# Patient Record
Sex: FEMALE | Race: WHITE | Employment: FULL TIME | URBAN - METROPOLITAN AREA
[De-identification: names, ages, dates, MRNs, and addresses within clinical notes are randomized per-mention and may not be internally consistent; named-entity substitution may affect disease eponyms.]

---

## 2018-06-07 ENCOUNTER — TELEPHONE (OUTPATIENT)
Dept: FAMILY MEDICINE CLINIC | Age: 58
End: 2018-06-07

## 2018-07-02 ENCOUNTER — OFFICE VISIT (OUTPATIENT)
Dept: FAMILY MEDICINE CLINIC | Age: 58
End: 2018-07-02
Payer: COMMERCIAL

## 2018-07-02 VITALS
HEART RATE: 82 BPM | DIASTOLIC BLOOD PRESSURE: 74 MMHG | RESPIRATION RATE: 16 BRPM | OXYGEN SATURATION: 96 % | WEIGHT: 112.8 LBS | SYSTOLIC BLOOD PRESSURE: 120 MMHG | TEMPERATURE: 97.5 F | BODY MASS INDEX: 19.99 KG/M2 | HEIGHT: 63 IN

## 2018-07-02 DIAGNOSIS — J40 BRONCHITIS: Primary | ICD-10-CM

## 2018-07-02 DIAGNOSIS — Z72.0 TOBACCO USE: ICD-10-CM

## 2018-07-02 PROCEDURE — 99203 OFFICE O/P NEW LOW 30 MIN: CPT | Performed by: FAMILY MEDICINE

## 2018-07-02 RX ORDER — DOXYCYCLINE HYCLATE 100 MG
100 TABLET ORAL 2 TIMES DAILY
Qty: 20 TABLET | Refills: 0 | Status: SHIPPED | OUTPATIENT
Start: 2018-07-02 | End: 2018-07-12

## 2018-07-02 RX ORDER — PREDNISONE 20 MG/1
20 TABLET ORAL 3 TIMES DAILY
Qty: 18 TABLET | Refills: 0 | Status: SHIPPED | OUTPATIENT
Start: 2018-07-02 | End: 2018-07-09 | Stop reason: ALTCHOICE

## 2018-07-02 ASSESSMENT — PATIENT HEALTH QUESTIONNAIRE - PHQ9
SUM OF ALL RESPONSES TO PHQ QUESTIONS 1-9: 0
1. LITTLE INTEREST OR PLEASURE IN DOING THINGS: 0
2. FEELING DOWN, DEPRESSED OR HOPELESS: 0
SUM OF ALL RESPONSES TO PHQ9 QUESTIONS 1 & 2: 0

## 2018-07-02 NOTE — PROGRESS NOTES
7/5/2018    Chief Complaint   Patient presents with    Cough     Pt has had cough since mid-May (clear mucus) -- has tried several OTC medications which have not helped    Other     Declined My Chart          Subjective      Since mother's day , puffer and zyrtec    Location  chest  Severity mod  Timing intermittent  modifying factor tried zyrtec and cough drops   Quality tightness  Duration 4 weeks+  context or assoc signs and sx cough mucus and wheezing  1. Bronchitis    - XR CHEST STANDARD (2 VW); Future        Goals      Wellness Goal            Patient Self-Management Goal for Health Maintenance  Goal: I will chose a goal related to tobacco cessation:  I will think about my triggers for smoking, I will think about reasons why I should quit smoking and I will try a nicotine replacement product or medication. I will schedule a yearly preventative care visit.   Barriers: fear of failure  Plan for overcoming my barriers: N/A  Confidence: 3/10  Anticipated Goal Completion Date: 3 mos          Goals   Review Of Systems    General:  No weight change no malaise no fatigue no change in appetite no sleep disturbance no fever/chills no night sweats  Skin:                 no abnormal pigmentation, no rash, no scaling,no itching,no Masses,no  hair or nail changes  Eyes:               no blurring, no diplopia, no  eye pain no glaucoma no cataracts  ENT:                 no hearing loss,no  Tinnitus,no  Vertigo,no osebleed, no nasal congestion, no rhinorrhea,  no sore throat no jaw pain no hoarseness no bleeding  Gums   ___ dentures  Neck:                no node tenderness , not rigid, no masses   Respiratory:            no cough, no sputum,  No coughing blood, no pleuritic , no chest pain, no dyspnea,  no wheezing  Cardiovascular:         no angina,  No chest pain  No syncope, no pedal edema , no orthopnea, no PND, no palpitations, no claudication  Gastrointestinal  no nausea, no vomiting, no heartburn, no diarrhea, no constipation, no bloating,  no abdominal pain, no rectal pain, no bleeding no hemorrhoids, no hernia  Genitourinary:           no urinary urgency, no frequency, no dysuria, no nocturia, hesitancy, no  Incontinence, no bleeding, no stones  Musculoskeletal:         no arthritis, no  arthralgia, no myalgia,no  weakness,  no morning stiffness, no joint swelling  Neurologic:                no paralysis, no resis,no  paresthesia, no seizures,no  tremors,no headaches, no tumors , no stroke, no speech issues,  No incoordination, no head trauma, no memory loss/concentration  Hematologic:            no anemia, no abnormal bleeding/bruising, no fever, no chills,no night sweats, no wollen glands, no unexplained weight loss  Endocrine:        no heat or cold intolerance and no polyphagia, polydipsia,  or polyuria  Psych:            No depression no anxiety, no suicidal or homicidal thoughts, no irritability, no decreased energy, no trouble falling asleep, no early awakening , no trouble concentrating             Objective:    No family history on file. Past Medical History:   Diagnosis Date    Depression        Social History     Social History    Marital status:      Spouse name: N/A    Number of children: N/A    Years of education: N/A     Occupational History    Not on file.      Social History Main Topics    Smoking status: Current Every Day Smoker     Packs/day: 1.50     Years: 30.00     Types: Cigarettes    Smokeless tobacco: Never Used    Alcohol use 1.2 oz/week     2 Glasses of wine per week      Comment: wine 2 x a week    Drug use: No    Sexual activity: Not on file     Other Topics Concern    Not on file     Social History Narrative    No narrative on file           Scheduled Meds:  Current Outpatient Prescriptions   Medication Sig Dispense Refill    doxycycline hyclate (VIBRA-TABS) 100 MG tablet Take 1 tablet by mouth 2 times daily for 10 days 20 tablet 0    predniSONE (DELTASONE) 20 MG tablet

## 2018-07-04 PROBLEM — Z72.0 TOBACCO USE: Status: ACTIVE | Noted: 2018-07-04

## 2018-07-09 ENCOUNTER — OFFICE VISIT (OUTPATIENT)
Dept: FAMILY MEDICINE CLINIC | Age: 58
End: 2018-07-09
Payer: COMMERCIAL

## 2018-07-09 VITALS
TEMPERATURE: 98.7 F | OXYGEN SATURATION: 97 % | SYSTOLIC BLOOD PRESSURE: 100 MMHG | BODY MASS INDEX: 20.44 KG/M2 | WEIGHT: 115.4 LBS | DIASTOLIC BLOOD PRESSURE: 60 MMHG | HEART RATE: 99 BPM | RESPIRATION RATE: 16 BRPM

## 2018-07-09 DIAGNOSIS — R05.9 COUGH: Primary | ICD-10-CM

## 2018-07-09 DIAGNOSIS — Z72.0 TOBACCO USE: ICD-10-CM

## 2018-07-09 PROCEDURE — 99213 OFFICE O/P EST LOW 20 MIN: CPT | Performed by: FAMILY MEDICINE

## 2018-07-09 NOTE — PROGRESS NOTES
urgency, no frequency, no dysuria, no nocturia, hesitancy, no  Incontinence, no bleeding, no stones  Musculoskeletal:         no arthritis, no  arthralgia, no myalgia,no  weakness,  no morning stiffness, no joint swelling  Neurologic:                no paralysis, no resis,no  paresthesia, no seizures,no  tremors,no headaches, no tumors , no stroke, no speech issues,  No incoordination, no head trauma, no memory loss/concentration  Hematologic:            no anemia, no abnormal bleeding/bruising, no fever, no chills,no night sweats, no wollen glands, no unexplained weight loss  Endocrine:        no heat or cold intolerance and no polyphagia, polydipsia,  or polyuria  Psych:            No depression no anxiety, no suicidal or homicidal thoughts, no irritability, no decreased energy, no trouble falling asleep, no early awakening , no trouble concentrating             Objective:    Family History   Problem Relation Age of Onset    Cancer Mother         Bone    Early Death Mother    [employer] Alzheimer's Disease Father     Cancer Father         Skin       Past Medical History:   Diagnosis Date    Depression        Social History     Social History    Marital status:      Spouse name: N/A    Number of children: N/A    Years of education: N/A     Occupational History    Not on file.      Social History Main Topics    Smoking status: Current Every Day Smoker     Packs/day: 1.50     Years: 30.00     Types: Cigarettes    Smokeless tobacco: Never Used    Alcohol use 1.2 oz/week     2 Glasses of wine per week      Comment: wine 2 x a week    Drug use: No    Sexual activity: Not on file     Other Topics Concern    Not on file     Social History Narrative    No narrative on file           Scheduled Meds:  Current Outpatient Prescriptions   Medication Sig Dispense Refill    doxycycline hyclate (VIBRA-TABS) 100 MG tablet Take 1 tablet by mouth 2 times daily for 10 days 20 tablet 0    VENTOLIN  (90 Base) MCG/ACT inhaler Inhale 2 puffs into the lungs 2 times daily        No current facility-administered medications for this visit. Wt Readings from Last 3 Encounters:   07/09/18 115 lb 6.4 oz (52.3 kg)   07/02/18 112 lb 12.8 oz (51.2 kg)   01/09/18 125 lb (56.7 kg)         Vitals:    07/09/18 1743   BP: 100/60   Pulse: 99   Resp: 16   Temp: 98.7 °F (37.1 °C)   SpO2: 97%       Physical Exam:    General: No obesity, no cachexia  Skin:    Warm and dry. Not pale, not flushed , no lesions, No rash , no bruises  HEENT:   PERRLA, EOMI. Conjunctiva clear, no  Drainage, no jaundice, ext canals normal ,no cerumen impaction,TM's normal ,not injected ,no fluid . Normal nasal mucosa not boggy , not inflamed . Septum normal, turbinates not swollen, oral mucosa moist , lips normal, teeth normal, tongue normal, salivary glands normal, no sinus tenderness, tmj normal, throat no injected, no cobblestoning, no drainage , tonsils normal no exudates  Neck:    Supple,No JVD, No thyromegaly, No carotid bruit, no adenopathy  Cardiac:   PMI Normal,  RRR, No gallop , no  murmur. No S3, no S4, no abd aortic bruit, normal pulses, , no pedal edema  Lungs:   CTA, symmetrical,  No wheezes no rales, Normal percussion, no use of accessory muscles,   Abdomen:  Normal bowel sounds, abd soft, non-tender, no rebound no guarding,no hepatomegaly, no splenomegaly, no hernia, no ascites, normal palpation  Extremities:   No clubbing, no edema no cyanosis. Pedal pulses ok  M/S:  Back normal,no kyphosis, no cva tenderness,  Head and neck normal rom, shoulders normal strength , , hips normal , gait normal , no cane or walker , no motor deficits, no clubbing, normal nails  Neurological:   A & O x 3, Moves all extremities,  No gross neuro deficits ,normal DTR, normal memory, normal judgement/insight, normal affect    Phy         Assessment:    1. Cough  IMPROVED WITH TREATMENT    2.  Tobacco use  ENCOURAGE STOP SMOKING                      4. Reviewed labs    5. No Follow-up on file.            Siomara Jennings M.D.    7/12/2018

## 2020-11-02 ENCOUNTER — APPOINTMENT (OUTPATIENT)
Dept: GENERAL RADIOLOGY | Age: 60
End: 2020-11-02
Payer: COMMERCIAL

## 2020-11-02 ENCOUNTER — HOSPITAL ENCOUNTER (EMERGENCY)
Age: 60
Discharge: HOME | End: 2020-11-02
Attending: EMERGENCY MEDICINE
Payer: COMMERCIAL

## 2020-11-02 VITALS
WEIGHT: 120 LBS | OXYGEN SATURATION: 98 % | DIASTOLIC BLOOD PRESSURE: 58 MMHG | HEART RATE: 91 BPM | TEMPERATURE: 97.5 F | BODY MASS INDEX: 19.99 KG/M2 | RESPIRATION RATE: 16 BRPM | SYSTOLIC BLOOD PRESSURE: 102 MMHG | HEIGHT: 65 IN

## 2020-11-02 PROCEDURE — 73560 X-RAY EXAM OF KNEE 1 OR 2: CPT

## 2020-11-02 PROCEDURE — 73610 X-RAY EXAM OF ANKLE: CPT

## 2020-11-02 PROCEDURE — 73630 X-RAY EXAM OF FOOT: CPT

## 2020-11-02 PROCEDURE — 6370000000 HC RX 637 (ALT 250 FOR IP): Performed by: EMERGENCY MEDICINE

## 2020-11-02 PROCEDURE — 99284 EMERGENCY DEPT VISIT MOD MDM: CPT

## 2020-11-02 RX ORDER — NAPROXEN 375 MG/1
375 TABLET ORAL 2 TIMES DAILY WITH MEALS
Qty: 60 TABLET | Refills: 0 | Status: SHIPPED | OUTPATIENT
Start: 2020-11-02

## 2020-11-02 RX ORDER — HYDROCODONE BITARTRATE AND ACETAMINOPHEN 5; 325 MG/1; MG/1
1 TABLET ORAL EVERY 4 HOURS PRN
Qty: 12 TABLET | Refills: 0 | Status: SHIPPED | OUTPATIENT
Start: 2020-11-02 | End: 2020-11-05

## 2020-11-02 RX ORDER — OXYCODONE HYDROCHLORIDE AND ACETAMINOPHEN 5; 325 MG/1; MG/1
1 TABLET ORAL ONCE
Status: COMPLETED | OUTPATIENT
Start: 2020-11-02 | End: 2020-11-02

## 2020-11-02 RX ADMIN — OXYCODONE AND ACETAMINOPHEN 1 TABLET: 5; 325 TABLET ORAL at 21:19

## 2020-11-02 ASSESSMENT — PAIN DESCRIPTION - DESCRIPTORS: DESCRIPTORS: ACHING;THROBBING;SHARP

## 2020-11-02 ASSESSMENT — ENCOUNTER SYMPTOMS
SHORTNESS OF BREATH: 0
COUGH: 0
ABDOMINAL PAIN: 0
BACK PAIN: 0

## 2020-11-02 ASSESSMENT — PAIN DESCRIPTION - FREQUENCY: FREQUENCY: CONTINUOUS

## 2020-11-02 ASSESSMENT — PAIN SCALES - GENERAL: PAINLEVEL_OUTOF10: 10

## 2020-11-02 ASSESSMENT — PAIN DESCRIPTION - LOCATION: LOCATION: ANKLE;KNEE

## 2020-11-02 ASSESSMENT — PAIN DESCRIPTION - PAIN TYPE: TYPE: ACUTE PAIN

## 2020-11-03 NOTE — ED PROVIDER NOTES
Patient is an extremly pleasant 61year old female with no signficant PMH who presents to the ED for evaluation after a fall. Patient states that she works at [employer] and was carrying something ealier in the afternoon trying to go through a door apparently. She states that her left foot got caught and she inverted the left foot and caused her to call. She states that she then fell to the ground on her right knee. Patient states that she never struck her head no did she pass out. Patient states that she now has pain to her left foot, ankle and right knee. She states that she has noticed swelling to her right knee and it has been more painful since the fall occurred. She has no hip pain, right ankle pain, or back pain. She has been taking Motrin which does not seem to provide much relief. The history is provided by the patient. No  was used. Review of Systems   Constitutional: Negative for fever. HENT: Negative for congestion. Eyes: Negative for visual disturbance. Respiratory: Negative for cough and shortness of breath. Cardiovascular: Negative for chest pain. Gastrointestinal: Negative for abdominal pain. Endocrine: Negative for polyuria. Genitourinary: Negative for dysuria. Musculoskeletal: Positive for joint swelling. Negative for back pain. Skin: Negative for rash. Allergic/Immunologic: Negative for immunocompromised state. Neurological: Negative for headaches. Hematological: Does not bruise/bleed easily. Psychiatric/Behavioral: Negative for confusion. Physical Exam  Vitals signs and nursing note reviewed. Constitutional:       General: She is not in acute distress. Appearance: She is well-developed. She is not ill-appearing. HENT:      Head: Normocephalic and atraumatic. Mouth/Throat:      Mouth: Mucous membranes are moist.   Eyes:      Extraocular Movements: Extraocular movements intact.       Pupils: Pupils are equal, round, and reactive to light. Neck:      Musculoskeletal: Normal range of motion and neck supple. Vascular: No JVD. Cardiovascular:      Rate and Rhythm: Normal rate and regular rhythm. Pulmonary:      Effort: Pulmonary effort is normal.      Breath sounds: No wheezing, rhonchi or rales. Chest:      Chest wall: No tenderness. Abdominal:      General: There is no distension. Palpations: Abdomen is soft. Tenderness: There is no abdominal tenderness. There is no guarding or rebound. Hernia: No hernia is present. Musculoskeletal:      Comments: No midline c-spine, t-spine or l-spine tenderness to palpation or stepoffs. No hip tenderness to palpation bilaterally or instability. Full ROM of bilateral upper and lower extremities. Signifcant effusion to right knee, pulses intat to DP and PT of right leg, no ankle or foot right tenderness. NV intact. Compartments soft. Limited ROM of right knee secondary to pain. Slight tenderness to lateral aspect of left malleous, pulses intact, NV intact, compartments soft and compressible with full ROM of left foot and ankle   Skin:     General: Skin is warm and dry. Capillary Refill: Capillary refill takes less than 2 seconds. Neurological:      General: No focal deficit present. Mental Status: She is alert and oriented to person, place, and time. Cranial Nerves: No cranial nerve deficit. Psychiatric:         Mood and Affect: Mood normal.         Behavior: Behavior normal.         Procedures    MDM  Number of Diagnoses or Management Options  Effusion of right knee:   Fall, initial encounter:   Left foot pain:   Diagnosis management comments: Patient presented to the ED for evaluation of a fall that occurred earlir today. Patient had no acute fractures on imaging. She did have significant swelling and effusion to her right knee but had no signs of any emergent pathology.  Patient was advised to use RICE as well as follow up with her orthopedist as there is potential for meniscus/ligamentous injury. Patient was given brace as well as crutches and given strict return precautions.              --------------------------------------------- PAST HISTORY ---------------------------------------------  Past Medical History:  has a past medical history of Depression. Past Surgical History:  has a past surgical history that includes  section and ovarian cyst removal.    Social History:  reports that she has been smoking cigarettes. She has a 45.00 pack-year smoking history. She has never used smokeless tobacco. She reports current alcohol use of about 2.0 standard drinks of alcohol per week. She reports that she does not use drugs. Family History: family history includes Alzheimer's Disease in her father; Cancer in her father and mother; Early Death in her mother. The patients home medications have been reviewed. Allergies: Patient has no known allergies. -------------------------------------------------- RESULTS -------------------------------------------------  Labs:  No results found for this visit on 20. Radiology:  XR ANKLE LEFT (MIN 3 VIEWS)   Final Result   No evidence of fracture or joint dislocation in the left ankle or foot. XR FOOT LEFT (MIN 3 VIEWS)   Final Result   No evidence of fracture or joint dislocation in the left ankle or foot. XR KNEE RIGHT (1-2 VIEWS)   Final Result   1. Small suprapatellar synovial effusion. 2.  No fracture or dislocation.             ------------------------- NURSING NOTES AND VITALS REVIEWED ---------------------------  Date / Time Roomed:  2020  8:32 PM  ED Bed Assignment:      The nursing notes within the ED encounter and vital signs as below have been reviewed.    BP (!) 102/58   Pulse 91   Temp 97.5 °F (36.4 °C) (Infrared)   Resp 16   Ht 5' 5\" (1.651 m)   Wt 120 lb (54.4 kg)   SpO2 98%   BMI 19.97 kg/m²   Oxygen Saturation Interpretation: Normal      ------------------------------------------ PROGRESS NOTES ------------------------------------------  9:37 PM EST  I have spoken with the patient and discussed todays results, in addition to providing specific details for the plan of care and counseling regarding the diagnosis and prognosis. Their questions are answered at this time and they are agreeable with the plan. I discussed at length with them reasons for immediate return here for re evaluation. They will followup with Dr. Isaias Mooney.      --------------------------------- ADDITIONAL PROVIDER NOTES ---------------------------------  At this time the patient is without objective evidence of an acute process requiring hospitalization or inpatient management. They have remained hemodynamically stable throughout their entire ED visit and are stable for discharge with outpatient follow-up. The plan has been discussed in detail and they are aware of the specific conditions for emergent return, as well as the importance of follow-up. Discharge Medication List as of 11/2/2020  9:22 PM      START taking these medications    Details   HYDROcodone-acetaminophen (NORCO) 5-325 MG per tablet Take 1 tablet by mouth every 4 hours as needed for Pain for up to 3 days. Intended supply: 3 days. Take lowest dose possible to manage pain, Disp-12 tablet,R-0Print      naproxen (NAPROSYN) 375 MG tablet Take 1 tablet by mouth 2 times daily (with meals), Disp-60 tablet,R-0Print             Diagnosis:  1. Effusion of right knee    2. Fall, initial encounter    3. Left foot pain        Disposition:  Patient's disposition: Discharge to home  Patient's condition is stable.           César Ramirez DO  11/03/20 1221

## 2020-11-03 NOTE — ED NOTES
Knee immobilizer applied to right knee per verbal order from Dr. Tori Galaviz.   Pt also given crutches per verbal order     Balatzar Katz RN  11/02/20 8733

## 2020-12-04 ENCOUNTER — HOSPITAL ENCOUNTER (OUTPATIENT)
Dept: MRI IMAGING | Age: 60
Discharge: HOME | End: 2020-12-06
Payer: COMMERCIAL

## 2020-12-04 PROCEDURE — 73721 MRI JNT OF LWR EXTRE W/O DYE: CPT

## 2022-09-20 LAB — PAP SMEAR, EXTERNAL: NORMAL

## 2022-10-05 LAB
MAMMOGRAPHY, EXTERNAL: NORMAL
MAMMOGRAPHY, EXTERNAL: NORMAL

## 2023-06-07 ENCOUNTER — OFFICE VISIT (OUTPATIENT)
Dept: FAMILY MEDICINE CLINIC | Age: 63
End: 2023-06-07
Payer: COMMERCIAL

## 2023-06-07 VITALS
OXYGEN SATURATION: 97 % | TEMPERATURE: 97.7 F | WEIGHT: 133.2 LBS | RESPIRATION RATE: 16 BRPM | DIASTOLIC BLOOD PRESSURE: 74 MMHG | BODY MASS INDEX: 22.19 KG/M2 | HEIGHT: 65 IN | SYSTOLIC BLOOD PRESSURE: 132 MMHG | HEART RATE: 72 BPM

## 2023-06-07 DIAGNOSIS — Z00.00 ANNUAL PHYSICAL EXAM: Primary | ICD-10-CM

## 2023-06-07 PROCEDURE — 99396 PREV VISIT EST AGE 40-64: CPT | Performed by: FAMILY MEDICINE

## 2023-06-07 SDOH — ECONOMIC STABILITY: FOOD INSECURITY: WITHIN THE PAST 12 MONTHS, THE FOOD YOU BOUGHT JUST DIDN'T LAST AND YOU DIDN'T HAVE MONEY TO GET MORE.: NEVER TRUE

## 2023-06-07 SDOH — ECONOMIC STABILITY: INCOME INSECURITY: HOW HARD IS IT FOR YOU TO PAY FOR THE VERY BASICS LIKE FOOD, HOUSING, MEDICAL CARE, AND HEATING?: NOT HARD AT ALL

## 2023-06-07 SDOH — ECONOMIC STABILITY: HOUSING INSECURITY
IN THE LAST 12 MONTHS, WAS THERE A TIME WHEN YOU DID NOT HAVE A STEADY PLACE TO SLEEP OR SLEPT IN A SHELTER (INCLUDING NOW)?: NO

## 2023-06-07 SDOH — ECONOMIC STABILITY: FOOD INSECURITY: WITHIN THE PAST 12 MONTHS, YOU WORRIED THAT YOUR FOOD WOULD RUN OUT BEFORE YOU GOT MONEY TO BUY MORE.: NEVER TRUE

## 2023-06-07 ASSESSMENT — PATIENT HEALTH QUESTIONNAIRE - PHQ9
SUM OF ALL RESPONSES TO PHQ QUESTIONS 1-9: 0
2. FEELING DOWN, DEPRESSED OR HOPELESS: 0
SUM OF ALL RESPONSES TO PHQ9 QUESTIONS 1 & 2: 0
SUM OF ALL RESPONSES TO PHQ QUESTIONS 1-9: 0
1. LITTLE INTEREST OR PLEASURE IN DOING THINGS: 0

## 2023-06-09 ENCOUNTER — HOSPITAL ENCOUNTER (OUTPATIENT)
Age: 63
Discharge: HOME | End: 2023-06-09
Payer: COMMERCIAL

## 2023-06-09 DIAGNOSIS — Z00.00 ANNUAL PHYSICAL EXAM: ICD-10-CM

## 2023-06-09 LAB
ALBUMIN SERPL-MCNC: 4.4 G/DL (ref 3.5–5.2)
ALP SERPL-CCNC: 70 U/L (ref 35–104)
ALT SERPL-CCNC: 8 U/L (ref 0–32)
ANION GAP SERPL CALCULATED.3IONS-SCNC: 12 MMOL/L (ref 7–16)
AST SERPL-CCNC: 13 U/L (ref 0–31)
BILIRUB SERPL-MCNC: 0.8 MG/DL (ref 0–1.2)
BUN SERPL-MCNC: 7 MG/DL (ref 6–23)
CALCIUM SERPL-MCNC: 9.6 MG/DL (ref 8.6–10.2)
CHLORIDE SERPL-SCNC: 101 MMOL/L (ref 98–107)
CHOLESTEROL, TOTAL: 161 MG/DL (ref 0–199)
CO2 SERPL-SCNC: 26 MMOL/L (ref 22–29)
CREAT SERPL-MCNC: 0.7 MG/DL (ref 0.5–1)
ERYTHROCYTE [DISTWIDTH] IN BLOOD BY AUTOMATED COUNT: 12.9 FL (ref 11.5–15)
GLUCOSE SERPL-MCNC: 92 MG/DL (ref 74–99)
HCT VFR BLD AUTO: 43.1 % (ref 34–48)
HDLC SERPL-MCNC: 46 MG/DL
HGB BLD-MCNC: 14 G/DL (ref 11.5–15.5)
LDLC SERPL CALC-MCNC: 90 MG/DL (ref 0–99)
MCH RBC QN AUTO: 29 PG (ref 26–35)
MCHC RBC AUTO-ENTMCNC: 32.5 % (ref 32–34.5)
MCV RBC AUTO: 89.4 FL (ref 80–99.9)
PLATELET # BLD AUTO: 281 E9/L (ref 130–450)
PMV BLD AUTO: 10.4 FL (ref 7–12)
POTASSIUM SERPL-SCNC: 4.4 MMOL/L (ref 3.5–5)
PROT SERPL-MCNC: 7.4 G/DL (ref 6.4–8.3)
RBC # BLD AUTO: 4.82 E12/L (ref 3.5–5.5)
SODIUM SERPL-SCNC: 139 MMOL/L (ref 132–146)
TRIGL SERPL-MCNC: 127 MG/DL (ref 0–149)
VLDLC SERPL CALC-MCNC: 25 MG/DL
WBC # BLD: 10 E9/L (ref 4.5–11.5)

## 2023-06-09 PROCEDURE — 80053 COMPREHEN METABOLIC PANEL: CPT

## 2023-06-09 PROCEDURE — 36415 COLL VENOUS BLD VENIPUNCTURE: CPT

## 2023-06-09 PROCEDURE — 80061 LIPID PANEL: CPT

## 2023-06-09 PROCEDURE — 85027 COMPLETE CBC AUTOMATED: CPT

## 2024-04-15 ENCOUNTER — APPOINTMENT (OUTPATIENT)
Dept: CT IMAGING | Age: 64
DRG: 181 | End: 2024-04-15
Payer: COMMERCIAL

## 2024-04-15 ENCOUNTER — OFFICE VISIT (OUTPATIENT)
Dept: FAMILY MEDICINE CLINIC | Age: 64
End: 2024-04-15
Payer: COMMERCIAL

## 2024-04-15 ENCOUNTER — HOSPITAL ENCOUNTER (INPATIENT)
Age: 64
LOS: 3 days | Discharge: HOME | DRG: 181 | End: 2024-04-18
Attending: STUDENT IN AN ORGANIZED HEALTH CARE EDUCATION/TRAINING PROGRAM | Admitting: FAMILY MEDICINE
Payer: COMMERCIAL

## 2024-04-15 VITALS
SYSTOLIC BLOOD PRESSURE: 116 MMHG | TEMPERATURE: 98.6 F | HEART RATE: 102 BPM | DIASTOLIC BLOOD PRESSURE: 68 MMHG | HEIGHT: 65 IN | BODY MASS INDEX: 19.16 KG/M2 | WEIGHT: 115 LBS | OXYGEN SATURATION: 97 %

## 2024-04-15 DIAGNOSIS — R91.8 MASS OF UPPER LOBE OF RIGHT LUNG: Primary | ICD-10-CM

## 2024-04-15 DIAGNOSIS — R05.2 SUBACUTE COUGH: ICD-10-CM

## 2024-04-15 DIAGNOSIS — F43.20 ADJUSTMENT DISORDER, UNSPECIFIED TYPE: ICD-10-CM

## 2024-04-15 DIAGNOSIS — F41.9 ANXIETY: ICD-10-CM

## 2024-04-15 LAB
ALBUMIN SERPL-MCNC: 4 G/DL (ref 3.5–5.2)
ALP SERPL-CCNC: 82 U/L (ref 35–104)
ALT SERPL-CCNC: 12 U/L (ref 0–32)
ANION GAP SERPL CALCULATED.3IONS-SCNC: 9 MMOL/L (ref 7–16)
AST SERPL-CCNC: 15 U/L (ref 0–31)
BASOPHILS # BLD: 0.05 K/UL (ref 0–0.2)
BASOPHILS NFR BLD: 0 % (ref 0–2)
BILIRUB SERPL-MCNC: 0.7 MG/DL (ref 0–1.2)
BUN SERPL-MCNC: 5 MG/DL (ref 6–23)
CALCIUM SERPL-MCNC: 10.6 MG/DL (ref 8.6–10.2)
CHLORIDE SERPL-SCNC: 101 MMOL/L (ref 98–107)
CO2 SERPL-SCNC: 30 MMOL/L (ref 22–29)
CREAT SERPL-MCNC: 0.5 MG/DL (ref 0.5–1)
EOSINOPHIL # BLD: 0.09 K/UL (ref 0.05–0.5)
EOSINOPHILS RELATIVE PERCENT: 1 % (ref 0–6)
ERYTHROCYTE [DISTWIDTH] IN BLOOD BY AUTOMATED COUNT: 14.8 % (ref 11.5–15)
GFR SERPL CREATININE-BSD FRML MDRD: >90 ML/MIN/1.73M2
GLUCOSE SERPL-MCNC: 103 MG/DL (ref 74–99)
HCT VFR BLD AUTO: 37.3 % (ref 34–48)
HGB BLD-MCNC: 11.1 G/DL (ref 11.5–15.5)
IMM GRANULOCYTES # BLD AUTO: 0.05 K/UL (ref 0–0.58)
IMM GRANULOCYTES NFR BLD: 0 % (ref 0–5)
LYMPHOCYTES NFR BLD: 2.23 K/UL (ref 1.5–4)
LYMPHOCYTES RELATIVE PERCENT: 16 % (ref 20–42)
MCH RBC QN AUTO: 24.3 PG (ref 26–35)
MCHC RBC AUTO-ENTMCNC: 29.8 G/DL (ref 32–34.5)
MCV RBC AUTO: 81.8 FL (ref 80–99.9)
MONOCYTES NFR BLD: 0.71 K/UL (ref 0.1–0.95)
MONOCYTES NFR BLD: 5 % (ref 2–12)
NEUTROPHILS NFR BLD: 77 % (ref 43–80)
NEUTS SEG NFR BLD: 10.48 K/UL (ref 1.8–7.3)
PLATELET # BLD AUTO: 450 K/UL (ref 130–450)
PMV BLD AUTO: 9.9 FL (ref 7–12)
POTASSIUM SERPL-SCNC: 4.8 MMOL/L (ref 3.5–5)
PROCALCITONIN SERPL-MCNC: 0.05 NG/ML (ref 0–0.08)
PROT SERPL-MCNC: 7.6 G/DL (ref 6.4–8.3)
RBC # BLD AUTO: 4.56 M/UL (ref 3.5–5.5)
SODIUM SERPL-SCNC: 140 MMOL/L (ref 132–146)
WBC OTHER # BLD: 13.6 K/UL (ref 4.5–11.5)

## 2024-04-15 PROCEDURE — 99205 OFFICE O/P NEW HI 60 MIN: CPT | Performed by: PHYSICIAN ASSISTANT

## 2024-04-15 PROCEDURE — 6360000004 HC RX CONTRAST MEDICATION: Performed by: RADIOLOGY

## 2024-04-15 PROCEDURE — 85025 COMPLETE CBC W/AUTO DIFF WBC: CPT

## 2024-04-15 PROCEDURE — 80053 COMPREHEN METABOLIC PANEL: CPT

## 2024-04-15 PROCEDURE — 99285 EMERGENCY DEPT VISIT HI MDM: CPT

## 2024-04-15 PROCEDURE — 2580000003 HC RX 258

## 2024-04-15 PROCEDURE — 71260 CT THORAX DX C+: CPT

## 2024-04-15 PROCEDURE — 84145 PROCALCITONIN (PCT): CPT

## 2024-04-15 PROCEDURE — 3006F CXR DOC REV: CPT | Performed by: PHYSICIAN ASSISTANT

## 2024-04-15 PROCEDURE — 6370000000 HC RX 637 (ALT 250 FOR IP)

## 2024-04-15 PROCEDURE — 2060000000 HC ICU INTERMEDIATE R&B

## 2024-04-15 PROCEDURE — 94664 DEMO&/EVAL PT USE INHALER: CPT

## 2024-04-15 RX ORDER — SODIUM CHLORIDE 9 MG/ML
INJECTION, SOLUTION INTRAVENOUS PRN
Status: DISCONTINUED | OUTPATIENT
Start: 2024-04-15 | End: 2024-04-18 | Stop reason: HOSPADM

## 2024-04-15 RX ORDER — IPRATROPIUM BROMIDE AND ALBUTEROL SULFATE 2.5; .5 MG/3ML; MG/3ML
1 SOLUTION RESPIRATORY (INHALATION)
Status: DISCONTINUED | OUTPATIENT
Start: 2024-04-15 | End: 2024-04-18 | Stop reason: HOSPADM

## 2024-04-15 RX ORDER — ACETAMINOPHEN 325 MG/1
650 TABLET ORAL EVERY 6 HOURS PRN
Status: DISCONTINUED | OUTPATIENT
Start: 2024-04-15 | End: 2024-04-18 | Stop reason: HOSPADM

## 2024-04-15 RX ORDER — POLYETHYLENE GLYCOL 3350 17 G/17G
17 POWDER, FOR SOLUTION ORAL DAILY PRN
Status: DISCONTINUED | OUTPATIENT
Start: 2024-04-15 | End: 2024-04-18 | Stop reason: HOSPADM

## 2024-04-15 RX ORDER — ONDANSETRON 4 MG/1
4 TABLET, ORALLY DISINTEGRATING ORAL EVERY 8 HOURS PRN
Status: DISCONTINUED | OUTPATIENT
Start: 2024-04-15 | End: 2024-04-18 | Stop reason: HOSPADM

## 2024-04-15 RX ORDER — ONDANSETRON 2 MG/ML
4 INJECTION INTRAMUSCULAR; INTRAVENOUS EVERY 6 HOURS PRN
Status: DISCONTINUED | OUTPATIENT
Start: 2024-04-15 | End: 2024-04-18 | Stop reason: HOSPADM

## 2024-04-15 RX ORDER — ACETAMINOPHEN 650 MG/1
650 SUPPOSITORY RECTAL EVERY 6 HOURS PRN
Status: DISCONTINUED | OUTPATIENT
Start: 2024-04-15 | End: 2024-04-18 | Stop reason: HOSPADM

## 2024-04-15 RX ORDER — ENOXAPARIN SODIUM 100 MG/ML
40 INJECTION SUBCUTANEOUS DAILY
Status: DISCONTINUED | OUTPATIENT
Start: 2024-04-15 | End: 2024-04-18 | Stop reason: HOSPADM

## 2024-04-15 RX ORDER — SODIUM CHLORIDE 0.9 % (FLUSH) 0.9 %
5-40 SYRINGE (ML) INJECTION PRN
Status: DISCONTINUED | OUTPATIENT
Start: 2024-04-15 | End: 2024-04-18 | Stop reason: HOSPADM

## 2024-04-15 RX ORDER — ACETAMINOPHEN 325 MG/1
650 TABLET ORAL EVERY 6 HOURS PRN
COMMUNITY

## 2024-04-15 RX ORDER — GUAIFENESIN 400 MG/1
400 TABLET ORAL 4 TIMES DAILY PRN
COMMUNITY

## 2024-04-15 RX ORDER — SODIUM CHLORIDE 0.9 % (FLUSH) 0.9 %
5-40 SYRINGE (ML) INJECTION EVERY 12 HOURS SCHEDULED
Status: DISCONTINUED | OUTPATIENT
Start: 2024-04-15 | End: 2024-04-18 | Stop reason: HOSPADM

## 2024-04-15 RX ADMIN — SODIUM CHLORIDE, PRESERVATIVE FREE 10 ML: 5 INJECTION INTRAVENOUS at 21:22

## 2024-04-15 RX ADMIN — IPRATROPIUM BROMIDE AND ALBUTEROL SULFATE 1 DOSE: 2.5; .5 SOLUTION RESPIRATORY (INHALATION) at 20:33

## 2024-04-15 RX ADMIN — IOPAMIDOL 75 ML: 755 INJECTION, SOLUTION INTRAVENOUS at 14:12

## 2024-04-15 ASSESSMENT — ENCOUNTER SYMPTOMS
EYE REDNESS: 0
CONSTIPATION: 0
CHOKING: 0
WHEEZING: 0
SHORTNESS OF BREATH: 0
EYE DISCHARGE: 0
VOMITING: 0
NAUSEA: 0
DIARRHEA: 0
COUGH: 1
SINUS PRESSURE: 0
SORE THROAT: 0
EYE PAIN: 0
WHEEZING: 1
BACK PAIN: 0
ABDOMINAL DISTENTION: 0

## 2024-04-15 ASSESSMENT — LIFESTYLE VARIABLES
HOW MANY STANDARD DRINKS CONTAINING ALCOHOL DO YOU HAVE ON A TYPICAL DAY: PATIENT DOES NOT DRINK
HOW OFTEN DO YOU HAVE A DRINK CONTAINING ALCOHOL: NEVER

## 2024-04-15 ASSESSMENT — PAIN - FUNCTIONAL ASSESSMENT
PAIN_FUNCTIONAL_ASSESSMENT: NONE - DENIES PAIN
PAIN_FUNCTIONAL_ASSESSMENT: NONE - DENIES PAIN

## 2024-04-15 NOTE — PROGRESS NOTES
Database initiated pharmacy and medications verified with the patient. She is A&O comes in from home with . She's been losing some weight she contributes to having work done on her teeth making it hard to eat.

## 2024-04-15 NOTE — ED NOTES
ED to Inpatient Handoff Report    Notified Justino that electronic handoff available and patient ready for transport to room 423.    Safety Risks: None identified     Patient in Restraints: no    Constant Observer or Patient : no    Telemetry Monitoring Ordered: No          Order to transfer to unit without monitor: NA    Last MEWS: 1 Time completed: 1835    Deterioration Index: 23.69    Vitals:    04/15/24 1210 04/15/24 1835   BP: 120/80 121/75   Pulse: 98 95   Resp: 16 16   Temp: 98.4 °F (36.9 °C) 98.6 °F (37 °C)   TempSrc: Oral Oral   SpO2: 100% 100%   Weight: 52.2 kg (115 lb)    Height: 1.651 m (5' 5\")        Opportunity for questions and clarification was provided.

## 2024-04-15 NOTE — ED PROVIDER NOTES
Department of Emergency Medicine   ED  Provider Note  Admit Date/RoomTime: 4/15/2024 12:23 PM  ED Room: 0423/0423-A              LANE     Mary Purdy is a 64 y.o. female with a PMHx significant for None who presents for evaluation of cough and abnormal chest x-ray, beginning prior to arrival.  The complaint has been persistent, moderate in severity, and worsened by nothing.   The patient states that she has severe past medical history of tobacco abuse.  Denies any other medical problems.  States she has had a cough for the last 4 to 6 weeks, called her PCP and was told to go to walk-in urgent care for chest x-ray.  Was told there is an abnormality on the chest x-ray and sent here for further ration treatment.  Denies any fevers, chills, chest pain, shortness of breath.  States she has had a dry cough, noticing some crackles in the right chest when this occurred.  States that she recent has been having dental work performed, she has been losing weight but she believes secondary to that.     Review of Systems   Constitutional:  Negative for chills and fever.   HENT:  Negative for ear pain, sinus pressure and sore throat.    Eyes:  Negative for pain, discharge and redness.   Respiratory:  Positive for cough. Negative for shortness of breath and wheezing.    Cardiovascular:  Negative for chest pain.   Gastrointestinal:  Negative for abdominal distention, diarrhea, nausea and vomiting.   Genitourinary:  Negative for dysuria and frequency.   Musculoskeletal:  Negative for arthralgias and back pain.   Skin:  Negative for rash and wound.   Neurological:  Negative for weakness and headaches.   Hematological:  Negative for adenopathy.   All other systems reviewed and are negative.       Physical Exam  Vitals and nursing note reviewed.   Constitutional:       General: She is not in acute distress.     Appearance: Normal appearance. She is well-developed. She is not ill-appearing.   HENT:      Head: Normocephalic and  atraumatic.      Right Ear: External ear normal.      Left Ear: External ear normal.   Eyes:      General:         Right eye: No discharge.         Left eye: No discharge.      Extraocular Movements: Extraocular movements intact.      Conjunctiva/sclera: Conjunctivae normal.   Cardiovascular:      Rate and Rhythm: Normal rate and regular rhythm.      Heart sounds: Normal heart sounds. No murmur heard.  Pulmonary:      Effort: Pulmonary effort is normal. No respiratory distress.      Breath sounds: Normal breath sounds. No stridor.   Abdominal:      General: There is no distension.      Palpations: Abdomen is soft. There is no mass.   Musculoskeletal:      Cervical back: Normal range of motion and neck supple.   Skin:     General: Skin is warm and dry.      Coloration: Skin is not jaundiced or pale.   Neurological:      General: No focal deficit present.      Mental Status: She is alert.          Procedures    [facility]       ED Course as of 04/17/24 0845   Mon Apr 15, 2024   1751 Spoke with Dr. Houston, family medicine he will admit the patient. [BB]      ED Course User Index  [BB] Zeeshan Cramer DO        Differential diagnosis: Mass, pneumonia, pleural effusion to name a few  Review of ED/ Outpatient Records: On chart review patient was seen earlier today on 04/15/2024 at [facility], chest x-ray was performed at that time this demonstrated 8.5 cm right upper lobe mass new since prior study  Historians that case was discussed with: none  My EKG interpretation: See ED course  Imaging Non-plain film images such as CT, Ultrasound and MRI are read by the radiologist. Plain radiographic images are visualized and preliminarily interpreted by the ED provider:none  Discussed with other providers: Dr. Houston, Family medicine  Tests Considered but not ordered: none  Decision making tools/risks stratification / MDM / Independent Interpretation of labs: Mary Purdy presents to the ED for evaluation of cough and

## 2024-04-15 NOTE — ED NOTES
Spoke to [facility], aware that patient will be arriving. Also, aware that patient is not in gown d/t in hallway bed.

## 2024-04-15 NOTE — ED TRIAGE NOTES
Department of Emergency Medicine    FIRST PROVIDER TRIAGE NOTE             Independent MLP           4/15/24  12:13 PM EDT    Date of Encounter: 4/15/24   MRN: [id number]    Vitals:    04/15/24 1210   BP: 120/80   Pulse: 98   Resp: 16   Temp: 98.4 °F (36.9 °C)   TempSrc: Oral   SpO2: 100%   Weight: 52.2 kg (115 lb)   Height: 1.651 m (5' 5\")        HPI: Mary Purdy is a 63 y.o. female who presents to the ED for Cough (6 weeks) and Abnormal Chest X-ray (Top right lung)     Xray Result (most recent):  XR CHEST STANDARD TWO VW 04/15/2024    Narrative  EXAMINATION:  TWO XRAY VIEWS OF THE CHEST    4/15/2024 10:38 am    COMPARISON:  None.    HISTORY:  ORDERING SYSTEM PROVIDED HISTORY: Cough, unspecified type  TECHNOLOGIST PROVIDED HISTORY:  Reason for exam:->cough for 6 weeks    FINDINGS:  There is 8.5 cm right upper lobe mass, new since prior study.  The left lung  is clear.  The heart is not enlarged.  There is no pneumothorax or pleural  effusion.    Impression  8.5 cm right upper lobe mass, new since prior study. CT chest with contrast  is recommended for further evaluation.    ROS: Negative for abd pain, back pain, or fever.    Physical Exam:   Gen Appearance/Constitutional: alert  CV: regular rate     Initial Plan of Care: All treatment areas with department are currently occupied.     Plan to order/Initiate the following while awaiting opening in ED: Triage evaluation  labs.    Provider-Patient relationship only established for Provider In Triage (PIT).  Full assessment, HPI and examination not performed.  Discussed with patient that the provider in triage evaluation is not a comprehensive medical screening exam and this is not yet possible at the end of the provider in triage counter, to state whether or not an emergency medical condition exist  Secondary to high volume, low staffing, and/or boarding- patient to await bed availability.  This ends my PIT-Patient relationship.  Exam limited due to being in  triage. Focused exam only. Care of patient relinquished after triage    Initial Plan of Care: Initiate Treatment-Testing, Proceed toTreatment Area When Bed Available for ED Attending/MLP to Continue Care    Electronically signed by Gabrielle Umaña PA-C   DD: 4/15/24

## 2024-04-15 NOTE — H&P
Formerly [facility]  Resident History and Physical      Chief Complaint:    Chief Complaint   Patient presents with    Cough     6 weeks    Abnormal Chest X-ray     Top right lung        History of Present Illness:   Mary Purdy  is a 63 y.o. female patient of Yue Nobles DO  with a pertinent PMHx of tobacco use who presented to the ED from urgent care with chief complaint of cough.  Patient reports that she has had an ongoing cough for the past 6 weeks.  She reports some intermittent wheezing with this cough.  Patient states that she is  roughly 12 pounds with Pacira weeks.  She currently smokes 1/4 pack a day and has a 30-year pack history.  She denies any fevers chills chest or back pain nausea or vomiting or bowel issues.    She was evaluated in urgent care today for this issue.  At that time chest x-ray was performed which identified in 8.5 cm mass in the right upper lobe.     In the ED vitals are notable for a heart rate of 102 blood pressure 116/68 with an O2 sat of 100%.  Labs notable for a calcium of 10.6 white count of 13.6, hemoglobin 11.1.  CT chest shows a centrally necrotic mass in the right upper lobe posteriorly 7.4 cm  consistent with malignancy until proven otherwise. Bulky mediastinal and right hilar adenopathy consistent with metastatic adenopathy.    Patient was admitted for new lung mass.      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    Social History:    reports that she has been smoking cigarettes. She has a 30.0 pack-year smoking history. She has never used smokeless tobacco. She reports current alcohol use of about 2.0 standard drinks of alcohol per week. She  reports that she does not use drugs.    Family History:   family history includes Alzheimer's Disease in her father; Cancer in her father and mother; Early Death in her mother.    ROS:   Review of Systems   Constitutional:  Positive for unexpected weight change. Negative for chills and fever.   Respiratory:  Positive for cough and wheezing. Negative for choking and shortness of breath.    Cardiovascular:  Negative for chest pain and palpitations.   Gastrointestinal:  Negative for constipation and diarrhea.   Genitourinary:  Negative for difficulty urinating and dysuria.   Neurological:  Negative for dizziness and headaches.       Physical Exam:    Vitals:  /80   Pulse 98   Temp 98.4 °F (36.9 °C) (Oral)   Resp 16   Ht 1.651 m (5' 5\")   Wt 52.2 kg (115 lb)   SpO2 100%   BMI 19.14 kg/m²     Physical Exam  Constitutional:       Appearance: Normal appearance. She is normal weight.   HENT:      Head: Normocephalic and atraumatic.      Mouth/Throat:      Mouth: Mucous membranes are moist.      Pharynx: Oropharynx is clear.   Eyes:      Extraocular Movements: Extraocular movements intact.      Pupils: Pupils are equal, round, and reactive to light.   Cardiovascular:      Rate and Rhythm: Normal rate and regular rhythm.      Pulses: Normal pulses.      Heart sounds: No murmur heard.  Pulmonary:      Effort: Pulmonary effort is normal.      Breath sounds: Wheezing (Lateralized to the right lung fields) and rhonchi (Right upper lung fields) present.   Abdominal:      General: Abdomen is flat. Bowel sounds are normal.   Musculoskeletal:      Right lower leg: No edema.      Left lower leg: No edema.   Skin:     General: Skin is warm and dry.      Coloration: Skin is not jaundiced.   Neurological:      Mental Status: She is alert.            Labs:  Recent Results (from the past 24 hour(s))   CBC with Auto Differential    Collection Time: 04/15/24  1:01 PM   Result Value Ref Range    WBC 13.6 (H) 4.5 - 11.5 k/uL

## 2024-04-15 NOTE — PROGRESS NOTES
4/15/24  Mary Purdy : 1960 Sex: female  Age 63 y.o.      Subjective:  Chief Complaint   Patient presents with    Cough     Would like to see provider.    crackling     Hear crackles when exhales         HPI:   HPI  Mary Purdy , 63 y.o. female presents to [facility] care for evaluation of cough.  The patient has had this cough ongoing for about 6 weeks.  The patient has not been seen or evaluated for this.  The patient is noted to be slightly tachycardic here.  The patient states that the cough seems to be worsening.  The patient is occasionally bringing up some yellow sputum.  The patient has been taking some over-the-counter medication without much improvement.  The patient has noted that she has lost about 15 pounds in the last couple of weeks.  The patient is a smoker.  The patient states that when she exhales she can hear some crackling.  The patient is not in any apparent distress.  No fevers.  No night sweats.        ROS:   Unless otherwise stated in this report the patient's positive and negative responses for review of systems for constitutional, eyes, ENT, cardiovascular, respiratory, gastrointestinal, neurological, , musculoskeletal, and integument systems and related systems to the presenting problem are either stated in the history of present illness or were not pertinent or were negative for the symptoms and/or complaints related to the presenting medical problem.  Positives and pertinent negatives as per HPI.  All others reviewed and are negative.      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

## 2024-04-16 ENCOUNTER — APPOINTMENT (OUTPATIENT)
Dept: CT IMAGING | Age: 64
DRG: 181 | End: 2024-04-16
Payer: COMMERCIAL

## 2024-04-16 LAB
25(OH)D3 SERPL-MCNC: 13.8 NG/ML (ref 30–100)
ALBUMIN SERPL-MCNC: 3.7 G/DL (ref 3.5–5.2)
ALP SERPL-CCNC: 76 U/L (ref 35–104)
ALT SERPL-CCNC: 10 U/L (ref 0–32)
ANION GAP SERPL CALCULATED.3IONS-SCNC: 9 MMOL/L (ref 7–16)
AST SERPL-CCNC: 16 U/L (ref 0–31)
BASOPHILS # BLD: 0.06 K/UL (ref 0–0.2)
BASOPHILS NFR BLD: 1 % (ref 0–2)
BILIRUB SERPL-MCNC: 0.7 MG/DL (ref 0–1.2)
BUN SERPL-MCNC: 5 MG/DL (ref 6–23)
CALCIUM SERPL-MCNC: 10.2 MG/DL (ref 8.6–10.2)
CHLORIDE SERPL-SCNC: 100 MMOL/L (ref 98–107)
CO2 SERPL-SCNC: 27 MMOL/L (ref 22–29)
CREAT SERPL-MCNC: 0.5 MG/DL (ref 0.5–1)
EOSINOPHIL # BLD: 0.14 K/UL (ref 0.05–0.5)
EOSINOPHILS RELATIVE PERCENT: 1 % (ref 0–6)
ERYTHROCYTE [DISTWIDTH] IN BLOOD BY AUTOMATED COUNT: 14.6 % (ref 11.5–15)
FERRITIN SERPL-MCNC: 242 NG/ML
FOLATE SERPL-MCNC: 11.8 NG/ML (ref 4.8–24.2)
GFR SERPL CREATININE-BSD FRML MDRD: >90 ML/MIN/1.73M2
GLUCOSE SERPL-MCNC: 105 MG/DL (ref 74–99)
HCT VFR BLD AUTO: 33.5 % (ref 34–48)
HCT VFR BLD AUTO: 36.3 % (ref 34–48)
HGB BLD-MCNC: 10.3 G/DL (ref 11.5–15.5)
HGB BLD-MCNC: 10.7 G/DL (ref 11.5–15.5)
IMM GRANULOCYTES # BLD AUTO: 0.04 K/UL (ref 0–0.58)
IMM GRANULOCYTES NFR BLD: 0 % (ref 0–5)
IRON SATN MFR SERPL: 8 % (ref 15–50)
IRON SERPL-MCNC: 19 UG/DL (ref 37–145)
LYMPHOCYTES NFR BLD: 1.78 K/UL (ref 1.5–4)
LYMPHOCYTES RELATIVE PERCENT: 14 % (ref 20–42)
MCH RBC QN AUTO: 24.7 PG (ref 26–35)
MCHC RBC AUTO-ENTMCNC: 30.7 G/DL (ref 32–34.5)
MCV RBC AUTO: 80.3 FL (ref 80–99.9)
MONOCYTES NFR BLD: 0.83 K/UL (ref 0.1–0.95)
MONOCYTES NFR BLD: 6 % (ref 2–12)
NEUTROPHILS NFR BLD: 78 % (ref 43–80)
NEUTS SEG NFR BLD: 10.27 K/UL (ref 1.8–7.3)
PLATELET # BLD AUTO: 401 K/UL (ref 130–450)
PMV BLD AUTO: 10.2 FL (ref 7–12)
POTASSIUM SERPL-SCNC: 4.3 MMOL/L (ref 3.5–5)
PROT SERPL-MCNC: 7.1 G/DL (ref 6.4–8.3)
RBC # BLD AUTO: 4.17 M/UL (ref 3.5–5.5)
SODIUM SERPL-SCNC: 136 MMOL/L (ref 132–146)
TIBC SERPL-MCNC: 247 UG/DL (ref 250–450)
VIT B12 SERPL-MCNC: 628 PG/ML (ref 211–946)
WBC OTHER # BLD: 13.1 K/UL (ref 4.5–11.5)

## 2024-04-16 PROCEDURE — 85018 HEMOGLOBIN: CPT

## 2024-04-16 PROCEDURE — 82728 ASSAY OF FERRITIN: CPT

## 2024-04-16 PROCEDURE — 82306 VITAMIN D 25 HYDROXY: CPT

## 2024-04-16 PROCEDURE — 82607 VITAMIN B-12: CPT

## 2024-04-16 PROCEDURE — 83540 ASSAY OF IRON: CPT

## 2024-04-16 PROCEDURE — 6370000000 HC RX 637 (ALT 250 FOR IP)

## 2024-04-16 PROCEDURE — 80053 COMPREHEN METABOLIC PANEL: CPT

## 2024-04-16 PROCEDURE — 94640 AIRWAY INHALATION TREATMENT: CPT

## 2024-04-16 PROCEDURE — 85014 HEMATOCRIT: CPT

## 2024-04-16 PROCEDURE — 6360000004 HC RX CONTRAST MEDICATION: Performed by: RADIOLOGY

## 2024-04-16 PROCEDURE — 2060000000 HC ICU INTERMEDIATE R&B

## 2024-04-16 PROCEDURE — 99222 1ST HOSP IP/OBS MODERATE 55: CPT | Performed by: FAMILY MEDICINE

## 2024-04-16 PROCEDURE — 36415 COLL VENOUS BLD VENIPUNCTURE: CPT

## 2024-04-16 PROCEDURE — 85025 COMPLETE CBC W/AUTO DIFF WBC: CPT

## 2024-04-16 PROCEDURE — 2580000003 HC RX 258

## 2024-04-16 PROCEDURE — 2580000003 HC RX 258: Performed by: NURSE PRACTITIONER

## 2024-04-16 PROCEDURE — 83550 IRON BINDING TEST: CPT

## 2024-04-16 PROCEDURE — 82746 ASSAY OF FOLIC ACID SERUM: CPT

## 2024-04-16 PROCEDURE — 74177 CT ABD & PELVIS W/CONTRAST: CPT

## 2024-04-16 PROCEDURE — 6360000002 HC RX W HCPCS: Performed by: NURSE PRACTITIONER

## 2024-04-16 RX ADMIN — SODIUM CHLORIDE 125 MG: 900 INJECTION INTRAVENOUS at 16:33

## 2024-04-16 RX ADMIN — IOPAMIDOL 75 ML: 755 INJECTION, SOLUTION INTRAVENOUS at 19:04

## 2024-04-16 RX ADMIN — IPRATROPIUM BROMIDE AND ALBUTEROL SULFATE 1 DOSE: 2.5; .5 SOLUTION RESPIRATORY (INHALATION) at 10:54

## 2024-04-16 RX ADMIN — SODIUM CHLORIDE, PRESERVATIVE FREE 10 ML: 5 INJECTION INTRAVENOUS at 21:38

## 2024-04-16 RX ADMIN — IOPAMIDOL 18 ML: 755 INJECTION, SOLUTION INTRAVENOUS at 19:04

## 2024-04-16 RX ADMIN — IPRATROPIUM BROMIDE AND ALBUTEROL SULFATE 1 DOSE: 2.5; .5 SOLUTION RESPIRATORY (INHALATION) at 13:56

## 2024-04-16 RX ADMIN — SODIUM CHLORIDE, PRESERVATIVE FREE 10 ML: 5 INJECTION INTRAVENOUS at 07:54

## 2024-04-16 RX ADMIN — IPRATROPIUM BROMIDE AND ALBUTEROL SULFATE 1 DOSE: 2.5; .5 SOLUTION RESPIRATORY (INHALATION) at 16:17

## 2024-04-16 RX ADMIN — IPRATROPIUM BROMIDE AND ALBUTEROL SULFATE 1 DOSE: 2.5; .5 SOLUTION RESPIRATORY (INHALATION) at 21:17

## 2024-04-16 ASSESSMENT — ENCOUNTER SYMPTOMS
WHEEZING: 1
SHORTNESS OF BREATH: 0
CONSTIPATION: 0
DIARRHEA: 0
WHEEZING: 0
COUGH: 1
CHOKING: 0

## 2024-04-16 NOTE — PROGRESS NOTES
SPIRITUAL HEALTH SERVICES - [facility]  PROGRESS NOTE    Name: Mary Purdy                Episcopalian: Judaism   Anointed (Last Rites): NA    Referral: Routine Visit    Assessment:  Upon entering the room  observes calm patient lying in bed.      Intervention:  Discussed illness and its implications with patient. Discussed patient's Restoration. Provided active, empathetic listening and sustaining ministry of presence. Prayed for patient as requested.    Outcome:  Patient expressed gratitude for the visit.    Plan:  Chaplains will remain available to offer spiritual and emotional support as needed.      Electronically signed by Chaplain Shady, on 4/16/2024 at 7:46 PM.  Spiritual Care Department  [facility]  [phone]

## 2024-04-16 NOTE — FLOWSHEET NOTE
Eric RN notified that biopsy cannot be done today. Patient needs to be NPO after midnight tonight, PT/INR needs drawn, and anticoagulants need held for possible lung biopsy tomorrow.

## 2024-04-16 NOTE — PLAN OF CARE
Problem: Discharge Planning  Goal: Discharge to home or other facility with appropriate resources  Outcome: Progressing  Flowsheets (Taken 4/15/2024 1830 by Niecy Jimenez, RN)  Discharge to home or other facility with appropriate resources: Refer to discharge planning if patient needs post-hospital services based on physician order or complex needs related to functional status, cognitive ability or social support system     Problem: Safety - Adult  Goal: Free from fall injury  Outcome: Progressing

## 2024-04-16 NOTE — CONSULTS
Antwon Villareal M.D.,[facility]  Mark Johnson D.O., ANTHONY., [facility]  Martha Browning M.D.  Mony Figueroa M.D.   Km Smith D.O.      Patient:  Mary Purdy 64 y.o. female MRN: [id number]           PULMONARY CONSULTATION    Reason for Consultation: New Lung Mass  Referring Physician: Dr. Celso Meier    Communication with the referring physician will be sent via the electronic medical record.    Chief Complaint: Cough    CODE STATUS: Full    SUBJECTIVE:  HPI:  Mary Purdy is a 64 y.o. with a history of tobacco use who presented to the ER after being sent in by urgent care.  Patient reported a cough that been going on for 6 weeks.  Additionally, she had lost 12 pounds.  She does have a 30-pack-year smoking history however has been cutting back.  She went to urgent care today for the cough or an x-ray showed a 8.5 cm mass in the right upper lobe and she was sent to the emergency room.  In the ER, she was slightly tachycardic at 102 with a leukocytosis of 13.6.  CT at that time showed a centrally necrotic mass in the right upper lobe measuring 7.4 cm suspicious for malignancy.  Adenopathy in the mediastinal and right hilar regions was also noted.    Since admission, patient has no concerns or complaints.  She is understandably very overwhelmed and upset about the new diagnosis.  She continues to endorse a cough that is nonproductive, no hemoptysis.  She continues to have a cough but otherwise has no specific questions or concerns.  Denies headache, fever, chills, nausea, vomiting, chest pain, shortness of breath.      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

## 2024-04-16 NOTE — CONSULTS
Consult Note            Date:4/16/2024        Patient Name:Mary Purdy     YOB: 1960     Age:64 y.o.    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   The patient had not been feeling well.  She reported an ongoing cough for several weeks.  This was accompanied with intermittent wheezing.  She had called her PCP to ask for a chest x-ray.  This was performed and unfortunately a mass was found.  She was directed to the ED  where a CT chest yesterday found a centrally necrotic mass in the right upper lobe posteriorly measuring 7.4 cm consistent with malignancy.  There is also bulky mediastinal and right hilar adenopathy consistent with metastatic adenopathy.  There was further a trace to small right pleural effusion.  The patient reports that she has had unintentional weight loss of approximately 12 pounds.     The patient does have a family history of cancer with her mother.  Her mother passed at the age of 48 with adrenal cancer.    Further workup did show anemia with a hemoglobin of 10.7.  Iron and ferritin were also ordered and she was shown to have significant iron deficiency anemia.  There were no other significant abnormalities.    On exam, the patient reports that she still has a cough.  She is very tearful.  She reports a mild headache but this is new and not a recently chronic issue.  She denies any changes in her vision, dizziness, lightheadedness, changes  will follow along.     Electronically signed by SAIGE Arenas CNP on 4/16/24 at 3:35 PM EDT

## 2024-04-16 NOTE — PROGRESS NOTES
4 Eyes Skin Assessment     NAME:  Mary Purdy  YOB: 1960  MEDICAL RECORD NUMBER:  [id number]    The patient is being assessed for  Admission    I agree that at least one RN has performed a thorough Head to Toe Skin Assessment on the patient. ALL assessment sites listed below have been assessed.      Areas assessed by both nurses:    Head, Face, Ears, Shoulders, Back, Chest, Arms, Elbows, Hands, Sacrum. Buttock, Coccyx, Ischium, Legs. Feet and Heels, and Under Medical Devices         Does the Patient have a Wound? No noted wound(s)       Steve Prevention initiated by RN: No  Wound Care Orders initiated by RN: No    Pressure Injury (Stage 3,4, Unstageable, DTI, NWPT, and Complex wounds) if present, place Wound referral order by RN under : No    New Ostomies, if present place, Ostomy referral order under : No     Nurse 1 eSignature: Electronically signed by Gloria Guillen RN on 4/16/24 at 6:59 AM EDT    **SHARE this note so that the co-signing nurse can place an eSignature**    Nurse 2 eSignature: Electronically signed by Evon Arzola RN on 4/16/24 at 7:00 AM EDT

## 2024-04-16 NOTE — PROGRESS NOTES
[facility]  Progress Note    Chief complaint :  Chief Complaint   Patient presents with    Cough     6 weeks    Abnormal Chest X-ray     Top right lung       Subjective:    No overnight problems. Patient describes feeling unchanged, patient mentions that after the breathing treatment she was not hearing herself wheezing but the cough is still present. Patient denies chest pain, SOB, nausea, vomiting, bloody stools, fever, chills, changes in urination, changes in BM. Patient is tolerating diet.    Past medical, surgical, family and social history were reviewed, non-contributory, and unchanged unless otherwise stated.    Review of Systems   Constitutional:  Positive for unexpected weight change. Negative for chills and fever.   Respiratory:  Positive for cough. Negative for choking, shortness of breath and wheezing.    Cardiovascular:  Negative for chest pain and palpitations.   Gastrointestinal:  Negative for constipation and diarrhea.   Genitourinary:  Negative for difficulty urinating and dysuria.   Neurological:  Negative for dizziness and headaches.       Objective:  BP (!) 98/53   Pulse 88   Temp 97.9 °F (36.6 °C) (Oral)   Resp 17   Ht 1.651 m (5' 5\")   Wt 52.2 kg (115 lb)   SpO2 95%   BMI 19.14 kg/m²     Physical Exam  Constitutional:       Appearance: Normal appearance. She is normal weight.   HENT:      Head: Normocephalic and atraumatic.      Mouth/Throat:      Mouth: Mucous membranes are moist.      Pharynx: Oropharynx is clear.   Eyes:      Extraocular Movements: Extraocular movements intact.      Pupils: Pupils are equal, round, and reactive to light.   Cardiovascular:      Rate and Rhythm: Normal rate and regular rhythm.      Pulses: Normal pulses.      Heart sounds: No murmur heard.  Pulmonary:      Effort: Pulmonary effort is normal.      Breath sounds: Rhonchi (Right upper lung fields) present. No wheezing (Lateralized to the right lung fields).   Abdominal:    Comprehensive Metabolic Panel w/ Reflex to MG    Collection Time: 04/16/24  5:25 AM   Result Value Ref Range    Sodium 136 132 - 146 mmol/L    Potassium 4.3 3.5 - 5.0 mmol/L    Chloride 100 98 - 107 mmol/L    CO2 27 22 - 29 mmol/L    Anion Gap 9 7 - 16 mmol/L    Glucose 105 (H) 74 - 99 mg/dL    BUN 5 (L) 6 - 23 mg/dL    Creatinine 0.5 0.50 - 1.00 mg/dL    Est, Glom Filt Rate >90 >60 mL/min/1.73m2    Calcium 10.2 8.6 - 10.2 mg/dL    Total Protein 7.1 6.4 - 8.3 g/dL    Albumin 3.7 3.5 - 5.2 g/dL    Total Bilirubin 0.7 0.0 - 1.2 mg/dL    Alkaline Phosphatase 76 35 - 104 U/L    ALT 10 0 - 32 U/L    AST 16 0 - 31 U/L   CBC with Auto Differential    Collection Time: 04/16/24  5:25 AM   Result Value Ref Range    WBC 13.1 (H) 4.5 - 11.5 k/uL    RBC 4.17 3.50 - 5.50 m/uL    Hemoglobin 10.3 (L) 11.5 - 15.5 g/dL    Hematocrit 33.5 (L) 34.0 - 48.0 %    MCV 80.3 80.0 - 99.9 fL    MCH 24.7 (L) 26.0 - 35.0 pg    MCHC 30.7 (L) 32.0 - 34.5 g/dL    RDW 14.6 11.5 - 15.0 %    Platelets 401 130 - 450 k/uL    MPV 10.2 7.0 - 12.0 fL    Neutrophils % 78 43.0 - 80.0 %    Lymphocytes % 14 (L) 20.0 - 42.0 %    Monocytes % 6 2.0 - 12.0 %    Eosinophils % 1 0 - 6 %    Basophils % 1 0.0 - 2.0 %    Immature Granulocytes % 0 0.0 - 5.0 %    Neutrophils Absolute 10.27 (H) 1.80 - 7.30 k/uL    Lymphocytes Absolute 1.78 1.50 - 4.00 k/uL    Monocytes Absolute 0.83 0.10 - 0.95 k/uL    Eosinophils Absolute 0.14 0.05 - 0.50 k/uL    Basophils Absolute 0.06 0.00 - 0.20 k/uL    Immature Granulocytes Absolute 0.04 0.00 - 0.58 k/uL       Radiology and other tests reviewed:  CT CHEST W CONTRAST   Final Result   1. Centrally necrotic mass in the right upper lobe posteriorly 7.4 cm   consistent with malignancy until proven otherwise.  PET-CT and/or tissue   sampling recommended for further evaluation   2. Bulky mediastinal and right hilar adenopathy consistent with metastatic   adenopathy.   3. Trace to small right pleural effusion.   4. No clear evidence for

## 2024-04-16 NOTE — PROGRESS NOTES
Comprehensive Nutrition Assessment    Type and Reason for Visit:  Initial, Positive Nutrition Screen    Nutrition Recommendations/Plan:   Continue current diet  Add Ensure Plus ONS BID  Will monitor     Malnutrition Assessment:  Malnutrition Status:   Moderate malnutrition (Comment) (04/16/24 1245)    Context:  Chronic Illness     Findings of the 6 clinical characteristics of malnutrition:  Energy Intake:  Mild decrease in energy intake (Comment) (pt reports dental work over the winter and has been eating less while consuming softer foods)  Weight Loss:  Mild weight loss (specify amount and time period) (12% over 10 months)     Body Fat Loss:  Mild body fat loss Orbital, Buccal region   Muscle Mass Loss:  Mild muscle mass loss Temples (temporalis)  Fluid Accumulation:  No significant fluid accumulation     Strength:  Not Performed    Nutrition Assessment:    Pt adm w/ lung mass. No pertinent medical Hx. Current smoker x30yrs. Pt reports having dental work over the winter and has been eating less while consuming softer foods.This contributed to subjective wt loss recently. She states she has not experienced any significant changes in her appetite and states her intake has been good while inpatient. Will provide Ensure Plus ONS BID for additional protein and energy.     Nutrition Related Findings:    A&O, Abd: WDL, I/O: WDL, no edema, BUN 5, glucose 105, Hgb 10.3 Wound Type: None       Current Nutrition Intake & Therapies:    Average Meal Intake: % (per intake sheets)  Average Supplements Intake: None Ordered  ADULT DIET; Regular  Diet NPO    Anthropometric Measures:  Height: 165.1 cm (5' 5\")  Ideal Body Weight (IBW): 125 lbs (57 kg)    Admission Body Weight: 52.2 kg (115 lb 1.3 oz) (stated 4/15)  Current Body Weight: 53.1 kg (117 lb 1 oz), 93.7 % IBW. Weight Source: Bed Scale (bed 4/16)  Current BMI (kg/m2): 19.5  Usual Body Weight: 60.4 kg (133 lb 2.5 oz) (office visit 6/7/23)  % Weight Change

## 2024-04-17 ENCOUNTER — APPOINTMENT (OUTPATIENT)
Dept: MRI IMAGING | Age: 64
DRG: 181 | End: 2024-04-17
Payer: COMMERCIAL

## 2024-04-17 ENCOUNTER — APPOINTMENT (OUTPATIENT)
Dept: NUCLEAR MEDICINE | Age: 64
DRG: 181 | End: 2024-04-17
Payer: COMMERCIAL

## 2024-04-17 DIAGNOSIS — R91.8 LUNG MASS: Primary | ICD-10-CM

## 2024-04-17 PROBLEM — E55.9 VITAMIN D DEFICIENCY: Status: ACTIVE | Noted: 2024-04-17

## 2024-04-17 PROBLEM — R93.89 THICKENED ENDOMETRIUM: Status: ACTIVE | Noted: 2024-04-17

## 2024-04-17 PROBLEM — E83.52 HYPERCALCEMIA: Status: ACTIVE | Noted: 2024-04-17

## 2024-04-17 PROBLEM — D64.9 NORMOCYTIC ANEMIA: Status: ACTIVE | Noted: 2024-04-17

## 2024-04-17 LAB
ALBUMIN SERPL-MCNC: 3.9 G/DL (ref 3.5–5.2)
ALP SERPL-CCNC: 80 U/L (ref 35–104)
ALT SERPL-CCNC: 11 U/L (ref 0–32)
ANION GAP SERPL CALCULATED.3IONS-SCNC: 7 MMOL/L (ref 7–16)
AST SERPL-CCNC: 23 U/L (ref 0–31)
BASOPHILS # BLD: 0.05 K/UL (ref 0–0.2)
BASOPHILS NFR BLD: 0 % (ref 0–2)
BILIRUB SERPL-MCNC: 0.4 MG/DL (ref 0–1.2)
BUN SERPL-MCNC: 5 MG/DL (ref 6–23)
CALCIUM SERPL-MCNC: 10.3 MG/DL (ref 8.6–10.2)
CHLORIDE SERPL-SCNC: 101 MMOL/L (ref 98–107)
CO2 SERPL-SCNC: 30 MMOL/L (ref 22–29)
CREAT SERPL-MCNC: 0.5 MG/DL (ref 0.5–1)
EOSINOPHIL # BLD: 0.12 K/UL (ref 0.05–0.5)
EOSINOPHILS RELATIVE PERCENT: 1 % (ref 0–6)
ERYTHROCYTE [DISTWIDTH] IN BLOOD BY AUTOMATED COUNT: 14.9 % (ref 11.5–15)
GFR SERPL CREATININE-BSD FRML MDRD: >90 ML/MIN/1.73M2
GLUCOSE SERPL-MCNC: 116 MG/DL (ref 74–99)
HCT VFR BLD AUTO: 35.6 % (ref 34–48)
HGB BLD-MCNC: 10.8 G/DL (ref 11.5–15.5)
IMM GRANULOCYTES # BLD AUTO: 0.06 K/UL (ref 0–0.58)
IMM GRANULOCYTES NFR BLD: 1 % (ref 0–5)
INR PPP: 1.2
LYMPHOCYTES NFR BLD: 1.78 K/UL (ref 1.5–4)
LYMPHOCYTES RELATIVE PERCENT: 14 % (ref 20–42)
MCH RBC QN AUTO: 24.7 PG (ref 26–35)
MCHC RBC AUTO-ENTMCNC: 30.3 G/DL (ref 32–34.5)
MCV RBC AUTO: 81.3 FL (ref 80–99.9)
MONOCYTES NFR BLD: 0.68 K/UL (ref 0.1–0.95)
MONOCYTES NFR BLD: 5 % (ref 2–12)
NEUTROPHILS NFR BLD: 79 % (ref 43–80)
NEUTS SEG NFR BLD: 9.95 K/UL (ref 1.8–7.3)
PLATELET # BLD AUTO: 423 K/UL (ref 130–450)
PMV BLD AUTO: 10 FL (ref 7–12)
POTASSIUM SERPL-SCNC: 4.7 MMOL/L (ref 3.5–5)
PROT SERPL-MCNC: 7.4 G/DL (ref 6.4–8.3)
PROTHROMBIN TIME: 12.9 SEC (ref 9.3–12.4)
RBC # BLD AUTO: 4.38 M/UL (ref 3.5–5.5)
SODIUM SERPL-SCNC: 138 MMOL/L (ref 132–146)
WBC OTHER # BLD: 12.6 K/UL (ref 4.5–11.5)

## 2024-04-17 PROCEDURE — 85610 PROTHROMBIN TIME: CPT

## 2024-04-17 PROCEDURE — 2580000003 HC RX 258

## 2024-04-17 PROCEDURE — 70553 MRI BRAIN STEM W/O & W/DYE: CPT

## 2024-04-17 PROCEDURE — 99232 SBSQ HOSP IP/OBS MODERATE 35: CPT | Performed by: FAMILY MEDICINE

## 2024-04-17 PROCEDURE — 6360000004 HC RX CONTRAST MEDICATION: Performed by: RADIOLOGY

## 2024-04-17 PROCEDURE — 78306 BONE IMAGING WHOLE BODY: CPT | Performed by: NURSE PRACTITIONER

## 2024-04-17 PROCEDURE — 2060000000 HC ICU INTERMEDIATE R&B

## 2024-04-17 PROCEDURE — 2580000003 HC RX 258: Performed by: NURSE PRACTITIONER

## 2024-04-17 PROCEDURE — 94640 AIRWAY INHALATION TREATMENT: CPT

## 2024-04-17 PROCEDURE — 36415 COLL VENOUS BLD VENIPUNCTURE: CPT

## 2024-04-17 PROCEDURE — 85025 COMPLETE CBC W/AUTO DIFF WBC: CPT

## 2024-04-17 PROCEDURE — 6370000000 HC RX 637 (ALT 250 FOR IP): Performed by: NURSE PRACTITIONER

## 2024-04-17 PROCEDURE — A9577 INJ MULTIHANCE: HCPCS | Performed by: RADIOLOGY

## 2024-04-17 PROCEDURE — 6360000002 HC RX W HCPCS: Performed by: NURSE PRACTITIONER

## 2024-04-17 PROCEDURE — A9503 TC99M MEDRONATE: HCPCS | Performed by: RADIOLOGY

## 2024-04-17 PROCEDURE — 3430000000 HC RX DIAGNOSTIC RADIOPHARMACEUTICAL: Performed by: RADIOLOGY

## 2024-04-17 PROCEDURE — 6370000000 HC RX 637 (ALT 250 FOR IP)

## 2024-04-17 PROCEDURE — 99255 IP/OBS CONSLTJ NEW/EST HI 80: CPT | Performed by: INTERNAL MEDICINE

## 2024-04-17 PROCEDURE — 80053 COMPREHEN METABOLIC PANEL: CPT

## 2024-04-17 RX ORDER — TC 99M MEDRONATE 20 MG/10ML
25 INJECTION, POWDER, LYOPHILIZED, FOR SOLUTION INTRAVENOUS
Status: COMPLETED | OUTPATIENT
Start: 2024-04-17 | End: 2024-04-17

## 2024-04-17 RX ORDER — ALPRAZOLAM 0.25 MG/1
0.5 TABLET ORAL EVERY 8 HOURS PRN
Status: DISCONTINUED | OUTPATIENT
Start: 2024-04-17 | End: 2024-04-18 | Stop reason: HOSPADM

## 2024-04-17 RX ORDER — PANTOPRAZOLE SODIUM 40 MG/1
40 TABLET, DELAYED RELEASE ORAL
Status: DISCONTINUED | OUTPATIENT
Start: 2024-04-18 | End: 2024-04-18 | Stop reason: HOSPADM

## 2024-04-17 RX ORDER — BUDESONIDE 0.5 MG/2ML
500 INHALANT ORAL
Status: DISCONTINUED | OUTPATIENT
Start: 2024-04-17 | End: 2024-04-18 | Stop reason: HOSPADM

## 2024-04-17 RX ORDER — ARFORMOTEROL TARTRATE 15 UG/2ML
15 SOLUTION RESPIRATORY (INHALATION)
Status: DISCONTINUED | OUTPATIENT
Start: 2024-04-17 | End: 2024-04-18 | Stop reason: HOSPADM

## 2024-04-17 RX ADMIN — IPRATROPIUM BROMIDE AND ALBUTEROL SULFATE 1 DOSE: 2.5; .5 SOLUTION RESPIRATORY (INHALATION) at 09:07

## 2024-04-17 RX ADMIN — IPRATROPIUM BROMIDE AND ALBUTEROL SULFATE 1 DOSE: 2.5; .5 SOLUTION RESPIRATORY (INHALATION) at 16:02

## 2024-04-17 RX ADMIN — IPRATROPIUM BROMIDE AND ALBUTEROL SULFATE 1 DOSE: 2.5; .5 SOLUTION RESPIRATORY (INHALATION) at 20:29

## 2024-04-17 RX ADMIN — ARFORMOTEROL TARTRATE 15 MCG: 15 SOLUTION RESPIRATORY (INHALATION) at 09:10

## 2024-04-17 RX ADMIN — ALPRAZOLAM 0.5 MG: 0.25 TABLET ORAL at 21:45

## 2024-04-17 RX ADMIN — ALPRAZOLAM 0.5 MG: 0.25 TABLET ORAL at 13:03

## 2024-04-17 RX ADMIN — SODIUM CHLORIDE, PRESERVATIVE FREE 10 ML: 5 INJECTION INTRAVENOUS at 09:51

## 2024-04-17 RX ADMIN — ARFORMOTEROL TARTRATE 15 MCG: 15 SOLUTION RESPIRATORY (INHALATION) at 20:29

## 2024-04-17 RX ADMIN — BUDESONIDE INHALATION 500 MCG: 0.5 SUSPENSION RESPIRATORY (INHALATION) at 20:29

## 2024-04-17 RX ADMIN — TC 99M MEDRONATE 25 MILLICURIE: 20 INJECTION, POWDER, LYOPHILIZED, FOR SOLUTION INTRAVENOUS at 09:05

## 2024-04-17 RX ADMIN — BUDESONIDE INHALATION 500 MCG: 0.5 SUSPENSION RESPIRATORY (INHALATION) at 09:10

## 2024-04-17 RX ADMIN — SODIUM CHLORIDE 125 MG: 900 INJECTION INTRAVENOUS at 09:53

## 2024-04-17 RX ADMIN — GADOBENATE DIMEGLUMINE 11 ML: 529 INJECTION, SOLUTION INTRAVENOUS at 14:43

## 2024-04-17 RX ADMIN — SODIUM CHLORIDE, PRESERVATIVE FREE 10 ML: 5 INJECTION INTRAVENOUS at 21:45

## 2024-04-17 ASSESSMENT — ENCOUNTER SYMPTOMS
DIARRHEA: 0
COUGH: 1
CONSTIPATION: 0
SHORTNESS OF BREATH: 0
CHOKING: 0
WHEEZING: 0

## 2024-04-17 NOTE — DISCHARGE SUMMARY
Physician Discharge Summary  [facility]     Patient ID:  Mary Purdy  [id number]  64 y.o.  1960    Admit date: 4/15/2024    Discharge date: 4/17/2024    Admission Diagnoses:   Lung mass [R91.8]    Discharge Diagnoses:  Principal Problem:    Mass of upper lobe of right lung  Active Problems:    Tobacco use    Normocytic anemia    Thickened endometrium    Vitamin D deficiency    Lung mass  Resolved Problems:    Hypercalcemia      Consults: Pulmonary and hematology/oncology  Procedures: Lung biopsy    Hospital Course: Mary Purdy is a 64 year old female who presented for cough and was admitted for new lung mass.    Patient has significant PMHx of tobacco use 30-pack-year smoking history  squamous cell carcinoma of the nose. During admission patient also reported having some weight lost 12 pounds and chronic cough. X- ray showed a 8.5 cm mass in the right upper lobe, CT of the chest without contrast was performed showing centrally necrotic mass in the right upper lobe posteriorly 7.4 cm consistent with malignancy.  Hematology and pulmonology were consulted.  CT of the abdomen showed no clear evidence of metastatic disease. MRI and NM Bone scan were ordered as well.  Interventional radiology was consulted for biopsy of the mass with was performed on 04/18/2024 without any complications.  Patient tolerated the procedure well.  Patient was started on ferllicit infusions for her iron deficiency anemia.  Patient was set up with radiation oncology outpatient.  Subsequent follow-up with her oncologist pulmonologist radiation oncology and PCP after discharge.  Patient discharged in stable and improved condition.              Significant Diagnostic Studies:   XR CHEST INSPIRATION AND EXPIRATION   Final Result   No pneumothorax.         XR CHEST INSPIRATION AND EXPIRATION   Final Result   Large right upper lobe mass. No pneumothorax.         CT NEEDLE BIOPSY LUNG/MEDIASTINUM  [facility] [id number]  [phone]    Call today  Hosptial Follow-up        Celso Meier MD  Family Medicine Resident PGY-1  04/18/24   2:00 PM

## 2024-04-17 NOTE — PROGRESS NOTES
[facility]  Progress Note    Chief complaint :  Chief Complaint   Patient presents with    Cough     6 weeks    Abnormal Chest X-ray     Top right lung       Subjective:    No overnight problems. Patient describes feeling unchanged, she refers feeling sometimes  overwhelmed ans still process all the information she has been receiving from the doctors . Patient denies chest pain, SOB, nausea, vomiting, bloody stools, fever, chills, changes in urination, changes in BM. Patient is NPO due to surgical procedure today.    Past medical, surgical, family and social history were reviewed, non-contributory, and unchanged unless otherwise stated.    Review of Systems   Constitutional:  Positive for unexpected weight change. Negative for chills and fever.   Respiratory:  Positive for cough. Negative for choking, shortness of breath and wheezing.    Cardiovascular:  Negative for chest pain and palpitations.   Gastrointestinal:  Negative for constipation and diarrhea.   Genitourinary:  Negative for difficulty urinating and dysuria.   Neurological:  Negative for dizziness and headaches.       Objective:  BP (!) 90/46   Pulse 85   Temp 98.2 °F (36.8 °C) (Oral)   Resp 15   Ht 1.651 m (5' 5\")   Wt 56.6 kg (124 lb 12.5 oz)   SpO2 97%   BMI 20.76 kg/m²     Physical Exam  Constitutional:       Appearance: Normal appearance. She is normal weight.   HENT:      Head: Normocephalic and atraumatic.      Mouth/Throat:      Mouth: Mucous membranes are moist.      Pharynx: Oropharynx is clear.   Eyes:      Extraocular Movements: Extraocular movements intact.      Pupils: Pupils are equal, round, and reactive to light.   Cardiovascular:      Rate and Rhythm: Normal rate and regular rhythm.      Pulses: Normal pulses.      Heart sounds: No murmur heard.  Pulmonary:      Effort: Pulmonary effort is normal.      Breath sounds: Rhonchi (Right upper lung fields) present. No wheezing.   Abdominal:

## 2024-04-17 NOTE — PROGRESS NOTES
Antwon Villareal M.D.,[facility]  Mark Johnson D.O., FHERB., [facility]  Mirza Browning M.D.  Mony Figueroa M.D.   LUCIANA TiradoO.        Daily Pulmonary Progress Note    Patient:  Mary Purdy 64 y.o. female MRN: [id number]            Synopsis     We are following patient for new lung mass right upper lobe    \"CC\" cough    Code status: Full      Subjective      Patient was seen and examined.  Continues with cough.  CT guided biopsy of right lung mass rescheduled for 4/18/2024.  Patient okay to resume diet.  N.p.o. after midnight.  Positive for increased anxiety  No fevers or chest pain.  No shortness of breath.  Oncology input appreciated workup for staging ongoing including CT abdomen and pelvis, MRI brain, and bone scan.    Review of Systems:  Constitutional: Denies fever,  night sweats, and fatigue unintentional weight loss  Skin: Denies pigmentation, dark lesions, and rashes   HEENT: Denies hearing loss, tinnitus, ear drainage, epistaxis, sore throat, and hoarseness.  Cardiovascular: Denies palpitations, chest pain, and chest pressure.  Respiratory: Denies  dyspnea at rest, hemoptysis, apnea, and choking.  Cough  Gastrointestinal: Denies nausea, vomiting,  diarrhea, heartburn or reflux unintentional weight loss, poor appetite  Genitourinary: Denies dysuria, frequency, urgency or hematuria  Musculoskeletal: Denies myalgias, muscle weakness, and bone pain  Neurological: Denies dizziness, vertigo, headache, and focal weakness  Psychological: Anxiety  Endocrine: Denies heat intolerance and cold intolerance  Hematopoietic/Lymphatic: Denies bleeding problems and blood transfusions    24-hour events:  CT-guided biopsy of right lung mass 4/18/2024    Objective   OBJECTIVE:   BP (!) 90/46   Pulse 85   Temp 98.2 °F (36.8 °C) (Oral)   Resp 15   Ht 1.651 m (5' 5\")   Wt 56.6 kg (124 lb 12.5 oz)   SpO2 97%   BMI 20.76 kg/m²   SpO2 Readings from Last 1 Encounters:   04/17/24 97%       ribs.                            Echo: None available for review.        Labs:  Lab Results   Component Value Date/Time    WBC 12.6 04/17/2024 07:30 AM    RBC 4.38 04/17/2024 07:30 AM    HGB 10.8 04/17/2024 07:30 AM    HCT 35.6 04/17/2024 07:30 AM    MCV 81.3 04/17/2024 07:30 AM    MCH 24.7 04/17/2024 07:30 AM    MCHC 30.3 04/17/2024 07:30 AM    RDW 14.9 04/17/2024 07:30 AM     04/17/2024 07:30 AM    MPV 10.0 04/17/2024 07:30 AM     Lab Results   Component Value Date/Time     04/17/2024 07:30 AM    K 4.7 04/17/2024 07:30 AM     04/17/2024 07:30 AM    CO2 30 04/17/2024 07:30 AM    BUN 5 04/17/2024 07:30 AM    CREATININE 0.5 04/17/2024 07:30 AM    CALCIUM 10.3 04/17/2024 07:30 AM    LABGLOM >90 04/17/2024 07:30 AM     Lab Results   Component Value Date/Time    PROTIME 12.9 04/17/2024 07:30 AM    INR 1.2 04/17/2024 07:30 AM     No results for input(s): \"PROBNP\" in the last 72 hours.  No results for input(s): \"TROPONINI\" in the last 72 hours.  Recent Labs     04/15/24  1301   PROCAL 0.05     This SmartLink has not been configured with any valid records.       Micro:  No results for input(s): \"CULTRESP\" in the last 72 hours.  No results for input(s): \"LABGRAM\" in the last 72 hours.  No results for input(s): \"LEGUR\" in the last 72 hours.  No results for input(s): \"STREPNEUMAGU\" in the last 72 hours.  No results for input(s): \"LP1UAG\" in the last 72 hours.     Assessment:    Right-sided pleural-based lung mass with central necrosis  Trace right pleural effusion  Bulky mediastinal and right hilar lymphadenopathy consistent with metastatic disease  Unintentional weight loss  Ongoing nicotine dependence      Plan:   Monitor off oxygen keep SpO2 greater than 92%  4/18/2024 CT-guided biopsy of pleural-based right lung mass per IR  Oncology following for further staging workup including CT abdomen and pelvis, MRI brain, bone scan  Ferrlecit infusion  Scheduled bronchodilators-DuoNebs every 4 hours while awake.

## 2024-04-17 NOTE — CARE COORDINATION
Social Work:    Chart screened. Mary is scheduled for a bone scan, MRI of the brain, and liver biopsy today due to mass of her upper lobe of right lung.  Social service attempted to meet with Mary but she was out for an MRI.  Social work met with Neel, her , advised him about social service role and discussed discharge planning. Neel advises that Mary is a patient of Dr. Adolfo Zurita and resides with Neel in a ranch home independently. Neel & Mary have been  47 yrs and have a son who is a paramedic & resides in [place] with his wife, an NP at . The couple also have a daughter, son-in-law, and three grandchildren who reside locally. Neel explained that they are not disclosing anything to their children until they have final results as they do not want to upset them when they have no definitive answers as of yet.  Neel advises that he & Mary will be anxiously awaiting test results and are having a hard time with the unknown at this time. Social work offered support and will continue to follow. Neel did not foresee any discharge needs at this time. Social work to follow-up with Mary to confirm no home needs and offer support.    Electronically signed by CRISTOBAL Givens on 4/17/2024 at 2:38 PM

## 2024-04-17 NOTE — ACP (ADVANCE CARE PLANNING)
Advance Care Planning   Healthcare Decision Maker:    Primary Decision Maker: Neel Purdy - [facility] - [phone]    Click here to complete Healthcare Decision Makers including selection of the Healthcare Decision Maker Relationship (ie \"Primary\").

## 2024-04-17 NOTE — PLAN OF CARE
Problem: Safety - Adult  Goal: Free from fall injury  4/17/2024 0005 by Bk Hogan, RN  Outcome: Progressing  4/16/2024 1302 by Eric Arellano, RN  Outcome: Progressing     Problem: Nutrition Deficit:  Goal: Optimize nutritional status  Outcome: Progressing

## 2024-04-18 ENCOUNTER — APPOINTMENT (OUTPATIENT)
Dept: GENERAL RADIOLOGY | Age: 64
DRG: 181 | End: 2024-04-18
Payer: COMMERCIAL

## 2024-04-18 ENCOUNTER — APPOINTMENT (OUTPATIENT)
Dept: CT IMAGING | Age: 64
DRG: 181 | End: 2024-04-18
Payer: COMMERCIAL

## 2024-04-18 VITALS
TEMPERATURE: 98.6 F | HEART RATE: 102 BPM | DIASTOLIC BLOOD PRESSURE: 59 MMHG | HEIGHT: 65 IN | WEIGHT: 124.78 LBS | SYSTOLIC BLOOD PRESSURE: 127 MMHG | OXYGEN SATURATION: 95 % | RESPIRATION RATE: 20 BRPM | BODY MASS INDEX: 20.79 KG/M2

## 2024-04-18 PROBLEM — R91.8 LUNG MASS: Status: ACTIVE | Noted: 2024-04-18

## 2024-04-18 PROBLEM — E83.52 HYPERCALCEMIA: Status: RESOLVED | Noted: 2024-04-17 | Resolved: 2024-04-18

## 2024-04-18 LAB
ALBUMIN SERPL-MCNC: 3.6 G/DL (ref 3.5–5.2)
ALP SERPL-CCNC: 73 U/L (ref 35–104)
ALT SERPL-CCNC: 14 U/L (ref 0–32)
ANION GAP SERPL CALCULATED.3IONS-SCNC: 9 MMOL/L (ref 7–16)
AST SERPL-CCNC: 20 U/L (ref 0–31)
BASOPHILS # BLD: 0.06 K/UL (ref 0–0.2)
BASOPHILS NFR BLD: 0 % (ref 0–2)
BILIRUB SERPL-MCNC: 0.4 MG/DL (ref 0–1.2)
BUN SERPL-MCNC: 5 MG/DL (ref 6–23)
CALCIUM SERPL-MCNC: 10.2 MG/DL (ref 8.6–10.2)
CHLORIDE SERPL-SCNC: 100 MMOL/L (ref 98–107)
CO2 SERPL-SCNC: 28 MMOL/L (ref 22–29)
CREAT SERPL-MCNC: 0.5 MG/DL (ref 0.5–1)
EOSINOPHIL # BLD: 0.21 K/UL (ref 0.05–0.5)
EOSINOPHILS RELATIVE PERCENT: 2 % (ref 0–6)
ERYTHROCYTE [DISTWIDTH] IN BLOOD BY AUTOMATED COUNT: 15.2 % (ref 11.5–15)
GFR SERPL CREATININE-BSD FRML MDRD: >90 ML/MIN/1.73M2
GLUCOSE SERPL-MCNC: 87 MG/DL (ref 74–99)
HCT VFR BLD AUTO: 32.6 % (ref 34–48)
HCT VFR BLD AUTO: 36.7 % (ref 34–48)
HGB BLD-MCNC: 11 G/DL (ref 11.5–15.5)
HGB BLD-MCNC: 9.9 G/DL (ref 11.5–15.5)
IMM GRANULOCYTES # BLD AUTO: 0.07 K/UL (ref 0–0.58)
IMM GRANULOCYTES NFR BLD: 1 % (ref 0–5)
LYMPHOCYTES NFR BLD: 2.36 K/UL (ref 1.5–4)
LYMPHOCYTES RELATIVE PERCENT: 17 % (ref 20–42)
MCH RBC QN AUTO: 24.8 PG (ref 26–35)
MCHC RBC AUTO-ENTMCNC: 30.4 G/DL (ref 32–34.5)
MCV RBC AUTO: 81.5 FL (ref 80–99.9)
MONOCYTES NFR BLD: 0.99 K/UL (ref 0.1–0.95)
MONOCYTES NFR BLD: 7 % (ref 2–12)
NEUTROPHILS NFR BLD: 74 % (ref 43–80)
NEUTS SEG NFR BLD: 10.45 K/UL (ref 1.8–7.3)
PLATELET # BLD AUTO: 338 K/UL (ref 130–450)
PMV BLD AUTO: 10.9 FL (ref 7–12)
POTASSIUM SERPL-SCNC: 4.5 MMOL/L (ref 3.5–5)
PROT SERPL-MCNC: 6.9 G/DL (ref 6.4–8.3)
RBC # BLD AUTO: 4 M/UL (ref 3.5–5.5)
SODIUM SERPL-SCNC: 137 MMOL/L (ref 132–146)
WBC OTHER # BLD: 14.1 K/UL (ref 4.5–11.5)

## 2024-04-18 PROCEDURE — 2580000003 HC RX 258: Performed by: NURSE PRACTITIONER

## 2024-04-18 PROCEDURE — 36415 COLL VENOUS BLD VENIPUNCTURE: CPT

## 2024-04-18 PROCEDURE — 6370000000 HC RX 637 (ALT 250 FOR IP)

## 2024-04-18 PROCEDURE — 88305 TISSUE EXAM BY PATHOLOGIST: CPT

## 2024-04-18 PROCEDURE — 6360000002 HC RX W HCPCS: Performed by: RADIOLOGY

## 2024-04-18 PROCEDURE — 71046 X-RAY EXAM CHEST 2 VIEWS: CPT

## 2024-04-18 PROCEDURE — 99238 HOSP IP/OBS DSCHRG MGMT 30/<: CPT | Performed by: FAMILY MEDICINE

## 2024-04-18 PROCEDURE — 6360000002 HC RX W HCPCS: Performed by: NURSE PRACTITIONER

## 2024-04-18 PROCEDURE — 85014 HEMATOCRIT: CPT

## 2024-04-18 PROCEDURE — 2500000003 HC RX 250 WO HCPCS: Performed by: RADIOLOGY

## 2024-04-18 PROCEDURE — 88360 TUMOR IMMUNOHISTOCHEM/MANUAL: CPT

## 2024-04-18 PROCEDURE — 2580000003 HC RX 258

## 2024-04-18 PROCEDURE — 85018 HEMOGLOBIN: CPT

## 2024-04-18 PROCEDURE — 6370000000 HC RX 637 (ALT 250 FOR IP): Performed by: NURSE PRACTITIONER

## 2024-04-18 PROCEDURE — 85025 COMPLETE CBC W/AUTO DIFF WBC: CPT

## 2024-04-18 PROCEDURE — 6370000000 HC RX 637 (ALT 250 FOR IP): Performed by: CLINICAL NURSE SPECIALIST

## 2024-04-18 PROCEDURE — 94640 AIRWAY INHALATION TREATMENT: CPT

## 2024-04-18 PROCEDURE — 2709999900 CT NEEDLE BIOPSY LUNG PERCUTANEOUS W IMAGING GUIDANCE

## 2024-04-18 PROCEDURE — 80053 COMPREHEN METABOLIC PANEL: CPT

## 2024-04-18 PROCEDURE — 0BBC3ZX EXCISION OF RIGHT UPPER LUNG LOBE, PERCUTANEOUS APPROACH, DIAGNOSTIC: ICD-10-PCS

## 2024-04-18 PROCEDURE — 99232 SBSQ HOSP IP/OBS MODERATE 35: CPT | Performed by: CLINICAL NURSE SPECIALIST

## 2024-04-18 RX ORDER — FLUTICASONE FUROATE, UMECLIDINIUM BROMIDE AND VILANTEROL TRIFENATATE 100; 62.5; 25 UG/1; UG/1; UG/1
1 POWDER RESPIRATORY (INHALATION) DAILY
Qty: 1 EACH | Refills: 3 | Status: SHIPPED | OUTPATIENT
Start: 2024-04-18

## 2024-04-18 RX ORDER — MIDAZOLAM HYDROCHLORIDE 2 MG/2ML
INJECTION, SOLUTION INTRAMUSCULAR; INTRAVENOUS PRN
Status: COMPLETED | OUTPATIENT
Start: 2024-04-18 | End: 2024-04-18

## 2024-04-18 RX ORDER — FENTANYL CITRATE 50 UG/ML
INJECTION, SOLUTION INTRAMUSCULAR; INTRAVENOUS PRN
Status: COMPLETED | OUTPATIENT
Start: 2024-04-18 | End: 2024-04-18

## 2024-04-18 RX ORDER — ALPRAZOLAM 0.5 MG/1
0.5 TABLET ORAL EVERY 8 HOURS PRN
Qty: 12 TABLET | Refills: 0 | Status: SHIPPED | OUTPATIENT
Start: 2024-04-18 | End: 2024-05-18

## 2024-04-18 RX ORDER — ERGOCALCIFEROL 1.25 MG/1
50000 CAPSULE ORAL WEEKLY
Status: COMPLETED | OUTPATIENT
Start: 2024-04-18 | End: 2024-04-18

## 2024-04-18 RX ORDER — ALBUTEROL SULFATE 90 UG/1
2 AEROSOL, METERED RESPIRATORY (INHALATION) EVERY 6 HOURS PRN
Qty: 18 G | Refills: 3 | Status: SHIPPED | OUTPATIENT
Start: 2024-04-18

## 2024-04-18 RX ORDER — LIDOCAINE HYDROCHLORIDE 20 MG/ML
INJECTION, SOLUTION INFILTRATION; PERINEURAL PRN
Status: COMPLETED | OUTPATIENT
Start: 2024-04-18 | End: 2024-04-18

## 2024-04-18 RX ADMIN — SODIUM CHLORIDE, PRESERVATIVE FREE 10 ML: 5 INJECTION INTRAVENOUS at 07:37

## 2024-04-18 RX ADMIN — ERGOCALCIFEROL 50000 UNITS: 1.25 CAPSULE ORAL at 14:56

## 2024-04-18 RX ADMIN — LIDOCAINE HYDROCHLORIDE 10 ML: 20 INJECTION, SOLUTION INFILTRATION; PERINEURAL at 08:59

## 2024-04-18 RX ADMIN — SODIUM CHLORIDE 125 MG: 900 INJECTION INTRAVENOUS at 07:36

## 2024-04-18 RX ADMIN — BUDESONIDE INHALATION 500 MCG: 0.5 SUSPENSION RESPIRATORY (INHALATION) at 09:48

## 2024-04-18 RX ADMIN — ARFORMOTEROL TARTRATE 15 MCG: 15 SOLUTION RESPIRATORY (INHALATION) at 09:48

## 2024-04-18 RX ADMIN — FENTANYL CITRATE 25 MCG: 50 INJECTION, SOLUTION INTRAMUSCULAR; INTRAVENOUS at 08:55

## 2024-04-18 RX ADMIN — IPRATROPIUM BROMIDE AND ALBUTEROL SULFATE 1 DOSE: 2.5; .5 SOLUTION RESPIRATORY (INHALATION) at 09:48

## 2024-04-18 RX ADMIN — PANTOPRAZOLE SODIUM 40 MG: 40 TABLET, DELAYED RELEASE ORAL at 05:28

## 2024-04-18 RX ADMIN — IPRATROPIUM BROMIDE AND ALBUTEROL SULFATE 1 DOSE: 2.5; .5 SOLUTION RESPIRATORY (INHALATION) at 12:48

## 2024-04-18 RX ADMIN — MIDAZOLAM HYDROCHLORIDE 0.5 MG: 1 INJECTION, SOLUTION INTRAMUSCULAR; INTRAVENOUS at 08:55

## 2024-04-18 ASSESSMENT — ENCOUNTER SYMPTOMS
CHOKING: 0
SHORTNESS OF BREATH: 0
WHEEZING: 0
CONSTIPATION: 0
COUGH: 1
DIARRHEA: 0

## 2024-04-18 ASSESSMENT — PAIN SCALES - GENERAL
PAINLEVEL_OUTOF10: 0
PAINLEVEL_OUTOF10: 0

## 2024-04-18 NOTE — PROGRESS NOTES
Department of [facility] Med Oncology  Consult Follow Up Note    SUBJECTIVE:  Reports some slight discomfort where biopsy was done she denies shortness of breath sitting up at side of bed  at bedside    OBJECTIVE:   Appears in no acute distress, anxious regarding neck step in her plan of care reviewed outpatient plan of care with patient and her  answered all questions. Denies SOB     Current Medications:   pantoprazole (PROTONIX) tablet 40 mg, QAM AC  arformoterol tartrate (BROVANA) nebulizer solution 15 mcg, BID RT  budesonide (PULMICORT) nebulizer suspension 500 mcg, BID RT  ALPRAZolam (XANAX) tablet 0.5 mg, Q8H PRN  ferric gluconate (FERRLECIT) 125 mg in sodium chloride 0.9 % 100 mL IVPB, Daily  ipratropium 0.5 mg-albuterol 2.5 mg (DUONEB) nebulizer solution 1 Dose, Q4H WA RT  sodium chloride flush 0.9 % injection 5-40 mL, 2 times per day  sodium chloride flush 0.9 % injection 5-40 mL, PRN  0.9 % sodium chloride infusion, PRN  [Held by provider] enoxaparin (LOVENOX) injection 40 mg, Daily  ondansetron (ZOFRAN-ODT) disintegrating tablet 4 mg, Q8H PRN   Or  ondansetron (ZOFRAN) injection 4 mg, Q6H PRN  polyethylene glycol (GLYCOLAX) packet 17 g, Daily PRN  acetaminophen (TYLENOL) tablet 650 mg, Q6H PRN   Or  acetaminophen (TYLENOL) suppository 650 mg, Q6H PRN  nicotine (NICODERM CQ) 7 MG/24HR 1 patch, Daily      ROS   Negative for abnormality  Allergies: No Known Allergies     PHYSICAL EXAM:   VITALS:   Vitals:    04/18/24 1248   BP:    Pulse:    Resp:    Temp:    SpO2: 95%     GENERAL APPEARANCE: Well developed, well nourished 64 y.o. yo female in no acute distress.  ECOG PERFORMANCE STATUS:  HEENT: Head: Nornmocephalic, artaumatic.  Eyes: PERRL; EOMI. oropharynx clear, no evidence of mucositis or thrush. Ears: no abnormalities on inspection. Nose: no abnormal discharge or ulcerations.  LUNGS:  Clear to auscultation bilaterally. No wheezes, rhonchi, or rales.  CARDIOVASCULAR:  Regular rate. No murmurs,

## 2024-04-18 NOTE — PROGRESS NOTES
[facility]  Progress Note    Chief complaint :  Chief Complaint   Patient presents with    Cough     6 weeks    Abnormal Chest X-ray     Top right lung       Subjective:    Overall patient good night.  Denies any chest pain shortness of breath patient denies any other problems.  Patient anxious to have a lung biopsy and to be discharged home.      Past medical, surgical, family and social history were reviewed, non-contributory, and unchanged unless otherwise stated.    Review of Systems   Constitutional:  Negative for chills, fever and unexpected weight change.   Respiratory:  Positive for cough. Negative for choking, shortness of breath and wheezing.    Cardiovascular:  Negative for chest pain and palpitations.   Gastrointestinal:  Negative for constipation and diarrhea.   Genitourinary:  Negative for difficulty urinating and dysuria.   Neurological:  Negative for dizziness and headaches.       Objective:  BP (!) 97/51   Pulse 87   Temp 98.1 °F (36.7 °C) (Oral)   Resp 18   Ht 1.651 m (5' 5\")   Wt 56.6 kg (124 lb 12.5 oz)   SpO2 95%   BMI 20.76 kg/m²     Physical Exam  Constitutional:       Appearance: Normal appearance. She is normal weight.   HENT:      Head: Normocephalic and atraumatic.      Mouth/Throat:      Mouth: Mucous membranes are moist.      Pharynx: Oropharynx is clear.   Eyes:      Extraocular Movements: Extraocular movements intact.      Pupils: Pupils are equal, round, and reactive to light.   Cardiovascular:      Rate and Rhythm: Normal rate and regular rhythm.      Pulses: Normal pulses.      Heart sounds: No murmur heard.  Pulmonary:      Effort: Pulmonary effort is normal.      Breath sounds: Rhonchi (Right upper lung fields) present. No wheezing.   Abdominal:      General: Abdomen is flat. Bowel sounds are normal.   Musculoskeletal:      Right lower leg: No edema.      Left lower leg: No edema.   Skin:     General: Skin is warm and dry.

## 2024-04-18 NOTE — PRE SEDATION
Sedation Pre-Procedure Note    Patient Name: Mary Purdy   YOB: 1960  Room/Bed: 0423/0423-A  Medical Record Number: [id number]  Date: 2024   Time: 8:20 AM       Indication:  Image guided right upper lobe lung mass biopsy with possible conscious sedation.     Consent: IDr. Perez have discussed with the patient and/or the patient representative the indication, alternatives, and the possible risks and/or complications of the planned procedure and the anesthesia methods. The patient and/or patient representative appear to understand and agree to proceed.    Vital Signs:   Vitals:    24 0732   BP: (!) 94/59   Pulse: 94   Resp: 18   Temp: 97.8 °F (36.6 °C)   SpO2: 95%       Past Medical History:   has a past medical history of Cancer (HCC).    Past Surgical History:   has a past surgical history that includes  section and ovarian cyst removal.    Medications:   Scheduled Meds:    pantoprazole  40 mg Oral QAM AC    arformoterol tartrate  15 mcg Nebulization BID RT    budesonide  500 mcg Nebulization BID RT    ferric gluconate (FERRLECIT) 125 mg in sodium chloride 0.9 % 100 mL IVPB  125 mg IntraVENous Daily    ipratropium 0.5 mg-albuterol 2.5 mg  1 Dose Inhalation Q4H WA RT    sodium chloride flush  5-40 mL IntraVENous 2 times per day    [Held by provider] enoxaparin  40 mg SubCUTAneous Daily    nicotine  1 patch TransDERmal Daily     Continuous Infusions:    sodium chloride       PRN Meds: ALPRAZolam, sodium chloride flush, sodium chloride, ondansetron **OR** ondansetron, polyethylene glycol, acetaminophen **OR** acetaminophen  Home Meds:   Prior to Admission medications    Medication Sig Start Date End Date Taking? Authorizing Provider   guaiFENesin 400 MG tablet Take 1 tablet by mouth 4 times daily as needed for Cough   Yes Provider, MD Marcy   Aspirin Effervescent 325 MG TBEF tablet Take 1 tablet by mouth daily   Yes ProviderMarcy MD   acetaminophen (TYLENOL) 325 MG

## 2024-04-18 NOTE — PROGRESS NOTES
CLINICAL PHARMACY NOTE: MEDS TO BEDS    Total # of Prescriptions Filled: 1   The following medications were delivered to the patient:  Xanax 0.5 mg    Additional Documentation:'

## 2024-04-18 NOTE — PLAN OF CARE
Problem: Discharge Planning  Goal: Discharge to home or other facility with appropriate resources  4/18/2024 1404 by Roberta Boyce, RN  Outcome: Completed  4/18/2024 0017 by Cristóbal Jimenez RN  Outcome: Progressing     Problem: Safety - Adult  Goal: Free from fall injury  4/18/2024 1404 by Roberta Boyce, RN  Outcome: Completed  4/18/2024 0017 by Cristóbal Jimenez, RN  Outcome: Progressing     Problem: Nutrition Deficit:  Goal: Optimize nutritional status  Outcome: Completed     Problem: Pain  Goal: Verbalizes/displays adequate comfort level or baseline comfort level  Outcome: Completed

## 2024-04-18 NOTE — OR NURSING
Patient brought into CT room and procedure explained by Dr. Perez. Procedural consent obtained. Patient placed prone on CT table with O2 and patient placed on monitor. Using CT, lidocaine administered, needle inserted and biopsy obtained from right lung lobe by Dr. Perez. Patient re-scanned and imaging reviewed by Dr. Perez. Biopsy site cleaned and dressing applied. Patient taken back to room and reported called to floor RN.

## 2024-04-18 NOTE — CARE COORDINATION
Social Work:    Follow-up visit was made with Mary. Social work advised Mary of her role and discussed discharge planning. Mary is doing well considering the circumstances and has no discharge needs. Social work offered support and provided her with social work contact number should needs arise.    Electronically signed by CRISTOBAL Givens on 4/18/2024 at 2:58 PM

## 2024-04-18 NOTE — PROGRESS NOTES
Antwon Villareal M.D.,[facility]  Mark Johnson D.O., ANTHONY., [facility]  Mirza Browning M.D.  Mony Figueroa M.D.   VERONICA Tirado.O.        Daily Pulmonary Progress Note    Patient:  Mary Purdy 64 y.o. female MRN: [id number]            Synopsis     We are following patient for new lung mass right upper lobe    \"CC\" cough    Code status: Full      Subjective      Patient was seen and examined.  Biopsy of lung mass today.  Tolerated procedure.  Not requiring supplemental oxygen.  Xanax as needed for anxiety.  MRI brain with no evidence of metastatic disease.  Whole-body bone scan with no evidence of skeletal metastatic disease.  CT abdomen and pelvis no clear evidence of metastatic disease.      Review of Systems:  Constitutional: Denies fever,  night sweats, and fatigue unintentional weight loss  Skin: Denies pigmentation, dark lesions, and rashes   HEENT: Denies hearing loss, tinnitus, ear drainage, epistaxis, sore throat, and hoarseness.  Cardiovascular: Denies palpitations, chest pain, and chest pressure.  Respiratory: Denies  dyspnea at rest, hemoptysis, apnea, and choking.  Cough  Gastrointestinal: Denies nausea, vomiting,  diarrhea, heartburn or reflux unintentional weight loss, poor appetite  Genitourinary: Denies dysuria, frequency, urgency or hematuria  Musculoskeletal: Denies myalgias, muscle weakness, and bone pain  Neurological: Denies dizziness, vertigo, headache, and focal weakness  Psychological: Anxiety  Endocrine: Denies heat intolerance and cold intolerance  Hematopoietic/Lymphatic: Denies bleeding problems and blood transfusions    24-hour events:  CT-guided biopsy of right lung mass 4/18/2024    Objective   OBJECTIVE:   BP (!) 100/57   Pulse 83   Temp 97.8 °F (36.6 °C) (Temporal)   Resp 22   Ht 1.651 m (5' 5\")   Wt 56.6 kg (124 lb 12.5 oz)   SpO2 98%   BMI 20.76 kg/m²   SpO2 Readings from Last 1 Encounters:   04/18/24 98%        I/O:  No intake or output data in the 24  Radiation oncology referral pending results of biopsy.  Ferrlecit infusion  Scheduled bronchodilators-DuoNebs every 4 hours while awake.  Add Brovana budesonide.  Would recommend Trelegy or Breztri for discharge.  PFTs as outpatient  NicoDerm CQ, tobacco cessation  DVT, GI prophylaxis  Xanax 0.5 mg by mouth every 8 hours as needed for anxiety  Okay to discharge from a pulmonary perspective when okay with all others outpatient follow-up arranged.    This plan of care was reviewed in collaboration with Dr. Browning    Electronically signed by SAIGE Paz CNP on 4/18/2024 at 9:57 AM      This is confirmation that I have personally performed a substantial portion of medical decision making (>50%) related to this patient encounter.  The medications & laboratory data and imagery were discussed and adjusted where necessary. Key issues of the case were discussed among consultants.  Review of CNP documentation was conducted and revisions were made as appropriate. I agree with the above documented exam, problem list and plan of care with the following additions:    Patient underwent RUL CT guided biopsy this a.m.  MRI and bone scan negative for metastatic disease.  Patient is stable to be discharged from a pulmonary stand point, follow up in office 1 week out patient.    MD Martha Lyle MD

## 2024-04-22 LAB — SURGICAL PATHOLOGY REPORT: NORMAL

## 2024-04-26 ENCOUNTER — HOSPITAL ENCOUNTER (OUTPATIENT)
Dept: PET IMAGING | Age: 64
End: 2024-04-26
Attending: INTERNAL MEDICINE
Payer: COMMERCIAL

## 2024-04-26 ENCOUNTER — HOSPITAL ENCOUNTER (OUTPATIENT)
Dept: PET IMAGING | Age: 64
Discharge: HOME | End: 2024-04-26
Attending: INTERNAL MEDICINE
Payer: COMMERCIAL

## 2024-04-26 DIAGNOSIS — R91.8 LUNG MASS: ICD-10-CM

## 2024-04-26 LAB — GLUCOSE BLD-MCNC: 97 MG/DL (ref 74–99)

## 2024-04-26 PROCEDURE — 3430000000 HC RX DIAGNOSTIC RADIOPHARMACEUTICAL: Performed by: RADIOLOGY

## 2024-04-26 PROCEDURE — 78815 PET IMAGE W/CT SKULL-THIGH: CPT

## 2024-04-26 PROCEDURE — A9609 HC RX DIAGNOSTIC RADIOPHARMACEUTICAL: HCPCS | Performed by: RADIOLOGY

## 2024-04-26 PROCEDURE — 82962 GLUCOSE BLOOD TEST: CPT

## 2024-04-26 RX ORDER — FLUDEOXYGLUCOSE F 18 200 MCI/ML
15 INJECTION, SOLUTION INTRAVENOUS
Status: COMPLETED | OUTPATIENT
Start: 2024-04-26 | End: 2024-04-26

## 2024-04-26 RX ADMIN — FLUDEOXYGLUCOSE F 18 15 MILLICURIE: 200 INJECTION, SOLUTION INTRAVENOUS at 11:05

## 2024-04-29 ENCOUNTER — HOSPITAL ENCOUNTER (OUTPATIENT)
Dept: INFUSION THERAPY | Age: 64
Discharge: HOME | End: 2024-04-29

## 2024-04-29 ENCOUNTER — TELEPHONE (OUTPATIENT)
Dept: CASE MANAGEMENT | Age: 64
End: 2024-04-29

## 2024-04-29 ENCOUNTER — HOSPITAL ENCOUNTER (OUTPATIENT)
Dept: RADIATION ONCOLOGY | Age: 64
Discharge: HOME | End: 2024-04-29
Payer: COMMERCIAL

## 2024-04-29 ENCOUNTER — OFFICE VISIT (OUTPATIENT)
Dept: ONCOLOGY | Age: 64
End: 2024-04-29
Payer: COMMERCIAL

## 2024-04-29 VITALS
WEIGHT: 114.4 LBS | TEMPERATURE: 97.4 F | DIASTOLIC BLOOD PRESSURE: 64 MMHG | SYSTOLIC BLOOD PRESSURE: 102 MMHG | RESPIRATION RATE: 20 BRPM | HEART RATE: 81 BPM | BODY MASS INDEX: 19.64 KG/M2

## 2024-04-29 VITALS
BODY MASS INDEX: 19.56 KG/M2 | HEIGHT: 64 IN | SYSTOLIC BLOOD PRESSURE: 99 MMHG | TEMPERATURE: 98.2 F | OXYGEN SATURATION: 99 % | WEIGHT: 114.6 LBS | DIASTOLIC BLOOD PRESSURE: 71 MMHG | HEART RATE: 88 BPM

## 2024-04-29 DIAGNOSIS — C34.91 SQUAMOUS CELL CARCINOMA OF RIGHT LUNG (HCC): Primary | ICD-10-CM

## 2024-04-29 DIAGNOSIS — C34.11 MALIGNANT NEOPLASM OF UPPER LOBE OF RIGHT LUNG (HCC): Primary | ICD-10-CM

## 2024-04-29 DIAGNOSIS — R05.8 OTHER COUGH: ICD-10-CM

## 2024-04-29 DIAGNOSIS — D50.8 IRON DEFICIENCY ANEMIA SECONDARY TO INADEQUATE DIETARY IRON INTAKE: ICD-10-CM

## 2024-04-29 LAB — SURGICAL PATHOLOGY REPORT: NORMAL

## 2024-04-29 PROCEDURE — 99205 OFFICE O/P NEW HI 60 MIN: CPT

## 2024-04-29 PROCEDURE — 99214 OFFICE O/P EST MOD 30 MIN: CPT

## 2024-04-29 PROCEDURE — 99205 OFFICE O/P NEW HI 60 MIN: CPT | Performed by: STUDENT IN AN ORGANIZED HEALTH CARE EDUCATION/TRAINING PROGRAM

## 2024-04-29 PROCEDURE — 99245 OFF/OP CONSLTJ NEW/EST HI 55: CPT | Performed by: RADIOLOGY

## 2024-04-29 RX ORDER — EPINEPHRINE 1 MG/ML
0.3 INJECTION, SOLUTION, CONCENTRATE INTRAVENOUS PRN
OUTPATIENT
Start: 2024-04-29

## 2024-04-29 RX ORDER — SODIUM CHLORIDE 9 MG/ML
INJECTION, SOLUTION INTRAVENOUS CONTINUOUS
OUTPATIENT
Start: 2024-04-29

## 2024-04-29 RX ORDER — SODIUM CHLORIDE 9 MG/ML
5-250 INJECTION, SOLUTION INTRAVENOUS PRN
Status: CANCELLED | OUTPATIENT
Start: 2024-04-29

## 2024-04-29 RX ORDER — SODIUM CHLORIDE 9 MG/ML
5-250 INJECTION, SOLUTION INTRAVENOUS PRN
OUTPATIENT
Start: 2024-04-29

## 2024-04-29 RX ORDER — SODIUM CHLORIDE 0.9 % (FLUSH) 0.9 %
5-40 SYRINGE (ML) INJECTION PRN
Status: CANCELLED | OUTPATIENT
Start: 2024-04-29

## 2024-04-29 RX ORDER — ONDANSETRON 2 MG/ML
8 INJECTION INTRAMUSCULAR; INTRAVENOUS
OUTPATIENT
Start: 2024-04-29

## 2024-04-29 RX ORDER — ACETAMINOPHEN 325 MG/1
650 TABLET ORAL
OUTPATIENT
Start: 2024-04-29

## 2024-04-29 RX ORDER — BENZONATATE 100 MG/1
100 CAPSULE ORAL 3 TIMES DAILY PRN
Qty: 30 CAPSULE | Refills: 0 | Status: SHIPPED | OUTPATIENT
Start: 2024-04-29 | End: 2024-05-09

## 2024-04-29 RX ORDER — FAMOTIDINE 10 MG/ML
20 INJECTION, SOLUTION INTRAVENOUS
OUTPATIENT
Start: 2024-04-29

## 2024-04-29 RX ORDER — HEPARIN SODIUM (PORCINE) LOCK FLUSH IV SOLN 100 UNIT/ML 100 UNIT/ML
500 SOLUTION INTRAVENOUS PRN
OUTPATIENT
Start: 2024-04-29

## 2024-04-29 RX ORDER — DIPHENHYDRAMINE HYDROCHLORIDE 50 MG/ML
50 INJECTION INTRAMUSCULAR; INTRAVENOUS
OUTPATIENT
Start: 2024-04-29

## 2024-04-29 RX ORDER — ALBUTEROL SULFATE 90 UG/1
4 AEROSOL, METERED RESPIRATORY (INHALATION) PRN
OUTPATIENT
Start: 2024-04-29

## 2024-04-29 ASSESSMENT — ENCOUNTER SYMPTOMS
COUGH: 1
GASTROINTESTINAL NEGATIVE: 1
SHORTNESS OF BREATH: 0

## 2024-04-29 NOTE — TELEPHONE ENCOUNTER
Met with patient and her  during her initial consultation with Dr. Haynes for her recent lung cancer diagnosis. Introduced myself and explained my role with patients receiving treatment at our center. Patient was friendly and receptive. Completed nursing assessment for the center including medication review and medical, social, and surgical histories. Patient had been admitted to the hospital for a RUL lung mass. This was biopsied and came back as SCC. She states she quit smoking when she found out she had lung cancer. She has completed her metastatic workup, which is negative for distant disease. She will need to see a cardiothoracic surgeon. Referral will be placed today. She is also seeing Dr. Wang for consulation today after this appointment. Discussed additional ancillary services available at the center. Will be referred to our  due to her positive distress screen. Provided with extensive written literature including Patient Resource: Lung Cancer, information on NSCLC from [employer], [facility] pamphlet, and Lung Prehab sheet. Provided patient with my contact information, office hours, and encouragement to call me with questions or concerns. Patient verbalizes understanding and appreciative of nurse navigator visit. Emotional support provided and greater than 30 minutes spent with patient. Nurse navigator will continue to follow. Carito Fam, ALISN, RN, Oncology Nurse Navigator

## 2024-04-29 NOTE — PROGRESS NOTES
ambulation activities  2.  Reorient confused/cognitively impaired patient  3.  Call-light/bell within patient's reach  4.  Chair/bed in low position, stretcher/bed with siderails up except when performing patient care activities  5.  Educate patient/family/caregiver on falls prevention  6.  Falls risk precaution (Yellow sticker Level II) placed on patient chart   7 or   Higher High Risk 1.  Place patient in easily observable treatment room  2.  Patient attended at all times by family member or staff  3.  Provide assistance as indicated for ambulation activities  4.  Reorient confused/cognitively impaired patient  5.  Call-light/bell within patient's reach  6.  Chair/bed in low position, stretcher/bed with siderails up except when performing patient care activities  7.  Educate patient/family/caregiver on falls prevention  8.  Falls risk precaution (Yellow sticker Level III) placed on patient chart           MALNUTRITION RISK SCREENING ASSESSMENT    Instructions:  Assess the patient and enter the appropriate indicators that are present for nutrition risk identification. Total the numbers entered and assign a risk score. Follow the appropriate action for total score listed below.     Assessment   Date  4/29/2024     Have you lost weight without trying?      1- Yes, 0.5 kg to 5 kg     Have you been eating poorly because of a decreased appetite?         0- No   3. Do you have a diagnosis of head and neck cancer OR will you be receiving neoadjuvant treatment for breast cancer?      0- No                                                                                    TOTAL 1        Score of 0-1: No action  Score 2 or greater:  For Non-Diabetic Patient: Recommend adding Ensure Enlive 2 x daily and provide patient with Ensure wellness bag with coupons  For Diabetic Patient: Recommend adding Glucerna Shake 2 x daily and provide patient with Glucerna Wellness bag with coupons  Route to the dietitian via [employer]          OT 
4/18/2024 9:34 am COMPARISON: 04/15/2024 HISTORY: ORDERING SYSTEM PROVIDED HISTORY: post lung biopsy TECHNOLOGIST PROVIDED HISTORY: Reason for exam:->post lung biopsy Post biopsy right upper lobe mass FINDINGS: There is again a large right upper lobe mass.  No pneumothorax is seen. There is slight pleural thickening at the right apex.  No acute infiltrate or pleural effusion is visualized.  The heart and mediastinum are unchanged and there is no evidence of vascular congestion.     Large right upper lobe mass. No pneumothorax.     MRI BRAIN W WO CONTRAST    Result Date: 4/17/2024  EXAMINATION: MRI OF THE BRAIN WITHOUT AND WITH CONTRAST  4/17/2024 1:04 pm TECHNIQUE: Multiplanar multisequence MRI of the head/brain was performed without and with the administration of intravenous contrast. COMPARISON: None. HISTORY: ORDERING SYSTEM PROVIDED HISTORY: lung mass, TECHNOLOGIST PROVIDED HISTORY: Reason for exam:->lung mass, What is the sedation requirement?->None FINDINGS: INTRACRANIAL STRUCTURES/VENTRICLES: There is no sign of acute infarct. No mass effect or midline shift. No evidence of an acute intracranial hemorrhage. There is mild dilatation of the ventricles and sulci representing age-appropriate atrophy. There is moderate periventricular and subcortical white matter T2 signal abnormality representing age-appropriate small vessel ischemia. The sellar/suprasellar regions appear unremarkable. The normal signal voids within the major intracranial vessels appear maintained. ORBITS: The visualized portion of the orbits demonstrate no acute abnormality. SINUSES: The visualized paranasal sinuses and mastoid air cells demonstrate no acute abnormality. BONES/SOFT TISSUES: The bone marrow signal intensity appears normal. The soft tissues demonstrate no acute abnormality.     1. No evidence of metastatic disease to the brain. 2. No acute intracranial abnormality. No sign of acute infarct. 3. Mild age-appropriate atrophy and

## 2024-04-29 NOTE — PROGRESS NOTES
She refers of no pain.    PAST MEDICAL HISTORY:      Diagnosis Date    Cancer (HCC)     skin on bridge of her nose, SCC       PAST SURGICAL HISTORY:      Procedure Laterality Date     SECTION  1990    CT NEEDLE BIOPSY LUNG PERCUTANEOUS  2024    CT NEEDLE BIOPSY LUNG PERCUTANEOUS 2024 [facility] CT    OVARIAN CYST REMOVAL         No Known Allergies    MEDICATIONS:  Medications reviewed and reconciled.  Current Outpatient Medications   Medication Sig Dispense Refill    benzonatate (TESSALON) 100 MG capsule Take 1 capsule by mouth 3 times daily as needed for Cough 30 capsule 0    ALPRAZolam (XANAX) 0.5 MG tablet Take 1 tablet by mouth nightly as needed for Sleep or Anxiety (insomnia) for up to 30 days. Max Daily Amount: 0.5 mg 30 tablet 0    nicotine (NICODERM CQ) 7 MG/24HR Place 1 patch onto the skin daily 30 patch 3    fluticasone-umeclidin-vilant (TRELEGY ELLIPTA) 100-62.5-25 MCG/ACT AEPB inhaler Inhale 1 puff into the lungs daily 1 each 3    albuterol sulfate HFA (PROVENTIL HFA) 108 (90 Base) MCG/ACT inhaler Inhale 2 puffs into the lungs every 6 hours as needed for Wheezing 18 g 3    ALPRAZolam (XANAX) 0.5 MG tablet Take 1 tablet by mouth every 8 hours as needed for Anxiety for up to 30 days. Max Daily Amount: 1.5 mg 12 tablet 0    guaiFENesin 400 MG tablet Take 1 tablet by mouth 4 times daily as needed for Cough      acetaminophen (TYLENOL) 325 MG tablet Take 2 tablets by mouth every 6 hours as needed for Pain (Patient not taking: Reported on 2024)       No current facility-administered medications for this encounter.       FAMILY HISTORY:  Family History   Problem Relation Age of Onset    Cancer Mother         Adrinal then Bone    Early Death Mother     Alzheimer's Disease Father     Cancer Father         Skin       SOCIAL HISTORY:       reports that she has quit smoking. Her smoking use included cigarettes. She has a 30.0 pack-year smoking history. She has never used smokeless tobacco.. 
Moderate Risk 1.  Provide assistance as indicated for ambulation activities  2.  Reorient confused/cognitively impaired patient  3.  Call-light/bell within patient's reach  4.  Chair/bed in low position, stretcher/bed with siderails up except when performing patient care activities  5.  Educate patient/family/caregiver on falls prevention  6.  Falls risk precaution (Yellow sticker Level II) placed on patient chart   7 or   Higher High Risk 1.  Place patient in easily observable treatment room  2.  Patient attended at all times by family member or staff  3.  Provide assistance as indicated for ambulation activities  4.  Reorient confused/cognitively impaired patient  5.  Call-light/bell within patient's reach  6.  Chair/bed in low position, stretcher/bed with siderails up except when performing patient care activities  7.  Educate patient/family/caregiver on falls prevention  8.  Falls risk precaution (Yellow sticker Level III) placed on patient chart           MALNUTRITION RISK SCREENING ASSESSMENT    Instructions:  Assess the patient and enter the appropriate indicators that are present for nutrition risk identification. Total the numbers entered and assign a risk score. Follow the appropriate action for total score listed below.     Assessment   Date  4/29/2024     Have you lost weight without trying?      0- No     Have you been eating poorly because of a decreased appetite?         0- No   3. Do you have a diagnosis of head and neck cancer OR will you be receiving neoadjuvant treatment for breast cancer?      0- No                                                                                    TOTAL 0          Score of 0-1: No action  Score 2 or greater:  For Non-Diabetic Patient: Recommend adding Ensure Complete 2 x daily and provide patient with Ensure wellness bag with coupons  For Diabetic Patient: Recommend adding Glucerna Shake 2 x daily and provide patient with Glucerna Wellness bag with coupons  Route

## 2024-04-30 ENCOUNTER — TELEPHONE (OUTPATIENT)
Dept: CASE MANAGEMENT | Age: 64
End: 2024-04-30

## 2024-04-30 NOTE — TELEPHONE ENCOUNTER
Guardant CDX testing ordered on patient's tissue specimen per Dr. Haynes's request. Will follow up on results.

## 2024-05-02 ENCOUNTER — TELEPHONE (OUTPATIENT)
Dept: ONCOLOGY | Age: 64
End: 2024-05-02

## 2024-05-02 ENCOUNTER — HOSPITAL ENCOUNTER (OUTPATIENT)
Dept: INFUSION THERAPY | Age: 64
Discharge: HOME | End: 2024-05-02
Payer: COMMERCIAL

## 2024-05-02 VITALS
OXYGEN SATURATION: 95 % | HEART RATE: 77 BPM | TEMPERATURE: 97.8 F | RESPIRATION RATE: 18 BRPM | SYSTOLIC BLOOD PRESSURE: 92 MMHG | DIASTOLIC BLOOD PRESSURE: 57 MMHG

## 2024-05-02 DIAGNOSIS — C34.91 SQUAMOUS CELL CARCINOMA OF RIGHT LUNG (HCC): ICD-10-CM

## 2024-05-02 DIAGNOSIS — D50.8 IRON DEFICIENCY ANEMIA SECONDARY TO INADEQUATE DIETARY IRON INTAKE: Primary | ICD-10-CM

## 2024-05-02 DIAGNOSIS — D64.9 NORMOCYTIC ANEMIA: ICD-10-CM

## 2024-05-02 PROCEDURE — 96365 THER/PROPH/DIAG IV INF INIT: CPT

## 2024-05-02 PROCEDURE — 6360000002 HC RX W HCPCS: Performed by: NURSE PRACTITIONER

## 2024-05-02 PROCEDURE — 2580000003 HC RX 258: Performed by: NURSE PRACTITIONER

## 2024-05-02 RX ORDER — SODIUM CHLORIDE 9 MG/ML
5-250 INJECTION, SOLUTION INTRAVENOUS PRN
Status: CANCELLED | OUTPATIENT
Start: 2024-05-09

## 2024-05-02 RX ORDER — EPINEPHRINE 1 MG/ML
0.3 INJECTION, SOLUTION, CONCENTRATE INTRAVENOUS PRN
Status: CANCELLED | OUTPATIENT
Start: 2024-05-09

## 2024-05-02 RX ORDER — DIPHENHYDRAMINE HYDROCHLORIDE 50 MG/ML
50 INJECTION INTRAMUSCULAR; INTRAVENOUS
Status: CANCELLED | OUTPATIENT
Start: 2024-05-09

## 2024-05-02 RX ORDER — ONDANSETRON 2 MG/ML
8 INJECTION INTRAMUSCULAR; INTRAVENOUS
Status: CANCELLED | OUTPATIENT
Start: 2024-05-09

## 2024-05-02 RX ORDER — HEPARIN 100 UNIT/ML
500 SYRINGE INTRAVENOUS PRN
Status: CANCELLED | OUTPATIENT
Start: 2024-05-09

## 2024-05-02 RX ORDER — SODIUM CHLORIDE 0.9 % (FLUSH) 0.9 %
5-40 SYRINGE (ML) INJECTION PRN
Status: DISCONTINUED | OUTPATIENT
Start: 2024-05-02 | End: 2024-05-03 | Stop reason: HOSPADM

## 2024-05-02 RX ORDER — SODIUM CHLORIDE 9 MG/ML
INJECTION, SOLUTION INTRAVENOUS CONTINUOUS
Status: CANCELLED | OUTPATIENT
Start: 2024-05-09

## 2024-05-02 RX ORDER — ACETAMINOPHEN 325 MG/1
650 TABLET ORAL
Status: CANCELLED | OUTPATIENT
Start: 2024-05-09

## 2024-05-02 RX ORDER — SODIUM CHLORIDE 0.9 % (FLUSH) 0.9 %
5-40 SYRINGE (ML) INJECTION PRN
Status: CANCELLED | OUTPATIENT
Start: 2024-05-09

## 2024-05-02 RX ORDER — SODIUM CHLORIDE 9 MG/ML
5-250 INJECTION, SOLUTION INTRAVENOUS PRN
Status: DISCONTINUED | OUTPATIENT
Start: 2024-05-02 | End: 2024-05-03 | Stop reason: HOSPADM

## 2024-05-02 RX ORDER — ALBUTEROL SULFATE 90 UG/1
4 AEROSOL, METERED RESPIRATORY (INHALATION) PRN
Status: CANCELLED | OUTPATIENT
Start: 2024-05-09

## 2024-05-02 RX ADMIN — SODIUM CHLORIDE 200 ML/HR: 9 INJECTION, SOLUTION INTRAVENOUS at 08:42

## 2024-05-02 RX ADMIN — SODIUM CHLORIDE, PRESERVATIVE FREE 10 ML: 5 INJECTION INTRAVENOUS at 08:46

## 2024-05-02 RX ADMIN — SODIUM CHLORIDE 125 MG: 9 INJECTION, SOLUTION INTRAVENOUS at 08:44

## 2024-05-02 NOTE — TELEPHONE ENCOUNTER
Met with pt in conjunction with infusion re: positive distress screen.  Pt is 64-year-old female being managed for lung cancer.  Pt's mood appeared euthymic with full affect, she appeared A&Ox4, and was willing/able to participate in session.  She appeared appropriately dressed/groomed and was seated in infusion chair.  Reviewed distress screen dated 4/29/2024:     4/29/2024   Distress Tool Screening    Please select the number that describes how much distress you have experienced in the PAST WEEK: 10 (Extreme Distress)    Please select the number that describes how much distress you have experienced TODAY: 10 (Extreme Distress)    Sadness/Depression: 8    Anxiety/Worry/Fear: 9    Concerns about appearance: 0 (None)    Connection to a higher power: 8    Sense of meaning and purpose: 8    Support from my spiritual community: 6    Nausea: 0 (None)    Eating/Loss of appetite: 0 (None)    Pain: 2    Fatigue: 0 (None)      Practical:  No concerns identified.  Pt reported having strong support system including her  and children.      Emotional:  Pt discussed reaction to current diagnosis.  She explored feeling overwhelmed while also feeling grateful for the care she is receiving.  She noted that she does have prescription to help with management of anxiety as needed.  Discussed ongoing symptoms and encouraged pt to notify clinic if symptoms persist or worsen.  Explored importance of continuing to engage in meaningful activities (pt identified gardening and spending time with grandchildren).    Spiritual:  Not discussed at this time.      Physical:  No needs identified.    No additional needs identified at this time.  Reviewed role of oncology SW and encouraged pt to notify this provider if additional needs arise.    PARIS Murray, SUDHA-S  Oncology Social Worker

## 2024-05-02 NOTE — FLOWSHEET NOTE
Patient arrived to clinic for treatment, tolerated treatment well, VSS. Patient denies any dizziness, light headedness, patient was resting during treatment causing BP to be on lower end. Verbalized next appointments.

## 2024-05-06 DIAGNOSIS — C34.91 SQUAMOUS CELL CARCINOMA OF RIGHT LUNG (HCC): Primary | ICD-10-CM

## 2024-05-06 RX ORDER — DIPHENHYDRAMINE HYDROCHLORIDE 50 MG/ML
50 INJECTION INTRAMUSCULAR; INTRAVENOUS ONCE
OUTPATIENT
Start: 2024-05-13 | End: 2024-05-13

## 2024-05-06 RX ORDER — SODIUM CHLORIDE 9 MG/ML
INJECTION, SOLUTION INTRAVENOUS CONTINUOUS
OUTPATIENT
Start: 2024-05-13

## 2024-05-06 RX ORDER — SODIUM CHLORIDE 9 MG/ML
5-250 INJECTION, SOLUTION INTRAVENOUS PRN
OUTPATIENT
Start: 2024-05-13

## 2024-05-06 RX ORDER — ACETAMINOPHEN 325 MG/1
650 TABLET ORAL
OUTPATIENT
Start: 2024-05-13

## 2024-05-06 RX ORDER — FAMOTIDINE 10 MG/ML
20 INJECTION, SOLUTION INTRAVENOUS ONCE
OUTPATIENT
Start: 2024-05-13 | End: 2024-05-13

## 2024-05-06 RX ORDER — FAMOTIDINE 10 MG/ML
20 INJECTION, SOLUTION INTRAVENOUS
OUTPATIENT
Start: 2024-05-13

## 2024-05-06 RX ORDER — EPINEPHRINE 1 MG/ML
0.3 INJECTION, SOLUTION, CONCENTRATE INTRAVENOUS PRN
OUTPATIENT
Start: 2024-05-13

## 2024-05-06 RX ORDER — SODIUM CHLORIDE 0.9 % (FLUSH) 0.9 %
5-40 SYRINGE (ML) INJECTION PRN
OUTPATIENT
Start: 2024-05-13

## 2024-05-06 RX ORDER — ONDANSETRON 8 MG/1
8 TABLET, ORALLY DISINTEGRATING ORAL EVERY 8 HOURS PRN
Qty: 30 TABLET | Refills: 2 | Status: SHIPPED | OUTPATIENT
Start: 2024-05-06

## 2024-05-06 RX ORDER — DIPHENHYDRAMINE HYDROCHLORIDE 50 MG/ML
50 INJECTION INTRAMUSCULAR; INTRAVENOUS
OUTPATIENT
Start: 2024-05-13

## 2024-05-06 RX ORDER — PROCHLORPERAZINE MALEATE 10 MG
10 TABLET ORAL EVERY 6 HOURS PRN
Qty: 50 TABLET | Refills: 2 | Status: SHIPPED | OUTPATIENT
Start: 2024-05-06

## 2024-05-06 RX ORDER — PALONOSETRON 0.05 MG/ML
0.25 INJECTION, SOLUTION INTRAVENOUS ONCE
OUTPATIENT
Start: 2024-05-13 | End: 2024-05-13

## 2024-05-06 RX ORDER — HEPARIN SODIUM (PORCINE) LOCK FLUSH IV SOLN 100 UNIT/ML 100 UNIT/ML
500 SOLUTION INTRAVENOUS PRN
OUTPATIENT
Start: 2024-05-13

## 2024-05-06 RX ORDER — ONDANSETRON 2 MG/ML
8 INJECTION INTRAMUSCULAR; INTRAVENOUS
OUTPATIENT
Start: 2024-05-13

## 2024-05-06 RX ORDER — ALBUTEROL SULFATE 90 UG/1
4 AEROSOL, METERED RESPIRATORY (INHALATION) PRN
OUTPATIENT
Start: 2024-05-13

## 2024-05-06 RX ORDER — MEPERIDINE HYDROCHLORIDE 50 MG/ML
12.5 INJECTION INTRAMUSCULAR; INTRAVENOUS; SUBCUTANEOUS PRN
OUTPATIENT
Start: 2024-05-13

## 2024-05-06 NOTE — PROGRESS NOTES
Please do not wear any nail polish, make up or hair products on the day of surgery    [x] You can expect a call the business day prior to procedure to notify you if your arrival time changes    [x] If you receive a survey after surgery we would greatly appreciate your comments    [] Parent/guardian of a minor must accompany their child and remain on the premises  the entire time they are under our care     [] Pediatric patients may bring favorite toy, blanket or comfort item with them    [] A caregiver or family member must remain with the patient during their stay if they are mentally handicapped, have dementia, disoriented or unable to use a call light or would be a safety concern if left unattended    [x] Please notify surgeon if you develop any illness between now and time of surgery (cold, cough, sore throat, fever, nausea, vomiting) or any signs of infections  including skin, wounds, and dental.    [x]  The Outpatient Pharmacy is available to fill your prescription here on your day of surgery, ask your preop nurse for details

## 2024-05-08 ENCOUNTER — TELEPHONE (OUTPATIENT)
Dept: CASE MANAGEMENT | Age: 64
End: 2024-05-08

## 2024-05-08 ENCOUNTER — HOSPITAL ENCOUNTER (OUTPATIENT)
Dept: CT IMAGING | Age: 64
Discharge: HOME | End: 2024-05-10

## 2024-05-08 ENCOUNTER — HOSPITAL ENCOUNTER (OUTPATIENT)
Dept: RADIATION ONCOLOGY | Age: 64
Discharge: HOME | End: 2024-05-08
Payer: COMMERCIAL

## 2024-05-08 PROCEDURE — 77334 RADIATION TREATMENT AID(S): CPT | Performed by: RADIOLOGY

## 2024-05-09 ENCOUNTER — ANESTHESIA EVENT (OUTPATIENT)
Dept: OPERATING ROOM | Age: 64
End: 2024-05-09
Payer: COMMERCIAL

## 2024-05-09 ENCOUNTER — ANESTHESIA (OUTPATIENT)
Dept: OPERATING ROOM | Age: 64
End: 2024-05-09
Payer: COMMERCIAL

## 2024-05-09 ENCOUNTER — APPOINTMENT (OUTPATIENT)
Dept: GENERAL RADIOLOGY | Age: 64
End: 2024-05-09
Attending: SURGERY
Payer: COMMERCIAL

## 2024-05-09 ENCOUNTER — HOSPITAL ENCOUNTER (OUTPATIENT)
Age: 64
Setting detail: OUTPATIENT SURGERY
Discharge: HOME | End: 2024-05-09
Attending: SURGERY | Admitting: SURGERY
Payer: COMMERCIAL

## 2024-05-09 VITALS
WEIGHT: 115 LBS | HEIGHT: 65 IN | HEART RATE: 70 BPM | SYSTOLIC BLOOD PRESSURE: 93 MMHG | TEMPERATURE: 97.8 F | DIASTOLIC BLOOD PRESSURE: 50 MMHG | RESPIRATION RATE: 16 BRPM | OXYGEN SATURATION: 95 % | BODY MASS INDEX: 19.16 KG/M2

## 2024-05-09 PROBLEM — C34.11 MALIGNANT NEOPLASM OF UPPER LOBE OF RIGHT LUNG (HCC): Status: ACTIVE | Noted: 2024-05-09

## 2024-05-09 PROCEDURE — 3700000000 HC ANESTHESIA ATTENDED CARE: Performed by: SURGERY

## 2024-05-09 PROCEDURE — 6360000002 HC RX W HCPCS: Performed by: SURGERY

## 2024-05-09 PROCEDURE — 3600000002 HC SURGERY LEVEL 2 BASE: Performed by: SURGERY

## 2024-05-09 PROCEDURE — 7100000010 HC PHASE II RECOVERY - FIRST 15 MIN: Performed by: SURGERY

## 2024-05-09 PROCEDURE — 36561 INSERT TUNNELED CV CATH: CPT | Performed by: SURGERY

## 2024-05-09 PROCEDURE — 3700000001 HC ADD 15 MINUTES (ANESTHESIA): Performed by: SURGERY

## 2024-05-09 PROCEDURE — 77001 FLUOROGUIDE FOR VEIN DEVICE: CPT

## 2024-05-09 PROCEDURE — 77001 FLUOROGUIDE FOR VEIN DEVICE: CPT | Performed by: SURGERY

## 2024-05-09 PROCEDURE — 2580000003 HC RX 258: Performed by: SURGERY

## 2024-05-09 PROCEDURE — 2500000003 HC RX 250 WO HCPCS: Performed by: SURGERY

## 2024-05-09 PROCEDURE — C1788 PORT, INDWELLING, IMP: HCPCS | Performed by: SURGERY

## 2024-05-09 PROCEDURE — 7100000011 HC PHASE II RECOVERY - ADDTL 15 MIN: Performed by: SURGERY

## 2024-05-09 PROCEDURE — 6360000002 HC RX W HCPCS: Performed by: NURSE ANESTHETIST, CERTIFIED REGISTERED

## 2024-05-09 PROCEDURE — 2580000003 HC RX 258: Performed by: NURSE ANESTHETIST, CERTIFIED REGISTERED

## 2024-05-09 PROCEDURE — 3600000012 HC SURGERY LEVEL 2 ADDTL 15MIN: Performed by: SURGERY

## 2024-05-09 PROCEDURE — 71045 X-RAY EXAM CHEST 1 VIEW: CPT

## 2024-05-09 PROCEDURE — 2709999900 HC NON-CHARGEABLE SUPPLY: Performed by: SURGERY

## 2024-05-09 DEVICE — VACCESS CT LOW-PROFILE POWER-INJECTABLE IMPLANTABLE PORT (AIR GUARD) (8F) (WITH SUTURE PLUGS)
Type: IMPLANTABLE DEVICE | Site: CHEST | Status: FUNCTIONAL
Brand: VACCESS

## 2024-05-09 RX ORDER — PROPOFOL 10 MG/ML
INJECTION, EMULSION INTRAVENOUS CONTINUOUS PRN
Status: DISCONTINUED | OUTPATIENT
Start: 2024-05-09 | End: 2024-05-09 | Stop reason: SDUPTHER

## 2024-05-09 RX ORDER — SODIUM CHLORIDE 9 MG/ML
INJECTION, SOLUTION INTRAVENOUS CONTINUOUS PRN
Status: DISCONTINUED | OUTPATIENT
Start: 2024-05-09 | End: 2024-05-09 | Stop reason: SDUPTHER

## 2024-05-09 RX ORDER — SODIUM CHLORIDE 0.9 % (FLUSH) 0.9 %
5-40 SYRINGE (ML) INJECTION EVERY 12 HOURS SCHEDULED
Status: DISCONTINUED | OUTPATIENT
Start: 2024-05-09 | End: 2024-05-09 | Stop reason: HOSPADM

## 2024-05-09 RX ORDER — DEXAMETHASONE SODIUM PHOSPHATE 4 MG/ML
INJECTION, SOLUTION INTRA-ARTICULAR; INTRALESIONAL; INTRAMUSCULAR; INTRAVENOUS; SOFT TISSUE PRN
Status: DISCONTINUED | OUTPATIENT
Start: 2024-05-09 | End: 2024-05-09 | Stop reason: SDUPTHER

## 2024-05-09 RX ORDER — MIDAZOLAM HYDROCHLORIDE 1 MG/ML
INJECTION INTRAMUSCULAR; INTRAVENOUS PRN
Status: DISCONTINUED | OUTPATIENT
Start: 2024-05-09 | End: 2024-05-09 | Stop reason: SDUPTHER

## 2024-05-09 RX ORDER — SODIUM CHLORIDE 0.9 % (FLUSH) 0.9 %
5-40 SYRINGE (ML) INJECTION PRN
Status: DISCONTINUED | OUTPATIENT
Start: 2024-05-09 | End: 2024-05-09 | Stop reason: HOSPADM

## 2024-05-09 RX ORDER — HEPARIN 100 UNIT/ML
SYRINGE INTRAVENOUS PRN
Status: DISCONTINUED | OUTPATIENT
Start: 2024-05-09 | End: 2024-05-09 | Stop reason: ALTCHOICE

## 2024-05-09 RX ORDER — ONDANSETRON 2 MG/ML
INJECTION INTRAMUSCULAR; INTRAVENOUS PRN
Status: DISCONTINUED | OUTPATIENT
Start: 2024-05-09 | End: 2024-05-09 | Stop reason: SDUPTHER

## 2024-05-09 RX ORDER — SODIUM CHLORIDE 9 MG/ML
INJECTION, SOLUTION INTRAVENOUS PRN
Status: DISCONTINUED | OUTPATIENT
Start: 2024-05-09 | End: 2024-05-09 | Stop reason: HOSPADM

## 2024-05-09 RX ORDER — SODIUM CHLORIDE 9 MG/ML
INJECTION, SOLUTION INTRAVENOUS CONTINUOUS
Status: DISCONTINUED | OUTPATIENT
Start: 2024-05-09 | End: 2024-05-09 | Stop reason: HOSPADM

## 2024-05-09 RX ORDER — LIDOCAINE HYDROCHLORIDE AND EPINEPHRINE 10; 10 MG/ML; UG/ML
INJECTION, SOLUTION INFILTRATION; PERINEURAL PRN
Status: DISCONTINUED | OUTPATIENT
Start: 2024-05-09 | End: 2024-05-09 | Stop reason: ALTCHOICE

## 2024-05-09 RX ORDER — PHENYLEPHRINE HCL IN 0.9% NACL 1 MG/10 ML
SYRINGE (ML) INTRAVENOUS PRN
Status: DISCONTINUED | OUTPATIENT
Start: 2024-05-09 | End: 2024-05-09 | Stop reason: SDUPTHER

## 2024-05-09 RX ORDER — FENTANYL CITRATE 50 UG/ML
INJECTION, SOLUTION INTRAMUSCULAR; INTRAVENOUS PRN
Status: DISCONTINUED | OUTPATIENT
Start: 2024-05-09 | End: 2024-05-09 | Stop reason: SDUPTHER

## 2024-05-09 RX ADMIN — FENTANYL CITRATE 25 MCG: 50 INJECTION, SOLUTION INTRAMUSCULAR; INTRAVENOUS at 09:29

## 2024-05-09 RX ADMIN — ONDANSETRON 4 MG: 2 INJECTION INTRAMUSCULAR; INTRAVENOUS at 09:25

## 2024-05-09 RX ADMIN — PROPOFOL 100 MCG/KG/MIN: 10 INJECTION, EMULSION INTRAVENOUS at 09:18

## 2024-05-09 RX ADMIN — Medication 50 MCG: at 09:45

## 2024-05-09 RX ADMIN — WATER 2000 MG: 1 INJECTION INTRAMUSCULAR; INTRAVENOUS; SUBCUTANEOUS at 09:25

## 2024-05-09 RX ADMIN — FENTANYL CITRATE 25 MCG: 50 INJECTION, SOLUTION INTRAMUSCULAR; INTRAVENOUS at 09:35

## 2024-05-09 RX ADMIN — Medication 100 MCG: at 09:46

## 2024-05-09 RX ADMIN — SODIUM CHLORIDE: 9 INJECTION, SOLUTION INTRAVENOUS at 09:15

## 2024-05-09 RX ADMIN — FENTANYL CITRATE 50 MCG: 50 INJECTION, SOLUTION INTRAMUSCULAR; INTRAVENOUS at 09:20

## 2024-05-09 RX ADMIN — DEXAMETHASONE SODIUM PHOSPHATE 10 MG: 4 INJECTION, SOLUTION INTRAMUSCULAR; INTRAVENOUS at 09:25

## 2024-05-09 RX ADMIN — MIDAZOLAM 2 MG: 1 INJECTION INTRAMUSCULAR; INTRAVENOUS at 09:15

## 2024-05-09 ASSESSMENT — PAIN SCALES - GENERAL
PAINLEVEL_OUTOF10: 0

## 2024-05-09 ASSESSMENT — PAIN - FUNCTIONAL ASSESSMENT: PAIN_FUNCTIONAL_ASSESSMENT: NONE - DENIES PAIN

## 2024-05-09 NOTE — OP NOTE
[place] [address]                            OPERATIVE REPORT      PATIENT NAME: JENNIFER LOPEZ            : 1960  MED REC NO: [id number]                        ROOM: [facility]  ACCOUNT NO: [id number]                       ADMIT DATE: 2024  PROVIDER: Gayatri Wilkinson MD      DATE OF PROCEDURE:  2024    SURGEON:  Gayatri Wilkinson MD    PREOPERATIVE DIAGNOSIS:  Lung cancer, right upper lobe.    POSTOPERATIVE DIAGNOSIS:  Lung cancer, right upper lobe.    PROCEDURES:    1. MediPort placement.  2. Intraoperative fluoroscopy.    INDICATIONS:  64-year-old female patient recently diagnosed with lung cancer, was referred for evaluation for a PEG tube placement for access for chemotherapy.  Procedure risks and benefits, possible complications, alternative options were fully explained to the patient, who understood and agreed to proceed with the surgery.    ESTIMATED BLOOD LOSS:  Minimal.    DESCRIPTION OF PROCEDURE:  With the patient in supine position on the operating table, after adequate sedation was obtained by Anesthesia, the operative area was prepped with DuraPrep and the patient was draped in the usual sterile manner.  1% lidocaine was used for local anesthesia.  The left subclavian vein was accessed percutaneously just below the clavicle.  Guidewire was inserted through the needle sheath into the superior vena cava under fluoroscopic guidance.  The needle was withdrawn leaving the guidewire in place.  A small pouch was created below the left clavicle.  Skin incision was deepened through subcutaneous tissue down to the muscle fascia, which was opened along the skin incision and a pouch was created large enough to accommodate the portal.  A guidewire was inserted through the guidewire into the superior vena cava under direct vision with fluoroscopy.  The guidewire and the dilator were removed leaving the dilator  sheath in place.  #18-Croatian tubing was inserted through the guidewire sheath into the superior vena cava under direct fluoroscopy guidance.  Excess tubing was excised.  The tubing was tunneled under the skin into the newly created pouch in the left upper chest.  The proximal end of the tubing was secured to the port and the port was secured in place into the anterior chest wall using 3 suture of #3-0 nylon.  The muscle fascia was closed over the port using 3-0 Vicryl continuous sutures.  Subcutaneous tissue was closed with 3-0 Vicryl interrupted sutures and the skin was closed 4-0 Vicryl continuous subcuticular sutures.  Dermabond was applied to the skin.  The patient tolerated the procedure well.          RUBEN DÍAZ MD      D:  05/09/2024 10:53:31     T:  05/09/2024 12:16:40     MA/EVELIN  Job #:  [id number]     Doc#:  [id number]

## 2024-05-09 NOTE — DISCHARGE INSTRUCTIONS
than about 15 pounds. This may include a child, heavy grocery bags, a heavy briefcase or backpack, cat litter or dog food bags, or a vacuum .     You can shower, but keep the access dry for the first 2 days. Cover the area with a plastic bag to keep it dry.     Do not soak or scrub the incision until it has healed.     Wear an arm guard to protect the area if you play sports or work with your arms.     You may drive when your doctor says it is okay. This is usually in 1 to 2 days.     Most people are able to return to work about 1 or 2 days after surgery.   Diet    Follow an eating plan that is good for your kidneys. A registered dietitian can help you make a meal plan that is right for you. You may need to limit protein, salt, fluids, and certain foods.   Medicines    Your doctor will tell you if and when you can restart your medicines. You will also be given instructions about taking any new medicines.     If you stopped taking aspirin or some other blood thinner, your doctor will tell you when to start taking it again.     Take pain medicines exactly as directed.  If the doctor gave you a prescription medicine for pain, take it as prescribed.  If you are not taking a prescription pain medicine, ask your doctor if you can take acetaminophen (Tylenol). Do not take ibuprofen (Advil, Motrin) or naproxen (Aleve), or similar medicines, unless your doctor tells you to. They may make chronic kidney disease worse.  Do not take two or more pain medicines at the same time unless the doctor told you to. Many pain medicines have acetaminophen, which is Tylenol. Too much acetaminophen (Tylenol) can be harmful.     If you think your pain medicine is making you sick to your stomach:  Take your medicine after meals (unless your doctor has told you not to).  Ask your doctor for a different pain medicine.     If your doctor prescribed antibiotics, take them as directed. Do not stop taking them just because you feel better.  pain medicine.     You have loose stitches, or your incision comes open.     You are bleeding from the incision.     You have signs of infection, such as:  Increased pain, swelling, warmth, or redness.  Red streaks leading from the area.  Pus draining from the area.  A fever.     You have signs of a blood clot in your leg (called a deep vein thrombosis), such as:  Pain in your calf, back of the knee, thigh, or groin.  Redness or swelling in your leg.   Watch closely for changes in your health, and be sure to contact your doctor if you have any problems.  Where can you learn more?  Go to https://www.Exit41.net/patientEd and enter P616 to learn more about \"Hemodialysis Access Surgery: What to Expect at Home.\"  Current as of: October 11, 2023               Content Version: 14.0  © 2006-2024 [employer].   Care instructions adapted under license by [employer]. If you have questions about a medical condition or this instruction, always ask your healthcare professional. [employer] disclaims any warranty or liability for your use of this information.

## 2024-05-09 NOTE — H&P
Patient's Name/Date of Birth: Mary Purdy / 1960    Date: 2024    PCP: Yue Nobles DO    No chief complaint on file.      HPI:  Patient recently diagnosed with lung CA.She was admitted at [facility] on 4/15/2024 due to weight loss, and cough. She was found to have an 8.5 cm right upper lobe mass with central necrosis, and had undergone biopsy of the lung on 2023, confirming her diagnosis. Here for mediport placement    Patient's medications, allergies, past medical, surgical, social and family histories were reviewed and updated as appropriate.    No Known Allergies    Past Medical History:   Diagnosis Date    Cancer (HCC)     skin on bridge of her nose, SCC    Lung cancer (HCC)         Past Surgical History:   Procedure Laterality Date     SECTION  1990    CT NEEDLE BIOPSY LUNG PERCUTANEOUS  2024    CT NEEDLE BIOPSY LUNG PERCUTANEOUS 2024 [facility] CT    OVARIAN CYST REMOVAL          Social History     Tobacco Use    Smoking status: Former     Current packs/day: 1.00     Average packs/day: 1 pack/day for 30.0 years (30.0 ttl pk-yrs)     Types: Cigarettes    Smokeless tobacco: Never    Tobacco comments:     Quit two weeks ago   Substance Use Topics    Alcohol use: Not Currently       Current Facility-Administered Medications   Medication Dose Route Frequency Provider Last Rate Last Admin    0.9 % sodium chloride infusion   IntraVENous Continuous Gayatri Wilkinson MD        sodium chloride flush 0.9 % injection 5-40 mL  5-40 mL IntraVENous 2 times per day Gayatri Wilkinson MD        sodium chloride flush 0.9 % injection 5-40 mL  5-40 mL IntraVENous PRN Gayatri Wilkinson MD        0.9 % sodium chloride infusion   IntraVENous PRN Gayatri Wilkinson MD        ceFAZolin (ANCEF) 2,000 mg in sterile water 20 mL IV syringe  2,000 mg IntraVENous On Call to OR Gayatri Wilkinson MD             Labs:  CBC:   Lab Results   Component Value Date/Time    WBC 14.1 2024 05:30 AM    RBC 4.00

## 2024-05-09 NOTE — ANESTHESIA PRE PROCEDURE
Department of Anesthesiology  Preprocedure Note       Name:  Mary Purdy   Age:  64 y.o.  :  1960                                          MRN:  [id number]         Date:  2024      Surgeon: Surgeon(s):  Gayatri Wilkinson MD    Procedure: Procedure(s):  MEDI PORT INSERTION    Medications prior to admission:   Prior to Admission medications    Medication Sig Start Date End Date Taking? Authorizing Provider   ondansetron (ZOFRAN-ODT) 8 MG TBDP disintegrating tablet Place 1 tablet under the tongue every 8 hours as needed for Nausea or Vomiting 24   Gigi Haynes MD   prochlorperazine (COMPAZINE) 10 MG tablet Take 1 tablet by mouth every 6 hours as needed (nausea) 24   Gigi Haynes MD   benzonatate (TESSALON) 100 MG capsule Take 1 capsule by mouth 3 times daily as needed for Cough 24  Gigi Haynes MD   ALPRAZolam (XANAX) 0.5 MG tablet Take 1 tablet by mouth nightly as needed for Sleep or Anxiety (insomnia) for up to 30 days. Max Daily Amount: 0.5 mg 24  Martha Browning MD   nicotine (NICODERM CQ) 7 MG/24HR Place 1 patch onto the skin daily  Patient not taking: Reported on 2024   Louis Linares APRN - CNP   fluticasone-umeclidin-vilant (TRELEGY ELLIPTA) 100-62.5-25 MCG/ACT AEPB inhaler Inhale 1 puff into the lungs daily  Patient not taking: Reported on 2024   Louis Linares APRN - CNP   albuterol sulfate HFA (PROVENTIL HFA) 108 (90 Base) MCG/ACT inhaler Inhale 2 puffs into the lungs every 6 hours as needed for Wheezing  Patient not taking: Reported on 2024   Louis Linares APRN - CNP   ALPRAZolam (XANAX) 0.5 MG tablet Take 1 tablet by mouth every 8 hours as needed for Anxiety for up to 30 days. Max Daily Amount: 1.5 mg 24  Celso Meier MD   acetaminophen (TYLENOL) 325 MG tablet Take 2 tablets by mouth every 6 hours as needed for Pain  Patient not taking: Reported on 2024    Provider, MD Marcy

## 2024-05-09 NOTE — ANESTHESIA POSTPROCEDURE EVALUATION
Department of Anesthesiology  Postprocedure Note    Patient: Mary Purdy  MRN: [id number]  YOB: 1960  Date of evaluation: 5/9/2024    Procedure Summary       Date: 05/09/24 Room / Location: [address]    Anesthesia Start: 0915 Anesthesia Stop: 1018    Procedure: MEDI PORT INSERTION (Chest) Diagnosis:       Malignant neoplasm of lung, unspecified laterality, unspecified part of lung (HCC)      (Malignant neoplasm of lung, unspecified laterality, unspecified part of lung (HCC) [C34.90])    Surgeons: Gayatri Wilkinson MD Responsible Provider: Brittney Garcia DO    Anesthesia Type: MAC ASA Status: 2            Anesthesia Type: MAC    Usha Phase I: Usha Score: 10    Usha Phase II:      Anesthesia Post Evaluation    Patient location during evaluation: PACU  Patient participation: complete - patient participated  Level of consciousness: sleepy but conscious  Pain score: 0  Airway patency: patent  Nausea & Vomiting: no vomiting and no nausea  Cardiovascular status: hemodynamically stable  Respiratory status: acceptable  Hydration status: stable  Pain management: adequate        No notable events documented.

## 2024-05-10 ENCOUNTER — HOSPITAL ENCOUNTER (OUTPATIENT)
Dept: INFUSION THERAPY | Age: 64
Discharge: HOME | End: 2024-05-10
Payer: COMMERCIAL

## 2024-05-10 ENCOUNTER — HOSPITAL ENCOUNTER (OUTPATIENT)
Dept: INFUSION THERAPY | Age: 64
End: 2024-05-10

## 2024-05-10 VITALS
SYSTOLIC BLOOD PRESSURE: 113 MMHG | DIASTOLIC BLOOD PRESSURE: 61 MMHG | RESPIRATION RATE: 16 BRPM | HEART RATE: 87 BPM | TEMPERATURE: 97.9 F | OXYGEN SATURATION: 95 %

## 2024-05-10 DIAGNOSIS — C34.11 MALIGNANT NEOPLASM OF UPPER LOBE OF RIGHT LUNG (HCC): ICD-10-CM

## 2024-05-10 DIAGNOSIS — D64.9 NORMOCYTIC ANEMIA: ICD-10-CM

## 2024-05-10 DIAGNOSIS — D50.8 IRON DEFICIENCY ANEMIA SECONDARY TO INADEQUATE DIETARY IRON INTAKE: Primary | ICD-10-CM

## 2024-05-10 DIAGNOSIS — C34.91 SQUAMOUS CELL CARCINOMA OF RIGHT LUNG (HCC): ICD-10-CM

## 2024-05-10 LAB
ALBUMIN SERPL-MCNC: 3.7 G/DL (ref 3.5–5.2)
ALP SERPL-CCNC: 88 U/L (ref 35–104)
ALT SERPL-CCNC: 28 U/L (ref 0–32)
ANION GAP SERPL CALCULATED.3IONS-SCNC: 10 MMOL/L (ref 7–16)
AST SERPL-CCNC: 34 U/L (ref 0–31)
BASOPHILS # BLD: 0.03 K/UL (ref 0–0.2)
BASOPHILS NFR BLD: 0 % (ref 0–2)
BILIRUB SERPL-MCNC: 0.2 MG/DL (ref 0–1.2)
BUN SERPL-MCNC: 16 MG/DL (ref 6–23)
CALCIUM SERPL-MCNC: 11.2 MG/DL (ref 8.6–10.2)
CHLORIDE SERPL-SCNC: 102 MMOL/L (ref 98–107)
CO2 SERPL-SCNC: 27 MMOL/L (ref 22–29)
CREAT SERPL-MCNC: 0.4 MG/DL (ref 0.5–1)
EOSINOPHIL # BLD: 0 K/UL (ref 0.05–0.5)
EOSINOPHILS RELATIVE PERCENT: 0 % (ref 0–6)
ERYTHROCYTE [DISTWIDTH] IN BLOOD BY AUTOMATED COUNT: 16 % (ref 11.5–15)
GFR, ESTIMATED: >90 ML/MIN/1.73M2
GLUCOSE SERPL-MCNC: 95 MG/DL (ref 74–99)
HCT VFR BLD AUTO: 35.3 % (ref 34–48)
HGB BLD-MCNC: 10.6 G/DL (ref 11.5–15.5)
IMM GRANULOCYTES # BLD AUTO: 0.1 K/UL (ref 0–0.58)
IMM GRANULOCYTES NFR BLD: 1 % (ref 0–5)
LYMPHOCYTES NFR BLD: 2 K/UL (ref 1.5–4)
LYMPHOCYTES RELATIVE PERCENT: 10 % (ref 20–42)
MAGNESIUM SERPL-MCNC: 2.1 MG/DL (ref 1.6–2.6)
MCH RBC QN AUTO: 25.4 PG (ref 26–35)
MCHC RBC AUTO-ENTMCNC: 30 G/DL (ref 32–34.5)
MCV RBC AUTO: 84.4 FL (ref 80–99.9)
MONOCYTES NFR BLD: 0.95 K/UL (ref 0.1–0.95)
MONOCYTES NFR BLD: 5 % (ref 2–12)
NEUTROPHILS NFR BLD: 84 % (ref 43–80)
NEUTS SEG NFR BLD: 16.11 K/UL (ref 1.8–7.3)
PLATELET # BLD AUTO: 478 K/UL (ref 130–450)
PMV BLD AUTO: 10.6 FL (ref 7–12)
POTASSIUM SERPL-SCNC: 3.9 MMOL/L (ref 3.5–5)
PROT SERPL-MCNC: 7.4 G/DL (ref 6.4–8.3)
RBC # BLD AUTO: 4.18 M/UL (ref 3.5–5.5)
SODIUM SERPL-SCNC: 139 MMOL/L (ref 132–146)
SURGICAL PATHOLOGY REPORT: NORMAL
WBC OTHER # BLD: 19.2 K/UL (ref 4.5–11.5)

## 2024-05-10 PROCEDURE — 83735 ASSAY OF MAGNESIUM: CPT

## 2024-05-10 PROCEDURE — 6360000002 HC RX W HCPCS: Performed by: STUDENT IN AN ORGANIZED HEALTH CARE EDUCATION/TRAINING PROGRAM

## 2024-05-10 PROCEDURE — 6360000002 HC RX W HCPCS: Performed by: NURSE PRACTITIONER

## 2024-05-10 PROCEDURE — 85025 COMPLETE CBC W/AUTO DIFF WBC: CPT

## 2024-05-10 PROCEDURE — 86704 HEP B CORE ANTIBODY TOTAL: CPT

## 2024-05-10 PROCEDURE — 87340 HEPATITIS B SURFACE AG IA: CPT

## 2024-05-10 PROCEDURE — 86317 IMMUNOASSAY INFECTIOUS AGENT: CPT

## 2024-05-10 PROCEDURE — 80053 COMPREHEN METABOLIC PANEL: CPT

## 2024-05-10 PROCEDURE — 96365 THER/PROPH/DIAG IV INF INIT: CPT

## 2024-05-10 PROCEDURE — 2580000003 HC RX 258: Performed by: NURSE PRACTITIONER

## 2024-05-10 PROCEDURE — 2580000003 HC RX 258: Performed by: STUDENT IN AN ORGANIZED HEALTH CARE EDUCATION/TRAINING PROGRAM

## 2024-05-10 PROCEDURE — 36591 DRAW BLOOD OFF VENOUS DEVICE: CPT

## 2024-05-10 RX ORDER — HEPARIN 100 UNIT/ML
500 SYRINGE INTRAVENOUS PRN
Status: DISCONTINUED | OUTPATIENT
Start: 2024-05-10 | End: 2024-05-11 | Stop reason: HOSPADM

## 2024-05-10 RX ORDER — SODIUM CHLORIDE 9 MG/ML
5-250 INJECTION, SOLUTION INTRAVENOUS PRN
Status: DISCONTINUED | OUTPATIENT
Start: 2024-05-10 | End: 2024-05-11 | Stop reason: HOSPADM

## 2024-05-10 RX ORDER — SODIUM CHLORIDE 0.9 % (FLUSH) 0.9 %
5-40 SYRINGE (ML) INJECTION PRN
OUTPATIENT
Start: 2024-05-16

## 2024-05-10 RX ORDER — ALBUTEROL SULFATE 90 UG/1
4 AEROSOL, METERED RESPIRATORY (INHALATION) PRN
OUTPATIENT
Start: 2024-05-16

## 2024-05-10 RX ORDER — SODIUM CHLORIDE 9 MG/ML
5-250 INJECTION, SOLUTION INTRAVENOUS PRN
OUTPATIENT
Start: 2024-05-16

## 2024-05-10 RX ORDER — SODIUM CHLORIDE 9 MG/ML
INJECTION, SOLUTION INTRAVENOUS CONTINUOUS
OUTPATIENT
Start: 2024-05-16

## 2024-05-10 RX ORDER — SODIUM CHLORIDE 9 MG/ML
25 INJECTION, SOLUTION INTRAVENOUS PRN
OUTPATIENT
Start: 2024-05-10

## 2024-05-10 RX ORDER — ACETAMINOPHEN 325 MG/1
650 TABLET ORAL
OUTPATIENT
Start: 2024-05-16

## 2024-05-10 RX ORDER — EPINEPHRINE 1 MG/ML
0.3 INJECTION, SOLUTION, CONCENTRATE INTRAVENOUS PRN
OUTPATIENT
Start: 2024-05-16

## 2024-05-10 RX ORDER — HEPARIN 100 UNIT/ML
500 SYRINGE INTRAVENOUS PRN
OUTPATIENT
Start: 2024-05-16

## 2024-05-10 RX ORDER — SODIUM CHLORIDE 0.9 % (FLUSH) 0.9 %
5-40 SYRINGE (ML) INJECTION PRN
Status: DISCONTINUED | OUTPATIENT
Start: 2024-05-10 | End: 2024-05-11 | Stop reason: HOSPADM

## 2024-05-10 RX ORDER — DIPHENHYDRAMINE HYDROCHLORIDE 50 MG/ML
50 INJECTION INTRAMUSCULAR; INTRAVENOUS
OUTPATIENT
Start: 2024-05-16

## 2024-05-10 RX ORDER — HEPARIN 100 UNIT/ML
500 SYRINGE INTRAVENOUS PRN
OUTPATIENT
Start: 2024-05-10

## 2024-05-10 RX ORDER — ONDANSETRON 2 MG/ML
8 INJECTION INTRAMUSCULAR; INTRAVENOUS
OUTPATIENT
Start: 2024-05-16

## 2024-05-10 RX ORDER — SODIUM CHLORIDE 0.9 % (FLUSH) 0.9 %
5-40 SYRINGE (ML) INJECTION PRN
OUTPATIENT
Start: 2024-05-10

## 2024-05-10 RX ADMIN — SODIUM CHLORIDE, PRESERVATIVE FREE 10 ML: 5 INJECTION INTRAVENOUS at 11:58

## 2024-05-10 RX ADMIN — SODIUM CHLORIDE 20 ML/HR: 9 INJECTION, SOLUTION INTRAVENOUS at 12:04

## 2024-05-10 RX ADMIN — SODIUM CHLORIDE, PRESERVATIVE FREE 10 ML: 5 INJECTION INTRAVENOUS at 13:12

## 2024-05-10 RX ADMIN — SODIUM CHLORIDE, PRESERVATIVE FREE 10 ML: 5 INJECTION INTRAVENOUS at 12:00

## 2024-05-10 RX ADMIN — SODIUM CHLORIDE 125 MG: 9 INJECTION, SOLUTION INTRAVENOUS at 12:05

## 2024-05-10 RX ADMIN — HEPARIN 500 UNITS: 100 SYRINGE at 13:12

## 2024-05-10 NOTE — FLOWSHEET NOTE
Patient tolerated infusion well. Patient alert and oriented x 3 No distress noted. Vital signs stable. Patient denies any new or worsening pain. Patient denies questions regarding treatment or medication; verbalized understanding, offered patient information and discharge material; patient declined; Patient denies needs, all questions answered.

## 2024-05-13 ENCOUNTER — HOSPITAL ENCOUNTER (OUTPATIENT)
Dept: INFUSION THERAPY | Age: 64
End: 2024-05-13

## 2024-05-13 LAB
HBV SURFACE AB SERPL IA-ACNC: 198.3 MIU/ML (ref 0–9.99)
HBV SURFACE AG SERPL QL IA: NONREACTIVE

## 2024-05-14 LAB — HBV CORE AB SER QL: NONREACTIVE

## 2024-05-15 ENCOUNTER — TELEPHONE (OUTPATIENT)
Dept: ONCOLOGY | Age: 64
End: 2024-05-15

## 2024-05-15 ENCOUNTER — HOSPITAL ENCOUNTER (OUTPATIENT)
Dept: RADIATION ONCOLOGY | Age: 64
Discharge: HOME | End: 2024-05-15
Payer: COMMERCIAL

## 2024-05-15 PROCEDURE — 77300 RADIATION THERAPY DOSE PLAN: CPT | Performed by: RADIOLOGY

## 2024-05-15 PROCEDURE — 77338 DESIGN MLC DEVICE FOR IMRT: CPT | Performed by: RADIOLOGY

## 2024-05-15 PROCEDURE — 77301 RADIOTHERAPY DOSE PLAN IMRT: CPT | Performed by: RADIOLOGY

## 2024-05-15 NOTE — TELEPHONE ENCOUNTER
Received call from pt inquiring about request she had received for signature from [facility].  She stated that form is to be forwarded to her and requested assistance reviewing prior to signing.  Advised that this provider will alert Nurse Navigator to concern.  Pt noted that she did well with first treatment and is working to establish a new routine surrounding her cancer treatments.  No additional needs identified at this time.  Reviewed role of oncology SW and encouraged pt to notify this provider if additional needs arise.    PARIS Murray, MARYW-S  Oncology Social Worker

## 2024-05-17 ENCOUNTER — HOSPITAL ENCOUNTER (OUTPATIENT)
Dept: INFUSION THERAPY | Age: 64
Discharge: HOME | End: 2024-05-17
Payer: COMMERCIAL

## 2024-05-17 VITALS
SYSTOLIC BLOOD PRESSURE: 107 MMHG | HEART RATE: 88 BPM | RESPIRATION RATE: 18 BRPM | TEMPERATURE: 98.2 F | DIASTOLIC BLOOD PRESSURE: 60 MMHG | OXYGEN SATURATION: 95 %

## 2024-05-17 DIAGNOSIS — D64.9 NORMOCYTIC ANEMIA: ICD-10-CM

## 2024-05-17 DIAGNOSIS — C34.91 SQUAMOUS CELL CARCINOMA OF RIGHT LUNG (HCC): ICD-10-CM

## 2024-05-17 DIAGNOSIS — D50.8 IRON DEFICIENCY ANEMIA SECONDARY TO INADEQUATE DIETARY IRON INTAKE: Primary | ICD-10-CM

## 2024-05-17 LAB
ALBUMIN SERPL-MCNC: 3.7 G/DL (ref 3.5–5.2)
ALP SERPL-CCNC: 91 U/L (ref 35–104)
ALT SERPL-CCNC: 31 U/L (ref 0–32)
ANION GAP SERPL CALCULATED.3IONS-SCNC: 8 MMOL/L (ref 7–16)
AST SERPL-CCNC: 41 U/L (ref 0–31)
BASOPHILS # BLD: 0.04 K/UL (ref 0–0.2)
BASOPHILS NFR BLD: 0 % (ref 0–2)
BILIRUB SERPL-MCNC: 0.5 MG/DL (ref 0–1.2)
BUN SERPL-MCNC: 13 MG/DL (ref 6–23)
CALCIUM SERPL-MCNC: 10.7 MG/DL (ref 8.6–10.2)
CHLORIDE SERPL-SCNC: 96 MMOL/L (ref 98–107)
CO2 SERPL-SCNC: 29 MMOL/L (ref 22–29)
CREAT SERPL-MCNC: 0.5 MG/DL (ref 0.5–1)
EOSINOPHIL # BLD: 0.15 K/UL (ref 0.05–0.5)
EOSINOPHILS RELATIVE PERCENT: 1 % (ref 0–6)
ERYTHROCYTE [DISTWIDTH] IN BLOOD BY AUTOMATED COUNT: 16.4 % (ref 11.5–15)
GFR, ESTIMATED: >90 ML/MIN/1.73M2
GLUCOSE SERPL-MCNC: 127 MG/DL (ref 74–99)
HCT VFR BLD AUTO: 34.8 % (ref 34–48)
HGB BLD-MCNC: 10.5 G/DL (ref 11.5–15.5)
IMM GRANULOCYTES # BLD AUTO: 0.1 K/UL (ref 0–0.58)
IMM GRANULOCYTES NFR BLD: 1 % (ref 0–5)
LYMPHOCYTES NFR BLD: 1.83 K/UL (ref 1.5–4)
LYMPHOCYTES RELATIVE PERCENT: 11 % (ref 20–42)
MAGNESIUM SERPL-MCNC: 2.1 MG/DL (ref 1.6–2.6)
MCH RBC QN AUTO: 25.2 PG (ref 26–35)
MCHC RBC AUTO-ENTMCNC: 30.2 G/DL (ref 32–34.5)
MCV RBC AUTO: 83.5 FL (ref 80–99.9)
MONOCYTES NFR BLD: 1.12 K/UL (ref 0.1–0.95)
MONOCYTES NFR BLD: 7 % (ref 2–12)
NEUTROPHILS NFR BLD: 81 % (ref 43–80)
NEUTS SEG NFR BLD: 13.57 K/UL (ref 1.8–7.3)
PLATELET # BLD AUTO: 361 K/UL (ref 130–450)
PMV BLD AUTO: 10.2 FL (ref 7–12)
POTASSIUM SERPL-SCNC: 4.1 MMOL/L (ref 3.5–5)
PROT SERPL-MCNC: 7.2 G/DL (ref 6.4–8.3)
RBC # BLD AUTO: 4.17 M/UL (ref 3.5–5.5)
SODIUM SERPL-SCNC: 133 MMOL/L (ref 132–146)
WBC OTHER # BLD: 16.8 K/UL (ref 4.5–11.5)

## 2024-05-17 PROCEDURE — 83735 ASSAY OF MAGNESIUM: CPT

## 2024-05-17 PROCEDURE — 96365 THER/PROPH/DIAG IV INF INIT: CPT

## 2024-05-17 PROCEDURE — 2580000003 HC RX 258: Performed by: NURSE PRACTITIONER

## 2024-05-17 PROCEDURE — 80053 COMPREHEN METABOLIC PANEL: CPT

## 2024-05-17 PROCEDURE — 99214 OFFICE O/P EST MOD 30 MIN: CPT

## 2024-05-17 PROCEDURE — 85025 COMPLETE CBC W/AUTO DIFF WBC: CPT

## 2024-05-17 PROCEDURE — 6360000002 HC RX W HCPCS: Performed by: NURSE PRACTITIONER

## 2024-05-17 RX ORDER — SODIUM CHLORIDE 9 MG/ML
5-250 INJECTION, SOLUTION INTRAVENOUS PRN
Status: CANCELLED | OUTPATIENT
Start: 2024-05-24

## 2024-05-17 RX ORDER — SODIUM CHLORIDE 0.9 % (FLUSH) 0.9 %
5-40 SYRINGE (ML) INJECTION PRN
Status: DISCONTINUED | OUTPATIENT
Start: 2024-05-17 | End: 2024-05-18 | Stop reason: HOSPADM

## 2024-05-17 RX ORDER — HEPARIN 100 UNIT/ML
500 SYRINGE INTRAVENOUS PRN
Status: DISCONTINUED | OUTPATIENT
Start: 2024-05-17 | End: 2024-05-18 | Stop reason: HOSPADM

## 2024-05-17 RX ORDER — EPINEPHRINE 1 MG/ML
0.3 INJECTION, SOLUTION, CONCENTRATE INTRAVENOUS PRN
Status: CANCELLED | OUTPATIENT
Start: 2024-05-24

## 2024-05-17 RX ORDER — ONDANSETRON 2 MG/ML
8 INJECTION INTRAMUSCULAR; INTRAVENOUS
Status: CANCELLED | OUTPATIENT
Start: 2024-05-24

## 2024-05-17 RX ORDER — ALBUTEROL SULFATE 90 UG/1
4 AEROSOL, METERED RESPIRATORY (INHALATION) PRN
Status: CANCELLED | OUTPATIENT
Start: 2024-05-24

## 2024-05-17 RX ORDER — HEPARIN 100 UNIT/ML
500 SYRINGE INTRAVENOUS PRN
Status: CANCELLED | OUTPATIENT
Start: 2024-05-24

## 2024-05-17 RX ORDER — SODIUM CHLORIDE 9 MG/ML
INJECTION, SOLUTION INTRAVENOUS CONTINUOUS
Status: CANCELLED | OUTPATIENT
Start: 2024-05-24

## 2024-05-17 RX ORDER — DIPHENHYDRAMINE HYDROCHLORIDE 50 MG/ML
50 INJECTION INTRAMUSCULAR; INTRAVENOUS
Status: CANCELLED | OUTPATIENT
Start: 2024-05-24

## 2024-05-17 RX ORDER — SODIUM CHLORIDE 9 MG/ML
5-250 INJECTION, SOLUTION INTRAVENOUS PRN
Status: DISCONTINUED | OUTPATIENT
Start: 2024-05-17 | End: 2024-05-18 | Stop reason: HOSPADM

## 2024-05-17 RX ORDER — SODIUM CHLORIDE 0.9 % (FLUSH) 0.9 %
5-40 SYRINGE (ML) INJECTION PRN
Status: CANCELLED | OUTPATIENT
Start: 2024-05-24

## 2024-05-17 RX ORDER — ACETAMINOPHEN 325 MG/1
650 TABLET ORAL
Status: CANCELLED | OUTPATIENT
Start: 2024-05-24

## 2024-05-17 RX ADMIN — Medication 500 UNITS: at 16:25

## 2024-05-17 RX ADMIN — SODIUM CHLORIDE 20 ML/HR: 9 INJECTION, SOLUTION INTRAVENOUS at 15:10

## 2024-05-17 RX ADMIN — SODIUM CHLORIDE 125 MG: 9 INJECTION, SOLUTION INTRAVENOUS at 15:12

## 2024-05-17 RX ADMIN — SODIUM CHLORIDE, PRESERVATIVE FREE 10 ML: 5 INJECTION INTRAVENOUS at 16:25

## 2024-05-17 NOTE — PROGRESS NOTES
Spoke with patient about first line chemotherapy (paclitaxel and carboplatin).      We went over the protocol to be used, what to expect as far as side effects, and when to call with problems. This was intended to reinforce the education done by Dr. Haynes.     Patient was provided medication handouts to the patient to take home and told patient to call if there are any questions once they had a chance to absorb the information.     Patient had the opportunity to ask questions with all questions being answered to their satisfaction. The patient expresses understanding and acceptance of instructions.    Electronically signed by Natalie Shabazz RPH on 5/17/2024 at 2:48 PM

## 2024-05-17 NOTE — PROGRESS NOTES
Patient arrived to unit for chemo teaching. This nurse provided patient with a chemo video and educational packet. After patient viewed the video, This nurse discussed a typical day on the infusion unit and what his treatment day would be like. After chemo sheet was discussed. Patient was given opportunity to ask questions and advised to write down any questions he may have and bring them to the next visit. Labs were drawn at this time. Patient verbalized understanding. Patient tolerated infusion well. Patient alert and oriented x3.   No distress noted.   Vital signs stable.   Patient denies any new or worsening pain.     Patient educated on signs and symptoms of reaction to medication.   Educated patient on possible side effects and treatment of medication.     Patient verbalized understanding.   Offered patient education an/or discharge material.  Patient declined.   Patient denies any needs.  All questions answered.

## 2024-05-20 ENCOUNTER — TELEPHONE (OUTPATIENT)
Dept: CASE MANAGEMENT | Age: 64
End: 2024-05-20

## 2024-05-20 ENCOUNTER — OFFICE VISIT (OUTPATIENT)
Dept: ONCOLOGY | Age: 64
End: 2024-05-20
Payer: COMMERCIAL

## 2024-05-20 ENCOUNTER — HOSPITAL ENCOUNTER (OUTPATIENT)
Dept: RADIATION ONCOLOGY | Age: 64
Discharge: HOME | End: 2024-05-20
Payer: COMMERCIAL

## 2024-05-20 ENCOUNTER — HOSPITAL ENCOUNTER (OUTPATIENT)
Dept: INFUSION THERAPY | Age: 64
Discharge: HOME | End: 2024-05-20
Payer: COMMERCIAL

## 2024-05-20 VITALS
SYSTOLIC BLOOD PRESSURE: 97 MMHG | DIASTOLIC BLOOD PRESSURE: 59 MMHG | TEMPERATURE: 97.2 F | OXYGEN SATURATION: 97 % | HEART RATE: 82 BPM | RESPIRATION RATE: 16 BRPM

## 2024-05-20 VITALS
HEIGHT: 65 IN | SYSTOLIC BLOOD PRESSURE: 111 MMHG | DIASTOLIC BLOOD PRESSURE: 74 MMHG | BODY MASS INDEX: 18.89 KG/M2 | WEIGHT: 113.4 LBS | OXYGEN SATURATION: 97 % | HEART RATE: 97 BPM | TEMPERATURE: 98.3 F

## 2024-05-20 DIAGNOSIS — C34.91 SQUAMOUS CELL CARCINOMA OF RIGHT LUNG (HCC): Primary | ICD-10-CM

## 2024-05-20 PROCEDURE — 99214 OFFICE O/P EST MOD 30 MIN: CPT | Performed by: STUDENT IN AN ORGANIZED HEALTH CARE EDUCATION/TRAINING PROGRAM

## 2024-05-20 PROCEDURE — 96413 CHEMO IV INFUSION 1 HR: CPT

## 2024-05-20 PROCEDURE — A4216 STERILE WATER/SALINE, 10 ML: HCPCS | Performed by: STUDENT IN AN ORGANIZED HEALTH CARE EDUCATION/TRAINING PROGRAM

## 2024-05-20 PROCEDURE — 96375 TX/PRO/DX INJ NEW DRUG ADDON: CPT

## 2024-05-20 PROCEDURE — 77387 GUIDANCE FOR RADJ TX DLVR: CPT | Performed by: RADIOLOGY

## 2024-05-20 PROCEDURE — 96417 CHEMO IV INFUS EACH ADDL SEQ: CPT

## 2024-05-20 PROCEDURE — 77386 HC NTSTY MODUL RAD TX DLVR CPLX: CPT | Performed by: RADIOLOGY

## 2024-05-20 PROCEDURE — 2580000003 HC RX 258: Performed by: STUDENT IN AN ORGANIZED HEALTH CARE EDUCATION/TRAINING PROGRAM

## 2024-05-20 PROCEDURE — 6360000002 HC RX W HCPCS: Performed by: STUDENT IN AN ORGANIZED HEALTH CARE EDUCATION/TRAINING PROGRAM

## 2024-05-20 PROCEDURE — 2500000003 HC RX 250 WO HCPCS: Performed by: STUDENT IN AN ORGANIZED HEALTH CARE EDUCATION/TRAINING PROGRAM

## 2024-05-20 RX ORDER — PALONOSETRON HYDROCHLORIDE 0.05 MG/ML
0.25 INJECTION, SOLUTION INTRAVENOUS ONCE
Status: COMPLETED | OUTPATIENT
Start: 2024-05-20 | End: 2024-05-20

## 2024-05-20 RX ORDER — HEPARIN 100 UNIT/ML
500 SYRINGE INTRAVENOUS PRN
Status: DISCONTINUED | OUTPATIENT
Start: 2024-05-20 | End: 2024-05-21 | Stop reason: HOSPADM

## 2024-05-20 RX ORDER — DIPHENHYDRAMINE HYDROCHLORIDE 50 MG/ML
50 INJECTION INTRAMUSCULAR; INTRAVENOUS ONCE
Status: COMPLETED | OUTPATIENT
Start: 2024-05-20 | End: 2024-05-20

## 2024-05-20 RX ORDER — SODIUM CHLORIDE 9 MG/ML
5-250 INJECTION, SOLUTION INTRAVENOUS PRN
Status: DISCONTINUED | OUTPATIENT
Start: 2024-05-20 | End: 2024-05-21 | Stop reason: HOSPADM

## 2024-05-20 RX ORDER — SODIUM CHLORIDE 0.9 % (FLUSH) 0.9 %
5-40 SYRINGE (ML) INJECTION PRN
Status: DISCONTINUED | OUTPATIENT
Start: 2024-05-20 | End: 2024-05-21 | Stop reason: HOSPADM

## 2024-05-20 RX ORDER — DEXAMETHASONE SODIUM PHOSPHATE 10 MG/ML
10 INJECTION, SOLUTION INTRAMUSCULAR; INTRAVENOUS ONCE
Status: COMPLETED | OUTPATIENT
Start: 2024-05-20 | End: 2024-05-20

## 2024-05-20 RX ADMIN — SODIUM CHLORIDE, PRESERVATIVE FREE 10 ML: 5 INJECTION INTRAVENOUS at 12:39

## 2024-05-20 RX ADMIN — SODIUM CHLORIDE, PRESERVATIVE FREE 10 ML: 5 INJECTION INTRAVENOUS at 10:04

## 2024-05-20 RX ADMIN — PACLITAXEL 78 MG: 6 INJECTION, SOLUTION INTRAVENOUS at 10:48

## 2024-05-20 RX ADMIN — FAMOTIDINE 20 MG: 10 INJECTION, SOLUTION INTRAVENOUS at 10:14

## 2024-05-20 RX ADMIN — DIPHENHYDRAMINE HYDROCHLORIDE 50 MG: 50 INJECTION INTRAMUSCULAR; INTRAVENOUS at 10:07

## 2024-05-20 RX ADMIN — DEXAMETHASONE SODIUM PHOSPHATE 10 MG: 10 INJECTION, SOLUTION INTRAMUSCULAR; INTRAVENOUS at 10:11

## 2024-05-20 RX ADMIN — PALONOSETRON 0.25 MG: 0.25 INJECTION, SOLUTION INTRAVENOUS at 10:04

## 2024-05-20 RX ADMIN — SODIUM CHLORIDE 200 ML/HR: 9 INJECTION, SOLUTION INTRAVENOUS at 10:04

## 2024-05-20 RX ADMIN — SODIUM CHLORIDE, PRESERVATIVE FREE 10 ML: 5 INJECTION INTRAVENOUS at 10:07

## 2024-05-20 RX ADMIN — Medication 500 UNITS: at 12:39

## 2024-05-20 RX ADMIN — SODIUM CHLORIDE, PRESERVATIVE FREE 10 ML: 5 INJECTION INTRAVENOUS at 10:11

## 2024-05-20 RX ADMIN — CARBOPLATIN 180 MG: 10 INJECTION, SOLUTION INTRAVENOUS at 11:57

## 2024-05-20 ASSESSMENT — ENCOUNTER SYMPTOMS
CHEST TIGHTNESS: 0
GASTROINTESTINAL NEGATIVE: 1
COUGH: 1
SHORTNESS OF BREATH: 0

## 2024-05-20 NOTE — FLOWSHEET NOTE
Pt arrived to [facility] for office visit and infusion. Tolerated infusion with no complications. VSS. PT to RTC Friday for Iron infusion.

## 2024-05-20 NOTE — PROGRESS NOTES
[facility] [place] [facility] MEDICAL ONCOLOGY  [address] [address]  Dept: [phone]  Loc: [phone]    Clinic Consultation Note      Date of Encounter: 05/20/2024     Referring Provider:  Celso Meier MD     Reason for Visit:   Squamous cell lung cancer, locally advanced        PCP:  Yue Nobles DO    Demographics: 64 y.o. female    Chief Complaint   Patient presents with    Lung Cancer    Follow-up         Subjective:  Received first fraction of radiation this morning, and here to start Cycle 1 of chemo.   No new symptoms. Still having some cough. Patient couldn't tell me if Tessalon helps.   She seems anxious.     HPI from Initial Outpatient Consultation (4/29/2024):  The patient is a 64 y.o. female and is coming in, establishing at our clinic, after we first met patient on inpatient consultation for a right sided lung mass.  She was admitted at [facility] on 4/15/2024 due to weight loss, and cough.  She was found to have an 8.5 cm right upper lobe mass with central necrosis, and had undergone biopsy of the lung on 4/8/2023, confirming her diagnosis.    Today, she was accompanied by her , feeling little bit better.  She complains of cough that is dry still.  Denies persistent headaches, vision changes, nausea or vomiting.     Oncology History   Squamous cell carcinoma of right lung (HCC)   4/18/2023 Biopsy    -- Diagnosis --   Right lung, core biopsy: Invasive squamous cell carcinoma with   necrosis     -- Diagnosis Comment --   Intradepartmental consultation is obtained.  PD-L1 testing has been   ordered per institution protocol and will be reported separately.     Jo Ann Clark MD   **Electronically Signed Out**         [facility]/4/22/2024      Clinical Information   Procedure; Right lung mass biopsy, 4 cores     Pre-Operative Diagnosis   Lung alyse, smoker       Source of Specimen   A: LUNG, TRANSBRONCHIAL BIOPSY,

## 2024-05-20 NOTE — TELEPHONE ENCOUNTER
Met with patient in the treatment room prior to her chemotherapy treatment for follow up. Patient appears well and is in good spirits. Today is her first chemotherapy treatment. She had radiation this morning and feels much more relaxed now that she knows what to expect. She is motivated to get through treatment. Provided support and encouragement. Her  brought her to treatment today and will be here for the duration. Denies any current needs for assistance from NN. Patient appreciative of visit. Will continue to follow as needed. Carito SNELLN, RN, Oncology Nurse Navigator

## 2024-05-20 NOTE — PROGRESS NOTES
Mary Purdy  5/20/2024  Ht Readings from Last 1 Encounters:   05/20/24 1.651 m (5' 5\")     Wt Readings from Last 10 Encounters:   05/20/24 51.4 kg (113 lb 6.4 oz)   05/09/24 52.2 kg (115 lb)   04/29/24 51.9 kg (114 lb 6.4 oz)   04/29/24 52 kg (114 lb 9.6 oz)   04/24/24 52.6 kg (116 lb)   04/17/24 56.6 kg (124 lb 12.5 oz)   04/15/24 52.2 kg (115 lb)   06/07/23 60.4 kg (133 lb 3.2 oz)   11/02/20 54.4 kg (120 lb)   07/09/18 52.3 kg (115 lb 6.4 oz)     BMI: 18.87    Assessment: Met w/ pt per request during infusion. Pt receiving first chemo for lung CA, had first radiation today as well. She has questions regarding adequate nutrition through treatment. Reviewed role of oncology dietitian in pt's care. Educated on goal of weight maintenance, focusing on lean body mass. Discussed need for high calorie/high protein options given low-normal BMI. Reviewed ONS options. Pt reports that family sent her a case of Premier Protein. Educated on nutrient breakdown of Premier, providing suggestions for increasing kcal content as needed. Pt appreciative of time spent. She denies any further needs at this time. Will follow PRN.      Suzanne Romero, MS, RD, LD

## 2024-05-21 ENCOUNTER — HOSPITAL ENCOUNTER (OUTPATIENT)
Dept: RADIATION ONCOLOGY | Age: 64
Discharge: HOME | End: 2024-05-21
Payer: COMMERCIAL

## 2024-05-21 PROCEDURE — 77387 GUIDANCE FOR RADJ TX DLVR: CPT | Performed by: RADIOLOGY

## 2024-05-21 PROCEDURE — 77386 HC NTSTY MODUL RAD TX DLVR CPLX: CPT | Performed by: RADIOLOGY

## 2024-05-22 ENCOUNTER — HOSPITAL ENCOUNTER (OUTPATIENT)
Dept: RADIATION ONCOLOGY | Age: 64
Discharge: HOME | End: 2024-05-22
Payer: COMMERCIAL

## 2024-05-22 VITALS
TEMPERATURE: 97.3 F | WEIGHT: 117.8 LBS | RESPIRATION RATE: 20 BRPM | DIASTOLIC BLOOD PRESSURE: 51 MMHG | SYSTOLIC BLOOD PRESSURE: 88 MMHG | HEART RATE: 81 BPM | BODY MASS INDEX: 19.6 KG/M2

## 2024-05-22 DIAGNOSIS — C34.91 SQUAMOUS CELL CARCINOMA OF RIGHT LUNG (HCC): Primary | ICD-10-CM

## 2024-05-22 PROCEDURE — 77387 GUIDANCE FOR RADJ TX DLVR: CPT | Performed by: RADIOLOGY

## 2024-05-22 PROCEDURE — 77386 HC NTSTY MODUL RAD TX DLVR CPLX: CPT | Performed by: RADIOLOGY

## 2024-05-22 NOTE — PROGRESS NOTES
Mary Purdy  2024  Wt Readings from Last 3 Encounters:   24 53.4 kg (117 lb 12.8 oz)   24 51.4 kg (113 lb 6.4 oz)   24 52.2 kg (115 lb)     Body mass index is 19.6 kg/m².        Treatment Area:RUL + nodes    Patient was seen today for weekly visit.      Comfort Alteration  KPS:80%  Fatigue: None    Ventilation Alterations  Cough: Yes  Hemoptysis: No  Mucus Color: yellow  Dyspnea: No  O2 Sat: 97%    Nutritional Alteration  Anorexia: No  Nausea: No   Vomiting: No     Skin Alteration   Sensation:no issues     Radiation Dermatitis:  none    Mucous Membrane Alteration  Voice Changes/ Stridor/Larynx: no  Pharynx & Esophagus: na    Elimination Alterations  Constipation: no  Diarrhea:  no      Emotional  Coping: effective      Injury, potential bleeding or infection: na    Other:na    Lab Results   Component Value Date    WBC 16.8 (H) 2024    HGB 10.5 (L) 2024    HCT 34.8 2024     2024         BP (!) 87/56 Comment: asymptomatic, denies feeling lightheade  Pulse 81   Temp 97.3 °F (36.3 °C) (Temporal)   Resp 20   Wt 53.4 kg (117 lb 12.8 oz)   BMI 19.60 kg/m²   BP within normal range? no   -if no, manually recheck in 5-10 min  NEW BP readin/51  BP within normal range? No, asymptomatic   -if no, notify attending provider for further instruction      Assessment/Plan:Completed 3/30 fractions; 600/6000 cGy    Manju Armijo RN

## 2024-05-22 NOTE — PROGRESS NOTES
Mary Purdy  5/22/2024  2:24 PM      No chief complaint on file.         Wt Readings from Last 3 Encounters:   05/22/24 53.4 kg (117 lb 12.8 oz)   05/20/24 51.4 kg (113 lb 6.4 oz)   05/09/24 52.2 kg (115 lb)       Comments: Dose 600 cGy to the right upper lobe of the lung.    Patient had 3 fractions so far.    She is doing well without any complaints.    She denies any dysphagia.  She has no shortness of breath cough or hemoptysis.    She has no headache nausea or vomiting.    On examination the skin over the treated area looks good.    Patient is tolerating treatment well      Plan: Radiation to continue as planned      Electronically signed by Sherman Crook MD on 5/22/24 at 2:24 PM EDT

## 2024-05-23 ENCOUNTER — HOSPITAL ENCOUNTER (OUTPATIENT)
Dept: RADIATION ONCOLOGY | Age: 64
Discharge: HOME | End: 2024-05-23
Payer: COMMERCIAL

## 2024-05-23 PROCEDURE — 77386 HC NTSTY MODUL RAD TX DLVR CPLX: CPT | Performed by: RADIOLOGY

## 2024-05-24 ENCOUNTER — HOSPITAL ENCOUNTER (OUTPATIENT)
Dept: INFUSION THERAPY | Age: 64
Discharge: HOME | End: 2024-05-24
Payer: COMMERCIAL

## 2024-05-24 ENCOUNTER — HOSPITAL ENCOUNTER (OUTPATIENT)
Dept: RADIATION ONCOLOGY | Age: 64
Discharge: HOME | End: 2024-05-24
Payer: COMMERCIAL

## 2024-05-24 VITALS
RESPIRATION RATE: 16 BRPM | SYSTOLIC BLOOD PRESSURE: 98 MMHG | TEMPERATURE: 97.6 F | DIASTOLIC BLOOD PRESSURE: 58 MMHG | HEART RATE: 88 BPM | OXYGEN SATURATION: 92 %

## 2024-05-24 DIAGNOSIS — D64.9 NORMOCYTIC ANEMIA: ICD-10-CM

## 2024-05-24 DIAGNOSIS — D50.8 IRON DEFICIENCY ANEMIA SECONDARY TO INADEQUATE DIETARY IRON INTAKE: Primary | ICD-10-CM

## 2024-05-24 DIAGNOSIS — C34.91 SQUAMOUS CELL CARCINOMA OF RIGHT LUNG (HCC): ICD-10-CM

## 2024-05-24 PROCEDURE — 6360000002 HC RX W HCPCS: Performed by: NURSE PRACTITIONER

## 2024-05-24 PROCEDURE — 77336 RADIATION PHYSICS CONSULT: CPT | Performed by: RADIOLOGY

## 2024-05-24 PROCEDURE — 77386 HC NTSTY MODUL RAD TX DLVR CPLX: CPT | Performed by: RADIOLOGY

## 2024-05-24 PROCEDURE — 96365 THER/PROPH/DIAG IV INF INIT: CPT

## 2024-05-24 PROCEDURE — 2580000003 HC RX 258: Performed by: NURSE PRACTITIONER

## 2024-05-24 RX ORDER — SODIUM CHLORIDE 9 MG/ML
5-250 INJECTION, SOLUTION INTRAVENOUS PRN
Status: CANCELLED | OUTPATIENT
Start: 2024-05-31

## 2024-05-24 RX ORDER — HEPARIN 100 UNIT/ML
500 SYRINGE INTRAVENOUS PRN
Status: DISCONTINUED | OUTPATIENT
Start: 2024-05-24 | End: 2024-05-25 | Stop reason: HOSPADM

## 2024-05-24 RX ORDER — ACETAMINOPHEN 325 MG/1
650 TABLET ORAL
Status: CANCELLED | OUTPATIENT
Start: 2024-05-31

## 2024-05-24 RX ORDER — EPINEPHRINE 1 MG/ML
0.3 INJECTION, SOLUTION, CONCENTRATE INTRAVENOUS PRN
Status: CANCELLED | OUTPATIENT
Start: 2024-05-31

## 2024-05-24 RX ORDER — SODIUM CHLORIDE 0.9 % (FLUSH) 0.9 %
5-40 SYRINGE (ML) INJECTION PRN
Status: CANCELLED | OUTPATIENT
Start: 2024-05-31

## 2024-05-24 RX ORDER — ONDANSETRON 2 MG/ML
8 INJECTION INTRAMUSCULAR; INTRAVENOUS
Status: CANCELLED | OUTPATIENT
Start: 2024-05-31

## 2024-05-24 RX ORDER — SODIUM CHLORIDE 9 MG/ML
5-250 INJECTION, SOLUTION INTRAVENOUS PRN
Status: DISCONTINUED | OUTPATIENT
Start: 2024-05-24 | End: 2024-05-25 | Stop reason: HOSPADM

## 2024-05-24 RX ORDER — SODIUM CHLORIDE 9 MG/ML
INJECTION, SOLUTION INTRAVENOUS CONTINUOUS
Status: CANCELLED | OUTPATIENT
Start: 2024-05-31

## 2024-05-24 RX ORDER — ALBUTEROL SULFATE 90 UG/1
4 AEROSOL, METERED RESPIRATORY (INHALATION) PRN
Status: CANCELLED | OUTPATIENT
Start: 2024-05-31

## 2024-05-24 RX ORDER — DIPHENHYDRAMINE HYDROCHLORIDE 50 MG/ML
50 INJECTION INTRAMUSCULAR; INTRAVENOUS
Status: CANCELLED | OUTPATIENT
Start: 2024-05-31

## 2024-05-24 RX ORDER — HEPARIN 100 UNIT/ML
500 SYRINGE INTRAVENOUS PRN
Status: CANCELLED | OUTPATIENT
Start: 2024-05-31

## 2024-05-24 RX ORDER — SODIUM CHLORIDE 0.9 % (FLUSH) 0.9 %
5-40 SYRINGE (ML) INJECTION PRN
Status: DISCONTINUED | OUTPATIENT
Start: 2024-05-24 | End: 2024-05-25 | Stop reason: HOSPADM

## 2024-05-24 RX ADMIN — SODIUM CHLORIDE 125 MG: 9 INJECTION, SOLUTION INTRAVENOUS at 14:29

## 2024-05-24 RX ADMIN — SODIUM CHLORIDE, PRESERVATIVE FREE 10 ML: 5 INJECTION INTRAVENOUS at 15:29

## 2024-05-24 RX ADMIN — HEPARIN 500 UNITS: 100 SYRINGE at 15:29

## 2024-05-24 RX ADMIN — SODIUM CHLORIDE 200 ML/HR: 9 INJECTION, SOLUTION INTRAVENOUS at 14:19

## 2024-05-25 ENCOUNTER — HOSPITAL ENCOUNTER (OUTPATIENT)
Age: 64
Discharge: HOME | End: 2024-05-25
Payer: COMMERCIAL

## 2024-05-25 DIAGNOSIS — C34.91 SQUAMOUS CELL CARCINOMA OF RIGHT LUNG (HCC): ICD-10-CM

## 2024-05-25 LAB
ALBUMIN SERPL-MCNC: 3.7 G/DL (ref 3.5–5.2)
ALP SERPL-CCNC: 84 U/L (ref 35–104)
ALT SERPL-CCNC: 38 U/L (ref 0–32)
ANION GAP SERPL CALCULATED.3IONS-SCNC: 9 MMOL/L (ref 7–16)
AST SERPL-CCNC: 23 U/L (ref 0–31)
BASOPHILS # BLD: 0.06 K/UL (ref 0–0.2)
BASOPHILS NFR BLD: 0 % (ref 0–2)
BILIRUB SERPL-MCNC: 0.7 MG/DL (ref 0–1.2)
BUN SERPL-MCNC: 8 MG/DL (ref 6–23)
CALCIUM SERPL-MCNC: 12 MG/DL (ref 8.6–10.2)
CHLORIDE SERPL-SCNC: 98 MMOL/L (ref 98–107)
CO2 SERPL-SCNC: 29 MMOL/L (ref 22–29)
CREAT SERPL-MCNC: 0.6 MG/DL (ref 0.5–1)
EOSINOPHIL # BLD: 0.12 K/UL (ref 0.05–0.5)
EOSINOPHILS RELATIVE PERCENT: 1 % (ref 0–6)
ERYTHROCYTE [DISTWIDTH] IN BLOOD BY AUTOMATED COUNT: 16.5 % (ref 11.5–15)
GFR, ESTIMATED: >90 ML/MIN/1.73M2
GLUCOSE SERPL-MCNC: 99 MG/DL (ref 74–99)
HCT VFR BLD AUTO: 38.1 % (ref 34–48)
HGB BLD-MCNC: 11.2 G/DL (ref 11.5–15.5)
IMM GRANULOCYTES # BLD AUTO: 0.18 K/UL (ref 0–0.58)
IMM GRANULOCYTES NFR BLD: 1 % (ref 0–5)
LYMPHOCYTES NFR BLD: 0.81 K/UL (ref 1.5–4)
LYMPHOCYTES RELATIVE PERCENT: 6 % (ref 20–42)
MAGNESIUM SERPL-MCNC: 2.2 MG/DL (ref 1.6–2.6)
MCH RBC QN AUTO: 24.7 PG (ref 26–35)
MCHC RBC AUTO-ENTMCNC: 29.4 G/DL (ref 32–34.5)
MCV RBC AUTO: 83.9 FL (ref 80–99.9)
MONOCYTES NFR BLD: 0.39 K/UL (ref 0.1–0.95)
MONOCYTES NFR BLD: 3 % (ref 2–12)
NEUTROPHILS NFR BLD: 88 % (ref 43–80)
NEUTS SEG NFR BLD: 11.79 K/UL (ref 1.8–7.3)
PLATELET # BLD AUTO: 393 K/UL (ref 130–450)
PMV BLD AUTO: 10.1 FL (ref 7–12)
POTASSIUM SERPL-SCNC: 4.7 MMOL/L (ref 3.5–5)
PROT SERPL-MCNC: 7.4 G/DL (ref 6.4–8.3)
RBC # BLD AUTO: 4.54 M/UL (ref 3.5–5.5)
SODIUM SERPL-SCNC: 136 MMOL/L (ref 132–146)
WBC OTHER # BLD: 13.4 K/UL (ref 4.5–11.5)

## 2024-05-25 PROCEDURE — 83735 ASSAY OF MAGNESIUM: CPT

## 2024-05-25 PROCEDURE — 85025 COMPLETE CBC W/AUTO DIFF WBC: CPT

## 2024-05-25 PROCEDURE — 36415 COLL VENOUS BLD VENIPUNCTURE: CPT

## 2024-05-25 PROCEDURE — 80053 COMPREHEN METABOLIC PANEL: CPT

## 2024-05-28 ENCOUNTER — OFFICE VISIT (OUTPATIENT)
Dept: ONCOLOGY | Age: 64
End: 2024-05-28
Payer: COMMERCIAL

## 2024-05-28 ENCOUNTER — APPOINTMENT (OUTPATIENT)
Dept: RADIATION ONCOLOGY | Age: 64
End: 2024-05-28
Payer: COMMERCIAL

## 2024-05-28 ENCOUNTER — HOSPITAL ENCOUNTER (OUTPATIENT)
Dept: INFUSION THERAPY | Age: 64
Discharge: HOME | End: 2024-05-28
Payer: COMMERCIAL

## 2024-05-28 ENCOUNTER — TELEPHONE (OUTPATIENT)
Dept: CASE MANAGEMENT | Age: 64
End: 2024-05-28

## 2024-05-28 VITALS
WEIGHT: 110.8 LBS | DIASTOLIC BLOOD PRESSURE: 69 MMHG | HEIGHT: 65 IN | HEART RATE: 117 BPM | BODY MASS INDEX: 18.46 KG/M2 | OXYGEN SATURATION: 99 % | SYSTOLIC BLOOD PRESSURE: 96 MMHG | TEMPERATURE: 98.2 F

## 2024-05-28 VITALS
TEMPERATURE: 97.3 F | RESPIRATION RATE: 16 BRPM | OXYGEN SATURATION: 98 % | SYSTOLIC BLOOD PRESSURE: 92 MMHG | HEART RATE: 87 BPM | DIASTOLIC BLOOD PRESSURE: 59 MMHG

## 2024-05-28 DIAGNOSIS — E83.52 HYPERCALCEMIA: Primary | ICD-10-CM

## 2024-05-28 DIAGNOSIS — C34.91 SQUAMOUS CELL CARCINOMA OF RIGHT LUNG (HCC): Primary | ICD-10-CM

## 2024-05-28 DIAGNOSIS — C34.91 SQUAMOUS CELL CARCINOMA OF RIGHT LUNG (HCC): ICD-10-CM

## 2024-05-28 DIAGNOSIS — C34.11 MALIGNANT NEOPLASM OF UPPER LOBE OF RIGHT LUNG (HCC): ICD-10-CM

## 2024-05-28 PROCEDURE — A4216 STERILE WATER/SALINE, 10 ML: HCPCS | Performed by: STUDENT IN AN ORGANIZED HEALTH CARE EDUCATION/TRAINING PROGRAM

## 2024-05-28 PROCEDURE — 96413 CHEMO IV INFUSION 1 HR: CPT

## 2024-05-28 PROCEDURE — 96375 TX/PRO/DX INJ NEW DRUG ADDON: CPT

## 2024-05-28 PROCEDURE — 6360000002 HC RX W HCPCS: Performed by: STUDENT IN AN ORGANIZED HEALTH CARE EDUCATION/TRAINING PROGRAM

## 2024-05-28 PROCEDURE — 77387 GUIDANCE FOR RADJ TX DLVR: CPT | Performed by: RADIOLOGY

## 2024-05-28 PROCEDURE — 96417 CHEMO IV INFUS EACH ADDL SEQ: CPT

## 2024-05-28 PROCEDURE — 96415 CHEMO IV INFUSION ADDL HR: CPT

## 2024-05-28 PROCEDURE — 2500000003 HC RX 250 WO HCPCS: Performed by: STUDENT IN AN ORGANIZED HEALTH CARE EDUCATION/TRAINING PROGRAM

## 2024-05-28 PROCEDURE — 96361 HYDRATE IV INFUSION ADD-ON: CPT

## 2024-05-28 PROCEDURE — 77386 HC NTSTY MODUL RAD TX DLVR CPLX: CPT | Performed by: RADIOLOGY

## 2024-05-28 PROCEDURE — 2580000003 HC RX 258: Performed by: STUDENT IN AN ORGANIZED HEALTH CARE EDUCATION/TRAINING PROGRAM

## 2024-05-28 PROCEDURE — 99214 OFFICE O/P EST MOD 30 MIN: CPT | Performed by: STUDENT IN AN ORGANIZED HEALTH CARE EDUCATION/TRAINING PROGRAM

## 2024-05-28 RX ORDER — DEXAMETHASONE SODIUM PHOSPHATE 10 MG/ML
10 INJECTION, SOLUTION INTRAMUSCULAR; INTRAVENOUS ONCE
Status: COMPLETED | OUTPATIENT
Start: 2024-05-28 | End: 2024-05-28

## 2024-05-28 RX ORDER — HEPARIN SODIUM (PORCINE) LOCK FLUSH IV SOLN 100 UNIT/ML 100 UNIT/ML
500 SOLUTION INTRAVENOUS PRN
Status: CANCELLED | OUTPATIENT
Start: 2024-05-28

## 2024-05-28 RX ORDER — SODIUM CHLORIDE 9 MG/ML
INJECTION, SOLUTION INTRAVENOUS CONTINUOUS
Status: CANCELLED | OUTPATIENT
Start: 2024-05-28

## 2024-05-28 RX ORDER — ONDANSETRON 2 MG/ML
8 INJECTION INTRAMUSCULAR; INTRAVENOUS
OUTPATIENT
Start: 2024-05-28

## 2024-05-28 RX ORDER — ONDANSETRON 2 MG/ML
8 INJECTION INTRAMUSCULAR; INTRAVENOUS
Status: CANCELLED | OUTPATIENT
Start: 2024-05-28

## 2024-05-28 RX ORDER — EPINEPHRINE 1 MG/ML
0.3 INJECTION, SOLUTION, CONCENTRATE INTRAVENOUS PRN
Status: CANCELLED | OUTPATIENT
Start: 2024-05-28

## 2024-05-28 RX ORDER — SODIUM CHLORIDE 9 MG/ML
5-250 INJECTION, SOLUTION INTRAVENOUS PRN
Status: CANCELLED | OUTPATIENT
Start: 2024-05-28

## 2024-05-28 RX ORDER — EPINEPHRINE 1 MG/ML
0.3 INJECTION, SOLUTION, CONCENTRATE INTRAVENOUS PRN
OUTPATIENT
Start: 2024-05-28

## 2024-05-28 RX ORDER — DIPHENHYDRAMINE HYDROCHLORIDE 50 MG/ML
50 INJECTION INTRAMUSCULAR; INTRAVENOUS
Status: CANCELLED | OUTPATIENT
Start: 2024-05-28

## 2024-05-28 RX ORDER — 0.9 % SODIUM CHLORIDE 0.9 %
1000 INTRAVENOUS SOLUTION INTRAVENOUS ONCE
Status: COMPLETED | OUTPATIENT
Start: 2024-05-28 | End: 2024-05-28

## 2024-05-28 RX ORDER — DIPHENHYDRAMINE HYDROCHLORIDE 50 MG/ML
50 INJECTION INTRAMUSCULAR; INTRAVENOUS ONCE
Status: COMPLETED | OUTPATIENT
Start: 2024-05-28 | End: 2024-05-28

## 2024-05-28 RX ORDER — DIPHENHYDRAMINE HYDROCHLORIDE 50 MG/ML
50 INJECTION INTRAMUSCULAR; INTRAVENOUS
OUTPATIENT
Start: 2024-05-28

## 2024-05-28 RX ORDER — 0.9 % SODIUM CHLORIDE 0.9 %
1000 INTRAVENOUS SOLUTION INTRAVENOUS ONCE
OUTPATIENT
Start: 2024-05-28 | End: 2024-05-28

## 2024-05-28 RX ORDER — SODIUM CHLORIDE 0.9 % (FLUSH) 0.9 %
5-40 SYRINGE (ML) INJECTION PRN
Status: CANCELLED | OUTPATIENT
Start: 2024-05-28

## 2024-05-28 RX ORDER — DIPHENHYDRAMINE HYDROCHLORIDE 50 MG/ML
50 INJECTION INTRAMUSCULAR; INTRAVENOUS ONCE
Status: CANCELLED | OUTPATIENT
Start: 2024-05-28 | End: 2024-05-28

## 2024-05-28 RX ORDER — ACETAMINOPHEN 325 MG/1
650 TABLET ORAL
Status: CANCELLED | OUTPATIENT
Start: 2024-05-28

## 2024-05-28 RX ORDER — SODIUM CHLORIDE 9 MG/ML
INJECTION, SOLUTION INTRAVENOUS CONTINUOUS
OUTPATIENT
Start: 2024-05-28

## 2024-05-28 RX ORDER — PALONOSETRON 0.05 MG/ML
0.25 INJECTION, SOLUTION INTRAVENOUS ONCE
Status: CANCELLED | OUTPATIENT
Start: 2024-05-28 | End: 2024-05-28

## 2024-05-28 RX ORDER — PALONOSETRON HYDROCHLORIDE 0.05 MG/ML
0.25 INJECTION, SOLUTION INTRAVENOUS ONCE
Status: COMPLETED | OUTPATIENT
Start: 2024-05-28 | End: 2024-05-28

## 2024-05-28 RX ORDER — SODIUM CHLORIDE 0.9 % (FLUSH) 0.9 %
5-40 SYRINGE (ML) INJECTION PRN
Status: DISCONTINUED | OUTPATIENT
Start: 2024-05-28 | End: 2024-05-29 | Stop reason: HOSPADM

## 2024-05-28 RX ORDER — MEPERIDINE HYDROCHLORIDE 50 MG/ML
12.5 INJECTION INTRAMUSCULAR; INTRAVENOUS; SUBCUTANEOUS PRN
Status: CANCELLED | OUTPATIENT
Start: 2024-05-28

## 2024-05-28 RX ORDER — SODIUM CHLORIDE 0.9 % (FLUSH) 0.9 %
5-40 SYRINGE (ML) INJECTION PRN
OUTPATIENT
Start: 2024-05-28

## 2024-05-28 RX ORDER — ALBUTEROL SULFATE 90 UG/1
4 AEROSOL, METERED RESPIRATORY (INHALATION) PRN
OUTPATIENT
Start: 2024-05-28

## 2024-05-28 RX ORDER — ALBUTEROL SULFATE 90 UG/1
4 AEROSOL, METERED RESPIRATORY (INHALATION) PRN
Status: CANCELLED | OUTPATIENT
Start: 2024-05-28

## 2024-05-28 RX ORDER — ACETAMINOPHEN 325 MG/1
650 TABLET ORAL
OUTPATIENT
Start: 2024-05-28

## 2024-05-28 RX ORDER — 0.9 % SODIUM CHLORIDE 0.9 %
1000 INTRAVENOUS SOLUTION INTRAVENOUS ONCE
Status: CANCELLED | OUTPATIENT
Start: 2024-05-28 | End: 2024-05-28

## 2024-05-28 RX ORDER — FAMOTIDINE 10 MG/ML
20 INJECTION, SOLUTION INTRAVENOUS
Status: CANCELLED | OUTPATIENT
Start: 2024-05-28

## 2024-05-28 RX ORDER — HEPARIN 100 UNIT/ML
500 SYRINGE INTRAVENOUS PRN
Status: DISCONTINUED | OUTPATIENT
Start: 2024-05-28 | End: 2024-05-29 | Stop reason: HOSPADM

## 2024-05-28 RX ORDER — SODIUM CHLORIDE 9 MG/ML
5-250 INJECTION, SOLUTION INTRAVENOUS PRN
Status: DISCONTINUED | OUTPATIENT
Start: 2024-05-28 | End: 2024-05-29 | Stop reason: HOSPADM

## 2024-05-28 RX ORDER — HEPARIN 100 UNIT/ML
500 SYRINGE INTRAVENOUS PRN
OUTPATIENT
Start: 2024-05-28

## 2024-05-28 RX ORDER — FAMOTIDINE 10 MG/ML
20 INJECTION, SOLUTION INTRAVENOUS ONCE
Status: CANCELLED | OUTPATIENT
Start: 2024-05-28 | End: 2024-05-28

## 2024-05-28 RX ORDER — SODIUM CHLORIDE 9 MG/ML
5-250 INJECTION, SOLUTION INTRAVENOUS PRN
OUTPATIENT
Start: 2024-05-28

## 2024-05-28 RX ADMIN — FAMOTIDINE 20 MG: 10 INJECTION, SOLUTION INTRAVENOUS at 09:49

## 2024-05-28 RX ADMIN — HYDROCORTISONE SODIUM SUCCINATE 100 MG: 100 INJECTION, POWDER, FOR SOLUTION INTRAMUSCULAR; INTRAVENOUS at 10:27

## 2024-05-28 RX ADMIN — DEXAMETHASONE SODIUM PHOSPHATE 10 MG: 10 INJECTION INTRAMUSCULAR; INTRAVENOUS at 09:39

## 2024-05-28 RX ADMIN — SODIUM CHLORIDE, PRESERVATIVE FREE 10 ML: 5 INJECTION INTRAVENOUS at 13:21

## 2024-05-28 RX ADMIN — SODIUM CHLORIDE, PRESERVATIVE FREE 10 ML: 5 INJECTION INTRAVENOUS at 09:39

## 2024-05-28 RX ADMIN — PALONOSETRON 0.25 MG: 0.25 INJECTION, SOLUTION INTRAVENOUS at 09:36

## 2024-05-28 RX ADMIN — SODIUM CHLORIDE, PRESERVATIVE FREE 10 ML: 5 INJECTION INTRAVENOUS at 09:46

## 2024-05-28 RX ADMIN — SODIUM CHLORIDE 200 ML/HR: 9 INJECTION, SOLUTION INTRAVENOUS at 09:36

## 2024-05-28 RX ADMIN — SODIUM CHLORIDE 1000 ML: 9 INJECTION, SOLUTION INTRAVENOUS at 09:26

## 2024-05-28 RX ADMIN — CARBOPLATIN 180 MG: 600 INJECTION, SOLUTION INTRAVENOUS at 12:40

## 2024-05-28 RX ADMIN — Medication 500 UNITS: at 13:21

## 2024-05-28 RX ADMIN — PACLITAXEL 78 MG: 6 INJECTION, SOLUTION INTRAVENOUS at 10:14

## 2024-05-28 RX ADMIN — DIPHENHYDRAMINE HYDROCHLORIDE 50 MG: 50 INJECTION INTRAMUSCULAR; INTRAVENOUS at 09:46

## 2024-05-28 ASSESSMENT — ENCOUNTER SYMPTOMS
CHEST TIGHTNESS: 0
GASTROINTESTINAL NEGATIVE: 1
COUGH: 1
SHORTNESS OF BREATH: 0

## 2024-05-28 NOTE — PROGRESS NOTES
Shortly after Taxol infusion was started the patient called out stating that she felt \"weird\" and \"tingly\". The patient's face was also red. Taxol stopped. NS started. Vitals obtained. 3 L oxygen placed on patient. Dr. Haynes aware. Solucortef given. Per Dr Haynes, patient restarted at half rate once in stable condition. Pt then increased to 75% rate.

## 2024-05-28 NOTE — PROGRESS NOTES
Patient tolerated infusion. Patient alert and oriented x 3 No distress noted. Vital signs stable. Patient denies any new or worsening pain. Patient denies questions regarding treatment or medication; verbalized understanding, offered patient information and discharge material; patient declined; Patient denies needs, all questions answered.

## 2024-05-28 NOTE — TELEPHONE ENCOUNTER
Met with patient in the treatment room prior to her chemotherapy treatment for follow up. Patient appears well and is in good spirits. States that this last week she felt a little worse than she had in the past. She said she did not have much of an appetite and was not able to tolerate a lot of her normal foods. She states she does have a premier protein every once in a while. Dietician following. Provided support and encouragement. She presents a lone today. Denies any current needs for assistance from NN. Patient appreciative of visit. Will continue to follow as needed. Carito SNELLN, RN, Oncology Nurse Navigator

## 2024-05-29 ENCOUNTER — APPOINTMENT (OUTPATIENT)
Dept: RADIATION ONCOLOGY | Age: 64
End: 2024-05-29
Payer: COMMERCIAL

## 2024-05-29 ENCOUNTER — TELEPHONE (OUTPATIENT)
Dept: CARDIOTHORACIC SURGERY | Age: 64
End: 2024-05-29

## 2024-05-29 VITALS
RESPIRATION RATE: 18 BRPM | DIASTOLIC BLOOD PRESSURE: 58 MMHG | HEART RATE: 73 BPM | BODY MASS INDEX: 19.6 KG/M2 | TEMPERATURE: 96 F | SYSTOLIC BLOOD PRESSURE: 94 MMHG | WEIGHT: 117.8 LBS

## 2024-05-29 DIAGNOSIS — T66.XXXA ADVERSE EFFECT OF RADIATION, INITIAL ENCOUNTER: Primary | ICD-10-CM

## 2024-05-29 DIAGNOSIS — C34.91 SQUAMOUS CELL CARCINOMA OF RIGHT LUNG (HCC): Primary | ICD-10-CM

## 2024-05-29 PROCEDURE — 77386 HC NTSTY MODUL RAD TX DLVR CPLX: CPT | Performed by: RADIOLOGY

## 2024-05-29 PROCEDURE — 77387 GUIDANCE FOR RADJ TX DLVR: CPT | Performed by: RADIOLOGY

## 2024-05-29 RX ORDER — SUCRALFATE ORAL 1 G/10ML
1 SUSPENSION ORAL 4 TIMES DAILY
Qty: 400 ML | Refills: 2 | Status: SHIPPED | OUTPATIENT
Start: 2024-05-29

## 2024-05-29 NOTE — PROGRESS NOTES
DEPARTMENT OF RADIATION ONCOLOGY   ON TREATMENT VISIT       5/29/2024      NAME:  Mary Purdy    YOB: 1960    DIAGNOSIS: Clinical stage T4, N2, MZ, stage IIIb, squamous cell cancer of the right upper lobe, PD-L1 75%, with mediastinal and hilar adenopathy, PET staged.    SUBJECTIVE:   Mary Purdy has now received 1400 cGy in 7 fractions directed to the  Right upper lobe and nodes.  Patient presents initial dysphagia for solid food.    Past medical, surgical, social and family histories reviewed and updated as indicated.    PAIN: No    ALLERGIES:  Patient has no known allergies.         Current Outpatient Medications   Medication Sig Dispense Refill    Magic Mouthwash (MIRACLE MOUTHWASH) Swish and swallow 15 mLs 4 times daily as needed for Irritation Do not swish, swallow  20 minutes before meals 400 mL 1    sucralfate (CARAFATE) 1 GM/10ML suspension Take 10 mLs by mouth 4 times daily One hour after meals and at bedtime 400 mL 2    ondansetron (ZOFRAN-ODT) 8 MG TBDP disintegrating tablet Place 1 tablet under the tongue every 8 hours as needed for Nausea or Vomiting 30 tablet 2    prochlorperazine (COMPAZINE) 10 MG tablet Take 1 tablet by mouth every 6 hours as needed (nausea) 50 tablet 2    nicotine (NICODERM CQ) 7 MG/24HR Place 1 patch onto the skin daily 30 patch 3    fluticasone-umeclidin-vilant (TRELEGY ELLIPTA) 100-62.5-25 MCG/ACT AEPB inhaler Inhale 1 puff into the lungs daily 1 each 3    albuterol sulfate HFA (PROVENTIL HFA) 108 (90 Base) MCG/ACT inhaler Inhale 2 puffs into the lungs every 6 hours as needed for Wheezing 18 g 3    acetaminophen (TYLENOL) 325 MG tablet Take 2 tablets by mouth every 6 hours as needed for Pain       No current facility-administered medications for this encounter.         OBJECTIVE:  Alert and fully ambulatory. Pleasant and conversant.      Physical Examination:General appearance - alert, well appearing, and in no distress and normal appearing

## 2024-05-29 NOTE — TELEPHONE ENCOUNTER
SPOKE TO PT WHO IS UNDERGOING CHEMO/RADS  SPOKE TO [facility] THEY WILL NOTIFY US ONCE TREATMENTS ARE COMPLETED

## 2024-05-29 NOTE — PROGRESS NOTES
Late entry 1100 Per tegan Mathis to restart treatment at 50 % rate, increase to 75% rate in 15 minutes and 100% rate in 30 minutes, if patient tolerates.  
Visualized portions of the upper abdomen unremarkable. Soft Tissues/Bones: No acute osseous or soft tissue findings. No aggressive osseous lesion.     1. Centrally necrotic mass in the right upper lobe posteriorly 7.4 cm consistent with malignancy until proven otherwise.  PET-CT and/or tissue sampling recommended for further evaluation 2. Bulky mediastinal and right hilar adenopathy consistent with metastatic adenopathy. 3. Trace to small right pleural effusion. 4. No clear evidence for chest wall invasion or cortical involvement of the right ribs.     XR CHEST STANDARD (2 VW)    Result Date: 4/15/2024  EXAMINATION: TWO XRAY VIEWS OF THE CHEST 4/15/2024 10:38 am COMPARISON: None. HISTORY: ORDERING SYSTEM PROVIDED HISTORY: Cough, unspecified type TECHNOLOGIST PROVIDED HISTORY: Reason for exam:->cough for 6 weeks FINDINGS: There is 8.5 cm right upper lobe mass, new since prior study.  The left lung is clear.  The heart is not enlarged.  There is no pneumothorax or pleural effusion.     8.5 cm right upper lobe mass, new since prior study. CT chest with contrast is recommended for further evaluation.            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  We had first met the patient on inpatient consultation, where biopsy was done, confirming her diagnosis.  Today we discussed general management for locally advanced non-small cell lung cancer, notably with in-depth discussion regarding utilizing chemoradiotherapy.  We outlined the side effects as well as potential scheduling of chemo, notably with carbo/Taxol.  This could be followed up with consolidative immunotherapy.  However we have taken into account the size of patient's tumor which is >7 cm, up to around 8 cm.  With that said, I discussed

## 2024-05-29 NOTE — PROGRESS NOTES
Mary Purdy  5/29/2024  Wt Readings from Last 3 Encounters:   05/29/24 53.4 kg (117 lb 12.8 oz)   05/28/24 50.3 kg (110 lb 12.8 oz)   05/22/24 53.4 kg (117 lb 12.8 oz)     Body mass index is 19.6 kg/m².        Treatment Area:RUL + nodes    Patient was seen today for weekly visit.      Comfort Alteration  KPS:80%  Fatigue: Moderate    Ventilation Alterations  Cough: Yes  Hemoptysis: No  Mucus Color: yellow  Dyspnea: No  O2 Sat: 98%    Nutritional Alteration  Anorexia: No  Nausea: No   Vomiting: No     Skin Alteration   Sensation:no issues    Radiation Dermatitis:  none    Mucous Membrane Alteration  Voice Changes/ Stridor/Larynx: no  Pharynx & Esophagus: na    Elimination Alterations  Constipation: no  Diarrhea:  no      Emotional  Coping: effective      Injury, potential bleeding or infection: na    Other:na    Lab Results   Component Value Date    WBC 13.4 (H) 05/25/2024    HGB 11.2 (L) 05/25/2024    HCT 38.1 05/25/2024     05/25/2024         BP (!) 94/58   Pulse 73   Temp (!) 96 °F (35.6 °C) (Temporal)   Resp 18   Wt 53.4 kg (117 lb 12.8 oz)   BMI 19.60 kg/m²   BP within normal range? yes       Assessment/Plan:Completed 7/30 fractions; 1400/ 6000 cGy    Manju Armijo RN

## 2024-05-30 ENCOUNTER — TELEPHONE (OUTPATIENT)
Dept: CASE MANAGEMENT | Age: 64
End: 2024-05-30

## 2024-05-30 ENCOUNTER — APPOINTMENT (OUTPATIENT)
Dept: RADIATION ONCOLOGY | Age: 64
End: 2024-05-30
Payer: COMMERCIAL

## 2024-05-30 PROCEDURE — 77386 HC NTSTY MODUL RAD TX DLVR CPLX: CPT | Performed by: RADIOLOGY

## 2024-05-30 PROCEDURE — 77387 GUIDANCE FOR RADJ TX DLVR: CPT | Performed by: RADIOLOGY

## 2024-05-30 NOTE — TELEPHONE ENCOUNTER
Patient called in to make Dr. Haynes aware that last night she was experiencing a bit of chills and shivering. She denies any pain or fever. She states she had a slight reaction when she received chemo this week and was not sure if this was related to that. Explained that it is hard to say for certain, but if she continues to experience something similar, she should call the after hours number or go to ER/urgent care. She states she feels completely normal now, but wanted the episode to be documented. I will notify Dr. Haynes.

## 2024-05-31 ENCOUNTER — HOSPITAL ENCOUNTER (OUTPATIENT)
Dept: INFUSION THERAPY | Age: 64
Discharge: HOME | End: 2024-05-31
Payer: COMMERCIAL

## 2024-05-31 ENCOUNTER — APPOINTMENT (OUTPATIENT)
Dept: RADIATION ONCOLOGY | Age: 64
End: 2024-05-31
Payer: COMMERCIAL

## 2024-05-31 VITALS
DIASTOLIC BLOOD PRESSURE: 63 MMHG | TEMPERATURE: 97.8 F | RESPIRATION RATE: 16 BRPM | SYSTOLIC BLOOD PRESSURE: 96 MMHG | HEART RATE: 98 BPM | OXYGEN SATURATION: 100 %

## 2024-05-31 DIAGNOSIS — C34.11 MALIGNANT NEOPLASM OF UPPER LOBE OF RIGHT LUNG (HCC): ICD-10-CM

## 2024-05-31 DIAGNOSIS — C34.91 SQUAMOUS CELL CARCINOMA OF RIGHT LUNG (HCC): ICD-10-CM

## 2024-05-31 DIAGNOSIS — D64.9 NORMOCYTIC ANEMIA: ICD-10-CM

## 2024-05-31 DIAGNOSIS — D50.8 IRON DEFICIENCY ANEMIA SECONDARY TO INADEQUATE DIETARY IRON INTAKE: Primary | ICD-10-CM

## 2024-05-31 PROCEDURE — 96365 THER/PROPH/DIAG IV INF INIT: CPT

## 2024-05-31 PROCEDURE — 77386 HC NTSTY MODUL RAD TX DLVR CPLX: CPT | Performed by: RADIOLOGY

## 2024-05-31 PROCEDURE — 6360000002 HC RX W HCPCS: Performed by: NURSE PRACTITIONER

## 2024-05-31 PROCEDURE — 6360000002 HC RX W HCPCS: Performed by: STUDENT IN AN ORGANIZED HEALTH CARE EDUCATION/TRAINING PROGRAM

## 2024-05-31 PROCEDURE — 2580000003 HC RX 258: Performed by: NURSE PRACTITIONER

## 2024-05-31 PROCEDURE — 2580000003 HC RX 258: Performed by: STUDENT IN AN ORGANIZED HEALTH CARE EDUCATION/TRAINING PROGRAM

## 2024-05-31 RX ORDER — SODIUM CHLORIDE 9 MG/ML
5-250 INJECTION, SOLUTION INTRAVENOUS PRN
OUTPATIENT
Start: 2024-06-07

## 2024-05-31 RX ORDER — SODIUM CHLORIDE 0.9 % (FLUSH) 0.9 %
5-40 SYRINGE (ML) INJECTION PRN
Status: DISCONTINUED | OUTPATIENT
Start: 2024-05-31 | End: 2024-06-01 | Stop reason: HOSPADM

## 2024-05-31 RX ORDER — ACETAMINOPHEN 325 MG/1
650 TABLET ORAL
OUTPATIENT
Start: 2024-06-07

## 2024-05-31 RX ORDER — ALBUTEROL SULFATE 90 UG/1
4 AEROSOL, METERED RESPIRATORY (INHALATION) PRN
OUTPATIENT
Start: 2024-06-07

## 2024-05-31 RX ORDER — SODIUM CHLORIDE 0.9 % (FLUSH) 0.9 %
5-40 SYRINGE (ML) INJECTION PRN
OUTPATIENT
Start: 2024-06-07

## 2024-05-31 RX ORDER — HEPARIN 100 UNIT/ML
500 SYRINGE INTRAVENOUS PRN
OUTPATIENT
Start: 2024-06-07

## 2024-05-31 RX ORDER — HEPARIN 100 UNIT/ML
500 SYRINGE INTRAVENOUS PRN
Status: CANCELLED | OUTPATIENT
Start: 2024-05-31

## 2024-05-31 RX ORDER — SODIUM CHLORIDE 0.9 % (FLUSH) 0.9 %
5-40 SYRINGE (ML) INJECTION PRN
Status: CANCELLED | OUTPATIENT
Start: 2024-05-31

## 2024-05-31 RX ORDER — ONDANSETRON 2 MG/ML
8 INJECTION INTRAMUSCULAR; INTRAVENOUS
OUTPATIENT
Start: 2024-06-07

## 2024-05-31 RX ORDER — SODIUM CHLORIDE 9 MG/ML
25 INJECTION, SOLUTION INTRAVENOUS PRN
Status: CANCELLED | OUTPATIENT
Start: 2024-05-31

## 2024-05-31 RX ORDER — SODIUM CHLORIDE 9 MG/ML
5-250 INJECTION, SOLUTION INTRAVENOUS PRN
Status: DISCONTINUED | OUTPATIENT
Start: 2024-05-31 | End: 2024-06-01 | Stop reason: HOSPADM

## 2024-05-31 RX ORDER — EPINEPHRINE 1 MG/ML
0.3 INJECTION, SOLUTION, CONCENTRATE INTRAVENOUS PRN
OUTPATIENT
Start: 2024-06-07

## 2024-05-31 RX ORDER — HEPARIN 100 UNIT/ML
500 SYRINGE INTRAVENOUS PRN
Status: DISCONTINUED | OUTPATIENT
Start: 2024-05-31 | End: 2024-06-01 | Stop reason: HOSPADM

## 2024-05-31 RX ORDER — SODIUM CHLORIDE 9 MG/ML
INJECTION, SOLUTION INTRAVENOUS CONTINUOUS
OUTPATIENT
Start: 2024-06-07

## 2024-05-31 RX ORDER — DIPHENHYDRAMINE HYDROCHLORIDE 50 MG/ML
50 INJECTION INTRAMUSCULAR; INTRAVENOUS
OUTPATIENT
Start: 2024-06-07

## 2024-05-31 RX ADMIN — Medication 500 UNITS: at 15:29

## 2024-05-31 RX ADMIN — SODIUM CHLORIDE, PRESERVATIVE FREE 10 ML: 5 INJECTION INTRAVENOUS at 14:20

## 2024-05-31 RX ADMIN — SODIUM CHLORIDE 100 ML/HR: 9 INJECTION, SOLUTION INTRAVENOUS at 14:26

## 2024-05-31 RX ADMIN — SODIUM CHLORIDE 125 MG: 9 INJECTION, SOLUTION INTRAVENOUS at 14:27

## 2024-05-31 RX ADMIN — SODIUM CHLORIDE, PRESERVATIVE FREE 10 ML: 5 INJECTION INTRAVENOUS at 15:29

## 2024-06-03 ENCOUNTER — HOSPITAL ENCOUNTER (OUTPATIENT)
Dept: INFUSION THERAPY | Age: 64
Discharge: HOME | End: 2024-06-03
Payer: COMMERCIAL

## 2024-06-03 ENCOUNTER — HOSPITAL ENCOUNTER (OUTPATIENT)
Dept: RADIATION ONCOLOGY | Age: 64
Discharge: HOME | End: 2024-06-03
Payer: COMMERCIAL

## 2024-06-03 DIAGNOSIS — T66.XXXA ADVERSE EFFECT OF RADIATION, INITIAL ENCOUNTER: Primary | ICD-10-CM

## 2024-06-03 DIAGNOSIS — C34.11 MALIGNANT NEOPLASM OF UPPER LOBE OF RIGHT LUNG (HCC): Primary | ICD-10-CM

## 2024-06-03 DIAGNOSIS — C34.91 SQUAMOUS CELL CARCINOMA OF RIGHT LUNG (HCC): ICD-10-CM

## 2024-06-03 LAB
ALBUMIN SERPL-MCNC: 3.4 G/DL (ref 3.5–5.2)
ALP SERPL-CCNC: 80 U/L (ref 35–104)
ALT SERPL-CCNC: 54 U/L (ref 0–32)
ANION GAP SERPL CALCULATED.3IONS-SCNC: 8 MMOL/L (ref 7–16)
AST SERPL-CCNC: 44 U/L (ref 0–31)
BASOPHILS # BLD: 0.02 K/UL (ref 0–0.2)
BASOPHILS NFR BLD: 0 % (ref 0–2)
BILIRUB SERPL-MCNC: 0.4 MG/DL (ref 0–1.2)
BUN SERPL-MCNC: 10 MG/DL (ref 6–23)
CALCIUM SERPL-MCNC: 9.4 MG/DL (ref 8.6–10.2)
CHLORIDE SERPL-SCNC: 99 MMOL/L (ref 98–107)
CO2 SERPL-SCNC: 28 MMOL/L (ref 22–29)
CREAT SERPL-MCNC: 0.5 MG/DL (ref 0.5–1)
EOSINOPHIL # BLD: 0.09 K/UL (ref 0.05–0.5)
EOSINOPHILS RELATIVE PERCENT: 1 % (ref 0–6)
ERYTHROCYTE [DISTWIDTH] IN BLOOD BY AUTOMATED COUNT: 17.2 % (ref 11.5–15)
GFR, ESTIMATED: >90 ML/MIN/1.73M2
GLUCOSE SERPL-MCNC: 90 MG/DL (ref 74–99)
HCT VFR BLD AUTO: 31.6 % (ref 34–48)
HGB BLD-MCNC: 9.6 G/DL (ref 11.5–15.5)
IMM GRANULOCYTES # BLD AUTO: 0.04 K/UL (ref 0–0.58)
IMM GRANULOCYTES NFR BLD: 1 % (ref 0–5)
LYMPHOCYTES NFR BLD: 0.6 K/UL (ref 1.5–4)
LYMPHOCYTES RELATIVE PERCENT: 9 % (ref 20–42)
MAGNESIUM SERPL-MCNC: 2.1 MG/DL (ref 1.6–2.6)
MCH RBC QN AUTO: 25.2 PG (ref 26–35)
MCHC RBC AUTO-ENTMCNC: 30.4 G/DL (ref 32–34.5)
MCV RBC AUTO: 82.9 FL (ref 80–99.9)
MONOCYTES NFR BLD: 0.58 K/UL (ref 0.1–0.95)
MONOCYTES NFR BLD: 9 % (ref 2–12)
NEUTROPHILS NFR BLD: 79 % (ref 43–80)
NEUTS SEG NFR BLD: 5.08 K/UL (ref 1.8–7.3)
PLATELET CONFIRMATION: NORMAL
PLATELET, FLUORESCENCE: 193 K/UL (ref 130–450)
PMV BLD AUTO: 11 FL (ref 7–12)
POTASSIUM SERPL-SCNC: 4.4 MMOL/L (ref 3.5–5)
PROT SERPL-MCNC: 6.6 G/DL (ref 6.4–8.3)
RBC # BLD AUTO: 3.81 M/UL (ref 3.5–5.5)
SODIUM SERPL-SCNC: 135 MMOL/L (ref 132–146)
WBC OTHER # BLD: 6.4 K/UL (ref 4.5–11.5)

## 2024-06-03 PROCEDURE — 36591 DRAW BLOOD OFF VENOUS DEVICE: CPT

## 2024-06-03 PROCEDURE — 6360000002 HC RX W HCPCS: Performed by: STUDENT IN AN ORGANIZED HEALTH CARE EDUCATION/TRAINING PROGRAM

## 2024-06-03 PROCEDURE — 77427 RADIATION TX MANAGEMENT X5: CPT | Performed by: RADIOLOGY

## 2024-06-03 PROCEDURE — 77386 HC NTSTY MODUL RAD TX DLVR CPLX: CPT | Performed by: RADIOLOGY

## 2024-06-03 PROCEDURE — 2580000003 HC RX 258: Performed by: STUDENT IN AN ORGANIZED HEALTH CARE EDUCATION/TRAINING PROGRAM

## 2024-06-03 PROCEDURE — 83735 ASSAY OF MAGNESIUM: CPT

## 2024-06-03 PROCEDURE — 77387 GUIDANCE FOR RADJ TX DLVR: CPT | Performed by: RADIOLOGY

## 2024-06-03 PROCEDURE — 80053 COMPREHEN METABOLIC PANEL: CPT

## 2024-06-03 PROCEDURE — 85025 COMPLETE CBC W/AUTO DIFF WBC: CPT

## 2024-06-03 PROCEDURE — 77336 RADIATION PHYSICS CONSULT: CPT | Performed by: RADIOLOGY

## 2024-06-03 RX ORDER — DIPHENHYDRAMINE HYDROCHLORIDE 50 MG/ML
50 INJECTION INTRAMUSCULAR; INTRAVENOUS
OUTPATIENT
Start: 2024-06-03

## 2024-06-03 RX ORDER — SODIUM CHLORIDE 9 MG/ML
25 INJECTION, SOLUTION INTRAVENOUS PRN
Status: CANCELLED | OUTPATIENT
Start: 2024-06-03

## 2024-06-03 RX ORDER — BENZONATATE 200 MG/1
200 CAPSULE ORAL 3 TIMES DAILY PRN
Qty: 30 CAPSULE | Refills: 0 | Status: SHIPPED | OUTPATIENT
Start: 2024-06-03 | End: 2024-06-13

## 2024-06-03 RX ORDER — SODIUM CHLORIDE 0.9 % (FLUSH) 0.9 %
5-40 SYRINGE (ML) INJECTION PRN
Status: DISCONTINUED | OUTPATIENT
Start: 2024-06-03 | End: 2024-06-04 | Stop reason: HOSPADM

## 2024-06-03 RX ORDER — HEPARIN 100 UNIT/ML
500 SYRINGE INTRAVENOUS PRN
Status: DISCONTINUED | OUTPATIENT
Start: 2024-06-03 | End: 2024-06-04 | Stop reason: HOSPADM

## 2024-06-03 RX ORDER — EPINEPHRINE 1 MG/ML
0.3 INJECTION, SOLUTION, CONCENTRATE INTRAVENOUS PRN
OUTPATIENT
Start: 2024-06-03

## 2024-06-03 RX ORDER — SODIUM CHLORIDE 9 MG/ML
INJECTION, SOLUTION INTRAVENOUS CONTINUOUS
OUTPATIENT
Start: 2024-06-03

## 2024-06-03 RX ORDER — ALBUTEROL SULFATE 90 UG/1
4 AEROSOL, METERED RESPIRATORY (INHALATION) PRN
OUTPATIENT
Start: 2024-06-03

## 2024-06-03 RX ORDER — SODIUM CHLORIDE 0.9 % (FLUSH) 0.9 %
5-40 SYRINGE (ML) INJECTION PRN
OUTPATIENT
Start: 2024-06-03

## 2024-06-03 RX ORDER — 0.9 % SODIUM CHLORIDE 0.9 %
1000 INTRAVENOUS SOLUTION INTRAVENOUS ONCE
OUTPATIENT
Start: 2024-06-03 | End: 2024-06-03

## 2024-06-03 RX ORDER — HEPARIN 100 UNIT/ML
500 SYRINGE INTRAVENOUS PRN
OUTPATIENT
Start: 2024-06-03

## 2024-06-03 RX ORDER — SODIUM CHLORIDE 9 MG/ML
5-250 INJECTION, SOLUTION INTRAVENOUS PRN
OUTPATIENT
Start: 2024-06-03

## 2024-06-03 RX ORDER — HEPARIN 100 UNIT/ML
500 SYRINGE INTRAVENOUS PRN
Status: CANCELLED | OUTPATIENT
Start: 2024-06-03

## 2024-06-03 RX ORDER — SODIUM CHLORIDE 0.9 % (FLUSH) 0.9 %
5-40 SYRINGE (ML) INJECTION PRN
Status: CANCELLED | OUTPATIENT
Start: 2024-06-03

## 2024-06-03 RX ORDER — ACETAMINOPHEN 325 MG/1
650 TABLET ORAL
OUTPATIENT
Start: 2024-06-03

## 2024-06-03 RX ORDER — ONDANSETRON 2 MG/ML
8 INJECTION INTRAMUSCULAR; INTRAVENOUS
OUTPATIENT
Start: 2024-06-03

## 2024-06-03 RX ADMIN — SODIUM CHLORIDE, PRESERVATIVE FREE 10 ML: 5 INJECTION INTRAVENOUS at 13:25

## 2024-06-03 RX ADMIN — HEPARIN 500 UNITS: 100 SYRINGE at 13:25

## 2024-06-04 ENCOUNTER — HOSPITAL ENCOUNTER (OUTPATIENT)
Dept: RADIATION ONCOLOGY | Age: 64
Discharge: HOME | End: 2024-06-04
Payer: COMMERCIAL

## 2024-06-04 ENCOUNTER — HOSPITAL ENCOUNTER (OUTPATIENT)
Dept: INFUSION THERAPY | Age: 64
Discharge: HOME | End: 2024-06-04
Payer: COMMERCIAL

## 2024-06-04 ENCOUNTER — OFFICE VISIT (OUTPATIENT)
Dept: ONCOLOGY | Age: 64
End: 2024-06-04
Payer: COMMERCIAL

## 2024-06-04 VITALS
DIASTOLIC BLOOD PRESSURE: 71 MMHG | SYSTOLIC BLOOD PRESSURE: 100 MMHG | WEIGHT: 112.6 LBS | HEART RATE: 99 BPM | HEIGHT: 65 IN | BODY MASS INDEX: 18.76 KG/M2 | OXYGEN SATURATION: 99 % | TEMPERATURE: 97.4 F

## 2024-06-04 VITALS
OXYGEN SATURATION: 98 % | SYSTOLIC BLOOD PRESSURE: 108 MMHG | HEART RATE: 89 BPM | TEMPERATURE: 97.9 F | DIASTOLIC BLOOD PRESSURE: 55 MMHG | RESPIRATION RATE: 16 BRPM

## 2024-06-04 DIAGNOSIS — C34.91 SQUAMOUS CELL CARCINOMA OF RIGHT LUNG (HCC): Primary | ICD-10-CM

## 2024-06-04 PROCEDURE — 6360000002 HC RX W HCPCS: Performed by: NURSE PRACTITIONER

## 2024-06-04 PROCEDURE — 96375 TX/PRO/DX INJ NEW DRUG ADDON: CPT

## 2024-06-04 PROCEDURE — 96413 CHEMO IV INFUSION 1 HR: CPT

## 2024-06-04 PROCEDURE — 2500000003 HC RX 250 WO HCPCS: Performed by: NURSE PRACTITIONER

## 2024-06-04 PROCEDURE — 96417 CHEMO IV INFUS EACH ADDL SEQ: CPT

## 2024-06-04 PROCEDURE — A4216 STERILE WATER/SALINE, 10 ML: HCPCS | Performed by: NURSE PRACTITIONER

## 2024-06-04 PROCEDURE — 77387 GUIDANCE FOR RADJ TX DLVR: CPT | Performed by: RADIOLOGY

## 2024-06-04 PROCEDURE — 99214 OFFICE O/P EST MOD 30 MIN: CPT | Performed by: NURSE PRACTITIONER

## 2024-06-04 PROCEDURE — 2580000003 HC RX 258: Performed by: NURSE PRACTITIONER

## 2024-06-04 PROCEDURE — 77386 HC NTSTY MODUL RAD TX DLVR CPLX: CPT | Performed by: RADIOLOGY

## 2024-06-04 PROCEDURE — 96415 CHEMO IV INFUSION ADDL HR: CPT

## 2024-06-04 RX ORDER — DIPHENHYDRAMINE HYDROCHLORIDE 50 MG/ML
50 INJECTION INTRAMUSCULAR; INTRAVENOUS ONCE
Status: COMPLETED | OUTPATIENT
Start: 2024-06-04 | End: 2024-06-04

## 2024-06-04 RX ORDER — DIPHENHYDRAMINE HYDROCHLORIDE 50 MG/ML
50 INJECTION INTRAMUSCULAR; INTRAVENOUS ONCE
Status: CANCELLED | OUTPATIENT
Start: 2024-06-04 | End: 2024-06-04

## 2024-06-04 RX ORDER — ALBUTEROL SULFATE 90 UG/1
4 AEROSOL, METERED RESPIRATORY (INHALATION) PRN
Status: CANCELLED | OUTPATIENT
Start: 2024-06-04

## 2024-06-04 RX ORDER — SODIUM CHLORIDE 9 MG/ML
INJECTION, SOLUTION INTRAVENOUS CONTINUOUS
Status: CANCELLED | OUTPATIENT
Start: 2024-06-04

## 2024-06-04 RX ORDER — PALONOSETRON 0.05 MG/ML
0.25 INJECTION, SOLUTION INTRAVENOUS ONCE
Status: CANCELLED | OUTPATIENT
Start: 2024-06-04 | End: 2024-06-04

## 2024-06-04 RX ORDER — SODIUM CHLORIDE 9 MG/ML
5-250 INJECTION, SOLUTION INTRAVENOUS PRN
Status: CANCELLED | OUTPATIENT
Start: 2024-06-04

## 2024-06-04 RX ORDER — EPINEPHRINE 1 MG/ML
0.3 INJECTION, SOLUTION, CONCENTRATE INTRAVENOUS PRN
Status: CANCELLED | OUTPATIENT
Start: 2024-06-04

## 2024-06-04 RX ORDER — SODIUM CHLORIDE 0.9 % (FLUSH) 0.9 %
5-40 SYRINGE (ML) INJECTION PRN
Status: DISCONTINUED | OUTPATIENT
Start: 2024-06-04 | End: 2024-06-05 | Stop reason: HOSPADM

## 2024-06-04 RX ORDER — FAMOTIDINE 10 MG/ML
INJECTION, SOLUTION INTRAVENOUS
Status: DISCONTINUED
Start: 2024-06-04 | End: 2024-06-04 | Stop reason: WASHOUT

## 2024-06-04 RX ORDER — HEPARIN 100 UNIT/ML
500 SYRINGE INTRAVENOUS PRN
Status: DISCONTINUED | OUTPATIENT
Start: 2024-06-04 | End: 2024-06-05 | Stop reason: HOSPADM

## 2024-06-04 RX ORDER — DEXAMETHASONE SODIUM PHOSPHATE 10 MG/ML
10 INJECTION, SOLUTION INTRAMUSCULAR; INTRAVENOUS ONCE
Status: COMPLETED | OUTPATIENT
Start: 2024-06-04 | End: 2024-06-04

## 2024-06-04 RX ORDER — DIPHENHYDRAMINE HYDROCHLORIDE 50 MG/ML
50 INJECTION INTRAMUSCULAR; INTRAVENOUS
Status: CANCELLED | OUTPATIENT
Start: 2024-06-04

## 2024-06-04 RX ORDER — FAMOTIDINE 10 MG/ML
20 INJECTION, SOLUTION INTRAVENOUS
Status: CANCELLED | OUTPATIENT
Start: 2024-06-04

## 2024-06-04 RX ORDER — SODIUM CHLORIDE 0.9 % (FLUSH) 0.9 %
5-40 SYRINGE (ML) INJECTION PRN
Status: CANCELLED | OUTPATIENT
Start: 2024-06-04

## 2024-06-04 RX ORDER — SODIUM CHLORIDE 9 MG/ML
5-250 INJECTION, SOLUTION INTRAVENOUS PRN
Status: DISCONTINUED | OUTPATIENT
Start: 2024-06-04 | End: 2024-06-05 | Stop reason: HOSPADM

## 2024-06-04 RX ORDER — PALONOSETRON HYDROCHLORIDE 0.05 MG/ML
0.25 INJECTION, SOLUTION INTRAVENOUS ONCE
Status: COMPLETED | OUTPATIENT
Start: 2024-06-04 | End: 2024-06-04

## 2024-06-04 RX ORDER — DIPHENHYDRAMINE HYDROCHLORIDE 50 MG/ML
50 INJECTION INTRAMUSCULAR; INTRAVENOUS
Status: DISCONTINUED | OUTPATIENT
Start: 2024-06-04 | End: 2024-06-05 | Stop reason: HOSPADM

## 2024-06-04 RX ORDER — MEPERIDINE HYDROCHLORIDE 50 MG/ML
12.5 INJECTION INTRAMUSCULAR; INTRAVENOUS; SUBCUTANEOUS PRN
Status: CANCELLED | OUTPATIENT
Start: 2024-06-04

## 2024-06-04 RX ORDER — ACETAMINOPHEN 325 MG/1
650 TABLET ORAL
Status: CANCELLED | OUTPATIENT
Start: 2024-06-04

## 2024-06-04 RX ORDER — FAMOTIDINE 10 MG/ML
20 INJECTION, SOLUTION INTRAVENOUS ONCE
Status: CANCELLED | OUTPATIENT
Start: 2024-06-04 | End: 2024-06-04

## 2024-06-04 RX ORDER — ONDANSETRON 2 MG/ML
8 INJECTION INTRAMUSCULAR; INTRAVENOUS
Status: CANCELLED | OUTPATIENT
Start: 2024-06-04

## 2024-06-04 RX ORDER — HEPARIN SODIUM (PORCINE) LOCK FLUSH IV SOLN 100 UNIT/ML 100 UNIT/ML
500 SOLUTION INTRAVENOUS PRN
Status: CANCELLED | OUTPATIENT
Start: 2024-06-04

## 2024-06-04 RX ADMIN — Medication 500 UNITS: at 14:19

## 2024-06-04 RX ADMIN — PACLITAXEL 78 MG: 6 INJECTION, SOLUTION INTRAVENOUS at 10:31

## 2024-06-04 RX ADMIN — SODIUM CHLORIDE 20 ML/HR: 9 INJECTION, SOLUTION INTRAVENOUS at 09:39

## 2024-06-04 RX ADMIN — DEXAMETHASONE SODIUM PHOSPHATE 10 MG: 10 INJECTION, SOLUTION INTRAMUSCULAR; INTRAVENOUS at 09:54

## 2024-06-04 RX ADMIN — SODIUM CHLORIDE, PRESERVATIVE FREE 10 ML: 5 INJECTION INTRAVENOUS at 14:18

## 2024-06-04 RX ADMIN — HYDROCORTISONE SODIUM SUCCINATE 100 MG: 100 INJECTION, POWDER, FOR SOLUTION INTRAMUSCULAR; INTRAVENOUS at 10:44

## 2024-06-04 RX ADMIN — PALONOSETRON 0.25 MG: 0.25 INJECTION, SOLUTION INTRAVENOUS at 09:41

## 2024-06-04 RX ADMIN — FAMOTIDINE 20 MG: 10 INJECTION, SOLUTION INTRAVENOUS at 09:45

## 2024-06-04 RX ADMIN — CARBOPLATIN 180 MG: 450 INJECTION, SOLUTION INTRAVENOUS at 13:38

## 2024-06-04 RX ADMIN — DIPHENHYDRAMINE HYDROCHLORIDE 50 MG: 50 INJECTION INTRAMUSCULAR; INTRAVENOUS at 09:50

## 2024-06-04 ASSESSMENT — ENCOUNTER SYMPTOMS
GASTROINTESTINAL NEGATIVE: 1
SHORTNESS OF BREATH: 0
CHEST TIGHTNESS: 0

## 2024-06-04 NOTE — PROGRESS NOTES
Shortly after Taxol was started patient notified this nurse that she felt \"hot\" and \"cyndee\". Patient's face noticeable redness. Infusion was paused. Solu-cortef given and Nicolasa Monteiro NP notified. Vitals stable. Instructed by Nicolasa Monteiro NP to pause Taxol for 30minutes and if vitals remained stable to start infusion at half the rate. At 30 minute jayson, Vital signs stable and Taxol resumed at half the rate. Patient tolerated rest of infusions well. Patient alert and oriented x3.   No distress noted.   Vital signs stable.   Patient denies any new or worsening pain.  Patient educated on signs and symptoms of reaction to medication.   Educated patient on possible side effects and treatment of medication.     Patient verbalized understanding.   Offered patient education an/or discharge material.  Patient declined.   Patient denies any needs.  All questions answered.

## 2024-06-04 NOTE — PROGRESS NOTES
[facility] MEDICAL ONCOLOGY  [address] [facility] [address] [address]  Dept: [phone]  Loc: [phone]    Clinic Progress Note      Date of Encounter: 06/04/2024     Referring Provider:  Celso Meier MD     Reason for Visit:   Squamous cell lung cancer, locally advanced        PCP:  Yue Nobles DO    Demographics: 64 y.o. female    Chief Complaint   Patient presents with    Follow-up         Subjective:  Here for Cycle 3. Feeling okay. Was able to go out on a motorcycle ride with her . She has fatigue.     HPI from Initial Outpatient Consultation (4/29/2024):  The patient is a 64 y.o. female and is coming in, establishing at our clinic, after we first met patient on inpatient consultation for a right sided lung mass.  She was admitted at [facility] on 4/15/2024 due to weight loss, and cough.  She was found to have an 8.5 cm right upper lobe mass with central necrosis, and had undergone biopsy of the lung on 4/8/2023, confirming her diagnosis.    Today, she was accompanied by her , feeling little bit better.  She complains of cough that is dry still.  Denies persistent headaches, vision changes, nausea or vomiting.     Oncology History   Squamous cell carcinoma of right lung (HCC)   4/18/2023 Biopsy    -- Diagnosis --   Right lung, core biopsy: Invasive squamous cell carcinoma with   necrosis     -- Diagnosis Comment --   Intradepartmental consultation is obtained.  PD-L1 testing has been   ordered per institution protocol and will be reported separately.     Jo Ann Clark MD   **Electronically Signed Out**         [facility]/4/22/2024      Clinical Information   Procedure; Right lung mass biopsy, 4 cores     Pre-Operative Diagnosis   Lung alyse, smoker       Source of Specimen   A: LUNG, TRANSBRONCHIAL BIOPSY, RIGHT     ==================================================    -- Diagnosis --   PD-L1 22C3 FDA analysis: High

## 2024-06-05 ENCOUNTER — HOSPITAL ENCOUNTER (OUTPATIENT)
Dept: RADIATION ONCOLOGY | Age: 64
Discharge: HOME | End: 2024-06-05
Payer: COMMERCIAL

## 2024-06-05 VITALS
HEART RATE: 86 BPM | BODY MASS INDEX: 19.1 KG/M2 | WEIGHT: 114.8 LBS | DIASTOLIC BLOOD PRESSURE: 51 MMHG | RESPIRATION RATE: 18 BRPM | SYSTOLIC BLOOD PRESSURE: 92 MMHG | TEMPERATURE: 96.7 F

## 2024-06-05 DIAGNOSIS — C34.91 SQUAMOUS CELL CARCINOMA OF RIGHT LUNG (HCC): Primary | ICD-10-CM

## 2024-06-05 PROCEDURE — 77387 GUIDANCE FOR RADJ TX DLVR: CPT | Performed by: RADIOLOGY

## 2024-06-05 PROCEDURE — 77386 HC NTSTY MODUL RAD TX DLVR CPLX: CPT | Performed by: RADIOLOGY

## 2024-06-05 NOTE — PROGRESS NOTES
Mary Purdy  6/5/2024  Wt Readings from Last 3 Encounters:   06/05/24 52.1 kg (114 lb 12.8 oz)   06/04/24 51.1 kg (112 lb 9.6 oz)   05/29/24 53.4 kg (117 lb 12.8 oz)     Body mass index is 19.1 kg/m².        Treatment Area:RUL + nodes    Patient was seen today for weekly visit.      Comfort Alteration  KPS:80%  Fatigue: Moderate    Ventilation Alterations  Cough: Yes  Hemoptysis: No  Mucus Color: clear  Dyspnea: No  O2 Sat: 98%    Nutritional Alteration  Anorexia: Yes, but a little bit better today  Nausea: No   Vomiting: No     Skin Alteration   Sensation:no  issues    Radiation Dermatitis:  few rashes    Mucous Membrane Alteration  Voice Changes/ Stridor/Larynx: no  Pharynx & Esophagus: no    Elimination Alterations  Constipation: no  Diarrhea:  no      Emotional  Coping: effective      Injury, potential bleeding or infection: no    Other:na    Lab Results   Component Value Date    WBC 6.4 06/03/2024    HGB 9.6 (L) 06/03/2024    HCT 31.6 (L) 06/03/2024     05/25/2024         BP (!) 92/51   Pulse 86   Temp (!) 96.7 °F (35.9 °C) (Temporal)   Resp 18   Wt 52.1 kg (114 lb 12.8 oz)   BMI 19.10 kg/m²   BP within normal range? yes       Assessment/Plan:  Completed 12/30 fractions; 2400/6000 cGy    Manju Armijo RN

## 2024-06-05 NOTE — PROGRESS NOTES
DEPARTMENT OF RADIATION ONCOLOGY   ON TREATMENT VISIT       6/5/2024      NAME:  Mary Purdy    YOB: 1960    DIAGNOSIS: Clinical stage T4, N2, MZ, stage IIIb, squamous cell cancer of the right upper lobe, PD-L1 75%, with mediastinal and hilar adenopathy, PET staged.    SUBJECTIVE:   Mary Purdy has now received 2400 cGy in 12 fractions directed to the right upper lobe and nodes.      Past medical, surgical, social and family histories reviewed and updated as indicated.    PAIN: No    ALLERGIES:  Patient has no known allergies.         Current Outpatient Medications   Medication Sig Dispense Refill    benzonatate (TESSALON) 200 MG capsule Take 1 capsule by mouth 3 times daily as needed for Cough 30 capsule 0    Magic Mouthwash (MIRACLE MOUTHWASH) Swish and swallow 15 mLs 4 times daily as needed for Irritation Do not swish, swallow  20 minutes before meals 400 mL 1    sucralfate (CARAFATE) 1 GM/10ML suspension Take 10 mLs by mouth 4 times daily One hour after meals and at bedtime 400 mL 2    ondansetron (ZOFRAN-ODT) 8 MG TBDP disintegrating tablet Place 1 tablet under the tongue every 8 hours as needed for Nausea or Vomiting 30 tablet 2    prochlorperazine (COMPAZINE) 10 MG tablet Take 1 tablet by mouth every 6 hours as needed (nausea) 50 tablet 2    nicotine (NICODERM CQ) 7 MG/24HR Place 1 patch onto the skin daily 30 patch 3    fluticasone-umeclidin-vilant (TRELEGY ELLIPTA) 100-62.5-25 MCG/ACT AEPB inhaler Inhale 1 puff into the lungs daily 1 each 3    albuterol sulfate HFA (PROVENTIL HFA) 108 (90 Base) MCG/ACT inhaler Inhale 2 puffs into the lungs every 6 hours as needed for Wheezing 18 g 3    acetaminophen (TYLENOL) 325 MG tablet Take 2 tablets by mouth every 6 hours as needed for Pain       No current facility-administered medications for this encounter.         OBJECTIVE:  Alert and fully ambulatory. Pleasant and conversant.      Physical Examination:General appearance - alert,

## 2024-06-06 ENCOUNTER — HOSPITAL ENCOUNTER (OUTPATIENT)
Dept: RADIATION ONCOLOGY | Age: 64
Discharge: HOME | End: 2024-06-06
Payer: COMMERCIAL

## 2024-06-06 PROCEDURE — 77387 GUIDANCE FOR RADJ TX DLVR: CPT | Performed by: RADIOLOGY

## 2024-06-06 PROCEDURE — 77386 HC NTSTY MODUL RAD TX DLVR CPLX: CPT | Performed by: RADIOLOGY

## 2024-06-07 ENCOUNTER — HOSPITAL ENCOUNTER (OUTPATIENT)
Dept: RADIATION ONCOLOGY | Age: 64
Discharge: HOME | End: 2024-06-07
Payer: COMMERCIAL

## 2024-06-07 PROCEDURE — 77386 HC NTSTY MODUL RAD TX DLVR CPLX: CPT | Performed by: RADIOLOGY

## 2024-06-10 ENCOUNTER — HOSPITAL ENCOUNTER (OUTPATIENT)
Dept: RADIATION ONCOLOGY | Age: 64
Discharge: HOME | End: 2024-06-10
Payer: COMMERCIAL

## 2024-06-10 ENCOUNTER — HOSPITAL ENCOUNTER (OUTPATIENT)
Dept: INFUSION THERAPY | Age: 64
Discharge: HOME | End: 2024-06-10
Payer: COMMERCIAL

## 2024-06-10 DIAGNOSIS — C34.91 SQUAMOUS CELL CARCINOMA OF RIGHT LUNG (HCC): ICD-10-CM

## 2024-06-10 DIAGNOSIS — C34.11 MALIGNANT NEOPLASM OF UPPER LOBE OF RIGHT LUNG (HCC): Primary | ICD-10-CM

## 2024-06-10 LAB
ALBUMIN SERPL-MCNC: 3.5 G/DL (ref 3.5–5.2)
ALP SERPL-CCNC: 92 U/L (ref 35–104)
ALT SERPL-CCNC: 49 U/L (ref 0–32)
ANION GAP SERPL CALCULATED.3IONS-SCNC: 9 MMOL/L (ref 7–16)
AST SERPL-CCNC: 30 U/L (ref 0–31)
BASOPHILS # BLD: 0 K/UL (ref 0–0.2)
BASOPHILS NFR BLD: 0 % (ref 0–2)
BILIRUB SERPL-MCNC: 0.3 MG/DL (ref 0–1.2)
BUN SERPL-MCNC: 9 MG/DL (ref 6–23)
CALCIUM SERPL-MCNC: 8.7 MG/DL (ref 8.6–10.2)
CHLORIDE SERPL-SCNC: 97 MMOL/L (ref 98–107)
CO2 SERPL-SCNC: 27 MMOL/L (ref 22–29)
CREAT SERPL-MCNC: 0.4 MG/DL (ref 0.5–1)
EOSINOPHIL # BLD: 0.09 K/UL (ref 0.05–0.5)
EOSINOPHILS RELATIVE PERCENT: 2 % (ref 0–6)
ERYTHROCYTE [DISTWIDTH] IN BLOOD BY AUTOMATED COUNT: 18.1 % (ref 11.5–15)
GFR, ESTIMATED: >90 ML/MIN/1.73M2
GLUCOSE SERPL-MCNC: 108 MG/DL (ref 74–99)
HCT VFR BLD AUTO: 33 % (ref 34–48)
HGB BLD-MCNC: 10 G/DL (ref 11.5–15.5)
LYMPHOCYTES NFR BLD: 0.59 K/UL (ref 1.5–4)
LYMPHOCYTES RELATIVE PERCENT: 11 % (ref 20–42)
MAGNESIUM SERPL-MCNC: 1.9 MG/DL (ref 1.6–2.6)
MCH RBC QN AUTO: 25.4 PG (ref 26–35)
MCHC RBC AUTO-ENTMCNC: 30.3 G/DL (ref 32–34.5)
MCV RBC AUTO: 84 FL (ref 80–99.9)
MONOCYTES NFR BLD: 0.27 K/UL (ref 0.1–0.95)
MONOCYTES NFR BLD: 5 % (ref 2–12)
NEUTROPHILS NFR BLD: 82 % (ref 43–80)
NEUTS SEG NFR BLD: 4.34 K/UL (ref 1.8–7.3)
PLATELET # BLD AUTO: 218 K/UL (ref 130–450)
PMV BLD AUTO: 9.6 FL (ref 7–12)
POTASSIUM SERPL-SCNC: 4.1 MMOL/L (ref 3.5–5)
PROT SERPL-MCNC: 6.8 G/DL (ref 6.4–8.3)
RBC # BLD AUTO: 3.93 M/UL (ref 3.5–5.5)
RBC # BLD: NORMAL 10*6/UL
SODIUM SERPL-SCNC: 133 MMOL/L (ref 132–146)
WBC OTHER # BLD: 5.3 K/UL (ref 4.5–11.5)

## 2024-06-10 PROCEDURE — 77386 HC NTSTY MODUL RAD TX DLVR CPLX: CPT | Performed by: RADIOLOGY

## 2024-06-10 PROCEDURE — 80053 COMPREHEN METABOLIC PANEL: CPT

## 2024-06-10 PROCEDURE — 36591 DRAW BLOOD OFF VENOUS DEVICE: CPT

## 2024-06-10 PROCEDURE — 83735 ASSAY OF MAGNESIUM: CPT

## 2024-06-10 PROCEDURE — 85025 COMPLETE CBC W/AUTO DIFF WBC: CPT

## 2024-06-10 PROCEDURE — 77336 RADIATION PHYSICS CONSULT: CPT | Performed by: RADIOLOGY

## 2024-06-10 PROCEDURE — 77427 RADIATION TX MANAGEMENT X5: CPT | Performed by: RADIOLOGY

## 2024-06-10 PROCEDURE — 6360000002 HC RX W HCPCS: Performed by: STUDENT IN AN ORGANIZED HEALTH CARE EDUCATION/TRAINING PROGRAM

## 2024-06-10 PROCEDURE — 2580000003 HC RX 258: Performed by: STUDENT IN AN ORGANIZED HEALTH CARE EDUCATION/TRAINING PROGRAM

## 2024-06-10 PROCEDURE — 77387 GUIDANCE FOR RADJ TX DLVR: CPT | Performed by: RADIOLOGY

## 2024-06-10 RX ORDER — SODIUM CHLORIDE 9 MG/ML
25 INJECTION, SOLUTION INTRAVENOUS PRN
Status: CANCELLED | OUTPATIENT
Start: 2024-06-10

## 2024-06-10 RX ORDER — SODIUM CHLORIDE 0.9 % (FLUSH) 0.9 %
5-40 SYRINGE (ML) INJECTION PRN
Status: DISCONTINUED | OUTPATIENT
Start: 2024-06-10 | End: 2024-06-11 | Stop reason: HOSPADM

## 2024-06-10 RX ORDER — HEPARIN 100 UNIT/ML
500 SYRINGE INTRAVENOUS PRN
Status: CANCELLED | OUTPATIENT
Start: 2024-06-10

## 2024-06-10 RX ORDER — SODIUM CHLORIDE 0.9 % (FLUSH) 0.9 %
5-40 SYRINGE (ML) INJECTION PRN
Status: CANCELLED | OUTPATIENT
Start: 2024-06-10

## 2024-06-10 RX ORDER — HEPARIN 100 UNIT/ML
500 SYRINGE INTRAVENOUS PRN
Status: DISCONTINUED | OUTPATIENT
Start: 2024-06-10 | End: 2024-06-11 | Stop reason: HOSPADM

## 2024-06-10 RX ADMIN — SODIUM CHLORIDE, PRESERVATIVE FREE 10 ML: 5 INJECTION INTRAVENOUS at 13:16

## 2024-06-10 RX ADMIN — Medication 500 UNITS: at 13:16

## 2024-06-11 ENCOUNTER — OFFICE VISIT (OUTPATIENT)
Dept: ONCOLOGY | Age: 64
End: 2024-06-11
Payer: COMMERCIAL

## 2024-06-11 ENCOUNTER — HOSPITAL ENCOUNTER (OUTPATIENT)
Dept: RADIATION ONCOLOGY | Age: 64
Discharge: HOME | End: 2024-06-11
Payer: COMMERCIAL

## 2024-06-11 ENCOUNTER — HOSPITAL ENCOUNTER (OUTPATIENT)
Dept: INFUSION THERAPY | Age: 64
Discharge: HOME | End: 2024-06-11
Payer: COMMERCIAL

## 2024-06-11 ENCOUNTER — TELEPHONE (OUTPATIENT)
Dept: CASE MANAGEMENT | Age: 64
End: 2024-06-11

## 2024-06-11 VITALS
OXYGEN SATURATION: 95 % | RESPIRATION RATE: 16 BRPM | DIASTOLIC BLOOD PRESSURE: 59 MMHG | SYSTOLIC BLOOD PRESSURE: 96 MMHG | HEART RATE: 92 BPM | TEMPERATURE: 97.9 F

## 2024-06-11 VITALS
OXYGEN SATURATION: 99 % | HEIGHT: 65 IN | WEIGHT: 112.6 LBS | BODY MASS INDEX: 18.76 KG/M2 | TEMPERATURE: 97.8 F | DIASTOLIC BLOOD PRESSURE: 75 MMHG | SYSTOLIC BLOOD PRESSURE: 106 MMHG | HEART RATE: 101 BPM

## 2024-06-11 DIAGNOSIS — T45.1X5D: ICD-10-CM

## 2024-06-11 DIAGNOSIS — C34.11 MALIGNANT NEOPLASM OF UPPER LOBE OF RIGHT LUNG (HCC): Primary | ICD-10-CM

## 2024-06-11 DIAGNOSIS — C34.91 SQUAMOUS CELL CARCINOMA OF RIGHT LUNG (HCC): Primary | ICD-10-CM

## 2024-06-11 DIAGNOSIS — C34.91 SQUAMOUS CELL CARCINOMA OF RIGHT LUNG (HCC): ICD-10-CM

## 2024-06-11 PROCEDURE — 36593 DECLOT VASCULAR DEVICE: CPT

## 2024-06-11 PROCEDURE — 96361 HYDRATE IV INFUSION ADD-ON: CPT

## 2024-06-11 PROCEDURE — 99214 OFFICE O/P EST MOD 30 MIN: CPT | Performed by: STUDENT IN AN ORGANIZED HEALTH CARE EDUCATION/TRAINING PROGRAM

## 2024-06-11 PROCEDURE — 77386 HC NTSTY MODUL RAD TX DLVR CPLX: CPT | Performed by: RADIOLOGY

## 2024-06-11 PROCEDURE — 96375 TX/PRO/DX INJ NEW DRUG ADDON: CPT

## 2024-06-11 PROCEDURE — 96417 CHEMO IV INFUS EACH ADDL SEQ: CPT

## 2024-06-11 PROCEDURE — 77387 GUIDANCE FOR RADJ TX DLVR: CPT | Performed by: RADIOLOGY

## 2024-06-11 PROCEDURE — 2500000003 HC RX 250 WO HCPCS: Performed by: STUDENT IN AN ORGANIZED HEALTH CARE EDUCATION/TRAINING PROGRAM

## 2024-06-11 PROCEDURE — 6360000002 HC RX W HCPCS: Performed by: STUDENT IN AN ORGANIZED HEALTH CARE EDUCATION/TRAINING PROGRAM

## 2024-06-11 PROCEDURE — 2580000003 HC RX 258: Performed by: STUDENT IN AN ORGANIZED HEALTH CARE EDUCATION/TRAINING PROGRAM

## 2024-06-11 PROCEDURE — 96413 CHEMO IV INFUSION 1 HR: CPT

## 2024-06-11 RX ORDER — DEXAMETHASONE SODIUM PHOSPHATE 10 MG/ML
10 INJECTION, SOLUTION INTRAMUSCULAR; INTRAVENOUS ONCE
Status: COMPLETED | OUTPATIENT
Start: 2024-06-11 | End: 2024-06-11

## 2024-06-11 RX ORDER — SODIUM CHLORIDE 9 MG/ML
5-250 INJECTION, SOLUTION INTRAVENOUS PRN
Status: CANCELLED | OUTPATIENT
Start: 2024-06-11

## 2024-06-11 RX ORDER — SODIUM CHLORIDE 0.9 % (FLUSH) 0.9 %
5-40 SYRINGE (ML) INJECTION PRN
Status: DISCONTINUED | OUTPATIENT
Start: 2024-06-11 | End: 2024-06-12 | Stop reason: HOSPADM

## 2024-06-11 RX ORDER — ONDANSETRON 2 MG/ML
8 INJECTION INTRAMUSCULAR; INTRAVENOUS
Status: CANCELLED | OUTPATIENT
Start: 2024-06-11

## 2024-06-11 RX ORDER — DIPHENHYDRAMINE HYDROCHLORIDE 50 MG/ML
50 INJECTION INTRAMUSCULAR; INTRAVENOUS ONCE
Status: COMPLETED | OUTPATIENT
Start: 2024-06-11 | End: 2024-06-11

## 2024-06-11 RX ORDER — ALBUTEROL SULFATE 90 UG/1
4 AEROSOL, METERED RESPIRATORY (INHALATION) PRN
Status: CANCELLED | OUTPATIENT
Start: 2024-06-11

## 2024-06-11 RX ORDER — FAMOTIDINE 10 MG/ML
20 INJECTION, SOLUTION INTRAVENOUS
Status: CANCELLED | OUTPATIENT
Start: 2024-06-11

## 2024-06-11 RX ORDER — EPINEPHRINE 1 MG/ML
0.3 INJECTION, SOLUTION, CONCENTRATE INTRAVENOUS PRN
Status: CANCELLED | OUTPATIENT
Start: 2024-06-11

## 2024-06-11 RX ORDER — PALONOSETRON HYDROCHLORIDE 0.05 MG/ML
0.25 INJECTION, SOLUTION INTRAVENOUS ONCE
Status: COMPLETED | OUTPATIENT
Start: 2024-06-11 | End: 2024-06-11

## 2024-06-11 RX ORDER — ACETAMINOPHEN 325 MG/1
650 TABLET ORAL
Status: CANCELLED | OUTPATIENT
Start: 2024-06-11

## 2024-06-11 RX ORDER — DIPHENHYDRAMINE HYDROCHLORIDE 50 MG/ML
50 INJECTION INTRAMUSCULAR; INTRAVENOUS
Status: CANCELLED | OUTPATIENT
Start: 2024-06-11

## 2024-06-11 RX ORDER — HEPARIN 100 UNIT/ML
500 SYRINGE INTRAVENOUS PRN
Status: DISCONTINUED | OUTPATIENT
Start: 2024-06-11 | End: 2024-06-12 | Stop reason: HOSPADM

## 2024-06-11 RX ORDER — SODIUM CHLORIDE 9 MG/ML
25 INJECTION, SOLUTION INTRAVENOUS PRN
OUTPATIENT
Start: 2024-06-11

## 2024-06-11 RX ORDER — DIPHENHYDRAMINE HYDROCHLORIDE 50 MG/ML
50 INJECTION INTRAMUSCULAR; INTRAVENOUS ONCE
Status: CANCELLED | OUTPATIENT
Start: 2024-06-11 | End: 2024-06-11

## 2024-06-11 RX ORDER — SODIUM CHLORIDE 9 MG/ML
5-250 INJECTION, SOLUTION INTRAVENOUS PRN
Status: DISCONTINUED | OUTPATIENT
Start: 2024-06-11 | End: 2024-06-12 | Stop reason: HOSPADM

## 2024-06-11 RX ORDER — FAMOTIDINE 10 MG/ML
20 INJECTION, SOLUTION INTRAVENOUS ONCE
Status: CANCELLED | OUTPATIENT
Start: 2024-06-11 | End: 2024-06-11

## 2024-06-11 RX ORDER — SODIUM CHLORIDE 9 MG/ML
INJECTION, SOLUTION INTRAVENOUS CONTINUOUS
Status: CANCELLED | OUTPATIENT
Start: 2024-06-11

## 2024-06-11 RX ORDER — SODIUM CHLORIDE 0.9 % (FLUSH) 0.9 %
5-40 SYRINGE (ML) INJECTION PRN
Status: CANCELLED | OUTPATIENT
Start: 2024-06-11

## 2024-06-11 RX ORDER — SODIUM CHLORIDE 0.9 % (FLUSH) 0.9 %
5-40 SYRINGE (ML) INJECTION PRN
OUTPATIENT
Start: 2024-06-11

## 2024-06-11 RX ORDER — MEPERIDINE HYDROCHLORIDE 50 MG/ML
12.5 INJECTION INTRAMUSCULAR; INTRAVENOUS; SUBCUTANEOUS PRN
Status: CANCELLED | OUTPATIENT
Start: 2024-06-11

## 2024-06-11 RX ORDER — PALONOSETRON 0.05 MG/ML
0.25 INJECTION, SOLUTION INTRAVENOUS ONCE
Status: CANCELLED | OUTPATIENT
Start: 2024-06-11 | End: 2024-06-11

## 2024-06-11 RX ORDER — HEPARIN SODIUM (PORCINE) LOCK FLUSH IV SOLN 100 UNIT/ML 100 UNIT/ML
500 SOLUTION INTRAVENOUS PRN
Status: CANCELLED | OUTPATIENT
Start: 2024-06-11

## 2024-06-11 RX ORDER — HEPARIN 100 UNIT/ML
500 SYRINGE INTRAVENOUS PRN
OUTPATIENT
Start: 2024-06-11

## 2024-06-11 RX ADMIN — ALTEPLASE 2 MG: 2.2 INJECTION, POWDER, LYOPHILIZED, FOR SOLUTION INTRAVENOUS at 10:07

## 2024-06-11 RX ADMIN — DEXAMETHASONE SODIUM PHOSPHATE 10 MG: 10 INJECTION, SOLUTION INTRAMUSCULAR; INTRAVENOUS at 11:09

## 2024-06-11 RX ADMIN — Medication 500 UNITS: at 13:22

## 2024-06-11 RX ADMIN — PALONOSETRON 0.25 MG: 0.25 INJECTION, SOLUTION INTRAVENOUS at 11:06

## 2024-06-11 RX ADMIN — DIPHENHYDRAMINE HYDROCHLORIDE 50 MG: 50 INJECTION INTRAMUSCULAR; INTRAVENOUS at 11:16

## 2024-06-11 RX ADMIN — HYDROCORTISONE SODIUM SUCCINATE 100 MG: 100 INJECTION, POWDER, FOR SOLUTION INTRAMUSCULAR; INTRAVENOUS at 12:12

## 2024-06-11 RX ADMIN — CARBOPLATIN 180 MG: 450 INJECTION, SOLUTION INTRAVENOUS at 12:44

## 2024-06-11 RX ADMIN — FAMOTIDINE 20 MG: 10 INJECTION, SOLUTION INTRAVENOUS at 11:22

## 2024-06-11 RX ADMIN — SODIUM CHLORIDE, PRESERVATIVE FREE 10 ML: 5 INJECTION INTRAVENOUS at 11:09

## 2024-06-11 RX ADMIN — SODIUM CHLORIDE, PRESERVATIVE FREE 10 ML: 5 INJECTION INTRAVENOUS at 11:16

## 2024-06-11 RX ADMIN — SODIUM CHLORIDE 20 ML/HR: 9 INJECTION, SOLUTION INTRAVENOUS at 11:05

## 2024-06-11 RX ADMIN — PACLITAXEL 78 MG: 6 INJECTION, SOLUTION INTRAVENOUS at 11:57

## 2024-06-11 RX ADMIN — SODIUM CHLORIDE, PRESERVATIVE FREE 10 ML: 5 INJECTION INTRAVENOUS at 13:22

## 2024-06-11 ASSESSMENT — ENCOUNTER SYMPTOMS
GASTROINTESTINAL NEGATIVE: 1
CHEST TIGHTNESS: 0
SHORTNESS OF BREATH: 0
COUGH: 1

## 2024-06-11 NOTE — PROGRESS NOTES
Patient provided with discharge instructions.  All questions answered.  Patient understands follow up plan of care.     
precarinal 12 mm short axis dimensions along with right hilar 2.3 cm lymph node consistent with metastatic adenopathy..  Central airways are patent. Esophagus with normal course and caliber.  Cardiac size within normal limits without pericardial effusion. Lungs/pleura: Hyperinflated lungs central necrotic mass in the right upper lobe posteriorly 7.4 cm series 2, image 41 with minimal postobstructive changes and trace to small right pleural effusion consistent with malignancy until proven otherwise.  Although abutting the right ribs there is no clear cortical involvement or chest wall invasion. Upper Abdomen: Visualized portions of the upper abdomen unremarkable. Soft Tissues/Bones: No acute osseous or soft tissue findings. No aggressive osseous lesion.     1. Centrally necrotic mass in the right upper lobe posteriorly 7.4 cm consistent with malignancy until proven otherwise.  PET-CT and/or tissue sampling recommended for further evaluation 2. Bulky mediastinal and right hilar adenopathy consistent with metastatic adenopathy. 3. Trace to small right pleural effusion. 4. No clear evidence for chest wall invasion or cortical involvement of the right ribs.     XR CHEST STANDARD (2 VW)    Result Date: 4/15/2024  EXAMINATION: TWO XRAY VIEWS OF THE CHEST 4/15/2024 10:38 am COMPARISON: None. HISTORY: ORDERING SYSTEM PROVIDED HISTORY: Cough, unspecified type TECHNOLOGIST PROVIDED HISTORY: Reason for exam:->cough for 6 weeks FINDINGS: There is 8.5 cm right upper lobe mass, new since prior study.  The left lung is clear.  The heart is not enlarged.  There is no pneumothorax or pleural effusion.     8.5 cm right upper lobe mass, new since prior study. CT chest with contrast is recommended for further evaluation.            ASSESSMENT     Squamous cell carcinoma of the right upper lung, Stage IIIB (cT4 N2 M0), PDL1 75%  Taxol reaction  H/o chronic smoking   Iron deficiency anemia    04/29/2024: Today I am meeting the patient for

## 2024-06-11 NOTE — TELEPHONE ENCOUNTER
Met with patient in the treatment room prior to her chemotherapy treatment for follow up. Patient appears well and is in good spirits. States that she continues to feel generally well with the concurrent chemo/RT. She admits to some fatigue, but remains to have a positive attitude and feels motivated to complete treatment. Her last radiation treatment is scheduled for July 1st. Provided support and encouragement. Denies any current needs for assistance from NN. Patient appreciative of visit. Will continue to follow as needed. Carito SNELLN, RN, Oncology Nurse Navigator

## 2024-06-11 NOTE — PROGRESS NOTES
Shortly into Taxol infusion, the patient hit her call bell stating that she was having another reaction. Patient's face and skin red, states she is having trouble breathing and feels hot. Infusion stopped, normal saline started, oxygen placed on patient, Dr Haynes notified, vitals obtained, solucortef given. Dr. Haynes at chairside. Orders to discontinue taxol permanently. Will still give carboplatin today. Patient's vitals returned to baseline. No more distress noted. Patient denies any new or worsening pain. Patient denies questions regarding treatment or medication; verbalized understanding, offered patient information and discharge material; patient declined; Patient denies needs, all questions answered.

## 2024-06-12 ENCOUNTER — HOSPITAL ENCOUNTER (OUTPATIENT)
Dept: RADIATION ONCOLOGY | Age: 64
Discharge: HOME | End: 2024-06-12
Payer: COMMERCIAL

## 2024-06-12 PROCEDURE — 77386 HC NTSTY MODUL RAD TX DLVR CPLX: CPT | Performed by: RADIOLOGY

## 2024-06-13 ENCOUNTER — TELEPHONE (OUTPATIENT)
Dept: PALLATIVE CARE | Age: 64
End: 2024-06-13

## 2024-06-13 ENCOUNTER — HOSPITAL ENCOUNTER (OUTPATIENT)
Dept: RADIATION ONCOLOGY | Age: 64
Discharge: HOME | End: 2024-06-13
Payer: COMMERCIAL

## 2024-06-13 VITALS
BODY MASS INDEX: 19.94 KG/M2 | RESPIRATION RATE: 18 BRPM | SYSTOLIC BLOOD PRESSURE: 102 MMHG | WEIGHT: 119.8 LBS | TEMPERATURE: 97.1 F | DIASTOLIC BLOOD PRESSURE: 61 MMHG | HEART RATE: 89 BPM

## 2024-06-13 DIAGNOSIS — C34.91 SQUAMOUS CELL CARCINOMA OF RIGHT LUNG (HCC): Primary | ICD-10-CM

## 2024-06-13 PROCEDURE — 77386 HC NTSTY MODUL RAD TX DLVR CPLX: CPT | Performed by: RADIOLOGY

## 2024-06-13 NOTE — PROGRESS NOTES
Mary Purdy  6/13/2024  2:11 PM      No chief complaint on file.         Wt Readings from Last 3 Encounters:   06/13/24 54.3 kg (119 lb 12.8 oz)   06/11/24 51.1 kg (112 lb 9.6 oz)   06/05/24 52.1 kg (114 lb 12.8 oz)       Comments: Dose 3600 cGy to the right upper lobe of the lung.    Patient is doing well.  She denies any headache nausea or vomiting.  She has no shortness of breath or hemoptysis.  She has no dysphagia.  Her weight is stable.    She has no hoarseness.    On examination the skin over the treated area looks good.    Patient is tolerating treatment well.          Plan: Radiation to continue as planned      Electronically signed by Sherman Crook MD on 6/13/24 at 2:11 PM EDT

## 2024-06-13 NOTE — TELEPHONE ENCOUNTER
Called to Mary regarding referral for Palliative Care from Dr. Browning. No answer, left message with contact number.

## 2024-06-13 NOTE — PROGRESS NOTES
Mary Purdy  6/13/2024  Wt Readings from Last 3 Encounters:   06/13/24 54.3 kg (119 lb 12.8 oz)   06/11/24 51.1 kg (112 lb 9.6 oz)   06/05/24 52.1 kg (114 lb 12.8 oz)     Body mass index is 19.94 kg/m².        Treatment Area:RUL + nodes    Patient was seen today for weekly visit.      Comfort Alteration  KPS:80%  Fatigue: Moderate    Ventilation Alterations  Cough: Yes  Hemoptysis: No  Mucus Color: clear  Dyspnea: No  O2 Sat: 96%    Nutritional Alteration  Anorexia: No  Nausea: No   Vomiting: No     Skin Alteration   Sensation:no issues    Radiation Dermatitis:  few rashes    Mucous Membrane Alteration  Voice Changes/ Stridor/Larynx: no  Pharynx & Esophagus: na    Elimination Alterations  Constipation: no  Diarrhea:  no      Emotional  Coping: effective      Injury, potential bleeding or infection: na    Other:na    Lab Results   Component Value Date    WBC 5.3 06/10/2024    HGB 10.0 (L) 06/10/2024    HCT 33.0 (L) 06/10/2024     06/10/2024         /61   Pulse 89   Temp 97.1 °F (36.2 °C) (Temporal)   Resp 18   Wt 54.3 kg (119 lb 12.8 oz)   BMI 19.94 kg/m²   BP within normal range? yes       Assessment/Plan:Completed 18/30 fractions; 3600/6000 cGy    Manju Armijo RN

## 2024-06-14 ENCOUNTER — HOSPITAL ENCOUNTER (OUTPATIENT)
Dept: RADIATION ONCOLOGY | Age: 64
Discharge: HOME | End: 2024-06-14
Payer: COMMERCIAL

## 2024-06-14 PROCEDURE — 77386 HC NTSTY MODUL RAD TX DLVR CPLX: CPT | Performed by: RADIOLOGY

## 2024-06-17 ENCOUNTER — HOSPITAL ENCOUNTER (OUTPATIENT)
Dept: RADIATION ONCOLOGY | Age: 64
Discharge: HOME | End: 2024-06-17
Payer: COMMERCIAL

## 2024-06-17 ENCOUNTER — HOSPITAL ENCOUNTER (OUTPATIENT)
Dept: INFUSION THERAPY | Age: 64
Discharge: HOME | End: 2024-06-17
Payer: COMMERCIAL

## 2024-06-17 DIAGNOSIS — C34.11 MALIGNANT NEOPLASM OF UPPER LOBE OF RIGHT LUNG (HCC): Primary | ICD-10-CM

## 2024-06-17 DIAGNOSIS — C34.91 SQUAMOUS CELL CARCINOMA OF RIGHT LUNG (HCC): ICD-10-CM

## 2024-06-17 LAB
ALBUMIN SERPL-MCNC: 3.7 G/DL (ref 3.5–5.2)
ALP SERPL-CCNC: 98 U/L (ref 35–104)
ALT SERPL-CCNC: 38 U/L (ref 0–32)
ANION GAP SERPL CALCULATED.3IONS-SCNC: 11 MMOL/L (ref 7–16)
AST SERPL-CCNC: 29 U/L (ref 0–31)
BASOPHILS # BLD: 0.03 K/UL (ref 0–0.2)
BASOPHILS NFR BLD: 1 % (ref 0–2)
BILIRUB SERPL-MCNC: 0.5 MG/DL (ref 0–1.2)
BUN SERPL-MCNC: 14 MG/DL (ref 6–23)
CALCIUM SERPL-MCNC: 8.8 MG/DL (ref 8.6–10.2)
CHLORIDE SERPL-SCNC: 96 MMOL/L (ref 98–107)
CO2 SERPL-SCNC: 26 MMOL/L (ref 22–29)
CREAT SERPL-MCNC: 0.5 MG/DL (ref 0.5–1)
EOSINOPHIL # BLD: 0.03 K/UL (ref 0.05–0.5)
EOSINOPHILS RELATIVE PERCENT: 1 % (ref 0–6)
ERYTHROCYTE [DISTWIDTH] IN BLOOD BY AUTOMATED COUNT: 20.1 % (ref 11.5–15)
GFR, ESTIMATED: >90 ML/MIN/1.73M2
GLUCOSE SERPL-MCNC: 83 MG/DL (ref 74–99)
HCT VFR BLD AUTO: 37.2 % (ref 34–48)
HGB BLD-MCNC: 11.2 G/DL (ref 11.5–15.5)
IMM GRANULOCYTES # BLD AUTO: 0.06 K/UL (ref 0–0.58)
IMM GRANULOCYTES NFR BLD: 1 % (ref 0–5)
LYMPHOCYTES NFR BLD: 0.75 K/UL (ref 1.5–4)
LYMPHOCYTES RELATIVE PERCENT: 13 % (ref 20–42)
MAGNESIUM SERPL-MCNC: 2 MG/DL (ref 1.6–2.6)
MCH RBC QN AUTO: 26.2 PG (ref 26–35)
MCHC RBC AUTO-ENTMCNC: 30.1 G/DL (ref 32–34.5)
MCV RBC AUTO: 87.1 FL (ref 80–99.9)
MONOCYTES NFR BLD: 0.58 K/UL (ref 0.1–0.95)
MONOCYTES NFR BLD: 10 % (ref 2–12)
NEUTROPHILS NFR BLD: 76 % (ref 43–80)
NEUTS SEG NFR BLD: 4.48 K/UL (ref 1.8–7.3)
PLATELET # BLD AUTO: 171 K/UL (ref 130–450)
PMV BLD AUTO: 9.2 FL (ref 7–12)
POTASSIUM SERPL-SCNC: 4 MMOL/L (ref 3.5–5)
PROT SERPL-MCNC: 6.9 G/DL (ref 6.4–8.3)
RBC # BLD AUTO: 4.27 M/UL (ref 3.5–5.5)
RBC # BLD: ABNORMAL 10*6/UL
SODIUM SERPL-SCNC: 133 MMOL/L (ref 132–146)
WBC OTHER # BLD: 5.9 K/UL (ref 4.5–11.5)

## 2024-06-17 PROCEDURE — 6360000002 HC RX W HCPCS: Performed by: STUDENT IN AN ORGANIZED HEALTH CARE EDUCATION/TRAINING PROGRAM

## 2024-06-17 PROCEDURE — 80053 COMPREHEN METABOLIC PANEL: CPT

## 2024-06-17 PROCEDURE — 83735 ASSAY OF MAGNESIUM: CPT

## 2024-06-17 PROCEDURE — 36591 DRAW BLOOD OFF VENOUS DEVICE: CPT

## 2024-06-17 PROCEDURE — 85025 COMPLETE CBC W/AUTO DIFF WBC: CPT

## 2024-06-17 PROCEDURE — 2580000003 HC RX 258: Performed by: STUDENT IN AN ORGANIZED HEALTH CARE EDUCATION/TRAINING PROGRAM

## 2024-06-17 RX ORDER — SODIUM CHLORIDE 0.9 % (FLUSH) 0.9 %
5-40 SYRINGE (ML) INJECTION PRN
Status: DISCONTINUED | OUTPATIENT
Start: 2024-06-17 | End: 2024-06-18 | Stop reason: HOSPADM

## 2024-06-17 RX ORDER — SODIUM CHLORIDE 9 MG/ML
25 INJECTION, SOLUTION INTRAVENOUS PRN
OUTPATIENT
Start: 2024-06-17

## 2024-06-17 RX ORDER — HEPARIN 100 UNIT/ML
500 SYRINGE INTRAVENOUS PRN
Status: DISCONTINUED | OUTPATIENT
Start: 2024-06-17 | End: 2024-06-18 | Stop reason: HOSPADM

## 2024-06-17 RX ORDER — SODIUM CHLORIDE 0.9 % (FLUSH) 0.9 %
5-40 SYRINGE (ML) INJECTION PRN
Status: CANCELLED | OUTPATIENT
Start: 2024-06-17

## 2024-06-17 RX ORDER — HEPARIN 100 UNIT/ML
500 SYRINGE INTRAVENOUS PRN
Status: CANCELLED | OUTPATIENT
Start: 2024-06-17

## 2024-06-17 RX ADMIN — SODIUM CHLORIDE, PRESERVATIVE FREE 10 ML: 5 INJECTION INTRAVENOUS at 14:46

## 2024-06-17 RX ADMIN — SODIUM CHLORIDE, PRESERVATIVE FREE 10 ML: 5 INJECTION INTRAVENOUS at 14:47

## 2024-06-17 RX ADMIN — Medication 500 UNITS: at 14:46

## 2024-06-18 ENCOUNTER — OFFICE VISIT (OUTPATIENT)
Dept: ONCOLOGY | Age: 64
End: 2024-06-18
Payer: COMMERCIAL

## 2024-06-18 ENCOUNTER — HOSPITAL ENCOUNTER (OUTPATIENT)
Dept: INFUSION THERAPY | Age: 64
Discharge: HOME | End: 2024-06-18
Payer: COMMERCIAL

## 2024-06-18 ENCOUNTER — HOSPITAL ENCOUNTER (OUTPATIENT)
Dept: RADIATION ONCOLOGY | Age: 64
Discharge: HOME | End: 2024-06-18
Payer: COMMERCIAL

## 2024-06-18 VITALS
WEIGHT: 116.2 LBS | BODY MASS INDEX: 19.34 KG/M2 | DIASTOLIC BLOOD PRESSURE: 71 MMHG | SYSTOLIC BLOOD PRESSURE: 109 MMHG | TEMPERATURE: 97.4 F | HEART RATE: 104 BPM | OXYGEN SATURATION: 99 %

## 2024-06-18 VITALS
RESPIRATION RATE: 16 BRPM | SYSTOLIC BLOOD PRESSURE: 95 MMHG | TEMPERATURE: 97 F | HEART RATE: 84 BPM | DIASTOLIC BLOOD PRESSURE: 64 MMHG | OXYGEN SATURATION: 100 %

## 2024-06-18 DIAGNOSIS — C34.91 SQUAMOUS CELL CARCINOMA OF RIGHT LUNG (HCC): Primary | ICD-10-CM

## 2024-06-18 PROCEDURE — 77386 HC NTSTY MODUL RAD TX DLVR CPLX: CPT | Performed by: RADIOLOGY

## 2024-06-18 PROCEDURE — 96375 TX/PRO/DX INJ NEW DRUG ADDON: CPT

## 2024-06-18 PROCEDURE — 2580000003 HC RX 258: Performed by: STUDENT IN AN ORGANIZED HEALTH CARE EDUCATION/TRAINING PROGRAM

## 2024-06-18 PROCEDURE — 77387 GUIDANCE FOR RADJ TX DLVR: CPT | Performed by: RADIOLOGY

## 2024-06-18 PROCEDURE — 96413 CHEMO IV INFUSION 1 HR: CPT

## 2024-06-18 PROCEDURE — 99214 OFFICE O/P EST MOD 30 MIN: CPT | Performed by: STUDENT IN AN ORGANIZED HEALTH CARE EDUCATION/TRAINING PROGRAM

## 2024-06-18 PROCEDURE — 6360000002 HC RX W HCPCS: Performed by: STUDENT IN AN ORGANIZED HEALTH CARE EDUCATION/TRAINING PROGRAM

## 2024-06-18 RX ORDER — DEXAMETHASONE SODIUM PHOSPHATE 10 MG/ML
10 INJECTION, SOLUTION INTRAMUSCULAR; INTRAVENOUS ONCE
Status: COMPLETED | OUTPATIENT
Start: 2024-06-18 | End: 2024-06-18

## 2024-06-18 RX ORDER — DIPHENHYDRAMINE HYDROCHLORIDE 50 MG/ML
50 INJECTION INTRAMUSCULAR; INTRAVENOUS
Status: CANCELLED | OUTPATIENT
Start: 2024-06-18

## 2024-06-18 RX ORDER — PALONOSETRON HYDROCHLORIDE 0.05 MG/ML
0.25 INJECTION, SOLUTION INTRAVENOUS ONCE
Status: DISCONTINUED | OUTPATIENT
Start: 2024-06-18 | End: 2024-06-19 | Stop reason: HOSPADM

## 2024-06-18 RX ORDER — EPINEPHRINE 1 MG/ML
0.3 INJECTION, SOLUTION, CONCENTRATE INTRAVENOUS PRN
Status: CANCELLED | OUTPATIENT
Start: 2024-06-18

## 2024-06-18 RX ORDER — ACETAMINOPHEN 325 MG/1
650 TABLET ORAL
Status: CANCELLED | OUTPATIENT
Start: 2024-06-18

## 2024-06-18 RX ORDER — DIPHENHYDRAMINE HYDROCHLORIDE 50 MG/ML
50 INJECTION INTRAMUSCULAR; INTRAVENOUS ONCE
Status: CANCELLED | OUTPATIENT
Start: 2024-06-18 | End: 2024-06-18

## 2024-06-18 RX ORDER — HEPARIN SODIUM (PORCINE) LOCK FLUSH IV SOLN 100 UNIT/ML 100 UNIT/ML
500 SOLUTION INTRAVENOUS PRN
Status: CANCELLED | OUTPATIENT
Start: 2024-06-18

## 2024-06-18 RX ORDER — SODIUM CHLORIDE 9 MG/ML
5-250 INJECTION, SOLUTION INTRAVENOUS PRN
Status: DISCONTINUED | OUTPATIENT
Start: 2024-06-18 | End: 2024-06-19 | Stop reason: HOSPADM

## 2024-06-18 RX ORDER — PALONOSETRON 0.05 MG/ML
0.25 INJECTION, SOLUTION INTRAVENOUS ONCE
Status: CANCELLED | OUTPATIENT
Start: 2024-06-18 | End: 2024-06-18

## 2024-06-18 RX ORDER — SODIUM CHLORIDE 9 MG/ML
5-250 INJECTION, SOLUTION INTRAVENOUS PRN
Status: CANCELLED | OUTPATIENT
Start: 2024-06-18

## 2024-06-18 RX ORDER — SODIUM CHLORIDE 9 MG/ML
INJECTION, SOLUTION INTRAVENOUS CONTINUOUS
Status: CANCELLED | OUTPATIENT
Start: 2024-06-18

## 2024-06-18 RX ORDER — SODIUM CHLORIDE 0.9 % (FLUSH) 0.9 %
5-40 SYRINGE (ML) INJECTION PRN
Status: CANCELLED | OUTPATIENT
Start: 2024-06-18

## 2024-06-18 RX ORDER — MEPERIDINE HYDROCHLORIDE 50 MG/ML
12.5 INJECTION INTRAMUSCULAR; INTRAVENOUS; SUBCUTANEOUS PRN
Status: CANCELLED | OUTPATIENT
Start: 2024-06-18

## 2024-06-18 RX ORDER — FAMOTIDINE 10 MG/ML
20 INJECTION, SOLUTION INTRAVENOUS ONCE
Status: CANCELLED | OUTPATIENT
Start: 2024-06-18 | End: 2024-06-18

## 2024-06-18 RX ORDER — ONDANSETRON 2 MG/ML
8 INJECTION INTRAMUSCULAR; INTRAVENOUS
Status: CANCELLED | OUTPATIENT
Start: 2024-06-18

## 2024-06-18 RX ORDER — SODIUM CHLORIDE 0.9 % (FLUSH) 0.9 %
5-40 SYRINGE (ML) INJECTION PRN
Status: DISCONTINUED | OUTPATIENT
Start: 2024-06-18 | End: 2024-06-19 | Stop reason: HOSPADM

## 2024-06-18 RX ORDER — HEPARIN 100 UNIT/ML
500 SYRINGE INTRAVENOUS PRN
Status: DISCONTINUED | OUTPATIENT
Start: 2024-06-18 | End: 2024-06-19 | Stop reason: HOSPADM

## 2024-06-18 RX ORDER — ALBUTEROL SULFATE 90 UG/1
4 AEROSOL, METERED RESPIRATORY (INHALATION) PRN
Status: CANCELLED | OUTPATIENT
Start: 2024-06-18

## 2024-06-18 RX ORDER — DIPHENHYDRAMINE HYDROCHLORIDE 50 MG/ML
50 INJECTION INTRAMUSCULAR; INTRAVENOUS ONCE
Status: DISCONTINUED | OUTPATIENT
Start: 2024-06-18 | End: 2024-06-19 | Stop reason: HOSPADM

## 2024-06-18 RX ORDER — FAMOTIDINE 10 MG/ML
20 INJECTION, SOLUTION INTRAVENOUS
Status: CANCELLED | OUTPATIENT
Start: 2024-06-18

## 2024-06-18 RX ADMIN — SODIUM CHLORIDE 50 ML/HR: 9 INJECTION, SOLUTION INTRAVENOUS at 10:22

## 2024-06-18 RX ADMIN — SODIUM CHLORIDE, PRESERVATIVE FREE 10 ML: 5 INJECTION INTRAVENOUS at 11:24

## 2024-06-18 RX ADMIN — CARBOPLATIN 185 MG: 600 INJECTION, SOLUTION INTRAVENOUS at 10:47

## 2024-06-18 RX ADMIN — DEXAMETHASONE SODIUM PHOSPHATE 10 MG: 10 INJECTION, SOLUTION INTRAMUSCULAR; INTRAVENOUS at 10:23

## 2024-06-18 RX ADMIN — Medication 500 UNITS: at 11:24

## 2024-06-18 RX ADMIN — SODIUM CHLORIDE, PRESERVATIVE FREE 10 ML: 5 INJECTION INTRAVENOUS at 10:22

## 2024-06-18 ASSESSMENT — ENCOUNTER SYMPTOMS
CHEST TIGHTNESS: 0
SHORTNESS OF BREATH: 0
GASTROINTESTINAL NEGATIVE: 1

## 2024-06-18 NOTE — PROGRESS NOTES
Patient provided with discharge instructions.  All questions answered.  Patient understands follow up plan of care.       
weight based adjustment of the mA/kV was utilized to reduce the radiation dose to as low as reasonably achievable. COMPARISON: None. HISTORY: ORDERING SYSTEM PROVIDED HISTORY: cancer staging, unintentional weight loss TECHNOLOGIST PROVIDED HISTORY: Reason for exam:->cancer staging, unintentional weight loss Additional Contrast?->Oral FINDINGS: Lower Chest: Was evaluated on exam yesterday. Organs: No significant lesion identified. GI/Bowel: No pathologic dilation. There is mild sigmoid diverticulosis. Pelvis: Endometrium appears to be perhaps mildly thickened on the limited visualization, for postmenopausal, suggest ultrasound if clinically indicated. Peritoneum/Retroperitoneum:  No enlarged lymph nodes. Bones/Soft Tissues:   Facet degenerative changes mainly are present in the lumbar spine.     1. No clear evidence of metastatic disease is identified. 2. Possible endometrial thickening, consider ultrasound.     CT CHEST W CONTRAST    Result Date: 4/15/2024  EXAMINATION: CT OF THE CHEST WITH CONTRAST 4/15/2024 2:09 pm TECHNIQUE: CT of the chest was performed with the administration of intravenous contrast. Multiplanar reformatted images are provided for review. Automated exposure control, iterative reconstruction, and/or weight based adjustment of the mA/kV was utilized to reduce the radiation dose to as low as reasonably achievable. COMPARISON: Chest x-ray 3 hours prior, CT chest dated 04/28/2013 HISTORY: ORDERING SYSTEM PROVIDED HISTORY: lobe to right upper eye TECHNOLOGIST PROVIDED HISTORY: Reason for exam:->lobe to right upper eye Additional Contrast?->None FINDINGS: Mediastinum: Thyroid is homogeneous in attenuation.  Bulky irregular mediastinal adenopathy right paratracheal ma mm and precarinal 12 mm short axis dimensions along with right hilar 2.3 cm lymph node consistent with metastatic adenopathy..  Central airways are patent. Esophagus with normal course and caliber.  Cardiac size within normal limits

## 2024-06-18 NOTE — PROGRESS NOTES
Patient tolerated treatment well without complications or complaints. Alert and oriented x3. Patient aware of potential side effects and denies questions regarding treatment. Vital signs stable. Pain assessed, patient denies any new or worsening pain. Patient left ambulatory with friend.

## 2024-06-19 ENCOUNTER — HOSPITAL ENCOUNTER (OUTPATIENT)
Dept: RADIATION ONCOLOGY | Age: 64
Discharge: HOME | End: 2024-06-19
Payer: COMMERCIAL

## 2024-06-19 VITALS
TEMPERATURE: 98 F | SYSTOLIC BLOOD PRESSURE: 100 MMHG | BODY MASS INDEX: 19.67 KG/M2 | RESPIRATION RATE: 18 BRPM | WEIGHT: 118.2 LBS | DIASTOLIC BLOOD PRESSURE: 58 MMHG | HEART RATE: 91 BPM

## 2024-06-19 DIAGNOSIS — C34.91 SQUAMOUS CELL CARCINOMA OF RIGHT LUNG (HCC): Primary | ICD-10-CM

## 2024-06-19 PROCEDURE — 77386 HC NTSTY MODUL RAD TX DLVR CPLX: CPT | Performed by: RADIOLOGY

## 2024-06-19 NOTE — PROGRESS NOTES
Mary Purdy  6/19/2024  Wt Readings from Last 3 Encounters:   06/19/24 53.6 kg (118 lb 3.2 oz)   06/18/24 52.7 kg (116 lb 3.2 oz)   06/13/24 54.3 kg (119 lb 12.8 oz)     Body mass index is 19.67 kg/m².        Treatment Area:RUL + nodes    Patient was seen today for weekly visit.      Comfort Alteration  KPS:80%  Fatigue: Moderate    Ventilation Alterations  Cough: Yes, not as frequent  Hemoptysis: No  Mucus Color: none  Dyspnea: No  O2 Sat: 98%    Nutritional Alteration  Anorexia: No  Nausea: No   Vomiting: No     Skin Alteration   Sensation:itching sometimes    Radiation Dermatitis:  slight redness, using xeroform effective    Mucous Membrane Alteration  Voice Changes/ Stridor/Larynx: no  Pharynx & Esophagus: na    Elimination Alterations  Constipation: no  Diarrhea:  no      Emotional  Coping: effective      Injury, potential bleeding or infection: na    Other:na    Lab Results   Component Value Date    WBC 5.9 06/17/2024    HGB 11.2 (L) 06/17/2024    HCT 37.2 06/17/2024     06/17/2024         BP (!) 100/58   Pulse 91   Temp 98 °F (36.7 °C) (Temporal)   Resp 18   Wt 53.6 kg (118 lb 3.2 oz)   BMI 19.67 kg/m²   BP within normal range? yes       Assessment/Plan:Completed 22/30 fractions 4400/6000 cGy    Manju Armijo RN

## 2024-06-19 NOTE — PROGRESS NOTES
Mary VENEGAS Gurwinder  6/19/2024  2:19 PM      No chief complaint on file.         Wt Readings from Last 3 Encounters:   06/19/24 53.6 kg (118 lb 3.2 oz)   06/18/24 52.7 kg (116 lb 3.2 oz)   06/13/24 54.3 kg (119 lb 12.8 oz)       Comments: Dose 4400 cGy to the right upper lobe of the lung.    Patient is doing well.  She denies any headache nausea or vomiting.  She has no shortness of breath cough or hemoptysis.  She denies any bony pain.  She has no dysphagia.  Her weight is stable.    On examination the skin over the treated area looks red.    Patient is tolerating treatment well      Plan: Radiation to continue as planned      Electronically signed by Sherman Crook MD on 6/19/24 at 2:19 PM EDT

## 2024-06-20 ENCOUNTER — HOSPITAL ENCOUNTER (OUTPATIENT)
Dept: RADIATION ONCOLOGY | Age: 64
Discharge: HOME | End: 2024-06-20
Payer: COMMERCIAL

## 2024-06-20 ENCOUNTER — TELEPHONE (OUTPATIENT)
Dept: PALLATIVE CARE | Age: 64
End: 2024-06-20

## 2024-06-20 PROCEDURE — 77386 HC NTSTY MODUL RAD TX DLVR CPLX: CPT | Performed by: RADIOLOGY

## 2024-06-20 NOTE — TELEPHONE ENCOUNTER
Called and spoke with Mary regarding referral for Palliative Care. Mary states she is doing well with only a few radiation treatments left and the fact that they adjusted her chemo treatment. Encouraged Mary to call our team if needed.

## 2024-06-21 ENCOUNTER — HOSPITAL ENCOUNTER (OUTPATIENT)
Dept: RADIATION ONCOLOGY | Age: 64
Discharge: HOME | End: 2024-06-21
Payer: COMMERCIAL

## 2024-06-21 PROCEDURE — 77386 HC NTSTY MODUL RAD TX DLVR CPLX: CPT | Performed by: RADIOLOGY

## 2024-06-24 ENCOUNTER — HOSPITAL ENCOUNTER (OUTPATIENT)
Dept: RADIATION ONCOLOGY | Age: 64
Discharge: HOME | End: 2024-06-24
Payer: COMMERCIAL

## 2024-06-24 ENCOUNTER — HOSPITAL ENCOUNTER (OUTPATIENT)
Dept: INFUSION THERAPY | Age: 64
Discharge: HOME | End: 2024-06-24
Payer: COMMERCIAL

## 2024-06-24 DIAGNOSIS — C34.11 MALIGNANT NEOPLASM OF UPPER LOBE OF RIGHT LUNG (HCC): Primary | ICD-10-CM

## 2024-06-24 DIAGNOSIS — C34.91 SQUAMOUS CELL CARCINOMA OF RIGHT LUNG (HCC): ICD-10-CM

## 2024-06-24 LAB
ALBUMIN SERPL-MCNC: 3.8 G/DL (ref 3.5–5.2)
ALP SERPL-CCNC: 108 U/L (ref 35–104)
ALT SERPL-CCNC: 22 U/L (ref 0–32)
ANION GAP SERPL CALCULATED.3IONS-SCNC: 10 MMOL/L (ref 7–16)
AST SERPL-CCNC: 22 U/L (ref 0–31)
BASOPHILS # BLD: 0 K/UL (ref 0–0.2)
BASOPHILS NFR BLD: 0 % (ref 0–2)
BILIRUB SERPL-MCNC: 0.4 MG/DL (ref 0–1.2)
BUN SERPL-MCNC: 12 MG/DL (ref 6–23)
CALCIUM SERPL-MCNC: 8.9 MG/DL (ref 8.6–10.2)
CHLORIDE SERPL-SCNC: 100 MMOL/L (ref 98–107)
CO2 SERPL-SCNC: 27 MMOL/L (ref 22–29)
CREAT SERPL-MCNC: 0.4 MG/DL (ref 0.5–1)
EOSINOPHIL # BLD: 0.05 K/UL (ref 0.05–0.5)
EOSINOPHILS RELATIVE PERCENT: 1 % (ref 0–6)
ERYTHROCYTE [DISTWIDTH] IN BLOOD BY AUTOMATED COUNT: 21.1 % (ref 11.5–15)
GFR, ESTIMATED: >90 ML/MIN/1.73M2
GLUCOSE SERPL-MCNC: 86 MG/DL (ref 74–99)
HCT VFR BLD AUTO: 36.6 % (ref 34–48)
HGB BLD-MCNC: 11.1 G/DL (ref 11.5–15.5)
LYMPHOCYTES NFR BLD: 1.2 K/UL (ref 1.5–4)
LYMPHOCYTES RELATIVE PERCENT: 26 % (ref 20–42)
MAGNESIUM SERPL-MCNC: 2.2 MG/DL (ref 1.6–2.6)
MCH RBC QN AUTO: 26.4 PG (ref 26–35)
MCHC RBC AUTO-ENTMCNC: 30.3 G/DL (ref 32–34.5)
MCV RBC AUTO: 87.1 FL (ref 80–99.9)
MONOCYTES NFR BLD: 0.09 K/UL (ref 0.1–0.95)
MONOCYTES NFR BLD: 2 % (ref 2–12)
NEUTROPHILS NFR BLD: 71 % (ref 43–80)
NEUTS SEG NFR BLD: 3.27 K/UL (ref 1.8–7.3)
PLATELET # BLD AUTO: 137 K/UL (ref 130–450)
PMV BLD AUTO: 9.3 FL (ref 7–12)
POTASSIUM SERPL-SCNC: 3.9 MMOL/L (ref 3.5–5)
PROT SERPL-MCNC: 7.4 G/DL (ref 6.4–8.3)
RBC # BLD AUTO: 4.2 M/UL (ref 3.5–5.5)
RBC # BLD: ABNORMAL 10*6/UL
SODIUM SERPL-SCNC: 137 MMOL/L (ref 132–146)
WBC OTHER # BLD: 4.6 K/UL (ref 4.5–11.5)

## 2024-06-24 PROCEDURE — 77387 GUIDANCE FOR RADJ TX DLVR: CPT | Performed by: RADIOLOGY

## 2024-06-24 PROCEDURE — 80053 COMPREHEN METABOLIC PANEL: CPT

## 2024-06-24 PROCEDURE — 2580000003 HC RX 258: Performed by: STUDENT IN AN ORGANIZED HEALTH CARE EDUCATION/TRAINING PROGRAM

## 2024-06-24 PROCEDURE — 85025 COMPLETE CBC W/AUTO DIFF WBC: CPT

## 2024-06-24 PROCEDURE — 83735 ASSAY OF MAGNESIUM: CPT

## 2024-06-24 PROCEDURE — 77336 RADIATION PHYSICS CONSULT: CPT | Performed by: RADIOLOGY

## 2024-06-24 PROCEDURE — 77427 RADIATION TX MANAGEMENT X5: CPT | Performed by: RADIOLOGY

## 2024-06-24 PROCEDURE — 36591 DRAW BLOOD OFF VENOUS DEVICE: CPT

## 2024-06-24 PROCEDURE — 6360000002 HC RX W HCPCS: Performed by: STUDENT IN AN ORGANIZED HEALTH CARE EDUCATION/TRAINING PROGRAM

## 2024-06-24 PROCEDURE — 77386 HC NTSTY MODUL RAD TX DLVR CPLX: CPT | Performed by: RADIOLOGY

## 2024-06-24 RX ORDER — SODIUM CHLORIDE 0.9 % (FLUSH) 0.9 %
5-40 SYRINGE (ML) INJECTION PRN
Status: DISCONTINUED | OUTPATIENT
Start: 2024-06-24 | End: 2024-06-25 | Stop reason: HOSPADM

## 2024-06-24 RX ORDER — SODIUM CHLORIDE 9 MG/ML
25 INJECTION, SOLUTION INTRAVENOUS PRN
OUTPATIENT
Start: 2024-06-24

## 2024-06-24 RX ORDER — SODIUM CHLORIDE 0.9 % (FLUSH) 0.9 %
5-40 SYRINGE (ML) INJECTION PRN
OUTPATIENT
Start: 2024-06-24

## 2024-06-24 RX ORDER — HEPARIN 100 UNIT/ML
500 SYRINGE INTRAVENOUS PRN
Status: DISCONTINUED | OUTPATIENT
Start: 2024-06-24 | End: 2024-06-25 | Stop reason: HOSPADM

## 2024-06-24 RX ORDER — HEPARIN 100 UNIT/ML
500 SYRINGE INTRAVENOUS PRN
OUTPATIENT
Start: 2024-06-24

## 2024-06-24 RX ADMIN — SODIUM CHLORIDE, PRESERVATIVE FREE 10 ML: 5 INJECTION INTRAVENOUS at 14:29

## 2024-06-24 RX ADMIN — Medication 500 UNITS: at 14:28

## 2024-06-24 RX ADMIN — SODIUM CHLORIDE, PRESERVATIVE FREE 10 ML: 5 INJECTION INTRAVENOUS at 14:28

## 2024-06-25 ENCOUNTER — TELEPHONE (OUTPATIENT)
Dept: CASE MANAGEMENT | Age: 64
End: 2024-06-25

## 2024-06-25 ENCOUNTER — OFFICE VISIT (OUTPATIENT)
Dept: ONCOLOGY | Age: 64
End: 2024-06-25
Payer: COMMERCIAL

## 2024-06-25 ENCOUNTER — HOSPITAL ENCOUNTER (OUTPATIENT)
Dept: RADIATION ONCOLOGY | Age: 64
Discharge: HOME | End: 2024-06-25
Payer: COMMERCIAL

## 2024-06-25 ENCOUNTER — HOSPITAL ENCOUNTER (OUTPATIENT)
Dept: INFUSION THERAPY | Age: 64
Discharge: HOME | End: 2024-06-25
Payer: COMMERCIAL

## 2024-06-25 VITALS
DIASTOLIC BLOOD PRESSURE: 75 MMHG | OXYGEN SATURATION: 97 % | RESPIRATION RATE: 18 BRPM | HEART RATE: 97 BPM | SYSTOLIC BLOOD PRESSURE: 116 MMHG | TEMPERATURE: 98.7 F

## 2024-06-25 VITALS
HEIGHT: 65 IN | SYSTOLIC BLOOD PRESSURE: 118 MMHG | WEIGHT: 115.8 LBS | DIASTOLIC BLOOD PRESSURE: 73 MMHG | HEART RATE: 81 BPM | TEMPERATURE: 98.3 F | OXYGEN SATURATION: 100 % | BODY MASS INDEX: 19.29 KG/M2

## 2024-06-25 DIAGNOSIS — C34.91 SQUAMOUS CELL CARCINOMA OF RIGHT LUNG (HCC): Primary | ICD-10-CM

## 2024-06-25 PROCEDURE — 99214 OFFICE O/P EST MOD 30 MIN: CPT | Performed by: STUDENT IN AN ORGANIZED HEALTH CARE EDUCATION/TRAINING PROGRAM

## 2024-06-25 PROCEDURE — 6360000002 HC RX W HCPCS: Performed by: STUDENT IN AN ORGANIZED HEALTH CARE EDUCATION/TRAINING PROGRAM

## 2024-06-25 PROCEDURE — 96375 TX/PRO/DX INJ NEW DRUG ADDON: CPT

## 2024-06-25 PROCEDURE — 2580000003 HC RX 258: Performed by: STUDENT IN AN ORGANIZED HEALTH CARE EDUCATION/TRAINING PROGRAM

## 2024-06-25 PROCEDURE — 96413 CHEMO IV INFUSION 1 HR: CPT

## 2024-06-25 PROCEDURE — 77387 GUIDANCE FOR RADJ TX DLVR: CPT | Performed by: RADIOLOGY

## 2024-06-25 PROCEDURE — 77386 HC NTSTY MODUL RAD TX DLVR CPLX: CPT | Performed by: RADIOLOGY

## 2024-06-25 RX ORDER — ONDANSETRON 2 MG/ML
8 INJECTION INTRAMUSCULAR; INTRAVENOUS
Status: CANCELLED | OUTPATIENT
Start: 2024-06-25

## 2024-06-25 RX ORDER — DEXAMETHASONE SODIUM PHOSPHATE 10 MG/ML
10 INJECTION, SOLUTION INTRAMUSCULAR; INTRAVENOUS ONCE
Status: COMPLETED | OUTPATIENT
Start: 2024-06-25 | End: 2024-06-25

## 2024-06-25 RX ORDER — SODIUM CHLORIDE 9 MG/ML
5-250 INJECTION, SOLUTION INTRAVENOUS PRN
Status: CANCELLED | OUTPATIENT
Start: 2024-06-25

## 2024-06-25 RX ORDER — EPINEPHRINE 1 MG/ML
0.3 INJECTION, SOLUTION, CONCENTRATE INTRAVENOUS PRN
Status: CANCELLED | OUTPATIENT
Start: 2024-06-25

## 2024-06-25 RX ORDER — DIPHENHYDRAMINE HYDROCHLORIDE 50 MG/ML
50 INJECTION INTRAMUSCULAR; INTRAVENOUS
Status: CANCELLED | OUTPATIENT
Start: 2024-06-25

## 2024-06-25 RX ORDER — MEPERIDINE HYDROCHLORIDE 50 MG/ML
12.5 INJECTION INTRAMUSCULAR; INTRAVENOUS; SUBCUTANEOUS PRN
Status: CANCELLED | OUTPATIENT
Start: 2024-06-25

## 2024-06-25 RX ORDER — FAMOTIDINE 10 MG/ML
20 INJECTION, SOLUTION INTRAVENOUS
Status: CANCELLED | OUTPATIENT
Start: 2024-06-25

## 2024-06-25 RX ORDER — HEPARIN SODIUM (PORCINE) LOCK FLUSH IV SOLN 100 UNIT/ML 100 UNIT/ML
500 SOLUTION INTRAVENOUS PRN
Status: CANCELLED | OUTPATIENT
Start: 2024-06-25

## 2024-06-25 RX ORDER — SODIUM CHLORIDE 0.9 % (FLUSH) 0.9 %
5-40 SYRINGE (ML) INJECTION PRN
Status: DISCONTINUED | OUTPATIENT
Start: 2024-06-25 | End: 2024-06-26 | Stop reason: HOSPADM

## 2024-06-25 RX ORDER — SODIUM CHLORIDE 9 MG/ML
5-250 INJECTION, SOLUTION INTRAVENOUS PRN
Status: DISCONTINUED | OUTPATIENT
Start: 2024-06-25 | End: 2024-06-26 | Stop reason: HOSPADM

## 2024-06-25 RX ORDER — ALBUTEROL SULFATE 90 UG/1
4 AEROSOL, METERED RESPIRATORY (INHALATION) PRN
Status: CANCELLED | OUTPATIENT
Start: 2024-06-25

## 2024-06-25 RX ORDER — ACETAMINOPHEN 325 MG/1
650 TABLET ORAL
Status: CANCELLED | OUTPATIENT
Start: 2024-06-25

## 2024-06-25 RX ORDER — SODIUM CHLORIDE 9 MG/ML
INJECTION, SOLUTION INTRAVENOUS CONTINUOUS
Status: CANCELLED | OUTPATIENT
Start: 2024-06-25

## 2024-06-25 RX ORDER — SODIUM CHLORIDE 0.9 % (FLUSH) 0.9 %
5-40 SYRINGE (ML) INJECTION PRN
Status: CANCELLED | OUTPATIENT
Start: 2024-06-25

## 2024-06-25 RX ORDER — HEPARIN 100 UNIT/ML
500 SYRINGE INTRAVENOUS PRN
Status: DISCONTINUED | OUTPATIENT
Start: 2024-06-25 | End: 2024-06-26 | Stop reason: HOSPADM

## 2024-06-25 RX ORDER — DIPHENHYDRAMINE HYDROCHLORIDE 50 MG/ML
50 INJECTION INTRAMUSCULAR; INTRAVENOUS
Status: COMPLETED | OUTPATIENT
Start: 2024-06-25 | End: 2024-06-25

## 2024-06-25 RX ADMIN — SODIUM CHLORIDE, PRESERVATIVE FREE 10 ML: 5 INJECTION INTRAVENOUS at 10:55

## 2024-06-25 RX ADMIN — SODIUM CHLORIDE, PRESERVATIVE FREE 10 ML: 5 INJECTION INTRAVENOUS at 11:42

## 2024-06-25 RX ADMIN — DEXAMETHASONE SODIUM PHOSPHATE 10 MG: 10 INJECTION INTRAMUSCULAR; INTRAVENOUS at 09:48

## 2024-06-25 RX ADMIN — SODIUM CHLORIDE, PRESERVATIVE FREE 10 ML: 5 INJECTION INTRAVENOUS at 09:48

## 2024-06-25 RX ADMIN — Medication 500 UNITS: at 11:42

## 2024-06-25 RX ADMIN — DIPHENHYDRAMINE HYDROCHLORIDE 25 MG: 50 INJECTION, SOLUTION INTRAMUSCULAR; INTRAVENOUS at 10:55

## 2024-06-25 RX ADMIN — CARBOPLATIN 185 MG: 600 INJECTION, SOLUTION INTRAVENOUS at 10:11

## 2024-06-25 RX ADMIN — SODIUM CHLORIDE 200 ML/HR: 9 INJECTION, SOLUTION INTRAVENOUS at 09:47

## 2024-06-25 RX ADMIN — SODIUM CHLORIDE 200 ML/HR: 9 INJECTION, SOLUTION INTRAVENOUS at 10:55

## 2024-06-25 RX ADMIN — DIPHENHYDRAMINE HYDROCHLORIDE 25 MG: 50 INJECTION, SOLUTION INTRAMUSCULAR; INTRAVENOUS at 11:20

## 2024-06-25 ASSESSMENT — ENCOUNTER SYMPTOMS
SHORTNESS OF BREATH: 0
COUGH: 1
GASTROINTESTINAL NEGATIVE: 1
CHEST TIGHTNESS: 0

## 2024-06-25 NOTE — FLOWSHEET NOTE
Pt arrived to clinic for office visit and treatment. Treatment given per orders, see previous note. After 25 mg of benadryl given, pt still coughing and slightly red. Wanda RN gave 25 mg more of benadryl. Pt received a total of 50 mg of Benadryl today. Upon discharge pt was in stable condition, no coughing, redness or shortness of breath. Pt has next appointments.

## 2024-06-25 NOTE — FLOWSHEET NOTE
Pt began coughing with about 10 mL of Carboplatin left. O2 89-90%. Pt states she feels \"off.\" 25 mg of Benadryl given. 2 L O2 placed on pt, per pt oxygen placed on pt.

## 2024-06-25 NOTE — TELEPHONE ENCOUNTER
Met with patient in the treatment room prior to her chemotherapy treatment for follow up. Patient appears well and is in good spirits. Today is her last chemotherapy treatment. She will finish radiation therapy on 7/1 and begin Imfinzi at the end of July. She is due for repeat imaging in July. She continues to feel very well and denies any unpleasant side effects. Provided support and encouragement. Her  brought her to treatment today and was present during our visit. Denies any current needs for assistance from NN. Patient appreciative of visit. Will continue to follow as needed. Carito SNELLN, RN, Oncology Nurse Navigator

## 2024-06-25 NOTE — PROGRESS NOTES
[facility] CARE [facility] MEDICAL ONCOLOGY  [address] [facility] [address] [address]  Dept: [phone]  Loc: [phone]    Clinic Progress Note      Date of Encounter: 06/25/2024     Referring Provider:  Celso Meier MD     Reason for Visit:   Squamous cell lung cancer, locally advanced        PCP:  Yue Nobles DO    Demographics: 64 y.o. female    Chief Complaint   Patient presents with    Follow-up    Lung Cancer         Subjective:  Here for next cycle of carbo. Doing well. Cough is getting better.     HPI from Initial Outpatient Consultation (4/29/2024):  The patient is a 64 y.o. female and is coming in, establishing at our clinic, after we first met patient on inpatient consultation for a right sided lung mass.  She was admitted at [facility] on 4/15/2024 due to weight loss, and cough.  She was found to have an 8.5 cm right upper lobe mass with central necrosis, and had undergone biopsy of the lung on 4/8/2023, confirming her diagnosis.    Today, she was accompanied by her , feeling little bit better.  She complains of cough that is dry still.  Denies persistent headaches, vision changes, nausea or vomiting.     Oncology History   Squamous cell carcinoma of right lung (HCC)   4/18/2023 Biopsy    -- Diagnosis --   Right lung, core biopsy: Invasive squamous cell carcinoma with   necrosis     -- Diagnosis Comment --   Intradepartmental consultation is obtained.  PD-L1 testing has been   ordered per institution protocol and will be reported separately.     Jo Ann Clark MD   **Electronically Signed Out**         [facility]/4/22/2024      Clinical Information   Procedure; Right lung mass biopsy, 4 cores     Pre-Operative Diagnosis   Lung alyse, smoker       Source of Specimen   A: LUNG, TRANSBRONCHIAL BIOPSY, RIGHT     ==================================================    -- Diagnosis --   PD-L1 22C3 FDA analysis: High PD-L1 expressionTumor

## 2024-06-26 ENCOUNTER — HOSPITAL ENCOUNTER (OUTPATIENT)
Dept: RADIATION ONCOLOGY | Age: 64
Discharge: HOME | End: 2024-06-26
Payer: COMMERCIAL

## 2024-06-26 VITALS
TEMPERATURE: 97.5 F | WEIGHT: 117.4 LBS | HEART RATE: 93 BPM | BODY MASS INDEX: 19.54 KG/M2 | SYSTOLIC BLOOD PRESSURE: 92 MMHG | RESPIRATION RATE: 18 BRPM | DIASTOLIC BLOOD PRESSURE: 63 MMHG

## 2024-06-26 DIAGNOSIS — C34.91 SQUAMOUS CELL CARCINOMA OF RIGHT LUNG (HCC): Primary | ICD-10-CM

## 2024-06-26 PROCEDURE — 77386 HC NTSTY MODUL RAD TX DLVR CPLX: CPT | Performed by: RADIOLOGY

## 2024-06-26 PROCEDURE — 77387 GUIDANCE FOR RADJ TX DLVR: CPT | Performed by: RADIOLOGY

## 2024-06-26 NOTE — PROGRESS NOTES
Mary Purdy  6/26/2024  Wt Readings from Last 3 Encounters:   06/26/24 53.3 kg (117 lb 6.4 oz)   06/25/24 52.5 kg (115 lb 12.8 oz)   06/19/24 53.6 kg (118 lb 3.2 oz)     Body mass index is 19.54 kg/m².        Treatment Area:RUL + nodes    Patient was seen today for weekly visit.      Comfort Alteration  KPS:80%  Fatigue: Mild    Ventilation Alterations  Cough: No  Hemoptysis: No  Mucus Color: none  Dyspnea: No  O2 Sat: 98%    Nutritional Alteration  Anorexia: No  Nausea: No   Vomiting: No     Skin Alteration   Sensation:itching sometimes     Radiation Dermatitis:  redness    Mucous Membrane Alteration  Voice Changes/ Stridor/Larynx: no  Pharynx & Esophagus: no    Elimination Alterations  Constipation: no  Diarrhea:  no      Emotional  Coping: effective      Injury, potential bleeding or infection: na    Other:na    Lab Results   Component Value Date    WBC 4.6 06/24/2024    HGB 11.1 (L) 06/24/2024    HCT 36.6 06/24/2024     06/24/2024         BP 92/63   Pulse 93   Temp 97.5 °F (36.4 °C) (Temporal)   Resp 18   Wt 53.3 kg (117 lb 6.4 oz)   BMI 19.54 kg/m²   BP within normal range? yes      Assessment/Plan:Completed 27/30 fractions; 5400/6000 cGy    Manju Armijo RN

## 2024-06-26 NOTE — PROGRESS NOTES
DEPARTMENT OF RADIATION ONCOLOGY   ON TREATMENT VISIT       6/26/2024      NAME:  Mary Purdy    YOB: 1960    DIAGNOSIS: Clinical stage T4, N2, MZ, stage IIIb, squamous cell cancer of the right upper lobe, PD-L1 75%, with mediastinal and hilar adenopathy, PET staged.    SUBJECTIVE:   Mary Purdy has now received 5400 cGy in 27 fractions directed to the right upper lobe and nodes.      Past medical, surgical, social and family histories reviewed and updated as indicated.    PAIN: No    ALLERGIES:  Patient has no known allergies.         Current Outpatient Medications   Medication Sig Dispense Refill    ALPRAZolam (XANAX) 0.5 MG tablet Take 1 tablet by mouth 3 times daily as needed for Sleep or Anxiety for up to 30 days. Max Daily Amount: 1.5 mg 30 tablet 0    Magic Mouthwash (MIRACLE MOUTHWASH) Swish and swallow 15 mLs 4 times daily as needed for Irritation Do not swish, swallow  20 minutes before meals 400 mL 1    sucralfate (CARAFATE) 1 GM/10ML suspension Take 10 mLs by mouth 4 times daily One hour after meals and at bedtime 400 mL 2    ondansetron (ZOFRAN-ODT) 8 MG TBDP disintegrating tablet Place 1 tablet under the tongue every 8 hours as needed for Nausea or Vomiting 30 tablet 2    prochlorperazine (COMPAZINE) 10 MG tablet Take 1 tablet by mouth every 6 hours as needed (nausea) 50 tablet 2    nicotine (NICODERM CQ) 7 MG/24HR Place 1 patch onto the skin daily 30 patch 3    fluticasone-umeclidin-vilant (TRELEGY ELLIPTA) 100-62.5-25 MCG/ACT AEPB inhaler Inhale 1 puff into the lungs daily 1 each 3    albuterol sulfate HFA (PROVENTIL HFA) 108 (90 Base) MCG/ACT inhaler Inhale 2 puffs into the lungs every 6 hours as needed for Wheezing 18 g 3    acetaminophen (TYLENOL) 325 MG tablet Take 2 tablets by mouth every 6 hours as needed for Pain       No current facility-administered medications for this encounter.         OBJECTIVE:  Alert and fully ambulatory. Pleasant and conversant.

## 2024-06-27 ENCOUNTER — HOSPITAL ENCOUNTER (OUTPATIENT)
Dept: RADIATION ONCOLOGY | Age: 64
Discharge: HOME | End: 2024-06-27
Payer: COMMERCIAL

## 2024-06-27 PROCEDURE — 77386 HC NTSTY MODUL RAD TX DLVR CPLX: CPT | Performed by: RADIOLOGY

## 2024-06-28 ENCOUNTER — HOSPITAL ENCOUNTER (OUTPATIENT)
Dept: RADIATION ONCOLOGY | Age: 64
Discharge: HOME | End: 2024-06-28
Payer: COMMERCIAL

## 2024-06-28 PROCEDURE — 77386 HC NTSTY MODUL RAD TX DLVR CPLX: CPT | Performed by: RADIOLOGY

## 2024-07-01 ENCOUNTER — HOSPITAL ENCOUNTER (OUTPATIENT)
Dept: RADIATION ONCOLOGY | Age: 64
Discharge: HOME | End: 2024-07-01
Payer: COMMERCIAL

## 2024-07-01 PROCEDURE — 77387 GUIDANCE FOR RADJ TX DLVR: CPT | Performed by: RADIOLOGY

## 2024-07-01 PROCEDURE — 77427 RADIATION TX MANAGEMENT X5: CPT | Performed by: RADIOLOGY

## 2024-07-01 PROCEDURE — 77386 HC NTSTY MODUL RAD TX DLVR CPLX: CPT | Performed by: RADIOLOGY

## 2024-07-01 PROCEDURE — 77336 RADIATION PHYSICS CONSULT: CPT | Performed by: RADIOLOGY

## 2024-07-01 NOTE — PROGRESS NOTES
Mary Purdy  7/1/2024  8:06 AM          Current Outpatient Medications   Medication Sig Dispense Refill    ALPRAZolam (XANAX) 0.5 MG tablet Take 1 tablet by mouth 3 times daily as needed for Sleep or Anxiety for up to 30 days. Max Daily Amount: 1.5 mg 30 tablet 0    sucralfate (CARAFATE) 1 GM/10ML suspension Take 10 mLs by mouth 4 times daily One hour after meals and at bedtime 400 mL 2    ondansetron (ZOFRAN-ODT) 8 MG TBDP disintegrating tablet Place 1 tablet under the tongue every 8 hours as needed for Nausea or Vomiting 30 tablet 2    prochlorperazine (COMPAZINE) 10 MG tablet Take 1 tablet by mouth every 6 hours as needed (nausea) 50 tablet 2    nicotine (NICODERM CQ) 7 MG/24HR Place 1 patch onto the skin daily 30 patch 3    fluticasone-umeclidin-vilant (TRELEGY ELLIPTA) 100-62.5-25 MCG/ACT AEPB inhaler Inhale 1 puff into the lungs daily 1 each 3    albuterol sulfate HFA (PROVENTIL HFA) 108 (90 Base) MCG/ACT inhaler Inhale 2 puffs into the lungs every 6 hours as needed for Wheezing 18 g 3    acetaminophen (TYLENOL) 325 MG tablet Take 2 tablets by mouth every 6 hours as needed for Pain       No current facility-administered medications for this encounter.          This is an up-to-date medication list.    Please take this list to your next care provider, and discard any previous medication lists.

## 2024-07-17 ENCOUNTER — HOSPITAL ENCOUNTER (OUTPATIENT)
Dept: CT IMAGING | Age: 64
Discharge: HOME | End: 2024-07-19
Attending: STUDENT IN AN ORGANIZED HEALTH CARE EDUCATION/TRAINING PROGRAM
Payer: COMMERCIAL

## 2024-07-17 DIAGNOSIS — C34.91 SQUAMOUS CELL CARCINOMA OF RIGHT LUNG (HCC): ICD-10-CM

## 2024-07-17 PROCEDURE — 6360000004 HC RX CONTRAST MEDICATION: Performed by: RADIOLOGY

## 2024-07-17 PROCEDURE — 74177 CT ABD & PELVIS W/CONTRAST: CPT

## 2024-07-17 PROCEDURE — 71260 CT THORAX DX C+: CPT

## 2024-07-17 RX ADMIN — IOPAMIDOL 75 ML: 755 INJECTION, SOLUTION INTRAVENOUS at 10:12

## 2024-07-22 ENCOUNTER — HOSPITAL ENCOUNTER (OUTPATIENT)
Dept: INFUSION THERAPY | Age: 64
Discharge: HOME | End: 2024-07-22
Payer: COMMERCIAL

## 2024-07-22 DIAGNOSIS — C34.11 MALIGNANT NEOPLASM OF UPPER LOBE OF RIGHT LUNG (HCC): Primary | ICD-10-CM

## 2024-07-22 DIAGNOSIS — C34.91 SQUAMOUS CELL CARCINOMA OF RIGHT LUNG (HCC): ICD-10-CM

## 2024-07-22 LAB
ALBUMIN SERPL-MCNC: 4.1 G/DL (ref 3.5–5.2)
ALP SERPL-CCNC: 104 U/L (ref 35–104)
ALT SERPL-CCNC: 30 U/L (ref 0–32)
ANION GAP SERPL CALCULATED.3IONS-SCNC: 11 MMOL/L (ref 7–16)
AST SERPL-CCNC: 35 U/L (ref 0–31)
BASOPHILS # BLD: 0.03 K/UL (ref 0–0.2)
BASOPHILS NFR BLD: 1 % (ref 0–2)
BILIRUB SERPL-MCNC: 0.7 MG/DL (ref 0–1.2)
BUN SERPL-MCNC: 9 MG/DL (ref 6–23)
CALCIUM SERPL-MCNC: 8.6 MG/DL (ref 8.6–10.2)
CHLORIDE SERPL-SCNC: 100 MMOL/L (ref 98–107)
CO2 SERPL-SCNC: 26 MMOL/L (ref 22–29)
CREAT SERPL-MCNC: 0.5 MG/DL (ref 0.5–1)
EOSINOPHIL # BLD: 0.09 K/UL (ref 0.05–0.5)
EOSINOPHILS RELATIVE PERCENT: 2 % (ref 0–6)
ERYTHROCYTE [DISTWIDTH] IN BLOOD BY AUTOMATED COUNT: 20.6 % (ref 11.5–15)
GFR, ESTIMATED: >90 ML/MIN/1.73M2
GLUCOSE SERPL-MCNC: 91 MG/DL (ref 74–99)
HCT VFR BLD AUTO: 40.4 % (ref 34–48)
HGB BLD-MCNC: 13 G/DL (ref 11.5–15.5)
IMM GRANULOCYTES # BLD AUTO: 0.03 K/UL (ref 0–0.58)
IMM GRANULOCYTES NFR BLD: 1 % (ref 0–5)
LYMPHOCYTES NFR BLD: 1.03 K/UL (ref 1.5–4)
LYMPHOCYTES RELATIVE PERCENT: 28 % (ref 20–42)
MAGNESIUM SERPL-MCNC: 2.2 MG/DL (ref 1.6–2.6)
MCH RBC QN AUTO: 28.8 PG (ref 26–35)
MCHC RBC AUTO-ENTMCNC: 32.2 G/DL (ref 32–34.5)
MCV RBC AUTO: 89.6 FL (ref 80–99.9)
MONOCYTES NFR BLD: 0.64 K/UL (ref 0.1–0.95)
MONOCYTES NFR BLD: 17 % (ref 2–12)
NEUTROPHILS NFR BLD: 51 % (ref 43–80)
NEUTS SEG NFR BLD: 1.93 K/UL (ref 1.8–7.3)
PLATELET # BLD AUTO: 170 K/UL (ref 130–450)
PMV BLD AUTO: 9.5 FL (ref 7–12)
POTASSIUM SERPL-SCNC: 4.2 MMOL/L (ref 3.5–5)
PROT SERPL-MCNC: 7.6 G/DL (ref 6.4–8.3)
RBC # BLD AUTO: 4.51 M/UL (ref 3.5–5.5)
RBC # BLD: ABNORMAL 10*6/UL
SODIUM SERPL-SCNC: 137 MMOL/L (ref 132–146)
WBC OTHER # BLD: 3.8 K/UL (ref 4.5–11.5)

## 2024-07-22 PROCEDURE — 85025 COMPLETE CBC W/AUTO DIFF WBC: CPT

## 2024-07-22 PROCEDURE — 36591 DRAW BLOOD OFF VENOUS DEVICE: CPT

## 2024-07-22 PROCEDURE — 83735 ASSAY OF MAGNESIUM: CPT

## 2024-07-22 PROCEDURE — 2580000003 HC RX 258: Performed by: STUDENT IN AN ORGANIZED HEALTH CARE EDUCATION/TRAINING PROGRAM

## 2024-07-22 PROCEDURE — 80053 COMPREHEN METABOLIC PANEL: CPT

## 2024-07-22 PROCEDURE — 6360000002 HC RX W HCPCS: Performed by: STUDENT IN AN ORGANIZED HEALTH CARE EDUCATION/TRAINING PROGRAM

## 2024-07-22 RX ORDER — HEPARIN 100 UNIT/ML
500 SYRINGE INTRAVENOUS PRN
Status: CANCELLED | OUTPATIENT
Start: 2024-07-22

## 2024-07-22 RX ORDER — SODIUM CHLORIDE 9 MG/ML
25 INJECTION, SOLUTION INTRAVENOUS PRN
Status: CANCELLED | OUTPATIENT
Start: 2024-07-22

## 2024-07-22 RX ORDER — SODIUM CHLORIDE 0.9 % (FLUSH) 0.9 %
5-40 SYRINGE (ML) INJECTION PRN
Status: DISCONTINUED | OUTPATIENT
Start: 2024-07-22 | End: 2024-07-23 | Stop reason: HOSPADM

## 2024-07-22 RX ORDER — HEPARIN 100 UNIT/ML
500 SYRINGE INTRAVENOUS PRN
Status: DISCONTINUED | OUTPATIENT
Start: 2024-07-22 | End: 2024-07-23 | Stop reason: HOSPADM

## 2024-07-22 RX ORDER — SODIUM CHLORIDE 0.9 % (FLUSH) 0.9 %
5-40 SYRINGE (ML) INJECTION PRN
Status: CANCELLED | OUTPATIENT
Start: 2024-07-22

## 2024-07-22 RX ADMIN — SODIUM CHLORIDE, PRESERVATIVE FREE 10 ML: 5 INJECTION INTRAVENOUS at 14:02

## 2024-07-22 RX ADMIN — Medication 500 UNITS: at 14:03

## 2024-07-22 RX ADMIN — SODIUM CHLORIDE, PRESERVATIVE FREE 10 ML: 5 INJECTION INTRAVENOUS at 14:03

## 2024-07-22 NOTE — PROGRESS NOTES
Patient presents to clinic for labs today. Left chest SQ port accessed per policy, using 20G .75in underwood needle for good blood return. Aspirate for waste and specimen sent to lab. Site flushed easily with 10 mL NSS followed by 5 mL Heparin solution 100 units/ml rinse prior to de-access. Dry sterile dressing to area.

## 2024-07-23 ENCOUNTER — HOSPITAL ENCOUNTER (OUTPATIENT)
Dept: INFUSION THERAPY | Age: 64
Discharge: HOME | End: 2024-07-23

## 2024-07-23 ENCOUNTER — OFFICE VISIT (OUTPATIENT)
Dept: ONCOLOGY | Age: 64
End: 2024-07-23
Payer: COMMERCIAL

## 2024-07-23 VITALS
BODY MASS INDEX: 20.39 KG/M2 | TEMPERATURE: 97.4 F | WEIGHT: 122.4 LBS | HEART RATE: 92 BPM | SYSTOLIC BLOOD PRESSURE: 119 MMHG | HEIGHT: 65 IN | DIASTOLIC BLOOD PRESSURE: 83 MMHG | OXYGEN SATURATION: 100 %

## 2024-07-23 DIAGNOSIS — C34.91 SQUAMOUS CELL CARCINOMA OF RIGHT LUNG (HCC): Primary | ICD-10-CM

## 2024-07-23 PROCEDURE — 99212 OFFICE O/P EST SF 10 MIN: CPT

## 2024-07-23 PROCEDURE — 99213 OFFICE O/P EST LOW 20 MIN: CPT | Performed by: STUDENT IN AN ORGANIZED HEALTH CARE EDUCATION/TRAINING PROGRAM

## 2024-07-23 RX ORDER — ONDANSETRON 2 MG/ML
8 INJECTION INTRAMUSCULAR; INTRAVENOUS
OUTPATIENT
Start: 2024-07-23

## 2024-07-23 RX ORDER — SODIUM CHLORIDE 9 MG/ML
5-250 INJECTION, SOLUTION INTRAVENOUS PRN
OUTPATIENT
Start: 2024-07-23

## 2024-07-23 RX ORDER — EPINEPHRINE 1 MG/ML
0.3 INJECTION, SOLUTION, CONCENTRATE INTRAVENOUS PRN
OUTPATIENT
Start: 2024-07-23

## 2024-07-23 RX ORDER — MEPERIDINE HYDROCHLORIDE 50 MG/ML
12.5 INJECTION INTRAMUSCULAR; INTRAVENOUS; SUBCUTANEOUS PRN
OUTPATIENT
Start: 2024-07-23

## 2024-07-23 RX ORDER — HEPARIN SODIUM (PORCINE) LOCK FLUSH IV SOLN 100 UNIT/ML 100 UNIT/ML
500 SOLUTION INTRAVENOUS PRN
OUTPATIENT
Start: 2024-07-23

## 2024-07-23 RX ORDER — SODIUM CHLORIDE 0.9 % (FLUSH) 0.9 %
5-40 SYRINGE (ML) INJECTION PRN
OUTPATIENT
Start: 2024-07-23

## 2024-07-23 RX ORDER — DIPHENHYDRAMINE HYDROCHLORIDE 50 MG/ML
50 INJECTION INTRAMUSCULAR; INTRAVENOUS
OUTPATIENT
Start: 2024-07-23

## 2024-07-23 RX ORDER — ACETAMINOPHEN 325 MG/1
650 TABLET ORAL
OUTPATIENT
Start: 2024-07-23

## 2024-07-23 RX ORDER — FAMOTIDINE 10 MG/ML
20 INJECTION, SOLUTION INTRAVENOUS
OUTPATIENT
Start: 2024-07-23

## 2024-07-23 RX ORDER — ALBUTEROL SULFATE 90 UG/1
4 AEROSOL, METERED RESPIRATORY (INHALATION) PRN
OUTPATIENT
Start: 2024-07-23

## 2024-07-23 RX ORDER — SODIUM CHLORIDE 9 MG/ML
INJECTION, SOLUTION INTRAVENOUS CONTINUOUS
OUTPATIENT
Start: 2024-07-23

## 2024-07-23 NOTE — PROGRESS NOTES
Patient provided with discharge instructions.  All questions answered.  Patient understands follow up plan of care.     
hypermetabolic at the periphery, with a maximal SUV of 18.6.  No additional pulmonary parenchymal hypermetabolic foci are identified. Mildly enlarged low right paratracheal lymph nodes are hypermetabolic, with a maximal SUV of 4.3.  Mildly enlarged superior mediastinal lymph node has a maximal SUV of 3.8.  Right hilar mildly enlarged lymph nodes are hypermetabolic, with a maximal SUV of 7.9.  More inferiorly located right hilar lymph nodes are also hypermetabolic with a maximal SUV of 8.0. ABDOMEN/PELVIS: The liver, spleen, pancreas, adrenals are free of hypermetabolic foci.  No hypermetabolic abdominal or pelvic lymph nodes are identified.  There is no evidence of focal bowel hypermetabolic activity. BONES/SOFT TISSUE: There is no evidence of focal osseous hypermetabolic lesion.  Diffusely mildly increased metabolic activity within bone marrow may represent physiologic response to medication. INCIDENTAL CT FINDINGS: Central low-attenuation within the large right upper lobe mass is consistent with necrosis.  No right posterior rib destruction or chest wall invasion is identified.     Centrally necrotic 8 cm posterior right upper lobe mass is markedly hypermetabolic and consistent with malignancy. Hypermetabolic mildly enlarged superior mediastinal, central mediastinal and right hilar lymph nodes are consistent with metastases.     XR CHEST INSPIRATION AND EXPIRATION    Result Date: 4/18/2024  EXAMINATION: ONE XRAY VIEW OF THE CHEST IN INSPIRATION 4/18/2024 12:52 pm COMPARISON: 04/18/2024, about 3 hours earlier HISTORY: ORDERING SYSTEM PROVIDED HISTORY: post right lung biopsy TECHNOLOGIST PROVIDED HISTORY: Reason for exam:->post right lung biopsy FINDINGS: There is again a large right upper lobe mass.  No pneumothorax seen.  There is no evidence of acute infiltrate or pleural effusion.  The heart and mediastinum are unchanged and there is no evidence of vascular congestion.     No pneumothorax.     CT NEEDLE BIOPSY

## 2024-07-29 ENCOUNTER — HOSPITAL ENCOUNTER (OUTPATIENT)
Dept: INFUSION THERAPY | Age: 64
Discharge: HOME | End: 2024-07-29
Payer: COMMERCIAL

## 2024-07-29 DIAGNOSIS — C34.11 MALIGNANT NEOPLASM OF UPPER LOBE OF RIGHT LUNG (HCC): Primary | ICD-10-CM

## 2024-07-29 DIAGNOSIS — C34.91 SQUAMOUS CELL CARCINOMA OF RIGHT LUNG (HCC): ICD-10-CM

## 2024-07-29 LAB
ALBUMIN SERPL-MCNC: 3.8 G/DL (ref 3.5–5.2)
ALP SERPL-CCNC: 99 U/L (ref 35–104)
ALT SERPL-CCNC: 16 U/L (ref 0–32)
ANION GAP SERPL CALCULATED.3IONS-SCNC: 10 MMOL/L (ref 7–16)
AST SERPL-CCNC: 20 U/L (ref 0–31)
BASOPHILS # BLD: 0.03 K/UL (ref 0–0.2)
BASOPHILS NFR BLD: 1 % (ref 0–2)
BILIRUB SERPL-MCNC: 0.6 MG/DL (ref 0–1.2)
BUN SERPL-MCNC: 6 MG/DL (ref 6–23)
CALCIUM SERPL-MCNC: 8.6 MG/DL (ref 8.6–10.2)
CHLORIDE SERPL-SCNC: 104 MMOL/L (ref 98–107)
CO2 SERPL-SCNC: 26 MMOL/L (ref 22–29)
CREAT SERPL-MCNC: 0.5 MG/DL (ref 0.5–1)
EOSINOPHIL # BLD: 0.13 K/UL (ref 0.05–0.5)
EOSINOPHILS RELATIVE PERCENT: 3 % (ref 0–6)
ERYTHROCYTE [DISTWIDTH] IN BLOOD BY AUTOMATED COUNT: 19.1 % (ref 11.5–15)
GFR, ESTIMATED: >90 ML/MIN/1.73M2
GLUCOSE SERPL-MCNC: 112 MG/DL (ref 74–99)
HCT VFR BLD AUTO: 38.6 % (ref 34–48)
HGB BLD-MCNC: 12.2 G/DL (ref 11.5–15.5)
IMM GRANULOCYTES # BLD AUTO: 0.04 K/UL (ref 0–0.58)
IMM GRANULOCYTES NFR BLD: 1 % (ref 0–5)
LYMPHOCYTES NFR BLD: 1.01 K/UL (ref 1.5–4)
LYMPHOCYTES RELATIVE PERCENT: 25 % (ref 20–42)
MAGNESIUM SERPL-MCNC: 1.8 MG/DL (ref 1.6–2.6)
MCH RBC QN AUTO: 28.5 PG (ref 26–35)
MCHC RBC AUTO-ENTMCNC: 31.6 G/DL (ref 32–34.5)
MCV RBC AUTO: 90.2 FL (ref 80–99.9)
MONOCYTES NFR BLD: 0.66 K/UL (ref 0.1–0.95)
MONOCYTES NFR BLD: 16 % (ref 2–12)
NEUTROPHILS NFR BLD: 54 % (ref 43–80)
NEUTS SEG NFR BLD: 2.19 K/UL (ref 1.8–7.3)
PLATELET # BLD AUTO: 182 K/UL (ref 130–450)
PMV BLD AUTO: 9.5 FL (ref 7–12)
POTASSIUM SERPL-SCNC: 3.9 MMOL/L (ref 3.5–5)
PROT SERPL-MCNC: 6.7 G/DL (ref 6.4–8.3)
RBC # BLD AUTO: 4.28 M/UL (ref 3.5–5.5)
SODIUM SERPL-SCNC: 140 MMOL/L (ref 132–146)
WBC OTHER # BLD: 4.1 K/UL (ref 4.5–11.5)

## 2024-07-29 PROCEDURE — 6360000002 HC RX W HCPCS: Performed by: STUDENT IN AN ORGANIZED HEALTH CARE EDUCATION/TRAINING PROGRAM

## 2024-07-29 PROCEDURE — 80053 COMPREHEN METABOLIC PANEL: CPT

## 2024-07-29 PROCEDURE — 83735 ASSAY OF MAGNESIUM: CPT

## 2024-07-29 PROCEDURE — 2580000003 HC RX 258: Performed by: STUDENT IN AN ORGANIZED HEALTH CARE EDUCATION/TRAINING PROGRAM

## 2024-07-29 PROCEDURE — 84443 ASSAY THYROID STIM HORMONE: CPT

## 2024-07-29 PROCEDURE — 85025 COMPLETE CBC W/AUTO DIFF WBC: CPT

## 2024-07-29 PROCEDURE — 36591 DRAW BLOOD OFF VENOUS DEVICE: CPT

## 2024-07-29 RX ORDER — HEPARIN 100 UNIT/ML
500 SYRINGE INTRAVENOUS PRN
Status: DISCONTINUED | OUTPATIENT
Start: 2024-07-29 | End: 2024-07-30 | Stop reason: HOSPADM

## 2024-07-29 RX ORDER — HEPARIN 100 UNIT/ML
500 SYRINGE INTRAVENOUS PRN
OUTPATIENT
Start: 2024-07-29

## 2024-07-29 RX ORDER — SODIUM CHLORIDE 9 MG/ML
25 INJECTION, SOLUTION INTRAVENOUS PRN
OUTPATIENT
Start: 2024-07-29

## 2024-07-29 RX ORDER — SODIUM CHLORIDE 0.9 % (FLUSH) 0.9 %
5-40 SYRINGE (ML) INJECTION PRN
OUTPATIENT
Start: 2024-07-29

## 2024-07-29 RX ORDER — SODIUM CHLORIDE 0.9 % (FLUSH) 0.9 %
5-40 SYRINGE (ML) INJECTION PRN
Status: DISCONTINUED | OUTPATIENT
Start: 2024-07-29 | End: 2024-07-30 | Stop reason: HOSPADM

## 2024-07-29 RX ADMIN — SODIUM CHLORIDE, PRESERVATIVE FREE 10 ML: 5 INJECTION INTRAVENOUS at 11:39

## 2024-07-29 RX ADMIN — Medication 500 UNITS: at 11:39

## 2024-07-30 ENCOUNTER — OFFICE VISIT (OUTPATIENT)
Dept: ONCOLOGY | Age: 64
End: 2024-07-30
Payer: COMMERCIAL

## 2024-07-30 ENCOUNTER — HOSPITAL ENCOUNTER (OUTPATIENT)
Dept: INFUSION THERAPY | Age: 64
Discharge: HOME | End: 2024-07-30
Payer: COMMERCIAL

## 2024-07-30 VITALS
SYSTOLIC BLOOD PRESSURE: 114 MMHG | BODY MASS INDEX: 20.83 KG/M2 | OXYGEN SATURATION: 100 % | DIASTOLIC BLOOD PRESSURE: 78 MMHG | HEART RATE: 81 BPM | WEIGHT: 125 LBS | TEMPERATURE: 97.7 F | HEIGHT: 65 IN

## 2024-07-30 VITALS
DIASTOLIC BLOOD PRESSURE: 70 MMHG | SYSTOLIC BLOOD PRESSURE: 114 MMHG | OXYGEN SATURATION: 97 % | RESPIRATION RATE: 16 BRPM | HEART RATE: 79 BPM | TEMPERATURE: 97 F

## 2024-07-30 DIAGNOSIS — C34.11 MALIGNANT NEOPLASM OF UPPER LOBE OF RIGHT LUNG (HCC): Primary | ICD-10-CM

## 2024-07-30 LAB — TSH SERPL DL<=0.05 MIU/L-ACNC: 0.76 UIU/ML (ref 0.27–4.2)

## 2024-07-30 PROCEDURE — 2580000003 HC RX 258: Performed by: NURSE PRACTITIONER

## 2024-07-30 PROCEDURE — 6360000002 HC RX W HCPCS: Performed by: NURSE PRACTITIONER

## 2024-07-30 PROCEDURE — 96413 CHEMO IV INFUSION 1 HR: CPT

## 2024-07-30 PROCEDURE — 99214 OFFICE O/P EST MOD 30 MIN: CPT | Performed by: NURSE PRACTITIONER

## 2024-07-30 RX ORDER — SODIUM CHLORIDE 0.9 % (FLUSH) 0.9 %
5-40 SYRINGE (ML) INJECTION PRN
Status: DISCONTINUED | OUTPATIENT
Start: 2024-07-30 | End: 2024-07-31 | Stop reason: HOSPADM

## 2024-07-30 RX ORDER — ONDANSETRON 2 MG/ML
8 INJECTION INTRAMUSCULAR; INTRAVENOUS
Status: CANCELLED | OUTPATIENT
Start: 2024-07-30

## 2024-07-30 RX ORDER — HEPARIN 100 UNIT/ML
500 SYRINGE INTRAVENOUS PRN
Status: DISCONTINUED | OUTPATIENT
Start: 2024-07-30 | End: 2024-07-31 | Stop reason: HOSPADM

## 2024-07-30 RX ORDER — SODIUM CHLORIDE 0.9 % (FLUSH) 0.9 %
5-40 SYRINGE (ML) INJECTION PRN
Status: CANCELLED | OUTPATIENT
Start: 2024-07-30

## 2024-07-30 RX ORDER — EPINEPHRINE 1 MG/ML
0.3 INJECTION, SOLUTION, CONCENTRATE INTRAVENOUS PRN
Status: CANCELLED | OUTPATIENT
Start: 2024-07-30

## 2024-07-30 RX ORDER — FAMOTIDINE 10 MG/ML
20 INJECTION, SOLUTION INTRAVENOUS
Status: CANCELLED | OUTPATIENT
Start: 2024-07-30

## 2024-07-30 RX ORDER — ACETAMINOPHEN 325 MG/1
650 TABLET ORAL
Status: CANCELLED | OUTPATIENT
Start: 2024-07-30

## 2024-07-30 RX ORDER — SODIUM CHLORIDE 9 MG/ML
5-250 INJECTION, SOLUTION INTRAVENOUS PRN
Status: DISCONTINUED | OUTPATIENT
Start: 2024-07-30 | End: 2024-07-31 | Stop reason: HOSPADM

## 2024-07-30 RX ORDER — ALBUTEROL SULFATE 90 UG/1
4 AEROSOL, METERED RESPIRATORY (INHALATION) PRN
Status: CANCELLED | OUTPATIENT
Start: 2024-07-30

## 2024-07-30 RX ORDER — SODIUM CHLORIDE 9 MG/ML
INJECTION, SOLUTION INTRAVENOUS CONTINUOUS
Status: CANCELLED | OUTPATIENT
Start: 2024-07-30

## 2024-07-30 RX ORDER — HEPARIN SODIUM (PORCINE) LOCK FLUSH IV SOLN 100 UNIT/ML 100 UNIT/ML
500 SOLUTION INTRAVENOUS PRN
Status: CANCELLED | OUTPATIENT
Start: 2024-07-30

## 2024-07-30 RX ORDER — SODIUM CHLORIDE 9 MG/ML
5-250 INJECTION, SOLUTION INTRAVENOUS PRN
Status: CANCELLED | OUTPATIENT
Start: 2024-07-30

## 2024-07-30 RX ORDER — DIPHENHYDRAMINE HYDROCHLORIDE 50 MG/ML
50 INJECTION INTRAMUSCULAR; INTRAVENOUS
Status: CANCELLED | OUTPATIENT
Start: 2024-07-30

## 2024-07-30 RX ORDER — MEPERIDINE HYDROCHLORIDE 50 MG/ML
12.5 INJECTION INTRAMUSCULAR; INTRAVENOUS; SUBCUTANEOUS PRN
Status: CANCELLED | OUTPATIENT
Start: 2024-07-30

## 2024-07-30 RX ADMIN — SODIUM CHLORIDE, PRESERVATIVE FREE 10 ML: 5 INJECTION INTRAVENOUS at 12:22

## 2024-07-30 RX ADMIN — SODIUM CHLORIDE 500 MG: 9 INJECTION, SOLUTION INTRAVENOUS at 11:02

## 2024-07-30 RX ADMIN — Medication 500 UNITS: at 12:22

## 2024-07-30 RX ADMIN — SODIUM CHLORIDE 50 ML/HR: 9 INJECTION, SOLUTION INTRAVENOUS at 10:59

## 2024-07-30 NOTE — FLOWSHEET NOTE
Pt arrived to clinic for office visit and treatment. Treatment given per orders with no complications. VSS.

## 2024-07-30 NOTE — PROGRESS NOTES
[facility] [place] [facility] MEDICAL ONCOLOGY  [address] [address]  Dept: [phone]  Loc: [phone]    Clinic Progress Note      Date of Encounter: 07/30/2024     Referring Provider:  Celso Meier MD     Reason for Visit:   Squamous cell lung cancer, locally advanced        PCP:  Yue Nobles DO    Demographics: 64 y.o. female    Chief Complaint   Patient presents with    Follow-up    Lung Cancer     Rt         Subjective:  Pt doing well. Here to start first cycle of durvalumab. She is nervous to start immunotherapy as she had reactions to chemotherapy. We discussed that the majority of people have no side effects, but to report any if they start. She is seeing Dr. Ian marroquin.       HPI from Initial Outpatient Consultation (4/29/2024):  The patient is a 64 y.o. female and is coming in, establishing at our clinic, after we first met patient on inpatient consultation for a right sided lung mass.  She was admitted at [facility] on 4/15/2024 due to weight loss, and cough.  She was found to have an 8.5 cm right upper lobe mass with central necrosis, and had undergone biopsy of the lung on 4/8/2023, confirming her diagnosis.      Oncology History   Squamous cell carcinoma of right lung (HCC)   4/18/2023 Biopsy    -- Diagnosis --   Right lung, core biopsy: Invasive squamous cell carcinoma with   necrosis     -- Diagnosis Comment --   Intradepartmental consultation is obtained.  PD-L1 testing has been   ordered per institution protocol and will be reported separately.     Jo Ann Clark MD   **Electronically Signed Out**         [facility]/4/22/2024      Clinical Information   Procedure; Right lung mass biopsy, 4 cores     Pre-Operative Diagnosis   Lung alyse, smoker       Source of Specimen   A: LUNG, TRANSBRONCHIAL BIOPSY, RIGHT     ==================================================    -- Diagnosis --   PD-L1 22C3 FDA analysis: High PD-L1

## 2024-08-06 ENCOUNTER — INITIAL CONSULT (OUTPATIENT)
Dept: CARDIOTHORACIC SURGERY | Age: 64
End: 2024-08-06
Payer: COMMERCIAL

## 2024-08-06 VITALS
HEART RATE: 85 BPM | WEIGHT: 123 LBS | DIASTOLIC BLOOD PRESSURE: 71 MMHG | OXYGEN SATURATION: 97 % | SYSTOLIC BLOOD PRESSURE: 117 MMHG | BODY MASS INDEX: 20.49 KG/M2 | HEIGHT: 65 IN

## 2024-08-06 DIAGNOSIS — C34.91 NON-SMALL CELL CANCER OF RIGHT LUNG (HCC): Primary | ICD-10-CM

## 2024-08-06 DIAGNOSIS — R91.8 LUNG MASS: Primary | ICD-10-CM

## 2024-08-06 PROCEDURE — 99204 OFFICE O/P NEW MOD 45 MIN: CPT | Performed by: THORACIC SURGERY (CARDIOTHORACIC VASCULAR SURGERY)

## 2024-08-12 ENCOUNTER — HOSPITAL ENCOUNTER (OUTPATIENT)
Dept: INFUSION THERAPY | Age: 64
Discharge: HOME | End: 2024-08-12
Payer: COMMERCIAL

## 2024-08-12 DIAGNOSIS — C34.11 MALIGNANT NEOPLASM OF UPPER LOBE OF RIGHT LUNG (HCC): Primary | ICD-10-CM

## 2024-08-12 DIAGNOSIS — C34.91 SQUAMOUS CELL CARCINOMA OF RIGHT LUNG (HCC): ICD-10-CM

## 2024-08-12 LAB
ALBUMIN SERPL-MCNC: 4.2 G/DL (ref 3.5–5.2)
ALP SERPL-CCNC: 100 U/L (ref 35–104)
ALT SERPL-CCNC: 12 U/L (ref 0–32)
ANION GAP SERPL CALCULATED.3IONS-SCNC: 9 MMOL/L (ref 7–16)
AST SERPL-CCNC: 18 U/L (ref 0–31)
BASOPHILS # BLD: 0.05 K/UL (ref 0–0.2)
BASOPHILS NFR BLD: 1 % (ref 0–2)
BILIRUB SERPL-MCNC: 0.5 MG/DL (ref 0–1.2)
BUN SERPL-MCNC: 16 MG/DL (ref 6–23)
CALCIUM SERPL-MCNC: 9.2 MG/DL (ref 8.6–10.2)
CHLORIDE SERPL-SCNC: 100 MMOL/L (ref 98–107)
CO2 SERPL-SCNC: 28 MMOL/L (ref 22–29)
CREAT SERPL-MCNC: 0.6 MG/DL (ref 0.5–1)
EOSINOPHIL # BLD: 0.19 K/UL (ref 0.05–0.5)
EOSINOPHILS RELATIVE PERCENT: 3 % (ref 0–6)
ERYTHROCYTE [DISTWIDTH] IN BLOOD BY AUTOMATED COUNT: 17.1 % (ref 11.5–15)
GFR, ESTIMATED: >90 ML/MIN/1.73M2
GLUCOSE SERPL-MCNC: 78 MG/DL (ref 74–99)
HCT VFR BLD AUTO: 40 % (ref 34–48)
HGB BLD-MCNC: 12.9 G/DL (ref 11.5–15.5)
IMM GRANULOCYTES # BLD AUTO: 0.04 K/UL (ref 0–0.58)
IMM GRANULOCYTES NFR BLD: 1 % (ref 0–5)
LYMPHOCYTES NFR BLD: 1.1 K/UL (ref 1.5–4)
LYMPHOCYTES RELATIVE PERCENT: 17 % (ref 20–42)
MAGNESIUM SERPL-MCNC: 2.2 MG/DL (ref 1.6–2.6)
MCH RBC QN AUTO: 29.5 PG (ref 26–35)
MCHC RBC AUTO-ENTMCNC: 32.3 G/DL (ref 32–34.5)
MCV RBC AUTO: 91.3 FL (ref 80–99.9)
MONOCYTES NFR BLD: 0.57 K/UL (ref 0.1–0.95)
MONOCYTES NFR BLD: 9 % (ref 2–12)
NEUTROPHILS NFR BLD: 70 % (ref 43–80)
NEUTS SEG NFR BLD: 4.58 K/UL (ref 1.8–7.3)
PLATELET # BLD AUTO: 207 K/UL (ref 130–450)
PMV BLD AUTO: 10 FL (ref 7–12)
POTASSIUM SERPL-SCNC: 4.2 MMOL/L (ref 3.5–5)
PROT SERPL-MCNC: 7.2 G/DL (ref 6.4–8.3)
RBC # BLD AUTO: 4.38 M/UL (ref 3.5–5.5)
SODIUM SERPL-SCNC: 137 MMOL/L (ref 132–146)
WBC OTHER # BLD: 6.5 K/UL (ref 4.5–11.5)

## 2024-08-12 PROCEDURE — 80053 COMPREHEN METABOLIC PANEL: CPT

## 2024-08-12 PROCEDURE — 85025 COMPLETE CBC W/AUTO DIFF WBC: CPT

## 2024-08-12 PROCEDURE — 36591 DRAW BLOOD OFF VENOUS DEVICE: CPT

## 2024-08-12 PROCEDURE — 83735 ASSAY OF MAGNESIUM: CPT

## 2024-08-12 RX ORDER — HEPARIN 100 UNIT/ML
500 SYRINGE INTRAVENOUS PRN
Status: DISCONTINUED | OUTPATIENT
Start: 2024-08-12 | End: 2024-08-13 | Stop reason: HOSPADM

## 2024-08-12 RX ORDER — HEPARIN 100 UNIT/ML
500 SYRINGE INTRAVENOUS PRN
Status: CANCELLED | OUTPATIENT
Start: 2024-08-12

## 2024-08-12 RX ORDER — SODIUM CHLORIDE 0.9 % (FLUSH) 0.9 %
5-40 SYRINGE (ML) INJECTION PRN
Status: DISCONTINUED | OUTPATIENT
Start: 2024-08-12 | End: 2024-08-13 | Stop reason: HOSPADM

## 2024-08-12 RX ORDER — SODIUM CHLORIDE 9 MG/ML
25 INJECTION, SOLUTION INTRAVENOUS PRN
Status: CANCELLED | OUTPATIENT
Start: 2024-08-12

## 2024-08-12 RX ORDER — SODIUM CHLORIDE 0.9 % (FLUSH) 0.9 %
5-40 SYRINGE (ML) INJECTION PRN
Status: CANCELLED | OUTPATIENT
Start: 2024-08-12

## 2024-08-13 ENCOUNTER — HOSPITAL ENCOUNTER (OUTPATIENT)
Dept: INFUSION THERAPY | Age: 64
Discharge: HOME | End: 2024-08-13
Payer: COMMERCIAL

## 2024-08-13 ENCOUNTER — OFFICE VISIT (OUTPATIENT)
Dept: ONCOLOGY | Age: 64
End: 2024-08-13
Payer: COMMERCIAL

## 2024-08-13 VITALS
SYSTOLIC BLOOD PRESSURE: 120 MMHG | BODY MASS INDEX: 20.66 KG/M2 | OXYGEN SATURATION: 99 % | TEMPERATURE: 97.6 F | HEIGHT: 65 IN | HEART RATE: 82 BPM | DIASTOLIC BLOOD PRESSURE: 72 MMHG | WEIGHT: 124 LBS

## 2024-08-13 VITALS
OXYGEN SATURATION: 97 % | RESPIRATION RATE: 16 BRPM | TEMPERATURE: 97.7 F | HEART RATE: 71 BPM | DIASTOLIC BLOOD PRESSURE: 68 MMHG | SYSTOLIC BLOOD PRESSURE: 115 MMHG

## 2024-08-13 DIAGNOSIS — C34.11 MALIGNANT NEOPLASM OF UPPER LOBE OF RIGHT LUNG (HCC): Primary | ICD-10-CM

## 2024-08-13 PROCEDURE — 6360000002 HC RX W HCPCS: Performed by: STUDENT IN AN ORGANIZED HEALTH CARE EDUCATION/TRAINING PROGRAM

## 2024-08-13 PROCEDURE — 99214 OFFICE O/P EST MOD 30 MIN: CPT | Performed by: NURSE PRACTITIONER

## 2024-08-13 PROCEDURE — 36593 DECLOT VASCULAR DEVICE: CPT

## 2024-08-13 PROCEDURE — 2580000003 HC RX 258: Performed by: NURSE PRACTITIONER

## 2024-08-13 PROCEDURE — 2580000003 HC RX 258: Performed by: STUDENT IN AN ORGANIZED HEALTH CARE EDUCATION/TRAINING PROGRAM

## 2024-08-13 PROCEDURE — 6360000002 HC RX W HCPCS: Performed by: NURSE PRACTITIONER

## 2024-08-13 PROCEDURE — 96413 CHEMO IV INFUSION 1 HR: CPT

## 2024-08-13 RX ORDER — ACETAMINOPHEN 325 MG/1
650 TABLET ORAL
Status: CANCELLED | OUTPATIENT
Start: 2024-08-13

## 2024-08-13 RX ORDER — HEPARIN SODIUM (PORCINE) LOCK FLUSH IV SOLN 100 UNIT/ML 100 UNIT/ML
500 SOLUTION INTRAVENOUS PRN
Status: CANCELLED | OUTPATIENT
Start: 2024-08-13

## 2024-08-13 RX ORDER — SODIUM CHLORIDE 0.9 % (FLUSH) 0.9 %
5-40 SYRINGE (ML) INJECTION PRN
OUTPATIENT
Start: 2024-08-13

## 2024-08-13 RX ORDER — HEPARIN 100 UNIT/ML
500 SYRINGE INTRAVENOUS PRN
OUTPATIENT
Start: 2024-08-13

## 2024-08-13 RX ORDER — ALBUTEROL SULFATE 90 UG/1
4 AEROSOL, METERED RESPIRATORY (INHALATION) PRN
Status: CANCELLED | OUTPATIENT
Start: 2024-08-13

## 2024-08-13 RX ORDER — SODIUM CHLORIDE 9 MG/ML
INJECTION, SOLUTION INTRAVENOUS CONTINUOUS
Status: CANCELLED | OUTPATIENT
Start: 2024-08-13

## 2024-08-13 RX ORDER — HEPARIN 100 UNIT/ML
500 SYRINGE INTRAVENOUS PRN
Status: DISCONTINUED | OUTPATIENT
Start: 2024-08-13 | End: 2024-08-14 | Stop reason: HOSPADM

## 2024-08-13 RX ORDER — SODIUM CHLORIDE 9 MG/ML
5-250 INJECTION, SOLUTION INTRAVENOUS PRN
Status: DISCONTINUED | OUTPATIENT
Start: 2024-08-13 | End: 2024-08-14 | Stop reason: HOSPADM

## 2024-08-13 RX ORDER — SODIUM CHLORIDE 0.9 % (FLUSH) 0.9 %
5-40 SYRINGE (ML) INJECTION PRN
Status: DISCONTINUED | OUTPATIENT
Start: 2024-08-13 | End: 2024-08-14 | Stop reason: HOSPADM

## 2024-08-13 RX ORDER — ONDANSETRON 2 MG/ML
8 INJECTION INTRAMUSCULAR; INTRAVENOUS
Status: CANCELLED | OUTPATIENT
Start: 2024-08-13

## 2024-08-13 RX ORDER — FAMOTIDINE 10 MG/ML
20 INJECTION, SOLUTION INTRAVENOUS
Status: CANCELLED | OUTPATIENT
Start: 2024-08-13

## 2024-08-13 RX ORDER — SODIUM CHLORIDE 9 MG/ML
5-250 INJECTION, SOLUTION INTRAVENOUS PRN
Status: CANCELLED | OUTPATIENT
Start: 2024-08-13

## 2024-08-13 RX ORDER — SODIUM CHLORIDE 9 MG/ML
25 INJECTION, SOLUTION INTRAVENOUS PRN
OUTPATIENT
Start: 2024-08-13

## 2024-08-13 RX ORDER — EPINEPHRINE 1 MG/ML
0.3 INJECTION, SOLUTION, CONCENTRATE INTRAVENOUS PRN
Status: CANCELLED | OUTPATIENT
Start: 2024-08-13

## 2024-08-13 RX ORDER — MEPERIDINE HYDROCHLORIDE 50 MG/ML
12.5 INJECTION INTRAMUSCULAR; INTRAVENOUS; SUBCUTANEOUS PRN
Status: CANCELLED | OUTPATIENT
Start: 2024-08-13

## 2024-08-13 RX ORDER — SODIUM CHLORIDE 0.9 % (FLUSH) 0.9 %
5-40 SYRINGE (ML) INJECTION PRN
Status: CANCELLED | OUTPATIENT
Start: 2024-08-13

## 2024-08-13 RX ORDER — DIPHENHYDRAMINE HYDROCHLORIDE 50 MG/ML
50 INJECTION INTRAMUSCULAR; INTRAVENOUS
Status: CANCELLED | OUTPATIENT
Start: 2024-08-13

## 2024-08-13 RX ADMIN — SODIUM CHLORIDE 500 MG: 9 INJECTION, SOLUTION INTRAVENOUS at 11:28

## 2024-08-13 RX ADMIN — SODIUM CHLORIDE 200 ML/HR: 9 INJECTION, SOLUTION INTRAVENOUS at 11:15

## 2024-08-13 RX ADMIN — SODIUM CHLORIDE, PRESERVATIVE FREE 10 ML: 5 INJECTION INTRAVENOUS at 11:28

## 2024-08-13 RX ADMIN — SODIUM CHLORIDE, PRESERVATIVE FREE 10 ML: 5 INJECTION INTRAVENOUS at 11:30

## 2024-08-13 RX ADMIN — Medication 500 UNITS: at 12:30

## 2024-08-13 RX ADMIN — ALTEPLASE 2 MG: 2.2 INJECTION, POWDER, LYOPHILIZED, FOR SOLUTION INTRAVENOUS at 11:05

## 2024-08-13 RX ADMIN — SODIUM CHLORIDE, PRESERVATIVE FREE 10 ML: 5 INJECTION INTRAVENOUS at 12:30

## 2024-08-13 NOTE — PROGRESS NOTES
Mary Purdy  8/13/2024  Ht Readings from Last 1 Encounters:   08/13/24 1.651 m (5' 5\")     Wt Readings from Last 10 Encounters:   08/13/24 56.2 kg (124 lb)   08/06/24 55.8 kg (123 lb)   07/30/24 56.7 kg (125 lb)   07/23/24 55.5 kg (122 lb 6.4 oz)   07/03/24 52.2 kg (115 lb)   06/26/24 53.3 kg (117 lb 6.4 oz)   06/25/24 52.5 kg (115 lb 12.8 oz)   06/19/24 53.6 kg (118 lb 3.2 oz)   06/18/24 52.7 kg (116 lb 3.2 oz)   06/13/24 54.3 kg (119 lb 12.8 oz)     Body mass index is 20.63 kg/m².    Assessment: Met with Mary during follow up visit for lung cancer. Mary reports doing well nutritionally. Her weight has been stable over the past month. She reports eating a small breakfast and a larger lunch and dinner every day. She continues to drink Premier Protein daily, as well. Denies N/V/D/C. Mary was appreciative of nutrition check-in and did not have further questions at this time. Will remain available.       Eda Mathur, RD, LD, MPH    
Patient provided with discharge instructions.  All questions answered.  Patient understands follow up plan of care.       
adenopathy right paratracheal ma mm and precarinal 12 mm short  axis dimensions along with right hilar 2.3 cm lymph node consistent with  metastatic adenopathy..  Central airways are patent. Esophagus with normal  course and caliber.  Cardiac size within normal limits without pericardial  effusion.     Lungs/pleura: Hyperinflated lungs central necrotic mass in the right upper  lobe posteriorly 7.4 cm series 2, image 41 with minimal postobstructive  changes and trace to small right pleural effusion consistent with malignancy  until proven otherwise.  Although abutting the right ribs there is no clear  cortical involvement or chest wall invasion.     Upper Abdomen: Visualized portions of the upper abdomen unremarkable.     Soft Tissues/Bones: No acute osseous or soft tissue findings. No aggressive  osseous lesion.     IMPRESSION:  1. Centrally necrotic mass in the right upper lobe posteriorly 7.4 cm  consistent with malignancy until proven otherwise.  PET-CT and/or tissue  sampling recommended for further evaluation  2. Bulky mediastinal and right hilar adenopathy consistent with metastatic  adenopathy.  3. Trace to small right pleural effusion.  4. No clear evidence for chest wall invasion or cortical involvement of the  right ribs.        CT Abd/pel - 4/16/24    FINDINGS:  Lower Chest: Was evaluated on exam yesterday.     Organs: No significant lesion identified.     GI/Bowel: No pathologic dilation. There is mild sigmoid diverticulosis.     Pelvis: Endometrium appears to be perhaps mildly thickened on the limited  visualization, for postmenopausal, suggest ultrasound if clinically indicated.     Peritoneum/Retroperitoneum:  No enlarged lymph nodes.     Bones/Soft Tissues:   Facet degenerative changes mainly are present in the  lumbar spine.     IMPRESSION:  1. No clear evidence of metastatic disease is identified.  2. Possible endometrial thickening, consider ultrasound.     4/18/2024 -  Cancer Staged    Staging

## 2024-08-14 NOTE — PROGRESS NOTES
Cardiothoracic Surgery       Subjective     Patient ID: Mary Purdy is a 64 y.o. female     CC: Lung CA    HPI:  Mary Purdy is a 64 y.o. female who was previously seen in office earlier this month 8/6/24 and presents back to office today to review PET and PFTs and to continue to discuss possible surgical recommendations   Briefly presented to office upon referral from oncology for continued evaluation and recommendations regarding her RUL lung CA.  She was admitted at [facility] on 4/15/2024 due to weight loss, and cough. She was found to have an 8.5 cm right upper lobe mass with central necrosis, and had undergone biopsy of the lung on 4/18/2024, confirming her diagnosis. - Squamous cell carcinoma of the right upper lung, Stage IIIB (cT4 N2 M0), PDL1 75%   She was started on chemoRT in May of 2024.  CT chest on 7/17/24 ( report below)  did note downsize of lung mass and at previous visit it was recommended she undergo repeat PET and repeat PFTs  She is a former smoker 1 pack/day for 46.6 years (46.3 ttl pk-yrs)   She denies any fever, chills, SOB, hemoptysis or CP or other change in symptoms since last office visit     CT chest 7/17/24  5.6 cm centrally necrotic posterior right upper lobe lesion measured 7.3 cm  on 04/15/2024.     Interval resolution of low right paratracheal and subcarinal lymph node  enlargement with mild residual right hilar lymph node enlargement.     There is no evidence of new thoracic metastatic disease.            Past Medical History:   Diagnosis Date    Cancer (HCC)     skin on bridge of her nose, SCC    Lung cancer (HCC)           Review of Systems:   Review of Systems   Constitutional:  Negative for chills, fatigue and fever.   Respiratory:  Negative for cough and shortness of breath.    Cardiovascular:  Negative for chest pain and palpitations.   Neurological:  Negative for

## 2024-08-15 ENCOUNTER — HOSPITAL ENCOUNTER (OUTPATIENT)
Dept: PULMONOLOGY | Age: 64
Discharge: HOME | End: 2024-08-15
Payer: COMMERCIAL

## 2024-08-15 ENCOUNTER — HOSPITAL ENCOUNTER (OUTPATIENT)
Dept: PET IMAGING | Age: 64
Discharge: HOME | End: 2024-08-17
Payer: COMMERCIAL

## 2024-08-15 DIAGNOSIS — R91.8 LUNG MASS: ICD-10-CM

## 2024-08-15 LAB — GLUCOSE BLD-MCNC: 92 MG/DL (ref 74–99)

## 2024-08-15 PROCEDURE — 94729 DIFFUSING CAPACITY: CPT

## 2024-08-15 PROCEDURE — A9609 HC RX DIAGNOSTIC RADIOPHARMACEUTICAL: HCPCS | Performed by: RADIOLOGY

## 2024-08-15 PROCEDURE — 82962 GLUCOSE BLOOD TEST: CPT

## 2024-08-15 PROCEDURE — 94726 PLETHYSMOGRAPHY LUNG VOLUMES: CPT

## 2024-08-15 PROCEDURE — 94060 EVALUATION OF WHEEZING: CPT

## 2024-08-15 PROCEDURE — 78815 PET IMAGE W/CT SKULL-THIGH: CPT

## 2024-08-15 PROCEDURE — 3430000000 HC RX DIAGNOSTIC RADIOPHARMACEUTICAL: Performed by: RADIOLOGY

## 2024-08-15 RX ORDER — FLUDEOXYGLUCOSE F 18 200 MCI/ML
15 INJECTION, SOLUTION INTRAVENOUS
Status: COMPLETED | OUTPATIENT
Start: 2024-08-15 | End: 2024-08-15

## 2024-08-15 RX ADMIN — FLUDEOXYGLUCOSE F 18 15 MILLICURIE: 200 INJECTION, SOLUTION INTRAVENOUS at 08:43

## 2024-08-20 ENCOUNTER — PREP FOR PROCEDURE (OUTPATIENT)
Dept: CARDIOTHORACIC SURGERY | Age: 64
End: 2024-08-20

## 2024-08-20 ENCOUNTER — OFFICE VISIT (OUTPATIENT)
Dept: CARDIOTHORACIC SURGERY | Age: 64
End: 2024-08-20
Payer: COMMERCIAL

## 2024-08-20 VITALS
DIASTOLIC BLOOD PRESSURE: 61 MMHG | BODY MASS INDEX: 20.66 KG/M2 | WEIGHT: 124 LBS | HEART RATE: 81 BPM | OXYGEN SATURATION: 98 % | HEIGHT: 65 IN | SYSTOLIC BLOOD PRESSURE: 98 MMHG

## 2024-08-20 DIAGNOSIS — C34.91 SQUAMOUS CELL CARCINOMA OF RIGHT LUNG (HCC): Primary | ICD-10-CM

## 2024-08-20 DIAGNOSIS — Z85.118 HISTORY OF LUNG CANCER: ICD-10-CM

## 2024-08-20 PROCEDURE — 99215 OFFICE O/P EST HI 40 MIN: CPT | Performed by: THORACIC SURGERY (CARDIOTHORACIC VASCULAR SURGERY)

## 2024-08-20 RX ORDER — SODIUM CHLORIDE 0.9 % (FLUSH) 0.9 %
5-40 SYRINGE (ML) INJECTION EVERY 12 HOURS SCHEDULED
Status: CANCELLED | OUTPATIENT
Start: 2024-08-20

## 2024-08-20 RX ORDER — SODIUM CHLORIDE 9 MG/ML
INJECTION, SOLUTION INTRAVENOUS PRN
Status: CANCELLED | OUTPATIENT
Start: 2024-08-20

## 2024-08-20 RX ORDER — SODIUM CHLORIDE 9 MG/ML
INJECTION, SOLUTION INTRAVENOUS CONTINUOUS
Status: CANCELLED | OUTPATIENT
Start: 2024-08-20

## 2024-08-20 RX ORDER — SODIUM CHLORIDE 0.9 % (FLUSH) 0.9 %
5-40 SYRINGE (ML) INJECTION PRN
Status: CANCELLED | OUTPATIENT
Start: 2024-08-20

## 2024-08-21 ENCOUNTER — TELEPHONE (OUTPATIENT)
Dept: CARDIOTHORACIC SURGERY | Age: 64
End: 2024-08-21

## 2024-08-22 ENCOUNTER — TELEPHONE (OUTPATIENT)
Dept: CARDIOTHORACIC SURGERY | Age: 64
End: 2024-08-22

## 2024-08-22 VITALS — HEIGHT: 65 IN | BODY MASS INDEX: 20.63 KG/M2

## 2024-08-22 NOTE — PROGRESS NOTES
[facility]   PRE-ADMISSION TESTING GENERAL INSTRUCTIONS  PAT Phone Number: [phone]      GENERAL INSTRUCTIONS:    [x] Hibiclens shower the night before AND the morning of surgery.   -Do not use Hibiclens on your face or head.  [x] Do not wear makeup, lotions, powders, deodorant the morning of surgery.  [x] Nothing to eat or drink after midnight. This includes no gum, candy, mints or water.  [x] You may brush your teeth, gargle, but do NOT swallow water.   [x] No tobacco products, illegal drugs, or alcohol within 24 hours of your surgery.  [x] Jewelry or valuables should not be brought to the hospital. All body and/or tongue piercing's must be removed prior to arriving to hospital. No contact lens or hair pins.   [x] Arrange transportation with a responsible adult  to and from the hospital.   [x] Bring insurance card and photo ID.  [x] Bring copy of living will or healthcare power of  papers to be placed in your electronic record.  [x] Transfusion (Green) Bracelet: Please bring with you to hospital, day of surgery.     PARKING INSTRUCTIONS:     [x] ARRIVAL DATE & TIME:  WEDNESDAY 9/4  @  0800  [x] Times are subject to change. We will contact you the business day before surgery if that were to occur.  [x] Enter into the [facility] Entrance. Two people may accompany you. Masks are not required.  [x] Parking Lot \"I\" is where you will park. It is located on the corner of [address] and [address]. The entrance is on [address].   Only one vehicle - per patient, is permitted in parking lot.   Upon entering the parking lot, a voucher ticket will print.    EDUCATION INSTRUCTIONS:           [x] Pre-admission Testing educational folder given  [x] Incentive Spirometry,coughing & deep breathing exercises reviewed.   [x] Fluoroscopy-Xray used in surgery reviewed with patient. Educational pamphlet placed in chart.  [x] Pain: Post-op pain is normal and to be expected.

## 2024-08-22 NOTE — TELEPHONE ENCOUNTER
Pt requesting her office visit note from 8/20 be amended. The date of her biopsy confirming her diagnosis was 4/18/24 not 4/8/23.

## 2024-08-26 ENCOUNTER — HOSPITAL ENCOUNTER (OUTPATIENT)
Dept: INFUSION THERAPY | Age: 64
Discharge: HOME | End: 2024-08-26
Payer: COMMERCIAL

## 2024-08-26 DIAGNOSIS — C34.91 SQUAMOUS CELL CARCINOMA OF RIGHT LUNG (HCC): ICD-10-CM

## 2024-08-26 LAB
ALBUMIN SERPL-MCNC: 4.2 G/DL (ref 3.5–5.2)
ALP SERPL-CCNC: 97 U/L (ref 35–104)
ALT SERPL-CCNC: 14 U/L (ref 0–32)
ANION GAP SERPL CALCULATED.3IONS-SCNC: 10 MMOL/L (ref 7–16)
AST SERPL-CCNC: 24 U/L (ref 0–31)
BASOPHILS # BLD: 0.03 K/UL (ref 0–0.2)
BASOPHILS NFR BLD: 0 % (ref 0–2)
BILIRUB SERPL-MCNC: 0.9 MG/DL (ref 0–1.2)
BUN SERPL-MCNC: 9 MG/DL (ref 6–23)
CALCIUM SERPL-MCNC: 9.2 MG/DL (ref 8.6–10.2)
CHLORIDE SERPL-SCNC: 100 MMOL/L (ref 98–107)
CO2 SERPL-SCNC: 28 MMOL/L (ref 22–29)
CREAT SERPL-MCNC: 0.6 MG/DL (ref 0.5–1)
EOSINOPHIL # BLD: 0.13 K/UL (ref 0.05–0.5)
EOSINOPHILS RELATIVE PERCENT: 2 % (ref 0–6)
ERYTHROCYTE [DISTWIDTH] IN BLOOD BY AUTOMATED COUNT: 14.9 % (ref 11.5–15)
GFR, ESTIMATED: >90 ML/MIN/1.73M2
GLUCOSE SERPL-MCNC: 133 MG/DL (ref 74–99)
HCT VFR BLD AUTO: 39.7 % (ref 34–48)
HGB BLD-MCNC: 13.3 G/DL (ref 11.5–15.5)
IMM GRANULOCYTES # BLD AUTO: 0.04 K/UL (ref 0–0.58)
IMM GRANULOCYTES NFR BLD: 1 % (ref 0–5)
LYMPHOCYTES NFR BLD: 1.2 K/UL (ref 1.5–4)
LYMPHOCYTES RELATIVE PERCENT: 17 % (ref 20–42)
MAGNESIUM SERPL-MCNC: 2.1 MG/DL (ref 1.6–2.6)
MCH RBC QN AUTO: 30.2 PG (ref 26–35)
MCHC RBC AUTO-ENTMCNC: 33.5 G/DL (ref 32–34.5)
MCV RBC AUTO: 90 FL (ref 80–99.9)
MONOCYTES NFR BLD: 0.52 K/UL (ref 0.1–0.95)
MONOCYTES NFR BLD: 7 % (ref 2–12)
NEUTROPHILS NFR BLD: 73 % (ref 43–80)
NEUTS SEG NFR BLD: 5.22 K/UL (ref 1.8–7.3)
PLATELET # BLD AUTO: 198 K/UL (ref 130–450)
PMV BLD AUTO: 9.9 FL (ref 7–12)
POTASSIUM SERPL-SCNC: 3.9 MMOL/L (ref 3.5–5)
PROT SERPL-MCNC: 7.4 G/DL (ref 6.4–8.3)
RBC # BLD AUTO: 4.41 M/UL (ref 3.5–5.5)
SODIUM SERPL-SCNC: 138 MMOL/L (ref 132–146)
WBC OTHER # BLD: 7.1 K/UL (ref 4.5–11.5)

## 2024-08-26 PROCEDURE — 36591 DRAW BLOOD OFF VENOUS DEVICE: CPT

## 2024-08-26 PROCEDURE — 83735 ASSAY OF MAGNESIUM: CPT

## 2024-08-26 PROCEDURE — 85025 COMPLETE CBC W/AUTO DIFF WBC: CPT

## 2024-08-26 PROCEDURE — 80053 COMPREHEN METABOLIC PANEL: CPT

## 2024-08-26 NOTE — PROGRESS NOTES
"Kurt Vick  is a 64 y.o. female who presents for evaluation of 7.3 cm right upper lobe lung cancer.    This patient presented with weight loss and cough in April 2023.  A biopsy of a 8.5 cm right upper lobe lung mass was performed on 4/8/2023 confirming a diagnosis of squamous cell carcinoma.  She was given a clinical stage of cT4 N2 M0.  She began treatment with carboplatin and paclitaxel on 5/20/2024 with a plan for \"consolidative immunotherapy\".  It.  She received 6 cycles of CarboTaxol and Radiation (ending 6/25/24) and then began cycle 1 of nivolumab on 7/30/2024, completing her third dose 8/28/2024.  She was evaluated by a thoracic surgeon who felt she \"deserves a shot at resection\" with planned right thoracotomy and right upper lobe lobectomy versus pneumonectomy on 9/4/2024.    Currently the patient is in their usual state of health. She denies the following symptoms: chest pain, shortness of breath at rest, shortness of breath with activity, cough, hemoptysis, fevers, chills, and weight loss.      Medical history is notable for no history of MI, CVA or other major cardiovascular disease. She has no history of prior chest surgery.   She has no history of prior cancer.  She has no first degree relatives with lung cancer.     She quit smoking in the summer 2024    Objective   Physical Exam  The patient is well-appearing and in no acute distress. The trachea is midline and there is no crepitus. The lungs were clear to auscultation grossly. There was good effort and excursion. The heart had a regular rate and rhythm. The abdomen was soft, nontender and nondistended. The extremities had no edema or gross deformities. Mood and affect are appropriate.  Diagnostic Studies  I reviewed the test results described in the HPI  PFTs performed 8/15/2024 suggest an FEV1 of 71% predicted and DLCO of 83% predicted    PET/CT performed 8/15/2024 show partial response with decrease in size and metabolic " activity of a 6.3 cm cavitary right upper lobe mass with residual hypermetabolic activity in the right hilar lymph nodes and no evidence of new hypermetabolic metastatic disease.  Assessment/Plan   Overall, I believe that the patient has a diagnosis of cT4 N2 M0 lung cancer status post concurrent chemoradiotherapy and currently receiving immunotherapy .     This patient has a large complex tuner and PET imaging suggested mediastinal lymph node metastasis.  In the setting of concurrent chemoradiation and immunotherapy, the patient has had a significant radiographic response with resolution of her mediastinal lymph node FDG avidity and  decrease in size of her primary cancer of the right upper lung fields, albeit with some residual FDG avid mass in that area.    At this point, management of the patient's condition is complex.  I concur with the surgeon who saw and that her young age and excellent performance status suggest she would be low risk for surgical resection.  Furthermore, the location of the tumor and the patient's previous treatment make the determination if a lobectomy or pneumonectomy would be required difficult.  My guess is that this could be performed with a lobectomy, but this may be very difficult to know until the time of surgery.  Certainly, I would prefer to avoid pneumonectomy if at all technically feasible based on historical concerns regarding right pneumonectomy after treatment with chemotherapy and radiation.  I also believe she is relatively likely to have persistent cancer based on her recent radiographic imaging, however, PET/CT can overestimate the presence of cancer in the setting of immunotherapy.  Before the patient could receive surgical resection, I would recommend she receive a VQ scan and echocardiogram to determine if she is a candidate for surgical resection.    We discussed her current situation and her options for treatment in some detail.  I believe her treatment options could  "include ongoing treatment with immunotherapy, similar to the [place] trial.  If her radiographic imaging suggested ongoing appropriate response, she could theoretically avoid surgical intervention as many people have prolonged survival with this treatment.   if in radiographic follow-up, her cancer did recur, she could be evaluated for \"salvage\" resection at that time.  Certainly it is possible that her disease would recur in a way that surgery would not not be feasible.    Alternatively, she could receive surgery at this time under the rubric that the treatment she has received to date was \"neoadjuvant\" in nature.  Although the regiment she has received does not typical for neoadjuvant treatment, multimodality treatment prior to surgical resection is often associated with improved R0 resection rate and improved long-term survival.  That being said, I believe the ideal timing of this operation would be 4 to 6 weeks after her last dose of immunotherapy, which was delivered earlier this week.    After this discussion and discussing these alternatives at significant length, the patient and her family wanted to discuss her treatment prior to making any decisions regarding surgery or other treatments at this time.  Certainly if she is interested in surgery by me, I will not commit to her being a surgical candidate until I reviewed a VQ scan and an echocardiogram to evaluate for right heart function (given her possible need for pneumonectomy)    I recommend  ongoing considerations of treatment    I discussed this in detail with the patient, including a discussion of alternatives. They were comfortable with this approach.     Jonah Wagner MD  [phone]    The patient has asked for an addendum to this note:  *Date of diagnosis of lung cancer is wrong:  correct date is April 15, 2024   *Biopsy date is incorrect:  should be April 18, 2024   *Note states she did not have any previous cancers, but she says she had a " small skin cancer removed from her nose October 2022.        complains of pain/discomfort

## 2024-08-27 ENCOUNTER — OFFICE VISIT (OUTPATIENT)
Dept: ONCOLOGY | Age: 64
End: 2024-08-27
Payer: COMMERCIAL

## 2024-08-27 ENCOUNTER — HOSPITAL ENCOUNTER (OUTPATIENT)
Dept: INFUSION THERAPY | Age: 64
Discharge: HOME | End: 2024-08-27
Payer: COMMERCIAL

## 2024-08-27 VITALS
DIASTOLIC BLOOD PRESSURE: 65 MMHG | SYSTOLIC BLOOD PRESSURE: 101 MMHG | RESPIRATION RATE: 16 BRPM | HEART RATE: 78 BPM | OXYGEN SATURATION: 95 % | TEMPERATURE: 98.1 F

## 2024-08-27 VITALS
DIASTOLIC BLOOD PRESSURE: 72 MMHG | HEIGHT: 65 IN | WEIGHT: 124 LBS | TEMPERATURE: 97.8 F | HEART RATE: 92 BPM | OXYGEN SATURATION: 98 % | BODY MASS INDEX: 20.66 KG/M2 | SYSTOLIC BLOOD PRESSURE: 126 MMHG

## 2024-08-27 DIAGNOSIS — C34.11 MALIGNANT NEOPLASM OF UPPER LOBE OF RIGHT LUNG (HCC): Primary | ICD-10-CM

## 2024-08-27 PROCEDURE — 99214 OFFICE O/P EST MOD 30 MIN: CPT | Performed by: STUDENT IN AN ORGANIZED HEALTH CARE EDUCATION/TRAINING PROGRAM

## 2024-08-27 PROCEDURE — 96413 CHEMO IV INFUSION 1 HR: CPT

## 2024-08-27 PROCEDURE — 6360000002 HC RX W HCPCS: Performed by: STUDENT IN AN ORGANIZED HEALTH CARE EDUCATION/TRAINING PROGRAM

## 2024-08-27 PROCEDURE — 2580000003 HC RX 258: Performed by: STUDENT IN AN ORGANIZED HEALTH CARE EDUCATION/TRAINING PROGRAM

## 2024-08-27 RX ORDER — ONDANSETRON 2 MG/ML
8 INJECTION INTRAMUSCULAR; INTRAVENOUS
Status: CANCELLED | OUTPATIENT
Start: 2024-08-27

## 2024-08-27 RX ORDER — FAMOTIDINE 10 MG/ML
20 INJECTION, SOLUTION INTRAVENOUS
Status: CANCELLED | OUTPATIENT
Start: 2024-08-27

## 2024-08-27 RX ORDER — SODIUM CHLORIDE 9 MG/ML
5-250 INJECTION, SOLUTION INTRAVENOUS PRN
Status: CANCELLED | OUTPATIENT
Start: 2024-08-27

## 2024-08-27 RX ORDER — SODIUM CHLORIDE 0.9 % (FLUSH) 0.9 %
5-40 SYRINGE (ML) INJECTION PRN
Status: DISCONTINUED | OUTPATIENT
Start: 2024-08-27 | End: 2024-08-28 | Stop reason: HOSPADM

## 2024-08-27 RX ORDER — SODIUM CHLORIDE 0.9 % (FLUSH) 0.9 %
5-40 SYRINGE (ML) INJECTION PRN
Status: CANCELLED | OUTPATIENT
Start: 2024-08-27

## 2024-08-27 RX ORDER — SODIUM CHLORIDE 9 MG/ML
5-250 INJECTION, SOLUTION INTRAVENOUS PRN
Status: DISCONTINUED | OUTPATIENT
Start: 2024-08-27 | End: 2024-08-28 | Stop reason: HOSPADM

## 2024-08-27 RX ORDER — MEPERIDINE HYDROCHLORIDE 50 MG/ML
12.5 INJECTION INTRAMUSCULAR; INTRAVENOUS; SUBCUTANEOUS PRN
Status: CANCELLED | OUTPATIENT
Start: 2024-08-27

## 2024-08-27 RX ORDER — HEPARIN SODIUM (PORCINE) LOCK FLUSH IV SOLN 100 UNIT/ML 100 UNIT/ML
500 SOLUTION INTRAVENOUS PRN
Status: CANCELLED | OUTPATIENT
Start: 2024-08-27

## 2024-08-27 RX ORDER — ALBUTEROL SULFATE 90 UG/1
4 AEROSOL, METERED RESPIRATORY (INHALATION) PRN
Status: CANCELLED | OUTPATIENT
Start: 2024-08-27

## 2024-08-27 RX ORDER — DIPHENHYDRAMINE HYDROCHLORIDE 50 MG/ML
50 INJECTION INTRAMUSCULAR; INTRAVENOUS
Status: CANCELLED | OUTPATIENT
Start: 2024-08-27

## 2024-08-27 RX ORDER — SODIUM CHLORIDE 9 MG/ML
INJECTION, SOLUTION INTRAVENOUS CONTINUOUS
Status: CANCELLED | OUTPATIENT
Start: 2024-08-27

## 2024-08-27 RX ORDER — EPINEPHRINE 1 MG/ML
0.3 INJECTION, SOLUTION, CONCENTRATE INTRAVENOUS PRN
Status: CANCELLED | OUTPATIENT
Start: 2024-08-27

## 2024-08-27 RX ORDER — ACETAMINOPHEN 325 MG/1
650 TABLET ORAL
Status: CANCELLED | OUTPATIENT
Start: 2024-08-27

## 2024-08-27 RX ORDER — HEPARIN 100 UNIT/ML
500 SYRINGE INTRAVENOUS PRN
Status: DISCONTINUED | OUTPATIENT
Start: 2024-08-27 | End: 2024-08-28 | Stop reason: HOSPADM

## 2024-08-27 RX ADMIN — Medication 500 UNITS: at 11:30

## 2024-08-27 RX ADMIN — SODIUM CHLORIDE, PRESERVATIVE FREE 10 ML: 5 INJECTION INTRAVENOUS at 11:30

## 2024-08-27 RX ADMIN — SODIUM CHLORIDE 500 MG: 9 INJECTION, SOLUTION INTRAVENOUS at 10:15

## 2024-08-27 RX ADMIN — SODIUM CHLORIDE 100 ML/HR: 9 INJECTION, SOLUTION INTRAVENOUS at 09:54

## 2024-08-27 NOTE — PROGRESS NOTES
[facility] [place] [facility] MEDICAL ONCOLOGY  [address] [address]  Dept: [phone]  Loc: [phone]    Clinic Progress Note      Date of Encounter: 08/27/2024     Referring Provider:  Celso Meier MD     Reason for Visit:   Squamous cell lung cancer, locally advanced        PCP:  Yue Nobles DO    Demographics: 64 y.o. female    Chief Complaint   Patient presents with    Lung Cancer         Subjective:  Pt doing well. No major events.   When having BM's may have intermittent blood in stool on toilet paper along with small amount of muucus; papers elida bleeding, abdominal pain or diarrhea.       HPI from Initial Outpatient Consultation (4/29/2024):  The patient is a 64 y.o. female and is coming in, establishing at our clinic, after we first met patient on inpatient consultation for a right sided lung mass.  She was admitted at [facility] on 4/15/2024 due to weight loss, and cough.  She was found to have an 8.5 cm right upper lobe mass with central necrosis, and had undergone biopsy of the lung on 4/8/2023, confirming her diagnosis.      Oncology History   Squamous cell carcinoma of right lung (HCC)   4/18/2023 Biopsy    -- Diagnosis --   Right lung, core biopsy: Invasive squamous cell carcinoma with   necrosis     -- Diagnosis Comment --   Intradepartmental consultation is obtained.  PD-L1 testing has been   ordered per institution protocol and will be reported separately.     Jo Ann Clark MD   **Electronically Signed Out**         [facility]/4/22/2024      Clinical Information   Procedure; Right lung mass biopsy, 4 cores     Pre-Operative Diagnosis   Lung alyse, smoker       Source of Specimen   A: LUNG, TRANSBRONCHIAL BIOPSY, RIGHT     ==================================================    -- Diagnosis --   PD-L1 22C3 FDA analysis: High PD-L1 expressionTumor proportion score:   75%Intensity: 2+        4/15/2024 Imaging    CT  pneumothorax.     CT NEEDLE BIOPSY LUNG/MEDIASTINUM PERCUTANEOUS    Result Date: 4/18/2024  PROCEDURE: CT NEEDLE BIOPSY LUNG PERCUTANEOUS MODERATE CONSCIOUS SEDATION 4/18/2024 HISTORY: ORDERING SYSTEM PROVIDED HISTORY: CT guided biospy Right lung mass TECHNOLOGIST PROVIDED HISTORY: Reason for exam:->CT guided biospy Right lung mass TECHNIQUE: The patient was placed in the prone position and multiple unenhanced axial images of the mid and upper chest were performed. Automated exposure control, iterative reconstruction, and/or weight based adjustment of the mA/kV was utilized to reduce the radiation dose to as low as reasonably achievable. CONTRAST: None SEDATION: 0.5 mg versed and 25 mcg fentanyl were titrated intravenously for moderate sedation monitored under my direction.  Total intraservice time of sedation was 15 minutes.  The patient's vital signs were monitored throughout the procedure and recorded in the patient's medical record by the nurse. DESCRIPTION OF PROCEDURE: Informed consent was obtained after a detailed explanation of the procedure including risks, benefits, and alternatives. Universal protocol was observed. Time-out was called and the patient's order and identity were verified. Under sterile conditions and CT guidance, a 17 gauge guiding needle was placed into the peripheral portion of a large right upper lobe mass.  4 core biopsy specimens were obtained with an 18 gauge biopsy gun.  The needle was removed and hemostasis was achieved with manual compression.  Patient tolerated the procedure well. FINDINGS: Intra procedural images demonstrate good needle position within the right upper lobe mass.  Post biopsy images show no significant hemorrhage about the biopsy site.     Status post CT-guided core biopsy of right upper lobe mass.     XR CHEST INSPIRATION AND EXPIRATION    Result Date: 4/18/2024  EXAMINATION: ONE XRAY VIEW OF THE CHEST IN INSPIRATION 4/18/2024 9:34 am COMPARISON: 04/15/2024

## 2024-08-29 ENCOUNTER — OFFICE VISIT (OUTPATIENT)
Dept: SURGERY | Facility: HOSPITAL | Age: 64
End: 2024-08-29
Payer: COMMERCIAL

## 2024-08-29 ENCOUNTER — HOSPITAL ENCOUNTER (OUTPATIENT)
Dept: PREADMISSION TESTING | Age: 64
Discharge: HOME | End: 2024-08-29

## 2024-08-29 VITALS
DIASTOLIC BLOOD PRESSURE: 65 MMHG | RESPIRATION RATE: 22 BRPM | OXYGEN SATURATION: 99 % | TEMPERATURE: 98.4 F | HEART RATE: 78 BPM | SYSTOLIC BLOOD PRESSURE: 127 MMHG | WEIGHT: 121.03 LBS

## 2024-08-29 DIAGNOSIS — C34.11 PRIMARY CANCER OF RIGHT UPPER LOBE OF LUNG (MULTI): Primary | ICD-10-CM

## 2024-08-29 PROCEDURE — 99205 OFFICE O/P NEW HI 60 MIN: CPT | Performed by: THORACIC SURGERY (CARDIOTHORACIC VASCULAR SURGERY)

## 2024-08-29 PROCEDURE — 1036F TOBACCO NON-USER: CPT | Performed by: THORACIC SURGERY (CARDIOTHORACIC VASCULAR SURGERY)

## 2024-08-29 PROCEDURE — 99215 OFFICE O/P EST HI 40 MIN: CPT | Performed by: THORACIC SURGERY (CARDIOTHORACIC VASCULAR SURGERY)

## 2024-08-29 ASSESSMENT — PATIENT HEALTH QUESTIONNAIRE - PHQ9
1. LITTLE INTEREST OR PLEASURE IN DOING THINGS: NOT AT ALL
SUM OF ALL RESPONSES TO PHQ9 QUESTIONS 1 AND 2: 0
2. FEELING DOWN, DEPRESSED OR HOPELESS: NOT AT ALL

## 2024-08-29 ASSESSMENT — PAIN SCALES - GENERAL: PAINLEVEL: 0-NO PAIN

## 2024-09-05 ENCOUNTER — HOSPITAL ENCOUNTER (OUTPATIENT)
Dept: RADIATION ONCOLOGY | Age: 64
Discharge: HOME | End: 2024-09-05

## 2024-09-05 VITALS — BODY MASS INDEX: 20.7 KG/M2 | WEIGHT: 124.38 LBS

## 2024-09-05 DIAGNOSIS — C34.91 SQUAMOUS CELL CARCINOMA OF RIGHT LUNG (HCC): Primary | ICD-10-CM

## 2024-09-05 NOTE — PROGRESS NOTES
RADIATION ONCOLOGY- [facility]   6 week follow up       9/5/2024      NAME:  Mary Purdy    YOB: 1960    Diagnosis:  Lung cancer.     Subjective:  On 07/01/2024, Mary Purdy completed 6000 cGy in 30 fractions directed to the RUL + nodes.     The patient is seen today in 6 week post radiation therapy completion symptom check. The patient denies skin complaints or pain complaints to treatment site. The patient denies SOB, wheezing or productive cough. No fevers or chills. No nausea or vomiting. No abdominal pain or diarrhea.       Patient is following with:    Medical Oncology- Dr. Haynes. Next visit 09/10/2024.     Pulmonology- Dr. Browning. Next visit 10/03/2024.     Primary Care- Dr. Adlofo Zurita.      Pain: No pain complaint.     Past medical, surgical, social and family histories reviewed and updated as indicated.    ALLERGIES:  Patient has no known allergies.         Current Outpatient Medications   Medication Sig Dispense Refill    fluticasone-umeclidin-vilant (TRELEGY ELLIPTA) 100-62.5-25 MCG/ACT AEPB inhaler Inhale 1 puff into the lungs daily 1 each 3    ALPRAZolam (XANAX) 1 MG tablet Take 1 tablet by mouth nightly as needed for Sleep for up to 30 days. Max Daily Amount: 1 mg 30 tablet 0    sucralfate (CARAFATE) 1 GM/10ML suspension Take 10 mLs by mouth 4 times daily One hour after meals and at bedtime (Patient not taking: Reported on 9/5/2024) 400 mL 2    ondansetron (ZOFRAN-ODT) 8 MG TBDP disintegrating tablet Place 1 tablet under the tongue every 8 hours as needed for Nausea or Vomiting (Patient not taking: Reported on 9/5/2024) 30 tablet 2    prochlorperazine (COMPAZINE) 10 MG tablet Take 1 tablet by mouth every 6 hours as needed (nausea) (Patient not taking: Reported on 9/5/2024) 50 tablet 2    albuterol sulfate HFA (PROVENTIL HFA) 108 (90 Base) MCG/ACT inhaler Inhale 2 puffs into the lungs every 6 hours as needed for Wheezing (Patient not taking: Reported on 9/5/2024) 18 g 3

## 2024-09-09 ENCOUNTER — HOSPITAL ENCOUNTER (OUTPATIENT)
Dept: INFUSION THERAPY | Age: 64
Discharge: HOME | End: 2024-09-09
Payer: COMMERCIAL

## 2024-09-09 DIAGNOSIS — C34.91 SQUAMOUS CELL CARCINOMA OF RIGHT LUNG (HCC): ICD-10-CM

## 2024-09-09 DIAGNOSIS — C34.11 MALIGNANT NEOPLASM OF UPPER LOBE OF RIGHT LUNG (HCC): Primary | ICD-10-CM

## 2024-09-09 DIAGNOSIS — C34.91 SQUAMOUS CELL CARCINOMA OF RIGHT LUNG (MULTI): Primary | ICD-10-CM

## 2024-09-09 LAB
ALBUMIN SERPL-MCNC: 4.1 G/DL (ref 3.5–5.2)
ALP SERPL-CCNC: 96 U/L (ref 35–104)
ALT SERPL-CCNC: 11 U/L (ref 0–32)
ANION GAP SERPL CALCULATED.3IONS-SCNC: 10 MMOL/L (ref 7–16)
AST SERPL-CCNC: 19 U/L (ref 0–31)
BASOPHILS # BLD: 0.02 K/UL (ref 0–0.2)
BASOPHILS NFR BLD: 0 % (ref 0–2)
BILIRUB SERPL-MCNC: 0.6 MG/DL (ref 0–1.2)
BUN SERPL-MCNC: 12 MG/DL (ref 6–23)
CALCIUM SERPL-MCNC: 9.3 MG/DL (ref 8.6–10.2)
CHLORIDE SERPL-SCNC: 99 MMOL/L (ref 98–107)
CO2 SERPL-SCNC: 27 MMOL/L (ref 22–29)
CREAT SERPL-MCNC: 0.5 MG/DL (ref 0.5–1)
EOSINOPHIL # BLD: 0.05 K/UL (ref 0.05–0.5)
EOSINOPHILS RELATIVE PERCENT: 1 % (ref 0–6)
ERYTHROCYTE [DISTWIDTH] IN BLOOD BY AUTOMATED COUNT: 13.2 % (ref 11.5–15)
GFR, ESTIMATED: >90 ML/MIN/1.73M2
GLUCOSE SERPL-MCNC: 86 MG/DL (ref 74–99)
HCT VFR BLD AUTO: 39.6 % (ref 34–48)
HGB BLD-MCNC: 13 G/DL (ref 11.5–15.5)
IMM GRANULOCYTES # BLD AUTO: 0.03 K/UL (ref 0–0.58)
IMM GRANULOCYTES NFR BLD: 0 % (ref 0–5)
LYMPHOCYTES NFR BLD: 1.3 K/UL (ref 1.5–4)
LYMPHOCYTES RELATIVE PERCENT: 18 % (ref 20–42)
MAGNESIUM SERPL-MCNC: 2.2 MG/DL (ref 1.6–2.6)
MCH RBC QN AUTO: 30.2 PG (ref 26–35)
MCHC RBC AUTO-ENTMCNC: 32.8 G/DL (ref 32–34.5)
MCV RBC AUTO: 92.1 FL (ref 80–99.9)
MONOCYTES NFR BLD: 0.59 K/UL (ref 0.1–0.95)
MONOCYTES NFR BLD: 8 % (ref 2–12)
NEUTROPHILS NFR BLD: 73 % (ref 43–80)
NEUTS SEG NFR BLD: 5.32 K/UL (ref 1.8–7.3)
PLATELET # BLD AUTO: 186 K/UL (ref 130–450)
PMV BLD AUTO: 9.7 FL (ref 7–12)
POTASSIUM SERPL-SCNC: 4.3 MMOL/L (ref 3.5–5)
PROT SERPL-MCNC: 7.1 G/DL (ref 6.4–8.3)
RBC # BLD AUTO: 4.3 M/UL (ref 3.5–5.5)
SODIUM SERPL-SCNC: 136 MMOL/L (ref 132–146)
WBC OTHER # BLD: 7.3 K/UL (ref 4.5–11.5)

## 2024-09-09 PROCEDURE — 83735 ASSAY OF MAGNESIUM: CPT

## 2024-09-09 PROCEDURE — 80053 COMPREHEN METABOLIC PANEL: CPT

## 2024-09-09 PROCEDURE — 6360000002 HC RX W HCPCS: Performed by: STUDENT IN AN ORGANIZED HEALTH CARE EDUCATION/TRAINING PROGRAM

## 2024-09-09 PROCEDURE — 36591 DRAW BLOOD OFF VENOUS DEVICE: CPT

## 2024-09-09 PROCEDURE — 2580000003 HC RX 258: Performed by: STUDENT IN AN ORGANIZED HEALTH CARE EDUCATION/TRAINING PROGRAM

## 2024-09-09 PROCEDURE — 85025 COMPLETE CBC W/AUTO DIFF WBC: CPT

## 2024-09-09 RX ORDER — SODIUM CHLORIDE 0.9 % (FLUSH) 0.9 %
5-40 SYRINGE (ML) INJECTION PRN
Status: DISCONTINUED | OUTPATIENT
Start: 2024-09-09 | End: 2024-09-10 | Stop reason: HOSPADM

## 2024-09-09 RX ORDER — HEPARIN 100 UNIT/ML
500 SYRINGE INTRAVENOUS PRN
Status: DISCONTINUED | OUTPATIENT
Start: 2024-09-09 | End: 2024-09-10 | Stop reason: HOSPADM

## 2024-09-09 RX ORDER — SODIUM CHLORIDE 0.9 % (FLUSH) 0.9 %
5-40 SYRINGE (ML) INJECTION PRN
OUTPATIENT
Start: 2024-09-09

## 2024-09-09 RX ORDER — HEPARIN 100 UNIT/ML
500 SYRINGE INTRAVENOUS PRN
OUTPATIENT
Start: 2024-09-09

## 2024-09-09 RX ORDER — SODIUM CHLORIDE 9 MG/ML
25 INJECTION, SOLUTION INTRAVENOUS PRN
OUTPATIENT
Start: 2024-09-09

## 2024-09-09 RX ADMIN — SODIUM CHLORIDE, PRESERVATIVE FREE 10 ML: 5 INJECTION INTRAVENOUS at 14:02

## 2024-09-09 RX ADMIN — SODIUM CHLORIDE, PRESERVATIVE FREE 10 ML: 5 INJECTION INTRAVENOUS at 14:00

## 2024-09-09 RX ADMIN — Medication 500 UNITS: at 14:00

## 2024-09-10 ENCOUNTER — HOSPITAL ENCOUNTER (OUTPATIENT)
Dept: INFUSION THERAPY | Age: 64
Discharge: HOME | End: 2024-09-10
Payer: COMMERCIAL

## 2024-09-10 ENCOUNTER — OFFICE VISIT (OUTPATIENT)
Dept: ONCOLOGY | Age: 64
End: 2024-09-10
Payer: COMMERCIAL

## 2024-09-10 VITALS
HEART RATE: 90 BPM | SYSTOLIC BLOOD PRESSURE: 114 MMHG | TEMPERATURE: 97.7 F | DIASTOLIC BLOOD PRESSURE: 75 MMHG | OXYGEN SATURATION: 98 %

## 2024-09-10 VITALS
HEIGHT: 65 IN | HEART RATE: 91 BPM | DIASTOLIC BLOOD PRESSURE: 73 MMHG | OXYGEN SATURATION: 97 % | BODY MASS INDEX: 20.49 KG/M2 | WEIGHT: 123 LBS | SYSTOLIC BLOOD PRESSURE: 111 MMHG | TEMPERATURE: 97.9 F

## 2024-09-10 DIAGNOSIS — C34.11 MALIGNANT NEOPLASM OF UPPER LOBE OF RIGHT LUNG (HCC): Primary | ICD-10-CM

## 2024-09-10 PROCEDURE — 99214 OFFICE O/P EST MOD 30 MIN: CPT | Performed by: STUDENT IN AN ORGANIZED HEALTH CARE EDUCATION/TRAINING PROGRAM

## 2024-09-10 PROCEDURE — 6360000002 HC RX W HCPCS: Performed by: STUDENT IN AN ORGANIZED HEALTH CARE EDUCATION/TRAINING PROGRAM

## 2024-09-10 PROCEDURE — 2580000003 HC RX 258: Performed by: STUDENT IN AN ORGANIZED HEALTH CARE EDUCATION/TRAINING PROGRAM

## 2024-09-10 PROCEDURE — 96413 CHEMO IV INFUSION 1 HR: CPT

## 2024-09-10 RX ORDER — SODIUM CHLORIDE 9 MG/ML
5-250 INJECTION, SOLUTION INTRAVENOUS PRN
Status: DISCONTINUED | OUTPATIENT
Start: 2024-09-10 | End: 2024-09-11 | Stop reason: HOSPADM

## 2024-09-10 RX ORDER — SODIUM CHLORIDE 0.9 % (FLUSH) 0.9 %
5-40 SYRINGE (ML) INJECTION PRN
Status: CANCELLED | OUTPATIENT
Start: 2024-09-10

## 2024-09-10 RX ORDER — SODIUM CHLORIDE 9 MG/ML
INJECTION, SOLUTION INTRAVENOUS CONTINUOUS
Status: CANCELLED | OUTPATIENT
Start: 2024-09-10

## 2024-09-10 RX ORDER — DIPHENHYDRAMINE HYDROCHLORIDE 50 MG/ML
50 INJECTION INTRAMUSCULAR; INTRAVENOUS
Status: CANCELLED | OUTPATIENT
Start: 2024-09-10

## 2024-09-10 RX ORDER — HEPARIN SODIUM (PORCINE) LOCK FLUSH IV SOLN 100 UNIT/ML 100 UNIT/ML
500 SOLUTION INTRAVENOUS PRN
Status: CANCELLED | OUTPATIENT
Start: 2024-09-10

## 2024-09-10 RX ORDER — ONDANSETRON 2 MG/ML
8 INJECTION INTRAMUSCULAR; INTRAVENOUS
Status: CANCELLED | OUTPATIENT
Start: 2024-09-10

## 2024-09-10 RX ORDER — FAMOTIDINE 10 MG/ML
20 INJECTION, SOLUTION INTRAVENOUS
Status: CANCELLED | OUTPATIENT
Start: 2024-09-10

## 2024-09-10 RX ORDER — SODIUM CHLORIDE 0.9 % (FLUSH) 0.9 %
5-40 SYRINGE (ML) INJECTION PRN
Status: DISCONTINUED | OUTPATIENT
Start: 2024-09-10 | End: 2024-09-11 | Stop reason: HOSPADM

## 2024-09-10 RX ORDER — SODIUM CHLORIDE 9 MG/ML
5-250 INJECTION, SOLUTION INTRAVENOUS PRN
Status: CANCELLED | OUTPATIENT
Start: 2024-09-10

## 2024-09-10 RX ORDER — ACETAMINOPHEN 325 MG/1
650 TABLET ORAL
Status: CANCELLED | OUTPATIENT
Start: 2024-09-10

## 2024-09-10 RX ORDER — EPINEPHRINE 1 MG/ML
0.3 INJECTION, SOLUTION, CONCENTRATE INTRAVENOUS PRN
Status: CANCELLED | OUTPATIENT
Start: 2024-09-10

## 2024-09-10 RX ORDER — MEPERIDINE HYDROCHLORIDE 50 MG/ML
12.5 INJECTION INTRAMUSCULAR; INTRAVENOUS; SUBCUTANEOUS PRN
Status: CANCELLED | OUTPATIENT
Start: 2024-09-10

## 2024-09-10 RX ORDER — HEPARIN 100 UNIT/ML
500 SYRINGE INTRAVENOUS PRN
Status: DISCONTINUED | OUTPATIENT
Start: 2024-09-10 | End: 2024-09-11 | Stop reason: HOSPADM

## 2024-09-10 RX ORDER — ALBUTEROL SULFATE 90 UG/1
4 AEROSOL, METERED RESPIRATORY (INHALATION) PRN
Status: CANCELLED | OUTPATIENT
Start: 2024-09-10

## 2024-09-10 RX ADMIN — SODIUM CHLORIDE 100 ML/HR: 9 INJECTION, SOLUTION INTRAVENOUS at 13:06

## 2024-09-10 RX ADMIN — SODIUM CHLORIDE 500 MG: 9 INJECTION, SOLUTION INTRAVENOUS at 13:09

## 2024-09-19 ENCOUNTER — HOSPITAL ENCOUNTER (OUTPATIENT)
Dept: RADIOLOGY | Facility: HOSPITAL | Age: 64
Discharge: HOME | End: 2024-09-19
Payer: COMMERCIAL

## 2024-09-19 DIAGNOSIS — C34.91 SQUAMOUS CELL CARCINOMA OF RIGHT LUNG (MULTI): ICD-10-CM

## 2024-09-19 PROCEDURE — 78597 LUNG PERFUSION DIFFERENTIAL: CPT

## 2024-09-19 PROCEDURE — A9540 TC99M MAA: HCPCS | Performed by: THORACIC SURGERY (CARDIOTHORACIC VASCULAR SURGERY)

## 2024-09-19 PROCEDURE — 3430000001 HC RX 343 DIAGNOSTIC RADIOPHARMACEUTICALS: Performed by: THORACIC SURGERY (CARDIOTHORACIC VASCULAR SURGERY)

## 2024-09-23 ENCOUNTER — HOSPITAL ENCOUNTER (OUTPATIENT)
Dept: INFUSION THERAPY | Age: 64
Discharge: HOME | End: 2024-09-23
Payer: COMMERCIAL

## 2024-09-23 DIAGNOSIS — C34.91 SQUAMOUS CELL CARCINOMA OF RIGHT LUNG (HCC): ICD-10-CM

## 2024-09-23 DIAGNOSIS — C34.11 MALIGNANT NEOPLASM OF UPPER LOBE OF RIGHT LUNG (HCC): Primary | ICD-10-CM

## 2024-09-23 LAB
ALBUMIN SERPL-MCNC: 4 G/DL (ref 3.5–5.2)
ALP SERPL-CCNC: 104 U/L (ref 35–104)
ALT SERPL-CCNC: 9 U/L (ref 0–32)
ANION GAP SERPL CALCULATED.3IONS-SCNC: 12 MMOL/L (ref 7–16)
AST SERPL-CCNC: 17 U/L (ref 0–31)
BASOPHILS # BLD: 0.02 K/UL (ref 0–0.2)
BASOPHILS NFR BLD: 0 % (ref 0–2)
BILIRUB SERPL-MCNC: 0.4 MG/DL (ref 0–1.2)
BUN SERPL-MCNC: 10 MG/DL (ref 6–23)
CALCIUM SERPL-MCNC: 8.7 MG/DL (ref 8.6–10.2)
CHLORIDE SERPL-SCNC: 100 MMOL/L (ref 98–107)
CO2 SERPL-SCNC: 26 MMOL/L (ref 22–29)
CREAT SERPL-MCNC: 0.5 MG/DL (ref 0.5–1)
EOSINOPHIL # BLD: 0.11 K/UL (ref 0.05–0.5)
EOSINOPHILS RELATIVE PERCENT: 2 % (ref 0–6)
ERYTHROCYTE [DISTWIDTH] IN BLOOD BY AUTOMATED COUNT: 12 % (ref 11.5–15)
GFR, ESTIMATED: >90 ML/MIN/1.73M2
GLUCOSE SERPL-MCNC: 88 MG/DL (ref 74–99)
HCT VFR BLD AUTO: 38 % (ref 34–48)
HGB BLD-MCNC: 12.4 G/DL (ref 11.5–15.5)
IMM GRANULOCYTES # BLD AUTO: <0.03 K/UL (ref 0–0.58)
IMM GRANULOCYTES NFR BLD: 0 % (ref 0–5)
LYMPHOCYTES NFR BLD: 1.2 K/UL (ref 1.5–4)
LYMPHOCYTES RELATIVE PERCENT: 17 % (ref 20–42)
MAGNESIUM SERPL-MCNC: 2 MG/DL (ref 1.6–2.6)
MCH RBC QN AUTO: 30.2 PG (ref 26–35)
MCHC RBC AUTO-ENTMCNC: 32.6 G/DL (ref 32–34.5)
MCV RBC AUTO: 92.5 FL (ref 80–99.9)
MONOCYTES NFR BLD: 0.6 K/UL (ref 0.1–0.95)
MONOCYTES NFR BLD: 8 % (ref 2–12)
NEUTROPHILS NFR BLD: 73 % (ref 43–80)
NEUTS SEG NFR BLD: 5.29 K/UL (ref 1.8–7.3)
PLATELET # BLD AUTO: 172 K/UL (ref 130–450)
PMV BLD AUTO: 10.2 FL (ref 7–12)
POTASSIUM SERPL-SCNC: 4.2 MMOL/L (ref 3.5–5)
PROT SERPL-MCNC: 7 G/DL (ref 6.4–8.3)
RBC # BLD AUTO: 4.11 M/UL (ref 3.5–5.5)
SODIUM SERPL-SCNC: 138 MMOL/L (ref 132–146)
WBC OTHER # BLD: 7.2 K/UL (ref 4.5–11.5)

## 2024-09-23 PROCEDURE — 85025 COMPLETE CBC W/AUTO DIFF WBC: CPT

## 2024-09-23 PROCEDURE — 83735 ASSAY OF MAGNESIUM: CPT

## 2024-09-23 PROCEDURE — 2580000003 HC RX 258: Performed by: STUDENT IN AN ORGANIZED HEALTH CARE EDUCATION/TRAINING PROGRAM

## 2024-09-23 PROCEDURE — 80053 COMPREHEN METABOLIC PANEL: CPT

## 2024-09-23 PROCEDURE — 36591 DRAW BLOOD OFF VENOUS DEVICE: CPT

## 2024-09-23 PROCEDURE — 6360000002 HC RX W HCPCS: Performed by: STUDENT IN AN ORGANIZED HEALTH CARE EDUCATION/TRAINING PROGRAM

## 2024-09-23 RX ORDER — HEPARIN 100 UNIT/ML
500 SYRINGE INTRAVENOUS PRN
OUTPATIENT
Start: 2024-09-23

## 2024-09-23 RX ORDER — SODIUM CHLORIDE 0.9 % (FLUSH) 0.9 %
5-40 SYRINGE (ML) INJECTION PRN
OUTPATIENT
Start: 2024-09-23

## 2024-09-23 RX ORDER — SODIUM CHLORIDE 9 MG/ML
25 INJECTION, SOLUTION INTRAVENOUS PRN
OUTPATIENT
Start: 2024-09-23

## 2024-09-23 RX ORDER — HEPARIN 100 UNIT/ML
500 SYRINGE INTRAVENOUS PRN
Status: DISCONTINUED | OUTPATIENT
Start: 2024-09-23 | End: 2024-09-24 | Stop reason: HOSPADM

## 2024-09-23 RX ORDER — SODIUM CHLORIDE 0.9 % (FLUSH) 0.9 %
5-40 SYRINGE (ML) INJECTION PRN
Status: DISCONTINUED | OUTPATIENT
Start: 2024-09-23 | End: 2024-09-24 | Stop reason: HOSPADM

## 2024-09-23 RX ADMIN — SODIUM CHLORIDE, PRESERVATIVE FREE 10 ML: 5 INJECTION INTRAVENOUS at 14:07

## 2024-09-23 RX ADMIN — Medication 500 UNITS: at 14:07

## 2024-09-23 RX ADMIN — SODIUM CHLORIDE, PRESERVATIVE FREE 10 ML: 5 INJECTION INTRAVENOUS at 14:06

## 2024-09-23 RX ADMIN — SODIUM CHLORIDE, PRESERVATIVE FREE 10 ML: 5 INJECTION INTRAVENOUS at 14:05

## 2024-09-24 ENCOUNTER — OFFICE VISIT (OUTPATIENT)
Dept: ONCOLOGY | Age: 64
End: 2024-09-24
Payer: COMMERCIAL

## 2024-09-24 ENCOUNTER — HOSPITAL ENCOUNTER (OUTPATIENT)
Dept: INFUSION THERAPY | Age: 64
Discharge: HOME | End: 2024-09-24
Payer: COMMERCIAL

## 2024-09-24 VITALS
HEART RATE: 80 BPM | SYSTOLIC BLOOD PRESSURE: 109 MMHG | TEMPERATURE: 97.5 F | RESPIRATION RATE: 16 BRPM | OXYGEN SATURATION: 95 % | DIASTOLIC BLOOD PRESSURE: 69 MMHG

## 2024-09-24 VITALS
HEART RATE: 94 BPM | BODY MASS INDEX: 20.83 KG/M2 | OXYGEN SATURATION: 95 % | HEIGHT: 65 IN | WEIGHT: 125 LBS | TEMPERATURE: 97.8 F | DIASTOLIC BLOOD PRESSURE: 74 MMHG | SYSTOLIC BLOOD PRESSURE: 120 MMHG

## 2024-09-24 DIAGNOSIS — C34.11 MALIGNANT NEOPLASM OF UPPER LOBE OF RIGHT LUNG (HCC): Primary | ICD-10-CM

## 2024-09-24 PROCEDURE — 6360000002 HC RX W HCPCS: Performed by: STUDENT IN AN ORGANIZED HEALTH CARE EDUCATION/TRAINING PROGRAM

## 2024-09-24 PROCEDURE — 99214 OFFICE O/P EST MOD 30 MIN: CPT | Performed by: STUDENT IN AN ORGANIZED HEALTH CARE EDUCATION/TRAINING PROGRAM

## 2024-09-24 PROCEDURE — 96413 CHEMO IV INFUSION 1 HR: CPT

## 2024-09-24 PROCEDURE — 2580000003 HC RX 258: Performed by: STUDENT IN AN ORGANIZED HEALTH CARE EDUCATION/TRAINING PROGRAM

## 2024-09-24 RX ORDER — SODIUM CHLORIDE 9 MG/ML
5-250 INJECTION, SOLUTION INTRAVENOUS PRN
Status: CANCELLED | OUTPATIENT
Start: 2024-09-24

## 2024-09-24 RX ORDER — SODIUM CHLORIDE 0.9 % (FLUSH) 0.9 %
5-40 SYRINGE (ML) INJECTION PRN
Status: CANCELLED | OUTPATIENT
Start: 2024-09-24

## 2024-09-24 RX ORDER — ONDANSETRON 2 MG/ML
8 INJECTION INTRAMUSCULAR; INTRAVENOUS
Status: CANCELLED | OUTPATIENT
Start: 2024-09-24

## 2024-09-24 RX ORDER — MEPERIDINE HYDROCHLORIDE 50 MG/ML
12.5 INJECTION INTRAMUSCULAR; INTRAVENOUS; SUBCUTANEOUS PRN
Status: CANCELLED | OUTPATIENT
Start: 2024-09-24

## 2024-09-24 RX ORDER — SODIUM CHLORIDE 9 MG/ML
INJECTION, SOLUTION INTRAVENOUS CONTINUOUS
Status: CANCELLED | OUTPATIENT
Start: 2024-09-24

## 2024-09-24 RX ORDER — DIPHENHYDRAMINE HYDROCHLORIDE 50 MG/ML
50 INJECTION INTRAMUSCULAR; INTRAVENOUS
Status: CANCELLED | OUTPATIENT
Start: 2024-09-24

## 2024-09-24 RX ORDER — HEPARIN 100 UNIT/ML
500 SYRINGE INTRAVENOUS PRN
Status: DISCONTINUED | OUTPATIENT
Start: 2024-09-24 | End: 2024-09-25 | Stop reason: HOSPADM

## 2024-09-24 RX ORDER — SODIUM CHLORIDE 9 MG/ML
5-250 INJECTION, SOLUTION INTRAVENOUS PRN
Status: DISCONTINUED | OUTPATIENT
Start: 2024-09-24 | End: 2024-09-25 | Stop reason: HOSPADM

## 2024-09-24 RX ORDER — ACETAMINOPHEN 325 MG/1
650 TABLET ORAL
Status: CANCELLED | OUTPATIENT
Start: 2024-09-24

## 2024-09-24 RX ORDER — QUETIAPINE FUMARATE 25 MG/1
TABLET, FILM COATED ORAL
COMMUNITY
Start: 2024-09-12

## 2024-09-24 RX ORDER — HEPARIN SODIUM (PORCINE) LOCK FLUSH IV SOLN 100 UNIT/ML 100 UNIT/ML
500 SOLUTION INTRAVENOUS PRN
Status: CANCELLED | OUTPATIENT
Start: 2024-09-24

## 2024-09-24 RX ORDER — SODIUM CHLORIDE 0.9 % (FLUSH) 0.9 %
5-40 SYRINGE (ML) INJECTION PRN
Status: DISCONTINUED | OUTPATIENT
Start: 2024-09-24 | End: 2024-09-25 | Stop reason: HOSPADM

## 2024-09-24 RX ORDER — ALBUTEROL SULFATE 90 UG/1
4 INHALANT RESPIRATORY (INHALATION) PRN
Status: CANCELLED | OUTPATIENT
Start: 2024-09-24

## 2024-09-24 RX ORDER — FAMOTIDINE 10 MG/ML
20 INJECTION, SOLUTION INTRAVENOUS
Status: CANCELLED | OUTPATIENT
Start: 2024-09-24

## 2024-09-24 RX ORDER — EPINEPHRINE 1 MG/ML
0.3 INJECTION, SOLUTION, CONCENTRATE INTRAVENOUS PRN
Status: CANCELLED | OUTPATIENT
Start: 2024-09-24

## 2024-09-24 RX ADMIN — SODIUM CHLORIDE, PRESERVATIVE FREE 10 ML: 5 INJECTION INTRAVENOUS at 13:49

## 2024-09-24 RX ADMIN — Medication 500 UNITS: at 13:49

## 2024-09-24 RX ADMIN — SODIUM CHLORIDE 20 ML/HR: 9 INJECTION, SOLUTION INTRAVENOUS at 12:22

## 2024-09-24 RX ADMIN — SODIUM CHLORIDE 500 MG: 9 INJECTION, SOLUTION INTRAVENOUS at 12:41

## 2024-10-03 ENCOUNTER — HOSPITAL ENCOUNTER (OUTPATIENT)
Dept: CARDIOLOGY | Facility: HOSPITAL | Age: 64
Discharge: HOME | End: 2024-10-03
Payer: COMMERCIAL

## 2024-10-03 DIAGNOSIS — C34.91 SQUAMOUS CELL CARCINOMA OF RIGHT LUNG (MULTI): ICD-10-CM

## 2024-10-03 DIAGNOSIS — Z01.89 ENCOUNTER FOR OTHER SPECIFIED SPECIAL EXAMINATIONS: ICD-10-CM

## 2024-10-03 LAB
AORTIC VALVE MEAN GRADIENT: 2 MMHG
AORTIC VALVE PEAK VELOCITY: 1.04 M/S
AV PEAK GRADIENT: 4.3 MMHG
AVA (PEAK VEL): 2.76 CM2
AVA (VTI): 3.04 CM2
EJECTION FRACTION APICAL 4 CHAMBER: 50.6
EJECTION FRACTION: 58 %
LEFT ATRIUM VOLUME AREA LENGTH INDEX BSA: 19.8 ML/M2
LEFT VENTRICLE INTERNAL DIMENSION DIASTOLE: 4.31 CM (ref 3.5–6)
LEFT VENTRICULAR OUTFLOW TRACT DIAMETER: 2.1 CM
MITRAL VALVE E/A RATIO: 0.85
RIGHT VENTRICLE FREE WALL PEAK S': 10.1 CM/S
RIGHT VENTRICLE PEAK SYSTOLIC PRESSURE: 9.8 MMHG
TRICUSPID ANNULAR PLANE SYSTOLIC EXCURSION: 2.1 CM

## 2024-10-03 PROCEDURE — 93306 TTE W/DOPPLER COMPLETE: CPT | Performed by: STUDENT IN AN ORGANIZED HEALTH CARE EDUCATION/TRAINING PROGRAM

## 2024-10-03 PROCEDURE — 93306 TTE W/DOPPLER COMPLETE: CPT

## 2024-10-07 ENCOUNTER — HOSPITAL ENCOUNTER (OUTPATIENT)
Dept: INFUSION THERAPY | Age: 64
Discharge: HOME | End: 2024-10-07
Payer: COMMERCIAL

## 2024-10-07 DIAGNOSIS — C34.91 SQUAMOUS CELL CARCINOMA OF RIGHT LUNG (HCC): ICD-10-CM

## 2024-10-07 DIAGNOSIS — C34.11 MALIGNANT NEOPLASM OF UPPER LOBE OF RIGHT LUNG (HCC): Primary | ICD-10-CM

## 2024-10-07 LAB
ALBUMIN SERPL-MCNC: 4 G/DL (ref 3.5–5.2)
ALP SERPL-CCNC: 90 U/L (ref 35–104)
ALT SERPL-CCNC: 7 U/L (ref 0–32)
ANION GAP SERPL CALCULATED.3IONS-SCNC: 12 MMOL/L (ref 7–16)
AST SERPL-CCNC: 18 U/L (ref 0–31)
BASOPHILS # BLD: 0.03 K/UL (ref 0–0.2)
BASOPHILS NFR BLD: 0 % (ref 0–2)
BILIRUB SERPL-MCNC: 0.6 MG/DL (ref 0–1.2)
BUN SERPL-MCNC: 7 MG/DL (ref 6–23)
CALCIUM SERPL-MCNC: 9.1 MG/DL (ref 8.6–10.2)
CHLORIDE SERPL-SCNC: 99 MMOL/L (ref 98–107)
CO2 SERPL-SCNC: 27 MMOL/L (ref 22–29)
CREAT SERPL-MCNC: 0.5 MG/DL (ref 0.5–1)
EOSINOPHIL # BLD: 0.06 K/UL (ref 0.05–0.5)
EOSINOPHILS RELATIVE PERCENT: 1 % (ref 0–6)
ERYTHROCYTE [DISTWIDTH] IN BLOOD BY AUTOMATED COUNT: 11.6 % (ref 11.5–15)
GFR, ESTIMATED: >90 ML/MIN/1.73M2
GLUCOSE SERPL-MCNC: 81 MG/DL (ref 74–99)
HCT VFR BLD AUTO: 42.1 % (ref 34–48)
HGB BLD-MCNC: 13.3 G/DL (ref 11.5–15.5)
IMM GRANULOCYTES # BLD AUTO: 0.03 K/UL (ref 0–0.58)
IMM GRANULOCYTES NFR BLD: 0 % (ref 0–5)
LYMPHOCYTES NFR BLD: 0.95 K/UL (ref 1.5–4)
LYMPHOCYTES RELATIVE PERCENT: 13 % (ref 20–42)
MAGNESIUM SERPL-MCNC: 2.1 MG/DL (ref 1.6–2.6)
MCH RBC QN AUTO: 29.7 PG (ref 26–35)
MCHC RBC AUTO-ENTMCNC: 31.6 G/DL (ref 32–34.5)
MCV RBC AUTO: 94 FL (ref 80–99.9)
MONOCYTES NFR BLD: 0.46 K/UL (ref 0.1–0.95)
MONOCYTES NFR BLD: 6 % (ref 2–12)
NEUTROPHILS NFR BLD: 79 % (ref 43–80)
NEUTS SEG NFR BLD: 5.67 K/UL (ref 1.8–7.3)
PLATELET # BLD AUTO: 239 K/UL (ref 130–450)
PMV BLD AUTO: 10.1 FL (ref 7–12)
POTASSIUM SERPL-SCNC: 4.2 MMOL/L (ref 3.5–5)
PROT SERPL-MCNC: 7.1 G/DL (ref 6.4–8.3)
RBC # BLD AUTO: 4.48 M/UL (ref 3.5–5.5)
SODIUM SERPL-SCNC: 138 MMOL/L (ref 132–146)
WBC OTHER # BLD: 7.2 K/UL (ref 4.5–11.5)

## 2024-10-07 PROCEDURE — 80053 COMPREHEN METABOLIC PANEL: CPT

## 2024-10-07 PROCEDURE — 36415 COLL VENOUS BLD VENIPUNCTURE: CPT

## 2024-10-07 PROCEDURE — 85025 COMPLETE CBC W/AUTO DIFF WBC: CPT

## 2024-10-07 PROCEDURE — 83735 ASSAY OF MAGNESIUM: CPT

## 2024-10-07 PROCEDURE — 2580000003 HC RX 258: Performed by: STUDENT IN AN ORGANIZED HEALTH CARE EDUCATION/TRAINING PROGRAM

## 2024-10-07 RX ORDER — SODIUM CHLORIDE 0.9 % (FLUSH) 0.9 %
5-40 SYRINGE (ML) INJECTION PRN
Status: DISCONTINUED | OUTPATIENT
Start: 2024-10-07 | End: 2024-10-08 | Stop reason: HOSPADM

## 2024-10-07 RX ORDER — SODIUM CHLORIDE 9 MG/ML
25 INJECTION, SOLUTION INTRAVENOUS PRN
OUTPATIENT
Start: 2024-10-07

## 2024-10-07 RX ORDER — SODIUM CHLORIDE 0.9 % (FLUSH) 0.9 %
5-40 SYRINGE (ML) INJECTION PRN
OUTPATIENT
Start: 2024-10-07

## 2024-10-07 RX ORDER — HEPARIN 100 UNIT/ML
500 SYRINGE INTRAVENOUS PRN
OUTPATIENT
Start: 2024-10-07

## 2024-10-07 RX ORDER — HEPARIN 100 UNIT/ML
500 SYRINGE INTRAVENOUS PRN
Status: DISCONTINUED | OUTPATIENT
Start: 2024-10-07 | End: 2024-10-08 | Stop reason: HOSPADM

## 2024-10-07 RX ADMIN — SODIUM CHLORIDE, PRESERVATIVE FREE 10 ML: 5 INJECTION INTRAVENOUS at 14:07

## 2024-10-08 ENCOUNTER — OFFICE VISIT (OUTPATIENT)
Dept: ONCOLOGY | Age: 64
End: 2024-10-08
Payer: COMMERCIAL

## 2024-10-08 ENCOUNTER — HOSPITAL ENCOUNTER (OUTPATIENT)
Dept: INFUSION THERAPY | Age: 64
Discharge: HOME | End: 2024-10-08
Payer: COMMERCIAL

## 2024-10-08 VITALS
SYSTOLIC BLOOD PRESSURE: 110 MMHG | OXYGEN SATURATION: 99 % | HEIGHT: 65 IN | RESPIRATION RATE: 16 BRPM | TEMPERATURE: 98.4 F | WEIGHT: 124.8 LBS | HEART RATE: 88 BPM | DIASTOLIC BLOOD PRESSURE: 82 MMHG | BODY MASS INDEX: 20.79 KG/M2

## 2024-10-08 VITALS
DIASTOLIC BLOOD PRESSURE: 63 MMHG | SYSTOLIC BLOOD PRESSURE: 107 MMHG | OXYGEN SATURATION: 93 % | RESPIRATION RATE: 16 BRPM | TEMPERATURE: 98.6 F | HEART RATE: 86 BPM

## 2024-10-08 DIAGNOSIS — C34.11 MALIGNANT NEOPLASM OF UPPER LOBE OF RIGHT LUNG (HCC): Primary | ICD-10-CM

## 2024-10-08 DIAGNOSIS — K92.1 BLOOD IN STOOL: ICD-10-CM

## 2024-10-08 PROCEDURE — 96413 CHEMO IV INFUSION 1 HR: CPT

## 2024-10-08 PROCEDURE — 2580000003 HC RX 258: Performed by: STUDENT IN AN ORGANIZED HEALTH CARE EDUCATION/TRAINING PROGRAM

## 2024-10-08 PROCEDURE — 6360000002 HC RX W HCPCS: Performed by: STUDENT IN AN ORGANIZED HEALTH CARE EDUCATION/TRAINING PROGRAM

## 2024-10-08 PROCEDURE — 99214 OFFICE O/P EST MOD 30 MIN: CPT | Performed by: STUDENT IN AN ORGANIZED HEALTH CARE EDUCATION/TRAINING PROGRAM

## 2024-10-08 RX ORDER — ALBUTEROL SULFATE 90 UG/1
4 INHALANT RESPIRATORY (INHALATION) PRN
Status: CANCELLED | OUTPATIENT
Start: 2024-10-08

## 2024-10-08 RX ORDER — FAMOTIDINE 10 MG/ML
20 INJECTION, SOLUTION INTRAVENOUS
Status: CANCELLED | OUTPATIENT
Start: 2024-10-08

## 2024-10-08 RX ORDER — HEPARIN SODIUM (PORCINE) LOCK FLUSH IV SOLN 100 UNIT/ML 100 UNIT/ML
500 SOLUTION INTRAVENOUS PRN
Status: CANCELLED | OUTPATIENT
Start: 2024-10-08

## 2024-10-08 RX ORDER — MEPERIDINE HYDROCHLORIDE 50 MG/ML
12.5 INJECTION INTRAMUSCULAR; INTRAVENOUS; SUBCUTANEOUS PRN
Status: CANCELLED | OUTPATIENT
Start: 2024-10-08

## 2024-10-08 RX ORDER — EPINEPHRINE 1 MG/ML
0.3 INJECTION, SOLUTION, CONCENTRATE INTRAVENOUS PRN
Status: CANCELLED | OUTPATIENT
Start: 2024-10-08

## 2024-10-08 RX ORDER — SODIUM CHLORIDE 0.9 % (FLUSH) 0.9 %
5-40 SYRINGE (ML) INJECTION PRN
Status: CANCELLED | OUTPATIENT
Start: 2024-10-08

## 2024-10-08 RX ORDER — DIPHENHYDRAMINE HYDROCHLORIDE 50 MG/ML
50 INJECTION INTRAMUSCULAR; INTRAVENOUS
Status: CANCELLED | OUTPATIENT
Start: 2024-10-08

## 2024-10-08 RX ORDER — ONDANSETRON 2 MG/ML
8 INJECTION INTRAMUSCULAR; INTRAVENOUS
Status: CANCELLED | OUTPATIENT
Start: 2024-10-08

## 2024-10-08 RX ORDER — ACETAMINOPHEN 325 MG/1
650 TABLET ORAL
Status: CANCELLED | OUTPATIENT
Start: 2024-10-08

## 2024-10-08 RX ORDER — SODIUM CHLORIDE 0.9 % (FLUSH) 0.9 %
5-40 SYRINGE (ML) INJECTION PRN
Status: DISCONTINUED | OUTPATIENT
Start: 2024-10-08 | End: 2024-10-09 | Stop reason: HOSPADM

## 2024-10-08 RX ORDER — HEPARIN 100 UNIT/ML
500 SYRINGE INTRAVENOUS PRN
Status: DISCONTINUED | OUTPATIENT
Start: 2024-10-08 | End: 2024-10-09 | Stop reason: HOSPADM

## 2024-10-08 RX ORDER — SODIUM CHLORIDE 9 MG/ML
5-250 INJECTION, SOLUTION INTRAVENOUS PRN
Status: DISCONTINUED | OUTPATIENT
Start: 2024-10-08 | End: 2024-10-09 | Stop reason: HOSPADM

## 2024-10-08 RX ORDER — SODIUM CHLORIDE 9 MG/ML
INJECTION, SOLUTION INTRAVENOUS CONTINUOUS
Status: CANCELLED | OUTPATIENT
Start: 2024-10-08

## 2024-10-08 RX ORDER — SODIUM CHLORIDE 9 MG/ML
5-250 INJECTION, SOLUTION INTRAVENOUS PRN
Status: CANCELLED | OUTPATIENT
Start: 2024-10-08

## 2024-10-08 RX ADMIN — SODIUM CHLORIDE, PRESERVATIVE FREE 10 ML: 5 INJECTION INTRAVENOUS at 13:07

## 2024-10-08 RX ADMIN — SODIUM CHLORIDE 500 MG: 9 INJECTION, SOLUTION INTRAVENOUS at 11:56

## 2024-10-08 RX ADMIN — SODIUM CHLORIDE 50 ML/HR: 9 INJECTION, SOLUTION INTRAVENOUS at 11:39

## 2024-10-08 RX ADMIN — Medication 500 UNITS: at 13:07

## 2024-10-08 RX ADMIN — SODIUM CHLORIDE, PRESERVATIVE FREE 10 ML: 5 INJECTION INTRAVENOUS at 11:40

## 2024-10-08 NOTE — PROGRESS NOTES
[facility] [place] [facility] MEDICAL ONCOLOGY  [address] [address]  Dept: [phone]  Loc: [phone]    Clinic Progress Note      Date of Encounter: 10/08/2024     Referring Provider:  Celso Meier MD     Reason for Visit:   Squamous cell lung cancer, locally advanced        PCP:  Yue Nobles DO    Demographics: 64 y.o. female    Chief Complaint   Patient presents with    Chemotherapy     Malignant neoplasm of upper lobe of right lung        Malignant neoplasm of upper lobe of right lung         Subjective:  Doing ok. Still having blood in stool, mixed with mucous.   Denies of any abdominal pain, fevers, or chills.       HPI from Initial Outpatient Consultation (4/29/2024):  The patient is a 64 y.o. female and is coming in, establishing at our clinic, after we first met patient on inpatient consultation for a right sided lung mass.  She was admitted at [facility] on 4/15/2024 due to weight loss, and cough.  She was found to have an 8.5 cm right upper lobe mass with central necrosis, and had undergone biopsy of the lung on 4/8/2023, confirming her diagnosis.      Oncology History   Squamous cell carcinoma of right lung (HCC)   4/15/2024 Imaging    CT CHEST:    FINDINGS:  Mediastinum: Thyroid is homogeneous in attenuation.  Bulky irregular  mediastinal adenopathy right paratracheal ma mm and precarinal 12 mm short  axis dimensions along with right hilar 2.3 cm lymph node consistent with  metastatic adenopathy..  Central airways are patent. Esophagus with normal  course and caliber.  Cardiac size within normal limits without pericardial  effusion.     Lungs/pleura: Hyperinflated lungs central necrotic mass in the right upper  lobe posteriorly 7.4 cm series 2, image 41 with minimal postobstructive  changes and trace to small right pleural effusion consistent with malignancy  until proven otherwise.  Although abutting the right

## 2024-10-15 NOTE — PROGRESS NOTES
"Kurt Vick  is a 64 y.o. female who presents for evaluation of 7.3 cm right upper lobe lung cancer.     This patient presented with weight loss and cough in April 2023.  A biopsy of a 8.5 cm right upper lobe lung mass was performed on 4/8/2023 confirming a diagnosis of squamous cell carcinoma.  She was given a clinical stage of cT4 N2 M0.  She began treatment with carboplatin and paclitaxel on 5/20/2024 with a plan for \"consolidative immunotherapy\".  It.  She received 6 cycles of CarboTaxol and Radiation (ending 6/25/24) and then began cycle 1 of nivolumab on 7/30/2024, completing her third dose 8/28/2024.  She was evaluated by a thoracic surgeon who felt she \"deserves a shot at resection\" with planned right thoracotomy and right upper lobe lobectomy versus pneumonectomy on 9/4/2024.    Before I considered surgical resection, I asked that the patient receive a VQ scan and an echocardiogram.  Her VQ scan showed that the right had 37.7% perfusion    Currently the patient is  \"doing well\", nothing concerning . She denies the following symptoms: chest pain, shortness of breath at rest, shortness of breath with activity, cough, hemoptysis, fevers, chills, and weight loss.  She is scheduled to get immunotherapy on 10/22    There have been no significant changes to their documented medical, surgical and family history.     She  reports that she has quit smoking. Her smoking use included cigarettes. She has been exposed to tobacco smoke. She has quit using smokeless tobacco.    Objective   Physical Exam  The patient is well-appearing and in no acute distress. The trachea is midline and there is no crepitus. The lungs were clear to auscultation grossly. There was good effort and excursion. The heart had a regular rate and rhythm. The abdomen was soft, nontender and nondistended. The extremities had no edema or gross deformities. Mood and affect are appropriate.  Diagnostic Studies  VQ scan results " 9/19/2024:  LEFT:  UPPER 13.6 %  MIDDLE 30.4 %  LOWER 18.3 %  TOTAL 62.3 %      RIGHT:  UPPER 3.7 %  MIDDLE 20.3 %  LOWER 13.7 %  TOTAL 37.7 %    10/3/2024 echocardiogram:  PHYSICIAN INTERPRETATION:  Left Ventricle: The left ventricular systolic function is normal, with a visually estimated ejection fraction of 55-60%. There are no regional left ventricular wall motion abnormalities. The left ventricular cavity size is normal. Spectral Doppler shows a normal pattern of left ventricular diastolic filling.  Left Atrium: The left atrium is normal in size.  Right Ventricle: The right ventricle is normal in size. There is normal right ventricular global systolic function.  Right Atrium: The right atrium is normal in size.  Aortic Valve: The aortic valve is trileaflet. There is no evidence of aortic valve stenosis. The aortic valve dimensionless index is 0.88. There is no evidence of aortic valve regurgitation. The peak instantaneous gradient of the aortic valve is 4.3 mmHg. The mean gradient of the aortic valve is 2.0 mmHg.  Mitral Valve: The mitral valve is normal in structure. There is trace mitral valve regurgitation.  Tricuspid Valve: The tricuspid valve is structurally normal. There is trace tricuspid regurgitation. The right ventricular systolic pressure is unable to be estimated.  Pulmonic Valve: The pulmonic valve is not well visualized. There is physiologic pulmonic valve regurgitation.  Pericardium: Trivial pericardial effusion.  Aorta: The aortic root is normal.  Systemic Veins: The inferior vena cava appears small in size, with IVC inspiratory collapse greater than 50%.  In comparison to the previous echocardiogram(s): There are no prior studies on this patient for comparison purposes.        CONCLUSIONS:   1. The left ventricular systolic function is normal, with a visually estimated ejection fraction of 55-60%.   2. There is normal right ventricular global systolic function.       Assessment/Plan   I  "believe that the patient has a diagnosis of right-sided lung cancer  .     the patient echocardiogram shows normal right ventricular systolic function suggesting appropriate Right ventricular function to tolerate a pneumonectomy.  PPPO FEV1 based on VQ calculation is 44.2% (preop is 71 * left 62.3% perfusion).  That being said, I believe she is likely to require a lobectomy for surgical resection, albeit this can be difficult to tell based on scan    Conceptually, the patient is a candidate for \"salvage\" resection.  Certainly I cannot tell by looking at her current scans whether there is residual viable tumor, but the pathophysiology of stage III lung cancer suggest that there is a significant likelihood of residual viable disease.    I discussed these dynamics in detail with the patient.  I presented her with 3 options: First, to pursue surgical resection soon, in a scheduled fashion after her next dose of immunotherapy.  Second, to be treated with chemotherapy radiation and immunotherapy alone, and third, to consider surgical resection only for progression of disease on subsequent radiographic imaging i.e. for in the future).  I believe all the strategies are reasonable with some specific risks and benefits which we discussed in detail.  The patient will consider these options and contact my office with a decision.    I recommend  consideration of surgery for stage III cancer    I discussed this in detail with the patient, including a discussion of alternatives. They were comfortable with this approach.     Jonah Wagner MD  [phone]    "

## 2024-10-17 ENCOUNTER — OFFICE VISIT (OUTPATIENT)
Dept: SURGERY | Facility: HOSPITAL | Age: 64
End: 2024-10-17
Payer: COMMERCIAL

## 2024-10-17 VITALS
WEIGHT: 125.7 LBS | TEMPERATURE: 98.6 F | SYSTOLIC BLOOD PRESSURE: 122 MMHG | HEART RATE: 99 BPM | OXYGEN SATURATION: 100 % | DIASTOLIC BLOOD PRESSURE: 68 MMHG | RESPIRATION RATE: 18 BRPM

## 2024-10-17 DIAGNOSIS — C34.11 PRIMARY CANCER OF RIGHT UPPER LOBE OF LUNG (MULTI): Primary | ICD-10-CM

## 2024-10-17 PROCEDURE — 99215 OFFICE O/P EST HI 40 MIN: CPT | Performed by: THORACIC SURGERY (CARDIOTHORACIC VASCULAR SURGERY)

## 2024-10-17 ASSESSMENT — PAIN SCALES - GENERAL: PAINLEVEL_OUTOF10: 0-NO PAIN

## 2024-10-21 ENCOUNTER — HOSPITAL ENCOUNTER (OUTPATIENT)
Dept: INFUSION THERAPY | Age: 64
Discharge: HOME | End: 2024-10-21
Payer: COMMERCIAL

## 2024-10-21 DIAGNOSIS — C34.11 MALIGNANT NEOPLASM OF UPPER LOBE OF RIGHT LUNG (HCC): Primary | ICD-10-CM

## 2024-10-21 DIAGNOSIS — C34.91 SQUAMOUS CELL CARCINOMA OF RIGHT LUNG (HCC): ICD-10-CM

## 2024-10-21 LAB
ALBUMIN SERPL-MCNC: 3.8 G/DL (ref 3.5–5.2)
ALP SERPL-CCNC: 88 U/L (ref 35–104)
ALT SERPL-CCNC: 11 U/L (ref 0–32)
ANION GAP SERPL CALCULATED.3IONS-SCNC: 11 MMOL/L (ref 7–16)
AST SERPL-CCNC: 15 U/L (ref 0–31)
BASOPHILS # BLD: 0.03 K/UL (ref 0–0.2)
BASOPHILS NFR BLD: 0 % (ref 0–2)
BILIRUB SERPL-MCNC: 0.5 MG/DL (ref 0–1.2)
BUN SERPL-MCNC: 7 MG/DL (ref 6–23)
CALCIUM SERPL-MCNC: 8.9 MG/DL (ref 8.6–10.2)
CHLORIDE SERPL-SCNC: 101 MMOL/L (ref 98–107)
CO2 SERPL-SCNC: 26 MMOL/L (ref 22–29)
CREAT SERPL-MCNC: 0.6 MG/DL (ref 0.5–1)
EOSINOPHIL # BLD: 0.07 K/UL (ref 0.05–0.5)
EOSINOPHILS RELATIVE PERCENT: 1 % (ref 0–6)
ERYTHROCYTE [DISTWIDTH] IN BLOOD BY AUTOMATED COUNT: 11.8 % (ref 11.5–15)
GFR, ESTIMATED: >90 ML/MIN/1.73M2
GLUCOSE SERPL-MCNC: 158 MG/DL (ref 74–99)
HCT VFR BLD AUTO: 37.4 % (ref 34–48)
HGB BLD-MCNC: 11.8 G/DL (ref 11.5–15.5)
IMM GRANULOCYTES # BLD AUTO: 0.04 K/UL (ref 0–0.58)
IMM GRANULOCYTES NFR BLD: 1 % (ref 0–5)
LYMPHOCYTES NFR BLD: 1.07 K/UL (ref 1.5–4)
LYMPHOCYTES RELATIVE PERCENT: 14 % (ref 20–42)
MAGNESIUM SERPL-MCNC: 2.1 MG/DL (ref 1.6–2.6)
MCH RBC QN AUTO: 29.1 PG (ref 26–35)
MCHC RBC AUTO-ENTMCNC: 31.6 G/DL (ref 32–34.5)
MCV RBC AUTO: 92.3 FL (ref 80–99.9)
MONOCYTES NFR BLD: 0.52 K/UL (ref 0.1–0.95)
MONOCYTES NFR BLD: 7 % (ref 2–12)
NEUTROPHILS NFR BLD: 77 % (ref 43–80)
NEUTS SEG NFR BLD: 5.68 K/UL (ref 1.8–7.3)
PLATELET # BLD AUTO: 267 K/UL (ref 130–450)
PMV BLD AUTO: 9.5 FL (ref 7–12)
POTASSIUM SERPL-SCNC: 3.7 MMOL/L (ref 3.5–5)
PROT SERPL-MCNC: 6.7 G/DL (ref 6.4–8.3)
RBC # BLD AUTO: 4.05 M/UL (ref 3.5–5.5)
SODIUM SERPL-SCNC: 138 MMOL/L (ref 132–146)
WBC OTHER # BLD: 7.4 K/UL (ref 4.5–11.5)

## 2024-10-21 PROCEDURE — 83735 ASSAY OF MAGNESIUM: CPT

## 2024-10-21 PROCEDURE — 6360000002 HC RX W HCPCS: Performed by: STUDENT IN AN ORGANIZED HEALTH CARE EDUCATION/TRAINING PROGRAM

## 2024-10-21 PROCEDURE — 85025 COMPLETE CBC W/AUTO DIFF WBC: CPT

## 2024-10-21 PROCEDURE — 36591 DRAW BLOOD OFF VENOUS DEVICE: CPT

## 2024-10-21 PROCEDURE — 2580000003 HC RX 258: Performed by: STUDENT IN AN ORGANIZED HEALTH CARE EDUCATION/TRAINING PROGRAM

## 2024-10-21 PROCEDURE — 80053 COMPREHEN METABOLIC PANEL: CPT

## 2024-10-21 RX ORDER — HEPARIN 100 UNIT/ML
500 SYRINGE INTRAVENOUS PRN
OUTPATIENT
Start: 2024-10-21

## 2024-10-21 RX ORDER — SODIUM CHLORIDE 0.9 % (FLUSH) 0.9 %
5-40 SYRINGE (ML) INJECTION PRN
OUTPATIENT
Start: 2024-10-21

## 2024-10-21 RX ORDER — SODIUM CHLORIDE 9 MG/ML
25 INJECTION, SOLUTION INTRAVENOUS PRN
OUTPATIENT
Start: 2024-10-21

## 2024-10-21 RX ORDER — SODIUM CHLORIDE 0.9 % (FLUSH) 0.9 %
5-40 SYRINGE (ML) INJECTION PRN
Status: DISCONTINUED | OUTPATIENT
Start: 2024-10-21 | End: 2024-10-22 | Stop reason: HOSPADM

## 2024-10-21 RX ORDER — HEPARIN 100 UNIT/ML
500 SYRINGE INTRAVENOUS PRN
Status: DISCONTINUED | OUTPATIENT
Start: 2024-10-21 | End: 2024-10-22 | Stop reason: HOSPADM

## 2024-10-21 RX ADMIN — SODIUM CHLORIDE, PRESERVATIVE FREE 10 ML: 5 INJECTION INTRAVENOUS at 14:16

## 2024-10-21 RX ADMIN — HEPARIN 500 UNITS: 100 SYRINGE at 14:16

## 2024-10-21 NOTE — FLOWSHEET NOTE
Patient presents to clinic for labs today. Left chest SQ port accessed per policy, using 20G .75 in underwood needle for good blood return. Aspirate for waste and specimen sent to lab. Site flushed easily with 10 mL NSS followed by 5 mL Heparin solution 100 units/ml rinse prior to de-access. Dry sterile dressing to area.

## 2024-10-22 ENCOUNTER — OFFICE VISIT (OUTPATIENT)
Dept: ONCOLOGY | Age: 64
End: 2024-10-22
Payer: COMMERCIAL

## 2024-10-22 ENCOUNTER — HOSPITAL ENCOUNTER (OUTPATIENT)
Dept: INFUSION THERAPY | Age: 64
Discharge: HOME | End: 2024-10-22
Payer: COMMERCIAL

## 2024-10-22 VITALS
BODY MASS INDEX: 20.66 KG/M2 | DIASTOLIC BLOOD PRESSURE: 80 MMHG | HEIGHT: 65 IN | HEART RATE: 88 BPM | SYSTOLIC BLOOD PRESSURE: 126 MMHG | OXYGEN SATURATION: 97 % | WEIGHT: 124 LBS | TEMPERATURE: 98 F

## 2024-10-22 VITALS
HEART RATE: 85 BPM | DIASTOLIC BLOOD PRESSURE: 67 MMHG | TEMPERATURE: 98.8 F | SYSTOLIC BLOOD PRESSURE: 113 MMHG | OXYGEN SATURATION: 97 % | RESPIRATION RATE: 16 BRPM

## 2024-10-22 DIAGNOSIS — C34.11 MALIGNANT NEOPLASM OF UPPER LOBE OF RIGHT LUNG (HCC): Primary | ICD-10-CM

## 2024-10-22 LAB — TSH SERPL DL<=0.05 MIU/L-ACNC: 0.84 UIU/ML (ref 0.27–4.2)

## 2024-10-22 PROCEDURE — 84443 ASSAY THYROID STIM HORMONE: CPT

## 2024-10-22 PROCEDURE — 99213 OFFICE O/P EST LOW 20 MIN: CPT | Performed by: NURSE PRACTITIONER

## 2024-10-22 PROCEDURE — 6360000002 HC RX W HCPCS: Performed by: NURSE PRACTITIONER

## 2024-10-22 PROCEDURE — 96413 CHEMO IV INFUSION 1 HR: CPT

## 2024-10-22 PROCEDURE — 2580000003 HC RX 258: Performed by: NURSE PRACTITIONER

## 2024-10-22 RX ORDER — HEPARIN SODIUM (PORCINE) LOCK FLUSH IV SOLN 100 UNIT/ML 100 UNIT/ML
500 SOLUTION INTRAVENOUS PRN
Status: CANCELLED | OUTPATIENT
Start: 2024-10-22

## 2024-10-22 RX ORDER — SODIUM CHLORIDE 9 MG/ML
5-250 INJECTION, SOLUTION INTRAVENOUS PRN
Status: DISCONTINUED | OUTPATIENT
Start: 2024-10-22 | End: 2024-10-23 | Stop reason: HOSPADM

## 2024-10-22 RX ORDER — SODIUM CHLORIDE 9 MG/ML
5-250 INJECTION, SOLUTION INTRAVENOUS PRN
Status: CANCELLED | OUTPATIENT
Start: 2024-10-22

## 2024-10-22 RX ORDER — ALBUTEROL SULFATE 90 UG/1
4 INHALANT RESPIRATORY (INHALATION) PRN
Status: CANCELLED | OUTPATIENT
Start: 2024-10-22

## 2024-10-22 RX ORDER — SODIUM CHLORIDE 9 MG/ML
INJECTION, SOLUTION INTRAVENOUS CONTINUOUS
Status: CANCELLED | OUTPATIENT
Start: 2024-10-22

## 2024-10-22 RX ORDER — ACETAMINOPHEN 325 MG/1
650 TABLET ORAL
Status: CANCELLED | OUTPATIENT
Start: 2024-10-22

## 2024-10-22 RX ORDER — DIPHENHYDRAMINE HYDROCHLORIDE 50 MG/ML
50 INJECTION INTRAMUSCULAR; INTRAVENOUS
Status: CANCELLED | OUTPATIENT
Start: 2024-10-22

## 2024-10-22 RX ORDER — EPINEPHRINE 1 MG/ML
0.3 INJECTION, SOLUTION, CONCENTRATE INTRAVENOUS PRN
Status: CANCELLED | OUTPATIENT
Start: 2024-10-22

## 2024-10-22 RX ORDER — ONDANSETRON 2 MG/ML
8 INJECTION INTRAMUSCULAR; INTRAVENOUS
Status: CANCELLED | OUTPATIENT
Start: 2024-10-22

## 2024-10-22 RX ORDER — MEPERIDINE HYDROCHLORIDE 50 MG/ML
12.5 INJECTION INTRAMUSCULAR; INTRAVENOUS; SUBCUTANEOUS PRN
Status: CANCELLED | OUTPATIENT
Start: 2024-10-22

## 2024-10-22 RX ORDER — SODIUM CHLORIDE 0.9 % (FLUSH) 0.9 %
5-40 SYRINGE (ML) INJECTION PRN
Status: CANCELLED | OUTPATIENT
Start: 2024-10-22

## 2024-10-22 RX ORDER — SODIUM CHLORIDE 0.9 % (FLUSH) 0.9 %
5-40 SYRINGE (ML) INJECTION PRN
Status: DISCONTINUED | OUTPATIENT
Start: 2024-10-22 | End: 2024-10-23 | Stop reason: HOSPADM

## 2024-10-22 RX ORDER — HEPARIN 100 UNIT/ML
500 SYRINGE INTRAVENOUS PRN
Status: DISCONTINUED | OUTPATIENT
Start: 2024-10-22 | End: 2024-10-23 | Stop reason: HOSPADM

## 2024-10-22 RX ORDER — FAMOTIDINE 10 MG/ML
20 INJECTION, SOLUTION INTRAVENOUS
Status: CANCELLED | OUTPATIENT
Start: 2024-10-22

## 2024-10-22 RX ADMIN — SODIUM CHLORIDE 500 MG: 9 INJECTION, SOLUTION INTRAVENOUS at 11:49

## 2024-10-22 RX ADMIN — SODIUM CHLORIDE, PRESERVATIVE FREE 10 ML: 5 INJECTION INTRAVENOUS at 13:05

## 2024-10-22 RX ADMIN — HEPARIN 500 UNITS: 100 SYRINGE at 13:05

## 2024-10-22 RX ADMIN — SODIUM CHLORIDE 20 ML/HR: 9 INJECTION, SOLUTION INTRAVENOUS at 11:48

## 2024-10-22 NOTE — PROGRESS NOTES
[facility] [place] [facility] MEDICAL ONCOLOGY  [address] [address]  Dept: [phone]  Loc: [phone]    Clinic Progress Note      Date of Encounter: 10/22/2024     Referring Provider:  Celso Meier MD     Reason for Visit:   Squamous cell lung cancer, locally advanced        PCP:  Yue Nobles DO    Demographics: 64 y.o. female    Chief Complaint   Patient presents with    Malignant neoplasm of upper lobe of right lung         Subjective:  Patient still having blood in stool, mixed with mucous. Slightly decreased. She is scheduled to see general surgery tomorrow. Denies of any abdominal pain, fevers, or chills.       HPI from Initial Outpatient Consultation (4/29/2024):  The patient is a 64 y.o. female and is coming in, establishing at our clinic, after we first met patient on inpatient consultation for a right sided lung mass.  She was admitted at [facility] on 4/15/2024 due to weight loss, and cough.  She was found to have an 8.5 cm right upper lobe mass with central necrosis, and had undergone biopsy of the lung on 4/8/2023, confirming her diagnosis.      Oncology History   Squamous cell carcinoma of right lung (HCC)   4/15/2024 Imaging    CT CHEST:    FINDINGS:  Mediastinum: Thyroid is homogeneous in attenuation.  Bulky irregular  mediastinal adenopathy right paratracheal ma mm and precarinal 12 mm short  axis dimensions along with right hilar 2.3 cm lymph node consistent with  metastatic adenopathy..  Central airways are patent. Esophagus with normal  course and caliber.  Cardiac size within normal limits without pericardial  effusion.     Lungs/pleura: Hyperinflated lungs central necrotic mass in the right upper  lobe posteriorly 7.4 cm series 2, image 41 with minimal postobstructive  changes and trace to small right pleural effusion consistent with malignancy  until proven otherwise.  Although abutting the right ribs

## 2024-10-22 NOTE — FLOWSHEET NOTE
Patient arrived to clinic for treatment and office visit. Tolerated treatment well, VSS. Patient alert and oriented x3, no questions or concerns at the time of discharge. Verbalized next appointment time and date.

## 2024-10-23 ENCOUNTER — OFFICE VISIT (OUTPATIENT)
Dept: SURGERY | Age: 64
End: 2024-10-23
Payer: COMMERCIAL

## 2024-10-23 ENCOUNTER — TELEPHONE (OUTPATIENT)
Dept: SURGERY | Age: 64
End: 2024-10-23

## 2024-10-23 VITALS
SYSTOLIC BLOOD PRESSURE: 110 MMHG | HEART RATE: 88 BPM | DIASTOLIC BLOOD PRESSURE: 68 MMHG | RESPIRATION RATE: 18 BRPM | TEMPERATURE: 97 F | WEIGHT: 124.13 LBS | HEIGHT: 65 IN | OXYGEN SATURATION: 98 % | BODY MASS INDEX: 20.68 KG/M2

## 2024-10-23 DIAGNOSIS — C34.11 MALIGNANT NEOPLASM OF UPPER LOBE OF RIGHT LUNG (HCC): ICD-10-CM

## 2024-10-23 DIAGNOSIS — R19.5 OCCULT BLOOD IN STOOLS: Primary | ICD-10-CM

## 2024-10-23 DIAGNOSIS — K62.5 RECTAL BLEEDING: ICD-10-CM

## 2024-10-23 PROCEDURE — 99213 OFFICE O/P EST LOW 20 MIN: CPT | Performed by: SURGERY

## 2024-10-23 NOTE — PROGRESS NOTES
Patient's Name/Date of Birth: Mary Purdy / 1960    Date: 10/23/2024    PCP: Yue Nobles DO    Chief Complaint   Patient presents with    Colonoscopy     Blood in stool since Aug.  Patient says that its visible, was bright red yesterday.        HPI:  Patient seen for evaluation for blood in stools. Never had colonoscopy before. Patient with Hx of lung Ca, awaing lung sutgery    Patient's medications, allergies, past medical, surgical, social and family histories were reviewed and updated as appropriate.    No Known Allergies    Past Medical History:   Diagnosis Date    Cancer (HCC)     skin on bridge of her nose, SCC    Lung cancer (HCC)         Past Surgical History:   Procedure Laterality Date     SECTION  1990    CT NEEDLE BIOPSY LUNG PERCUTANEOUS  2024    CT NEEDLE BIOPSY LUNG PERCUTANEOUS 2024 [facility] CT    OVARIAN CYST REMOVAL      PORT SURGERY N/A 2024    MEDI PORT INSERTION performed by Gayatri Wilkinson MD at [facility] OR        Social History     Tobacco Use    Smoking status: Former     Current packs/day: 0.00     Average packs/day: 1 pack/day for 46.3 years (46.3 ttl pk-yrs)     Types: Cigarettes     Start date: 1978     Quit date: 4/15/2024     Years since quittin.5    Smokeless tobacco: Never    Tobacco comments:     Quit two weeks ago   Substance Use Topics    Alcohol use: Not Currently       Current Outpatient Medications   Medication Sig Dispense Refill    QUEtiapine (SEROQUEL) 25 MG tablet TAKE 1/2 TABLET BY MOUTH EVERY NIGHT      albuterol sulfate HFA (PROVENTIL HFA) 108 (90 Base) MCG/ACT inhaler Inhale 2 puffs into the lungs every 6 hours as needed for Wheezing 18 g 3    acetaminophen (TYLENOL) 325 MG tablet Take 2 tablets by mouth every 6 hours as needed for Pain      ALPRAZolam (XANAX) 1 MG tablet Take 1 tablet by mouth nightly as needed for Sleep for up to 30 days. Max Daily Amount: 1 mg 30 tablet 0    sucralfate (CARAFATE) 1 GM/10ML suspension

## 2024-10-23 NOTE — TELEPHONE ENCOUNTER
Prior Authorization Form:      DEMOGRAPHICS:                     Patient Name:  Mary Lopez  Patient :  1960            Insurance:  Payor: AETNA / Plan: AETNA NAP CHOICE POS II / Product Type: *No Product type* /   Insurance ID Number:    Payer/Plan Subscr  Sex Relation Sub. Ins. ID Effective Group Num   1. AETNA - AETNA* ESMER LOPEZ 10/4/1958 Male Spouse [id number] 1995 [id number]                                   [address]         DIAGNOSIS & PROCEDURE:                       Procedure/Operation: COLONOSCOPY           CPT Code: 76743    Diagnosis:  RECTAL BLEEDING    ICD10 Code: K62.5    Location:  [facility]    Surgeon:  RUBEN DÍAZ    SCHEDULING INFORMATION:                          Date: 2024    Time: 11:30              Anesthesia:  MAC/TIVA                                                       Status:  Outpatient        Special Comments:         Electronically signed by Merary Gamble MA on 10/23/2024 at 3:38 PM

## 2024-10-30 ENCOUNTER — TELEPHONE (OUTPATIENT)
Dept: ONCOLOGY | Age: 64
End: 2024-10-30

## 2024-10-30 NOTE — TELEPHONE ENCOUNTER
Communicated with Dr. Oh with CT surgery at .   Surgery is being considered.   We discussed consideration to continue durvalumab until around early Dec 2024, in which she would be due for Cycle 10 on 12/3/24.     We discussed consideration to hold treatment then, allow for an approximate 4-6 week span prior to surgery. And PET scan around mid Dec 2024.

## 2024-11-04 ENCOUNTER — HOSPITAL ENCOUNTER (OUTPATIENT)
Dept: INFUSION THERAPY | Age: 64
Discharge: HOME | End: 2024-11-04
Payer: COMMERCIAL

## 2024-11-04 DIAGNOSIS — C34.11 MALIGNANT NEOPLASM OF UPPER LOBE OF RIGHT LUNG (HCC): Primary | ICD-10-CM

## 2024-11-04 DIAGNOSIS — C34.91 SQUAMOUS CELL CARCINOMA OF RIGHT LUNG (HCC): ICD-10-CM

## 2024-11-04 LAB
ALBUMIN SERPL-MCNC: 3.7 G/DL (ref 3.5–5.2)
ALP SERPL-CCNC: 87 U/L (ref 35–104)
ALT SERPL-CCNC: 8 U/L (ref 0–32)
ANION GAP SERPL CALCULATED.3IONS-SCNC: 11 MMOL/L (ref 7–16)
AST SERPL-CCNC: 13 U/L (ref 0–31)
BASOPHILS # BLD: 0.03 K/UL (ref 0–0.2)
BASOPHILS NFR BLD: 0 % (ref 0–2)
BILIRUB SERPL-MCNC: 0.6 MG/DL (ref 0–1.2)
BUN SERPL-MCNC: 6 MG/DL (ref 6–23)
CALCIUM SERPL-MCNC: 9.1 MG/DL (ref 8.6–10.2)
CHLORIDE SERPL-SCNC: 98 MMOL/L (ref 98–107)
CO2 SERPL-SCNC: 27 MMOL/L (ref 22–29)
CREAT SERPL-MCNC: 0.5 MG/DL (ref 0.5–1)
EOSINOPHIL # BLD: 0.04 K/UL (ref 0.05–0.5)
EOSINOPHILS RELATIVE PERCENT: 1 % (ref 0–6)
ERYTHROCYTE [DISTWIDTH] IN BLOOD BY AUTOMATED COUNT: 11.9 % (ref 11.5–15)
GFR, ESTIMATED: >90 ML/MIN/1.73M2
GLUCOSE SERPL-MCNC: 142 MG/DL (ref 74–99)
HCT VFR BLD AUTO: 37.3 % (ref 34–48)
HGB BLD-MCNC: 11.9 G/DL (ref 11.5–15.5)
IMM GRANULOCYTES # BLD AUTO: 0.04 K/UL (ref 0–0.58)
IMM GRANULOCYTES NFR BLD: 1 % (ref 0–5)
LYMPHOCYTES NFR BLD: 0.94 K/UL (ref 1.5–4)
LYMPHOCYTES RELATIVE PERCENT: 11 % (ref 20–42)
MAGNESIUM SERPL-MCNC: 2.2 MG/DL (ref 1.6–2.6)
MCH RBC QN AUTO: 28.7 PG (ref 26–35)
MCHC RBC AUTO-ENTMCNC: 31.9 G/DL (ref 32–34.5)
MCV RBC AUTO: 90.1 FL (ref 80–99.9)
MONOCYTES NFR BLD: 0.52 K/UL (ref 0.1–0.95)
MONOCYTES NFR BLD: 6 % (ref 2–12)
NEUTROPHILS NFR BLD: 81 % (ref 43–80)
NEUTS SEG NFR BLD: 6.72 K/UL (ref 1.8–7.3)
PLATELET # BLD AUTO: 277 K/UL (ref 130–450)
PMV BLD AUTO: 9.2 FL (ref 7–12)
POTASSIUM SERPL-SCNC: 3.5 MMOL/L (ref 3.5–5)
PROT SERPL-MCNC: 6.9 G/DL (ref 6.4–8.3)
RBC # BLD AUTO: 4.14 M/UL (ref 3.5–5.5)
SODIUM SERPL-SCNC: 136 MMOL/L (ref 132–146)
WBC OTHER # BLD: 8.3 K/UL (ref 4.5–11.5)

## 2024-11-04 PROCEDURE — 85025 COMPLETE CBC W/AUTO DIFF WBC: CPT

## 2024-11-04 PROCEDURE — 80053 COMPREHEN METABOLIC PANEL: CPT

## 2024-11-04 PROCEDURE — 83735 ASSAY OF MAGNESIUM: CPT

## 2024-11-04 PROCEDURE — 36415 COLL VENOUS BLD VENIPUNCTURE: CPT

## 2024-11-04 PROCEDURE — 2580000003 HC RX 258: Performed by: STUDENT IN AN ORGANIZED HEALTH CARE EDUCATION/TRAINING PROGRAM

## 2024-11-04 PROCEDURE — 6360000002 HC RX W HCPCS: Performed by: STUDENT IN AN ORGANIZED HEALTH CARE EDUCATION/TRAINING PROGRAM

## 2024-11-04 RX ORDER — SODIUM CHLORIDE 9 MG/ML
25 INJECTION, SOLUTION INTRAVENOUS PRN
OUTPATIENT
Start: 2024-11-04

## 2024-11-04 RX ORDER — SODIUM CHLORIDE 0.9 % (FLUSH) 0.9 %
5-40 SYRINGE (ML) INJECTION PRN
Status: DISCONTINUED | OUTPATIENT
Start: 2024-11-04 | End: 2024-11-05 | Stop reason: HOSPADM

## 2024-11-04 RX ORDER — SODIUM CHLORIDE 0.9 % (FLUSH) 0.9 %
5-40 SYRINGE (ML) INJECTION PRN
OUTPATIENT
Start: 2024-11-04

## 2024-11-04 RX ORDER — HEPARIN 100 UNIT/ML
500 SYRINGE INTRAVENOUS PRN
Status: DISCONTINUED | OUTPATIENT
Start: 2024-11-04 | End: 2024-11-05 | Stop reason: HOSPADM

## 2024-11-04 RX ORDER — HEPARIN 100 UNIT/ML
500 SYRINGE INTRAVENOUS PRN
OUTPATIENT
Start: 2024-11-04

## 2024-11-04 RX ADMIN — SODIUM CHLORIDE, PRESERVATIVE FREE 10 ML: 5 INJECTION INTRAVENOUS at 14:20

## 2024-11-04 RX ADMIN — SODIUM CHLORIDE, PRESERVATIVE FREE 10 ML: 5 INJECTION INTRAVENOUS at 14:21

## 2024-11-04 RX ADMIN — Medication 500 UNITS: at 14:20

## 2024-11-05 ENCOUNTER — OFFICE VISIT (OUTPATIENT)
Dept: ONCOLOGY | Age: 64
End: 2024-11-05
Payer: COMMERCIAL

## 2024-11-05 ENCOUNTER — HOSPITAL ENCOUNTER (OUTPATIENT)
Dept: INFUSION THERAPY | Age: 64
Discharge: HOME | End: 2024-11-05
Payer: COMMERCIAL

## 2024-11-05 VITALS
HEART RATE: 102 BPM | OXYGEN SATURATION: 96 % | SYSTOLIC BLOOD PRESSURE: 127 MMHG | TEMPERATURE: 98.3 F | DIASTOLIC BLOOD PRESSURE: 73 MMHG | BODY MASS INDEX: 19.99 KG/M2 | WEIGHT: 120 LBS | HEIGHT: 65 IN

## 2024-11-05 VITALS
SYSTOLIC BLOOD PRESSURE: 110 MMHG | RESPIRATION RATE: 16 BRPM | OXYGEN SATURATION: 96 % | DIASTOLIC BLOOD PRESSURE: 68 MMHG | HEART RATE: 90 BPM

## 2024-11-05 DIAGNOSIS — C34.11 MALIGNANT NEOPLASM OF UPPER LOBE OF RIGHT LUNG (HCC): Primary | ICD-10-CM

## 2024-11-05 DIAGNOSIS — C34.11 MALIGNANT NEOPLASM OF UPPER LOBE OF RIGHT LUNG (MULTI): Primary | ICD-10-CM

## 2024-11-05 PROCEDURE — 99214 OFFICE O/P EST MOD 30 MIN: CPT | Performed by: STUDENT IN AN ORGANIZED HEALTH CARE EDUCATION/TRAINING PROGRAM

## 2024-11-05 PROCEDURE — 2580000003 HC RX 258: Performed by: STUDENT IN AN ORGANIZED HEALTH CARE EDUCATION/TRAINING PROGRAM

## 2024-11-05 PROCEDURE — 96413 CHEMO IV INFUSION 1 HR: CPT

## 2024-11-05 PROCEDURE — 6360000002 HC RX W HCPCS: Performed by: STUDENT IN AN ORGANIZED HEALTH CARE EDUCATION/TRAINING PROGRAM

## 2024-11-05 RX ORDER — ACETAMINOPHEN 325 MG/1
650 TABLET ORAL
Status: CANCELLED | OUTPATIENT
Start: 2024-11-05

## 2024-11-05 RX ORDER — ONDANSETRON 2 MG/ML
8 INJECTION INTRAMUSCULAR; INTRAVENOUS
Status: CANCELLED | OUTPATIENT
Start: 2024-11-05

## 2024-11-05 RX ORDER — DIPHENHYDRAMINE HYDROCHLORIDE 50 MG/ML
50 INJECTION INTRAMUSCULAR; INTRAVENOUS
Status: CANCELLED | OUTPATIENT
Start: 2024-11-05

## 2024-11-05 RX ORDER — SODIUM CHLORIDE 9 MG/ML
INJECTION, SOLUTION INTRAVENOUS CONTINUOUS
Status: CANCELLED | OUTPATIENT
Start: 2024-11-05

## 2024-11-05 RX ORDER — SODIUM CHLORIDE 9 MG/ML
5-250 INJECTION, SOLUTION INTRAVENOUS PRN
Status: DISCONTINUED | OUTPATIENT
Start: 2024-11-05 | End: 2024-11-06 | Stop reason: HOSPADM

## 2024-11-05 RX ORDER — EPINEPHRINE 1 MG/ML
0.3 INJECTION, SOLUTION, CONCENTRATE INTRAVENOUS PRN
Status: CANCELLED | OUTPATIENT
Start: 2024-11-05

## 2024-11-05 RX ORDER — FAMOTIDINE 10 MG/ML
20 INJECTION, SOLUTION INTRAVENOUS
Status: CANCELLED | OUTPATIENT
Start: 2024-11-05

## 2024-11-05 RX ORDER — HEPARIN SODIUM (PORCINE) LOCK FLUSH IV SOLN 100 UNIT/ML 100 UNIT/ML
500 SOLUTION INTRAVENOUS PRN
Status: CANCELLED | OUTPATIENT
Start: 2024-11-05

## 2024-11-05 RX ORDER — SODIUM CHLORIDE 9 MG/ML
5-250 INJECTION, SOLUTION INTRAVENOUS PRN
Status: CANCELLED | OUTPATIENT
Start: 2024-11-05

## 2024-11-05 RX ORDER — ALBUTEROL SULFATE 90 UG/1
4 INHALANT RESPIRATORY (INHALATION) PRN
Status: CANCELLED | OUTPATIENT
Start: 2024-11-05

## 2024-11-05 RX ORDER — SODIUM CHLORIDE 0.9 % (FLUSH) 0.9 %
5-40 SYRINGE (ML) INJECTION PRN
Status: DISCONTINUED | OUTPATIENT
Start: 2024-11-05 | End: 2024-11-06 | Stop reason: HOSPADM

## 2024-11-05 RX ORDER — HEPARIN 100 UNIT/ML
500 SYRINGE INTRAVENOUS PRN
Status: DISCONTINUED | OUTPATIENT
Start: 2024-11-05 | End: 2024-11-06 | Stop reason: HOSPADM

## 2024-11-05 RX ORDER — MEPERIDINE HYDROCHLORIDE 50 MG/ML
12.5 INJECTION INTRAMUSCULAR; INTRAVENOUS; SUBCUTANEOUS PRN
Status: CANCELLED | OUTPATIENT
Start: 2024-11-05

## 2024-11-05 RX ORDER — SODIUM CHLORIDE 0.9 % (FLUSH) 0.9 %
5-40 SYRINGE (ML) INJECTION PRN
Status: CANCELLED | OUTPATIENT
Start: 2024-11-05

## 2024-11-05 RX ADMIN — HEPARIN 500 UNITS: 100 SYRINGE at 13:24

## 2024-11-05 RX ADMIN — SODIUM CHLORIDE, PRESERVATIVE FREE 10 ML: 5 INJECTION INTRAVENOUS at 13:24

## 2024-11-05 RX ADMIN — SODIUM CHLORIDE 500 MG: 9 INJECTION, SOLUTION INTRAVENOUS at 12:08

## 2024-11-05 RX ADMIN — SODIUM CHLORIDE 20 ML/HR: 9 INJECTION, SOLUTION INTRAVENOUS at 12:06

## 2024-11-05 NOTE — PROGRESS NOTES
response of index tumor. She was initially scheduled to have surgery in a few weeks but opted to seek a second opinion. Seems to be tolerating IO owell. Recent issue is c/o Blood in stool, mixed w/ mucous for about a month. No abdominal pain or diarrhea.    9/10/2024: Here for cycle 4 of Durvalumab.  Met with Dr. Oh with CT surgery at  recently.  Patient reports that recommended presurgical workup.    09/24/2024: Pt continues to do well. No major issues; She completed part of preop workup including NM perfusion scans.     10/08/2024: No major symptoms. Tolerating durvalumab without problems so far. Continues to have blood in stool mixed with mucus, no abd pain, fevers, or chills. Consider possible immunotherapy related side effects. However, does not appears to demonstrate classic features of IO-related colitis. Labs reviewed. Has not had colonoscopy in 10-12 years.    10/22/2024:  Here for cycle 7 of Durvalumab. GI symptoms continue but decreasing. Scheduled to see general surgery tomorrow for consultation.    11/05/2024: Patient is here for cycle 8.  No major issues.  I was able to communicate with Dr. Oh regarding treatment plan.       PLAN     Okay to proceed with cycle 8 of Durvalumab  Discussed case with Dr. Oh with CT surgery at   Plan for 10 cycles of Durvalumab  Consider repeat PET scan afterwards  Then treatment break for 4 to 6 weeks prior to surgery  Consider consider resuming durvalumab to complete a year's worth of therapy  Continue to trend TSH  Scheduled for colonoscopy early December next  month         DISPO:   Return in about 2 weeks (around 11/19/2024) for office visit and labs, chemo. For Cycle 9      Thank you for allowing us to participate in the care of Mary Purdy       Approximately spent 32 minutes on chart review as well as time spent on patient encounter, discussing the laboratory, imaging, and clinical findings; and documentation. I have discussed clinical implications

## 2024-11-14 ENCOUNTER — PREP FOR PROCEDURE (OUTPATIENT)
Dept: CARDIOTHORACIC SURGERY | Facility: HOSPITAL | Age: 64
End: 2024-11-14
Payer: COMMERCIAL

## 2024-11-14 DIAGNOSIS — C34.11 PRIMARY CANCER OF RIGHT UPPER LOBE OF LUNG (MULTI): ICD-10-CM

## 2024-11-14 DIAGNOSIS — C34.11 MALIGNANT NEOPLASM OF UPPER LOBE OF RIGHT LUNG (MULTI): Primary | ICD-10-CM

## 2024-11-14 RX ORDER — ACETAMINOPHEN 325 MG/1
650 TABLET ORAL ONCE
OUTPATIENT
Start: 2024-11-14 | End: 2024-11-14

## 2024-11-14 RX ORDER — GABAPENTIN 300 MG/1
300 CAPSULE ORAL ONCE
OUTPATIENT
Start: 2024-11-14 | End: 2024-11-14

## 2024-11-14 RX ORDER — CEFAZOLIN SODIUM 2 G/50ML
2 SOLUTION INTRAVENOUS ONCE
OUTPATIENT
Start: 2024-11-14 | End: 2024-11-14

## 2024-11-14 RX ORDER — HEPARIN SODIUM 5000 [USP'U]/ML
5000 INJECTION, SOLUTION INTRAVENOUS; SUBCUTANEOUS ONCE
OUTPATIENT
Start: 2024-11-14 | End: 2024-11-14

## 2024-11-18 ENCOUNTER — HOSPITAL ENCOUNTER (OUTPATIENT)
Dept: INFUSION THERAPY | Age: 64
Discharge: HOME | End: 2024-11-18
Payer: COMMERCIAL

## 2024-11-18 DIAGNOSIS — C34.91 SQUAMOUS CELL CARCINOMA OF RIGHT LUNG (HCC): ICD-10-CM

## 2024-11-18 DIAGNOSIS — C34.11 MALIGNANT NEOPLASM OF UPPER LOBE OF RIGHT LUNG (HCC): Primary | ICD-10-CM

## 2024-11-18 LAB
ALBUMIN SERPL-MCNC: 3.8 G/DL (ref 3.5–5.2)
ALP SERPL-CCNC: 91 U/L (ref 35–104)
ALT SERPL-CCNC: 22 U/L (ref 0–32)
ANION GAP SERPL CALCULATED.3IONS-SCNC: 12 MMOL/L (ref 7–16)
AST SERPL-CCNC: 27 U/L (ref 0–31)
BASOPHILS # BLD: 0.03 K/UL (ref 0–0.2)
BASOPHILS NFR BLD: 0 % (ref 0–2)
BILIRUB SERPL-MCNC: 0.3 MG/DL (ref 0–1.2)
BUN SERPL-MCNC: 4 MG/DL (ref 6–23)
CALCIUM SERPL-MCNC: 8.8 MG/DL (ref 8.6–10.2)
CHLORIDE SERPL-SCNC: 99 MMOL/L (ref 98–107)
CO2 SERPL-SCNC: 26 MMOL/L (ref 22–29)
CREAT SERPL-MCNC: 0.5 MG/DL (ref 0.5–1)
EOSINOPHIL # BLD: 0.12 K/UL (ref 0.05–0.5)
EOSINOPHILS RELATIVE PERCENT: 2 % (ref 0–6)
ERYTHROCYTE [DISTWIDTH] IN BLOOD BY AUTOMATED COUNT: 12.1 % (ref 11.5–15)
GFR, ESTIMATED: >90 ML/MIN/1.73M2
GLUCOSE SERPL-MCNC: 91 MG/DL (ref 74–99)
HCT VFR BLD AUTO: 35.2 % (ref 34–48)
HGB BLD-MCNC: 11.3 G/DL (ref 11.5–15.5)
IMM GRANULOCYTES # BLD AUTO: 0.03 K/UL (ref 0–0.58)
IMM GRANULOCYTES NFR BLD: 0 % (ref 0–5)
LYMPHOCYTES NFR BLD: 1.14 K/UL (ref 1.5–4)
LYMPHOCYTES RELATIVE PERCENT: 15 % (ref 20–42)
MAGNESIUM SERPL-MCNC: 2.3 MG/DL (ref 1.6–2.6)
MCH RBC QN AUTO: 28 PG (ref 26–35)
MCHC RBC AUTO-ENTMCNC: 32.1 G/DL (ref 32–34.5)
MCV RBC AUTO: 87.3 FL (ref 80–99.9)
MONOCYTES NFR BLD: 0.61 K/UL (ref 0.1–0.95)
MONOCYTES NFR BLD: 8 % (ref 2–12)
NEUTROPHILS NFR BLD: 75 % (ref 43–80)
NEUTS SEG NFR BLD: 5.8 K/UL (ref 1.8–7.3)
PLATELET # BLD AUTO: 324 K/UL (ref 130–450)
PMV BLD AUTO: 10 FL (ref 7–12)
POTASSIUM SERPL-SCNC: 4.1 MMOL/L (ref 3.5–5)
PROT SERPL-MCNC: 7.1 G/DL (ref 6.4–8.3)
RBC # BLD AUTO: 4.03 M/UL (ref 3.5–5.5)
SODIUM SERPL-SCNC: 137 MMOL/L (ref 132–146)
WBC OTHER # BLD: 7.7 K/UL (ref 4.5–11.5)

## 2024-11-18 PROCEDURE — 2580000003 HC RX 258: Performed by: STUDENT IN AN ORGANIZED HEALTH CARE EDUCATION/TRAINING PROGRAM

## 2024-11-18 PROCEDURE — 83735 ASSAY OF MAGNESIUM: CPT

## 2024-11-18 PROCEDURE — 85025 COMPLETE CBC W/AUTO DIFF WBC: CPT

## 2024-11-18 PROCEDURE — 36591 DRAW BLOOD OFF VENOUS DEVICE: CPT

## 2024-11-18 PROCEDURE — 6360000002 HC RX W HCPCS: Performed by: STUDENT IN AN ORGANIZED HEALTH CARE EDUCATION/TRAINING PROGRAM

## 2024-11-18 PROCEDURE — 80053 COMPREHEN METABOLIC PANEL: CPT

## 2024-11-18 RX ORDER — HEPARIN 100 UNIT/ML
500 SYRINGE INTRAVENOUS PRN
Status: DISCONTINUED | OUTPATIENT
Start: 2024-11-18 | End: 2024-11-19 | Stop reason: HOSPADM

## 2024-11-18 RX ORDER — SODIUM CHLORIDE 0.9 % (FLUSH) 0.9 %
5-40 SYRINGE (ML) INJECTION PRN
OUTPATIENT
Start: 2024-11-18

## 2024-11-18 RX ORDER — SODIUM CHLORIDE 0.9 % (FLUSH) 0.9 %
5-40 SYRINGE (ML) INJECTION PRN
Status: DISCONTINUED | OUTPATIENT
Start: 2024-11-18 | End: 2024-11-19 | Stop reason: HOSPADM

## 2024-11-18 RX ORDER — HEPARIN 100 UNIT/ML
500 SYRINGE INTRAVENOUS PRN
OUTPATIENT
Start: 2024-11-18

## 2024-11-18 RX ORDER — SODIUM CHLORIDE 9 MG/ML
25 INJECTION, SOLUTION INTRAVENOUS PRN
OUTPATIENT
Start: 2024-11-18

## 2024-11-18 RX ADMIN — HEPARIN 500 UNITS: 100 SYRINGE at 14:29

## 2024-11-18 RX ADMIN — SODIUM CHLORIDE, PRESERVATIVE FREE 10 ML: 5 INJECTION INTRAVENOUS at 14:29

## 2024-11-18 RX ADMIN — SODIUM CHLORIDE, PRESERVATIVE FREE 10 ML: 5 INJECTION INTRAVENOUS at 14:25

## 2024-11-19 ENCOUNTER — OFFICE VISIT (OUTPATIENT)
Dept: ONCOLOGY | Age: 64
End: 2024-11-19
Payer: COMMERCIAL

## 2024-11-19 ENCOUNTER — HOSPITAL ENCOUNTER (OUTPATIENT)
Dept: INFUSION THERAPY | Age: 64
Discharge: HOME | End: 2024-11-19
Payer: COMMERCIAL

## 2024-11-19 VITALS
HEIGHT: 65 IN | BODY MASS INDEX: 20.12 KG/M2 | DIASTOLIC BLOOD PRESSURE: 60 MMHG | SYSTOLIC BLOOD PRESSURE: 133 MMHG | WEIGHT: 120.8 LBS | HEART RATE: 104 BPM | TEMPERATURE: 97.9 F

## 2024-11-19 VITALS
DIASTOLIC BLOOD PRESSURE: 66 MMHG | SYSTOLIC BLOOD PRESSURE: 102 MMHG | OXYGEN SATURATION: 93 % | RESPIRATION RATE: 16 BRPM | HEART RATE: 97 BPM

## 2024-11-19 DIAGNOSIS — C34.11 MALIGNANT NEOPLASM OF UPPER LOBE OF RIGHT LUNG (HCC): Primary | ICD-10-CM

## 2024-11-19 DIAGNOSIS — R05.8 OTHER COUGH: ICD-10-CM

## 2024-11-19 PROCEDURE — 6360000002 HC RX W HCPCS: Performed by: STUDENT IN AN ORGANIZED HEALTH CARE EDUCATION/TRAINING PROGRAM

## 2024-11-19 PROCEDURE — 96413 CHEMO IV INFUSION 1 HR: CPT

## 2024-11-19 PROCEDURE — 99214 OFFICE O/P EST MOD 30 MIN: CPT | Performed by: STUDENT IN AN ORGANIZED HEALTH CARE EDUCATION/TRAINING PROGRAM

## 2024-11-19 PROCEDURE — 2580000003 HC RX 258: Performed by: STUDENT IN AN ORGANIZED HEALTH CARE EDUCATION/TRAINING PROGRAM

## 2024-11-19 RX ORDER — EPINEPHRINE 1 MG/ML
0.3 INJECTION, SOLUTION, CONCENTRATE INTRAVENOUS PRN
Status: CANCELLED | OUTPATIENT
Start: 2024-11-19

## 2024-11-19 RX ORDER — ONDANSETRON 2 MG/ML
8 INJECTION INTRAMUSCULAR; INTRAVENOUS
Status: CANCELLED | OUTPATIENT
Start: 2024-11-19

## 2024-11-19 RX ORDER — DIPHENHYDRAMINE HYDROCHLORIDE 50 MG/ML
50 INJECTION INTRAMUSCULAR; INTRAVENOUS
Status: CANCELLED | OUTPATIENT
Start: 2024-11-19

## 2024-11-19 RX ORDER — MEPERIDINE HYDROCHLORIDE 50 MG/ML
12.5 INJECTION INTRAMUSCULAR; INTRAVENOUS; SUBCUTANEOUS PRN
Status: CANCELLED | OUTPATIENT
Start: 2024-11-19

## 2024-11-19 RX ORDER — SODIUM CHLORIDE 0.9 % (FLUSH) 0.9 %
5-40 SYRINGE (ML) INJECTION PRN
Status: DISCONTINUED | OUTPATIENT
Start: 2024-11-19 | End: 2024-11-20 | Stop reason: HOSPADM

## 2024-11-19 RX ORDER — SODIUM CHLORIDE 9 MG/ML
5-250 INJECTION, SOLUTION INTRAVENOUS PRN
Status: CANCELLED | OUTPATIENT
Start: 2024-11-19

## 2024-11-19 RX ORDER — SODIUM CHLORIDE 0.9 % (FLUSH) 0.9 %
5-40 SYRINGE (ML) INJECTION PRN
Status: CANCELLED | OUTPATIENT
Start: 2024-11-19

## 2024-11-19 RX ORDER — HEPARIN SODIUM (PORCINE) LOCK FLUSH IV SOLN 100 UNIT/ML 100 UNIT/ML
500 SOLUTION INTRAVENOUS PRN
Status: CANCELLED | OUTPATIENT
Start: 2024-11-19

## 2024-11-19 RX ORDER — HEPARIN 100 UNIT/ML
500 SYRINGE INTRAVENOUS PRN
Status: DISCONTINUED | OUTPATIENT
Start: 2024-11-19 | End: 2024-11-20 | Stop reason: HOSPADM

## 2024-11-19 RX ORDER — HYDROCORTISONE SODIUM SUCCINATE 100 MG/2ML
100 INJECTION INTRAMUSCULAR; INTRAVENOUS
Status: CANCELLED | OUTPATIENT
Start: 2024-11-19

## 2024-11-19 RX ORDER — BENZONATATE 100 MG/1
100 CAPSULE ORAL 3 TIMES DAILY PRN
Qty: 30 CAPSULE | Refills: 0 | Status: SHIPPED | OUTPATIENT
Start: 2024-11-19 | End: 2024-11-29

## 2024-11-19 RX ORDER — SODIUM CHLORIDE 9 MG/ML
5-250 INJECTION, SOLUTION INTRAVENOUS PRN
Status: DISCONTINUED | OUTPATIENT
Start: 2024-11-19 | End: 2024-11-20 | Stop reason: HOSPADM

## 2024-11-19 RX ORDER — SODIUM CHLORIDE 9 MG/ML
INJECTION, SOLUTION INTRAVENOUS CONTINUOUS
Status: CANCELLED | OUTPATIENT
Start: 2024-11-19

## 2024-11-19 RX ORDER — ALBUTEROL SULFATE 90 UG/1
4 INHALANT RESPIRATORY (INHALATION) PRN
Status: CANCELLED | OUTPATIENT
Start: 2024-11-19

## 2024-11-19 RX ORDER — ACETAMINOPHEN 325 MG/1
650 TABLET ORAL
Status: CANCELLED | OUTPATIENT
Start: 2024-11-19

## 2024-11-19 RX ORDER — FAMOTIDINE 10 MG/ML
20 INJECTION, SOLUTION INTRAVENOUS
Status: CANCELLED | OUTPATIENT
Start: 2024-11-19

## 2024-11-19 RX ADMIN — SODIUM CHLORIDE, PRESERVATIVE FREE 10 ML: 5 INJECTION INTRAVENOUS at 12:36

## 2024-11-19 RX ADMIN — SODIUM CHLORIDE 20 ML/HR: 9 INJECTION, SOLUTION INTRAVENOUS at 11:19

## 2024-11-19 RX ADMIN — SODIUM CHLORIDE, PRESERVATIVE FREE 10 ML: 5 INJECTION INTRAVENOUS at 11:10

## 2024-11-19 RX ADMIN — SODIUM CHLORIDE 500 MG: 9 INJECTION, SOLUTION INTRAVENOUS at 11:30

## 2024-11-19 RX ADMIN — Medication 500 UNITS: at 12:36

## 2024-11-19 NOTE — PROGRESS NOTES
[facility] MEDICAL ONCOLOGY  [address] [facility] [address]  Dept: [phone]  Loc: [phone]    Clinic Progress Note      Date of Encounter: 11/19/2024     Referring Provider:  Celso Meier MD     Reason for Visit:   Squamous cell lung cancer, locally advanced        PCP:  Yue Nobles DO    Demographics: 64 y.o. female    Chief Complaint   Patient presents with    Other     Lung CA           Subjective:  No major issue.  Patient has mild dry cough.  Denies of any shortness of breath.      HPI from Initial Outpatient Consultation (4/29/2024):  The patient is a 64 y.o. female and is coming in, establishing at our clinic, after we first met patient on inpatient consultation for a right sided lung mass.  She was admitted at [facility] on 4/15/2024 due to weight loss, and cough.  She was found to have an 8.5 cm right upper lobe mass with central necrosis, and had undergone biopsy of the lung on 4/8/2023, confirming her diagnosis.      Oncology History   Squamous cell carcinoma of right lung (HCC)   4/15/2024 Imaging    CT CHEST:    FINDINGS:  Mediastinum: Thyroid is homogeneous in attenuation.  Bulky irregular  mediastinal adenopathy right paratracheal ma mm and precarinal 12 mm short  axis dimensions along with right hilar 2.3 cm lymph node consistent with  metastatic adenopathy..  Central airways are patent. Esophagus with normal  course and caliber.  Cardiac size within normal limits without pericardial  effusion.     Lungs/pleura: Hyperinflated lungs central necrotic mass in the right upper  lobe posteriorly 7.4 cm series 2, image 41 with minimal postobstructive  changes and trace to small right pleural effusion consistent with malignancy  until proven otherwise.  Although abutting the right ribs there is no clear  cortical involvement or chest wall invasion.     Upper Abdomen: Visualized portions of the upper abdomen

## 2024-11-19 NOTE — PROGRESS NOTES
Patient refused printed AVS.   Pt verbalized understanding of follow up plan of care.  All questions answered.

## 2024-11-20 PROBLEM — C34.11 PRIMARY CANCER OF RIGHT UPPER LOBE OF LUNG (MULTI): Status: ACTIVE | Noted: 2024-11-14

## 2024-11-26 ENCOUNTER — TELEPHONE (OUTPATIENT)
Dept: FAMILY MEDICINE CLINIC | Age: 64
End: 2024-11-26

## 2024-11-26 NOTE — PROGRESS NOTES
[facility] PRE-ADMISSION TESTING INSTRUCTIONS    The Preadmission Testing patient is instructed accordingly using the following criteria (check applicable):    ARRIVAL INSTRUCTIONS:  [x] Parking the day of Surgery is located in the Main Entrance lot.  Upon entering the door, make an immediate right to the surgery reception desk    [x] Bring photo ID and insurance card    [] Bring in a copy of Living will or Durable Power of  papers.    [x] Please be sure to arrange for a responsible adult to provide transportation to and from the hospital    [x] Please arrange for a responsible adult to be with you for the 24 hour period post procedure due to having anesthesia    [x] If you awake am of surgery not feeling well or have temperature >100 please call [phone]    GENERAL INSTRUCTIONS:    [x]  may have up to 8oz of water until 4 hours prior to surgery. No gum, no candy, no mints. NPO time:       [x] You may brush your teeth, do not swallow any toothpaste    [x] Take medications as instructed     [x] Stop herbal supplements and vitamins 5 days prior to procedure    [x] Follow preop dosing of blood thinners per physician instructions    [] Take 1/2 dose of evening insulin, but no insulin after midnight    [] No oral diabetic medications after midnight    [] If diabetic and have low blood sugar or feel symptomatic, take 1-2oz apple juice only    [x] Bring inhalers day of surgery    [] Bring urine specimen day of surgery    [x] Shower or bath with soap, lather and rinse well, AM of Surgery, no lotion, powders or creams to surgical site    [x] Follow bowel prep as instructed per surgeon    [x] No tobacco products within 24 hours of surgery     [x] No alcohol or illegal drug use, marijuana included, within 24 hours of surgery.    [x] Jewelry, body piercing's, eyeglasses, contact lenses and dentures are not permitted into surgery (bring cases)      [x] Please do not wear any nail polish,

## 2024-11-28 ENCOUNTER — ANESTHESIA EVENT (OUTPATIENT)
Dept: ENDOSCOPY | Age: 64
End: 2024-11-28
Payer: COMMERCIAL

## 2024-11-28 NOTE — ANESTHESIA PRE PROCEDURE
Department of Anesthesiology  Preprocedure Note       Name:  Mary Purdy   Age:  64 y.o.  :  1960                                          MRN:  [id number]         Date:  2024      Surgeon: Surgeon(s):  Gayatri Wilkinson MD    Procedure: Procedure(s):  COLONOSCOPY DIAGNOSTIC    Medications prior to admission:   Prior to Admission medications    Medication Sig Start Date End Date Taking? Authorizing Provider   benzonatate (TESSALON) 100 MG capsule Take 1 capsule by mouth 3 times daily as needed for Cough 24  Gigi Haynes MD   QUEtiapine (SEROQUEL) 25 MG tablet TAKE 1/2 TABLET BY MOUTH EVERY NIGHT 24   ProviderMarcy MD   ALPRAZolam (XANAX) 1 MG tablet Take 1 tablet by mouth nightly as needed for Sleep for up to 30 days. Max Daily Amount: 1 mg 7/16/24 8/15/24  Martha Browning MD   sucralfate (CARAFATE) 1 GM/10ML suspension Take 10 mLs by mouth 4 times daily One hour after meals and at bedtime  Patient not taking: Reported on 10/23/2024 5/29/24   Idalia Wang MD   ondansetron (ZOFRAN-ODT) 8 MG TBDP disintegrating tablet Place 1 tablet under the tongue every 8 hours as needed for Nausea or Vomiting  Patient not taking: Reported on 10/23/2024 5/6/24   Gigi Haynes MD   prochlorperazine (COMPAZINE) 10 MG tablet Take 1 tablet by mouth every 6 hours as needed (nausea)  Patient not taking: Reported on 10/23/2024 5/6/24   Gigi Haynes MD   albuterol sulfate HFA (PROVENTIL HFA) 108 (90 Base) MCG/ACT inhaler Inhale 2 puffs into the lungs every 6 hours as needed for Wheezing 24   Louis Linares APRN - CNP   acetaminophen (TYLENOL) 325 MG tablet Take 2 tablets by mouth every 6 hours as needed for Pain    Provider, MD Marcy       Current medications:    No current facility-administered medications for this encounter.     Current Outpatient Medications   Medication Sig Dispense Refill   • benzonatate (TESSALON) 100 MG capsule Take 1 capsule by mouth 3 times daily as

## 2024-12-02 ENCOUNTER — HOSPITAL ENCOUNTER (OUTPATIENT)
Age: 64
Setting detail: OUTPATIENT SURGERY
Discharge: HOME | End: 2024-12-02
Attending: SURGERY | Admitting: SURGERY
Payer: COMMERCIAL

## 2024-12-02 ENCOUNTER — HOSPITAL ENCOUNTER (OUTPATIENT)
Age: 64
Discharge: HOME | End: 2024-12-02
Payer: COMMERCIAL

## 2024-12-02 ENCOUNTER — ANESTHESIA (OUTPATIENT)
Dept: ENDOSCOPY | Age: 64
End: 2024-12-02
Payer: COMMERCIAL

## 2024-12-02 VITALS
HEIGHT: 65 IN | RESPIRATION RATE: 16 BRPM | SYSTOLIC BLOOD PRESSURE: 108 MMHG | TEMPERATURE: 97.8 F | OXYGEN SATURATION: 94 % | DIASTOLIC BLOOD PRESSURE: 61 MMHG | WEIGHT: 120 LBS | HEART RATE: 102 BPM | BODY MASS INDEX: 19.99 KG/M2

## 2024-12-02 DIAGNOSIS — C34.11 MALIGNANT NEOPLASM OF UPPER LOBE OF RIGHT LUNG (HCC): ICD-10-CM

## 2024-12-02 DIAGNOSIS — C34.91 SQUAMOUS CELL CARCINOMA OF RIGHT LUNG (HCC): ICD-10-CM

## 2024-12-02 LAB
ALBUMIN SERPL-MCNC: 4.1 G/DL (ref 3.5–5.2)
ALP SERPL-CCNC: 100 U/L (ref 35–104)
ALT SERPL-CCNC: 22 U/L (ref 0–32)
ANION GAP SERPL CALCULATED.3IONS-SCNC: 16 MMOL/L (ref 7–16)
AST SERPL-CCNC: 24 U/L (ref 0–31)
BASOPHILS # BLD: 0.04 K/UL (ref 0–0.2)
BASOPHILS NFR BLD: 0 % (ref 0–2)
BILIRUB SERPL-MCNC: 0.8 MG/DL (ref 0–1.2)
BUN SERPL-MCNC: 5 MG/DL (ref 6–23)
CALCIUM SERPL-MCNC: 10.1 MG/DL (ref 8.6–10.2)
CHLORIDE SERPL-SCNC: 99 MMOL/L (ref 98–107)
CO2 SERPL-SCNC: 24 MMOL/L (ref 22–29)
CREAT SERPL-MCNC: 0.6 MG/DL (ref 0.5–1)
EOSINOPHIL # BLD: 0.09 K/UL (ref 0.05–0.5)
EOSINOPHILS RELATIVE PERCENT: 1 % (ref 0–6)
ERYTHROCYTE [DISTWIDTH] IN BLOOD BY AUTOMATED COUNT: 12.4 % (ref 11.5–15)
GFR, ESTIMATED: >90 ML/MIN/1.73M2
GLUCOSE SERPL-MCNC: 121 MG/DL (ref 74–99)
HCT VFR BLD AUTO: 42.9 % (ref 34–48)
HGB BLD-MCNC: 13.2 G/DL (ref 11.5–15.5)
IMM GRANULOCYTES # BLD AUTO: 0.06 K/UL (ref 0–0.58)
IMM GRANULOCYTES NFR BLD: 1 % (ref 0–5)
LYMPHOCYTES NFR BLD: 1.19 K/UL (ref 1.5–4)
LYMPHOCYTES RELATIVE PERCENT: 12 % (ref 20–42)
MCH RBC QN AUTO: 27.2 PG (ref 26–35)
MCHC RBC AUTO-ENTMCNC: 30.8 G/DL (ref 32–34.5)
MCV RBC AUTO: 88.5 FL (ref 80–99.9)
MONOCYTES NFR BLD: 0.62 K/UL (ref 0.1–0.95)
MONOCYTES NFR BLD: 6 % (ref 2–12)
NEUTROPHILS NFR BLD: 80 % (ref 43–80)
NEUTS SEG NFR BLD: 8.22 K/UL (ref 1.8–7.3)
PLATELET # BLD AUTO: 411 K/UL (ref 130–450)
PMV BLD AUTO: 9.5 FL (ref 7–12)
POTASSIUM SERPL-SCNC: 4.3 MMOL/L (ref 3.5–5)
PROT SERPL-MCNC: 8.2 G/DL (ref 6.4–8.3)
RBC # BLD AUTO: 4.85 M/UL (ref 3.5–5.5)
SODIUM SERPL-SCNC: 139 MMOL/L (ref 132–146)
TSH SERPL DL<=0.05 MIU/L-ACNC: 1.86 UIU/ML (ref 0.27–4.2)
WBC OTHER # BLD: 10.2 K/UL (ref 4.5–11.5)

## 2024-12-02 PROCEDURE — 36415 COLL VENOUS BLD VENIPUNCTURE: CPT

## 2024-12-02 PROCEDURE — 3609027000 HC COLONOSCOPY: Performed by: SURGERY

## 2024-12-02 PROCEDURE — 6360000002 HC RX W HCPCS: Performed by: NURSE ANESTHETIST, CERTIFIED REGISTERED

## 2024-12-02 PROCEDURE — 85025 COMPLETE CBC W/AUTO DIFF WBC: CPT

## 2024-12-02 PROCEDURE — 3700000000 HC ANESTHESIA ATTENDED CARE: Performed by: SURGERY

## 2024-12-02 PROCEDURE — 80053 COMPREHEN METABOLIC PANEL: CPT

## 2024-12-02 PROCEDURE — 84443 ASSAY THYROID STIM HORMONE: CPT

## 2024-12-02 PROCEDURE — 7100000010 HC PHASE II RECOVERY - FIRST 15 MIN: Performed by: SURGERY

## 2024-12-02 PROCEDURE — 2709999900 HC NON-CHARGEABLE SUPPLY: Performed by: SURGERY

## 2024-12-02 PROCEDURE — 2580000003 HC RX 258: Performed by: SURGERY

## 2024-12-02 PROCEDURE — 7100000011 HC PHASE II RECOVERY - ADDTL 15 MIN: Performed by: SURGERY

## 2024-12-02 PROCEDURE — 45378 DIAGNOSTIC COLONOSCOPY: CPT | Performed by: SURGERY

## 2024-12-02 PROCEDURE — 3700000001 HC ADD 15 MINUTES (ANESTHESIA): Performed by: SURGERY

## 2024-12-02 RX ORDER — PROPOFOL 10 MG/ML
INJECTION, EMULSION INTRAVENOUS
Status: DISCONTINUED | OUTPATIENT
Start: 2024-12-02 | End: 2024-12-02 | Stop reason: SDUPTHER

## 2024-12-02 RX ORDER — SODIUM CHLORIDE 9 MG/ML
INJECTION, SOLUTION INTRAVENOUS CONTINUOUS
Status: DISCONTINUED | OUTPATIENT
Start: 2024-12-02 | End: 2024-12-02 | Stop reason: HOSPADM

## 2024-12-02 RX ORDER — SODIUM CHLORIDE 9 MG/ML
INJECTION, SOLUTION INTRAVENOUS PRN
Status: DISCONTINUED | OUTPATIENT
Start: 2024-12-02 | End: 2024-12-02 | Stop reason: HOSPADM

## 2024-12-02 RX ORDER — SODIUM CHLORIDE 0.9 % (FLUSH) 0.9 %
5-40 SYRINGE (ML) INJECTION EVERY 12 HOURS SCHEDULED
Status: DISCONTINUED | OUTPATIENT
Start: 2024-12-02 | End: 2024-12-02 | Stop reason: HOSPADM

## 2024-12-02 RX ORDER — HYDROCORTISONE ACETATE PRAMOXINE HCL 2.5; 1 G/100G; G/100G
CREAM TOPICAL 3 TIMES DAILY
Qty: 15 G | Refills: 3 | Status: SHIPPED | OUTPATIENT
Start: 2024-12-02

## 2024-12-02 RX ORDER — SODIUM CHLORIDE 0.9 % (FLUSH) 0.9 %
5-40 SYRINGE (ML) INJECTION PRN
Status: DISCONTINUED | OUTPATIENT
Start: 2024-12-02 | End: 2024-12-02 | Stop reason: HOSPADM

## 2024-12-02 RX ADMIN — SODIUM CHLORIDE: 9 INJECTION, SOLUTION INTRAVENOUS at 09:19

## 2024-12-02 RX ADMIN — PROPOFOL 220 MG: 10 INJECTION, EMULSION INTRAVENOUS at 09:35

## 2024-12-02 ASSESSMENT — PAIN - FUNCTIONAL ASSESSMENT
PAIN_FUNCTIONAL_ASSESSMENT: 0-10
PAIN_FUNCTIONAL_ASSESSMENT: 0-10

## 2024-12-02 NOTE — ANESTHESIA POSTPROCEDURE EVALUATION
Department of Anesthesiology  Postprocedure Note    Patient: Mary Purdy  MRN: [id number]  YOB: 1960  Date of evaluation: 12/2/2024    Procedure Summary       Date: 12/02/24 Room / Location: [address]    Anesthesia Start: 0919 Anesthesia Stop: 0956    Procedure: COLONOSCOPY DIAGNOSTIC Diagnosis:       Rectal bleeding      (Rectal bleeding [K62.5])    Surgeons: Gayatri Wilkinson MD Responsible Provider: Dileep Miller DO    Anesthesia Type: MAC ASA Status: 4            Anesthesia Type: No value filed.    Usha Phase I: Usha Score: 10    Usha Phase II: Usha Score: 10    Anesthesia Post Evaluation    Level of consciousness: awake  Pain score: 1  Airway patency: patent  Nausea & Vomiting: no vomiting and no nausea  Cardiovascular status: hemodynamically stable  Respiratory status: acceptable  Hydration status: stable  Pain management: adequate    No notable events documented.

## 2024-12-02 NOTE — DISCHARGE INSTRUCTIONS
[facility] AMBULATORY PROCEDURE DISCHARGE INSTRUCTIONS  You may be drowsy or lightheaded after receiving sedation or anesthesia.    A responsible person should be with you for the next 24 hours.    Please follow the instructions checked below:    DIET INSTRUCTIONS:  [x]Start with light diet and progress to your normal diet as you feel like eating. If you experience nausea or repeated episodes of vomiting which persist beyond 12-24 hours, notify your doctor.  []Other     ACTIVITY INSTRUCTIONS:  [x]Rest today. Increase activity as tolerated    []No heavy lifting or strenuous activity     [x]No driving for today  []Other      MEDICATION INSTRUCTIONS:    [x]Prescriptions sent with you.  Use as directed.  When taking pain medications, you may experience dizziness or drowsiness.  Do not drink alcohol or drive when taking these medications.  [x]Continue preop medications                               Post-procedure Care   If any tissue was removed:   It will be sent to a lab to be examined. It may take 1-2 weeks for results. The doctor will usually give an initial report after the scope is removed. Other tests may be recommended.   A small amount of bleeding may occur during the first few days after the procedure.     When you return home after the procedure, be sure to follow your doctor's instructions, which may include:     Other instructions    Sit in a few inches of warm water (sitz bath) for 15 to 20 minutes 3 times a day and after bowel movements. Then pat the area dry. Do this as long as you have pain in your anal area.     Avoid sitting on the toilet for long periods of time or straining during bowel movements.     Keep your anal area clean.     Support your feet with a small step stool when you sit on the toilet. This helps flex your hips and places your pelvis in a squatting position. This can make bowel movements easier after surgery.     Use baby wipes or medicated pads, such as Tucks,

## 2024-12-02 NOTE — OP NOTE
procedure well.    RECOMMENDATIONS:    1. High-fiber diet.  2. Avoid constipation.  3. Sitz baths daily.  4. Analpram HC 2.5 mg ointment to be applied to the anal region twice a day.          RUEBN DÍAZ MD      D:  12/02/2024 10:29:19     T:  12/02/2024 12:10:41     MA/EVELIN  Job #:  [id number]     Doc#:  [id number]

## 2024-12-02 NOTE — BRIEF OP NOTE
Brief Postoperative Note      Patient: Mary Purdy  YOB: 1960  MRN: [id number]    Date of Procedure: 12/2/2024    Pre-Op Diagnosis Codes:      * Rectal bleeding [K62.5]    Post-Op Diagnosis: Same       Procedure(s):  COLONOSCOPY DIAGNOSTIC    Surgeon(s):  Gayatri Wilkinson MD    Assistant:  * No surgical staff found *    Anesthesia: Monitor Anesthesia Care    Estimated Blood Loss (mL): Minimal    Complications: None    Specimens:   * No specimens in log *    Implants:  * No implants in log *      Drains: * No LDAs found *    Findings:  Infection Present At Time Of Surgery (PATOS) (choose all levels that have infection present):  No infection present  Other Findings:     Electronically signed by Gayatri Wilkinson MD on 12/2/2024 at 9:57 AM

## 2024-12-02 NOTE — H&P
Patient's Name/Date of Birth: Mary Purdy / 1960    Date: 2024    PCP: Yue Nobles DO    No chief complaint on file.      HPI:  Patient seen for evaluation for blood in stools. Never had colonoscopy before. Patient with Hx of lung Ca, awaing lung sutgery       Patient's medications, allergies, past medical, surgical, social and family histories were reviewed and updated as appropriate.    No Known Allergies    Past Medical History:   Diagnosis Date    Cancer (HCC)     skin on bridge of her nose, SCC    Lung cancer (HCC) 2024    had rad/chemo on immunotherapy now. For Lobectomy 2025    Rectal bleeding     for colonoscopy 24        Past Surgical History:   Procedure Laterality Date     SECTION  1990    CT NEEDLE BIOPSY LUNG PERCUTANEOUS  2024    CT NEEDLE BIOPSY LUNG PERCUTANEOUS 2024 [facility] CT    OVARIAN CYST REMOVAL      PORT SURGERY N/A 2024    MEDI PORT INSERTION performed by Gayatri Wilkinson MD at [facility] OR        Social History     Tobacco Use    Smoking status: Former     Current packs/day: 0.00     Average packs/day: 1 pack/day for 46.3 years (46.3 ttl pk-yrs)     Types: Cigarettes     Start date: 1978     Quit date: 4/15/2024     Years since quittin.6    Smokeless tobacco: Never    Tobacco comments:     Quit two weeks ago   Substance Use Topics    Alcohol use: Not Currently       Current Facility-Administered Medications   Medication Dose Route Frequency Provider Last Rate Last Admin    0.9 % sodium chloride infusion   IntraVENous Continuous Gayatri Wilkinson MD   New Bag at 24 0919    sodium chloride flush 0.9 % injection 5-40 mL  5-40 mL IntraVENous 2 times per day Gayatri Wilkinson MD        sodium chloride flush 0.9 % injection 5-40 mL  5-40 mL IntraVENous PRN Gayatri Wilkinson MD        0.9 % sodium chloride infusion   IntraVENous PRN Gayatri Wilkinson MD             Labs:      Review of Systems  Constitutional: negative  Eyes: negative  Ears,

## 2024-12-02 NOTE — ADDENDUM NOTE
Addendum  created 12/02/24 1325 by Parris Bustillo APRN - RONNELL    Attestation recorded in Intraprocedure, Flowsheet accepted, Intraprocedure Attestations deleted, Intraprocedure Attestations filed

## 2024-12-03 ENCOUNTER — OFFICE VISIT (OUTPATIENT)
Dept: ONCOLOGY | Age: 64
End: 2024-12-03
Payer: COMMERCIAL

## 2024-12-03 ENCOUNTER — HOSPITAL ENCOUNTER (OUTPATIENT)
Dept: INFUSION THERAPY | Age: 64
Discharge: HOME | End: 2024-12-03
Payer: COMMERCIAL

## 2024-12-03 VITALS
OXYGEN SATURATION: 96 % | WEIGHT: 117 LBS | DIASTOLIC BLOOD PRESSURE: 89 MMHG | HEIGHT: 65 IN | SYSTOLIC BLOOD PRESSURE: 128 MMHG | HEART RATE: 118 BPM | BODY MASS INDEX: 19.49 KG/M2 | TEMPERATURE: 97.8 F

## 2024-12-03 VITALS
RESPIRATION RATE: 16 BRPM | SYSTOLIC BLOOD PRESSURE: 113 MMHG | TEMPERATURE: 98.6 F | DIASTOLIC BLOOD PRESSURE: 67 MMHG | HEART RATE: 95 BPM | OXYGEN SATURATION: 98 %

## 2024-12-03 DIAGNOSIS — C34.11 MALIGNANT NEOPLASM OF UPPER LOBE OF RIGHT LUNG (HCC): Primary | ICD-10-CM

## 2024-12-03 DIAGNOSIS — R05.8 OTHER COUGH: ICD-10-CM

## 2024-12-03 PROCEDURE — 96413 CHEMO IV INFUSION 1 HR: CPT

## 2024-12-03 PROCEDURE — 6360000002 HC RX W HCPCS: Performed by: STUDENT IN AN ORGANIZED HEALTH CARE EDUCATION/TRAINING PROGRAM

## 2024-12-03 PROCEDURE — 99214 OFFICE O/P EST MOD 30 MIN: CPT | Performed by: STUDENT IN AN ORGANIZED HEALTH CARE EDUCATION/TRAINING PROGRAM

## 2024-12-03 PROCEDURE — 2580000003 HC RX 258: Performed by: STUDENT IN AN ORGANIZED HEALTH CARE EDUCATION/TRAINING PROGRAM

## 2024-12-03 RX ORDER — ACETAMINOPHEN 325 MG/1
650 TABLET ORAL
Status: CANCELLED | OUTPATIENT
Start: 2024-12-03

## 2024-12-03 RX ORDER — FAMOTIDINE 10 MG/ML
20 INJECTION, SOLUTION INTRAVENOUS
Status: CANCELLED | OUTPATIENT
Start: 2024-12-03

## 2024-12-03 RX ORDER — ONDANSETRON 2 MG/ML
8 INJECTION INTRAMUSCULAR; INTRAVENOUS
Status: CANCELLED | OUTPATIENT
Start: 2024-12-03

## 2024-12-03 RX ORDER — EPINEPHRINE 1 MG/ML
0.3 INJECTION, SOLUTION, CONCENTRATE INTRAVENOUS PRN
Status: CANCELLED | OUTPATIENT
Start: 2024-12-03

## 2024-12-03 RX ORDER — SODIUM CHLORIDE 0.9 % (FLUSH) 0.9 %
5-40 SYRINGE (ML) INJECTION PRN
Status: CANCELLED | OUTPATIENT
Start: 2024-12-03

## 2024-12-03 RX ORDER — MEPERIDINE HYDROCHLORIDE 50 MG/ML
12.5 INJECTION INTRAMUSCULAR; INTRAVENOUS; SUBCUTANEOUS PRN
Status: CANCELLED | OUTPATIENT
Start: 2024-12-03

## 2024-12-03 RX ORDER — HEPARIN 100 UNIT/ML
500 SYRINGE INTRAVENOUS PRN
Status: DISCONTINUED | OUTPATIENT
Start: 2024-12-03 | End: 2024-12-04 | Stop reason: HOSPADM

## 2024-12-03 RX ORDER — SODIUM CHLORIDE 9 MG/ML
INJECTION, SOLUTION INTRAVENOUS CONTINUOUS
Status: CANCELLED | OUTPATIENT
Start: 2024-12-03

## 2024-12-03 RX ORDER — HEPARIN SODIUM (PORCINE) LOCK FLUSH IV SOLN 100 UNIT/ML 100 UNIT/ML
500 SOLUTION INTRAVENOUS PRN
Status: CANCELLED | OUTPATIENT
Start: 2024-12-03

## 2024-12-03 RX ORDER — ALBUTEROL SULFATE 90 UG/1
4 INHALANT RESPIRATORY (INHALATION) PRN
Status: CANCELLED | OUTPATIENT
Start: 2024-12-03

## 2024-12-03 RX ORDER — SODIUM CHLORIDE 9 MG/ML
5-250 INJECTION, SOLUTION INTRAVENOUS PRN
Status: DISCONTINUED | OUTPATIENT
Start: 2024-12-03 | End: 2024-12-04 | Stop reason: HOSPADM

## 2024-12-03 RX ORDER — SODIUM CHLORIDE 0.9 % (FLUSH) 0.9 %
5-40 SYRINGE (ML) INJECTION PRN
Status: DISCONTINUED | OUTPATIENT
Start: 2024-12-03 | End: 2024-12-04 | Stop reason: HOSPADM

## 2024-12-03 RX ORDER — PREDNISONE 10 MG/1
10 TABLET ORAL DAILY
Qty: 5 TABLET | Refills: 0 | Status: SHIPPED | OUTPATIENT
Start: 2024-12-03 | End: 2024-12-08

## 2024-12-03 RX ORDER — SODIUM CHLORIDE 9 MG/ML
5-250 INJECTION, SOLUTION INTRAVENOUS PRN
Status: CANCELLED | OUTPATIENT
Start: 2024-12-03

## 2024-12-03 RX ORDER — DIPHENHYDRAMINE HYDROCHLORIDE 50 MG/ML
50 INJECTION INTRAMUSCULAR; INTRAVENOUS
Status: CANCELLED | OUTPATIENT
Start: 2024-12-03

## 2024-12-03 RX ORDER — HYDROCORTISONE SODIUM SUCCINATE 100 MG/2ML
100 INJECTION INTRAMUSCULAR; INTRAVENOUS
Status: CANCELLED | OUTPATIENT
Start: 2024-12-03

## 2024-12-03 RX ADMIN — SODIUM CHLORIDE, PRESERVATIVE FREE 10 ML: 5 INJECTION INTRAVENOUS at 13:47

## 2024-12-03 RX ADMIN — SODIUM CHLORIDE, PRESERVATIVE FREE 10 ML: 5 INJECTION INTRAVENOUS at 12:22

## 2024-12-03 RX ADMIN — SODIUM CHLORIDE 500 MG: 9 INJECTION, SOLUTION INTRAVENOUS at 12:43

## 2024-12-03 RX ADMIN — Medication 500 UNITS: at 13:47

## 2024-12-03 RX ADMIN — SODIUM CHLORIDE 25 ML/HR: 9 INJECTION, SOLUTION INTRAVENOUS at 12:22

## 2024-12-03 NOTE — PROGRESS NOTES
recently, noting partial response of index tumor. She was initially scheduled to have surgery in a few weeks but opted to seek a second opinion. Seems to be tolerating IO owell. Recent issue is c/o Blood in stool, mixed w/ mucous for about a month. No abdominal pain or diarrhea.    9/10/2024: Here for cycle 4 of Durvalumab.  Met with Dr. Oh with CT surgery at  recently.  Patient reports that recommended presurgical workup.    09/24/2024: Pt continues to do well. No major issues; She completed part of preop workup including NM perfusion scans.     10/08/2024: No major symptoms. Tolerating durvalumab without problems so far. Continues to have blood in stool mixed with mucus, no abd pain, fevers, or chills. Consider possible immunotherapy related side effects. However, does not appears to demonstrate classic features of IO-related colitis. Labs reviewed. Has not had colonoscopy in 10-12 years.    10/22/2024:  Here for cycle 7 of Durvalumab. GI symptoms continue but decreasing. Scheduled to see general surgery tomorrow for consultation.    11/05/2024: Patient is here for cycle 8.  No major issues.  I was able to communicate with Dr. Oh regarding treatment plan.     11/19/2024: Here for cycle 9.  Overall doing fairly well.  Does have some mild dry cough.  I discussed continuing to monitor this closely.  She feels albuterol helps.  She is unsure whether or not symptoms are associate with season changes.  I did address monitor closely as immunotherapy could cause side effects    12/3/2024: Here for cycle 10 of Durvalumab.  Discussed surgical planning.  Discussed proceeding with treatment today and PET scan is to be completed at . Pt had colonoscopy/EGD done recently.      PLAN     Okay to proceed with cycle 10 of Durvalumab  Will prescribe prednisone for cough  After day, will hold durvalumab until after surgery  Follow-up with Dr. Oh with CT surgery at  as directed  PET scan scheduled 12/30/2024 at   Surgery

## 2024-12-04 ENCOUNTER — OFFICE VISIT (OUTPATIENT)
Dept: FAMILY MEDICINE CLINIC | Age: 64
End: 2024-12-04
Payer: COMMERCIAL

## 2024-12-04 VITALS
TEMPERATURE: 97.2 F | DIASTOLIC BLOOD PRESSURE: 72 MMHG | RESPIRATION RATE: 16 BRPM | OXYGEN SATURATION: 95 % | HEIGHT: 65 IN | SYSTOLIC BLOOD PRESSURE: 118 MMHG | BODY MASS INDEX: 19.88 KG/M2 | WEIGHT: 119.3 LBS | HEART RATE: 96 BPM

## 2024-12-04 DIAGNOSIS — F41.9 ANXIETY: Primary | ICD-10-CM

## 2024-12-04 DIAGNOSIS — R91.8 MASS OF UPPER LOBE OF RIGHT LUNG: ICD-10-CM

## 2024-12-04 PROCEDURE — 99214 OFFICE O/P EST MOD 30 MIN: CPT | Performed by: FAMILY MEDICINE

## 2024-12-04 RX ORDER — ALPRAZOLAM 1 MG/1
1 TABLET ORAL NIGHTLY PRN
Qty: 30 TABLET | Refills: 2 | Status: SHIPPED | OUTPATIENT
Start: 2024-12-04 | End: 2025-01-03

## 2024-12-04 SDOH — ECONOMIC STABILITY: FOOD INSECURITY: WITHIN THE PAST 12 MONTHS, THE FOOD YOU BOUGHT JUST DIDN'T LAST AND YOU DIDN'T HAVE MONEY TO GET MORE.: NEVER TRUE

## 2024-12-04 SDOH — ECONOMIC STABILITY: INCOME INSECURITY: HOW HARD IS IT FOR YOU TO PAY FOR THE VERY BASICS LIKE FOOD, HOUSING, MEDICAL CARE, AND HEATING?: NOT HARD AT ALL

## 2024-12-04 SDOH — ECONOMIC STABILITY: FOOD INSECURITY: WITHIN THE PAST 12 MONTHS, YOU WORRIED THAT YOUR FOOD WOULD RUN OUT BEFORE YOU GOT MONEY TO BUY MORE.: NEVER TRUE

## 2024-12-04 ASSESSMENT — PATIENT HEALTH QUESTIONNAIRE - PHQ9
SUM OF ALL RESPONSES TO PHQ QUESTIONS 1-9: 0
SUM OF ALL RESPONSES TO PHQ QUESTIONS 1-9: 0
2. FEELING DOWN, DEPRESSED OR HOPELESS: NOT AT ALL
SUM OF ALL RESPONSES TO PHQ QUESTIONS 1-9: 0
SUM OF ALL RESPONSES TO PHQ9 QUESTIONS 1 & 2: 0
SUM OF ALL RESPONSES TO PHQ QUESTIONS 1-9: 0
1. LITTLE INTEREST OR PLEASURE IN DOING THINGS: NOT AT ALL

## 2024-12-04 NOTE — PROGRESS NOTES
twice daily if needed.    2. Lung Mass.  She has a history of a lung mass discovered in January, which was 8.5 cm in size. She underwent 6 weeks of radiation and chemotherapy, followed by 10 rounds of immunotherapy every 2 weeks, with the last session completed yesterday. A PET scan is scheduled for December 30 to ensure everything is in place before her surgery on January 10 in [place]. She reports no chest pain, shortness of breath, cough, hemoptysis, fever, chills, night sweats, or significant weight loss. She was advised to continue with her current treatment plan and [facility]    3. Cough.  She reports a persistent cough for which she has been using an inhaler. Dr. Haynes prescribed a 5-day supply of steroids to help with the cough. She was advised to continue using her inhaler as needed.    4. Health Maintenance.  An influenza vaccine was administered today to provide protection during the holiday season. She was advised to wear a mask if she hears that anyone around her is sick.         Mary was seen today for other, rectal bleeding and flu vaccine.    Diagnoses and all orders for this visit:    Anxiety  -     ALPRAZolam (XANAX) 1 MG tablet; Take 1 tablet by mouth nightly as needed for Sleep for up to 30 days. Max Daily Amount: 1 mg    Mass of upper lobe of right lung    Other orders  -     Influenza, FLUCELVAX Trivalent, (age 6 mo+) IM, Preservative Free, 0.5mL         Yue Quinn, DO    Call or go to ED immediately if symptoms worsen or persist.    Educational materials and/or home exercises printed for patient's review and were included in patient instructions on his/her After Visit Summary and given to patient at the end of visit.       Counseled regarding above diagnosis, including possible risks and complications,  especially if left uncontrolled.    Counseled regarding the possible side effects, risks, benefits and alternatives to

## 2024-12-20 ENCOUNTER — TELEPHONE (OUTPATIENT)
Dept: FAMILY MEDICINE CLINIC | Age: 64
End: 2024-12-20

## 2024-12-20 DIAGNOSIS — T66.XXXA ADVERSE EFFECT OF RADIATION, INITIAL ENCOUNTER: ICD-10-CM

## 2024-12-20 RX ORDER — BENZONATATE 200 MG/1
200 CAPSULE ORAL 3 TIMES DAILY PRN
Qty: 30 CAPSULE | Refills: 0 | Status: SHIPPED | OUTPATIENT
Start: 2024-12-20 | End: 2024-12-30

## 2024-12-20 RX ORDER — FLUTICASONE FUROATE, UMECLIDINIUM BROMIDE AND VILANTEROL TRIFENATATE 100; 62.5; 25 UG/1; UG/1; UG/1
1 POWDER RESPIRATORY (INHALATION) DAILY
Qty: 1 EACH | Refills: 3 | Status: SHIPPED | OUTPATIENT
Start: 2024-12-20

## 2024-12-20 NOTE — TELEPHONE ENCOUNTER
Can we please call patient and ask if she has recently been sick or is this a chronic cough. Also ask her who started her on trelegy and what current dose is as I do not see this on her MAR.

## 2024-12-20 NOTE — TELEPHONE ENCOUNTER
Pt called and needed RX for Trelegy Inhaler and also wanted RX for this on going cough that over the counter medicine is not working. Pt has a PET Scan scheduled for 12/30/24 and Lung surgery scheduled for 01/10/2025 and pt was wanting to try and get rid of this cough.

## 2024-12-20 NOTE — PROGRESS NOTES
"Kurt Vick  is a 64 y.o. female who presents for evaluation of 7.3 cm right upper lobe lung cancer.     This patient presented with weight loss and cough in April 2023.  A biopsy of a 8.5 cm right upper lobe lung mass was performed on 4/8/2023 confirming a diagnosis of squamous cell carcinoma.  She was given a clinical stage of cT4 N2 M0.  She began treatment with carboplatin and paclitaxel on 5/20/2024 with a plan for \"consolidative immunotherapy\".  It.  She received 6 cycles of CarboTaxol and Radiation (ending 6/25/24) and then began cycle 1 of nivolumab on 7/30/2024, completing her third dose 8/28/2024.  She was evaluated by a thoracic surgeon who felt she \"deserves a shot at resection\" with planned right thoracotomy and right upper lobe lobectomy versus pneumonectomy on 9/4/2024. Before I considered surgical resection, I asked that the patient receive a VQ scan and an echocardiogram.  Her VQ scan showed that the right had 37.7% perfusion.  I felt that she was a reasonable candidate for consideration of surgical resection and recommended cessation of her chemotherapy treatment and repeat radiographic imaging.  She received a PET/CT on 12/30/2024 which showed localized progression of disease in the right chest and no distant metastatic disease    Currently the patient is in their usual state of health. She denies the following symptoms: shortness of breath at rest, cough, hemoptysis, fevers, and chills.      There have been no significant changes to their documented medical, surgical and family history.     She  reports that she has quit smoking. Her smoking use included cigarettes. She has been exposed to tobacco smoke. She has quit using smokeless tobacco.    Objective   Physical Exam  Phone visit  Diagnostic Studies  I have reviewed a PET :  1. Interval worsening of the FDG-avid mass with central necrosis in  the posterior right upper lobe, along with increased collapse of the  right lung " "compared to the prior PET from 08/15/2024, suggestive of  disease progression.  2. No other concerning FDG avid disease identified.      Assessment/Plan   I believe that the patient has a diagnosis of locally advanced lung cancer .     This patient's PET suggests residual viable cancer. The progression to me suggest that she is \"failing\" non-surgical treatment and that \"salvage\" surgery. Her pleural effusion may suggest pleural metastatic disease, which would be a contraindication of surgery. I will assess this at the time of surgery.   The enlarged size of the mass suggests to me that she is more likely to require an pneumonectomy. We discussed this operation in detail and the specific risks associated with it.     I recommend surgery. Right thoracotomy and lung resection (likely pneumonectomy)    I discussed this in detail with the patient, including a discussion of alternatives. They were comfortable with this approach.     Jonah Wagner MD  [phone]  They declined video call, consent obtained, Time: 10  min.     "

## 2024-12-20 NOTE — H&P (VIEW-ONLY)
"Kurt Vick  is a 64 y.o. female who presents for evaluation of 7.3 cm right upper lobe lung cancer.     This patient presented with weight loss and cough in April 2023.  A biopsy of a 8.5 cm right upper lobe lung mass was performed on 4/8/2023 confirming a diagnosis of squamous cell carcinoma.  She was given a clinical stage of cT4 N2 M0.  She began treatment with carboplatin and paclitaxel on 5/20/2024 with a plan for \"consolidative immunotherapy\".  It.  She received 6 cycles of CarboTaxol and Radiation (ending 6/25/24) and then began cycle 1 of nivolumab on 7/30/2024, completing her third dose 8/28/2024.  She was evaluated by a thoracic surgeon who felt she \"deserves a shot at resection\" with planned right thoracotomy and right upper lobe lobectomy versus pneumonectomy on 9/4/2024. Before I considered surgical resection, I asked that the patient receive a VQ scan and an echocardiogram.  Her VQ scan showed that the right had 37.7% perfusion.  I felt that she was a reasonable candidate for consideration of surgical resection and recommended cessation of her chemotherapy treatment and repeat radiographic imaging.  She received a PET/CT on 12/30/2024 which showed localized progression of disease in the right chest and no distant metastatic disease    Currently the patient is in their usual state of health. She denies the following symptoms: shortness of breath at rest, cough, hemoptysis, fevers, and chills.      There have been no significant changes to their documented medical, surgical and family history.     She  reports that she has quit smoking. Her smoking use included cigarettes. She has been exposed to tobacco smoke. She has quit using smokeless tobacco.    Objective   Physical Exam  Phone visit  Diagnostic Studies  I have reviewed a PET :  1. Interval worsening of the FDG-avid mass with central necrosis in  the posterior right upper lobe, along with increased collapse of the  right lung " "compared to the prior PET from 08/15/2024, suggestive of  disease progression.  2. No other concerning FDG avid disease identified.      Assessment/Plan   I believe that the patient has a diagnosis of locally advanced lung cancer .     This patient's PET suggests residual viable cancer. The progression to me suggest that she is \"failing\" non-surgical treatment and that \"salvage\" surgery. Her pleural effusion may suggest pleural metastatic disease, which would be a contraindication of surgery. I will assess this at the time of surgery.   The enlarged size of the mass suggests to me that she is more likely to require an pneumonectomy. We discussed this operation in detail and the specific risks associated with it.     I recommend surgery. Right thoracotomy and lung resection (likely pneumonectomy)    I discussed this in detail with the patient, including a discussion of alternatives. They were comfortable with this approach.     Jonah Wagner MD  [phone]  Time 13 min (audio only)  "

## 2024-12-23 NOTE — PROGRESS NOTES
Physician Progress Note      PATIENT:               JENNIFER LOPEZ  [facility] #:                  [id number]  :                       1960  ADMIT DATE:       4/15/2024 12:23 PM  DISCH DATE:        2024 3:41 PM  RESPONDING  PROVIDER #:        Floridalma CHANDLER MD          QUERY TEXT:    Patient admitted with Lung mass. Noted to have Moderate Malnutrition in   Nutrition Note . If possible, please document in progress notes and   discharge summary if you are evaluating and /or treating any of the following:    The medical record reflects the following:  Risk Factors: Chronic Illness, Lung mass,  Clinical Indicators: Current BMI (kg/m2): 19.5, Mild weight loss (specify   amount and time period) (12% over 10 months) ,  Mild body fat loss Orbital,   Buccal region, Mild muscle mass loss Temples (temporalis), Mild decrease in   energy intake (Comment) (pt reports dental work over the winter and has been   eating less while consuming softer foods)  Treatment: Ensure Plus ONS BID    Thank You  KENYETTA Senior, RN  Clinical Documentation Integrity    ASPEN Criteria:    https://aspenjournals.onlinelibrary.lim.com/doi/full/10.1177/516045687165466  5  Options provided:  -- Protein calorie malnutrition moderate  -- Other - I will add my own diagnosis  -- Disagree - Not applicable / Not valid  -- Disagree - Clinically unable to determine / Unknown  -- Refer to Clinical Documentation Reviewer    PROVIDER RESPONSE TEXT:    This patient has moderate protein calorie malnutrition.    Query created by: Lazaro Jackson on 2024 10:45 AM      Electronically signed by:  Floridalma CHANDLER MD 2024 8:12 AM           None known

## 2024-12-26 ENCOUNTER — CLINICAL SUPPORT (OUTPATIENT)
Dept: PREADMISSION TESTING | Facility: HOSPITAL | Age: 64
End: 2024-12-26
Payer: COMMERCIAL

## 2024-12-26 PROBLEM — D50.8 IRON DEFICIENCY ANEMIA SECONDARY TO INADEQUATE DIETARY IRON INTAKE: Status: ACTIVE | Noted: 2024-04-29

## 2024-12-26 PROBLEM — E55.9 VITAMIN D DEFICIENCY: Status: ACTIVE | Noted: 2024-04-17

## 2024-12-26 PROBLEM — C34.11 MALIGNANT NEOPLASM OF UPPER LOBE OF RIGHT LUNG (MULTI): Status: RESOLVED | Noted: 2024-05-09 | Resolved: 2024-12-26

## 2024-12-26 PROBLEM — E83.52 HYPERCALCEMIA: Status: ACTIVE | Noted: 2024-04-17

## 2024-12-26 PROBLEM — C34.91 SQUAMOUS CELL CARCINOMA OF RIGHT LUNG (MULTI): Status: ACTIVE | Noted: 2024-04-29

## 2024-12-26 PROBLEM — Z85.118 HISTORY OF LUNG CANCER: Status: ACTIVE | Noted: 2024-08-20

## 2024-12-26 PROBLEM — K62.5 HEMORRHAGE OF RECTUM AND ANUS: Status: ACTIVE | Noted: 2024-10-23

## 2024-12-26 PROBLEM — R91.8 LUNG MASS: Status: ACTIVE | Noted: 2024-04-15

## 2024-12-26 PROBLEM — D64.9 NORMOCYTIC ANEMIA: Status: ACTIVE | Noted: 2024-04-17

## 2024-12-26 PROBLEM — R93.89 THICKENED ENDOMETRIUM: Status: ACTIVE | Noted: 2024-04-17

## 2024-12-26 RX ORDER — FLUTICASONE FUROATE, UMECLIDINIUM BROMIDE AND VILANTEROL TRIFENATATE 100; 62.5; 25 UG/1; UG/1; UG/1
1 POWDER RESPIRATORY (INHALATION) DAILY
COMMUNITY

## 2024-12-26 RX ORDER — ALBUTEROL SULFATE 90 UG/1
2 AEROSOL, METERED RESPIRATORY (INHALATION) EVERY 6 HOURS PRN
COMMUNITY

## 2024-12-26 RX ORDER — NICOTINE 7MG/24HR
1 PATCH, TRANSDERMAL 24 HOURS TRANSDERMAL EVERY 24 HOURS
COMMUNITY
Start: 2024-05-12 | End: 2025-01-02 | Stop reason: ALTCHOICE

## 2024-12-26 RX ORDER — SUCRALFATE 1 G/10ML
1 SUSPENSION ORAL 4 TIMES DAILY
COMMUNITY
Start: 2024-05-29 | End: 2025-01-02 | Stop reason: ALTCHOICE

## 2024-12-26 RX ORDER — HYDROCORTISONE ACETATE PRAMOXINE HCL 2.5; 1 G/100G; G/100G
CREAM TOPICAL 3 TIMES DAILY
COMMUNITY
Start: 2024-12-02 | End: 2025-01-02 | Stop reason: ALTCHOICE

## 2024-12-26 RX ORDER — PREDNISONE 10 MG/1
1 TABLET ORAL
COMMUNITY
Start: 2024-12-03

## 2024-12-26 RX ORDER — ALPRAZOLAM 1 MG/1
1 TABLET ORAL NIGHTLY PRN
COMMUNITY
Start: 2024-07-16

## 2024-12-26 RX ORDER — QUETIAPINE FUMARATE 25 MG/1
12.5 TABLET, FILM COATED ORAL NIGHTLY
COMMUNITY
Start: 2024-11-18

## 2024-12-26 NOTE — CPM/PAT H&P
"CPM/PAT Evaluation       Name: Faustina Vick (Faustina Vick \"Jannie\")  /Age: 1960/64 y.o.     { PAT Visit Type:17135}      Chief Complaint: ***    Faustina Vick is scheduled for Resection Robot-Assisted Lung Lobe - Right  Thoracotomy Robot-Assisted - Right  on 01/10/24    **Data Input  HPI    No past medical history on file.    No past surgical history on file.    Patient  has no history on file for sexual activity.    No family history on file.    No Known Allergies    Prior to Admission medications    Not on File        PAT ROS     PAT Physical Exam     Airway        Testing/Diagnostic:         - Echo: 10/03/24  CONCLUSIONS:   1. The left ventricular systolic function is normal, with a visually estimated ejection fraction of 55-60%.   2. There is normal right ventricular global systolic function.  There is trace mitral valve regurgitation.       - MRI Brain: 24  Impression    1. No evidence of metastatic disease to the brain.  2. No acute intracranial abnormality. No sign of acute infarct.  3. Mild age-appropriate atrophy and moderate small vessel ischemia.      - PFTs: 08/15/24  IMPRESSION:    1. Moderate obstructive lung disease.  2. No bronchodilator response.  3. Normal minute volume ventilation.  4. Normal total lung capacity.  5. Normal diffusion capacity.      - CT Chest: 24  IMPRESSION   5.6 cm centrally necrotic posterior right upper lobe lesion measured 7.3 cm on 04/15/2024.   Interval resolution of low right paratracheal and subcarinal lymph node enlargement with mild residual right hilar lymph node enlargement.       - CT A/P: 24 Impression    There is no evidence of abdominal/pelvic metastatic disease.  Fluid is again identified within the endometrial canal suggesting cervical stenosis.      - PET MR skull: 08/15/24  Impression  Partial response to therapy with decrease in size and metabolic activity within the 6.3 cm cavitary right upper lobe pulmonary mass. There is " mild residual hypermetabolic activity within right hilar lymph nodes but the multiple additional mediastinal hypermetabolic lymph nodes have resolved.    There is no evidence of new hypermetabolic metastatic disease.        Patient Specialist/PCP:          Cardiac Sx: Artemio Patel at [facility] 08/20/24 h/o of lung cancer, presents to review PET and PFTs and to continue to discuss possible surgical recommendations       PCP: Beata Denney     Pulmonology:  Manny Harvey 07/03/24 follow-up regarding her recently diagnosed lung cancer. She completed Chemoradiation therapy as well. She is scheduled for follow-up CT chest abdomen pelvis for post treatment response. Pending on the results she will be referred to CTS for possible resection.       RadOnc: Artemio Patel  LOV 06/26/24  Onc: Tim Novak 10/08/24 follow up of Cancer of upper lobe of right lung, status post concurrent chemoradiotherapy and currently receiving immunotherapy .   Okay to proceed with cycle 6 of Durvalumab  Follow-up with Dr. Wagner as directed, scheduled for follow later this month  Consider repeat systemic imaging in the next few months  Refer back to gen surg      Thoracic Sx: Jonah Wagner 10/17/24 presents for evaluation of 7.3 cm right upper lobe lung cancer.   A biopsy of a 8.5 cm right upper lobe lung mass was performed on 4/8/2023 confirming a diagnosis of squamous cell carcinoma.           _____________________________________________________________________  **Data input only. No medications, history or providers verified           Natalia Chong LPN  Preadmission Testing              Visit Vitals  Smoking Status Former       DASI Risk Score    No data to display       Caprini DVT Assessment    No data to display       Modified Frailty Index    No data to display       CHADS2 Stroke Risk  Current as of today        N/A 3 to 100%: High Risk   2 to < 3%: Medium Risk   0 to < 2%: Low Risk     Last Change: N/A          This score determines  the patient's risk of having a stroke if the patient has atrial fibrillation.        This score is not applicable to this patient. Components are not calculated.          Revised Cardiac Risk Index    No data to display       Apfel Simplified Score    No data to display       Risk Analysis Index Results This Encounter    No data found in the last 10 encounters.       Prodigy: High Risk  Total Score: 8              Prodigy Age Score           ARISCAT Score for Postoperative Pulmonary Complications    No data to display       Rashid Perioperative Risk for Myocardial Infarction or Cardiac Arrest (ALESSANDRO)    No data to display         Assessment and Plan:     {[facility] EMBEDDED ASSESSMENT AND PLAN:80673}

## 2024-12-30 ENCOUNTER — HOSPITAL ENCOUNTER (OUTPATIENT)
Dept: RADIOLOGY | Facility: CLINIC | Age: 64
Discharge: HOME | End: 2024-12-30
Payer: COMMERCIAL

## 2024-12-30 DIAGNOSIS — C34.11 MALIGNANT NEOPLASM OF UPPER LOBE OF RIGHT LUNG (MULTI): ICD-10-CM

## 2024-12-30 PROCEDURE — 3430000001 HC RX 343 DIAGNOSTIC RADIOPHARMACEUTICALS: Performed by: THORACIC SURGERY (CARDIOTHORACIC VASCULAR SURGERY)

## 2024-12-30 PROCEDURE — 78815 PET IMAGE W/CT SKULL-THIGH: CPT

## 2024-12-30 PROCEDURE — A9552 F18 FDG: HCPCS | Performed by: THORACIC SURGERY (CARDIOTHORACIC VASCULAR SURGERY)

## 2024-12-30 PROCEDURE — 78815 PET IMAGE W/CT SKULL-THIGH: CPT | Performed by: STUDENT IN AN ORGANIZED HEALTH CARE EDUCATION/TRAINING PROGRAM

## 2024-12-30 RX ORDER — FLUDEOXYGLUCOSE F 18 200 MCI/ML
11.28 INJECTION, SOLUTION INTRAVENOUS
Status: COMPLETED | OUTPATIENT
Start: 2024-12-30 | End: 2024-12-30

## 2024-12-30 RX ADMIN — FLUDEOXYGLUCOSE F 18 11.28 MILLICURIE: 200 INJECTION, SOLUTION INTRAVENOUS at 10:11

## 2025-01-02 ENCOUNTER — PRE-ADMISSION TESTING (OUTPATIENT)
Dept: PREADMISSION TESTING | Facility: HOSPITAL | Age: 65
End: 2025-01-02
Payer: COMMERCIAL

## 2025-01-02 VITALS
BODY MASS INDEX: 19.22 KG/M2 | HEIGHT: 65 IN | WEIGHT: 115.4 LBS | OXYGEN SATURATION: 95 % | HEART RATE: 104 BPM | DIASTOLIC BLOOD PRESSURE: 79 MMHG | TEMPERATURE: 99.5 F | SYSTOLIC BLOOD PRESSURE: 109 MMHG

## 2025-01-02 DIAGNOSIS — C34.11 PRIMARY CANCER OF RIGHT UPPER LOBE OF LUNG (MULTI): ICD-10-CM

## 2025-01-02 DIAGNOSIS — Z92.3 HX OF RADIATION THERAPY: Primary | ICD-10-CM

## 2025-01-02 DIAGNOSIS — Z01.818 PREOPERATIVE EXAMINATION: ICD-10-CM

## 2025-01-02 LAB
ABO GROUP (TYPE) IN BLOOD: NORMAL
ANTIBODY SCREEN: NORMAL
APTT PPP: 40 SECONDS (ref 27–38)
ERYTHROCYTE [DISTWIDTH] IN BLOOD BY AUTOMATED COUNT: 13.2 % (ref 11.5–14.5)
HCT VFR BLD AUTO: 40.3 % (ref 36–46)
HGB BLD-MCNC: 12.3 G/DL (ref 12–16)
INR PPP: 1.3 (ref 0.9–1.1)
MCH RBC QN AUTO: 25.5 PG (ref 26–34)
MCHC RBC AUTO-ENTMCNC: 30.5 G/DL (ref 32–36)
MCV RBC AUTO: 83 FL (ref 80–100)
NRBC BLD-RTO: 0 /100 WBCS (ref 0–0)
PLATELET # BLD AUTO: 384 X10*3/UL (ref 150–450)
PROTHROMBIN TIME: 14.8 SECONDS (ref 9.8–12.8)
RBC # BLD AUTO: 4.83 X10*6/UL (ref 4–5.2)
RH FACTOR (ANTIGEN D): NORMAL
WBC # BLD AUTO: 8.4 X10*3/UL (ref 4.4–11.3)

## 2025-01-02 PROCEDURE — 86901 BLOOD TYPING SEROLOGIC RH(D): CPT

## 2025-01-02 PROCEDURE — 87081 CULTURE SCREEN ONLY: CPT

## 2025-01-02 PROCEDURE — 86923 COMPATIBILITY TEST ELECTRIC: CPT

## 2025-01-02 PROCEDURE — 36415 COLL VENOUS BLD VENIPUNCTURE: CPT

## 2025-01-02 PROCEDURE — 85027 COMPLETE CBC AUTOMATED: CPT

## 2025-01-02 PROCEDURE — 85610 PROTHROMBIN TIME: CPT

## 2025-01-02 PROCEDURE — 99204 OFFICE O/P NEW MOD 45 MIN: CPT

## 2025-01-02 RX ORDER — CHLORHEXIDINE GLUCONATE 40 MG/ML
SOLUTION TOPICAL DAILY PRN
Qty: 473 ML | Refills: 0 | Status: SHIPPED | OUTPATIENT
Start: 2025-01-02

## 2025-01-02 RX ORDER — CHLORHEXIDINE GLUCONATE ORAL RINSE 1.2 MG/ML
15 SOLUTION DENTAL AS NEEDED
Qty: 120 ML | Refills: 0 | Status: SHIPPED | OUTPATIENT
Start: 2025-01-02 | End: 2025-01-04

## 2025-01-02 ASSESSMENT — ENCOUNTER SYMPTOMS
EXCESSIVE BLEEDING: 0
CONSTIPATION: 0
WEAKNESS: 0
SINUS CONGESTION: 0
SHORTNESS OF BREATH: 0
EYE DISCHARGE: 0
CHILLS: 0
COUGH: 1
PND: 0
BRUISES/BLEEDS EASILY: 0
NECK PAIN: 0
VOMITING: 0
NERVOUS/ANXIOUS: 0
DYSPNEA AT REST: 0
CONFUSION: 0
UNEXPECTED WEIGHT CHANGE: 0
LIGHT-HEADEDNESS: 0
TROUBLE SWALLOWING: 0
JOINT SWELLING: 0
ARTHRALGIAS: 0
NUMBNESS: 0
DYSPNEA WITH EXERTION: 0
RHINORRHEA: 0
POLYDIPSIA: 0
NECK STIFFNESS: 0
MYALGIAS: 0
TREMORS: 0
ABDOMINAL PAIN: 0
FEVER: 0
DIARRHEA: 0
VOICE CHANGE: 0
DIFFICULTY URINATING: 0
HEMOPTYSIS: 0
SKIN CHANGES: 0
ABDOMINAL DISTENTION: 0
WOUND: 0
NAUSEA: 0
BLOOD IN STOOL: 0
PALPITATIONS: 0
VERTIGO: 0
NECK SWELLING: 0
DYSURIA: 0
WHEEZING: 0
DOUBLE VISION: 0
VISUAL CHANGE: 0
LIMITED RANGE OF MOTION: 0
NECK MASS: 0

## 2025-01-02 ASSESSMENT — DUKE ACTIVITY SCORE INDEX (DASI)
CAN YOU DO YARD WORK LIKE RAKING LEAVES, WEEDING OR PUSHING A MOWER: NO
CAN YOU HAVE SEXUAL RELATIONS: NO
TOTAL_SCORE: 10.7
DASI METS SCORE: 4.1
CAN YOU RUN A SHORT DISTANCE: NO
CAN YOU TAKE CARE OF YOURSELF (EAT, DRESS, BATHE, OR USE TOILET): YES
CAN YOU PARTICIPATE IN STRENOUS SPORTS LIKE SWIMMING, SINGLES TENNIS, FOOTBALL, BASKETBALL, OR SKIING: NO
CAN YOU PARTICIPATE IN MODERATE RECREATIONAL ACTIVITIES LIKE GOLF, BOWLING, DANCING, DOUBLES TENNIS OR THROWING A BASEBALL OR FOOTBALL: NO
CAN YOU WALK INDOORS, SUCH AS AROUND YOUR HOUSE: YES
CAN YOU DO HEAVY WORK AROUND THE HOUSE LIKE SCRUBBING FLOORS OR LIFTING AND MOVING HEAVY FURNITURE: NO
CAN YOU DO LIGHT WORK AROUND THE HOUSE LIKE DUSTING OR WASHING DISHES: YES
CAN YOU CLIMB A FLIGHT OF STAIRS OR WALK UP A HILL: NO
CAN YOU DO MODERATE WORK AROUND THE HOUSE LIKE VACUUMING, SWEEPING FLOORS OR CARRYING GROCERIES: YES
CAN YOU WALK A BLOCK OR TWO ON LEVEL GROUND: NO

## 2025-01-02 ASSESSMENT — LIFESTYLE VARIABLES: SMOKING_STATUS: NONSMOKER

## 2025-01-02 NOTE — CPM/PAT H&P
"CPM/PAT Evaluation       Name: Faustina Vick (Faustina Vick \"Jannie\")  /Age: 1960/64 y.o.     Visit Type:   In-Person       Chief Complaint: Perioperative Evaluation    HPI HPI: 65 y/o female scheduled for LOBECTOMY, LUNG, ROBOT-ASSISTED - Right THORACOTOMY, ROBOT-ASSISTED - Right  on 1/10/25 with Dr. Jonah Wagner secondary to Primary cancer of right upper lobe of lung .   Presents to CPM today for perioperative risk stratification and optimization. PMHX includes Anxiety, SCC Lung Cancer, Asthma, Iron Deficiency Anemia, Arthritis, Vitamin D.    PCP: Beata Denney  Specialists:   Oncology - Dr. Tim Novak MD   Thoracic Surgery - Dr Jonah Wagner MD            Past Medical History:   Diagnosis Date    Anxiety     treated by PCP with PRN medication    Arthritis     hands    Asthma     controlled    History of SCC (squamous cell carcinoma) of skin     skin on bridge of her nose    Personal history of antineoplastic chemotherapy     Primary cancer of right upper lobe of lung (Multi)     seen by Dr. Jonah Wagner 10/17/24--7.3 cm mass right upper lobe lung cancer       Past Surgical History:   Procedure Laterality Date     SECTION, LOW TRANSVERSE      INSERTION / REMOVAL / REPLACEMENT VENOUS ACCESS CATHETER      OTHER SURGICAL HISTORY      MEDI PORT INSERTION    OVARIAN CYST SURGERY         Patient Sexual activity questions deferred to the physician.    Family History   Problem Relation Name Age of Onset    Adrenal disorder Mother      Bone cancer Mother      Skin cancer Father         No Known Allergies    Prior to Admission medications    Medication Sig Start Date End Date Taking? Authorizing Provider   ALPRAZolam (Xanax) 1 mg tablet Take 1 tablet (1 mg) by mouth as needed at bedtime. 24  Yes Historical Provider, MD Reyes Bruce 100-62.5-25 mcg blister with device Inhale 1 puff once daily.   Yes Historical Provider, MD   Ventolin HFA 90 mcg/actuation inhaler Inhale 2 puffs every " 6 hours if needed for wheezing.   Yes Historical Provider, MD   hydrocortisone-pramoxine (Analpram-HC) 2.5-1 % cream Insert into the rectum 3 times a day.  Patient not taking: Reported on 1/2/2025 12/2/24   Historical Provider, MD   nicotine (Nicoderm CQ) 7 mg/24 hr patch Place 1 patch on the skin once every 24 hours.  Patient not taking: Reported on 1/2/2025 5/12/24   Historical Provider, MD   predniSONE (Deltasone) 10 mg tablet Take 1 tablet (10 mg) by mouth early in the morning..  Patient not taking: Reported on 1/2/2025 12/3/24   Historical Provider, MD   QUEtiapine (SEROquel) 25 mg tablet Take 0.5 tablets (12.5 mg) by mouth once daily at bedtime.  Patient not taking: Reported on 1/2/2025 11/18/24   Historical Provider, MD   sucralfate (Carafate) 100 mg/mL suspension Take 10 mL (1 g) by mouth 4 times a day.  Patient not taking: Reported on 1/2/2025 5/29/24   Historical Provider, MD BLOOD ROS:   Constitutional:    no fever   no chills   no unexpected weight change  Neuro/Psych:    no numbness   no weakness   no light-headedness   no tremors   no confusion   not nervous/anxious   no self-injury   no suicidal ideation  Eyes:    no discharge   no vision loss   no diplopia   no visual disturbance   no corrective lenses  Ears:    no ear pain   no hearing loss   no vertigo   no tinnitus   no hearing aides  Nose:    no nasal discharge   no sinus congestion   no epistaxis  Mouth:    no dental issues   no mouth pain   no oral bleeding   no mouth lesions  Throat:    no throat pain   no dysphagia   no voice change  Neck:    no neck pain   neck swelling   no neck stiffness   no neck masses  Cardio:    no chest pain   no palpitations   no peripheral edema   no dyspnea   no MANNING   no paroxysmal nocturnal dyspnea  Respiratory:    cough (dry, worse at night, improving. for 2 months.)   no wheezing   no hemoptysis   no shortness of breath  Endocrine:    no cold intolerance   no heat intolerance   no polydipsia  GI:    no  abdominal distention   no abdominal pain   no constipation   no diarrhea   no nausea   no vomiting   no blood in stool  :    no difficulty urinating   no dysuria   no oliguria   no polyuria  Musculoskeletal:    no arthralgias   no myalgias   no decreased ROM   no swelling  Hematologic:    does not bruise/bleed easily   no excessive bleeding   no history of blood transfusion   no blood clots  Skin:   no skin changes   no sores/wound   no rash      Physical Exam  Vitals reviewed. Physical exam within normal limits.   Constitutional:       General: She is not in acute distress.     Appearance: Normal appearance. She is normal weight. She is not ill-appearing, toxic-appearing or diaphoretic.   HENT:      Head: Normocephalic.      Nose: Nose normal. No congestion or rhinorrhea.      Mouth/Throat:      Mouth: Mucous membranes are moist.      Pharynx: Oropharynx is clear. No oropharyngeal exudate or posterior oropharyngeal erythema.   Eyes:      General: No scleral icterus.        Right eye: No discharge.         Left eye: No discharge.      Conjunctiva/sclera: Conjunctivae normal.   Neck:      Vascular: No carotid bruit.   Cardiovascular:      Rate and Rhythm: Regular rhythm. Tachycardia present.      Pulses: Normal pulses.      Heart sounds: Normal heart sounds. No murmur heard.     No friction rub. No gallop.   Pulmonary:      Effort: Pulmonary effort is normal. No respiratory distress.      Breath sounds: No stridor. No wheezing, rhonchi or rales.      Comments: Decreased lung sounds on right side, no wheezing or rhonchi  Chest:      Chest wall: No tenderness.   Musculoskeletal:         General: No swelling or tenderness. Normal range of motion.      Cervical back: Normal range of motion and neck supple. No rigidity or tenderness.      Comments: Left side chest port   Skin:     General: Skin is warm and dry.   Neurological:      General: No focal deficit present.      Mental Status: She is alert.      Motor: No  "weakness.   Psychiatric:         Mood and Affect: Mood normal.         Behavior: Behavior normal.         Thought Content: Thought content normal.         Judgment: Judgment normal.          PAT AIRWAY:   Airway:     Mallampati::  I    TM distance::  >3 FB    Neck ROM::  Full   Upper partial, poor dentition.       Visit Vitals  /79   Pulse 104   Temp 37.5 °C (99.5 °F) (Temporal)   Ht 1.651 m (5' 5\")   Wt 52.3 kg (115 lb 6.4 oz)   SpO2 95%   BMI 19.20 kg/m²   Smoking Status Former   BSA 1.55 m²       DASI Risk Score      Flowsheet Row Pre-Admission Testing [facility] 1/2/[facility] in [facility]   Can you take care of yourself (eat, dress, bathe, or use toilet)?  2.75 filed at 01/02/2025 1026   Can you walk indoors, such as around your house? 1.75 filed at 01/02/2025 1026   Can you walk a block or two on level ground?  0 filed at 01/02/2025 1026   Can you climb a flight of stairs or walk up a hill? 0 filed at 01/02/2025 1026   Can you run a short distance? 0 filed at 01/02/2025 1026   Can you do light work around the house like dusting or washing dishes? 2.7 filed at 01/02/2025 1026   Can you do moderate work around the house like vacuuming, sweeping floors or carrying groceries? 3.5 filed at 01/02/2025 1026   Can you do heavy work around the house like scrubbing floors or lifting and moving heavy furniture?  0 filed at 01/02/2025 1026   Can you do yard work like raking leaves, weeding or pushing a mower? 0 filed at 01/02/2025 1026   Can you have sexual relations? 0 filed at 01/02/2025 1026   Can you participate in moderate recreational activities like golf, bowling, dancing, doubles tennis or throwing a baseball or football? 0 filed at 01/02/2025 1026   Can you participate in strenous sports like swimming, singles tennis, football, basketball, or skiing? 0 filed at 01/02/2025 1026   DASI SCORE 10.7 filed at 01/02/2025 1026   METS Score (Will be calculated only when all the questions are answered) 4.1 " filed at 01/02/2025 1026          Caprini DVT Assessment      Flowsheet Row Pre-Admission Testing from 1/2/2025 in [facility]   DVT Score 10 filed at 01/02/2025 1618   Medical Factors Present cancer, chemotherapy, or previous malignancy filed at 01/02/2025 1618   Surgical Factors Major surgery planned, lasting over 3 hours filed at 01/02/2025 1618   BMI 30 or less filed at 01/02/2025 1618          Modified Frailty Index    No data to display       CHADS2 Stroke Risk  Current as of 6 minutes ago        N/A 3 to 100%: High Risk   2 to < 3%: Medium Risk   0 to < 2%: Low Risk     Last Change: N/A          This score determines the patient's risk of having a stroke if the patient has atrial fibrillation.        This score is not applicable to this patient. Components are not calculated.          Revised Cardiac Risk Index      Flowsheet Row Pre-Admission Testing from 1/2/2025 in [facility]   High-Risk Surgery (Intraperitoneal, Intrathoracic,Suprainguinal vascular) 1 filed at 01/02/2025 1615   History of ischemic heart disease (History of MI, History of positive exercuse test, Current chest paint considered due to myocardial ischemia, Use of nitrate therapy, ECG with pathological Q Waves) 0 filed at 01/02/2025 1615   History of congestive heart failure (pulmonary edemia, bilateral rales or S3 gallop, Paroxysmal nocturnal dyspnea, CXR showing pulmonary vascular redistribution) 0 filed at 01/02/2025 1615   History of cerebrovascular disease (Prior TIA or stroke) 0 filed at 01/02/2025 1615   Pre-operative insulin treatment 0 filed at 01/02/2025 1615   Pre-operative creatinine>2 mg/dl 0 filed at 01/02/2025 1615   Revised Cardiac Risk Calculator 1 filed at 01/02/2025 1615          Apfel Simplified Score      Flowsheet Row Pre-Admission Testing from 1/2/2025 in [facility]   Smoking status 1 filed at 01/02/2025 1619   History of motion sickness or PONV  0 filed at 01/02/2025  1619   Use of postoperative opioids 1 filed at 01/02/2025 1619   Gender - Female 1=Yes filed at 01/02/2025 1619   Apfel Simplified Score Calculator 3 filed at 01/02/2025 1619          Risk Analysis Index Results This Encounter    No data found in the last 10 encounters.       Stop Bang Score      Flowsheet Row Pre-Admission Testing from 1/2/2025 in [facility]   Do you snore loudly? 0 filed at 01/02/2025 1025   Do you often feel tired or fatigued after your sleep? 0 filed at 01/02/2025 1025   Has anyone ever observed you stop breathing in your sleep? 0 filed at 01/02/2025 1025   Do you have or are you being treated for high blood pressure? 0 filed at 01/02/2025 1025   Recent BMI (Calculated) 19.2 filed at 01/02/2025 1025   Is BMI greater than 35 kg/m2? 0=No filed at 01/02/2025 1025   Age older than 50 years old? 1=Yes filed at 01/02/2025 1025   Is your neck circumference greater than 17 inches (Male) or 16 inches (Female)? 0 filed at 01/02/2025 1025   Gender - Male 0=No filed at 01/02/2025 1025   STOP-BANG Total Score 1 filed at 01/02/2025 1025          Prodigy: High Risk  Total Score: 8              Prodigy Age Score           ARISCAT Score for Postoperative Pulmonary Complications      Flowsheet Row Pre-Admission Testing from 1/2/2025 in [facility]   Age, years  3 filed at 01/02/2025 1618   Preoperative SpO2 0 filed at 01/02/2025 1618   Respiratory infection in the last month Either upper or lower (i.e., URI, bronchitis, pneumonia), with fever and antibiotic treatment 0 filed at 01/02/2025 1618   Preoperative anemoa (Hgb less than 10 g/dl) 0 filed at 01/02/2025 1618   Surgical incision  24 filed at 01/02/2025 1618   Duration of surgery  23 filed at 01/02/2025 1618   Emergency Procedure  0 filed at 01/02/2025 1618   ARISCAT Total Score  50 filed at 01/02/2025 1618          Rachid Perioperative Risk for Myocardial Infarction or Cardiac Arrest (ALESSANDRO)    No data to display          Assessment and Plan:   Anesthesia/Airway:  No anesthesia complications      Neuro:    #Anxiety -medicated with Xanax (continue) follow-up with PCP.  No further intervention at this time    No neurologic diagnoses, however, the patient is at an increased risk for post operative delirium secondary to type and duration of surgery.  Preoperative brain exercise educational handout provided to patient.    The patient is at an increased risk for perioperative stroke secondary to increased age, female sex , general anesthesia, hypercoagulable state, and op time >2.5 hours.       HEENT/Airway:    No HEENT diagnosis or significant findings on chart review or clinical presentation and evaluation. No further preoperative testing/intervention indicated at this time.      Cardiovascular:    #Tachycardic- Patient noted with elevated HR at 110bpm, repeat HR was 107. On EKG ventricular rate 99bpm. Patient states she had used albuterol inhaler prior to arrival to appointment. No sign of cardiac/respiratory distress. Patient physical exam is otherwise benign aside from muffled right lung sounds, as noted on physical exam per pulmonology for current diagnosis warranting surgical intervention.     No CV diagnosis or significant findings on chart review or clinical presentation and evaluation. No additional preoperative testing is currently indicated.    METS are 4.1    RCRI  1 which is 6% 30 day risk of MACE (risk for cardiac death, nonfatal myocardial infarction, and nonfactal cardiac arrest    ALESSANDRO score which indicates a   0.1 % risk of intraoperative or 30-day postoperative MACE    EKG 1/2/25 - today in CPM  Normal Sinus Rhythm   Nonspecific ST and T wave abnormality   Abnormal ECG  Pending Cardiology review     Echo 10/3/24  CONCLUSIONS:   1. The left ventricular systolic function is normal, with a visually estimated ejection fraction of 55-60%.   2. There is normal right ventricular global systolic function.    Pulmonary:     #SCC Lung Cancer, Asthma -medicated with Trelegy (continue), Ventolin (continue).  Follows with pulmonology and oncology. Patient reports requiring iron infusion once after chemotherapy (last chemotherapy in 2024. Patient has been completing 10 rounds of immunotherapy , completed . Patient reports dry cough for the past 3-4 months. Patient has history of dry cough since 2024 with diagnosis of SCC. Patient denies all other URI symptoms. Patient states she was prescribed a 5 day course of Prednisone early December, stating she took a daily dose of 10mg due to her cough she could not shake. She does report this cough has been improving, stating she was prescribed a cough suppressant she cannot remember, possibly tessalon Perles. She states she is done with the prescription now. Patient quit smoking around 2024.      Preoperative deep breathing educational handout provided to patient.    patient is at increased risk of perioperative complications secondary to  age > 60, site of surgery, major surgery, duration of surgery > 2 hours, types of anesthetic    ARISCAT:    50  points which is a high (42.1%) risk of in-hospital post-op pulmonary complications     PRODIGY:  8  points which is a intermediate risk of post op opioid induced respiratory depression episodes    STOP BAN   points which is a low risk for moderate to severe LIBERTY    Pumonary toilet education discussed, patient also provided deep breathing exercises and incentive spirometry educational handout      Renal:   No renal diagnosis, however patient is at increase risk for perioperative renal complications secondary to  Age equal to or greater than 56      Endocrine:   No endocrine diagnosis or significant findings on chart review or clinical presentation and evaluation. No further testing or intervention is indicated at this time.      Hematologic:      #Iron Deficiency Anemia - history of iron transfusions following  chemotherapy, patient denies history of blood product requirements in the past. Blood work today.   No hematologic diagnosis, however patient is at an increased risk for DVT  Caprini Score:  10  points which is a highest risk of perioperative VTE      Gastrointestinal:   No GI diagnosis or significant findings on chart review or clinical presentation and evaluation. However please refer to below risk factor scores.     EAT-10 score of    0  : self-perceived oropharyngeal dysphagia scale (0-40)     Apfel: 3 points 61% risk for post operative N/V      Infectious disease:     No infectious diagnosis or significant findings on chart review or clinical presentation and evaluation.   Prescription provided for CHG body wash and dental rinse. CHG use instructions reviewed and provided to patient.  Staph screen collected      Musculoskeletal:    #Arthritis, Vitamin D Deficiency - Follows with PCP, no current medication/supplementations. No further intervention at this time.       Labs ordered  Recent Results (from the past 3 weeks)   CBC    Collection Time: 01/02/25 11:44 AM   Result Value Ref Range    WBC 8.4 4.4 - 11.3 x10*3/uL    nRBC 0.0 0.0 - 0.0 /100 WBCs    RBC 4.83 4.00 - 5.20 x10*6/uL    Hemoglobin 12.3 12.0 - 16.0 g/dL    Hematocrit 40.3 36.0 - 46.0 %    MCV 83 80 - 100 fL    MCH 25.5 (L) 26.0 - 34.0 pg    MCHC 30.5 (L) 32.0 - 36.0 g/dL    RDW 13.2 11.5 - 14.5 %    Platelets 384 150 - 450 x10*3/uL   Staphylococcus aureus/MRSA colonization, Culture    Collection Time: 01/02/25 11:44 AM    Specimen: Nares/Axilla/Groin; Swab   Result Value Ref Range    Staph/MRSA Screen Culture No Staphylococcus aureus isolated    Coagulation Screen    Collection Time: 01/02/25 11:44 AM   Result Value Ref Range    Protime 14.8 (H) 9.8 - 12.8 seconds    INR 1.3 (H) 0.9 - 1.1    aPTT 40 (H) 27 - 38 seconds   Type And Screen    Collection Time: 01/02/25 11:44 AM   Result Value Ref Range    ABO TYPE B     Rh TYPE POS     ANTIBODY SCREEN  NEG           Medication instructions and NPO guidelines reviewed with the patient.  All questions or concerns discussed and addressed.      Moriah Casper PA-C

## 2025-01-02 NOTE — PREPROCEDURE INSTRUCTIONS
"      Thank you for visiting [facility] ([facility]) today for your pre-procedure evaluation, you were seen by     Moriah Casper PA-C   Department of Anesthesiology and Perioperative Medicine  Main phone [phone]  Fax [phone]      This summary includes instructions and information to aid you during your perioperative period.  Please read carefully. If you have any questions about your visit today, please call the number listed above.  If you become ill or have any changes to your health before your surgery, please contact your primary care provider and alert your surgeon.    Preparing for Surgery       Preoperative Fasting Guidelines    Why must I stop eating and drinking near surgery time?  With sedation, food or liquid in your stomach can enter your lungs causing serious complications  Food can increase nausea and vomiting  When do I need to stop eating and drinking before my surgery?  Do not eat any food after midnight the night before your surgery/procedure.  You may have up to 13.5 ounces of clear liquid until TWO hours before your instructed arrival time to the hospital.  This includes water, black tea/coffee, (no milk or cream) apple juice, and electrolyte drinks (Gatorade)  You may chew gum until TWO hours before your surgery/procedure    Tobacco and Alcohol;  Do not drink alcohol or smoke within 24 hours of surgery.  It is best to quit smoking for as long as possible before any surgery or procedure.     Other Instructions  Why did I have my nose, under my arms, and groin swabbed? The purpose of the swab is to identify Staphylococcus aureus inside your nose or on your skin.  The swab was sent to the laboratory for culture.  A positive swab/culture for Staphylococcus aureus is called colonization or carriage.   What is Staphylococcus aureus? Staphylococcus aureus, also known as \"staph\", is a germ found on the skin or in the nose of healthy people.  Sometimes Staphylococcus aureus " can get into the body and cause an infection.  This can be minor (such as pimples, boils, or other skin problems).  It might also be serious (such as a blood infection, pneumonia, or a surgical site infection). What is Staphylococcus aureus colonization or carriage? Colonization or carriage means that a person has the germ but is not sick from it.  These bacteria can be spread on the hands or when breathing or sneezing. How is Staphylococcus aureus spread? It is most often spread by close contact with a person or item that carries it. What happens if my culture is positive for Staphylococcus aureus? Your doctor/medical team will use this information to guide any antibiotic treatment which may be necessary.  Regardless of the culture results, we will clean the inside of your nose with a betadine swab just before you have your surgery. Will I get an infection if I have Staphylococcus aureus in my nose or on my skin? Anyone can get an infection with Staphylococcus aureus.  However, the best way to reduce your risk of infection is to follow the instructions provided to you for the use of your CHG soap and dental rinse.  , Body Wash; What is a home preoperative antibacterial shower? This shower is a way of cleaning the skin with a germ-killing solution before surgery.  The solution contains chlorhexidine, commonly known as CHG.  CHG is a skin cleanser with germ-killing ability.  Let your doctor know if you are allergic to chlorhexidine. Why do I need to take a preoperative antibacterial shower? Skin is not sterile.  It is best to try to make your skin as free of germs as possible before surgery.  Proper cleansing with a germ-killing soap before surgery can lower the number of germs on your skin.  This helps to reduce the risk of infection at the surgical site.  Following the instructions listed below will help you prepare your skin for surgery.   How do I use the solution? Steps:  Begin using your CHG soap 5 days before  your scheduled surgery on ___________.   First, wash and rinse your hair using the CHG soap. Keep CHG soap away from ear canals and eyes.  Rinse completely, do not condition.  Hair extensions should be removed. , Oral/Dental Rinse: What is oral/dental rinse?  It is a mouthwash. It is a way of cleaning the mouth with a germ-killing solution before your surgery.  The solution contains chlorhexidine, commonly known as CHG. It is used inside the mouth to kill a bacteria known as Staphylococcus aureus.  Let your doctor know if you are allergic to Chlorhexidine. Why do I need to use CHG oral/dental rinse? The CHG oral/dental rinse helps to kill a bacteria in your mouth known as Staphylococcus aureus.  This reduces the risk of infection at the surgical site.  Using your CHG oral/dental rinse STEPS: Use your CHG oral/dental rinse after you brush your teeth the night before (at bedtime) and the morning of your surgery.  Follow all directions on your prescription label.  Use the cap on the container to measure 15 ml.  Swish (gargle if you can) the mouthwash in your mouth for at least 30 seconds, (do not swallow) and spit out.  After you use your CHG rinse, do not rinse your mouth with water, drink or eat.  Please refer to the prescription label for the appropriate time to resume oral intake What side effects might I have using the CHG oral/dental rinse? CHG rinse will stick to plaque on the teeth.  Brush and floss just before use.  Teeth brushing will help avoid staining of plaque during use.       The Week before Surgery        Seven days before Surgery  Check your [facility] medication instructions  Do the exercises provided to you by [facility]   Arrange for a responsible, adult licensed  to take you home after surgery and stay with you for 24 hours.  You will not be permitted to drive yourself home if you have received any anesthetic/sedation  Five days before surgery  Check your [facility] medication instructions  Do the exercises  provided to you by [facility]   Start using Chlorhexidene (CHG) body wash if prescribed (Continue till day of surgery)      The Day before Surgery       Check your [facility] medication and all other [facility] instructions including when to stop eating and drinking  You will be called with your arrival time for surgery in the late afternoon.  If you do not receive a call please reach out to your surgeon's office.  Do not smoke or drink 24 hours before surgery  Prepare items to bring with you to the hospital  Shower with your chlorhexidine wash if prescribed  Brush your teeth and use your chlorhexidine dental rinse if prescribed    The Day of Surgery       Check your [facility] medication instructions  Ensure you follow the instructions for when to stop eating and drinking  Shower, if prescribed use CHG.  Do not apply any lotions, creams, moisturizers, perfume or deodorant  Brush your teeth and use your CHG dental rinse if prescribed  Wear loose comfortable clothing  Avoid make-up  Remove  jewelry and piercings, consider professional piercing removal with a plastic spacer if needed  Bring photo ID and Insurance card  Bring an accurate medication list that includes medication dose, frequency and allergies  Bring a copy of your advanced directives (will, health care power of )  Bring any devices and controllers as well as medical devices you have been provided with for surgery (CPAP, slings, braces, etc.)  Dentures, eyeglasses, and contacts will be removed before surgery, please bring cases for contacts or glasses    Preoperative Deep Breathing Exercises    Why it is important to do deep breathing exercises before my surgery?  Deep breathing exercises strengthen your breathing muscles.  This helps you to recover after your surgery and decreases the chance of breathing complications.    How are the deep breathing exercises done?  Sit straight with your back supported.  Breathe in deeply and slowly through your nose. Your lower rib cage  should expand and your abdomen may move forward.  Hold that breath for 3 to 5 seconds.  Breathe out through pursed lips, slowly and completely.  Rest and repeat 10 times every hour while awake.  Rest longer if you become dizzy or lightheaded.    Incentive Spirometer   You were provided with an incentive spirometer in [facility], please follow the below instructions.  What is an incentive spirometer?  An incentive spirometer is a device used before and after surgery to “exercise” your lungs.  It helps you to take deeper breaths to expand your lungs.  Below is an example of a basic incentive spirometer.  The device you receive may differ slightly but they all function the same.    Why do I need to use an incentive spirometer?  Using your incentive spirometer prepares your lungs for surgery and helps prevent lung problems after surgery.  How do I use my incentive spirometer?  When you're using your incentive spirometer, make sure to breathe through your mouth. If you breathe through your nose, the incentive spirometer won't work properly. You can hold your nose if you have trouble.  If you feel dizzy at any time, stop and rest. Try again at a later time.  Follow the steps below:  Set up your incentive spirometer, expand the flexible tubing and connect to the outlet.  Sit upright in a chair or bed. Hold the incentive spirometer at eye level.   Put the mouthpiece in your mouth and close your lips tightly around it. Slowly breathe out (exhale) completely.  Breathe in (inhale) slowly through your mouth as deeply as you can. As you take a breath, you will see the piston rise inside the large column. While the piston rises, the indicator should move upwards. It should stay in between the 2 arrows (see Figure).  Try to get the piston as high as you can, while keeping the indicator between the arrows.   If the indicator doesn't stay between the arrows, you're breathing either too fast or too slow.  When you get it as high as you  can, hold your breath for 10 seconds, or as long as possible. While you're holding your breath, the piston will slowly fall to the base of the spirometer.  Once the piston reaches the bottom of the spirometer, breathe out slowly through your mouth. Rest for a few seconds.  Repeat 10 times. Try to get the piston to the same level with each breath.  Repeat every hour while awake  You can carefully clean the outside of the mouthpiece with an alcohol wipe or soap and water.     Preoperative Brain Exercises    What are brain exercises?  A brain exercise is any activity that engages your thinking (cognitive) skills.    What types of activities are considered brain exercises?  Jigsaw puzzles, crossword puzzles, word jumble, memory games, word search, and many more.  Many can be found free online or on your phone via a mobile shannan.    Why should I do brain exercises before my surgery?  More recent research has shown brain exercise before surgery can lower the risk of postoperative delirium (confusion) which can be especially important for older adults.  Patients who did brain exercises for 5 to 10 hours the days before surgery, cut their risk of postoperative delirium in half up to 1 week after surgery.    Sit-to-Stand Exercise    What is the sit-to-stand exercise?  The sit-to-stand exercise strengthens the muscles of your lower body and muscles in the center of your body (core muscles for stability) helping to maintain and improve your strength and mobility.  How do I do the sit-to-stand exercise?  The goal is to do this exercise without using your arms or hands.  If this is too difficult, use your arms and hands or a chair with armrests to help slowly push yourself to the standing position and lower yourself back to the sitting position. As the movement becomes easier use your arms and hands less.    Steps to the sit-to-stand exercise  Sit up tall in a sturdy chair, knees bent, feet flat on the floor shoulder-width  apart.  Shift your hips/pelvis forward in the chair to correctly position yourself for the next movement.  Lean forward at your hips.  Stand up straight putting equal weight on both feet.  Check to be sure you are properly aligned with the chair, in a slow controlled movement sit back down.  Repeat this exercise 10-15 times.  If needed you can do it fewer times until your strength improves.  Rest for 1 minute.  Do another 10-15 sit-to-stand exercises.  Try to do this in the morning and evening.       Simple things you can do to help prevent blood clots     Blood clots are blockages that can form in the body's veins. When a blood clot forms in your deep veins, it may be called a deep vein thrombosis, or DVT for short. Blood clots can happen in any part of the body where blood flows, but they are most common in the arms and legs. If a piece of a blood clot breaks free and travels to the lungs, it is called a pulmonary embolus (PE). A PE can be a very serious problem.      Being in the hospital or having surgery can raise your chances of getting a blood clot because you may not be well enough to move around as much as you normally do.         Ways you can help prevent blood clots in the hospital       Wearing SCDs  SCDs stands for Sequential Compression Devices.   SCDs are special sleeves that wrap around your legs. They attach to a pump that fills them with air to gently squeeze your legs every few minutes.  This helps return the blood in your legs to your heart.   SCDs should only be taken off when walking or bathing. SCDs may not be comfortable, but they can help save your life.              Pump SCD leg sleeves  Wearing compression stockings - if your doctor orders them. These special snug-fitting stockings gently squeeze your legs to help blood flow.       Walking. Walking helps move the blood in your legs.   If your doctor says it is ok, try walking the halls at least   5 times a day. Ask us to help you get up,  so you don't fall.      Taking any blood-thinning medicines your doctor orders.              Ways you can help prevent blood clots at home         Wearing compression stockings - if your doctor orders them.   Walking - to help move the blood in your legs.    Taking any blood-thinning medicines your doctor orders.      Signs of a blood clot or PE    Tell your doctor or nurse right away if you have any of the problems listed below.         If you are at home, seek medical care right away. Call 911 for chest pain or problems breathing.            Signs of a blood clot (DVT) - such as pain, swelling, redness, or warmth in your arm or legs.  Signs of a pulmonary embolism (PE) - such as chest pain or feeling short of breath

## 2025-01-03 ENCOUNTER — TELEPHONE (OUTPATIENT)
Dept: FAMILY MEDICINE CLINIC | Age: 65
End: 2025-01-03

## 2025-01-03 NOTE — TELEPHONE ENCOUNTER
PT is having lung surgery on the 10th. Had pre-admission testing yesterday. Is concerned that she might have an infection due to a lingering cough. Wanted to see what doctor thinks. I did advise her to try to get a hold of her surgeon's office.

## 2025-01-04 LAB — STAPHYLOCOCCUS SPEC CULT: NORMAL

## 2025-01-06 ENCOUNTER — OFFICE VISIT (OUTPATIENT)
Dept: FAMILY MEDICINE CLINIC | Age: 65
End: 2025-01-06
Payer: COMMERCIAL

## 2025-01-06 VITALS
OXYGEN SATURATION: 93 % | BODY MASS INDEX: 19.53 KG/M2 | DIASTOLIC BLOOD PRESSURE: 78 MMHG | SYSTOLIC BLOOD PRESSURE: 116 MMHG | TEMPERATURE: 98.1 F | HEART RATE: 111 BPM | HEIGHT: 65 IN | WEIGHT: 117.2 LBS

## 2025-01-06 DIAGNOSIS — R05.2 SUBACUTE COUGH: Primary | ICD-10-CM

## 2025-01-06 PROCEDURE — 99213 OFFICE O/P EST LOW 20 MIN: CPT | Performed by: FAMILY MEDICINE

## 2025-01-06 RX ORDER — BENZONATATE 200 MG/1
200 CAPSULE ORAL 3 TIMES DAILY PRN
Qty: 30 CAPSULE | Refills: 0 | Status: SHIPPED | OUTPATIENT
Start: 2025-01-06 | End: 2025-01-13

## 2025-01-06 ASSESSMENT — PATIENT HEALTH QUESTIONNAIRE - PHQ9
SUM OF ALL RESPONSES TO PHQ QUESTIONS 1-9: 0
SUM OF ALL RESPONSES TO PHQ QUESTIONS 1-9: 0
1. LITTLE INTEREST OR PLEASURE IN DOING THINGS: NOT AT ALL
2. FEELING DOWN, DEPRESSED OR HOPELESS: NOT AT ALL
SUM OF ALL RESPONSES TO PHQ QUESTIONS 1-9: 0
SUM OF ALL RESPONSES TO PHQ QUESTIONS 1-9: 0
SUM OF ALL RESPONSES TO PHQ9 QUESTIONS 1 & 2: 0

## 2025-01-06 NOTE — PROGRESS NOTES
2025    Mary Purdy (:  1960) is a 64 y.o. female, is here for evaluation of the following chief complaint(s):  Cough (Patient has a persistent cough and wanted to take care of that before surgery. )      History of Present Illness  The patient presents for evaluation of a persistent cough.    He has been experiencing this cough since 2024, as reported to his oncologist, Dr. Haynes. Despite the administration of benzonatate 100 mg three times daily, there was no significant improvement. Consequently, he resorted to over-the-counter Mucinex for a duration of one to two weeks. A subsequent visit to Dr. Haynes on 2024 resulted in a prescription for a 5-day course of steroids. He also received an influenza vaccine and Xanax from our clinic on 2024. However, the cough persisted, leading to an increase in the benzonatate dosage to 200 mg. Even after completing this course, the cough remains. He recently underwent a PET scan on 2024, which revealed a full right lung and a potential growth in the mass. He is scheduled for lung surgery on the upcoming Monday. He has previously undergone radiation, chemotherapy, and immunotherapy, but these were discontinued after 10 rounds as per Dr. Mike Gomez's advice. He reports no symptoms of COVID-19 or influenza. He believes that the benzonatate has been effective in managing his cough, which has transitioned from dry to moist. He also finds relief from Xanax, which aids his sleep. He has not been advised to seek treatment for his cough prior to surgery.    MEDICATIONS  Current: benzonatate, Xanax    IMMUNIZATIONS  He received a flu shot on 2024.    Patient's past medical, surgical, social and/or family history reviewed, updated in chart, and are non-contributory (unless otherwise stated).  Medications and allergies also reviewed and updated in chart.     ROS negative unless otherwise specified    Physical Exam  Temp Readings from Last

## 2025-01-08 ENCOUNTER — TELEMEDICINE (OUTPATIENT)
Dept: SURGERY | Facility: CLINIC | Age: 65
End: 2025-01-08
Payer: COMMERCIAL

## 2025-01-08 DIAGNOSIS — C34.11 PRIMARY CANCER OF RIGHT UPPER LOBE OF LUNG (MULTI): Primary | ICD-10-CM

## 2025-01-08 RX ORDER — BENZONATATE 200 MG/1
200 CAPSULE ORAL 3 TIMES DAILY PRN
COMMUNITY
Start: 2025-01-06

## 2025-01-08 RX ORDER — CHLORHEXIDINE GLUCONATE ORAL RINSE 1.2 MG/ML
15 SOLUTION DENTAL AS NEEDED
COMMUNITY
Start: 2025-01-02

## 2025-01-08 SDOH — ECONOMIC STABILITY: FOOD INSECURITY: WITHIN THE PAST 12 MONTHS, THE FOOD YOU BOUGHT JUST DIDN'T LAST AND YOU DIDN'T HAVE MONEY TO GET MORE.: NEVER TRUE

## 2025-01-08 SDOH — ECONOMIC STABILITY: FOOD INSECURITY: WITHIN THE PAST 12 MONTHS, YOU WORRIED THAT YOUR FOOD WOULD RUN OUT BEFORE YOU GOT MONEY TO BUY MORE.: NEVER TRUE

## 2025-01-08 ASSESSMENT — COLUMBIA-SUICIDE SEVERITY RATING SCALE - C-SSRS
2. HAVE YOU ACTUALLY HAD ANY THOUGHTS OF KILLING YOURSELF?: NO
6. HAVE YOU EVER DONE ANYTHING, STARTED TO DO ANYTHING, OR PREPARED TO DO ANYTHING TO END YOUR LIFE?: NO
1. IN THE PAST MONTH, HAVE YOU WISHED YOU WERE DEAD OR WISHED YOU COULD GO TO SLEEP AND NOT WAKE UP?: NO

## 2025-01-08 ASSESSMENT — ENCOUNTER SYMPTOMS
DEPRESSION: 0
OCCASIONAL FEELINGS OF UNSTEADINESS: 0
LOSS OF SENSATION IN FEET: 0

## 2025-01-08 ASSESSMENT — PAIN SCALES - GENERAL: PAINLEVEL_OUTOF10: 0-NO PAIN

## 2025-01-10 ENCOUNTER — ANESTHESIA EVENT (OUTPATIENT)
Dept: OPERATING ROOM | Facility: HOSPITAL | Age: 65
End: 2025-01-10
Payer: COMMERCIAL

## 2025-01-10 NOTE — PROGRESS NOTES
"Pharmacy Medication History Review    Faustina Vick \"Noam" is a 64 y.o. 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 Pharmacy called the patient prior to their scheduled procedure and reviewed the patient's gduwq-ey-gdpxrucos medications for accuracy.    Medications ADDED:  none  Medications CHANGED:  none  Medications REMOVED:   none    Please review updated prior to admission medication list and comments regarding how patient may be taking medications differently by going to Admission tab --> Admission Orders --> Admit Orders / Review prior to admission medications.     Preferred pharmacy, last doses of medications, and allergies to be confirmed with patient by nursing the day of procedure.     Sources used to complete the med history include:  [facility]  Pharmacy dispense history  Patient interview  Chart Review  Care Everywhere     Below are additional concerns with the patient's PTA list.  Patient states they use their trelegy inhaler every morning and uses their albuterol in the evenings on most days    Taisha Castillo CPhT  [facility]  Please reach out via Secure Chat for questions or call [employer] or [employer]  "

## 2025-01-13 ENCOUNTER — APPOINTMENT (OUTPATIENT)
Dept: RADIOLOGY | Facility: HOSPITAL | Age: 65
End: 2025-01-13
Payer: COMMERCIAL

## 2025-01-13 ENCOUNTER — APPOINTMENT (OUTPATIENT)
Dept: CARDIOLOGY | Facility: HOSPITAL | Age: 65
End: 2025-01-13
Payer: COMMERCIAL

## 2025-01-13 ENCOUNTER — ANESTHESIA (OUTPATIENT)
Dept: OPERATING ROOM | Facility: HOSPITAL | Age: 65
End: 2025-01-13
Payer: COMMERCIAL

## 2025-01-13 ENCOUNTER — HOSPITAL ENCOUNTER (INPATIENT)
Facility: HOSPITAL | Age: 65
End: 2025-01-13
Attending: THORACIC SURGERY (CARDIOTHORACIC VASCULAR SURGERY) | Admitting: THORACIC SURGERY (CARDIOTHORACIC VASCULAR SURGERY)
Payer: COMMERCIAL

## 2025-01-13 DIAGNOSIS — I51.81 TAKOTSUBO CARDIOMYOPATHY: ICD-10-CM

## 2025-01-13 DIAGNOSIS — E43 SEVERE MALNUTRITION (MULTI): ICD-10-CM

## 2025-01-13 DIAGNOSIS — Z99.11 VENTILATOR DEPENDENCE (MULTI): ICD-10-CM

## 2025-01-13 DIAGNOSIS — I50.21 ACUTE SYSTOLIC (CONGESTIVE) HEART FAILURE: ICD-10-CM

## 2025-01-13 DIAGNOSIS — J96.01 ACUTE RESPIRATORY FAILURE WITH HYPOXIA (MULTI): ICD-10-CM

## 2025-01-13 DIAGNOSIS — C34.11 MALIGNANT NEOPLASM OF UPPER LOBE OF RIGHT LUNG (MULTI): ICD-10-CM

## 2025-01-13 DIAGNOSIS — G47.01 INSOMNIA DUE TO MEDICAL CONDITION: ICD-10-CM

## 2025-01-13 DIAGNOSIS — M79.89 LEFT UPPER EXTREMITY SWELLING: ICD-10-CM

## 2025-01-13 DIAGNOSIS — I50.41 ACUTE COMBINED SYSTOLIC (CONGESTIVE) AND DIASTOLIC (CONGESTIVE) HEART FAILURE: ICD-10-CM

## 2025-01-13 DIAGNOSIS — I95.9 HYPOTENSION, UNSPECIFIED HYPOTENSION TYPE: ICD-10-CM

## 2025-01-13 DIAGNOSIS — I47.19 ATRIAL TACHYCARDIA (CMS-HCC): ICD-10-CM

## 2025-01-13 DIAGNOSIS — R33.9 URINARY RETENTION: ICD-10-CM

## 2025-01-13 DIAGNOSIS — F43.23 SITUATIONAL MIXED ANXIETY AND DEPRESSIVE DISORDER: ICD-10-CM

## 2025-01-13 DIAGNOSIS — C34.11 PRIMARY CANCER OF RIGHT UPPER LOBE OF LUNG (MULTI): Primary | ICD-10-CM

## 2025-01-13 DIAGNOSIS — R57.0 CARDIOGENIC SHOCK (MULTI): ICD-10-CM

## 2025-01-13 DIAGNOSIS — R06.03 RESPIRATORY DISTRESS: ICD-10-CM

## 2025-01-13 PROBLEM — J44.9 CHRONIC OBSTRUCTIVE PULMONARY DISEASE (MULTI): Status: ACTIVE | Noted: 2025-01-13

## 2025-01-13 LAB
ABO GROUP (TYPE) IN BLOOD: NORMAL
ALBUMIN SERPL BCP-MCNC: 3.2 G/DL (ref 3.4–5)
ALP SERPL-CCNC: 41 U/L (ref 33–136)
ALT SERPL W P-5'-P-CCNC: 10 U/L (ref 7–45)
ANION GAP BLDA CALCULATED.4IONS-SCNC: 10 MMO/L (ref 10–25)
ANION GAP BLDA CALCULATED.4IONS-SCNC: 8 MMO/L (ref 10–25)
ANION GAP BLDA CALCULATED.4IONS-SCNC: 9 MMO/L (ref 10–25)
ANION GAP BLDA CALCULATED.4IONS-SCNC: ABNORMAL MMOL/L
ANION GAP BLDV CALCULATED.4IONS-SCNC: 10 MMOL/L (ref 10–25)
ANION GAP SERPL CALC-SCNC: 14 MMOL/L (ref 10–20)
APTT PPP: 36 SECONDS (ref 27–38)
AST SERPL W P-5'-P-CCNC: 25 U/L (ref 9–39)
BASE EXCESS BLDA CALC-SCNC: -0.7 MMOL/L (ref -2–3)
BASE EXCESS BLDA CALC-SCNC: -0.8 MMOL/L (ref -2–3)
BASE EXCESS BLDA CALC-SCNC: -2.3 MMOL/L (ref -2–3)
BASE EXCESS BLDA CALC-SCNC: 0.5 MMOL/L (ref -2–3)
BASE EXCESS BLDV CALC-SCNC: -2.8 MMOL/L (ref -2–3)
BILIRUB DIRECT SERPL-MCNC: 0.1 MG/DL (ref 0–0.3)
BILIRUB SERPL-MCNC: 0.4 MG/DL (ref 0–1.2)
BODY TEMPERATURE: 37 DEGREES CELSIUS
BUN SERPL-MCNC: 10 MG/DL (ref 6–23)
CA-I BLD-SCNC: 1.07 MMOL/L (ref 1.1–1.33)
CA-I BLDA-SCNC: 1.05 MMOL/L (ref 1.1–1.33)
CA-I BLDA-SCNC: 1.06 MMOL/L (ref 1.1–1.33)
CA-I BLDA-SCNC: 1.17 MMOL/L (ref 1.1–1.33)
CA-I BLDA-SCNC: 1.19 MMOL/L (ref 1.1–1.33)
CA-I BLDV-SCNC: 1.09 MMOL/L (ref 1.1–1.33)
CALCIUM SERPL-MCNC: 7.4 MG/DL (ref 8.6–10.6)
CHLORIDE BLDA-SCNC: 96 MMOL/L (ref 98–107)
CHLORIDE BLDA-SCNC: ABNORMAL MMOL/L
CHLORIDE BLDV-SCNC: 96 MMOL/L (ref 98–107)
CHLORIDE SERPL-SCNC: 95 MMOL/L (ref 98–107)
CO2 SERPL-SCNC: 26 MMOL/L (ref 21–32)
CREAT SERPL-MCNC: 0.39 MG/DL (ref 0.5–1.05)
EGFRCR SERPLBLD CKD-EPI 2021: >90 ML/MIN/1.73M*2
EJECTION FRACTION APICAL 4 CHAMBER: 64.5
EJECTION FRACTION: 63 %
ERYTHROCYTE [DISTWIDTH] IN BLOOD BY AUTOMATED COUNT: 13.6 % (ref 11.5–14.5)
GLUCOSE BLD MANUAL STRIP-MCNC: 166 MG/DL (ref 74–99)
GLUCOSE BLD MANUAL STRIP-MCNC: 210 MG/DL (ref 74–99)
GLUCOSE BLDA-MCNC: 125 MG/DL (ref 74–99)
GLUCOSE BLDA-MCNC: 216 MG/DL (ref 74–99)
GLUCOSE BLDA-MCNC: 231 MG/DL (ref 74–99)
GLUCOSE BLDA-MCNC: 84 MG/DL (ref 74–99)
GLUCOSE BLDV-MCNC: 233 MG/DL (ref 74–99)
GLUCOSE SERPL-MCNC: 231 MG/DL (ref 74–99)
HCO3 BLDA-SCNC: 25.1 MMOL/L (ref 22–26)
HCO3 BLDA-SCNC: 25.4 MMOL/L (ref 22–26)
HCO3 BLDA-SCNC: 26.1 MMOL/L (ref 22–26)
HCO3 BLDA-SCNC: 26.4 MMOL/L (ref 22–26)
HCO3 BLDV-SCNC: 25.1 MMOL/L (ref 22–26)
HCT VFR BLD AUTO: 28.3 % (ref 36–46)
HCT VFR BLD EST: 28 % (ref 36–46)
HCT VFR BLD EST: 28 % (ref 36–46)
HCT VFR BLD EST: 29 % (ref 36–46)
HCT VFR BLD EST: 30 % (ref 36–46)
HCT VFR BLD EST: 31 % (ref 36–46)
HGB BLD-MCNC: 8.9 G/DL (ref 12–16)
HGB BLDA-MCNC: 10 G/DL (ref 12–16)
HGB BLDA-MCNC: 10.3 G/DL (ref 12–16)
HGB BLDA-MCNC: 9.2 G/DL (ref 12–16)
HGB BLDA-MCNC: 9.6 G/DL (ref 12–16)
HGB BLDV-MCNC: 9.2 G/DL (ref 12–16)
INHALED O2 CONCENTRATION: 100 %
INHALED O2 CONCENTRATION: 100 %
INHALED O2 CONCENTRATION: 36 %
INHALED O2 CONCENTRATION: 60 %
INHALED O2 CONCENTRATION: 60 %
INR PPP: 1.6 (ref 0.9–1.1)
LACTATE BLDA-SCNC: 0.6 MMOL/L (ref 0.4–2)
LACTATE BLDA-SCNC: 0.6 MMOL/L (ref 0.4–2)
LACTATE BLDA-SCNC: 0.8 MMOL/L (ref 0.4–2)
LACTATE BLDA-SCNC: 0.8 MMOL/L (ref 0.4–2)
LACTATE BLDV-SCNC: 0.8 MMOL/L (ref 0.4–2)
MAGNESIUM SERPL-MCNC: 1.71 MG/DL (ref 1.6–2.4)
MCH RBC QN AUTO: 25.3 PG (ref 26–34)
MCHC RBC AUTO-ENTMCNC: 31.4 G/DL (ref 32–36)
MCV RBC AUTO: 80 FL (ref 80–100)
NRBC BLD-RTO: 0 /100 WBCS (ref 0–0)
OXYHGB MFR BLDA: 96.7 % (ref 94–98)
OXYHGB MFR BLDA: 97.6 % (ref 94–98)
OXYHGB MFR BLDA: 97.7 % (ref 94–98)
OXYHGB MFR BLDA: 98.4 % (ref 94–98)
OXYHGB MFR BLDV: 79.9 % (ref 45–75)
PCO2 BLDA: 41 MM HG (ref 38–42)
PCO2 BLDA: 53 MM HG (ref 38–42)
PCO2 BLDA: 55 MM HG (ref 38–42)
PCO2 BLDA: 56 MM HG (ref 38–42)
PCO2 BLDV: 60 MM HG (ref 41–51)
PH BLDA: 7.26 PH (ref 7.38–7.42)
PH BLDA: 7.29 PH (ref 7.38–7.42)
PH BLDA: 7.3 PH (ref 7.38–7.42)
PH BLDA: 7.4 PH (ref 7.38–7.42)
PH BLDV: 7.23 PH (ref 7.33–7.43)
PHOSPHATE SERPL-MCNC: 4 MG/DL (ref 2.5–4.9)
PLATELET # BLD AUTO: 222 X10*3/UL (ref 150–450)
PO2 BLDA: 116 MM HG (ref 85–95)
PO2 BLDA: 180 MM HG (ref 85–95)
PO2 BLDA: 227 MM HG (ref 85–95)
PO2 BLDA: 259 MM HG (ref 85–95)
PO2 BLDV: 53 MM HG (ref 35–45)
POTASSIUM BLDA-SCNC: 3.2 MMOL/L (ref 3.5–5.3)
POTASSIUM BLDA-SCNC: 3.6 MMOL/L (ref 3.5–5.3)
POTASSIUM BLDA-SCNC: 3.9 MMOL/L (ref 3.5–5.3)
POTASSIUM BLDA-SCNC: 4.4 MMOL/L (ref 3.5–5.3)
POTASSIUM BLDV-SCNC: 4.2 MMOL/L (ref 3.5–5.3)
POTASSIUM SERPL-SCNC: 4.2 MMOL/L (ref 3.5–5.3)
PROT SERPL-MCNC: 5.2 G/DL (ref 6.4–8.2)
PROTHROMBIN TIME: 18.2 SECONDS (ref 9.8–12.8)
RBC # BLD AUTO: 3.52 X10*6/UL (ref 4–5.2)
RH FACTOR (ANTIGEN D): NORMAL
RIGHT VENTRICLE FREE WALL PEAK S': 6 CM/S
RIGHT VENTRICLE PEAK SYSTOLIC PRESSURE: 25.6 MMHG
SAO2 % BLDA: 100 % (ref 94–100)
SAO2 % BLDA: 99 % (ref 94–100)
SAO2 % BLDV: 82 % (ref 45–75)
SODIUM BLDA-SCNC: 126 MMOL/L (ref 136–145)
SODIUM BLDA-SCNC: 126 MMOL/L (ref 136–145)
SODIUM BLDA-SCNC: 127 MMOL/L (ref 136–145)
SODIUM BLDA-SCNC: 128 MMOL/L (ref 136–145)
SODIUM BLDV-SCNC: 127 MMOL/L (ref 136–145)
SODIUM SERPL-SCNC: 131 MMOL/L (ref 136–145)
TRICUSPID ANNULAR PLANE SYSTOLIC EXCURSION: 1 CM
WBC # BLD AUTO: 6.7 X10*3/UL (ref 4.4–11.3)

## 2025-01-13 PROCEDURE — 71045 X-RAY EXAM CHEST 1 VIEW: CPT | Performed by: RADIOLOGY

## 2025-01-13 PROCEDURE — 93308 TTE F-UP OR LMTD: CPT

## 2025-01-13 PROCEDURE — 3E043XZ INTRODUCTION OF VASOPRESSOR INTO CENTRAL VEIN, PERCUTANEOUS APPROACH: ICD-10-PCS | Performed by: THORACIC SURGERY (CARDIOTHORACIC VASCULAR SURGERY)

## 2025-01-13 PROCEDURE — 87015 SPECIMEN INFECT AGNT CONCNTJ: CPT | Performed by: THORACIC SURGERY (CARDIOTHORACIC VASCULAR SURGERY)

## 2025-01-13 PROCEDURE — 3700000002 HC GENERAL ANESTHESIA TIME - EACH INCREMENTAL 1 MINUTE: Performed by: THORACIC SURGERY (CARDIOTHORACIC VASCULAR SURGERY)

## 2025-01-13 PROCEDURE — 2500000005 HC RX 250 GENERAL PHARMACY W/O HCPCS: Performed by: NURSE PRACTITIONER

## 2025-01-13 PROCEDURE — 0BTK0ZZ RESECTION OF RIGHT LUNG, OPEN APPROACH: ICD-10-PCS | Performed by: THORACIC SURGERY (CARDIOTHORACIC VASCULAR SURGERY)

## 2025-01-13 PROCEDURE — 84132 ASSAY OF SERUM POTASSIUM: CPT | Performed by: NURSE PRACTITIONER

## 2025-01-13 PROCEDURE — 82330 ASSAY OF CALCIUM: CPT | Performed by: NURSE PRACTITIONER

## 2025-01-13 PROCEDURE — 2500000004 HC RX 250 GENERAL PHARMACY W/ HCPCS (ALT 636 FOR OP/ED)

## 2025-01-13 PROCEDURE — 85027 COMPLETE CBC AUTOMATED: CPT | Performed by: NURSE PRACTITIONER

## 2025-01-13 PROCEDURE — 37799 UNLISTED PX VASCULAR SURGERY: CPT | Performed by: NURSE PRACTITIONER

## 2025-01-13 PROCEDURE — 2500000004 HC RX 250 GENERAL PHARMACY W/ HCPCS (ALT 636 FOR OP/ED): Performed by: NURSE PRACTITIONER

## 2025-01-13 PROCEDURE — 2500000005 HC RX 250 GENERAL PHARMACY W/O HCPCS

## 2025-01-13 PROCEDURE — 85610 PROTHROMBIN TIME: CPT | Performed by: NURSE PRACTITIONER

## 2025-01-13 PROCEDURE — 2500000002 HC RX 250 W HCPCS SELF ADMINISTERED DRUGS (ALT 637 FOR MEDICARE OP, ALT 636 FOR OP/ED): Performed by: NURSE PRACTITIONER

## 2025-01-13 PROCEDURE — 82248 BILIRUBIN DIRECT: CPT | Performed by: NURSE PRACTITIONER

## 2025-01-13 PROCEDURE — 2500000004 HC RX 250 GENERAL PHARMACY W/ HCPCS (ALT 636 FOR OP/ED): Performed by: THORACIC SURGERY (CARDIOTHORACIC VASCULAR SURGERY)

## 2025-01-13 PROCEDURE — 87070 CULTURE OTHR SPECIMN AEROBIC: CPT | Performed by: THORACIC SURGERY (CARDIOTHORACIC VASCULAR SURGERY)

## 2025-01-13 PROCEDURE — 2500000005 HC RX 250 GENERAL PHARMACY W/O HCPCS: Performed by: THORACIC SURGERY (CARDIOTHORACIC VASCULAR SURGERY)

## 2025-01-13 PROCEDURE — 99231 SBSQ HOSP IP/OBS SF/LOW 25: CPT | Performed by: STUDENT IN AN ORGANIZED HEALTH CARE EDUCATION/TRAINING PROGRAM

## 2025-01-13 PROCEDURE — 3600000009 HC OR TIME - EACH INCREMENTAL 1 MINUTE - PROCEDURE LEVEL FOUR: Performed by: THORACIC SURGERY (CARDIOTHORACIC VASCULAR SURGERY)

## 2025-01-13 PROCEDURE — 2500000001 HC RX 250 WO HCPCS SELF ADMINISTERED DRUGS (ALT 637 FOR MEDICARE OP): Performed by: NURSE PRACTITIONER

## 2025-01-13 PROCEDURE — 88309 TISSUE EXAM BY PATHOLOGIST: CPT | Performed by: PATHOLOGY

## 2025-01-13 PROCEDURE — 88305 TISSUE EXAM BY PATHOLOGIST: CPT | Mod: TC,SUR | Performed by: THORACIC SURGERY (CARDIOTHORACIC VASCULAR SURGERY)

## 2025-01-13 PROCEDURE — 88311 DECALCIFY TISSUE: CPT | Performed by: PATHOLOGY

## 2025-01-13 PROCEDURE — P9045 ALBUMIN (HUMAN), 5%, 250 ML: HCPCS | Mod: JZ | Performed by: NURSE PRACTITIONER

## 2025-01-13 PROCEDURE — 32440 REMOVE LUNG PNEUMONECTOMY: CPT | Performed by: THORACIC SURGERY (CARDIOTHORACIC VASCULAR SURGERY)

## 2025-01-13 PROCEDURE — 76937 US GUIDE VASCULAR ACCESS: CPT

## 2025-01-13 PROCEDURE — 82435 ASSAY OF BLOOD CHLORIDE: CPT | Performed by: NURSE PRACTITIONER

## 2025-01-13 PROCEDURE — 38746 REMOVE THORACIC LYMPH NODES: CPT | Performed by: THORACIC SURGERY (CARDIOTHORACIC VASCULAR SURGERY)

## 2025-01-13 PROCEDURE — 36620 INSERTION CATHETER ARTERY: CPT

## 2025-01-13 PROCEDURE — 2720000007 HC OR 272 NO HCPCS: Performed by: THORACIC SURGERY (CARDIOTHORACIC VASCULAR SURGERY)

## 2025-01-13 PROCEDURE — 2500000001 HC RX 250 WO HCPCS SELF ADMINISTERED DRUGS (ALT 637 FOR MEDICARE OP): Performed by: THORACIC SURGERY (CARDIOTHORACIC VASCULAR SURGERY)

## 2025-01-13 PROCEDURE — 84132 ASSAY OF SERUM POTASSIUM: CPT

## 2025-01-13 PROCEDURE — 07T70ZZ RESECTION OF THORAX LYMPHATIC, OPEN APPROACH: ICD-10-PCS | Performed by: THORACIC SURGERY (CARDIOTHORACIC VASCULAR SURGERY)

## 2025-01-13 PROCEDURE — 21602 EXC CH WAL TUM W/O LYMPHADEC: CPT | Performed by: THORACIC SURGERY (CARDIOTHORACIC VASCULAR SURGERY)

## 2025-01-13 PROCEDURE — C1781 MESH (IMPLANTABLE): HCPCS | Performed by: THORACIC SURGERY (CARDIOTHORACIC VASCULAR SURGERY)

## 2025-01-13 PROCEDURE — 36556 INSERT NON-TUNNEL CV CATH: CPT

## 2025-01-13 PROCEDURE — 3600000004 HC OR TIME - INITIAL BASE CHARGE - PROCEDURE LEVEL FOUR: Performed by: THORACIC SURGERY (CARDIOTHORACIC VASCULAR SURGERY)

## 2025-01-13 PROCEDURE — 82947 ASSAY GLUCOSE BLOOD QUANT: CPT

## 2025-01-13 PROCEDURE — 0WJC4ZZ INSPECTION OF MEDIASTINUM, PERCUTANEOUS ENDOSCOPIC APPROACH: ICD-10-PCS | Performed by: THORACIC SURGERY (CARDIOTHORACIC VASCULAR SURGERY)

## 2025-01-13 PROCEDURE — 71045 X-RAY EXAM CHEST 1 VIEW: CPT

## 2025-01-13 PROCEDURE — A32442: Performed by: STUDENT IN AN ORGANIZED HEALTH CARE EDUCATION/TRAINING PROGRAM

## 2025-01-13 PROCEDURE — 3700000001 HC GENERAL ANESTHESIA TIME - INITIAL BASE CHARGE: Performed by: THORACIC SURGERY (CARDIOTHORACIC VASCULAR SURGERY)

## 2025-01-13 PROCEDURE — 0BJ08ZZ INSPECTION OF TRACHEOBRONCHIAL TREE, VIA NATURAL OR ARTIFICIAL OPENING ENDOSCOPIC: ICD-10-PCS | Performed by: THORACIC SURGERY (CARDIOTHORACIC VASCULAR SURGERY)

## 2025-01-13 PROCEDURE — 99291 CRITICAL CARE FIRST HOUR: CPT | Performed by: NURSE PRACTITIONER

## 2025-01-13 PROCEDURE — 2780000003 HC OR 278 NO HCPCS: Performed by: THORACIC SURGERY (CARDIOTHORACIC VASCULAR SURGERY)

## 2025-01-13 PROCEDURE — 84100 ASSAY OF PHOSPHORUS: CPT | Performed by: NURSE PRACTITIONER

## 2025-01-13 PROCEDURE — 93308 TTE F-UP OR LMTD: CPT | Performed by: INTERNAL MEDICINE

## 2025-01-13 PROCEDURE — C1768 GRAFT, VASCULAR: HCPCS | Performed by: THORACIC SURGERY (CARDIOTHORACIC VASCULAR SURGERY)

## 2025-01-13 PROCEDURE — 87116 MYCOBACTERIA CULTURE: CPT | Performed by: THORACIC SURGERY (CARDIOTHORACIC VASCULAR SURGERY)

## 2025-01-13 PROCEDURE — 0WB80ZZ EXCISION OF CHEST WALL, OPEN APPROACH: ICD-10-PCS | Performed by: THORACIC SURGERY (CARDIOTHORACIC VASCULAR SURGERY)

## 2025-01-13 PROCEDURE — P9047 ALBUMIN (HUMAN), 25%, 50ML: HCPCS | Mod: JZ

## 2025-01-13 PROCEDURE — 99291 CRITICAL CARE FIRST HOUR: CPT | Performed by: STUDENT IN AN ORGANIZED HEALTH CARE EDUCATION/TRAINING PROGRAM

## 2025-01-13 PROCEDURE — 83735 ASSAY OF MAGNESIUM: CPT | Performed by: NURSE PRACTITIONER

## 2025-01-13 PROCEDURE — 87102 FUNGUS ISOLATION CULTURE: CPT | Performed by: THORACIC SURGERY (CARDIOTHORACIC VASCULAR SURGERY)

## 2025-01-13 PROCEDURE — 88305 TISSUE EXAM BY PATHOLOGIST: CPT | Performed by: PATHOLOGY

## 2025-01-13 PROCEDURE — 2020000001 HC ICU ROOM DAILY

## 2025-01-13 PROCEDURE — P9045 ALBUMIN (HUMAN), 5%, 250 ML: HCPCS | Mod: JZ

## 2025-01-13 DEVICE — GORE-TEX CARDIOVASCULAR PATCH 10.0CMX15.0CMX0.6MM
Type: IMPLANTABLE DEVICE | Site: CHEST | Status: FUNCTIONAL
Brand: GORE-TEX CARDIOVASCULAR PATCH

## 2025-01-13 DEVICE — IMPLANTABLE DEVICE: Type: IMPLANTABLE DEVICE | Site: CHEST | Status: FUNCTIONAL

## 2025-01-13 RX ORDER — ALBUMIN HUMAN 50 G/1000ML
SOLUTION INTRAVENOUS AS NEEDED
Status: DISCONTINUED | OUTPATIENT
Start: 2025-01-13 | End: 2025-01-13

## 2025-01-13 RX ORDER — DEXTROSE 50 % IN WATER (D50W) INTRAVENOUS SYRINGE
25
Status: DISCONTINUED | OUTPATIENT
Start: 2025-01-13 | End: 2025-01-16

## 2025-01-13 RX ORDER — ACETAMINOPHEN 10 MG/ML
1000 INJECTION, SOLUTION INTRAVENOUS EVERY 6 HOURS SCHEDULED
Status: COMPLETED | OUTPATIENT
Start: 2025-01-13 | End: 2025-01-14

## 2025-01-13 RX ORDER — DEXTROSE 50 % IN WATER (D50W) INTRAVENOUS SYRINGE
12.5
Status: DISCONTINUED | OUTPATIENT
Start: 2025-01-13 | End: 2025-01-16

## 2025-01-13 RX ORDER — KETAMINE HYDROCHLORIDE 10 MG/ML
INJECTION, SOLUTION INTRAMUSCULAR; INTRAVENOUS AS NEEDED
Status: DISCONTINUED | OUTPATIENT
Start: 2025-01-13 | End: 2025-01-13

## 2025-01-13 RX ORDER — HEPARIN SODIUM 5000 [USP'U]/ML
5000 INJECTION, SOLUTION INTRAVENOUS; SUBCUTANEOUS EVERY 8 HOURS
Status: DISCONTINUED | OUTPATIENT
Start: 2025-01-13 | End: 2025-01-13

## 2025-01-13 RX ORDER — SODIUM CHLORIDE 0.9 G/100ML
INJECTION, SOLUTION IRRIGATION AS NEEDED
Status: DISCONTINUED | OUTPATIENT
Start: 2025-01-13 | End: 2025-01-13 | Stop reason: HOSPADM

## 2025-01-13 RX ORDER — ROCURONIUM BROMIDE 10 MG/ML
INJECTION, SOLUTION INTRAVENOUS AS NEEDED
Status: DISCONTINUED | OUTPATIENT
Start: 2025-01-13 | End: 2025-01-13

## 2025-01-13 RX ORDER — FENTANYL CITRATE 50 UG/ML
INJECTION, SOLUTION INTRAMUSCULAR; INTRAVENOUS AS NEEDED
Status: DISCONTINUED | OUTPATIENT
Start: 2025-01-13 | End: 2025-01-13

## 2025-01-13 RX ORDER — NOREPINEPHRINE BITARTRATE/D5W 8 MG/250ML
PLASTIC BAG, INJECTION (ML) INTRAVENOUS
Status: COMPLETED
Start: 2025-01-13 | End: 2025-01-13

## 2025-01-13 RX ORDER — SODIUM CHLORIDE, SODIUM LACTATE, POTASSIUM CHLORIDE, CALCIUM CHLORIDE 600; 310; 30; 20 MG/100ML; MG/100ML; MG/100ML; MG/100ML
INJECTION, SOLUTION INTRAVENOUS CONTINUOUS PRN
Status: DISCONTINUED | OUTPATIENT
Start: 2025-01-13 | End: 2025-01-13

## 2025-01-13 RX ORDER — METHADONE IN SOD CHLOR,ISO-OSM 10 MG/ML
SYRINGE (ML) INTRAVENOUS AS NEEDED
Status: DISCONTINUED | OUTPATIENT
Start: 2025-01-13 | End: 2025-01-13

## 2025-01-13 RX ORDER — PROPOFOL 10 MG/ML
INJECTION, EMULSION INTRAVENOUS AS NEEDED
Status: DISCONTINUED | OUTPATIENT
Start: 2025-01-13 | End: 2025-01-13

## 2025-01-13 RX ORDER — HEPARIN SODIUM 5000 [USP'U]/ML
5000 INJECTION, SOLUTION INTRAVENOUS; SUBCUTANEOUS EVERY 8 HOURS
Status: DISCONTINUED | OUTPATIENT
Start: 2025-01-13 | End: 2025-02-06

## 2025-01-13 RX ORDER — ROPIVACAINE HYDROCHLORIDE 5 MG/ML
INJECTION, SOLUTION EPIDURAL; INFILTRATION; PERINEURAL AS NEEDED
Status: DISCONTINUED | OUTPATIENT
Start: 2025-01-13 | End: 2025-01-13

## 2025-01-13 RX ORDER — CEFAZOLIN SODIUM 2 G/100ML
2 INJECTION, SOLUTION INTRAVENOUS EVERY 8 HOURS
Status: COMPLETED | OUTPATIENT
Start: 2025-01-13 | End: 2025-01-14

## 2025-01-13 RX ORDER — PANTOPRAZOLE SODIUM 40 MG/10ML
40 INJECTION, POWDER, LYOPHILIZED, FOR SOLUTION INTRAVENOUS
Status: DISCONTINUED | OUTPATIENT
Start: 2025-01-14 | End: 2025-01-20

## 2025-01-13 RX ORDER — CEFAZOLIN SODIUM 2 G/100ML
2 INJECTION, SOLUTION INTRAVENOUS ONCE
Status: DISCONTINUED | OUTPATIENT
Start: 2025-01-13 | End: 2025-01-13 | Stop reason: HOSPADM

## 2025-01-13 RX ORDER — ONDANSETRON HYDROCHLORIDE 2 MG/ML
INJECTION, SOLUTION INTRAVENOUS AS NEEDED
Status: DISCONTINUED | OUTPATIENT
Start: 2025-01-13 | End: 2025-01-13

## 2025-01-13 RX ORDER — ALBUTEROL SULFATE 90 UG/1
2 INHALANT RESPIRATORY (INHALATION) EVERY 6 HOURS PRN
Status: DISCONTINUED | OUTPATIENT
Start: 2025-01-13 | End: 2025-01-17

## 2025-01-13 RX ORDER — HEPARIN SODIUM 5000 [USP'U]/ML
5000 INJECTION, SOLUTION INTRAVENOUS; SUBCUTANEOUS ONCE
Status: COMPLETED | OUTPATIENT
Start: 2025-01-13 | End: 2025-01-13

## 2025-01-13 RX ORDER — CEFAZOLIN 1 G/1
INJECTION, POWDER, FOR SOLUTION INTRAVENOUS AS NEEDED
Status: DISCONTINUED | OUTPATIENT
Start: 2025-01-13 | End: 2025-01-13

## 2025-01-13 RX ORDER — PHENYLEPHRINE HCL IN 0.9% NACL 0.4MG/10ML
SYRINGE (ML) INTRAVENOUS AS NEEDED
Status: DISCONTINUED | OUTPATIENT
Start: 2025-01-13 | End: 2025-01-13

## 2025-01-13 RX ORDER — ACETAMINOPHEN 325 MG/1
650 TABLET ORAL ONCE
Status: COMPLETED | OUTPATIENT
Start: 2025-01-13 | End: 2025-01-13

## 2025-01-13 RX ORDER — CALCIUM GLUCONATE 20 MG/ML
1 INJECTION, SOLUTION INTRAVENOUS EVERY 6 HOURS PRN
Status: DISCONTINUED | OUTPATIENT
Start: 2025-01-13 | End: 2025-01-17

## 2025-01-13 RX ORDER — OXYCODONE HYDROCHLORIDE 5 MG/1
2.5 TABLET ORAL EVERY 4 HOURS PRN
Status: DISCONTINUED | OUTPATIENT
Start: 2025-01-13 | End: 2025-01-18

## 2025-01-13 RX ORDER — ALBUMIN HUMAN 250 G/1000ML
SOLUTION INTRAVENOUS CONTINUOUS PRN
Status: DISCONTINUED | OUTPATIENT
Start: 2025-01-13 | End: 2025-01-13

## 2025-01-13 RX ORDER — GABAPENTIN 300 MG/1
300 CAPSULE ORAL ONCE
Status: COMPLETED | OUTPATIENT
Start: 2025-01-13 | End: 2025-01-13

## 2025-01-13 RX ORDER — POTASSIUM CHLORIDE 29.8 MG/ML
40 INJECTION INTRAVENOUS EVERY 6 HOURS PRN
Status: DISCONTINUED | OUTPATIENT
Start: 2025-01-13 | End: 2025-01-15

## 2025-01-13 RX ORDER — ALBUMIN HUMAN 50 G/1000ML
12.5 SOLUTION INTRAVENOUS ONCE
Status: COMPLETED | OUTPATIENT
Start: 2025-01-13 | End: 2025-01-13

## 2025-01-13 RX ORDER — MIDAZOLAM HYDROCHLORIDE 1 MG/ML
INJECTION INTRAMUSCULAR; INTRAVENOUS AS NEEDED
Status: DISCONTINUED | OUTPATIENT
Start: 2025-01-13 | End: 2025-01-13

## 2025-01-13 RX ORDER — LIDOCAINE 560 MG/1
1 PATCH PERCUTANEOUS; TOPICAL; TRANSDERMAL DAILY
Status: DISCONTINUED | OUTPATIENT
Start: 2025-01-13 | End: 2025-01-15

## 2025-01-13 RX ORDER — FLUTICASONE FUROATE AND VILANTEROL 200; 25 UG/1; UG/1
1 POWDER RESPIRATORY (INHALATION) DAILY
Status: DISCONTINUED | OUTPATIENT
Start: 2025-01-13 | End: 2025-01-18

## 2025-01-13 RX ORDER — INSULIN LISPRO 100 [IU]/ML
0-5 INJECTION, SOLUTION INTRAVENOUS; SUBCUTANEOUS EVERY 4 HOURS
Status: DISCONTINUED | OUTPATIENT
Start: 2025-01-13 | End: 2025-01-16

## 2025-01-13 RX ORDER — MAGNESIUM SULFATE HEPTAHYDRATE 40 MG/ML
4 INJECTION, SOLUTION INTRAVENOUS EVERY 6 HOURS PRN
Status: DISCONTINUED | OUTPATIENT
Start: 2025-01-13 | End: 2025-01-16

## 2025-01-13 RX ORDER — NOREPINEPHRINE BITARTRATE/D5W 8 MG/250ML
0-.1 PLASTIC BAG, INJECTION (ML) INTRAVENOUS CONTINUOUS
Status: DISCONTINUED | OUTPATIENT
Start: 2025-01-13 | End: 2025-01-14

## 2025-01-13 RX ORDER — EPINEPHRINE IN 0.9 % SOD CHLOR 4MG/250ML
PLASTIC BAG, INJECTION (ML) INTRAVENOUS CONTINUOUS PRN
Status: DISCONTINUED | OUTPATIENT
Start: 2025-01-13 | End: 2025-01-13

## 2025-01-13 RX ORDER — CALCIUM GLUCONATE 20 MG/ML
2 INJECTION, SOLUTION INTRAVENOUS EVERY 6 HOURS PRN
Status: DISCONTINUED | OUTPATIENT
Start: 2025-01-13 | End: 2025-01-17

## 2025-01-13 RX ORDER — MAGNESIUM SULFATE HEPTAHYDRATE 40 MG/ML
2 INJECTION, SOLUTION INTRAVENOUS EVERY 6 HOURS PRN
Status: DISCONTINUED | OUTPATIENT
Start: 2025-01-13 | End: 2025-01-16

## 2025-01-13 RX ORDER — LIDOCAINE HYDROCHLORIDE 20 MG/ML
INJECTION, SOLUTION INFILTRATION; PERINEURAL AS NEEDED
Status: DISCONTINUED | OUTPATIENT
Start: 2025-01-13 | End: 2025-01-13

## 2025-01-13 RX ORDER — BUPIVACAINE HCL/EPINEPHRINE 0.25-.0005
VIAL (ML) INJECTION AS NEEDED
Status: DISCONTINUED | OUTPATIENT
Start: 2025-01-13 | End: 2025-01-13 | Stop reason: HOSPADM

## 2025-01-13 RX ADMIN — ALBUMIN HUMAN: 0.25 SOLUTION INTRAVENOUS at 13:05

## 2025-01-13 RX ADMIN — VASOPRESSIN 2 UNITS: 20 INJECTION INTRAVENOUS at 10:19

## 2025-01-13 RX ADMIN — Medication 5 MG: at 09:05

## 2025-01-13 RX ADMIN — VASOPRESSIN 1 UNITS: 20 INJECTION INTRAVENOUS at 10:59

## 2025-01-13 RX ADMIN — VASOPRESSIN 1 UNITS: 20 INJECTION INTRAVENOUS at 11:30

## 2025-01-13 RX ADMIN — ALBUMIN HUMAN 250 ML: 0.05 INJECTION, SOLUTION INTRAVENOUS at 11:01

## 2025-01-13 RX ADMIN — ACETAMINOPHEN 1000 MG: 10 INJECTION INTRAVENOUS at 21:38

## 2025-01-13 RX ADMIN — Medication 80 MCG: at 08:25

## 2025-01-13 RX ADMIN — ACETAMINOPHEN 975 MG: 325 TABLET, FILM COATED ORAL at 07:09

## 2025-01-13 RX ADMIN — Medication 5 MG: at 11:07

## 2025-01-13 RX ADMIN — Medication 0.08 MCG/KG/MIN: at 14:53

## 2025-01-13 RX ADMIN — VASOPRESSIN 2 UNITS: 20 INJECTION INTRAVENOUS at 13:00

## 2025-01-13 RX ADMIN — Medication 80 MCG: at 09:17

## 2025-01-13 RX ADMIN — LIDOCAINE HYDROCHLORIDE 40 MG: 20 INJECTION, SOLUTION INFILTRATION; PERINEURAL at 07:36

## 2025-01-13 RX ADMIN — Medication 120 MCG: at 08:09

## 2025-01-13 RX ADMIN — VASOPRESSIN 0.06 UNITS/MIN: 0.2 INJECTION INTRAVENOUS at 14:54

## 2025-01-13 RX ADMIN — MIDAZOLAM HYDROCHLORIDE 0.5 MG: 1 INJECTION, SOLUTION INTRAMUSCULAR; INTRAVENOUS at 07:35

## 2025-01-13 RX ADMIN — VASOPRESSIN 2 UNITS: 20 INJECTION INTRAVENOUS at 12:18

## 2025-01-13 RX ADMIN — Medication 120 MCG: at 07:36

## 2025-01-13 RX ADMIN — ROCURONIUM 20 MG: 50 INJECTION, SOLUTION INTRAVENOUS at 10:15

## 2025-01-13 RX ADMIN — SUGAMMADEX 200 MG: 100 INJECTION, SOLUTION INTRAVENOUS at 13:35

## 2025-01-13 RX ADMIN — DEXAMETHASONE SODIUM PHOSPHATE 8 MG: 4 INJECTION INTRA-ARTICULAR; INTRALESIONAL; INTRAMUSCULAR; INTRAVENOUS; SOFT TISSUE at 07:37

## 2025-01-13 RX ADMIN — HEPARIN SODIUM 5000 UNITS: 5000 INJECTION INTRAVENOUS; SUBCUTANEOUS at 07:14

## 2025-01-13 RX ADMIN — LIDOCAINE 4% 1 PATCH: 40 PATCH TOPICAL at 16:09

## 2025-01-13 RX ADMIN — VASOPRESSIN 1 UNITS: 20 INJECTION INTRAVENOUS at 12:52

## 2025-01-13 RX ADMIN — ROPIVACAINE HYDROCHLORIDE 10 ML: 5 INJECTION, SOLUTION EPIDURAL; INFILTRATION; PERINEURAL at 13:27

## 2025-01-13 RX ADMIN — VASOPRESSIN 2 UNITS: 20 INJECTION INTRAVENOUS at 10:26

## 2025-01-13 RX ADMIN — CEFAZOLIN 2 G: 1 INJECTION, POWDER, FOR SOLUTION INTRAMUSCULAR; INTRAVENOUS at 12:05

## 2025-01-13 RX ADMIN — MIDAZOLAM HYDROCHLORIDE 0.5 MG: 1 INJECTION, SOLUTION INTRAMUSCULAR; INTRAVENOUS at 07:19

## 2025-01-13 RX ADMIN — ALBUMIN HUMAN 12.5 G: 0.05 INJECTION, SOLUTION INTRAVENOUS at 18:37

## 2025-01-13 RX ADMIN — ACETAMINOPHEN 1000 MG: 10 INJECTION INTRAVENOUS at 14:50

## 2025-01-13 RX ADMIN — VASOPRESSIN 0.02 UNITS/MIN: 20 INJECTION, SOLUTION INTRAMUSCULAR; SUBCUTANEOUS at 09:44

## 2025-01-13 RX ADMIN — VASOPRESSIN 1 UNITS: 20 INJECTION INTRAVENOUS at 11:17

## 2025-01-13 RX ADMIN — ROCURONIUM 50 MG: 50 INJECTION, SOLUTION INTRAVENOUS at 07:36

## 2025-01-13 RX ADMIN — CEFAZOLIN SODIUM 2 G: 2 INJECTION, SOLUTION INTRAVENOUS at 21:38

## 2025-01-13 RX ADMIN — VASOPRESSIN 2 UNITS: 20 INJECTION INTRAVENOUS at 12:00

## 2025-01-13 RX ADMIN — ALBUMIN HUMAN 250 ML: 0.05 INJECTION, SOLUTION INTRAVENOUS at 07:55

## 2025-01-13 RX ADMIN — VASOPRESSIN 1 UNITS: 20 INJECTION INTRAVENOUS at 11:21

## 2025-01-13 RX ADMIN — VASOPRESSIN 2 UNITS: 20 INJECTION INTRAVENOUS at 10:34

## 2025-01-13 RX ADMIN — VASOPRESSIN 1 UNITS: 20 INJECTION INTRAVENOUS at 11:53

## 2025-01-13 RX ADMIN — VASOPRESSIN 2 UNITS: 20 INJECTION INTRAVENOUS at 10:53

## 2025-01-13 RX ADMIN — OXYCODONE 2.5 MG: 5 TABLET ORAL at 20:49

## 2025-01-13 RX ADMIN — VASOPRESSIN 2 UNITS: 20 INJECTION INTRAVENOUS at 11:05

## 2025-01-13 RX ADMIN — VASOPRESSIN 1 UNITS: 20 INJECTION INTRAVENOUS at 09:53

## 2025-01-13 RX ADMIN — PROPOFOL 70 MG: 10 INJECTION, EMULSION INTRAVENOUS at 07:35

## 2025-01-13 RX ADMIN — HEPARIN SODIUM 5000 UNITS: 5000 INJECTION INTRAVENOUS; SUBCUTANEOUS at 20:54

## 2025-01-13 RX ADMIN — ONDANSETRON 4 MG: 2 INJECTION, SOLUTION INTRAMUSCULAR; INTRAVENOUS at 12:53

## 2025-01-13 RX ADMIN — Medication 80 MCG: at 08:19

## 2025-01-13 RX ADMIN — NOREPINEPHRINE BITARTRATE 0.08 MCG/KG/MIN: 8 INJECTION, SOLUTION INTRAVENOUS at 14:53

## 2025-01-13 RX ADMIN — VASOPRESSIN 1 UNITS: 20 INJECTION INTRAVENOUS at 10:04

## 2025-01-13 RX ADMIN — CEFAZOLIN 2 G: 1 INJECTION, POWDER, FOR SOLUTION INTRAMUSCULAR; INTRAVENOUS at 08:08

## 2025-01-13 RX ADMIN — INSULIN LISPRO 2 UNITS: 100 INJECTION, SOLUTION INTRAVENOUS; SUBCUTANEOUS at 16:09

## 2025-01-13 RX ADMIN — VASOPRESSIN 1 UNITS: 20 INJECTION INTRAVENOUS at 10:13

## 2025-01-13 RX ADMIN — Medication 120 MCG: at 07:59

## 2025-01-13 RX ADMIN — GABAPENTIN 300 MG: 300 CAPSULE ORAL at 07:14

## 2025-01-13 RX ADMIN — Medication 5 MG: at 09:17

## 2025-01-13 RX ADMIN — MAGNESIUM SULFATE HEPTAHYDRATE 2 G: 40 INJECTION, SOLUTION INTRAVENOUS at 16:30

## 2025-01-13 RX ADMIN — SODIUM CHLORIDE, POTASSIUM CHLORIDE, SODIUM LACTATE AND CALCIUM CHLORIDE: 600; 310; 30; 20 INJECTION, SOLUTION INTRAVENOUS at 07:33

## 2025-01-13 RX ADMIN — Medication 15 MG: at 09:25

## 2025-01-13 RX ADMIN — VASOPRESSIN 1 UNITS: 20 INJECTION INTRAVENOUS at 09:49

## 2025-01-13 RX ADMIN — Medication 80 MCG: at 09:23

## 2025-01-13 RX ADMIN — VASOPRESSIN 2 UNITS: 20 INJECTION INTRAVENOUS at 12:09

## 2025-01-13 RX ADMIN — FENTANYL CITRATE 50 MCG: 50 INJECTION, SOLUTION INTRAMUSCULAR; INTRAVENOUS at 07:36

## 2025-01-13 RX ADMIN — VASOPRESSIN 1 UNITS: 20 INJECTION INTRAVENOUS at 11:46

## 2025-01-13 RX ADMIN — Medication 120 MCG: at 07:46

## 2025-01-13 RX ADMIN — VASOPRESSIN 0.06 UNITS/MIN: 0.2 INJECTION INTRAVENOUS at 19:36

## 2025-01-13 RX ADMIN — VASOPRESSIN 1 UNITS: 20 INJECTION INTRAVENOUS at 11:37

## 2025-01-13 RX ADMIN — ALBUMIN HUMAN 12.5 G: 0.05 INJECTION, SOLUTION INTRAVENOUS at 14:50

## 2025-01-13 RX ADMIN — VASOPRESSIN 2 UNITS: 20 INJECTION INTRAVENOUS at 12:37

## 2025-01-13 RX ADMIN — ROCURONIUM 20 MG: 50 INJECTION, SOLUTION INTRAVENOUS at 08:28

## 2025-01-13 RX ADMIN — INSULIN LISPRO 1 UNITS: 100 INJECTION, SOLUTION INTRAVENOUS; SUBCUTANEOUS at 22:12

## 2025-01-13 RX ADMIN — CALCIUM GLUCONATE 1 G: 20 INJECTION, SOLUTION INTRAVENOUS at 16:29

## 2025-01-13 RX ADMIN — VASOPRESSIN 2 UNITS: 20 INJECTION INTRAVENOUS at 12:27

## 2025-01-13 RX ADMIN — EPINEPHRINE IN SODIUM CHLORIDE 0.02 MCG/KG/MIN: 16 INJECTION INTRAVENOUS at 10:33

## 2025-01-13 ASSESSMENT — PAIN SCALES - GENERAL
PAINLEVEL_OUTOF10: 0 - NO PAIN
PAINLEVEL_OUTOF10: 6
PAINLEVEL_OUTOF10: 0 - NO PAIN
PAINLEVEL_OUTOF10: 0 - NO PAIN

## 2025-01-13 ASSESSMENT — COLUMBIA-SUICIDE SEVERITY RATING SCALE - C-SSRS
1. IN THE PAST MONTH, HAVE YOU WISHED YOU WERE DEAD OR WISHED YOU COULD GO TO SLEEP AND NOT WAKE UP?: NO
6. HAVE YOU EVER DONE ANYTHING, STARTED TO DO ANYTHING, OR PREPARED TO DO ANYTHING TO END YOUR LIFE?: NO
2. HAVE YOU ACTUALLY HAD ANY THOUGHTS OF KILLING YOURSELF?: NO

## 2025-01-13 ASSESSMENT — PAIN - FUNCTIONAL ASSESSMENT
PAIN_FUNCTIONAL_ASSESSMENT: 0-10

## 2025-01-13 NOTE — BRIEF OP NOTE
"Date: 2025  OR Location: [facility] OR    Name: Faustina Vick \"Noam", : 1960, Age: 64 y.o., MRN: [id number], Sex: female    Diagnosis  Pre-op Diagnosis      * Primary cancer of right upper lobe of lung (Multi) [C34.11] Post-op Diagnosis     * Primary cancer of right upper lobe of lung (Multi) [C34.11]     Procedures  PNEUMONECTOMY  13011 - AR REMOVAL OF LUNG PNEUMONECTOMY  Right Pneumonectomy, intrapericardial, with en bloc chest wall resection  MLND  Flexible bronchoscopy    Surgeons      * Jonah Wagner - Primary    Resident/Fellow/Other Assistant:  Surgeons and Role:     * John Brush MD - Fellow    Staff:   Circulator: Filiberto  Scrub Person: Jennifer  Scrub Person: Rafal Green Circulator: Isa  Circulator: Zenia  Scrub Person: Zenia    Anesthesia Staff: Anesthesiologist: Joshua Mcgarry MD  Anesthesia Resident: Sayda Gonzalez DO    Procedure Summary  Anesthesia: General  ASA: III  Estimated Blood Loss: 200 mL  Intra-op Medications:   Administrations occurring from 0645 to 1315 on 25:   Medication Name Total Dose   BUPivacaine-EPINEPHrine (Marcaine w/EPI) 0.25 %-1:200,000 injection 30 mL   sodium chloride 0.9 % irrigation solution 1,000 mL   albumin human bottle 25% Cannot be calculated   albumin human bottle 5% 500 mL   ceFAZolin (Ancef) vial 1 g 4 g   dexAMETHasone (Decadron) 4 mg/mL 8 mg   EPINEPHrine (Adrenalin) 4 mg/ 250 mL NS (16 mcg/mL) infusion 0.12 mg   fentaNYL (Sublimaze) injection 50 mcg/mL 50 mcg   ketamine (Ketalar) 10 mg/mL injection 20 mg   LR infusion Cannot be calculated   lidocaine (Xylocaine) injection 2 % 40 mg   methadone (Dolophine) injection 10 mg   midazolam PF (Versed) injection 1 mg/mL 1 mg   ondansetron 2 mg/mL 4 mg   phenylephrine 40 mcg/mL syringe 10 mL 800 mcg   propofol (Diprivan) injection 10 mg/mL 70 mg   rocuronium (ZeMuron) 50 mg/5 mL injection 90 mg   vasopressin (Vasostrict) 20 Units in sodium chloride 0.9% 100 mL (0.2 Units/mL) infusion 10.9 " Units   vasopressin (Vasostrict) 1 unit/mL in NS IV bolus 34 Units   acetaminophen (Tylenol) tablet 650 mg 975 mg   gabapentin (Neurontin) capsule 300 mg 300 mg   heparin (porcine) injection 5,000 Units 5,000 Units              Anesthesia Record               Intraprocedure I/O Totals          Intake    Ketamine 0.00 mL    The total shown is the total volume documented since Anesthesia Start was filed.    Epinephrine Drip 0.00 mL    The total shown is the total volume documented since Anesthesia Start was filed.    Vasopressin Drip 0.00 mL    The total shown is the total volume documented since Anesthesia Start was filed.    albumin human bottle 25% 50.00 mL    Total Intake 50 mL       Output    Urine 160 mL    Other 1900 mL    Total Output 2060 mL       Net    Net Volume -2010 mL          Specimen:   ID Type Source Tests Collected by Time   1 : level 8 LN Tissue LYMPH NODE PULMONARY (SPECIFY SITE) SURGICAL PATHOLOGY EXAM Jonah Wagner MD 1/13/2025 1039   2 : level 10 LN Tissue LYMPH NODE PULMONARY (SPECIFY SITE) SURGICAL PATHOLOGY EXAM Jonah Wagner MD 1/13/2025 1050   3 : right pneumoectomy Tissue LUNG RESECTION RIGHT (SPECIFY SITE) SURGICAL PATHOLOGY EXAM Jonah Wagner MD 1/13/2025 1057   4 : level 2 Tissue LUNG RESECTION RIGHT (SPECIFY SITE) SURGICAL PATHOLOGY EXAM Jonah Wagner MD 1/13/2025 1126   5 : level 4 LN Tissue LUNG RESECTION RIGHT (SPECIFY SITE) SURGICAL PATHOLOGY EXAM Jonah Wagner MD 1/13/2025 1128   A : pleurel effusion Fluid PLEURAL FLUID RIGHT SIDE AFB CULTURE/SMEAR, FUNGAL CULTURE/SMEAR, STERILE FLUID CULTURE/SMEAR Jonah Wagner MD 1/13/2025 0832                  Findings: Successful resection and reconstruction of R lung tumor.     Complications:  None; patient tolerated the procedure well.     Disposition: PACU - hemodynamically stable.  Condition: stable  Specimens Collected:   ID Type Source Tests Collected by Time   1 : level 8 LN Tissue LYMPH NODE  PULMONARY (SPECIFY SITE) SURGICAL PATHOLOGY EXAM Jonah Wagner MD 1/13/2025 1039   2 : level 10 LN Tissue LYMPH NODE PULMONARY (SPECIFY SITE) SURGICAL PATHOLOGY EXAM Jonah Wagner MD 1/13/2025 1050   3 : right pneumoectomy Tissue LUNG RESECTION RIGHT (SPECIFY SITE) SURGICAL PATHOLOGY EXAM Jonah Wagner MD 1/13/2025 1057   4 : level 2 Tissue LUNG RESECTION RIGHT (SPECIFY SITE) SURGICAL PATHOLOGY EXAM Jonah Wagner MD 1/13/2025 1126   5 : level 4 LN Tissue LUNG RESECTION RIGHT (SPECIFY SITE) SURGICAL PATHOLOGY EXAM Jonah Wagner MD 1/13/2025 1128   A : pleurel effusion Fluid PLEURAL FLUID RIGHT SIDE AFB CULTURE/SMEAR, FUNGAL CULTURE/SMEAR, STERILE FLUID CULTURE/SMEAR Jonah Wagner MD 1/13/2025 0832     Attending Attestation:     Jonah Wagner  Phone Number: [phone]

## 2025-01-13 NOTE — ANESTHESIA PROCEDURE NOTES
Airway  Date/Time: 1/13/2025 7:42 AM  Urgency: elective    Airway not difficult    Staffing  Performed: resident   Authorized by: Joshua Mcgarry MD    Performed by: Sayda Gonzalez DO  Patient location during procedure: OR    Indications and Patient Condition  Indications for airway management: anesthesia  Sedation level: deep  Preoxygenated: yes  Mask difficulty assessment: 1 - vent by mask  Planned trial extubation    Final Airway Details  Final airway type: endotracheal airway      Successful airway: ETT - double lumen left  Cuffed: yes   Successful intubation technique: direct laryngoscopy  Facilitating devices/methods: intubating stylet  Endotracheal tube insertion site: oral  Blade: Lenin  Blade size: #3  ETT DL size (fr): 37  Cormack-Lehane Classification: grade I - full view of glottis  Placement verified by: chest auscultation, bronchoscopy and capnometry   Tracheal cuff pressure (cm H2O): 8  Bronchial cuff pressure (cm H2O): 2  Measured from: lips  ETT to lips (cm): 28  Number of attempts at approach: 1

## 2025-01-13 NOTE — H&P
"History Of Present Illness  Faustina Vick \"Jannie\" is a 64 y.o. female presenting to SICU s/p Right Pneumonectomy, intrapericardial, with en bloc chest wall resection.    63 yo female with Stage III lung cancer. She initially presented with weight loss and cough in April 2023. A biopsy of a 8.5 cm right upper lobe lung mass was performed on 4/8/2023 confirming a diagnosis of squamous cell carcinoma.  She was given a clinical stage of cT4 N2 M0.  She began treatment with carboplatin and paclitaxel on 5/20/2024 with a plan for \"consolidative immunotherapy\".  She received 6 cycles of CarboTaxol and Radiation (ending 6/25/24) and then began cycle 1 of nivolumab on 7/30/2024, completing her third dose 8/28/2024.  She was referred to thoracic surgery and was evaluated by Dr. Wagner for possible RUL lobectomy v pneumonectomy. Pre-op VQ scan showed that the right had 37.7% perfusion. TTE 9/9/24 with normal LV and RV function, trivial valvular and no wall motion abnormalities. She completed 10 rounds of immunotherapy 12/8/24. PET/CT on 12/30/2024 showed localized progression of disease in the right chest and no distant metastatic disease. Surgical resection recommended. She presented from home today for above procedure.    OR course:    EBL 150ml  UOP 150ml  Crystalloid 550ml  Colloid 750ml 5% albumin, 50ml 25% albumin  Products none    Intubation: GIV, left double lumen ETT size 37, DL, MAC 3, x1 attempt  Lines: R internal jugular MAC line, L radial art line, PIV        Past Medical History  Past Medical History:   Diagnosis Date    Anxiety     treated by PCP with PRN medication    Arthritis     hands    Asthma     controlled    History of SCC (squamous cell carcinoma) of skin     skin on bridge of her nose    Personal history of antineoplastic chemotherapy     Primary cancer of right upper lobe of lung (Multi)     seen by Dr. Jonah Wagner 10/17/24--7.3 cm mass right upper lobe lung cancer       Surgical History  Past " Surgical History:   Procedure Laterality Date     SECTION, LOW TRANSVERSE      INSERTION / REMOVAL / REPLACEMENT VENOUS ACCESS CATHETER      OTHER SURGICAL HISTORY      MEDI PORT INSERTION    OVARIAN CYST SURGERY          Social History  She reports that she quit smoking about 10 months ago. Her smoking use included cigarettes. She has been exposed to tobacco smoke. She has quit using smokeless tobacco. She reports that she does not currently use alcohol. She reports that she does not use drugs.    Family History  Family History   Problem Relation Name Age of Onset    Adrenal disorder Mother      Bone cancer Mother      Skin cancer Father          Allergies  Patient has no known allergies.    Home meds    Medication Details Provider Last Reconciliation Status   ALPRAZolam (Xanax) 1 mg tablet Take 1 tablet (1 mg) by mouth as needed at bedtime. Historical Provider, MD Needs Review   benzonatate (Tessalon) 200 mg capsule Take 1 capsule (200 mg) by mouth 3 times a day as needed. Kamron Denson MD Needs Review   chlorhexidine (Hibiclens) 4 % external liquid Apply topically once daily as needed for wound care. Moriah Casper PA-C Needs Review   chlorhexidine (Peridex) 0.12 % solution Use 15 mL in the mouth or throat if needed. Historical MD Jarett Needs Review   Trelegy Ellipta 100-62.5-25 mcg blister with device Inhale 1 puff once daily. Kamron Denson MD Needs Review   Ventolin HFA 90 mcg/actuation inhaler Inhale 2 puffs every 6 hours if needed for wheezing. Historical MD Jarett Needs Review       Review of Systems   Unable to perform ROS: Other (resdual sedation)        Physical Exam  Constitutional:       Appearance: She is underweight.      Interventions: Face mask in place.      Comments: 10L   HENT:      Mouth/Throat:      Lips: Pink.      Mouth: Mucous membranes are dry.   Eyes:      Pupils: Pupils are equal, round, and reactive to light.      Comments: Pupils pinpoint, scleral edema    Neck:      Comments: Right internal jugular central line  Cardiovascular:      Rate and Rhythm: Normal rate and regular rhythm.      Pulses: Normal pulses.      Heart sounds: Normal heart sounds.   Pulmonary:      Effort: Bradypnea present.      Breath sounds: Normal breath sounds.   Chest:      Comments: Right chest dressing intact. R chest tube to water seal, air leak and tidaling present. No drainage  Abdominal:      General: Abdomen is flat. Bowel sounds are decreased.      Palpations: Abdomen is soft.   Genitourinary:     Comments: Montiel in place, minimal clear yellow urine  Musculoskeletal:      Cervical back: Neck supple.      Right lower leg: No edema.      Left lower leg: No edema.   Skin:     General: Skin is warm and dry.   Neurological:      General: No focal deficit present.      Mental Status: She is easily aroused.      GCS: GCS eye subscore is 3. GCS verbal subscore is 4. GCS motor subscore is 6.          Last Recorded Vitals  Blood pressure 100/57, pulse (!) 112, temperature 37.2 °C (99 °F), temperature source Temporal, resp. rate 22, SpO2 99%.    Relevant Results  Scheduled medications  acetaminophen, 1,000 mg, intravenous, q6h WILMER  albumin human, 12.5 g, intravenous, Once  ceFAZolin, 2 g, intravenous, q8h  fluticasone furoate-vilanteroL, 1 puff, inhalation, Daily  heparin (porcine), 5,000 Units, subcutaneous, q8h  insulin lispro, 0-5 Units, subcutaneous, q4h  lidocaine, 1 patch, transdermal, Daily  [START ON 1/14/2025] pantoprazole, 40 mg, intravenous, Daily before breakfast  tiotropium, 2 puff, inhalation, Daily      Continuous medications  norepinephrine, 0-0.1 mcg/kg/min, Last Rate: 0.08 mcg/kg/min (01/13/25 1453)  vasopressin, 0.06 Units/min, Last Rate: 0.06 Units/min (01/13/25 1454)      PRN medications  PRN medications: albuterol, calcium gluconate, calcium gluconate, dextrose, dextrose, glucagon, glucagon, magnesium sulfate, magnesium sulfate, potassium chloride     Results for orders  placed or performed during the hospital encounter of 01/13/25 (from the past 24 hours)   Blood Gas Arterial Full Panel   Result Value Ref Range    POCT pH, Arterial 7.40 7.38 - 7.42 pH    POCT pCO2, Arterial 41 38 - 42 mm Hg    POCT pO2, Arterial 259 (H) 85 - 95 mm Hg    POCT SO2, Arterial 100 94 - 100 %    POCT Oxy Hemoglobin, Arterial 97.6 94.0 - 98.0 %    POCT Hematocrit Calculated, Arterial 31.0 (L) 36.0 - 46.0 %    POCT Sodium, Arterial 127 (L) 136 - 145 mmol/L    POCT Potassium, Arterial 3.2 (L) 3.5 - 5.3 mmol/L    POCT Chloride, Arterial      POCT Ionized Calcium, Arterial 1.17 1.10 - 1.33 mmol/L    POCT Glucose, Arterial 84 74 - 99 mg/dL    POCT Lactate, Arterial 0.8 0.4 - 2.0 mmol/L    POCT Base Excess, Arterial 0.5 -2.0 - 3.0 mmol/L    POCT HCO3 Calculated, Arterial 25.4 22.0 - 26.0 mmol/L    POCT Hemoglobin, Arterial 10.3 (L) 12.0 - 16.0 g/dL    POCT Anion Gap, Arterial      Patient Temperature 37.0 degrees Celsius    FiO2 100 %   VERIFY ABO/Rh Group Test   Result Value Ref Range    ABO TYPE B     Rh TYPE POS    Blood Gas Arterial Full Panel   Result Value Ref Range    POCT pH, Arterial 7.30 (L) 7.38 - 7.42 pH    POCT pCO2, Arterial 53 (H) 38 - 42 mm Hg    POCT pO2, Arterial 227 (H) 85 - 95 mm Hg    POCT SO2, Arterial 100 94 - 100 %    POCT Oxy Hemoglobin, Arterial 98.4 (H) 94.0 - 98.0 %    POCT Hematocrit Calculated, Arterial 30.0 (L) 36.0 - 46.0 %    POCT Sodium, Arterial 126 (L) 136 - 145 mmol/L    POCT Potassium, Arterial 3.6 3.5 - 5.3 mmol/L    POCT Chloride, Arterial 96 (L) 98 - 107 mmol/L    POCT Ionized Calcium, Arterial 1.19 1.10 - 1.33 mmol/L    POCT Glucose, Arterial 125 (H) 74 - 99 mg/dL    POCT Lactate, Arterial 0.8 0.4 - 2.0 mmol/L    POCT Base Excess, Arterial -0.8 -2.0 - 3.0 mmol/L    POCT HCO3 Calculated, Arterial 26.1 (H) 22.0 - 26.0 mmol/L    POCT Hemoglobin, Arterial 10.0 (L) 12.0 - 16.0 g/dL    POCT Anion Gap, Arterial 8 (L) 10 - 25 mmo/L    Patient Temperature 37.0 degrees Celsius     FiO2 100 %   Blood Gas Venous Full Panel   Result Value Ref Range    POCT pH, Venous 7.23 (LL) 7.33 - 7.43 pH    POCT pCO2, Venous 60 (H) 41 - 51 mm Hg    POCT pO2, Venous 53 (H) 35 - 45 mm Hg    POCT SO2, Venous 82 (H) 45 - 75 %    POCT Oxy Hemoglobin, Venous 79.9 (H) 45.0 - 75.0 %    POCT Hematocrit Calculated, Venous 28.0 (L) 36.0 - 46.0 %    POCT Sodium, Venous 127 (L) 136 - 145 mmol/L    POCT Potassium, Venous 4.2 3.5 - 5.3 mmol/L    POCT Chloride, Venous 96 (L) 98 - 107 mmol/L    POCT Ionized Calicum, Venous 1.09 (L) 1.10 - 1.33 mmol/L    POCT Glucose, Venous 233 (H) 74 - 99 mg/dL    POCT Lactate, Venous 0.8 0.4 - 2.0 mmol/L    POCT Base Excess, Venous -2.8 (L) -2.0 - 3.0 mmol/L    POCT HCO3 Calculated, Venous 25.1 22.0 - 26.0 mmol/L    POCT Hemoglobin, Venous 9.2 (L) 12.0 - 16.0 g/dL    POCT Anion Gap, Venous 10.0 10.0 - 25.0 mmol/L    Patient Temperature 37.0 degrees Celsius    FiO2 60 %   Blood Gas Arterial Full Panel   Result Value Ref Range    POCT pH, Arterial 7.26 (L) 7.38 - 7.42 pH    POCT pCO2, Arterial 56 (H) 38 - 42 mm Hg    POCT pO2, Arterial 180 (H) 85 - 95 mm Hg    POCT SO2, Arterial 100 94 - 100 %    POCT Oxy Hemoglobin, Arterial 97.7 94.0 - 98.0 %    POCT Hematocrit Calculated, Arterial 28.0 (L) 36.0 - 46.0 %    POCT Sodium, Arterial 126 (L) 136 - 145 mmol/L    POCT Potassium, Arterial 4.4 3.5 - 5.3 mmol/L    POCT Chloride, Arterial 96 (L) 98 - 107 mmol/L    POCT Ionized Calcium, Arterial 1.06 (L) 1.10 - 1.33 mmol/L    POCT Glucose, Arterial 231 (H) 74 - 99 mg/dL    POCT Lactate, Arterial 0.6 0.4 - 2.0 mmol/L    POCT Base Excess, Arterial -2.3 (L) -2.0 - 3.0 mmol/L    POCT HCO3 Calculated, Arterial 25.1 22.0 - 26.0 mmol/L    POCT Hemoglobin, Arterial 9.2 (L) 12.0 - 16.0 g/dL    POCT Anion Gap, Arterial 9 (L) 10 - 25 mmo/L    Patient Temperature 37.0 degrees Celsius    FiO2 60 %           Assessment/Plan   Assessment & Plan  Primary cancer of right upper lobe of lung (Multi)    Chronic  obstructive pulmonary disease (Multi)    Malignant neoplasm of upper lobe of right lung (Multi)      Assessment:  63 y/o with a history of lung cancer presents to ICU from the OR s/p Right Pneumonectomy, intrapericardial, with en bloc chest wall resection by Dr. Wagner 1/13.    Plan:  NEURO: History of anxiety on alprazolam 1mg qd, last filled 1/4/2025 per [facility] review. Patient awakens to name, follows commands, no focal deficits. Acute post op pain  - Pain control with hydromorphone prn when more awake  - schedule iv acetaminophen  - lidoderm patch surrounding surgical incision  - R CARLOS MANUEL block catheter, start infusion when hemodynamics permit  - PT/OT consulted    CV: No history of cardiac disease. Baseline /79, -110. TTE 9/2024 normal biV function, RVSP 10mmHg. Intra-op hypotension requiring vaso and epi. Hypovolemic v cardiogenic shock. Arrived to SICU on vaso 0.06 units/min, started on levo 0.05 mcg/kg/min.  - trend CVP  - stat TTE  - Goal map range 65-90  - continuous EKG/abp monitoring  - limit unnecessary volume administration   - trend lactate  - start iv metoprolol for Afib ppx when appropriate    PULM: 30 pack year smoking history, quit 4/2024. Asthma, lung CA, s/p chemo/XRT/immunotherapy. Now s/p Right Pneumonectomy, intrapericardial, with en bloc chest wall resection . Arrived to SICU extubated on 10L SFM  - Wean FiO2 as tolerated.    - Q1h incentive spirometer while awake  - additional pulm toilet prn.    - F/U post op and daily CXR  - R pleural chest tube management per surgical team -> keep off suction  - continue home Trelegy Elipta, ventolin HFA formulary exchange  - hold home benzonatate    GI: No GI history. LFTs WNL 12/2/24. NPO.   - Advance diet per surgical service discretion  - PPI for GI prophylaxis while NPO    : No history of renal disease. Baseline creatinine 0.6  - limit IVF  - Maintain U/O >0.5ml/kg/hr  - Check renal function panel post op, daily and prn  - Replete  electrolytes to goal K>4, Mg>2, Phos>2.5, ionized Ca>1.10.    HEME: DOC. Baseline H/H 12/40. Acute blood loss anemia, OR EBL 150mL  - Check CBC and coags post op, daily and prn  - scds, sc heparin  - ongoing monitoring for s/s bleeding    ENDO: No history of DM or thyroid disease. TSH 1.86. On 5 day therapy of prednisone 12/2024 for URI  - Q4h BG   - SSI Lispro per ICU protocol.    MSK: Arthritis, Vit D deficiency  - not on meds at home  - monitor for symptoms    ID: Afebrile. Await post op WBC  - cefazolin x24h  - temp q4h, wbc daily  - ongoing monitoring for s/s infection    Lines:  - left radial art line  - right internal jugular mac line  - piv  - yoo    Dispo: admit to ICU. Reassess dispo in am. Discussed with Dr. Mo and ICU fellow Dr. Ascencio       Critical care time: 90 minutes      Kathy Ambriz APRN-CNP

## 2025-01-13 NOTE — ANESTHESIA PROCEDURE NOTES
Peripheral Block    Patient location during procedure: pre-op  Start time: 1/13/2025 1:20 PM  End time: 1/13/2025 1:30 PM  Reason for block: at surgeon's request and post-op pain management  Staffing  Performed: resident   Authorized by: Joshua Mcgarry MD    Performed by: Sayda Gonzalez DO  Preanesthetic Checklist  Completed: patient identified, IV checked, site marked, risks and benefits discussed, surgical consent, monitors and equipment checked, pre-op evaluation and timeout performed   Timeout performed at:   Peripheral Block  Patient position: left lateral decubitus  Prep: ChloraPrep  Patient monitoring: heart rate and continuous pulse ox  Block type: CARLOS MANUEL  Laterality: right  Injection technique: catheter  Guidance: ultrasound guided  Local infiltration: ropivacaine  Needle  Needle type: Tuohy   Needle gauge: 22 G  Needle length: 8 cm  Needle localization: ultrasound guidance     image stored in chart  Catheter type: closed end  Assessment  Injection assessment: negative aspiration for heme, no paresthesia on injection, incremental injection and local visualized surrounding nerve on ultrasound  Additional Notes  Erector spinae plane block: Informed consent obtained.  Risks, benefits, and alternatives discussed.  ASA monitors placed, and timeout performed.  Patient positioned, prepped with chlorhexidine, and draped with sterile towels. Anatomical landmarks clearly marked.    Ultrasound guidance used to visualize the erector spine a muscle above the TP/costal junction at T5.  Good visualization of the needle throughout duration of the procedure.  Aspiration was negative. A total of Type of Local:  10 cc of 0.5% ropivacaine was injected unilaterally.  Catheters threaded and secured. Patient tolerated procedure well.

## 2025-01-13 NOTE — PROGRESS NOTES
65 y/o F with stage III SCC of the lung s/p chemotherapy+radiation last chemotherapy session on 8/28/2024 + right pneumonectomy, intrapericardial, with en block chest wall resection on 1/13 by Dr. Wagner. Patient with low blood pressure on preop that continued intra-op and post op. Patient arrived on vasopressin 0.06 to the ICU. Patient started on levophed on arrival to the SICU.      Neuro: PMH of anxiety on alprazolam at home. ANOX3, somnolent on arrival. Methadone 10 mg given intra-op. Right CARLOS MANUEL catheter.   CV: No PMH, ECHO on 10/24 with normal biV function. On arrival on vaso 0.06 and levo started. Repeat echo ordered. CVP 6-7. 250 5% albumin given, will be cautious will fluid resucitation. Unable to start metop for afib proph due to hemodynamic instability.   Pulm: 30 pack year smoking history, quit on 4/2024. Asthma, lung Ca, s/p chemo/XRT/immunotherapy. Now s/p Right Pneumonectomy, intrapericardial, with en bloc chest wall resection.  Hypercapnic on arrival, oxygenating appropriately. Will continue home trelegy elipta, ventolin formulary exchange.   GI: No GI history. LFTs WNL 12/2/2024. NPO. PPI for GI prophylaxis  : No PMH. Baseline creat 0.6. Low UOP intra-op.  Heme: Acute blood loss anemia. SCDs, subcutaneous heparin.   Endo: No PMH. SSI.   ID: Afebrile. Cefazolin 24 hrs.      Lines:  - left radial art line  - right internal jugular mac line  - piv  - yoo    Dispo: Continue ICU care    Kendall Barbour MD  PGY-5 Anesthesiology critical care fellow.

## 2025-01-13 NOTE — Clinical Note
PA catheter left in place and secured by suture. Catheter attached to a pressure bag. Measuring @ 59 cm

## 2025-01-13 NOTE — ANESTHESIA PROCEDURE NOTES
Central Venous Line:    Date/Time: 1/13/2025 7:52 AM    A central venous line was placed in the OR for the following indication(s): central venous access and CVP monitoring.  Staffing  Performed: resident   Authorized by: Joshua Mcgarry MD    Performed by: Sayda Gonzalez DO    Sterility preparation included the following: provider hand hygiene performed prior to central venous catheter insertion, all 5 sterile barriers used (gloves, gown, cap, mask, large sterile drape) during central venous catheter insertion, antiseptic used during central venous catheter insertion and skin prep agent completely dried prior to procedure.  The patient was placed in Trendelenburg position.    Right internal jugular vein was prepped.    The site was prepped with Chlorhexidine.  Size: 9 Fr   Length: 11.5 (16 cm)  Catheter type: introducer   Number of Lumens: double lumen    This catheter was not an oximetric catheter.    During the procedure, the following specific steps were taken: target vein identified, needle advanced into vein and blood aspirated and guidewire advanced into vein.  Seldinger technique used.  Procedure performed using ultrasound guidance.  Sterile gel and probe cover used in ultrasound-guided central venous catheter insertion.    Intravenous verification was obtained by ultrasound, venous blood return and manometry.      Post insertion care included: all ports aspirated, all ports flushed easily, guidewire removed intact, Biopatch applied, line sutured in place and dressing applied.    During the procedure the patient experienced: patient tolerated procedure well with no complications.           images stored in chart

## 2025-01-13 NOTE — ANESTHESIA POSTPROCEDURE EVALUATION
"Patient: Faustina Vick \"Jannie\"    Procedure Summary       Date: 01/13/25 Room / Location: [facility] OR 20 / Virtual [facility] OR    Anesthesia Start: 0728 Anesthesia Stop: 1423    Procedure: PNEUMONECTOMY (Right: Chest) Diagnosis:       Primary cancer of right upper lobe of lung (Multi)      (Primary cancer of right upper lobe of lung (Multi) [C34.11])    Surgeons: Jonah Wagner MD Responsible Provider: Joshua Mcgarry MD    Anesthesia Type: general ASA Status: 3            Anesthesia Type: general    Vitals Value Taken Time   /64 01/13/25 1408   Temp 36.1 01/13/25 1423   Pulse 77 01/13/25 1423   Resp 11 01/13/25 1423   SpO2 97 % 01/13/25 1423   Vitals shown include unfiled device data.    Anesthesia Post Evaluation    Patient location during evaluation: ICU  Patient participation: complete - patient participated  Level of consciousness: sleepy but conscious  Pain management: adequate  Multimodal analgesia pain management approach  Airway patency: patent  Cardiovascular status: hemodynamically stable and hypotensive  Respiratory status: ETT, intubated and ventilator  Hydration status: acceptable  Postoperative Nausea and Vomiting: none        There were no known notable events for this encounter.    "

## 2025-01-13 NOTE — OP NOTE
"PNEUMONECTOMY (R) Operative Note  Patient: Faustina Vick  : 1960  MRN: [id number]    Preoperative Diagnosis: Primary cancer of right upper lobe of lung (Multi) [C34.11]  Postoperative Diagnosis: Primary cancer of right upper lobe of lung (Multi) [C34.11]    Procedure: Procedure(s) (LRB):  PNEUMONECTOMY (Right) (en bloc with chest wall resection and reconstruction), mediastinal lymph node dissection    Surgeon: Surgeon(s):  MD Jonah Recinos MD    Other Staff:   Surgeons and Role:     * John Brush MD - Fellow   Circulator: Filiberto Draper RN; Zenia Devi RN  Relief Circulator: Isa Montalvo RN  Scrub Person: Jennifer Barnes; Rafal Boo RN; Zenia Devi RN    Anesthesia Type: General    Indications: Faustina Vick \"Jannie\" is an 64 y.o. female who presents for Primary cancer of right upper lobe of lung (Multi) [C34.11].  She initially received chemotherapy and radiation, but appeared to have residual radiographic abnormalities.  PET/CT suggested progression of disease, and in this setting I felt that surgical resection in a \"salvage\" setting might facilitate long-term overall survival given the failure of nonsurgical management    The patient was seen in the preoperative area. The risks, benefits, complications, treatment options, non-operative alternatives, expected recovery and outcomes were discussed with the patient. The possibilities of reaction to medication, pulmonary aspiration, injury to surrounding structures, bleeding, recurrent infection, the need for additional procedures, failure to diagnose a condition, and creating a complication requiring transfusion or operation were discussed with the patient. The patient concurred with the proposed plan, giving informed consent.  The site of surgery was properly noted/marked if necessary per policy.     Procedure Details:   The patient was placed on the operating table. A safety huddle was performed. General endotracheal " anesthesia was initiated. Bronchoscopy was performed using the endotracheal tube which showed narrowing of the right-sided airways from extrinsic compression.  No tumor was visible.    The patient was positioned in lateral decubitus position. The patient was prepped with chlorhexidine & alcohol.    I started by making a thoracoscopic port incision in approximately the eighth interspace.  I inserted a thoracoscope and explored the chest.  There was a large thin serous pleural effusion which was aspirated.  A portion of this was sent for culture.  There was no evidence of pleural metastatic disease.  The lung was quite adherent to the chest wall.  I felt it was appropriate to offer the patient a thoracotomy.  We performed a posterolateral thoracotomy.  I excised the fifth rib for exposure.  Immediately deep to this, the lung was densely adherent to the pleura and was quite fibrosed.  It was impossible to know if this was viable tumor or scar from patient's radiation.  We were able to enter the chest just anterior to this and I felt the tumor mass was completely adherent to the chest wall.  In this setting, I felt ribs would need to be excised.  I felt in order to remove the tumor she would require excision of excised ribs 345 and 6.  We started by dividing the anterior portion of the ribs.  This was quite straightforward.  Posteriorly, however, the the mass was adherent to the level of the transverse process, so we disarticulated the ribs off the transverse process.  This was difficult and time-consuming.  Ultimately, we were able to complete the resection posteriorly by disarticulating the ribs which allowed us to mobilize the chest wall and further explore the superior hilum.  The mediastinum was quite scarred presumably from the patient's radiation treatment.  It was difficult to appreciate her anatomy.  In exploring the chest, the tumor mass clearly crossed the fissure, suggesting that she would require a  "pneumonectomy.  I felt an intrapericardial dissection would be safest, so we incised the pericardium and performed intrapericardial dissection.  We placed a Juan tourniquet on the proximal pulmonary artery and then dissected and divided the pulmonary veins and pulmonary artery within the pericardium.  This allowed us to excise an island of pericardium en bloc with the lung.  We took lymph nodes from levels 8 and 7 when we divided the inferior ligament.  This allowed us to complete the resection of the lung en bloc with pericardium.  We dissected the right mainstem bronchus.  We divided the right mainstem bronchus with a TA stapler and a knife.  The specimen was removed.  The bronchial stump appeared intact.  There was no suitable tissue to provide a tissue buttress to the bronchial stump.  We continued by performing a lymph node dissection of levels 2 and 4.  We then reconstructed the pericardium.  Pericardium was reconstructed with 0.6 mm goretex and interrupted Prolene sutures.  We then placed a 28 [place] chest tube.  We explored the chest for hemostasis and it was appropriate.  We reconstructed the chest wall with a 2 mm [place], secured in multiple locations superiorly anteriorly and inferiorly using #1 Prolene suture.  This was done in a \"overlay\" fashion to avoid entrapment of the scapula.  The overlying soft tissue was closed in layers.  Marcaine was given in the soft tissues.    The procedure was completed and patient was brought to recovery without evidence of immediate complications.    This procedure was 100% more difficult than a standard procedure based on the complex dissection required at the level of the chest wall resection and the intrapericardial dissection         Estimated blood loss:  200  Complications: None  Disposition: Recovery  Condition: stable    Attending Attestation: I was present and scrubbed for the entire procedure.    Specimens:   ID Type Source Tests Collected by Time   1 : " level 8 LN Tissue LYMPH NODE PULMONARY (SPECIFY SITE) SURGICAL PATHOLOGY EXAM Jonah Wagner MD 1/13/2025 1039   2 : level 10 LN Tissue LYMPH NODE PULMONARY (SPECIFY SITE) SURGICAL PATHOLOGY EXAM Jonah Wagner MD 1/13/2025 1050   3 : right pneumoectomy Tissue LUNG RESECTION RIGHT (SPECIFY SITE) SURGICAL PATHOLOGY EXAM Jonah Wagner MD 1/13/2025 1057   4 : level 2 Tissue LUNG RESECTION RIGHT (SPECIFY SITE) SURGICAL PATHOLOGY EXAM Jonah Wagner MD 1/13/2025 1126   5 : level 4 LN Tissue LUNG RESECTION RIGHT (SPECIFY SITE) SURGICAL PATHOLOGY EXAM Jonah Wagner MD 1/13/2025 1128   A : pleurel effusion Fluid PLEURAL FLUID RIGHT SIDE AFB CULTURE/SMEAR, FUNGAL CULTURE/SMEAR, STERILE FLUID CULTURE/SMEAR Jonah Wagner MD 1/13/2025 0832       Jonah Wagner  Phone Number: [phone]

## 2025-01-13 NOTE — ANESTHESIA PREPROCEDURE EVALUATION
"Patient: Faustina Vick \"Jannie\"    Procedure Information       Date/Time: 01/13/25 0645    Procedure: PNEUMONECTOMY (Right: Chest)    Location: [facility] OR 20 / [facility] OR    Surgeons: Jonah Wagner MD            Relevant Problems   Anesthesia (within normal limits)      Cardiac (within normal limits)      Pulmonary   (+) Chronic obstructive pulmonary disease (Multi)   (+) Primary cancer of right upper lobe of lung (Multi)   (+) Squamous cell carcinoma of right lung (Multi)   (-) History of home oxygen therapy   (-) LIBERTY (obstructive sleep apnea)      Neuro (within normal limits)      GI   (+) Hemorrhage of rectum and anus      /Renal (within normal limits)      Endocrine (within normal limits)      Hematology   (+) Iron deficiency anemia secondary to inadequate dietary iron intake   (+) Normocytic anemia       Clinical information reviewed:   Tobacco  Allergies  Meds   Med Hx  Surg Hx  OB Status  Fam Hx  Soc   Hx        NPO Detail:  NPO/Void Status  Date of Last Liquid: 01/12/25  Time of Last Liquid: 1630  Date of Last Solid: 01/12/25  Time of Last Solid: 1630  Last Intake Type: Clear fluids         Physical Exam    Airway  Mallampati: I  TM distance: >3 FB     Cardiovascular   Rhythm: regular  Rate: normal     Dental   (+) upper dentures  Comments: Partial uppers   Pulmonary   (+) rhonchi, wheezes     Abdominal            Anesthesia Plan    History of general anesthesia?: yes  History of complications of general anesthesia?: no    ASA 3     general     Patient was previously instructed to abstain from smoking on day of procedure.  Patient did not smoke on day of procedure.    intravenous induction   Postoperative administration of opioids is intended.  Trial extubation is planned.  Anesthetic plan and risks discussed with patient.  Use of blood products discussed with who consented to blood products.    Plan discussed with attending and resident.      "

## 2025-01-13 NOTE — ANESTHESIA PROCEDURE NOTES
Arterial Line:    Date/Time: 1/13/2025 7:51 AM    Staffing  Performed: resident   Authorized by: Joshua Mcgarry MD    Performed by: Sayda Gonzalez DO    An arterial line was placed. Procedure performed using surface landmarks.in the OR for the following indication(s): continuous blood pressure monitoring.    A 20 gauge (size), 5 cm (length), Angiocath (type) catheter was placed into the Left radial artery, secured by Tegaderm,   Seldinger technique not used.  Events:  patient tolerated procedure well with no complications.

## 2025-01-13 NOTE — ANESTHESIA PROCEDURE NOTES
Peripheral IV  Date/Time: 1/13/2025 7:43 AM  Inserted by: Sayda Gonzalez DO    Placement  Needle size: 14 G  Laterality: left  Location: hand  Site prep: chlorhexidine  Technique: anatomical landmarks  Attempts: 1

## 2025-01-14 ENCOUNTER — APPOINTMENT (OUTPATIENT)
Dept: RADIOLOGY | Facility: HOSPITAL | Age: 65
End: 2025-01-14
Payer: COMMERCIAL

## 2025-01-14 LAB
ALBUMIN SERPL BCP-MCNC: 4 G/DL (ref 3.4–5)
ALBUMIN SERPL BCP-MCNC: 4.1 G/DL (ref 3.4–5)
ALBUMIN SERPL BCP-MCNC: 4.1 G/DL (ref 3.4–5)
ALP SERPL-CCNC: 32 U/L (ref 33–136)
ALT SERPL W P-5'-P-CCNC: 13 U/L (ref 7–45)
ANION GAP BLDA CALCULATED.4IONS-SCNC: 7 MMO/L (ref 10–25)
ANION GAP BLDA CALCULATED.4IONS-SCNC: 8 MMO/L (ref 10–25)
ANION GAP SERPL CALC-SCNC: 14 MMOL/L (ref 10–20)
ANION GAP SERPL CALC-SCNC: 16 MMOL/L (ref 10–20)
APTT PPP: 38 SECONDS (ref 27–38)
APTT PPP: 73 SECONDS (ref 27–38)
AST SERPL W P-5'-P-CCNC: 52 U/L (ref 9–39)
BASE EXCESS BLDA CALC-SCNC: 2.3 MMOL/L (ref -2–3)
BASE EXCESS BLDA CALC-SCNC: 3.3 MMOL/L (ref -2–3)
BILIRUB DIRECT SERPL-MCNC: 0.1 MG/DL (ref 0–0.3)
BILIRUB SERPL-MCNC: 0.4 MG/DL (ref 0–1.2)
BODY TEMPERATURE: 37 DEGREES CELSIUS
BODY TEMPERATURE: 37 DEGREES CELSIUS
BUN SERPL-MCNC: 7 MG/DL (ref 6–23)
BUN SERPL-MCNC: 8 MG/DL (ref 6–23)
CA-I BLD-SCNC: 1.02 MMOL/L (ref 1.1–1.33)
CA-I BLD-SCNC: 1.05 MMOL/L (ref 1.1–1.33)
CA-I BLDA-SCNC: 1.04 MMOL/L (ref 1.1–1.33)
CA-I BLDA-SCNC: 1.07 MMOL/L (ref 1.1–1.33)
CALCIUM SERPL-MCNC: 8.1 MG/DL (ref 8.6–10.6)
CALCIUM SERPL-MCNC: 8.1 MG/DL (ref 8.6–10.6)
CHLORIDE BLDA-SCNC: 88 MMOL/L (ref 98–107)
CHLORIDE BLDA-SCNC: 93 MMOL/L (ref 98–107)
CHLORIDE SERPL-SCNC: 85 MMOL/L (ref 98–107)
CHLORIDE SERPL-SCNC: 90 MMOL/L (ref 98–107)
CO2 SERPL-SCNC: 28 MMOL/L (ref 21–32)
CO2 SERPL-SCNC: 29 MMOL/L (ref 21–32)
CREAT SERPL-MCNC: 0.26 MG/DL (ref 0.5–1.05)
CREAT SERPL-MCNC: 0.3 MG/DL (ref 0.5–1.05)
EGFRCR SERPLBLD CKD-EPI 2021: >90 ML/MIN/1.73M*2
EGFRCR SERPLBLD CKD-EPI 2021: >90 ML/MIN/1.73M*2
ERYTHROCYTE [DISTWIDTH] IN BLOOD BY AUTOMATED COUNT: 13.2 % (ref 11.5–14.5)
ERYTHROCYTE [DISTWIDTH] IN BLOOD BY AUTOMATED COUNT: 13.2 % (ref 11.5–14.5)
GLUCOSE BLD MANUAL STRIP-MCNC: 103 MG/DL (ref 74–99)
GLUCOSE BLD MANUAL STRIP-MCNC: 108 MG/DL (ref 74–99)
GLUCOSE BLD MANUAL STRIP-MCNC: 118 MG/DL (ref 74–99)
GLUCOSE BLD MANUAL STRIP-MCNC: 120 MG/DL (ref 74–99)
GLUCOSE BLD MANUAL STRIP-MCNC: 124 MG/DL (ref 74–99)
GLUCOSE BLD MANUAL STRIP-MCNC: 147 MG/DL (ref 74–99)
GLUCOSE BLDA-MCNC: 115 MG/DL (ref 74–99)
GLUCOSE BLDA-MCNC: 133 MG/DL (ref 74–99)
GLUCOSE SERPL-MCNC: 117 MG/DL (ref 74–99)
GLUCOSE SERPL-MCNC: 133 MG/DL (ref 74–99)
HCO3 BLDA-SCNC: 28.2 MMOL/L (ref 22–26)
HCO3 BLDA-SCNC: 29 MMOL/L (ref 22–26)
HCT VFR BLD AUTO: 24.8 % (ref 36–46)
HCT VFR BLD AUTO: 27.5 % (ref 36–46)
HCT VFR BLD EST: 26 % (ref 36–46)
HCT VFR BLD EST: 28 % (ref 36–46)
HGB BLD-MCNC: 8 G/DL (ref 12–16)
HGB BLD-MCNC: 8.9 G/DL (ref 12–16)
HGB BLDA-MCNC: 8.6 G/DL (ref 12–16)
HGB BLDA-MCNC: 9.2 G/DL (ref 12–16)
INHALED O2 CONCENTRATION: 21 %
INHALED O2 CONCENTRATION: 21 %
INR PPP: 1.4 (ref 0.9–1.1)
INR PPP: 2.1 (ref 0.9–1.1)
LACTATE BLDA-SCNC: 0.7 MMOL/L (ref 0.4–2)
LACTATE BLDA-SCNC: 0.8 MMOL/L (ref 0.4–2)
MAGNESIUM SERPL-MCNC: 1.82 MG/DL (ref 1.6–2.4)
MAGNESIUM SERPL-MCNC: 2.15 MG/DL (ref 1.6–2.4)
MCH RBC QN AUTO: 24.7 PG (ref 26–34)
MCH RBC QN AUTO: 25.1 PG (ref 26–34)
MCHC RBC AUTO-ENTMCNC: 32.3 G/DL (ref 32–36)
MCHC RBC AUTO-ENTMCNC: 32.4 G/DL (ref 32–36)
MCV RBC AUTO: 77 FL (ref 80–100)
MCV RBC AUTO: 78 FL (ref 80–100)
NRBC BLD-RTO: 0 /100 WBCS (ref 0–0)
NRBC BLD-RTO: 0 /100 WBCS (ref 0–0)
OSMOLALITY SERPL: 252 MOSM/KG (ref 280–300)
OXYHGB MFR BLDA: 94.8 % (ref 94–98)
OXYHGB MFR BLDA: 97.9 % (ref 94–98)
PCO2 BLDA: 49 MM HG (ref 38–42)
PCO2 BLDA: 50 MM HG (ref 38–42)
PH BLDA: 7.36 PH (ref 7.38–7.42)
PH BLDA: 7.38 PH (ref 7.38–7.42)
PHOSPHATE SERPL-MCNC: 2.6 MG/DL (ref 2.5–4.9)
PHOSPHATE SERPL-MCNC: 3.2 MG/DL (ref 2.5–4.9)
PLATELET # BLD AUTO: 182 X10*3/UL (ref 150–450)
PLATELET # BLD AUTO: 199 X10*3/UL (ref 150–450)
PO2 BLDA: 201 MM HG (ref 85–95)
PO2 BLDA: 76 MM HG (ref 85–95)
POTASSIUM BLDA-SCNC: 3.7 MMOL/L (ref 3.5–5.3)
POTASSIUM BLDA-SCNC: 4.1 MMOL/L (ref 3.5–5.3)
POTASSIUM SERPL-SCNC: 3.9 MMOL/L (ref 3.5–5.3)
POTASSIUM SERPL-SCNC: 4.3 MMOL/L (ref 3.5–5.3)
PROT SERPL-MCNC: 6 G/DL (ref 6.4–8.2)
PROTHROMBIN TIME: 16 SECONDS (ref 9.8–12.8)
PROTHROMBIN TIME: 23.8 SECONDS (ref 9.8–12.8)
RBC # BLD AUTO: 3.24 X10*6/UL (ref 4–5.2)
RBC # BLD AUTO: 3.55 X10*6/UL (ref 4–5.2)
SAO2 % BLDA: 100 % (ref 94–100)
SAO2 % BLDA: 97 % (ref 94–100)
SODIUM BLDA-SCNC: 120 MMOL/L (ref 136–145)
SODIUM BLDA-SCNC: 125 MMOL/L (ref 136–145)
SODIUM SERPL-SCNC: 125 MMOL/L (ref 136–145)
SODIUM SERPL-SCNC: 129 MMOL/L (ref 136–145)
WBC # BLD AUTO: 7.2 X10*3/UL (ref 4.4–11.3)
WBC # BLD AUTO: 9.9 X10*3/UL (ref 4.4–11.3)

## 2025-01-14 PROCEDURE — P9045 ALBUMIN (HUMAN), 5%, 250 ML: HCPCS | Mod: JZ

## 2025-01-14 PROCEDURE — 82947 ASSAY GLUCOSE BLOOD QUANT: CPT

## 2025-01-14 PROCEDURE — 2500000004 HC RX 250 GENERAL PHARMACY W/ HCPCS (ALT 636 FOR OP/ED): Mod: JZ

## 2025-01-14 PROCEDURE — 71045 X-RAY EXAM CHEST 1 VIEW: CPT | Performed by: RADIOLOGY

## 2025-01-14 PROCEDURE — 83930 ASSAY OF BLOOD OSMOLALITY: CPT

## 2025-01-14 PROCEDURE — 2500000005 HC RX 250 GENERAL PHARMACY W/O HCPCS

## 2025-01-14 PROCEDURE — 2500000004 HC RX 250 GENERAL PHARMACY W/ HCPCS (ALT 636 FOR OP/ED)

## 2025-01-14 PROCEDURE — 2500000001 HC RX 250 WO HCPCS SELF ADMINISTERED DRUGS (ALT 637 FOR MEDICARE OP)

## 2025-01-14 PROCEDURE — 83735 ASSAY OF MAGNESIUM: CPT | Performed by: NURSE PRACTITIONER

## 2025-01-14 PROCEDURE — 85027 COMPLETE CBC AUTOMATED: CPT | Performed by: NURSE PRACTITIONER

## 2025-01-14 PROCEDURE — 71045 X-RAY EXAM CHEST 1 VIEW: CPT

## 2025-01-14 PROCEDURE — 97530 THERAPEUTIC ACTIVITIES: CPT | Mod: GO

## 2025-01-14 PROCEDURE — 99231 SBSQ HOSP IP/OBS SF/LOW 25: CPT | Performed by: STUDENT IN AN ORGANIZED HEALTH CARE EDUCATION/TRAINING PROGRAM

## 2025-01-14 PROCEDURE — 2500000004 HC RX 250 GENERAL PHARMACY W/ HCPCS (ALT 636 FOR OP/ED): Mod: JZ | Performed by: NURSE PRACTITIONER

## 2025-01-14 PROCEDURE — 2500000005 HC RX 250 GENERAL PHARMACY W/O HCPCS: Performed by: NURSE PRACTITIONER

## 2025-01-14 PROCEDURE — 37799 UNLISTED PX VASCULAR SURGERY: CPT | Performed by: NURSE PRACTITIONER

## 2025-01-14 PROCEDURE — 99291 CRITICAL CARE FIRST HOUR: CPT | Performed by: ANESTHESIOLOGY

## 2025-01-14 PROCEDURE — 82330 ASSAY OF CALCIUM: CPT | Performed by: NURSE PRACTITIONER

## 2025-01-14 PROCEDURE — 84075 ASSAY ALKALINE PHOSPHATASE: CPT

## 2025-01-14 PROCEDURE — 84132 ASSAY OF SERUM POTASSIUM: CPT | Performed by: NURSE PRACTITIONER

## 2025-01-14 PROCEDURE — 2500000001 HC RX 250 WO HCPCS SELF ADMINISTERED DRUGS (ALT 637 FOR MEDICARE OP): Performed by: NURSE PRACTITIONER

## 2025-01-14 PROCEDURE — 2020000001 HC ICU ROOM DAILY

## 2025-01-14 PROCEDURE — 97165 OT EVAL LOW COMPLEX 30 MIN: CPT | Mod: GO

## 2025-01-14 PROCEDURE — P9047 ALBUMIN (HUMAN), 25%, 50ML: HCPCS | Mod: JZ

## 2025-01-14 PROCEDURE — 83605 ASSAY OF LACTIC ACID: CPT | Performed by: NURSE PRACTITIONER

## 2025-01-14 PROCEDURE — 85610 PROTHROMBIN TIME: CPT | Performed by: NURSE PRACTITIONER

## 2025-01-14 PROCEDURE — 85730 THROMBOPLASTIN TIME PARTIAL: CPT | Performed by: NURSE PRACTITIONER

## 2025-01-14 RX ORDER — ALBUMIN HUMAN 50 G/1000ML
12.5 SOLUTION INTRAVENOUS ONCE
Status: COMPLETED | OUTPATIENT
Start: 2025-01-14 | End: 2025-01-14

## 2025-01-14 RX ORDER — ACETAMINOPHEN 325 MG/1
650 TABLET ORAL EVERY 6 HOURS
Status: DISCONTINUED | OUTPATIENT
Start: 2025-01-14 | End: 2025-01-16

## 2025-01-14 RX ORDER — ALBUMIN HUMAN 250 G/1000ML
12.5 SOLUTION INTRAVENOUS EVERY 6 HOURS
Status: DISCONTINUED | OUTPATIENT
Start: 2025-01-14 | End: 2025-01-15

## 2025-01-14 RX ORDER — TALC
3 POWDER (GRAM) TOPICAL NIGHTLY
Status: DISCONTINUED | OUTPATIENT
Start: 2025-01-14 | End: 2025-01-18

## 2025-01-14 RX ORDER — ALBUMIN HUMAN 250 G/1000ML
12.5 SOLUTION INTRAVENOUS EVERY 6 HOURS
Status: DISCONTINUED | OUTPATIENT
Start: 2025-01-14 | End: 2025-01-14

## 2025-01-14 RX ADMIN — MAGNESIUM SULFATE HEPTAHYDRATE 2 G: 40 INJECTION, SOLUTION INTRAVENOUS at 18:54

## 2025-01-14 RX ADMIN — CALCIUM GLUCONATE 1 G: 20 INJECTION, SOLUTION INTRAVENOUS at 03:40

## 2025-01-14 RX ADMIN — ACETAMINOPHEN 1000 MG: 10 INJECTION INTRAVENOUS at 09:02

## 2025-01-14 RX ADMIN — FLUTICASONE FUROATE AND VILANTEROL TRIFENATATE 1 PUFF: 200; 25 POWDER RESPIRATORY (INHALATION) at 09:02

## 2025-01-14 RX ADMIN — HYDROCORTISONE SODIUM SUCCINATE 100 MG: 100 INJECTION, POWDER, FOR SOLUTION INTRAMUSCULAR; INTRAVENOUS at 18:36

## 2025-01-14 RX ADMIN — ACETAMINOPHEN 1000 MG: 10 INJECTION INTRAVENOUS at 02:36

## 2025-01-14 RX ADMIN — OXYCODONE 2.5 MG: 5 TABLET ORAL at 01:18

## 2025-01-14 RX ADMIN — CEFAZOLIN SODIUM 2 G: 2 INJECTION, SOLUTION INTRAVENOUS at 04:39

## 2025-01-14 RX ADMIN — Medication 3 MG: at 01:26

## 2025-01-14 RX ADMIN — Medication 3 MG: at 20:08

## 2025-01-14 RX ADMIN — ACETAMINOPHEN 650 MG: 325 TABLET ORAL at 20:08

## 2025-01-14 RX ADMIN — ACETAMINOPHEN 650 MG: 325 TABLET ORAL at 15:20

## 2025-01-14 RX ADMIN — LIDOCAINE 4% 1 PATCH: 40 PATCH TOPICAL at 15:20

## 2025-01-14 RX ADMIN — HEPARIN SODIUM 5000 UNITS: 5000 INJECTION INTRAVENOUS; SUBCUTANEOUS at 04:39

## 2025-01-14 RX ADMIN — ALBUMIN HUMAN 12.5 G: 0.25 SOLUTION INTRAVENOUS at 09:02

## 2025-01-14 RX ADMIN — CALCIUM CHLORIDE 0.5 G: 100 INJECTION INTRAVENOUS; INTRAVENTRICULAR at 06:19

## 2025-01-14 RX ADMIN — HEPARIN SODIUM 5000 UNITS: 5000 INJECTION INTRAVENOUS; SUBCUTANEOUS at 13:13

## 2025-01-14 RX ADMIN — VASOPRESSIN 0.03 UNITS/MIN: 0.2 INJECTION INTRAVENOUS at 22:04

## 2025-01-14 RX ADMIN — PANTOPRAZOLE SODIUM 40 MG: 40 INJECTION, POWDER, FOR SOLUTION INTRAVENOUS at 09:01

## 2025-01-14 RX ADMIN — SODIUM CHLORIDE, POTASSIUM CHLORIDE, SODIUM LACTATE AND CALCIUM CHLORIDE 250 ML: 600; 310; 30; 20 INJECTION, SOLUTION INTRAVENOUS at 04:38

## 2025-01-14 RX ADMIN — ALBUMIN HUMAN 12.5 G: 0.05 INJECTION, SOLUTION INTRAVENOUS at 11:31

## 2025-01-14 RX ADMIN — ALBUMIN HUMAN 12.5 G: 0.25 SOLUTION INTRAVENOUS at 20:07

## 2025-01-14 RX ADMIN — VASOPRESSIN 0.06 UNITS/MIN: 0.2 INJECTION INTRAVENOUS at 01:17

## 2025-01-14 RX ADMIN — ALBUMIN HUMAN 12.5 G: 0.25 SOLUTION INTRAVENOUS at 13:21

## 2025-01-14 RX ADMIN — ALBUMIN HUMAN 12.5 G: 0.05 INJECTION, SOLUTION INTRAVENOUS at 16:18

## 2025-01-14 RX ADMIN — OXYCODONE 2.5 MG: 5 TABLET ORAL at 09:32

## 2025-01-14 RX ADMIN — CALCIUM GLUCONATE 1 G: 20 INJECTION, SOLUTION INTRAVENOUS at 18:54

## 2025-01-14 RX ADMIN — HEPARIN SODIUM 5000 UNITS: 5000 INJECTION INTRAVENOUS; SUBCUTANEOUS at 20:07

## 2025-01-14 RX ADMIN — OXYCODONE 2.5 MG: 5 TABLET ORAL at 13:42

## 2025-01-14 ASSESSMENT — PAIN - FUNCTIONAL ASSESSMENT
PAIN_FUNCTIONAL_ASSESSMENT: 0-10

## 2025-01-14 ASSESSMENT — ACTIVITIES OF DAILY LIVING (ADL)
BATHING_ASSISTANCE: MINIMAL
ADL_ASSISTANCE: INDEPENDENT

## 2025-01-14 ASSESSMENT — PAIN SCALES - GENERAL
PAINLEVEL_OUTOF10: 0 - NO PAIN
PAINLEVEL_OUTOF10: 6
PAINLEVEL_OUTOF10: 5 - MODERATE PAIN
PAINLEVEL_OUTOF10: 2
PAINLEVEL_OUTOF10: 6
PAINLEVEL_OUTOF10: 7
PAINLEVEL_OUTOF10: 0 - NO PAIN
PAINLEVEL_OUTOF10: 5 - MODERATE PAIN
PAINLEVEL_OUTOF10: 5 - MODERATE PAIN
PAINLEVEL_OUTOF10: 8
PAINLEVEL_OUTOF10: 0 - NO PAIN
PAINLEVEL_OUTOF10: 0 - NO PAIN

## 2025-01-14 ASSESSMENT — COGNITIVE AND FUNCTIONAL STATUS - GENERAL
DRESSING REGULAR UPPER BODY CLOTHING: A LITTLE
HELP NEEDED FOR BATHING: A LITTLE
DRESSING REGULAR LOWER BODY CLOTHING: A LOT
TOILETING: A LOT
DAILY ACTIVITIY SCORE: 18

## 2025-01-14 ASSESSMENT — PAIN DESCRIPTION - ORIENTATION: ORIENTATION: RIGHT

## 2025-01-14 ASSESSMENT — PAIN DESCRIPTION - LOCATION: LOCATION: CHEST

## 2025-01-14 NOTE — PROGRESS NOTES
"Occupational Therapy    Evaluation and Treatment    Patient Name: Faustina Vick \"Noam"  MRN: [id number]  Today's Date: 1/14/2025  Room: 12/12  Time Calculation  Start Time: 0916  Stop Time: 1002  Time Calculation (min): 46 min    Assessment  IP OT Assessment  OT Assessment: Pt is a 64 year old female who demonstrates decreased strength, balance, and activity tolerance, which impedes occupational performance.  Prognosis: Good  Barriers to Discharge Home: No anticipated barriers  Evaluation/Treatment Tolerance: Patient tolerated treatment well  Medical Staff Made Aware: Yes  End of Session Communication: Bedside nurse  End of Session Patient Position: Up in chair, Alarm off, not on at start of session    Plan:  Inpatient Plan  Treatment Interventions: ADL retraining, Functional transfer training, Endurance training, UE strengthening/ROM, Cognitive reorientation, Patient/family training, Equipment evaluation/education, Compensatory technique education  OT Frequency: 3 times per week  OT Discharge Recommendations: Low intensity level of continued care  Equipment Recommended upon Discharge:  (TBD)  OT Recommended Transfer Status: Assist of 1  OT - OK to Discharge: Yes  OT Assessment  OT Assessment Results: Decreased ADL status, Decreased endurance, Decreased functional mobility, Decreased IADLs  Prognosis: Good  Evaluation/Treatment Tolerance: Patient tolerated treatment well  Medical Staff Made Aware: Yes    Subjective   Current Problem:  1. Malignant neoplasm of upper lobe of right lung (Multi)        2. Primary cancer of right upper lobe of lung (Multi)  AFB Culture/Smear    AFB Culture/Smear    Fungal Culture/Smear    Fungal Culture/Smear    Sterile Fluid Culture/Smear    Sterile Fluid Culture/Smear    Surgical Pathology Exam    Surgical Pathology Exam      3. 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 On .02 vaso and 4 L NC. Session terminated after transfer to chair 2/2 family emergency in room.     Precautions:  Medical Precautions: Fall precautions, Oxygen therapy device and L/min, Chest tube  Precautions Comment: MAP 65-90    Vital Signs:   01/14/25 0916 01/14/25 1000 01/14/25 1002   Vital Signs   Vitals Session Pre OT  --  Post OT   Heart Rate 66 68 69   Resp 17 14 17   SpO2 100 % 100 % 100 %   /58 94/63 92/55   MAP (mmHg) 80 75 71   Vital Signs Comment  --   --  During: SBP with brief decrease to 80s and MAP decrease to 60 with activity. Mildly symptomatic. Quick recovery with seated ankle pumps       Pain:  Pain Assessment  Pain Assessment:  (reports soreness on right side, did not quantify)    Lines/Tubes/Drains:  CVC 01/13/25 Double lumen Right Internal jugular (Active)   Number of days: 1       Arterial Line 01/13/25 Left Radial (Active)   Number of days: 1       Chest Tube Right 28 Fr (Active)   Number of days: 1     Objective   Cognition:  Overall Cognitive Status: Within Functional Limits  Orientation Level: Oriented X4     Confusion Assessment Method (CAM)  Acute Onset and Fluctuating Course (1A): No     Home Living:  Type of Home: House  Lives With: Spouse (who can assist as needed)  Home Adaptive Equipment: None  Home Layout: One level  Home Access: Stairs to enter without rails  Entrance Stairs-Rails: None  Entrance Stairs-Number of Steps: 3  Bathroom Shower/Tub: Tub  only  Bathroom Equipment: None     Prior Function:  Level of [place]: Independent with ADLs and functional transfers  ADL Assistance: Independent  Homemaking Assistance: Independent  Ambulatory Assistance: Independent  Vocational: Retired  Leisure: gardening, reading  Prior Function Comments: +drives, -falls    ADL:  Eating Assistance: Independent  Grooming Assistance: Modified independent (Device)  Bathing Assistance: Minimal  UE Dressing Assistance: Minimal  LE Dressing Assistance: Moderate  LE Dressing Deficit: Thread LLE into pants, Pull up over hips  Toileting Assistance with Device: Moderate    Activity Tolerance:  Endurance: Decreased tolerance for upright activites  Early Mobility/Exercise Safety Screen: Proceed with mobilization - No exclusion criteria met  Activity Tolerance Comments: Decrease in BP with activity     Bed Mobility/Transfers: Bed Mobility  Bed Mobility: Yes  Bed Mobility 1  Bed Mobility 1: Supine to sitting  Level of Assistance 1: Minimum assistance  Bed Mobility Comments 1: HOB elevated, increased time.   and Transfers  Transfer: Yes  Transfer 1  Transfer From 1: Sit to  Transfer to 1: Stand  Transfer Level of Assistance 1: Minimum assistance  Transfers 2  Transfer From 2: Bed to  Transfer to 2: Chair with arms  Transfer Level of Assistance 2: Minimum assistance  Trials/Comments 2: Increased time, verbal cues for sequencing, short and shuffled steps.    Sensation:  Light Touch: No apparent deficits    Strength:  Strength Comments: BUE >/=3/5     Coordination:  Movements are Fluid and Coordinated: Yes     Hand Function:  Hand Function  Gross Grasp: Functional    Extremities:   RUE   RUE :  (Shoulder slightly pain limited; otherwise WFL),   LUE   LUE: Within Functional Limits,       Treatment Completed on Evaluation    Activities of Daily Living:      LE Dressing  LE Dressing: Yes  Pants Level of Assistance: Moderate assistance  LE Dressing Where Assessed: Edge of bed  LE Dressing  Comments: Able to thread RLE into pants seated EOB with partial figure four technique, required assist to thread LLE and pull pants over hips in standing.    Therapy/Activity:     Therapeutic Activity  Therapeutic Activity Performed: Yes  Therapeutic Activity 1: Pt tolerated 10 minutes seated EOB with CS for safety. Mild cueing for pacing d/t BP drop with position change.    Outcome Measures: [facility] Daily Activity  Putting on and taking off regular lower body clothing: A lot  Bathing (including washing, rinsing, drying): A little  Putting on and taking off regular upper body clothing: A little  Toileting, which includes using toilet, bedpan or urinal: A lot  Taking care of personal grooming such as brushing teeth: None  Eating Meals: None  Daily Activity - Total Score: 18        ICU Mobility Screen  Early Mobility/Exercise Safety Screen: Proceed with mobilization - No exclusion criteria met  ICU Mobility Scale: Transferring bed to chair,          Education Documentation  Body Mechanics, taught by Ellen Garcia OT at 1/14/2025 12:32 PM.  Learner: Patient  Readiness: Acceptance  Method: Explanation, Demonstration  Response: Verbalizes Understanding  Comment: OT goals/POC    Precautions, taught by Ellen Garcia OT at 1/14/2025 12:32 PM.  Learner: Patient  Readiness: Acceptance  Method: Explanation, Demonstration  Response: Verbalizes Understanding  Comment: OT goals/POC    ADL Training, taught by Ellen Garcia OT at 1/14/2025 12:32 PM.  Learner: Patient  Readiness: Acceptance  Method: Explanation, Demonstration  Response: Verbalizes Understanding  Comment: OT goals/POC    Education Comments  No comments found.        Goals:   Encounter Problems       Encounter Problems (Active)       ADLs       Patient will perform UB and LB bathing with modified independent level of assistance.       Start:  01/14/25    Expected End:  01/28/25            Patient with complete upper body dressing with  modified independent level of assistance donning and doffing all UE clothes.        Start:  01/14/25    Expected End:  01/28/25            Patient with complete lower body dressing with modified independent level of assistance donning and doffing all LE clothes  with PRN adaptive equipment        Start:  01/14/25    Expected End:  01/28/25            Patient will complete toileting including hygiene clothing management/hygiene with modified independent level of assistance.       Start:  01/14/25    Expected End:  01/28/25               MOBILITY       Patient will perform Functional mobility max Household distances/Community Distances with modified independent level of assistance and least restrictive device in order to improve safety and functional mobility.       Start:  01/14/25    Expected End:  01/28/25               TRANSFERS       Patient will perform bed mobility modified independent level of assistance in order to improve safety and independence with mobility       Start:  01/14/25    Expected End:  01/28/25            Patient will complete functional transfer to toilet with least restrictive device with modified independent level of assistance.       Start:  01/14/25    Expected End:  01/28/25 01/14/25 at 12:33 PM   Ellen Garcia, OT   Rehab Office: [phone]

## 2025-01-14 NOTE — PROGRESS NOTES
Postop Pain HPI -   Palliative: relieved with IV analgesics and regional local anesthetics  Provocative: movement  Quality:  burning and aching  Radiation:  none  Severity:  8/10  Timing: constant    24-HOUR OPIOID CONSUMPTION:  7.5 mg oxycodone    Scheduled medications  acetaminophen, 650 mg, oral, q6h  albumin human, 12.5 g, intravenous, q6h  albumin human, 12.5 g, intravenous, Once  fluticasone furoate-vilanteroL, 1 puff, inhalation, Daily  heparin (porcine), 5,000 Units, subcutaneous, q8h  insulin lispro, 0-5 Units, subcutaneous, q4h  lidocaine, 1 patch, transdermal, Daily  melatonin, 3 mg, oral, Nightly  pantoprazole, 40 mg, intravenous, Daily before breakfast  tiotropium, 2 puff, inhalation, Daily      Continuous medications  [Held by provider] vasopressin, 0.02 Units/min, Last Rate: Stopped (01/14/25 1015)      PRN medications  PRN medications: albuterol, calcium gluconate, calcium gluconate, dextrose, dextrose, glucagon, glucagon, magnesium sulfate, magnesium sulfate, oxyCODONE, potassium chloride     Physical Exam:  Constitutional:  no distress, alert and cooperative  Eyes: clear sclera  Head/Neck: No apparent injury, trachea midline  Respiratory/Thorax: Patent airways, thorax symmetric, breathing comfortably  Cardiovascular: no pitting edema  Gastrointestinal: Nondistended  Musculoskeletal: ROM intact  Extremities: no clubbing  Neurological: alert, chan x4  Psychological: Appropriate affect    Results for orders placed or performed during the hospital encounter of 01/13/25 (from the past 24 hours)   Blood Gas Venous Full Panel   Result Value Ref Range    POCT pH, Venous 7.23 (LL) 7.33 - 7.43 pH    POCT pCO2, Venous 60 (H) 41 - 51 mm Hg    POCT pO2, Venous 53 (H) 35 - 45 mm Hg    POCT SO2, Venous 82 (H) 45 - 75 %    POCT Oxy Hemoglobin, Venous 79.9 (H) 45.0 - 75.0 %    POCT Hematocrit Calculated, Venous 28.0 (L) 36.0 - 46.0 %    POCT Sodium, Venous 127 (L) 136 - 145 mmol/L    POCT Potassium, Venous 4.2 3.5 -  5.3 mmol/L    POCT Chloride, Venous 96 (L) 98 - 107 mmol/L    POCT Ionized Calicum, Venous 1.09 (L) 1.10 - 1.33 mmol/L    POCT Glucose, Venous 233 (H) 74 - 99 mg/dL    POCT Lactate, Venous 0.8 0.4 - 2.0 mmol/L    POCT Base Excess, Venous -2.8 (L) -2.0 - 3.0 mmol/L    POCT HCO3 Calculated, Venous 25.1 22.0 - 26.0 mmol/L    POCT Hemoglobin, Venous 9.2 (L) 12.0 - 16.0 g/dL    POCT Anion Gap, Venous 10.0 10.0 - 25.0 mmol/L    Patient Temperature 37.0 degrees Celsius    FiO2 60 %   Hepatic function panel   Result Value Ref Range    Albumin 3.2 (L) 3.4 - 5.0 g/dL    Bilirubin, Total 0.4 0.0 - 1.2 mg/dL    Bilirubin, Direct 0.1 0.0 - 0.3 mg/dL    Alkaline Phosphatase 41 33 - 136 U/L    ALT 10 7 - 45 U/L    AST 25 9 - 39 U/L    Total Protein 5.2 (L) 6.4 - 8.2 g/dL   Calcium, Ionized   Result Value Ref Range    POCT Calcium, Ionized 1.07 (L) 1.1 - 1.33 mmol/L   CBC   Result Value Ref Range    WBC 6.7 4.4 - 11.3 x10*3/uL    nRBC 0.0 0.0 - 0.0 /100 WBCs    RBC 3.52 (L) 4.00 - 5.20 x10*6/uL    Hemoglobin 8.9 (L) 12.0 - 16.0 g/dL    Hematocrit 28.3 (L) 36.0 - 46.0 %    MCV 80 80 - 100 fL    MCH 25.3 (L) 26.0 - 34.0 pg    MCHC 31.4 (L) 32.0 - 36.0 g/dL    RDW 13.6 11.5 - 14.5 %    Platelets 222 150 - 450 x10*3/uL   Coagulation Screen   Result Value Ref Range    Protime 18.2 (H) 9.8 - 12.8 seconds    INR 1.6 (H) 0.9 - 1.1    aPTT 36 27 - 38 seconds   Magnesium   Result Value Ref Range    Magnesium 1.71 1.60 - 2.40 mg/dL   Basic Metabolic Panel   Result Value Ref Range    Glucose 231 (H) 74 - 99 mg/dL    Sodium 131 (L) 136 - 145 mmol/L    Potassium 4.2 3.5 - 5.3 mmol/L    Chloride 95 (L) 98 - 107 mmol/L    Bicarbonate 26 21 - 32 mmol/L    Anion Gap 14 10 - 20 mmol/L    Urea Nitrogen 10 6 - 23 mg/dL    Creatinine 0.39 (L) 0.50 - 1.05 mg/dL    eGFR >90 >60 mL/min/1.73m*2    Calcium 7.4 (L) 8.6 - 10.6 mg/dL   Phosphorus   Result Value Ref Range    Phosphorus 4.0 2.5 - 4.9 mg/dL   Blood Gas Arterial Full Panel   Result Value Ref Range     POCT pH, Arterial 7.26 (L) 7.38 - 7.42 pH    POCT pCO2, Arterial 56 (H) 38 - 42 mm Hg    POCT pO2, Arterial 180 (H) 85 - 95 mm Hg    POCT SO2, Arterial 100 94 - 100 %    POCT Oxy Hemoglobin, Arterial 97.7 94.0 - 98.0 %    POCT Hematocrit Calculated, Arterial 28.0 (L) 36.0 - 46.0 %    POCT Sodium, Arterial 126 (L) 136 - 145 mmol/L    POCT Potassium, Arterial 4.4 3.5 - 5.3 mmol/L    POCT Chloride, Arterial 96 (L) 98 - 107 mmol/L    POCT Ionized Calcium, Arterial 1.06 (L) 1.10 - 1.33 mmol/L    POCT Glucose, Arterial 231 (H) 74 - 99 mg/dL    POCT Lactate, Arterial 0.6 0.4 - 2.0 mmol/L    POCT Base Excess, Arterial -2.3 (L) -2.0 - 3.0 mmol/L    POCT HCO3 Calculated, Arterial 25.1 22.0 - 26.0 mmol/L    POCT Hemoglobin, Arterial 9.2 (L) 12.0 - 16.0 g/dL    POCT Anion Gap, Arterial 9 (L) 10 - 25 mmo/L    Patient Temperature 37.0 degrees Celsius    FiO2 60 %   Blood Gas Arterial Full Panel   Result Value Ref Range    POCT pH, Arterial 7.29 (L) 7.38 - 7.42 pH    POCT pCO2, Arterial 55 (H) 38 - 42 mm Hg    POCT pO2, Arterial 116 (H) 85 - 95 mm Hg    POCT SO2, Arterial 99 94 - 100 %    POCT Oxy Hemoglobin, Arterial 96.7 94.0 - 98.0 %    POCT Hematocrit Calculated, Arterial 29.0 (L) 36.0 - 46.0 %    POCT Sodium, Arterial 128 (L) 136 - 145 mmol/L    POCT Potassium, Arterial 3.9 3.5 - 5.3 mmol/L    POCT Chloride, Arterial 96 (L) 98 - 107 mmol/L    POCT Ionized Calcium, Arterial 1.05 (L) 1.10 - 1.33 mmol/L    POCT Glucose, Arterial 216 (H) 74 - 99 mg/dL    POCT Lactate, Arterial 0.6 0.4 - 2.0 mmol/L    POCT Base Excess, Arterial -0.7 -2.0 - 3.0 mmol/L    POCT HCO3 Calculated, Arterial 26.4 (H) 22.0 - 26.0 mmol/L    POCT Hemoglobin, Arterial 9.6 (L) 12.0 - 16.0 g/dL    POCT Anion Gap, Arterial 10 10 - 25 mmo/L    Patient Temperature 37.0 degrees Celsius    FiO2 36 %   POCT GLUCOSE   Result Value Ref Range    POCT Glucose 210 (H) 74 - 99 mg/dL   Transthoracic Echo (TTE) Limited   Result Value Ref Range    Tricuspid annular plane  systolic excursion 1.0 cm    LV EF 63 %    RV free wall pk S' 6.00 cm/s    RVSP 25.6 mmHg    LV A4C EF 64.5    POCT GLUCOSE   Result Value Ref Range    POCT Glucose 166 (H) 74 - 99 mg/dL   Blood Gas Arterial Full Panel   Result Value Ref Range    POCT pH, Arterial 7.38 7.38 - 7.42 pH    POCT pCO2, Arterial 49 (H) 38 - 42 mm Hg    POCT pO2, Arterial 201 (H) 85 - 95 mm Hg    POCT SO2, Arterial 100 94 - 100 %    POCT Oxy Hemoglobin, Arterial 97.9 94.0 - 98.0 %    POCT Hematocrit Calculated, Arterial 28.0 (L) 36.0 - 46.0 %    POCT Sodium, Arterial 125 (L) 136 - 145 mmol/L    POCT Potassium, Arterial 4.1 3.5 - 5.3 mmol/L    POCT Chloride, Arterial 93 (L) 98 - 107 mmol/L    POCT Ionized Calcium, Arterial 1.07 (L) 1.10 - 1.33 mmol/L    POCT Glucose, Arterial 133 (H) 74 - 99 mg/dL    POCT Lactate, Arterial 0.7 0.4 - 2.0 mmol/L    POCT Base Excess, Arterial 3.3 (H) -2.0 - 3.0 mmol/L    POCT HCO3 Calculated, Arterial 29.0 (H) 22.0 - 26.0 mmol/L    POCT Hemoglobin, Arterial 9.2 (L) 12.0 - 16.0 g/dL    POCT Anion Gap, Arterial 7 (L) 10 - 25 mmo/L    Patient Temperature 37.0 degrees Celsius    FiO2 21 %   Calcium, Ionized   Result Value Ref Range    POCT Calcium, Ionized 1.05 (L) 1.1 - 1.33 mmol/L   CBC   Result Value Ref Range    WBC 7.2 4.4 - 11.3 x10*3/uL    nRBC 0.0 0.0 - 0.0 /100 WBCs    RBC 3.55 (L) 4.00 - 5.20 x10*6/uL    Hemoglobin 8.9 (L) 12.0 - 16.0 g/dL    Hematocrit 27.5 (L) 36.0 - 46.0 %    MCV 78 (L) 80 - 100 fL    MCH 25.1 (L) 26.0 - 34.0 pg    MCHC 32.4 32.0 - 36.0 g/dL    RDW 13.2 11.5 - 14.5 %    Platelets 199 150 - 450 x10*3/uL   Coagulation Screen   Result Value Ref Range    Protime 16.0 (H) 9.8 - 12.8 seconds    INR 1.4 (H) 0.9 - 1.1    aPTT 38 27 - 38 seconds   Magnesium   Result Value Ref Range    Magnesium 2.15 1.60 - 2.40 mg/dL   Renal Function Panel   Result Value Ref Range    Glucose 133 (H) 74 - 99 mg/dL    Sodium 129 (L) 136 - 145 mmol/L    Potassium 4.3 3.5 - 5.3 mmol/L    Chloride 90 (L) 98 - 107  mmol/L    Bicarbonate 29 21 - 32 mmol/L    Anion Gap 14 10 - 20 mmol/L    Urea Nitrogen 8 6 - 23 mg/dL    Creatinine 0.30 (L) 0.50 - 1.05 mg/dL    eGFR >90 >60 mL/min/1.73m*2    Calcium 8.1 (L) 8.6 - 10.6 mg/dL    Phosphorus 3.2 2.5 - 4.9 mg/dL    Albumin 4.0 3.4 - 5.0 g/dL   POCT GLUCOSE   Result Value Ref Range    POCT Glucose 124 (H) 74 - 99 mg/dL   POCT GLUCOSE   Result Value Ref Range    POCT Glucose 103 (H) 74 - 99 mg/dL   POCT GLUCOSE   Result Value Ref Range    POCT Glucose 120 (H) 74 - 99 mg/dL       Faustina Vick is a 64 y.o. year old female patient who presents for Procedure(s):  PNEUMONECTOMY with Jonah Wagner MD on 1/13/2025. Acute Pain consulted for assistance with pain control. Patient received a postoperative R CARLOS MANUEL nerve block at T5 with catheter placement on 1/13/25.     Plan:     - Right erector spinae block with catheter performed post-operatively on 1/13/25  - Ambit ball with Ropivacaine 0.2%/NaCl 0.9% 500mL, Rate 10 cc/hr unilaterally - Pump started this AM  - 10cc 0.5 % ropivacaine bolused this AM  - Ambit medication will not interfere with pain medication prescribed by the primary team.   - Please be aware of local anesthetic toxic dose and absorption variability before considering lidocaine patches  - Acute pain service will follow while catheters in place  - Rest of pain management per primary team     Acute Pain Resident  pg [phone] ph [phone]

## 2025-01-14 NOTE — PROGRESS NOTES
Faustina Vick is a 64 y.o. year old female patient who presents for Procedure(s):  PNEUMONECTOMY with Jonah Wagner MD on 2025.  Acute Pain consulted for assistance with pain control.     Anticipated Postop Pain Issues -   Palliative: typically relieved with IV analgesics and regional local anesthetics  Provocative: typically with movement  Quality: typically burning and aching  Radiation: typically none  Severity: typically severe 8-10/10  Timing: typically constant    Past Medical History:   Diagnosis Date    Anxiety     treated by PCP with PRN medication    Arthritis     hands    Asthma     controlled    History of SCC (squamous cell carcinoma) of skin     skin on bridge of her nose    Personal history of antineoplastic chemotherapy     Primary cancer of right upper lobe of lung ([facility])     seen by Dr. Jonah Wagner 10/17/24--7.3 cm mass right upper lobe lung cancer        Past Surgical History:   Procedure Laterality Date     SECTION, LOW TRANSVERSE      INSERTION / REMOVAL / REPLACEMENT VENOUS ACCESS CATHETER      OTHER SURGICAL HISTORY      MEDI PORT INSERTION    OVARIAN CYST SURGERY          Family History   Problem Relation Name Age of Onset    Adrenal disorder Mother      Bone cancer Mother      Skin cancer Father          Social History     Socioeconomic History    Marital status:      Spouse name: Not on file    Number of children: Not on file    Years of education: Not on file    Highest education level: Not on file   Occupational History    Not on file   Tobacco Use    Smoking status: Former     Current packs/day: 0.00     Types: Cigarettes     Quit date: 2024     Years since quittin.8     Passive exposure: Past    Smokeless tobacco: Former   Substance and Sexual Activity    Alcohol use: Not Currently    Drug use: Never    Sexual activity: Defer   Other Topics Concern    Not on file   Social History Narrative    Not on file     Social Drivers of Health     Financial  Resource Strain: Low Risk  (12/4/2024)    Received from [facility]    Overall Financial Resource Strain (CARDIA)     Difficulty of Paying Living Expenses: Not hard at all   Food Insecurity: No Food Insecurity (1/8/2025)    Hunger Vital Sign     Worried About Running Out of Food in the Last Year: Never true     Ran Out of Food in the Last Year: Never true   Transportation Needs: No Transportation Needs (12/4/2024)    Received from [facility]    PRAPARE - Transportation     Lack of Transportation (Medical): No     Lack of Transportation (Non-Medical): No   Physical Activity: Not on file   Stress: Not on file   Social Connections: Not on file   Intimate Partner Violence: Not on file   Housing Stability: Unknown (12/4/2024)    Received from [facility]    Housing Stability Vital Sign     Unable to Pay for Housing in the Last Year: No     Number of Times Moved in the Last Year: Not on file     Homeless in the Last Year: No        No Known Allergies      Review of Systems  Gen: No fatigue, anorexia, insomnia, fever.   Eyes: No vision loss, double vision, drainage, eye pain.   ENT: No pharyngitis, dry mouth, no hearing changes or ear discharge  Cardiac: No chest pain, palpitations, syncope, near syncope.   Pulmonary: No shortness of breath, cough, hemoptysis.   Heme/lymph: No swollen glands, fever, bleeding.   GI: No abdominal pain, change in bowel habits, melena, hematemesis, hematochezia, nausea, vomiting, diarrhea.   : No discharge, dysuria, frequency, urgency, hematuria.  Endo: No polyuria or weight loss.   Musculoskeletal: Negative for any pain or loss of ROM/weakness  Skin: No rashes or lesions  Neuro: Normal speech, no numbness or weakness. No gait difficulties  Review of systems is otherwise negative unless stated above or in history of present illness.    Physical Exam:  Constitutional:  no distress, alert and cooperative  Eyes: clear  sclera  Head/Neck: No apparent injury, trachea midline  Respiratory/Thorax: Patent airways, thorax symmetric, breathing comfortably  Cardiovascular: no pitting edema  Gastrointestinal: Nondistended  Musculoskeletal: ROM intact  Extremities: no clubbing  Neurological: alert, chan x4  Psychological: Appropriate affect    Results for orders placed or performed during the hospital encounter of 01/13/25 (from the past 24 hours)   Blood Gas Venous Full Panel   Result Value Ref Range    POCT pH, Venous 7.23 (LL) 7.33 - 7.43 pH    POCT pCO2, Venous 60 (H) 41 - 51 mm Hg    POCT pO2, Venous 53 (H) 35 - 45 mm Hg    POCT SO2, Venous 82 (H) 45 - 75 %    POCT Oxy Hemoglobin, Venous 79.9 (H) 45.0 - 75.0 %    POCT Hematocrit Calculated, Venous 28.0 (L) 36.0 - 46.0 %    POCT Sodium, Venous 127 (L) 136 - 145 mmol/L    POCT Potassium, Venous 4.2 3.5 - 5.3 mmol/L    POCT Chloride, Venous 96 (L) 98 - 107 mmol/L    POCT Ionized Calicum, Venous 1.09 (L) 1.10 - 1.33 mmol/L    POCT Glucose, Venous 233 (H) 74 - 99 mg/dL    POCT Lactate, Venous 0.8 0.4 - 2.0 mmol/L    POCT Base Excess, Venous -2.8 (L) -2.0 - 3.0 mmol/L    POCT HCO3 Calculated, Venous 25.1 22.0 - 26.0 mmol/L    POCT Hemoglobin, Venous 9.2 (L) 12.0 - 16.0 g/dL    POCT Anion Gap, Venous 10.0 10.0 - 25.0 mmol/L    Patient Temperature 37.0 degrees Celsius    FiO2 60 %   Hepatic function panel   Result Value Ref Range    Albumin 3.2 (L) 3.4 - 5.0 g/dL    Bilirubin, Total 0.4 0.0 - 1.2 mg/dL    Bilirubin, Direct 0.1 0.0 - 0.3 mg/dL    Alkaline Phosphatase 41 33 - 136 U/L    ALT 10 7 - 45 U/L    AST 25 9 - 39 U/L    Total Protein 5.2 (L) 6.4 - 8.2 g/dL   Calcium, Ionized   Result Value Ref Range    POCT Calcium, Ionized 1.07 (L) 1.1 - 1.33 mmol/L   CBC   Result Value Ref Range    WBC 6.7 4.4 - 11.3 x10*3/uL    nRBC 0.0 0.0 - 0.0 /100 WBCs    RBC 3.52 (L) 4.00 - 5.20 x10*6/uL    Hemoglobin 8.9 (L) 12.0 - 16.0 g/dL    Hematocrit 28.3 (L) 36.0 - 46.0 %    MCV 80 80 - 100 fL    MCH 25.3  (L) 26.0 - 34.0 pg    MCHC 31.4 (L) 32.0 - 36.0 g/dL    RDW 13.6 11.5 - 14.5 %    Platelets 222 150 - 450 x10*3/uL   Coagulation Screen   Result Value Ref Range    Protime 18.2 (H) 9.8 - 12.8 seconds    INR 1.6 (H) 0.9 - 1.1    aPTT 36 27 - 38 seconds   Magnesium   Result Value Ref Range    Magnesium 1.71 1.60 - 2.40 mg/dL   Basic Metabolic Panel   Result Value Ref Range    Glucose 231 (H) 74 - 99 mg/dL    Sodium 131 (L) 136 - 145 mmol/L    Potassium 4.2 3.5 - 5.3 mmol/L    Chloride 95 (L) 98 - 107 mmol/L    Bicarbonate 26 21 - 32 mmol/L    Anion Gap 14 10 - 20 mmol/L    Urea Nitrogen 10 6 - 23 mg/dL    Creatinine 0.39 (L) 0.50 - 1.05 mg/dL    eGFR >90 >60 mL/min/1.73m*2    Calcium 7.4 (L) 8.6 - 10.6 mg/dL   Phosphorus   Result Value Ref Range    Phosphorus 4.0 2.5 - 4.9 mg/dL   Blood Gas Arterial Full Panel   Result Value Ref Range    POCT pH, Arterial 7.26 (L) 7.38 - 7.42 pH    POCT pCO2, Arterial 56 (H) 38 - 42 mm Hg    POCT pO2, Arterial 180 (H) 85 - 95 mm Hg    POCT SO2, Arterial 100 94 - 100 %    POCT Oxy Hemoglobin, Arterial 97.7 94.0 - 98.0 %    POCT Hematocrit Calculated, Arterial 28.0 (L) 36.0 - 46.0 %    POCT Sodium, Arterial 126 (L) 136 - 145 mmol/L    POCT Potassium, Arterial 4.4 3.5 - 5.3 mmol/L    POCT Chloride, Arterial 96 (L) 98 - 107 mmol/L    POCT Ionized Calcium, Arterial 1.06 (L) 1.10 - 1.33 mmol/L    POCT Glucose, Arterial 231 (H) 74 - 99 mg/dL    POCT Lactate, Arterial 0.6 0.4 - 2.0 mmol/L    POCT Base Excess, Arterial -2.3 (L) -2.0 - 3.0 mmol/L    POCT HCO3 Calculated, Arterial 25.1 22.0 - 26.0 mmol/L    POCT Hemoglobin, Arterial 9.2 (L) 12.0 - 16.0 g/dL    POCT Anion Gap, Arterial 9 (L) 10 - 25 mmo/L    Patient Temperature 37.0 degrees Celsius    FiO2 60 %   Blood Gas Arterial Full Panel   Result Value Ref Range    POCT pH, Arterial 7.29 (L) 7.38 - 7.42 pH    POCT pCO2, Arterial 55 (H) 38 - 42 mm Hg    POCT pO2, Arterial 116 (H) 85 - 95 mm Hg    POCT SO2, Arterial 99 94 - 100 %    POCT Oxy  Hemoglobin, Arterial 96.7 94.0 - 98.0 %    POCT Hematocrit Calculated, Arterial 29.0 (L) 36.0 - 46.0 %    POCT Sodium, Arterial 128 (L) 136 - 145 mmol/L    POCT Potassium, Arterial 3.9 3.5 - 5.3 mmol/L    POCT Chloride, Arterial 96 (L) 98 - 107 mmol/L    POCT Ionized Calcium, Arterial 1.05 (L) 1.10 - 1.33 mmol/L    POCT Glucose, Arterial 216 (H) 74 - 99 mg/dL    POCT Lactate, Arterial 0.6 0.4 - 2.0 mmol/L    POCT Base Excess, Arterial -0.7 -2.0 - 3.0 mmol/L    POCT HCO3 Calculated, Arterial 26.4 (H) 22.0 - 26.0 mmol/L    POCT Hemoglobin, Arterial 9.6 (L) 12.0 - 16.0 g/dL    POCT Anion Gap, Arterial 10 10 - 25 mmo/L    Patient Temperature 37.0 degrees Celsius    FiO2 36 %   POCT GLUCOSE   Result Value Ref Range    POCT Glucose 210 (H) 74 - 99 mg/dL   Transthoracic Echo (TTE) Limited   Result Value Ref Range    Tricuspid annular plane systolic excursion 1.0 cm    LV EF 63 %    RV free wall pk S' 6.00 cm/s    RVSP 25.6 mmHg    LV A4C EF 64.5    POCT GLUCOSE   Result Value Ref Range    POCT Glucose 166 (H) 74 - 99 mg/dL   Blood Gas Arterial Full Panel   Result Value Ref Range    POCT pH, Arterial 7.38 7.38 - 7.42 pH    POCT pCO2, Arterial 49 (H) 38 - 42 mm Hg    POCT pO2, Arterial 201 (H) 85 - 95 mm Hg    POCT SO2, Arterial 100 94 - 100 %    POCT Oxy Hemoglobin, Arterial 97.9 94.0 - 98.0 %    POCT Hematocrit Calculated, Arterial 28.0 (L) 36.0 - 46.0 %    POCT Sodium, Arterial 125 (L) 136 - 145 mmol/L    POCT Potassium, Arterial 4.1 3.5 - 5.3 mmol/L    POCT Chloride, Arterial 93 (L) 98 - 107 mmol/L    POCT Ionized Calcium, Arterial 1.07 (L) 1.10 - 1.33 mmol/L    POCT Glucose, Arterial 133 (H) 74 - 99 mg/dL    POCT Lactate, Arterial 0.7 0.4 - 2.0 mmol/L    POCT Base Excess, Arterial 3.3 (H) -2.0 - 3.0 mmol/L    POCT HCO3 Calculated, Arterial 29.0 (H) 22.0 - 26.0 mmol/L    POCT Hemoglobin, Arterial 9.2 (L) 12.0 - 16.0 g/dL    POCT Anion Gap, Arterial 7 (L) 10 - 25 mmo/L    Patient Temperature 37.0 degrees Celsius    FiO2  21 %   Calcium, Ionized   Result Value Ref Range    POCT Calcium, Ionized 1.05 (L) 1.1 - 1.33 mmol/L   CBC   Result Value Ref Range    WBC 7.2 4.4 - 11.3 x10*3/uL    nRBC 0.0 0.0 - 0.0 /100 WBCs    RBC 3.55 (L) 4.00 - 5.20 x10*6/uL    Hemoglobin 8.9 (L) 12.0 - 16.0 g/dL    Hematocrit 27.5 (L) 36.0 - 46.0 %    MCV 78 (L) 80 - 100 fL    MCH 25.1 (L) 26.0 - 34.0 pg    MCHC 32.4 32.0 - 36.0 g/dL    RDW 13.2 11.5 - 14.5 %    Platelets 199 150 - 450 x10*3/uL   Coagulation Screen   Result Value Ref Range    Protime 16.0 (H) 9.8 - 12.8 seconds    INR 1.4 (H) 0.9 - 1.1    aPTT 38 27 - 38 seconds   Magnesium   Result Value Ref Range    Magnesium 2.15 1.60 - 2.40 mg/dL   Renal Function Panel   Result Value Ref Range    Glucose 133 (H) 74 - 99 mg/dL    Sodium 129 (L) 136 - 145 mmol/L    Potassium 4.3 3.5 - 5.3 mmol/L    Chloride 90 (L) 98 - 107 mmol/L    Bicarbonate 29 21 - 32 mmol/L    Anion Gap 14 10 - 20 mmol/L    Urea Nitrogen 8 6 - 23 mg/dL    Creatinine 0.30 (L) 0.50 - 1.05 mg/dL    eGFR >90 >60 mL/min/1.73m*2    Calcium 8.1 (L) 8.6 - 10.6 mg/dL    Phosphorus 3.2 2.5 - 4.9 mg/dL    Albumin 4.0 3.4 - 5.0 g/dL   POCT GLUCOSE   Result Value Ref Range    POCT Glucose 124 (H) 74 - 99 mg/dL   POCT GLUCOSE   Result Value Ref Range    POCT Glucose 103 (H) 74 - 99 mg/dL   POCT GLUCOSE   Result Value Ref Range    POCT Glucose 120 (H) 74 - 99 mg/dL        Faustina Vick is a 64 y.o. year old female patient who presents for Procedure(s):  PNEUMONECTOMY with Jonah Wagner MD on 1/13/2025. Acute Pain consulted for assistance with pain control. Patient received a postoperative R CARLOS MANUEL nerve block at T5 with catheter placement on 1/13/25.    Plan:    - Right erector spinae block with catheter performed post-operatively on 1/13/25  - Ambit ball with Ropivacaine 0.2%/NaCl 0.9% 500mL, Rate 10 cc/hr unilaterally   - Ambit medication will not interfere with pain medication prescribed by the primary team.   - Please be aware of local  anesthetic toxic dose and absorption variability before considering lidocaine patches  - Acute pain service will follow while catheters in place  - Rest of pain management per primary team    Acute Pain Resident  pg [phone] ph [phone]

## 2025-01-14 NOTE — PROGRESS NOTES
"Physical Therapy                 Therapy Communication Note    Patient Name: Faustina Vick \"Jannie\"  MRN: [id number]  Department: [facility]  Room: 12/12-A  Today's Date: 1/14/2025     Discipline: Physical Therapy    PT Missed Visit: Yes     Missed Visit Reason:  (1130: Attempted to see pt for PT though hypotensive with SBPs in 70s and MAPs in 50s. About to get a fluid bolus. PT re-attempted 1215 though another service at bedside for procedure. Will re-attempt PT eval as able/appropriate.)    Missed Time: Attempt      "

## 2025-01-14 NOTE — PROGRESS NOTES
63 y/o F with stage III SCC of the lung s/p chemotherapy+radiation last chemotherapy session on 8/28/2024 + right pneumonectomy, intrapericardial, with en block chest wall resection on 1/13 by Dr. Wagner. Patient with low blood pressure on preop that continued intra-op and post op. Patient arrived on vasopressin 0.06 to the ICU. Patient started on levophed on arrival to the SICU.       Neuro: PMH of anxiety on alprazolam at home. ANOX3. Right CARLOS MANUEL catheter not connected, APS informed this morning. Ropivacaine bolus given through CARLOS MANUEL by APS.  Tylenol and oxycodone for pain control. PT/OT.  CV: No PMH, ECHO on 1/13 showing EF 65%, low normal RV function. Gentle resuscitation. Levo off. Will continue weaning vaso. On and off today. Currently at 0.03. Will start Beta blockade once off pressors.   Pulm: 30 pack year smoking history, quit on 4/2024. Asthma, lung Ca, s/p chemo/XRT/immunotherapy. Now s/p Right Pneumonectomy, intrapericardial, with en bloc chest wall resection.  Oxygenating appropriately. On NC. Will continue home trelegy elipta, ventolin formulary exchange.   GI: No GI history. LFTs WNL 12/2/2024. On clear liquids. PPI for GI prophylaxis.  : No PMH. Baseline creat 0.6. Adequate UOP. DC yoo  Heme: Acute blood loss anemia. SCDs, subcutaneous heparin.   Endo: No PMH. SSI.   ID: Afebrile. Cefazolin 24 hrs.      Lines:  - left radial art line  - right internal jugular mac line  - piv  - yoo--> Will DC     Dispo: Continue ICU care     Kendall Barbour MD  PGY-5 Anesthesiology critical care fellow.     Due to the high probability of life threatening clinical decompensation, the patient required critical care time evaluating and managing this patient.  Critical care time included obtaining a history, examining the patient, ordering and reviewing studies, discussing, developing, and implementing a management plan, evaluating the patient's response to treatment, and discussion with other care team  providers. I saw and evaluated the patient myself. I reviewed the provider's documentation and discussed the patient with the provider. Critical care time was performed exclusive of billable procedures.    Critical Care Time: 37 minutes    I saw and examined patient with fellow and team. I directly edited note above.    Benjamin Mo MD

## 2025-01-14 NOTE — PROGRESS NOTES
"Faustina Vick \"Noam" is a 64 y.o. female on day 1 of admission presenting with Primary cancer of right upper lobe of lung (Multi).    Subjective   NAEO. Patient off pressors this morning however back on this afternoon. Patient has been OOB to chair and is visiting with family. Chest tube pulled at bedside, tolerated well. Pain is controlled.       Objective     Physical Exam  Constitutional:       General: She is not in acute distress.  HENT:      Head: Normocephalic and atraumatic.   Eyes:      Conjunctiva/sclera: Conjunctivae normal.   Cardiovascular:      Rate and Rhythm: Normal rate.      Comments: Hypotensive  Pulmonary:      Effort: Pulmonary effort is normal.      Comments: R CT removed  Abdominal:      General: There is no distension.   Genitourinary:     Comments: Montiel removed  Musculoskeletal:         General: No swelling.   Skin:     General: Skin is warm and dry.   Neurological:      General: No focal deficit present.      Mental Status: She is alert.   Psychiatric:         Behavior: Behavior normal.         Last Recorded Vitals  Blood pressure 80/61, pulse 71, temperature 36 °C (96.8 °F), temperature source Temporal, resp. rate 19, height 1.651 m (5' 5\"), weight 52.1 kg (114 lb 13.8 oz), SpO2 96%.  Intake/Output last 3 Shifts:  I/O last 3 completed shifts:  In: 1905.8 (36.6 mL/kg) [P.O.:100; I.V.:950.8 (18.2 mL/kg); Blood:250; IV Piggyback:605]  Out: 4100 (78.7 mL/kg) [Urine:1860 (1 mL/kg/hr); Other:1900; Blood:150; Chest Tube:190]  Weight: 52.1 kg     Relevant Results  Results for orders placed or performed during the hospital encounter of 01/13/25 (from the past 24 hours)   Blood Gas Venous Full Panel   Result Value Ref Range    POCT pH, Venous 7.23 (LL) 7.33 - 7.43 pH    POCT pCO2, Venous 60 (H) 41 - 51 mm Hg    POCT pO2, Venous 53 (H) 35 - 45 mm Hg    POCT SO2, Venous 82 (H) 45 - 75 %    POCT Oxy Hemoglobin, Venous 79.9 (H) 45.0 - 75.0 %    POCT Hematocrit Calculated, Venous 28.0 (L) 36.0 - 46.0 " %    POCT Sodium, Venous 127 (L) 136 - 145 mmol/L    POCT Potassium, Venous 4.2 3.5 - 5.3 mmol/L    POCT Chloride, Venous 96 (L) 98 - 107 mmol/L    POCT Ionized Calicum, Venous 1.09 (L) 1.10 - 1.33 mmol/L    POCT Glucose, Venous 233 (H) 74 - 99 mg/dL    POCT Lactate, Venous 0.8 0.4 - 2.0 mmol/L    POCT Base Excess, Venous -2.8 (L) -2.0 - 3.0 mmol/L    POCT HCO3 Calculated, Venous 25.1 22.0 - 26.0 mmol/L    POCT Hemoglobin, Venous 9.2 (L) 12.0 - 16.0 g/dL    POCT Anion Gap, Venous 10.0 10.0 - 25.0 mmol/L    Patient Temperature 37.0 degrees Celsius    FiO2 60 %   Hepatic function panel   Result Value Ref Range    Albumin 3.2 (L) 3.4 - 5.0 g/dL    Bilirubin, Total 0.4 0.0 - 1.2 mg/dL    Bilirubin, Direct 0.1 0.0 - 0.3 mg/dL    Alkaline Phosphatase 41 33 - 136 U/L    ALT 10 7 - 45 U/L    AST 25 9 - 39 U/L    Total Protein 5.2 (L) 6.4 - 8.2 g/dL   Calcium, Ionized   Result Value Ref Range    POCT Calcium, Ionized 1.07 (L) 1.1 - 1.33 mmol/L   CBC   Result Value Ref Range    WBC 6.7 4.4 - 11.3 x10*3/uL    nRBC 0.0 0.0 - 0.0 /100 WBCs    RBC 3.52 (L) 4.00 - 5.20 x10*6/uL    Hemoglobin 8.9 (L) 12.0 - 16.0 g/dL    Hematocrit 28.3 (L) 36.0 - 46.0 %    MCV 80 80 - 100 fL    MCH 25.3 (L) 26.0 - 34.0 pg    MCHC 31.4 (L) 32.0 - 36.0 g/dL    RDW 13.6 11.5 - 14.5 %    Platelets 222 150 - 450 x10*3/uL   Coagulation Screen   Result Value Ref Range    Protime 18.2 (H) 9.8 - 12.8 seconds    INR 1.6 (H) 0.9 - 1.1    aPTT 36 27 - 38 seconds   Magnesium   Result Value Ref Range    Magnesium 1.71 1.60 - 2.40 mg/dL   Basic Metabolic Panel   Result Value Ref Range    Glucose 231 (H) 74 - 99 mg/dL    Sodium 131 (L) 136 - 145 mmol/L    Potassium 4.2 3.5 - 5.3 mmol/L    Chloride 95 (L) 98 - 107 mmol/L    Bicarbonate 26 21 - 32 mmol/L    Anion Gap 14 10 - 20 mmol/L    Urea Nitrogen 10 6 - 23 mg/dL    Creatinine 0.39 (L) 0.50 - 1.05 mg/dL    eGFR >90 >60 mL/min/1.73m*2    Calcium 7.4 (L) 8.6 - 10.6 mg/dL   Phosphorus   Result Value Ref Range     Phosphorus 4.0 2.5 - 4.9 mg/dL   Blood Gas Arterial Full Panel   Result Value Ref Range    POCT pH, Arterial 7.26 (L) 7.38 - 7.42 pH    POCT pCO2, Arterial 56 (H) 38 - 42 mm Hg    POCT pO2, Arterial 180 (H) 85 - 95 mm Hg    POCT SO2, Arterial 100 94 - 100 %    POCT Oxy Hemoglobin, Arterial 97.7 94.0 - 98.0 %    POCT Hematocrit Calculated, Arterial 28.0 (L) 36.0 - 46.0 %    POCT Sodium, Arterial 126 (L) 136 - 145 mmol/L    POCT Potassium, Arterial 4.4 3.5 - 5.3 mmol/L    POCT Chloride, Arterial 96 (L) 98 - 107 mmol/L    POCT Ionized Calcium, Arterial 1.06 (L) 1.10 - 1.33 mmol/L    POCT Glucose, Arterial 231 (H) 74 - 99 mg/dL    POCT Lactate, Arterial 0.6 0.4 - 2.0 mmol/L    POCT Base Excess, Arterial -2.3 (L) -2.0 - 3.0 mmol/L    POCT HCO3 Calculated, Arterial 25.1 22.0 - 26.0 mmol/L    POCT Hemoglobin, Arterial 9.2 (L) 12.0 - 16.0 g/dL    POCT Anion Gap, Arterial 9 (L) 10 - 25 mmo/L    Patient Temperature 37.0 degrees Celsius    FiO2 60 %   Blood Gas Arterial Full Panel   Result Value Ref Range    POCT pH, Arterial 7.29 (L) 7.38 - 7.42 pH    POCT pCO2, Arterial 55 (H) 38 - 42 mm Hg    POCT pO2, Arterial 116 (H) 85 - 95 mm Hg    POCT SO2, Arterial 99 94 - 100 %    POCT Oxy Hemoglobin, Arterial 96.7 94.0 - 98.0 %    POCT Hematocrit Calculated, Arterial 29.0 (L) 36.0 - 46.0 %    POCT Sodium, Arterial 128 (L) 136 - 145 mmol/L    POCT Potassium, Arterial 3.9 3.5 - 5.3 mmol/L    POCT Chloride, Arterial 96 (L) 98 - 107 mmol/L    POCT Ionized Calcium, Arterial 1.05 (L) 1.10 - 1.33 mmol/L    POCT Glucose, Arterial 216 (H) 74 - 99 mg/dL    POCT Lactate, Arterial 0.6 0.4 - 2.0 mmol/L    POCT Base Excess, Arterial -0.7 -2.0 - 3.0 mmol/L    POCT HCO3 Calculated, Arterial 26.4 (H) 22.0 - 26.0 mmol/L    POCT Hemoglobin, Arterial 9.6 (L) 12.0 - 16.0 g/dL    POCT Anion Gap, Arterial 10 10 - 25 mmo/L    Patient Temperature 37.0 degrees Celsius    FiO2 36 %   POCT GLUCOSE   Result Value Ref Range    POCT Glucose 210 (H) 74 - 99 mg/dL    Transthoracic Echo (TTE) Limited   Result Value Ref Range    Tricuspid annular plane systolic excursion 1.0 cm    LV EF 63 %    RV free wall pk S' 6.00 cm/s    RVSP 25.6 mmHg    LV A4C EF 64.5    POCT GLUCOSE   Result Value Ref Range    POCT Glucose 166 (H) 74 - 99 mg/dL   Blood Gas Arterial Full Panel   Result Value Ref Range    POCT pH, Arterial 7.38 7.38 - 7.42 pH    POCT pCO2, Arterial 49 (H) 38 - 42 mm Hg    POCT pO2, Arterial 201 (H) 85 - 95 mm Hg    POCT SO2, Arterial 100 94 - 100 %    POCT Oxy Hemoglobin, Arterial 97.9 94.0 - 98.0 %    POCT Hematocrit Calculated, Arterial 28.0 (L) 36.0 - 46.0 %    POCT Sodium, Arterial 125 (L) 136 - 145 mmol/L    POCT Potassium, Arterial 4.1 3.5 - 5.3 mmol/L    POCT Chloride, Arterial 93 (L) 98 - 107 mmol/L    POCT Ionized Calcium, Arterial 1.07 (L) 1.10 - 1.33 mmol/L    POCT Glucose, Arterial 133 (H) 74 - 99 mg/dL    POCT Lactate, Arterial 0.7 0.4 - 2.0 mmol/L    POCT Base Excess, Arterial 3.3 (H) -2.0 - 3.0 mmol/L    POCT HCO3 Calculated, Arterial 29.0 (H) 22.0 - 26.0 mmol/L    POCT Hemoglobin, Arterial 9.2 (L) 12.0 - 16.0 g/dL    POCT Anion Gap, Arterial 7 (L) 10 - 25 mmo/L    Patient Temperature 37.0 degrees Celsius    FiO2 21 %   Calcium, Ionized   Result Value Ref Range    POCT Calcium, Ionized 1.05 (L) 1.1 - 1.33 mmol/L   CBC   Result Value Ref Range    WBC 7.2 4.4 - 11.3 x10*3/uL    nRBC 0.0 0.0 - 0.0 /100 WBCs    RBC 3.55 (L) 4.00 - 5.20 x10*6/uL    Hemoglobin 8.9 (L) 12.0 - 16.0 g/dL    Hematocrit 27.5 (L) 36.0 - 46.0 %    MCV 78 (L) 80 - 100 fL    MCH 25.1 (L) 26.0 - 34.0 pg    MCHC 32.4 32.0 - 36.0 g/dL    RDW 13.2 11.5 - 14.5 %    Platelets 199 150 - 450 x10*3/uL   Coagulation Screen   Result Value Ref Range    Protime 16.0 (H) 9.8 - 12.8 seconds    INR 1.4 (H) 0.9 - 1.1    aPTT 38 27 - 38 seconds   Magnesium   Result Value Ref Range    Magnesium 2.15 1.60 - 2.40 mg/dL   Renal Function Panel   Result Value Ref Range    Glucose 133 (H) 74 - 99 mg/dL    Sodium  129 (L) 136 - 145 mmol/L    Potassium 4.3 3.5 - 5.3 mmol/L    Chloride 90 (L) 98 - 107 mmol/L    Bicarbonate 29 21 - 32 mmol/L    Anion Gap 14 10 - 20 mmol/L    Urea Nitrogen 8 6 - 23 mg/dL    Creatinine 0.30 (L) 0.50 - 1.05 mg/dL    eGFR >90 >60 mL/min/1.73m*2    Calcium 8.1 (L) 8.6 - 10.6 mg/dL    Phosphorus 3.2 2.5 - 4.9 mg/dL    Albumin 4.0 3.4 - 5.0 g/dL   POCT GLUCOSE   Result Value Ref Range    POCT Glucose 124 (H) 74 - 99 mg/dL   POCT GLUCOSE   Result Value Ref Range    POCT Glucose 103 (H) 74 - 99 mg/dL   POCT GLUCOSE   Result Value Ref Range    POCT Glucose 120 (H) 74 - 99 mg/dL   POCT GLUCOSE   Result Value Ref Range    POCT Glucose 108 (H) 74 - 99 mg/dL         This patient has a central line   Reason for the central line remaining today? Hemodynamic monitoring                 Assessment/Plan   Assessment & Plan  Primary cancer of right upper lobe of lung (Multi)    Chronic obstructive pulmonary disease (Multi)    Malignant neoplasm of upper lobe of right lung (Multi)    Faustina Vick (Jannie) is a 64F with Hx of Stage III lung cancer, asthma, anxiety, and arthritis who is now s/p R pneumonectomy with Dr. Wagner 1/13/25. She is recovering well in ICU, still with some pressor requirements.    - CT removed at bedside, please obtain post pull CXR  - Montiel removed, follow up TOV  - Limit CLD intake to 1 pink jug per day  - Monitor electrolytes and replete as needed  - Monitor for s/s of bleeding  - Keep in ICU for now given pressor requirements, will reassess tomorrow  - Please page with concerns    Patient seen with cardiothoracic fellow Dr. Brush and discussed with attending Dr. Bernard Miranda MD  Thoracic surgery [id number]

## 2025-01-14 NOTE — PROGRESS NOTES
"Faustina Vick \"Noam" is a 64 y.o. female on day 1 of admission presenting with Primary cancer of right upper lobe of lung (Multi).    Subjective   250cc LR given overnight for low CVP. Vaso weaned to 0.03.        Objective     Physical Exam  Vitals reviewed.   Constitutional:       General: She is not in acute distress.     Appearance: She is ill-appearing.   HENT:      Nose: Nose normal.      Mouth/Throat:      Mouth: Mucous membranes are moist.   Eyes:      Extraocular Movements: Extraocular movements intact.   Neck:      Comments: RIJ MAC line in place, dressing c/d/i  Cardiovascular:      Rate and Rhythm: Normal rate and regular rhythm.      Pulses: Normal pulses.      Heart sounds: Normal heart sounds.   Pulmonary:      Effort: Pulmonary effort is normal.      Comments: Left lung sounds normal   Absent lung sounds on right     Right chest tube in place draining serosanguinous fluid         Abdominal:      General: Abdomen is flat.      Palpations: Abdomen is soft.   Musculoskeletal:      Right lower leg: No edema.      Left lower leg: No edema.   Skin:     General: Skin is warm and dry.   Neurological:      General: No focal deficit present.      Mental Status: She is oriented to person, place, and time.   Psychiatric:         Mood and Affect: Mood normal.         Behavior: Behavior normal.         Thought Content: Thought content normal.         Last Recorded Vitals  Blood pressure 104/66, pulse 66, temperature 36 °C (96.8 °F), temperature source Temporal, resp. rate 10, height 1.651 m (5' 5\"), weight 52.1 kg (114 lb 13.8 oz), SpO2 100%.  Intake/Output last 3 Shifts:  I/O last 3 completed shifts:  In: 1882.4 (36.1 mL/kg) [P.O.:100; I.V.:927.4 (17.8 mL/kg); Blood:250; IV Piggyback:605]  Out: 4100 (78.7 mL/kg) [Urine:1860 (1 mL/kg/hr); Other:1900; Blood:150; Chest Tube:190]  Weight: 52.1 kg     Relevant Results  Scheduled medications  acetaminophen, 1,000 mg, intravenous, q6h WILMER  fluticasone " furoate-vilanteroL, 1 puff, inhalation, Daily  heparin (porcine), 5,000 Units, subcutaneous, q8h  insulin lispro, 0-5 Units, subcutaneous, q4h  lidocaine, 1 patch, transdermal, Daily  melatonin, 3 mg, oral, Nightly  pantoprazole, 40 mg, intravenous, Daily before breakfast  tiotropium, 2 puff, inhalation, Daily      Continuous medications  norepinephrine, 0-0.1 mcg/kg/min, Last Rate: Stopped (01/13/25 1945)  vasopressin, 0-0.03 Units/min, Last Rate: Stopped (01/14/25 0600)      PRN medications  PRN medications: albuterol, calcium gluconate, calcium gluconate, dextrose, dextrose, glucagon, glucagon, magnesium sulfate, magnesium sulfate, oxyCODONE, potassium chloride    Results for orders placed or performed during the hospital encounter of 01/13/25 (from the past 24 hours)   Blood Gas Arterial Full Panel   Result Value Ref Range    POCT pH, Arterial 7.40 7.38 - 7.42 pH    POCT pCO2, Arterial 41 38 - 42 mm Hg    POCT pO2, Arterial 259 (H) 85 - 95 mm Hg    POCT SO2, Arterial 100 94 - 100 %    POCT Oxy Hemoglobin, Arterial 97.6 94.0 - 98.0 %    POCT Hematocrit Calculated, Arterial 31.0 (L) 36.0 - 46.0 %    POCT Sodium, Arterial 127 (L) 136 - 145 mmol/L    POCT Potassium, Arterial 3.2 (L) 3.5 - 5.3 mmol/L    POCT Chloride, Arterial      POCT Ionized Calcium, Arterial 1.17 1.10 - 1.33 mmol/L    POCT Glucose, Arterial 84 74 - 99 mg/dL    POCT Lactate, Arterial 0.8 0.4 - 2.0 mmol/L    POCT Base Excess, Arterial 0.5 -2.0 - 3.0 mmol/L    POCT HCO3 Calculated, Arterial 25.4 22.0 - 26.0 mmol/L    POCT Hemoglobin, Arterial 10.3 (L) 12.0 - 16.0 g/dL    POCT Anion Gap, Arterial      Patient Temperature 37.0 degrees Celsius    FiO2 100 %   VERIFY ABO/Rh Group Test   Result Value Ref Range    ABO TYPE B     Rh TYPE POS    Sterile Fluid Culture/Smear    Specimen: PLEURAL FLUID RIGHT SIDE   Result Value Ref Range    Gram Stain (1+) Rare Polymorphonuclear leukocytes     Gram Stain No organisms seen    Blood Gas Arterial Full Panel    Result Value Ref Range    POCT pH, Arterial 7.30 (L) 7.38 - 7.42 pH    POCT pCO2, Arterial 53 (H) 38 - 42 mm Hg    POCT pO2, Arterial 227 (H) 85 - 95 mm Hg    POCT SO2, Arterial 100 94 - 100 %    POCT Oxy Hemoglobin, Arterial 98.4 (H) 94.0 - 98.0 %    POCT Hematocrit Calculated, Arterial 30.0 (L) 36.0 - 46.0 %    POCT Sodium, Arterial 126 (L) 136 - 145 mmol/L    POCT Potassium, Arterial 3.6 3.5 - 5.3 mmol/L    POCT Chloride, Arterial 96 (L) 98 - 107 mmol/L    POCT Ionized Calcium, Arterial 1.19 1.10 - 1.33 mmol/L    POCT Glucose, Arterial 125 (H) 74 - 99 mg/dL    POCT Lactate, Arterial 0.8 0.4 - 2.0 mmol/L    POCT Base Excess, Arterial -0.8 -2.0 - 3.0 mmol/L    POCT HCO3 Calculated, Arterial 26.1 (H) 22.0 - 26.0 mmol/L    POCT Hemoglobin, Arterial 10.0 (L) 12.0 - 16.0 g/dL    POCT Anion Gap, Arterial 8 (L) 10 - 25 mmo/L    Patient Temperature 37.0 degrees Celsius    FiO2 100 %   Blood Gas Venous Full Panel   Result Value Ref Range    POCT pH, Venous 7.23 (LL) 7.33 - 7.43 pH    POCT pCO2, Venous 60 (H) 41 - 51 mm Hg    POCT pO2, Venous 53 (H) 35 - 45 mm Hg    POCT SO2, Venous 82 (H) 45 - 75 %    POCT Oxy Hemoglobin, Venous 79.9 (H) 45.0 - 75.0 %    POCT Hematocrit Calculated, Venous 28.0 (L) 36.0 - 46.0 %    POCT Sodium, Venous 127 (L) 136 - 145 mmol/L    POCT Potassium, Venous 4.2 3.5 - 5.3 mmol/L    POCT Chloride, Venous 96 (L) 98 - 107 mmol/L    POCT Ionized Calicum, Venous 1.09 (L) 1.10 - 1.33 mmol/L    POCT Glucose, Venous 233 (H) 74 - 99 mg/dL    POCT Lactate, Venous 0.8 0.4 - 2.0 mmol/L    POCT Base Excess, Venous -2.8 (L) -2.0 - 3.0 mmol/L    POCT HCO3 Calculated, Venous 25.1 22.0 - 26.0 mmol/L    POCT Hemoglobin, Venous 9.2 (L) 12.0 - 16.0 g/dL    POCT Anion Gap, Venous 10.0 10.0 - 25.0 mmol/L    Patient Temperature 37.0 degrees Celsius    FiO2 60 %   Hepatic function panel   Result Value Ref Range    Albumin 3.2 (L) 3.4 - 5.0 g/dL    Bilirubin, Total 0.4 0.0 - 1.2 mg/dL    Bilirubin, Direct 0.1 0.0 - 0.3  mg/dL    Alkaline Phosphatase 41 33 - 136 U/L    ALT 10 7 - 45 U/L    AST 25 9 - 39 U/L    Total Protein 5.2 (L) 6.4 - 8.2 g/dL   Calcium, Ionized   Result Value Ref Range    POCT Calcium, Ionized 1.07 (L) 1.1 - 1.33 mmol/L   CBC   Result Value Ref Range    WBC 6.7 4.4 - 11.3 x10*3/uL    nRBC 0.0 0.0 - 0.0 /100 WBCs    RBC 3.52 (L) 4.00 - 5.20 x10*6/uL    Hemoglobin 8.9 (L) 12.0 - 16.0 g/dL    Hematocrit 28.3 (L) 36.0 - 46.0 %    MCV 80 80 - 100 fL    MCH 25.3 (L) 26.0 - 34.0 pg    MCHC 31.4 (L) 32.0 - 36.0 g/dL    RDW 13.6 11.5 - 14.5 %    Platelets 222 150 - 450 x10*3/uL   Coagulation Screen   Result Value Ref Range    Protime 18.2 (H) 9.8 - 12.8 seconds    INR 1.6 (H) 0.9 - 1.1    aPTT 36 27 - 38 seconds   Magnesium   Result Value Ref Range    Magnesium 1.71 1.60 - 2.40 mg/dL   Basic Metabolic Panel   Result Value Ref Range    Glucose 231 (H) 74 - 99 mg/dL    Sodium 131 (L) 136 - 145 mmol/L    Potassium 4.2 3.5 - 5.3 mmol/L    Chloride 95 (L) 98 - 107 mmol/L    Bicarbonate 26 21 - 32 mmol/L    Anion Gap 14 10 - 20 mmol/L    Urea Nitrogen 10 6 - 23 mg/dL    Creatinine 0.39 (L) 0.50 - 1.05 mg/dL    eGFR >90 >60 mL/min/1.73m*2    Calcium 7.4 (L) 8.6 - 10.6 mg/dL   Phosphorus   Result Value Ref Range    Phosphorus 4.0 2.5 - 4.9 mg/dL   Blood Gas Arterial Full Panel   Result Value Ref Range    POCT pH, Arterial 7.26 (L) 7.38 - 7.42 pH    POCT pCO2, Arterial 56 (H) 38 - 42 mm Hg    POCT pO2, Arterial 180 (H) 85 - 95 mm Hg    POCT SO2, Arterial 100 94 - 100 %    POCT Oxy Hemoglobin, Arterial 97.7 94.0 - 98.0 %    POCT Hematocrit Calculated, Arterial 28.0 (L) 36.0 - 46.0 %    POCT Sodium, Arterial 126 (L) 136 - 145 mmol/L    POCT Potassium, Arterial 4.4 3.5 - 5.3 mmol/L    POCT Chloride, Arterial 96 (L) 98 - 107 mmol/L    POCT Ionized Calcium, Arterial 1.06 (L) 1.10 - 1.33 mmol/L    POCT Glucose, Arterial 231 (H) 74 - 99 mg/dL    POCT Lactate, Arterial 0.6 0.4 - 2.0 mmol/L    POCT Base Excess, Arterial -2.3 (L) -2.0 -  3.0 mmol/L    POCT HCO3 Calculated, Arterial 25.1 22.0 - 26.0 mmol/L    POCT Hemoglobin, Arterial 9.2 (L) 12.0 - 16.0 g/dL    POCT Anion Gap, Arterial 9 (L) 10 - 25 mmo/L    Patient Temperature 37.0 degrees Celsius    FiO2 60 %   Blood Gas Arterial Full Panel   Result Value Ref Range    POCT pH, Arterial 7.29 (L) 7.38 - 7.42 pH    POCT pCO2, Arterial 55 (H) 38 - 42 mm Hg    POCT pO2, Arterial 116 (H) 85 - 95 mm Hg    POCT SO2, Arterial 99 94 - 100 %    POCT Oxy Hemoglobin, Arterial 96.7 94.0 - 98.0 %    POCT Hematocrit Calculated, Arterial 29.0 (L) 36.0 - 46.0 %    POCT Sodium, Arterial 128 (L) 136 - 145 mmol/L    POCT Potassium, Arterial 3.9 3.5 - 5.3 mmol/L    POCT Chloride, Arterial 96 (L) 98 - 107 mmol/L    POCT Ionized Calcium, Arterial 1.05 (L) 1.10 - 1.33 mmol/L    POCT Glucose, Arterial 216 (H) 74 - 99 mg/dL    POCT Lactate, Arterial 0.6 0.4 - 2.0 mmol/L    POCT Base Excess, Arterial -0.7 -2.0 - 3.0 mmol/L    POCT HCO3 Calculated, Arterial 26.4 (H) 22.0 - 26.0 mmol/L    POCT Hemoglobin, Arterial 9.6 (L) 12.0 - 16.0 g/dL    POCT Anion Gap, Arterial 10 10 - 25 mmo/L    Patient Temperature 37.0 degrees Celsius    FiO2 36 %   POCT GLUCOSE   Result Value Ref Range    POCT Glucose 210 (H) 74 - 99 mg/dL   Transthoracic Echo (TTE) Limited   Result Value Ref Range    Tricuspid annular plane systolic excursion 1.0 cm    LV EF 63 %    RV free wall pk S' 6.00 cm/s    RVSP 25.6 mmHg    LV A4C EF 64.5    POCT GLUCOSE   Result Value Ref Range    POCT Glucose 166 (H) 74 - 99 mg/dL   Blood Gas Arterial Full Panel   Result Value Ref Range    POCT pH, Arterial 7.38 7.38 - 7.42 pH    POCT pCO2, Arterial 49 (H) 38 - 42 mm Hg    POCT pO2, Arterial 201 (H) 85 - 95 mm Hg    POCT SO2, Arterial 100 94 - 100 %    POCT Oxy Hemoglobin, Arterial 97.9 94.0 - 98.0 %    POCT Hematocrit Calculated, Arterial 28.0 (L) 36.0 - 46.0 %    POCT Sodium, Arterial 125 (L) 136 - 145 mmol/L    POCT Potassium, Arterial 4.1 3.5 - 5.3 mmol/L    POCT  Chloride, Arterial 93 (L) 98 - 107 mmol/L    POCT Ionized Calcium, Arterial 1.07 (L) 1.10 - 1.33 mmol/L    POCT Glucose, Arterial 133 (H) 74 - 99 mg/dL    POCT Lactate, Arterial 0.7 0.4 - 2.0 mmol/L    POCT Base Excess, Arterial 3.3 (H) -2.0 - 3.0 mmol/L    POCT HCO3 Calculated, Arterial 29.0 (H) 22.0 - 26.0 mmol/L    POCT Hemoglobin, Arterial 9.2 (L) 12.0 - 16.0 g/dL    POCT Anion Gap, Arterial 7 (L) 10 - 25 mmo/L    Patient Temperature 37.0 degrees Celsius    FiO2 21 %   Calcium, Ionized   Result Value Ref Range    POCT Calcium, Ionized 1.05 (L) 1.1 - 1.33 mmol/L   CBC   Result Value Ref Range    WBC 7.2 4.4 - 11.3 x10*3/uL    nRBC 0.0 0.0 - 0.0 /100 WBCs    RBC 3.55 (L) 4.00 - 5.20 x10*6/uL    Hemoglobin 8.9 (L) 12.0 - 16.0 g/dL    Hematocrit 27.5 (L) 36.0 - 46.0 %    MCV 78 (L) 80 - 100 fL    MCH 25.1 (L) 26.0 - 34.0 pg    MCHC 32.4 32.0 - 36.0 g/dL    RDW 13.2 11.5 - 14.5 %    Platelets 199 150 - 450 x10*3/uL   Coagulation Screen   Result Value Ref Range    Protime 16.0 (H) 9.8 - 12.8 seconds    INR 1.4 (H) 0.9 - 1.1    aPTT 38 27 - 38 seconds   Magnesium   Result Value Ref Range    Magnesium 2.15 1.60 - 2.40 mg/dL   Renal Function Panel   Result Value Ref Range    Glucose 133 (H) 74 - 99 mg/dL    Sodium 129 (L) 136 - 145 mmol/L    Potassium 4.3 3.5 - 5.3 mmol/L    Chloride 90 (L) 98 - 107 mmol/L    Bicarbonate 29 21 - 32 mmol/L    Anion Gap 14 10 - 20 mmol/L    Urea Nitrogen 8 6 - 23 mg/dL    Creatinine 0.30 (L) 0.50 - 1.05 mg/dL    eGFR >90 >60 mL/min/1.73m*2    Calcium 8.1 (L) 8.6 - 10.6 mg/dL    Phosphorus 3.2 2.5 - 4.9 mg/dL    Albumin 4.0 3.4 - 5.0 g/dL   POCT GLUCOSE   Result Value Ref Range    POCT Glucose 124 (H) 74 - 99 mg/dL       Transthoracic Echo (TTE) Limited    Result Date: 1/13/2025   [facility], [address] [place] [address]                Tel [phone] and Fax [phone] TRANSTHORACIC ECHOCARDIOGRAM REPORT  Patient Name:       RAÚL Rodríguez  Physician:    [id number] Cam Llanos MD Study Date:         1/13/2025           Ordering Provider:    [id number] WILMER ANTHONY MRN/PID:            [id number]            Fellow: Accession#:         [id number]        Nurse: Date of Birth/Age:  1960 / 64      Sonographer:          José Miguel rocha RDCS Gender assigned at  F                   Additional Staff: Birth: Height:             165.10 cm           Admit Date:           1/13/2025 Weight:             51.71 kg            Admission Status:     Inpatient - STAT BSA / BMI:          1.56 m2 / 18.97     Encounter#:           [id number]                     kg/m2 Blood Pressure:     100/57 mmHg         Department Location:  [facility] Study Type:    TRANSTHORACIC ECHO (TTE) LIMITED Diagnosis/ICD: Hypotension, unspecified-I95.9 Indication:    Hypotension CPT Code:      Echo Limited-15411; Doppler Limited-26321; Color Doppler-21304  Study Detail: The following Echo studies were performed: 2D, Doppler and color               flow. Technically challenging study due to body habitus and               patient lying in supine position.  PHYSICIAN INTERPRETATION: Left Ventricle: The left ventricular systolic function is normal, with a visually estimated ejection fraction of 60-65%. There are no regional left ventricular wall motion abnormalities. The left ventricular cavity size is normal. Left ventricular diastolic filling was not assessed. Left Atrium: The left atrium was not well visualized. Right Ventricle: The right ventricle is normal in size. There is low normal right ventricular systolic function. RV S' and TAPSE under measured due to technical difficulty visualizing lateral tricuspid valve annulus. Right Atrium: The right atrium was not well visualized. Aortic Valve: The aortic  valve was not well visualized. There is no evidence of aortic valve regurgitation. Mitral Valve: The mitral valve is normal in structure. There is trace mitral valve regurgitation. Tricuspid Valve: The tricuspid valve was not well visualized. There is trace tricuspid regurgitation. Pulmonic Valve: The pulmonic valve is not well visualized. The pulmonic valve regurgitation was not well visualized. Pericardium: Trivial pericardial effusion. Aorta: The aortic root is normal. Pulmonary Artery: The tricuspid regurgitant velocity is 2.10 m/s, and with an estimated right atrial pressure of 8 mmHg, the estimated pulmonary artery pressure is normal with the RVSP at 25.6 mmHg. Systemic Veins: The inferior vena cava appears normal in size, with IVC inspiratory collapse less than 50%. In comparison to the previous echocardiogram(s): Compared with study dated 10/3/2024, no significant change.  CONCLUSIONS:  1. The left ventricular systolic function is normal, with a visually estimated ejection fraction of 60-65%.  2. There is low normal right ventricular systolic function. QUANTITATIVE DATA SUMMARY:  LV SYSTOLIC FUNCTION BY 2D PLANIMETRY (MOD):                      Normal Ranges: EF-A4C View:    65 % (>=55%) EF-Visual:      63 % LV EF Reported: 63 %  RIGHT VENTRICLE: TAPSE: 10.0 mm RV s'  0.06 m/s  TRICUSPID VALVE/RVSP:          Normal Ranges: Peak TR Velocity:     2.10 m/s Est. RA Pressure:     8 mmHg RV Syst Pressure:     26 mmHg  (< 30mmHg) IVC Diam:             2.00 cm  [id number] Cam Llanos MD Electronically signed on 1/13/2025 at 5:06:16 PM  ** Final **     XR chest 1 view    Result Date: 1/13/2025  Interpreted By:  Vick Eli, STUDY: XR CHEST 1 VIEW; 1/13/2025 2:52 pm   INDICATION: Signs/Symptoms:s/p pneumonectomy.   COMPARISON: CT dated 07/17/2024 and PET-CT dated 12/30/2024   ACCESSION NUMBER(S): [id number]   ORDERING CLINICIAN: WILMER ANTHONY   FINDINGS: Right IJ central venous catheter sheath is  projecting over mid SV C. Left subclavian approach MediPort is in place with the tip projecting over upper to mid SVC. Right chest tube is in place.   Cardiac silhouette size is within normal limits.   Postsurgical changes related to right pneumonectomy with multiple partial right-sided rib resection. Mild interstitial prominence of the left lung.   Subcutaneous and soft tissue emphysema in the right chest wall.         1. Postsurgical changes related to right sided pneumonectomy with multiple medical devices as described above. 2. Mild interstitial prominence of the left lung.       Signed by: Vick Xiao 1/13/2025 3:16 PM Dictation workstation:   [id number]     This patient has a central line   Reason for the central line remaining today? Hemodynamic monitoring and vasoactive medication administration    This patient has a urinary catheter - to be removed today    Assessment/Plan   Assessment:  65 y/o with a history of lung cancer presents to ICU from the OR s/p Right Pneumonectomy, intrapericardial, with en bloc chest wall resection by Dr. Wagner 1/13. Remains on vasopressor support.     Plan:  NEURO: History of anxiety on alprazolam 1mg qd, last filled 1/4/2025 per [facility] review. Patient awakens to name, follows commands, no focal deficits. Acute post op pain  - Pain control with prn oxycodone 2.5mg  - IV tylenol x24h then switch to scheduled PO tylenol  - R CARLOS MANUEL block catheter, start infusion today - dc lidocaine patch while infusion is running  - Monitor for s/s of benzodiazepine withdrawal, held for now.   - PT/OT consulted     CV: No history of cardiac disease. Baseline /79, -110. TTE 9/2024 normal biV function, RVSP 10mmHg. Intra-op hypotension requiring vaso and epi. Hypovolemic vs cardiogenic shock. Arrived to SICU on vaso 0.06 units/min, started on levo 0.05 mcg/kg/min. TTE 1/13 normal LV function, low normal RV systolic function. Now on vaso 0.03.   - trend CVP  - Goal map range  65-90  - continuous EKG/abp monitoring  - limit unnecessary volume administration   - 12.5g 25% albumin q6 ordered  - Wean vasopressin as able  - trend lactate  - start iv metoprolol for Afib ppx when appropriate - unable to start given vasopressor requirement     PULM: 30 pack year smoking history, quit 4/2024. Asthma, lung CA, s/p chemo/XRT/immunotherapy. Now s/p Right Pneumonectomy, intrapericardial, with en bloc chest wall resection . Arrived to SICU extubated on 10L SFM, now on 4L NC.  - Wean FiO2 as tolerated.    - Q1h incentive spirometer while awake  - additional pulm toilet prn.    - Daily CXR  - R pleural chest tube management per surgical team -> keep off suction, surgery to pull chest tube today  - continue home Trelegy Elipta, ventolin HFA formulary exchange  - hold home benzonatate     GI: No GI history. LFTs WNL 12/2/24. Now on clear liquid diet.  - Advance diet per surgical service discretion  - IV PPI for GI prophylaxis while on clears, will dc when advancing     : No history of renal disease. Baseline creatinine 0.6. Per thoracic, 1L PO fluid restriction   - limit IVF  - Maintain U/O >0.5ml/kg/hr  - Check renal function panel post op, daily and prn  - Replete electrolytes to goal K>4, Mg>2, Phos>2.5, ionized Ca>1.10.     HEME: DOC. Baseline H/H 12/40. Acute blood loss anemia, OR EBL 150mL  - Check CBC and coags post op, daily and prn  - scds, sc heparin  - ongoing monitoring for s/s bleeding     ENDO: No history of DM or thyroid disease. TSH 1.86. Recent 5d course of prednisone 10mg 12/2024 for URI.  - Q4h BG   - SSI Lispro per ICU protocol.     MSK: Arthritis, Vit D deficiency  - not on meds at home  - monitor for symptoms     ID: Afebrile. Await post op WBC  - cefazolin x24h to be completed today  - temp q4h, wbc daily  - ongoing monitoring for s/s infection     Lines:  - left radial art line  - right internal jugular mac line  - piv: 20g, 14g  - yoo - will dc today     Dispo: admit to ICU.  Reassess dispo in am. Discussed with Dr. Mo and ICU fellow Dr. Sonu Anna MD  Anesthesiology PGY3  SICU Phone [phone]

## 2025-01-14 NOTE — CONSULTS
Faustina Vick is a 64 y.o. year old female patient who presents for Procedure(s):  PNEUMONECTOMY with Jonah Wagner MD on 2025.  Acute Pain consulted for assistance with pain control.     Anticipated Postop Pain Issues -   Palliative: typically relieved with IV analgesics and regional local anesthetics  Provocative: typically with movement  Quality: typically burning and aching  Radiation: typically none  Severity: typically severe 8-10/10  Timing: typically constant    Past Medical History:   Diagnosis Date    Anxiety     treated by PCP with PRN medication    Arthritis     hands    Asthma     controlled    History of SCC (squamous cell carcinoma) of skin     skin on bridge of her nose    Personal history of antineoplastic chemotherapy     Primary cancer of right upper lobe of lung (Multi)     seen by Dr. Jonah Wagner 10/17/24--7.3 cm mass right upper lobe lung cancer        Past Surgical History:   Procedure Laterality Date     SECTION, LOW TRANSVERSE      INSERTION / REMOVAL / REPLACEMENT VENOUS ACCESS CATHETER      OTHER SURGICAL HISTORY      MEDI PORT INSERTION    OVARIAN CYST SURGERY          Family History   Problem Relation Name Age of Onset    Adrenal disorder Mother      Bone cancer Mother      Skin cancer Father          Social History     Socioeconomic History    Marital status:      Spouse name: Not on file    Number of children: Not on file    Years of education: Not on file    Highest education level: Not on file   Occupational History    Not on file   Tobacco Use    Smoking status: Former     Current packs/day: 0.00     Types: Cigarettes     Quit date: 2024     Years since quittin.8     Passive exposure: Past    Smokeless tobacco: Former   Substance and Sexual Activity    Alcohol use: Not Currently    Drug use: Never    Sexual activity: Defer   Other Topics Concern    Not on file   Social History Narrative    Not on file     Social Drivers of Health     Financial  Resource Strain: Low Risk  (12/4/2024)    Received from [facility]    Overall Financial Resource Strain (CARDIA)     Difficulty of Paying Living Expenses: Not hard at all   Food Insecurity: No Food Insecurity (1/8/2025)    Hunger Vital Sign     Worried About Running Out of Food in the Last Year: Never true     Ran Out of Food in the Last Year: Never true   Transportation Needs: No Transportation Needs (12/4/2024)    Received from [facility]    PRAPARE - Transportation     Lack of Transportation (Medical): No     Lack of Transportation (Non-Medical): No   Physical Activity: Not on file   Stress: Not on file   Social Connections: Not on file   Intimate Partner Violence: Not on file   Housing Stability: Unknown (12/4/2024)    Received from [facility]    Housing Stability Vital Sign     Unable to Pay for Housing in the Last Year: No     Number of Times Moved in the Last Year: Not on file     Homeless in the Last Year: No        No Known Allergies      Review of Systems  Gen: No fatigue, anorexia, insomnia, fever.   Eyes: No vision loss, double vision, drainage, eye pain.   ENT: No pharyngitis, dry mouth, no hearing changes or ear discharge  Cardiac: No chest pain, palpitations, syncope, near syncope.   Pulmonary: No shortness of breath, cough, hemoptysis.   Heme/lymph: No swollen glands, fever, bleeding.   GI: No abdominal pain, change in bowel habits, melena, hematemesis, hematochezia, nausea, vomiting, diarrhea.   : No discharge, dysuria, frequency, urgency, hematuria.  Endo: No polyuria or weight loss.   Musculoskeletal: Negative for any pain or loss of ROM/weakness  Skin: No rashes or lesions  Neuro: Normal speech, no numbness or weakness. No gait difficulties  Review of systems is otherwise negative unless stated above or in history of present illness.    Physical Exam:  Constitutional:  no distress, alert and cooperative  Eyes: clear  sclera  Head/Neck: No apparent injury, trachea midline  Respiratory/Thorax: Patent airways, thorax symmetric, breathing comfortably  Cardiovascular: no pitting edema  Gastrointestinal: Nondistended  Musculoskeletal: ROM intact  Extremities: no clubbing  Neurological: alert, chan x4  Psychological: Appropriate affect    Results for orders placed or performed during the hospital encounter of 01/13/25 (from the past 24 hours)   Blood Gas Venous Full Panel   Result Value Ref Range    POCT pH, Venous 7.23 (LL) 7.33 - 7.43 pH    POCT pCO2, Venous 60 (H) 41 - 51 mm Hg    POCT pO2, Venous 53 (H) 35 - 45 mm Hg    POCT SO2, Venous 82 (H) 45 - 75 %    POCT Oxy Hemoglobin, Venous 79.9 (H) 45.0 - 75.0 %    POCT Hematocrit Calculated, Venous 28.0 (L) 36.0 - 46.0 %    POCT Sodium, Venous 127 (L) 136 - 145 mmol/L    POCT Potassium, Venous 4.2 3.5 - 5.3 mmol/L    POCT Chloride, Venous 96 (L) 98 - 107 mmol/L    POCT Ionized Calicum, Venous 1.09 (L) 1.10 - 1.33 mmol/L    POCT Glucose, Venous 233 (H) 74 - 99 mg/dL    POCT Lactate, Venous 0.8 0.4 - 2.0 mmol/L    POCT Base Excess, Venous -2.8 (L) -2.0 - 3.0 mmol/L    POCT HCO3 Calculated, Venous 25.1 22.0 - 26.0 mmol/L    POCT Hemoglobin, Venous 9.2 (L) 12.0 - 16.0 g/dL    POCT Anion Gap, Venous 10.0 10.0 - 25.0 mmol/L    Patient Temperature 37.0 degrees Celsius    FiO2 60 %   Hepatic function panel   Result Value Ref Range    Albumin 3.2 (L) 3.4 - 5.0 g/dL    Bilirubin, Total 0.4 0.0 - 1.2 mg/dL    Bilirubin, Direct 0.1 0.0 - 0.3 mg/dL    Alkaline Phosphatase 41 33 - 136 U/L    ALT 10 7 - 45 U/L    AST 25 9 - 39 U/L    Total Protein 5.2 (L) 6.4 - 8.2 g/dL   Calcium, Ionized   Result Value Ref Range    POCT Calcium, Ionized 1.07 (L) 1.1 - 1.33 mmol/L   CBC   Result Value Ref Range    WBC 6.7 4.4 - 11.3 x10*3/uL    nRBC 0.0 0.0 - 0.0 /100 WBCs    RBC 3.52 (L) 4.00 - 5.20 x10*6/uL    Hemoglobin 8.9 (L) 12.0 - 16.0 g/dL    Hematocrit 28.3 (L) 36.0 - 46.0 %    MCV 80 80 - 100 fL    MCH 25.3  (L) 26.0 - 34.0 pg    MCHC 31.4 (L) 32.0 - 36.0 g/dL    RDW 13.6 11.5 - 14.5 %    Platelets 222 150 - 450 x10*3/uL   Coagulation Screen   Result Value Ref Range    Protime 18.2 (H) 9.8 - 12.8 seconds    INR 1.6 (H) 0.9 - 1.1    aPTT 36 27 - 38 seconds   Magnesium   Result Value Ref Range    Magnesium 1.71 1.60 - 2.40 mg/dL   Basic Metabolic Panel   Result Value Ref Range    Glucose 231 (H) 74 - 99 mg/dL    Sodium 131 (L) 136 - 145 mmol/L    Potassium 4.2 3.5 - 5.3 mmol/L    Chloride 95 (L) 98 - 107 mmol/L    Bicarbonate 26 21 - 32 mmol/L    Anion Gap 14 10 - 20 mmol/L    Urea Nitrogen 10 6 - 23 mg/dL    Creatinine 0.39 (L) 0.50 - 1.05 mg/dL    eGFR >90 >60 mL/min/1.73m*2    Calcium 7.4 (L) 8.6 - 10.6 mg/dL   Phosphorus   Result Value Ref Range    Phosphorus 4.0 2.5 - 4.9 mg/dL   Blood Gas Arterial Full Panel   Result Value Ref Range    POCT pH, Arterial 7.26 (L) 7.38 - 7.42 pH    POCT pCO2, Arterial 56 (H) 38 - 42 mm Hg    POCT pO2, Arterial 180 (H) 85 - 95 mm Hg    POCT SO2, Arterial 100 94 - 100 %    POCT Oxy Hemoglobin, Arterial 97.7 94.0 - 98.0 %    POCT Hematocrit Calculated, Arterial 28.0 (L) 36.0 - 46.0 %    POCT Sodium, Arterial 126 (L) 136 - 145 mmol/L    POCT Potassium, Arterial 4.4 3.5 - 5.3 mmol/L    POCT Chloride, Arterial 96 (L) 98 - 107 mmol/L    POCT Ionized Calcium, Arterial 1.06 (L) 1.10 - 1.33 mmol/L    POCT Glucose, Arterial 231 (H) 74 - 99 mg/dL    POCT Lactate, Arterial 0.6 0.4 - 2.0 mmol/L    POCT Base Excess, Arterial -2.3 (L) -2.0 - 3.0 mmol/L    POCT HCO3 Calculated, Arterial 25.1 22.0 - 26.0 mmol/L    POCT Hemoglobin, Arterial 9.2 (L) 12.0 - 16.0 g/dL    POCT Anion Gap, Arterial 9 (L) 10 - 25 mmo/L    Patient Temperature 37.0 degrees Celsius    FiO2 60 %   Blood Gas Arterial Full Panel   Result Value Ref Range    POCT pH, Arterial 7.29 (L) 7.38 - 7.42 pH    POCT pCO2, Arterial 55 (H) 38 - 42 mm Hg    POCT pO2, Arterial 116 (H) 85 - 95 mm Hg    POCT SO2, Arterial 99 94 - 100 %    POCT Oxy  Hemoglobin, Arterial 96.7 94.0 - 98.0 %    POCT Hematocrit Calculated, Arterial 29.0 (L) 36.0 - 46.0 %    POCT Sodium, Arterial 128 (L) 136 - 145 mmol/L    POCT Potassium, Arterial 3.9 3.5 - 5.3 mmol/L    POCT Chloride, Arterial 96 (L) 98 - 107 mmol/L    POCT Ionized Calcium, Arterial 1.05 (L) 1.10 - 1.33 mmol/L    POCT Glucose, Arterial 216 (H) 74 - 99 mg/dL    POCT Lactate, Arterial 0.6 0.4 - 2.0 mmol/L    POCT Base Excess, Arterial -0.7 -2.0 - 3.0 mmol/L    POCT HCO3 Calculated, Arterial 26.4 (H) 22.0 - 26.0 mmol/L    POCT Hemoglobin, Arterial 9.6 (L) 12.0 - 16.0 g/dL    POCT Anion Gap, Arterial 10 10 - 25 mmo/L    Patient Temperature 37.0 degrees Celsius    FiO2 36 %   POCT GLUCOSE   Result Value Ref Range    POCT Glucose 210 (H) 74 - 99 mg/dL   Transthoracic Echo (TTE) Limited   Result Value Ref Range    Tricuspid annular plane systolic excursion 1.0 cm    LV EF 63 %    RV free wall pk S' 6.00 cm/s    RVSP 25.6 mmHg    LV A4C EF 64.5    POCT GLUCOSE   Result Value Ref Range    POCT Glucose 166 (H) 74 - 99 mg/dL   Blood Gas Arterial Full Panel   Result Value Ref Range    POCT pH, Arterial 7.38 7.38 - 7.42 pH    POCT pCO2, Arterial 49 (H) 38 - 42 mm Hg    POCT pO2, Arterial 201 (H) 85 - 95 mm Hg    POCT SO2, Arterial 100 94 - 100 %    POCT Oxy Hemoglobin, Arterial 97.9 94.0 - 98.0 %    POCT Hematocrit Calculated, Arterial 28.0 (L) 36.0 - 46.0 %    POCT Sodium, Arterial 125 (L) 136 - 145 mmol/L    POCT Potassium, Arterial 4.1 3.5 - 5.3 mmol/L    POCT Chloride, Arterial 93 (L) 98 - 107 mmol/L    POCT Ionized Calcium, Arterial 1.07 (L) 1.10 - 1.33 mmol/L    POCT Glucose, Arterial 133 (H) 74 - 99 mg/dL    POCT Lactate, Arterial 0.7 0.4 - 2.0 mmol/L    POCT Base Excess, Arterial 3.3 (H) -2.0 - 3.0 mmol/L    POCT HCO3 Calculated, Arterial 29.0 (H) 22.0 - 26.0 mmol/L    POCT Hemoglobin, Arterial 9.2 (L) 12.0 - 16.0 g/dL    POCT Anion Gap, Arterial 7 (L) 10 - 25 mmo/L    Patient Temperature 37.0 degrees Celsius    FiO2  21 %   Calcium, Ionized   Result Value Ref Range    POCT Calcium, Ionized 1.05 (L) 1.1 - 1.33 mmol/L   CBC   Result Value Ref Range    WBC 7.2 4.4 - 11.3 x10*3/uL    nRBC 0.0 0.0 - 0.0 /100 WBCs    RBC 3.55 (L) 4.00 - 5.20 x10*6/uL    Hemoglobin 8.9 (L) 12.0 - 16.0 g/dL    Hematocrit 27.5 (L) 36.0 - 46.0 %    MCV 78 (L) 80 - 100 fL    MCH 25.1 (L) 26.0 - 34.0 pg    MCHC 32.4 32.0 - 36.0 g/dL    RDW 13.2 11.5 - 14.5 %    Platelets 199 150 - 450 x10*3/uL   Coagulation Screen   Result Value Ref Range    Protime 16.0 (H) 9.8 - 12.8 seconds    INR 1.4 (H) 0.9 - 1.1    aPTT 38 27 - 38 seconds   Magnesium   Result Value Ref Range    Magnesium 2.15 1.60 - 2.40 mg/dL   Renal Function Panel   Result Value Ref Range    Glucose 133 (H) 74 - 99 mg/dL    Sodium 129 (L) 136 - 145 mmol/L    Potassium 4.3 3.5 - 5.3 mmol/L    Chloride 90 (L) 98 - 107 mmol/L    Bicarbonate 29 21 - 32 mmol/L    Anion Gap 14 10 - 20 mmol/L    Urea Nitrogen 8 6 - 23 mg/dL    Creatinine 0.30 (L) 0.50 - 1.05 mg/dL    eGFR >90 >60 mL/min/1.73m*2    Calcium 8.1 (L) 8.6 - 10.6 mg/dL    Phosphorus 3.2 2.5 - 4.9 mg/dL    Albumin 4.0 3.4 - 5.0 g/dL   POCT GLUCOSE   Result Value Ref Range    POCT Glucose 124 (H) 74 - 99 mg/dL   POCT GLUCOSE   Result Value Ref Range    POCT Glucose 103 (H) 74 - 99 mg/dL   POCT GLUCOSE   Result Value Ref Range    POCT Glucose 120 (H) 74 - 99 mg/dL        Faustina Vick is a 64 y.o. year old female patient who presents for Procedure(s):  PNEUMONECTOMY with Jonah Wagner MD on 1/13/2025. Acute Pain consulted for assistance with pain control. Patient received a postoperative R CARLOS MANUEL nerve block at T5 with catheter placement on 1/13/25.    Plan:    - Right erector spinae block with catheter performed post-operatively on 1/13/25  - Ambit ball with Ropivacaine 0.2%/NaCl 0.9% 500mL, Rate 10 cc/hr unilaterally   - Ambit medication will not interfere with pain medication prescribed by the primary team.   - Please be aware of local  anesthetic toxic dose and absorption variability before considering lidocaine patches  - Acute pain service will follow while catheters in place  - Rest of pain management per primary team    Acute Pain Resident  pg [phone] ph [phone]

## 2025-01-15 ENCOUNTER — APPOINTMENT (OUTPATIENT)
Dept: RADIOLOGY | Facility: HOSPITAL | Age: 65
End: 2025-01-15
Payer: COMMERCIAL

## 2025-01-15 LAB
ALBUMIN SERPL BCP-MCNC: 3.7 G/DL (ref 3.4–5)
ALBUMIN SERPL BCP-MCNC: 3.8 G/DL (ref 3.4–5)
ALBUMIN SERPL BCP-MCNC: 4 G/DL (ref 3.4–5)
ALBUMIN SERPL BCP-MCNC: 4.1 G/DL (ref 3.4–5)
ALBUMIN SERPL BCP-MCNC: 4.2 G/DL (ref 3.4–5)
ANION GAP SERPL CALC-SCNC: 15 MMOL/L (ref 10–20)
ANION GAP SERPL CALC-SCNC: 15 MMOL/L (ref 10–20)
ANION GAP SERPL CALC-SCNC: 16 MMOL/L (ref 10–20)
ANION GAP SERPL CALC-SCNC: 16 MMOL/L (ref 10–20)
ANION GAP SERPL CALC-SCNC: 18 MMOL/L
APTT PPP: 58 SECONDS (ref 27–38)
BUN SERPL-MCNC: 5 MG/DL (ref 6–23)
BUN SERPL-MCNC: 6 MG/DL (ref 6–23)
BUN SERPL-MCNC: 7 MG/DL (ref 6–23)
CA-I BLD-SCNC: 1.02 MMOL/L (ref 1.1–1.33)
CALCIUM SERPL-MCNC: 7.8 MG/DL (ref 8.6–10.6)
CALCIUM SERPL-MCNC: 7.8 MG/DL (ref 8.6–10.6)
CALCIUM SERPL-MCNC: 8.2 MG/DL (ref 8.6–10.6)
CALCIUM SERPL-MCNC: 8.3 MG/DL (ref 8.6–10.6)
CALCIUM SERPL-MCNC: 8.3 MG/DL (ref 8.6–10.6)
CHLORIDE SERPL-SCNC: 82 MMOL/L (ref 98–107)
CHLORIDE SERPL-SCNC: 84 MMOL/L (ref 98–107)
CHLORIDE SERPL-SCNC: 85 MMOL/L (ref 98–107)
CHLORIDE SERPL-SCNC: 86 MMOL/L (ref 98–107)
CHLORIDE SERPL-SCNC: 86 MMOL/L (ref 98–107)
CHLORIDE UR-SCNC: 66 MMOL/L
CHLORIDE/CREATININE (MMOL/G) IN URINE: 81 MMOL/G CREAT (ref 38–318)
CO2 SERPL-SCNC: 24 MMOL/L (ref 21–32)
CO2 SERPL-SCNC: 25 MMOL/L (ref 21–32)
CO2 SERPL-SCNC: 26 MMOL/L (ref 21–32)
CREAT SERPL-MCNC: 0.2 MG/DL (ref 0.5–1.05)
CREAT SERPL-MCNC: 0.21 MG/DL (ref 0.5–1.05)
CREAT SERPL-MCNC: 0.22 MG/DL (ref 0.5–1.05)
CREAT SERPL-MCNC: 0.24 MG/DL (ref 0.5–1.05)
CREAT SERPL-MCNC: <0.2 MG/DL (ref 0.5–1.05)
CREAT UR-MCNC: 81.3 MG/DL (ref 20–320)
EGFRCR SERPLBLD CKD-EPI 2021: >90 ML/MIN/1.73M*2
EGFRCR SERPLBLD CKD-EPI 2021: ABNORMAL ML/MIN/{1.73_M2}
ERYTHROCYTE [DISTWIDTH] IN BLOOD BY AUTOMATED COUNT: 13 % (ref 11.5–14.5)
GLUCOSE BLD MANUAL STRIP-MCNC: 109 MG/DL (ref 74–99)
GLUCOSE BLD MANUAL STRIP-MCNC: 120 MG/DL (ref 74–99)
GLUCOSE BLD MANUAL STRIP-MCNC: 122 MG/DL (ref 74–99)
GLUCOSE BLD MANUAL STRIP-MCNC: 144 MG/DL (ref 74–99)
GLUCOSE BLD MANUAL STRIP-MCNC: 170 MG/DL (ref 74–99)
GLUCOSE BLD MANUAL STRIP-MCNC: 175 MG/DL (ref 74–99)
GLUCOSE BLD MANUAL STRIP-MCNC: 96 MG/DL (ref 74–99)
GLUCOSE SERPL-MCNC: 102 MG/DL (ref 74–99)
GLUCOSE SERPL-MCNC: 120 MG/DL (ref 74–99)
GLUCOSE SERPL-MCNC: 135 MG/DL (ref 74–99)
GLUCOSE SERPL-MCNC: 156 MG/DL (ref 74–99)
GLUCOSE SERPL-MCNC: 86 MG/DL (ref 74–99)
HCT VFR BLD AUTO: 24.5 % (ref 36–46)
HGB BLD-MCNC: 8.2 G/DL (ref 12–16)
INR PPP: 2.3 (ref 0.9–1.1)
MAGNESIUM SERPL-MCNC: 2.21 MG/DL (ref 1.6–2.4)
MCH RBC QN AUTO: 25.7 PG (ref 26–34)
MCHC RBC AUTO-ENTMCNC: 33.5 G/DL (ref 32–36)
MCV RBC AUTO: 77 FL (ref 80–100)
NRBC BLD-RTO: 0 /100 WBCS (ref 0–0)
OSMOLALITY SERPL: 251 MOSM/KG (ref 280–300)
OSMOLALITY SERPL: 251 MOSM/KG (ref 280–300)
OSMOLALITY UR: 648 MOSM/KG (ref 200–1200)
PHOSPHATE SERPL-MCNC: 2 MG/DL (ref 2.5–4.9)
PHOSPHATE SERPL-MCNC: 2.1 MG/DL (ref 2.5–4.9)
PHOSPHATE SERPL-MCNC: 2.3 MG/DL (ref 2.5–4.9)
PHOSPHATE SERPL-MCNC: 2.9 MG/DL (ref 2.5–4.9)
PHOSPHATE SERPL-MCNC: 3 MG/DL (ref 2.5–4.9)
PLATELET # BLD AUTO: 242 X10*3/UL (ref 150–450)
POTASSIUM SERPL-SCNC: 3.6 MMOL/L (ref 3.5–5.3)
POTASSIUM SERPL-SCNC: 3.8 MMOL/L (ref 3.5–5.3)
POTASSIUM SERPL-SCNC: 4 MMOL/L (ref 3.5–5.3)
POTASSIUM SERPL-SCNC: 4.1 MMOL/L (ref 3.5–5.3)
POTASSIUM SERPL-SCNC: 4.6 MMOL/L (ref 3.5–5.3)
POTASSIUM UR-SCNC: 37 MMOL/L
POTASSIUM/CREAT UR-RTO: 46 MMOL/G CREAT
PROTHROMBIN TIME: 26 SECONDS (ref 9.8–12.8)
RBC # BLD AUTO: 3.19 X10*6/UL (ref 4–5.2)
SODIUM SERPL-SCNC: 120 MMOL/L (ref 136–145)
SODIUM SERPL-SCNC: 121 MMOL/L (ref 136–145)
SODIUM SERPL-SCNC: 122 MMOL/L (ref 136–145)
SODIUM SERPL-SCNC: 122 MMOL/L (ref 136–145)
SODIUM SERPL-SCNC: 124 MMOL/L (ref 136–145)
SODIUM UR-SCNC: 28 MMOL/L
SODIUM/CREAT UR-RTO: 34 MMOL/G CREAT
WBC # BLD AUTO: 17.2 X10*3/UL (ref 4.4–11.3)

## 2025-01-15 PROCEDURE — 97162 PT EVAL MOD COMPLEX 30 MIN: CPT | Mod: GP | Performed by: PHYSICAL THERAPIST

## 2025-01-15 PROCEDURE — 2500000005 HC RX 250 GENERAL PHARMACY W/O HCPCS: Performed by: PHYSICIAN ASSISTANT

## 2025-01-15 PROCEDURE — 99221 1ST HOSP IP/OBS SF/LOW 40: CPT

## 2025-01-15 PROCEDURE — 2500000001 HC RX 250 WO HCPCS SELF ADMINISTERED DRUGS (ALT 637 FOR MEDICARE OP): Performed by: NURSE PRACTITIONER

## 2025-01-15 PROCEDURE — 82947 ASSAY GLUCOSE BLOOD QUANT: CPT

## 2025-01-15 PROCEDURE — 97129 THER IVNTJ 1ST 15 MIN: CPT | Mod: GO

## 2025-01-15 PROCEDURE — 2500000004 HC RX 250 GENERAL PHARMACY W/ HCPCS (ALT 636 FOR OP/ED)

## 2025-01-15 PROCEDURE — 2500000005 HC RX 250 GENERAL PHARMACY W/O HCPCS

## 2025-01-15 PROCEDURE — 37799 UNLISTED PX VASCULAR SURGERY: CPT | Performed by: NURSE PRACTITIONER

## 2025-01-15 PROCEDURE — 80069 RENAL FUNCTION PANEL: CPT | Performed by: STUDENT IN AN ORGANIZED HEALTH CARE EDUCATION/TRAINING PROGRAM

## 2025-01-15 PROCEDURE — 2580000001 HC RX 258 IV SOLUTIONS

## 2025-01-15 PROCEDURE — 2500000004 HC RX 250 GENERAL PHARMACY W/ HCPCS (ALT 636 FOR OP/ED): Performed by: PHYSICIAN ASSISTANT

## 2025-01-15 PROCEDURE — 83930 ASSAY OF BLOOD OSMOLALITY: CPT

## 2025-01-15 PROCEDURE — 83935 ASSAY OF URINE OSMOLALITY: CPT

## 2025-01-15 PROCEDURE — 82330 ASSAY OF CALCIUM: CPT | Performed by: NURSE PRACTITIONER

## 2025-01-15 PROCEDURE — P9045 ALBUMIN (HUMAN), 5%, 250 ML: HCPCS | Mod: JZ

## 2025-01-15 PROCEDURE — 80069 RENAL FUNCTION PANEL: CPT

## 2025-01-15 PROCEDURE — 97530 THERAPEUTIC ACTIVITIES: CPT | Mod: GO

## 2025-01-15 PROCEDURE — 2500000004 HC RX 250 GENERAL PHARMACY W/ HCPCS (ALT 636 FOR OP/ED): Performed by: NURSE PRACTITIONER

## 2025-01-15 PROCEDURE — 71045 X-RAY EXAM CHEST 1 VIEW: CPT

## 2025-01-15 PROCEDURE — 85027 COMPLETE CBC AUTOMATED: CPT | Performed by: NURSE PRACTITIONER

## 2025-01-15 PROCEDURE — 2500000001 HC RX 250 WO HCPCS SELF ADMINISTERED DRUGS (ALT 637 FOR MEDICARE OP): Performed by: PHYSICIAN ASSISTANT

## 2025-01-15 PROCEDURE — 85730 THROMBOPLASTIN TIME PARTIAL: CPT | Performed by: NURSE PRACTITIONER

## 2025-01-15 PROCEDURE — 71045 X-RAY EXAM CHEST 1 VIEW: CPT | Performed by: RADIOLOGY

## 2025-01-15 PROCEDURE — 2020000001 HC ICU ROOM DAILY

## 2025-01-15 PROCEDURE — 83735 ASSAY OF MAGNESIUM: CPT | Performed by: NURSE PRACTITIONER

## 2025-01-15 PROCEDURE — 85610 PROTHROMBIN TIME: CPT | Performed by: NURSE PRACTITIONER

## 2025-01-15 PROCEDURE — 37799 UNLISTED PX VASCULAR SURGERY: CPT

## 2025-01-15 PROCEDURE — 2500000001 HC RX 250 WO HCPCS SELF ADMINISTERED DRUGS (ALT 637 FOR MEDICARE OP)

## 2025-01-15 PROCEDURE — 97535 SELF CARE MNGMENT TRAINING: CPT | Mod: GO

## 2025-01-15 PROCEDURE — 2500000002 HC RX 250 W HCPCS SELF ADMINISTERED DRUGS (ALT 637 FOR MEDICARE OP, ALT 636 FOR OP/ED): Performed by: NURSE PRACTITIONER

## 2025-01-15 PROCEDURE — 99231 SBSQ HOSP IP/OBS SF/LOW 25: CPT | Performed by: STUDENT IN AN ORGANIZED HEALTH CARE EDUCATION/TRAINING PROGRAM

## 2025-01-15 PROCEDURE — 82436 ASSAY OF URINE CHLORIDE: CPT

## 2025-01-15 PROCEDURE — 80069 RENAL FUNCTION PANEL: CPT | Performed by: NURSE PRACTITIONER

## 2025-01-15 PROCEDURE — 2500000004 HC RX 250 GENERAL PHARMACY W/ HCPCS (ALT 636 FOR OP/ED): Mod: JZ

## 2025-01-15 PROCEDURE — 76770 US EXAM ABDO BACK WALL COMP: CPT

## 2025-01-15 PROCEDURE — 99291 CRITICAL CARE FIRST HOUR: CPT | Performed by: STUDENT IN AN ORGANIZED HEALTH CARE EDUCATION/TRAINING PROGRAM

## 2025-01-15 PROCEDURE — 76770 US EXAM ABDO BACK WALL COMP: CPT | Performed by: RADIOLOGY

## 2025-01-15 RX ORDER — ALBUMIN HUMAN 50 G/1000ML
12.5 SOLUTION INTRAVENOUS ONCE
Status: COMPLETED | OUTPATIENT
Start: 2025-01-15 | End: 2025-01-15

## 2025-01-15 RX ORDER — ALPRAZOLAM 0.25 MG/1
0.25 TABLET ORAL NIGHTLY PRN
Status: DISCONTINUED | OUTPATIENT
Start: 2025-01-15 | End: 2025-01-16

## 2025-01-15 RX ORDER — METOPROLOL TARTRATE 25 MG/1
12.5 TABLET, FILM COATED ORAL EVERY 12 HOURS
Status: DISCONTINUED | OUTPATIENT
Start: 2025-01-15 | End: 2025-01-18

## 2025-01-15 RX ORDER — POTASSIUM CHLORIDE 14.9 MG/ML
20 INJECTION INTRAVENOUS
Status: COMPLETED | OUTPATIENT
Start: 2025-01-15 | End: 2025-01-15

## 2025-01-15 RX ORDER — POTASSIUM CHLORIDE 14.9 MG/ML
20 INJECTION INTRAVENOUS EVERY 6 HOURS PRN
Status: DISCONTINUED | OUTPATIENT
Start: 2025-01-15 | End: 2025-01-16

## 2025-01-15 RX ORDER — LIDOCAINE 560 MG/1
1 PATCH PERCUTANEOUS; TOPICAL; TRANSDERMAL DAILY
Status: DISCONTINUED | OUTPATIENT
Start: 2025-01-15 | End: 2025-02-05

## 2025-01-15 RX ADMIN — OXYCODONE 2.5 MG: 5 TABLET ORAL at 12:36

## 2025-01-15 RX ADMIN — SODIUM CHLORIDE 150 ML: 3 INJECTION, SOLUTION INTRAVENOUS at 03:18

## 2025-01-15 RX ADMIN — SODIUM PHOSPHATE, MONOBASIC, MONOHYDRATE AND SODIUM PHOSPHATE, DIBASIC, ANHYDROUS 15 MMOL: 142; 276 INJECTION, SOLUTION INTRAVENOUS at 10:22

## 2025-01-15 RX ADMIN — LIDOCAINE 4% 1 PATCH: 40 PATCH TOPICAL at 12:36

## 2025-01-15 RX ADMIN — Medication 3 MG: at 20:13

## 2025-01-15 RX ADMIN — HYDROCORTISONE SODIUM SUCCINATE 100 MG: 100 INJECTION, POWDER, FOR SOLUTION INTRAMUSCULAR; INTRAVENOUS at 10:23

## 2025-01-15 RX ADMIN — HEPARIN SODIUM 5000 UNITS: 5000 INJECTION INTRAVENOUS; SUBCUTANEOUS at 13:46

## 2025-01-15 RX ADMIN — CALCIUM GLUCONATE 1 G: 20 INJECTION, SOLUTION INTRAVENOUS at 10:36

## 2025-01-15 RX ADMIN — HEPARIN SODIUM 5000 UNITS: 5000 INJECTION INTRAVENOUS; SUBCUTANEOUS at 06:01

## 2025-01-15 RX ADMIN — ALPRAZOLAM 0.25 MG: 0.25 TABLET ORAL at 20:13

## 2025-01-15 RX ADMIN — HYDROCORTISONE SODIUM SUCCINATE 100 MG: 100 INJECTION, POWDER, FOR SOLUTION INTRAMUSCULAR; INTRAVENOUS at 17:34

## 2025-01-15 RX ADMIN — PANTOPRAZOLE SODIUM 40 MG: 40 INJECTION, POWDER, FOR SOLUTION INTRAVENOUS at 06:01

## 2025-01-15 RX ADMIN — HYDROCORTISONE SODIUM SUCCINATE 100 MG: 100 INJECTION, POWDER, FOR SOLUTION INTRAMUSCULAR; INTRAVENOUS at 01:36

## 2025-01-15 RX ADMIN — POTASSIUM CHLORIDE 20 MEQ: 14.9 INJECTION, SOLUTION INTRAVENOUS at 19:41

## 2025-01-15 RX ADMIN — POTASSIUM CHLORIDE 20 MEQ: 14.9 INJECTION, SOLUTION INTRAVENOUS at 17:34

## 2025-01-15 RX ADMIN — ALBUMIN HUMAN 12.5 G: 0.05 INJECTION, SOLUTION INTRAVENOUS at 01:36

## 2025-01-15 RX ADMIN — ACETAMINOPHEN 650 MG: 325 TABLET ORAL at 12:18

## 2025-01-15 RX ADMIN — HEPARIN SODIUM 5000 UNITS: 5000 INJECTION INTRAVENOUS; SUBCUTANEOUS at 22:17

## 2025-01-15 RX ADMIN — METOPROLOL TARTRATE 12.5 MG: 25 TABLET, FILM COATED ORAL at 17:08

## 2025-01-15 RX ADMIN — INSULIN LISPRO 1 UNITS: 100 INJECTION, SOLUTION INTRAVENOUS; SUBCUTANEOUS at 01:30

## 2025-01-15 RX ADMIN — PHYTONADIONE 10 MG: 10 INJECTION, EMULSION INTRAMUSCULAR; INTRAVENOUS; SUBCUTANEOUS at 11:57

## 2025-01-15 RX ADMIN — SODIUM CHLORIDE 150 ML: 3 INJECTION, SOLUTION INTRAVENOUS at 13:45

## 2025-01-15 RX ADMIN — ACETAMINOPHEN 650 MG: 325 TABLET ORAL at 17:34

## 2025-01-15 ASSESSMENT — PAIN SCALES - GENERAL
PAINLEVEL_OUTOF10: 0 - NO PAIN
PAINLEVEL_OUTOF10: 0 - NO PAIN
PAINLEVEL_OUTOF10: 4
PAINLEVEL_OUTOF10: 3
PAINLEVEL_OUTOF10: 0 - NO PAIN
PAINLEVEL_OUTOF10: 0 - NO PAIN
PAINLEVEL_OUTOF10: 4
PAINLEVEL_OUTOF10: 0 - NO PAIN
PAINLEVEL_OUTOF10: 0 - NO PAIN
PAINLEVEL_OUTOF10: 4
PAINLEVEL_OUTOF10: 0 - NO PAIN
PAINLEVEL_OUTOF10: 0 - NO PAIN

## 2025-01-15 ASSESSMENT — COGNITIVE AND FUNCTIONAL STATUS - GENERAL
TOILETING: A LOT
DRESSING REGULAR UPPER BODY CLOTHING: A LITTLE
DRESSING REGULAR LOWER BODY CLOTHING: A LOT
STANDING UP FROM CHAIR USING ARMS: A LITTLE
WALKING IN HOSPITAL ROOM: A LOT
MOBILITY SCORE: 16
DAILY ACTIVITIY SCORE: 18
CLIMB 3 TO 5 STEPS WITH RAILING: A LOT
MOVING TO AND FROM BED TO CHAIR: A LITTLE
MOVING FROM LYING ON BACK TO SITTING ON SIDE OF FLAT BED WITH BEDRAILS: A LITTLE
HELP NEEDED FOR BATHING: A LITTLE
TURNING FROM BACK TO SIDE WHILE IN FLAT BAD: A LITTLE

## 2025-01-15 ASSESSMENT — ACTIVITIES OF DAILY LIVING (ADL)
ADL_ASSISTANCE: INDEPENDENT
HOME_MANAGEMENT_TIME_ENTRY: 17

## 2025-01-15 NOTE — PROGRESS NOTES
Postop Pain HPI -   Palliative: relieved with IV analgesics and regional local anesthetics  Provocative: movement  Quality:  burning and aching  Radiation:  none  Severity:  0-5/10  Timing: constant    24-HOUR OPIOID CONSUMPTION:  Oxycodone 2.5mg x2    Scheduled medications  acetaminophen, 650 mg, oral, q6h  fluticasone furoate-vilanteroL, 1 puff, inhalation, Daily  heparin (porcine), 5,000 Units, subcutaneous, q8h  hydrocortisone sodium succinate, 100 mg, intravenous, q8h  insulin lispro, 0-5 Units, subcutaneous, q4h  melatonin, 3 mg, oral, Nightly  pantoprazole, 40 mg, intravenous, Daily before breakfast  phytonadione, 10 mg, intravenous, Daily  sodium phosphate, 15 mmol, intravenous, Once  tiotropium, 2 puff, inhalation, Daily      Continuous medications     PRN medications  PRN medications: albuterol, ALPRAZolam, calcium gluconate, calcium gluconate, dextrose, dextrose, glucagon, glucagon, magnesium sulfate, magnesium sulfate, oxyCODONE, potassium chloride     Physical Exam:  Constitutional:  no distress, alert and cooperative. Sitting in chair  Eyes: clear sclera  Head/Neck: No apparent injury, trachea midline  Respiratory/Thorax: Patent airways, thorax symmetric, breathing comfortably  Cardiovascular: no pitting edema  Gastrointestinal: Nondistended  Musculoskeletal: ROM intact  Extremities: no clubbing  Neurological: alert, chan x4  Psychological: Appropriate affect    Results for orders placed or performed during the hospital encounter of 01/13/25 (from the past 24 hours)   POCT GLUCOSE   Result Value Ref Range    POCT Glucose 120 (H) 74 - 99 mg/dL   POCT GLUCOSE   Result Value Ref Range    POCT Glucose 108 (H) 74 - 99 mg/dL   POCT GLUCOSE   Result Value Ref Range    POCT Glucose 147 (H) 74 - 99 mg/dL   Blood Gas Arterial Full Panel   Result Value Ref Range    POCT pH, Arterial 7.36 (L) 7.38 - 7.42 pH    POCT pCO2, Arterial 50 (H) 38 - 42 mm Hg    POCT pO2, Arterial 76 (L) 85 - 95 mm Hg    POCT SO2, Arterial  97 94 - 100 %    POCT Oxy Hemoglobin, Arterial 94.8 94.0 - 98.0 %    POCT Hematocrit Calculated, Arterial 26.0 (L) 36.0 - 46.0 %    POCT Sodium, Arterial 120 (LL) 136 - 145 mmol/L    POCT Potassium, Arterial 3.7 3.5 - 5.3 mmol/L    POCT Chloride, Arterial 88 (L) 98 - 107 mmol/L    POCT Ionized Calcium, Arterial 1.04 (L) 1.10 - 1.33 mmol/L    POCT Glucose, Arterial 115 (H) 74 - 99 mg/dL    POCT Lactate, Arterial 0.8 0.4 - 2.0 mmol/L    POCT Base Excess, Arterial 2.3 -2.0 - 3.0 mmol/L    POCT HCO3 Calculated, Arterial 28.2 (H) 22.0 - 26.0 mmol/L    POCT Hemoglobin, Arterial 8.6 (L) 12.0 - 16.0 g/dL    POCT Anion Gap, Arterial 8 (L) 10 - 25 mmo/L    Patient Temperature 37.0 degrees Celsius    FiO2 21 %   Calcium, Ionized   Result Value Ref Range    POCT Calcium, Ionized 1.02 (L) 1.1 - 1.33 mmol/L   CBC   Result Value Ref Range    WBC 9.9 4.4 - 11.3 x10*3/uL    nRBC 0.0 0.0 - 0.0 /100 WBCs    RBC 3.24 (L) 4.00 - 5.20 x10*6/uL    Hemoglobin 8.0 (L) 12.0 - 16.0 g/dL    Hematocrit 24.8 (L) 36.0 - 46.0 %    MCV 77 (L) 80 - 100 fL    MCH 24.7 (L) 26.0 - 34.0 pg    MCHC 32.3 32.0 - 36.0 g/dL    RDW 13.2 11.5 - 14.5 %    Platelets 182 150 - 450 x10*3/uL   Coagulation Screen   Result Value Ref Range    Protime 23.8 (H) 9.8 - 12.8 seconds    INR 2.1 (H) 0.9 - 1.1    aPTT 73 (H) 27 - 38 seconds   Magnesium   Result Value Ref Range    Magnesium 1.82 1.60 - 2.40 mg/dL   Renal Function Panel   Result Value Ref Range    Glucose 117 (H) 74 - 99 mg/dL    Sodium 125 (L) 136 - 145 mmol/L    Potassium 3.9 3.5 - 5.3 mmol/L    Chloride 85 (L) 98 - 107 mmol/L    Bicarbonate 28 21 - 32 mmol/L    Anion Gap 16 10 - 20 mmol/L    Urea Nitrogen 7 6 - 23 mg/dL    Creatinine 0.26 (L) 0.50 - 1.05 mg/dL    eGFR >90 >60 mL/min/1.73m*2    Calcium 8.1 (L) 8.6 - 10.6 mg/dL    Phosphorus 2.6 2.5 - 4.9 mg/dL    Albumin 4.1 3.4 - 5.0 g/dL   Hepatic function panel   Result Value Ref Range    Albumin 4.1 3.4 - 5.0 g/dL    Bilirubin, Total 0.4 0.0 - 1.2 mg/dL     Bilirubin, Direct 0.1 0.0 - 0.3 mg/dL    Alkaline Phosphatase 32 (L) 33 - 136 U/L    ALT 13 7 - 45 U/L    AST 52 (H) 9 - 39 U/L    Total Protein 6.0 (L) 6.4 - 8.2 g/dL   Osmolality   Result Value Ref Range    Osmolality, Serum 252 (L) 280 - 300 mOsm/kg   POCT GLUCOSE   Result Value Ref Range    POCT Glucose 118 (H) 74 - 99 mg/dL   POCT GLUCOSE   Result Value Ref Range    POCT Glucose 170 (H) 74 - 99 mg/dL   Osmolality   Result Value Ref Range    Osmolality, Serum 251 (L) 280 - 300 mOsm/kg   Magnesium   Result Value Ref Range    Magnesium 2.21 1.60 - 2.40 mg/dL   Calcium, Ionized   Result Value Ref Range    POCT Calcium, Ionized 1.02 (L) 1.1 - 1.33 mmol/L   Coagulation Screen   Result Value Ref Range    Protime 26.0 (H) 9.8 - 12.8 seconds    INR 2.3 (H) 0.9 - 1.1    aPTT 58 (H) 27 - 38 seconds   Renal Function Panel   Result Value Ref Range    Glucose 156 (H) 74 - 99 mg/dL    Sodium 120 (LL) 136 - 145 mmol/L    Potassium 4.0 3.5 - 5.3 mmol/L    Chloride 82 (L) 98 - 107 mmol/L    Bicarbonate 26 21 - 32 mmol/L    Anion Gap 16 10 - 20 mmol/L    Urea Nitrogen 7 6 - 23 mg/dL    Creatinine 0.24 (L) 0.50 - 1.05 mg/dL    eGFR >90 >60 mL/min/1.73m*2    Calcium 8.3 (L) 8.6 - 10.6 mg/dL    Phosphorus 2.3 (L) 2.5 - 4.9 mg/dL    Albumin 4.1 3.4 - 5.0 g/dL   CBC   Result Value Ref Range    WBC 17.2 (H) 4.4 - 11.3 x10*3/uL    nRBC 0.0 0.0 - 0.0 /100 WBCs    RBC 3.19 (L) 4.00 - 5.20 x10*6/uL    Hemoglobin 8.2 (L) 12.0 - 16.0 g/dL    Hematocrit 24.5 (L) 36.0 - 46.0 %    MCV 77 (L) 80 - 100 fL    MCH 25.7 (L) 26.0 - 34.0 pg    MCHC 33.5 32.0 - 36.0 g/dL    RDW 13.0 11.5 - 14.5 %    Platelets 242 150 - 450 x10*3/uL   POCT GLUCOSE   Result Value Ref Range    POCT Glucose 175 (H) 74 - 99 mg/dL   Urine electrolytes   Result Value Ref Range    Sodium, Urine Random 28 mmol/L    Sodium/Creatinine Ratio 34 Not established. mmol/g Creat    Potassium, Urine Random 37 mmol/L    Potassium/Creatinine Ratio 46 Not established mmol/g Creat     Chloride, Urine Random 66 mmol/L    Chloride/Creatinine Ratio 81 38 - 318 mmol/g creat    Creatinine, Urine Random 81.3 20.0 - 320.0 mg/dL   Osmolality, urine   Result Value Ref Range    Osmolality, Urine Random 648 200 - 1,200 mOsm/kg   Osmolality   Result Value Ref Range    Osmolality, Serum 251 (L) 280 - 300 mOsm/kg   Renal Function Panel   Result Value Ref Range    Glucose 135 (H) 74 - 99 mg/dL    Sodium 122 (L) 136 - 145 mmol/L    Potassium 3.8 3.5 - 5.3 mmol/L    Chloride 85 (L) 98 - 107 mmol/L    Bicarbonate 26 21 - 32 mmol/L    Anion Gap 15 10 - 20 mmol/L    Urea Nitrogen 6 6 - 23 mg/dL    Creatinine 0.21 (L) 0.50 - 1.05 mg/dL    eGFR >90 >60 mL/min/1.73m*2    Calcium 7.8 (L) 8.6 - 10.6 mg/dL    Phosphorus 2.0 (L) 2.5 - 4.9 mg/dL    Albumin 4.0 3.4 - 5.0 g/dL   POCT GLUCOSE   Result Value Ref Range    POCT Glucose 144 (H) 74 - 99 mg/dL   POCT GLUCOSE   Result Value Ref Range    POCT Glucose 122 (H) 74 - 99 mg/dL        Faustina Vick is a 64 y.o. year old female patient who presents for Procedure(s):  PNEUMONECTOMY with Jonah Wagner MD on 1/13/2025. Acute Pain consulted for assistance with pain control. Patient received a postoperative R CARLOS MANUEL nerve block at T5 with catheter placement on 1/13/25.     Plan:  - Right erector spinae block with catheter performed post-operatively on 1/13/25  - Ambit ball with Ropivacaine 0.2%/NaCl 0.9% 500mL, Rate 10 cc/hr unilaterally   - Ambit medication will not interfere with pain medication prescribed by the primary team.   - Please be aware of local anesthetic toxic dose and absorption variability before considering lidocaine patches  - Received heparin at 0600  - Plan to remove CARLOS MANUEL catheter at 1000 today  - Acute pain team will sign off after catheter removal  - Rest of pain management per primary team     Acute Pain Resident  pg [phone] ph [phone]

## 2025-01-15 NOTE — PROGRESS NOTES
"Physical Therapy    Physical Therapy Treatment    Patient Name: Faustina Vick \"Jannie\"  MRN: [id number]  Department: [facility] [place] [facility]  Room: 12/12-A  Today's Date: 1/15/2025  Time Calculation  Start Time: 0851  Stop Time: 0912  Time Calculation (min): 21 min    Assessment/Plan   PT Assessment  PT Assessment Results: Decreased strength, Decreased range of motion, Impaired balance, Decreased endurance, Decreased mobility, Decreased safety awareness  Rehab Prognosis: Good  Barriers to Discharge Home: Physical needs  Physical Needs: Stair navigation into home limited by function/safety  Evaluation/Treatment Tolerance: Patient tolerated treatment well (Pt tolerated treatment well.)  Medical Staff Made Aware: Yes  Strengths: Ability to acquire knowledge, Attitude of self, Support of Caregivers  Barriers to Participation: Comorbidities, Housing layout  End of Session Communication: Bedside nurse  Assessment Comment: Pt tolerated treatement well and agreeable to therapy. Pt demos decreases in BLE strength, ROM, mobility, transfer ability, and gait deviations. Skilled interventions can benefit pt to improve deficits noted above.  End of Session Patient Position: Up in chair     PT Plan  Treatment/Interventions: Bed mobility, Transfer training, Gait training, Stair training, Balance training, Endurance training, Strengthening, Range of motion, Therapeutic exercise, Therapeutic activity, Home exercise program  PT Plan: Ongoing PT  PT Frequency: 5 times per week  PT Discharge Recommendations: Moderate intensity level of continued care  Equipment Recommended upon Discharge: Wheeled walker  PT Recommended Transfer Status: Assist x1  PT - OK to Discharge: Yes  General Visit Information:   PT  Visit  PT Received On: 01/15/25  General  Reason for Referral: Right pneumonectomy, intrapericardial, with en block chest wall resection on 1/13 c/b hypotension post op  Past Medical History Relevant to Rehab: stage III SCC of the lung s/p " chemotherapy+radiation last chemotherapy session on 8/28/2024  PT Missed Visit: Yes  Family/Caregiver Present: Yes  Prior to Session Communication: Bedside nurse  Patient Position Received: Bed, 3 rail up, Alarm off, not on at start of session  General Comment: Pt supine in bed on arrival, alert, cooperative, and willing to participate. Low sodium levels associated with pt having difficulty motor planning during session. Tele, A line, CVP monitor, IV, Montiel, Pain block.    Subjective   Precautions:  Precautions  Medical Precautions: Fall precautions  Precautions Comment: MAP 65-90    Objective   Pain:  Pain Assessment  Pain Assessment: 0-10  0-10 (Numeric) Pain Score: 0 - No pain  Cognition:  Cognition  Overall Cognitive Status: Within Functional Limits  Orientation Level: Oriented X4  Coordination:  Movements are Fluid and Coordinated: Yes  Postural Control:  Postural Control  Postural Control: Within Functional Limits  Posture Comment: Pt demos increased FHP and throacic kyphosis sitting EOB ~5 min  Static Sitting Balance  Static Sitting-Balance Support: Right upper extremity supported  Static Sitting-Level of Assistance: Close supervision  Static Sitting-Comment/Number of Minutes: Sat EOB w/ 1 UE support CGA ~5 min  Dynamic Sitting Balance  Dynamic Sitting-Balance Support: Bilateral upper extremity supported  Dynamic Sitting-Level of Assistance: Contact guard  Dynamic Sitting-Comments: Sat EOB w/ BUE support while PT assessed BLE ROM and strength.  Static Standing Balance  Static Standing-Balance Support: Bilateral upper extremity supported  Static Standing-Level of Assistance: Contact guard  Static Standing-Comment/Number of Minutes: Static standing w/ FWW CGA ~3 min  Extremity/Trunk Assessments:  RUE   RUE : Within Functional Limits  LUE   LUE: Within Functional Limits  RLE   RLE : Within Functional Limits  LLE   LLE : Within Functional Limits  Activity Tolerance:  Activity Tolerance  Endurance: Tolerates less  than 10 min exercise, no significant change in vital signs  Early Mobility/Exercise Safety Screen: Proceed with mobilization - No exclusion criteria met  Treatments:    Bed Mobility 1  Bed Mobility 1: Supine to sitting  Level of Assistance 1: Minimum assistance  Bed Mobility Comments 1: HOB elevated, increased time, pt required UE assistance to intiate mobilization    Ambulation/Gait Training  Ambulation/Gait Training Performed: Yes  Ambulation/Gait Training 1  Surface 1: Level tile  Device 1: Rolling walker  Assistance 1: Contact guard  Quality of Gait 1: Shuffling gait, Decreased step length  Comments/Distance (ft) 1: Pt amb 3' w/ FWW CGA w/ vc's to improve sequencing and AD management.  Transfers  Transfer: Yes  Transfer 1  Transfer From 1: Sit to  Transfer to 1: Stand  Transfer Device 1: Walker  Transfer Level of Assistance 1: Contact guard  Trials/Comments 1: Pt t/f sit to stand w/ FWW CGA w/ vc's on proper hand and foot positioning to improve safety and effciency while transferring.  Transfers 2  Transfer From 2: Stand to  Transfer to 2: Chair with arms  Transfer Device 2: Walker  Transfer Level of Assistance 2: Contact guard, +1 to manage equipment  Trials/Comments 2: Pt required vc's for sequencing and AD managment    Outcome Measures:  [facility] Basic Mobility  Turning from your back to your side while in a flat bed without using bedrails: A little  Moving from lying on your back to sitting on the side of a flat bed without using bedrails: A little  Moving to and from bed to chair (including a wheelchair): A little  Standing up from a chair using your arms (e.g. wheelchair or bedside chair): A little  To walk in hospital room: A lot  Climbing 3-5 steps with railing: A lot  Basic Mobility - Total Score: 16    FSS-ICU  Ambulation: Walks <50 feet with any assistance x1 or walks any distance with assistance x2 people  Rolling: Minimal assistance (performs 75% or more of task)  Sitting: Minimal assistance (performs  75% or more of task)  Transfer Sit-to-Stand: Minimal assistance (performs 75% or more of task)  Transfer Supine-to-Sit: Minimal assistance (performs 75% or more of task)  Total Score: 17      Early Mobility/Exercise Safety Screen: Proceed with mobilization - No exclusion criteria met  ICU Mobility Scale: Walking with assistance of 1 person [8]    Education Documentation  No documentation found.  Education Comments  No comments found.    Vitals    01/15/25 0851 01/15/25 0912   Vital Signs   Vitals Session Pre PT Post PT   Heart Rate 84 79   Resp 16 19   SpO2 98 % 95 %   /56 108/60   MAP (mmHg) 91 70     OP EDUCATION:       Encounter Problems       Encounter Problems (Active)       Balance       Pt will demo static sitting balance w/o UE support IND to decrease risk of falls in home environment (Progressing)       Start:  01/15/25    Expected End:  02/05/25               Mobility       Pt will mobilize supine to sitting MI to promote functional independence (Progressing)       Start:  01/15/25    Expected End:  02/05/25               PT Transfers       Pt will transfer sit to stand w/ LRAD MI to improve functional mobility.  (Progressing)       Start:  01/15/25    Expected End:  02/05/25                 Alexx Hayes, SPT  Student physical therapist working under the direct supervision of licensed therapist

## 2025-01-15 NOTE — CONSULTS
"Inpatient consult to Nephrology  Consult performed by: Osito Jimenes DO  Consult ordered by: Jonah Wagner MD  Reason for consult: symptomatic hyponatremia      NEPHROLOGY NEW CONSULT NOTE   Faustina Vick   64 y.o.    @WT@  MRN/Room: [id number]/12/12-A    Reason for consult: symptomatic hyponatremia    HPI:  Faustina Vick is a 64 y.o. female with a past medical hx of DEEPAK, tobacco use disorder and stage III squamous cell carcinoma who presented to [facility] on 1/13 for scheduled Right pneumonectomy, intrapericardial, with en bloc wall resection by Dr. Jonah Wagner 1/13. Nephrology is being consulted for symptomatic hyponatremia (reportedly confused and hallucinating).    Briefly, squamous cell of right lung diagnosed 4/2023. Immunotherapy and radiation started 5/2024. Completed 10 rounds as of 12/8/24. PET 12/30/24 showed progression to right chest without distant metastasis. Evaluated by CTS on 1/8/25 with R-pneumonectomy 1/13. Intra-op patient was hypotension and placed on vasopressin, epinephrine, phenylephrine. Later in post-op period, hypotension persisted and patient transitioned to vaso/levophed. Acute drop in sodium noted overnight from 131 to 120 in 24hr period. Patient given 150mL bolus hypertonic saline with only small correction seen 4hrs later to 122. Vasopressin stopped 0700 1/15.    On evaluation, she is alert and orientated x4. She reports that she feels completely fine today and denies confusion, hallucinations last night, but stating that, \"I just felt the vibe last night was off.\" She denies ever being told that her sodium was low in the past. Maintains that her diet pre-operative included solute rich foods; minimal appetite but eating one full meal (gives pizza as example). Confirms that she has been compliant with fluid restriction. Appears generally euvolemic.    Nephrology consulted for symptomatic hyponatremia    Baseline creatinine: 0.6  Creatinine at the time of admission 1/13/25 was " "0.39.  Blood pressure remains low with need for pressor support.  Urine output was 595mL (0.5ml/kg/hr) in last 24hrs  No recent contrast studies  Recent nephrotoxic medication use (vanco/vanco+zosyn, amphotericin B, gentamicin, chemo drugs, calcineurin inhibitors, NSAIDs): none  Recent ACE/ARB/diuretic use: none    In The ER: /56   Pulse 84   Temp 36.3 °C (97.3 °F) (Temporal)   Resp 16   Ht 1.651 m (5' 5\")   Wt 52.1 kg (114 lb 13.8 oz)   SpO2 98%   BMI 19.11 kg/m²      Past Medical History:   Diagnosis Date    Anxiety     treated by PCP with PRN medication    Arthritis     hands    Asthma     controlled    History of SCC (squamous cell carcinoma) of skin     skin on bridge of her nose    Personal history of antineoplastic chemotherapy     Primary cancer of right upper lobe of lung (Multi)     seen by Dr. Jonah Wagner 10/17/24--7.3 cm mass right upper lobe lung cancer      Past Surgical History:   Procedure Laterality Date     SECTION, LOW TRANSVERSE      INSERTION / REMOVAL / REPLACEMENT VENOUS ACCESS CATHETER      OTHER SURGICAL HISTORY      MEDI PORT INSERTION    OVARIAN CYST SURGERY        Family History   Problem Relation Name Age of Onset    Adrenal disorder Mother      Bone cancer Mother      Skin cancer Father       Social History     Socioeconomic History    Marital status:      Spouse name: Not on file    Number of children: Not on file    Years of education: Not on file    Highest education level: Not on file   Occupational History    Not on file   Tobacco Use    Smoking status: Former     Current packs/day: 0.00     Types: Cigarettes     Quit date: 2024     Years since quittin.8     Passive exposure: Past    Smokeless tobacco: Former   Substance and Sexual Activity    Alcohol use: Not Currently    Drug use: Never    Sexual activity: Defer   Other Topics Concern    Not on file   Social History Narrative    Not on file     Social Drivers of Health     Financial " Resource Strain: Low Risk  (12/4/2024)    Received from [facility]    Overall Financial Resource Strain (CARDIA)     Difficulty of Paying Living Expenses: Not hard at all   Food Insecurity: No Food Insecurity (1/8/2025)    Hunger Vital Sign     Worried About Running Out of Food in the Last Year: Never true     Ran Out of Food in the Last Year: Never true   Transportation Needs: No Transportation Needs (12/4/2024)    Received from [facility]    PRAPARE - Transportation     Lack of Transportation (Medical): No     Lack of Transportation (Non-Medical): No   Physical Activity: Not on file   Stress: Not on file   Social Connections: Not on file   Intimate Partner Violence: Not on file   Housing Stability: Unknown (12/4/2024)    Received from [facility]    Housing Stability Vital Sign     Unable to Pay for Housing in the Last Year: No     Number of Times Moved in the Last Year: Not on file     Homeless in the Last Year: No       No Known Allergies     Medications Prior to Admission   Medication Sig Dispense Refill Last Dose/Taking    ALPRAZolam (Xanax) 1 mg tablet Take 1 tablet (1 mg) by mouth as needed at bedtime.   1/12/2025    benzonatate (Tessalon) 200 mg capsule Take 1 capsule (200 mg) by mouth 3 times a day as needed.   1/13/2025 Morning    chlorhexidine (Hibiclens) 4 % external liquid Apply topically once daily as needed for wound care. 473 mL 0 1/13/2025 Morning    chlorhexidine (Peridex) 0.12 % solution Use 15 mL in the mouth or throat if needed.   1/13/2025 Morning    Trelegy Ellipta 100-62.5-25 mcg blister with device Inhale 1 puff once daily.   1/13/2025 Morning    Ventolin HFA 90 mcg/actuation inhaler Inhale 2 puffs every 6 hours if needed for wheezing.   1/12/2025        Meds:   acetaminophen, 650 mg, q6h  fluticasone furoate-vilanteroL, 1 puff, Daily  heparin (porcine), 5,000 Units, q8h  hydrocortisone sodium succinate, 100 mg,  q8h  insulin lispro, 0-5 Units, q4h  melatonin, 3 mg, Nightly  pantoprazole, 40 mg, Daily before breakfast  phytonadione, 10 mg, Daily  sodium phosphate, 15 mmol, Once  tiotropium, 2 puff, Daily         albuterol, 2 puff, q6h PRN  ALPRAZolam, 0.25 mg, Nightly PRN  calcium gluconate, 1 g, q6h PRN  calcium gluconate, 2 g, q6h PRN  dextrose, 12.5 g, q15 min PRN  dextrose, 25 g, q15 min PRN  glucagon, 1 mg, q15 min PRN  glucagon, 1 mg, q15 min PRN  magnesium sulfate, 2 g, q6h PRN  magnesium sulfate, 4 g, q6h PRN  oxyCODONE, 2.5 mg, q4h PRN  potassium chloride, 40 mEq, q6h PRN        Vitals:    01/15/25 0851   BP: 109/56   Pulse: 84   Resp: 16   Temp:    SpO2: 98%        01/13 1900 - 01/15 0659  In: 3231.6 [P.O.:1390; I.V.:486.6]  Out: 1940 [Urine:1500]   Weight change:      Constitutional: underweight, A&Ox3, no acute distress, alert and cooperative  Eyes: EOMI, clear sclera  Respiratory/Thorax: diminished lung sounds  Cardiovascular: Regular rate, regular rhythm  Gastrointestinal: Nondistended, soft, non-tender  Extremities: normal extremities, no cyanosis edema  Skin: Warm and dry    Blood Labs:  Results for orders placed or performed during the hospital encounter of 01/13/25 (from the past 24 hours)   POCT GLUCOSE   Result Value Ref Range    POCT Glucose 120 (H) 74 - 99 mg/dL   POCT GLUCOSE   Result Value Ref Range    POCT Glucose 108 (H) 74 - 99 mg/dL   POCT GLUCOSE   Result Value Ref Range    POCT Glucose 147 (H) 74 - 99 mg/dL   Blood Gas Arterial Full Panel   Result Value Ref Range    POCT pH, Arterial 7.36 (L) 7.38 - 7.42 pH    POCT pCO2, Arterial 50 (H) 38 - 42 mm Hg    POCT pO2, Arterial 76 (L) 85 - 95 mm Hg    POCT SO2, Arterial 97 94 - 100 %    POCT Oxy Hemoglobin, Arterial 94.8 94.0 - 98.0 %    POCT Hematocrit Calculated, Arterial 26.0 (L) 36.0 - 46.0 %    POCT Sodium, Arterial 120 (LL) 136 - 145 mmol/L    POCT Potassium, Arterial 3.7 3.5 - 5.3 mmol/L    POCT Chloride, Arterial 88 (L) 98 - 107 mmol/L    POCT  Ionized Calcium, Arterial 1.04 (L) 1.10 - 1.33 mmol/L    POCT Glucose, Arterial 115 (H) 74 - 99 mg/dL    POCT Lactate, Arterial 0.8 0.4 - 2.0 mmol/L    POCT Base Excess, Arterial 2.3 -2.0 - 3.0 mmol/L    POCT HCO3 Calculated, Arterial 28.2 (H) 22.0 - 26.0 mmol/L    POCT Hemoglobin, Arterial 8.6 (L) 12.0 - 16.0 g/dL    POCT Anion Gap, Arterial 8 (L) 10 - 25 mmo/L    Patient Temperature 37.0 degrees Celsius    FiO2 21 %   Calcium, Ionized   Result Value Ref Range    POCT Calcium, Ionized 1.02 (L) 1.1 - 1.33 mmol/L   CBC   Result Value Ref Range    WBC 9.9 4.4 - 11.3 x10*3/uL    nRBC 0.0 0.0 - 0.0 /100 WBCs    RBC 3.24 (L) 4.00 - 5.20 x10*6/uL    Hemoglobin 8.0 (L) 12.0 - 16.0 g/dL    Hematocrit 24.8 (L) 36.0 - 46.0 %    MCV 77 (L) 80 - 100 fL    MCH 24.7 (L) 26.0 - 34.0 pg    MCHC 32.3 32.0 - 36.0 g/dL    RDW 13.2 11.5 - 14.5 %    Platelets 182 150 - 450 x10*3/uL   Coagulation Screen   Result Value Ref Range    Protime 23.8 (H) 9.8 - 12.8 seconds    INR 2.1 (H) 0.9 - 1.1    aPTT 73 (H) 27 - 38 seconds   Magnesium   Result Value Ref Range    Magnesium 1.82 1.60 - 2.40 mg/dL   Renal Function Panel   Result Value Ref Range    Glucose 117 (H) 74 - 99 mg/dL    Sodium 125 (L) 136 - 145 mmol/L    Potassium 3.9 3.5 - 5.3 mmol/L    Chloride 85 (L) 98 - 107 mmol/L    Bicarbonate 28 21 - 32 mmol/L    Anion Gap 16 10 - 20 mmol/L    Urea Nitrogen 7 6 - 23 mg/dL    Creatinine 0.26 (L) 0.50 - 1.05 mg/dL    eGFR >90 >60 mL/min/1.73m*2    Calcium 8.1 (L) 8.6 - 10.6 mg/dL    Phosphorus 2.6 2.5 - 4.9 mg/dL    Albumin 4.1 3.4 - 5.0 g/dL   Hepatic function panel   Result Value Ref Range    Albumin 4.1 3.4 - 5.0 g/dL    Bilirubin, Total 0.4 0.0 - 1.2 mg/dL    Bilirubin, Direct 0.1 0.0 - 0.3 mg/dL    Alkaline Phosphatase 32 (L) 33 - 136 U/L    ALT 13 7 - 45 U/L    AST 52 (H) 9 - 39 U/L    Total Protein 6.0 (L) 6.4 - 8.2 g/dL   Osmolality   Result Value Ref Range    Osmolality, Serum 252 (L) 280 - 300 mOsm/kg   POCT GLUCOSE   Result Value  Ref Range    POCT Glucose 118 (H) 74 - 99 mg/dL   POCT GLUCOSE   Result Value Ref Range    POCT Glucose 170 (H) 74 - 99 mg/dL   Osmolality   Result Value Ref Range    Osmolality, Serum 251 (L) 280 - 300 mOsm/kg   Magnesium   Result Value Ref Range    Magnesium 2.21 1.60 - 2.40 mg/dL   Calcium, Ionized   Result Value Ref Range    POCT Calcium, Ionized 1.02 (L) 1.1 - 1.33 mmol/L   Coagulation Screen   Result Value Ref Range    Protime 26.0 (H) 9.8 - 12.8 seconds    INR 2.3 (H) 0.9 - 1.1    aPTT 58 (H) 27 - 38 seconds   Renal Function Panel   Result Value Ref Range    Glucose 156 (H) 74 - 99 mg/dL    Sodium 120 (LL) 136 - 145 mmol/L    Potassium 4.0 3.5 - 5.3 mmol/L    Chloride 82 (L) 98 - 107 mmol/L    Bicarbonate 26 21 - 32 mmol/L    Anion Gap 16 10 - 20 mmol/L    Urea Nitrogen 7 6 - 23 mg/dL    Creatinine 0.24 (L) 0.50 - 1.05 mg/dL    eGFR >90 >60 mL/min/1.73m*2    Calcium 8.3 (L) 8.6 - 10.6 mg/dL    Phosphorus 2.3 (L) 2.5 - 4.9 mg/dL    Albumin 4.1 3.4 - 5.0 g/dL   CBC   Result Value Ref Range    WBC 17.2 (H) 4.4 - 11.3 x10*3/uL    nRBC 0.0 0.0 - 0.0 /100 WBCs    RBC 3.19 (L) 4.00 - 5.20 x10*6/uL    Hemoglobin 8.2 (L) 12.0 - 16.0 g/dL    Hematocrit 24.5 (L) 36.0 - 46.0 %    MCV 77 (L) 80 - 100 fL    MCH 25.7 (L) 26.0 - 34.0 pg    MCHC 33.5 32.0 - 36.0 g/dL    RDW 13.0 11.5 - 14.5 %    Platelets 242 150 - 450 x10*3/uL   POCT GLUCOSE   Result Value Ref Range    POCT Glucose 175 (H) 74 - 99 mg/dL   Urine electrolytes   Result Value Ref Range    Sodium, Urine Random 28 mmol/L    Sodium/Creatinine Ratio 34 Not established. mmol/g Creat    Potassium, Urine Random 37 mmol/L    Potassium/Creatinine Ratio 46 Not established mmol/g Creat    Chloride, Urine Random 66 mmol/L    Chloride/Creatinine Ratio 81 38 - 318 mmol/g creat    Creatinine, Urine Random 81.3 20.0 - 320.0 mg/dL   Osmolality, urine   Result Value Ref Range    Osmolality, Urine Random 648 200 - 1,200 mOsm/kg   Osmolality   Result Value Ref Range    Osmolality,  Serum 251 (L) 280 - 300 mOsm/kg   Renal Function Panel   Result Value Ref Range    Glucose 135 (H) 74 - 99 mg/dL    Sodium 122 (L) 136 - 145 mmol/L    Potassium 3.8 3.5 - 5.3 mmol/L    Chloride 85 (L) 98 - 107 mmol/L    Bicarbonate 26 21 - 32 mmol/L    Anion Gap 15 10 - 20 mmol/L    Urea Nitrogen 6 6 - 23 mg/dL    Creatinine 0.21 (L) 0.50 - 1.05 mg/dL    eGFR >90 >60 mL/min/1.73m*2    Calcium 7.8 (L) 8.6 - 10.6 mg/dL    Phosphorus 2.0 (L) 2.5 - 4.9 mg/dL    Albumin 4.0 3.4 - 5.0 g/dL   POCT GLUCOSE   Result Value Ref Range    POCT Glucose 144 (H) 74 - 99 mg/dL   POCT GLUCOSE   Result Value Ref Range    POCT Glucose 122 (H) 74 - 99 mg/dL       ASSESSMENT:  Faustina Vick is a 64 y.o. female with a past medical hx of DEEPAK, tobacco use disorder and stage III squamous cell carcinoma who presented to [facility] on 1/13 for scheduled Right pneumonectomy, intrapericardial, with en bloc wall resection by Dr. Jonah Wagner 1/13. Nephrology is being consulted for symptomatic hyponatremia (reportedly confused and hallucinating). Symptoms currently not present. Received 3% saline 150mL 1/15 with minimal correction. Currently believe to be ADH mediated.     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 No hx of CKD or recent diuretic use. Unlikely primary adrenal insufficiency.    - Baseline creatinine: 0.6  - UA showed:  --  - Urine electrolyes 1/15 (obtain before 3% saline admin): Na 28, Cl 66, Cr 81.3   - 1/15 Sosm 251, Uosm 648  - Clinical volume status: Euvolemic  - Electrolytes (Na, K, Ca, Phos): hypochloremic hyponatremia, hypophos  - Acid base status: Respiratory Acidosis (1/14 ABG 7.36/50/76 w bicarb 26)    #Stage III squamous cell carcinoma s/p R-pneumonectomy  1/13    Recommendations:  - S/p 150mL bolus 3% saline (120-->122)  - Goal correction in next 24hrs (1/16 0500): 128-132  - Check serum sodium q4h  - Maintain 1L PO restriction  - Indication for dialysis:  No   - Strict Is/Os  - Page nephrology if serum sodium corrects outside of goal range    Osito Jimenes DO  Nephrology Resident  24 hour Renal Pager - [phone]    Discussed with attending nephrologist

## 2025-01-15 NOTE — PROGRESS NOTES
"Faustina Vick \"Noam" is a 64 y.o. female on day 2 of admission presenting with Primary cancer of right upper lobe of lung (Multi).    Subjective   NAEO. Patient off pressors. Montiel replaced due to not passing TOV and to track I/O. Patient reports she is a little sore after removal of pain catheters but her pain is controlled.        Objective     Physical Exam  Constitutional:       General: She is not in acute distress.  HENT:      Head: Normocephalic and atraumatic.   Eyes:      Conjunctiva/sclera: Conjunctivae normal.   Cardiovascular:      Rate and Rhythm: Normal rate.      Comments: Normotensive off pressors  Pulmonary:      Effort: Pulmonary effort is normal.      Comments: R CT removed, dressing intact, no leakage. On RA  Abdominal:      General: There is no distension.   Genitourinary:     Comments: Montiel in place  Musculoskeletal:         General: No swelling.   Skin:     General: Skin is warm and dry.   Neurological:      General: No focal deficit present.      Mental Status: She is alert.   Psychiatric:         Behavior: Behavior normal.         Last Recorded Vitals  Blood pressure 107/60, pulse 78, temperature 36.1 °C (97 °F), temperature source Temporal, resp. rate 18, height 1.651 m (5' 5\"), weight 52.1 kg (114 lb 13.8 oz), SpO2 100%.  Intake/Output last 3 Shifts:  I/O last 3 completed shifts:  In: 3231.6 (62 mL/kg) [P.O.:1390; I.V.:486.6 (9.3 mL/kg); Blood:250; IV Piggyback:1105]  Out: 1940 (37.2 mL/kg) [Urine:1500 (0.8 mL/kg/hr); Chest Tube:440]  Weight: 52.1 kg     Relevant Results  Results for orders placed or performed during the hospital encounter of 01/13/25 (from the past 24 hours)   POCT GLUCOSE   Result Value Ref Range    POCT Glucose 147 (H) 74 - 99 mg/dL   Blood Gas Arterial Full Panel   Result Value Ref Range    POCT pH, Arterial 7.36 (L) 7.38 - 7.42 pH    POCT pCO2, Arterial 50 (H) 38 - 42 mm Hg    POCT pO2, Arterial 76 (L) 85 - 95 mm Hg    POCT SO2, Arterial 97 94 - 100 %    POCT Oxy " Hemoglobin, Arterial 94.8 94.0 - 98.0 %    POCT Hematocrit Calculated, Arterial 26.0 (L) 36.0 - 46.0 %    POCT Sodium, Arterial 120 (LL) 136 - 145 mmol/L    POCT Potassium, Arterial 3.7 3.5 - 5.3 mmol/L    POCT Chloride, Arterial 88 (L) 98 - 107 mmol/L    POCT Ionized Calcium, Arterial 1.04 (L) 1.10 - 1.33 mmol/L    POCT Glucose, Arterial 115 (H) 74 - 99 mg/dL    POCT Lactate, Arterial 0.8 0.4 - 2.0 mmol/L    POCT Base Excess, Arterial 2.3 -2.0 - 3.0 mmol/L    POCT HCO3 Calculated, Arterial 28.2 (H) 22.0 - 26.0 mmol/L    POCT Hemoglobin, Arterial 8.6 (L) 12.0 - 16.0 g/dL    POCT Anion Gap, Arterial 8 (L) 10 - 25 mmo/L    Patient Temperature 37.0 degrees Celsius    FiO2 21 %   Calcium, Ionized   Result Value Ref Range    POCT Calcium, Ionized 1.02 (L) 1.1 - 1.33 mmol/L   CBC   Result Value Ref Range    WBC 9.9 4.4 - 11.3 x10*3/uL    nRBC 0.0 0.0 - 0.0 /100 WBCs    RBC 3.24 (L) 4.00 - 5.20 x10*6/uL    Hemoglobin 8.0 (L) 12.0 - 16.0 g/dL    Hematocrit 24.8 (L) 36.0 - 46.0 %    MCV 77 (L) 80 - 100 fL    MCH 24.7 (L) 26.0 - 34.0 pg    MCHC 32.3 32.0 - 36.0 g/dL    RDW 13.2 11.5 - 14.5 %    Platelets 182 150 - 450 x10*3/uL   Coagulation Screen   Result Value Ref Range    Protime 23.8 (H) 9.8 - 12.8 seconds    INR 2.1 (H) 0.9 - 1.1    aPTT 73 (H) 27 - 38 seconds   Magnesium   Result Value Ref Range    Magnesium 1.82 1.60 - 2.40 mg/dL   Renal Function Panel   Result Value Ref Range    Glucose 117 (H) 74 - 99 mg/dL    Sodium 125 (L) 136 - 145 mmol/L    Potassium 3.9 3.5 - 5.3 mmol/L    Chloride 85 (L) 98 - 107 mmol/L    Bicarbonate 28 21 - 32 mmol/L    Anion Gap 16 10 - 20 mmol/L    Urea Nitrogen 7 6 - 23 mg/dL    Creatinine 0.26 (L) 0.50 - 1.05 mg/dL    eGFR >90 >60 mL/min/1.73m*2    Calcium 8.1 (L) 8.6 - 10.6 mg/dL    Phosphorus 2.6 2.5 - 4.9 mg/dL    Albumin 4.1 3.4 - 5.0 g/dL   Hepatic function panel   Result Value Ref Range    Albumin 4.1 3.4 - 5.0 g/dL    Bilirubin, Total 0.4 0.0 - 1.2 mg/dL    Bilirubin, Direct 0.1  0.0 - 0.3 mg/dL    Alkaline Phosphatase 32 (L) 33 - 136 U/L    ALT 13 7 - 45 U/L    AST 52 (H) 9 - 39 U/L    Total Protein 6.0 (L) 6.4 - 8.2 g/dL   Osmolality   Result Value Ref Range    Osmolality, Serum 252 (L) 280 - 300 mOsm/kg   POCT GLUCOSE   Result Value Ref Range    POCT Glucose 118 (H) 74 - 99 mg/dL   POCT GLUCOSE   Result Value Ref Range    POCT Glucose 170 (H) 74 - 99 mg/dL   Osmolality   Result Value Ref Range    Osmolality, Serum 251 (L) 280 - 300 mOsm/kg   Magnesium   Result Value Ref Range    Magnesium 2.21 1.60 - 2.40 mg/dL   Calcium, Ionized   Result Value Ref Range    POCT Calcium, Ionized 1.02 (L) 1.1 - 1.33 mmol/L   Coagulation Screen   Result Value Ref Range    Protime 26.0 (H) 9.8 - 12.8 seconds    INR 2.3 (H) 0.9 - 1.1    aPTT 58 (H) 27 - 38 seconds   Renal Function Panel   Result Value Ref Range    Glucose 156 (H) 74 - 99 mg/dL    Sodium 120 (LL) 136 - 145 mmol/L    Potassium 4.0 3.5 - 5.3 mmol/L    Chloride 82 (L) 98 - 107 mmol/L    Bicarbonate 26 21 - 32 mmol/L    Anion Gap 16 10 - 20 mmol/L    Urea Nitrogen 7 6 - 23 mg/dL    Creatinine 0.24 (L) 0.50 - 1.05 mg/dL    eGFR >90 >60 mL/min/1.73m*2    Calcium 8.3 (L) 8.6 - 10.6 mg/dL    Phosphorus 2.3 (L) 2.5 - 4.9 mg/dL    Albumin 4.1 3.4 - 5.0 g/dL   CBC   Result Value Ref Range    WBC 17.2 (H) 4.4 - 11.3 x10*3/uL    nRBC 0.0 0.0 - 0.0 /100 WBCs    RBC 3.19 (L) 4.00 - 5.20 x10*6/uL    Hemoglobin 8.2 (L) 12.0 - 16.0 g/dL    Hematocrit 24.5 (L) 36.0 - 46.0 %    MCV 77 (L) 80 - 100 fL    MCH 25.7 (L) 26.0 - 34.0 pg    MCHC 33.5 32.0 - 36.0 g/dL    RDW 13.0 11.5 - 14.5 %    Platelets 242 150 - 450 x10*3/uL   POCT GLUCOSE   Result Value Ref Range    POCT Glucose 175 (H) 74 - 99 mg/dL   Urine electrolytes   Result Value Ref Range    Sodium, Urine Random 28 mmol/L    Sodium/Creatinine Ratio 34 Not established. mmol/g Creat    Potassium, Urine Random 37 mmol/L    Potassium/Creatinine Ratio 46 Not established mmol/g Creat    Chloride, Urine Random 66  mmol/L    Chloride/Creatinine Ratio 81 38 - 318 mmol/g creat    Creatinine, Urine Random 81.3 20.0 - 320.0 mg/dL   Osmolality, urine   Result Value Ref Range    Osmolality, Urine Random 648 200 - 1,200 mOsm/kg   Osmolality   Result Value Ref Range    Osmolality, Serum 251 (L) 280 - 300 mOsm/kg   Renal Function Panel   Result Value Ref Range    Glucose 135 (H) 74 - 99 mg/dL    Sodium 122 (L) 136 - 145 mmol/L    Potassium 3.8 3.5 - 5.3 mmol/L    Chloride 85 (L) 98 - 107 mmol/L    Bicarbonate 26 21 - 32 mmol/L    Anion Gap 15 10 - 20 mmol/L    Urea Nitrogen 6 6 - 23 mg/dL    Creatinine 0.21 (L) 0.50 - 1.05 mg/dL    eGFR >90 >60 mL/min/1.73m*2    Calcium 7.8 (L) 8.6 - 10.6 mg/dL    Phosphorus 2.0 (L) 2.5 - 4.9 mg/dL    Albumin 4.0 3.4 - 5.0 g/dL   POCT GLUCOSE   Result Value Ref Range    POCT Glucose 144 (H) 74 - 99 mg/dL   POCT GLUCOSE   Result Value Ref Range    POCT Glucose 122 (H) 74 - 99 mg/dL   Renal Function Panel   Result Value Ref Range    Glucose 102 (H) 74 - 99 mg/dL    Sodium 121 (L) 136 - 145 mmol/L    Potassium 4.1 3.5 - 5.3 mmol/L    Chloride 84 (L) 98 - 107 mmol/L    Bicarbonate 26 21 - 32 mmol/L    Anion Gap 15 10 - 20 mmol/L    Urea Nitrogen 5 (L) 6 - 23 mg/dL    Creatinine 0.22 (L) 0.50 - 1.05 mg/dL    eGFR >90 >60 mL/min/1.73m*2    Calcium 8.2 (L) 8.6 - 10.6 mg/dL    Phosphorus 2.1 (L) 2.5 - 4.9 mg/dL    Albumin 4.2 3.4 - 5.0 g/dL   POCT GLUCOSE   Result Value Ref Range    POCT Glucose 109 (H) 74 - 99 mg/dL         This patient has a central line   Reason for the central line remaining today? Hemodynamic monitoring                 Assessment/Plan   Assessment & Plan  Primary cancer of right upper lobe of lung (Multi)    Chronic obstructive pulmonary disease (Multi)    Malignant neoplasm of upper lobe of right lung (Multi)    Faustina Vick (Jannie) is a 64F with Hx of Stage III lung cancer, asthma, anxiety, and arthritis who is now s/p R pneumonectomy with Dr. Wagner 1/13/25. She is recovering well in  ICU.    - Appreciate nephrology recommendations  - Agree with fluid restriction  - Monitor electrolytes and replete as needed  - Monitor for s/s of bleeding  - If patient remains stable of pressors, okay for transition to RNF  - Please page with concerns    Patient seen with cardiothoracic fellow Dr. Brush and discussed with attending Dr. Bernard Miranda MD  Thoracic surgery [id number]

## 2025-01-15 NOTE — PROGRESS NOTES
"Faustina Vick \"Noam" is a 64 y.o. female on day 2 of admission presenting with Primary cancer of right upper lobe of lung (Multi).    Subjective   Symptomatic hyponatremia to 120 overnight. Given 250cc albumin and 150cc 3% NaCl.        Objective     Physical Exam  Vitals reviewed.   Constitutional:       General: She is not in acute distress.     Appearance: She is ill-appearing.   HENT:      Nose: Nose normal.      Mouth/Throat:      Mouth: Mucous membranes are moist.   Eyes:      Extraocular Movements: Extraocular movements intact.   Neck:      Comments: RIJ MAC line in place, dressing c/d/i  Cardiovascular:      Rate and Rhythm: Normal rate and regular rhythm.      Pulses: Normal pulses.      Heart sounds: Normal heart sounds.   Pulmonary:      Effort: Pulmonary effort is normal.      Comments: Left lung sounds normal   Absent lung sounds on right     Right chest tube in place draining serosanguinous fluid         Abdominal:      General: Abdomen is flat.      Palpations: Abdomen is soft.      Tenderness: There is no abdominal tenderness.   Musculoskeletal:      Right lower leg: No edema.      Left lower leg: No edema.   Skin:     General: Skin is warm and dry.   Neurological:      General: No focal deficit present.      Mental Status: She is oriented to person, place, and time.   Psychiatric:      Comments: Slowed responses, anxious appearing         Last Recorded Vitals  Blood pressure 119/82, pulse 79, temperature 36.2 °C (97.2 °F), temperature source Temporal, resp. rate 12, height 1.651 m (5' 5\"), weight 52.1 kg (114 lb 13.8 oz), SpO2 97%.  Intake/Output last 3 Shifts:  I/O last 3 completed shifts:  In: 3231.6 (62 mL/kg) [P.O.:1390; I.V.:486.6 (9.3 mL/kg); Blood:250; IV Piggyback:1105]  Out: 1940 (37.2 mL/kg) [Urine:1500 (0.8 mL/kg/hr); Chest Tube:440]  Weight: 52.1 kg     Relevant Results  Scheduled medications  acetaminophen, 650 mg, oral, q6h  fluticasone furoate-vilanteroL, 1 puff, inhalation, " Daily  heparin (porcine), 5,000 Units, subcutaneous, q8h  hydrocortisone sodium succinate, 100 mg, intravenous, q8h  insulin lispro, 0-5 Units, subcutaneous, q4h  melatonin, 3 mg, oral, Nightly  pantoprazole, 40 mg, intravenous, Daily before breakfast  tiotropium, 2 puff, inhalation, Daily      Continuous medications  vasopressin, 0-0.03 Units/min, Last Rate: 0.02 Units/min (01/15/25 0600)      PRN medications  PRN medications: albuterol, calcium gluconate, calcium gluconate, dextrose, dextrose, glucagon, glucagon, magnesium sulfate, magnesium sulfate, oxyCODONE, potassium chloride    Results for orders placed or performed during the hospital encounter of 01/13/25 (from the past 24 hours)   POCT GLUCOSE   Result Value Ref Range    POCT Glucose 103 (H) 74 - 99 mg/dL   POCT GLUCOSE   Result Value Ref Range    POCT Glucose 120 (H) 74 - 99 mg/dL   POCT GLUCOSE   Result Value Ref Range    POCT Glucose 108 (H) 74 - 99 mg/dL   POCT GLUCOSE   Result Value Ref Range    POCT Glucose 147 (H) 74 - 99 mg/dL   Blood Gas Arterial Full Panel   Result Value Ref Range    POCT pH, Arterial 7.36 (L) 7.38 - 7.42 pH    POCT pCO2, Arterial 50 (H) 38 - 42 mm Hg    POCT pO2, Arterial 76 (L) 85 - 95 mm Hg    POCT SO2, Arterial 97 94 - 100 %    POCT Oxy Hemoglobin, Arterial 94.8 94.0 - 98.0 %    POCT Hematocrit Calculated, Arterial 26.0 (L) 36.0 - 46.0 %    POCT Sodium, Arterial 120 (LL) 136 - 145 mmol/L    POCT Potassium, Arterial 3.7 3.5 - 5.3 mmol/L    POCT Chloride, Arterial 88 (L) 98 - 107 mmol/L    POCT Ionized Calcium, Arterial 1.04 (L) 1.10 - 1.33 mmol/L    POCT Glucose, Arterial 115 (H) 74 - 99 mg/dL    POCT Lactate, Arterial 0.8 0.4 - 2.0 mmol/L    POCT Base Excess, Arterial 2.3 -2.0 - 3.0 mmol/L    POCT HCO3 Calculated, Arterial 28.2 (H) 22.0 - 26.0 mmol/L    POCT Hemoglobin, Arterial 8.6 (L) 12.0 - 16.0 g/dL    POCT Anion Gap, Arterial 8 (L) 10 - 25 mmo/L    Patient Temperature 37.0 degrees Celsius    FiO2 21 %   Calcium, Ionized    Result Value Ref Range    POCT Calcium, Ionized 1.02 (L) 1.1 - 1.33 mmol/L   CBC   Result Value Ref Range    WBC 9.9 4.4 - 11.3 x10*3/uL    nRBC 0.0 0.0 - 0.0 /100 WBCs    RBC 3.24 (L) 4.00 - 5.20 x10*6/uL    Hemoglobin 8.0 (L) 12.0 - 16.0 g/dL    Hematocrit 24.8 (L) 36.0 - 46.0 %    MCV 77 (L) 80 - 100 fL    MCH 24.7 (L) 26.0 - 34.0 pg    MCHC 32.3 32.0 - 36.0 g/dL    RDW 13.2 11.5 - 14.5 %    Platelets 182 150 - 450 x10*3/uL   Coagulation Screen   Result Value Ref Range    Protime 23.8 (H) 9.8 - 12.8 seconds    INR 2.1 (H) 0.9 - 1.1    aPTT 73 (H) 27 - 38 seconds   Magnesium   Result Value Ref Range    Magnesium 1.82 1.60 - 2.40 mg/dL   Renal Function Panel   Result Value Ref Range    Glucose 117 (H) 74 - 99 mg/dL    Sodium 125 (L) 136 - 145 mmol/L    Potassium 3.9 3.5 - 5.3 mmol/L    Chloride 85 (L) 98 - 107 mmol/L    Bicarbonate 28 21 - 32 mmol/L    Anion Gap 16 10 - 20 mmol/L    Urea Nitrogen 7 6 - 23 mg/dL    Creatinine 0.26 (L) 0.50 - 1.05 mg/dL    eGFR >90 >60 mL/min/1.73m*2    Calcium 8.1 (L) 8.6 - 10.6 mg/dL    Phosphorus 2.6 2.5 - 4.9 mg/dL    Albumin 4.1 3.4 - 5.0 g/dL   Hepatic function panel   Result Value Ref Range    Albumin 4.1 3.4 - 5.0 g/dL    Bilirubin, Total 0.4 0.0 - 1.2 mg/dL    Bilirubin, Direct 0.1 0.0 - 0.3 mg/dL    Alkaline Phosphatase 32 (L) 33 - 136 U/L    ALT 13 7 - 45 U/L    AST 52 (H) 9 - 39 U/L    Total Protein 6.0 (L) 6.4 - 8.2 g/dL   Osmolality   Result Value Ref Range    Osmolality, Serum 252 (L) 280 - 300 mOsm/kg   POCT GLUCOSE   Result Value Ref Range    POCT Glucose 118 (H) 74 - 99 mg/dL   POCT GLUCOSE   Result Value Ref Range    POCT Glucose 170 (H) 74 - 99 mg/dL   Osmolality   Result Value Ref Range    Osmolality, Serum 251 (L) 280 - 300 mOsm/kg   Magnesium   Result Value Ref Range    Magnesium 2.21 1.60 - 2.40 mg/dL   Calcium, Ionized   Result Value Ref Range    POCT Calcium, Ionized 1.02 (L) 1.1 - 1.33 mmol/L   Coagulation Screen   Result Value Ref Range    Protime 26.0  (H) 9.8 - 12.8 seconds    INR 2.3 (H) 0.9 - 1.1    aPTT 58 (H) 27 - 38 seconds   Renal Function Panel   Result Value Ref Range    Glucose 156 (H) 74 - 99 mg/dL    Sodium 120 (LL) 136 - 145 mmol/L    Potassium 4.0 3.5 - 5.3 mmol/L    Chloride 82 (L) 98 - 107 mmol/L    Bicarbonate 26 21 - 32 mmol/L    Anion Gap 16 10 - 20 mmol/L    Urea Nitrogen 7 6 - 23 mg/dL    Creatinine 0.24 (L) 0.50 - 1.05 mg/dL    eGFR >90 >60 mL/min/1.73m*2    Calcium 8.3 (L) 8.6 - 10.6 mg/dL    Phosphorus 2.3 (L) 2.5 - 4.9 mg/dL    Albumin 4.1 3.4 - 5.0 g/dL   CBC   Result Value Ref Range    WBC 17.2 (H) 4.4 - 11.3 x10*3/uL    nRBC 0.0 0.0 - 0.0 /100 WBCs    RBC 3.19 (L) 4.00 - 5.20 x10*6/uL    Hemoglobin 8.2 (L) 12.0 - 16.0 g/dL    Hematocrit 24.5 (L) 36.0 - 46.0 %    MCV 77 (L) 80 - 100 fL    MCH 25.7 (L) 26.0 - 34.0 pg    MCHC 33.5 32.0 - 36.0 g/dL    RDW 13.0 11.5 - 14.5 %    Platelets 242 150 - 450 x10*3/uL   POCT GLUCOSE   Result Value Ref Range    POCT Glucose 175 (H) 74 - 99 mg/dL   Urine electrolytes   Result Value Ref Range    Sodium, Urine Random 28 mmol/L    Sodium/Creatinine Ratio 34 Not established. mmol/g Creat    Potassium, Urine Random 37 mmol/L    Potassium/Creatinine Ratio 46 Not established mmol/g Creat    Chloride, Urine Random 66 mmol/L    Chloride/Creatinine Ratio 81 38 - 318 mmol/g creat    Creatinine, Urine Random 81.3 20.0 - 320.0 mg/dL   Osmolality, urine   Result Value Ref Range    Osmolality, Urine Random 648 200 - 1,200 mOsm/kg   Osmolality   Result Value Ref Range    Osmolality, Serum 251 (L) 280 - 300 mOsm/kg   Renal Function Panel   Result Value Ref Range    Glucose 135 (H) 74 - 99 mg/dL    Sodium 122 (L) 136 - 145 mmol/L    Potassium 3.8 3.5 - 5.3 mmol/L    Chloride 85 (L) 98 - 107 mmol/L    Bicarbonate 26 21 - 32 mmol/L    Anion Gap 15 10 - 20 mmol/L    Urea Nitrogen 6 6 - 23 mg/dL    Creatinine 0.21 (L) 0.50 - 1.05 mg/dL    eGFR >90 >60 mL/min/1.73m*2    Calcium 7.8 (L) 8.6 - 10.6 mg/dL    Phosphorus 2.0 (L)  2.5 - 4.9 mg/dL    Albumin 4.0 3.4 - 5.0 g/dL   POCT GLUCOSE   Result Value Ref Range    POCT Glucose 144 (H) 74 - 99 mg/dL       XR chest 1 view    Result Date: 1/14/2025  Interpreted By:  Vick Eli, STUDY: XR CHEST 1 VIEW; 1/14/2025 2:40 pm   INDICATION: Signs/Symptoms:Chest tube removal.   COMPARISON: Radiograph dated 01/14/2025   ACCESSION NUMBER(S): [id number]   ORDERING CLINICIAN: ERINN SANCHEZ   FINDINGS: Postsurgical changes related to right pneumonectomy with partially resected ribs.. Interval removal of right chest tube. Left subclavian MediPort and right IJ central venous catheter sheath are unchanged in positioning.   Cardiac silhouette size is slightly enlarged, unchanged.   Faint left basilar airspace opacity, likely atelectatic. No sizable pneumothorax.   Small amount of subcutaneous and soft tissue emphysema in the right chest wall.       1. Interval removal of right chest tube. Other medical devices as described above. 2. Postsurgical changes related to right pneumonectomy. 3. Faint left basilar opacity, likely atelectatic.       Signed by: Vick Xiao 1/14/2025 2:53 PM Dictation workstation:   [id number]    XR chest 1 view    Result Date: 1/14/2025  Interpreted By:  Nino Barbour, STUDY: XR CHEST 1 VIEW; 1/14/2025 7:06 am   INDICATION: Signs/Symptoms:recent pneumonectomy.   COMPARISON: 01/13/2025.   ACCESSION NUMBER(S): [id number]   ORDERING CLINICIAN: GABBY SMITH   FINDINGS: Right-sided chest tube in place.   CARDIOMEDIASTINAL SILHOUETTE: Right-sided mediastinal shift consistent with recent right-sided pneumonectomy.   LUNGS: Right-sided pneumonectomy changes.   ABDOMEN: No remarkable upper abdominal findings.   BONES: Right-sided thoracotomy changes.       1.  Stable right-sided pneumonectomy changes.     Signed by: Nino Barbour 1/14/2025 12:56 PM Dictation workstation:   [id number]    Transthoracic Echo (TTE) Limited    Result Date: 1/13/2025   [place]  [place] [id number]                Tel [phone] and Fax [phone] TRANSTHORACIC ECHOCARDIOGRAM REPORT  Patient Name:       RAÚL Rodríguez Physician:    [id number] Cam Llanos MD Study Date:         1/13/2025           Ordering Provider:    [id number] WILMER ANTHONY MRN/PID:            [id number]            Fellow: Accession#:         [id number]        Nurse: Date of Birth/Age:  1960 / 64      Sonographer:          José Miguel rocha                                     CARIE Gender assigned at  F                   Additional Staff: Birth: Height:             165.10 cm           Admit Date:           1/13/2025 Weight:             51.71 kg            Admission Status:     Inpatient - STAT BSA / BMI:          1.56 m2 / 18.97     Encounter#:           [id number]                     kg/m2 Blood Pressure:     100/57 mmHg         Department Location:  [facility] Study Type:    TRANSTHORACIC ECHO (TTE) LIMITED Diagnosis/ICD: Hypotension, unspecified-I95.9 Indication:    Hypotension CPT Code:      Echo Limited-99160; Doppler Limited-41059; Color Doppler-98011  Study Detail: The following Echo studies were performed: 2D, Doppler and color               flow. Technically challenging study due to body habitus and               patient lying in supine position.  PHYSICIAN INTERPRETATION: Left Ventricle: The left ventricular systolic function is normal, with a visually estimated ejection fraction of 60-65%. There are no regional left ventricular wall motion abnormalities. The left ventricular cavity size is normal. Left ventricular diastolic filling was not assessed. Left Atrium: The left atrium was not well visualized. Right Ventricle: The right ventricle is normal in size. There is low normal right ventricular systolic  function. RV S' and TAPSE under measured due to technical difficulty visualizing lateral tricuspid valve annulus. Right Atrium: The right atrium was not well visualized. Aortic Valve: The aortic valve was not well visualized. There is no evidence of aortic valve regurgitation. Mitral Valve: The mitral valve is normal in structure. There is trace mitral valve regurgitation. Tricuspid Valve: The tricuspid valve was not well visualized. There is trace tricuspid regurgitation. Pulmonic Valve: The pulmonic valve is not well visualized. The pulmonic valve regurgitation was not well visualized. Pericardium: Trivial pericardial effusion. Aorta: The aortic root is normal. Pulmonary Artery: The tricuspid regurgitant velocity is 2.10 m/s, and with an estimated right atrial pressure of 8 mmHg, the estimated pulmonary artery pressure is normal with the RVSP at 25.6 mmHg. Systemic Veins: The inferior vena cava appears normal in size, with IVC inspiratory collapse less than 50%. In comparison to the previous echocardiogram(s): Compared with study dated 10/3/2024, no significant change.  CONCLUSIONS:  1. The left ventricular systolic function is normal, with a visually estimated ejection fraction of 60-65%.  2. There is low normal right ventricular systolic function. QUANTITATIVE DATA SUMMARY:  LV SYSTOLIC FUNCTION BY 2D PLANIMETRY (MOD):                      Normal Ranges: EF-A4C View:    65 % (>=55%) EF-Visual:      63 % LV EF Reported: 63 %  RIGHT VENTRICLE: TAPSE: 10.0 mm RV s'  0.06 m/s  TRICUSPID VALVE/RVSP:          Normal Ranges: Peak TR Velocity:     2.10 m/s Est. RA Pressure:     8 mmHg RV Syst Pressure:     26 mmHg  (< 30mmHg) IVC Diam:             2.00 cm  [id number] Cam Llanos MD Electronically signed on 1/13/2025 at 5:06:16 PM  ** Final **     XR chest 1 view    Result Date: 1/13/2025  Interpreted By:  Vick Eli, STUDY: XR CHEST 1 VIEW; 1/13/2025 2:52 pm   INDICATION: Signs/Symptoms:s/p pneumonectomy.    COMPARISON: CT dated 07/17/2024 and PET-CT dated 12/30/2024   ACCESSION NUMBER(S): [id number]   ORDERING CLINICIAN: WILMER ANTHONY   FINDINGS: Right IJ central venous catheter sheath is projecting over mid SV C. Left subclavian approach MediPort is in place with the tip projecting over upper to mid SVC. Right chest tube is in place.   Cardiac silhouette size is within normal limits.   Postsurgical changes related to right pneumonectomy with multiple partial right-sided rib resection. Mild interstitial prominence of the left lung.   Subcutaneous and soft tissue emphysema in the right chest wall.         1. Postsurgical changes related to right sided pneumonectomy with multiple medical devices as described above. 2. Mild interstitial prominence of the left lung.       Signed by: Vick Xiao 1/13/2025 3:16 PM Dictation workstation:   [id number]     This patient has a central line   Reason for the central line remaining today? Hemodynamic monitoring and vasoactive medication administration    This patient has a urinary catheter   Reason for the yoo  remaining today? Critically ill requiring accurate Is/Os    Assessment/Plan   Assessment:  65 y/o with a history of lung cancer presents to ICU from the OR s/p Right Pneumonectomy, intrapericardial, with en bloc chest wall resection by Dr. Wagner 1/13. Now off pressors but with symptomatic hyponatremia to 120.     Plan:  NEURO: History of anxiety on alprazolam 1mg qd, last filled 1/4/2025 per [facility] review. A&Ox4. Denying pain today. Poor sleep overnight  - Pain well controlled with prn oxycodone 2.5mg, PO tylenol scheduled   - R CARLOS MANUEL block catheter - to be pulled today. Will resume lido patch once infusion is stopped  - Alprazolam 0.25mg at bedtime prn ordered   - PT/OT consulted     CV: No history of cardiac disease. Baseline /79, -110. TTE 9/2024 normal biV function, RVSP 10mmHg. Intra-op hypotension requiring vaso and epi. Hypovolemic vs  cardiogenic shock. Arrived to SICU on vaso 0.06 units/min, started on levo 0.05 mcg/kg/min. TTE 1/13 normal LV function, low normal RV systolic function. Now off vaso. CVP 6-8 this AM.  - trend CVP  - Goal map range 65-90  - continuous EKG/abp monitoring  - limit unnecessary volume administration   - trend lactate  - Hydrocortisone 100mg q8 (1/14 - )  - Consider repeat TTE if requiring pressors today  - Consider pressor choice (vaso vs levo) given hyponatremia  - will start metoprolol for Afib ppx this afternoon if remains stable off pressors     PULM: 30 pack year smoking history, quit 4/2024. Asthma, lung CA, s/p chemo/XRT/immunotherapy. Now s/p Right Pneumonectomy, intrapericardial, with en bloc chest wall resection . Arrived to SICU extubated on 10L SFM, now on room air. CXR 1/15 with appropriate fluid accumulation in R chest after chest tube was pulled 1/14.  - Maintain SpO2 > 92%  - Q1h incentive spirometer while awake  - additional pulm toilet prn.    - Daily CXR  - continue home Trelegy Elipta, ventolin HFA formulary exchange  - hold home benzonatate     GI: No GI history. LFTs WNL 12/2/24. Now on clear liquid diet.   - Advance diet per surgical service discretion  - IV PPI for GI prophylaxis while on clears and stress dose steroids     : No history of renal disease. Baseline creatinine 0.6. Per thoracic, 1L PO fluid restriction. Symptomatic hyponatremia to 120 overnight 1/14 - 1/15, corrected to 122 after 150cc 3% NaCl and 250 albumin then 121 this AM . Adequate UOP.   - Montiel replaced 1/15 for accurate Is/Os given hyponatremia  - Renal ultrasound ordered  - Nephrology consult placed  - Additional 150cc 3% saline ordered given symptoms  - q4 RFP to monitor correction (per nephro note goal Na 128-131 in first 24h)  - Judicious IVF administration  - Maintain U/O >0.5ml/kg/hr  - Check renal function panel post op, daily and prn  - Replete electrolytes to goal K>4, Mg>2, Phos>2.5, ionized Ca>1.10.     HEME:  Hx DOC. Baseline H/H 12/40. Acute blood loss anemia, OR EBL 150mL. Hgb stable. INR 2.3 but no s/s of bleeding and liver function normal, possibly due to poor nutritional status on clear liquid diet.   - IV Vit K 10mg q8 x24h ordered  - Check CBC and coags post op, daily and prn  - scds, sc heparin  - ongoing monitoring for s/s bleeding     ENDO: No history of DM or thyroid disease. TSH 1.86. Recent 5d course of prednisone 10mg 12/2024 for URI.  - Stress dose steroids as above  - Q4h BG   - SSI Lispro per ICU protocol.     MSK: Arthritis, Vit D deficiency  - not on meds at home  - monitor for symptoms     ID: Afebrile. Leukocytosis to 17.2 after initiation of stress dose steroids, low concern for infection at this time given hemodynamic stability and afebrile.   - Received 24h perioperative cefazolin  - temp q4h, wbc daily  - ongoing monitoring for s/s infection     Lines:  - left radial art line  - right internal jugular mac line - consider removal this afternoon if off pressors  - piv: 20g, 14g  - yoo     Dispo: Continue ICU care. Discussed with Dr. Mo and ICU fellow Dr. Sonu Anna MD  Anesthesiology PGY3  SICU Phone [phone]

## 2025-01-15 NOTE — PROGRESS NOTES
65 y/o F with stage III SCC of the lung s/p chemotherapy+radiation last chemotherapy session on 8/28/2024 + right pneumonectomy, intrapericardial, with en block chest wall resection on 1/13 by Dr. Wagner. Patient with low blood pressure on preop that continued intra-op and post op. Patient arrived on vasopressin 0.06 to the ICU. Patient started on levophed on arrival to the SICU.       Neuro: PMH of anxiety on alprazolam at home. ANOX3. Right CARLOS MANUEL catheter not connected, APS informed this morning. Ropivacaine bolus given through CARLOS MANUEL by APS.  Tylenol and oxycodone for pain control. PT/OT. Will restart low dose alprazolam at night.   CV: No PMH, ECHO on 1/13 showing EF 65%, low normal RV function. Gentle resuscitation. Vasopressin off.   Pulm: 30 pack year smoking history, quit on 4/2024. Asthma, lung Ca, s/p chemo/XRT/immunotherapy. Now s/p Right Pneumonectomy, intrapericardial, with en bloc chest wall resection.  Oxygenating appropriately. On RA. Will continue home trelegy elipta, ventolin formulary exchange. Chest tube removed on 1/14.   GI: No GI history. LFTs WNL 12/2/2024. On clear liquids. PPI for GI prophylaxis.  : No PMH. Baseline creat 0.6. Acute hyponatremia overnight 120 meq. 3% saline given overnight. Urine electrolytes, kidney US ordered and nephrology consulted. RFP Q4h. Slow correction of 4-6 meq in 24 hrs. Continue SDS.   Heme: Acute blood loss anemia. SCDs, subcutaneous heparin. Vit K for 3 days.   Endo: No PMH. SSI.   ID: Afebrile. Leukocytosis likely 2/2 SDS.      Lines:  - left radial art line  - right internal jugular mac line--> if continues off pressors will DC in afternoon.   - dmitry yoo 1/15      Dispo: Continue ICU care     Kendall Barbour MD  PGY-5 Anesthesiology critical care fellow.

## 2025-01-15 NOTE — PROGRESS NOTES
"Occupational Therapy    Occupational Therapy Treatment    Name: Faustina Vick \"Jannie\"  MRN: [id number]  : 1960  Date: 01/15/25  Room: A      Time Calculation  Start Time: 1017  Stop Time: 1104  Time Calculation (min): 47 min    Assessment:  Barriers to Discharge Home: No anticipated barriers  End of Session Communication: Bedside nurse  End of Session Patient Position: Up in chair, Alarm off, not on at start of session    Plan:  Treatment Interventions: ADL retraining, Functional transfer training, Endurance training, UE strengthening/ROM, Cognitive reorientation, Patient/family training, Equipment evaluation/education, Compensatory technique education  OT Frequency: 3 times per week  OT Discharge Recommendations: Low intensity level of continued care  Equipment Recommended upon Discharge: Wheeled walker  OT Recommended Transfer Status: Assist of 1  OT - OK to Discharge: Yes    Subjective   General:  OT Last Visit  OT Received On: 01/15/25  Co-Treatment:  (rehab aide assisted with chair follow)  Prior to Session Communication: Bedside nurse  Patient Position Received: Up in chair, Alarm off, not on at start of session  Family/Caregiver Present: Yes  Caregiver Feedback: Son and dtr in law at bedside  General Comment: Pt seated in chair on arrival, agreeable. Mildly confused (reports not sleeping in 24 hours, also hyponatremic).     Precautions:  Medical Precautions: Fall precautions  Precautions Comment: MAP 65-90    Vitals:   01/15/25 1017 01/15/25 1100 01/15/25 1104   Vital Signs   Vitals Session Pre OT  --  Post OT   Heart Rate 77 77 78   Resp 16 21 18   SpO2 98 % 96 % 100 %   BP 96/55 105/65 107/60   MAP (mmHg) 71 79 78       Lines/Tubes/Drains:  CVC 25 Double lumen Right Internal jugular (Active)   Number of days: 2       Arterial Line 25 Left Radial (Active)   Number of days: 2       Urethral Catheter Straight-tip 18 Fr. (Active)   Number of days: 0       Cognition:  Overall Cognitive " Status:  (Mildly impaired)  Orientation Level: Oriented X4 (confusion regarding timeline of events)  Safety/Judgement: Exceptions to WFL  Unable to Self-Monitor and Self-Correct Consistently: Minimal  Pt scored a 6/28 on SBT indicating questionable cognitive impairment.     Pain Assessment:  Pain Assessment  Pain Assessment: 0-10  0-10 (Numeric) Pain Score: 0 - No pain     Objective   Activities of Daily Living:      Grooming  Grooming Level of Assistance: Setup, Minimal verbal cues  Grooming Where Assessed: Chair  Grooming Comments: Hair care with shampoo cap and brush in sitting     LE Bathing  LE Bathing Comments: UB/LB bathing, assist with back and set up of task in sitting. Also assist for perineal region in standing.  UE Dressing  UE Dressing Level of Assistance: Minimum assistance  UE Dressing Where Assessed: Chair    Bed Mobility/Transfers:   Bed Mobility  Bed Mobility: No  Transfers  Transfer: Yes  Transfer 1  Transfer From 1: Sit to  Transfer to 1: Stand  Transfer Level of Assistance 1: Contact guard  Trials/Comments 1: x2 trials; refused use of walker    Therapy/Activity:      Therapeutic Activity  Therapeutic Activity Performed: Yes  Therapeutic Activity 1: Pt performed functional mobility 15 feet. Refused use of walker with encouragement but with frequent attempts to reach for bed d/t unsteadiness and fear of falling. Pt required CGA-occasional min A. Short, shuffled steps noted.     Cognitive Skill Development:  Cognitive Skill Development  Cognitive Skill Development Activity 1: Pt provided with ICU diary and educated on purpose and proper use. Verbalized understanding.  Cognitive Skill Development Activity 2: Increased time discussing sleep hygiene, hospital acquired delirium and prevention strategies. Provided pt with eye mask and ear plugs and educated pt on importance of frequent activity during the day to promote night time sleep.  Cognitive Skill Development Activity 1: Pt provided with ICU  diary and educated on purpose and proper use. Verbalized understanding.  Cognitive Skill Development Activity 2: Increased time discussing sleep hygiene, hospital acquired delirium and prevention strategies. Provided pt with eye mask and ear plugs and educated pt on importance of frequent activity during the day to promote night time sleep.    Outcome Measures:  [facility] Daily Activity  Putting on and taking off regular lower body clothing: A lot  Bathing (including washing, rinsing, drying): A little  Putting on and taking off regular upper body clothing: A little  Toileting, which includes using toilet, bedpan or urinal: A lot  Taking care of personal grooming such as brushing teeth: None  Eating Meals: None  Daily Activity - Total Score: 18  ICU Mobility Screen  ICU Mobility Scale: Walking with assistance of 1 person     Education Documentation  Body Mechanics, taught by Ellen Garcia OT at 1/15/2025 12:19 PM.  Learner: Patient  Readiness: Acceptance  Method: Explanation, Demonstration  Response: Verbalizes Understanding    Precautions, taught by Ellen Garcia OT at 1/15/2025 12:19 PM.  Learner: Patient  Readiness: Acceptance  Method: Explanation, Demonstration  Response: Verbalizes Understanding    ADL Training, taught by Ellen Garcia OT at 1/15/2025 12:19 PM.  Learner: Patient  Readiness: Acceptance  Method: Explanation, Demonstration  Response: Verbalizes Understanding    Body Mechanics, taught by Ellen Garcia OT at 1/14/2025 12:32 PM.  Learner: Patient  Readiness: Acceptance  Method: Explanation, Demonstration  Response: Verbalizes Understanding  Comment: OT goals/POC    Precautions, taught by Ellen Garcia OT at 1/14/2025 12:32 PM.  Learner: Patient  Readiness: Acceptance  Method: Explanation, Demonstration  Response: Verbalizes Understanding  Comment: OT goals/POC    ADL Training, taught by Ellen Garcia OT at 1/14/2025 12:32  PM.  Learner: Patient  Readiness: Acceptance  Method: Explanation, Demonstration  Response: Verbalizes Understanding  Comment: OT goals/POC    Education Comments  No comments found.        Goals:  Encounter Problems       Encounter Problems (Active)       ADLs       Patient will perform UB and LB bathing with modified independent level of assistance. (Progressing)       Start:  01/14/25    Expected End:  01/28/25            Patient with complete upper body dressing with modified independent level of assistance donning and doffing all UE clothes.  (Progressing)       Start:  01/14/25    Expected End:  01/28/25            Patient with complete lower body dressing with modified independent level of assistance donning and doffing all LE clothes  with PRN adaptive equipment  (Progressing)       Start:  01/14/25    Expected End:  01/28/25            Patient will complete toileting including hygiene clothing management/hygiene with modified independent level of assistance. (Progressing)       Start:  01/14/25    Expected End:  01/28/25               MOBILITY       Patient will perform Functional mobility max Household distances/Community Distances with modified independent level of assistance and least restrictive device in order to improve safety and functional mobility. (Progressing)       Start:  01/14/25    Expected End:  01/28/25               TRANSFERS       Patient will perform bed mobility modified independent level of assistance in order to improve safety and independence with mobility (Progressing)       Start:  01/14/25    Expected End:  01/28/25            Patient will complete functional transfer to toilet with least restrictive device with modified independent level of assistance. (Progressing)       Start:  01/14/25    Expected End:  01/28/25                 01/15/25 at 12:20 PM   Ellen Garcia, OT   [phone]

## 2025-01-16 ENCOUNTER — APPOINTMENT (OUTPATIENT)
Dept: RADIOLOGY | Facility: HOSPITAL | Age: 65
End: 2025-01-16
Payer: COMMERCIAL

## 2025-01-16 ENCOUNTER — APPOINTMENT (OUTPATIENT)
Dept: CARDIOLOGY | Facility: HOSPITAL | Age: 65
End: 2025-01-16
Payer: COMMERCIAL

## 2025-01-16 LAB
ALBUMIN SERPL BCP-MCNC: 3.5 G/DL (ref 3.4–5)
ALBUMIN SERPL BCP-MCNC: 3.6 G/DL (ref 3.4–5)
ALP SERPL-CCNC: 45 U/L (ref 33–136)
ALT SERPL W P-5'-P-CCNC: 20 U/L (ref 7–45)
ANION GAP BLDA CALCULATED.4IONS-SCNC: 10 MMO/L (ref 10–25)
ANION GAP BLDA CALCULATED.4IONS-SCNC: 10 MMO/L (ref 10–25)
ANION GAP BLDA CALCULATED.4IONS-SCNC: 11 MMO/L (ref 10–25)
ANION GAP BLDA CALCULATED.4IONS-SCNC: 12 MMO/L (ref 10–25)
ANION GAP BLDA CALCULATED.4IONS-SCNC: 12 MMO/L (ref 10–25)
ANION GAP BLDA CALCULATED.4IONS-SCNC: 13 MMO/L (ref 10–25)
ANION GAP BLDA CALCULATED.4IONS-SCNC: 13 MMO/L (ref 10–25)
ANION GAP BLDV CALCULATED.4IONS-SCNC: 11 MMOL/L (ref 10–25)
ANION GAP SERPL CALC-SCNC: 16 MMOL/L (ref 10–20)
ANION GAP SERPL CALC-SCNC: 17 MMOL/L
ANION GAP SERPL CALC-SCNC: 17 MMOL/L
ANION GAP SERPL CALC-SCNC: 20 MMOL/L (ref 10–20)
ANION GAP SERPL CALC-SCNC: 20 MMOL/L (ref 10–20)
APTT PPP: 42 SECONDS (ref 27–38)
AST SERPL W P-5'-P-CCNC: 71 U/L (ref 9–39)
BASE EXCESS BLDA CALC-SCNC: -1.5 MMOL/L (ref -2–3)
BASE EXCESS BLDA CALC-SCNC: -1.9 MMOL/L (ref -2–3)
BASE EXCESS BLDA CALC-SCNC: -2 MMOL/L (ref -2–3)
BASE EXCESS BLDA CALC-SCNC: -2.1 MMOL/L (ref -2–3)
BASE EXCESS BLDA CALC-SCNC: -3 MMOL/L (ref -2–3)
BASE EXCESS BLDA CALC-SCNC: 0.1 MMOL/L (ref -2–3)
BASE EXCESS BLDA CALC-SCNC: 2 MMOL/L (ref -2–3)
BASE EXCESS BLDV CALC-SCNC: -1.4 MMOL/L (ref -2–3)
BILIRUB DIRECT SERPL-MCNC: 0.2 MG/DL (ref 0–0.3)
BILIRUB SERPL-MCNC: 0.7 MG/DL (ref 0–1.2)
BNP SERPL-MCNC: 793 PG/ML (ref 0–99)
BODY TEMPERATURE: 37 DEGREES CELSIUS
BUN SERPL-MCNC: 10 MG/DL (ref 6–23)
BUN SERPL-MCNC: 5 MG/DL (ref 6–23)
BUN SERPL-MCNC: 5 MG/DL (ref 6–23)
BUN SERPL-MCNC: 8 MG/DL (ref 6–23)
BUN SERPL-MCNC: 9 MG/DL (ref 6–23)
CA-I BLD-SCNC: 1.03 MMOL/L (ref 1.1–1.33)
CA-I BLDA-SCNC: 1.08 MMOL/L (ref 1.1–1.33)
CA-I BLDA-SCNC: 1.1 MMOL/L (ref 1.1–1.33)
CA-I BLDA-SCNC: 1.12 MMOL/L (ref 1.1–1.33)
CA-I BLDA-SCNC: 1.12 MMOL/L (ref 1.1–1.33)
CA-I BLDA-SCNC: 1.17 MMOL/L (ref 1.1–1.33)
CA-I BLDV-SCNC: 1.14 MMOL/L (ref 1.1–1.33)
CALCIUM SERPL-MCNC: 8 MG/DL (ref 8.6–10.6)
CALCIUM SERPL-MCNC: 8.1 MG/DL (ref 8.6–10.6)
CALCIUM SERPL-MCNC: 8.3 MG/DL (ref 8.6–10.6)
CALCIUM SERPL-MCNC: 8.4 MG/DL (ref 8.6–10.6)
CALCIUM SERPL-MCNC: 8.5 MG/DL (ref 8.6–10.6)
CARDIAC TROPONIN I PNL SERPL HS: 928 NG/L (ref 0–34)
CHLORIDE BLDA-SCNC: 86 MMOL/L (ref 98–107)
CHLORIDE BLDA-SCNC: 90 MMOL/L (ref 98–107)
CHLORIDE BLDA-SCNC: 91 MMOL/L (ref 98–107)
CHLORIDE BLDA-SCNC: 91 MMOL/L (ref 98–107)
CHLORIDE BLDA-SCNC: 93 MMOL/L (ref 98–107)
CHLORIDE BLDA-SCNC: 93 MMOL/L (ref 98–107)
CHLORIDE BLDA-SCNC: 94 MMOL/L (ref 98–107)
CHLORIDE BLDV-SCNC: 90 MMOL/L (ref 98–107)
CHLORIDE SERPL-SCNC: 84 MMOL/L (ref 98–107)
CHLORIDE SERPL-SCNC: 87 MMOL/L (ref 98–107)
CHLORIDE SERPL-SCNC: 88 MMOL/L (ref 98–107)
CHLORIDE SERPL-SCNC: 89 MMOL/L (ref 98–107)
CHLORIDE SERPL-SCNC: 89 MMOL/L (ref 98–107)
CO2 SERPL-SCNC: 20 MMOL/L (ref 21–32)
CO2 SERPL-SCNC: 21 MMOL/L (ref 21–32)
CO2 SERPL-SCNC: 24 MMOL/L (ref 21–32)
CREAT SERPL-MCNC: 0.2 MG/DL (ref 0.5–1.05)
CREAT SERPL-MCNC: 0.31 MG/DL (ref 0.5–1.05)
CREAT SERPL-MCNC: 0.33 MG/DL (ref 0.5–1.05)
CREAT SERPL-MCNC: 0.34 MG/DL (ref 0.5–1.05)
CREAT SERPL-MCNC: <0.2 MG/DL (ref 0.5–1.05)
EGFRCR SERPLBLD CKD-EPI 2021: >90 ML/MIN/1.73M*2
EGFRCR SERPLBLD CKD-EPI 2021: ABNORMAL ML/MIN/{1.73_M2}
EJECTION FRACTION APICAL 4 CHAMBER: 40.2
EJECTION FRACTION: 34 %
ERYTHROCYTE [DISTWIDTH] IN BLOOD BY AUTOMATED COUNT: 13 % (ref 11.5–14.5)
GLUCOSE BLD MANUAL STRIP-MCNC: 120 MG/DL (ref 74–99)
GLUCOSE BLD MANUAL STRIP-MCNC: 151 MG/DL (ref 74–99)
GLUCOSE BLD MANUAL STRIP-MCNC: 153 MG/DL (ref 74–99)
GLUCOSE BLD MANUAL STRIP-MCNC: 156 MG/DL (ref 74–99)
GLUCOSE BLD MANUAL STRIP-MCNC: 166 MG/DL (ref 74–99)
GLUCOSE BLDA-MCNC: 121 MG/DL (ref 74–99)
GLUCOSE BLDA-MCNC: 131 MG/DL (ref 74–99)
GLUCOSE BLDA-MCNC: 157 MG/DL (ref 74–99)
GLUCOSE BLDA-MCNC: 160 MG/DL (ref 74–99)
GLUCOSE BLDA-MCNC: 163 MG/DL (ref 74–99)
GLUCOSE BLDA-MCNC: 168 MG/DL (ref 74–99)
GLUCOSE BLDA-MCNC: 170 MG/DL (ref 74–99)
GLUCOSE BLDV-MCNC: 162 MG/DL (ref 74–99)
GLUCOSE SERPL-MCNC: 121 MG/DL (ref 74–99)
GLUCOSE SERPL-MCNC: 151 MG/DL (ref 74–99)
GLUCOSE SERPL-MCNC: 165 MG/DL (ref 74–99)
GLUCOSE SERPL-MCNC: 175 MG/DL (ref 74–99)
GLUCOSE SERPL-MCNC: 96 MG/DL (ref 74–99)
HCO3 BLDA-SCNC: 21.3 MMOL/L (ref 22–26)
HCO3 BLDA-SCNC: 22.2 MMOL/L (ref 22–26)
HCO3 BLDA-SCNC: 23.1 MMOL/L (ref 22–26)
HCO3 BLDA-SCNC: 23.2 MMOL/L (ref 22–26)
HCO3 BLDA-SCNC: 23.7 MMOL/L (ref 22–26)
HCO3 BLDA-SCNC: 24.7 MMOL/L (ref 22–26)
HCO3 BLDA-SCNC: 26.5 MMOL/L (ref 22–26)
HCO3 BLDV-SCNC: 25.5 MMOL/L (ref 22–26)
HCT VFR BLD AUTO: 27 % (ref 36–46)
HCT VFR BLD EST: 28 % (ref 36–46)
HCT VFR BLD EST: 29 % (ref 36–46)
HCT VFR BLD EST: 30 % (ref 36–46)
HCT VFR BLD EST: 32 % (ref 36–46)
HGB BLD-MCNC: 9.2 G/DL (ref 12–16)
HGB BLDA-MCNC: 10.7 G/DL (ref 12–16)
HGB BLDA-MCNC: 9.2 G/DL (ref 12–16)
HGB BLDA-MCNC: 9.4 G/DL (ref 12–16)
HGB BLDA-MCNC: 9.4 G/DL (ref 12–16)
HGB BLDA-MCNC: 9.7 G/DL (ref 12–16)
HGB BLDA-MCNC: 9.8 G/DL (ref 12–16)
HGB BLDA-MCNC: 9.9 G/DL (ref 12–16)
HGB BLDV-MCNC: 9.5 G/DL (ref 12–16)
INHALED O2 CONCENTRATION: 21 %
INHALED O2 CONCENTRATION: 40 %
INR PPP: 1.2 (ref 0.9–1.1)
LACTATE BLDA-SCNC: 1.2 MMOL/L (ref 0.4–2)
LACTATE BLDA-SCNC: 1.5 MMOL/L (ref 0.4–2)
LACTATE BLDA-SCNC: 2 MMOL/L (ref 0.4–2)
LACTATE BLDA-SCNC: 2.3 MMOL/L (ref 0.4–2)
LACTATE BLDA-SCNC: 2.5 MMOL/L (ref 0.4–2)
LACTATE BLDA-SCNC: 2.9 MMOL/L (ref 0.4–2)
LACTATE BLDA-SCNC: 2.9 MMOL/L (ref 0.4–2)
LACTATE BLDV-SCNC: 2.4 MMOL/L (ref 0.4–2)
LACTATE BLDV-SCNC: 2.4 MMOL/L (ref 0.4–2)
LEFT VENTRICLE INTERNAL DIMENSION DIASTOLE: 4.8 CM (ref 3.5–6)
MAGNESIUM SERPL-MCNC: 1.95 MG/DL (ref 1.6–2.4)
MCH RBC QN AUTO: 25.3 PG (ref 26–34)
MCHC RBC AUTO-ENTMCNC: 34.1 G/DL (ref 32–36)
MCV RBC AUTO: 74 FL (ref 80–100)
MITRAL VALVE E/A RATIO: 1.48
NRBC BLD-RTO: 0 /100 WBCS (ref 0–0)
OSMOLALITY SERPL: 264 MOSM/KG (ref 280–300)
OXYHGB MFR BLDA: 89.6 % (ref 94–98)
OXYHGB MFR BLDA: 94.6 % (ref 94–98)
OXYHGB MFR BLDA: 96.4 % (ref 94–98)
OXYHGB MFR BLDA: 96.8 % (ref 94–98)
OXYHGB MFR BLDA: 97.2 % (ref 94–98)
OXYHGB MFR BLDA: 97.5 % (ref 94–98)
OXYHGB MFR BLDA: 97.5 % (ref 94–98)
OXYHGB MFR BLDV: 63 % (ref 45–75)
PCO2 BLDA: 30 MM HG (ref 38–42)
PCO2 BLDA: 35 MM HG (ref 38–42)
PCO2 BLDA: 39 MM HG (ref 38–42)
PCO2 BLDA: 40 MM HG (ref 38–42)
PCO2 BLDA: 40 MM HG (ref 38–42)
PCO2 BLDA: 41 MM HG (ref 38–42)
PCO2 BLDA: 46 MM HG (ref 38–42)
PCO2 BLDV: 53 MM HG (ref 41–51)
PH BLDA: 7.31 PH (ref 7.38–7.42)
PH BLDA: 7.37 PH (ref 7.38–7.42)
PH BLDA: 7.37 PH (ref 7.38–7.42)
PH BLDA: 7.41 PH (ref 7.38–7.42)
PH BLDA: 7.41 PH (ref 7.38–7.42)
PH BLDA: 7.43 PH (ref 7.38–7.42)
PH BLDA: 7.46 PH (ref 7.38–7.42)
PH BLDV: 7.29 PH (ref 7.33–7.43)
PHOSPHATE SERPL-MCNC: 1.8 MG/DL (ref 2.5–4.9)
PHOSPHATE SERPL-MCNC: 2.5 MG/DL (ref 2.5–4.9)
PHOSPHATE SERPL-MCNC: 3 MG/DL (ref 2.5–4.9)
PHOSPHATE SERPL-MCNC: 3.3 MG/DL (ref 2.5–4.9)
PHOSPHATE SERPL-MCNC: 4.3 MG/DL (ref 2.5–4.9)
PLATELET # BLD AUTO: 340 X10*3/UL (ref 150–450)
PO2 BLDA: 111 MM HG (ref 85–95)
PO2 BLDA: 148 MM HG (ref 85–95)
PO2 BLDA: 178 MM HG (ref 85–95)
PO2 BLDA: 196 MM HG (ref 85–95)
PO2 BLDA: 65 MM HG (ref 85–95)
PO2 BLDA: 76 MM HG (ref 85–95)
PO2 BLDA: 96 MM HG (ref 85–95)
PO2 BLDV: 45 MM HG (ref 35–45)
POTASSIUM BLDA-SCNC: 3.6 MMOL/L (ref 3.5–5.3)
POTASSIUM BLDA-SCNC: 4.5 MMOL/L (ref 3.5–5.3)
POTASSIUM BLDA-SCNC: 4.7 MMOL/L (ref 3.5–5.3)
POTASSIUM BLDA-SCNC: 4.7 MMOL/L (ref 3.5–5.3)
POTASSIUM BLDA-SCNC: 5.1 MMOL/L (ref 3.5–5.3)
POTASSIUM BLDV-SCNC: 4.8 MMOL/L (ref 3.5–5.3)
POTASSIUM SERPL-SCNC: 4.6 MMOL/L (ref 3.5–5.3)
POTASSIUM SERPL-SCNC: 4.7 MMOL/L (ref 3.5–5.3)
POTASSIUM SERPL-SCNC: 4.7 MMOL/L (ref 3.5–5.3)
POTASSIUM SERPL-SCNC: 4.8 MMOL/L (ref 3.5–5.3)
POTASSIUM SERPL-SCNC: 5 MMOL/L (ref 3.5–5.3)
PROT SERPL-MCNC: 5.9 G/DL (ref 6.4–8.2)
PROTHROMBIN TIME: 13.8 SECONDS (ref 9.8–12.8)
RBC # BLD AUTO: 3.63 X10*6/UL (ref 4–5.2)
RIGHT VENTRICLE FREE WALL PEAK S': 6.64 CM/S
RIGHT VENTRICLE PEAK SYSTOLIC PRESSURE: 45.5 MMHG
SAO2 % BLDA: 100 % (ref 94–100)
SAO2 % BLDA: 100 % (ref 94–100)
SAO2 % BLDA: 92 % (ref 94–100)
SAO2 % BLDA: 96 % (ref 94–100)
SAO2 % BLDA: 98 % (ref 94–100)
SAO2 % BLDA: 99 % (ref 94–100)
SAO2 % BLDA: 99 % (ref 94–100)
SAO2 % BLDV: 64 % (ref 45–75)
SODIUM BLDA-SCNC: 116 MMOL/L (ref 136–145)
SODIUM BLDA-SCNC: 120 MMOL/L (ref 136–145)
SODIUM BLDA-SCNC: 122 MMOL/L (ref 136–145)
SODIUM BLDA-SCNC: 123 MMOL/L (ref 136–145)
SODIUM BLDA-SCNC: 124 MMOL/L (ref 136–145)
SODIUM BLDV-SCNC: 122 MMOL/L (ref 136–145)
SODIUM SERPL-SCNC: 120 MMOL/L (ref 136–145)
SODIUM SERPL-SCNC: 122 MMOL/L (ref 136–145)
SODIUM SERPL-SCNC: 124 MMOL/L (ref 136–145)
SODIUM SERPL-SCNC: 124 MMOL/L (ref 136–145)
SODIUM SERPL-SCNC: 125 MMOL/L (ref 136–145)
TRICUSPID ANNULAR PLANE SYSTOLIC EXCURSION: 1 CM
WBC # BLD AUTO: 16.2 X10*3/UL (ref 4.4–11.3)

## 2025-01-16 PROCEDURE — 82435 ASSAY OF BLOOD CHLORIDE: CPT

## 2025-01-16 PROCEDURE — 2580000001 HC RX 258 IV SOLUTIONS

## 2025-01-16 PROCEDURE — 2500000004 HC RX 250 GENERAL PHARMACY W/ HCPCS (ALT 636 FOR OP/ED)

## 2025-01-16 PROCEDURE — 84484 ASSAY OF TROPONIN QUANT: CPT

## 2025-01-16 PROCEDURE — 84484 ASSAY OF TROPONIN QUANT: CPT | Performed by: STUDENT IN AN ORGANIZED HEALTH CARE EDUCATION/TRAINING PROGRAM

## 2025-01-16 PROCEDURE — 76705 ECHO EXAM OF ABDOMEN: CPT

## 2025-01-16 PROCEDURE — 93325 DOPPLER ECHO COLOR FLOW MAPG: CPT | Performed by: STUDENT IN AN ORGANIZED HEALTH CARE EDUCATION/TRAINING PROGRAM

## 2025-01-16 PROCEDURE — 2500000005 HC RX 250 GENERAL PHARMACY W/O HCPCS: Performed by: STUDENT IN AN ORGANIZED HEALTH CARE EDUCATION/TRAINING PROGRAM

## 2025-01-16 PROCEDURE — 84132 ASSAY OF SERUM POTASSIUM: CPT | Performed by: NURSE PRACTITIONER

## 2025-01-16 PROCEDURE — 84132 ASSAY OF SERUM POTASSIUM: CPT

## 2025-01-16 PROCEDURE — 36556 INSERT NON-TUNNEL CV CATH: CPT

## 2025-01-16 PROCEDURE — 2500000004 HC RX 250 GENERAL PHARMACY W/ HCPCS (ALT 636 FOR OP/ED): Performed by: STUDENT IN AN ORGANIZED HEALTH CARE EDUCATION/TRAINING PROGRAM

## 2025-01-16 PROCEDURE — 93005 ELECTROCARDIOGRAM TRACING: CPT

## 2025-01-16 PROCEDURE — 2500000001 HC RX 250 WO HCPCS SELF ADMINISTERED DRUGS (ALT 637 FOR MEDICARE OP): Performed by: NURSE PRACTITIONER

## 2025-01-16 PROCEDURE — 71045 X-RAY EXAM CHEST 1 VIEW: CPT

## 2025-01-16 PROCEDURE — 94640 AIRWAY INHALATION TREATMENT: CPT

## 2025-01-16 PROCEDURE — 82040 ASSAY OF SERUM ALBUMIN: CPT

## 2025-01-16 PROCEDURE — 83605 ASSAY OF LACTIC ACID: CPT | Performed by: NURSE PRACTITIONER

## 2025-01-16 PROCEDURE — 36620 INSERTION CATHETER ARTERY: CPT

## 2025-01-16 PROCEDURE — 85027 COMPLETE CBC AUTOMATED: CPT | Performed by: NURSE PRACTITIONER

## 2025-01-16 PROCEDURE — 99291 CRITICAL CARE FIRST HOUR: CPT | Performed by: ANESTHESIOLOGY

## 2025-01-16 PROCEDURE — 36556 INSERT NON-TUNNEL CV CATH: CPT | Mod: GC

## 2025-01-16 PROCEDURE — 37799 UNLISTED PX VASCULAR SURGERY: CPT | Performed by: NURSE PRACTITIONER

## 2025-01-16 PROCEDURE — 76705 ECHO EXAM OF ABDOMEN: CPT | Performed by: RADIOLOGY

## 2025-01-16 PROCEDURE — 82947 ASSAY GLUCOSE BLOOD QUANT: CPT

## 2025-01-16 PROCEDURE — 83880 ASSAY OF NATRIURETIC PEPTIDE: CPT

## 2025-01-16 PROCEDURE — 83930 ASSAY OF BLOOD OSMOLALITY: CPT

## 2025-01-16 PROCEDURE — 82435 ASSAY OF BLOOD CHLORIDE: CPT | Performed by: NURSE PRACTITIONER

## 2025-01-16 PROCEDURE — 85730 THROMBOPLASTIN TIME PARTIAL: CPT | Performed by: NURSE PRACTITIONER

## 2025-01-16 PROCEDURE — 2500000005 HC RX 250 GENERAL PHARMACY W/O HCPCS: Performed by: PHYSICIAN ASSISTANT

## 2025-01-16 PROCEDURE — 99232 SBSQ HOSP IP/OBS MODERATE 35: CPT

## 2025-01-16 PROCEDURE — 83735 ASSAY OF MAGNESIUM: CPT | Performed by: NURSE PRACTITIONER

## 2025-01-16 PROCEDURE — 2500000004 HC RX 250 GENERAL PHARMACY W/ HCPCS (ALT 636 FOR OP/ED): Performed by: PHYSICIAN ASSISTANT

## 2025-01-16 PROCEDURE — 2500000004 HC RX 250 GENERAL PHARMACY W/ HCPCS (ALT 636 FOR OP/ED): Performed by: NURSE PRACTITIONER

## 2025-01-16 PROCEDURE — 99223 1ST HOSP IP/OBS HIGH 75: CPT | Performed by: INTERNAL MEDICINE

## 2025-01-16 PROCEDURE — 82330 ASSAY OF CALCIUM: CPT | Performed by: NURSE PRACTITIONER

## 2025-01-16 PROCEDURE — 93308 TTE F-UP OR LMTD: CPT | Performed by: STUDENT IN AN ORGANIZED HEALTH CARE EDUCATION/TRAINING PROGRAM

## 2025-01-16 PROCEDURE — 71045 X-RAY EXAM CHEST 1 VIEW: CPT | Performed by: RADIOLOGY

## 2025-01-16 PROCEDURE — 36620 INSERTION CATHETER ARTERY: CPT | Mod: GC

## 2025-01-16 PROCEDURE — 93325 DOPPLER ECHO COLOR FLOW MAPG: CPT

## 2025-01-16 PROCEDURE — 2020000001 HC ICU ROOM DAILY

## 2025-01-16 PROCEDURE — 93321 DOPPLER ECHO F-UP/LMTD STD: CPT | Performed by: STUDENT IN AN ORGANIZED HEALTH CARE EDUCATION/TRAINING PROGRAM

## 2025-01-16 PROCEDURE — 85018 HEMOGLOBIN: CPT | Performed by: NURSE PRACTITIONER

## 2025-01-16 PROCEDURE — 85610 PROTHROMBIN TIME: CPT | Performed by: NURSE PRACTITIONER

## 2025-01-16 PROCEDURE — 93010 ELECTROCARDIOGRAM REPORT: CPT | Performed by: INTERNAL MEDICINE

## 2025-01-16 PROCEDURE — 02HV33Z INSERTION OF INFUSION DEVICE INTO SUPERIOR VENA CAVA, PERCUTANEOUS APPROACH: ICD-10-PCS | Performed by: ANESTHESIOLOGY

## 2025-01-16 PROCEDURE — 84132 ASSAY OF SERUM POTASSIUM: CPT | Performed by: STUDENT IN AN ORGANIZED HEALTH CARE EDUCATION/TRAINING PROGRAM

## 2025-01-16 PROCEDURE — 2500000001 HC RX 250 WO HCPCS SELF ADMINISTERED DRUGS (ALT 637 FOR MEDICARE OP)

## 2025-01-16 RX ORDER — DEXMEDETOMIDINE HYDROCHLORIDE 4 UG/ML
0-1.5 INJECTION, SOLUTION INTRAVENOUS CONTINUOUS
Status: DISCONTINUED | OUTPATIENT
Start: 2025-01-16 | End: 2025-01-16

## 2025-01-16 RX ORDER — FUROSEMIDE 10 MG/ML
20 INJECTION INTRAMUSCULAR; INTRAVENOUS ONCE
Status: COMPLETED | OUTPATIENT
Start: 2025-01-16 | End: 2025-01-16

## 2025-01-16 RX ORDER — POTASSIUM CHLORIDE 20 MEQ/1
40 TABLET, EXTENDED RELEASE ORAL EVERY 6 HOURS PRN
Status: DISCONTINUED | OUTPATIENT
Start: 2025-01-16 | End: 2025-01-18

## 2025-01-16 RX ORDER — ONDANSETRON HYDROCHLORIDE 2 MG/ML
4 INJECTION, SOLUTION INTRAVENOUS ONCE
Status: COMPLETED | OUTPATIENT
Start: 2025-01-16 | End: 2025-01-16

## 2025-01-16 RX ORDER — FUROSEMIDE 10 MG/ML
10 INJECTION INTRAMUSCULAR; INTRAVENOUS ONCE
Status: COMPLETED | OUTPATIENT
Start: 2025-01-16 | End: 2025-01-16

## 2025-01-16 RX ORDER — POTASSIUM CHLORIDE 20 MEQ/1
20 TABLET, EXTENDED RELEASE ORAL EVERY 6 HOURS PRN
Status: DISCONTINUED | OUTPATIENT
Start: 2025-01-16 | End: 2025-01-18

## 2025-01-16 RX ORDER — ALPRAZOLAM 0.5 MG/1
0.5 TABLET ORAL NIGHTLY PRN
Status: DISCONTINUED | OUTPATIENT
Start: 2025-01-16 | End: 2025-01-16

## 2025-01-16 RX ORDER — DEXMEDETOMIDINE HYDROCHLORIDE 4 UG/ML
0-.6 INJECTION, SOLUTION INTRAVENOUS CONTINUOUS
Status: DISCONTINUED | OUTPATIENT
Start: 2025-01-16 | End: 2025-01-17

## 2025-01-16 RX ORDER — ALBUTEROL SULFATE 0.83 MG/ML
2.5 SOLUTION RESPIRATORY (INHALATION)
Status: DISCONTINUED | OUTPATIENT
Start: 2025-01-17 | End: 2025-01-17

## 2025-01-16 RX ORDER — SODIUM CHLORIDE FOR INHALATION 3 %
3 VIAL, NEBULIZER (ML) INHALATION
Status: DISCONTINUED | OUTPATIENT
Start: 2025-01-17 | End: 2025-01-18

## 2025-01-16 RX ORDER — ACETAMINOPHEN 10 MG/ML
1000 INJECTION, SOLUTION INTRAVENOUS EVERY 6 HOURS SCHEDULED
Status: DISCONTINUED | OUTPATIENT
Start: 2025-01-16 | End: 2025-01-17

## 2025-01-16 RX ORDER — FUROSEMIDE 20 MG/1
20 TABLET ORAL ONCE
Status: DISCONTINUED | OUTPATIENT
Start: 2025-01-17 | End: 2025-01-17

## 2025-01-16 RX ORDER — EPINEPHRINE IN 0.9 % SOD CHLOR 4MG/250ML
0.04 PLASTIC BAG, INJECTION (ML) INTRAVENOUS CONTINUOUS
Status: DISCONTINUED | OUTPATIENT
Start: 2025-01-16 | End: 2025-01-18

## 2025-01-16 RX ORDER — ONDANSETRON HYDROCHLORIDE 2 MG/ML
INJECTION, SOLUTION INTRAVENOUS
Status: COMPLETED
Start: 2025-01-16 | End: 2025-01-16

## 2025-01-16 RX ORDER — MILRINONE LACTATE 0.2 MG/ML
0.12 INJECTION, SOLUTION INTRAVENOUS CONTINUOUS
Status: DISCONTINUED | OUTPATIENT
Start: 2025-01-16 | End: 2025-01-22

## 2025-01-16 RX ORDER — EPINEPHRINE IN 0.9 % SOD CHLOR 4MG/250ML
PLASTIC BAG, INJECTION (ML) INTRAVENOUS
Status: COMPLETED
Start: 2025-01-16 | End: 2025-01-16

## 2025-01-16 RX ORDER — TRAZODONE HYDROCHLORIDE 50 MG/1
50 TABLET ORAL NIGHTLY PRN
Status: DISCONTINUED | OUTPATIENT
Start: 2025-01-16 | End: 2025-01-18

## 2025-01-16 RX ADMIN — FLUTICASONE FUROATE AND VILANTEROL TRIFENATATE 1 PUFF: 200; 25 POWDER RESPIRATORY (INHALATION) at 10:02

## 2025-01-16 RX ADMIN — HUMAN ALBUMIN MICROSPHERES AND PERFLUTREN 0.5 ML: 10; .22 INJECTION, SOLUTION INTRAVENOUS at 14:11

## 2025-01-16 RX ADMIN — PANTOPRAZOLE SODIUM 40 MG: 40 INJECTION, POWDER, FOR SOLUTION INTRAVENOUS at 08:28

## 2025-01-16 RX ADMIN — HYDROCORTISONE SODIUM SUCCINATE 100 MG: 100 INJECTION, POWDER, FOR SOLUTION INTRAMUSCULAR; INTRAVENOUS at 17:57

## 2025-01-16 RX ADMIN — Medication 0.02 MCG/KG/MIN: at 22:20

## 2025-01-16 RX ADMIN — EPINEPHRINE IN SODIUM CHLORIDE 0.02 MCG/KG/MIN: 16 INJECTION INTRAVENOUS at 22:20

## 2025-01-16 RX ADMIN — TRAZODONE HYDROCHLORIDE 50 MG: 50 TABLET ORAL at 21:10

## 2025-01-16 RX ADMIN — METOPROLOL TARTRATE 12.5 MG: 25 TABLET, FILM COATED ORAL at 04:35

## 2025-01-16 RX ADMIN — HEPARIN SODIUM 5000 UNITS: 5000 INJECTION INTRAVENOUS; SUBCUTANEOUS at 14:25

## 2025-01-16 RX ADMIN — LIDOCAINE 4% 1 PATCH: 40 PATCH TOPICAL at 08:28

## 2025-01-16 RX ADMIN — HYDROCORTISONE SODIUM SUCCINATE 100 MG: 100 INJECTION, POWDER, FOR SOLUTION INTRAMUSCULAR; INTRAVENOUS at 03:16

## 2025-01-16 RX ADMIN — SODIUM CHLORIDE 21 MMOL: 9 INJECTION, SOLUTION INTRAVENOUS at 03:16

## 2025-01-16 RX ADMIN — ONDANSETRON 4 MG: 2 INJECTION INTRAMUSCULAR; INTRAVENOUS at 17:14

## 2025-01-16 RX ADMIN — FUROSEMIDE 10 MG: 10 INJECTION, SOLUTION INTRAVENOUS at 09:24

## 2025-01-16 RX ADMIN — FUROSEMIDE 5 MG/HR: 10 INJECTION, SOLUTION INTRAVENOUS at 18:34

## 2025-01-16 RX ADMIN — CALCIUM GLUCONATE 1 G: 20 INJECTION, SOLUTION INTRAVENOUS at 05:30

## 2025-01-16 RX ADMIN — MILRINONE LACTATE IN DEXTROSE 0.12 MCG/KG/MIN: 200 INJECTION, SOLUTION INTRAVENOUS at 17:49

## 2025-01-16 RX ADMIN — FUROSEMIDE 20 MG: 10 INJECTION, SOLUTION INTRAMUSCULAR; INTRAVENOUS at 16:30

## 2025-01-16 RX ADMIN — DEXMEDETOMIDINE HYDROCHLORIDE 0.2 MCG/KG/HR: 400 INJECTION INTRAVENOUS at 15:20

## 2025-01-16 RX ADMIN — ACETAMINOPHEN 1000 MG: 10 INJECTION INTRAVENOUS at 20:37

## 2025-01-16 RX ADMIN — VASOPRESSIN 0.03 UNITS/MIN: 0.2 INJECTION INTRAVENOUS at 19:15

## 2025-01-16 RX ADMIN — TIOTROPIUM BROMIDE INHALATION SPRAY 2 PUFF: 3.12 SPRAY, METERED RESPIRATORY (INHALATION) at 10:02

## 2025-01-16 RX ADMIN — HEPARIN SODIUM 5000 UNITS: 5000 INJECTION INTRAVENOUS; SUBCUTANEOUS at 05:30

## 2025-01-16 RX ADMIN — ONDANSETRON HYDROCHLORIDE 4 MG: 2 INJECTION, SOLUTION INTRAVENOUS at 17:14

## 2025-01-16 RX ADMIN — HYDROCORTISONE SODIUM SUCCINATE 100 MG: 100 INJECTION, POWDER, FOR SOLUTION INTRAMUSCULAR; INTRAVENOUS at 10:21

## 2025-01-16 RX ADMIN — SODIUM CHLORIDE 150 ML: 3 INJECTION, SOLUTION INTRAVENOUS at 06:17

## 2025-01-16 ASSESSMENT — PAIN SCALES - GENERAL
PAINLEVEL_OUTOF10: 0 - NO PAIN
PAINLEVEL_OUTOF10: 0 - NO PAIN
PAINLEVEL_OUTOF10: 6
PAINLEVEL_OUTOF10: 0 - NO PAIN

## 2025-01-16 ASSESSMENT — PAIN - FUNCTIONAL ASSESSMENT
PAIN_FUNCTIONAL_ASSESSMENT: 0-10
PAIN_FUNCTIONAL_ASSESSMENT: CPOT (CRITICAL CARE PAIN OBSERVATION TOOL)
PAIN_FUNCTIONAL_ASSESSMENT: CPOT (CRITICAL CARE PAIN OBSERVATION TOOL)
PAIN_FUNCTIONAL_ASSESSMENT: 0-10

## 2025-01-16 NOTE — PROGRESS NOTES
"Faustina Vick \"Noam" is a 64 y.o. female on day 3 of admission presenting with Primary cancer of right upper lobe of lung (Multi).    Subjective   Hyponatremia initially improved to 124 yesterday afternoon, however, down to 120 overnight. Given additional 150cc 3% NaCl. Reports poor sleep and more confused/agitated this AM       Objective     Physical Exam  Vitals reviewed.   Constitutional:       General: She is not in acute distress.     Appearance: She is ill-appearing.   HENT:      Nose: Nose normal.      Mouth/Throat:      Mouth: Mucous membranes are moist.   Eyes:      Extraocular Movements: Extraocular movements intact.   Neck:      Comments: Dressing over prior RIJ line site is c/d/i  Cardiovascular:      Rate and Rhythm: Normal rate and regular rhythm.      Pulses: Normal pulses.      Heart sounds: Normal heart sounds.   Pulmonary:      Effort: Pulmonary effort is normal.      Comments: Left lung sounds normal   Absent lung sounds on right     Wet cough  Abdominal:      General: Abdomen is flat.      Palpations: Abdomen is soft.      Tenderness: There is no abdominal tenderness.   Musculoskeletal:      Right lower leg: No edema.      Left lower leg: No edema.   Skin:     General: Skin is warm and dry.   Neurological:      General: No focal deficit present.      Mental Status: She is oriented to person, place, and time.   Psychiatric:      Comments: Slowed responses, anxious appearing, confused though oriented         Last Recorded Vitals  Blood pressure (!) 124/95, pulse 87, temperature 35.9 °C (96.6 °F), temperature source Temporal, resp. rate 24, height 1.651 m (5' 5\"), weight 52.1 kg (114 lb 13.8 oz), SpO2 93%.  Intake/Output last 3 Shifts:  I/O last 3 completed shifts:  In: 2637 (50.6 mL/kg) [P.O.:1200; I.V.:231 (4.4 mL/kg); Blood:250; IV Piggyback:956]  Out: 1120 (21.5 mL/kg) [Urine:1120 (0.6 mL/kg/hr)]  Weight: 52.1 kg     Relevant Results  Scheduled medications  acetaminophen, 650 mg, oral, " q6h  fluticasone furoate-vilanteroL, 1 puff, inhalation, Daily  heparin (porcine), 5,000 Units, subcutaneous, q8h  hydrocortisone sodium succinate, 100 mg, intravenous, q8h  insulin lispro, 0-5 Units, subcutaneous, q4h  lidocaine, 1 patch, transdermal, Daily  melatonin, 3 mg, oral, Nightly  metoprolol tartrate, 12.5 mg, oral, q12h  pantoprazole, 40 mg, intravenous, Daily before breakfast  phytonadione, 10 mg, intravenous, Daily  sodium phosphate, 21 mmol, intravenous, Once  tiotropium, 2 puff, inhalation, Daily      Continuous medications       PRN medications  PRN medications: albuterol, ALPRAZolam, calcium gluconate, calcium gluconate, dextrose, dextrose, glucagon, glucagon, magnesium sulfate, magnesium sulfate, oxyCODONE, potassium chloride    Results for orders placed or performed during the hospital encounter of 01/13/25 (from the past 24 hours)   POCT GLUCOSE   Result Value Ref Range    POCT Glucose 122 (H) 74 - 99 mg/dL   Renal Function Panel   Result Value Ref Range    Glucose 102 (H) 74 - 99 mg/dL    Sodium 121 (L) 136 - 145 mmol/L    Potassium 4.1 3.5 - 5.3 mmol/L    Chloride 84 (L) 98 - 107 mmol/L    Bicarbonate 26 21 - 32 mmol/L    Anion Gap 15 10 - 20 mmol/L    Urea Nitrogen 5 (L) 6 - 23 mg/dL    Creatinine 0.22 (L) 0.50 - 1.05 mg/dL    eGFR >90 >60 mL/min/1.73m*2    Calcium 8.2 (L) 8.6 - 10.6 mg/dL    Phosphorus 2.1 (L) 2.5 - 4.9 mg/dL    Albumin 4.2 3.4 - 5.0 g/dL   POCT GLUCOSE   Result Value Ref Range    POCT Glucose 109 (H) 74 - 99 mg/dL   Renal Function Panel   Result Value Ref Range    Glucose 86 74 - 99 mg/dL    Sodium 124 (L) 136 - 145 mmol/L    Potassium 3.6 3.5 - 5.3 mmol/L    Chloride 86 (L) 98 - 107 mmol/L    Bicarbonate 24 21 - 32 mmol/L    Anion Gap 18 mmol/L    Urea Nitrogen 5 (L) 6 - 23 mg/dL    Creatinine <0.20 (L) 0.50 - 1.05 mg/dL    eGFR      Calcium 7.8 (L) 8.6 - 10.6 mg/dL    Phosphorus 2.9 2.5 - 4.9 mg/dL    Albumin 3.7 3.4 - 5.0 g/dL   POCT GLUCOSE   Result Value Ref Range     POCT Glucose 96 74 - 99 mg/dL   Renal Function Panel   Result Value Ref Range    Glucose 120 (H) 74 - 99 mg/dL    Sodium 122 (L) 136 - 145 mmol/L    Potassium 4.6 3.5 - 5.3 mmol/L    Chloride 86 (L) 98 - 107 mmol/L    Bicarbonate 25 21 - 32 mmol/L    Anion Gap 16 10 - 20 mmol/L    Urea Nitrogen 5 (L) 6 - 23 mg/dL    Creatinine 0.20 (L) 0.50 - 1.05 mg/dL    eGFR >90 >60 mL/min/1.73m*2    Calcium 8.3 (L) 8.6 - 10.6 mg/dL    Phosphorus 3.0 2.5 - 4.9 mg/dL    Albumin 3.8 3.4 - 5.0 g/dL   POCT GLUCOSE   Result Value Ref Range    POCT Glucose 120 (H) 74 - 99 mg/dL   Renal Function Panel   Result Value Ref Range    Glucose 96 74 - 99 mg/dL    Sodium 122 (L) 136 - 145 mmol/L    Potassium 4.7 3.5 - 5.3 mmol/L    Chloride 87 (L) 98 - 107 mmol/L    Bicarbonate 24 21 - 32 mmol/L    Anion Gap 16 10 - 20 mmol/L    Urea Nitrogen 5 (L) 6 - 23 mg/dL    Creatinine <0.20 (L) 0.50 - 1.05 mg/dL    eGFR      Calcium 8.0 (L) 8.6 - 10.6 mg/dL    Phosphorus 1.8 (L) 2.5 - 4.9 mg/dL    Albumin 3.5 3.4 - 5.0 g/dL   POCT GLUCOSE   Result Value Ref Range    POCT Glucose 120 (H) 74 - 99 mg/dL   Renal Function Panel   Result Value Ref Range    Glucose 121 (H) 74 - 99 mg/dL    Sodium 120 (LL) 136 - 145 mmol/L    Potassium 4.7 3.5 - 5.3 mmol/L    Chloride 84 (L) 98 - 107 mmol/L    Bicarbonate 21 21 - 32 mmol/L    Anion Gap 20 10 - 20 mmol/L    Urea Nitrogen 5 (L) 6 - 23 mg/dL    Creatinine 0.20 (L) 0.50 - 1.05 mg/dL    eGFR >90 >60 mL/min/1.73m*2    Calcium 8.1 (L) 8.6 - 10.6 mg/dL    Phosphorus 2.5 2.5 - 4.9 mg/dL    Albumin 3.6 3.4 - 5.0 g/dL   Calcium, Ionized   Result Value Ref Range    POCT Calcium, Ionized 1.03 (L) 1.1 - 1.33 mmol/L   CBC   Result Value Ref Range    WBC 16.2 (H) 4.4 - 11.3 x10*3/uL    nRBC 0.0 0.0 - 0.0 /100 WBCs    RBC 3.63 (L) 4.00 - 5.20 x10*6/uL    Hemoglobin 9.2 (L) 12.0 - 16.0 g/dL    Hematocrit 27.0 (L) 36.0 - 46.0 %    MCV 74 (L) 80 - 100 fL    MCH 25.3 (L) 26.0 - 34.0 pg    MCHC 34.1 32.0 - 36.0 g/dL    RDW 13.0 11.5  - 14.5 %    Platelets 340 150 - 450 x10*3/uL   Magnesium   Result Value Ref Range    Magnesium 1.95 1.60 - 2.40 mg/dL   Blood Gas Arterial Full Panel   Result Value Ref Range    POCT pH, Arterial 7.46 (H) 7.38 - 7.42 pH    POCT pCO2, Arterial 30 (L) 38 - 42 mm Hg    POCT pO2, Arterial 76 (L) 85 - 95 mm Hg    POCT SO2, Arterial 96 94 - 100 %    POCT Oxy Hemoglobin, Arterial 94.6 94.0 - 98.0 %    POCT Hematocrit Calculated, Arterial 30.0 (L) 36.0 - 46.0 %    POCT Sodium, Arterial 116 (LL) 136 - 145 mmol/L    POCT Potassium, Arterial 4.7 3.5 - 5.3 mmol/L    POCT Chloride, Arterial 86 (L) 98 - 107 mmol/L    POCT Ionized Calcium, Arterial 1.12 1.10 - 1.33 mmol/L    POCT Glucose, Arterial 131 (H) 74 - 99 mg/dL    POCT Lactate, Arterial 1.5 0.4 - 2.0 mmol/L    POCT Base Excess, Arterial -1.9 -2.0 - 3.0 mmol/L    POCT HCO3 Calculated, Arterial 21.3 (L) 22.0 - 26.0 mmol/L    POCT Hemoglobin, Arterial 9.9 (L) 12.0 - 16.0 g/dL    POCT Anion Gap, Arterial 13 10 - 25 mmo/L    Patient Temperature 37.0 degrees Celsius    FiO2 21 %       US renal complete    Result Date: 1/15/2025  Interpreted By:  Alberto Salas and Velez-Martinez Osvaldo STUDY: US RENAL COMPLETE;  1/15/2025 3:25 pm   INDICATION: Signs/Symptoms:symptomatic hyponatremia.     COMPARISON: CT abdomen 08/15/2024 FDG PET-CT 12/30/2024   ACCESSION NUMBER(S): [id number]   ORDERING CLINICIAN: GABBY SMITH   TECHNIQUE: Multiple images of the kidneys were obtained  .   FINDINGS: RIGHT KIDNEY: The right kidney measures 11.17 cm in length. The renal cortical echogenicity and thickness are within normal limits. No hydronephrosis is present; no evidence of nephrolithiasis.   LEFT KIDNEY: The left kidney measures 11.75 cm in length. The renal cortical echogenicity and thickness are within normal limits. No hydronephrosis is present; no evidence of nephrolithiasis.   BLADDER: A Montiel is present within a decompressed bladder.   Incidental note is made of a somewhat  edematous gallbladder with some pericholecystic fluid and layering echogenic material likely representing biliary sludge.   Free fluid is present within the right upper quadrant and pelvis.       1. Unremarkable sonographic evaluation of the kidneys. 2. Incomplete evaluation of a gallbladder with some pericholecystic fluid and biliary sludge. Dedicated right upper quadrant ultrasound could be obtained as clinically warranted. 3. A small amount of free fluid present in the right upper quadrant and pelvis.   Findings relayed by phone by me to SICU provider at 5:11 p.m.   I personally reviewed the images/study and I agree with the findings as stated by Erickson Best MD (resident) . This study was interpreted at [place], [place].   MACRO: None   Signed by: Alberto Salas 1/15/2025 6:17 PM Dictation workstation:   [id number]    XR chest 1 view    Result Date: 1/15/2025  Interpreted By:  Alexx Anne, STUDY: XR CHEST 1 VIEW;  1/15/2025 6:46 am   INDICATION: Signs/Symptoms:routine ICU.     COMPARISON: Exam dated 01/14/2025   ACCESSION NUMBER(S): [id number]   ORDERING CLINICIAN: GABBY SMITH   FINDINGS: AP radiograph of the chest was provided.   Left chest wall subclavian approach medication port with catheter tip projecting over the upper SVC. Right IJ central venous catheter tip projects over the upper SVC. Interval increase in subcutaneous emphysema overlying the right lower cervical soft tissues. Similar subcutaneous emphysema along the right chest wall.   CARDIOMEDIASTINAL SILHOUETTE: Cardiomediastinal silhouette is stable in size and configuration.   LUNGS: Postsurgical changes of pneumonectomy are again visualized. There is a moderate amount of layering pleural fluid within the right hemithorax on a background of diffuse lucency. Streaky left basilar atelectasis. The left lung otherwise appears clear.   ABDOMEN: No remarkable upper abdominal findings.    BONES: No acute osseous changes.       1.  Status post pneumonectomy with interval increase in fluid component of right-sided hydropneumothorax. 2. Medical devices as above.       MACRO: None   Signed by: Alexx Anne 1/15/2025 9:28 AM Dictation workstation:   [id number]    XR chest 1 view    Result Date: 1/14/2025  Interpreted By:  Vick Eli, STUDY: XR CHEST 1 VIEW; 1/14/2025 2:40 pm   INDICATION: Signs/Symptoms:Chest tube removal.   COMPARISON: Radiograph dated 01/14/2025   ACCESSION NUMBER(S): [id number]   ORDERING CLINICIAN: ERINN SANCHEZ   FINDINGS: Postsurgical changes related to right pneumonectomy with partially resected ribs.. Interval removal of right chest tube. Left subclavian MediPort and right IJ central venous catheter sheath are unchanged in positioning.   Cardiac silhouette size is slightly enlarged, unchanged.   Faint left basilar airspace opacity, likely atelectatic. No sizable pneumothorax.   Small amount of subcutaneous and soft tissue emphysema in the right chest wall.       1. Interval removal of right chest tube. Other medical devices as described above. 2. Postsurgical changes related to right pneumonectomy. 3. Faint left basilar opacity, likely atelectatic.       Signed by: Vick Xiao 1/14/2025 2:53 PM Dictation workstation:   [id number]     This patient does not have a central line.     This patient has a urinary catheter   Reason for the yoo  remaining today? Critically ill requiring accurate Is/Os    Assessment/Plan   Assessment:  63 y/o with a history of lung cancer presents to ICU from the OR s/p Right Pneumonectomy, intrapericardial, with en bloc chest wall resection by Dr. Wagner 1/13. Now off pressors but with symptomatic hyponatremia to 120.     Plan:  NEURO: History of anxiety on alprazolam 1mg qd, last filled 1/4/2025 per [facility] review. A&Ox4. Denying pain today. Poor sleep. C/f delirium overnight, confused this AM but reamins A&Ox4. Likely  multifactorial in the setting of ICU delirium, hyponatremia, benzo administration vs benzo withdrawal?  - Pain well controlled with prn oxycodone 2.5mg, PO tylenol scheduled (not taking)  - Lidocaine patch  - Held alprazolam due to confusion. Ordered prn trazodone at bedtime as well as precedex infusion. Can consider seroquel if these are ineffective.   - PT/OT consulted     CV: No history of cardiac disease. Baseline /79, -110. TTE 9/2024 normal biV function, RVSP 10mmHg. Intra-op hypotension requiring vaso and epi. Hypovolemic vs cardiogenic shock. Arrived to SICU on vaso 0.06 units/min, started on levo 0.05 mcg/kg/min. TTE 1/13 normal LV function, low normal RV systolic function. Off pressors since 1/15. Arterial line waveform poor but remains in place for frequent lab draws.   - Goal map range 65-90  - continuous EKG/abp monitoring  - limit unnecessary volume administration   - trend lactate  - q4 ABG today to monitor Na  - Hydrocortisone 100mg q8 (1/14 - ), will decrease to 50mg q8 tomorrow  - Consider pressor choice if needed (vaso vs levo) given hyponatremia  - Continue metoprolol 12.5mg q12h for atrial fibrillation prophylaxis      PULM: 30 pack year smoking history, quit 4/2024. Asthma, lung CA, s/p chemo/XRT/immunotherapy. Now s/p Right Pneumonectomy, intrapericardial, with en bloc chest wall resection . Arrived to SICU extubated on 10L SFM, now on room air. CXR 1/15 with appropriate fluid accumulation in R chest after chest tube was pulled 1/14. CXR stable.   - Maintain SpO2 > 92%  - Q1h incentive spirometer while awake  - additional pulm toilet prn.    - Daily CXR  - continue home Trelegy Elipta, ventolin HFA formulary exchange  - hold home benzonatate     GI: No GI history. LFTs WNL 12/2/24. Tolerating soft diet. Evidence of pericholecystic fluid and biliary sludge on renal US from 1/15 but no abdominal symptoms.   - Advance diet per surgical service discretion  - IV PPI for GI prophylaxis  while on clears and stress dose steroids  - RUQ ultrasound ordered given findings on renal US and worsening mental status.      : No history of renal disease. Baseline creatinine 0.6. Per thoracic and nephro, 1L PO fluid restriction. Symptomatic hyponatremia to 120 overnight 1/14 - 1/15, corrected to 124 after 150cc 3% NaCl x2 and 250 albumin on 1/15, now down to 120 1/16 AM. Maintaining adequate UOP. Renal ultrasound 1/15 no abnormalities.   - Montiel replaced 1/15 for accurate Is/Os given hyponatremia  - Appreciate nephrology recs: SIADH vs vaso mediated, fluid restriction to 1L, q4h RFP  - 10mg IV lasix given this AM.  - Judicious IVF administration  - Maintain U/O >0.5ml/kg/hr  - Check renal function panel post op, daily and prn  - Replete electrolytes to goal K>4, Mg>2, Phos>2.5, ionized Ca>1.10. Prefer PO potassium given water content. Mg standing order dc'd, will replete as needed in smaller volume.      HEME: Hx DOC. Baseline H/H 12/40. Acute blood loss anemia, OR EBL 150mL. Hgb stable. INR 2.3 1/15 but no s/s of bleeding and liver function normal, possibly due to poor nutritional status on clear liquid diet. Received IV Vit K 10mg x1 (additional doses held due to free water content) with subsequent normalization of INR  - Check CBC and coags post op, daily and prn  - scds, sc heparin  - ongoing monitoring for s/s bleeding     ENDO: No history of DM or thyroid disease. TSH 1.86. Recent 5d course of prednisone 10mg 12/2024 for URI.  - Stress dose steroids as above  - Discontinue q4 SSI today as blood glucose appropriate since admit and obtaining q4h RFP. Maintain blood glucose < 180     MSK: Arthritis, Vit D deficiency  - not on meds at home  - monitor for symptoms     ID: Afebrile. Leukocytosis to 17.2 after initiation of stress dose steroids, low concern for infection at this time given hemodynamic stability and afebrile. Downtrending today to 16.2.  - Received 24h perioperative cefazolin  - temp q4h, wbc  daily  - ongoing monitoring for s/s infection     Lines:  - left radial art line  - piv: 20g, 14g  - yoo     Dispo: Continue ICU care. Discussed with Dr. Mo and ICU fellow Dr. Sonu Anna MD  Anesthesiology PGY3  SICU Phone [phone]

## 2025-01-16 NOTE — PROGRESS NOTES
NEPHROLOGY FOLLOW UP NOTE    Faustina Nicanor   64 y.o.    @WT@  MRN/Room: [id number]/12/12-A    Subjective: Patient evaluated this morning and found to be resting in bed with  at bedside. States that she is in minimal amount of pain (only exacerabated with walking). Further denies nausea. Has an apparent wet sounding cough that is not new but increased in frequency. Admits that her intake has been poor; only eating a little of her broth.     Objective:     Meds:   acetaminophen, 650 mg, q6h  fluticasone furoate-vilanteroL, 1 puff, Daily  heparin (porcine), 5,000 Units, q8h  hydrocortisone sodium succinate, 100 mg, q8h  [START ON 1/17/2025] hydrocortisone sodium succinate, 50 mg, q8h  lidocaine, 1 patch, Daily  melatonin, 3 mg, Nightly  metoprolol tartrate, 12.5 mg, q12h  pantoprazole, 40 mg, Daily before breakfast  tiotropium, 2 puff, Daily      dexmedeTOMIDine      albuterol, 2 puff, q6h PRN  calcium gluconate, 1 g, q6h PRN  calcium gluconate, 2 g, q6h PRN  oxyCODONE, 2.5 mg, q4h PRN  potassium chloride CR, 20 mEq, q6h PRN  potassium chloride CR, 40 mEq, q6h PRN  traZODone, 50 mg, Nightly PRN        Vitals:    01/16/25 0800   BP: 101/75   Pulse: 85   Resp: 23   Temp: 36.1 °C (97 °F)   SpO2: 94%          Intake/Output Summary (Last 24 hours) at 1/16/2025 1129  Last data filed at 1/16/2025 1100  Gross per 24 hour   Intake 1483 ml   Output 702 ml   Net 781 ml       Constitutional: underweight, A&Ox3, no acute distress, alert and cooperative  Eyes: EOMI, clear sclera  Respiratory/Thorax: diminished lung sounds, diffuse rhonchi  Cardiovascular: Regular rate, regular rhythm  Gastrointestinal: Nondistended, soft, non-tender  Extremities: normal extremities, no cyanosis edema  Skin: Warm and dry    Blood Labs:  Results for orders placed or performed during the hospital encounter of 01/13/25 (from the past 24 hours)   POCT GLUCOSE   Result Value Ref Range    POCT Glucose 109 (H) 74 - 99 mg/dL   Renal Function Panel    Result Value Ref Range    Glucose 86 74 - 99 mg/dL    Sodium 124 (L) 136 - 145 mmol/L    Potassium 3.6 3.5 - 5.3 mmol/L    Chloride 86 (L) 98 - 107 mmol/L    Bicarbonate 24 21 - 32 mmol/L    Anion Gap 18 mmol/L    Urea Nitrogen 5 (L) 6 - 23 mg/dL    Creatinine <0.20 (L) 0.50 - 1.05 mg/dL    eGFR      Calcium 7.8 (L) 8.6 - 10.6 mg/dL    Phosphorus 2.9 2.5 - 4.9 mg/dL    Albumin 3.7 3.4 - 5.0 g/dL   POCT GLUCOSE   Result Value Ref Range    POCT Glucose 96 74 - 99 mg/dL   Renal Function Panel   Result Value Ref Range    Glucose 120 (H) 74 - 99 mg/dL    Sodium 122 (L) 136 - 145 mmol/L    Potassium 4.6 3.5 - 5.3 mmol/L    Chloride 86 (L) 98 - 107 mmol/L    Bicarbonate 25 21 - 32 mmol/L    Anion Gap 16 10 - 20 mmol/L    Urea Nitrogen 5 (L) 6 - 23 mg/dL    Creatinine 0.20 (L) 0.50 - 1.05 mg/dL    eGFR >90 >60 mL/min/1.73m*2    Calcium 8.3 (L) 8.6 - 10.6 mg/dL    Phosphorus 3.0 2.5 - 4.9 mg/dL    Albumin 3.8 3.4 - 5.0 g/dL   POCT GLUCOSE   Result Value Ref Range    POCT Glucose 120 (H) 74 - 99 mg/dL   Renal Function Panel   Result Value Ref Range    Glucose 96 74 - 99 mg/dL    Sodium 122 (L) 136 - 145 mmol/L    Potassium 4.7 3.5 - 5.3 mmol/L    Chloride 87 (L) 98 - 107 mmol/L    Bicarbonate 24 21 - 32 mmol/L    Anion Gap 16 10 - 20 mmol/L    Urea Nitrogen 5 (L) 6 - 23 mg/dL    Creatinine <0.20 (L) 0.50 - 1.05 mg/dL    eGFR      Calcium 8.0 (L) 8.6 - 10.6 mg/dL    Phosphorus 1.8 (L) 2.5 - 4.9 mg/dL    Albumin 3.5 3.4 - 5.0 g/dL   POCT GLUCOSE   Result Value Ref Range    POCT Glucose 120 (H) 74 - 99 mg/dL   Renal Function Panel   Result Value Ref Range    Glucose 121 (H) 74 - 99 mg/dL    Sodium 120 (LL) 136 - 145 mmol/L    Potassium 4.7 3.5 - 5.3 mmol/L    Chloride 84 (L) 98 - 107 mmol/L    Bicarbonate 21 21 - 32 mmol/L    Anion Gap 20 10 - 20 mmol/L    Urea Nitrogen 5 (L) 6 - 23 mg/dL    Creatinine 0.20 (L) 0.50 - 1.05 mg/dL    eGFR >90 >60 mL/min/1.73m*2    Calcium 8.1 (L) 8.6 - 10.6 mg/dL    Phosphorus 2.5 2.5 - 4.9  mg/dL    Albumin 3.6 3.4 - 5.0 g/dL   Calcium, Ionized   Result Value Ref Range    POCT Calcium, Ionized 1.03 (L) 1.1 - 1.33 mmol/L   CBC   Result Value Ref Range    WBC 16.2 (H) 4.4 - 11.3 x10*3/uL    nRBC 0.0 0.0 - 0.0 /100 WBCs    RBC 3.63 (L) 4.00 - 5.20 x10*6/uL    Hemoglobin 9.2 (L) 12.0 - 16.0 g/dL    Hematocrit 27.0 (L) 36.0 - 46.0 %    MCV 74 (L) 80 - 100 fL    MCH 25.3 (L) 26.0 - 34.0 pg    MCHC 34.1 32.0 - 36.0 g/dL    RDW 13.0 11.5 - 14.5 %    Platelets 340 150 - 450 x10*3/uL   Magnesium   Result Value Ref Range    Magnesium 1.95 1.60 - 2.40 mg/dL   Blood Gas Arterial Full Panel   Result Value Ref Range    POCT pH, Arterial 7.46 (H) 7.38 - 7.42 pH    POCT pCO2, Arterial 30 (L) 38 - 42 mm Hg    POCT pO2, Arterial 76 (L) 85 - 95 mm Hg    POCT SO2, Arterial 96 94 - 100 %    POCT Oxy Hemoglobin, Arterial 94.6 94.0 - 98.0 %    POCT Hematocrit Calculated, Arterial 30.0 (L) 36.0 - 46.0 %    POCT Sodium, Arterial 116 (LL) 136 - 145 mmol/L    POCT Potassium, Arterial 4.7 3.5 - 5.3 mmol/L    POCT Chloride, Arterial 86 (L) 98 - 107 mmol/L    POCT Ionized Calcium, Arterial 1.12 1.10 - 1.33 mmol/L    POCT Glucose, Arterial 131 (H) 74 - 99 mg/dL    POCT Lactate, Arterial 1.5 0.4 - 2.0 mmol/L    POCT Base Excess, Arterial -1.9 -2.0 - 3.0 mmol/L    POCT HCO3 Calculated, Arterial 21.3 (L) 22.0 - 26.0 mmol/L    POCT Hemoglobin, Arterial 9.9 (L) 12.0 - 16.0 g/dL    POCT Anion Gap, Arterial 13 10 - 25 mmo/L    Patient Temperature 37.0 degrees Celsius    FiO2 21 %   POCT GLUCOSE   Result Value Ref Range    POCT Glucose 166 (H) 74 - 99 mg/dL   Renal Function Panel   Result Value Ref Range    Glucose 175 (H) 74 - 99 mg/dL    Sodium 124 (L) 136 - 145 mmol/L    Potassium 5.0 3.5 - 5.3 mmol/L    Chloride 89 (L) 98 - 107 mmol/L    Bicarbonate 20 (L) 21 - 32 mmol/L    Anion Gap 20 10 - 20 mmol/L    Urea Nitrogen 8 6 - 23 mg/dL    Creatinine 0.34 (L) 0.50 - 1.05 mg/dL    eGFR >90 >60 mL/min/1.73m*2    Calcium 8.4 (L) 8.6 - 10.6  mg/dL    Phosphorus 4.3 2.5 - 4.9 mg/dL    Albumin 3.6 3.4 - 5.0 g/dL   Coagulation Screen   Result Value Ref Range    Protime 13.8 (H) 9.8 - 12.8 seconds    INR 1.2 (H) 0.9 - 1.1    aPTT 42 (H) 27 - 38 seconds   Hepatic function panel   Result Value Ref Range    Albumin 3.6 3.4 - 5.0 g/dL    Bilirubin, Total 0.7 0.0 - 1.2 mg/dL    Bilirubin, Direct 0.2 0.0 - 0.3 mg/dL    Alkaline Phosphatase 45 33 - 136 U/L    ALT 20 7 - 45 U/L    AST 71 (H) 9 - 39 U/L    Total Protein 5.9 (L) 6.4 - 8.2 g/dL   Blood Gas Arterial Full Panel   Result Value Ref Range    POCT pH, Arterial 7.41 7.38 - 7.42 pH    POCT pCO2, Arterial 35 (L) 38 - 42 mm Hg    POCT pO2, Arterial 65 (L) 85 - 95 mm Hg    POCT SO2, Arterial 92 (L) 94 - 100 %    POCT Oxy Hemoglobin, Arterial 89.6 (L) 94.0 - 98.0 %    POCT Hematocrit Calculated, Arterial 28.0 (L) 36.0 - 46.0 %    POCT Sodium, Arterial 122 (L) 136 - 145 mmol/L    POCT Potassium, Arterial 4.5 3.5 - 5.3 mmol/L    POCT Chloride, Arterial 91 (L) 98 - 107 mmol/L    POCT Ionized Calcium, Arterial 1.10 1.10 - 1.33 mmol/L    POCT Glucose, Arterial 170 (H) 74 - 99 mg/dL    POCT Lactate, Arterial 2.9 (H) 0.4 - 2.0 mmol/L    POCT Base Excess, Arterial -2.1 (L) -2.0 - 3.0 mmol/L    POCT HCO3 Calculated, Arterial 22.2 22.0 - 26.0 mmol/L    POCT Hemoglobin, Arterial 9.4 (L) 12.0 - 16.0 g/dL    POCT Anion Gap, Arterial 13 10 - 25 mmo/L    Patient Temperature 37.0 degrees Celsius    FiO2 21 %          ASSESSMENT:  Faustina Vick is a 64 y.o. female with a past medical hx of DEEPAK, tobacco use disorder and stage III squamous cell carcinoma who presented to [facility] on 1/13 for scheduled Right pneumonectomy, intrapericardial, with en bloc wall resection by Dr. Jonah Wagner 1/13. Nephrology is being consulted for symptomatic hyponatremia (reportedly confused and hallucinating). Symptoms currently not present. Received 3% saline 150mL 1/15 with minimal correction. Currently believe to be ADH mediated.       #Symptomatic Hyponatremia, currently asymptomatic  - Reports of confusion, hallucinations overnight 1/14-1/15; however currently asymptomatic  - Etiology: Unclear; possibly SIADH s/p resection superimposed on vasopressin initiation (stopped 1/15). Will consider patient volume down considering serum Cl and poor intake.  - Not likely thyroid mediated given TSH wnl 1mo ago. No hx of CKD or recent diuretic use. Unlikely primary adrenal insufficiency.     - Baseline creatinine: 0.6  - UA showed:  --  - Urine electrolyes 1/15 (obtain before 3% saline admin): Na 28, Cl 66, Cr 81.3   - 1/15 Sosm 251, Uosm 648  - Clinical volume status: Euvolemic  - Electrolytes (Na, K, Ca, Phos): hypochloremic hyponatremia, hypophos  - Acid base status: Respiratory Acidosis (1/14 ABG 7.36/50/76 w bicarb 26)     #Stage III squamous cell carcinoma s/p R-pneumonectomy 1/13     Recommendations:  - Evaluate volume status with orthostatic or IVC compression   - If positive give isotonic fluid bolus  - Goal correction in next 24hrs (1/17 0400): 126-129  - Ecourage solute rich diet; Ensure shakes  - Start urea 15g TID  - Check serum sodium q4h  - Maintain 1L PO restriction  - Indication for dialysis:  No   - Strict Is/Os     Osito Jimenes DO  Nephrology Resident  24 hour Renal Pager - [phone]     Discussed with attending nephrologist

## 2025-01-16 NOTE — CARE PLAN
Problem: Pain - Adult  Goal: Verbalizes/displays adequate comfort level or baseline comfort level  Outcome: Progressing     Problem: Safety - Adult  Goal: Free from fall injury  Outcome: Progressing     Problem: Discharge Planning  Goal: Discharge to home or other facility with appropriate resources  Outcome: Progressing     Problem: Chronic Conditions and Co-morbidities  Goal: Patient's chronic conditions and co-morbidity symptoms are monitored and maintained or improved  Outcome: Progressing     Problem: Skin  Goal: Decreased wound size/increased tissue granulation at next dressing change  Outcome: Progressing  Goal: Participates in plan/prevention/treatment measures  Outcome: Progressing  Goal: Prevent/manage excess moisture  Outcome: Progressing  Goal: Prevent/minimize sheer/friction injuries  Outcome: Progressing  Goal: Promote/optimize nutrition  Outcome: Progressing  Goal: Promote skin healing  Outcome: Progressing     Problem: Pain  Goal: Takes deep breaths with improved pain control throughout the shift  Outcome: Progressing  Goal: Turns in bed with improved pain control throughout the shift  Outcome: Progressing  Goal: Walks with improved pain control throughout the shift  Outcome: Progressing  Goal: Performs ADL's with improved pain control throughout shift  Outcome: Progressing  Goal: Participates in PT with improved pain control throughout the shift  Outcome: Progressing  Goal: Free from opioid side effects throughout the shift  Outcome: Progressing  Goal: Free from acute confusion related to pain meds throughout the shift  Outcome: Progressing

## 2025-01-16 NOTE — PROGRESS NOTES
63 y/o F with stage III SCC of the lung s/p chemotherapy+radiation last chemotherapy session on 8/28/2024 + right pneumonectomy, intrapericardial, with en block chest wall resection on 1/13 by Dr. Wagner. Patient with low blood pressure on preop that continued intra-op and post op. Patient arrived on vasopressin 0.06 to the ICU. Patient started on levophed on arrival to the SICU.       Neuro: PMH of anxiety on alprazolam at home. Agitated overnight and delirious during morning rounds.  Will dc alprazolam. Will add precedex at night and trazadone for agitation. Tylenol and oxycodone for pain control. PT/OT.   CV: No PMH, ECHO on 1/13 showing EF 65%, low normal RV function. New ECHO showing LV and RV RWMA suggestive of Takotsubo cardiomyopathy. Troponin 928. EKG without signs of ischemia. Will continue diuresis. Will consult cardiology for recommendations.   Pulm: 30 pack year smoking history, quit on 4/2024. Asthma, lung Ca, s/p chemo/XRT/immunotherapy. Now s/p Right Pneumonectomy, intrapericardial, with en bloc chest wall resection.  Oxygenating appropriately. On RA. Will continue home trelegy elipta, ventolin formulary exchange. Chest tube removed on 1/14.   GI: No GI history. LFTs WNL 12/2/2024. On clear liquids. PPI for GI prophylaxis.  : No PMH. Baseline creat 0.6. Acute hyponatremia overnight 120 meq. 3% saline given overnight. Nephrology following. RFP Q4h. 10 of lasix given in am. Slow correction of 4-6 meq in 24 hrs. Continue SDS. Will continue diuresis.   Heme: Acute blood loss anemia. SCDs, subcutaneous heparin. Vit K stopped.   Endo: No PMH. SSI.   ID: Afebrile. Leukocytosis likely 2/2 SDS.      Lines:  - left radial art line  - piv  - yoo 1/15      Dispo: Continue ICU care     Kendall Barbour MD  PGY-5 Anesthesiology critical care fellow.       Attending Addendum: Worsening respiratory status throughout day as well as minimal UOP despite diuresis. Lactate mildly elevated. Obtained repeat echo  that demonstrated severe BiV failure. Consulted cardiology and shock call completed. Low flow state. Will start inotropy, aggressive diuresis. Will reconvene shock call with worsening patient state. Discussed case personally with Surgeon Bernard, Cardiologist Santiago, and updated family extensively at bedside.    Due to the high probability of life threatening clinical decompensation, the patient required critical care time evaluating and managing this patient.  Critical care time included obtaining a history, examining the patient, ordering and reviewing studies, discussing, developing, and implementing a management plan, evaluating the patient's response to treatment, and discussion with other care team providers. I saw and evaluated the patient myself. I reviewed the provider's documentation and discussed the patient with the provider. Critical care time was performed exclusive of billable procedures.    Critical Care Time: 95 minutes    Benjamin Mo MD

## 2025-01-16 NOTE — CONSULTS
Reason For Consult  Biventricular Takotsubo    History Of Present Illness  Jannie Vick is a 64 y.o. female with PMH significant for DEEPAK, tobacco use disorder, stage III squamous cell carcinoma of right lung patient presented to [facility] on 113 for scheduled right pneumonectomy, intrapericardial with en bloc wall resection by Dr. Jonah Wagner.  Intraoperatively patient had hypotension and was placed on vasopressin, epinephrine, phenylephrine and later in the postoperative period hypotension persisted and patient was transition to vaso/Levophed.  Also complicating postoperative period  Patient had hyponatremia.  After which patient also required supplemental oxygen and was subsequently weaned off of vasopressor therapy and supplemental oxygen.  Today 1/16/2025 patient patient continued to have borderline low blood pressure 90s/60s, as well as increasing oxygen demand and an echocardiogram was subsequently obtained.  Echocardiogram showed biventricular Takotsubo with ejection fraction at 30%.  Cardiology was consulted for the new Takotsubo cardiomyopathy.    At time of my evaluation patient was slightly altered, endorsing cough which was nonproductive, and shortness of breath.  She denied chest pain, palpitations, lightheadedness, dizziness, N/V/D/C, hematuria, melanotic stools, lower extremity swelling.    Cardiac studies:  TTE 1/16/25   1. Left ventricular ejection fraction is moderately decreased, calculated by Miranda's biplane at 34%.   2. Multiple segmental abnormalities exist. See findings.   3. Spectral Doppler shows a Grade II (pseudonormal pattern) of left ventricular diastolic filling with an elevated left atrial pressure.   4. There is moderately reduced right ventricular systolic function.   5. There is akinesis of the mid and apical segments of the RV free wall.   6. Mildly elevated right ventricular systolic pressure.   7. Mild to moderate tricuspid regurgitation visualized.   8. The left atrium is  moderately dilated.   9. A dilated inferior vena cava demonstrates poor inspiratory collapse, consistent with elevated right atrial pressures.  10. The described regional wall motion abnormalities of the LV and RV are suggestive of biventricular Takotsubo (stress cardiomyopathy).     TTE 25 (Post op)  1. The left ventricular systolic function is normal, with a visually estimated ejection fraction of 60-65%.  2. There is low normal right ventricular systolic function.    TTE 24   1. The left ventricular systolic function is normal, with a visually estimated ejection fraction of 55-60%.   2. There is normal right ventricular global systolic function.    Past Medical History  She has a past medical history of Anxiety, Arthritis, Asthma, History of SCC (squamous cell carcinoma) of skin, Personal history of antineoplastic chemotherapy, and Primary cancer of right upper lobe of lung (Multi).    She has no past medical history of Malignant hyperthermia.    Surgical History  She has a past surgical history that includes Insertion / removal / replacement venous access catheter; Ovarian cyst surgery;  section, low transverse; and Other surgical history.     Social History  She reports that she quit smoking about 10 months ago. Her smoking use included cigarettes. She has been exposed to tobacco smoke. She has quit using smokeless tobacco. She reports that she does not currently use alcohol. She reports that she does not use drugs.    Family History  Family History   Problem Relation Name Age of Onset    Adrenal disorder Mother      Bone cancer Mother      Skin cancer Father          Allergies  Patient has no known allergies.    Review of Systems  Patient denies all symptomatology pertaining to a 12 point ROS with exception of those listed above HPI.     Physical Exam  General: Not in acute distress, A&O x 2, alert, cooperative, ill-appearing  HEENT: Normocephalic, atraumatic, EOMI, moist mucous membranes NC  "in place  Neck: Neck supple, trachea midline, no evidence of trauma right neck bandaged superficial to IJ where previous line was placed.  Cardiovascular: RRR, S1 and S2 appreciated, no murmurs rubs gallops appreciated, distal pulses 2+ bilaterally (radial and dorsalis pedis)  Respiratory: Coarse breath sounds appreciated over the right upper lung with loss of breath sounds from right mid lung down (s/p pneumonectomy), left lung breath sounds appreciable throughout with crackles at the left base.  Mildly increased WOB 10 L via NC  GI: Abdomen soft, nondistended, nontender to palpation, bowel sounds present  Extremities: No edema appreciated in lower extremities bilaterally, no cyanosis, lower extremities cool to touch and mottled  Neuro: A&O X2, no focal deficits, strength and sensation intact bilaterally         Last Recorded Vitals  Blood pressure 99/69, pulse (!) 125, temperature 36.6 °C (97.9 °F), temperature source Temporal, resp. rate 21, height 1.651 m (5' 5\"), weight 52.1 kg (114 lb 13.8 oz), SpO2 100%.    Relevant Results  ECG 12 lead    Result Date: 1/16/2025  Normal sinus rhythm Low voltage QRS Cannot rule out Anterior infarct (cited on or before 16-JAN-2025) Abnormal ECG When compared with ECG of 16-JAN-2025 14:04, No significant change was found    Transthoracic Echo (TTE) Limited    Result Date: 1/16/2025   [facility], [address] [place] [address]                Tel [phone] and Fax [phone] TRANSTHORACIC ECHOCARDIOGRAM REPORT  Patient Name:       RAÚL Rodríguez Physician:    [id number] Fabián Kulkarni MD Study Date:         1/16/2025           Ordering Provider:    [id number] GABBY SMITH MRN/PID:            [id number]            Fellow: Accession#:         [id number]        Nurse: Date of Birth/Age:  1960 / 64      Sonographer:   "        Naya Pérez                     years                                     [facility] Gender assigned at  F                   Additional Staff: Birth: Height:             165.10 cm           Admit Date:           1/20/2025 Weight:             51.71 kg            Admission Status:     Inpatient - STAT BSA / BMI:          1.56 m2 / 18.97     Encounter#:           [id number]                     kg/m2 Blood Pressure:     106/62 mmHg         Department Location:  [facility] Study Type:    TRANSTHORACIC ECHO (TTE) LIMITED Diagnosis/ICD: Acute respiratory failure with hypoxia-J96.01 Indication:    Post pneumonectomy; new O2 requirements; eval BIV function CPT Code:      Echo Limited-42126; Color Doppler-16767 Patient History: Pertinent History: COPD. Lung cancer; s/p pneumonectomy; immunotherapy. Study Detail: The following Echo studies were performed: 2D, M-Mode and color               flow. Technically challenging study due to body habitus and               prominent lung artifact. Definity used as a contrast agent for               endocardial border definition. Total contrast used for this               procedure was 3 mL via IV push. Unable to obtain suprasternal               notch view.  PHYSICIAN INTERPRETATION: Left Ventricle: Left ventricular ejection fraction is moderately decreased, calculated by Miranda's biplane at 34%. Left venticular wall motion is abnormal. The left ventricular cavity size is upper limits of normal. There is normal septal and normal posterior left ventricular wall thickness. Spectral Doppler shows a Grade II (pseudonormal pattern) of left ventricular diastolic filling with an elevated left atrial pressure. The described regional wall motion abnormalities of the LV and RV are suggestive of biventricular Takotsubo (stress cardiomyopathy). LV Wall Scoring: The mid and apical anterior wall, mid and apical anterior septum, mid and apical inferior septum, mid and apical inferior wall,  mid inferolateral segment, mid anterolateral segment, and apical lateral segment are akinetic. All remaining scored segments are normal. Left Atrium: The left atrium is moderately dilated. Right Ventricle: The right ventricle is normal in size. There is moderately reduced right ventricular systolic function. There is akinesis of the mid and apical segments of the RV free wall. Right Atrium: The right atrium is normal in size. A dilated inferior vena cava demonstrates poor inspiratory collapse, consistent with elevated right atrial pressures. Aortic Valve: The aortic valve is trileaflet. There is trace aortic valve regurgitation. Mitral Valve: The mitral valve is normal in structure. There is mild mitral valve regurgitation. Tricuspid Valve: The tricuspid valve is structurally normal. There is mild to moderate tricuspid regurgitation. The Doppler estimated RVSP is mildly elevated at 45.5 mmHg. Pulmonic Valve: The pulmonic valve is not well visualized. Pulmonic valve regurgitation was not assessed. Pericardium: There is no pericardial effusion noted. Aorta: The aortic root is normal. Systemic Veins: The inferior vena cava appears dilated, with IVC inspiratory collapse less than 50%. In comparison to the previous echocardiogram(s): Compared with study dated 1/13/2025, the LV and RV wall motion abnormalities were not present in prior study.  CONCLUSIONS:  1. Left ventricular ejection fraction is moderately decreased, calculated by Miranda's biplane at 34%.  2. Multiple segmental abnormalities exist. See findings.  3. Spectral Doppler shows a Grade II (pseudonormal pattern) of left ventricular diastolic filling with an elevated left atrial pressure.  4. There is moderately reduced right ventricular systolic function.  5. There is akinesis of the mid and apical segments of the RV free wall.  6. Mildly elevated right ventricular systolic pressure.  7. Mild to moderate tricuspid regurgitation visualized.  8. The left atrium  is moderately dilated.  9. A dilated inferior vena cava demonstrates poor inspiratory collapse, consistent with elevated right atrial pressures. 10. The described regional wall motion abnormalities of the LV and RV are suggestive of biventricular Takotsubo (stress cardiomyopathy). QUANTITATIVE DATA SUMMARY:  2D MEASUREMENTS:          Normal Ranges: IVSd:            0.60 cm  (0.6-1.1cm) LVPWd:           0.70 cm  (0.6-1.1cm) LVIDd:           4.80 cm  (3.9-5.9cm) LVIDs:           3.50 cm LV Mass Index:   63 g/m2 LVEDV Index:     61 ml/m2 LV % FS          27.1 %  LA VOLUME:                   Normal Ranges: LA Vol A4C:        69.1 ml   (22+/-6mL/m2) LA Vol Index A4C:  44.3ml/m2 LA Area A4C:       21.1 cm2 LA Major Axis A4C: 5.5 cm  RA VOLUME BY A/L METHOD:          Normal Ranges: RA Area A4C:             16.3 cm2  M-MODE MEASUREMENTS:         Normal Ranges: Ao Root:             3.00 cm (2.0-3.7cm) LAs:                 3.50 cm (2.7-4.0cm)  LV SYSTOLIC FUNCTION BY 2D PLANIMETRY (MOD):                      Normal Ranges: EF-A4C View:    40 % (>=55%) EF-A2C View:    31 % EF-Biplane:     34 % LV EF Reported: 34 %  LV DIASTOLIC FUNCTION:           Normal Ranges: MV Peak E:             0.87 m/s  (0.7-1.2 m/s) MV Peak A:             0.59 m/s  (0.42-0.7 m/s) E/A Ratio:             1.48      (1.0-2.2) MV e'                  0.057 m/s (>8.0) MV lateral e'          0.06 m/s MV medial e'           0.05 m/s E/e' Ratio:            15.37     (<8.0)  MITRAL VALVE:          Normal Ranges: MV DT:        119 msec (150-240msec)  RIGHT VENTRICLE: RV Basal 3.51 cm RV Mid   2.52 cm RV Major 6.0 cm TAPSE:   9.6 mm RV s'    0.07 m/s  TRICUSPID VALVE/RVSP:          Normal Ranges: Peak TR Velocity:     3.26 m/s RV Syst Pressure:     46 mmHg  (< 30mmHg) IVC Diam:             2.25 cm  [id number] Fabián Kulkarni MD Electronically signed on 1/16/2025 at 2:58:07 PM  Wall Scoring  ** Final (Updated) **      right upper quadrant    Result Date:  1/16/2025  Interpreted By:  Alberto Salas, STUDY: US RIGHT UPPER QUADRANT; 1/16/2025 11:09 am   INDICATION: Signs/Symptoms:pericholecystic fluid seen on renal us.   COMPARISON: None.   ACCESSION NUMBER(S): [id number]   ORDERING CLINICIAN: WILMER ANTHONY   TECHNIQUE: Multiple images of the right upper quadrant were obtained.   FINDINGS: LIVER: The liver measures up to  15.5 cm in greatest sagittal dimension. Echogenicity is within normal limits. There is no morphologic evidence of cirrhosis and no focal hepatic lesion is evident.   GALLBLADDER: The gallbladder is within normal limits without wall thickening or cholelithiasis. A sonographic Odom sign was not present. There is a small amount of simple appearing ascites in the right upper quadrant.   BILIARY SYSTEM: There is no intrahepatic biliary dilation. The common bile duct measures up to  5 mm in width.   PANCREAS: The pancreas is partially obscured by bowel gas. The visualized portions of the pancreas are within normal limits.   RIGHT KIDNEY: The right kidney measures up to  9.9 cm in greatest sagittal dimension. Cortical echogenicity and thickness are within normal limits. No hydroureteronephrosis.       Small amount of simple appearing ascites in the right upper quadrant without evidence of cholelithiasis, cholecystitis or biliary dilation.   Signed by: Alberto Salas 1/16/2025 1:02 PM Dictation workstation:   [id number]    XR chest 1 view    Result Date: 1/16/2025  Interpreted By:  Vick Eli, STUDY: XR CHEST 1 VIEW; 1/16/2025 6:43 am   INDICATION: Signs/Symptoms:ICU.   COMPARISON: Radiograph dated 01/15/2025   ACCESSION NUMBER(S): [id number]   ORDERING CLINICIAN: GABBY SMITH   FINDINGS: Postsurgical changes of right pneumonectomy and resection of multiple right-sided ribs. Slight interval increase in opacity in the lower portion of the pneumonectomy site. Slight interval increase in left basilar faint airspace opacity.  Blunting of left costophrenic angle. No left pneumothorax.   Cardiac silhouette size is grossly stable, however right heart border is obscured.   Left subclavian approach MediPort is unchanged in position. Interval removal of right IJ central venous catheter sheath.   Subcutaneous emphysema in the right chest wall and right side of the neck.       1. Slight interval increase in left basilar airspace opacity which may be due to increasing atelectasis. Cannot exclude developing infectious infiltrate. Question trace left pleural effusion. 2. Again seen postsurgical changes related to right-sided pneumonectomy as described above       Signed by: Vick Xiao 1/16/2025 9:47 AM Dictation workstation:   [id number]    US renal complete    Result Date: 1/15/2025  Interpreted By:  Alberto Salas and Velez-Martinez Osvaldo STUDY: US RENAL COMPLETE;  1/15/2025 3:25 pm   INDICATION: Signs/Symptoms:symptomatic hyponatremia.     COMPARISON: CT abdomen 08/15/2024 FDG PET-CT 12/30/2024   ACCESSION NUMBER(S): [id number]   ORDERING CLINICIAN: GABBY SMITH   TECHNIQUE: Multiple images of the kidneys were obtained  .   FINDINGS: RIGHT KIDNEY: The right kidney measures 11.17 cm in length. The renal cortical echogenicity and thickness are within normal limits. No hydronephrosis is present; no evidence of nephrolithiasis.   LEFT KIDNEY: The left kidney measures 11.75 cm in length. The renal cortical echogenicity and thickness are within normal limits. No hydronephrosis is present; no evidence of nephrolithiasis.   BLADDER: A Montiel is present within a decompressed bladder.   Incidental note is made of a somewhat edematous gallbladder with some pericholecystic fluid and layering echogenic material likely representing biliary sludge.   Free fluid is present within the right upper quadrant and pelvis.       1. Unremarkable sonographic evaluation of the kidneys. 2. Incomplete evaluation of a gallbladder with some  pericholecystic fluid and biliary sludge. Dedicated right upper quadrant ultrasound could be obtained as clinically warranted. 3. A small amount of free fluid present in the right upper quadrant and pelvis.   Findings relayed by phone by me to SICU provider at 5:11 p.m.   I personally reviewed the images/study and I agree with the findings as stated by Erickson Best MD (resident) . This study was interpreted at [facility], [place], [place].   MACRO: None   Signed by: Alberto Salas 1/15/2025 6:17 PM Dictation workstation:   [id number]    XR chest 1 view    Result Date: 1/15/2025  Interpreted By:  Alexx Anne, STUDY: XR CHEST 1 VIEW;  1/15/2025 6:46 am   INDICATION: Signs/Symptoms:routine ICU.     COMPARISON: Exam dated 01/14/2025   ACCESSION NUMBER(S): [id number]   ORDERING CLINICIAN: GABBY SMITH   FINDINGS: AP radiograph of the chest was provided.   Left chest wall subclavian approach medication port with catheter tip projecting over the upper SVC. Right IJ central venous catheter tip projects over the upper SVC. Interval increase in subcutaneous emphysema overlying the right lower cervical soft tissues. Similar subcutaneous emphysema along the right chest wall.   CARDIOMEDIASTINAL SILHOUETTE: Cardiomediastinal silhouette is stable in size and configuration.   LUNGS: Postsurgical changes of pneumonectomy are again visualized. There is a moderate amount of layering pleural fluid within the right hemithorax on a background of diffuse lucency. Streaky left basilar atelectasis. The left lung otherwise appears clear.   ABDOMEN: No remarkable upper abdominal findings.   BONES: No acute osseous changes.       1.  Status post pneumonectomy with interval increase in fluid component of right-sided hydropneumothorax. 2. Medical devices as above.       MACRO: None   Signed by: Alexx Anne 1/15/2025 9:28 AM Dictation workstation:   [id number]    XR chest 1  view    Result Date: 1/14/2025  Interpreted By:  Vick Eli, STUDY: XR CHEST 1 VIEW; 1/14/2025 2:40 pm   INDICATION: Signs/Symptoms:Chest tube removal.   COMPARISON: Radiograph dated 01/14/2025   ACCESSION NUMBER(S): [id number]   ORDERING CLINICIAN: ERINN SANCHEZ   FINDINGS: Postsurgical changes related to right pneumonectomy with partially resected ribs.. Interval removal of right chest tube. Left subclavian MediPort and right IJ central venous catheter sheath are unchanged in positioning.   Cardiac silhouette size is slightly enlarged, unchanged.   Faint left basilar airspace opacity, likely atelectatic. No sizable pneumothorax.   Small amount of subcutaneous and soft tissue emphysema in the right chest wall.       1. Interval removal of right chest tube. Other medical devices as described above. 2. Postsurgical changes related to right pneumonectomy. 3. Faint left basilar opacity, likely atelectatic.       Signed by: Vick Xiao 1/14/2025 2:53 PM Dictation workstation:   [id number]    XR chest 1 view    Result Date: 1/14/2025  Interpreted By:  Nino Barbour, STUDY: XR CHEST 1 VIEW; 1/14/2025 7:06 am   INDICATION: Signs/Symptoms:recent pneumonectomy.   COMPARISON: 01/13/2025.   ACCESSION NUMBER(S): [id number]   ORDERING CLINICIAN: GABBY SMITH   FINDINGS: Right-sided chest tube in place.   CARDIOMEDIASTINAL SILHOUETTE: Right-sided mediastinal shift consistent with recent right-sided pneumonectomy.   LUNGS: Right-sided pneumonectomy changes.   ABDOMEN: No remarkable upper abdominal findings.   BONES: Right-sided thoracotomy changes.       1.  Stable right-sided pneumonectomy changes.     Signed by: Nino Barbour 1/14/2025 12:56 PM Dictation workstation:   [id number]    Transthoracic Echo (TTE) Limited    Result Date: 1/13/2025   [facility], [address] [place] [address]                Tel [phone] and Fax [phone] TRANSTHORACIC ECHOCARDIOGRAM  REPORT  Patient Name:       RAÚL KAMINSKI     Anne Physician:    [id number] Cam Llanos MD Study Date:         1/13/2025           Ordering Provider:    [id number] WILMER ANTHONY MRN/PID:            [id number]            Fellow: Accession#:         [id number]        Nurse: Date of Birth/Age:  1960 / 64      Sonographer:          José Miguel rocha                                     CARIE Gender assigned at  F                   Additional Staff: Birth: Height:             165.10 cm           Admit Date:           1/13/2025 Weight:             51.71 kg            Admission Status:     Inpatient - STAT BSA / BMI:          1.56 m2 / 18.97     Encounter#:           [id number]                     kg/m2 Blood Pressure:     100/57 mmHg         Department Location:  [facility] Study Type:    TRANSTHORACIC ECHO (TTE) LIMITED Diagnosis/ICD: Hypotension, unspecified-I95.9 Indication:    Hypotension CPT Code:      Echo Limited-80809; Doppler Limited-79715; Color Doppler-53371  Study Detail: The following Echo studies were performed: 2D, Doppler and color               flow. Technically challenging study due to body habitus and               patient lying in supine position.  PHYSICIAN INTERPRETATION: Left Ventricle: The left ventricular systolic function is normal, with a visually estimated ejection fraction of 60-65%. There are no regional left ventricular wall motion abnormalities. The left ventricular cavity size is normal. Left ventricular diastolic filling was not assessed. Left Atrium: The left atrium was not well visualized. Right Ventricle: The right ventricle is normal in size. There is low normal right ventricular systolic function. RV S' and TAPSE under measured due to technical difficulty visualizing lateral tricuspid valve annulus. Right Atrium: The right  atrium was not well visualized. Aortic Valve: The aortic valve was not well visualized. There is no evidence of aortic valve regurgitation. Mitral Valve: The mitral valve is normal in structure. There is trace mitral valve regurgitation. Tricuspid Valve: The tricuspid valve was not well visualized. There is trace tricuspid regurgitation. Pulmonic Valve: The pulmonic valve is not well visualized. The pulmonic valve regurgitation was not well visualized. Pericardium: Trivial pericardial effusion. Aorta: The aortic root is normal. Pulmonary Artery: The tricuspid regurgitant velocity is 2.10 m/s, and with an estimated right atrial pressure of 8 mmHg, the estimated pulmonary artery pressure is normal with the RVSP at 25.6 mmHg. Systemic Veins: The inferior vena cava appears normal in size, with IVC inspiratory collapse less than 50%. In comparison to the previous echocardiogram(s): Compared with study dated 10/3/2024, no significant change.  CONCLUSIONS:  1. The left ventricular systolic function is normal, with a visually estimated ejection fraction of 60-65%.  2. There is low normal right ventricular systolic function. QUANTITATIVE DATA SUMMARY:  LV SYSTOLIC FUNCTION BY 2D PLANIMETRY (MOD):                      Normal Ranges: EF-A4C View:    65 % (>=55%) EF-Visual:      63 % LV EF Reported: 63 %  RIGHT VENTRICLE: TAPSE: 10.0 mm RV s'  0.06 m/s  TRICUSPID VALVE/RVSP:          Normal Ranges: Peak TR Velocity:     2.10 m/s Est. RA Pressure:     8 mmHg RV Syst Pressure:     26 mmHg  (< 30mmHg) IVC Diam:             2.00 cm  [id number] Cam Llanos MD Electronically signed on 1/13/2025 at 5:06:16 PM  ** Final **     XR chest 1 view    Result Date: 1/13/2025  Interpreted By:  Uriel Eli, STUDY: XR CHEST 1 VIEW; 1/13/2025 2:52 pm   INDICATION: Signs/Symptoms:s/p pneumonectomy.   COMPARISON: CT dated 07/17/2024 and PET-CT dated 12/30/2024   ACCESSION NUMBER(S): [id number]   ORDERING CLINICIAN: WILMER ANTHONY    FINDINGS: Right IJ central venous catheter sheath is projecting over mid SV C. Left subclavian approach MediPort is in place with the tip projecting over upper to mid SVC. Right chest tube is in place.   Cardiac silhouette size is within normal limits.   Postsurgical changes related to right pneumonectomy with multiple partial right-sided rib resection. Mild interstitial prominence of the left lung.   Subcutaneous and soft tissue emphysema in the right chest wall.         1. Postsurgical changes related to right sided pneumonectomy with multiple medical devices as described above. 2. Mild interstitial prominence of the left lung.       Signed by: Vick Xiao 1/13/2025 3:16 PM Dictation workstation:   [id number]    NM PET CT lung CA staging    Result Date: 12/31/2024  Interpreted By:  Kush Hansen and Bera Kaustav STUDY: NM PET CT LUNG CA STAGING;  12/30/2024 11:08 am   INDICATION: Signs/Symptoms:DIAGNOSTIC.   Diagnosed with a large right lung mass in January 20, 2024, status post neoadjuvant chemoradiation as well as immunotherapy ending on July 2024. Currently scheduled for surgery on 01/10/2025.   COMPARISON: PET-CT on 08/15/2024   ACCESSION NUMBER(S): [id number]   ORDERING CLINICIAN: GABBY SMITH   TECHNIQUE: DIVISION OF NUCLEAR MEDICINE POSITRON EMISSION TOMOGRAPHY (PET-CT)   The patient received an intravenous dose of 11.2 mCi of Fluorine-18 fluorodeoxyglucose (FDG).  Positron emission tomographic (PET) images from mid thigh to skull base were then acquired after a one hour delay. Also acquired was a contemporaneous low dose non-contrast CT scan performed for attenuation correction of PET images and anatomic localization.  The PET and CT images were digitally fused for display.  All images were acquired on a combined PET-CT scanner unit. Some areas of FDG accumulation may be described in standardized uptake value (SUV) units.   CODING: Subsequent Treatment Strategy (PS)   CALIBRATION: Dose  Injection-to-Scan Interval (mins): 51 min Mediastinal bloodpool SUV (normal 1.5-2.5): 2.1 Blood glucose: NA   FINDINGS: HEAD AND NECK: * No evidence of focal FDG avid lesion in the partially visualized brain parenchyma, noting that evaluation is limited because of the expected physiologic diffuse FDG uptake in the brain. * No FDG avid cervical lymphadenopathy is present. * No paranasal sinus diease. * Thyroid gland is unremarkable.   CHEST: *There has been interval worsening collapse of the right lung, with the right lung now completely fluid-filled. There has been interval worsening of FDG avid mass with central necrosis in  posterior right upper lobe with SUV max of 15, as compared to 8 previously. Left lung is unremarkable *No FDG avid mediastinal, hilar or axillary lymphadenopathy. *Unremarkable both breasts   ABDOMEN AND PELVIS: *No FDG avid lymphadenopathy in the abdomen or pelvis. *Bilateral adrenal glands are unremarkable. *Colonic diverticulosis without evidence of diverticulitis. *Physiologic radiotracer uptake is present in the liver and spleen with excretion into the bowel loops and the genitourinary tract.   MUSCULOSKELETAL: *No concerning FDG avid bone lesion throughout the axial and appendicular skeleton to suggest osseous metastasis.       1. Interval worsening of the FDG-avid mass with central necrosis in the posterior right upper lobe, along with increased collapse of the right lung compared to the prior PET from 08/15/2024, suggestive of disease progression. 2. No other concerning FDG avid disease identified.   I personally reviewed the image(s) / study and agree with the findings and interpretation as stated. This study was interpreted at [facility].   MACRO: None       Signed by: Kush Hansen 12/31/2024 6:13 AM Dictation workstation:   [id number]   Results for orders placed or performed during the hospital encounter of 01/13/25 (from the past 24 hours)   POCT  GLUCOSE   Result Value Ref Range    POCT Glucose 120 (H) 74 - 99 mg/dL   Renal Function Panel   Result Value Ref Range    Glucose 96 74 - 99 mg/dL    Sodium 122 (L) 136 - 145 mmol/L    Potassium 4.7 3.5 - 5.3 mmol/L    Chloride 87 (L) 98 - 107 mmol/L    Bicarbonate 24 21 - 32 mmol/L    Anion Gap 16 10 - 20 mmol/L    Urea Nitrogen 5 (L) 6 - 23 mg/dL    Creatinine <0.20 (L) 0.50 - 1.05 mg/dL    eGFR      Calcium 8.0 (L) 8.6 - 10.6 mg/dL    Phosphorus 1.8 (L) 2.5 - 4.9 mg/dL    Albumin 3.5 3.4 - 5.0 g/dL   POCT GLUCOSE   Result Value Ref Range    POCT Glucose 120 (H) 74 - 99 mg/dL   Renal Function Panel   Result Value Ref Range    Glucose 121 (H) 74 - 99 mg/dL    Sodium 120 (LL) 136 - 145 mmol/L    Potassium 4.7 3.5 - 5.3 mmol/L    Chloride 84 (L) 98 - 107 mmol/L    Bicarbonate 21 21 - 32 mmol/L    Anion Gap 20 10 - 20 mmol/L    Urea Nitrogen 5 (L) 6 - 23 mg/dL    Creatinine 0.20 (L) 0.50 - 1.05 mg/dL    eGFR >90 >60 mL/min/1.73m*2    Calcium 8.1 (L) 8.6 - 10.6 mg/dL    Phosphorus 2.5 2.5 - 4.9 mg/dL    Albumin 3.6 3.4 - 5.0 g/dL   Calcium, Ionized   Result Value Ref Range    POCT Calcium, Ionized 1.03 (L) 1.1 - 1.33 mmol/L   CBC   Result Value Ref Range    WBC 16.2 (H) 4.4 - 11.3 x10*3/uL    nRBC 0.0 0.0 - 0.0 /100 WBCs    RBC 3.63 (L) 4.00 - 5.20 x10*6/uL    Hemoglobin 9.2 (L) 12.0 - 16.0 g/dL    Hematocrit 27.0 (L) 36.0 - 46.0 %    MCV 74 (L) 80 - 100 fL    MCH 25.3 (L) 26.0 - 34.0 pg    MCHC 34.1 32.0 - 36.0 g/dL    RDW 13.0 11.5 - 14.5 %    Platelets 340 150 - 450 x10*3/uL   Magnesium   Result Value Ref Range    Magnesium 1.95 1.60 - 2.40 mg/dL   B-type natriuretic peptide   Result Value Ref Range     (H) 0 - 99 pg/mL   Blood Gas Arterial Full Panel   Result Value Ref Range    POCT pH, Arterial 7.46 (H) 7.38 - 7.42 pH    POCT pCO2, Arterial 30 (L) 38 - 42 mm Hg    POCT pO2, Arterial 76 (L) 85 - 95 mm Hg    POCT SO2, Arterial 96 94 - 100 %    POCT Oxy Hemoglobin, Arterial 94.6 94.0 - 98.0 %    POCT  Hematocrit Calculated, Arterial 30.0 (L) 36.0 - 46.0 %    POCT Sodium, Arterial 116 (LL) 136 - 145 mmol/L    POCT Potassium, Arterial 4.7 3.5 - 5.3 mmol/L    POCT Chloride, Arterial 86 (L) 98 - 107 mmol/L    POCT Ionized Calcium, Arterial 1.12 1.10 - 1.33 mmol/L    POCT Glucose, Arterial 131 (H) 74 - 99 mg/dL    POCT Lactate, Arterial 1.5 0.4 - 2.0 mmol/L    POCT Base Excess, Arterial -1.9 -2.0 - 3.0 mmol/L    POCT HCO3 Calculated, Arterial 21.3 (L) 22.0 - 26.0 mmol/L    POCT Hemoglobin, Arterial 9.9 (L) 12.0 - 16.0 g/dL    POCT Anion Gap, Arterial 13 10 - 25 mmo/L    Patient Temperature 37.0 degrees Celsius    FiO2 21 %   POCT GLUCOSE   Result Value Ref Range    POCT Glucose 166 (H) 74 - 99 mg/dL   Renal Function Panel   Result Value Ref Range    Glucose 175 (H) 74 - 99 mg/dL    Sodium 124 (L) 136 - 145 mmol/L    Potassium 5.0 3.5 - 5.3 mmol/L    Chloride 89 (L) 98 - 107 mmol/L    Bicarbonate 20 (L) 21 - 32 mmol/L    Anion Gap 20 10 - 20 mmol/L    Urea Nitrogen 8 6 - 23 mg/dL    Creatinine 0.34 (L) 0.50 - 1.05 mg/dL    eGFR >90 >60 mL/min/1.73m*2    Calcium 8.4 (L) 8.6 - 10.6 mg/dL    Phosphorus 4.3 2.5 - 4.9 mg/dL    Albumin 3.6 3.4 - 5.0 g/dL   Coagulation Screen   Result Value Ref Range    Protime 13.8 (H) 9.8 - 12.8 seconds    INR 1.2 (H) 0.9 - 1.1    aPTT 42 (H) 27 - 38 seconds   Hepatic function panel   Result Value Ref Range    Albumin 3.6 3.4 - 5.0 g/dL    Bilirubin, Total 0.7 0.0 - 1.2 mg/dL    Bilirubin, Direct 0.2 0.0 - 0.3 mg/dL    Alkaline Phosphatase 45 33 - 136 U/L    ALT 20 7 - 45 U/L    AST 71 (H) 9 - 39 U/L    Total Protein 5.9 (L) 6.4 - 8.2 g/dL   Blood Gas Arterial Full Panel   Result Value Ref Range    POCT pH, Arterial 7.41 7.38 - 7.42 pH    POCT pCO2, Arterial 35 (L) 38 - 42 mm Hg    POCT pO2, Arterial 65 (L) 85 - 95 mm Hg    POCT SO2, Arterial 92 (L) 94 - 100 %    POCT Oxy Hemoglobin, Arterial 89.6 (L) 94.0 - 98.0 %    POCT Hematocrit Calculated, Arterial 28.0 (L) 36.0 - 46.0 %    POCT  Sodium, Arterial 122 (L) 136 - 145 mmol/L    POCT Potassium, Arterial 4.5 3.5 - 5.3 mmol/L    POCT Chloride, Arterial 91 (L) 98 - 107 mmol/L    POCT Ionized Calcium, Arterial 1.10 1.10 - 1.33 mmol/L    POCT Glucose, Arterial 170 (H) 74 - 99 mg/dL    POCT Lactate, Arterial 2.9 (H) 0.4 - 2.0 mmol/L    POCT Base Excess, Arterial -2.1 (L) -2.0 - 3.0 mmol/L    POCT HCO3 Calculated, Arterial 22.2 22.0 - 26.0 mmol/L    POCT Hemoglobin, Arterial 9.4 (L) 12.0 - 16.0 g/dL    POCT Anion Gap, Arterial 13 10 - 25 mmo/L    Patient Temperature 37.0 degrees Celsius    FiO2 21 %   POCT GLUCOSE   Result Value Ref Range    POCT Glucose 156 (H) 74 - 99 mg/dL   Renal Function Panel   Result Value Ref Range    Glucose 151 (H) 74 - 99 mg/dL    Sodium 125 (L) 136 - 145 mmol/L    Potassium 4.8 3.5 - 5.3 mmol/L    Chloride 89 (L) 98 - 107 mmol/L    Bicarbonate 24 21 - 32 mmol/L    Anion Gap 17 mmol/L    Urea Nitrogen 9 6 - 23 mg/dL    Creatinine 0.31 (L) 0.50 - 1.05 mg/dL    eGFR >90 >60 mL/min/1.73m*2    Calcium 8.5 (L) 8.6 - 10.6 mg/dL    Phosphorus 3.0 2.5 - 4.9 mg/dL    Albumin 3.6 3.4 - 5.0 g/dL   Blood Gas Arterial Full Panel   Result Value Ref Range    POCT pH, Arterial 7.41 7.38 - 7.42 pH    POCT pCO2, Arterial 39 38 - 42 mm Hg    POCT pO2, Arterial 96 (H) 85 - 95 mm Hg    POCT SO2, Arterial 98 94 - 100 %    POCT Oxy Hemoglobin, Arterial 96.4 94.0 - 98.0 %    POCT Hematocrit Calculated, Arterial 29.0 (L) 36.0 - 46.0 %    POCT Sodium, Arterial 120 (LL) 136 - 145 mmol/L    POCT Potassium, Arterial 5.1 3.5 - 5.3 mmol/L    POCT Chloride, Arterial 90 (L) 98 - 107 mmol/L    POCT Ionized Calcium, Arterial 1.17 1.10 - 1.33 mmol/L    POCT Glucose, Arterial 168 (H) 74 - 99 mg/dL    POCT Lactate, Arterial 2.9 (H) 0.4 - 2.0 mmol/L    POCT Base Excess, Arterial 0.1 -2.0 - 3.0 mmol/L    POCT HCO3 Calculated, Arterial 24.7 22.0 - 26.0 mmol/L    POCT Hemoglobin, Arterial 9.8 (L) 12.0 - 16.0 g/dL    POCT Anion Gap, Arterial 10 10 - 25 mmo/L     Patient Temperature 37.0 degrees Celsius    FiO2 21 %   Osmolality   Result Value Ref Range    Osmolality, Serum 264 (L) 280 - 300 mOsm/kg   ECG 12 lead   Result Value Ref Range    Ventricular Rate 97 BPM    Atrial Rate 97 BPM    WV Interval 130 ms    QRS Duration 82 ms    QT Interval 382 ms    QTC Calculation(Bazett) 485 ms    P Axis 83 degrees    R Axis 35 degrees    T Axis 89 degrees    QRS Count 16 beats    Q Onset 219 ms    P Onset 154 ms    P Offset 211 ms    T Offset 410 ms    QTC Fredericia 448 ms   Troponin I, High Sensitivity   Result Value Ref Range    Troponin I, High Sensitivity (CMC) 928 (HH) 0 - 34 ng/L   Transthoracic Echo (TTE) Limited   Result Value Ref Range    MV E/A ratio 1.48     Tricuspid annular plane systolic excursion 1.0 cm    LV EF 34 %    RV free wall pk S' 6.64 cm/s    LVIDd 4.80 cm    RVSP 45.5 mmHg    LV A4C EF 40.2    Blood Gas Arterial Full Panel   Result Value Ref Range    POCT pH, Arterial 7.37 (L) 7.38 - 7.42 pH    POCT pCO2, Arterial 40 38 - 42 mm Hg    POCT pO2, Arterial 111 (H) 85 - 95 mm Hg    POCT SO2, Arterial 99 94 - 100 %    POCT Oxy Hemoglobin, Arterial 96.8 94.0 - 98.0 %    POCT Hematocrit Calculated, Arterial 32.0 (L) 36.0 - 46.0 %    POCT Sodium, Arterial 123 (L) 136 - 145 mmol/L    POCT Potassium, Arterial 4.7 3.5 - 5.3 mmol/L    POCT Chloride, Arterial 93 (L) 98 - 107 mmol/L    POCT Ionized Calcium, Arterial 1.08 (L) 1.10 - 1.33 mmol/L    POCT Glucose, Arterial 157 (H) 74 - 99 mg/dL    POCT Lactate, Arterial 2.3 (H) 0.4 - 2.0 mmol/L    POCT Base Excess, Arterial -2.0 -2.0 - 3.0 mmol/L    POCT HCO3 Calculated, Arterial 23.1 22.0 - 26.0 mmol/L    POCT Hemoglobin, Arterial 10.7 (L) 12.0 - 16.0 g/dL    POCT Anion Gap, Arterial 12 10 - 25 mmo/L    Patient Temperature 37.0 degrees Celsius    FiO2 21 %   Renal Function Panel   Result Value Ref Range    Glucose 165 (H) 74 - 99 mg/dL    Sodium 124 (L) 136 - 145 mmol/L    Potassium 4.6 3.5 - 5.3 mmol/L    Chloride 88 (L) 98 -  107 mmol/L    Bicarbonate 24 21 - 32 mmol/L    Anion Gap 17 mmol/L    Urea Nitrogen 10 6 - 23 mg/dL    Creatinine 0.33 (L) 0.50 - 1.05 mg/dL    eGFR >90 >60 mL/min/1.73m*2    Calcium 8.3 (L) 8.6 - 10.6 mg/dL    Phosphorus 3.3 2.5 - 4.9 mg/dL    Albumin 3.6 3.4 - 5.0 g/dL   Blood Gas Lactic Acid, Venous   Result Value Ref Range    POCT Lactate, Venous 2.4 (H) 0.4 - 2.0 mmol/L   BLOOD GAS VENOUS FULL PANEL   Result Value Ref Range    POCT pH, Venous 7.29 (L) 7.33 - 7.43 pH    POCT pCO2, Venous 53 (H) 41 - 51 mm Hg    POCT pO2, Venous 45 35 - 45 mm Hg    POCT SO2, Venous 64 45 - 75 %    POCT Oxy Hemoglobin, Venous 63.0 45.0 - 75.0 %    POCT Hematocrit Calculated, Venous 29.0 (L) 36.0 - 46.0 %    POCT Sodium, Venous 122 (L) 136 - 145 mmol/L    POCT Potassium, Venous 4.8 3.5 - 5.3 mmol/L    POCT Chloride, Venous 90 (L) 98 - 107 mmol/L    POCT Ionized Calicum, Venous 1.14 1.10 - 1.33 mmol/L    POCT Glucose, Venous 162 (H) 74 - 99 mg/dL    POCT Lactate, Venous 2.4 (H) 0.4 - 2.0 mmol/L    POCT Base Excess, Venous -1.4 -2.0 - 3.0 mmol/L    POCT HCO3 Calculated, Venous 25.5 22.0 - 26.0 mmol/L    POCT Hemoglobin, Venous 9.5 (L) 12.0 - 16.0 g/dL    POCT Anion Gap, Venous 11.0 10.0 - 25.0 mmol/L    Patient Temperature 37.0 degrees Celsius    FiO2 21 %   Blood Gas Arterial Full Panel   Result Value Ref Range    POCT pH, Arterial 7.31 (L) 7.38 - 7.42 pH    POCT pCO2, Arterial 46 (H) 38 - 42 mm Hg    POCT pO2, Arterial 196 (H) 85 - 95 mm Hg    POCT SO2, Arterial 100 94 - 100 %    POCT Oxy Hemoglobin, Arterial 97.2 94.0 - 98.0 %    POCT Hematocrit Calculated, Arterial 28.0 (L) 36.0 - 46.0 %    POCT Sodium, Arterial 124 (L) 136 - 145 mmol/L    POCT Potassium, Arterial 4.5 3.5 - 5.3 mmol/L    POCT Chloride, Arterial 94 (L) 98 - 107 mmol/L    POCT Ionized Calcium, Arterial 1.10 1.10 - 1.33 mmol/L    POCT Glucose, Arterial 160 (H) 74 - 99 mg/dL    POCT Lactate, Arterial 2.5 (H) 0.4 - 2.0 mmol/L    POCT Base Excess, Arterial -3.0 (L)  -2.0 - 3.0 mmol/L    POCT HCO3 Calculated, Arterial 23.2 22.0 - 26.0 mmol/L    POCT Hemoglobin, Arterial 9.4 (L) 12.0 - 16.0 g/dL    POCT Anion Gap, Arterial 11 10 - 25 mmo/L    Patient Temperature 37.0 degrees Celsius    FiO2 21 %   POCT GLUCOSE   Result Value Ref Range    POCT Glucose 151 (H) 74 - 99 mg/dL   Blood Gas Arterial Full Panel   Result Value Ref Range    POCT pH, Arterial 7.37 (L) 7.38 - 7.42 pH    POCT pCO2, Arterial 41 38 - 42 mm Hg    POCT pO2, Arterial 178 (H) 85 - 95 mm Hg    POCT SO2, Arterial 100 94 - 100 %    POCT Oxy Hemoglobin, Arterial 97.5 94.0 - 98.0 %    POCT Hematocrit Calculated, Arterial 28.0 (L) 36.0 - 46.0 %    POCT Sodium, Arterial 124 (L) 136 - 145 mmol/L    POCT Potassium, Arterial 4.5 3.5 - 5.3 mmol/L    POCT Chloride, Arterial 93 (L) 98 - 107 mmol/L    POCT Ionized Calcium, Arterial 1.12 1.10 - 1.33 mmol/L    POCT Glucose, Arterial 163 (H) 74 - 99 mg/dL    POCT Lactate, Arterial 2.0 0.4 - 2.0 mmol/L    POCT Base Excess, Arterial -1.5 -2.0 - 3.0 mmol/L    POCT HCO3 Calculated, Arterial 23.7 22.0 - 26.0 mmol/L    POCT Hemoglobin, Arterial 9.2 (L) 12.0 - 16.0 g/dL    POCT Anion Gap, Arterial 12 10 - 25 mmo/L    Patient Temperature 37.0 degrees Celsius    FiO2 21 %     Scheduled medications  acetaminophen, 650 mg, oral, q6h  EPINEPHrine in 0.9 % sod chlor, , ,   fluticasone furoate-vilanteroL, 1 puff, inhalation, Daily  heparin (porcine), 5,000 Units, subcutaneous, q8h  hydrocortisone sodium succinate, 100 mg, intravenous, q8h  [START ON 1/17/2025] hydrocortisone sodium succinate, 50 mg, intravenous, q8h  lidocaine, 1 patch, transdermal, Daily  melatonin, 3 mg, oral, Nightly  [Held by provider] metoprolol tartrate, 12.5 mg, oral, q12h  pantoprazole, 40 mg, intravenous, Daily before breakfast  perflutren lipid microspheres, 0.5-10 mL of dilution, intravenous, Once in imaging  sulfur hexafluoride microsphr, 2 mL, intravenous, Once in imaging  tiotropium, 2 puff, inhalation,  Daily      Continuous medications  dexmedeTOMIDine, 0-1.5 mcg/kg/hr, Last Rate: 0.1 mcg/kg/hr (01/16/25 1800)  furosemide, 5 mg/hr, Last Rate: 5 mg/hr (01/16/25 1834)  milrinone, 0.125 mcg/kg/min, Last Rate: 0.125 mcg/kg/min (01/16/25 1800)      PRN medications  PRN medications: albuterol, alteplase, [Held by provider] calcium gluconate, [Held by provider] calcium gluconate, EPINEPHrine in 0.9 % sod chlor, oxyCODONE, potassium chloride CR, potassium chloride CR, traZODone       Assessment/Plan   Jannie Vick is a 64 y.o. female with PMH significant for DEEPAK, tobacco use disorder, stage III squamous cell carcinoma of right lung patient presented to [facility] on 113 for scheduled right pneumonectomy, intrapericardial with en bloc wall resection by Dr. Jonah Wagner.  Patient had a preoperative hypotension requiring vasopressors and postoperative hypotension requiring vasopressors which she has been subsequently weaned off of, increasing oxygen demand status post surgery which had subsequently been weaned off.  However on 1/16/2025 patient had borderline low blood pressures with 90s/60s, increasing oxygen requirements, and an echocardiogram illustrating biventricular Takotsubo cardiomyopathy for which cardiology was consulted for further recommendations.      Given that the patient was showing signs of low output state with altered mental status, decreased urine output with a lactate of 2 along with echo showing signs of elevated left and right sided pressures. A shock call was done where the recommendation was start the patient on inotrope (milrinone) along with a lasix drip and to place a central line to transduce the CVP and run a mixed venous Svo2 along repeating a lactate and if she's clinically worsening to re-conference for a shock call.    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    Plan:  -Shock team was initiated 2/2  patient's worsening clinical status, and multidisciplinary services came to the conclusion to do as follows:  -Begin milrinone and Lasix infusion for pressure support diuresis to help with volume overload status, with plan to reconvene at later time today 1/16/2025.  -Place Central line (MAC) and obtain CVP along with mixed venous Svo2.  -Repeat labs and lactate 2 hours from most recent labs   --> Pending patient's status at time of reconvene, and/or patient further deteriorates can consider placing swan catheter, and/or pressure supportive devices including balloon pump or Impella whichever is most appropriate based on holistic status at that time.   -Continue supplemental oxygen wean as tolerated  -Remainder per primary team.    Thank you for involving us in this patient care. Recommendations not final until signed by attending.     General Cardiology Consult Pager: [phone] (weekday 7AM-6PM and weekend 7AM-2PM) and other: [phone]  EP Consult Pager: [phone] (weekday 7AM-6PM and weekend 7AM-2PM) and other: [phone]  CICU Fellow Pager: [phone] anytime  EP Device Nurse Pager: [phone] (weekday 7AM-4PM)  Advanced Heart Failure Consult Pager: [phone] anytime    Case discussed with cardiology fellow Dr. Cavanaugh, and to be staffed with Dr. Santiago Robledo MD  Internal Medicine PGY-2    Reviewed by  David Cavanaugh MD  Cardiology Fellow PGY5

## 2025-01-16 NOTE — PROCEDURES
Insert arterial line    Date/Time: 1/16/2025 12:08 PM    Performed by: Micha Carranza DO  Authorized by: Jonah Wagner MD    Consent:     Consent obtained:  Verbal and written    Consent given by:  Spouse    Risks, benefits, and alternatives were discussed: yes      Risks discussed:  Bleeding, pain, infection, repeat procedure and ischemia  Universal protocol:     Procedure explained and questions answered to patient or proxy's satisfaction: yes      Relevant documents present and verified: yes      Immediately prior to procedure, a time out was called: yes      Patient identity confirmed:  Arm band and verbally with patient  Indications:     Indications: hemodynamic monitoring and multiple ABGs    Pre-procedure details:     Skin preparation:  Chlorhexidine  Sedation:     Sedation type:  None  Anesthesia:     Anesthesia method:  Local infiltration    Local anesthetic:  Lidocaine 1% w/o epi  Procedure details:     Location:  R radial    Manuel's test performed: no      Needle gauge:  20 G    Placement technique:  Seldinger and ultrasound guided    Number of attempts:  1    Transducer: waveform confirmed    Post-procedure details:     Post-procedure:  Biopatch applied, secured with tape and sterile dressing applied    Procedure completion:  Tolerated well, no immediate complications  Comments:      Some bruising noted around puncture site, no hematoma.    Micha Carranza DO  PGY1 Anesthesiology

## 2025-01-16 NOTE — PROGRESS NOTES
"Faustina Vick \"Noam" is a 64 y.o. female on day 3 of admission presenting with Primary cancer of right upper lobe of lung (Multi).    Subjective   Patient is fatigued however able to answer questions and ask appropriate questions with team.        Objective     Physical Exam  Constitutional:       Appearance: She is ill-appearing.      Comments: On precedex   HENT:      Head: Normocephalic and atraumatic.   Eyes:      Conjunctiva/sclera: Conjunctivae normal.   Cardiovascular:      Rate and Rhythm: Normal rate and regular rhythm.      Comments: Vaso 0.02, Lasix gtt, milrinone  Pulmonary:      Effort: Pulmonary effort is normal.      Comments: R CT removed, dressing intact, no leakage. On 2LNC  Abdominal:      General: There is no distension.   Genitourinary:     Comments: Montiel in place, clear urine  Musculoskeletal:         General: No swelling.   Skin:     General: Skin is warm and dry.   Neurological:      General: No focal deficit present.      Mental Status: She is alert.      Comments: Asking appropriate questions   Psychiatric:         Behavior: Behavior normal.         Last Recorded Vitals  Blood pressure 101/75, pulse 85, temperature 36.1 °C (97 °F), temperature source Temporal, resp. rate 23, height 1.651 m (5' 5\"), weight 52.1 kg (114 lb 13.8 oz), SpO2 94%.  Intake/Output last 3 Shifts:  I/O last 3 completed shifts:  In: 2687 (51.6 mL/kg) [P.O.:1200; I.V.:231 (4.4 mL/kg); Blood:250; IV Piggyback:1006]  Out: 1120 (21.5 mL/kg) [Urine:1120 (0.6 mL/kg/hr)]  Weight: 52.1 kg     Relevant Results  Results for orders placed or performed during the hospital encounter of 01/13/25 (from the past 24 hours)   POCT GLUCOSE   Result Value Ref Range    POCT Glucose 109 (H) 74 - 99 mg/dL   Renal Function Panel   Result Value Ref Range    Glucose 86 74 - 99 mg/dL    Sodium 124 (L) 136 - 145 mmol/L    Potassium 3.6 3.5 - 5.3 mmol/L    Chloride 86 (L) 98 - 107 mmol/L    Bicarbonate 24 21 - 32 mmol/L    Anion Gap 18 mmol/L "    Urea Nitrogen 5 (L) 6 - 23 mg/dL    Creatinine <0.20 (L) 0.50 - 1.05 mg/dL    eGFR      Calcium 7.8 (L) 8.6 - 10.6 mg/dL    Phosphorus 2.9 2.5 - 4.9 mg/dL    Albumin 3.7 3.4 - 5.0 g/dL   POCT GLUCOSE   Result Value Ref Range    POCT Glucose 96 74 - 99 mg/dL   Renal Function Panel   Result Value Ref Range    Glucose 120 (H) 74 - 99 mg/dL    Sodium 122 (L) 136 - 145 mmol/L    Potassium 4.6 3.5 - 5.3 mmol/L    Chloride 86 (L) 98 - 107 mmol/L    Bicarbonate 25 21 - 32 mmol/L    Anion Gap 16 10 - 20 mmol/L    Urea Nitrogen 5 (L) 6 - 23 mg/dL    Creatinine 0.20 (L) 0.50 - 1.05 mg/dL    eGFR >90 >60 mL/min/1.73m*2    Calcium 8.3 (L) 8.6 - 10.6 mg/dL    Phosphorus 3.0 2.5 - 4.9 mg/dL    Albumin 3.8 3.4 - 5.0 g/dL   POCT GLUCOSE   Result Value Ref Range    POCT Glucose 120 (H) 74 - 99 mg/dL   Renal Function Panel   Result Value Ref Range    Glucose 96 74 - 99 mg/dL    Sodium 122 (L) 136 - 145 mmol/L    Potassium 4.7 3.5 - 5.3 mmol/L    Chloride 87 (L) 98 - 107 mmol/L    Bicarbonate 24 21 - 32 mmol/L    Anion Gap 16 10 - 20 mmol/L    Urea Nitrogen 5 (L) 6 - 23 mg/dL    Creatinine <0.20 (L) 0.50 - 1.05 mg/dL    eGFR      Calcium 8.0 (L) 8.6 - 10.6 mg/dL    Phosphorus 1.8 (L) 2.5 - 4.9 mg/dL    Albumin 3.5 3.4 - 5.0 g/dL   POCT GLUCOSE   Result Value Ref Range    POCT Glucose 120 (H) 74 - 99 mg/dL   Renal Function Panel   Result Value Ref Range    Glucose 121 (H) 74 - 99 mg/dL    Sodium 120 (LL) 136 - 145 mmol/L    Potassium 4.7 3.5 - 5.3 mmol/L    Chloride 84 (L) 98 - 107 mmol/L    Bicarbonate 21 21 - 32 mmol/L    Anion Gap 20 10 - 20 mmol/L    Urea Nitrogen 5 (L) 6 - 23 mg/dL    Creatinine 0.20 (L) 0.50 - 1.05 mg/dL    eGFR >90 >60 mL/min/1.73m*2    Calcium 8.1 (L) 8.6 - 10.6 mg/dL    Phosphorus 2.5 2.5 - 4.9 mg/dL    Albumin 3.6 3.4 - 5.0 g/dL   Calcium, Ionized   Result Value Ref Range    POCT Calcium, Ionized 1.03 (L) 1.1 - 1.33 mmol/L   CBC   Result Value Ref Range    WBC 16.2 (H) 4.4 - 11.3 x10*3/uL    nRBC 0.0 0.0 -  0.0 /100 WBCs    RBC 3.63 (L) 4.00 - 5.20 x10*6/uL    Hemoglobin 9.2 (L) 12.0 - 16.0 g/dL    Hematocrit 27.0 (L) 36.0 - 46.0 %    MCV 74 (L) 80 - 100 fL    MCH 25.3 (L) 26.0 - 34.0 pg    MCHC 34.1 32.0 - 36.0 g/dL    RDW 13.0 11.5 - 14.5 %    Platelets 340 150 - 450 x10*3/uL   Magnesium   Result Value Ref Range    Magnesium 1.95 1.60 - 2.40 mg/dL   Blood Gas Arterial Full Panel   Result Value Ref Range    POCT pH, Arterial 7.46 (H) 7.38 - 7.42 pH    POCT pCO2, Arterial 30 (L) 38 - 42 mm Hg    POCT pO2, Arterial 76 (L) 85 - 95 mm Hg    POCT SO2, Arterial 96 94 - 100 %    POCT Oxy Hemoglobin, Arterial 94.6 94.0 - 98.0 %    POCT Hematocrit Calculated, Arterial 30.0 (L) 36.0 - 46.0 %    POCT Sodium, Arterial 116 (LL) 136 - 145 mmol/L    POCT Potassium, Arterial 4.7 3.5 - 5.3 mmol/L    POCT Chloride, Arterial 86 (L) 98 - 107 mmol/L    POCT Ionized Calcium, Arterial 1.12 1.10 - 1.33 mmol/L    POCT Glucose, Arterial 131 (H) 74 - 99 mg/dL    POCT Lactate, Arterial 1.5 0.4 - 2.0 mmol/L    POCT Base Excess, Arterial -1.9 -2.0 - 3.0 mmol/L    POCT HCO3 Calculated, Arterial 21.3 (L) 22.0 - 26.0 mmol/L    POCT Hemoglobin, Arterial 9.9 (L) 12.0 - 16.0 g/dL    POCT Anion Gap, Arterial 13 10 - 25 mmo/L    Patient Temperature 37.0 degrees Celsius    FiO2 21 %   POCT GLUCOSE   Result Value Ref Range    POCT Glucose 166 (H) 74 - 99 mg/dL   Renal Function Panel   Result Value Ref Range    Glucose 175 (H) 74 - 99 mg/dL    Sodium 124 (L) 136 - 145 mmol/L    Potassium 5.0 3.5 - 5.3 mmol/L    Chloride 89 (L) 98 - 107 mmol/L    Bicarbonate 20 (L) 21 - 32 mmol/L    Anion Gap 20 10 - 20 mmol/L    Urea Nitrogen 8 6 - 23 mg/dL    Creatinine 0.34 (L) 0.50 - 1.05 mg/dL    eGFR >90 >60 mL/min/1.73m*2    Calcium 8.4 (L) 8.6 - 10.6 mg/dL    Phosphorus 4.3 2.5 - 4.9 mg/dL    Albumin 3.6 3.4 - 5.0 g/dL   Coagulation Screen   Result Value Ref Range    Protime 13.8 (H) 9.8 - 12.8 seconds    INR 1.2 (H) 0.9 - 1.1    aPTT 42 (H) 27 - 38 seconds          This patient has a central line   Reason for the central line remaining today? Hemodynamic monitoring                 Assessment/Plan   Assessment & Plan  Primary cancer of right upper lobe of lung (Multi)    Chronic obstructive pulmonary disease (Multi)    Malignant neoplasm of upper lobe of right lung (Multi)    Faustina Vick (Jannie) is a 64F with Hx of Stage III lung cancer, asthma, anxiety, and arthritis who is now s/p R pneumonectomy with Dr. Wagner 1/13/25. 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     - Agree with cardiology consult, appreciate nephrology recommendations  - OOB, IS  - Requiring ICU level care at this time  - Please page with concerns    Patient seen with cardiothoracic fellow Dr. Brush and discussed with attending Dr. Bernard Miranda MD  Thoracic surgery [id number]

## 2025-01-17 ENCOUNTER — APPOINTMENT (OUTPATIENT)
Dept: RADIOLOGY | Facility: HOSPITAL | Age: 65
End: 2025-01-17
Payer: COMMERCIAL

## 2025-01-17 ENCOUNTER — APPOINTMENT (OUTPATIENT)
Dept: CARDIOLOGY | Facility: HOSPITAL | Age: 65
End: 2025-01-17
Payer: COMMERCIAL

## 2025-01-17 PROBLEM — R57.0 CARDIOGENIC SHOCK (MULTI): Status: ACTIVE | Noted: 2024-11-20

## 2025-01-17 LAB
ABO GROUP (TYPE) IN BLOOD: NORMAL
ALBUMIN SERPL BCP-MCNC: 3.1 G/DL (ref 3.4–5)
ALBUMIN SERPL BCP-MCNC: 3.3 G/DL (ref 3.4–5)
ALBUMIN SERPL BCP-MCNC: 3.3 G/DL (ref 3.4–5)
ALBUMIN SERPL BCP-MCNC: 3.4 G/DL (ref 3.4–5)
ALBUMIN SERPL BCP-MCNC: 3.5 G/DL (ref 3.4–5)
ALBUMIN SERPL BCP-MCNC: 3.5 G/DL (ref 3.4–5)
ALBUMIN SERPL BCP-MCNC: 3.6 G/DL (ref 3.4–5)
ANION GAP BLDA CALCULATED.4IONS-SCNC: 11 MMO/L (ref 10–25)
ANION GAP BLDA CALCULATED.4IONS-SCNC: 12 MMO/L (ref 10–25)
ANION GAP BLDA CALCULATED.4IONS-SCNC: 13 MMO/L (ref 10–25)
ANION GAP BLDA CALCULATED.4IONS-SCNC: 15 MMO/L (ref 10–25)
ANION GAP BLDA CALCULATED.4IONS-SCNC: 9 MMO/L (ref 10–25)
ANION GAP BLDV CALCULATED.4IONS-SCNC: 10 MMOL/L (ref 10–25)
ANION GAP BLDV CALCULATED.4IONS-SCNC: 12 MMOL/L (ref 10–25)
ANION GAP BLDV CALCULATED.4IONS-SCNC: 12 MMOL/L (ref 10–25)
ANION GAP BLDV CALCULATED.4IONS-SCNC: 13 MMOL/L (ref 10–25)
ANION GAP BLDV CALCULATED.4IONS-SCNC: 9 MMOL/L (ref 10–25)
ANION GAP SERPL CALC-SCNC: 15 MMOL/L (ref 10–20)
ANION GAP SERPL CALC-SCNC: 16 MMOL/L (ref 10–20)
ANION GAP SERPL CALC-SCNC: 16 MMOL/L (ref 10–20)
ANION GAP SERPL CALC-SCNC: 17 MMOL/L (ref 10–20)
ANION GAP SERPL CALC-SCNC: 17 MMOL/L (ref 10–20)
ANION GAP SERPL CALC-SCNC: 19 MMOL/L (ref 10–20)
ANION GAP SERPL CALC-SCNC: 19 MMOL/L (ref 10–20)
ANTIBODY SCREEN: NORMAL
AORTIC VALVE MEAN GRADIENT: 2 MMHG
AORTIC VALVE PEAK VELOCITY: 0.87 M/S
APTT PPP: 33 SECONDS (ref 27–38)
ATRIAL RATE: 97 BPM
AV PEAK GRADIENT: 3 MMHG
AVA (PEAK VEL): 2.26 CM2
AVA (VTI): 2.41 CM2
BASE EXCESS BLDA CALC-SCNC: -1.1 MMOL/L (ref -2–3)
BASE EXCESS BLDA CALC-SCNC: 1.8 MMOL/L (ref -2–3)
BASE EXCESS BLDA CALC-SCNC: 10.8 MMOL/L (ref -2–3)
BASE EXCESS BLDA CALC-SCNC: 6.9 MMOL/L (ref -2–3)
BASE EXCESS BLDA CALC-SCNC: 9 MMOL/L (ref -2–3)
BASE EXCESS BLDV CALC-SCNC: 0 MMOL/L (ref -2–3)
BASE EXCESS BLDV CALC-SCNC: 0.4 MMOL/L (ref -2–3)
BASE EXCESS BLDV CALC-SCNC: 3.8 MMOL/L (ref -2–3)
BASE EXCESS BLDV CALC-SCNC: 8.7 MMOL/L (ref -2–3)
BASE EXCESS BLDV CALC-SCNC: 9.1 MMOL/L (ref -2–3)
BODY TEMPERATURE: 37 DEGREES CELSIUS
BUN SERPL-MCNC: 10 MG/DL (ref 6–23)
BUN SERPL-MCNC: 11 MG/DL (ref 6–23)
BUN SERPL-MCNC: 9 MG/DL (ref 6–23)
CA-I BLD-SCNC: 0.97 MMOL/L (ref 1.1–1.33)
CA-I BLD-SCNC: 0.99 MMOL/L (ref 1.1–1.33)
CA-I BLD-SCNC: 1.07 MMOL/L (ref 1.1–1.33)
CA-I BLDA-SCNC: 1.03 MMOL/L (ref 1.1–1.33)
CA-I BLDA-SCNC: 1.03 MMOL/L (ref 1.1–1.33)
CA-I BLDA-SCNC: 1.06 MMOL/L (ref 1.1–1.33)
CA-I BLDA-SCNC: 1.17 MMOL/L (ref 1.1–1.33)
CA-I BLDA-SCNC: 1.21 MMOL/L (ref 1.1–1.33)
CA-I BLDV-SCNC: 1.02 MMOL/L (ref 1.1–1.33)
CA-I BLDV-SCNC: 1.08 MMOL/L (ref 1.1–1.33)
CA-I BLDV-SCNC: 1.12 MMOL/L (ref 1.1–1.33)
CA-I BLDV-SCNC: 1.14 MMOL/L (ref 1.1–1.33)
CA-I BLDV-SCNC: 1.14 MMOL/L (ref 1.1–1.33)
CALCIUM SERPL-MCNC: 7.8 MG/DL (ref 8.6–10.6)
CALCIUM SERPL-MCNC: 7.9 MG/DL (ref 8.6–10.6)
CALCIUM SERPL-MCNC: 8 MG/DL (ref 8.6–10.6)
CARDIAC TROPONIN I PNL SERPL HS: 625 NG/L (ref 0–34)
CHLORIDE BLDA-SCNC: 84 MMOL/L (ref 98–107)
CHLORIDE BLDA-SCNC: 84 MMOL/L (ref 98–107)
CHLORIDE BLDA-SCNC: 86 MMOL/L (ref 98–107)
CHLORIDE BLDA-SCNC: 90 MMOL/L (ref 98–107)
CHLORIDE BLDA-SCNC: 92 MMOL/L (ref 98–107)
CHLORIDE BLDV-SCNC: 85 MMOL/L (ref 98–107)
CHLORIDE BLDV-SCNC: 85 MMOL/L (ref 98–107)
CHLORIDE BLDV-SCNC: 87 MMOL/L (ref 98–107)
CHLORIDE BLDV-SCNC: 89 MMOL/L (ref 98–107)
CHLORIDE BLDV-SCNC: 90 MMOL/L (ref 98–107)
CHLORIDE SERPL-SCNC: 80 MMOL/L (ref 98–107)
CHLORIDE SERPL-SCNC: 81 MMOL/L (ref 98–107)
CHLORIDE SERPL-SCNC: 84 MMOL/L (ref 98–107)
CHLORIDE SERPL-SCNC: 87 MMOL/L (ref 98–107)
CHLORIDE SERPL-SCNC: 88 MMOL/L (ref 98–107)
CO2 SERPL-SCNC: 24 MMOL/L (ref 21–32)
CO2 SERPL-SCNC: 26 MMOL/L (ref 21–32)
CO2 SERPL-SCNC: 28 MMOL/L (ref 21–32)
CO2 SERPL-SCNC: 29 MMOL/L (ref 21–32)
CO2 SERPL-SCNC: 33 MMOL/L (ref 21–32)
CO2 SERPL-SCNC: 34 MMOL/L (ref 21–32)
CO2 SERPL-SCNC: 34 MMOL/L (ref 21–32)
CREAT SERPL-MCNC: 0.27 MG/DL (ref 0.5–1.05)
CREAT SERPL-MCNC: 0.27 MG/DL (ref 0.5–1.05)
CREAT SERPL-MCNC: 0.28 MG/DL (ref 0.5–1.05)
CREAT SERPL-MCNC: 0.29 MG/DL (ref 0.5–1.05)
CREAT SERPL-MCNC: 0.38 MG/DL (ref 0.5–1.05)
CREAT SERPL-MCNC: 0.39 MG/DL (ref 0.5–1.05)
CREAT SERPL-MCNC: 0.41 MG/DL (ref 0.5–1.05)
EGFRCR SERPLBLD CKD-EPI 2021: >90 ML/MIN/1.73M*2
EJECTION FRACTION APICAL 4 CHAMBER: 18.9
EJECTION FRACTION: 28 %
ERYTHROCYTE [DISTWIDTH] IN BLOOD BY AUTOMATED COUNT: 13.1 % (ref 11.5–14.5)
ERYTHROCYTE [DISTWIDTH] IN BLOOD BY AUTOMATED COUNT: 13.3 % (ref 11.5–14.5)
ERYTHROCYTE [DISTWIDTH] IN BLOOD BY AUTOMATED COUNT: 13.4 % (ref 11.5–14.5)
FUNGUS SPEC CULT: NORMAL
FUNGUS SPEC FUNGUS STN: NORMAL
GLUCOSE BLD MANUAL STRIP-MCNC: 142 MG/DL (ref 74–99)
GLUCOSE BLD MANUAL STRIP-MCNC: 144 MG/DL (ref 74–99)
GLUCOSE BLD MANUAL STRIP-MCNC: 145 MG/DL (ref 74–99)
GLUCOSE BLD MANUAL STRIP-MCNC: 163 MG/DL (ref 74–99)
GLUCOSE BLD MANUAL STRIP-MCNC: 175 MG/DL (ref 74–99)
GLUCOSE BLD MANUAL STRIP-MCNC: 199 MG/DL (ref 74–99)
GLUCOSE BLDA-MCNC: 145 MG/DL (ref 74–99)
GLUCOSE BLDA-MCNC: 163 MG/DL (ref 74–99)
GLUCOSE BLDA-MCNC: 167 MG/DL (ref 74–99)
GLUCOSE BLDA-MCNC: 177 MG/DL (ref 74–99)
GLUCOSE BLDA-MCNC: 187 MG/DL (ref 74–99)
GLUCOSE BLDV-MCNC: 143 MG/DL (ref 74–99)
GLUCOSE BLDV-MCNC: 162 MG/DL (ref 74–99)
GLUCOSE BLDV-MCNC: 163 MG/DL (ref 74–99)
GLUCOSE BLDV-MCNC: 168 MG/DL (ref 74–99)
GLUCOSE BLDV-MCNC: 179 MG/DL (ref 74–99)
GLUCOSE SERPL-MCNC: 123 MG/DL (ref 74–99)
GLUCOSE SERPL-MCNC: 131 MG/DL (ref 74–99)
GLUCOSE SERPL-MCNC: 147 MG/DL (ref 74–99)
GLUCOSE SERPL-MCNC: 169 MG/DL (ref 74–99)
GLUCOSE SERPL-MCNC: 171 MG/DL (ref 74–99)
GLUCOSE SERPL-MCNC: 171 MG/DL (ref 74–99)
GLUCOSE SERPL-MCNC: 174 MG/DL (ref 74–99)
HCO3 BLDA-SCNC: 24.3 MMOL/L (ref 22–26)
HCO3 BLDA-SCNC: 26.6 MMOL/L (ref 22–26)
HCO3 BLDA-SCNC: 31.3 MMOL/L (ref 22–26)
HCO3 BLDA-SCNC: 33.5 MMOL/L (ref 22–26)
HCO3 BLDA-SCNC: 36.7 MMOL/L (ref 22–26)
HCO3 BLDV-SCNC: 25.9 MMOL/L (ref 22–26)
HCO3 BLDV-SCNC: 26.8 MMOL/L (ref 22–26)
HCO3 BLDV-SCNC: 29.9 MMOL/L (ref 22–26)
HCO3 BLDV-SCNC: 34.4 MMOL/L (ref 22–26)
HCO3 BLDV-SCNC: 35 MMOL/L (ref 22–26)
HCT VFR BLD AUTO: 24.4 % (ref 36–46)
HCT VFR BLD AUTO: 24.7 % (ref 36–46)
HCT VFR BLD AUTO: 28.9 % (ref 36–46)
HCT VFR BLD EST: 11 % (ref 36–46)
HCT VFR BLD EST: 24 % (ref 36–46)
HCT VFR BLD EST: 25 % (ref 36–46)
HCT VFR BLD EST: 26 % (ref 36–46)
HCT VFR BLD EST: 26 % (ref 36–46)
HCT VFR BLD EST: 27 % (ref 36–46)
HCT VFR BLD EST: 27 % (ref 36–46)
HCT VFR BLD EST: 32 % (ref 36–46)
HCT VFR BLD EST: 32 % (ref 36–46)
HCT VFR BLD EST: 33 % (ref 36–46)
HGB BLD-MCNC: 8.1 G/DL (ref 12–16)
HGB BLD-MCNC: 8.2 G/DL (ref 12–16)
HGB BLD-MCNC: 9.8 G/DL (ref 12–16)
HGB BLDA-MCNC: 10.8 G/DL (ref 12–16)
HGB BLDA-MCNC: 11.1 G/DL (ref 12–16)
HGB BLDA-MCNC: 3.5 G/DL (ref 12–16)
HGB BLDA-MCNC: 8.6 G/DL (ref 12–16)
HGB BLDA-MCNC: 9 G/DL (ref 12–16)
HGB BLDV-MCNC: 10.5 G/DL (ref 12–16)
HGB BLDV-MCNC: 7.9 G/DL (ref 12–16)
HGB BLDV-MCNC: 8.4 G/DL (ref 12–16)
HGB BLDV-MCNC: 8.7 G/DL (ref 12–16)
HGB BLDV-MCNC: 9 G/DL (ref 12–16)
INHALED O2 CONCENTRATION: 100 %
INHALED O2 CONCENTRATION: 28 %
INR PPP: 1.3 (ref 0.9–1.1)
LACTATE BLDA-SCNC: 1.1 MMOL/L (ref 0.4–2)
LACTATE BLDA-SCNC: 1.1 MMOL/L (ref 0.4–2)
LACTATE BLDA-SCNC: 1.2 MMOL/L (ref 0.4–2)
LACTATE BLDA-SCNC: 1.4 MMOL/L (ref 0.4–2)
LACTATE BLDA-SCNC: 1.5 MMOL/L (ref 0.4–2)
LACTATE BLDV-SCNC: 1.2 MMOL/L (ref 0.4–2)
LACTATE BLDV-SCNC: 1.3 MMOL/L (ref 0.4–2)
LACTATE BLDV-SCNC: 1.4 MMOL/L (ref 0.4–2)
LEFT VENTRICULAR OUTFLOW TRACT DIAMETER: 1.94 CM
MAGNESIUM SERPL-MCNC: 1.45 MG/DL (ref 1.6–2.4)
MAGNESIUM SERPL-MCNC: 1.8 MG/DL (ref 1.6–2.4)
MCH RBC QN AUTO: 24.9 PG (ref 26–34)
MCH RBC QN AUTO: 25.1 PG (ref 26–34)
MCH RBC QN AUTO: 25.7 PG (ref 26–34)
MCHC RBC AUTO-ENTMCNC: 33.2 G/DL (ref 32–36)
MCHC RBC AUTO-ENTMCNC: 33.2 G/DL (ref 32–36)
MCHC RBC AUTO-ENTMCNC: 33.9 G/DL (ref 32–36)
MCV RBC AUTO: 75 FL (ref 80–100)
MCV RBC AUTO: 76 FL (ref 80–100)
MCV RBC AUTO: 76 FL (ref 80–100)
NRBC BLD-RTO: 0 /100 WBCS (ref 0–0)
OXYHGB MFR BLDA: 92.2 % (ref 94–98)
OXYHGB MFR BLDA: 92.3 % (ref 94–98)
OXYHGB MFR BLDA: 95.5 % (ref 94–98)
OXYHGB MFR BLDA: 96.1 % (ref 94–98)
OXYHGB MFR BLDA: 96.9 % (ref 94–98)
OXYHGB MFR BLDV: 53.8 % (ref 45–75)
OXYHGB MFR BLDV: 54.6 % (ref 45–75)
OXYHGB MFR BLDV: 62.2 % (ref 45–75)
OXYHGB MFR BLDV: 62.9 % (ref 45–75)
OXYHGB MFR BLDV: 65.9 % (ref 45–75)
P AXIS: 83 DEGREES
P OFFSET: 211 MS
P ONSET: 154 MS
PCO2 BLDA: 42 MM HG (ref 38–42)
PCO2 BLDA: 43 MM HG (ref 38–42)
PCO2 BLDA: 43 MM HG (ref 38–42)
PCO2 BLDA: 45 MM HG (ref 38–42)
PCO2 BLDA: 54 MM HG (ref 38–42)
PCO2 BLDV: 48 MM HG (ref 41–51)
PCO2 BLDV: 52 MM HG (ref 41–51)
PCO2 BLDV: 53 MM HG (ref 41–51)
PCO2 BLDV: 53 MM HG (ref 41–51)
PCO2 BLDV: 54 MM HG (ref 41–51)
PH BLDA: 7.36 PH (ref 7.38–7.42)
PH BLDA: 7.41 PH (ref 7.38–7.42)
PH BLDA: 7.44 PH (ref 7.38–7.42)
PH BLDA: 7.47 PH (ref 7.38–7.42)
PH BLDA: 7.48 PH (ref 7.38–7.42)
PH BLDV: 7.32 PH (ref 7.33–7.43)
PH BLDV: 7.34 PH (ref 7.33–7.43)
PH BLDV: 7.36 PH (ref 7.33–7.43)
PH BLDV: 7.42 PH (ref 7.33–7.43)
PH BLDV: 7.42 PH (ref 7.33–7.43)
PHOSPHATE SERPL-MCNC: 1.8 MG/DL (ref 2.5–4.9)
PHOSPHATE SERPL-MCNC: 1.9 MG/DL (ref 2.5–4.9)
PHOSPHATE SERPL-MCNC: 1.9 MG/DL (ref 2.5–4.9)
PHOSPHATE SERPL-MCNC: 2 MG/DL (ref 2.5–4.9)
PHOSPHATE SERPL-MCNC: 2 MG/DL (ref 2.5–4.9)
PHOSPHATE SERPL-MCNC: 2.1 MG/DL (ref 2.5–4.9)
PHOSPHATE SERPL-MCNC: 2.3 MG/DL (ref 2.5–4.9)
PLATELET # BLD AUTO: 157 X10*3/UL (ref 150–450)
PLATELET # BLD AUTO: 186 X10*3/UL (ref 150–450)
PLATELET # BLD AUTO: 187 X10*3/UL (ref 150–450)
PO2 BLDA: 68 MM HG (ref 85–95)
PO2 BLDA: 70 MM HG (ref 85–95)
PO2 BLDA: 78 MM HG (ref 85–95)
PO2 BLDA: 93 MM HG (ref 85–95)
PO2 BLDA: 97 MM HG (ref 85–95)
PO2 BLDV: 36 MM HG (ref 35–45)
PO2 BLDV: 37 MM HG (ref 35–45)
PO2 BLDV: 39 MM HG (ref 35–45)
PO2 BLDV: 43 MM HG (ref 35–45)
PO2 BLDV: 44 MM HG (ref 35–45)
POTASSIUM BLDA-SCNC: 2.7 MMOL/L (ref 3.5–5.3)
POTASSIUM BLDA-SCNC: 3.1 MMOL/L (ref 3.5–5.3)
POTASSIUM BLDA-SCNC: 3.6 MMOL/L (ref 3.5–5.3)
POTASSIUM BLDA-SCNC: 4.2 MMOL/L (ref 3.5–5.3)
POTASSIUM BLDA-SCNC: 4.5 MMOL/L (ref 3.5–5.3)
POTASSIUM BLDV-SCNC: 2.6 MMOL/L (ref 3.5–5.3)
POTASSIUM BLDV-SCNC: 3.1 MMOL/L (ref 3.5–5.3)
POTASSIUM BLDV-SCNC: 3.6 MMOL/L (ref 3.5–5.3)
POTASSIUM BLDV-SCNC: 4 MMOL/L (ref 3.5–5.3)
POTASSIUM BLDV-SCNC: 4.3 MMOL/L (ref 3.5–5.3)
POTASSIUM SERPL-SCNC: 3.1 MMOL/L (ref 3.5–5.3)
POTASSIUM SERPL-SCNC: 3.1 MMOL/L (ref 3.5–5.3)
POTASSIUM SERPL-SCNC: 3.3 MMOL/L (ref 3.5–5.3)
POTASSIUM SERPL-SCNC: 4.3 MMOL/L (ref 3.5–5.3)
POTASSIUM SERPL-SCNC: 4.3 MMOL/L (ref 3.5–5.3)
POTASSIUM SERPL-SCNC: 4.5 MMOL/L (ref 3.5–5.3)
POTASSIUM SERPL-SCNC: 5 MMOL/L (ref 3.5–5.3)
PR INTERVAL: 130 MS
PROTHROMBIN TIME: 14.4 SECONDS (ref 9.8–12.8)
Q ONSET: 219 MS
QRS COUNT: 16 BEATS
QRS DURATION: 82 MS
QT INTERVAL: 382 MS
QTC CALCULATION(BAZETT): 485 MS
QTC FREDERICIA: 448 MS
R AXIS: 35 DEGREES
RBC # BLD AUTO: 3.23 X10*6/UL (ref 4–5.2)
RBC # BLD AUTO: 3.29 X10*6/UL (ref 4–5.2)
RBC # BLD AUTO: 3.81 X10*6/UL (ref 4–5.2)
RH FACTOR (ANTIGEN D): NORMAL
RIGHT VENTRICLE FREE WALL PEAK S': 9.36 CM/S
RIGHT VENTRICLE PEAK SYSTOLIC PRESSURE: 27 MMHG
SAO2 % BLDA: 95 % (ref 94–100)
SAO2 % BLDA: 95 % (ref 94–100)
SAO2 % BLDA: 98 % (ref 94–100)
SAO2 % BLDA: 99 % (ref 94–100)
SAO2 % BLDA: 99 % (ref 94–100)
SAO2 % BLDV: 55 % (ref 45–75)
SAO2 % BLDV: 56 % (ref 45–75)
SAO2 % BLDV: 63 % (ref 45–75)
SAO2 % BLDV: 64 % (ref 45–75)
SAO2 % BLDV: 67 % (ref 45–75)
SODIUM BLDA-SCNC: 125 MMOL/L (ref 136–145)
SODIUM BLDA-SCNC: 126 MMOL/L (ref 136–145)
SODIUM BLDA-SCNC: 127 MMOL/L (ref 136–145)
SODIUM BLDV-SCNC: 124 MMOL/L (ref 136–145)
SODIUM BLDV-SCNC: 125 MMOL/L (ref 136–145)
SODIUM BLDV-SCNC: 127 MMOL/L (ref 136–145)
SODIUM SERPL-SCNC: 126 MMOL/L (ref 136–145)
SODIUM SERPL-SCNC: 127 MMOL/L (ref 136–145)
SODIUM SERPL-SCNC: 129 MMOL/L (ref 136–145)
SODIUM SERPL-SCNC: 130 MMOL/L (ref 136–145)
SODIUM SERPL-SCNC: 130 MMOL/L (ref 136–145)
T AXIS: 89 DEGREES
T OFFSET: 410 MS
TRICUSPID ANNULAR PLANE SYSTOLIC EXCURSION: 1.2 CM
VENTRICULAR RATE: 97 BPM
WBC # BLD AUTO: 8.3 X10*3/UL (ref 4.4–11.3)
WBC # BLD AUTO: 8.6 X10*3/UL (ref 4.4–11.3)
WBC # BLD AUTO: 9.3 X10*3/UL (ref 4.4–11.3)

## 2025-01-17 PROCEDURE — 93010 ELECTROCARDIOGRAM REPORT: CPT | Performed by: INTERNAL MEDICINE

## 2025-01-17 PROCEDURE — 94640 AIRWAY INHALATION TREATMENT: CPT

## 2025-01-17 PROCEDURE — 2500000001 HC RX 250 WO HCPCS SELF ADMINISTERED DRUGS (ALT 637 FOR MEDICARE OP): Performed by: NURSE PRACTITIONER

## 2025-01-17 PROCEDURE — P9016 RBC LEUKOCYTES REDUCED: HCPCS

## 2025-01-17 PROCEDURE — 4A023N6 MEASUREMENT OF CARDIAC SAMPLING AND PRESSURE, RIGHT HEART, PERCUTANEOUS APPROACH: ICD-10-PCS | Performed by: INTERNAL MEDICINE

## 2025-01-17 PROCEDURE — 85027 COMPLETE CBC AUTOMATED: CPT | Performed by: STUDENT IN AN ORGANIZED HEALTH CARE EDUCATION/TRAINING PROGRAM

## 2025-01-17 PROCEDURE — 85018 HEMOGLOBIN: CPT | Performed by: NURSE PRACTITIONER

## 2025-01-17 PROCEDURE — 83735 ASSAY OF MAGNESIUM: CPT | Performed by: NURSE PRACTITIONER

## 2025-01-17 PROCEDURE — 36415 COLL VENOUS BLD VENIPUNCTURE: CPT

## 2025-01-17 PROCEDURE — 84132 ASSAY OF SERUM POTASSIUM: CPT

## 2025-01-17 PROCEDURE — 71045 X-RAY EXAM CHEST 1 VIEW: CPT | Performed by: RADIOLOGY

## 2025-01-17 PROCEDURE — 37799 UNLISTED PX VASCULAR SURGERY: CPT | Performed by: STUDENT IN AN ORGANIZED HEALTH CARE EDUCATION/TRAINING PROGRAM

## 2025-01-17 PROCEDURE — C1894 INTRO/SHEATH, NON-LASER: HCPCS | Performed by: INTERNAL MEDICINE

## 2025-01-17 PROCEDURE — 93321 DOPPLER ECHO F-UP/LMTD STD: CPT | Performed by: INTERNAL MEDICINE

## 2025-01-17 PROCEDURE — 02HP32Z INSERTION OF MONITORING DEVICE INTO PULMONARY TRUNK, PERCUTANEOUS APPROACH: ICD-10-PCS | Performed by: INTERNAL MEDICINE

## 2025-01-17 PROCEDURE — 99223 1ST HOSP IP/OBS HIGH 75: CPT

## 2025-01-17 PROCEDURE — 2500000004 HC RX 250 GENERAL PHARMACY W/ HCPCS (ALT 636 FOR OP/ED): Performed by: STUDENT IN AN ORGANIZED HEALTH CARE EDUCATION/TRAINING PROGRAM

## 2025-01-17 PROCEDURE — 86901 BLOOD TYPING SEROLOGIC RH(D): CPT

## 2025-01-17 PROCEDURE — 2500000004 HC RX 250 GENERAL PHARMACY W/ HCPCS (ALT 636 FOR OP/ED)

## 2025-01-17 PROCEDURE — 99291 CRITICAL CARE FIRST HOUR: CPT | Performed by: ANESTHESIOLOGY

## 2025-01-17 PROCEDURE — 87040 BLOOD CULTURE FOR BACTERIA: CPT

## 2025-01-17 PROCEDURE — 82330 ASSAY OF CALCIUM: CPT | Performed by: NURSE PRACTITIONER

## 2025-01-17 PROCEDURE — 87641 MR-STAPH DNA AMP PROBE: CPT

## 2025-01-17 PROCEDURE — 2500000004 HC RX 250 GENERAL PHARMACY W/ HCPCS (ALT 636 FOR OP/ED): Performed by: NURSE PRACTITIONER

## 2025-01-17 PROCEDURE — 93325 DOPPLER ECHO COLOR FLOW MAPG: CPT | Performed by: INTERNAL MEDICINE

## 2025-01-17 PROCEDURE — 2500000005 HC RX 250 GENERAL PHARMACY W/O HCPCS: Performed by: INTERNAL MEDICINE

## 2025-01-17 PROCEDURE — 84132 ASSAY OF SERUM POTASSIUM: CPT | Performed by: STUDENT IN AN ORGANIZED HEALTH CARE EDUCATION/TRAINING PROGRAM

## 2025-01-17 PROCEDURE — 93503 INSERT/PLACE HEART CATHETER: CPT | Performed by: INTERNAL MEDICINE

## 2025-01-17 PROCEDURE — 84443 ASSAY THYROID STIM HORMONE: CPT | Performed by: NURSE PRACTITIONER

## 2025-01-17 PROCEDURE — 84132 ASSAY OF SERUM POTASSIUM: CPT | Performed by: NURSE PRACTITIONER

## 2025-01-17 PROCEDURE — 2500000001 HC RX 250 WO HCPCS SELF ADMINISTERED DRUGS (ALT 637 FOR MEDICARE OP)

## 2025-01-17 PROCEDURE — 2500000005 HC RX 250 GENERAL PHARMACY W/O HCPCS

## 2025-01-17 PROCEDURE — 2720000007 HC OR 272 NO HCPCS: Performed by: INTERNAL MEDICINE

## 2025-01-17 PROCEDURE — 71045 X-RAY EXAM CHEST 1 VIEW: CPT

## 2025-01-17 PROCEDURE — 2500000005 HC RX 250 GENERAL PHARMACY W/O HCPCS: Performed by: PHYSICIAN ASSISTANT

## 2025-01-17 PROCEDURE — 93308 TTE F-UP OR LMTD: CPT | Performed by: INTERNAL MEDICINE

## 2025-01-17 PROCEDURE — 2500000002 HC RX 250 W HCPCS SELF ADMINISTERED DRUGS (ALT 637 FOR MEDICARE OP, ALT 636 FOR OP/ED)

## 2025-01-17 PROCEDURE — 76937 US GUIDE VASCULAR ACCESS: CPT | Performed by: INTERNAL MEDICINE

## 2025-01-17 PROCEDURE — 93325 DOPPLER ECHO COLOR FLOW MAPG: CPT

## 2025-01-17 PROCEDURE — 5A0935A ASSISTANCE WITH RESPIRATORY VENTILATION, LESS THAN 24 CONSECUTIVE HOURS, HIGH NASAL FLOW/VELOCITY: ICD-10-PCS | Performed by: THORACIC SURGERY (CARDIOTHORACIC VASCULAR SURGERY)

## 2025-01-17 PROCEDURE — 93451 RIGHT HEART CATH: CPT | Performed by: INTERNAL MEDICINE

## 2025-01-17 PROCEDURE — C1751 CATH, INF, PER/CENT/MIDLINE: HCPCS | Performed by: INTERNAL MEDICINE

## 2025-01-17 PROCEDURE — 85027 COMPLETE CBC AUTOMATED: CPT | Performed by: NURSE PRACTITIONER

## 2025-01-17 PROCEDURE — 99232 SBSQ HOSP IP/OBS MODERATE 35: CPT

## 2025-01-17 PROCEDURE — 82947 ASSAY GLUCOSE BLOOD QUANT: CPT

## 2025-01-17 PROCEDURE — 93005 ELECTROCARDIOGRAM TRACING: CPT

## 2025-01-17 PROCEDURE — 36430 TRANSFUSION BLD/BLD COMPNT: CPT

## 2025-01-17 PROCEDURE — 2500000005 HC RX 250 GENERAL PHARMACY W/O HCPCS: Performed by: NURSE PRACTITIONER

## 2025-01-17 PROCEDURE — 2500000004 HC RX 250 GENERAL PHARMACY W/ HCPCS (ALT 636 FOR OP/ED): Mod: JZ

## 2025-01-17 PROCEDURE — 2020000001 HC ICU ROOM DAILY

## 2025-01-17 PROCEDURE — 87632 RESP VIRUS 6-11 TARGETS: CPT

## 2025-01-17 PROCEDURE — 2500000004 HC RX 250 GENERAL PHARMACY W/ HCPCS (ALT 636 FOR OP/ED): Performed by: INTERNAL MEDICINE

## 2025-01-17 PROCEDURE — 99291 CRITICAL CARE FIRST HOUR: CPT

## 2025-01-17 RX ORDER — POTASSIUM CHLORIDE 29.8 MG/ML
40 INJECTION INTRAVENOUS ONCE
Status: COMPLETED | OUTPATIENT
Start: 2025-01-17 | End: 2025-01-17

## 2025-01-17 RX ORDER — SODIUM,POTASSIUM PHOSPHATES 280-250MG
1 POWDER IN PACKET (EA) ORAL 4 TIMES DAILY
Status: DISPENSED | OUTPATIENT
Start: 2025-01-17 | End: 2025-01-17

## 2025-01-17 RX ORDER — CALCIUM GLUCONATE 20 MG/ML
1 INJECTION, SOLUTION INTRAVENOUS ONCE
Status: COMPLETED | OUTPATIENT
Start: 2025-01-17 | End: 2025-01-17

## 2025-01-17 RX ORDER — VANCOMYCIN HYDROCHLORIDE 500 MG/100ML
500 INJECTION, SOLUTION INTRAVENOUS ONCE
Status: DISCONTINUED | OUTPATIENT
Start: 2025-01-17 | End: 2025-01-18

## 2025-01-17 RX ORDER — MAGNESIUM SULFATE HEPTAHYDRATE 40 MG/ML
4 INJECTION, SOLUTION INTRAVENOUS ONCE
Status: COMPLETED | OUTPATIENT
Start: 2025-01-17 | End: 2025-01-17

## 2025-01-17 RX ORDER — AMOXICILLIN 250 MG
2 CAPSULE ORAL 2 TIMES DAILY
Status: DISCONTINUED | OUTPATIENT
Start: 2025-01-17 | End: 2025-01-18

## 2025-01-17 RX ORDER — LANOLIN ALCOHOL/MO/W.PET/CERES
400 CREAM (GRAM) TOPICAL DAILY
Status: DISCONTINUED | OUTPATIENT
Start: 2025-01-17 | End: 2025-01-18

## 2025-01-17 RX ORDER — ALBUTEROL SULFATE 0.83 MG/ML
2.5 SOLUTION RESPIRATORY (INHALATION) EVERY 4 HOURS PRN
Status: DISCONTINUED | OUTPATIENT
Start: 2025-01-17 | End: 2025-01-17

## 2025-01-17 RX ORDER — METOPROLOL TARTRATE 1 MG/ML
INJECTION, SOLUTION INTRAVENOUS
Status: DISPENSED
Start: 2025-01-17 | End: 2025-01-17

## 2025-01-17 RX ORDER — DEXMEDETOMIDINE HYDROCHLORIDE 4 UG/ML
0-.6 INJECTION, SOLUTION INTRAVENOUS CONTINUOUS
Status: DISCONTINUED | OUTPATIENT
Start: 2025-01-17 | End: 2025-01-20

## 2025-01-17 RX ORDER — AZITHROMYCIN 500 MG/1
500 TABLET, FILM COATED ORAL ONCE
Status: COMPLETED | OUTPATIENT
Start: 2025-01-17 | End: 2025-01-17

## 2025-01-17 RX ORDER — VANCOMYCIN HYDROCHLORIDE 1 G/200ML
INJECTION, SOLUTION INTRAVENOUS CONTINUOUS PRN
Status: COMPLETED | OUTPATIENT
Start: 2025-01-17 | End: 2025-01-17

## 2025-01-17 RX ORDER — POTASSIUM CHLORIDE 14.9 MG/ML
INJECTION INTRAVENOUS
Status: DISPENSED
Start: 2025-01-17 | End: 2025-01-18

## 2025-01-17 RX ORDER — MAGNESIUM SULFATE HEPTAHYDRATE 40 MG/ML
2 INJECTION, SOLUTION INTRAVENOUS ONCE
Status: DISCONTINUED | OUTPATIENT
Start: 2025-01-17 | End: 2025-01-17

## 2025-01-17 RX ORDER — CALCIUM GLUCONATE 20 MG/ML
1 INJECTION, SOLUTION INTRAVENOUS ONCE
Status: DISCONTINUED | OUTPATIENT
Start: 2025-01-17 | End: 2025-01-17

## 2025-01-17 RX ORDER — FUROSEMIDE 10 MG/ML
40 INJECTION INTRAMUSCULAR; INTRAVENOUS ONCE
Status: COMPLETED | OUTPATIENT
Start: 2025-01-17 | End: 2025-01-17

## 2025-01-17 RX ORDER — ALBUTEROL SULFATE 90 UG/1
2 INHALANT RESPIRATORY (INHALATION) EVERY 4 HOURS PRN
Status: DISCONTINUED | OUTPATIENT
Start: 2025-01-17 | End: 2025-01-18

## 2025-01-17 RX ORDER — ALBUTEROL SULFATE 0.83 MG/ML
2.5 SOLUTION RESPIRATORY (INHALATION) EVERY 4 HOURS PRN
Status: DISCONTINUED | OUTPATIENT
Start: 2025-01-17 | End: 2025-02-13 | Stop reason: HOSPADM

## 2025-01-17 RX ORDER — VANCOMYCIN HYDROCHLORIDE 1 G/20ML
INJECTION, POWDER, LYOPHILIZED, FOR SOLUTION INTRAVENOUS DAILY PRN
Status: DISCONTINUED | OUTPATIENT
Start: 2025-01-17 | End: 2025-01-20

## 2025-01-17 RX ORDER — AZITHROMYCIN 250 MG/1
250 TABLET, FILM COATED ORAL DAILY
Status: DISCONTINUED | OUTPATIENT
Start: 2025-01-18 | End: 2025-01-18

## 2025-01-17 RX ADMIN — OXYCODONE 2.5 MG: 5 TABLET ORAL at 22:11

## 2025-01-17 RX ADMIN — Medication 50 PERCENT: at 19:09

## 2025-01-17 RX ADMIN — PERFLUTREN 10 ML OF DILUTION: 6.52 INJECTION, SUSPENSION INTRAVENOUS at 11:01

## 2025-01-17 RX ADMIN — ACETAMINOPHEN 1000 MG: 10 INJECTION INTRAVENOUS at 09:08

## 2025-01-17 RX ADMIN — OXYCODONE 2.5 MG: 5 TABLET ORAL at 13:30

## 2025-01-17 RX ADMIN — ACETAMINOPHEN 1000 MG: 10 INJECTION INTRAVENOUS at 14:40

## 2025-01-17 RX ADMIN — ALBUTEROL SULFATE 2.5 MG: 2.5 SOLUTION RESPIRATORY (INHALATION) at 20:32

## 2025-01-17 RX ADMIN — SENNOSIDES AND DOCUSATE SODIUM 2 TABLET: 50; 8.6 TABLET ORAL at 09:08

## 2025-01-17 RX ADMIN — POTASSIUM CHLORIDE 40 MEQ: 29.8 INJECTION, SOLUTION INTRAVENOUS at 15:23

## 2025-01-17 RX ADMIN — Medication 3 MG: at 22:11

## 2025-01-17 RX ADMIN — HEPARIN SODIUM 5000 UNITS: 5000 INJECTION INTRAVENOUS; SUBCUTANEOUS at 14:40

## 2025-01-17 RX ADMIN — HEPARIN SODIUM 5000 UNITS: 5000 INJECTION INTRAVENOUS; SUBCUTANEOUS at 06:18

## 2025-01-17 RX ADMIN — HYDROCORTISONE SODIUM SUCCINATE 50 MG: 100 INJECTION, POWDER, FOR SOLUTION INTRAMUSCULAR; INTRAVENOUS at 19:08

## 2025-01-17 RX ADMIN — OXYCODONE 2.5 MG: 5 TABLET ORAL at 09:08

## 2025-01-17 RX ADMIN — HYDROCORTISONE SODIUM SUCCINATE 50 MG: 100 INJECTION, POWDER, FOR SOLUTION INTRAMUSCULAR; INTRAVENOUS at 10:22

## 2025-01-17 RX ADMIN — POTASSIUM & SODIUM PHOSPHATES POWDER PACK 280-160-250 MG 1 PACKET: 280-160-250 PACK at 12:25

## 2025-01-17 RX ADMIN — SODIUM CHLORIDE SOLN NEBU 3% 3 ML: 3 NEBU SOLN at 20:32

## 2025-01-17 RX ADMIN — LIDOCAINE 4% 1 PATCH: 40 PATCH TOPICAL at 09:11

## 2025-01-17 RX ADMIN — ALBUTEROL SULFATE 2.5 MG: 2.5 SOLUTION RESPIRATORY (INHALATION) at 03:47

## 2025-01-17 RX ADMIN — CALCIUM GLUCONATE 1 G: 20 INJECTION, SOLUTION INTRAVENOUS at 10:16

## 2025-01-17 RX ADMIN — HEPARIN SODIUM 5000 UNITS: 5000 INJECTION INTRAVENOUS; SUBCUTANEOUS at 00:10

## 2025-01-17 RX ADMIN — FUROSEMIDE 40 MG: 10 INJECTION, SOLUTION INTRAMUSCULAR; INTRAVENOUS at 04:44

## 2025-01-17 RX ADMIN — SODIUM CHLORIDE SOLN NEBU 3% 3 ML: 3 NEBU SOLN at 12:56

## 2025-01-17 RX ADMIN — VASOPRESSIN 0.06 UNITS/MIN: 0.2 INJECTION INTRAVENOUS at 16:40

## 2025-01-17 RX ADMIN — PIPERACILLIN SODIUM AND TAZOBACTAM SODIUM 4.5 G: 4; .5 INJECTION, SOLUTION INTRAVENOUS at 19:14

## 2025-01-17 RX ADMIN — TIOTROPIUM BROMIDE INHALATION SPRAY 2 PUFF: 3.12 SPRAY, METERED RESPIRATORY (INHALATION) at 12:57

## 2025-01-17 RX ADMIN — PANTOPRAZOLE SODIUM 40 MG: 40 INJECTION, POWDER, FOR SOLUTION INTRAVENOUS at 06:17

## 2025-01-17 RX ADMIN — MAGNESIUM SULFATE 4 G: 4 INJECTION INTRAVENOUS at 17:08

## 2025-01-17 RX ADMIN — HYDROCORTISONE SODIUM SUCCINATE 50 MG: 100 INJECTION, POWDER, FOR SOLUTION INTRAMUSCULAR; INTRAVENOUS at 03:17

## 2025-01-17 RX ADMIN — AZITHROMYCIN 500 MG: 500 TABLET, FILM COATED ORAL at 22:11

## 2025-01-17 RX ADMIN — HEPARIN SODIUM 5000 UNITS: 5000 INJECTION INTRAVENOUS; SUBCUTANEOUS at 22:11

## 2025-01-17 RX ADMIN — VASOPRESSIN 0.03 UNITS/MIN: 0.2 INJECTION INTRAVENOUS at 10:22

## 2025-01-17 RX ADMIN — Medication 400 MG: at 03:26

## 2025-01-17 RX ADMIN — FLUTICASONE FUROATE AND VILANTEROL TRIFENATATE 1 PUFF: 200; 25 POWDER RESPIRATORY (INHALATION) at 12:57

## 2025-01-17 RX ADMIN — CALCIUM CHLORIDE 1 G: 100 INJECTION INTRAVENOUS; INTRAVENTRICULAR at 19:00

## 2025-01-17 RX ADMIN — MILRINONE LACTATE IN DEXTROSE 0.12 MCG/KG/MIN: 200 INJECTION, SOLUTION INTRAVENOUS at 16:41

## 2025-01-17 ASSESSMENT — PAIN SCALES - GENERAL
PAINLEVEL_OUTOF10: 9
PAINLEVEL_OUTOF10: 9
PAINLEVEL_OUTOF10: 8
PAINLEVEL_OUTOF10: 0 - NO PAIN
PAINLEVEL_OUTOF10: 9
PAINLEVEL_OUTOF10: 8

## 2025-01-17 ASSESSMENT — PAIN - FUNCTIONAL ASSESSMENT
PAIN_FUNCTIONAL_ASSESSMENT: 0-10

## 2025-01-17 ASSESSMENT — PAIN SCALES - PAIN ASSESSMENT IN ADVANCED DEMENTIA (PAINAD): TOTALSCORE: MEDICATION (SEE MAR)

## 2025-01-17 ASSESSMENT — PAIN DESCRIPTION - LOCATION: LOCATION: ABDOMEN

## 2025-01-17 NOTE — PROGRESS NOTES
NEPHROLOGY FOLLOW UP NOTE    Faustina Vick   64 y.o.    @WT@  MRN/Room: [id number]/12/12-A    Subjective: Overnight, patient had episode of confusion, tachypnea, respiratory distress requiring 7 L O2 NC.  Lab work was obtained showing lactate 2.9.  Patient became an uric.  TTE obtained showing biventricular dysfunction with concerns for Takotsubo stress cardiomyopathy in the setting of elevated troponin 900s.  Patient was given IVP 40 mg Lasix and eventually transition to Lasix drip.  Steroids started.  Patient currently on epinephrine, vasopressin, milrinone.  Currently believe patient underwent cardiac event resulting in flash pulmonary edema.    Patient evaluated this morning and found to be resting in bed with family at bedside.  They were updated on current medical management in the context of her sodium and renal function.  Says that she did not have much to eat in the past 48 hours, only endorsing a little bit of broth and a bite of pizza.  Encouraged high solute intake with Ensure shakes.    Objective:     Meds:   acetaminophen, 1,000 mg, q6h WILMER  fluticasone furoate-vilanteroL, 1 puff, Daily  heparin (porcine), 5,000 Units, q8h  hydrocortisone sodium succinate, 50 mg, q8h  lidocaine, 1 patch, Daily  magnesium oxide, 400 mg, Daily  melatonin, 3 mg, Nightly  metoprolol tartrate, ,   [Held by provider] metoprolol tartrate, 12.5 mg, q12h  pantoprazole, 40 mg, Daily before breakfast  perflutren lipid microspheres, 0.5-10 mL of dilution, Once in imaging  potassium, sodium phosphates, 1 packet, 4x daily  sennosides-docusate sodium, 2 tablet, BID  sodium chloride, 3 mL, q6h while awake  tiotropium, 2 puff, Daily      dexmedeTOMIDine, Last Rate: 0.3 mcg/kg/hr (01/17/25 0944)  [Held by provider] EPINEPHrine, Last Rate: Stopped (01/17/25 0305)  furosemide, Last Rate: 15 mg/hr (01/17/25 0700)  milrinone, Last Rate: 0.25 mcg/kg/min (01/17/25 0700)  vasopressin, Last Rate: 0.06 Units/min (01/17/25 1155)      albuterol,  2.5 mg, q4h PRN  albuterol, 2 puff, q4h PRN  [Held by provider] calcium gluconate, 1 g, q6h PRN  [Held by provider] calcium gluconate, 2 g, q6h PRN  metoprolol tartrate, ,   oxyCODONE, 2.5 mg, q4h PRN  potassium chloride CR, 20 mEq, q6h PRN  potassium chloride CR, 40 mEq, q6h PRN  traZODone, 50 mg, Nightly PRN        Vitals:    01/17/25 1200   BP:    Pulse:    Resp:    Temp: (!) 6.3 °C (43.3 °F)   SpO2:           Intake/Output Summary (Last 24 hours) at 1/17/2025 1234  Last data filed at 1/17/2025 1100  Gross per 24 hour   Intake 478.42 ml   Output 2475 ml   Net -1996.58 ml       Constitutional: underweight, ill- appearing, A&Ox3, no acute distress, alert and cooperative  Eyes: EOMI, clear sclera  Respiratory/Thorax: diffuse fine crackles bilaterally  Cardiovascular: Regular rate, regular rhythm  Gastrointestinal: Nondistended, soft, non-tender  Extremities: normal extremities, no cyanosis edema  Skin: Warm and dry    Blood Labs:  Results for orders placed or performed during the hospital encounter of 01/13/25 (from the past 24 hours)   ECG 12 lead   Result Value Ref Range    Ventricular Rate 97 BPM    Atrial Rate 97 BPM    SD Interval 130 ms    QRS Duration 82 ms    QT Interval 382 ms    QTC Calculation(Bazett) 485 ms    P Axis 83 degrees    R Axis 35 degrees    T Axis 89 degrees    QRS Count 16 beats    Q Onset 219 ms    P Onset 154 ms    P Offset 211 ms    T Offset 410 ms    QTC Fredericia 448 ms   Troponin I, High Sensitivity   Result Value Ref Range    Troponin I, High Sensitivity (CMC) 928 (HH) 0 - 34 ng/L   Transthoracic Echo (TTE) Limited   Result Value Ref Range    MV E/A ratio 1.48     Tricuspid annular plane systolic excursion 1.0 cm    LV EF 34 %    RV free wall pk S' 6.64 cm/s    LVIDd 4.80 cm    RVSP 45.5 mmHg    LV A4C EF 40.2    Blood Gas Arterial Full Panel   Result Value Ref Range    POCT pH, Arterial 7.37 (L) 7.38 - 7.42 pH    POCT pCO2, Arterial 40 38 - 42 mm Hg    POCT pO2, Arterial 111 (H) 85 -  95 mm Hg    POCT SO2, Arterial 99 94 - 100 %    POCT Oxy Hemoglobin, Arterial 96.8 94.0 - 98.0 %    POCT Hematocrit Calculated, Arterial 32.0 (L) 36.0 - 46.0 %    POCT Sodium, Arterial 123 (L) 136 - 145 mmol/L    POCT Potassium, Arterial 4.7 3.5 - 5.3 mmol/L    POCT Chloride, Arterial 93 (L) 98 - 107 mmol/L    POCT Ionized Calcium, Arterial 1.08 (L) 1.10 - 1.33 mmol/L    POCT Glucose, Arterial 157 (H) 74 - 99 mg/dL    POCT Lactate, Arterial 2.3 (H) 0.4 - 2.0 mmol/L    POCT Base Excess, Arterial -2.0 -2.0 - 3.0 mmol/L    POCT HCO3 Calculated, Arterial 23.1 22.0 - 26.0 mmol/L    POCT Hemoglobin, Arterial 10.7 (L) 12.0 - 16.0 g/dL    POCT Anion Gap, Arterial 12 10 - 25 mmo/L    Patient Temperature 37.0 degrees Celsius    FiO2 21 %   Renal Function Panel   Result Value Ref Range    Glucose 165 (H) 74 - 99 mg/dL    Sodium 124 (L) 136 - 145 mmol/L    Potassium 4.6 3.5 - 5.3 mmol/L    Chloride 88 (L) 98 - 107 mmol/L    Bicarbonate 24 21 - 32 mmol/L    Anion Gap 17 mmol/L    Urea Nitrogen 10 6 - 23 mg/dL    Creatinine 0.33 (L) 0.50 - 1.05 mg/dL    eGFR >90 >60 mL/min/1.73m*2    Calcium 8.3 (L) 8.6 - 10.6 mg/dL    Phosphorus 3.3 2.5 - 4.9 mg/dL    Albumin 3.6 3.4 - 5.0 g/dL   Blood Gas Lactic Acid, Venous   Result Value Ref Range    POCT Lactate, Venous 2.4 (H) 0.4 - 2.0 mmol/L   BLOOD GAS VENOUS FULL PANEL   Result Value Ref Range    POCT pH, Venous 7.29 (L) 7.33 - 7.43 pH    POCT pCO2, Venous 53 (H) 41 - 51 mm Hg    POCT pO2, Venous 45 35 - 45 mm Hg    POCT SO2, Venous 64 45 - 75 %    POCT Oxy Hemoglobin, Venous 63.0 45.0 - 75.0 %    POCT Hematocrit Calculated, Venous 29.0 (L) 36.0 - 46.0 %    POCT Sodium, Venous 122 (L) 136 - 145 mmol/L    POCT Potassium, Venous 4.8 3.5 - 5.3 mmol/L    POCT Chloride, Venous 90 (L) 98 - 107 mmol/L    POCT Ionized Calicum, Venous 1.14 1.10 - 1.33 mmol/L    POCT Glucose, Venous 162 (H) 74 - 99 mg/dL    POCT Lactate, Venous 2.4 (H) 0.4 - 2.0 mmol/L    POCT Base Excess, Venous -1.4 -2.0 - 3.0  mmol/L    POCT HCO3 Calculated, Venous 25.5 22.0 - 26.0 mmol/L    POCT Hemoglobin, Venous 9.5 (L) 12.0 - 16.0 g/dL    POCT Anion Gap, Venous 11.0 10.0 - 25.0 mmol/L    Patient Temperature 37.0 degrees Celsius    FiO2 21 %   Blood Gas Arterial Full Panel   Result Value Ref Range    POCT pH, Arterial 7.31 (L) 7.38 - 7.42 pH    POCT pCO2, Arterial 46 (H) 38 - 42 mm Hg    POCT pO2, Arterial 196 (H) 85 - 95 mm Hg    POCT SO2, Arterial 100 94 - 100 %    POCT Oxy Hemoglobin, Arterial 97.2 94.0 - 98.0 %    POCT Hematocrit Calculated, Arterial 28.0 (L) 36.0 - 46.0 %    POCT Sodium, Arterial 124 (L) 136 - 145 mmol/L    POCT Potassium, Arterial 4.5 3.5 - 5.3 mmol/L    POCT Chloride, Arterial 94 (L) 98 - 107 mmol/L    POCT Ionized Calcium, Arterial 1.10 1.10 - 1.33 mmol/L    POCT Glucose, Arterial 160 (H) 74 - 99 mg/dL    POCT Lactate, Arterial 2.5 (H) 0.4 - 2.0 mmol/L    POCT Base Excess, Arterial -3.0 (L) -2.0 - 3.0 mmol/L    POCT HCO3 Calculated, Arterial 23.2 22.0 - 26.0 mmol/L    POCT Hemoglobin, Arterial 9.4 (L) 12.0 - 16.0 g/dL    POCT Anion Gap, Arterial 11 10 - 25 mmo/L    Patient Temperature 37.0 degrees Celsius    FiO2 21 %   POCT GLUCOSE   Result Value Ref Range    POCT Glucose 151 (H) 74 - 99 mg/dL   Blood Gas Arterial Full Panel   Result Value Ref Range    POCT pH, Arterial 7.37 (L) 7.38 - 7.42 pH    POCT pCO2, Arterial 41 38 - 42 mm Hg    POCT pO2, Arterial 178 (H) 85 - 95 mm Hg    POCT SO2, Arterial 100 94 - 100 %    POCT Oxy Hemoglobin, Arterial 97.5 94.0 - 98.0 %    POCT Hematocrit Calculated, Arterial 28.0 (L) 36.0 - 46.0 %    POCT Sodium, Arterial 124 (L) 136 - 145 mmol/L    POCT Potassium, Arterial 4.5 3.5 - 5.3 mmol/L    POCT Chloride, Arterial 93 (L) 98 - 107 mmol/L    POCT Ionized Calcium, Arterial 1.12 1.10 - 1.33 mmol/L    POCT Glucose, Arterial 163 (H) 74 - 99 mg/dL    POCT Lactate, Arterial 2.0 0.4 - 2.0 mmol/L    POCT Base Excess, Arterial -1.5 -2.0 - 3.0 mmol/L    POCT HCO3 Calculated, Arterial  23.7 22.0 - 26.0 mmol/L    POCT Hemoglobin, Arterial 9.2 (L) 12.0 - 16.0 g/dL    POCT Anion Gap, Arterial 12 10 - 25 mmo/L    Patient Temperature 37.0 degrees Celsius    FiO2 21 %   Blood Gas Arterial Full Panel   Result Value Ref Range    POCT pH, Arterial 7.43 (H) 7.38 - 7.42 pH    POCT pCO2, Arterial 40 38 - 42 mm Hg    POCT pO2, Arterial 148 (H) 85 - 95 mm Hg    POCT SO2, Arterial 99 94 - 100 %    POCT Oxy Hemoglobin, Arterial 97.5 94.0 - 98.0 %    POCT Hematocrit Calculated, Arterial 29.0 (L) 36.0 - 46.0 %    POCT Sodium, Arterial 124 (L) 136 - 145 mmol/L    POCT Potassium, Arterial 3.6 3.5 - 5.3 mmol/L    POCT Chloride, Arterial 91 (L) 98 - 107 mmol/L    POCT Ionized Calcium, Arterial 1.10 1.10 - 1.33 mmol/L    POCT Glucose, Arterial 121 (H) 74 - 99 mg/dL    POCT Lactate, Arterial 1.2 0.4 - 2.0 mmol/L    POCT Base Excess, Arterial 2.0 -2.0 - 3.0 mmol/L    POCT HCO3 Calculated, Arterial 26.5 (H) 22.0 - 26.0 mmol/L    POCT Hemoglobin, Arterial 9.7 (L) 12.0 - 16.0 g/dL    POCT Anion Gap, Arterial 10 10 - 25 mmo/L    Patient Temperature 37.0 degrees Celsius    FiO2 40 %   Renal Function Panel   Result Value Ref Range    Glucose 131 (H) 74 - 99 mg/dL    Sodium 127 (L) 136 - 145 mmol/L    Potassium 5.0 3.5 - 5.3 mmol/L    Chloride 87 (L) 98 - 107 mmol/L    Bicarbonate 28 21 - 32 mmol/L    Anion Gap 17 10 - 20 mmol/L    Urea Nitrogen 10 6 - 23 mg/dL    Creatinine 0.27 (L) 0.50 - 1.05 mg/dL    eGFR >90 >60 mL/min/1.73m*2    Calcium 7.8 (L) 8.6 - 10.6 mg/dL    Phosphorus 2.3 (L) 2.5 - 4.9 mg/dL    Albumin 3.4 3.4 - 5.0 g/dL   POCT GLUCOSE   Result Value Ref Range    POCT Glucose 153 (H) 74 - 99 mg/dL   Renal Function Panel   Result Value Ref Range    Glucose 171 (H) 74 - 99 mg/dL    Sodium 126 (L) 136 - 145 mmol/L    Potassium 4.3 3.5 - 5.3 mmol/L    Chloride 88 (L) 98 - 107 mmol/L    Bicarbonate 26 21 - 32 mmol/L    Anion Gap 16 10 - 20 mmol/L    Urea Nitrogen 11 6 - 23 mg/dL    Creatinine 0.27 (L) 0.50 - 1.05 mg/dL     eGFR >90 >60 mL/min/1.73m*2    Calcium 8.0 (L) 8.6 - 10.6 mg/dL    Phosphorus 1.9 (L) 2.5 - 4.9 mg/dL    Albumin 3.3 (L) 3.4 - 5.0 g/dL   Troponin I, High Sensitivity   Result Value Ref Range    Troponin I, High Sensitivity (CMC) 625 (HH) 0 - 34 ng/L   Magnesium   Result Value Ref Range    Magnesium 1.80 1.60 - 2.40 mg/dL   Coagulation Screen   Result Value Ref Range    Protime 14.4 (H) 9.8 - 12.8 seconds    INR 1.3 (H) 0.9 - 1.1    aPTT 33 27 - 38 seconds   CBC   Result Value Ref Range    WBC 8.6 4.4 - 11.3 x10*3/uL    nRBC 0.0 0.0 - 0.0 /100 WBCs    RBC 3.29 (L) 4.00 - 5.20 x10*6/uL    Hemoglobin 8.2 (L) 12.0 - 16.0 g/dL    Hematocrit 24.7 (L) 36.0 - 46.0 %    MCV 75 (L) 80 - 100 fL    MCH 24.9 (L) 26.0 - 34.0 pg    MCHC 33.2 32.0 - 36.0 g/dL    RDW 13.4 11.5 - 14.5 %    Platelets 157 150 - 450 x10*3/uL   Calcium, Ionized   Result Value Ref Range    POCT Calcium, Ionized 1.07 (L) 1.1 - 1.33 mmol/L   Blood Gas Venous Full Panel   Result Value Ref Range    POCT pH, Venous 7.34 7.33 - 7.43 pH    POCT pCO2, Venous 48 41 - 51 mm Hg    POCT pO2, Venous 44 35 - 45 mm Hg    POCT SO2, Venous 63 45 - 75 %    POCT Oxy Hemoglobin, Venous 62.2 45.0 - 75.0 %    POCT Hematocrit Calculated, Venous 24.0 (L) 36.0 - 46.0 %    POCT Sodium, Venous 124 (L) 136 - 145 mmol/L    POCT Potassium, Venous 4.3 3.5 - 5.3 mmol/L    POCT Chloride, Venous 90 (L) 98 - 107 mmol/L    POCT Ionized Calicum, Venous 1.14 1.10 - 1.33 mmol/L    POCT Glucose, Venous 179 (H) 74 - 99 mg/dL    POCT Lactate, Venous 1.3 0.4 - 2.0 mmol/L    POCT Base Excess, Venous 0.0 -2.0 - 3.0 mmol/L    POCT HCO3 Calculated, Venous 25.9 22.0 - 26.0 mmol/L    POCT Hemoglobin, Venous 7.9 (L) 12.0 - 16.0 g/dL    POCT Anion Gap, Venous 12.0 10.0 - 25.0 mmol/L    Patient Temperature 37.0 degrees Celsius    FiO2 28 %   Blood Gas Arterial Full Panel   Result Value Ref Range    POCT pH, Arterial 7.36 (L) 7.38 - 7.42 pH    POCT pCO2, Arterial 43 (H) 38 - 42 mm Hg    POCT pO2,  Arterial 70 (L) 85 - 95 mm Hg    POCT SO2, Arterial 95 94 - 100 %    POCT Oxy Hemoglobin, Arterial 92.2 (L) 94.0 - 98.0 %    POCT Hematocrit Calculated, Arterial 26.0 (L) 36.0 - 46.0 %    POCT Sodium, Arterial 125 (L) 136 - 145 mmol/L    POCT Potassium, Arterial 4.2 3.5 - 5.3 mmol/L    POCT Chloride, Arterial 90 (L) 98 - 107 mmol/L    POCT Ionized Calcium, Arterial 1.21 1.10 - 1.33 mmol/L    POCT Glucose, Arterial 187 (H) 74 - 99 mg/dL    POCT Lactate, Arterial 1.1 0.4 - 2.0 mmol/L    POCT Base Excess, Arterial -1.1 -2.0 - 3.0 mmol/L    POCT HCO3 Calculated, Arterial 24.3 22.0 - 26.0 mmol/L    POCT Hemoglobin, Arterial 8.6 (L) 12.0 - 16.0 g/dL    POCT Anion Gap, Arterial 15 10 - 25 mmo/L    Patient Temperature 37.0 degrees Celsius    FiO2 28 %   POCT GLUCOSE   Result Value Ref Range    POCT Glucose 144 (H) 74 - 99 mg/dL   POCT GLUCOSE   Result Value Ref Range    POCT Glucose 175 (H) 74 - 99 mg/dL   Renal Function Panel   Result Value Ref Range    Glucose 174 (H) 74 - 99 mg/dL    Sodium 127 (L) 136 - 145 mmol/L    Potassium 4.5 3.5 - 5.3 mmol/L    Chloride 88 (L) 98 - 107 mmol/L    Bicarbonate 29 21 - 32 mmol/L    Anion Gap 15 10 - 20 mmol/L    Urea Nitrogen 11 6 - 23 mg/dL    Creatinine 0.29 (L) 0.50 - 1.05 mg/dL    eGFR >90 >60 mL/min/1.73m*2    Calcium 8.0 (L) 8.6 - 10.6 mg/dL    Phosphorus 1.9 (L) 2.5 - 4.9 mg/dL    Albumin 3.5 3.4 - 5.0 g/dL   Blood Gas Venous Full Panel   Result Value Ref Range    POCT pH, Venous 7.32 (L) 7.33 - 7.43 pH    POCT pCO2, Venous 52 (H) 41 - 51 mm Hg    POCT pO2, Venous 43 35 - 45 mm Hg    POCT SO2, Venous 64 45 - 75 %    POCT Oxy Hemoglobin, Venous 62.9 45.0 - 75.0 %    POCT Hematocrit Calculated, Venous 25.0 (L) 36.0 - 46.0 %    POCT Sodium, Venous 125 (L) 136 - 145 mmol/L    POCT Potassium, Venous 4.0 3.5 - 5.3 mmol/L    POCT Chloride, Venous 89 (L) 98 - 107 mmol/L    POCT Ionized Calicum, Venous 1.14 1.10 - 1.33 mmol/L    POCT Glucose, Venous 168 (H) 74 - 99 mg/dL    POCT  Lactate, Venous 1.4 0.4 - 2.0 mmol/L    POCT Base Excess, Venous 0.4 -2.0 - 3.0 mmol/L    POCT HCO3 Calculated, Venous 26.8 (H) 22.0 - 26.0 mmol/L    POCT Hemoglobin, Venous 8.4 (L) 12.0 - 16.0 g/dL    POCT Anion Gap, Venous 13.0 10.0 - 25.0 mmol/L    Patient Temperature 37.0 degrees Celsius    FiO2 28 %   Blood Gas Arterial Full Panel   Result Value Ref Range    POCT pH, Arterial 7.41 7.38 - 7.42 pH    POCT pCO2, Arterial 42 38 - 42 mm Hg    POCT pO2, Arterial 93 85 - 95 mm Hg    POCT SO2, Arterial 99 94 - 100 %    POCT Oxy Hemoglobin, Arterial 96.9 94.0 - 98.0 %    POCT Hematocrit Calculated, Arterial 11.0 (L) 36.0 - 46.0 %    POCT Sodium, Arterial 126 (L) 136 - 145 mmol/L    POCT Potassium, Arterial 4.5 3.5 - 5.3 mmol/L    POCT Chloride, Arterial 92 (L) 98 - 107 mmol/L    POCT Ionized Calcium, Arterial 1.17 1.10 - 1.33 mmol/L    POCT Glucose, Arterial 167 (H) 74 - 99 mg/dL    POCT Lactate, Arterial 1.5 0.4 - 2.0 mmol/L    POCT Base Excess, Arterial 1.8 -2.0 - 3.0 mmol/L    POCT HCO3 Calculated, Arterial 26.6 (H) 22.0 - 26.0 mmol/L    POCT Hemoglobin, Arterial 3.5 (LL) 12.0 - 16.0 g/dL    POCT Anion Gap, Arterial 12 10 - 25 mmo/L    Patient Temperature 37.0 degrees Celsius    FiO2 28 %   CBC   Result Value Ref Range    WBC 8.3 4.4 - 11.3 x10*3/uL    nRBC 0.0 0.0 - 0.0 /100 WBCs    RBC 3.23 (L) 4.00 - 5.20 x10*6/uL    Hemoglobin 8.1 (L) 12.0 - 16.0 g/dL    Hematocrit 24.4 (L) 36.0 - 46.0 %    MCV 76 (L) 80 - 100 fL    MCH 25.1 (L) 26.0 - 34.0 pg    MCHC 33.2 32.0 - 36.0 g/dL    RDW 13.3 11.5 - 14.5 %    Platelets 186 150 - 450 x10*3/uL   Blood Gas Venous Full Panel   Result Value Ref Range    POCT pH, Venous 7.36 7.33 - 7.43 pH    POCT pCO2, Venous 53 (H) 41 - 51 mm Hg    POCT pO2, Venous 37 35 - 45 mm Hg    POCT SO2, Venous 56 45 - 75 %    POCT Oxy Hemoglobin, Venous 54.6 45.0 - 75.0 %    POCT Hematocrit Calculated, Venous 26.0 (L) 36.0 - 46.0 %    POCT Sodium, Venous 125 (L) 136 - 145 mmol/L    POCT Potassium,  Venous 3.6 3.5 - 5.3 mmol/L    POCT Chloride, Venous 87 (L) 98 - 107 mmol/L    POCT Ionized Calicum, Venous 1.12 1.10 - 1.33 mmol/L    POCT Glucose, Venous 162 (H) 74 - 99 mg/dL    POCT Lactate, Venous 1.3 0.4 - 2.0 mmol/L    POCT Base Excess, Venous 3.8 (H) -2.0 - 3.0 mmol/L    POCT HCO3 Calculated, Venous 29.9 (H) 22.0 - 26.0 mmol/L    POCT Hemoglobin, Venous 8.7 (L) 12.0 - 16.0 g/dL    POCT Anion Gap, Venous 12.0 10.0 - 25.0 mmol/L    Patient Temperature 37.0 degrees Celsius    FiO2 28 %   POCT GLUCOSE   Result Value Ref Range    POCT Glucose 145 (H) 74 - 99 mg/dL   Blood Gas Arterial Full Panel   Result Value Ref Range    POCT pH, Arterial 7.47 (H) 7.38 - 7.42 pH    POCT pCO2, Arterial 43 (H) 38 - 42 mm Hg    POCT pO2, Arterial 78 (L) 85 - 95 mm Hg    POCT SO2, Arterial 98 94 - 100 %    POCT Oxy Hemoglobin, Arterial 95.5 94.0 - 98.0 %    POCT Hematocrit Calculated, Arterial 27.0 (L) 36.0 - 46.0 %    POCT Sodium, Arterial 127 (L) 136 - 145 mmol/L    POCT Potassium, Arterial 3.1 (L) 3.5 - 5.3 mmol/L    POCT Chloride, Arterial 86 (L) 98 - 107 mmol/L    POCT Ionized Calcium, Arterial 1.06 (L) 1.10 - 1.33 mmol/L    POCT Glucose, Arterial 145 (H) 74 - 99 mg/dL    POCT Lactate, Arterial 1.2 0.4 - 2.0 mmol/L    POCT Base Excess, Arterial 6.9 (H) -2.0 - 3.0 mmol/L    POCT HCO3 Calculated, Arterial 31.3 (H) 22.0 - 26.0 mmol/L    POCT Hemoglobin, Arterial 9.0 (L) 12.0 - 16.0 g/dL    POCT Anion Gap, Arterial 13 10 - 25 mmo/L    Patient Temperature 37.0 degrees Celsius    FiO2 28 %   Renal Function Panel   Result Value Ref Range    Glucose 147 (H) 74 - 99 mg/dL    Sodium 130 (L) 136 - 145 mmol/L    Potassium 3.3 (L) 3.5 - 5.3 mmol/L    Chloride 84 (L) 98 - 107 mmol/L    Bicarbonate 33 (H) 21 - 32 mmol/L    Anion Gap 16 10 - 20 mmol/L    Urea Nitrogen 9 6 - 23 mg/dL    Creatinine 0.38 (L) 0.50 - 1.05 mg/dL    eGFR >90 >60 mL/min/1.73m*2    Calcium 8.0 (L) 8.6 - 10.6 mg/dL    Phosphorus 1.8 (L) 2.5 - 4.9 mg/dL    Albumin 3.6  3.4 - 5.0 g/dL   Calcium, Ionized   Result Value Ref Range    POCT Calcium, Ionized 0.99 (L) 1.1 - 1.33 mmol/L   Blood Gas Venous Full Panel   Result Value Ref Range    POCT pH, Venous 7.42 7.33 - 7.43 pH    POCT pCO2, Venous 53 (H) 41 - 51 mm Hg    POCT pO2, Venous 36 35 - 45 mm Hg    POCT SO2, Venous 55 45 - 75 %    POCT Oxy Hemoglobin, Venous 53.8 45.0 - 75.0 %    POCT Hematocrit Calculated, Venous 27.0 (L) 36.0 - 46.0 %    POCT Sodium, Venous 125 (L) 136 - 145 mmol/L    POCT Potassium, Venous 3.1 (L) 3.5 - 5.3 mmol/L    POCT Chloride, Venous 85 (L) 98 - 107 mmol/L    POCT Ionized Calicum, Venous 1.08 (L) 1.10 - 1.33 mmol/L    POCT Glucose, Venous 143 (H) 74 - 99 mg/dL    POCT Lactate, Venous 1.2 0.4 - 2.0 mmol/L    POCT Base Excess, Venous 8.7 (H) -2.0 - 3.0 mmol/L    POCT HCO3 Calculated, Venous 34.4 (H) 22.0 - 26.0 mmol/L    POCT Hemoglobin, Venous 9.0 (L) 12.0 - 16.0 g/dL    POCT Anion Gap, Venous 9.0 (L) 10.0 - 25.0 mmol/L    Patient Temperature 37.0 degrees Celsius    FiO2 28 %   Prepare RBC: 1 Units   Result Value Ref Range    PRODUCT CODE R6257R49     Unit Number [id number]     Unit ABO B     Unit RH POS     XM INTEP COMP     Dispense Status IS     Blood Expiration Date 1/18/2025 11:59:00 PM EST     PRODUCT BLOOD TYPE 7300     UNIT VOLUME 350    Type and screen   Result Value Ref Range    ABO TYPE B     Rh TYPE POS     ANTIBODY SCREEN NEG    POCT GLUCOSE   Result Value Ref Range    POCT Glucose 142 (H) 74 - 99 mg/dL   Transthoracic Echo (TTE) Limited   Result Value Ref Range    AV pk radha 0.87 m/s    AV mn grad 2 mmHg    LVOT diam 1.94 cm    Tricuspid annular plane systolic excursion 1.2 cm    LV EF 28 %    RV free wall pk S' 9.36 cm/s    RVSP 27.0 mmHg    Aortic Valve Area by Continuity of VTI 2.41 cm2    Aortic Valve Area by Continuity of Peak Velocity 2.26 cm2    AV pk grad 3 mmHg    LV A4C EF 18.9           ASSESSMENT:  Faustina Vick is a 64 y.o. female with a past medical hx of DEEPAK, tobacco use  disorder and stage III squamous cell carcinoma who presented to [facility] on 1/13 for scheduled Right pneumonectomy, intrapericardial, with en bloc wall resection by Dr. Jonah Wagner 1/13. Nephrology is being consulted for symptomatic hyponatremia (reportedly confused and hallucinating). Symptoms currently not present. Received 3% saline 150mL 1/15 with minimal correction. Currently believe to be ADH mediated.      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 Flash pulmonary edema s/p lasix drip with sodium correction  - Not likely thyroid mediated given TSH wnl 1mo ago. No hx of CKD or recent diuretic use. Unlikely primary adrenal insufficiency.     - Baseline creatinine: 0.6  - UA showed:  --  - Urine electrolyes 1/15 (obtain before 3% saline admin): Na 28, Cl 66, Cr 81.3   - 1/15 Sosm 251, Uosm 648  - Clinical volume status: Euvolemic  - Electrolytes (Na, K, Ca, Phos): hypochloremic hyponatremia, hypophos  - Acid base status: Respiratory Acidosis (1/14 ABG 7.36/50/76 w bicarb 26)     #Stage III squamous cell carcinoma s/p R-pneumonectomy 1/13     Recommendations:  - Rec stopping Lasix drip given electrolyte disturbances, beginning of contraction alkalosis, and improvement in O2 requirement  - Ecourage solute rich diet; Ensure shakes  - Urea 15g TID packets can wait to tomorrow; will forgo if >130  - Check serum sodium q8h  - Indication for dialysis:  No   - Strict Is/Os     Osito Jimenes DO  Nephrology Resident  24 hour Renal Pager - [phone]     Discussed with attending nephrologist

## 2025-01-17 NOTE — PROCEDURES
Central Line    Date/Time: 1/16/2025 7:26 PM    Performed by: Dinh Anna MD  Authorized by: Dinh Anna MD    Consent:     Consent obtained:  Verbal and emergent situation    Consent given by:  Spouse    Risks, benefits, and alternatives were discussed: yes    Universal protocol:     Procedure explained and questions answered to patient or proxy's satisfaction: yes      Relevant documents present and verified: yes      Required blood products, implants, devices, and special equipment available: yes      Immediately prior to procedure, a time out was called: yes      Patient identity confirmed:  Verbally with patient and arm band  Pre-procedure details:     Indication(s): central venous access and hemodynamic monitoring      Hand hygiene: Hand hygiene performed prior to insertion      Sterile barrier technique: All elements of maximal sterile technique followed      Skin preparation:  Chlorhexidine    Skin preparation agent: Skin preparation agent completely dried prior to procedure    Sedation:     Sedation type:  Anxiolysis (Precedex)  Procedure details:     Location:  R internal jugular    Patient position:  Reverse Trendelenburg    Procedural supplies:  Double lumen (MAC Line)    Catheter size:  9 Fr    Landmarks identified: yes      Ultrasound guidance: yes      Ultrasound guidance timing: real time      Sterile ultrasound techniques: Sterile gel and sterile probe covers were used      Number of attempts:  1    Successful placement: yes    Post-procedure details:     Post-procedure:  Dressing applied and line sutured    Assessment:  Blood return through all ports and free fluid flow    Procedure completion:  Tolerated  Comments:      Images stored in chart

## 2025-01-17 NOTE — PROGRESS NOTES
65 y/o F with stage III SCC of the lung s/p chemotherapy+radiation last chemotherapy session on 8/28/2024 + right pneumonectomy, intrapericardial, with en block chest wall resection on 1/13 by Dr. Wagner. Patient with low blood pressure on preop that continued intra-op and post op. Patient arrived on vasopressin 0.06 to the ICU. Patient started on levophed on arrival to the SICU.       Neuro: PMH of anxiety on alprazolam at home. ANOX3 ON precedex during morning rounds. Tylenol and oxycodone for pain control. PT/OT.   CV: No PMH, ECHO on 1/13 showing EF 65%, low normal RV function. Repeated echo showing reduced EF 25-30% and RV disfunction, still suggesting takatsubo. Troponin 928 now downtrending. EKG without signs of ischemia. Patient on milrinone 0.25. New PA catheter placed with fluoro.  CI 3.8 while tachycardic. Diuresis stopped per nephrology recs. Will wean inotropes with PA support.   Pulm: 30 pack year smoking history, quit on 4/2024. Asthma, lung Ca, s/p chemo/XRT/immunotherapy. Now s/p Right Pneumonectomy, intrapericardial, with en bloc chest wall resection.  Patient with increased 02 requirements now on AirVO. Pneumonia? COPD exarcerbation? Will continue home trelegy elipta, ventolin formulary exchange. Chest tube removed on 1/14.   GI: No GI history. LFTs WNL 12/2/2024. NPO. PPI for GI prophylaxis.  : No PMH. Baseline creat 0.6. Acute hyponatremia overnight 120 meq. 3% saline given overnight. Nephrology following. RFP Q4h. Hyponatremia improved after diuresis. Continue SDS. Diuresis stopped per nephrology recs.  Heme: Acute blood loss anemia. SCDs, subcutaneous heparin.  Endo: No PMH. SSI.   ID: Afebrile. Leukocytosis. Pneumonia vs SDS vs COPD exacerbation? Will start abx zosyn, azythromicin, vancomycin.       Lines:  - left radial art line  - piv  - yoo 1/15   - PA catheter 1/17     Dispo: Continue ICU care     Kendall Barbour MD  PGY-5 Anesthesiology critical care fellow.     Critical Care  Time: 38 Minutes

## 2025-01-17 NOTE — PROGRESS NOTES
"Faustina Vick \"Noam" is a 64 y.o. 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    Subjective   -Patient seen this morning resting comfortably in bed  - tachycardia with rates into the 130s VSS significant for sinus tachycardia with rates into the 130s, patient saturating appropriately on 2 L via nasal cannula and blood pressures mildly soft lowest being 87/68.  -Patient's labs this morning significant for sodium 130 potassium 3.3, Hgb 8.1,  -Mixed venous hemoglobin now 53.8 this morning in the setting of increasing tachycardia  -Patient has required increasing titration of milrinone overnight, and remains on the Lasix infusion.  She was also started on vasopressin and subsequently increased to vasopressin at 0.06.  -Plan is for patient to undergo [place] catheterization today.  -UOP: 1340 mL a day so far roughly 250-300 mL/HR.       Objective     Physical Exam  General: Not in acute distress, somnolent, alert, cooperative, ill-appearing  HEENT: Normocephalic, atraumatic, EOMI, moist mucous membranes  Neck: Neck supple, trachea midline, no evidence of trauma  Cardiovascular: Tachycardic, regular rhythm, S1 and S2 appreciated, no murmurs rubs gallops appreciated, distal pulses 2+ bilaterally (radial and dorsalis pedis)  Respiratory: Coarse breath sounds appreciated in the right upper lobe, with loss of lung sounds from right mid lobe down, left lung sounds markedly improved from day prior with improvement of bibasilar crackles, no increased work of breathing on 2 L via NC  GI: Abdomen soft, nondistended, nontender to palpation, bowel sounds present  Extremities: No edema appreciated in lower extremities bilaterally, no cyanosis  Neuro: no focal deficits, strength and sensation intact bilaterally  Skin: Warm and dry, without lesions or rashes    Last Recorded Vitals  Blood pressure 97/70, pulse (!) 131, temperature 35.9 °C (96.6 °F), temperature source Temporal, resp. " "rate 22, height 1.651 m (5' 5\"), weight 52.2 kg (115 lb 1.3 oz), SpO2 98%.  Intake/Output last 3 Shifts:  I/O last 3 completed shifts:  In: 1371.4 (26.3 mL/kg) [P.O.:400; I.V.:414.4 (7.9 mL/kg); IV Piggyback:557]  Out: 1582 (30.3 mL/kg) [Urine:1582 (0.8 mL/kg/hr)]  Weight: 52.2 kg     Relevant Results  Transthoracic Echo (TTE) Limited    Result Date: 1/17/2025   [facility], [address] [place] [address]                Tel [phone] and Fax [phone] TRANSTHORACIC ECHOCARDIOGRAM REPORT  Patient Name:       RAÚL KAMINSKI     Anne Physician:    [id number] Rita Graham MD Study Date:         1/17/2025           Ordering Provider:    [id number] ROLANDO ARAGON MRN/PID:            [id number]            Fellow: Accession#:         [id number]        Nurse: Date of Birth/Age:  1960 / 64      Sonographer:          Domonique rocha                                     [facility] Gender assigned at  F                   Additional Staff: Birth: Height:             165.10 cm           Admit Date:           1/13/2025 Weight:             52.16 kg            Admission Status:     Inpatient - STAT BSA / BMI:          1.56 m2 / 19.14     Encounter#:           [id number]                     kg/m2 Blood Pressure:     103/74 mmHg         Department Location:  [facility] Study Type:    TRANSTHORACIC ECHO (TTE) LIMITED Diagnosis/ICD: Acute combined systolic (congestive) and diastolic (congestive)                heart failure (CHF)-I50.41 Indication:    Acute systolic/diastolic failure CPT Code:      Echo Limited-37302; Doppler Limited-93980; Color Doppler-61055 Patient History: Pertinent History: Anxiety; Arthritis; Asthma; Hx of squamous cell carcinoma; Hx                    of chemotherapy; Lung cancer; Prior smoker; Known EF of 34%                    " (01/16/24) with described regional wall motion abnormalities                    of the LV and RV suggestive of biventricular Takotsubo. Study Detail: The following Echo studies were performed: 2D, M-Mode, Doppler and               color flow. Technically challenging study due to body habitus and               patient lying in supine position. Definity used as a contrast               agent for endocardial border definition. Total contrast used for               this procedure was 1 mL via IV push.  PHYSICIAN INTERPRETATION: Left Ventricle: Left ventricular ejection fraction is severely decreased, by visual estimate at 25-30%. There are multiple left ventricular wall motion abnormalities. The left ventricular cavity size is normal. Left ventricular diastolic filling cannot be determined, due to E/A wave fusion. There is no definite left ventricular thrombus visualized. Apical and mid LV segments including the RV free wall are completely akinetic. LV Wall Scoring: The entire apex, mid and apical anterior wall, mid and apical anterior septum, mid and apical inferior septum, mid and apical inferior wall, mid inferolateral segment, and mid anterolateral segment are akinetic. All remaining scored segments are normal. Left Atrium: The left atrium is mild to moderately dilated. Right Ventricle: The right ventricle is normal in size. There is reduced right ventricular systolic function. Right Atrium: The right atrium is mildly dilated. Aortic Valve: The aortic valve is structurally normal. The aortic valve dimensionless index is 0.82. There is no evidence of aortic valve regurgitation. The peak instantaneous gradient of the aortic valve is 3 mmHg. The mean gradient of the aortic valve is 2 mmHg. Mitral Valve: The mitral valve is normal in structure. There is trace mitral valve regurgitation. Tricuspid Valve: The tricuspid valve is structurally normal. There is trace tricuspid regurgitation. The Doppler estimated RVSP is  within normal limits at 27.0 mmHg. Pulmonic Valve: The pulmonic valve is not well visualized. Pulmonic valve regurgitation was not assessed. Pericardium: Trivial pericardial effusion. Aorta: The aortic root is normal. Systemic Veins: The inferior vena cava appears normal in size. In comparison to the previous echocardiogram(s): Compared with study dated 1/16/2025, no significant change.  CONCLUSIONS:  1. Left ventricular ejection fraction is severely decreased, by visual estimate at 25-30%.  2. Multiple segmental abnormalities exist. See findings.  3. Apical and mid LV segments including the RV free wall are completely akinetic.  4. There are multiple left ventricular wall motion abnormalities.  5. Left ventricular diastolic filling cannot be determined, due to E/A wave fusion.  6. No left ventricular thrombus visualized.  7. There is reduced right ventricular systolic function.  8. The left atrium is mild to moderately dilated.  9. Right ventricular systolic pressure is within normal limits. 10. Compared with study dated 1/16/2025, no significant change. QUANTITATIVE DATA SUMMARY:  2D MEASUREMENTS:          Normal Ranges: Ao Root d:       3.43 cm  (2.0-3.7cm) LAs:             3.03 cm  (2.7-4.0cm) LVEDV Index:     54 ml/m2  RA VOLUME BY A/L METHOD:          Normal Ranges: RA Area A4C:             18.3 cm2  LV SYSTOLIC FUNCTION BY 2D PLANIMETRY (MOD):                      Normal Ranges: EF-A4C View:    19 % (>=55%) EF-A2C View:    22 % EF-Biplane:     19 % EF-Visual:      28 % LV EF Reported: 28 %  LV DIASTOLIC FUNCTION:           Normal Ranges: MV Peak E:             0.99 m/s  (0.7-1.2 m/s) MV e'                  0.091 m/s (>8.0) MV lateral e'          0.12 m/s MV medial e'           0.06 m/s E/e' Ratio:            10.92     (<8.0) MV DT:                 119 msec  (150-240 msec)  AORTIC VALVE:                     Normal Ranges: AoV Vmax:                0.87 m/s (<=1.7m/s) AoV Peak PG:             3.0 mmHg (<20mmHg)  AoV Mean P.0 mmHg (1.7-11.5mmHg) LVOT Max Navi:            0.67 m/s (<=1.1m/s) AoV VTI:                 11.40 cm (18-25cm) LVOT VTI:                9.30 cm LVOT Diameter:           1.94 cm  (1.8-2.4cm) AoV Area, VTI:           2.41 cm2 (2.5-5.5cm2) AoV Area,Vmax:           2.26 cm2 (2.5-4.5cm2) AoV Dimensionless Index: 0.82  RIGHT VENTRICLE: RV Basal 3.40 cm RV Mid   2.30 cm RV Major 6.0 cm TAPSE:   11.9 mm RV s'    0.09 m/s  TRICUSPID VALVE/RVSP:          Normal Ranges: Peak TR Velocity:     2.45 m/s RV Syst Pressure:     27 mmHg  (< 30mmHg)  [id number] Rita Graham MD Electronically signed on 2025 at 12:13:06 PM  Wall Scoring  ** Final **     XR chest 1 view    Result Date: 2025  Interpreted By:  Alexx Anne, STUDY: XR CHEST 1 VIEW;  2025 6:40 am   INDICATION: Signs/Symptoms:pneumonectomy.     COMPARISON: Exam dated 2025   ACCESSION NUMBER(S): [id number]   ORDERING CLINICIAN: GABBY SMITH   FINDINGS: AP radiograph of the chest was provided.   Right IJ approach central venous catheter tip projects over the mid SVC. Left chest wall medication port tip projects over the confluence of the left brachiocephalic vein and SVC. Persistent subcutaneous emphysema overlying the right chest wall.   CARDIOMEDIASTINAL SILHOUETTE: Cardiomediastinal silhouette is stable in size and configuration. Complete obscuration of the right cardiomediastinal contours. Thin curvilinear lucency projecting along the right cardiac border may represent a component of pneumomediastinum.   LUNGS: Status post on bloc pneumonectomy and right rib resection. Similar gradient opacification of the right hemithorax. Similar prominent interstitial markings in the left lung with slightly increased blunting of the left costophrenic angle. Streaky left basilar opacities are most consistent with atelectasis. No evidence of left-sided pneumothorax.   ABDOMEN: No remarkable upper abdominal findings.   BONES: No acute  osseous changes.       1.  Stable exam with postsurgical changes of right pneumonectomy/rib resection and large layering fluid component within the right hemithorax. 2. Similar findings of left pulmonary edema with slightly increased size of small left pleural effusion. No left-sided pneumothorax. 3. Medical devices as above.       MACRO: None   Signed by: Alexx Anne 1/17/2025 8:16 AM Dictation workstation:   [id number]    XR chest 1 view    Result Date: 1/17/2025  Interpreted By:  Alexx Anne and Ritchie Brandon STUDY: XR CHEST 1 VIEW;  1/16/2025 7:01 pm   INDICATION: Signs/Symptoms:central line placement.   COMPARISON: Chest x-ray 01/16/2025, 6:41 a.m.   ACCESSION NUMBER(S): [id number]   ORDERING CLINICIAN: GABBY SMITH   FINDINGS: AP radiograph of the chest was provided.   Leftchest wall MediPort tip projects over the expected location of the upper SVC. Right internal jugular central venous catheter tip projects over the expected location of the mid SVC. There is subcutaneous emphysematous changes along the right lateral chest wall.   CARDIOMEDIASTINAL SILHOUETTE: Cardiomediastinal silhouette is stable in size and configuration. There is again obscuration of the right cardiomediastinal silhouette.   LUNGS: Redemonstration of postsurgical changes compatible with recent history of right pneumonectomy. Gradual, layering opacification of the right hemithorax which is increased when compared to prior examination. There is again trace blunting of the left costophrenic angle with adjacent linear opacities in the left lower lobe and findings of increased interstitial markings in the left lung. No evidence of left-sided pneumothorax.   ABDOMEN: No remarkable upper abdominal findings.   BONES: Resection of multiple upper right-sided ribs. No acute osseous changes.       1.  Postsurgical changes of right-sided pneumonectomy with increased layering opacification of the right hemithorax, likely due to increasing  fluid. 2. Similar trace left-sided pleural effusion with linear left basilar airspace opacity, likely atelectasis on a background of likely pulmonary edema. Infection is not definitively excluded. 3. Medical devices as above with interval insertion of right internal jugular central venous catheter projecting at the mid SVC.   I personally reviewed the images/study and I agree with the findings as stated by resident Davidson Mcguire. This study was interpreted at [facility], [place], [place].   MACRO: None   Signed by: Alexx Anne 1/17/2025 8:14 AM Dictation workstation:   [id number]    ECG 12 lead    Result Date: 1/16/2025  Normal sinus rhythm Low voltage QRS Cannot rule out Anterior infarct (cited on or before 16-JAN-2025) Abnormal ECG When compared with ECG of 16-JAN-2025 14:04, No significant change was found    Transthoracic Echo (TTE) Limited    Result Date: 1/16/2025   [facility], [address] [place] [address]                Tel [phone] and Fax [phone] TRANSTHORACIC ECHOCARDIOGRAM REPORT  Patient Name:       RAÚL RABAGOSOLOMON Rodríguez Physician:    [id number] Fabián Kulkarni MD Study Date:         1/16/2025           Ordering Provider:    [id number] GABBY SMITH MRN/PID:            [id number]            Fellow: Accession#:         [id number]        Nurse: Date of Birth/Age:  1960 / 64      Sonographer:          Naya rocha RDCS Gender assigned at  F                   Additional Staff: Birth: Height:             165.10 cm           Admit Date:           1/20/2025 Weight:             51.71 kg            Admission Status:     Inpatient - STAT BSA / BMI:          1.56 m2 / 18.97     Encounter#:           [id number]                     kg/m2 Blood  Pressure:     106/62 mmHg         Department Location:  [facility] Study Type:    TRANSTHORACIC ECHO (TTE) LIMITED Diagnosis/ICD: Acute respiratory failure with hypoxia-J96.01 Indication:    Post pneumonectomy; new O2 requirements; eval BIV function CPT Code:      Echo Limited-51229; Color Doppler-20314 Patient History: Pertinent History: COPD. Lung cancer; s/p pneumonectomy; immunotherapy. Study Detail: The following Echo studies were performed: 2D, M-Mode and color               flow. Technically challenging study due to body habitus and               prominent lung artifact. Definity used as a contrast agent for               endocardial border definition. Total contrast used for this               procedure was 3 mL via IV push. Unable to obtain suprasternal               notch view.  PHYSICIAN INTERPRETATION: Left Ventricle: Left ventricular ejection fraction is moderately decreased, calculated by Miranda's biplane at 34%. Left venticular wall motion is abnormal. The left ventricular cavity size is upper limits of normal. There is normal septal and normal posterior left ventricular wall thickness. Spectral Doppler shows a Grade II (pseudonormal pattern) of left ventricular diastolic filling with an elevated left atrial pressure. The described regional wall motion abnormalities of the LV and RV are suggestive of biventricular Takotsubo (stress cardiomyopathy). LV Wall Scoring: The mid and apical anterior wall, mid and apical anterior septum, mid and apical inferior septum, mid and apical inferior wall, mid inferolateral segment, mid anterolateral segment, and apical lateral segment are akinetic. All remaining scored segments are normal. Left Atrium: The left atrium is moderately dilated. Right Ventricle: The right ventricle is normal in size. There is moderately reduced right ventricular systolic function. There is akinesis of the mid and apical segments of the RV free wall. Right Atrium: The right atrium  is normal in size. A dilated inferior vena cava demonstrates poor inspiratory collapse, consistent with elevated right atrial pressures. Aortic Valve: The aortic valve is trileaflet. There is trace aortic valve regurgitation. Mitral Valve: The mitral valve is normal in structure. There is mild mitral valve regurgitation. Tricuspid Valve: The tricuspid valve is structurally normal. There is mild to moderate tricuspid regurgitation. The Doppler estimated RVSP is mildly elevated at 45.5 mmHg. Pulmonic Valve: The pulmonic valve is not well visualized. Pulmonic valve regurgitation was not assessed. Pericardium: There is no pericardial effusion noted. Aorta: The aortic root is normal. Systemic Veins: The inferior vena cava appears dilated, with IVC inspiratory collapse less than 50%. In comparison to the previous echocardiogram(s): Compared with study dated 1/13/2025, the LV and RV wall motion abnormalities were not present in prior study.  CONCLUSIONS:  1. Left ventricular ejection fraction is moderately decreased, calculated by Miranda's biplane at 34%.  2. Multiple segmental abnormalities exist. See findings.  3. Spectral Doppler shows a Grade II (pseudonormal pattern) of left ventricular diastolic filling with an elevated left atrial pressure.  4. There is moderately reduced right ventricular systolic function.  5. There is akinesis of the mid and apical segments of the RV free wall.  6. Mildly elevated right ventricular systolic pressure.  7. Mild to moderate tricuspid regurgitation visualized.  8. The left atrium is moderately dilated.  9. A dilated inferior vena cava demonstrates poor inspiratory collapse, consistent with elevated right atrial pressures. 10. The described regional wall motion abnormalities of the LV and RV are suggestive of biventricular Takotsubo (stress cardiomyopathy). QUANTITATIVE DATA SUMMARY:  2D MEASUREMENTS:          Normal Ranges: IVSd:            0.60 cm  (0.6-1.1cm) LVPWd:           0.70  cm  (0.6-1.1cm) LVIDd:           4.80 cm  (3.9-5.9cm) LVIDs:           3.50 cm LV Mass Index:   63 g/m2 LVEDV Index:     61 ml/m2 LV % FS          27.1 %  LA VOLUME:                   Normal Ranges: LA Vol A4C:        69.1 ml   (22+/-6mL/m2) LA Vol Index A4C:  44.3ml/m2 LA Area A4C:       21.1 cm2 LA Major Axis A4C: 5.5 cm  RA VOLUME BY A/L METHOD:          Normal Ranges: RA Area A4C:             16.3 cm2  M-MODE MEASUREMENTS:         Normal Ranges: Ao Root:             3.00 cm (2.0-3.7cm) LAs:                 3.50 cm (2.7-4.0cm)  LV SYSTOLIC FUNCTION BY 2D PLANIMETRY (MOD):                      Normal Ranges: EF-A4C View:    40 % (>=55%) EF-A2C View:    31 % EF-Biplane:     34 % LV EF Reported: 34 %  LV DIASTOLIC FUNCTION:           Normal Ranges: MV Peak E:             0.87 m/s  (0.7-1.2 m/s) MV Peak A:             0.59 m/s  (0.42-0.7 m/s) E/A Ratio:             1.48      (1.0-2.2) MV e'                  0.057 m/s (>8.0) MV lateral e'          0.06 m/s MV medial e'           0.05 m/s E/e' Ratio:            15.37     (<8.0)  MITRAL VALVE:          Normal Ranges: MV DT:        119 msec (150-240msec)  RIGHT VENTRICLE: RV Basal 3.51 cm RV Mid   2.52 cm RV Major 6.0 cm TAPSE:   9.6 mm RV s'    0.07 m/s  TRICUSPID VALVE/RVSP:          Normal Ranges: Peak TR Velocity:     3.26 m/s RV Syst Pressure:     46 mmHg  (< 30mmHg) IVC Diam:             2.25 cm  [id number] Fabián Kulkarni MD Electronically signed on 1/16/2025 at 2:58:07 PM  Wall Scoring  ** Final (Updated) **      right upper quadrant    Result Date: 1/16/2025  Interpreted By:  Alberto Salas, STUDY: US RIGHT UPPER QUADRANT; 1/16/2025 11:09 am   INDICATION: Signs/Symptoms:pericholecystic fluid seen on renal us.   COMPARISON: None.   ACCESSION NUMBER(S): [id number]   ORDERING CLINICIAN: WILMER ANTHONY   TECHNIQUE: Multiple images of the right upper quadrant were obtained.   FINDINGS: LIVER: The liver measures up to  15.5 cm in greatest sagittal dimension.  Echogenicity is within normal limits. There is no morphologic evidence of cirrhosis and no focal hepatic lesion is evident.   GALLBLADDER: The gallbladder is within normal limits without wall thickening or cholelithiasis. A sonographic Odom sign was not present. There is a small amount of simple appearing ascites in the right upper quadrant.   BILIARY SYSTEM: There is no intrahepatic biliary dilation. The common bile duct measures up to  5 mm in width.   PANCREAS: The pancreas is partially obscured by bowel gas. The visualized portions of the pancreas are within normal limits.   RIGHT KIDNEY: The right kidney measures up to  9.9 cm in greatest sagittal dimension. Cortical echogenicity and thickness are within normal limits. No hydroureteronephrosis.       Small amount of simple appearing ascites in the right upper quadrant without evidence of cholelithiasis, cholecystitis or biliary dilation.   Signed by: Alberto Salas 1/16/2025 1:02 PM Dictation workstation:   [id number]    XR chest 1 view    Result Date: 1/16/2025  Interpreted By:  Uriel Eli, STUDY: XR CHEST 1 VIEW; 1/16/2025 6:43 am   INDICATION: Signs/Symptoms:ICU.   COMPARISON: Radiograph dated 01/15/2025   ACCESSION NUMBER(S): [id number]   ORDERING CLINICIAN: GABBY SMITH   FINDINGS: Postsurgical changes of right pneumonectomy and resection of multiple right-sided ribs. Slight interval increase in opacity in the lower portion of the pneumonectomy site. Slight interval increase in left basilar faint airspace opacity. Blunting of left costophrenic angle. No left pneumothorax.   Cardiac silhouette size is grossly stable, however right heart border is obscured.   Left subclavian approach MediPort is unchanged in position. Interval removal of right IJ central venous catheter sheath.   Subcutaneous emphysema in the right chest wall and right side of the neck.       1. Slight interval increase in left basilar airspace opacity which may be  due to increasing atelectasis. Cannot exclude developing infectious infiltrate. Question trace left pleural effusion. 2. Again seen postsurgical changes related to right-sided pneumonectomy as described above       Signed by: Vick Xiao 1/16/2025 9:47 AM Dictation workstation:   [id number]    US renal complete    Result Date: 1/15/2025  Interpreted By:  Alberto Salas and Velez-Martinez Osvaldo STUDY: US RENAL COMPLETE;  1/15/2025 3:25 pm   INDICATION: Signs/Symptoms:symptomatic hyponatremia.     COMPARISON: CT abdomen 08/15/2024 FDG PET-CT 12/30/2024   ACCESSION NUMBER(S): [id number]   ORDERING CLINICIAN: GABBY SMITH   TECHNIQUE: Multiple images of the kidneys were obtained  .   FINDINGS: RIGHT KIDNEY: The right kidney measures 11.17 cm in length. The renal cortical echogenicity and thickness are within normal limits. No hydronephrosis is present; no evidence of nephrolithiasis.   LEFT KIDNEY: The left kidney measures 11.75 cm in length. The renal cortical echogenicity and thickness are within normal limits. No hydronephrosis is present; no evidence of nephrolithiasis.   BLADDER: A Montiel is present within a decompressed bladder.   Incidental note is made of a somewhat edematous gallbladder with some pericholecystic fluid and layering echogenic material likely representing biliary sludge.   Free fluid is present within the right upper quadrant and pelvis.       1. Unremarkable sonographic evaluation of the kidneys. 2. Incomplete evaluation of a gallbladder with some pericholecystic fluid and biliary sludge. Dedicated right upper quadrant ultrasound could be obtained as clinically warranted. 3. A small amount of free fluid present in the right upper quadrant and pelvis.   Findings relayed by phone by me to SICU provider at 5:11 p.m.   I personally reviewed the images/study and I agree with the findings as stated by Erickson Best MD (resident) . This study was interpreted at [facility]  [place], [place].   MACRO: None   Signed by: Albreto Salas 1/15/2025 6:17 PM Dictation workstation:   [id number]    XR chest 1 view    Result Date: 1/15/2025  Interpreted By:  Alexx Anne, STUDY: XR CHEST 1 VIEW;  1/15/2025 6:46 am   INDICATION: Signs/Symptoms:routine ICU.     COMPARISON: Exam dated 01/14/2025   ACCESSION NUMBER(S): [id number]   ORDERING CLINICIAN: GABBY SMITH   FINDINGS: AP radiograph of the chest was provided.   Left chest wall subclavian approach medication port with catheter tip projecting over the upper SVC. Right IJ central venous catheter tip projects over the upper SVC. Interval increase in subcutaneous emphysema overlying the right lower cervical soft tissues. Similar subcutaneous emphysema along the right chest wall.   CARDIOMEDIASTINAL SILHOUETTE: Cardiomediastinal silhouette is stable in size and configuration.   LUNGS: Postsurgical changes of pneumonectomy are again visualized. There is a moderate amount of layering pleural fluid within the right hemithorax on a background of diffuse lucency. Streaky left basilar atelectasis. The left lung otherwise appears clear.   ABDOMEN: No remarkable upper abdominal findings.   BONES: No acute osseous changes.       1.  Status post pneumonectomy with interval increase in fluid component of right-sided hydropneumothorax. 2. Medical devices as above.       MACRO: None   Signed by: Alexx Anne 1/15/2025 9:28 AM Dictation workstation:   [id number]    XR chest 1 view    Result Date: 1/14/2025  Interpreted By:  Vick Eli, STUDY: XR CHEST 1 VIEW; 1/14/2025 2:40 pm   INDICATION: Signs/Symptoms:Chest tube removal.   COMPARISON: Radiograph dated 01/14/2025   ACCESSION NUMBER(S): [id number]   ORDERING CLINICIAN: ERINN SANCHEZ   FINDINGS: Postsurgical changes related to right pneumonectomy with partially resected ribs.. Interval removal of right chest tube. Left subclavian MediPort and right  IJ central venous catheter sheath are unchanged in positioning.   Cardiac silhouette size is slightly enlarged, unchanged.   Faint left basilar airspace opacity, likely atelectatic. No sizable pneumothorax.   Small amount of subcutaneous and soft tissue emphysema in the right chest wall.       1. Interval removal of right chest tube. Other medical devices as described above. 2. Postsurgical changes related to right pneumonectomy. 3. Faint left basilar opacity, likely atelectatic.       Signed by: Vick Xiao 1/14/2025 2:53 PM Dictation workstation:   [id number]    XR chest 1 view    Result Date: 1/14/2025  Interpreted By:  Nino Barbour, STUDY: XR CHEST 1 VIEW; 1/14/2025 7:06 am   INDICATION: Signs/Symptoms:recent pneumonectomy.   COMPARISON: 01/13/2025.   ACCESSION NUMBER(S): [id number]   ORDERING CLINICIAN: GABBY SMITH   FINDINGS: Right-sided chest tube in place.   CARDIOMEDIASTINAL SILHOUETTE: Right-sided mediastinal shift consistent with recent right-sided pneumonectomy.   LUNGS: Right-sided pneumonectomy changes.   ABDOMEN: No remarkable upper abdominal findings.   BONES: Right-sided thoracotomy changes.       1.  Stable right-sided pneumonectomy changes.     Signed by: Nino Barbour 1/14/2025 12:56 PM Dictation workstation:   [id number]    Transthoracic Echo (TTE) Limited    Result Date: 1/13/2025   [facility], [address] [place] [address]                Tel [phone] and Fax [phone] TRANSTHORACIC ECHOCARDIOGRAM REPORT  Patient Name:       RAÚL Rodríguez Physician:    [id number] Cam Llanos MD Study Date:         1/13/2025           Ordering Provider:    [id number] WILMER ANTHONY MRN/PID:            [id number]            Fellow: Accession#:         [id number]        Nurse: Date of Birth/Age:  1960 / 64       Sonographer:          José Miguel rocha                                     CARIE Gender assigned at  F                   Additional Staff: Birth: Height:             165.10 cm           Admit Date:           1/13/2025 Weight:             51.71 kg            Admission Status:     Inpatient - STAT BSA / BMI:          1.56 m2 / 18.97     Encounter#:           [id number]                     kg/m2 Blood Pressure:     100/57 mmHg         Department Location:  [facility] Study Type:    TRANSTHORACIC ECHO (TTE) LIMITED Diagnosis/ICD: Hypotension, unspecified-I95.9 Indication:    Hypotension CPT Code:      Echo Limited-32856; Doppler Limited-20585; Color Doppler-48670  Study Detail: The following Echo studies were performed: 2D, Doppler and color               flow. Technically challenging study due to body habitus and               patient lying in supine position.  PHYSICIAN INTERPRETATION: Left Ventricle: The left ventricular systolic function is normal, with a visually estimated ejection fraction of 60-65%. There are no regional left ventricular wall motion abnormalities. The left ventricular cavity size is normal. Left ventricular diastolic filling was not assessed. Left Atrium: The left atrium was not well visualized. Right Ventricle: The right ventricle is normal in size. There is low normal right ventricular systolic function. RV S' and TAPSE under measured due to technical difficulty visualizing lateral tricuspid valve annulus. Right Atrium: The right atrium was not well visualized. Aortic Valve: The aortic valve was not well visualized. There is no evidence of aortic valve regurgitation. Mitral Valve: The mitral valve is normal in structure. There is trace mitral valve regurgitation. Tricuspid Valve: The tricuspid valve was not well visualized. There is trace tricuspid regurgitation. Pulmonic Valve: The pulmonic valve is not well visualized. The pulmonic valve regurgitation was not well  visualized. Pericardium: Trivial pericardial effusion. Aorta: The aortic root is normal. Pulmonary Artery: The tricuspid regurgitant velocity is 2.10 m/s, and with an estimated right atrial pressure of 8 mmHg, the estimated pulmonary artery pressure is normal with the RVSP at 25.6 mmHg. Systemic Veins: The inferior vena cava appears normal in size, with IVC inspiratory collapse less than 50%. In comparison to the previous echocardiogram(s): Compared with study dated 10/3/2024, no significant change.  CONCLUSIONS:  1. The left ventricular systolic function is normal, with a visually estimated ejection fraction of 60-65%.  2. There is low normal right ventricular systolic function. QUANTITATIVE DATA SUMMARY:  LV SYSTOLIC FUNCTION BY 2D PLANIMETRY (MOD):                      Normal Ranges: EF-A4C View:    65 % (>=55%) EF-Visual:      63 % LV EF Reported: 63 %  RIGHT VENTRICLE: TAPSE: 10.0 mm RV s'  0.06 m/s  TRICUSPID VALVE/RVSP:          Normal Ranges: Peak TR Velocity:     2.10 m/s Est. RA Pressure:     8 mmHg RV Syst Pressure:     26 mmHg  (< 30mmHg) IVC Diam:             2.00 cm  [id number] Cam Llanos MD Electronically signed on 1/13/2025 at 5:06:16 PM  ** Final **     XR chest 1 view    Result Date: 1/13/2025  Interpreted By:  Thierno EliMary [facility], STUDY: XR CHEST 1 VIEW; 1/13/2025 2:52 pm   INDICATION: Signs/Symptoms:s/p pneumonectomy.   COMPARISON: CT dated 07/17/2024 and PET-CT dated 12/30/2024   ACCESSION NUMBER(S): [id number]   ORDERING CLINICIAN: WILMER ANTHONY   FINDINGS: Right IJ central venous catheter sheath is projecting over mid SV C. Left subclavian approach MediPort is in place with the tip projecting over upper to mid SVC. Right chest tube is in place.   Cardiac silhouette size is within normal limits.   Postsurgical changes related to right pneumonectomy with multiple partial right-sided rib resection. Mild interstitial prominence of the left lung.   Subcutaneous and soft tissue emphysema  in the right chest wall.         1. Postsurgical changes related to right sided pneumonectomy with multiple medical devices as described above. 2. Mild interstitial prominence of the left lung.       Signed by: Vick Mahoganyprateek Xiao 1/13/2025 3:16 PM Dictation workstation:   [id number]    NM PET CT lung CA staging    Result Date: 12/31/2024  Interpreted By:  Kush Hansen and Bera Kaustav STUDY: NM PET CT LUNG CA STAGING;  12/30/2024 11:08 am   INDICATION: Signs/Symptoms:DIAGNOSTIC.   Diagnosed with a large right lung mass in January 20, 2024, status post neoadjuvant chemoradiation as well as immunotherapy ending on July 2024. Currently scheduled for surgery on 01/10/2025.   COMPARISON: PET-CT on 08/15/2024   ACCESSION NUMBER(S): [id number]   ORDERING CLINICIAN: GABBY SMITH   TECHNIQUE: DIVISION OF NUCLEAR MEDICINE POSITRON EMISSION TOMOGRAPHY (PET-CT)   The patient received an intravenous dose of 11.2 mCi of Fluorine-18 fluorodeoxyglucose (FDG).  Positron emission tomographic (PET) images from mid thigh to skull base were then acquired after a one hour delay. Also acquired was a contemporaneous low dose non-contrast CT scan performed for attenuation correction of PET images and anatomic localization.  The PET and CT images were digitally fused for display.  All images were acquired on a combined PET-CT scanner unit. Some areas of FDG accumulation may be described in standardized uptake value (SUV) units.   CODING: Subsequent Treatment Strategy (PS)   CALIBRATION: Dose Injection-to-Scan Interval (mins): 51 min Mediastinal bloodpool SUV (normal 1.5-2.5): 2.1 Blood glucose: NA   FINDINGS: HEAD AND NECK: * No evidence of focal FDG avid lesion in the partially visualized brain parenchyma, noting that evaluation is limited because of the expected physiologic diffuse FDG uptake in the brain. * No FDG avid cervical lymphadenopathy is present. * No paranasal sinus diease. * Thyroid gland is unremarkable.   CHEST:  *There has been interval worsening collapse of the right lung, with the right lung now completely fluid-filled. There has been interval worsening of FDG avid mass with central necrosis in  posterior right upper lobe with SUV max of 15, as compared to 8 previously. Left lung is unremarkable *No FDG avid mediastinal, hilar or axillary lymphadenopathy. *Unremarkable both breasts   ABDOMEN AND PELVIS: *No FDG avid lymphadenopathy in the abdomen or pelvis. *Bilateral adrenal glands are unremarkable. *Colonic diverticulosis without evidence of diverticulitis. *Physiologic radiotracer uptake is present in the liver and spleen with excretion into the bowel loops and the genitourinary tract.   MUSCULOSKELETAL: *No concerning FDG avid bone lesion throughout the axial and appendicular skeleton to suggest osseous metastasis.       1. Interval worsening of the FDG-avid mass with central necrosis in the posterior right upper lobe, along with increased collapse of the right lung compared to the prior PET from 08/15/2024, suggestive of disease progression. 2. No other concerning FDG avid disease identified.   I personally reviewed the image(s) / study and agree with the findings and interpretation as stated. This study was interpreted at [facility].   MACRO: None       Signed by: Kush Hansen 12/31/2024 6:13 AM Dictation workstation:   [id number]   Results for orders placed or performed during the hospital encounter of 01/13/25 (from the past 24 hours)   ECG 12 lead   Result Value Ref Range    Ventricular Rate 97 BPM    Atrial Rate 97 BPM    OR Interval 130 ms    QRS Duration 82 ms    QT Interval 382 ms    QTC Calculation(Bazett) 485 ms    P Axis 83 degrees    R Axis 35 degrees    T Axis 89 degrees    QRS Count 16 beats    Q Onset 219 ms    P Onset 154 ms    P Offset 211 ms    T Offset 410 ms    QTC Fredericia 448 ms   Troponin I, High Sensitivity   Result Value Ref Range    Troponin I, High  Sensitivity (CMC) 928 (HH) 0 - 34 ng/L   Transthoracic Echo (TTE) Limited   Result Value Ref Range    MV E/A ratio 1.48     Tricuspid annular plane systolic excursion 1.0 cm    LV EF 34 %    RV free wall pk S' 6.64 cm/s    LVIDd 4.80 cm    RVSP 45.5 mmHg    LV A4C EF 40.2    Blood Gas Arterial Full Panel   Result Value Ref Range    POCT pH, Arterial 7.37 (L) 7.38 - 7.42 pH    POCT pCO2, Arterial 40 38 - 42 mm Hg    POCT pO2, Arterial 111 (H) 85 - 95 mm Hg    POCT SO2, Arterial 99 94 - 100 %    POCT Oxy Hemoglobin, Arterial 96.8 94.0 - 98.0 %    POCT Hematocrit Calculated, Arterial 32.0 (L) 36.0 - 46.0 %    POCT Sodium, Arterial 123 (L) 136 - 145 mmol/L    POCT Potassium, Arterial 4.7 3.5 - 5.3 mmol/L    POCT Chloride, Arterial 93 (L) 98 - 107 mmol/L    POCT Ionized Calcium, Arterial 1.08 (L) 1.10 - 1.33 mmol/L    POCT Glucose, Arterial 157 (H) 74 - 99 mg/dL    POCT Lactate, Arterial 2.3 (H) 0.4 - 2.0 mmol/L    POCT Base Excess, Arterial -2.0 -2.0 - 3.0 mmol/L    POCT HCO3 Calculated, Arterial 23.1 22.0 - 26.0 mmol/L    POCT Hemoglobin, Arterial 10.7 (L) 12.0 - 16.0 g/dL    POCT Anion Gap, Arterial 12 10 - 25 mmo/L    Patient Temperature 37.0 degrees Celsius    FiO2 21 %   Renal Function Panel   Result Value Ref Range    Glucose 165 (H) 74 - 99 mg/dL    Sodium 124 (L) 136 - 145 mmol/L    Potassium 4.6 3.5 - 5.3 mmol/L    Chloride 88 (L) 98 - 107 mmol/L    Bicarbonate 24 21 - 32 mmol/L    Anion Gap 17 mmol/L    Urea Nitrogen 10 6 - 23 mg/dL    Creatinine 0.33 (L) 0.50 - 1.05 mg/dL    eGFR >90 >60 mL/min/1.73m*2    Calcium 8.3 (L) 8.6 - 10.6 mg/dL    Phosphorus 3.3 2.5 - 4.9 mg/dL    Albumin 3.6 3.4 - 5.0 g/dL   Blood Gas Lactic Acid, Venous   Result Value Ref Range    POCT Lactate, Venous 2.4 (H) 0.4 - 2.0 mmol/L   BLOOD GAS VENOUS FULL PANEL   Result Value Ref Range    POCT pH, Venous 7.29 (L) 7.33 - 7.43 pH    POCT pCO2, Venous 53 (H) 41 - 51 mm Hg    POCT pO2, Venous 45 35 - 45 mm Hg    POCT SO2, Venous 64 45 - 75  %    POCT Oxy Hemoglobin, Venous 63.0 45.0 - 75.0 %    POCT Hematocrit Calculated, Venous 29.0 (L) 36.0 - 46.0 %    POCT Sodium, Venous 122 (L) 136 - 145 mmol/L    POCT Potassium, Venous 4.8 3.5 - 5.3 mmol/L    POCT Chloride, Venous 90 (L) 98 - 107 mmol/L    POCT Ionized Calicum, Venous 1.14 1.10 - 1.33 mmol/L    POCT Glucose, Venous 162 (H) 74 - 99 mg/dL    POCT Lactate, Venous 2.4 (H) 0.4 - 2.0 mmol/L    POCT Base Excess, Venous -1.4 -2.0 - 3.0 mmol/L    POCT HCO3 Calculated, Venous 25.5 22.0 - 26.0 mmol/L    POCT Hemoglobin, Venous 9.5 (L) 12.0 - 16.0 g/dL    POCT Anion Gap, Venous 11.0 10.0 - 25.0 mmol/L    Patient Temperature 37.0 degrees Celsius    FiO2 21 %   Blood Gas Arterial Full Panel   Result Value Ref Range    POCT pH, Arterial 7.31 (L) 7.38 - 7.42 pH    POCT pCO2, Arterial 46 (H) 38 - 42 mm Hg    POCT pO2, Arterial 196 (H) 85 - 95 mm Hg    POCT SO2, Arterial 100 94 - 100 %    POCT Oxy Hemoglobin, Arterial 97.2 94.0 - 98.0 %    POCT Hematocrit Calculated, Arterial 28.0 (L) 36.0 - 46.0 %    POCT Sodium, Arterial 124 (L) 136 - 145 mmol/L    POCT Potassium, Arterial 4.5 3.5 - 5.3 mmol/L    POCT Chloride, Arterial 94 (L) 98 - 107 mmol/L    POCT Ionized Calcium, Arterial 1.10 1.10 - 1.33 mmol/L    POCT Glucose, Arterial 160 (H) 74 - 99 mg/dL    POCT Lactate, Arterial 2.5 (H) 0.4 - 2.0 mmol/L    POCT Base Excess, Arterial -3.0 (L) -2.0 - 3.0 mmol/L    POCT HCO3 Calculated, Arterial 23.2 22.0 - 26.0 mmol/L    POCT Hemoglobin, Arterial 9.4 (L) 12.0 - 16.0 g/dL    POCT Anion Gap, Arterial 11 10 - 25 mmo/L    Patient Temperature 37.0 degrees Celsius    FiO2 21 %   POCT GLUCOSE   Result Value Ref Range    POCT Glucose 151 (H) 74 - 99 mg/dL   Blood Gas Arterial Full Panel   Result Value Ref Range    POCT pH, Arterial 7.37 (L) 7.38 - 7.42 pH    POCT pCO2, Arterial 41 38 - 42 mm Hg    POCT pO2, Arterial 178 (H) 85 - 95 mm Hg    POCT SO2, Arterial 100 94 - 100 %    POCT Oxy Hemoglobin, Arterial 97.5 94.0 - 98.0 %     POCT Hematocrit Calculated, Arterial 28.0 (L) 36.0 - 46.0 %    POCT Sodium, Arterial 124 (L) 136 - 145 mmol/L    POCT Potassium, Arterial 4.5 3.5 - 5.3 mmol/L    POCT Chloride, Arterial 93 (L) 98 - 107 mmol/L    POCT Ionized Calcium, Arterial 1.12 1.10 - 1.33 mmol/L    POCT Glucose, Arterial 163 (H) 74 - 99 mg/dL    POCT Lactate, Arterial 2.0 0.4 - 2.0 mmol/L    POCT Base Excess, Arterial -1.5 -2.0 - 3.0 mmol/L    POCT HCO3 Calculated, Arterial 23.7 22.0 - 26.0 mmol/L    POCT Hemoglobin, Arterial 9.2 (L) 12.0 - 16.0 g/dL    POCT Anion Gap, Arterial 12 10 - 25 mmo/L    Patient Temperature 37.0 degrees Celsius    FiO2 21 %   Blood Gas Arterial Full Panel   Result Value Ref Range    POCT pH, Arterial 7.43 (H) 7.38 - 7.42 pH    POCT pCO2, Arterial 40 38 - 42 mm Hg    POCT pO2, Arterial 148 (H) 85 - 95 mm Hg    POCT SO2, Arterial 99 94 - 100 %    POCT Oxy Hemoglobin, Arterial 97.5 94.0 - 98.0 %    POCT Hematocrit Calculated, Arterial 29.0 (L) 36.0 - 46.0 %    POCT Sodium, Arterial 124 (L) 136 - 145 mmol/L    POCT Potassium, Arterial 3.6 3.5 - 5.3 mmol/L    POCT Chloride, Arterial 91 (L) 98 - 107 mmol/L    POCT Ionized Calcium, Arterial 1.10 1.10 - 1.33 mmol/L    POCT Glucose, Arterial 121 (H) 74 - 99 mg/dL    POCT Lactate, Arterial 1.2 0.4 - 2.0 mmol/L    POCT Base Excess, Arterial 2.0 -2.0 - 3.0 mmol/L    POCT HCO3 Calculated, Arterial 26.5 (H) 22.0 - 26.0 mmol/L    POCT Hemoglobin, Arterial 9.7 (L) 12.0 - 16.0 g/dL    POCT Anion Gap, Arterial 10 10 - 25 mmo/L    Patient Temperature 37.0 degrees Celsius    FiO2 40 %   Renal Function Panel   Result Value Ref Range    Glucose 131 (H) 74 - 99 mg/dL    Sodium 127 (L) 136 - 145 mmol/L    Potassium 5.0 3.5 - 5.3 mmol/L    Chloride 87 (L) 98 - 107 mmol/L    Bicarbonate 28 21 - 32 mmol/L    Anion Gap 17 10 - 20 mmol/L    Urea Nitrogen 10 6 - 23 mg/dL    Creatinine 0.27 (L) 0.50 - 1.05 mg/dL    eGFR >90 >60 mL/min/1.73m*2    Calcium 7.8 (L) 8.6 - 10.6 mg/dL    Phosphorus 2.3 (L)  2.5 - 4.9 mg/dL    Albumin 3.4 3.4 - 5.0 g/dL   POCT GLUCOSE   Result Value Ref Range    POCT Glucose 153 (H) 74 - 99 mg/dL   Renal Function Panel   Result Value Ref Range    Glucose 171 (H) 74 - 99 mg/dL    Sodium 126 (L) 136 - 145 mmol/L    Potassium 4.3 3.5 - 5.3 mmol/L    Chloride 88 (L) 98 - 107 mmol/L    Bicarbonate 26 21 - 32 mmol/L    Anion Gap 16 10 - 20 mmol/L    Urea Nitrogen 11 6 - 23 mg/dL    Creatinine 0.27 (L) 0.50 - 1.05 mg/dL    eGFR >90 >60 mL/min/1.73m*2    Calcium 8.0 (L) 8.6 - 10.6 mg/dL    Phosphorus 1.9 (L) 2.5 - 4.9 mg/dL    Albumin 3.3 (L) 3.4 - 5.0 g/dL   Troponin I, High Sensitivity   Result Value Ref Range    Troponin I, High Sensitivity (CMC) 625 (HH) 0 - 34 ng/L   Magnesium   Result Value Ref Range    Magnesium 1.80 1.60 - 2.40 mg/dL   Coagulation Screen   Result Value Ref Range    Protime 14.4 (H) 9.8 - 12.8 seconds    INR 1.3 (H) 0.9 - 1.1    aPTT 33 27 - 38 seconds   CBC   Result Value Ref Range    WBC 8.6 4.4 - 11.3 x10*3/uL    nRBC 0.0 0.0 - 0.0 /100 WBCs    RBC 3.29 (L) 4.00 - 5.20 x10*6/uL    Hemoglobin 8.2 (L) 12.0 - 16.0 g/dL    Hematocrit 24.7 (L) 36.0 - 46.0 %    MCV 75 (L) 80 - 100 fL    MCH 24.9 (L) 26.0 - 34.0 pg    MCHC 33.2 32.0 - 36.0 g/dL    RDW 13.4 11.5 - 14.5 %    Platelets 157 150 - 450 x10*3/uL   Calcium, Ionized   Result Value Ref Range    POCT Calcium, Ionized 1.07 (L) 1.1 - 1.33 mmol/L   Blood Gas Venous Full Panel   Result Value Ref Range    POCT pH, Venous 7.34 7.33 - 7.43 pH    POCT pCO2, Venous 48 41 - 51 mm Hg    POCT pO2, Venous 44 35 - 45 mm Hg    POCT SO2, Venous 63 45 - 75 %    POCT Oxy Hemoglobin, Venous 62.2 45.0 - 75.0 %    POCT Hematocrit Calculated, Venous 24.0 (L) 36.0 - 46.0 %    POCT Sodium, Venous 124 (L) 136 - 145 mmol/L    POCT Potassium, Venous 4.3 3.5 - 5.3 mmol/L    POCT Chloride, Venous 90 (L) 98 - 107 mmol/L    POCT Ionized Calicum, Venous 1.14 1.10 - 1.33 mmol/L    POCT Glucose, Venous 179 (H) 74 - 99 mg/dL    POCT Lactate, Venous 1.3  0.4 - 2.0 mmol/L    POCT Base Excess, Venous 0.0 -2.0 - 3.0 mmol/L    POCT HCO3 Calculated, Venous 25.9 22.0 - 26.0 mmol/L    POCT Hemoglobin, Venous 7.9 (L) 12.0 - 16.0 g/dL    POCT Anion Gap, Venous 12.0 10.0 - 25.0 mmol/L    Patient Temperature 37.0 degrees Celsius    FiO2 28 %   Blood Gas Arterial Full Panel   Result Value Ref Range    POCT pH, Arterial 7.36 (L) 7.38 - 7.42 pH    POCT pCO2, Arterial 43 (H) 38 - 42 mm Hg    POCT pO2, Arterial 70 (L) 85 - 95 mm Hg    POCT SO2, Arterial 95 94 - 100 %    POCT Oxy Hemoglobin, Arterial 92.2 (L) 94.0 - 98.0 %    POCT Hematocrit Calculated, Arterial 26.0 (L) 36.0 - 46.0 %    POCT Sodium, Arterial 125 (L) 136 - 145 mmol/L    POCT Potassium, Arterial 4.2 3.5 - 5.3 mmol/L    POCT Chloride, Arterial 90 (L) 98 - 107 mmol/L    POCT Ionized Calcium, Arterial 1.21 1.10 - 1.33 mmol/L    POCT Glucose, Arterial 187 (H) 74 - 99 mg/dL    POCT Lactate, Arterial 1.1 0.4 - 2.0 mmol/L    POCT Base Excess, Arterial -1.1 -2.0 - 3.0 mmol/L    POCT HCO3 Calculated, Arterial 24.3 22.0 - 26.0 mmol/L    POCT Hemoglobin, Arterial 8.6 (L) 12.0 - 16.0 g/dL    POCT Anion Gap, Arterial 15 10 - 25 mmo/L    Patient Temperature 37.0 degrees Celsius    FiO2 28 %   POCT GLUCOSE   Result Value Ref Range    POCT Glucose 144 (H) 74 - 99 mg/dL   POCT GLUCOSE   Result Value Ref Range    POCT Glucose 175 (H) 74 - 99 mg/dL   Renal Function Panel   Result Value Ref Range    Glucose 174 (H) 74 - 99 mg/dL    Sodium 127 (L) 136 - 145 mmol/L    Potassium 4.5 3.5 - 5.3 mmol/L    Chloride 88 (L) 98 - 107 mmol/L    Bicarbonate 29 21 - 32 mmol/L    Anion Gap 15 10 - 20 mmol/L    Urea Nitrogen 11 6 - 23 mg/dL    Creatinine 0.29 (L) 0.50 - 1.05 mg/dL    eGFR >90 >60 mL/min/1.73m*2    Calcium 8.0 (L) 8.6 - 10.6 mg/dL    Phosphorus 1.9 (L) 2.5 - 4.9 mg/dL    Albumin 3.5 3.4 - 5.0 g/dL   Blood Gas Venous Full Panel   Result Value Ref Range    POCT pH, Venous 7.32 (L) 7.33 - 7.43 pH    POCT pCO2, Venous 52 (H) 41 - 51 mm Hg     POCT pO2, Venous 43 35 - 45 mm Hg    POCT SO2, Venous 64 45 - 75 %    POCT Oxy Hemoglobin, Venous 62.9 45.0 - 75.0 %    POCT Hematocrit Calculated, Venous 25.0 (L) 36.0 - 46.0 %    POCT Sodium, Venous 125 (L) 136 - 145 mmol/L    POCT Potassium, Venous 4.0 3.5 - 5.3 mmol/L    POCT Chloride, Venous 89 (L) 98 - 107 mmol/L    POCT Ionized Calicum, Venous 1.14 1.10 - 1.33 mmol/L    POCT Glucose, Venous 168 (H) 74 - 99 mg/dL    POCT Lactate, Venous 1.4 0.4 - 2.0 mmol/L    POCT Base Excess, Venous 0.4 -2.0 - 3.0 mmol/L    POCT HCO3 Calculated, Venous 26.8 (H) 22.0 - 26.0 mmol/L    POCT Hemoglobin, Venous 8.4 (L) 12.0 - 16.0 g/dL    POCT Anion Gap, Venous 13.0 10.0 - 25.0 mmol/L    Patient Temperature 37.0 degrees Celsius    FiO2 28 %   Blood Gas Arterial Full Panel   Result Value Ref Range    POCT pH, Arterial 7.41 7.38 - 7.42 pH    POCT pCO2, Arterial 42 38 - 42 mm Hg    POCT pO2, Arterial 93 85 - 95 mm Hg    POCT SO2, Arterial 99 94 - 100 %    POCT Oxy Hemoglobin, Arterial 96.9 94.0 - 98.0 %    POCT Hematocrit Calculated, Arterial 11.0 (L) 36.0 - 46.0 %    POCT Sodium, Arterial 126 (L) 136 - 145 mmol/L    POCT Potassium, Arterial 4.5 3.5 - 5.3 mmol/L    POCT Chloride, Arterial 92 (L) 98 - 107 mmol/L    POCT Ionized Calcium, Arterial 1.17 1.10 - 1.33 mmol/L    POCT Glucose, Arterial 167 (H) 74 - 99 mg/dL    POCT Lactate, Arterial 1.5 0.4 - 2.0 mmol/L    POCT Base Excess, Arterial 1.8 -2.0 - 3.0 mmol/L    POCT HCO3 Calculated, Arterial 26.6 (H) 22.0 - 26.0 mmol/L    POCT Hemoglobin, Arterial 3.5 (LL) 12.0 - 16.0 g/dL    POCT Anion Gap, Arterial 12 10 - 25 mmo/L    Patient Temperature 37.0 degrees Celsius    FiO2 28 %   CBC   Result Value Ref Range    WBC 8.3 4.4 - 11.3 x10*3/uL    nRBC 0.0 0.0 - 0.0 /100 WBCs    RBC 3.23 (L) 4.00 - 5.20 x10*6/uL    Hemoglobin 8.1 (L) 12.0 - 16.0 g/dL    Hematocrit 24.4 (L) 36.0 - 46.0 %    MCV 76 (L) 80 - 100 fL    MCH 25.1 (L) 26.0 - 34.0 pg    MCHC 33.2 32.0 - 36.0 g/dL    RDW 13.3  11.5 - 14.5 %    Platelets 186 150 - 450 x10*3/uL   Blood Gas Venous Full Panel   Result Value Ref Range    POCT pH, Venous 7.36 7.33 - 7.43 pH    POCT pCO2, Venous 53 (H) 41 - 51 mm Hg    POCT pO2, Venous 37 35 - 45 mm Hg    POCT SO2, Venous 56 45 - 75 %    POCT Oxy Hemoglobin, Venous 54.6 45.0 - 75.0 %    POCT Hematocrit Calculated, Venous 26.0 (L) 36.0 - 46.0 %    POCT Sodium, Venous 125 (L) 136 - 145 mmol/L    POCT Potassium, Venous 3.6 3.5 - 5.3 mmol/L    POCT Chloride, Venous 87 (L) 98 - 107 mmol/L    POCT Ionized Calicum, Venous 1.12 1.10 - 1.33 mmol/L    POCT Glucose, Venous 162 (H) 74 - 99 mg/dL    POCT Lactate, Venous 1.3 0.4 - 2.0 mmol/L    POCT Base Excess, Venous 3.8 (H) -2.0 - 3.0 mmol/L    POCT HCO3 Calculated, Venous 29.9 (H) 22.0 - 26.0 mmol/L    POCT Hemoglobin, Venous 8.7 (L) 12.0 - 16.0 g/dL    POCT Anion Gap, Venous 12.0 10.0 - 25.0 mmol/L    Patient Temperature 37.0 degrees Celsius    FiO2 28 %   POCT GLUCOSE   Result Value Ref Range    POCT Glucose 145 (H) 74 - 99 mg/dL   Blood Gas Arterial Full Panel   Result Value Ref Range    POCT pH, Arterial 7.47 (H) 7.38 - 7.42 pH    POCT pCO2, Arterial 43 (H) 38 - 42 mm Hg    POCT pO2, Arterial 78 (L) 85 - 95 mm Hg    POCT SO2, Arterial 98 94 - 100 %    POCT Oxy Hemoglobin, Arterial 95.5 94.0 - 98.0 %    POCT Hematocrit Calculated, Arterial 27.0 (L) 36.0 - 46.0 %    POCT Sodium, Arterial 127 (L) 136 - 145 mmol/L    POCT Potassium, Arterial 3.1 (L) 3.5 - 5.3 mmol/L    POCT Chloride, Arterial 86 (L) 98 - 107 mmol/L    POCT Ionized Calcium, Arterial 1.06 (L) 1.10 - 1.33 mmol/L    POCT Glucose, Arterial 145 (H) 74 - 99 mg/dL    POCT Lactate, Arterial 1.2 0.4 - 2.0 mmol/L    POCT Base Excess, Arterial 6.9 (H) -2.0 - 3.0 mmol/L    POCT HCO3 Calculated, Arterial 31.3 (H) 22.0 - 26.0 mmol/L    POCT Hemoglobin, Arterial 9.0 (L) 12.0 - 16.0 g/dL    POCT Anion Gap, Arterial 13 10 - 25 mmo/L    Patient Temperature 37.0 degrees Celsius    FiO2 28 %   Renal Function  Panel   Result Value Ref Range    Glucose 147 (H) 74 - 99 mg/dL    Sodium 130 (L) 136 - 145 mmol/L    Potassium 3.3 (L) 3.5 - 5.3 mmol/L    Chloride 84 (L) 98 - 107 mmol/L    Bicarbonate 33 (H) 21 - 32 mmol/L    Anion Gap 16 10 - 20 mmol/L    Urea Nitrogen 9 6 - 23 mg/dL    Creatinine 0.38 (L) 0.50 - 1.05 mg/dL    eGFR >90 >60 mL/min/1.73m*2    Calcium 8.0 (L) 8.6 - 10.6 mg/dL    Phosphorus 1.8 (L) 2.5 - 4.9 mg/dL    Albumin 3.6 3.4 - 5.0 g/dL   Calcium, Ionized   Result Value Ref Range    POCT Calcium, Ionized 0.99 (L) 1.1 - 1.33 mmol/L   Blood Gas Venous Full Panel   Result Value Ref Range    POCT pH, Venous 7.42 7.33 - 7.43 pH    POCT pCO2, Venous 53 (H) 41 - 51 mm Hg    POCT pO2, Venous 36 35 - 45 mm Hg    POCT SO2, Venous 55 45 - 75 %    POCT Oxy Hemoglobin, Venous 53.8 45.0 - 75.0 %    POCT Hematocrit Calculated, Venous 27.0 (L) 36.0 - 46.0 %    POCT Sodium, Venous 125 (L) 136 - 145 mmol/L    POCT Potassium, Venous 3.1 (L) 3.5 - 5.3 mmol/L    POCT Chloride, Venous 85 (L) 98 - 107 mmol/L    POCT Ionized Calicum, Venous 1.08 (L) 1.10 - 1.33 mmol/L    POCT Glucose, Venous 143 (H) 74 - 99 mg/dL    POCT Lactate, Venous 1.2 0.4 - 2.0 mmol/L    POCT Base Excess, Venous 8.7 (H) -2.0 - 3.0 mmol/L    POCT HCO3 Calculated, Venous 34.4 (H) 22.0 - 26.0 mmol/L    POCT Hemoglobin, Venous 9.0 (L) 12.0 - 16.0 g/dL    POCT Anion Gap, Venous 9.0 (L) 10.0 - 25.0 mmol/L    Patient Temperature 37.0 degrees Celsius    FiO2 28 %   Prepare RBC: 1 Units   Result Value Ref Range    PRODUCT CODE I7053E37     Unit Number [id number]     Unit ABO B     Unit RH POS     XM INTEP COMP     Dispense Status IS     Blood Expiration Date 1/18/2025 11:59:00 PM EST     PRODUCT BLOOD TYPE 7300     UNIT VOLUME 350    Type and screen   Result Value Ref Range    ABO TYPE B     Rh TYPE POS     ANTIBODY SCREEN NEG    POCT GLUCOSE   Result Value Ref Range    POCT Glucose 142 (H) 74 - 99 mg/dL   Transthoracic Echo (TTE) Limited   Result Value Ref Range     AV pk radha 0.87 m/s    AV mn grad 2 mmHg    LVOT diam 1.94 cm    Tricuspid annular plane systolic excursion 1.2 cm    LV EF 28 %    RV free wall pk S' 9.36 cm/s    RVSP 27.0 mmHg    Aortic Valve Area by Continuity of VTI 2.41 cm2    Aortic Valve Area by Continuity of Peak Velocity 2.26 cm2    AV pk grad 3 mmHg    LV A4C EF 18.9      Scheduled medications  acetaminophen, 1,000 mg, intravenous, q6h WILMER  fluticasone furoate-vilanteroL, 1 puff, inhalation, Daily  heparin (porcine), 5,000 Units, subcutaneous, q8h  hydrocortisone sodium succinate, 50 mg, intravenous, q8h  lidocaine, 1 patch, transdermal, Daily  magnesium oxide, 400 mg, oral, Daily  melatonin, 3 mg, oral, Nightly  metoprolol tartrate, , ,   [Held by provider] metoprolol tartrate, 12.5 mg, oral, q12h  pantoprazole, 40 mg, intravenous, Daily before breakfast  perflutren lipid microspheres, 0.5-10 mL of dilution, intravenous, Once in imaging  potassium, sodium phosphates, 1 packet, oral, 4x daily  sennosides-docusate sodium, 2 tablet, oral, BID  sodium chloride, 3 mL, nebulization, q6h while awake  tiotropium, 2 puff, inhalation, Daily      Continuous medications  dexmedeTOMIDine, 0-0.6 mcg/kg/hr, Last Rate: 0.3 mcg/kg/hr (01/17/25 0944)  [Held by provider] EPINEPHrine, 0.04 mcg/kg/min, Last Rate: Stopped (01/17/25 0305)  furosemide, 15 mg/hr, Last Rate: 15 mg/hr (01/17/25 0700)  milrinone, 0.25 mcg/kg/min, Last Rate: 0.25 mcg/kg/min (01/17/25 0700)  vasopressin, 0-0.06 Units/min, Last Rate: 0.06 Units/min (01/17/25 1155)      PRN medications  PRN medications: albuterol, albuterol, [Held by provider] calcium gluconate, [Held by provider] calcium gluconate, metoprolol tartrate, oxyCODONE, potassium chloride CR, potassium chloride CR, traZODone           Assessment/Plan   Jannie Nicanor is a 64 y.o. female with PMH significant for DEEPAK, tobacco use disorder, stage III squamous cell carcinoma of right lung patient presented to [facility] on 113 for scheduled right  pneumonectomy, intrapericardial with en bloc wall resection by Dr. Jonah Wagner.  Patient had a preoperative hypotension requiring vasopressors and postoperative hypotension requiring vasopressors which she has been subsequently weaned off of, increasing oxygen demand status post surgery which had subsequently been weaned off.  However on 1/16/2025 patient had borderline low blood pressures with 90s/60s, increasing oxygen requirements, and an echocardiogram illustrating biventricular Takotsubo cardiomyopathy for which cardiology was consulted for further recommendations. Given that the patient was showing signs of low output state with altered mental status, decreased urine output with a lactate of 2 along with echo showing signs of elevated left and right sided pressures. A shock call was done where the recommendation was start the patient on inotrope (milrinone) along with a lasix drip and to place a central line to transduce the CVP and run a mixed venous Svo2 along repeating a lactate and if she's clinically worsening to re-conference for a shock call.     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     Plan:  -Continue milrinone and Lasix infusion for pressure support diuresis to help with volume overload status  -Continue Vasopressin  -mixed venous Svo2 decreasing in the setting of increasing tachycardia, plan to place [place] catheter under fluoroscopy today to obtain filling pressures for a more accurate depiction of heart failure status  -Continue supplemental oxygen wean as tolerated  -Remainder per primary team.     Thank you for involving us in this patient care. Recommendations not final until signed by attending.     General Cardiology Consult Pager: [phone] (weekday 7AM-6PM and weekend 7AM-2PM) and other: [phone]  EP Consult Pager: [phone] (weekday 7AM-6PM and weekend 7AM-2PM) and other: [phone]  CICU Fellow Pager: [phone]  anytime  EP Device Nurse Pager: [phone] (weekday 7AM-4PM)  Advanced Heart Failure Consult Pager: [phone] anytime     Case discussed with cardiology fellow Dr. Cavanaugh, and to be staffed with Dr. Santiago Robledo MD  Internal Medicine PGY-2

## 2025-01-17 NOTE — POST-PROCEDURE NOTE
Physician Transition of Care Summary  Invasive Cardiovascular Lab    Procedure Date: 1/17/2025  Attending:    * Aryan Singh - Primary  Resident/Fellow/Other Assistant: Surgeons and Role:     * Jasmin Light MD - Fellow    Indications:   Pre-op Diagnosis      * Cardiogenic shock (Multi) [R57.0]    Post-procedure diagnosis:   Post-op Diagnosis     * Cardiogenic shock (Multi) [R57.0]    Procedure(s):   Right Heart Cath  68169 - [facility] RIGHT HEART CATH O2 SATURATION & CARDIAC OUTPUT        Procedure Findings:   RA 7  RV 40/7   PA 40/16 (29)  PCWP 8   PA sat 67%   FA sat 96%   SVC sat 72 %   Oscar Cardiac Output: 5.99 L/min ; Oscar Cardiac Output Index: 3.83 (L/min)/BSA     Description of the Procedure:   RIJ double lumen MAC line 9 Fr--> 9 Fr SGC locked at 56 cm.    Complications:   None    Stents/Implants:   Implants       No implant documentation for this case.            Anticoagulation/Antiplatelet Plan:   None    Estimated Blood Loss:   5 mL    Anesthesia: Moderate Sedation Anesthesia Staff: No anesthesia staff entered.    Any Specimen(s) Removed:   No specimens collected during this procedure.    [facility]       Electronically signed by: Tara Gates MD, 1/17/2025 6:18 PM

## 2025-01-17 NOTE — CONSULTS
Vancomycin Dosing by Pharmacy  INITIAL CONSULT      Faustina Vick is a 64 y.o. female who Pharmacy is consulted to dose vancomycin for pneumonia.     Based on the patient's indication and renal status, this patient will be dosed based on a goal vancomycin AUC of 400-600.     Renal function is currently stable.    Estimated Creatinine Clearance: 114.2 mL/min (A) (by C-G formula based on SCr of 0.41 mg/dL (L)).    Results from last 7 days   Lab Units 01/17/25  1534 01/17/25  0838 01/17/25  0454 01/17/25  0418 01/16/25  2355 01/16/25  0806 01/16/25  0357   CREATININE mg/dL 0.41* 0.38*  --  0.29* 0.39*   < > 0.20*   BUN mg/dL 9 9  --  11 11   < > 5*   WBC AUTO x10*3/uL 9.3  --  8.3  --  8.6  --  16.2*    < > = values in this interval not displayed.        Visit Vitals  /80   Pulse (!) 133   Temp 36.4 °C (97.5 °F) (Temporal)   Resp 19       Staph/MRSA Screen Culture   Date/Time Value Ref Range Status   01/02/2025 11:44 AM No Staphylococcus aureus isolated  Final     Gram Stain   Date/Time Value Ref Range Status   01/13/2025 08:32 AM (1+) Rare Polymorphonuclear leukocytes  Preliminary   01/13/2025 08:32 AM No organisms seen  Preliminary        No results found for the last 90 days.      Assessment/Plan     Will order 500 mg to complete LD for a total of 1.5 g. followed by maintenance dose of 1000 mg every 12 hours. This dosing regimen is predicted by [employer] to result in the following pharmacokinetic parameters:  Loading Dose : 1500 mg   Regimen: 1000 mg IV every 12 hours.  Start time: 06:00 on 01/18/2025  Exposure target: AUC24 (range)400-600 mg/L.hr   [id number]: 429 mg/L.hr  AUC24,ss: 463 mg/L.hr  Probability of AUC24 > 400: 65 %  Ctrough,ss: 12.6 mg/L  Probability of Ctrough,ss > 20: 19 %      Vancomycin follow-up level will be ordered for 1/18 at 0900 , unless clinically indicated sooner.  Will continue to monitor renal function daily while on vancomycin and order serum creatinine at least every 48 hours if  not already ordered.  Will follow for continued vancomycin needs, clinical response, and signs/symptoms of toxicity.       Harley Tapia, PharmD

## 2025-01-17 NOTE — PROCEDURES
"Arterial Puncture/Cannulation    Date/Time: 1/16/2025 7:22 PM    Performed by: Dinh Anna MD  Authorized by: Dinh Anna MD  Consent: Verbal consent obtained. Written consent obtained.  Risks and benefits: risks, benefits and alternatives were discussed (Consented for radial arterial line earlier in the day, arterial line waveform flattened and catheter removed. Required replacement urgently due to deteriorating cardiac and pulmonary status so verbal consent was re confirmed)  Consent given by: spouse  Patient understanding: patient states understanding of the procedure being performed  Patient consent: the patient's understanding of the procedure matches consent given  Required items: required blood products, implants, devices, and special equipment available  Patient identity confirmed: verbally with patient and arm band  Time out: Immediately prior to procedure a \"time out\" was called to verify the correct patient, procedure, equipment, support staff and site/side marked as required.  Preparation: Patient was prepped and draped in the usual sterile fashion.  Local anesthesia used: yes  Anesthesia: local infiltration    Anesthesia:  Local anesthesia used: yes  Local Anesthetic: lidocaine 1% without epinephrine    Sedation:  Patient sedated: yes  Sedation type: anxiolysis  Sedatives: see MAR for details (precedex)  Vitals: Vital signs were monitored during sedation.    Patient tolerance: patient tolerated the procedure well with no immediate complications  Comments: Multiple attempts by myself and Dr. Ascencio (fellow) at right and left radial arterial cannulation, unable to achieve adequate flow or thread a catheter. First attempt at left brachial arterial line placement successful.              "

## 2025-01-17 NOTE — INTERVAL H&P NOTE
H&P reviewed. The patient was examined and there are no changes to the H&P. Patient seen and assessed pre-procedure. Allergies and current medications reviewed. Plan for  RHC w/ leave-in swan. Upon initial assessment, patient tachycardic, -140's, desaturating to mid 80's on 2L oxygen via NC. Wet congested cough, O2 increased to 6L (O2 sat improved to 89%) ultimately placed on NRB for transport. Labs reviewed, Hgb/Plt 9.8-187, GFR/Cr >90/0.38. Patient currently on Milrinone, Vasopressin, and Milrinone drips.

## 2025-01-17 NOTE — SIGNIFICANT EVENT
Ms. Vick became increasingly confused today and remained hyponatremic despite PO fluid restriction and hypertonic saline administration. Until this AM, her lactate was normal, UOP was adequate, and she was oxygenating well on room air. Her UOP decreased to 0 for several hours this morning. Early this afternoon, lactate began rising to peak of 2.9 and her work of breathing increased dramatically with tachypnea to 30s with a new O2 requirement of 2L NC (previously 100% on RA). Gentle diuresis was initiated with small response (100cc urine). Stat echo was ordered due to this O2 requirement and lactate, and she was found to have newly reduced BiV function suspicious for BiV Takotsubo. Additional 40mg lasix ordered. Cardiology was consulted and recommended replacement of CVC for CVP monitoring, lasix gtt and milrinone all of which were initiated. Shock call was made to discuss her condition, no indication for mechanical support at this time. Arterial line was replaced and RIJ MAC line was placed.     Family was updated at bedside throughout the day.

## 2025-01-17 NOTE — PROGRESS NOTES
"Faustina Vick \"Noam" is a 64 y.o. 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    Subjective   Overnight, epinephrine infusion added, but later stopped 2/2 tachycardia in the 130s. Milrinone increased to 0.25mcg/kig/min. Vasopressin at 0.06 units/min. Precedex infusion resumed. Remains on lasix infusion at 15mg/hr, bolused 20mg and 40mg overnight, now with good response. Patient A&Ox3 this am on exam with complaint of R chest surgical pain.    Objective     Physical Exam  Vitals reviewed.   Constitutional:       General: She is not in acute distress.     Appearance: She is ill-appearing.   HENT:      Head: Normocephalic and atraumatic.      Nose: Nose normal.      Mouth/Throat:      Mouth: Mucous membranes are moist.   Eyes:      Extraocular Movements: Extraocular movements intact.      Pupils: Pupils are equal, round, and reactive to light.   Neck:      Comments: RIJ MAC in place, dressing c/d/i.   Cardiovascular:      Rate and Rhythm: Regular rhythm. Tachycardia present.      Pulses: Normal pulses.      Heart sounds: Normal heart sounds.   Pulmonary:      Breath sounds: Rhonchi (left side) present.      Comments: Labored breathing, on 2L NC. Absent lung sounds on right s/p pneumonectomy. Wet cough.  Chest:      Comments: Left chest mediport accessed, dressing c/d/i. Right lateral chest dressing c/d/i, surgical tenderness.   Abdominal:      General: Abdomen is flat. Bowel sounds are normal.      Palpations: Abdomen is soft.      Tenderness: There is no abdominal tenderness.   Genitourinary:     Comments: Montiel in place with clear yellow UOP.   Musculoskeletal:      Right lower leg: No edema.      Left lower leg: No edema.   Skin:     General: Skin is warm and dry.      Coloration: Skin is pale.   Neurological:      General: No focal deficit present.      Mental Status: She is alert and oriented to person, place, and time.      Motor: Weakness (generalized) " "present.   Psychiatric:      Comments: Slowed responses, anxious appearing, confused though oriented.     Last Recorded Vitals  Blood pressure 103/74, pulse (!) 133, temperature 36.3 °C (97.3 °F), temperature source Temporal, resp. rate 19, height 1.651 m (5' 5\"), weight 52.2 kg (115 lb 1.3 oz), SpO2 98%.  Intake/Output last 3 Shifts:  I/O last 3 completed shifts:  In: 1371.4 (26.3 mL/kg) [P.O.:400; I.V.:414.4 (7.9 mL/kg); IV Piggyback:557]  Out: 1582 (30.3 mL/kg) [Urine:1582 (0.8 mL/kg/hr)]  Weight: 52.2 kg     Relevant Results  Scheduled medications  acetaminophen, 1,000 mg, intravenous, q6h WILMER  fluticasone furoate-vilanteroL, 1 puff, inhalation, Daily  heparin (porcine), 5,000 Units, subcutaneous, q8h  hydrocortisone sodium succinate, 50 mg, intravenous, q8h  lidocaine, 1 patch, transdermal, Daily  magnesium oxide, 400 mg, oral, Daily  melatonin, 3 mg, oral, Nightly  metoprolol tartrate, , ,   [Held by provider] metoprolol tartrate, 12.5 mg, oral, q12h  pantoprazole, 40 mg, intravenous, Daily before breakfast  perflutren lipid microspheres, 0.5-10 mL of dilution, intravenous, Once in imaging  sennosides-docusate sodium, 2 tablet, oral, BID  sodium chloride, 3 mL, nebulization, q6h while awake  sodium phosphate, 21 mmol, intravenous, Once  tiotropium, 2 puff, inhalation, Daily      Continuous medications  dexmedeTOMIDine, 0-0.6 mcg/kg/hr, Last Rate: 0.2 mcg/kg/hr (01/17/25 0700)  [Held by provider] EPINEPHrine, 0.04 mcg/kg/min, Last Rate: Stopped (01/17/25 0305)  furosemide, 15 mg/hr, Last Rate: 15 mg/hr (01/17/25 0700)  milrinone, 0.25 mcg/kg/min, Last Rate: 0.25 mcg/kg/min (01/17/25 0700)  vasopressin, 0-0.06 Units/min, Last Rate: 0.03 Units/min (01/17/25 0705)        PRN medications  PRN medications: albuterol, albuterol, [Held by provider] calcium gluconate, [Held by provider] calcium gluconate, metoprolol tartrate, oxyCODONE, potassium chloride CR, potassium chloride CR, traZODone      Results for orders " placed or performed during the hospital encounter of 01/13/25 (from the past 24 hours)   Blood Gas Arterial Full Panel   Result Value Ref Range    POCT pH, Arterial 7.41 7.38 - 7.42 pH    POCT pCO2, Arterial 35 (L) 38 - 42 mm Hg    POCT pO2, Arterial 65 (L) 85 - 95 mm Hg    POCT SO2, Arterial 92 (L) 94 - 100 %    POCT Oxy Hemoglobin, Arterial 89.6 (L) 94.0 - 98.0 %    POCT Hematocrit Calculated, Arterial 28.0 (L) 36.0 - 46.0 %    POCT Sodium, Arterial 122 (L) 136 - 145 mmol/L    POCT Potassium, Arterial 4.5 3.5 - 5.3 mmol/L    POCT Chloride, Arterial 91 (L) 98 - 107 mmol/L    POCT Ionized Calcium, Arterial 1.10 1.10 - 1.33 mmol/L    POCT Glucose, Arterial 170 (H) 74 - 99 mg/dL    POCT Lactate, Arterial 2.9 (H) 0.4 - 2.0 mmol/L    POCT Base Excess, Arterial -2.1 (L) -2.0 - 3.0 mmol/L    POCT HCO3 Calculated, Arterial 22.2 22.0 - 26.0 mmol/L    POCT Hemoglobin, Arterial 9.4 (L) 12.0 - 16.0 g/dL    POCT Anion Gap, Arterial 13 10 - 25 mmo/L    Patient Temperature 37.0 degrees Celsius    FiO2 21 %   POCT GLUCOSE   Result Value Ref Range    POCT Glucose 156 (H) 74 - 99 mg/dL   Renal Function Panel   Result Value Ref Range    Glucose 151 (H) 74 - 99 mg/dL    Sodium 125 (L) 136 - 145 mmol/L    Potassium 4.8 3.5 - 5.3 mmol/L    Chloride 89 (L) 98 - 107 mmol/L    Bicarbonate 24 21 - 32 mmol/L    Anion Gap 17 mmol/L    Urea Nitrogen 9 6 - 23 mg/dL    Creatinine 0.31 (L) 0.50 - 1.05 mg/dL    eGFR >90 >60 mL/min/1.73m*2    Calcium 8.5 (L) 8.6 - 10.6 mg/dL    Phosphorus 3.0 2.5 - 4.9 mg/dL    Albumin 3.6 3.4 - 5.0 g/dL   Blood Gas Arterial Full Panel   Result Value Ref Range    POCT pH, Arterial 7.41 7.38 - 7.42 pH    POCT pCO2, Arterial 39 38 - 42 mm Hg    POCT pO2, Arterial 96 (H) 85 - 95 mm Hg    POCT SO2, Arterial 98 94 - 100 %    POCT Oxy Hemoglobin, Arterial 96.4 94.0 - 98.0 %    POCT Hematocrit Calculated, Arterial 29.0 (L) 36.0 - 46.0 %    POCT Sodium, Arterial 120 (LL) 136 - 145 mmol/L    POCT Potassium, Arterial 5.1 3.5  - 5.3 mmol/L    POCT Chloride, Arterial 90 (L) 98 - 107 mmol/L    POCT Ionized Calcium, Arterial 1.17 1.10 - 1.33 mmol/L    POCT Glucose, Arterial 168 (H) 74 - 99 mg/dL    POCT Lactate, Arterial 2.9 (H) 0.4 - 2.0 mmol/L    POCT Base Excess, Arterial 0.1 -2.0 - 3.0 mmol/L    POCT HCO3 Calculated, Arterial 24.7 22.0 - 26.0 mmol/L    POCT Hemoglobin, Arterial 9.8 (L) 12.0 - 16.0 g/dL    POCT Anion Gap, Arterial 10 10 - 25 mmo/L    Patient Temperature 37.0 degrees Celsius    FiO2 21 %   Osmolality   Result Value Ref Range    Osmolality, Serum 264 (L) 280 - 300 mOsm/kg   ECG 12 lead   Result Value Ref Range    Ventricular Rate 97 BPM    Atrial Rate 97 BPM    SD Interval 130 ms    QRS Duration 82 ms    QT Interval 382 ms    QTC Calculation(Bazett) 485 ms    P Axis 83 degrees    R Axis 35 degrees    T Axis 89 degrees    QRS Count 16 beats    Q Onset 219 ms    P Onset 154 ms    P Offset 211 ms    T Offset 410 ms    QTC Fredericia 448 ms   Troponin I, High Sensitivity   Result Value Ref Range    Troponin I, High Sensitivity (CMC) 928 (HH) 0 - 34 ng/L   Transthoracic Echo (TTE) Limited   Result Value Ref Range    MV E/A ratio 1.48     Tricuspid annular plane systolic excursion 1.0 cm    LV EF 34 %    RV free wall pk S' 6.64 cm/s    LVIDd 4.80 cm    RVSP 45.5 mmHg    LV A4C EF 40.2    Blood Gas Arterial Full Panel   Result Value Ref Range    POCT pH, Arterial 7.37 (L) 7.38 - 7.42 pH    POCT pCO2, Arterial 40 38 - 42 mm Hg    POCT pO2, Arterial 111 (H) 85 - 95 mm Hg    POCT SO2, Arterial 99 94 - 100 %    POCT Oxy Hemoglobin, Arterial 96.8 94.0 - 98.0 %    POCT Hematocrit Calculated, Arterial 32.0 (L) 36.0 - 46.0 %    POCT Sodium, Arterial 123 (L) 136 - 145 mmol/L    POCT Potassium, Arterial 4.7 3.5 - 5.3 mmol/L    POCT Chloride, Arterial 93 (L) 98 - 107 mmol/L    POCT Ionized Calcium, Arterial 1.08 (L) 1.10 - 1.33 mmol/L    POCT Glucose, Arterial 157 (H) 74 - 99 mg/dL    POCT Lactate, Arterial 2.3 (H) 0.4 - 2.0 mmol/L    POCT  Base Excess, Arterial -2.0 -2.0 - 3.0 mmol/L    POCT HCO3 Calculated, Arterial 23.1 22.0 - 26.0 mmol/L    POCT Hemoglobin, Arterial 10.7 (L) 12.0 - 16.0 g/dL    POCT Anion Gap, Arterial 12 10 - 25 mmo/L    Patient Temperature 37.0 degrees Celsius    FiO2 21 %   Renal Function Panel   Result Value Ref Range    Glucose 165 (H) 74 - 99 mg/dL    Sodium 124 (L) 136 - 145 mmol/L    Potassium 4.6 3.5 - 5.3 mmol/L    Chloride 88 (L) 98 - 107 mmol/L    Bicarbonate 24 21 - 32 mmol/L    Anion Gap 17 mmol/L    Urea Nitrogen 10 6 - 23 mg/dL    Creatinine 0.33 (L) 0.50 - 1.05 mg/dL    eGFR >90 >60 mL/min/1.73m*2    Calcium 8.3 (L) 8.6 - 10.6 mg/dL    Phosphorus 3.3 2.5 - 4.9 mg/dL    Albumin 3.6 3.4 - 5.0 g/dL   Blood Gas Lactic Acid, Venous   Result Value Ref Range    POCT Lactate, Venous 2.4 (H) 0.4 - 2.0 mmol/L   BLOOD GAS VENOUS FULL PANEL   Result Value Ref Range    POCT pH, Venous 7.29 (L) 7.33 - 7.43 pH    POCT pCO2, Venous 53 (H) 41 - 51 mm Hg    POCT pO2, Venous 45 35 - 45 mm Hg    POCT SO2, Venous 64 45 - 75 %    POCT Oxy Hemoglobin, Venous 63.0 45.0 - 75.0 %    POCT Hematocrit Calculated, Venous 29.0 (L) 36.0 - 46.0 %    POCT Sodium, Venous 122 (L) 136 - 145 mmol/L    POCT Potassium, Venous 4.8 3.5 - 5.3 mmol/L    POCT Chloride, Venous 90 (L) 98 - 107 mmol/L    POCT Ionized Calicum, Venous 1.14 1.10 - 1.33 mmol/L    POCT Glucose, Venous 162 (H) 74 - 99 mg/dL    POCT Lactate, Venous 2.4 (H) 0.4 - 2.0 mmol/L    POCT Base Excess, Venous -1.4 -2.0 - 3.0 mmol/L    POCT HCO3 Calculated, Venous 25.5 22.0 - 26.0 mmol/L    POCT Hemoglobin, Venous 9.5 (L) 12.0 - 16.0 g/dL    POCT Anion Gap, Venous 11.0 10.0 - 25.0 mmol/L    Patient Temperature 37.0 degrees Celsius    FiO2 21 %   Blood Gas Arterial Full Panel   Result Value Ref Range    POCT pH, Arterial 7.31 (L) 7.38 - 7.42 pH    POCT pCO2, Arterial 46 (H) 38 - 42 mm Hg    POCT pO2, Arterial 196 (H) 85 - 95 mm Hg    POCT SO2, Arterial 100 94 - 100 %    POCT Oxy Hemoglobin,  Arterial 97.2 94.0 - 98.0 %    POCT Hematocrit Calculated, Arterial 28.0 (L) 36.0 - 46.0 %    POCT Sodium, Arterial 124 (L) 136 - 145 mmol/L    POCT Potassium, Arterial 4.5 3.5 - 5.3 mmol/L    POCT Chloride, Arterial 94 (L) 98 - 107 mmol/L    POCT Ionized Calcium, Arterial 1.10 1.10 - 1.33 mmol/L    POCT Glucose, Arterial 160 (H) 74 - 99 mg/dL    POCT Lactate, Arterial 2.5 (H) 0.4 - 2.0 mmol/L    POCT Base Excess, Arterial -3.0 (L) -2.0 - 3.0 mmol/L    POCT HCO3 Calculated, Arterial 23.2 22.0 - 26.0 mmol/L    POCT Hemoglobin, Arterial 9.4 (L) 12.0 - 16.0 g/dL    POCT Anion Gap, Arterial 11 10 - 25 mmo/L    Patient Temperature 37.0 degrees Celsius    FiO2 21 %   POCT GLUCOSE   Result Value Ref Range    POCT Glucose 151 (H) 74 - 99 mg/dL   Blood Gas Arterial Full Panel   Result Value Ref Range    POCT pH, Arterial 7.37 (L) 7.38 - 7.42 pH    POCT pCO2, Arterial 41 38 - 42 mm Hg    POCT pO2, Arterial 178 (H) 85 - 95 mm Hg    POCT SO2, Arterial 100 94 - 100 %    POCT Oxy Hemoglobin, Arterial 97.5 94.0 - 98.0 %    POCT Hematocrit Calculated, Arterial 28.0 (L) 36.0 - 46.0 %    POCT Sodium, Arterial 124 (L) 136 - 145 mmol/L    POCT Potassium, Arterial 4.5 3.5 - 5.3 mmol/L    POCT Chloride, Arterial 93 (L) 98 - 107 mmol/L    POCT Ionized Calcium, Arterial 1.12 1.10 - 1.33 mmol/L    POCT Glucose, Arterial 163 (H) 74 - 99 mg/dL    POCT Lactate, Arterial 2.0 0.4 - 2.0 mmol/L    POCT Base Excess, Arterial -1.5 -2.0 - 3.0 mmol/L    POCT HCO3 Calculated, Arterial 23.7 22.0 - 26.0 mmol/L    POCT Hemoglobin, Arterial 9.2 (L) 12.0 - 16.0 g/dL    POCT Anion Gap, Arterial 12 10 - 25 mmo/L    Patient Temperature 37.0 degrees Celsius    FiO2 21 %   Blood Gas Arterial Full Panel   Result Value Ref Range    POCT pH, Arterial 7.43 (H) 7.38 - 7.42 pH    POCT pCO2, Arterial 40 38 - 42 mm Hg    POCT pO2, Arterial 148 (H) 85 - 95 mm Hg    POCT SO2, Arterial 99 94 - 100 %    POCT Oxy Hemoglobin, Arterial 97.5 94.0 - 98.0 %    POCT Hematocrit  Calculated, Arterial 29.0 (L) 36.0 - 46.0 %    POCT Sodium, Arterial 124 (L) 136 - 145 mmol/L    POCT Potassium, Arterial 3.6 3.5 - 5.3 mmol/L    POCT Chloride, Arterial 91 (L) 98 - 107 mmol/L    POCT Ionized Calcium, Arterial 1.10 1.10 - 1.33 mmol/L    POCT Glucose, Arterial 121 (H) 74 - 99 mg/dL    POCT Lactate, Arterial 1.2 0.4 - 2.0 mmol/L    POCT Base Excess, Arterial 2.0 -2.0 - 3.0 mmol/L    POCT HCO3 Calculated, Arterial 26.5 (H) 22.0 - 26.0 mmol/L    POCT Hemoglobin, Arterial 9.7 (L) 12.0 - 16.0 g/dL    POCT Anion Gap, Arterial 10 10 - 25 mmo/L    Patient Temperature 37.0 degrees Celsius    FiO2 40 %   Renal Function Panel   Result Value Ref Range    Glucose 131 (H) 74 - 99 mg/dL    Sodium 127 (L) 136 - 145 mmol/L    Potassium 5.0 3.5 - 5.3 mmol/L    Chloride 87 (L) 98 - 107 mmol/L    Bicarbonate 28 21 - 32 mmol/L    Anion Gap 17 10 - 20 mmol/L    Urea Nitrogen 10 6 - 23 mg/dL    Creatinine 0.27 (L) 0.50 - 1.05 mg/dL    eGFR >90 >60 mL/min/1.73m*2    Calcium 7.8 (L) 8.6 - 10.6 mg/dL    Phosphorus 2.3 (L) 2.5 - 4.9 mg/dL    Albumin 3.4 3.4 - 5.0 g/dL   POCT GLUCOSE   Result Value Ref Range    POCT Glucose 153 (H) 74 - 99 mg/dL   Renal Function Panel   Result Value Ref Range    Glucose 171 (H) 74 - 99 mg/dL    Sodium 126 (L) 136 - 145 mmol/L    Potassium 4.3 3.5 - 5.3 mmol/L    Chloride 88 (L) 98 - 107 mmol/L    Bicarbonate 26 21 - 32 mmol/L    Anion Gap 16 10 - 20 mmol/L    Urea Nitrogen 11 6 - 23 mg/dL    Creatinine 0.27 (L) 0.50 - 1.05 mg/dL    eGFR >90 >60 mL/min/1.73m*2    Calcium 8.0 (L) 8.6 - 10.6 mg/dL    Phosphorus 1.9 (L) 2.5 - 4.9 mg/dL    Albumin 3.3 (L) 3.4 - 5.0 g/dL   Troponin I, High Sensitivity   Result Value Ref Range    Troponin I, High Sensitivity (CMC) 625 (HH) 0 - 34 ng/L   Magnesium   Result Value Ref Range    Magnesium 1.80 1.60 - 2.40 mg/dL   Coagulation Screen   Result Value Ref Range    Protime 14.4 (H) 9.8 - 12.8 seconds    INR 1.3 (H) 0.9 - 1.1    aPTT 33 27 - 38 seconds   CBC    Result Value Ref Range    WBC 8.6 4.4 - 11.3 x10*3/uL    nRBC 0.0 0.0 - 0.0 /100 WBCs    RBC 3.29 (L) 4.00 - 5.20 x10*6/uL    Hemoglobin 8.2 (L) 12.0 - 16.0 g/dL    Hematocrit 24.7 (L) 36.0 - 46.0 %    MCV 75 (L) 80 - 100 fL    MCH 24.9 (L) 26.0 - 34.0 pg    MCHC 33.2 32.0 - 36.0 g/dL    RDW 13.4 11.5 - 14.5 %    Platelets 157 150 - 450 x10*3/uL   Calcium, Ionized   Result Value Ref Range    POCT Calcium, Ionized 1.07 (L) 1.1 - 1.33 mmol/L   Blood Gas Venous Full Panel   Result Value Ref Range    POCT pH, Venous 7.34 7.33 - 7.43 pH    POCT pCO2, Venous 48 41 - 51 mm Hg    POCT pO2, Venous 44 35 - 45 mm Hg    POCT SO2, Venous 63 45 - 75 %    POCT Oxy Hemoglobin, Venous 62.2 45.0 - 75.0 %    POCT Hematocrit Calculated, Venous 24.0 (L) 36.0 - 46.0 %    POCT Sodium, Venous 124 (L) 136 - 145 mmol/L    POCT Potassium, Venous 4.3 3.5 - 5.3 mmol/L    POCT Chloride, Venous 90 (L) 98 - 107 mmol/L    POCT Ionized Calicum, Venous 1.14 1.10 - 1.33 mmol/L    POCT Glucose, Venous 179 (H) 74 - 99 mg/dL    POCT Lactate, Venous 1.3 0.4 - 2.0 mmol/L    POCT Base Excess, Venous 0.0 -2.0 - 3.0 mmol/L    POCT HCO3 Calculated, Venous 25.9 22.0 - 26.0 mmol/L    POCT Hemoglobin, Venous 7.9 (L) 12.0 - 16.0 g/dL    POCT Anion Gap, Venous 12.0 10.0 - 25.0 mmol/L    Patient Temperature 37.0 degrees Celsius    FiO2 28 %   Blood Gas Arterial Full Panel   Result Value Ref Range    POCT pH, Arterial 7.36 (L) 7.38 - 7.42 pH    POCT pCO2, Arterial 43 (H) 38 - 42 mm Hg    POCT pO2, Arterial 70 (L) 85 - 95 mm Hg    POCT SO2, Arterial 95 94 - 100 %    POCT Oxy Hemoglobin, Arterial 92.2 (L) 94.0 - 98.0 %    POCT Hematocrit Calculated, Arterial 26.0 (L) 36.0 - 46.0 %    POCT Sodium, Arterial 125 (L) 136 - 145 mmol/L    POCT Potassium, Arterial 4.2 3.5 - 5.3 mmol/L    POCT Chloride, Arterial 90 (L) 98 - 107 mmol/L    POCT Ionized Calcium, Arterial 1.21 1.10 - 1.33 mmol/L    POCT Glucose, Arterial 187 (H) 74 - 99 mg/dL    POCT Lactate, Arterial 1.1 0.4 -  2.0 mmol/L    POCT Base Excess, Arterial -1.1 -2.0 - 3.0 mmol/L    POCT HCO3 Calculated, Arterial 24.3 22.0 - 26.0 mmol/L    POCT Hemoglobin, Arterial 8.6 (L) 12.0 - 16.0 g/dL    POCT Anion Gap, Arterial 15 10 - 25 mmo/L    Patient Temperature 37.0 degrees Celsius    FiO2 28 %   POCT GLUCOSE   Result Value Ref Range    POCT Glucose 144 (H) 74 - 99 mg/dL   POCT GLUCOSE   Result Value Ref Range    POCT Glucose 175 (H) 74 - 99 mg/dL   Renal Function Panel   Result Value Ref Range    Glucose 174 (H) 74 - 99 mg/dL    Sodium 127 (L) 136 - 145 mmol/L    Potassium 4.5 3.5 - 5.3 mmol/L    Chloride 88 (L) 98 - 107 mmol/L    Bicarbonate 29 21 - 32 mmol/L    Anion Gap 15 10 - 20 mmol/L    Urea Nitrogen 11 6 - 23 mg/dL    Creatinine 0.29 (L) 0.50 - 1.05 mg/dL    eGFR >90 >60 mL/min/1.73m*2    Calcium 8.0 (L) 8.6 - 10.6 mg/dL    Phosphorus 1.9 (L) 2.5 - 4.9 mg/dL    Albumin 3.5 3.4 - 5.0 g/dL   Blood Gas Venous Full Panel   Result Value Ref Range    POCT pH, Venous 7.32 (L) 7.33 - 7.43 pH    POCT pCO2, Venous 52 (H) 41 - 51 mm Hg    POCT pO2, Venous 43 35 - 45 mm Hg    POCT SO2, Venous 64 45 - 75 %    POCT Oxy Hemoglobin, Venous 62.9 45.0 - 75.0 %    POCT Hematocrit Calculated, Venous 25.0 (L) 36.0 - 46.0 %    POCT Sodium, Venous 125 (L) 136 - 145 mmol/L    POCT Potassium, Venous 4.0 3.5 - 5.3 mmol/L    POCT Chloride, Venous 89 (L) 98 - 107 mmol/L    POCT Ionized Calicum, Venous 1.14 1.10 - 1.33 mmol/L    POCT Glucose, Venous 168 (H) 74 - 99 mg/dL    POCT Lactate, Venous 1.4 0.4 - 2.0 mmol/L    POCT Base Excess, Venous 0.4 -2.0 - 3.0 mmol/L    POCT HCO3 Calculated, Venous 26.8 (H) 22.0 - 26.0 mmol/L    POCT Hemoglobin, Venous 8.4 (L) 12.0 - 16.0 g/dL    POCT Anion Gap, Venous 13.0 10.0 - 25.0 mmol/L    Patient Temperature 37.0 degrees Celsius    FiO2 28 %   Blood Gas Arterial Full Panel   Result Value Ref Range    POCT pH, Arterial 7.41 7.38 - 7.42 pH    POCT pCO2, Arterial 42 38 - 42 mm Hg    POCT pO2, Arterial 93 85 - 95 mm Hg     POCT SO2, Arterial 99 94 - 100 %    POCT Oxy Hemoglobin, Arterial 96.9 94.0 - 98.0 %    POCT Hematocrit Calculated, Arterial 11.0 (L) 36.0 - 46.0 %    POCT Sodium, Arterial 126 (L) 136 - 145 mmol/L    POCT Potassium, Arterial 4.5 3.5 - 5.3 mmol/L    POCT Chloride, Arterial 92 (L) 98 - 107 mmol/L    POCT Ionized Calcium, Arterial 1.17 1.10 - 1.33 mmol/L    POCT Glucose, Arterial 167 (H) 74 - 99 mg/dL    POCT Lactate, Arterial 1.5 0.4 - 2.0 mmol/L    POCT Base Excess, Arterial 1.8 -2.0 - 3.0 mmol/L    POCT HCO3 Calculated, Arterial 26.6 (H) 22.0 - 26.0 mmol/L    POCT Hemoglobin, Arterial 3.5 (LL) 12.0 - 16.0 g/dL    POCT Anion Gap, Arterial 12 10 - 25 mmo/L    Patient Temperature 37.0 degrees Celsius    FiO2 28 %   CBC   Result Value Ref Range    WBC 8.3 4.4 - 11.3 x10*3/uL    nRBC 0.0 0.0 - 0.0 /100 WBCs    RBC 3.23 (L) 4.00 - 5.20 x10*6/uL    Hemoglobin 8.1 (L) 12.0 - 16.0 g/dL    Hematocrit 24.4 (L) 36.0 - 46.0 %    MCV 76 (L) 80 - 100 fL    MCH 25.1 (L) 26.0 - 34.0 pg    MCHC 33.2 32.0 - 36.0 g/dL    RDW 13.3 11.5 - 14.5 %    Platelets 186 150 - 450 x10*3/uL   Blood Gas Venous Full Panel   Result Value Ref Range    POCT pH, Venous 7.36 7.33 - 7.43 pH    POCT pCO2, Venous 53 (H) 41 - 51 mm Hg    POCT pO2, Venous 37 35 - 45 mm Hg    POCT SO2, Venous 56 45 - 75 %    POCT Oxy Hemoglobin, Venous 54.6 45.0 - 75.0 %    POCT Hematocrit Calculated, Venous 26.0 (L) 36.0 - 46.0 %    POCT Sodium, Venous 125 (L) 136 - 145 mmol/L    POCT Potassium, Venous 3.6 3.5 - 5.3 mmol/L    POCT Chloride, Venous 87 (L) 98 - 107 mmol/L    POCT Ionized Calicum, Venous 1.12 1.10 - 1.33 mmol/L    POCT Glucose, Venous 162 (H) 74 - 99 mg/dL    POCT Lactate, Venous 1.3 0.4 - 2.0 mmol/L    POCT Base Excess, Venous 3.8 (H) -2.0 - 3.0 mmol/L    POCT HCO3 Calculated, Venous 29.9 (H) 22.0 - 26.0 mmol/L    POCT Hemoglobin, Venous 8.7 (L) 12.0 - 16.0 g/dL    POCT Anion Gap, Venous 12.0 10.0 - 25.0 mmol/L    Patient Temperature 37.0 degrees Celsius     FiO2 28 %   POCT GLUCOSE   Result Value Ref Range    POCT Glucose 145 (H) 74 - 99 mg/dL   Blood Gas Arterial Full Panel   Result Value Ref Range    POCT pH, Arterial 7.47 (H) 7.38 - 7.42 pH    POCT pCO2, Arterial 43 (H) 38 - 42 mm Hg    POCT pO2, Arterial 78 (L) 85 - 95 mm Hg    POCT SO2, Arterial 98 94 - 100 %    POCT Oxy Hemoglobin, Arterial 95.5 94.0 - 98.0 %    POCT Hematocrit Calculated, Arterial 27.0 (L) 36.0 - 46.0 %    POCT Sodium, Arterial 127 (L) 136 - 145 mmol/L    POCT Potassium, Arterial 3.1 (L) 3.5 - 5.3 mmol/L    POCT Chloride, Arterial 86 (L) 98 - 107 mmol/L    POCT Ionized Calcium, Arterial 1.06 (L) 1.10 - 1.33 mmol/L    POCT Glucose, Arterial 145 (H) 74 - 99 mg/dL    POCT Lactate, Arterial 1.2 0.4 - 2.0 mmol/L    POCT Base Excess, Arterial 6.9 (H) -2.0 - 3.0 mmol/L    POCT HCO3 Calculated, Arterial 31.3 (H) 22.0 - 26.0 mmol/L    POCT Hemoglobin, Arterial 9.0 (L) 12.0 - 16.0 g/dL    POCT Anion Gap, Arterial 13 10 - 25 mmo/L    Patient Temperature 37.0 degrees Celsius    FiO2 28 %   Blood Gas Venous Full Panel   Result Value Ref Range    POCT pH, Venous 7.42 7.33 - 7.43 pH    POCT pCO2, Venous 53 (H) 41 - 51 mm Hg    POCT pO2, Venous 36 35 - 45 mm Hg    POCT SO2, Venous 55 45 - 75 %    POCT Oxy Hemoglobin, Venous 53.8 45.0 - 75.0 %    POCT Hematocrit Calculated, Venous 27.0 (L) 36.0 - 46.0 %    POCT Sodium, Venous 125 (L) 136 - 145 mmol/L    POCT Potassium, Venous 3.1 (L) 3.5 - 5.3 mmol/L    POCT Chloride, Venous 85 (L) 98 - 107 mmol/L    POCT Ionized Calicum, Venous 1.08 (L) 1.10 - 1.33 mmol/L    POCT Glucose, Venous 143 (H) 74 - 99 mg/dL    POCT Lactate, Venous 1.2 0.4 - 2.0 mmol/L    POCT Base Excess, Venous 8.7 (H) -2.0 - 3.0 mmol/L    POCT HCO3 Calculated, Venous 34.4 (H) 22.0 - 26.0 mmol/L    POCT Hemoglobin, Venous 9.0 (L) 12.0 - 16.0 g/dL    POCT Anion Gap, Venous 9.0 (L) 10.0 - 25.0 mmol/L    Patient Temperature 37.0 degrees Celsius    FiO2 28 %       XR chest 1 view   Final Result   1.   Stable exam with postsurgical changes of right pneumonectomy/rib   resection and large layering fluid component within the right   hemithorax.   2. Similar findings of left pulmonary edema with slightly increased   size of small left pleural effusion. No left-sided pneumothorax.   3. Medical devices as above.                  MACRO:   None        Signed by: Alexx Anne 1/17/2025 8:16 AM   Dictation workstation:   [id number]      XR chest 1 view   Final Result   1.  Postsurgical changes of right-sided pneumonectomy with increased   layering opacification of the right hemithorax, likely due to   increasing fluid.   2. Similar trace left-sided pleural effusion with linear left basilar   airspace opacity, likely atelectasis on a background of likely   pulmonary edema. Infection is not definitively excluded.   3. Medical devices as above with interval insertion of right internal   jugular central venous catheter projecting at the mid SVC.        I personally reviewed the images/study and I agree with the findings   as stated by resident Davidson Mcguire. This study was interpreted   at [place], [place].        MACRO:   None        Signed by: Alexx Anne 1/17/2025 8:14 AM   Dictation workstation:   [id number]      Transthoracic Echo (TTE) Limited   Final Result      US right upper quadrant   Final Result   Small amount of simple appearing ascites in the right upper quadrant   without evidence of cholelithiasis, cholecystitis or biliary dilation.        Signed by: Alberto Salas 1/16/2025 1:02 PM   Dictation workstation:   [id number]      XR chest 1 view   Final Result   1. Slight interval increase in left basilar airspace opacity which   may be due to increasing atelectasis. Cannot exclude developing   infectious infiltrate. Question trace left pleural effusion.   2. Again seen postsurgical changes related to right-sided   pneumonectomy as described above                   Signed by: Vick Xiao 1/16/2025 9:47 AM   Dictation workstation:   [id number]      US renal complete   Final Result   1. Unremarkable sonographic evaluation of the kidneys.   2. Incomplete evaluation of a gallbladder with some pericholecystic   fluid and biliary sludge. Dedicated right upper quadrant ultrasound   could be obtained as clinically warranted.   3. A small amount of free fluid present in the right upper quadrant   and pelvis.        Findings relayed by phone by me to SICU provider at 5:11 p.m.        I personally reviewed the images/study and I agree with the findings   as stated by Erickson Best MD (resident) . This study was   interpreted at [place], [place].        MACRO:   None        Signed by: Alberto Salas 1/15/2025 6:17 PM   Dictation workstation:   [id number]      XR chest 1 view   Final Result   1.  Status post pneumonectomy with interval increase in fluid   component of right-sided hydropneumothorax.   2. Medical devices as above.                  MACRO:   None        Signed by: Alexx Anne 1/15/2025 9:28 AM   Dictation workstation:   [id number]      XR chest 1 view   Final Result   1. Interval removal of right chest tube. Other medical devices as   described above.   2. Postsurgical changes related to right pneumonectomy.   3. Faint left basilar opacity, likely atelectatic.                  Signed by: Vick Xiao 1/14/2025 2:53 PM   Dictation workstation:   [id number]      XR chest 1 view   Final Result   1.  Stable right-sided pneumonectomy changes.             Signed by: Nino Barbour 1/14/2025 12:56 PM   Dictation workstation:   [id number]      Transthoracic Echo (TTE) Limited   Final Result      XR chest 1 view   Final Result   1. Postsurgical changes related to right sided pneumonectomy with   multiple medical devices as described above.   2. Mild interstitial prominence of the left lung.                   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 Wagner on 1/13 requiring post-op vasoactive therapy. Post-op course c/b acute symptomatic hyponatremia and cardiogenic shock requiring addition of milrinone therapy.      Plan:  NEURO: History of anxiety on alprazolam 1mg qd, last filled 1/4/2025 per [facility] review. A&Ox4 at baseline. Acute post-op pain. Delirium during post-op course, likely multifactorial in the setting of ICU delirium, hyponatremia, benzo administration vs benzo withdrawal. Oriented this am during exam, but anxious. Precedex infusion resumed overnight, currently at 0.2mcg/kg/min.  - ongoing neuro and pain assessments  - titrate Precedex to RASS goal 0  - scheduled IV Tylenol and PRN oxycodone 2.5mg for pain control  - Lidocaine patch  - continue melatonin and PRN nightly trazodone  - continue holding home xanax in the setting of delirium  - PT/OT consulted -> hold off for now     CV: No known history of cardiac disease. Baseline /79, -110. TTE 9/2024 normal biV function, RVSP 10mmHg. Intra-op hypotension requiring vaso and epi. Arrived to SICU on vaso 0.06 units/min, started on levo 0.05 mcg/kg/min. TTE 1/13 with EF 60-65%, low normal RV systolic function. Weaned off vasopressors 1/15. Elevated lactate to 2.9 on 1/16 with shortness of breath and new O2 requirement. Repeat TTE obtained 1/16 with acute biV dysfunction and c/f takotsubo cardiomyopathy with EF 34%, mod reduced RV systolic function, akinesis of the mid and apical segments of the RV free wall, mildly elevated RVSP, mild to mod TR, LA mod dilated, and dilated IVC consistent with elevated RA pressures. Now in  cardiogenic shock requiring initiation of milrinone therapy, lasix infusion, and vasopressin. Shock Call on 1/16, no indication for mechanical circulatory support at this time. Initial troponin 928 in the setting of demand ischemia, with repeat troponin downtrend to 625. Lactate normalized. Epinephrine infusion added overnight, but later discontinued 2/2 tachycardia in the 130s. Milrinone increased to 0.25mcg/kig/min. Vasopressin at 0.06 units/min. Remains on lasix infusion at 15mg/hr, bolused 20mg and 40mg overnight, now with good response. CVP ~4. SvO2 remains in the 50s.   - continuous EKG/abp/cvp monitoring  - Cardiology following, appreciate recs -> add on for RHC in cath lab today  - titrate vasopressin to goal map >65  - continue milrinone at 0.25mcg/kg/min and trend central venous SvO2 q4h  - Lasix infusion at 15mg/hr -> held this afternoon given new contraction alkalosis  - repeat TTE today  - transfuse 1u PRBC to maintain Hgb >9.0  - trend lactate  - continue hydrocortisone 50mg q8h  - continue holding metoprolol     PULM: Hx of 30 pack year smoking history (quit 4/2024), COPD, and stage III right lung SCC s/p chemo/XRT/immunotherapy. Now s/p Right Pneumonectomy and intrapericardial dissection with en bloc chest wall resection. Arrived to SICU extubated on 10L SFM, now on room air. CXR 1/15 with appropriate fluid accumulation in R chest after chest tube was pulled 1/14. CXR stable. Acute on chronic hypoxic respiratory failure with new O2 requirement on 1/16 in the setting of cardiogenic shock, on 2L NC.  - Continue oxygen therapy at this time  - Maintain SpO2 >92%  - Q1h incentive spirometer while awake  - continue formulary exchange for home Trelegy Elipta  - continue 3% saline nebs q6h  - PRN albuterol  - additional pulm toilet prn  - Daily CXR  - ABG prn  - holding home benzonatate     GI: No GI history. LFTs WNL 12/2/24. Evidence of pericholecystic fluid and biliary sludge on renal US 1/15 but no  abdominal symptoms. RUQ US with small ascites, otherwise no acute findings. Tolerating soft diet, however patient has decreased appetite.  - NPO for RHC today  - continue IV PPI for GI prophylaxis  - bowel regimen     : No history of renal disease. Baseline creatinine 0.6. Symptomatic acute hyponatremia (SIADH vs vaso mediated) to 120 overnight 1/14 - 1/15, corrected to 124 after 150cc 3% NaCl x2 on 1/15. Hypochloremia. Renal US on 1/15 unremarkable. Started on lasix infusion 1/16 as above in CV. Net negative 365ml for past 24hrs. Hypokalemia, hypophosphatemia, hypocalcemia, hypomagnesemia.   - RFP q8h and PRN  - Nephrology following, appreciate recs  - Lasix infusion as above in CV  - Fluid restriction: 1L / q24h  - Maintain yoo for strict I/Os   - Maintain U/O >0.5ml/kg/hr  - Replete electrolytes to maintain K>4, Phos>2.5, iCal>1.1, Mag>2.      HEME: Hx of DOC. Baseline H/H 12/40. Acute blood loss anemia, OR EBL 150mL. Acute coagulopathy with INR elevated to 2.3 on 1/15 but no s/s of bleeding and liver function normal, possibly due to poor nutritional status. Received IV Vit K 10mg x1 (additional doses held due to free water content), INR improved.   - Check CBC and coags daily and PRN  - 1u PRBC transfusion as above in CV to maintain Hgb >9  - SCDs and SQH for DVT ppx  - ongoing monitoring for s/s bleeding  - maintain active T&S (1/17)      ENDO: No history of DM or thyroid disease. TSH 1.86. Recent 5d course of prednisone 10mg 12/2024 for URI. Adequate glycemic control.  - monitor BG on RFP and ABG/VBGs  - Stress dose steroids as above     MSK: Arthritis, Vit D deficiency.  - not on meds at home  - monitor for symptoms     ID: Afebrile. Completed yunior-op course of cefazolin. Leukocytosis to 17.2 after initiation of stress dose steroids, now resolved.Respiratory viral panel pending.  - temp q4h, wbc daily  - ongoing monitoring for s/s infection     Lines:  - RIJ MAC (SICU, 1/16)  - L brachial A-line (1/16)  -  Left chest mediport accessed -> plan to de-access after PAC placement  - PIV nany  - naila (1/15)     Dispo: Continue ICU care. Patient seen and discussed with ICU attending Dr. Herbert and ICU fellow Dr. Ascencio.      ANDREA Saucedo-CNP  SICU Phone [phone]    Critical Care time: 70 minutes

## 2025-01-18 ENCOUNTER — APPOINTMENT (OUTPATIENT)
Dept: RADIOLOGY | Facility: HOSPITAL | Age: 65
End: 2025-01-18
Payer: COMMERCIAL

## 2025-01-18 LAB
ADENOVIRUS RVP, VIRC: NOT DETECTED
ALBUMIN SERPL BCP-MCNC: 3.4 G/DL (ref 3.4–5)
ALBUMIN SERPL BCP-MCNC: 3.4 G/DL (ref 3.4–5)
ALBUMIN SERPL BCP-MCNC: 3.5 G/DL (ref 3.4–5)
ANION GAP BLDA CALCULATED.4IONS-SCNC: 4 MMO/L (ref 10–25)
ANION GAP BLDA CALCULATED.4IONS-SCNC: 5 MMO/L (ref 10–25)
ANION GAP BLDA CALCULATED.4IONS-SCNC: 5 MMO/L (ref 10–25)
ANION GAP BLDA CALCULATED.4IONS-SCNC: 6 MMO/L (ref 10–25)
ANION GAP BLDA CALCULATED.4IONS-SCNC: 6 MMO/L (ref 10–25)
ANION GAP BLDA CALCULATED.4IONS-SCNC: 8 MMO/L (ref 10–25)
ANION GAP BLDA CALCULATED.4IONS-SCNC: 9 MMO/L (ref 10–25)
ANION GAP BLDMV CALCULATED.4IONS-SCNC: 2 MMO/L (ref 10–25)
ANION GAP BLDMV CALCULATED.4IONS-SCNC: 3 MMO/L (ref 10–25)
ANION GAP BLDMV CALCULATED.4IONS-SCNC: 4 MMO/L (ref 10–25)
ANION GAP BLDMV CALCULATED.4IONS-SCNC: 4 MMO/L (ref 10–25)
ANION GAP BLDMV CALCULATED.4IONS-SCNC: 5 MMO/L (ref 10–25)
ANION GAP BLDMV CALCULATED.4IONS-SCNC: 7 MMO/L (ref 10–25)
ANION GAP BLDMV CALCULATED.4IONS-SCNC: ABNORMAL MMOL/L
ANION GAP SERPL CALC-SCNC: 11 MMOL/L (ref 10–20)
ANION GAP SERPL CALC-SCNC: 13 MMOL/L (ref 10–20)
ANION GAP SERPL CALC-SCNC: 13 MMOL/L (ref 10–20)
APTT PPP: 29 SECONDS (ref 27–38)
APTT PPP: 33 SECONDS (ref 27–38)
BACTERIA FLD CULT: NORMAL
BASE EXCESS BLDA CALC-SCNC: 10.3 MMOL/L (ref -2–3)
BASE EXCESS BLDA CALC-SCNC: 11.4 MMOL/L (ref -2–3)
BASE EXCESS BLDA CALC-SCNC: 12 MMOL/L (ref -2–3)
BASE EXCESS BLDA CALC-SCNC: 14 MMOL/L (ref -2–3)
BASE EXCESS BLDA CALC-SCNC: 7.2 MMOL/L (ref -2–3)
BASE EXCESS BLDA CALC-SCNC: 7.3 MMOL/L (ref -2–3)
BASE EXCESS BLDA CALC-SCNC: 7.3 MMOL/L (ref -2–3)
BASE EXCESS BLDA CALC-SCNC: 9.6 MMOL/L (ref -2–3)
BASE EXCESS BLDA CALC-SCNC: 9.9 MMOL/L (ref -2–3)
BASE EXCESS BLDMV CALC-SCNC: 10.8 MMOL/L (ref -2–3)
BASE EXCESS BLDMV CALC-SCNC: 10.9 MMOL/L (ref -2–3)
BASE EXCESS BLDMV CALC-SCNC: 12.3 MMOL/L (ref -2–3)
BASE EXCESS BLDMV CALC-SCNC: 13.1 MMOL/L (ref -2–3)
BASE EXCESS BLDMV CALC-SCNC: 6.4 MMOL/L (ref -2–3)
BASE EXCESS BLDMV CALC-SCNC: 7.9 MMOL/L (ref -2–3)
BASE EXCESS BLDMV CALC-SCNC: 8.2 MMOL/L (ref -2–3)
BLOOD EXPIRATION DATE: NORMAL
BODY TEMPERATURE: 37 DEGREES CELSIUS
BUN SERPL-MCNC: 12 MG/DL (ref 6–23)
BUN SERPL-MCNC: 8 MG/DL (ref 6–23)
BUN SERPL-MCNC: 9 MG/DL (ref 6–23)
CA-I BLD-SCNC: 1.05 MMOL/L (ref 1.1–1.33)
CA-I BLD-SCNC: 1.18 MMOL/L (ref 1.1–1.33)
CA-I BLDA-SCNC: 1.04 MMOL/L (ref 1.1–1.33)
CA-I BLDA-SCNC: 1.09 MMOL/L (ref 1.1–1.33)
CA-I BLDA-SCNC: 1.09 MMOL/L (ref 1.1–1.33)
CA-I BLDA-SCNC: 1.13 MMOL/L (ref 1.1–1.33)
CA-I BLDA-SCNC: 1.14 MMOL/L (ref 1.1–1.33)
CA-I BLDA-SCNC: 1.15 MMOL/L (ref 1.1–1.33)
CA-I BLDA-SCNC: 1.17 MMOL/L (ref 1.1–1.33)
CA-I BLDA-SCNC: 1.19 MMOL/L (ref 1.1–1.33)
CA-I BLDA-SCNC: 1.21 MMOL/L (ref 1.1–1.33)
CA-I BLDMV-SCNC: 1.08 MMOL/L (ref 1.1–1.33)
CA-I BLDMV-SCNC: 1.09 MMOL/L (ref 1.1–1.33)
CA-I BLDMV-SCNC: 1.11 MMOL/L (ref 1.1–1.33)
CA-I BLDMV-SCNC: 1.13 MMOL/L (ref 1.1–1.33)
CA-I BLDMV-SCNC: 1.13 MMOL/L (ref 1.1–1.33)
CA-I BLDMV-SCNC: 1.15 MMOL/L (ref 1.1–1.33)
CA-I BLDMV-SCNC: 1.18 MMOL/L (ref 1.1–1.33)
CALCIUM SERPL-MCNC: 7.8 MG/DL (ref 8.6–10.6)
CALCIUM SERPL-MCNC: 8.2 MG/DL (ref 8.6–10.6)
CALCIUM SERPL-MCNC: 8.8 MG/DL (ref 8.6–10.6)
CHLORIDE BLD-SCNC: 87 MMOL/L (ref 98–107)
CHLORIDE BLD-SCNC: 88 MMOL/L (ref 98–107)
CHLORIDE BLD-SCNC: 88 MMOL/L (ref 98–107)
CHLORIDE BLD-SCNC: 90 MMOL/L (ref 98–107)
CHLORIDE BLD-SCNC: 91 MMOL/L (ref 98–107)
CHLORIDE BLD-SCNC: 92 MMOL/L (ref 98–107)
CHLORIDE BLD-SCNC: ABNORMAL MMOL/L
CHLORIDE BLDA-SCNC: 86 MMOL/L (ref 98–107)
CHLORIDE BLDA-SCNC: 87 MMOL/L (ref 98–107)
CHLORIDE BLDA-SCNC: 88 MMOL/L (ref 98–107)
CHLORIDE BLDA-SCNC: 90 MMOL/L (ref 98–107)
CHLORIDE BLDA-SCNC: 91 MMOL/L (ref 98–107)
CHLORIDE BLDA-SCNC: 91 MMOL/L (ref 98–107)
CHLORIDE BLDA-SCNC: 92 MMOL/L (ref 98–107)
CHLORIDE SERPL-SCNC: 84 MMOL/L (ref 98–107)
CHLORIDE SERPL-SCNC: 87 MMOL/L (ref 98–107)
CHLORIDE SERPL-SCNC: 87 MMOL/L (ref 98–107)
CO2 SERPL-SCNC: 34 MMOL/L (ref 21–32)
CO2 SERPL-SCNC: 35 MMOL/L (ref 21–32)
CO2 SERPL-SCNC: 36 MMOL/L (ref 21–32)
CREAT SERPL-MCNC: 0.28 MG/DL (ref 0.5–1.05)
CREAT SERPL-MCNC: 0.29 MG/DL (ref 0.5–1.05)
CREAT SERPL-MCNC: 0.36 MG/DL (ref 0.5–1.05)
DISPENSE STATUS: NORMAL
EGFRCR SERPLBLD CKD-EPI 2021: >90 ML/MIN/1.73M*2
ENTEROVIRUS/RHINOVIRUS RVP, VIRC: NOT DETECTED
ERYTHROCYTE [DISTWIDTH] IN BLOOD BY AUTOMATED COUNT: 13.1 % (ref 11.5–14.5)
ERYTHROCYTE [DISTWIDTH] IN BLOOD BY AUTOMATED COUNT: 13.5 % (ref 11.5–14.5)
GLUCOSE BLD MANUAL STRIP-MCNC: 126 MG/DL (ref 74–99)
GLUCOSE BLD MANUAL STRIP-MCNC: 155 MG/DL (ref 74–99)
GLUCOSE BLD MANUAL STRIP-MCNC: 164 MG/DL (ref 74–99)
GLUCOSE BLD-MCNC: 100 MG/DL (ref 74–99)
GLUCOSE BLD-MCNC: 114 MG/DL (ref 74–99)
GLUCOSE BLD-MCNC: 128 MG/DL (ref 74–99)
GLUCOSE BLD-MCNC: 144 MG/DL (ref 74–99)
GLUCOSE BLD-MCNC: 146 MG/DL (ref 74–99)
GLUCOSE BLD-MCNC: 159 MG/DL (ref 74–99)
GLUCOSE BLD-MCNC: 167 MG/DL (ref 74–99)
GLUCOSE BLDA-MCNC: 106 MG/DL (ref 74–99)
GLUCOSE BLDA-MCNC: 115 MG/DL (ref 74–99)
GLUCOSE BLDA-MCNC: 139 MG/DL (ref 74–99)
GLUCOSE BLDA-MCNC: 147 MG/DL (ref 74–99)
GLUCOSE BLDA-MCNC: 147 MG/DL (ref 74–99)
GLUCOSE BLDA-MCNC: 160 MG/DL (ref 74–99)
GLUCOSE BLDA-MCNC: 171 MG/DL (ref 74–99)
GLUCOSE BLDA-MCNC: 174 MG/DL (ref 74–99)
GLUCOSE BLDA-MCNC: 183 MG/DL (ref 74–99)
GLUCOSE SERPL-MCNC: 138 MG/DL (ref 74–99)
GLUCOSE SERPL-MCNC: 144 MG/DL (ref 74–99)
GLUCOSE SERPL-MCNC: 185 MG/DL (ref 74–99)
GRAM STN SPEC: NORMAL
GRAM STN SPEC: NORMAL
HCO3 BLDA-SCNC: 34.8 MMOL/L (ref 22–26)
HCO3 BLDA-SCNC: 34.9 MMOL/L (ref 22–26)
HCO3 BLDA-SCNC: 35.2 MMOL/L (ref 22–26)
HCO3 BLDA-SCNC: 35.2 MMOL/L (ref 22–26)
HCO3 BLDA-SCNC: 35.4 MMOL/L (ref 22–26)
HCO3 BLDA-SCNC: 35.6 MMOL/L (ref 22–26)
HCO3 BLDA-SCNC: 35.9 MMOL/L (ref 22–26)
HCO3 BLDA-SCNC: 37.2 MMOL/L (ref 22–26)
HCO3 BLDA-SCNC: 39 MMOL/L (ref 22–26)
HCO3 BLDMV-SCNC: 33.2 MMOL/L (ref 22–26)
HCO3 BLDMV-SCNC: 34.2 MMOL/L (ref 22–26)
HCO3 BLDMV-SCNC: 35.6 MMOL/L (ref 22–26)
HCO3 BLDMV-SCNC: 35.7 MMOL/L (ref 22–26)
HCO3 BLDMV-SCNC: 37.2 MMOL/L (ref 22–26)
HCO3 BLDMV-SCNC: 38.1 MMOL/L (ref 22–26)
HCO3 BLDMV-SCNC: 38.2 MMOL/L (ref 22–26)
HCT VFR BLD AUTO: 24.9 % (ref 36–46)
HCT VFR BLD AUTO: 29.4 % (ref 36–46)
HCT VFR BLD EST: 27 % (ref 36–46)
HCT VFR BLD EST: 29 % (ref 36–46)
HCT VFR BLD EST: 30 % (ref 36–46)
HCT VFR BLD EST: 30 % (ref 36–46)
HCT VFR BLD EST: 31 % (ref 36–46)
HCT VFR BLD EST: 31 % (ref 36–46)
HCT VFR BLD EST: 32 % (ref 36–46)
HCT VFR BLD EST: 33 % (ref 36–46)
HCT VFR BLD EST: 33 % (ref 36–46)
HCT VFR BLD EST: 34 % (ref 36–46)
HCT VFR BLD EST: 35 % (ref 36–46)
HCT VFR BLD EST: 36 % (ref 36–46)
HGB BLD-MCNC: 10.2 G/DL (ref 12–16)
HGB BLD-MCNC: 8.3 G/DL (ref 12–16)
HGB BLDA-MCNC: 10.2 G/DL (ref 12–16)
HGB BLDA-MCNC: 10.7 G/DL (ref 12–16)
HGB BLDA-MCNC: 11.1 G/DL (ref 12–16)
HGB BLDA-MCNC: 11.5 G/DL (ref 12–16)
HGB BLDA-MCNC: 11.6 G/DL (ref 12–16)
HGB BLDA-MCNC: 11.7 G/DL (ref 12–16)
HGB BLDA-MCNC: 9.6 G/DL (ref 12–16)
HGB BLDA-MCNC: 9.7 G/DL (ref 12–16)
HGB BLDA-MCNC: 9.9 G/DL (ref 12–16)
HGB BLDMV-MCNC: 10.2 G/DL (ref 12–16)
HGB BLDMV-MCNC: 11.1 G/DL (ref 12–16)
HGB BLDMV-MCNC: 11.4 G/DL (ref 12–16)
HGB BLDMV-MCNC: 12 G/DL (ref 12–16)
HGB BLDMV-MCNC: 9 G/DL (ref 12–16)
HGB BLDMV-MCNC: 9.6 G/DL (ref 12–16)
HGB BLDMV-MCNC: 9.9 G/DL (ref 12–16)
HUMAN BOCAVIRUS RVP, VIRC: NOT DETECTED
HUMAN CORONAVIRUS RVP, VIRC: NOT DETECTED
INFLUENZA A , VIRC: NOT DETECTED
INFLUENZA A H1N1-09 , VIRC: NOT DETECTED
INFLUENZA B PCR, VIRC: NOT DETECTED
INHALED O2 CONCENTRATION: 50 %
INHALED O2 CONCENTRATION: 80 %
INHALED O2 CONCENTRATION: 90 %
INR PPP: 1 (ref 0.9–1.1)
INR PPP: 1.2 (ref 0.9–1.1)
LACTATE BLDA-SCNC: 0.9 MMOL/L (ref 0.4–2)
LACTATE BLDA-SCNC: 1 MMOL/L (ref 0.4–2)
LACTATE BLDA-SCNC: 1.1 MMOL/L (ref 0.4–2)
LACTATE BLDA-SCNC: 1.3 MMOL/L (ref 0.4–2)
LACTATE BLDA-SCNC: 1.4 MMOL/L (ref 0.4–2)
LACTATE BLDA-SCNC: 1.5 MMOL/L (ref 0.4–2)
LACTATE BLDA-SCNC: 1.6 MMOL/L (ref 0.4–2)
LACTATE BLDMV-SCNC: 0.8 MMOL/L (ref 0.4–2)
LACTATE BLDMV-SCNC: 1 MMOL/L (ref 0.4–2)
LACTATE BLDMV-SCNC: 1.1 MMOL/L (ref 0.4–2)
LACTATE BLDMV-SCNC: 1.1 MMOL/L (ref 0.4–2)
LACTATE BLDMV-SCNC: 1.2 MMOL/L (ref 0.4–2)
LACTATE BLDMV-SCNC: 1.3 MMOL/L (ref 0.4–2)
LACTATE BLDMV-SCNC: 1.4 MMOL/L (ref 0.4–2)
MAGNESIUM SERPL-MCNC: 1.9 MG/DL (ref 1.6–2.4)
MAGNESIUM SERPL-MCNC: 2.17 MG/DL (ref 1.6–2.4)
MCH RBC QN AUTO: 25.6 PG (ref 26–34)
MCH RBC QN AUTO: 25.9 PG (ref 26–34)
MCHC RBC AUTO-ENTMCNC: 33.3 G/DL (ref 32–36)
MCHC RBC AUTO-ENTMCNC: 34.7 G/DL (ref 32–36)
MCV RBC AUTO: 75 FL (ref 80–100)
MCV RBC AUTO: 77 FL (ref 80–100)
METAPNEUMOVIRUS , VIRC: NOT DETECTED
MRSA DNA SPEC QL NAA+PROBE: NOT DETECTED
NRBC BLD-RTO: 0 /100 WBCS (ref 0–0)
NRBC BLD-RTO: 0.2 /100 WBCS (ref 0–0)
OXYHGB MFR BLDA: 83.2 % (ref 94–98)
OXYHGB MFR BLDA: 89.2 % (ref 94–98)
OXYHGB MFR BLDA: 92 % (ref 94–98)
OXYHGB MFR BLDA: 93.8 % (ref 94–98)
OXYHGB MFR BLDA: 94.2 % (ref 94–98)
OXYHGB MFR BLDA: 95.4 % (ref 94–98)
OXYHGB MFR BLDA: 96 % (ref 94–98)
OXYHGB MFR BLDA: 96.1 % (ref 94–98)
OXYHGB MFR BLDA: 97.1 % (ref 94–98)
OXYHGB MFR BLDMV: 45 % (ref 45–75)
OXYHGB MFR BLDMV: 47.7 % (ref 45–75)
OXYHGB MFR BLDMV: 49.2 % (ref 45–75)
OXYHGB MFR BLDMV: 57 % (ref 45–75)
OXYHGB MFR BLDMV: 59.4 % (ref 45–75)
OXYHGB MFR BLDMV: 64.3 % (ref 45–75)
OXYHGB MFR BLDMV: 68.4 % (ref 45–75)
PARAINFLUENZA PCR, VIRC: NOT DETECTED
PCO2 BLDA: 46 MM HG (ref 38–42)
PCO2 BLDA: 48 MM HG (ref 38–42)
PCO2 BLDA: 49 MM HG (ref 38–42)
PCO2 BLDA: 50 MM HG (ref 38–42)
PCO2 BLDA: 70 MM HG (ref 38–42)
PCO2 BLDA: 70 MM HG (ref 38–42)
PCO2 BLDA: 72 MM HG (ref 38–42)
PCO2 BLDMV: 48 MM HG (ref 41–51)
PCO2 BLDMV: 50 MM HG (ref 41–51)
PCO2 BLDMV: 50 MM HG (ref 41–51)
PCO2 BLDMV: 55 MM HG (ref 41–51)
PCO2 BLDMV: 56 MM HG (ref 41–51)
PCO2 BLDMV: 66 MM HG (ref 41–51)
PCO2 BLDMV: 68 MM HG (ref 41–51)
PH BLDA: 7.3 PH (ref 7.38–7.42)
PH BLDA: 7.31 PH (ref 7.38–7.42)
PH BLDA: 7.31 PH (ref 7.38–7.42)
PH BLDA: 7.46 PH (ref 7.38–7.42)
PH BLDA: 7.46 PH (ref 7.38–7.42)
PH BLDA: 7.47 PH (ref 7.38–7.42)
PH BLDA: 7.48 PH (ref 7.38–7.42)
PH BLDA: 7.5 PH (ref 7.38–7.42)
PH BLDA: 7.5 PH (ref 7.38–7.42)
PH BLDMV: 7.31 PH (ref 7.33–7.43)
PH BLDMV: 7.34 PH (ref 7.33–7.43)
PH BLDMV: 7.43 PH (ref 7.33–7.43)
PH BLDMV: 7.43 PH (ref 7.33–7.43)
PH BLDMV: 7.45 PH (ref 7.33–7.43)
PH BLDMV: 7.48 PH (ref 7.33–7.43)
PH BLDMV: 7.49 PH (ref 7.33–7.43)
PHOSPHATE SERPL-MCNC: 1.4 MG/DL (ref 2.5–4.9)
PHOSPHATE SERPL-MCNC: 2.3 MG/DL (ref 2.5–4.9)
PHOSPHATE SERPL-MCNC: 3.1 MG/DL (ref 2.5–4.9)
PLATELET # BLD AUTO: 189 X10*3/UL (ref 150–450)
PLATELET # BLD AUTO: 195 X10*3/UL (ref 150–450)
PO2 BLDA: 113 MM HG (ref 85–95)
PO2 BLDA: 51 MM HG (ref 85–95)
PO2 BLDA: 58 MM HG (ref 85–95)
PO2 BLDA: 68 MM HG (ref 85–95)
PO2 BLDA: 69 MM HG (ref 85–95)
PO2 BLDA: 74 MM HG (ref 85–95)
PO2 BLDA: 80 MM HG (ref 85–95)
PO2 BLDA: 92 MM HG (ref 85–95)
PO2 BLDA: 93 MM HG (ref 85–95)
PO2 BLDMV: 29 MM HG (ref 35–45)
PO2 BLDMV: 30 MM HG (ref 35–45)
PO2 BLDMV: 32 MM HG (ref 35–45)
PO2 BLDMV: 34 MM HG (ref 35–45)
PO2 BLDMV: 35 MM HG (ref 35–45)
PO2 BLDMV: 42 MM HG (ref 35–45)
PO2 BLDMV: 44 MM HG (ref 35–45)
POTASSIUM BLDA-SCNC: 2.8 MMOL/L (ref 3.5–5.3)
POTASSIUM BLDA-SCNC: 3.9 MMOL/L (ref 3.5–5.3)
POTASSIUM BLDA-SCNC: 3.9 MMOL/L (ref 3.5–5.3)
POTASSIUM BLDA-SCNC: 4 MMOL/L (ref 3.5–5.3)
POTASSIUM BLDA-SCNC: 4.1 MMOL/L (ref 3.5–5.3)
POTASSIUM BLDA-SCNC: 4.2 MMOL/L (ref 3.5–5.3)
POTASSIUM BLDA-SCNC: 4.2 MMOL/L (ref 3.5–5.3)
POTASSIUM BLDA-SCNC: 4.4 MMOL/L (ref 3.5–5.3)
POTASSIUM BLDA-SCNC: 4.5 MMOL/L (ref 3.5–5.3)
POTASSIUM BLDMV-SCNC: 2.7 MMOL/L (ref 3.5–5.3)
POTASSIUM BLDMV-SCNC: 3.7 MMOL/L (ref 3.5–5.3)
POTASSIUM BLDMV-SCNC: 3.8 MMOL/L (ref 3.5–5.3)
POTASSIUM BLDMV-SCNC: 3.8 MMOL/L (ref 3.5–5.3)
POTASSIUM BLDMV-SCNC: 4 MMOL/L (ref 3.5–5.3)
POTASSIUM BLDMV-SCNC: 4.2 MMOL/L (ref 3.5–5.3)
POTASSIUM BLDMV-SCNC: 4.5 MMOL/L (ref 3.5–5.3)
POTASSIUM SERPL-SCNC: 3 MMOL/L (ref 3.5–5.3)
POTASSIUM SERPL-SCNC: 4 MMOL/L (ref 3.5–5.3)
POTASSIUM SERPL-SCNC: 4.2 MMOL/L (ref 3.5–5.3)
PRODUCT BLOOD TYPE: 7300
PRODUCT CODE: NORMAL
PROTHROMBIN TIME: 11.5 SECONDS (ref 9.8–12.8)
PROTHROMBIN TIME: 13.6 SECONDS (ref 9.8–12.8)
RBC # BLD AUTO: 3.24 X10*6/UL (ref 4–5.2)
RBC # BLD AUTO: 3.94 X10*6/UL (ref 4–5.2)
RSV PCR, RVP, VIRC: NOT DETECTED
SAO2 % BLDA: 85 % (ref 94–100)
SAO2 % BLDA: 91 % (ref 94–100)
SAO2 % BLDA: 95 % (ref 94–100)
SAO2 % BLDA: 96 % (ref 94–100)
SAO2 % BLDA: 96 % (ref 94–100)
SAO2 % BLDA: 98 % (ref 94–100)
SAO2 % BLDA: 99 % (ref 94–100)
SAO2 % BLDMV: 46 % (ref 45–75)
SAO2 % BLDMV: 48 % (ref 45–75)
SAO2 % BLDMV: 50 % (ref 45–75)
SAO2 % BLDMV: 58 % (ref 45–75)
SAO2 % BLDMV: 60 % (ref 45–75)
SAO2 % BLDMV: 65 % (ref 45–75)
SAO2 % BLDMV: 70 % (ref 45–75)
SCAN RESULT: NORMAL
SODIUM BLDA-SCNC: 124 MMOL/L (ref 136–145)
SODIUM BLDA-SCNC: 126 MMOL/L (ref 136–145)
SODIUM BLDA-SCNC: 127 MMOL/L (ref 136–145)
SODIUM BLDA-SCNC: 128 MMOL/L (ref 136–145)
SODIUM BLDMV-SCNC: 123 MMOL/L (ref 136–145)
SODIUM BLDMV-SCNC: 126 MMOL/L (ref 136–145)
SODIUM BLDMV-SCNC: 126 MMOL/L (ref 136–145)
SODIUM BLDMV-SCNC: 127 MMOL/L (ref 136–145)
SODIUM SERPL-SCNC: 129 MMOL/L (ref 136–145)
SODIUM SERPL-SCNC: 130 MMOL/L (ref 136–145)
SODIUM SERPL-SCNC: 130 MMOL/L (ref 136–145)
TSH SERPL-ACNC: 0.48 MIU/L (ref 0.44–3.98)
UNIT ABO: NORMAL
UNIT NUMBER: NORMAL
UNIT RH: NORMAL
UNIT VOLUME: 350
VANCOMYCIN SERPL-MCNC: 14.3 UG/ML (ref 5–20)
WBC # BLD AUTO: 10.1 X10*3/UL (ref 4.4–11.3)
WBC # BLD AUTO: 12 X10*3/UL (ref 4.4–11.3)
XM INTEP: NORMAL

## 2025-01-18 PROCEDURE — 80069 RENAL FUNCTION PANEL: CPT

## 2025-01-18 PROCEDURE — 2500000004 HC RX 250 GENERAL PHARMACY W/ HCPCS (ALT 636 FOR OP/ED): Performed by: PHARMACIST

## 2025-01-18 PROCEDURE — 2500000004 HC RX 250 GENERAL PHARMACY W/ HCPCS (ALT 636 FOR OP/ED): Performed by: STUDENT IN AN ORGANIZED HEALTH CARE EDUCATION/TRAINING PROGRAM

## 2025-01-18 PROCEDURE — 85027 COMPLETE CBC AUTOMATED: CPT | Performed by: NURSE PRACTITIONER

## 2025-01-18 PROCEDURE — 82947 ASSAY GLUCOSE BLOOD QUANT: CPT

## 2025-01-18 PROCEDURE — 99222 1ST HOSP IP/OBS MODERATE 55: CPT | Performed by: INTERNAL MEDICINE

## 2025-01-18 PROCEDURE — 71045 X-RAY EXAM CHEST 1 VIEW: CPT

## 2025-01-18 PROCEDURE — 93010 ELECTROCARDIOGRAM REPORT: CPT | Performed by: INTERNAL MEDICINE

## 2025-01-18 PROCEDURE — 2500000005 HC RX 250 GENERAL PHARMACY W/O HCPCS: Performed by: NURSE PRACTITIONER

## 2025-01-18 PROCEDURE — 37799 UNLISTED PX VASCULAR SURGERY: CPT | Performed by: NURSE PRACTITIONER

## 2025-01-18 PROCEDURE — P9045 ALBUMIN (HUMAN), 5%, 250 ML: HCPCS | Mod: JZ | Performed by: NURSE PRACTITIONER

## 2025-01-18 PROCEDURE — 94645 CONT INHLJ TX EACH ADDL HOUR: CPT

## 2025-01-18 PROCEDURE — 84132 ASSAY OF SERUM POTASSIUM: CPT | Performed by: STUDENT IN AN ORGANIZED HEALTH CARE EDUCATION/TRAINING PROGRAM

## 2025-01-18 PROCEDURE — 85610 PROTHROMBIN TIME: CPT | Performed by: NURSE PRACTITIONER

## 2025-01-18 PROCEDURE — 80069 RENAL FUNCTION PANEL: CPT | Performed by: NURSE PRACTITIONER

## 2025-01-18 PROCEDURE — 2500000004 HC RX 250 GENERAL PHARMACY W/ HCPCS (ALT 636 FOR OP/ED)

## 2025-01-18 PROCEDURE — 2500000001 HC RX 250 WO HCPCS SELF ADMINISTERED DRUGS (ALT 637 FOR MEDICARE OP)

## 2025-01-18 PROCEDURE — 2500000005 HC RX 250 GENERAL PHARMACY W/O HCPCS

## 2025-01-18 PROCEDURE — 2500000005 HC RX 250 GENERAL PHARMACY W/O HCPCS: Performed by: PHYSICIAN ASSISTANT

## 2025-01-18 PROCEDURE — 84132 ASSAY OF SERUM POTASSIUM: CPT

## 2025-01-18 PROCEDURE — 99291 CRITICAL CARE FIRST HOUR: CPT | Performed by: ANESTHESIOLOGY

## 2025-01-18 PROCEDURE — 94640 AIRWAY INHALATION TREATMENT: CPT

## 2025-01-18 PROCEDURE — 5A1955Z RESPIRATORY VENTILATION, GREATER THAN 96 CONSECUTIVE HOURS: ICD-10-PCS | Performed by: THORACIC SURGERY (CARDIOTHORACIC VASCULAR SURGERY)

## 2025-01-18 PROCEDURE — 94660 CPAP INITIATION&MGMT: CPT

## 2025-01-18 PROCEDURE — 36430 TRANSFUSION BLD/BLD COMPNT: CPT

## 2025-01-18 PROCEDURE — 82330 ASSAY OF CALCIUM: CPT | Performed by: NURSE PRACTITIONER

## 2025-01-18 PROCEDURE — 2500000004 HC RX 250 GENERAL PHARMACY W/ HCPCS (ALT 636 FOR OP/ED): Performed by: NURSE PRACTITIONER

## 2025-01-18 PROCEDURE — P9016 RBC LEUKOCYTES REDUCED: HCPCS

## 2025-01-18 PROCEDURE — 2500000005 HC RX 250 GENERAL PHARMACY W/O HCPCS: Performed by: STUDENT IN AN ORGANIZED HEALTH CARE EDUCATION/TRAINING PROGRAM

## 2025-01-18 PROCEDURE — 84132 ASSAY OF SERUM POTASSIUM: CPT | Performed by: NURSE PRACTITIONER

## 2025-01-18 PROCEDURE — 2020000001 HC ICU ROOM DAILY

## 2025-01-18 PROCEDURE — P9045 ALBUMIN (HUMAN), 5%, 250 ML: HCPCS | Mod: JZ

## 2025-01-18 PROCEDURE — 71045 X-RAY EXAM CHEST 1 VIEW: CPT | Performed by: RADIOLOGY

## 2025-01-18 PROCEDURE — 37799 UNLISTED PX VASCULAR SURGERY: CPT | Performed by: STUDENT IN AN ORGANIZED HEALTH CARE EDUCATION/TRAINING PROGRAM

## 2025-01-18 PROCEDURE — 87086 URINE CULTURE/COLONY COUNT: CPT | Performed by: NURSE PRACTITIONER

## 2025-01-18 PROCEDURE — 83735 ASSAY OF MAGNESIUM: CPT | Performed by: NURSE PRACTITIONER

## 2025-01-18 PROCEDURE — 80202 ASSAY OF VANCOMYCIN: CPT

## 2025-01-18 PROCEDURE — 31500 INSERT EMERGENCY AIRWAY: CPT | Performed by: ANESTHESIOLOGY

## 2025-01-18 PROCEDURE — 94002 VENT MGMT INPAT INIT DAY: CPT

## 2025-01-18 PROCEDURE — 82805 BLOOD GASES W/O2 SATURATION: CPT

## 2025-01-18 PROCEDURE — 82947 ASSAY GLUCOSE BLOOD QUANT: CPT | Performed by: STUDENT IN AN ORGANIZED HEALTH CARE EDUCATION/TRAINING PROGRAM

## 2025-01-18 PROCEDURE — 0BH17EZ INSERTION OF ENDOTRACHEAL AIRWAY INTO TRACHEA, VIA NATURAL OR ARTIFICIAL OPENING: ICD-10-PCS | Performed by: ANESTHESIOLOGY

## 2025-01-18 PROCEDURE — 94644 CONT INHLJ TX 1ST HOUR: CPT

## 2025-01-18 PROCEDURE — 81001 URINALYSIS AUTO W/SCOPE: CPT | Performed by: NURSE PRACTITIONER

## 2025-01-18 PROCEDURE — 31500 INSERT EMERGENCY AIRWAY: CPT

## 2025-01-18 PROCEDURE — 85730 THROMBOPLASTIN TIME PARTIAL: CPT | Performed by: NURSE PRACTITIONER

## 2025-01-18 PROCEDURE — 99291 CRITICAL CARE FIRST HOUR: CPT | Performed by: NURSE PRACTITIONER

## 2025-01-18 PROCEDURE — 99291 CRITICAL CARE FIRST HOUR: CPT | Performed by: THORACIC SURGERY (CARDIOTHORACIC VASCULAR SURGERY)

## 2025-01-18 PROCEDURE — 31500 INSERT EMERGENCY AIRWAY: CPT | Mod: GC

## 2025-01-18 PROCEDURE — 2500000002 HC RX 250 W HCPCS SELF ADMINISTERED DRUGS (ALT 637 FOR MEDICARE OP, ALT 636 FOR OP/ED): Performed by: NURSE PRACTITIONER

## 2025-01-18 RX ORDER — CALCIUM GLUCONATE 20 MG/ML
2 INJECTION, SOLUTION INTRAVENOUS EVERY 6 HOURS PRN
Status: DISCONTINUED | OUTPATIENT
Start: 2025-01-18 | End: 2025-02-13 | Stop reason: HOSPADM

## 2025-01-18 RX ORDER — FLUCONAZOLE 2 MG/ML
400 INJECTION, SOLUTION INTRAVENOUS EVERY 24 HOURS
Status: DISCONTINUED | OUTPATIENT
Start: 2025-01-18 | End: 2025-01-21

## 2025-01-18 RX ORDER — CALCIUM GLUCONATE 20 MG/ML
1 INJECTION, SOLUTION INTRAVENOUS EVERY 6 HOURS PRN
Status: DISCONTINUED | OUTPATIENT
Start: 2025-01-18 | End: 2025-02-13 | Stop reason: HOSPADM

## 2025-01-18 RX ORDER — BUDESONIDE 0.5 MG/2ML
0.5 INHALANT ORAL
Status: DISCONTINUED | OUTPATIENT
Start: 2025-01-18 | End: 2025-02-13 | Stop reason: HOSPADM

## 2025-01-18 RX ORDER — NALOXONE HYDROCHLORIDE 0.4 MG/ML
INJECTION, SOLUTION INTRAMUSCULAR; INTRAVENOUS; SUBCUTANEOUS
Status: DISPENSED
Start: 2025-01-18 | End: 2025-01-19

## 2025-01-18 RX ORDER — ALBUMIN HUMAN 50 G/1000ML
12.5 SOLUTION INTRAVENOUS ONCE
Status: COMPLETED | OUTPATIENT
Start: 2025-01-18 | End: 2025-01-18

## 2025-01-18 RX ORDER — MAGNESIUM SULFATE HEPTAHYDRATE 40 MG/ML
4 INJECTION, SOLUTION INTRAVENOUS EVERY 6 HOURS PRN
Status: DISCONTINUED | OUTPATIENT
Start: 2025-01-18 | End: 2025-02-13 | Stop reason: HOSPADM

## 2025-01-18 RX ORDER — ROCURONIUM BROMIDE 10 MG/ML
INJECTION, SOLUTION INTRAVENOUS
Status: DISPENSED
Start: 2025-01-18 | End: 2025-01-19

## 2025-01-18 RX ORDER — MIDAZOLAM HYDROCHLORIDE 1 MG/ML
INJECTION INTRAMUSCULAR; INTRAVENOUS
Status: COMPLETED
Start: 2025-01-18 | End: 2025-01-18

## 2025-01-18 RX ORDER — FENTANYL CITRATE-0.9 % NACL/PF 10 MCG/ML
50 PLASTIC BAG, INJECTION (ML) INTRAVENOUS CONTINUOUS
Status: DISCONTINUED | OUTPATIENT
Start: 2025-01-18 | End: 2025-01-22

## 2025-01-18 RX ORDER — HYDROMORPHONE HYDROCHLORIDE 0.2 MG/ML
0.1 INJECTION INTRAMUSCULAR; INTRAVENOUS; SUBCUTANEOUS
Status: DISCONTINUED | OUTPATIENT
Start: 2025-01-18 | End: 2025-01-18

## 2025-01-18 RX ORDER — ROCURONIUM BROMIDE 10 MG/ML
50 INJECTION, SOLUTION INTRAVENOUS ONCE
Status: COMPLETED | OUTPATIENT
Start: 2025-01-18 | End: 2025-01-18

## 2025-01-18 RX ORDER — HALOPERIDOL LACTATE 5 MG/ML
1 INJECTION, SOLUTION INTRAMUSCULAR ONCE
Status: DISCONTINUED | OUTPATIENT
Start: 2025-01-18 | End: 2025-01-18

## 2025-01-18 RX ORDER — POTASSIUM CHLORIDE 29.8 MG/ML
40 INJECTION INTRAVENOUS ONCE
Status: COMPLETED | OUTPATIENT
Start: 2025-01-18 | End: 2025-01-18

## 2025-01-18 RX ORDER — ACETAMINOPHEN 10 MG/ML
500 INJECTION, SOLUTION INTRAVENOUS ONCE
Status: COMPLETED | OUTPATIENT
Start: 2025-01-18 | End: 2025-01-18

## 2025-01-18 RX ORDER — ETOMIDATE 2 MG/ML
8 INJECTION INTRAVENOUS ONCE
Status: COMPLETED | OUTPATIENT
Start: 2025-01-18 | End: 2025-01-18

## 2025-01-18 RX ORDER — ROCURONIUM BROMIDE 10 MG/ML
INJECTION, SOLUTION INTRAVENOUS
Status: COMPLETED
Start: 2025-01-18 | End: 2025-01-18

## 2025-01-18 RX ORDER — DOCUSATE SODIUM 50 MG/5ML
100 LIQUID ORAL DAILY
Status: DISCONTINUED | OUTPATIENT
Start: 2025-01-18 | End: 2025-01-18

## 2025-01-18 RX ORDER — ALBUMIN HUMAN 50 G/1000ML
SOLUTION INTRAVENOUS
Status: COMPLETED
Start: 2025-01-18 | End: 2025-01-18

## 2025-01-18 RX ORDER — DIAZEPAM 5 MG/ML
2 INJECTION, SOLUTION INTRAMUSCULAR; INTRAVENOUS ONCE
Status: COMPLETED | OUTPATIENT
Start: 2025-01-18 | End: 2025-01-18

## 2025-01-18 RX ORDER — NOREPINEPHRINE BITARTRATE/D5W 8 MG/250ML
0-.2 PLASTIC BAG, INJECTION (ML) INTRAVENOUS CONTINUOUS
Status: DISCONTINUED | OUTPATIENT
Start: 2025-01-18 | End: 2025-01-21

## 2025-01-18 RX ORDER — POTASSIUM CHLORIDE 29.8 MG/ML
40 INJECTION INTRAVENOUS ONCE
Status: DISCONTINUED | OUTPATIENT
Start: 2025-01-18 | End: 2025-01-18

## 2025-01-18 RX ORDER — BISACODYL 10 MG/1
10 SUPPOSITORY RECTAL DAILY
Status: DISCONTINUED | OUTPATIENT
Start: 2025-01-18 | End: 2025-01-25

## 2025-01-18 RX ORDER — HYDROMORPHONE HYDROCHLORIDE 0.2 MG/ML
0.2 INJECTION INTRAMUSCULAR; INTRAVENOUS; SUBCUTANEOUS ONCE
Status: COMPLETED | OUTPATIENT
Start: 2025-01-18 | End: 2025-01-18

## 2025-01-18 RX ORDER — IPRATROPIUM BROMIDE AND ALBUTEROL SULFATE 2.5; .5 MG/3ML; MG/3ML
3 SOLUTION RESPIRATORY (INHALATION)
Status: DISCONTINUED | OUTPATIENT
Start: 2025-01-18 | End: 2025-02-13 | Stop reason: HOSPADM

## 2025-01-18 RX ORDER — ACETAMINOPHEN 10 MG/ML
500 INJECTION, SOLUTION INTRAVENOUS EVERY 6 HOURS SCHEDULED
Status: DISCONTINUED | OUTPATIENT
Start: 2025-01-18 | End: 2025-01-19

## 2025-01-18 RX ORDER — SENNOSIDES 8.8 MG/5ML
5 LIQUID ORAL 2 TIMES DAILY
Status: DISCONTINUED | OUTPATIENT
Start: 2025-01-18 | End: 2025-01-18

## 2025-01-18 RX ORDER — MAGNESIUM SULFATE HEPTAHYDRATE 40 MG/ML
2 INJECTION, SOLUTION INTRAVENOUS EVERY 6 HOURS PRN
Status: DISCONTINUED | OUTPATIENT
Start: 2025-01-18 | End: 2025-02-13 | Stop reason: HOSPADM

## 2025-01-18 RX ORDER — MIDAZOLAM HYDROCHLORIDE 5 MG/ML
5 INJECTION INTRAMUSCULAR; INTRAVENOUS ONCE
Status: DISCONTINUED | OUTPATIENT
Start: 2025-01-18 | End: 2025-01-19

## 2025-01-18 RX ORDER — POTASSIUM CHLORIDE 29.8 MG/ML
40 INJECTION INTRAVENOUS EVERY 6 HOURS PRN
Status: DISCONTINUED | OUTPATIENT
Start: 2025-01-18 | End: 2025-02-01

## 2025-01-18 RX ORDER — HYDROMORPHONE HYDROCHLORIDE 0.2 MG/ML
0.1 INJECTION INTRAMUSCULAR; INTRAVENOUS; SUBCUTANEOUS EVERY 2 HOUR PRN
Status: DISCONTINUED | OUTPATIENT
Start: 2025-01-18 | End: 2025-01-19

## 2025-01-18 RX ORDER — AMOXICILLIN 250 MG
2 CAPSULE ORAL 2 TIMES DAILY
Status: DISCONTINUED | OUTPATIENT
Start: 2025-01-18 | End: 2025-01-18

## 2025-01-18 RX ORDER — HYDROMORPHONE HYDROCHLORIDE 0.2 MG/ML
0.1 INJECTION INTRAMUSCULAR; INTRAVENOUS; SUBCUTANEOUS ONCE
Status: COMPLETED | OUTPATIENT
Start: 2025-01-18 | End: 2025-01-18

## 2025-01-18 RX ORDER — PHENYLEPHRINE HCL IN 0.9% NACL 0.4MG/10ML
SYRINGE (ML) INTRAVENOUS
Status: DISPENSED
Start: 2025-01-18 | End: 2025-01-19

## 2025-01-18 RX ORDER — ETOMIDATE 2 MG/ML
INJECTION INTRAVENOUS
Status: COMPLETED
Start: 2025-01-18 | End: 2025-01-18

## 2025-01-18 RX ORDER — VANCOMYCIN HYDROCHLORIDE
1250 EVERY 12 HOURS
Status: DISCONTINUED | OUTPATIENT
Start: 2025-01-18 | End: 2025-01-19

## 2025-01-18 RX ORDER — ETOMIDATE 2 MG/ML
INJECTION INTRAVENOUS
Status: DISPENSED
Start: 2025-01-18 | End: 2025-01-19

## 2025-01-18 RX ADMIN — PIPERACILLIN SODIUM AND TAZOBACTAM SODIUM 4.5 G: 4; .5 INJECTION, SOLUTION INTRAVENOUS at 12:38

## 2025-01-18 RX ADMIN — Medication 90 PERCENT: at 08:00

## 2025-01-18 RX ADMIN — HYDROCORTISONE SODIUM SUCCINATE 50 MG: 100 INJECTION, POWDER, FOR SOLUTION INTRAMUSCULAR; INTRAVENOUS at 17:31

## 2025-01-18 RX ADMIN — HEPARIN SODIUM 5000 UNITS: 5000 INJECTION INTRAVENOUS; SUBCUTANEOUS at 23:00

## 2025-01-18 RX ADMIN — IPRATROPIUM BROMIDE AND ALBUTEROL SULFATE 3 ML: .5; 3 SOLUTION RESPIRATORY (INHALATION) at 19:51

## 2025-01-18 RX ADMIN — ALBUMIN HUMAN 12.5 G: 0.05 INJECTION, SOLUTION INTRAVENOUS at 18:15

## 2025-01-18 RX ADMIN — DIAZEPAM 2 MG: 5 INJECTION INTRAMUSCULAR; INTRAVENOUS at 09:54

## 2025-01-18 RX ADMIN — DEXMEDETOMIDINE HYDROCHLORIDE 0.5 MCG/KG/HR: 400 INJECTION INTRAVENOUS at 14:46

## 2025-01-18 RX ADMIN — NOREPINEPHRINE BITARTRATE 0.01 MCG/KG/MIN: 8 INJECTION, SOLUTION INTRAVENOUS at 18:00

## 2025-01-18 RX ADMIN — TRAZODONE HYDROCHLORIDE 50 MG: 50 TABLET ORAL at 00:43

## 2025-01-18 RX ADMIN — Medication 25 MCG/HR: at 18:35

## 2025-01-18 RX ADMIN — ROCURONIUM BROMIDE 50 MG: 10 INJECTION, SOLUTION INTRAVENOUS at 18:12

## 2025-01-18 RX ADMIN — HYDROMORPHONE HYDROCHLORIDE 0.1 MG: 0.2 INJECTION, SOLUTION INTRAMUSCULAR; INTRAVENOUS; SUBCUTANEOUS at 14:44

## 2025-01-18 RX ADMIN — ALBUMIN HUMAN 12.5 G: 50 SOLUTION INTRAVENOUS at 18:15

## 2025-01-18 RX ADMIN — AZITHROMYCIN DIHYDRATE 250 MG: 500 INJECTION, POWDER, LYOPHILIZED, FOR SOLUTION INTRAVENOUS at 23:20

## 2025-01-18 RX ADMIN — ROCURONIUM BROMIDE 50 MG: 10 INJECTION INTRAVENOUS at 18:12

## 2025-01-18 RX ADMIN — BUDESONIDE 0.5 MG: 0.5 INHALANT RESPIRATORY (INHALATION) at 19:52

## 2025-01-18 RX ADMIN — Medication 80 PERCENT: at 19:51

## 2025-01-18 RX ADMIN — ALBUMIN HUMAN 12.5 G: 0.05 INJECTION, SOLUTION INTRAVENOUS at 16:06

## 2025-01-18 RX ADMIN — ACETAMINOPHEN 500 MG: 10 INJECTION, SOLUTION INTRAVENOUS at 18:42

## 2025-01-18 RX ADMIN — Medication 1250 MG: at 17:34

## 2025-01-18 RX ADMIN — SODIUM PHOSPHATE, MONOBASIC, MONOHYDRATE AND SODIUM PHOSPHATE, DIBASIC, ANHYDROUS 15 MMOL: 142; 276 INJECTION, SOLUTION INTRAVENOUS at 23:00

## 2025-01-18 RX ADMIN — HEPARIN SODIUM 5000 UNITS: 5000 INJECTION INTRAVENOUS; SUBCUTANEOUS at 13:36

## 2025-01-18 RX ADMIN — HYDROMORPHONE HYDROCHLORIDE 0.2 MG: 0.2 INJECTION, SOLUTION INTRAMUSCULAR; INTRAVENOUS; SUBCUTANEOUS at 01:29

## 2025-01-18 RX ADMIN — PANTOPRAZOLE SODIUM 40 MG: 40 INJECTION, POWDER, FOR SOLUTION INTRAVENOUS at 06:02

## 2025-01-18 RX ADMIN — PIPERACILLIN SODIUM AND TAZOBACTAM SODIUM 4.5 G: 4; .5 INJECTION, SOLUTION INTRAVENOUS at 00:06

## 2025-01-18 RX ADMIN — TIOTROPIUM BROMIDE INHALATION SPRAY 2 PUFF: 3.12 SPRAY, METERED RESPIRATORY (INHALATION) at 08:25

## 2025-01-18 RX ADMIN — SODIUM CHLORIDE 1 G: 9 INJECTION, SOLUTION INTRAVENOUS at 06:43

## 2025-01-18 RX ADMIN — Medication 80 PERCENT: at 18:37

## 2025-01-18 RX ADMIN — CALCIUM GLUCONATE 1 G: 20 INJECTION, SOLUTION INTRAVENOUS at 21:29

## 2025-01-18 RX ADMIN — POTASSIUM PHOSPHATE, MONOBASIC POTASSIUM PHOSPHATE, DIBASIC 21 MMOL: 224; 236 INJECTION, SOLUTION, CONCENTRATE INTRAVENOUS at 06:15

## 2025-01-18 RX ADMIN — MAGNESIUM SULFATE HEPTAHYDRATE 2 G: 40 INJECTION, SOLUTION INTRAVENOUS at 21:29

## 2025-01-18 RX ADMIN — HYDROCORTISONE SODIUM SUCCINATE 50 MG: 100 INJECTION, POWDER, FOR SOLUTION INTRAMUSCULAR; INTRAVENOUS at 09:54

## 2025-01-18 RX ADMIN — HYDROMORPHONE HYDROCHLORIDE 0.1 MG: 0.2 INJECTION, SOLUTION INTRAMUSCULAR; INTRAVENOUS; SUBCUTANEOUS at 11:45

## 2025-01-18 RX ADMIN — ETOMIDATE 8 MG: 2 INJECTION INTRAVENOUS at 18:10

## 2025-01-18 RX ADMIN — ETOMIDATE 8 MG: 2 INJECTION, SOLUTION INTRAVENOUS at 18:10

## 2025-01-18 RX ADMIN — ACETAMINOPHEN 500 MG: 10 INJECTION INTRAVENOUS at 05:19

## 2025-01-18 RX ADMIN — PIPERACILLIN SODIUM AND TAZOBACTAM SODIUM 4.5 G: 4; .5 INJECTION, SOLUTION INTRAVENOUS at 18:39

## 2025-01-18 RX ADMIN — PIPERACILLIN SODIUM AND TAZOBACTAM SODIUM 4.5 G: 4; .5 INJECTION, SOLUTION INTRAVENOUS at 06:02

## 2025-01-18 RX ADMIN — ACETAMINOPHEN 500 MG: 10 INJECTION INTRAVENOUS at 10:57

## 2025-01-18 RX ADMIN — HYDROCORTISONE SODIUM SUCCINATE 50 MG: 100 INJECTION, POWDER, FOR SOLUTION INTRAMUSCULAR; INTRAVENOUS at 01:30

## 2025-01-18 RX ADMIN — VASOPRESSIN 0.03 UNITS/MIN: 0.2 INJECTION INTRAVENOUS at 08:23

## 2025-01-18 RX ADMIN — HYDROMORPHONE HYDROCHLORIDE 0.1 MG: 0.2 INJECTION, SOLUTION INTRAMUSCULAR; INTRAVENOUS; SUBCUTANEOUS at 13:35

## 2025-01-18 RX ADMIN — EPOPROSTENOL 0.05 MCG/KG/MIN: 1.5 INJECTION, POWDER, LYOPHILIZED, FOR SOLUTION INTRAVENOUS at 13:27

## 2025-01-18 RX ADMIN — LIDOCAINE 4% 1 PATCH: 40 PATCH TOPICAL at 09:53

## 2025-01-18 RX ADMIN — VASOPRESSIN 0.06 UNITS/MIN: 0.2 INJECTION INTRAVENOUS at 18:00

## 2025-01-18 RX ADMIN — MIDAZOLAM HYDROCHLORIDE 5 MG: 1 INJECTION, SOLUTION INTRAMUSCULAR; INTRAVENOUS at 18:04

## 2025-01-18 RX ADMIN — VASOPRESSIN 0.06 UNITS/MIN: 0.2 INJECTION INTRAVENOUS at 23:06

## 2025-01-18 RX ADMIN — POTASSIUM CHLORIDE 40 MEQ: 29.8 INJECTION, SOLUTION INTRAVENOUS at 02:21

## 2025-01-18 RX ADMIN — MILRINONE LACTATE IN DEXTROSE 0.38 MCG/KG/MIN: 200 INJECTION, SOLUTION INTRAVENOUS at 14:46

## 2025-01-18 RX ADMIN — SODIUM CHLORIDE SOLN NEBU 3% 3 ML: 3 NEBU SOLN at 08:25

## 2025-01-18 RX ADMIN — FLUCONAZOLE 400 MG: 2 INJECTION, SOLUTION INTRAVENOUS at 21:29

## 2025-01-18 RX ADMIN — HEPARIN SODIUM 5000 UNITS: 5000 INJECTION INTRAVENOUS; SUBCUTANEOUS at 06:02

## 2025-01-18 ASSESSMENT — PAIN - FUNCTIONAL ASSESSMENT
PAIN_FUNCTIONAL_ASSESSMENT: CPOT (CRITICAL CARE PAIN OBSERVATION TOOL)
PAIN_FUNCTIONAL_ASSESSMENT: 0-10
PAIN_FUNCTIONAL_ASSESSMENT: 0-10
PAIN_FUNCTIONAL_ASSESSMENT: CPOT (CRITICAL CARE PAIN OBSERVATION TOOL)
PAIN_FUNCTIONAL_ASSESSMENT: 0-10
PAIN_FUNCTIONAL_ASSESSMENT: CPOT (CRITICAL CARE PAIN OBSERVATION TOOL)
PAIN_FUNCTIONAL_ASSESSMENT: 0-10

## 2025-01-18 ASSESSMENT — PAIN SCALES - GENERAL
PAINLEVEL_OUTOF10: 0 - NO PAIN
PAINLEVEL_OUTOF10: 7
PAINLEVEL_OUTOF10: 0 - NO PAIN
PAINLEVEL_OUTOF10: 9
PAINLEVEL_OUTOF10: 7
PAINLEVEL_OUTOF10: 10 - WORST POSSIBLE PAIN

## 2025-01-18 ASSESSMENT — PAIN DESCRIPTION - LOCATION: LOCATION: RIB CAGE

## 2025-01-18 ASSESSMENT — COGNITIVE AND FUNCTIONAL STATUS - GENERAL: MOVING FROM LYING ON BACK TO SITTING ON SIDE OF FLAT BED WITH BEDRAILS: TOTAL

## 2025-01-18 NOTE — PROCEDURES
Intubation    Date/Time: 1/18/2025 6:33 PM    Performed by: Jonathan Herbert MD  Authorized by: Jonathan Herbert MD    Consent:     Consent obtained:  Emergent situation    Consent given by:  Spouse    Risks, benefits, and alternatives were discussed: yes      Risks discussed:  Aspiration, pneumothorax, hypoxia, death, bleeding, brain injury, dental trauma and laryngeal injury    Alternatives discussed:  Delayed treatment and observation  Pre-procedure details:     Indications: altered consciousness, respiratory distress and respiratory failure      Patient status:  Altered mental status    Look externally: no concerns      Mouth opening - incisor distance:  3 or more finger widths    Hyoid-mental distance: 3 or more finger widths      Hyoid-thyroid distance: 2 or more finger widths      Mallampati score:  I    Obstruction: none      Neck mobility: normal      Pharmacologic strategy: sedation      Induction agents:  Etomidate (8mg Etomidate, 50 mg Jules, 5 mg Versed)    Paralytics:  Rocuronium  Procedure details:     Preoxygenation: bipap then BVM.    CPR in progress: no      Number of attempts:  1  Successful intubation attempt details:     Intubation method:  Oral    Intubation technique: video assisted      Laryngoscope blade:  Mac 3    Bougie used: no      Grade view: I      Tube size (mm):  7.0    Tube type:  Cuffed    Tube visualized through cords: yes    Placement assessment:     ETT at teeth/gumline (cm):  22    Breath sounds: as expected, clear on left.    Placement verification: chest rise and colorimetric ETCO2      Chest x-ray findings: pending.  Post-procedure details:     Procedure completion:  Tolerated

## 2025-01-18 NOTE — CARE PLAN
Problem: Pain - Adult  Goal: Verbalizes/displays adequate comfort level or baseline comfort level  Outcome: Not Progressing  Flowsheets (Taken 1/18/2025 1300)  Verbalizes/displays adequate comfort level or baseline comfort level:   Encourage patient to monitor pain and request assistance   Assess pain using appropriate pain scale   Administer analgesics based on type and severity of pain and evaluate response   Implement non-pharmacological measures as appropriate and evaluate response   Consider cultural and social influences on pain and pain management   Notify Licensed Independent Practitioner if interventions unsuccessful or patient reports new pain    Problem: Skin  Goal: Decreased wound size/increased tissue granulation at next dressing change  Outcome: Progressing  Flowsheets (Taken 1/18/2025 1432)  Decreased wound size/increased tissue granulation at next dressing change:   Promote sleep for wound healing   Protective dressings over bony prominences  Goal: Participates in plan/prevention/treatment measures  Outcome: Progressing  Flowsheets (Taken 1/18/2025 1432)  Participates in plan/prevention/treatment measures: Elevate heels  Goal: Prevent/manage excess moisture  Outcome: Progressing  Flowsheets (Taken 1/18/2025 1432)  Prevent/manage excess moisture:   Cleanse incontinence/protect with barrier cream   Follow provider orders for dressing changes   Monitor for/manage infection if present  Goal: Prevent/minimize sheer/friction injuries  Outcome: Progressing  Flowsheets (Taken 1/18/2025 1432)  Prevent/minimize sheer/friction injuries:   Complete micro-shifts as needed if patient unable. Adjust patient position to relieve pressure points, not a full turn   Use pull sheet   HOB 30 degrees or less   Turn/reposition every 2 hours/use positioning/transfer devices  Goal: Promote/optimize nutrition  Outcome: Progressing  Flowsheets (Taken 1/18/2025 1432)  Promote/optimize nutrition: Monitor/record intake including  meals  Goal: Promote skin healing  Outcome: Progressing  Flowsheets (Taken 1/18/2025 1432)  Promote skin healing:   Assess skin/pad under line(s)/device(s)   Protective dressings over bony prominences   Turn/reposition every 2 hours/use positioning/transfer devices   Ensure correct size (line/device) and apply per  instructions   Rotate device position/do not position patient on device

## 2025-01-18 NOTE — CARE PLAN
Faustina Vick is a 64 y.o. female with a past medical hx of DEEPAK, tobacco use disorder and stage III squamous cell carcinoma who presented to [facility] on 1/13 for scheduled Right pneumonectomy, intrapericardial, with en bloc wall resection by Dr. Jonah Wagner 1/13. Nephrology is being consulted for symptomatic hyponatremia s/p 3L NS. Course c/b cardiogenic shock post op.      #Hyponatremia, hypervolemic  - Pt initially had severe hypoNa with sx received 3% NS. Developed cardiogenic shock, now on milrinone and vasopressin. Started on diuresis with improvement in sodium levels. Currently diuretics have been held.   - Urine electrolyes 1/15 (obtain before 3% saline admin): Na 28, Cl 66, Cr 81.3   - 1/15 Sosm 251, Uosm 648  - Clinical volume status: Euvolemic     #Stage III squamous cell carcinoma s/p R-pneumonectomy 1/13     Recommendations:  - Sodium levels improving. Defer volume management to primary team   - Ecourage solute rich diet; Ensure shakes  - Ok to follow with daily RFP      Nephrology will sign off at this time.

## 2025-01-18 NOTE — PROGRESS NOTES
Vancomycin Dosing by Pharmacy- FOLLOW UP    Faustina Vick is a 64 y.o. year old female who Pharmacy has been consulted for vancomycin dosing for pneumonia. Based on the patient's indication and renal status this patient is being dosed based on a goal AUC of 400-600.     Renal function is currently stable.    Current vancomycin dose: 1000 mg given every 12 hours    Estimated vancomycin AUC on current dose: 390 mg/L.hr     Visit Vitals  /79   Pulse (!) 127   Temp 37.5 °C (99.5 °F) (Core)   Resp 20        Lab Results   Component Value Date    CREATININE 0.29 (L) 2025    CREATININE 0.28 (L) 2025    CREATININE 0.41 (L) 2025    CREATININE 0.28 (L) 2025        Patient weight is as follows:   Vitals:    25 0600   Weight: 53.3 kg (117 lb 8.1 oz)       Cultures:  No results found for the encounter in last 14 days.       I/O last 3 completed shifts:  In: 2279.2 (42.8 mL/kg) [I.V.:1019.2 (19.1 mL/kg); IV Piggyback:1260]  Out: 3170 (59.5 mL/kg) [Urine:3165 (1.6 mL/kg/hr); Blood:5]  Weight: 53.3 kg   I/O during current shift:  I/O this shift:  In: 729.2 [I.V.:129.2; IV Piggyback:600]  Out: 150 [Urine:150]    Temp (24hrs), Av.8 °C (98.2 °F), Min:35.3 °C (95.5 °F), Max:37.9 °C (100.2 °F)      Assessment/Plan    Below goal AUC. Orders placed for new vancomcyin regimen of 1250 every 12 hours to begin at 1800 on 2025.     This dosing regimen is predicted by PLAXDRx to result in the following pharmacokinetic parameters:  Loading dose: N/A  Regimen: 1250 mg IV every 12 hours.  Start time: 18:43 on 2025  Exposure target: AUC24 (range)400-600 mg/L.hr   TIL46-06: 468 mg/L.hr  AUC24,ss: 487 mg/L.hr  Probability of AUC24 > 400: 85 %  Ctrough,ss: 12.3 mg/L  Probability of Ctrough,ss > 20: 7 %    The next level will be obtained on  at 1000. May be obtained sooner if clinically indicated.   Will continue to monitor renal function daily while on vancomycin and order serum creatinine at  least every 48 hours if not already ordered.  Follow for continued vancomycin needs, clinical response, and signs/symptoms of toxicity.       Humble Roblero, PharmD

## 2025-01-18 NOTE — NURSING NOTE
SICU team called to bedside for patient desaturation event    1128-- Patient lying flat for thermodilution numbers; increased RR into 40s and heart rate sean to 140s. RT called to bedside to increase oxygen levels to 60L, 90%.     1130-- SICU team called to bedside to assess patient. Team at bedside with RN to assess and plan. At this moment, continue to monitor on airvo and assess need to lie flat.     1136-- Jenelle SICU NP, aware and at bedside as well. Continuing to monitor.     1138-- Plan for pain medication. Team, RN, and RT aware of current patient status

## 2025-01-18 NOTE — PROGRESS NOTES
"Faustina Vick \"Noam" is a 64 y.o. 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    Subjective   Milrinone increased to 0.25 with improved CI from 1.7 to 2.9  On/off vaso  Reported fever overnight Tmax 38.7 (per monitor VS review) ~ 0500 -> given 500mg iv acetaminophen   Tachycardia improved    Objective     Physical Exam  Vitals reviewed.   Constitutional:       General: She is not in acute distress.     Appearance: She is ill-appearing.   HENT:      Head: Normocephalic and atraumatic.      Nose: Nose normal.      Mouth/Throat:      Mouth: Mucous membranes are moist.   Eyes:      Extraocular Movements: Extraocular movements intact.      Pupils: Pupils are equal, round, and reactive to light.   Neck:      Comments: RIJ introducer with PA cath in place, 58cm depth. dressing c/d/i.   Cardiovascular:      Rate and Rhythm: Normal rate and regular rhythm.      Pulses: Normal pulses.      Heart sounds: Normal heart sounds.   Pulmonary:      Effort: Tachypnea and accessory muscle usage present.      Breath sounds: Rhonchi (left side) present.      Comments: Labored breathing, on 50L, 90% Airvo. Absent lung sounds on right s/p pneumonectomy. Weak, moist cough.  Chest:      Comments: Left chest mediport accessed, dressing c/d/i. Right lateral chest dressing c/d/i, surgical tenderness.   Abdominal:      General: Abdomen is flat. Bowel sounds are normal.      Palpations: Abdomen is soft.      Tenderness: There is no abdominal tenderness.   Genitourinary:     Comments: Montiel in place with cloudy yellow urine  Musculoskeletal:      Cervical back: Neck supple.      Right lower leg: No edema.      Left lower leg: No edema.   Skin:     General: Skin is warm and dry.      Coloration: Skin is pale.   Neurological:      General: No focal deficit present.      Mental Status: She is alert and oriented to person, place, and time.      Motor: Weakness (generalized) present. " "  Psychiatric:      Comments: Follows commands, remains on precedex 0.3mcg/kg/hr       Last Recorded Vitals  Blood pressure 105/69, pulse 109, temperature 36.2 °C (97.2 °F), temperature source Temporal, resp. rate 24, height 1.651 m (5' 5\"), weight 53.3 kg (117 lb 8.1 oz), SpO2 97%.    VS over last 24h  Heart Rate:  []   Temp:  [35.9 °C (96.6 °F)-36.6 °C (97.9 °F)]   Resp:  [13-31]   BP: ()/(52-98)   Weight:  [53.3 kg (117 lb 8.1 oz)]   SpO2:  [88 %-100 %]      Intake/Output last 3 Shifts:  I/O last 3 completed shifts:  In: 2279.2 (42.8 mL/kg) [I.V.:1019.2 (19.1 mL/kg); IV Piggyback:1260]  Out: 3170 (59.5 mL/kg) [Urine:3165 (1.6 mL/kg/hr); Blood:5]  Weight: 53.3 kg       Intake/Output Summary (Last 24 hours) at 1/18/2025 0841  Last data filed at 1/18/2025 0644  Gross per 24 hour   Intake 1857.04 ml   Output 1850 ml   Net 7.04 ml        Relevant Results  Scheduled medications  azithromycin, 250 mg, oral, Daily  docusate sodium, 100 mg, nasogastric tube, Daily  fluticasone furoate-vilanteroL, 1 puff, inhalation, Daily  heparin (porcine), 5,000 Units, subcutaneous, q8h  hydrocortisone sodium succinate, 50 mg, intravenous, q8h  lidocaine, 1 patch, transdermal, Daily  magnesium oxide, 400 mg, oral, Daily  melatonin, 3 mg, oral, Nightly  [Held by provider] metoprolol tartrate, 12.5 mg, oral, q12h  oxygen, , inhalation, Continuous - Inhalation  pantoprazole, 40 mg, intravenous, Daily before breakfast  perflutren lipid microspheres, 0.5-10 mL of dilution, intravenous, Once in imaging  piperacillin-tazobactam, 4.5 g, intravenous, q6h  potassium phosphate, 21 mmol, intravenous, Once  senna, 5 mL, nasogastric tube, BID  [Held by provider] sennosides-docusate sodium, 2 tablet, oral, BID  sodium chloride, 3 mL, nebulization, q6h while awake  tiotropium, 2 puff, inhalation, Daily  vancomycin, 500 mg, intravenous, Once   Followed by  vancomycin, 1,000 mg, intravenous, q12h      Continuous medications  dexmedeTOMIDine, " 0-0.6 mcg/kg/hr, Last Rate: 0.3 mcg/kg/hr (01/18/25 0550)  [Held by provider] EPINEPHrine, 0.04 mcg/kg/min, Last Rate: Stopped (01/17/25 0305)  [Held by provider] furosemide, 15 mg/hr, Last Rate: Stopped (01/17/25 9079)  milrinone, 0.25 mcg/kg/min, Last Rate: 0.25 mcg/kg/min (01/18/25 0121)  vasopressin, 0-0.06 Units/min, Last Rate: 0.03 Units/min (01/18/25 9342)        PRN medications  PRN medications: albuterol, albuterol, oxyCODONE, potassium chloride CR, potassium chloride CR, traZODone, vancomycin      Results for orders placed or performed during the hospital encounter of 01/13/25 (from the past 24 hours)   Blood Gas Arterial Full Panel   Result Value Ref Range    POCT pH, Arterial 7.47 (H) 7.38 - 7.42 pH    POCT pCO2, Arterial 43 (H) 38 - 42 mm Hg    POCT pO2, Arterial 78 (L) 85 - 95 mm Hg    POCT SO2, Arterial 98 94 - 100 %    POCT Oxy Hemoglobin, Arterial 95.5 94.0 - 98.0 %    POCT Hematocrit Calculated, Arterial 27.0 (L) 36.0 - 46.0 %    POCT Sodium, Arterial 127 (L) 136 - 145 mmol/L    POCT Potassium, Arterial 3.1 (L) 3.5 - 5.3 mmol/L    POCT Chloride, Arterial 86 (L) 98 - 107 mmol/L    POCT Ionized Calcium, Arterial 1.06 (L) 1.10 - 1.33 mmol/L    POCT Glucose, Arterial 145 (H) 74 - 99 mg/dL    POCT Lactate, Arterial 1.2 0.4 - 2.0 mmol/L    POCT Base Excess, Arterial 6.9 (H) -2.0 - 3.0 mmol/L    POCT HCO3 Calculated, Arterial 31.3 (H) 22.0 - 26.0 mmol/L    POCT Hemoglobin, Arterial 9.0 (L) 12.0 - 16.0 g/dL    POCT Anion Gap, Arterial 13 10 - 25 mmo/L    Patient Temperature 37.0 degrees Celsius    FiO2 28 %   Renal Function Panel   Result Value Ref Range    Glucose 147 (H) 74 - 99 mg/dL    Sodium 130 (L) 136 - 145 mmol/L    Potassium 3.3 (L) 3.5 - 5.3 mmol/L    Chloride 84 (L) 98 - 107 mmol/L    Bicarbonate 33 (H) 21 - 32 mmol/L    Anion Gap 16 10 - 20 mmol/L    Urea Nitrogen 9 6 - 23 mg/dL    Creatinine 0.38 (L) 0.50 - 1.05 mg/dL    eGFR >90 >60 mL/min/1.73m*2    Calcium 8.0 (L) 8.6 - 10.6 mg/dL     Phosphorus 1.8 (L) 2.5 - 4.9 mg/dL    Albumin 3.6 3.4 - 5.0 g/dL   Calcium, Ionized   Result Value Ref Range    POCT Calcium, Ionized 0.99 (L) 1.1 - 1.33 mmol/L   Blood Gas Venous Full Panel   Result Value Ref Range    POCT pH, Venous 7.42 7.33 - 7.43 pH    POCT pCO2, Venous 53 (H) 41 - 51 mm Hg    POCT pO2, Venous 36 35 - 45 mm Hg    POCT SO2, Venous 55 45 - 75 %    POCT Oxy Hemoglobin, Venous 53.8 45.0 - 75.0 %    POCT Hematocrit Calculated, Venous 27.0 (L) 36.0 - 46.0 %    POCT Sodium, Venous 125 (L) 136 - 145 mmol/L    POCT Potassium, Venous 3.1 (L) 3.5 - 5.3 mmol/L    POCT Chloride, Venous 85 (L) 98 - 107 mmol/L    POCT Ionized Calicum, Venous 1.08 (L) 1.10 - 1.33 mmol/L    POCT Glucose, Venous 143 (H) 74 - 99 mg/dL    POCT Lactate, Venous 1.2 0.4 - 2.0 mmol/L    POCT Base Excess, Venous 8.7 (H) -2.0 - 3.0 mmol/L    POCT HCO3 Calculated, Venous 34.4 (H) 22.0 - 26.0 mmol/L    POCT Hemoglobin, Venous 9.0 (L) 12.0 - 16.0 g/dL    POCT Anion Gap, Venous 9.0 (L) 10.0 - 25.0 mmol/L    Patient Temperature 37.0 degrees Celsius    FiO2 28 %   Prepare RBC: 1 Units   Result Value Ref Range    PRODUCT CODE F0759C40     Unit Number [id number]     Unit ABO B     Unit RH POS     XM INTEP COMP     Dispense Status IS     Blood Expiration Date 1/18/2025 11:59:00 PM EST     PRODUCT BLOOD TYPE 7300     UNIT VOLUME 350    Type and screen   Result Value Ref Range    ABO TYPE B     Rh TYPE POS     ANTIBODY SCREEN NEG    POCT GLUCOSE   Result Value Ref Range    POCT Glucose 142 (H) 74 - 99 mg/dL   Transthoracic Echo (TTE) Limited   Result Value Ref Range    AV pk radha 0.87 m/s    AV mn grad 2 mmHg    LVOT diam 1.94 cm    Tricuspid annular plane systolic excursion 1.2 cm    LV EF 28 %    RV free wall pk S' 9.36 cm/s    RVSP 27.0 mmHg    Aortic Valve Area by Continuity of VTI 2.41 cm2    Aortic Valve Area by Continuity of Peak Velocity 2.26 cm2    AV pk grad 3 mmHg    LV A4C EF 18.9    Renal Function Panel   Result Value Ref Range     Glucose 123 (H) 74 - 99 mg/dL    Sodium 130 (L) 136 - 145 mmol/L    Potassium 3.1 (L) 3.5 - 5.3 mmol/L    Chloride 80 (L) 98 - 107 mmol/L    Bicarbonate 34 (H) 21 - 32 mmol/L    Anion Gap 19 10 - 20 mmol/L    Urea Nitrogen 9 6 - 23 mg/dL    Creatinine 0.28 (L) 0.50 - 1.05 mg/dL    eGFR >90 >60 mL/min/1.73m*2    Calcium 8.0 (L) 8.6 - 10.6 mg/dL    Phosphorus 2.1 (L) 2.5 - 4.9 mg/dL    Albumin 3.5 3.4 - 5.0 g/dL   Blood Gas Arterial Full Panel   Result Value Ref Range    POCT pH, Arterial 7.48 (H) 7.38 - 7.42 pH    POCT pCO2, Arterial 45 (H) 38 - 42 mm Hg    POCT pO2, Arterial 97 (H) 85 - 95 mm Hg    POCT SO2, Arterial 99 94 - 100 %    POCT Oxy Hemoglobin, Arterial 96.1 94.0 - 98.0 %    POCT Hematocrit Calculated, Arterial 32.0 (L) 36.0 - 46.0 %    POCT Sodium, Arterial 126 (L) 136 - 145 mmol/L    POCT Potassium, Arterial 2.7 (LL) 3.5 - 5.3 mmol/L    POCT Chloride, Arterial 84 (L) 98 - 107 mmol/L    POCT Ionized Calcium, Arterial 1.03 (L) 1.10 - 1.33 mmol/L    POCT Glucose, Arterial 163 (H) 74 - 99 mg/dL    POCT Lactate, Arterial 1.1 0.4 - 2.0 mmol/L    POCT Base Excess, Arterial 9.0 (H) -2.0 - 3.0 mmol/L    POCT HCO3 Calculated, Arterial 33.5 (H) 22.0 - 26.0 mmol/L    POCT Hemoglobin, Arterial 10.8 (L) 12.0 - 16.0 g/dL    POCT Anion Gap, Arterial 11 10 - 25 mmo/L    Patient Temperature 37.0 degrees Celsius    FiO2 28 %   Blood Gas Venous Full Panel   Result Value Ref Range    POCT pH, Venous 7.42 7.33 - 7.43 pH    POCT pCO2, Venous 54 (H) 41 - 51 mm Hg    POCT pO2, Venous 39 35 - 45 mm Hg    POCT SO2, Venous 67 45 - 75 %    POCT Oxy Hemoglobin, Venous 65.9 45.0 - 75.0 %    POCT Hematocrit Calculated, Venous 32.0 (L) 36.0 - 46.0 %    POCT Sodium, Venous 127 (L) 136 - 145 mmol/L    POCT Potassium, Venous 2.6 (LL) 3.5 - 5.3 mmol/L    POCT Chloride, Venous 85 (L) 98 - 107 mmol/L    POCT Ionized Calicum, Venous 1.02 (L) 1.10 - 1.33 mmol/L    POCT Glucose, Venous 163 (H) 74 - 99 mg/dL    POCT Lactate, Venous 1.3 0.4 - 2.0  mmol/L    POCT Base Excess, Venous 9.1 (H) -2.0 - 3.0 mmol/L    POCT HCO3 Calculated, Venous 35.0 (H) 22.0 - 26.0 mmol/L    POCT Hemoglobin, Venous 10.5 (L) 12.0 - 16.0 g/dL    POCT Anion Gap, Venous 10.0 10.0 - 25.0 mmol/L    Patient Temperature 37.0 degrees Celsius    FiO2 28 %   Calcium, Ionized   Result Value Ref Range    POCT Calcium, Ionized 0.97 (L) 1.1 - 1.33 mmol/L   CBC   Result Value Ref Range    WBC 9.3 4.4 - 11.3 x10*3/uL    nRBC 0.0 0.0 - 0.0 /100 WBCs    RBC 3.81 (L) 4.00 - 5.20 x10*6/uL    Hemoglobin 9.8 (L) 12.0 - 16.0 g/dL    Hematocrit 28.9 (L) 36.0 - 46.0 %    MCV 76 (L) 80 - 100 fL    MCH 25.7 (L) 26.0 - 34.0 pg    MCHC 33.9 32.0 - 36.0 g/dL    RDW 13.1 11.5 - 14.5 %    Platelets 187 150 - 450 x10*3/uL   Magnesium   Result Value Ref Range    Magnesium 1.45 (L) 1.60 - 2.40 mg/dL   Renal function panel   Result Value Ref Range    Glucose 169 (H) 74 - 99 mg/dL    Sodium 129 (L) 136 - 145 mmol/L    Potassium 3.1 (L) 3.5 - 5.3 mmol/L    Chloride 81 (L) 98 - 107 mmol/L    Bicarbonate 34 (H) 21 - 32 mmol/L    Anion Gap 17 10 - 20 mmol/L    Urea Nitrogen 9 6 - 23 mg/dL    Creatinine 0.41 (L) 0.50 - 1.05 mg/dL    eGFR >90 >60 mL/min/1.73m*2    Calcium 7.9 (L) 8.6 - 10.6 mg/dL    Phosphorus 2.0 (L) 2.5 - 4.9 mg/dL    Albumin 3.3 (L) 3.4 - 5.0 g/dL   POCT GLUCOSE   Result Value Ref Range    POCT Glucose 163 (H) 74 - 99 mg/dL   Blood Gas Arterial Full Panel   Result Value Ref Range    POCT pH, Arterial 7.44 (H) 7.38 - 7.42 pH    POCT pCO2, Arterial 54 (H) 38 - 42 mm Hg    POCT pO2, Arterial 68 (L) 85 - 95 mm Hg    POCT SO2, Arterial 95 94 - 100 %    POCT Oxy Hemoglobin, Arterial 92.3 (L) 94.0 - 98.0 %    POCT Hematocrit Calculated, Arterial 33.0 (L) 36.0 - 46.0 %    POCT Sodium, Arterial 126 (L) 136 - 145 mmol/L    POCT Potassium, Arterial 3.6 3.5 - 5.3 mmol/L    POCT Chloride, Arterial 84 (L) 98 - 107 mmol/L    POCT Ionized Calcium, Arterial 1.03 (L) 1.10 - 1.33 mmol/L    POCT Glucose, Arterial 177 (H) 74  - 99 mg/dL    POCT Lactate, Arterial 1.4 0.4 - 2.0 mmol/L    POCT Base Excess, Arterial 10.8 (H) -2.0 - 3.0 mmol/L    POCT HCO3 Calculated, Arterial 36.7 (H) 22.0 - 26.0 mmol/L    POCT Hemoglobin, Arterial 11.1 (L) 12.0 - 16.0 g/dL    POCT Anion Gap, Arterial 9 (L) 10 - 25 mmo/L    Patient Temperature 37.0 degrees Celsius    FiO2 100 %   POCT GLUCOSE   Result Value Ref Range    POCT Glucose 199 (H) 74 - 99 mg/dL   MRSA Surveillance for Vancomycin De-escalation, PCR    Specimen: Anterior Nares; Swab   Result Value Ref Range    MRSA PCR Not Detected Not Detected   Blood Culture    Specimen: Peripheral Venipuncture; Blood culture   Result Value Ref Range    Blood Culture Loaded on Instrument - Culture in progress    Blood Culture    Specimen: Peripheral Venipuncture; Blood culture   Result Value Ref Range    Blood Culture Loaded on Instrument - Culture in progress    Blood Gas Arterial Full Panel   Result Value Ref Range    POCT pH, Arterial 7.50 (H) 7.38 - 7.42 pH    POCT pCO2, Arterial 50 (H) 38 - 42 mm Hg    POCT pO2, Arterial 58 (L) 85 - 95 mm Hg    POCT SO2, Arterial 91 (L) 94 - 100 %    POCT Oxy Hemoglobin, Arterial 89.2 (L) 94.0 - 98.0 %    POCT Hematocrit Calculated, Arterial 32.0 (L) 36.0 - 46.0 %    POCT Sodium, Arterial 126 (L) 136 - 145 mmol/L    POCT Potassium, Arterial 2.8 (LL) 3.5 - 5.3 mmol/L    POCT Chloride, Arterial 86 (L) 98 - 107 mmol/L    POCT Ionized Calcium, Arterial 1.21 1.10 - 1.33 mmol/L    POCT Glucose, Arterial 147 (H) 74 - 99 mg/dL    POCT Lactate, Arterial 1.3 0.4 - 2.0 mmol/L    POCT Base Excess, Arterial 14.0 (H) -2.0 - 3.0 mmol/L    POCT HCO3 Calculated, Arterial 39.0 (H) 22.0 - 26.0 mmol/L    POCT Hemoglobin, Arterial 10.7 (L) 12.0 - 16.0 g/dL    POCT Anion Gap, Arterial 4 (L) 10 - 25 mmo/L    Patient Temperature 37.0 degrees Celsius    FiO2 50 %   Calcium, Ionized   Result Value Ref Range    POCT Calcium, Ionized 1.18 1.1 - 1.33 mmol/L   CBC   Result Value Ref Range    WBC 10.1 4.4 -  11.3 x10*3/uL    nRBC 0.0 0.0 - 0.0 /100 WBCs    RBC 3.94 (L) 4.00 - 5.20 x10*6/uL    Hemoglobin 10.2 (L) 12.0 - 16.0 g/dL    Hematocrit 29.4 (L) 36.0 - 46.0 %    MCV 75 (L) 80 - 100 fL    MCH 25.9 (L) 26.0 - 34.0 pg    MCHC 34.7 32.0 - 36.0 g/dL    RDW 13.1 11.5 - 14.5 %    Platelets 189 150 - 450 x10*3/uL   Coagulation Screen   Result Value Ref Range    Protime 11.5 9.8 - 12.8 seconds    INR 1.0 0.9 - 1.1    aPTT 29 27 - 38 seconds   Magnesium   Result Value Ref Range    Magnesium 2.17 1.60 - 2.40 mg/dL   Renal function panel   Result Value Ref Range    Glucose 138 (H) 74 - 99 mg/dL    Sodium 130 (L) 136 - 145 mmol/L    Potassium 3.0 (L) 3.5 - 5.3 mmol/L    Chloride 84 (L) 98 - 107 mmol/L    Bicarbonate 36 (H) 21 - 32 mmol/L    Anion Gap 13 10 - 20 mmol/L    Urea Nitrogen 8 6 - 23 mg/dL    Creatinine 0.28 (L) 0.50 - 1.05 mg/dL    eGFR >90 >60 mL/min/1.73m*2    Calcium 8.8 8.6 - 10.6 mg/dL    Phosphorus 1.4 (L) 2.5 - 4.9 mg/dL    Albumin 3.5 3.4 - 5.0 g/dL   BLOOD GAS MIXED VENOUS FULL PANEL   Result Value Ref Range    POCT pH, Mixed 7.49 (H) 7.33 - 7.43 pH    POCT pCO2, Mixed 50 41 - 51 mm Hg    POCT pO2, Mixed 34 (L) 35 - 45 mm Hg    POCT SO2, Mixed 58 45 - 75 %    POCT Oxy Hemoglobin, Mixed 57.0 45.0 - 75.0 %    POCT Hematocrit Calculated, Mixed 31.0 (L) 36.0 - 46.0 %    POCT Sodium, Mixed 126 (L) 136 - 145 mmol/L    POCT Potassium, Mixed 2.7 (LL) 3.5 - 5.3 mmol/L    POCT Chloride, Mixed 87 (L) 98 - 107 mmol/L    POCT Ionized Calcium, Mixed 1.18 1.10 - 1.33 mmol/L    POCT Glucose, Mixed 128 (H) 74 - 99 mg/dL    POCT Lactate, Mixed 1.1 0.4 - 2.0 mmol/L    POCT Base Excess, Mixed 13.1 (H) -2.0 - 3.0 mmol/L    POCT HCO3 Calculated, Mixed 38.1 (H) 22.0 - 26.0 mmol/L    POCT Hemoglobin, Mixed 10.2 (L) 12.0 - 16.0 g/dL    POCT Anion Gap, Mixed 4 (L) 10 - 25 mmo/L    Patient Temperature 37.0 degrees Celsius    FiO2 50 %   Blood Gas Arterial Full Panel   Result Value Ref Range    POCT pH, Arterial 7.48 (H) 7.38 - 7.42  pH    POCT pCO2, Arterial 50 (H) 38 - 42 mm Hg    POCT pO2, Arterial 51 (L) 85 - 95 mm Hg    POCT SO2, Arterial 85 (L) 94 - 100 %    POCT Oxy Hemoglobin, Arterial 83.2 (L) 94.0 - 98.0 %    POCT Hematocrit Calculated, Arterial 35.0 (L) 36.0 - 46.0 %    POCT Sodium, Arterial 126 (L) 136 - 145 mmol/L    POCT Potassium, Arterial 4.2 3.5 - 5.3 mmol/L    POCT Chloride, Arterial 87 (L) 98 - 107 mmol/L    POCT Ionized Calcium, Arterial 1.19 1.10 - 1.33 mmol/L    POCT Glucose, Arterial 106 (H) 74 - 99 mg/dL    POCT Lactate, Arterial 1.6 0.4 - 2.0 mmol/L    POCT Base Excess, Arterial 12.0 (H) -2.0 - 3.0 mmol/L    POCT HCO3 Calculated, Arterial 37.2 (H) 22.0 - 26.0 mmol/L    POCT Hemoglobin, Arterial 11.6 (L) 12.0 - 16.0 g/dL    POCT Anion Gap, Arterial 6 (L) 10 - 25 mmo/L    Patient Temperature 37.0 degrees Celsius    FiO2 50 %   BLOOD GAS MIXED VENOUS FULL PANEL   Result Value Ref Range    POCT pH, Mixed 7.48 (H) 7.33 - 7.43 pH    POCT pCO2, Mixed 48 41 - 51 mm Hg    POCT pO2, Mixed 29 (L) 35 - 45 mm Hg    POCT SO2, Mixed 46 45 - 75 %    POCT Oxy Hemoglobin, Mixed 45.0 45.0 - 75.0 %    POCT Hematocrit Calculated, Mixed 33.0 (L) 36.0 - 46.0 %    POCT Sodium, Mixed 127 (L) 136 - 145 mmol/L    POCT Potassium, Mixed 4.0 3.5 - 5.3 mmol/L    POCT Chloride, Mixed 90 (L) 98 - 107 mmol/L    POCT Ionized Calcium, Mixed 1.11 1.10 - 1.33 mmol/L    POCT Glucose, Mixed 100 (H) 74 - 99 mg/dL    POCT Lactate, Mixed 1.4 0.4 - 2.0 mmol/L    POCT Base Excess, Mixed 10.8 (H) -2.0 - 3.0 mmol/L    POCT HCO3 Calculated, Mixed 35.7 (H) 22.0 - 26.0 mmol/L    POCT Hemoglobin, Mixed 11.1 (L) 12.0 - 16.0 g/dL    POCT Anion Gap, Mixed 5 (L) 10 - 25 mmo/L    Patient Temperature 37.0 degrees Celsius    FiO2 50 %   Blood Gas Arterial Full Panel   Result Value Ref Range    POCT pH, Arterial 7.46 (H) 7.38 - 7.42 pH    POCT pCO2, Arterial 49 (H) 38 - 42 mm Hg    POCT pO2, Arterial 68 (L) 85 - 95 mm Hg    POCT SO2, Arterial 95 94 - 100 %    POCT Oxy  Hemoglobin, Arterial 92.0 (L) 94.0 - 98.0 %    POCT Hematocrit Calculated, Arterial 35.0 (L) 36.0 - 46.0 %    POCT Sodium, Arterial 127 (L) 136 - 145 mmol/L    POCT Potassium, Arterial 4.4 3.5 - 5.3 mmol/L    POCT Chloride, Arterial 88 (L) 98 - 107 mmol/L    POCT Ionized Calcium, Arterial 1.17 1.10 - 1.33 mmol/L    POCT Glucose, Arterial 115 (H) 74 - 99 mg/dL    POCT Lactate, Arterial 1.5 0.4 - 2.0 mmol/L    POCT Base Excess, Arterial 9.6 (H) -2.0 - 3.0 mmol/L    POCT HCO3 Calculated, Arterial 34.8 (H) 22.0 - 26.0 mmol/L    POCT Hemoglobin, Arterial 11.5 (L) 12.0 - 16.0 g/dL    POCT Anion Gap, Arterial 9 (L) 10 - 25 mmo/L    Patient Temperature 37.0 degrees Celsius    FiO2 90 %   BLOOD GAS MIXED VENOUS FULL PANEL   Result Value Ref Range    POCT pH, Mixed 7.45 (H) 7.33 - 7.43 pH    POCT pCO2, Mixed 55 (H) 41 - 51 mm Hg    POCT pO2, Mixed 32 (L) 35 - 45 mm Hg    POCT SO2, Mixed 50 45 - 75 %    POCT Oxy Hemoglobin, Mixed 49.2 45.0 - 75.0 %    POCT Hematocrit Calculated, Mixed 34.0 (L) 36.0 - 46.0 %    POCT Sodium, Mixed 126 (L) 136 - 145 mmol/L    POCT Potassium, Mixed 4.5 3.5 - 5.3 mmol/L    POCT Chloride, Mixed 88 (L) 98 - 107 mmol/L    POCT Ionized Calcium, Mixed 1.15 1.10 - 1.33 mmol/L    POCT Glucose, Mixed 114 (H) 74 - 99 mg/dL    POCT Lactate, Mixed 1.3 0.4 - 2.0 mmol/L    POCT Base Excess, Mixed 12.3 (H) -2.0 - 3.0 mmol/L    POCT HCO3 Calculated, Mixed 38.2 (H) 22.0 - 26.0 mmol/L    POCT Hemoglobin, Mixed 11.4 (L) 12.0 - 16.0 g/dL    POCT Anion Gap, Mixed 4 (L) 10 - 25 mmo/L    Patient Temperature 37.0 degrees Celsius    FiO2 90 %   POCT GLUCOSE   Result Value Ref Range    POCT Glucose 126 (H) 74 - 99 mg/dL   Blood Gas Arterial Full Panel   Result Value Ref Range    POCT pH, Arterial 7.50 (H) 7.38 - 7.42 pH    POCT pCO2, Arterial 46 (H) 38 - 42 mm Hg    POCT pO2, Arterial 80 (L) 85 - 95 mm Hg    POCT SO2, Arterial 98 94 - 100 %    POCT Oxy Hemoglobin, Arterial 95.4 94.0 - 98.0 %    POCT Hematocrit Calculated,  Arterial 30.0 (L) 36.0 - 46.0 %    POCT Sodium, Arterial 127 (L) 136 - 145 mmol/L    POCT Potassium, Arterial 4.0 3.5 - 5.3 mmol/L    POCT Chloride, Arterial 91 (L) 98 - 107 mmol/L    POCT Ionized Calcium, Arterial 1.09 (L) 1.10 - 1.33 mmol/L    POCT Glucose, Arterial 139 (H) 74 - 99 mg/dL    POCT Lactate, Arterial 0.9 0.4 - 2.0 mmol/L    POCT Base Excess, Arterial 11.4 (H) -2.0 - 3.0 mmol/L    POCT HCO3 Calculated, Arterial 35.9 (H) 22.0 - 26.0 mmol/L    POCT Hemoglobin, Arterial 9.9 (L) 12.0 - 16.0 g/dL    POCT Anion Gap, Arterial 4 (L) 10 - 25 mmo/L    Patient Temperature 37.0 degrees Celsius    FiO2 90 %       Transthoracic Echo (TTE) Limited   Final Result      XR chest 1 view   Final Result   1.  Stable exam with postsurgical changes of right pneumonectomy/rib   resection and large layering fluid component within the right   hemithorax.   2. Similar findings of left pulmonary edema with slightly increased   size of small left pleural effusion. No left-sided pneumothorax.   3. Medical devices as above.                  MACRO:   None        Signed by: Alexx Anne 1/17/2025 8:16 AM   Dictation workstation:   [id number]      XR chest 1 view   Final Result   1.  Postsurgical changes of right-sided pneumonectomy with increased   layering opacification of the right hemithorax, likely due to   increasing fluid.   2. Similar trace left-sided pleural effusion with linear left basilar   airspace opacity, likely atelectasis on a background of likely   pulmonary edema. Infection is not definitively excluded.   3. Medical devices as above with interval insertion of right internal   jugular central venous catheter projecting at the mid SVC.        I personally reviewed the images/study and I agree with the findings   as stated by resident Davidson Mcguire. This study was interpreted   at [facility], [place], [place].        MACRO:   None        Signed by: Alexx Anne 1/17/2025 8:14 AM    Dictation workstation:   [id number]      Transthoracic Echo (TTE) Limited   Final Result      US right upper quadrant   Final Result   Small amount of simple appearing ascites in the right upper quadrant   without evidence of cholelithiasis, cholecystitis or biliary dilation.        Signed by: Alberto Salas 1/16/2025 1:02 PM   Dictation workstation:   [id number]      XR chest 1 view   Final Result   1. Slight interval increase in left basilar airspace opacity which   may be due to increasing atelectasis. Cannot exclude developing   infectious infiltrate. Question trace left pleural effusion.   2. Again seen postsurgical changes related to right-sided   pneumonectomy as described above                  Signed by: Vick Xiao 1/16/2025 9:47 AM   Dictation workstation:   [id number]      US renal complete   Final Result   1. Unremarkable sonographic evaluation of the kidneys.   2. Incomplete evaluation of a gallbladder with some pericholecystic   fluid and biliary sludge. Dedicated right upper quadrant ultrasound   could be obtained as clinically warranted.   3. A small amount of free fluid present in the right upper quadrant   and pelvis.        Findings relayed by phone by me to SICU provider at 5:11 p.m.        I personally reviewed the images/study and I agree with the findings   as stated by Erickson Best MD (resident) . This study was   interpreted at [facility],   [place], [place].        MACRO:   None        Signed by: Alberto Salas 1/15/2025 6:17 PM   Dictation workstation:   [id number]      XR chest 1 view   Final Result   1.  Status post pneumonectomy with interval increase in fluid   component of right-sided hydropneumothorax.   2. Medical devices as above.                  MACRO:   None        Signed by: Alexx Anne 1/15/2025 9:28 AM   Dictation workstation:   [id number]      XR chest 1 view   Final Result   1. Interval removal of right  chest tube. Other medical devices as   described above.   2. Postsurgical changes related to right pneumonectomy.   3. Faint left basilar opacity, likely atelectatic.                  Signed by: Vick Xiao 1/14/2025 2:53 PM   Dictation workstation:   [id number]      XR chest 1 view   Final Result   1.  Stable right-sided pneumonectomy changes.             Signed by: Nino Barbour 1/14/2025 12:56 PM   Dictation workstation:   [id number]      Transthoracic Echo (TTE) Limited   Final Result      XR chest 1 view   Final Result   1. Postsurgical changes related to right sided pneumonectomy with   multiple medical devices as described above.   2. Mild interstitial prominence of the left lung.                  Signed by: Vick Xiao 1/13/2025 3:16 PM   Dictation workstation:   [id number]      Cardiac Catheterization Procedure    (Results Pending)   XR chest 1 view    (Results Pending)        Assessment/Plan   Assessment:  Faustina Vick is a 63 y/o with PMHx of COPD and 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 Wagner on 1/13 requiring post-op vasoactive therapy. Post-op course c/b acute symptomatic hyponatremia and cardiogenic shock requiring addition of milrinone therapy.      Plan:  NEURO: History of anxiety on alprazolam 1mg qd, last filled 1/4/2025 per [facility] review. A&Ox4 at baseline. Acute post-op pain. Delirium during post-op course, likely multifactorial in the setting of ICU delirium, hyponatremia, benzo administration vs benzo withdrawal. Oriented this am during exam, on Precedex infusion 0.3mcg/kg/min.  - ongoing neuro and pain assessments  - titrate Precedex to RASS goal 0  - prn IV Tylenol   - prn iv hydromorphone  - trial 2mg iv diazepam  - Lidocaine patch  - PT/OT consulted -> hold off for now     CV: No known history of cardiac disease. Baseline BP  109/79, -110. TTE 9/2024 normal biV function, RVSP 10mmHg. Intra-op hypotension requiring vaso and epi. Arrived to SICU on vaso 0.06 units/min, started on levo 0.05 mcg/kg/min. TTE 1/13 with EF 60-65%, low normal RV systolic function. Weaned off vasopressors 1/15. Elevated lactate to 2.9 on 1/16 with SOB and new O2 requirement. Repeat TTE 1/16: acute biV dysfunction with signs of takotsubo cardiomyopathy, EF 34%, mod reduced RV systolic function, mildly elevated RVSP, mild to mod TR. Cardiogenic shock, started milrinone, lasix infusion, and vasopressin. Shock Call on 1/16, no indication for mechanical circulatory support at this time. Initial troponin 928 in the setting of demand ischemia, with repeat troponin downtrend to 625. Lactate normalized. Epinephrine infusion added overnight 1/16-1/17, discontinued 2/2 tachycardia in the 130s. Milrinone increased to 0.25mcg/kg/min (decreased to 0.125). Vasopressin at 0.06 units/min. Lasix infusion stopped 1/17 given contraction alkalosis. CVP ~0-4. SvO2 remains in the 50s. Taken to cath lab 1/17 for PA cath placement, opening CI 2.9. Repeated on arrival to ICU, down to 1.7. Milrinone increased to 0.25mcg/kg/min. Vaso on/off overnight. Currently on, 0.03units/min. SVT 6349-1111 overnight. NSR at time of exam, became tachycardic to 140s thereafter  - continuous EKG/abp/cvp/pap monitoring  - thermodilution q4h and prn  - Cardiology following, appreciate recs   - decrease vaso to 0.02  - increase milrinone to 0.375 mcg/kg/min   - continue to trend central venous SvO2 q4h  - continue to hold Lasix infusion given contraction alkalosis  - trend lactate  - continue hydrocortisone 50mg q8h  - continue holding metoprolol  - trial iEpo via BIPAP today     PULM: Hx of 30 pack year smoking history (quit 4/2024), COPD, and stage III right lung SCC s/p chemo/XRT/immunotherapy. 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 Arrived  to SICU extubated on 10L SFM, now on room air. CXR 1/15 with appropriate fluid accumulation in R chest after chest tube was pulled 1/14. CXR stable. Acute on chronic hypoxic respiratory failure with new O2 requirement on 1/16 in the setting of cardiogenic shock. Progression to Airvo 50L 90% overnight  - ok for NIPPV per Dr. Seymour and Dr. Wagner  - Maintain SpO2 >92%  - continue formulary exchange for home Trelegy Elipta  - continue 3% saline nebs q6h  - PRN albuterol  - additional pulm toilet prn  - Daily CXR  - ABG prn  - holding home benzonatate     GI: No GI history. LFTs WNL 12/2/24. Evidence of pericholecystic fluid and biliary sludge on renal US 1/15 but no abdominal symptoms. RUQ US with small ascites, otherwise no acute findings. Tolerating soft diet, however patient has decreased appetite.  - continue IV PPI for GI prophylaxis  - bowel regimen  - strict NPO with tenuous respiratory status     : No history of renal disease. Baseline creatinine 0.6. Symptomatic acute hyponatremia (SIADH vs vaso mediated) to 120 overnight 1/14 - 1/15, corrected to 124 after 150cc 3% NaCl x2 on 1/15. Hypochloremia. Renal US on 1/15 unremarkable. Started on lasix infusion 1/16 as above in CV. Net negative 365ml for past 24hrs. Hypokalemia, hypophosphatemia, hypocalcemia, hypomagnesemia.   - RFP q12h and PRN  - Nephrology following, appreciate recs  - Maintain yoo for strict I/Os   - urine cloudy today, will change yoo and send cultures when patient able to tolerate lying flat  - Maintain U/O >0.5ml/kg/hr  - Replete electrolytes to maintain K>4, Phos>2.5, iCal>1.1, Mag>2.      HEME: Hx of DOC. Baseline H/H 12/40. Acute blood loss anemia, OR EBL 150mL. Acute coagulopathy with INR elevated to 2.3 2/2 to poor nutritional status. Received IV Vit K 10mg x1 with improved INR thereafter  - Check CBC and coags daily and PRN  - goal Hgb >9  - SCDs and SQH for DVT ppx  - ongoing monitoring for s/s bleeding  - maintain active T&S  (1/17)      ENDO: No history of DM or thyroid disease. TSH 1.86. Recent 5d course of prednisone 10mg 12/2024 for URI. Adequate glycemic control. Started on SDS out of OR 1/13  - monitor BG on RFP and ABG/VBGs  - Stress dose steroids as above     MSK: Arthritis, Vit D deficiency.  - not on meds at home  - monitor for symptoms     ID: Febrile, Tmax 38.7. Completed yunior-op course of cefazolin. Leukocytosis to 17.2 after initiation of stress dose steroids, now resolved. Worsened clinical condition 1/17, started on vanco, zosyn and azithromycin. Blood cultures sent. Respiratory viral panel pending.  - temp q4h, wbc daily  - ongoing monitoring for s/s infection     Lines:  - RIJ MAC (SICU, 1/16)  - PA cath (1/17)  - L brachial A-line (1/16)  - Left chest mediport accessed   - PIV x2  - yoo (1/15)     Dispo: Continue ICU care. Patient seen and discussed with ICU attending Dr. Herbert.      ANDREA Irizarry-CNP  SICU Phone [phone]    Critical Care time: 120 minutes

## 2025-01-18 NOTE — PROGRESS NOTES
"Faustina Vick \"Noam" is a 64 y.o. 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    Subjective   Patient mentation mildly improved this AM, however still very fatigued and ill appearing       Objective     Physical Exam  Constitutional:       Appearance: She is ill-appearing and diaphoretic.      Comments: On precedex   HENT:      Head: Normocephalic and atraumatic.   Eyes:      Conjunctiva/sclera: Conjunctivae normal.   Cardiovascular:      Rate and Rhythm: Normal rate and regular rhythm.      Comments: Vaso 0.03, Lasix gtt held, milrinone  Pulmonary:      Comments: R CT removed, dressing intact, no leakage. On Airvo 90% FiO2, breathing labored  Abdominal:      General: There is no distension.   Genitourinary:     Comments: Montiel in place, clear urine  Musculoskeletal:         General: No swelling.   Skin:     General: Skin is warm.      Comments: Mottling to BL LE   Neurological:      General: No focal deficit present.      Mental Status: She is alert.      Comments: Asking appropriate questions   Psychiatric:         Behavior: Behavior normal.         Last Recorded Vitals  Blood pressure 105/69, pulse 109, temperature 36.2 °C (97.2 °F), temperature source Temporal, resp. rate 24, height 1.651 m (5' 5\"), weight 53.3 kg (117 lb 8.1 oz), SpO2 97%.  Intake/Output last 3 Shifts:  I/O last 3 completed shifts:  In: 2279.2 (42.8 mL/kg) [I.V.:1019.2 (19.1 mL/kg); IV Piggyback:1260]  Out: 3170 (59.5 mL/kg) [Urine:3165 (1.6 mL/kg/hr); Blood:5]  Weight: 53.3 kg     Relevant Results  Results for orders placed or performed during the hospital encounter of 01/13/25 (from the past 24 hours)   Blood Gas Arterial Full Panel   Result Value Ref Range    POCT pH, Arterial 7.47 (H) 7.38 - 7.42 pH    POCT pCO2, Arterial 43 (H) 38 - 42 mm Hg    POCT pO2, Arterial 78 (L) 85 - 95 mm Hg    POCT SO2, Arterial 98 94 - 100 %    POCT Oxy Hemoglobin, Arterial 95.5 94.0 - 98.0 %    POCT " Hematocrit Calculated, Arterial 27.0 (L) 36.0 - 46.0 %    POCT Sodium, Arterial 127 (L) 136 - 145 mmol/L    POCT Potassium, Arterial 3.1 (L) 3.5 - 5.3 mmol/L    POCT Chloride, Arterial 86 (L) 98 - 107 mmol/L    POCT Ionized Calcium, Arterial 1.06 (L) 1.10 - 1.33 mmol/L    POCT Glucose, Arterial 145 (H) 74 - 99 mg/dL    POCT Lactate, Arterial 1.2 0.4 - 2.0 mmol/L    POCT Base Excess, Arterial 6.9 (H) -2.0 - 3.0 mmol/L    POCT HCO3 Calculated, Arterial 31.3 (H) 22.0 - 26.0 mmol/L    POCT Hemoglobin, Arterial 9.0 (L) 12.0 - 16.0 g/dL    POCT Anion Gap, Arterial 13 10 - 25 mmo/L    Patient Temperature 37.0 degrees Celsius    FiO2 28 %   Renal Function Panel   Result Value Ref Range    Glucose 147 (H) 74 - 99 mg/dL    Sodium 130 (L) 136 - 145 mmol/L    Potassium 3.3 (L) 3.5 - 5.3 mmol/L    Chloride 84 (L) 98 - 107 mmol/L    Bicarbonate 33 (H) 21 - 32 mmol/L    Anion Gap 16 10 - 20 mmol/L    Urea Nitrogen 9 6 - 23 mg/dL    Creatinine 0.38 (L) 0.50 - 1.05 mg/dL    eGFR >90 >60 mL/min/1.73m*2    Calcium 8.0 (L) 8.6 - 10.6 mg/dL    Phosphorus 1.8 (L) 2.5 - 4.9 mg/dL    Albumin 3.6 3.4 - 5.0 g/dL   Calcium, Ionized   Result Value Ref Range    POCT Calcium, Ionized 0.99 (L) 1.1 - 1.33 mmol/L   Blood Gas Venous Full Panel   Result Value Ref Range    POCT pH, Venous 7.42 7.33 - 7.43 pH    POCT pCO2, Venous 53 (H) 41 - 51 mm Hg    POCT pO2, Venous 36 35 - 45 mm Hg    POCT SO2, Venous 55 45 - 75 %    POCT Oxy Hemoglobin, Venous 53.8 45.0 - 75.0 %    POCT Hematocrit Calculated, Venous 27.0 (L) 36.0 - 46.0 %    POCT Sodium, Venous 125 (L) 136 - 145 mmol/L    POCT Potassium, Venous 3.1 (L) 3.5 - 5.3 mmol/L    POCT Chloride, Venous 85 (L) 98 - 107 mmol/L    POCT Ionized Calicum, Venous 1.08 (L) 1.10 - 1.33 mmol/L    POCT Glucose, Venous 143 (H) 74 - 99 mg/dL    POCT Lactate, Venous 1.2 0.4 - 2.0 mmol/L    POCT Base Excess, Venous 8.7 (H) -2.0 - 3.0 mmol/L    POCT HCO3 Calculated, Venous 34.4 (H) 22.0 - 26.0 mmol/L    POCT Hemoglobin,  Venous 9.0 (L) 12.0 - 16.0 g/dL    POCT Anion Gap, Venous 9.0 (L) 10.0 - 25.0 mmol/L    Patient Temperature 37.0 degrees Celsius    FiO2 28 %   Prepare RBC: 1 Units   Result Value Ref Range    PRODUCT CODE I4564S35     Unit Number [id number]     Unit ABO B     Unit RH POS     XM INTEP COMP     Dispense Status IS     Blood Expiration Date 1/18/2025 11:59:00 PM EST     PRODUCT BLOOD TYPE 7300     UNIT VOLUME 350    Type and screen   Result Value Ref Range    ABO TYPE B     Rh TYPE POS     ANTIBODY SCREEN NEG    POCT GLUCOSE   Result Value Ref Range    POCT Glucose 142 (H) 74 - 99 mg/dL   Transthoracic Echo (TTE) Limited   Result Value Ref Range    AV pk radha 0.87 m/s    AV mn grad 2 mmHg    LVOT diam 1.94 cm    Tricuspid annular plane systolic excursion 1.2 cm    LV EF 28 %    RV free wall pk S' 9.36 cm/s    RVSP 27.0 mmHg    Aortic Valve Area by Continuity of VTI 2.41 cm2    Aortic Valve Area by Continuity of Peak Velocity 2.26 cm2    AV pk grad 3 mmHg    LV A4C EF 18.9    Renal Function Panel   Result Value Ref Range    Glucose 123 (H) 74 - 99 mg/dL    Sodium 130 (L) 136 - 145 mmol/L    Potassium 3.1 (L) 3.5 - 5.3 mmol/L    Chloride 80 (L) 98 - 107 mmol/L    Bicarbonate 34 (H) 21 - 32 mmol/L    Anion Gap 19 10 - 20 mmol/L    Urea Nitrogen 9 6 - 23 mg/dL    Creatinine 0.28 (L) 0.50 - 1.05 mg/dL    eGFR >90 >60 mL/min/1.73m*2    Calcium 8.0 (L) 8.6 - 10.6 mg/dL    Phosphorus 2.1 (L) 2.5 - 4.9 mg/dL    Albumin 3.5 3.4 - 5.0 g/dL   Blood Gas Arterial Full Panel   Result Value Ref Range    POCT pH, Arterial 7.48 (H) 7.38 - 7.42 pH    POCT pCO2, Arterial 45 (H) 38 - 42 mm Hg    POCT pO2, Arterial 97 (H) 85 - 95 mm Hg    POCT SO2, Arterial 99 94 - 100 %    POCT Oxy Hemoglobin, Arterial 96.1 94.0 - 98.0 %    POCT Hematocrit Calculated, Arterial 32.0 (L) 36.0 - 46.0 %    POCT Sodium, Arterial 126 (L) 136 - 145 mmol/L    POCT Potassium, Arterial 2.7 (LL) 3.5 - 5.3 mmol/L    POCT Chloride, Arterial 84 (L) 98 - 107 mmol/L     POCT Ionized Calcium, Arterial 1.03 (L) 1.10 - 1.33 mmol/L    POCT Glucose, Arterial 163 (H) 74 - 99 mg/dL    POCT Lactate, Arterial 1.1 0.4 - 2.0 mmol/L    POCT Base Excess, Arterial 9.0 (H) -2.0 - 3.0 mmol/L    POCT HCO3 Calculated, Arterial 33.5 (H) 22.0 - 26.0 mmol/L    POCT Hemoglobin, Arterial 10.8 (L) 12.0 - 16.0 g/dL    POCT Anion Gap, Arterial 11 10 - 25 mmo/L    Patient Temperature 37.0 degrees Celsius    FiO2 28 %   Blood Gas Venous Full Panel   Result Value Ref Range    POCT pH, Venous 7.42 7.33 - 7.43 pH    POCT pCO2, Venous 54 (H) 41 - 51 mm Hg    POCT pO2, Venous 39 35 - 45 mm Hg    POCT SO2, Venous 67 45 - 75 %    POCT Oxy Hemoglobin, Venous 65.9 45.0 - 75.0 %    POCT Hematocrit Calculated, Venous 32.0 (L) 36.0 - 46.0 %    POCT Sodium, Venous 127 (L) 136 - 145 mmol/L    POCT Potassium, Venous 2.6 (LL) 3.5 - 5.3 mmol/L    POCT Chloride, Venous 85 (L) 98 - 107 mmol/L    POCT Ionized Calicum, Venous 1.02 (L) 1.10 - 1.33 mmol/L    POCT Glucose, Venous 163 (H) 74 - 99 mg/dL    POCT Lactate, Venous 1.3 0.4 - 2.0 mmol/L    POCT Base Excess, Venous 9.1 (H) -2.0 - 3.0 mmol/L    POCT HCO3 Calculated, Venous 35.0 (H) 22.0 - 26.0 mmol/L    POCT Hemoglobin, Venous 10.5 (L) 12.0 - 16.0 g/dL    POCT Anion Gap, Venous 10.0 10.0 - 25.0 mmol/L    Patient Temperature 37.0 degrees Celsius    FiO2 28 %   Calcium, Ionized   Result Value Ref Range    POCT Calcium, Ionized 0.97 (L) 1.1 - 1.33 mmol/L   CBC   Result Value Ref Range    WBC 9.3 4.4 - 11.3 x10*3/uL    nRBC 0.0 0.0 - 0.0 /100 WBCs    RBC 3.81 (L) 4.00 - 5.20 x10*6/uL    Hemoglobin 9.8 (L) 12.0 - 16.0 g/dL    Hematocrit 28.9 (L) 36.0 - 46.0 %    MCV 76 (L) 80 - 100 fL    MCH 25.7 (L) 26.0 - 34.0 pg    MCHC 33.9 32.0 - 36.0 g/dL    RDW 13.1 11.5 - 14.5 %    Platelets 187 150 - 450 x10*3/uL   Magnesium   Result Value Ref Range    Magnesium 1.45 (L) 1.60 - 2.40 mg/dL   Renal function panel   Result Value Ref Range    Glucose 169 (H) 74 - 99 mg/dL    Sodium 129 (L)  136 - 145 mmol/L    Potassium 3.1 (L) 3.5 - 5.3 mmol/L    Chloride 81 (L) 98 - 107 mmol/L    Bicarbonate 34 (H) 21 - 32 mmol/L    Anion Gap 17 10 - 20 mmol/L    Urea Nitrogen 9 6 - 23 mg/dL    Creatinine 0.41 (L) 0.50 - 1.05 mg/dL    eGFR >90 >60 mL/min/1.73m*2    Calcium 7.9 (L) 8.6 - 10.6 mg/dL    Phosphorus 2.0 (L) 2.5 - 4.9 mg/dL    Albumin 3.3 (L) 3.4 - 5.0 g/dL   POCT GLUCOSE   Result Value Ref Range    POCT Glucose 163 (H) 74 - 99 mg/dL   Blood Gas Arterial Full Panel   Result Value Ref Range    POCT pH, Arterial 7.44 (H) 7.38 - 7.42 pH    POCT pCO2, Arterial 54 (H) 38 - 42 mm Hg    POCT pO2, Arterial 68 (L) 85 - 95 mm Hg    POCT SO2, Arterial 95 94 - 100 %    POCT Oxy Hemoglobin, Arterial 92.3 (L) 94.0 - 98.0 %    POCT Hematocrit Calculated, Arterial 33.0 (L) 36.0 - 46.0 %    POCT Sodium, Arterial 126 (L) 136 - 145 mmol/L    POCT Potassium, Arterial 3.6 3.5 - 5.3 mmol/L    POCT Chloride, Arterial 84 (L) 98 - 107 mmol/L    POCT Ionized Calcium, Arterial 1.03 (L) 1.10 - 1.33 mmol/L    POCT Glucose, Arterial 177 (H) 74 - 99 mg/dL    POCT Lactate, Arterial 1.4 0.4 - 2.0 mmol/L    POCT Base Excess, Arterial 10.8 (H) -2.0 - 3.0 mmol/L    POCT HCO3 Calculated, Arterial 36.7 (H) 22.0 - 26.0 mmol/L    POCT Hemoglobin, Arterial 11.1 (L) 12.0 - 16.0 g/dL    POCT Anion Gap, Arterial 9 (L) 10 - 25 mmo/L    Patient Temperature 37.0 degrees Celsius    FiO2 100 %   POCT GLUCOSE   Result Value Ref Range    POCT Glucose 199 (H) 74 - 99 mg/dL   MRSA Surveillance for Vancomycin De-escalation, PCR    Specimen: Anterior Nares; Swab   Result Value Ref Range    MRSA PCR Not Detected Not Detected   Blood Culture    Specimen: Peripheral Venipuncture; Blood culture   Result Value Ref Range    Blood Culture Loaded on Instrument - Culture in progress    Blood Culture    Specimen: Peripheral Venipuncture; Blood culture   Result Value Ref Range    Blood Culture Loaded on Instrument - Culture in progress    Blood Gas Arterial Full Panel    Result Value Ref Range    POCT pH, Arterial 7.50 (H) 7.38 - 7.42 pH    POCT pCO2, Arterial 50 (H) 38 - 42 mm Hg    POCT pO2, Arterial 58 (L) 85 - 95 mm Hg    POCT SO2, Arterial 91 (L) 94 - 100 %    POCT Oxy Hemoglobin, Arterial 89.2 (L) 94.0 - 98.0 %    POCT Hematocrit Calculated, Arterial 32.0 (L) 36.0 - 46.0 %    POCT Sodium, Arterial 126 (L) 136 - 145 mmol/L    POCT Potassium, Arterial 2.8 (LL) 3.5 - 5.3 mmol/L    POCT Chloride, Arterial 86 (L) 98 - 107 mmol/L    POCT Ionized Calcium, Arterial 1.21 1.10 - 1.33 mmol/L    POCT Glucose, Arterial 147 (H) 74 - 99 mg/dL    POCT Lactate, Arterial 1.3 0.4 - 2.0 mmol/L    POCT Base Excess, Arterial 14.0 (H) -2.0 - 3.0 mmol/L    POCT HCO3 Calculated, Arterial 39.0 (H) 22.0 - 26.0 mmol/L    POCT Hemoglobin, Arterial 10.7 (L) 12.0 - 16.0 g/dL    POCT Anion Gap, Arterial 4 (L) 10 - 25 mmo/L    Patient Temperature 37.0 degrees Celsius    FiO2 50 %   Calcium, Ionized   Result Value Ref Range    POCT Calcium, Ionized 1.18 1.1 - 1.33 mmol/L   CBC   Result Value Ref Range    WBC 10.1 4.4 - 11.3 x10*3/uL    nRBC 0.0 0.0 - 0.0 /100 WBCs    RBC 3.94 (L) 4.00 - 5.20 x10*6/uL    Hemoglobin 10.2 (L) 12.0 - 16.0 g/dL    Hematocrit 29.4 (L) 36.0 - 46.0 %    MCV 75 (L) 80 - 100 fL    MCH 25.9 (L) 26.0 - 34.0 pg    MCHC 34.7 32.0 - 36.0 g/dL    RDW 13.1 11.5 - 14.5 %    Platelets 189 150 - 450 x10*3/uL   Coagulation Screen   Result Value Ref Range    Protime 11.5 9.8 - 12.8 seconds    INR 1.0 0.9 - 1.1    aPTT 29 27 - 38 seconds   Magnesium   Result Value Ref Range    Magnesium 2.17 1.60 - 2.40 mg/dL   Renal function panel   Result Value Ref Range    Glucose 138 (H) 74 - 99 mg/dL    Sodium 130 (L) 136 - 145 mmol/L    Potassium 3.0 (L) 3.5 - 5.3 mmol/L    Chloride 84 (L) 98 - 107 mmol/L    Bicarbonate 36 (H) 21 - 32 mmol/L    Anion Gap 13 10 - 20 mmol/L    Urea Nitrogen 8 6 - 23 mg/dL    Creatinine 0.28 (L) 0.50 - 1.05 mg/dL    eGFR >90 >60 mL/min/1.73m*2    Calcium 8.8 8.6 - 10.6 mg/dL     Phosphorus 1.4 (L) 2.5 - 4.9 mg/dL    Albumin 3.5 3.4 - 5.0 g/dL   BLOOD GAS MIXED VENOUS FULL PANEL   Result Value Ref Range    POCT pH, Mixed 7.49 (H) 7.33 - 7.43 pH    POCT pCO2, Mixed 50 41 - 51 mm Hg    POCT pO2, Mixed 34 (L) 35 - 45 mm Hg    POCT SO2, Mixed 58 45 - 75 %    POCT Oxy Hemoglobin, Mixed 57.0 45.0 - 75.0 %    POCT Hematocrit Calculated, Mixed 31.0 (L) 36.0 - 46.0 %    POCT Sodium, Mixed 126 (L) 136 - 145 mmol/L    POCT Potassium, Mixed 2.7 (LL) 3.5 - 5.3 mmol/L    POCT Chloride, Mixed 87 (L) 98 - 107 mmol/L    POCT Ionized Calcium, Mixed 1.18 1.10 - 1.33 mmol/L    POCT Glucose, Mixed 128 (H) 74 - 99 mg/dL    POCT Lactate, Mixed 1.1 0.4 - 2.0 mmol/L    POCT Base Excess, Mixed 13.1 (H) -2.0 - 3.0 mmol/L    POCT HCO3 Calculated, Mixed 38.1 (H) 22.0 - 26.0 mmol/L    POCT Hemoglobin, Mixed 10.2 (L) 12.0 - 16.0 g/dL    POCT Anion Gap, Mixed 4 (L) 10 - 25 mmo/L    Patient Temperature 37.0 degrees Celsius    FiO2 50 %   Blood Gas Arterial Full Panel   Result Value Ref Range    POCT pH, Arterial 7.48 (H) 7.38 - 7.42 pH    POCT pCO2, Arterial 50 (H) 38 - 42 mm Hg    POCT pO2, Arterial 51 (L) 85 - 95 mm Hg    POCT SO2, Arterial 85 (L) 94 - 100 %    POCT Oxy Hemoglobin, Arterial 83.2 (L) 94.0 - 98.0 %    POCT Hematocrit Calculated, Arterial 35.0 (L) 36.0 - 46.0 %    POCT Sodium, Arterial 126 (L) 136 - 145 mmol/L    POCT Potassium, Arterial 4.2 3.5 - 5.3 mmol/L    POCT Chloride, Arterial 87 (L) 98 - 107 mmol/L    POCT Ionized Calcium, Arterial 1.19 1.10 - 1.33 mmol/L    POCT Glucose, Arterial 106 (H) 74 - 99 mg/dL    POCT Lactate, Arterial 1.6 0.4 - 2.0 mmol/L    POCT Base Excess, Arterial 12.0 (H) -2.0 - 3.0 mmol/L    POCT HCO3 Calculated, Arterial 37.2 (H) 22.0 - 26.0 mmol/L    POCT Hemoglobin, Arterial 11.6 (L) 12.0 - 16.0 g/dL    POCT Anion Gap, Arterial 6 (L) 10 - 25 mmo/L    Patient Temperature 37.0 degrees Celsius    FiO2 50 %   BLOOD GAS MIXED VENOUS FULL PANEL   Result Value Ref Range    POCT pH, Mixed  7.48 (H) 7.33 - 7.43 pH    POCT pCO2, Mixed 48 41 - 51 mm Hg    POCT pO2, Mixed 29 (L) 35 - 45 mm Hg    POCT SO2, Mixed 46 45 - 75 %    POCT Oxy Hemoglobin, Mixed 45.0 45.0 - 75.0 %    POCT Hematocrit Calculated, Mixed 33.0 (L) 36.0 - 46.0 %    POCT Sodium, Mixed 127 (L) 136 - 145 mmol/L    POCT Potassium, Mixed 4.0 3.5 - 5.3 mmol/L    POCT Chloride, Mixed 90 (L) 98 - 107 mmol/L    POCT Ionized Calcium, Mixed 1.11 1.10 - 1.33 mmol/L    POCT Glucose, Mixed 100 (H) 74 - 99 mg/dL    POCT Lactate, Mixed 1.4 0.4 - 2.0 mmol/L    POCT Base Excess, Mixed 10.8 (H) -2.0 - 3.0 mmol/L    POCT HCO3 Calculated, Mixed 35.7 (H) 22.0 - 26.0 mmol/L    POCT Hemoglobin, Mixed 11.1 (L) 12.0 - 16.0 g/dL    POCT Anion Gap, Mixed 5 (L) 10 - 25 mmo/L    Patient Temperature 37.0 degrees Celsius    FiO2 50 %   Blood Gas Arterial Full Panel   Result Value Ref Range    POCT pH, Arterial 7.46 (H) 7.38 - 7.42 pH    POCT pCO2, Arterial 49 (H) 38 - 42 mm Hg    POCT pO2, Arterial 68 (L) 85 - 95 mm Hg    POCT SO2, Arterial 95 94 - 100 %    POCT Oxy Hemoglobin, Arterial 92.0 (L) 94.0 - 98.0 %    POCT Hematocrit Calculated, Arterial 35.0 (L) 36.0 - 46.0 %    POCT Sodium, Arterial 127 (L) 136 - 145 mmol/L    POCT Potassium, Arterial 4.4 3.5 - 5.3 mmol/L    POCT Chloride, Arterial 88 (L) 98 - 107 mmol/L    POCT Ionized Calcium, Arterial 1.17 1.10 - 1.33 mmol/L    POCT Glucose, Arterial 115 (H) 74 - 99 mg/dL    POCT Lactate, Arterial 1.5 0.4 - 2.0 mmol/L    POCT Base Excess, Arterial 9.6 (H) -2.0 - 3.0 mmol/L    POCT HCO3 Calculated, Arterial 34.8 (H) 22.0 - 26.0 mmol/L    POCT Hemoglobin, Arterial 11.5 (L) 12.0 - 16.0 g/dL    POCT Anion Gap, Arterial 9 (L) 10 - 25 mmo/L    Patient Temperature 37.0 degrees Celsius    FiO2 90 %   BLOOD GAS MIXED VENOUS FULL PANEL   Result Value Ref Range    POCT pH, Mixed 7.45 (H) 7.33 - 7.43 pH    POCT pCO2, Mixed 55 (H) 41 - 51 mm Hg    POCT pO2, Mixed 32 (L) 35 - 45 mm Hg    POCT SO2, Mixed 50 45 - 75 %    POCT Oxy  Hemoglobin, Mixed 49.2 45.0 - 75.0 %    POCT Hematocrit Calculated, Mixed 34.0 (L) 36.0 - 46.0 %    POCT Sodium, Mixed 126 (L) 136 - 145 mmol/L    POCT Potassium, Mixed 4.5 3.5 - 5.3 mmol/L    POCT Chloride, Mixed 88 (L) 98 - 107 mmol/L    POCT Ionized Calcium, Mixed 1.15 1.10 - 1.33 mmol/L    POCT Glucose, Mixed 114 (H) 74 - 99 mg/dL    POCT Lactate, Mixed 1.3 0.4 - 2.0 mmol/L    POCT Base Excess, Mixed 12.3 (H) -2.0 - 3.0 mmol/L    POCT HCO3 Calculated, Mixed 38.2 (H) 22.0 - 26.0 mmol/L    POCT Hemoglobin, Mixed 11.4 (L) 12.0 - 16.0 g/dL    POCT Anion Gap, Mixed 4 (L) 10 - 25 mmo/L    Patient Temperature 37.0 degrees Celsius    FiO2 90 %   POCT GLUCOSE   Result Value Ref Range    POCT Glucose 126 (H) 74 - 99 mg/dL   Blood Gas Arterial Full Panel   Result Value Ref Range    POCT pH, Arterial 7.50 (H) 7.38 - 7.42 pH    POCT pCO2, Arterial 46 (H) 38 - 42 mm Hg    POCT pO2, Arterial 80 (L) 85 - 95 mm Hg    POCT SO2, Arterial 98 94 - 100 %    POCT Oxy Hemoglobin, Arterial 95.4 94.0 - 98.0 %    POCT Hematocrit Calculated, Arterial 30.0 (L) 36.0 - 46.0 %    POCT Sodium, Arterial 127 (L) 136 - 145 mmol/L    POCT Potassium, Arterial 4.0 3.5 - 5.3 mmol/L    POCT Chloride, Arterial 91 (L) 98 - 107 mmol/L    POCT Ionized Calcium, Arterial 1.09 (L) 1.10 - 1.33 mmol/L    POCT Glucose, Arterial 139 (H) 74 - 99 mg/dL    POCT Lactate, Arterial 0.9 0.4 - 2.0 mmol/L    POCT Base Excess, Arterial 11.4 (H) -2.0 - 3.0 mmol/L    POCT HCO3 Calculated, Arterial 35.9 (H) 22.0 - 26.0 mmol/L    POCT Hemoglobin, Arterial 9.9 (L) 12.0 - 16.0 g/dL    POCT Anion Gap, Arterial 4 (L) 10 - 25 mmo/L    Patient Temperature 37.0 degrees Celsius    FiO2 90 %         This patient has a central line   Reason for the central line remaining today? Hemodynamic monitoring                 Assessment/Plan   Assessment & Plan  Primary cancer of right upper lobe of lung (Multi)    Chronic obstructive pulmonary disease (Multi)    Malignant neoplasm of upper lobe  of right lung (Multi)    Cardiogenic shock (Multi)    Faustina Vick (Jannie) is a 64F with Hx of Stage III lung cancer, asthma, anxiety, and arthritis who is now s/p R pneumonectomy with Dr. Wagner 1/13/25. 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 Cardiology was consulted and recommended Lasix gtt and milrinone. Lasix gtt held 1/17, hyponatremia improving. Patient underwent RHC and SGC placement with cardiology 1/17.     - Appreciate cardiology and nephrology recommendations regarding patient cardiomyopathy and electrolyte derangements  - Requiring ICU level care at this time  - Please page with concerns    Patient seen and discussed with attending Dr. Dawn Miranda MD  Thoracic surgery [id number]

## 2025-01-18 NOTE — PROGRESS NOTES
"Faustina Kaminski \"Noam" is a 64 y.o. 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    Subjective   -[place] yovanny catheter was placed in the cath lab, it showed normal filling pressures with mixed venous PA sat of 67% (CO 6L/min with CI 3.83 L/min/BSA).  -Overnight a trial of weaning milrinone was done and it was decreased to 0.125 mg however CI decreased to 1.7 however that's in the context of SVR of 2000 while on vasopressin along with hypoxia.         Objective     Physical Exam  General: Not in acute distress, somnolent, alert, cooperative, ill-appearing  Cardiovascular: Tachycardic, regular rhythm, S1 and S2 appreciated, no murmurs rubs gallops appreciated, distal pulses 2+ bilaterally (radial and dorsalis pedis)  Respiratory: Coarse breath sounds appreciated in the right upper lobe, with loss of lung sounds from right mid lobe down  Extremities: No edema appreciated in lower extremities bilaterally, no cyanosis      Last Recorded Vitals  Blood pressure 114/79, pulse (!) 142, temperature 37.9 °C (100.2 °F), resp. rate (!) 27, height 1.651 m (5' 5\"), weight 53.3 kg (117 lb 8.1 oz), SpO2 (!) 72%.  Intake/Output last 3 Shifts:  I/O last 3 completed shifts:  In: 2279.2 (42.8 mL/kg) [I.V.:1019.2 (19.1 mL/kg); IV Piggyback:1260]  Out: 3170 (59.5 mL/kg) [Urine:3165 (1.6 mL/kg/hr); Blood:5]  Weight: 53.3 kg     Relevant Results  Transthoracic Echo (TTE) Limited    Result Date: 1/17/2025   [facility], [address] [place] [address]                Tel [phone] and Fax [phone] TRANSTHORACIC ECHOCARDIOGRAM REPORT  Patient Name:       FAUSTINA KAMINSKI     Anne Physician:    [id number] Rita Graham MD Study Date:         1/17/2025           Ordering Provider:    [id number] ROLANDO ARAGON MRN/PID:            [id number]           "  Fellow: Accession#:         [id number]        Nurse: Date of Birth/Age:  1960 / 64      Sonographer:          Domonique rocha                                     CARIE Gender assigned at  F                   Additional Staff: Birth: Height:             165.10 cm           Admit Date:           1/13/2025 Weight:             52.16 kg            Admission Status:     Inpatient - STAT BSA / BMI:          1.56 m2 / 19.14     Encounter#:           [id number]                     kg/m2 Blood Pressure:     103/74 mmHg         Department Location:  [facility] Study Type:    TRANSTHORACIC ECHO (TTE) LIMITED Diagnosis/ICD: Acute combined systolic (congestive) and diastolic (congestive)                heart failure (CHF)-I50.41 Indication:    Acute systolic/diastolic failure CPT Code:      Echo Limited-06752; Doppler Limited-83966; Color Doppler-12668 Patient History: Pertinent History: Anxiety; Arthritis; Asthma; Hx of squamous cell carcinoma; Hx                    of chemotherapy; Lung cancer; Prior smoker; Known EF of 34%                    (01/16/24) with described regional wall motion abnormalities                    of the LV and RV suggestive of biventricular Takotsubo. Study Detail: The following Echo studies were performed: 2D, M-Mode, Doppler and               color flow. Technically challenging study due to body habitus and               patient lying in supine position. Definity used as a contrast               agent for endocardial border definition. Total contrast used for               this procedure was 1 mL via IV push.  PHYSICIAN INTERPRETATION: Left Ventricle: Left ventricular ejection fraction is severely decreased, by visual estimate at 25-30%. There are multiple left ventricular wall motion abnormalities. The left ventricular cavity size is normal. Left ventricular diastolic filling cannot be determined, due to E/A wave fusion. There is no definite left ventricular  thrombus visualized. Apical and mid LV segments including the RV free wall are completely akinetic. LV Wall Scoring: The entire apex, mid and apical anterior wall, mid and apical anterior septum, mid and apical inferior septum, mid and apical inferior wall, mid inferolateral segment, and mid anterolateral segment are akinetic. All remaining scored segments are normal. Left Atrium: The left atrium is mild to moderately dilated. Right Ventricle: The right ventricle is normal in size. There is reduced right ventricular systolic function. Right Atrium: The right atrium is mildly dilated. Aortic Valve: The aortic valve is structurally normal. The aortic valve dimensionless index is 0.82. There is no evidence of aortic valve regurgitation. The peak instantaneous gradient of the aortic valve is 3 mmHg. The mean gradient of the aortic valve is 2 mmHg. Mitral Valve: The mitral valve is normal in structure. There is trace mitral valve regurgitation. Tricuspid Valve: The tricuspid valve is structurally normal. There is trace tricuspid regurgitation. The Doppler estimated RVSP is within normal limits at 27.0 mmHg. Pulmonic Valve: The pulmonic valve is not well visualized. Pulmonic valve regurgitation was not assessed. Pericardium: Trivial pericardial effusion. Aorta: The aortic root is normal. Systemic Veins: The inferior vena cava appears normal in size. In comparison to the previous echocardiogram(s): Compared with study dated 1/16/2025, no significant change.  CONCLUSIONS:  1. Left ventricular ejection fraction is severely decreased, by visual estimate at 25-30%.  2. Multiple segmental abnormalities exist. See findings.  3. Apical and mid LV segments including the RV free wall are completely akinetic.  4. There are multiple left ventricular wall motion abnormalities.  5. Left ventricular diastolic filling cannot be determined, due to E/A wave fusion.  6. No left ventricular thrombus visualized.  7. There is reduced right  ventricular systolic function.  8. The left atrium is mild to moderately dilated.  9. Right ventricular systolic pressure is within normal limits. 10. Compared with study dated 2025, no significant change. QUANTITATIVE DATA SUMMARY:  2D MEASUREMENTS:          Normal Ranges: Ao Root d:       3.43 cm  (2.0-3.7cm) LAs:             3.03 cm  (2.7-4.0cm) LVEDV Index:     54 ml/m2  RA VOLUME BY A/L METHOD:          Normal Ranges: RA Area A4C:             18.3 cm2  LV SYSTOLIC FUNCTION BY 2D PLANIMETRY (MOD):                      Normal Ranges: EF-A4C View:    19 % (>=55%) EF-A2C View:    22 % EF-Biplane:     19 % EF-Visual:      28 % LV EF Reported: 28 %  LV DIASTOLIC FUNCTION:           Normal Ranges: MV Peak E:             0.99 m/s  (0.7-1.2 m/s) MV e'                  0.091 m/s (>8.0) MV lateral e'          0.12 m/s MV medial e'           0.06 m/s E/e' Ratio:            10.92     (<8.0) MV DT:                 119 msec  (150-240 msec)  AORTIC VALVE:                     Normal Ranges: AoV Vmax:                0.87 m/s (<=1.7m/s) AoV Peak PG:             3.0 mmHg (<20mmHg) AoV Mean P.0 mmHg (1.7-11.5mmHg) LVOT Max Navi:            0.67 m/s (<=1.1m/s) AoV VTI:                 11.40 cm (18-25cm) LVOT VTI:                9.30 cm LVOT Diameter:           1.94 cm  (1.8-2.4cm) AoV Area, VTI:           2.41 cm2 (2.5-5.5cm2) AoV Area,Vmax:           2.26 cm2 (2.5-4.5cm2) AoV Dimensionless Index: 0.82  RIGHT VENTRICLE: RV Basal 3.40 cm RV Mid   2.30 cm RV Major 6.0 cm TAPSE:   11.9 mm RV s'    0.09 m/s  TRICUSPID VALVE/RVSP:          Normal Ranges: Peak TR Velocity:     2.45 m/s RV Syst Pressure:     27 mmHg  (< 30mmHg)  [id number] Bhupendar Tayal MD Electronically signed on 2025 at 12:13:06 PM  Wall Scoring  ** Final **     XR chest 1 view    Result Date: 2025  Interpreted By:  Alexx Anne, STUDY: XR CHEST 1 VIEW;  2025 6:40 am   INDICATION: Signs/Symptoms:pneumonectomy.     COMPARISON: Exam dated  01/16/2025   ACCESSION NUMBER(S): [id number]   ORDERING CLINICIAN: GABBY SMITH   FINDINGS: AP radiograph of the chest was provided.   Right IJ approach central venous catheter tip projects over the mid SVC. Left chest wall medication port tip projects over the confluence of the left brachiocephalic vein and SVC. Persistent subcutaneous emphysema overlying the right chest wall.   CARDIOMEDIASTINAL SILHOUETTE: Cardiomediastinal silhouette is stable in size and configuration. Complete obscuration of the right cardiomediastinal contours. Thin curvilinear lucency projecting along the right cardiac border may represent a component of pneumomediastinum.   LUNGS: Status post on bloc pneumonectomy and right rib resection. Similar gradient opacification of the right hemithorax. Similar prominent interstitial markings in the left lung with slightly increased blunting of the left costophrenic angle. Streaky left basilar opacities are most consistent with atelectasis. No evidence of left-sided pneumothorax.   ABDOMEN: No remarkable upper abdominal findings.   BONES: No acute osseous changes.       1.  Stable exam with postsurgical changes of right pneumonectomy/rib resection and large layering fluid component within the right hemithorax. 2. Similar findings of left pulmonary edema with slightly increased size of small left pleural effusion. No left-sided pneumothorax. 3. Medical devices as above.       MACRO: None   Signed by: Alexx Anne 1/17/2025 8:16 AM Dictation workstation:   [id number]    XR chest 1 view    Result Date: 1/17/2025  Interpreted By:  Alexx Anne and Ritchie Brandon STUDY: XR CHEST 1 VIEW;  1/16/2025 7:01 pm   INDICATION: Signs/Symptoms:central line placement.   COMPARISON: Chest x-ray 01/16/2025, 6:41 a.m.   ACCESSION NUMBER(S): [id number]   ORDERING CLINICIAN: GABBY SMITH   FINDINGS: AP radiograph of the chest was provided.   Leftchest wall MediPort tip projects over the expected  location of the upper SVC. Right internal jugular central venous catheter tip projects over the expected location of the mid SVC. There is subcutaneous emphysematous changes along the right lateral chest wall.   CARDIOMEDIASTINAL SILHOUETTE: Cardiomediastinal silhouette is stable in size and configuration. There is again obscuration of the right cardiomediastinal silhouette.   LUNGS: Redemonstration of postsurgical changes compatible with recent history of right pneumonectomy. Gradual, layering opacification of the right hemithorax which is increased when compared to prior examination. There is again trace blunting of the left costophrenic angle with adjacent linear opacities in the left lower lobe and findings of increased interstitial markings in the left lung. No evidence of left-sided pneumothorax.   ABDOMEN: No remarkable upper abdominal findings.   BONES: Resection of multiple upper right-sided ribs. No acute osseous changes.       1.  Postsurgical changes of right-sided pneumonectomy with increased layering opacification of the right hemithorax, likely due to increasing fluid. 2. Similar trace left-sided pleural effusion with linear left basilar airspace opacity, likely atelectasis on a background of likely pulmonary edema. Infection is not definitively excluded. 3. Medical devices as above with interval insertion of right internal jugular central venous catheter projecting at the mid SVC.   I personally reviewed the images/study and I agree with the findings as stated by resident Davidson Mcguire. This study was interpreted at [facility], [place], [place].   MACRO: None   Signed by: Alexx Anne 1/17/2025 8:14 AM Dictation workstation:   [id number]    ECG 12 lead    Result Date: 1/16/2025  Normal sinus rhythm Low voltage QRS Cannot rule out Anterior infarct (cited on or before 16-JAN-2025) Abnormal ECG When compared with ECG of 16-JAN-2025 14:04, No significant change  was found    Transthoracic Echo (TTE) Limited    Result Date: 1/16/2025   [facility], [address] [place] [address]                Tel [phone] and Fax [phone] TRANSTHORACIC ECHOCARDIOGRAM REPORT  Patient Name:       RAÚL KAMINSKI     Anne Physician:    [id number] Fabián Kulkarni MD Study Date:         1/16/2025           Ordering Provider:    [id number] GABBY SMITH MRN/PID:            [id number]            Fellow: Accession#:         [id number]        Nurse: Date of Birth/Age:  1960 / 64      Sonographer:          Naya rocha RDCS Gender assigned at  F                   Additional Staff: Birth: Height:             165.10 cm           Admit Date:           1/20/2025 Weight:             51.71 kg            Admission Status:     Inpatient - STAT BSA / BMI:          1.56 m2 / 18.97     Encounter#:           [id number]                     kg/m2 Blood Pressure:     106/62 mmHg         Department Location:  [facility] Study Type:    TRANSTHORACIC ECHO (TTE) LIMITED Diagnosis/ICD: Acute respiratory failure with hypoxia-J96.01 Indication:    Post pneumonectomy; new O2 requirements; eval BIV function CPT Code:      Echo Limited-03777; Color Doppler-02700 Patient History: Pertinent History: COPD. Lung cancer; s/p pneumonectomy; immunotherapy. Study Detail: The following Echo studies were performed: 2D, M-Mode and color               flow. Technically challenging study due to body habitus and               prominent lung artifact. Definity used as a contrast agent for               endocardial border definition. Total contrast used for this               procedure was 3 mL via IV push. Unable to obtain suprasternal               notch view.  PHYSICIAN INTERPRETATION: Left Ventricle: Left  ventricular ejection fraction is moderately decreased, calculated by Miranda's biplane at 34%. Left venticular wall motion is abnormal. The left ventricular cavity size is upper limits of normal. There is normal septal and normal posterior left ventricular wall thickness. Spectral Doppler shows a Grade II (pseudonormal pattern) of left ventricular diastolic filling with an elevated left atrial pressure. The described regional wall motion abnormalities of the LV and RV are suggestive of biventricular Takotsubo (stress cardiomyopathy). LV Wall Scoring: The mid and apical anterior wall, mid and apical anterior septum, mid and apical inferior septum, mid and apical inferior wall, mid inferolateral segment, mid anterolateral segment, and apical lateral segment are akinetic. All remaining scored segments are normal. Left Atrium: The left atrium is moderately dilated. Right Ventricle: The right ventricle is normal in size. There is moderately reduced right ventricular systolic function. There is akinesis of the mid and apical segments of the RV free wall. Right Atrium: The right atrium is normal in size. A dilated inferior vena cava demonstrates poor inspiratory collapse, consistent with elevated right atrial pressures. Aortic Valve: The aortic valve is trileaflet. There is trace aortic valve regurgitation. Mitral Valve: The mitral valve is normal in structure. There is mild mitral valve regurgitation. Tricuspid Valve: The tricuspid valve is structurally normal. There is mild to moderate tricuspid regurgitation. The Doppler estimated RVSP is mildly elevated at 45.5 mmHg. Pulmonic Valve: The pulmonic valve is not well visualized. Pulmonic valve regurgitation was not assessed. Pericardium: There is no pericardial effusion noted. Aorta: The aortic root is normal. Systemic Veins: The inferior vena cava appears dilated, with IVC inspiratory collapse less than 50%. In comparison to the previous echocardiogram(s): Compared with  study dated 1/13/2025, the LV and RV wall motion abnormalities were not present in prior study.  CONCLUSIONS:  1. Left ventricular ejection fraction is moderately decreased, calculated by Miranda's biplane at 34%.  2. Multiple segmental abnormalities exist. See findings.  3. Spectral Doppler shows a Grade II (pseudonormal pattern) of left ventricular diastolic filling with an elevated left atrial pressure.  4. There is moderately reduced right ventricular systolic function.  5. There is akinesis of the mid and apical segments of the RV free wall.  6. Mildly elevated right ventricular systolic pressure.  7. Mild to moderate tricuspid regurgitation visualized.  8. The left atrium is moderately dilated.  9. A dilated inferior vena cava demonstrates poor inspiratory collapse, consistent with elevated right atrial pressures. 10. The described regional wall motion abnormalities of the LV and RV are suggestive of biventricular Takotsubo (stress cardiomyopathy). QUANTITATIVE DATA SUMMARY:  2D MEASUREMENTS:          Normal Ranges: IVSd:            0.60 cm  (0.6-1.1cm) LVPWd:           0.70 cm  (0.6-1.1cm) LVIDd:           4.80 cm  (3.9-5.9cm) LVIDs:           3.50 cm LV Mass Index:   63 g/m2 LVEDV Index:     61 ml/m2 LV % FS          27.1 %  LA VOLUME:                   Normal Ranges: LA Vol A4C:        69.1 ml   (22+/-6mL/m2) LA Vol Index A4C:  44.3ml/m2 LA Area A4C:       21.1 cm2 LA Major Axis A4C: 5.5 cm  RA VOLUME BY A/L METHOD:          Normal Ranges: RA Area A4C:             16.3 cm2  M-MODE MEASUREMENTS:         Normal Ranges: Ao Root:             3.00 cm (2.0-3.7cm) LAs:                 3.50 cm (2.7-4.0cm)  LV SYSTOLIC FUNCTION BY 2D PLANIMETRY (MOD):                      Normal Ranges: EF-A4C View:    40 % (>=55%) EF-A2C View:    31 % EF-Biplane:     34 % LV EF Reported: 34 %  LV DIASTOLIC FUNCTION:           Normal Ranges: MV Peak E:             0.87 m/s  (0.7-1.2 m/s) MV Peak A:             0.59 m/s  (0.42-0.7  m/s) E/A Ratio:             1.48      (1.0-2.2) MV e'                  0.057 m/s (>8.0) MV lateral e'          0.06 m/s MV medial e'           0.05 m/s E/e' Ratio:            15.37     (<8.0)  MITRAL VALVE:          Normal Ranges: MV DT:        119 msec (150-240msec)  RIGHT VENTRICLE: RV Basal 3.51 cm RV Mid   2.52 cm RV Major 6.0 cm TAPSE:   9.6 mm RV s'    0.07 m/s  TRICUSPID VALVE/RVSP:          Normal Ranges: Peak TR Velocity:     3.26 m/s RV Syst Pressure:     46 mmHg  (< 30mmHg) IVC Diam:             2.25 cm  [id number] Fabián Kulkarni MD Electronically signed on 1/16/2025 at 2:58:07 PM  Wall Scoring  ** Final (Updated) **     US right upper quadrant    Result Date: 1/16/2025  Interpreted By:  Alberto Salas, STUDY: US RIGHT UPPER QUADRANT; 1/16/2025 11:09 am   INDICATION: Signs/Symptoms:pericholecystic fluid seen on renal us.   COMPARISON: None.   ACCESSION NUMBER(S): [id number]   ORDERING CLINICIAN: WILMER ANTHONY   TECHNIQUE: Multiple images of the right upper quadrant were obtained.   FINDINGS: LIVER: The liver measures up to  15.5 cm in greatest sagittal dimension. Echogenicity is within normal limits. There is no morphologic evidence of cirrhosis and no focal hepatic lesion is evident.   GALLBLADDER: The gallbladder is within normal limits without wall thickening or cholelithiasis. A sonographic Odom sign was not present. There is a small amount of simple appearing ascites in the right upper quadrant.   BILIARY SYSTEM: There is no intrahepatic biliary dilation. The common bile duct measures up to  5 mm in width.   PANCREAS: The pancreas is partially obscured by bowel gas. The visualized portions of the pancreas are within normal limits.   RIGHT KIDNEY: The right kidney measures up to  9.9 cm in greatest sagittal dimension. Cortical echogenicity and thickness are within normal limits. No hydroureteronephrosis.       Small amount of simple appearing ascites in the right upper quadrant without  evidence of cholelithiasis, cholecystitis or biliary dilation.   Signed by: Alberto Salas 1/16/2025 1:02 PM Dictation workstation:   [id number]    XR chest 1 view    Result Date: 1/16/2025  Interpreted By:  Vick Eli, STUDY: XR CHEST 1 VIEW; 1/16/2025 6:43 am   INDICATION: Signs/Symptoms:ICU.   COMPARISON: Radiograph dated 01/15/2025   ACCESSION NUMBER(S): [id number]   ORDERING CLINICIAN: GABBY SMITH   FINDINGS: Postsurgical changes of right pneumonectomy and resection of multiple right-sided ribs. Slight interval increase in opacity in the lower portion of the pneumonectomy site. Slight interval increase in left basilar faint airspace opacity. Blunting of left costophrenic angle. No left pneumothorax.   Cardiac silhouette size is grossly stable, however right heart border is obscured.   Left subclavian approach MediPort is unchanged in position. Interval removal of right IJ central venous catheter sheath.   Subcutaneous emphysema in the right chest wall and right side of the neck.       1. Slight interval increase in left basilar airspace opacity which may be due to increasing atelectasis. Cannot exclude developing infectious infiltrate. Question trace left pleural effusion. 2. Again seen postsurgical changes related to right-sided pneumonectomy as described above       Signed by: Vick Xiao 1/16/2025 9:47 AM Dictation workstation:   [id number]    US renal complete    Result Date: 1/15/2025  Interpreted By:  Alberto Salas  and Estephania Almendarez STUDY: US RENAL COMPLETE;  1/15/2025 3:25 pm   INDICATION: Signs/Symptoms:symptomatic hyponatremia.     COMPARISON: CT abdomen 08/15/2024 FDG PET-CT 12/30/2024   ACCESSION NUMBER(S): [id number]   ORDERING CLINICIAN: GABBY SMITH   TECHNIQUE: Multiple images of the kidneys were obtained  .   FINDINGS: RIGHT KIDNEY: The right kidney measures 11.17 cm in length. The renal cortical echogenicity and thickness are within normal  limits. No hydronephrosis is present; no evidence of nephrolithiasis.   LEFT KIDNEY: The left kidney measures 11.75 cm in length. The renal cortical echogenicity and thickness are within normal limits. No hydronephrosis is present; no evidence of nephrolithiasis.   BLADDER: A Montiel is present within a decompressed bladder.   Incidental note is made of a somewhat edematous gallbladder with some pericholecystic fluid and layering echogenic material likely representing biliary sludge.   Free fluid is present within the right upper quadrant and pelvis.       1. Unremarkable sonographic evaluation of the kidneys. 2. Incomplete evaluation of a gallbladder with some pericholecystic fluid and biliary sludge. Dedicated right upper quadrant ultrasound could be obtained as clinically warranted. 3. A small amount of free fluid present in the right upper quadrant and pelvis.   Findings relayed by phone by me to SICU provider at 5:11 p.m.   I personally reviewed the images/study and I agree with the findings as stated by Erickson Best MD (resident) . This study was interpreted at [place], [place].   MACRO: None   Signed by: Alberto Salas 1/15/2025 6:17 PM Dictation workstation:   [id number]    XR chest 1 view    Result Date: 1/15/2025  Interpreted By:  Alexx Anne, STUDY: XR CHEST 1 VIEW;  1/15/2025 6:46 am   INDICATION: Signs/Symptoms:routine ICU.     COMPARISON: Exam dated 01/14/2025   ACCESSION NUMBER(S): [id number]   ORDERING CLINICIAN: GABBY SMITH   FINDINGS: AP radiograph of the chest was provided.   Left chest wall subclavian approach medication port with catheter tip projecting over the upper SVC. Right IJ central venous catheter tip projects over the upper SVC. Interval increase in subcutaneous emphysema overlying the right lower cervical soft tissues. Similar subcutaneous emphysema along the right chest wall.   CARDIOMEDIASTINAL SILHOUETTE:  Cardiomediastinal silhouette is stable in size and configuration.   LUNGS: Postsurgical changes of pneumonectomy are again visualized. There is a moderate amount of layering pleural fluid within the right hemithorax on a background of diffuse lucency. Streaky left basilar atelectasis. The left lung otherwise appears clear.   ABDOMEN: No remarkable upper abdominal findings.   BONES: No acute osseous changes.       1.  Status post pneumonectomy with interval increase in fluid component of right-sided hydropneumothorax. 2. Medical devices as above.       MACRO: None   Signed by: Alexx Anne 1/15/2025 9:28 AM Dictation workstation:   [id number]    XR chest 1 view    Result Date: 1/14/2025  Interpreted By:  Vick Eli, STUDY: XR CHEST 1 VIEW; 1/14/2025 2:40 pm   INDICATION: Signs/Symptoms:Chest tube removal.   COMPARISON: Radiograph dated 01/14/2025   ACCESSION NUMBER(S): [id number]   ORDERING CLINICIAN: ERINN SANCHEZ   FINDINGS: Postsurgical changes related to right pneumonectomy with partially resected ribs.. Interval removal of right chest tube. Left subclavian MediPort and right IJ central venous catheter sheath are unchanged in positioning.   Cardiac silhouette size is slightly enlarged, unchanged.   Faint left basilar airspace opacity, likely atelectatic. No sizable pneumothorax.   Small amount of subcutaneous and soft tissue emphysema in the right chest wall.       1. Interval removal of right chest tube. Other medical devices as described above. 2. Postsurgical changes related to right pneumonectomy. 3. Faint left basilar opacity, likely atelectatic.       Signed by: Vick Xiao 1/14/2025 2:53 PM Dictation workstation:   [id number]    XR chest 1 view    Result Date: 1/14/2025  Interpreted By:  Nino Barbour, STUDY: XR CHEST 1 VIEW; 1/14/2025 7:06 am   INDICATION: Signs/Symptoms:recent pneumonectomy.   COMPARISON: 01/13/2025.   ACCESSION NUMBER(S): [id number]   [facility] [place]   FINDINGS: Right-sided chest tube in place.   CARDIOMEDIASTINAL SILHOUETTE: Right-sided mediastinal shift consistent with recent right-sided pneumonectomy.   LUNGS: Right-sided pneumonectomy changes.   ABDOMEN: No remarkable upper abdominal findings.   BONES: Right-sided thoracotomy changes.       1.  Stable right-sided pneumonectomy changes.     Signed by: Nino Barbour 1/14/2025 12:56 PM Dictation workstation:   [id number]    Transthoracic Echo (TTE) Limited    Result Date: 1/13/2025   [facility], [address] [place] [address]                Tel [phone] and Fax [phone] TRANSTHORACIC ECHOCARDIOGRAM REPORT  Patient Name:       RAÚL Rodríguez Physician:    [id number] Cam Llanos MD Study Date:         1/13/2025           Ordering Provider:    [id number] WILMER ANTHONY MRN/PID:            [id number]            Fellow: Accession#:         [id number]        Nurse: Date of Birth/Age:  1960 / 64      Sonographer:          José Miguel rocha                                     [facility] Gender assigned at  F                   Additional Staff: Birth: Height:             165.10 cm           Admit Date:           1/13/2025 Weight:             51.71 kg            Admission Status:     Inpatient - STAT BSA / BMI:          1.56 m2 / 18.97     Encounter#:           [id number]                     kg/m2 Blood Pressure:     100/57 mmHg         Department Location:  [facility] Study Type:    TRANSTHORACIC ECHO (TTE) LIMITED Diagnosis/ICD: Hypotension, unspecified-I95.9 Indication:    Hypotension CPT Code:      Echo Limited-90399; Doppler Limited-46645; Color Doppler-12708  Study Detail: The following Echo studies were performed: 2D, Doppler and color               flow. Technically challenging study due to body  habitus and               patient lying in supine position.  PHYSICIAN INTERPRETATION: Left Ventricle: The left ventricular systolic function is normal, with a visually estimated ejection fraction of 60-65%. There are no regional left ventricular wall motion abnormalities. The left ventricular cavity size is normal. Left ventricular diastolic filling was not assessed. Left Atrium: The left atrium was not well visualized. Right Ventricle: The right ventricle is normal in size. There is low normal right ventricular systolic function. RV S' and TAPSE under measured due to technical difficulty visualizing lateral tricuspid valve annulus. Right Atrium: The right atrium was not well visualized. Aortic Valve: The aortic valve was not well visualized. There is no evidence of aortic valve regurgitation. Mitral Valve: The mitral valve is normal in structure. There is trace mitral valve regurgitation. Tricuspid Valve: The tricuspid valve was not well visualized. There is trace tricuspid regurgitation. Pulmonic Valve: The pulmonic valve is not well visualized. The pulmonic valve regurgitation was not well visualized. Pericardium: Trivial pericardial effusion. Aorta: The aortic root is normal. Pulmonary Artery: The tricuspid regurgitant velocity is 2.10 m/s, and with an estimated right atrial pressure of 8 mmHg, the estimated pulmonary artery pressure is normal with the RVSP at 25.6 mmHg. Systemic Veins: The inferior vena cava appears normal in size, with IVC inspiratory collapse less than 50%. In comparison to the previous echocardiogram(s): Compared with study dated 10/3/2024, no significant change.  CONCLUSIONS:  1. The left ventricular systolic function is normal, with a visually estimated ejection fraction of 60-65%.  2. There is low normal right ventricular systolic function. QUANTITATIVE DATA SUMMARY:  LV SYSTOLIC FUNCTION BY 2D PLANIMETRY (MOD):                      Normal Ranges: EF-A4C View:    65 % (>=55%) EF-Visual:       63 % LV EF Reported: 63 %  RIGHT VENTRICLE: TAPSE: 10.0 mm RV s'  0.06 m/s  TRICUSPID VALVE/RVSP:          Normal Ranges: Peak TR Velocity:     2.10 m/s Est. RA Pressure:     8 mmHg RV Syst Pressure:     26 mmHg  (< 30mmHg) IVC Diam:             2.00 cm  [id number] Cam Llanos MD Electronically signed on 1/13/2025 at 5:06:16 PM  ** Final **     XR chest 1 view    Result Date: 1/13/2025  Interpreted By:  Vick Eli, STUDY: XR CHEST 1 VIEW; 1/13/2025 2:52 pm   INDICATION: Signs/Symptoms:s/p pneumonectomy.   COMPARISON: CT dated 07/17/2024 and PET-CT dated 12/30/2024   ACCESSION NUMBER(S): [id number]   ORDERING CLINICIAN: WILMER ANTHONY   FINDINGS: Right IJ central venous catheter sheath is projecting over mid SV C. Left subclavian approach MediPort is in place with the tip projecting over upper to mid SVC. Right chest tube is in place.   Cardiac silhouette size is within normal limits.   Postsurgical changes related to right pneumonectomy with multiple partial right-sided rib resection. Mild interstitial prominence of the left lung.   Subcutaneous and soft tissue emphysema in the right chest wall.         1. Postsurgical changes related to right sided pneumonectomy with multiple medical devices as described above. 2. Mild interstitial prominence of the left lung.       Signed by: Vick Xiao 1/13/2025 3:16 PM Dictation workstation:   [id number]    NM PET CT lung CA staging    Result Date: 12/31/2024  Interpreted By:  Kush Hansen and Bera Kaustav STUDY: NM PET CT LUNG CA STAGING;  12/30/2024 11:08 am   INDICATION: Signs/Symptoms:DIAGNOSTIC.   Diagnosed with a large right lung mass in January 20, 2024, status post neoadjuvant chemoradiation as well as immunotherapy ending on July 2024. Currently scheduled for surgery on 01/10/2025.   COMPARISON: PET-CT on 08/15/2024   ACCESSION NUMBER(S): [id number]   ORDERING CLINICIAN: GABBY SMITH   TECHNIQUE: DIVISION OF NUCLEAR MEDICINE POSITRON  EMISSION TOMOGRAPHY (PET-CT)   The patient received an intravenous dose of 11.2 mCi of Fluorine-18 fluorodeoxyglucose (FDG).  Positron emission tomographic (PET) images from mid thigh to skull base were then acquired after a one hour delay. Also acquired was a contemporaneous low dose non-contrast CT scan performed for attenuation correction of PET images and anatomic localization.  The PET and CT images were digitally fused for display.  All images were acquired on a combined PET-CT scanner unit. Some areas of FDG accumulation may be described in standardized uptake value (SUV) units.   CODING: Subsequent Treatment Strategy (PS)   CALIBRATION: Dose Injection-to-Scan Interval (mins): 51 min Mediastinal bloodpool SUV (normal 1.5-2.5): 2.1 Blood glucose: NA   FINDINGS: HEAD AND NECK: * No evidence of focal FDG avid lesion in the partially visualized brain parenchyma, noting that evaluation is limited because of the expected physiologic diffuse FDG uptake in the brain. * No FDG avid cervical lymphadenopathy is present. * No paranasal sinus diease. * Thyroid gland is unremarkable.   CHEST: *There has been interval worsening collapse of the right lung, with the right lung now completely fluid-filled. There has been interval worsening of FDG avid mass with central necrosis in  posterior right upper lobe with SUV max of 15, as compared to 8 previously. Left lung is unremarkable *No FDG avid mediastinal, hilar or axillary lymphadenopathy. *Unremarkable both breasts   ABDOMEN AND PELVIS: *No FDG avid lymphadenopathy in the abdomen or pelvis. *Bilateral adrenal glands are unremarkable. *Colonic diverticulosis without evidence of diverticulitis. *Physiologic radiotracer uptake is present in the liver and spleen with excretion into the bowel loops and the genitourinary tract.   MUSCULOSKELETAL: *No concerning FDG avid bone lesion throughout the axial and appendicular skeleton to suggest osseous metastasis.       1. Interval  worsening of the FDG-avid mass with central necrosis in the posterior right upper lobe, along with increased collapse of the right lung compared to the prior PET from 08/15/2024, suggestive of disease progression. 2. No other concerning FDG avid disease identified.   I personally reviewed the image(s) / study and agree with the findings and interpretation as stated. This study was interpreted at [facility].   MACRO: None       Signed by: Kush Hansen 12/31/2024 6:13 AM Dictation workstation:   [id number]   Results for orders placed or performed during the hospital encounter of 01/13/25 (from the past 24 hours)   Blood Gas Arterial Full Panel   Result Value Ref Range    POCT pH, Arterial 7.48 (H) 7.38 - 7.42 pH    POCT pCO2, Arterial 45 (H) 38 - 42 mm Hg    POCT pO2, Arterial 97 (H) 85 - 95 mm Hg    POCT SO2, Arterial 99 94 - 100 %    POCT Oxy Hemoglobin, Arterial 96.1 94.0 - 98.0 %    POCT Hematocrit Calculated, Arterial 32.0 (L) 36.0 - 46.0 %    POCT Sodium, Arterial 126 (L) 136 - 145 mmol/L    POCT Potassium, Arterial 2.7 (LL) 3.5 - 5.3 mmol/L    POCT Chloride, Arterial 84 (L) 98 - 107 mmol/L    POCT Ionized Calcium, Arterial 1.03 (L) 1.10 - 1.33 mmol/L    POCT Glucose, Arterial 163 (H) 74 - 99 mg/dL    POCT Lactate, Arterial 1.1 0.4 - 2.0 mmol/L    POCT Base Excess, Arterial 9.0 (H) -2.0 - 3.0 mmol/L    POCT HCO3 Calculated, Arterial 33.5 (H) 22.0 - 26.0 mmol/L    POCT Hemoglobin, Arterial 10.8 (L) 12.0 - 16.0 g/dL    POCT Anion Gap, Arterial 11 10 - 25 mmo/L    Patient Temperature 37.0 degrees Celsius    FiO2 28 %   Blood Gas Venous Full Panel   Result Value Ref Range    POCT pH, Venous 7.42 7.33 - 7.43 pH    POCT pCO2, Venous 54 (H) 41 - 51 mm Hg    POCT pO2, Venous 39 35 - 45 mm Hg    POCT SO2, Venous 67 45 - 75 %    POCT Oxy Hemoglobin, Venous 65.9 45.0 - 75.0 %    POCT Hematocrit Calculated, Venous 32.0 (L) 36.0 - 46.0 %    POCT Sodium, Venous 127 (L) 136 - 145 mmol/L     POCT Potassium, Venous 2.6 (LL) 3.5 - 5.3 mmol/L    POCT Chloride, Venous 85 (L) 98 - 107 mmol/L    POCT Ionized Calicum, Venous 1.02 (L) 1.10 - 1.33 mmol/L    POCT Glucose, Venous 163 (H) 74 - 99 mg/dL    POCT Lactate, Venous 1.3 0.4 - 2.0 mmol/L    POCT Base Excess, Venous 9.1 (H) -2.0 - 3.0 mmol/L    POCT HCO3 Calculated, Venous 35.0 (H) 22.0 - 26.0 mmol/L    POCT Hemoglobin, Venous 10.5 (L) 12.0 - 16.0 g/dL    POCT Anion Gap, Venous 10.0 10.0 - 25.0 mmol/L    Patient Temperature 37.0 degrees Celsius    FiO2 28 %   Calcium, Ionized   Result Value Ref Range    POCT Calcium, Ionized 0.97 (L) 1.1 - 1.33 mmol/L   CBC   Result Value Ref Range    WBC 9.3 4.4 - 11.3 x10*3/uL    nRBC 0.0 0.0 - 0.0 /100 WBCs    RBC 3.81 (L) 4.00 - 5.20 x10*6/uL    Hemoglobin 9.8 (L) 12.0 - 16.0 g/dL    Hematocrit 28.9 (L) 36.0 - 46.0 %    MCV 76 (L) 80 - 100 fL    MCH 25.7 (L) 26.0 - 34.0 pg    MCHC 33.9 32.0 - 36.0 g/dL    RDW 13.1 11.5 - 14.5 %    Platelets 187 150 - 450 x10*3/uL   Magnesium   Result Value Ref Range    Magnesium 1.45 (L) 1.60 - 2.40 mg/dL   Renal function panel   Result Value Ref Range    Glucose 169 (H) 74 - 99 mg/dL    Sodium 129 (L) 136 - 145 mmol/L    Potassium 3.1 (L) 3.5 - 5.3 mmol/L    Chloride 81 (L) 98 - 107 mmol/L    Bicarbonate 34 (H) 21 - 32 mmol/L    Anion Gap 17 10 - 20 mmol/L    Urea Nitrogen 9 6 - 23 mg/dL    Creatinine 0.41 (L) 0.50 - 1.05 mg/dL    eGFR >90 >60 mL/min/1.73m*2    Calcium 7.9 (L) 8.6 - 10.6 mg/dL    Phosphorus 2.0 (L) 2.5 - 4.9 mg/dL    Albumin 3.3 (L) 3.4 - 5.0 g/dL   TSH with reflex to Free T4 if abnormal   Result Value Ref Range    Thyroid Stimulating Hormone 0.48 0.44 - 3.98 mIU/L   POCT GLUCOSE   Result Value Ref Range    POCT Glucose 163 (H) 74 - 99 mg/dL   Blood Gas Arterial Full Panel   Result Value Ref Range    POCT pH, Arterial 7.44 (H) 7.38 - 7.42 pH    POCT pCO2, Arterial 54 (H) 38 - 42 mm Hg    POCT pO2, Arterial 68 (L) 85 - 95 mm Hg    POCT SO2, Arterial 95 94 - 100 %     POCT Oxy Hemoglobin, Arterial 92.3 (L) 94.0 - 98.0 %    POCT Hematocrit Calculated, Arterial 33.0 (L) 36.0 - 46.0 %    POCT Sodium, Arterial 126 (L) 136 - 145 mmol/L    POCT Potassium, Arterial 3.6 3.5 - 5.3 mmol/L    POCT Chloride, Arterial 84 (L) 98 - 107 mmol/L    POCT Ionized Calcium, Arterial 1.03 (L) 1.10 - 1.33 mmol/L    POCT Glucose, Arterial 177 (H) 74 - 99 mg/dL    POCT Lactate, Arterial 1.4 0.4 - 2.0 mmol/L    POCT Base Excess, Arterial 10.8 (H) -2.0 - 3.0 mmol/L    POCT HCO3 Calculated, Arterial 36.7 (H) 22.0 - 26.0 mmol/L    POCT Hemoglobin, Arterial 11.1 (L) 12.0 - 16.0 g/dL    POCT Anion Gap, Arterial 9 (L) 10 - 25 mmo/L    Patient Temperature 37.0 degrees Celsius    FiO2 100 %   POCT GLUCOSE   Result Value Ref Range    POCT Glucose 199 (H) 74 - 99 mg/dL   MRSA Surveillance for Vancomycin De-escalation, PCR    Specimen: Anterior Nares; Swab   Result Value Ref Range    MRSA PCR Not Detected Not Detected   Blood Culture    Specimen: Peripheral Venipuncture; Blood culture   Result Value Ref Range    Blood Culture Loaded on Instrument - Culture in progress    Blood Culture    Specimen: Peripheral Venipuncture; Blood culture   Result Value Ref Range    Blood Culture Loaded on Instrument - Culture in progress    Blood Gas Arterial Full Panel   Result Value Ref Range    POCT pH, Arterial 7.50 (H) 7.38 - 7.42 pH    POCT pCO2, Arterial 50 (H) 38 - 42 mm Hg    POCT pO2, Arterial 58 (L) 85 - 95 mm Hg    POCT SO2, Arterial 91 (L) 94 - 100 %    POCT Oxy Hemoglobin, Arterial 89.2 (L) 94.0 - 98.0 %    POCT Hematocrit Calculated, Arterial 32.0 (L) 36.0 - 46.0 %    POCT Sodium, Arterial 126 (L) 136 - 145 mmol/L    POCT Potassium, Arterial 2.8 (LL) 3.5 - 5.3 mmol/L    POCT Chloride, Arterial 86 (L) 98 - 107 mmol/L    POCT Ionized Calcium, Arterial 1.21 1.10 - 1.33 mmol/L    POCT Glucose, Arterial 147 (H) 74 - 99 mg/dL    POCT Lactate, Arterial 1.3 0.4 - 2.0 mmol/L    POCT Base Excess, Arterial 14.0 (H) -2.0 - 3.0  mmol/L    POCT HCO3 Calculated, Arterial 39.0 (H) 22.0 - 26.0 mmol/L    POCT Hemoglobin, Arterial 10.7 (L) 12.0 - 16.0 g/dL    POCT Anion Gap, Arterial 4 (L) 10 - 25 mmo/L    Patient Temperature 37.0 degrees Celsius    FiO2 50 %   Calcium, Ionized   Result Value Ref Range    POCT Calcium, Ionized 1.18 1.1 - 1.33 mmol/L   CBC   Result Value Ref Range    WBC 10.1 4.4 - 11.3 x10*3/uL    nRBC 0.0 0.0 - 0.0 /100 WBCs    RBC 3.94 (L) 4.00 - 5.20 x10*6/uL    Hemoglobin 10.2 (L) 12.0 - 16.0 g/dL    Hematocrit 29.4 (L) 36.0 - 46.0 %    MCV 75 (L) 80 - 100 fL    MCH 25.9 (L) 26.0 - 34.0 pg    MCHC 34.7 32.0 - 36.0 g/dL    RDW 13.1 11.5 - 14.5 %    Platelets 189 150 - 450 x10*3/uL   Coagulation Screen   Result Value Ref Range    Protime 11.5 9.8 - 12.8 seconds    INR 1.0 0.9 - 1.1    aPTT 29 27 - 38 seconds   Magnesium   Result Value Ref Range    Magnesium 2.17 1.60 - 2.40 mg/dL   Renal function panel   Result Value Ref Range    Glucose 138 (H) 74 - 99 mg/dL    Sodium 130 (L) 136 - 145 mmol/L    Potassium 3.0 (L) 3.5 - 5.3 mmol/L    Chloride 84 (L) 98 - 107 mmol/L    Bicarbonate 36 (H) 21 - 32 mmol/L    Anion Gap 13 10 - 20 mmol/L    Urea Nitrogen 8 6 - 23 mg/dL    Creatinine 0.28 (L) 0.50 - 1.05 mg/dL    eGFR >90 >60 mL/min/1.73m*2    Calcium 8.8 8.6 - 10.6 mg/dL    Phosphorus 1.4 (L) 2.5 - 4.9 mg/dL    Albumin 3.5 3.4 - 5.0 g/dL   BLOOD GAS MIXED VENOUS FULL PANEL   Result Value Ref Range    POCT pH, Mixed 7.49 (H) 7.33 - 7.43 pH    POCT pCO2, Mixed 50 41 - 51 mm Hg    POCT pO2, Mixed 34 (L) 35 - 45 mm Hg    POCT SO2, Mixed 58 45 - 75 %    POCT Oxy Hemoglobin, Mixed 57.0 45.0 - 75.0 %    POCT Hematocrit Calculated, Mixed 31.0 (L) 36.0 - 46.0 %    POCT Sodium, Mixed 126 (L) 136 - 145 mmol/L    POCT Potassium, Mixed 2.7 (LL) 3.5 - 5.3 mmol/L    POCT Chloride, Mixed 87 (L) 98 - 107 mmol/L    POCT Ionized Calcium, Mixed 1.18 1.10 - 1.33 mmol/L    POCT Glucose, Mixed 128 (H) 74 - 99 mg/dL    POCT Lactate, Mixed 1.1 0.4 - 2.0  mmol/L    POCT Base Excess, Mixed 13.1 (H) -2.0 - 3.0 mmol/L    POCT HCO3 Calculated, Mixed 38.1 (H) 22.0 - 26.0 mmol/L    POCT Hemoglobin, Mixed 10.2 (L) 12.0 - 16.0 g/dL    POCT Anion Gap, Mixed 4 (L) 10 - 25 mmo/L    Patient Temperature 37.0 degrees Celsius    FiO2 50 %   Blood Gas Arterial Full Panel   Result Value Ref Range    POCT pH, Arterial 7.48 (H) 7.38 - 7.42 pH    POCT pCO2, Arterial 50 (H) 38 - 42 mm Hg    POCT pO2, Arterial 51 (L) 85 - 95 mm Hg    POCT SO2, Arterial 85 (L) 94 - 100 %    POCT Oxy Hemoglobin, Arterial 83.2 (L) 94.0 - 98.0 %    POCT Hematocrit Calculated, Arterial 35.0 (L) 36.0 - 46.0 %    POCT Sodium, Arterial 126 (L) 136 - 145 mmol/L    POCT Potassium, Arterial 4.2 3.5 - 5.3 mmol/L    POCT Chloride, Arterial 87 (L) 98 - 107 mmol/L    POCT Ionized Calcium, Arterial 1.19 1.10 - 1.33 mmol/L    POCT Glucose, Arterial 106 (H) 74 - 99 mg/dL    POCT Lactate, Arterial 1.6 0.4 - 2.0 mmol/L    POCT Base Excess, Arterial 12.0 (H) -2.0 - 3.0 mmol/L    POCT HCO3 Calculated, Arterial 37.2 (H) 22.0 - 26.0 mmol/L    POCT Hemoglobin, Arterial 11.6 (L) 12.0 - 16.0 g/dL    POCT Anion Gap, Arterial 6 (L) 10 - 25 mmo/L    Patient Temperature 37.0 degrees Celsius    FiO2 50 %   BLOOD GAS MIXED VENOUS FULL PANEL   Result Value Ref Range    POCT pH, Mixed 7.48 (H) 7.33 - 7.43 pH    POCT pCO2, Mixed 48 41 - 51 mm Hg    POCT pO2, Mixed 29 (L) 35 - 45 mm Hg    POCT SO2, Mixed 46 45 - 75 %    POCT Oxy Hemoglobin, Mixed 45.0 45.0 - 75.0 %    POCT Hematocrit Calculated, Mixed 33.0 (L) 36.0 - 46.0 %    POCT Sodium, Mixed 127 (L) 136 - 145 mmol/L    POCT Potassium, Mixed 4.0 3.5 - 5.3 mmol/L    POCT Chloride, Mixed 90 (L) 98 - 107 mmol/L    POCT Ionized Calcium, Mixed 1.11 1.10 - 1.33 mmol/L    POCT Glucose, Mixed 100 (H) 74 - 99 mg/dL    POCT Lactate, Mixed 1.4 0.4 - 2.0 mmol/L    POCT Base Excess, Mixed 10.8 (H) -2.0 - 3.0 mmol/L    POCT HCO3 Calculated, Mixed 35.7 (H) 22.0 - 26.0 mmol/L    POCT Hemoglobin, Mixed  11.1 (L) 12.0 - 16.0 g/dL    POCT Anion Gap, Mixed 5 (L) 10 - 25 mmo/L    Patient Temperature 37.0 degrees Celsius    FiO2 50 %   Blood Gas Arterial Full Panel   Result Value Ref Range    POCT pH, Arterial 7.46 (H) 7.38 - 7.42 pH    POCT pCO2, Arterial 49 (H) 38 - 42 mm Hg    POCT pO2, Arterial 68 (L) 85 - 95 mm Hg    POCT SO2, Arterial 95 94 - 100 %    POCT Oxy Hemoglobin, Arterial 92.0 (L) 94.0 - 98.0 %    POCT Hematocrit Calculated, Arterial 35.0 (L) 36.0 - 46.0 %    POCT Sodium, Arterial 127 (L) 136 - 145 mmol/L    POCT Potassium, Arterial 4.4 3.5 - 5.3 mmol/L    POCT Chloride, Arterial 88 (L) 98 - 107 mmol/L    POCT Ionized Calcium, Arterial 1.17 1.10 - 1.33 mmol/L    POCT Glucose, Arterial 115 (H) 74 - 99 mg/dL    POCT Lactate, Arterial 1.5 0.4 - 2.0 mmol/L    POCT Base Excess, Arterial 9.6 (H) -2.0 - 3.0 mmol/L    POCT HCO3 Calculated, Arterial 34.8 (H) 22.0 - 26.0 mmol/L    POCT Hemoglobin, Arterial 11.5 (L) 12.0 - 16.0 g/dL    POCT Anion Gap, Arterial 9 (L) 10 - 25 mmo/L    Patient Temperature 37.0 degrees Celsius    FiO2 90 %   BLOOD GAS MIXED VENOUS FULL PANEL   Result Value Ref Range    POCT pH, Mixed 7.45 (H) 7.33 - 7.43 pH    POCT pCO2, Mixed 55 (H) 41 - 51 mm Hg    POCT pO2, Mixed 32 (L) 35 - 45 mm Hg    POCT SO2, Mixed 50 45 - 75 %    POCT Oxy Hemoglobin, Mixed 49.2 45.0 - 75.0 %    POCT Hematocrit Calculated, Mixed 34.0 (L) 36.0 - 46.0 %    POCT Sodium, Mixed 126 (L) 136 - 145 mmol/L    POCT Potassium, Mixed 4.5 3.5 - 5.3 mmol/L    POCT Chloride, Mixed 88 (L) 98 - 107 mmol/L    POCT Ionized Calcium, Mixed 1.15 1.10 - 1.33 mmol/L    POCT Glucose, Mixed 114 (H) 74 - 99 mg/dL    POCT Lactate, Mixed 1.3 0.4 - 2.0 mmol/L    POCT Base Excess, Mixed 12.3 (H) -2.0 - 3.0 mmol/L    POCT HCO3 Calculated, Mixed 38.2 (H) 22.0 - 26.0 mmol/L    POCT Hemoglobin, Mixed 11.4 (L) 12.0 - 16.0 g/dL    POCT Anion Gap, Mixed 4 (L) 10 - 25 mmo/L    Patient Temperature 37.0 degrees Celsius    FiO2 90 %   POCT GLUCOSE    Result Value Ref Range    POCT Glucose 126 (H) 74 - 99 mg/dL   Blood Gas Arterial Full Panel   Result Value Ref Range    POCT pH, Arterial 7.50 (H) 7.38 - 7.42 pH    POCT pCO2, Arterial 46 (H) 38 - 42 mm Hg    POCT pO2, Arterial 80 (L) 85 - 95 mm Hg    POCT SO2, Arterial 98 94 - 100 %    POCT Oxy Hemoglobin, Arterial 95.4 94.0 - 98.0 %    POCT Hematocrit Calculated, Arterial 30.0 (L) 36.0 - 46.0 %    POCT Sodium, Arterial 127 (L) 136 - 145 mmol/L    POCT Potassium, Arterial 4.0 3.5 - 5.3 mmol/L    POCT Chloride, Arterial 91 (L) 98 - 107 mmol/L    POCT Ionized Calcium, Arterial 1.09 (L) 1.10 - 1.33 mmol/L    POCT Glucose, Arterial 139 (H) 74 - 99 mg/dL    POCT Lactate, Arterial 0.9 0.4 - 2.0 mmol/L    POCT Base Excess, Arterial 11.4 (H) -2.0 - 3.0 mmol/L    POCT HCO3 Calculated, Arterial 35.9 (H) 22.0 - 26.0 mmol/L    POCT Hemoglobin, Arterial 9.9 (L) 12.0 - 16.0 g/dL    POCT Anion Gap, Arterial 4 (L) 10 - 25 mmo/L    Patient Temperature 37.0 degrees Celsius    FiO2 90 %   Blood Gas Arterial Full Panel   Result Value Ref Range    POCT pH, Arterial 7.46 (H) 7.38 - 7.42 pH    POCT pCO2, Arterial 50 (H) 38 - 42 mm Hg    POCT pO2, Arterial 69 (L) 85 - 95 mm Hg    POCT SO2, Arterial 96 94 - 100 %    POCT Oxy Hemoglobin, Arterial 93.8 (L) 94.0 - 98.0 %    POCT Hematocrit Calculated, Arterial 35.0 (L) 36.0 - 46.0 %    POCT Sodium, Arterial 124 (L) 136 - 145 mmol/L    POCT Potassium, Arterial 4.2 3.5 - 5.3 mmol/L    POCT Chloride, Arterial 88 (L) 98 - 107 mmol/L    POCT Ionized Calcium, Arterial 1.13 1.10 - 1.33 mmol/L    POCT Glucose, Arterial 147 (H) 74 - 99 mg/dL    POCT Lactate, Arterial 1.3 0.4 - 2.0 mmol/L    POCT Base Excess, Arterial 10.3 (H) -2.0 - 3.0 mmol/L    POCT HCO3 Calculated, Arterial 35.6 (H) 22.0 - 26.0 mmol/L    POCT Hemoglobin, Arterial 11.7 (L) 12.0 - 16.0 g/dL    POCT Anion Gap, Arterial 5 (L) 10 - 25 mmo/L    Patient Temperature 37.0 degrees Celsius    FiO2 80 %   BLOOD GAS MIXED VENOUS FULL PANEL    Result Value Ref Range    POCT pH, Mixed 7.43 7.33 - 7.43 pH    POCT pCO2, Mixed 56 (H) 41 - 51 mm Hg    POCT pO2, Mixed 30 (L) 35 - 45 mm Hg    POCT SO2, Mixed 48 45 - 75 %    POCT Oxy Hemoglobin, Mixed 47.7 45.0 - 75.0 %    POCT Hematocrit Calculated, Mixed 36.0 36.0 - 46.0 %    POCT Sodium, Mixed 123 (L) 136 - 145 mmol/L    POCT Potassium, Mixed 4.2 3.5 - 5.3 mmol/L    POCT Chloride, Mixed 88 (L) 98 - 107 mmol/L    POCT Ionized Calcium, Mixed 1.13 1.10 - 1.33 mmol/L    POCT Glucose, Mixed 146 (H) 74 - 99 mg/dL    POCT Lactate, Mixed 1.2 0.4 - 2.0 mmol/L    POCT Base Excess, Mixed 10.9 (H) -2.0 - 3.0 mmol/L    POCT HCO3 Calculated, Mixed 37.2 (H) 22.0 - 26.0 mmol/L    POCT Hemoglobin, Mixed 12.0 12.0 - 16.0 g/dL    POCT Anion Gap, Mixed 2 (L) 10 - 25 mmo/L    Patient Temperature 37.0 degrees Celsius    FiO2 80 %   POCT GLUCOSE   Result Value Ref Range    POCT Glucose 155 (H) 74 - 99 mg/dL     Scheduled medications  azithromycin, 250 mg, intravenous, q24h  bisacodyl, 10 mg, rectal, Daily  fluticasone furoate-vilanteroL, 1 puff, inhalation, Daily  heparin (porcine), 5,000 Units, subcutaneous, q8h  hydrocortisone sodium succinate, 50 mg, intravenous, q8h  lidocaine, 1 patch, transdermal, Daily  [Held by provider] magnesium oxide, 400 mg, oral, Daily  [Held by provider] melatonin, 3 mg, oral, Nightly  [Held by provider] metoprolol tartrate, 12.5 mg, oral, q12h  oxygen, , inhalation, Continuous - Inhalation  pantoprazole, 40 mg, intravenous, Daily before breakfast  perflutren lipid microspheres, 0.5-10 mL of dilution, intravenous, Once in imaging  piperacillin-tazobactam, 4.5 g, intravenous, q6h  potassium phosphate, 21 mmol, intravenous, Once  [Held by provider] sennosides-docusate sodium, 2 tablet, oral, BID  sodium chloride, 3 mL, nebulization, q6h while awake  tiotropium, 2 puff, inhalation, Daily  vancomycin, 500 mg, intravenous, Once   Followed by  vancomycin, 1,000 mg, intravenous, q12h      Continuous  medications  dexmedeTOMIDine, 0-0.6 mcg/kg/hr, Last Rate: 0.4 mcg/kg/hr (01/18/25 1130)  [Held by provider] EPINEPHrine, 0.04 mcg/kg/min, Last Rate: Stopped (01/17/25 0305)  [Held by provider] furosemide, 15 mg/hr, Last Rate: Stopped (01/17/25 1230)  milrinone, 0.375 mcg/kg/min, Last Rate: 0.375 mcg/kg/min (01/18/25 1157)  vasopressin, 0.02 Units/min, Last Rate: 0.02 Units/min (01/18/25 1000)      PRN medications  PRN medications: albuterol, albuterol, HYDROmorphone, vancomycin           Assessment/Plan   Jannie Vick is a 64 y.o. 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 Jonah Wagner.  Patient had a preoperative hypotension requiring vasopressors and postoperative hypotension requiring vasopressors which she has been subsequently weaned off of, increasing oxygen demand status post surgery which had subsequently been weaned off.  However on 1/16/2025 patient had borderline low blood pressures with 90s/60s, increasing oxygen requirements, and an echocardiogram illustrating biventricular Takotsubo cardiomyopathy for which cardiology was consulted for further recommendations. Given that the patient was showing signs of low output state with altered mental status, decreased urine output with a lactate of 2 along with echo showing signs of elevated left and right sided pressures. A shock call was done where the recommendation was start the patient on inotrope (milrinone) along with a lasix drip. Now s/p Hartselle placement with normal filling pressures along with CI of 3.      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     Plan:  -Agree with holding lasix and monitoring filling pressures.  -Target MAP 65-70 and avoid severe afterload (CI was reduced in  the setting of high SVR and hypoxia), would hold off vasopressor medications if able.  -Wean milrinone slowly after decreasing afterload.  -If the patient develops worsening shock, requiring increased doses of inotropes and or adding new inotrope, would recommend activating a cardiac shock call to reconvene and discuss possible options.    -Remainder per primary team.     Thank you for involving us in this patient care. Recommendations not final until signed by attending.     General Cardiology Consult Pager: [phone] (weekday 7AM-6PM and weekend 7AM-2PM) and other: [phone]  EP Consult Pager: [phone] (weekday 7AM-6PM and weekend 7AM-2PM) and other: [phone]  CICU Fellow Pager: [phone] anytime  EP Device Nurse Pager: [phone] (weekday 7AM-4PM)  Advanced Heart Failure Consult Pager: [phone] anytime      David Cavanaugh MD  Cardiology Fellow PGY5

## 2025-01-18 NOTE — NURSING NOTE
SICU team called to bedside for patient hypotension and tachycardia event    1455 - Patient hypotensive on arterial line 69/48 (56),   1457 - SICU team at bedside assessing patient, per physician vasopressin temporarily  increased to 0.06 units/min from 0.02 units/min, precedex decreased to 0.3 mcg/kg/hr from 0.5 mcg/kg/hr.  1515 - Patient returned to vasopressin 0.02 units/min, precedex increased to 0.4 mcg/kg/hr due to patient RASS +1, attempting to remove BIPAP mask.  1530 - , 93/60 (73), Jenelle NP at bedside assessing patient.  1550 - , 77/50 (58), Jenelle NP at bedside. Vasopressin increased to 0.05 units/min, precedex decreased to 0.2 mcg/kg/hr  1555 - Precedex stopped

## 2025-01-18 NOTE — NURSING NOTE
"SICU team at bedside preparing for intubation    Attending Dr. Herbert at head of bed with RT, RN, SICU NP at bedside. Timeout performed prior to initiation of intubation.     1811: 50mcg rocuronium, 8mg etomidate pushed by SICU team at bedside   1814: patient preoxygenated with bag valve mask by Dr Herbert   5mg versed, 32mcg levo given by SICU team   1815: ETT inserted  and stylet removed   Placement confirmed by color change on bag valve mask and bilateral breath sounds   Pressors advanced to 0.1mg levo gtt.   1818: Ventilator connected to ETT; CXR ordered   1820: Levo @.06 per SICU at bedside     7.0ETT @ 22; AC-VC RR18,  , 80% Fi02,  5 PEEP       Visit Vitals  /79   Pulse (!) 131   Temp (!) 38 °C (100.4 °F) (Core)   Resp (!) 29   Ht 1.651 m (5' 5\")   Wt 53.3 kg (117 lb 8.1 oz)   SpO2 96%   BMI 19.55 kg/m²   OB Status Postmenopausal   Smoking Status Former   BSA 1.56 m²        "

## 2025-01-18 NOTE — PROGRESS NOTES
"Faustina Vick \"Noam" is a 64 y.o. 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    Subjective   Patient more ill-appearing this AM       Objective     Physical Exam  Constitutional:       Appearance: She is ill-appearing.      Comments: On precedex   HENT:      Head: Normocephalic and atraumatic.   Eyes:      Conjunctiva/sclera: Conjunctivae normal.   Cardiovascular:      Rate and Rhythm: Normal rate and regular rhythm.      Comments: Vaso 0.02, Lasix gtt, milrinone  Pulmonary:      Effort: Pulmonary effort is normal.      Comments: R CT removed, dressing intact, no leakage. On 2LNC  Abdominal:      General: There is no distension.   Genitourinary:     Comments: Montiel in place, clear urine  Musculoskeletal:         General: No swelling.   Skin:     General: Skin is warm and dry.   Neurological:      General: No focal deficit present.      Mental Status: She is alert.      Comments: Asking appropriate questions   Psychiatric:         Behavior: Behavior normal.         Last Recorded Vitals  Blood pressure 105/69, pulse 109, temperature 36.2 °C (97.2 °F), temperature source Temporal, resp. rate 24, height 1.651 m (5' 5\"), weight 53.3 kg (117 lb 8.1 oz), SpO2 97%.  Intake/Output last 3 Shifts:  I/O last 3 completed shifts:  In: 2279.2 (42.8 mL/kg) [I.V.:1019.2 (19.1 mL/kg); IV Piggyback:1260]  Out: 3170 (59.5 mL/kg) [Urine:3165 (1.6 mL/kg/hr); Blood:5]  Weight: 53.3 kg     Relevant Results  Results for orders placed or performed during the hospital encounter of 01/13/25 (from the past 24 hours)   Blood Gas Arterial Full Panel   Result Value Ref Range    POCT pH, Arterial 7.47 (H) 7.38 - 7.42 pH    POCT pCO2, Arterial 43 (H) 38 - 42 mm Hg    POCT pO2, Arterial 78 (L) 85 - 95 mm Hg    POCT SO2, Arterial 98 94 - 100 %    POCT Oxy Hemoglobin, Arterial 95.5 94.0 - 98.0 %    POCT Hematocrit Calculated, Arterial 27.0 (L) 36.0 - 46.0 %    POCT Sodium, Arterial 127 (L) 136 - " 145 mmol/L    POCT Potassium, Arterial 3.1 (L) 3.5 - 5.3 mmol/L    POCT Chloride, Arterial 86 (L) 98 - 107 mmol/L    POCT Ionized Calcium, Arterial 1.06 (L) 1.10 - 1.33 mmol/L    POCT Glucose, Arterial 145 (H) 74 - 99 mg/dL    POCT Lactate, Arterial 1.2 0.4 - 2.0 mmol/L    POCT Base Excess, Arterial 6.9 (H) -2.0 - 3.0 mmol/L    POCT HCO3 Calculated, Arterial 31.3 (H) 22.0 - 26.0 mmol/L    POCT Hemoglobin, Arterial 9.0 (L) 12.0 - 16.0 g/dL    POCT Anion Gap, Arterial 13 10 - 25 mmo/L    Patient Temperature 37.0 degrees Celsius    FiO2 28 %   Renal Function Panel   Result Value Ref Range    Glucose 147 (H) 74 - 99 mg/dL    Sodium 130 (L) 136 - 145 mmol/L    Potassium 3.3 (L) 3.5 - 5.3 mmol/L    Chloride 84 (L) 98 - 107 mmol/L    Bicarbonate 33 (H) 21 - 32 mmol/L    Anion Gap 16 10 - 20 mmol/L    Urea Nitrogen 9 6 - 23 mg/dL    Creatinine 0.38 (L) 0.50 - 1.05 mg/dL    eGFR >90 >60 mL/min/1.73m*2    Calcium 8.0 (L) 8.6 - 10.6 mg/dL    Phosphorus 1.8 (L) 2.5 - 4.9 mg/dL    Albumin 3.6 3.4 - 5.0 g/dL   Calcium, Ionized   Result Value Ref Range    POCT Calcium, Ionized 0.99 (L) 1.1 - 1.33 mmol/L   Blood Gas Venous Full Panel   Result Value Ref Range    POCT pH, Venous 7.42 7.33 - 7.43 pH    POCT pCO2, Venous 53 (H) 41 - 51 mm Hg    POCT pO2, Venous 36 35 - 45 mm Hg    POCT SO2, Venous 55 45 - 75 %    POCT Oxy Hemoglobin, Venous 53.8 45.0 - 75.0 %    POCT Hematocrit Calculated, Venous 27.0 (L) 36.0 - 46.0 %    POCT Sodium, Venous 125 (L) 136 - 145 mmol/L    POCT Potassium, Venous 3.1 (L) 3.5 - 5.3 mmol/L    POCT Chloride, Venous 85 (L) 98 - 107 mmol/L    POCT Ionized Calicum, Venous 1.08 (L) 1.10 - 1.33 mmol/L    POCT Glucose, Venous 143 (H) 74 - 99 mg/dL    POCT Lactate, Venous 1.2 0.4 - 2.0 mmol/L    POCT Base Excess, Venous 8.7 (H) -2.0 - 3.0 mmol/L    POCT HCO3 Calculated, Venous 34.4 (H) 22.0 - 26.0 mmol/L    POCT Hemoglobin, Venous 9.0 (L) 12.0 - 16.0 g/dL    POCT Anion Gap, Venous 9.0 (L) 10.0 - 25.0 mmol/L     Patient Temperature 37.0 degrees Celsius    FiO2 28 %   Prepare RBC: 1 Units   Result Value Ref Range    PRODUCT CODE I6904P62     Unit Number [id number]     Unit ABO B     Unit RH POS     XM INTEP COMP     Dispense Status IS     Blood Expiration Date 1/18/2025 11:59:00 PM EST     PRODUCT BLOOD TYPE 7300     UNIT VOLUME 350    Type and screen   Result Value Ref Range    ABO TYPE B     Rh TYPE POS     ANTIBODY SCREEN NEG    POCT GLUCOSE   Result Value Ref Range    POCT Glucose 142 (H) 74 - 99 mg/dL   Transthoracic Echo (TTE) Limited   Result Value Ref Range    AV pk radha 0.87 m/s    AV mn grad 2 mmHg    LVOT diam 1.94 cm    Tricuspid annular plane systolic excursion 1.2 cm    LV EF 28 %    RV free wall pk S' 9.36 cm/s    RVSP 27.0 mmHg    Aortic Valve Area by Continuity of VTI 2.41 cm2    Aortic Valve Area by Continuity of Peak Velocity 2.26 cm2    AV pk grad 3 mmHg    LV A4C EF 18.9    Renal Function Panel   Result Value Ref Range    Glucose 123 (H) 74 - 99 mg/dL    Sodium 130 (L) 136 - 145 mmol/L    Potassium 3.1 (L) 3.5 - 5.3 mmol/L    Chloride 80 (L) 98 - 107 mmol/L    Bicarbonate 34 (H) 21 - 32 mmol/L    Anion Gap 19 10 - 20 mmol/L    Urea Nitrogen 9 6 - 23 mg/dL    Creatinine 0.28 (L) 0.50 - 1.05 mg/dL    eGFR >90 >60 mL/min/1.73m*2    Calcium 8.0 (L) 8.6 - 10.6 mg/dL    Phosphorus 2.1 (L) 2.5 - 4.9 mg/dL    Albumin 3.5 3.4 - 5.0 g/dL   Blood Gas Arterial Full Panel   Result Value Ref Range    POCT pH, Arterial 7.48 (H) 7.38 - 7.42 pH    POCT pCO2, Arterial 45 (H) 38 - 42 mm Hg    POCT pO2, Arterial 97 (H) 85 - 95 mm Hg    POCT SO2, Arterial 99 94 - 100 %    POCT Oxy Hemoglobin, Arterial 96.1 94.0 - 98.0 %    POCT Hematocrit Calculated, Arterial 32.0 (L) 36.0 - 46.0 %    POCT Sodium, Arterial 126 (L) 136 - 145 mmol/L    POCT Potassium, Arterial 2.7 (LL) 3.5 - 5.3 mmol/L    POCT Chloride, Arterial 84 (L) 98 - 107 mmol/L    POCT Ionized Calcium, Arterial 1.03 (L) 1.10 - 1.33 mmol/L    POCT Glucose, Arterial  163 (H) 74 - 99 mg/dL    POCT Lactate, Arterial 1.1 0.4 - 2.0 mmol/L    POCT Base Excess, Arterial 9.0 (H) -2.0 - 3.0 mmol/L    POCT HCO3 Calculated, Arterial 33.5 (H) 22.0 - 26.0 mmol/L    POCT Hemoglobin, Arterial 10.8 (L) 12.0 - 16.0 g/dL    POCT Anion Gap, Arterial 11 10 - 25 mmo/L    Patient Temperature 37.0 degrees Celsius    FiO2 28 %   Blood Gas Venous Full Panel   Result Value Ref Range    POCT pH, Venous 7.42 7.33 - 7.43 pH    POCT pCO2, Venous 54 (H) 41 - 51 mm Hg    POCT pO2, Venous 39 35 - 45 mm Hg    POCT SO2, Venous 67 45 - 75 %    POCT Oxy Hemoglobin, Venous 65.9 45.0 - 75.0 %    POCT Hematocrit Calculated, Venous 32.0 (L) 36.0 - 46.0 %    POCT Sodium, Venous 127 (L) 136 - 145 mmol/L    POCT Potassium, Venous 2.6 (LL) 3.5 - 5.3 mmol/L    POCT Chloride, Venous 85 (L) 98 - 107 mmol/L    POCT Ionized Calicum, Venous 1.02 (L) 1.10 - 1.33 mmol/L    POCT Glucose, Venous 163 (H) 74 - 99 mg/dL    POCT Lactate, Venous 1.3 0.4 - 2.0 mmol/L    POCT Base Excess, Venous 9.1 (H) -2.0 - 3.0 mmol/L    POCT HCO3 Calculated, Venous 35.0 (H) 22.0 - 26.0 mmol/L    POCT Hemoglobin, Venous 10.5 (L) 12.0 - 16.0 g/dL    POCT Anion Gap, Venous 10.0 10.0 - 25.0 mmol/L    Patient Temperature 37.0 degrees Celsius    FiO2 28 %   Calcium, Ionized   Result Value Ref Range    POCT Calcium, Ionized 0.97 (L) 1.1 - 1.33 mmol/L   CBC   Result Value Ref Range    WBC 9.3 4.4 - 11.3 x10*3/uL    nRBC 0.0 0.0 - 0.0 /100 WBCs    RBC 3.81 (L) 4.00 - 5.20 x10*6/uL    Hemoglobin 9.8 (L) 12.0 - 16.0 g/dL    Hematocrit 28.9 (L) 36.0 - 46.0 %    MCV 76 (L) 80 - 100 fL    MCH 25.7 (L) 26.0 - 34.0 pg    MCHC 33.9 32.0 - 36.0 g/dL    RDW 13.1 11.5 - 14.5 %    Platelets 187 150 - 450 x10*3/uL   Magnesium   Result Value Ref Range    Magnesium 1.45 (L) 1.60 - 2.40 mg/dL   Renal function panel   Result Value Ref Range    Glucose 169 (H) 74 - 99 mg/dL    Sodium 129 (L) 136 - 145 mmol/L    Potassium 3.1 (L) 3.5 - 5.3 mmol/L    Chloride 81 (L) 98 - 107  mmol/L    Bicarbonate 34 (H) 21 - 32 mmol/L    Anion Gap 17 10 - 20 mmol/L    Urea Nitrogen 9 6 - 23 mg/dL    Creatinine 0.41 (L) 0.50 - 1.05 mg/dL    eGFR >90 >60 mL/min/1.73m*2    Calcium 7.9 (L) 8.6 - 10.6 mg/dL    Phosphorus 2.0 (L) 2.5 - 4.9 mg/dL    Albumin 3.3 (L) 3.4 - 5.0 g/dL   POCT GLUCOSE   Result Value Ref Range    POCT Glucose 163 (H) 74 - 99 mg/dL   Blood Gas Arterial Full Panel   Result Value Ref Range    POCT pH, Arterial 7.44 (H) 7.38 - 7.42 pH    POCT pCO2, Arterial 54 (H) 38 - 42 mm Hg    POCT pO2, Arterial 68 (L) 85 - 95 mm Hg    POCT SO2, Arterial 95 94 - 100 %    POCT Oxy Hemoglobin, Arterial 92.3 (L) 94.0 - 98.0 %    POCT Hematocrit Calculated, Arterial 33.0 (L) 36.0 - 46.0 %    POCT Sodium, Arterial 126 (L) 136 - 145 mmol/L    POCT Potassium, Arterial 3.6 3.5 - 5.3 mmol/L    POCT Chloride, Arterial 84 (L) 98 - 107 mmol/L    POCT Ionized Calcium, Arterial 1.03 (L) 1.10 - 1.33 mmol/L    POCT Glucose, Arterial 177 (H) 74 - 99 mg/dL    POCT Lactate, Arterial 1.4 0.4 - 2.0 mmol/L    POCT Base Excess, Arterial 10.8 (H) -2.0 - 3.0 mmol/L    POCT HCO3 Calculated, Arterial 36.7 (H) 22.0 - 26.0 mmol/L    POCT Hemoglobin, Arterial 11.1 (L) 12.0 - 16.0 g/dL    POCT Anion Gap, Arterial 9 (L) 10 - 25 mmo/L    Patient Temperature 37.0 degrees Celsius    FiO2 100 %   POCT GLUCOSE   Result Value Ref Range    POCT Glucose 199 (H) 74 - 99 mg/dL   MRSA Surveillance for Vancomycin De-escalation, PCR    Specimen: Anterior Nares; Swab   Result Value Ref Range    MRSA PCR Not Detected Not Detected   Blood Culture    Specimen: Peripheral Venipuncture; Blood culture   Result Value Ref Range    Blood Culture Loaded on Instrument - Culture in progress    Blood Culture    Specimen: Peripheral Venipuncture; Blood culture   Result Value Ref Range    Blood Culture Loaded on Instrument - Culture in progress    Blood Gas Arterial Full Panel   Result Value Ref Range    POCT pH, Arterial 7.50 (H) 7.38 - 7.42 pH    POCT pCO2,  Arterial 50 (H) 38 - 42 mm Hg    POCT pO2, Arterial 58 (L) 85 - 95 mm Hg    POCT SO2, Arterial 91 (L) 94 - 100 %    POCT Oxy Hemoglobin, Arterial 89.2 (L) 94.0 - 98.0 %    POCT Hematocrit Calculated, Arterial 32.0 (L) 36.0 - 46.0 %    POCT Sodium, Arterial 126 (L) 136 - 145 mmol/L    POCT Potassium, Arterial 2.8 (LL) 3.5 - 5.3 mmol/L    POCT Chloride, Arterial 86 (L) 98 - 107 mmol/L    POCT Ionized Calcium, Arterial 1.21 1.10 - 1.33 mmol/L    POCT Glucose, Arterial 147 (H) 74 - 99 mg/dL    POCT Lactate, Arterial 1.3 0.4 - 2.0 mmol/L    POCT Base Excess, Arterial 14.0 (H) -2.0 - 3.0 mmol/L    POCT HCO3 Calculated, Arterial 39.0 (H) 22.0 - 26.0 mmol/L    POCT Hemoglobin, Arterial 10.7 (L) 12.0 - 16.0 g/dL    POCT Anion Gap, Arterial 4 (L) 10 - 25 mmo/L    Patient Temperature 37.0 degrees Celsius    FiO2 50 %   Calcium, Ionized   Result Value Ref Range    POCT Calcium, Ionized 1.18 1.1 - 1.33 mmol/L   CBC   Result Value Ref Range    WBC 10.1 4.4 - 11.3 x10*3/uL    nRBC 0.0 0.0 - 0.0 /100 WBCs    RBC 3.94 (L) 4.00 - 5.20 x10*6/uL    Hemoglobin 10.2 (L) 12.0 - 16.0 g/dL    Hematocrit 29.4 (L) 36.0 - 46.0 %    MCV 75 (L) 80 - 100 fL    MCH 25.9 (L) 26.0 - 34.0 pg    MCHC 34.7 32.0 - 36.0 g/dL    RDW 13.1 11.5 - 14.5 %    Platelets 189 150 - 450 x10*3/uL   Coagulation Screen   Result Value Ref Range    Protime 11.5 9.8 - 12.8 seconds    INR 1.0 0.9 - 1.1    aPTT 29 27 - 38 seconds   Magnesium   Result Value Ref Range    Magnesium 2.17 1.60 - 2.40 mg/dL   Renal function panel   Result Value Ref Range    Glucose 138 (H) 74 - 99 mg/dL    Sodium 130 (L) 136 - 145 mmol/L    Potassium 3.0 (L) 3.5 - 5.3 mmol/L    Chloride 84 (L) 98 - 107 mmol/L    Bicarbonate 36 (H) 21 - 32 mmol/L    Anion Gap 13 10 - 20 mmol/L    Urea Nitrogen 8 6 - 23 mg/dL    Creatinine 0.28 (L) 0.50 - 1.05 mg/dL    eGFR >90 >60 mL/min/1.73m*2    Calcium 8.8 8.6 - 10.6 mg/dL    Phosphorus 1.4 (L) 2.5 - 4.9 mg/dL    Albumin 3.5 3.4 - 5.0 g/dL   BLOOD GAS MIXED  VENOUS FULL PANEL   Result Value Ref Range    POCT pH, Mixed 7.49 (H) 7.33 - 7.43 pH    POCT pCO2, Mixed 50 41 - 51 mm Hg    POCT pO2, Mixed 34 (L) 35 - 45 mm Hg    POCT SO2, Mixed 58 45 - 75 %    POCT Oxy Hemoglobin, Mixed 57.0 45.0 - 75.0 %    POCT Hematocrit Calculated, Mixed 31.0 (L) 36.0 - 46.0 %    POCT Sodium, Mixed 126 (L) 136 - 145 mmol/L    POCT Potassium, Mixed 2.7 (LL) 3.5 - 5.3 mmol/L    POCT Chloride, Mixed 87 (L) 98 - 107 mmol/L    POCT Ionized Calcium, Mixed 1.18 1.10 - 1.33 mmol/L    POCT Glucose, Mixed 128 (H) 74 - 99 mg/dL    POCT Lactate, Mixed 1.1 0.4 - 2.0 mmol/L    POCT Base Excess, Mixed 13.1 (H) -2.0 - 3.0 mmol/L    POCT HCO3 Calculated, Mixed 38.1 (H) 22.0 - 26.0 mmol/L    POCT Hemoglobin, Mixed 10.2 (L) 12.0 - 16.0 g/dL    POCT Anion Gap, Mixed 4 (L) 10 - 25 mmo/L    Patient Temperature 37.0 degrees Celsius    FiO2 50 %   Blood Gas Arterial Full Panel   Result Value Ref Range    POCT pH, Arterial 7.48 (H) 7.38 - 7.42 pH    POCT pCO2, Arterial 50 (H) 38 - 42 mm Hg    POCT pO2, Arterial 51 (L) 85 - 95 mm Hg    POCT SO2, Arterial 85 (L) 94 - 100 %    POCT Oxy Hemoglobin, Arterial 83.2 (L) 94.0 - 98.0 %    POCT Hematocrit Calculated, Arterial 35.0 (L) 36.0 - 46.0 %    POCT Sodium, Arterial 126 (L) 136 - 145 mmol/L    POCT Potassium, Arterial 4.2 3.5 - 5.3 mmol/L    POCT Chloride, Arterial 87 (L) 98 - 107 mmol/L    POCT Ionized Calcium, Arterial 1.19 1.10 - 1.33 mmol/L    POCT Glucose, Arterial 106 (H) 74 - 99 mg/dL    POCT Lactate, Arterial 1.6 0.4 - 2.0 mmol/L    POCT Base Excess, Arterial 12.0 (H) -2.0 - 3.0 mmol/L    POCT HCO3 Calculated, Arterial 37.2 (H) 22.0 - 26.0 mmol/L    POCT Hemoglobin, Arterial 11.6 (L) 12.0 - 16.0 g/dL    POCT Anion Gap, Arterial 6 (L) 10 - 25 mmo/L    Patient Temperature 37.0 degrees Celsius    FiO2 50 %   BLOOD GAS MIXED VENOUS FULL PANEL   Result Value Ref Range    POCT pH, Mixed 7.48 (H) 7.33 - 7.43 pH    POCT pCO2, Mixed 48 41 - 51 mm Hg    POCT pO2, Mixed 29  (L) 35 - 45 mm Hg    POCT SO2, Mixed 46 45 - 75 %    POCT Oxy Hemoglobin, Mixed 45.0 45.0 - 75.0 %    POCT Hematocrit Calculated, Mixed 33.0 (L) 36.0 - 46.0 %    POCT Sodium, Mixed 127 (L) 136 - 145 mmol/L    POCT Potassium, Mixed 4.0 3.5 - 5.3 mmol/L    POCT Chloride, Mixed 90 (L) 98 - 107 mmol/L    POCT Ionized Calcium, Mixed 1.11 1.10 - 1.33 mmol/L    POCT Glucose, Mixed 100 (H) 74 - 99 mg/dL    POCT Lactate, Mixed 1.4 0.4 - 2.0 mmol/L    POCT Base Excess, Mixed 10.8 (H) -2.0 - 3.0 mmol/L    POCT HCO3 Calculated, Mixed 35.7 (H) 22.0 - 26.0 mmol/L    POCT Hemoglobin, Mixed 11.1 (L) 12.0 - 16.0 g/dL    POCT Anion Gap, Mixed 5 (L) 10 - 25 mmo/L    Patient Temperature 37.0 degrees Celsius    FiO2 50 %   Blood Gas Arterial Full Panel   Result Value Ref Range    POCT pH, Arterial 7.46 (H) 7.38 - 7.42 pH    POCT pCO2, Arterial 49 (H) 38 - 42 mm Hg    POCT pO2, Arterial 68 (L) 85 - 95 mm Hg    POCT SO2, Arterial 95 94 - 100 %    POCT Oxy Hemoglobin, Arterial 92.0 (L) 94.0 - 98.0 %    POCT Hematocrit Calculated, Arterial 35.0 (L) 36.0 - 46.0 %    POCT Sodium, Arterial 127 (L) 136 - 145 mmol/L    POCT Potassium, Arterial 4.4 3.5 - 5.3 mmol/L    POCT Chloride, Arterial 88 (L) 98 - 107 mmol/L    POCT Ionized Calcium, Arterial 1.17 1.10 - 1.33 mmol/L    POCT Glucose, Arterial 115 (H) 74 - 99 mg/dL    POCT Lactate, Arterial 1.5 0.4 - 2.0 mmol/L    POCT Base Excess, Arterial 9.6 (H) -2.0 - 3.0 mmol/L    POCT HCO3 Calculated, Arterial 34.8 (H) 22.0 - 26.0 mmol/L    POCT Hemoglobin, Arterial 11.5 (L) 12.0 - 16.0 g/dL    POCT Anion Gap, Arterial 9 (L) 10 - 25 mmo/L    Patient Temperature 37.0 degrees Celsius    FiO2 90 %   BLOOD GAS MIXED VENOUS FULL PANEL   Result Value Ref Range    POCT pH, Mixed 7.45 (H) 7.33 - 7.43 pH    POCT pCO2, Mixed 55 (H) 41 - 51 mm Hg    POCT pO2, Mixed 32 (L) 35 - 45 mm Hg    POCT SO2, Mixed 50 45 - 75 %    POCT Oxy Hemoglobin, Mixed 49.2 45.0 - 75.0 %    POCT Hematocrit Calculated, Mixed 34.0 (L) 36.0  - 46.0 %    POCT Sodium, Mixed 126 (L) 136 - 145 mmol/L    POCT Potassium, Mixed 4.5 3.5 - 5.3 mmol/L    POCT Chloride, Mixed 88 (L) 98 - 107 mmol/L    POCT Ionized Calcium, Mixed 1.15 1.10 - 1.33 mmol/L    POCT Glucose, Mixed 114 (H) 74 - 99 mg/dL    POCT Lactate, Mixed 1.3 0.4 - 2.0 mmol/L    POCT Base Excess, Mixed 12.3 (H) -2.0 - 3.0 mmol/L    POCT HCO3 Calculated, Mixed 38.2 (H) 22.0 - 26.0 mmol/L    POCT Hemoglobin, Mixed 11.4 (L) 12.0 - 16.0 g/dL    POCT Anion Gap, Mixed 4 (L) 10 - 25 mmo/L    Patient Temperature 37.0 degrees Celsius    FiO2 90 %   POCT GLUCOSE   Result Value Ref Range    POCT Glucose 126 (H) 74 - 99 mg/dL   Blood Gas Arterial Full Panel   Result Value Ref Range    POCT pH, Arterial 7.50 (H) 7.38 - 7.42 pH    POCT pCO2, Arterial 46 (H) 38 - 42 mm Hg    POCT pO2, Arterial 80 (L) 85 - 95 mm Hg    POCT SO2, Arterial 98 94 - 100 %    POCT Oxy Hemoglobin, Arterial 95.4 94.0 - 98.0 %    POCT Hematocrit Calculated, Arterial 30.0 (L) 36.0 - 46.0 %    POCT Sodium, Arterial 127 (L) 136 - 145 mmol/L    POCT Potassium, Arterial 4.0 3.5 - 5.3 mmol/L    POCT Chloride, Arterial 91 (L) 98 - 107 mmol/L    POCT Ionized Calcium, Arterial 1.09 (L) 1.10 - 1.33 mmol/L    POCT Glucose, Arterial 139 (H) 74 - 99 mg/dL    POCT Lactate, Arterial 0.9 0.4 - 2.0 mmol/L    POCT Base Excess, Arterial 11.4 (H) -2.0 - 3.0 mmol/L    POCT HCO3 Calculated, Arterial 35.9 (H) 22.0 - 26.0 mmol/L    POCT Hemoglobin, Arterial 9.9 (L) 12.0 - 16.0 g/dL    POCT Anion Gap, Arterial 4 (L) 10 - 25 mmo/L    Patient Temperature 37.0 degrees Celsius    FiO2 90 %         This patient has a central line   Reason for the central line remaining today? Hemodynamic monitoring                 Assessment/Plan   Assessment & Plan  Primary cancer of right upper lobe of lung (Multi)    Chronic obstructive pulmonary disease (Multi)    Malignant neoplasm of upper lobe of right lung (Multi)    Cardiogenic shock (Multi)    Faustina Vick (Jannie) is a 64F  with Hx of Stage III lung cancer, asthma, anxiety, and arthritis who is now s/p R pneumonectomy with Dr. Wagner 1/13/25. 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 Cardiology was consulted and recommended Lasix gtt and milrinone.    - Appreciate cardiology and nephrology recommendations regarding patient cardiomyopathy and electrolyte derangements  - OOB, IS  - Requiring ICU level care at this time  - Please page with concerns    Patient seen with cardiothoracic fellow Dr. Brush and discussed with attending Dr. Bernard Miranda MD  Thoracic surgery [id number]

## 2025-01-19 ENCOUNTER — APPOINTMENT (OUTPATIENT)
Dept: RADIOLOGY | Facility: HOSPITAL | Age: 65
End: 2025-01-19
Payer: COMMERCIAL

## 2025-01-19 ENCOUNTER — APPOINTMENT (OUTPATIENT)
Dept: CARDIOLOGY | Facility: HOSPITAL | Age: 65
End: 2025-01-19
Payer: COMMERCIAL

## 2025-01-19 LAB
ABO GROUP (TYPE) IN BLOOD: NORMAL
ALBUMIN SERPL BCP-MCNC: 3.1 G/DL (ref 3.4–5)
ALBUMIN SERPL BCP-MCNC: 3.2 G/DL (ref 3.4–5)
ALP SERPL-CCNC: 63 U/L (ref 33–136)
ALP SERPL-CCNC: 66 U/L (ref 33–136)
ALP SERPL-CCNC: 66 U/L (ref 33–136)
ALT SERPL W P-5'-P-CCNC: 137 U/L (ref 7–45)
ALT SERPL W P-5'-P-CCNC: 143 U/L (ref 7–45)
ALT SERPL W P-5'-P-CCNC: 151 U/L (ref 7–45)
ANION GAP BLDA CALCULATED.4IONS-SCNC: 10 MMO/L (ref 10–25)
ANION GAP BLDA CALCULATED.4IONS-SCNC: 4 MMO/L (ref 10–25)
ANION GAP BLDA CALCULATED.4IONS-SCNC: 6 MMO/L (ref 10–25)
ANION GAP BLDA CALCULATED.4IONS-SCNC: 8 MMO/L (ref 10–25)
ANION GAP BLDA CALCULATED.4IONS-SCNC: 8 MMO/L (ref 10–25)
ANION GAP BLDA CALCULATED.4IONS-SCNC: 9 MMO/L (ref 10–25)
ANION GAP BLDA CALCULATED.4IONS-SCNC: 9 MMO/L (ref 10–25)
ANION GAP BLDMV CALCULATED.4IONS-SCNC: 5 MMO/L (ref 10–25)
ANION GAP BLDMV CALCULATED.4IONS-SCNC: 7 MMO/L (ref 10–25)
ANION GAP BLDMV CALCULATED.4IONS-SCNC: 9 MMO/L (ref 10–25)
ANION GAP BLDMV CALCULATED.4IONS-SCNC: ABNORMAL MMOL/L
ANION GAP SERPL CALC-SCNC: 13 MMOL/L (ref 10–20)
ANION GAP SERPL CALC-SCNC: 13 MMOL/L (ref 10–20)
ANION GAP SERPL CALC-SCNC: 14 MMOL/L (ref 10–20)
ANTIBODY SCREEN: NORMAL
APPEARANCE UR: ABNORMAL
APTT PPP: 33 SECONDS (ref 27–38)
APTT PPP: 33 SECONDS (ref 27–38)
AST SERPL W P-5'-P-CCNC: 257 U/L (ref 9–39)
AST SERPL W P-5'-P-CCNC: 301 U/L (ref 9–39)
AST SERPL W P-5'-P-CCNC: 375 U/L (ref 9–39)
BACTERIA BLD CULT: NORMAL
BACTERIA BLD CULT: NORMAL
BASE EXCESS BLDA CALC-SCNC: 2.9 MMOL/L (ref -2–3)
BASE EXCESS BLDA CALC-SCNC: 4.4 MMOL/L (ref -2–3)
BASE EXCESS BLDA CALC-SCNC: 4.5 MMOL/L (ref -2–3)
BASE EXCESS BLDA CALC-SCNC: 5 MMOL/L (ref -2–3)
BASE EXCESS BLDA CALC-SCNC: 5 MMOL/L (ref -2–3)
BASE EXCESS BLDA CALC-SCNC: 5.1 MMOL/L (ref -2–3)
BASE EXCESS BLDA CALC-SCNC: 5.1 MMOL/L (ref -2–3)
BASE EXCESS BLDMV CALC-SCNC: 3.5 MMOL/L (ref -2–3)
BASE EXCESS BLDMV CALC-SCNC: 5.2 MMOL/L (ref -2–3)
BASE EXCESS BLDMV CALC-SCNC: 5.6 MMOL/L (ref -2–3)
BASE EXCESS BLDMV CALC-SCNC: 6.5 MMOL/L (ref -2–3)
BASE EXCESS BLDMV CALC-SCNC: 6.7 MMOL/L (ref -2–3)
BASE EXCESS BLDMV CALC-SCNC: 7.1 MMOL/L (ref -2–3)
BASE EXCESS BLDMV CALC-SCNC: 7.2 MMOL/L (ref -2–3)
BILIRUB DIRECT SERPL-MCNC: 0.3 MG/DL (ref 0–0.3)
BILIRUB SERPL-MCNC: 0.8 MG/DL (ref 0–1.2)
BILIRUB SERPL-MCNC: 0.9 MG/DL (ref 0–1.2)
BILIRUB SERPL-MCNC: 0.9 MG/DL (ref 0–1.2)
BILIRUB UR STRIP.AUTO-MCNC: NEGATIVE MG/DL
BLOOD EXPIRATION DATE: NORMAL
BODY SURFACE AREA: 1.58 M2
BODY TEMPERATURE: 37 DEGREES CELSIUS
BUN SERPL-MCNC: 15 MG/DL (ref 6–23)
BUN SERPL-MCNC: 16 MG/DL (ref 6–23)
BUN SERPL-MCNC: 17 MG/DL (ref 6–23)
CA-I BLD-SCNC: 1.07 MMOL/L (ref 1.1–1.33)
CA-I BLD-SCNC: 1.08 MMOL/L (ref 1.1–1.33)
CA-I BLD-SCNC: 1.08 MMOL/L (ref 1.1–1.33)
CA-I BLDA-SCNC: 1.06 MMOL/L (ref 1.1–1.33)
CA-I BLDA-SCNC: 1.08 MMOL/L (ref 1.1–1.33)
CA-I BLDA-SCNC: 1.1 MMOL/L (ref 1.1–1.33)
CA-I BLDA-SCNC: 1.1 MMOL/L (ref 1.1–1.33)
CA-I BLDA-SCNC: 1.11 MMOL/L (ref 1.1–1.33)
CA-I BLDA-SCNC: 1.12 MMOL/L (ref 1.1–1.33)
CA-I BLDA-SCNC: 1.14 MMOL/L (ref 1.1–1.33)
CA-I BLDMV-SCNC: 1.06 MMOL/L (ref 1.1–1.33)
CA-I BLDMV-SCNC: 1.09 MMOL/L (ref 1.1–1.33)
CA-I BLDMV-SCNC: 1.09 MMOL/L (ref 1.1–1.33)
CA-I BLDMV-SCNC: 1.11 MMOL/L (ref 1.1–1.33)
CA-I BLDMV-SCNC: 1.13 MMOL/L (ref 1.1–1.33)
CA-I BLDMV-SCNC: 1.15 MMOL/L (ref 1.1–1.33)
CA-I BLDMV-SCNC: 1.15 MMOL/L (ref 1.1–1.33)
CALCIUM SERPL-MCNC: 7.9 MG/DL (ref 8.6–10.6)
CALCIUM SERPL-MCNC: 8 MG/DL (ref 8.6–10.6)
CALCIUM SERPL-MCNC: 8 MG/DL (ref 8.6–10.6)
CHLORIDE BLD-SCNC: 90 MMOL/L (ref 98–107)
CHLORIDE BLD-SCNC: 91 MMOL/L (ref 98–107)
CHLORIDE BLD-SCNC: 92 MMOL/L (ref 98–107)
CHLORIDE BLD-SCNC: 94 MMOL/L (ref 98–107)
CHLORIDE BLD-SCNC: ABNORMAL MMOL/L
CHLORIDE BLDA-SCNC: 91 MMOL/L (ref 98–107)
CHLORIDE BLDA-SCNC: 91 MMOL/L (ref 98–107)
CHLORIDE BLDA-SCNC: 92 MMOL/L (ref 98–107)
CHLORIDE BLDA-SCNC: 92 MMOL/L (ref 98–107)
CHLORIDE BLDA-SCNC: 93 MMOL/L (ref 98–107)
CHLORIDE BLDA-SCNC: 93 MMOL/L (ref 98–107)
CHLORIDE BLDA-SCNC: 94 MMOL/L (ref 98–107)
CHLORIDE SERPL-SCNC: 89 MMOL/L (ref 98–107)
CHLORIDE SERPL-SCNC: 89 MMOL/L (ref 98–107)
CHLORIDE SERPL-SCNC: 91 MMOL/L (ref 98–107)
CO2 SERPL-SCNC: 30 MMOL/L (ref 21–32)
CO2 SERPL-SCNC: 32 MMOL/L (ref 21–32)
CO2 SERPL-SCNC: 32 MMOL/L (ref 21–32)
COLOR UR: COLORLESS
CREAT SERPL-MCNC: 0.56 MG/DL (ref 0.5–1.05)
CREAT SERPL-MCNC: 0.66 MG/DL (ref 0.5–1.05)
CREAT SERPL-MCNC: 0.67 MG/DL (ref 0.5–1.05)
DISPENSE STATUS: NORMAL
EGFRCR SERPLBLD CKD-EPI 2021: >90 ML/MIN/1.73M*2
ERYTHROCYTE [DISTWIDTH] IN BLOOD BY AUTOMATED COUNT: 14.4 % (ref 11.5–14.5)
ERYTHROCYTE [DISTWIDTH] IN BLOOD BY AUTOMATED COUNT: 14.4 % (ref 11.5–14.5)
ERYTHROCYTE [DISTWIDTH] IN BLOOD BY AUTOMATED COUNT: 14.6 % (ref 11.5–14.5)
GLUCOSE BLD MANUAL STRIP-MCNC: 148 MG/DL (ref 74–99)
GLUCOSE BLD MANUAL STRIP-MCNC: 149 MG/DL (ref 74–99)
GLUCOSE BLD MANUAL STRIP-MCNC: 179 MG/DL (ref 74–99)
GLUCOSE BLD MANUAL STRIP-MCNC: 180 MG/DL (ref 74–99)
GLUCOSE BLD MANUAL STRIP-MCNC: 182 MG/DL (ref 74–99)
GLUCOSE BLD MANUAL STRIP-MCNC: 216 MG/DL (ref 74–99)
GLUCOSE BLD-MCNC: 149 MG/DL (ref 74–99)
GLUCOSE BLD-MCNC: 169 MG/DL (ref 74–99)
GLUCOSE BLD-MCNC: 170 MG/DL (ref 74–99)
GLUCOSE BLD-MCNC: 174 MG/DL (ref 74–99)
GLUCOSE BLD-MCNC: 185 MG/DL (ref 74–99)
GLUCOSE BLD-MCNC: 219 MG/DL (ref 74–99)
GLUCOSE BLD-MCNC: 229 MG/DL (ref 74–99)
GLUCOSE BLDA-MCNC: 146 MG/DL (ref 74–99)
GLUCOSE BLDA-MCNC: 161 MG/DL (ref 74–99)
GLUCOSE BLDA-MCNC: 172 MG/DL (ref 74–99)
GLUCOSE BLDA-MCNC: 175 MG/DL (ref 74–99)
GLUCOSE BLDA-MCNC: 187 MG/DL (ref 74–99)
GLUCOSE BLDA-MCNC: 214 MG/DL (ref 74–99)
GLUCOSE BLDA-MCNC: 221 MG/DL (ref 74–99)
GLUCOSE SERPL-MCNC: 143 MG/DL (ref 74–99)
GLUCOSE SERPL-MCNC: 161 MG/DL (ref 74–99)
GLUCOSE SERPL-MCNC: 223 MG/DL (ref 74–99)
GLUCOSE UR STRIP.AUTO-MCNC: ABNORMAL MG/DL
GRAM STN SPEC: NORMAL
GRAM STN SPEC: NORMAL
HCO3 BLDA-SCNC: 27.1 MMOL/L (ref 22–26)
HCO3 BLDA-SCNC: 28.4 MMOL/L (ref 22–26)
HCO3 BLDA-SCNC: 29.9 MMOL/L (ref 22–26)
HCO3 BLDA-SCNC: 29.9 MMOL/L (ref 22–26)
HCO3 BLDA-SCNC: 30.3 MMOL/L (ref 22–26)
HCO3 BLDA-SCNC: 30.4 MMOL/L (ref 22–26)
HCO3 BLDA-SCNC: 30.9 MMOL/L (ref 22–26)
HCO3 BLDMV-SCNC: 28.5 MMOL/L (ref 22–26)
HCO3 BLDMV-SCNC: 31.4 MMOL/L (ref 22–26)
HCO3 BLDMV-SCNC: 31.9 MMOL/L (ref 22–26)
HCO3 BLDMV-SCNC: 32 MMOL/L (ref 22–26)
HCO3 BLDMV-SCNC: 32.3 MMOL/L (ref 22–26)
HCO3 BLDMV-SCNC: 33 MMOL/L (ref 22–26)
HCO3 BLDMV-SCNC: 33.1 MMOL/L (ref 22–26)
HCT VFR BLD AUTO: 29.4 % (ref 36–46)
HCT VFR BLD AUTO: 30.5 % (ref 36–46)
HCT VFR BLD AUTO: 31.3 % (ref 36–46)
HCT VFR BLD EST: 31 % (ref 36–46)
HCT VFR BLD EST: 32 % (ref 36–46)
HCT VFR BLD EST: 33 % (ref 36–46)
HCT VFR BLD EST: 34 % (ref 36–46)
HGB BLD-MCNC: 10.2 G/DL (ref 12–16)
HGB BLD-MCNC: 10.5 G/DL (ref 12–16)
HGB BLD-MCNC: 10.6 G/DL (ref 12–16)
HGB BLDA-MCNC: 10.3 G/DL (ref 12–16)
HGB BLDA-MCNC: 10.6 G/DL (ref 12–16)
HGB BLDA-MCNC: 10.8 G/DL (ref 12–16)
HGB BLDA-MCNC: 10.9 G/DL (ref 12–16)
HGB BLDA-MCNC: 10.9 G/DL (ref 12–16)
HGB BLDA-MCNC: 11.4 G/DL (ref 12–16)
HGB BLDA-MCNC: 11.4 G/DL (ref 12–16)
HGB BLDMV-MCNC: 10.2 G/DL (ref 12–16)
HGB BLDMV-MCNC: 10.4 G/DL (ref 12–16)
HGB BLDMV-MCNC: 10.5 G/DL (ref 12–16)
HGB BLDMV-MCNC: 10.7 G/DL (ref 12–16)
HGB BLDMV-MCNC: 11.1 G/DL (ref 12–16)
HGB BLDMV-MCNC: 11.1 G/DL (ref 12–16)
HGB BLDMV-MCNC: 11.2 G/DL (ref 12–16)
HOLD SPECIMEN: NORMAL
INHALED O2 CONCENTRATION: 70 %
INHALED O2 CONCENTRATION: 80 %
INR PPP: 1.1 (ref 0.9–1.1)
INR PPP: 1.2 (ref 0.9–1.1)
KETONES UR STRIP.AUTO-MCNC: NEGATIVE MG/DL
LACTATE BLDA-SCNC: 1.1 MMOL/L (ref 0.4–2)
LACTATE BLDA-SCNC: 1.3 MMOL/L (ref 0.4–2)
LACTATE BLDA-SCNC: 1.4 MMOL/L (ref 0.4–2)
LACTATE BLDA-SCNC: 1.4 MMOL/L (ref 0.4–2)
LACTATE BLDA-SCNC: 2.7 MMOL/L (ref 0.4–2)
LACTATE BLDA-SCNC: 2.7 MMOL/L (ref 0.4–2)
LACTATE BLDA-SCNC: 2.9 MMOL/L (ref 0.4–2)
LACTATE BLDMV-SCNC: 1 MMOL/L (ref 0.4–2)
LACTATE BLDMV-SCNC: 1.3 MMOL/L (ref 0.4–2)
LACTATE BLDMV-SCNC: 1.3 MMOL/L (ref 0.4–2)
LACTATE BLDMV-SCNC: 1.4 MMOL/L (ref 0.4–2)
LACTATE BLDMV-SCNC: 2.6 MMOL/L (ref 0.4–2)
LACTATE BLDMV-SCNC: 2.7 MMOL/L (ref 0.4–2)
LACTATE BLDMV-SCNC: 2.8 MMOL/L (ref 0.4–2)
LEUKOCYTE ESTERASE UR QL STRIP.AUTO: NEGATIVE
MAGNESIUM SERPL-MCNC: 2.43 MG/DL (ref 1.6–2.4)
MAGNESIUM SERPL-MCNC: 2.52 MG/DL (ref 1.6–2.4)
MAGNESIUM SERPL-MCNC: 2.75 MG/DL (ref 1.6–2.4)
MCH RBC QN AUTO: 26.7 PG (ref 26–34)
MCH RBC QN AUTO: 27 PG (ref 26–34)
MCH RBC QN AUTO: 27.1 PG (ref 26–34)
MCHC RBC AUTO-ENTMCNC: 33.9 G/DL (ref 32–36)
MCHC RBC AUTO-ENTMCNC: 34.4 G/DL (ref 32–36)
MCHC RBC AUTO-ENTMCNC: 34.7 G/DL (ref 32–36)
MCV RBC AUTO: 78 FL (ref 80–100)
MCV RBC AUTO: 78 FL (ref 80–100)
MCV RBC AUTO: 79 FL (ref 80–100)
MUCOUS THREADS #/AREA URNS AUTO: ABNORMAL /LPF
NITRITE UR QL STRIP.AUTO: NEGATIVE
NRBC BLD-RTO: 0.2 /100 WBCS (ref 0–0)
OXYHGB MFR BLDA: 93.1 % (ref 94–98)
OXYHGB MFR BLDA: 94.4 % (ref 94–98)
OXYHGB MFR BLDA: 95.1 % (ref 94–98)
OXYHGB MFR BLDA: 95.4 % (ref 94–98)
OXYHGB MFR BLDA: 95.6 % (ref 94–98)
OXYHGB MFR BLDA: 96.1 % (ref 94–98)
OXYHGB MFR BLDA: 96.9 % (ref 94–98)
OXYHGB MFR BLDMV: 57.9 % (ref 45–75)
OXYHGB MFR BLDMV: 60.8 % (ref 45–75)
OXYHGB MFR BLDMV: 62.2 % (ref 45–75)
OXYHGB MFR BLDMV: 63.7 % (ref 45–75)
OXYHGB MFR BLDMV: 64 % (ref 45–75)
OXYHGB MFR BLDMV: 64.2 % (ref 45–75)
OXYHGB MFR BLDMV: 66.2 % (ref 45–75)
PCO2 BLDA: 39 MM HG (ref 38–42)
PCO2 BLDA: 39 MM HG (ref 38–42)
PCO2 BLDA: 44 MM HG (ref 38–42)
PCO2 BLDA: 44 MM HG (ref 38–42)
PCO2 BLDA: 48 MM HG (ref 38–42)
PCO2 BLDA: 50 MM HG (ref 38–42)
PCO2 BLDA: 51 MM HG (ref 38–42)
PCO2 BLDMV: 44 MM HG (ref 41–51)
PCO2 BLDMV: 46 MM HG (ref 41–51)
PCO2 BLDMV: 51 MM HG (ref 41–51)
PCO2 BLDMV: 52 MM HG (ref 41–51)
PCO2 BLDMV: 53 MM HG (ref 41–51)
PCO2 BLDMV: 54 MM HG (ref 41–51)
PCO2 BLDMV: 56 MM HG (ref 41–51)
PH BLDA: 7.39 PH (ref 7.38–7.42)
PH BLDA: 7.39 PH (ref 7.38–7.42)
PH BLDA: 7.41 PH (ref 7.38–7.42)
PH BLDA: 7.44 PH (ref 7.38–7.42)
PH BLDA: 7.44 PH (ref 7.38–7.42)
PH BLDA: 7.45 PH (ref 7.38–7.42)
PH BLDA: 7.47 PH (ref 7.38–7.42)
PH BLDMV: 7.38 PH (ref 7.33–7.43)
PH BLDMV: 7.41 PH (ref 7.33–7.43)
PH BLDMV: 7.41 PH (ref 7.33–7.43)
PH BLDMV: 7.42 PH (ref 7.33–7.43)
PH BLDMV: 7.45 PH (ref 7.33–7.43)
PH UR STRIP.AUTO: 6.5 [PH]
PHOSPHATE SERPL-MCNC: 2.6 MG/DL (ref 2.5–4.9)
PHOSPHATE SERPL-MCNC: 2.9 MG/DL (ref 2.5–4.9)
PHOSPHATE SERPL-MCNC: 3.3 MG/DL (ref 2.5–4.9)
PLATELET # BLD AUTO: 185 X10*3/UL (ref 150–450)
PLATELET # BLD AUTO: 189 X10*3/UL (ref 150–450)
PLATELET # BLD AUTO: 193 X10*3/UL (ref 150–450)
PO2 BLDA: 105 MM HG (ref 85–95)
PO2 BLDA: 68 MM HG (ref 85–95)
PO2 BLDA: 79 MM HG (ref 85–95)
PO2 BLDA: 80 MM HG (ref 85–95)
PO2 BLDA: 80 MM HG (ref 85–95)
PO2 BLDA: 81 MM HG (ref 85–95)
PO2 BLDA: 90 MM HG (ref 85–95)
PO2 BLDMV: 35 MM HG (ref 35–45)
PO2 BLDMV: 36 MM HG (ref 35–45)
PO2 BLDMV: 36 MM HG (ref 35–45)
PO2 BLDMV: 38 MM HG (ref 35–45)
PO2 BLDMV: 39 MM HG (ref 35–45)
PO2 BLDMV: 39 MM HG (ref 35–45)
PO2 BLDMV: 40 MM HG (ref 35–45)
POTASSIUM BLDA-SCNC: 3.3 MMOL/L (ref 3.5–5.3)
POTASSIUM BLDA-SCNC: 3.4 MMOL/L (ref 3.5–5.3)
POTASSIUM BLDA-SCNC: 3.5 MMOL/L (ref 3.5–5.3)
POTASSIUM BLDA-SCNC: 3.5 MMOL/L (ref 3.5–5.3)
POTASSIUM BLDA-SCNC: 3.6 MMOL/L (ref 3.5–5.3)
POTASSIUM BLDA-SCNC: 3.8 MMOL/L (ref 3.5–5.3)
POTASSIUM BLDA-SCNC: 3.8 MMOL/L (ref 3.5–5.3)
POTASSIUM BLDMV-SCNC: 3.3 MMOL/L (ref 3.5–5.3)
POTASSIUM BLDMV-SCNC: 3.4 MMOL/L (ref 3.5–5.3)
POTASSIUM BLDMV-SCNC: 3.5 MMOL/L (ref 3.5–5.3)
POTASSIUM BLDMV-SCNC: 3.6 MMOL/L (ref 3.5–5.3)
POTASSIUM BLDMV-SCNC: 3.8 MMOL/L (ref 3.5–5.3)
POTASSIUM BLDMV-SCNC: 3.8 MMOL/L (ref 3.5–5.3)
POTASSIUM BLDMV-SCNC: 3.9 MMOL/L (ref 3.5–5.3)
POTASSIUM SERPL-SCNC: 3.6 MMOL/L (ref 3.5–5.3)
POTASSIUM SERPL-SCNC: 3.8 MMOL/L (ref 3.5–5.3)
POTASSIUM SERPL-SCNC: 4 MMOL/L (ref 3.5–5.3)
PRODUCT BLOOD TYPE: 7300
PRODUCT CODE: NORMAL
PROT SERPL-MCNC: 4.8 G/DL (ref 6.4–8.2)
PROT SERPL-MCNC: 4.8 G/DL (ref 6.4–8.2)
PROT SERPL-MCNC: 5 G/DL (ref 6.4–8.2)
PROT UR STRIP.AUTO-MCNC: ABNORMAL MG/DL
PROTHROMBIN TIME: 12.2 SECONDS (ref 9.8–12.8)
PROTHROMBIN TIME: 13.2 SECONDS (ref 9.8–12.8)
RBC # BLD AUTO: 3.77 X10*6/UL (ref 4–5.2)
RBC # BLD AUTO: 3.89 X10*6/UL (ref 4–5.2)
RBC # BLD AUTO: 3.97 X10*6/UL (ref 4–5.2)
RBC # UR STRIP.AUTO: ABNORMAL /UL
RBC #/AREA URNS AUTO: >20 /HPF
RH FACTOR (ANTIGEN D): NORMAL
SAO2 % BLDA: 95 % (ref 94–100)
SAO2 % BLDA: 97 % (ref 94–100)
SAO2 % BLDA: 98 % (ref 94–100)
SAO2 % BLDA: 99 % (ref 94–100)
SAO2 % BLDA: 99 % (ref 94–100)
SAO2 % BLDMV: 59 % (ref 45–75)
SAO2 % BLDMV: 62 % (ref 45–75)
SAO2 % BLDMV: 63 % (ref 45–75)
SAO2 % BLDMV: 65 % (ref 45–75)
SAO2 % BLDMV: 65 % (ref 45–75)
SAO2 % BLDMV: 66 % (ref 45–75)
SAO2 % BLDMV: 67 % (ref 45–75)
SODIUM BLDA-SCNC: 125 MMOL/L (ref 136–145)
SODIUM BLDA-SCNC: 125 MMOL/L (ref 136–145)
SODIUM BLDA-SCNC: 126 MMOL/L (ref 136–145)
SODIUM BLDA-SCNC: 127 MMOL/L (ref 136–145)
SODIUM BLDA-SCNC: 127 MMOL/L (ref 136–145)
SODIUM BLDMV-SCNC: 126 MMOL/L (ref 136–145)
SODIUM BLDMV-SCNC: 127 MMOL/L (ref 136–145)
SODIUM BLDMV-SCNC: 127 MMOL/L (ref 136–145)
SODIUM BLDMV-SCNC: 128 MMOL/L (ref 136–145)
SODIUM SERPL-SCNC: 129 MMOL/L (ref 136–145)
SODIUM SERPL-SCNC: 130 MMOL/L (ref 136–145)
SODIUM SERPL-SCNC: 132 MMOL/L (ref 136–145)
SP GR UR STRIP.AUTO: 1.02
UNIT ABO: NORMAL
UNIT NUMBER: NORMAL
UNIT RH: NORMAL
UNIT VOLUME: 350
UROBILINOGEN UR STRIP.AUTO-MCNC: NORMAL MG/DL
VANCOMYCIN SERPL-MCNC: 31.3 UG/ML (ref 5–20)
WBC # BLD AUTO: 11.2 X10*3/UL (ref 4.4–11.3)
WBC # BLD AUTO: 11.8 X10*3/UL (ref 4.4–11.3)
WBC # BLD AUTO: 13.5 X10*3/UL (ref 4.4–11.3)
WBC #/AREA URNS AUTO: >50 /HPF
XM INTEP: NORMAL

## 2025-01-19 PROCEDURE — 93321 DOPPLER ECHO F-UP/LMTD STD: CPT | Performed by: INTERNAL MEDICINE

## 2025-01-19 PROCEDURE — 2500000004 HC RX 250 GENERAL PHARMACY W/ HCPCS (ALT 636 FOR OP/ED): Performed by: STUDENT IN AN ORGANIZED HEALTH CARE EDUCATION/TRAINING PROGRAM

## 2025-01-19 PROCEDURE — 2500000002 HC RX 250 W HCPCS SELF ADMINISTERED DRUGS (ALT 637 FOR MEDICARE OP, ALT 636 FOR OP/ED): Performed by: NURSE PRACTITIONER

## 2025-01-19 PROCEDURE — 2500000004 HC RX 250 GENERAL PHARMACY W/ HCPCS (ALT 636 FOR OP/ED)

## 2025-01-19 PROCEDURE — 87205 SMEAR GRAM STAIN: CPT

## 2025-01-19 PROCEDURE — 83735 ASSAY OF MAGNESIUM: CPT | Performed by: NURSE PRACTITIONER

## 2025-01-19 PROCEDURE — 93010 ELECTROCARDIOGRAM REPORT: CPT | Performed by: INTERNAL MEDICINE

## 2025-01-19 PROCEDURE — 74018 RADEX ABDOMEN 1 VIEW: CPT

## 2025-01-19 PROCEDURE — 94640 AIRWAY INHALATION TREATMENT: CPT

## 2025-01-19 PROCEDURE — 99291 CRITICAL CARE FIRST HOUR: CPT | Performed by: THORACIC SURGERY (CARDIOTHORACIC VASCULAR SURGERY)

## 2025-01-19 PROCEDURE — 94003 VENT MGMT INPAT SUBQ DAY: CPT

## 2025-01-19 PROCEDURE — 82947 ASSAY GLUCOSE BLOOD QUANT: CPT

## 2025-01-19 PROCEDURE — 93325 DOPPLER ECHO COLOR FLOW MAPG: CPT | Performed by: INTERNAL MEDICINE

## 2025-01-19 PROCEDURE — P9045 ALBUMIN (HUMAN), 5%, 250 ML: HCPCS | Mod: JZ | Performed by: NURSE PRACTITIONER

## 2025-01-19 PROCEDURE — 2500000004 HC RX 250 GENERAL PHARMACY W/ HCPCS (ALT 636 FOR OP/ED): Performed by: NURSE PRACTITIONER

## 2025-01-19 PROCEDURE — 37799 UNLISTED PX VASCULAR SURGERY: CPT | Performed by: STUDENT IN AN ORGANIZED HEALTH CARE EDUCATION/TRAINING PROGRAM

## 2025-01-19 PROCEDURE — 2500000005 HC RX 250 GENERAL PHARMACY W/O HCPCS: Performed by: NURSE PRACTITIONER

## 2025-01-19 PROCEDURE — 82040 ASSAY OF SERUM ALBUMIN: CPT | Performed by: NURSE PRACTITIONER

## 2025-01-19 PROCEDURE — 84132 ASSAY OF SERUM POTASSIUM: CPT | Performed by: NURSE PRACTITIONER

## 2025-01-19 PROCEDURE — 85027 COMPLETE CBC AUTOMATED: CPT | Performed by: NURSE PRACTITIONER

## 2025-01-19 PROCEDURE — 85610 PROTHROMBIN TIME: CPT | Performed by: NURSE PRACTITIONER

## 2025-01-19 PROCEDURE — 80202 ASSAY OF VANCOMYCIN: CPT

## 2025-01-19 PROCEDURE — P9045 ALBUMIN (HUMAN), 5%, 250 ML: HCPCS | Mod: JZ | Performed by: STUDENT IN AN ORGANIZED HEALTH CARE EDUCATION/TRAINING PROGRAM

## 2025-01-19 PROCEDURE — 71045 X-RAY EXAM CHEST 1 VIEW: CPT | Performed by: RADIOLOGY

## 2025-01-19 PROCEDURE — 82330 ASSAY OF CALCIUM: CPT | Performed by: NURSE PRACTITIONER

## 2025-01-19 PROCEDURE — 80053 COMPREHEN METABOLIC PANEL: CPT

## 2025-01-19 PROCEDURE — 99291 CRITICAL CARE FIRST HOUR: CPT | Performed by: ANESTHESIOLOGY

## 2025-01-19 PROCEDURE — 74018 RADEX ABDOMEN 1 VIEW: CPT | Performed by: RADIOLOGY

## 2025-01-19 PROCEDURE — 86901 BLOOD TYPING SEROLOGIC RH(D): CPT | Performed by: NURSE PRACTITIONER

## 2025-01-19 PROCEDURE — 84100 ASSAY OF PHOSPHORUS: CPT | Performed by: NURSE PRACTITIONER

## 2025-01-19 PROCEDURE — 82248 BILIRUBIN DIRECT: CPT | Performed by: NURSE PRACTITIONER

## 2025-01-19 PROCEDURE — 2500000002 HC RX 250 W HCPCS SELF ADMINISTERED DRUGS (ALT 637 FOR MEDICARE OP, ALT 636 FOR OP/ED): Performed by: STUDENT IN AN ORGANIZED HEALTH CARE EDUCATION/TRAINING PROGRAM

## 2025-01-19 PROCEDURE — 93005 ELECTROCARDIOGRAM TRACING: CPT

## 2025-01-19 PROCEDURE — 93308 TTE F-UP OR LMTD: CPT | Performed by: INTERNAL MEDICINE

## 2025-01-19 PROCEDURE — 99233 SBSQ HOSP IP/OBS HIGH 50: CPT | Performed by: STUDENT IN AN ORGANIZED HEALTH CARE EDUCATION/TRAINING PROGRAM

## 2025-01-19 PROCEDURE — 93325 DOPPLER ECHO COLOR FLOW MAPG: CPT

## 2025-01-19 PROCEDURE — 2020000001 HC ICU ROOM DAILY

## 2025-01-19 PROCEDURE — 84132 ASSAY OF SERUM POTASSIUM: CPT

## 2025-01-19 PROCEDURE — 2500000004 HC RX 250 GENERAL PHARMACY W/ HCPCS (ALT 636 FOR OP/ED): Mod: JZ | Performed by: NURSE PRACTITIONER

## 2025-01-19 PROCEDURE — 82040 ASSAY OF SERUM ALBUMIN: CPT | Performed by: ANESTHESIOLOGY

## 2025-01-19 PROCEDURE — 71045 X-RAY EXAM CHEST 1 VIEW: CPT

## 2025-01-19 PROCEDURE — 84132 ASSAY OF SERUM POTASSIUM: CPT | Performed by: STUDENT IN AN ORGANIZED HEALTH CARE EDUCATION/TRAINING PROGRAM

## 2025-01-19 PROCEDURE — 2500000005 HC RX 250 GENERAL PHARMACY W/O HCPCS

## 2025-01-19 PROCEDURE — 99291 CRITICAL CARE FIRST HOUR: CPT | Performed by: NURSE PRACTITIONER

## 2025-01-19 PROCEDURE — 94645 CONT INHLJ TX EACH ADDL HOUR: CPT

## 2025-01-19 RX ORDER — ALBUMIN HUMAN 50 G/1000ML
12.5 SOLUTION INTRAVENOUS ONCE
Status: COMPLETED | OUTPATIENT
Start: 2025-01-19 | End: 2025-01-19

## 2025-01-19 RX ORDER — VANCOMYCIN HYDROCHLORIDE 750 MG/150ML
750 INJECTION, SOLUTION INTRAVENOUS EVERY 12 HOURS
Status: DISCONTINUED | OUTPATIENT
Start: 2025-01-19 | End: 2025-01-20

## 2025-01-19 RX ORDER — DEXTROSE 50 % IN WATER (D50W) INTRAVENOUS SYRINGE
12.5
Status: DISCONTINUED | OUTPATIENT
Start: 2025-01-19 | End: 2025-02-13 | Stop reason: HOSPADM

## 2025-01-19 RX ORDER — HYDROMORPHONE HYDROCHLORIDE 0.2 MG/ML
0.1 INJECTION INTRAMUSCULAR; INTRAVENOUS; SUBCUTANEOUS EVERY 2 HOUR PRN
Status: DISCONTINUED | OUTPATIENT
Start: 2025-01-19 | End: 2025-01-22

## 2025-01-19 RX ORDER — POTASSIUM CHLORIDE 14.9 MG/ML
40 INJECTION INTRAVENOUS ONCE
Status: DISCONTINUED | OUTPATIENT
Start: 2025-01-19 | End: 2025-01-20

## 2025-01-19 RX ORDER — DEXTROSE 50 % IN WATER (D50W) INTRAVENOUS SYRINGE
25
Status: DISCONTINUED | OUTPATIENT
Start: 2025-01-19 | End: 2025-02-13 | Stop reason: HOSPADM

## 2025-01-19 RX ORDER — POTASSIUM CHLORIDE 14.9 MG/ML
20 INJECTION INTRAVENOUS EVERY 6 HOURS PRN
Status: DISCONTINUED | OUTPATIENT
Start: 2025-01-19 | End: 2025-02-13 | Stop reason: HOSPADM

## 2025-01-19 RX ORDER — INSULIN LISPRO 100 [IU]/ML
0-5 INJECTION, SOLUTION INTRAVENOUS; SUBCUTANEOUS EVERY 4 HOURS
Status: DISCONTINUED | OUTPATIENT
Start: 2025-01-19 | End: 2025-02-11

## 2025-01-19 RX ADMIN — VANCOMYCIN HYDROCHLORIDE 1250 MG: 5 INJECTION, POWDER, LYOPHILIZED, FOR SOLUTION INTRAVENOUS at 05:13

## 2025-01-19 RX ADMIN — EPOPROSTENOL 0.05 MCG/KG/MIN: 1.5 INJECTION, POWDER, LYOPHILIZED, FOR SOLUTION INTRAVENOUS at 06:01

## 2025-01-19 RX ADMIN — VASOPRESSIN 0.06 UNITS/MIN: 0.2 INJECTION INTRAVENOUS at 08:05

## 2025-01-19 RX ADMIN — HYDROCORTISONE SODIUM SUCCINATE 50 MG: 100 INJECTION, POWDER, FOR SOLUTION INTRAMUSCULAR; INTRAVENOUS at 21:13

## 2025-01-19 RX ADMIN — MILRINONE LACTATE IN DEXTROSE 0.38 MCG/KG/MIN: 200 INJECTION, SOLUTION INTRAVENOUS at 08:05

## 2025-01-19 RX ADMIN — HEPARIN SODIUM 5000 UNITS: 5000 INJECTION INTRAVENOUS; SUBCUTANEOUS at 21:58

## 2025-01-19 RX ADMIN — POTASSIUM CHLORIDE 20 MEQ: 14.9 INJECTION, SOLUTION INTRAVENOUS at 03:29

## 2025-01-19 RX ADMIN — INSULIN LISPRO 2 UNITS: 100 INJECTION, SOLUTION INTRAVENOUS; SUBCUTANEOUS at 04:46

## 2025-01-19 RX ADMIN — BUDESONIDE 0.5 MG: 0.5 INHALANT RESPIRATORY (INHALATION) at 19:37

## 2025-01-19 RX ADMIN — ALBUMIN HUMAN 12.5 G: 0.05 INJECTION, SOLUTION INTRAVENOUS at 04:43

## 2025-01-19 RX ADMIN — IPRATROPIUM BROMIDE AND ALBUTEROL SULFATE 3 ML: .5; 3 SOLUTION RESPIRATORY (INHALATION) at 14:57

## 2025-01-19 RX ADMIN — DEXMEDETOMIDINE HYDROCHLORIDE 0.5 MCG/KG/HR: 400 INJECTION INTRAVENOUS at 08:26

## 2025-01-19 RX ADMIN — POTASSIUM CHLORIDE 20 MEQ: 14.9 INJECTION, SOLUTION INTRAVENOUS at 20:55

## 2025-01-19 RX ADMIN — CALCIUM GLUCONATE 1 G: 20 INJECTION, SOLUTION INTRAVENOUS at 03:35

## 2025-01-19 RX ADMIN — AZITHROMYCIN DIHYDRATE 250 MG: 500 INJECTION, POWDER, LYOPHILIZED, FOR SOLUTION INTRAVENOUS at 22:09

## 2025-01-19 RX ADMIN — ALBUMIN HUMAN 12.5 G: 0.05 INJECTION, SOLUTION INTRAVENOUS at 14:52

## 2025-01-19 RX ADMIN — PIPERACILLIN SODIUM AND TAZOBACTAM SODIUM 4.5 G: 4; .5 INJECTION, SOLUTION INTRAVENOUS at 01:09

## 2025-01-19 RX ADMIN — CALCIUM GLUCONATE 1 G: 20 INJECTION, SOLUTION INTRAVENOUS at 16:48

## 2025-01-19 RX ADMIN — EPOPROSTENOL 0.05 MCG/KG/MIN: 1.5 INJECTION, POWDER, LYOPHILIZED, FOR SOLUTION INTRAVENOUS at 23:35

## 2025-01-19 RX ADMIN — INSULIN LISPRO 1 UNITS: 100 INJECTION, SOLUTION INTRAVENOUS; SUBCUTANEOUS at 12:34

## 2025-01-19 RX ADMIN — PANTOPRAZOLE SODIUM 40 MG: 40 INJECTION, POWDER, FOR SOLUTION INTRAVENOUS at 06:03

## 2025-01-19 RX ADMIN — POTASSIUM CHLORIDE 20 MEQ: 14.9 INJECTION, SOLUTION INTRAVENOUS at 17:34

## 2025-01-19 RX ADMIN — FLUCONAZOLE 400 MG: 2 INJECTION, SOLUTION INTRAVENOUS at 21:13

## 2025-01-19 RX ADMIN — PIPERACILLIN SODIUM AND TAZOBACTAM SODIUM 4.5 G: 4; .5 INJECTION, SOLUTION INTRAVENOUS at 12:34

## 2025-01-19 RX ADMIN — HEPARIN SODIUM 5000 UNITS: 5000 INJECTION INTRAVENOUS; SUBCUTANEOUS at 05:15

## 2025-01-19 RX ADMIN — VASOPRESSIN 0.06 UNITS/MIN: 0.2 INJECTION INTRAVENOUS at 05:32

## 2025-01-19 RX ADMIN — HYDROCORTISONE SODIUM SUCCINATE 50 MG: 100 INJECTION, POWDER, FOR SOLUTION INTRAMUSCULAR; INTRAVENOUS at 02:46

## 2025-01-19 RX ADMIN — INSULIN LISPRO 1 UNITS: 100 INJECTION, SOLUTION INTRAVENOUS; SUBCUTANEOUS at 21:13

## 2025-01-19 RX ADMIN — BUDESONIDE 0.5 MG: 0.5 INHALANT RESPIRATORY (INHALATION) at 09:52

## 2025-01-19 RX ADMIN — NOREPINEPHRINE BITARTRATE 0.06 MCG/KG/MIN: 8 INJECTION, SOLUTION INTRAVENOUS at 08:04

## 2025-01-19 RX ADMIN — Medication 70 PERCENT: at 19:36

## 2025-01-19 RX ADMIN — Medication 80 PERCENT: at 08:05

## 2025-01-19 RX ADMIN — PIPERACILLIN SODIUM AND TAZOBACTAM SODIUM 4.5 G: 4; .5 INJECTION, SOLUTION INTRAVENOUS at 07:05

## 2025-01-19 RX ADMIN — HYDROCORTISONE SODIUM SUCCINATE 50 MG: 100 INJECTION, POWDER, FOR SOLUTION INTRAMUSCULAR; INTRAVENOUS at 08:26

## 2025-01-19 RX ADMIN — IPRATROPIUM BROMIDE AND ALBUTEROL SULFATE 3 ML: .5; 3 SOLUTION RESPIRATORY (INHALATION) at 19:36

## 2025-01-19 RX ADMIN — IPRATROPIUM BROMIDE AND ALBUTEROL SULFATE 3 ML: .5; 3 SOLUTION RESPIRATORY (INHALATION) at 01:40

## 2025-01-19 RX ADMIN — HYDROCORTISONE SODIUM SUCCINATE 50 MG: 100 INJECTION, POWDER, FOR SOLUTION INTRAMUSCULAR; INTRAVENOUS at 15:19

## 2025-01-19 RX ADMIN — IPRATROPIUM BROMIDE AND ALBUTEROL SULFATE 3 ML: .5; 3 SOLUTION RESPIRATORY (INHALATION) at 09:53

## 2025-01-19 RX ADMIN — PIPERACILLIN SODIUM AND TAZOBACTAM SODIUM 4.5 G: 4; .5 INJECTION, SOLUTION INTRAVENOUS at 17:58

## 2025-01-19 RX ADMIN — VANCOMYCIN HYDROCHLORIDE 750 MG: 750 INJECTION, SOLUTION INTRAVENOUS at 17:34

## 2025-01-19 RX ADMIN — Medication 50 MCG/HR: at 12:34

## 2025-01-19 RX ADMIN — VASOPRESSIN 0.06 UNITS/MIN: 0.2 INJECTION INTRAVENOUS at 21:28

## 2025-01-19 RX ADMIN — HEPARIN SODIUM 5000 UNITS: 5000 INJECTION INTRAVENOUS; SUBCUTANEOUS at 15:19

## 2025-01-19 RX ADMIN — VASOPRESSIN 0.06 UNITS/MIN: 0.2 INJECTION INTRAVENOUS at 16:48

## 2025-01-19 ASSESSMENT — PAIN - FUNCTIONAL ASSESSMENT
PAIN_FUNCTIONAL_ASSESSMENT: CPOT (CRITICAL CARE PAIN OBSERVATION TOOL)

## 2025-01-19 ASSESSMENT — PAIN SCALES - GENERAL
PAINLEVEL_OUTOF10: 0 - NO PAIN
PAINLEVEL_OUTOF10: 0 - NO PAIN

## 2025-01-19 NOTE — PROGRESS NOTES
Vancomycin Dosing by Pharmacy- FOLLOW UP    Faustina Vick is a 64 y.o. year old female who Pharmacy has been consulted for vancomycin dosing for pneumonia. Based on the patient's indication and renal status this patient is being dosed based on a goal AUC of 400-600.     Renal function is currently stable.    Current vancomycin dose: 1250 mg given every 12 hours    Estimated vancomycin AUC on current dose: 786 mg/L.hr     Visit Vitals  /64   Pulse 99   Temp 36.3 °C (97.3 °F) (Temporal)   Resp 18        Lab Results   Component Value Date    CREATININE 0.67 2025    CREATININE 0.56 2025    CREATININE 0.36 (L) 2025    CREATININE 0.29 (L) 2025        Patient weight is as follows:   Vitals:    25 0600   Weight: 54.6 kg (120 lb 5.9 oz)       Cultures:  No results found for the encounter in last 14 days.       I/O last 3 completed shifts:  In: 5059.8 (92.7 mL/kg) [I.V.:1099.8 (20.1 mL/kg); Blood:600; IV Piggyback:3360]  Out: 1090 (20 mL/kg) [Urine:1090 (0.6 mL/kg/hr)]  Weight: 54.6 kg   I/O during current shift:  I/O this shift:  In: 263.2 [I.V.:163.2; IV Piggyback:100]  Out: 155 [Urine:155]    Temp (24hrs), Av.2 °C (98.9 °F), Min:35.3 °C (95.5 °F), Max:38.1 °C (100.6 °F)      Assessment/Plan    Above goal random/trough level. Orders placed for new vancomycin regimen of 750 every 12 hours to begin at 1800 .    This dosing regimen is predicted by [facility] to result in the following pharmacokinetic parameters:  Loading dose: N/A  Regimen: 750 mg IV every 12 hours.  Start time: 17:13 on 2025  Exposure target: AUC24 (range)400-600 mg/L.hr   [id number]: 529 mg/L.hr  AUC24,ss: 524 mg/L.hr  Probability of AUC24 > 400: 92 %  Ctrough,ss: 16.7 mg/L  Probability of Ctrough,ss > 20: 26 %      The next level will be obtained on  at 0500. May be obtained sooner if clinically indicated.   Will continue to monitor renal function daily while on vancomycin and order serum creatinine at least  every 48 hours if not already ordered.  Follow for continued vancomycin needs, clinical response, and signs/symptoms of toxicity.       Rosalie Zamarripa, PharmD

## 2025-01-19 NOTE — CARE PLAN
Problem: Pain - Adult  Goal: Verbalizes/displays adequate comfort level or baseline comfort level  Outcome: Progressing     Problem: Safety - Adult  Goal: Free from fall injury  Outcome: Progressing     Problem: Discharge Planning  Goal: Discharge to home or other facility with appropriate resources  Outcome: Progressing     Problem: Chronic Conditions and Co-morbidities  Goal: Patient's chronic conditions and co-morbidity symptoms are monitored and maintained or improved  Outcome: Progressing     Problem: Skin  Goal: Decreased wound size/increased tissue granulation at next dressing change  1/19/2025 1034 by Carline Eugene RN  Outcome: Progressing  1/19/2025 1033 by Carline Eugene RN  Outcome: Progressing  Goal: Participates in plan/prevention/treatment measures  1/19/2025 1034 by Carline Eugene RN  Outcome: Progressing  1/19/2025 1033 by Carline Eugene RN  Outcome: Progressing  Goal: Prevent/manage excess moisture  1/19/2025 1034 by Carline Eugene RN  Outcome: Progressing  1/19/2025 1033 by Carline Eugene RN  Outcome: Progressing  Goal: Prevent/minimize sheer/friction injuries  Outcome: Progressing  Goal: Promote/optimize nutrition  Outcome: Progressing  Goal: Promote skin healing  Outcome: Progressing

## 2025-01-19 NOTE — PROGRESS NOTES
Subjective Data/Overnight Events:    updates:  - Patient intubated overnight for acute hypoxic resp failure.   - [place] numbers this AM: CO3.9, CI 2.5, Mixed venous 65%, CVP 7, mPAP 20, SVR 1220 --> indicating that her shock is less likely to be cardiogenic in etiology.   - Tachycardic; EKG showing sinus tachycardia with -150s           Objective Data:  Last Recorded Vitals:  Vitals:    01/19/25 1300 01/19/25 1400 01/19/25 1500 01/19/25 1501   BP:       BP Location:       Patient Position:       Pulse: (!) 138 (!) 139 (!) 138 (!) 140   Resp: 22 20 20 20   Temp:       TempSrc:       SpO2: 93% 93% 93% 93%   Weight:       Height:           Last Labs:  CBC - 1/19/2025:  7:57 AM  11.8 10.6 193    31.3      CMP - 1/19/2025:  7:57 AM  8.0 5.0 301 --- 0.9   2.6 3.1 143 63      PTT - 1/19/2025:  7:57 AM  1.1   12.2 33     TROPHS   Date/Time Value Ref Range Status   01/16/2025 11:54  0 - 34 ng/L Final     Comment:     Previous result verified on 1/16/2025 1530 on specimen/case [id number] called with component [facility] for procedure Troponin I, High Sensitivity with value 928 ng/L.   01/16/2025 01:59  0 - 34 ng/L Final     BNP   Date/Time Value Ref Range Status   01/16/2025 03:57  0 - 99 pg/mL Final      Last I/O:  I/O last 3 completed shifts:  In: 5059.8 (92.7 mL/kg) [I.V.:1099.8 (20.1 mL/kg); Blood:600; IV Piggyback:3360]  Out: 1090 (20 mL/kg) [Urine:1090 (0.6 mL/kg/hr)]  Weight: 54.6 kg         Inpatient Medications:  Scheduled medications   Medication Dose Route Frequency    azithromycin  250 mg intravenous q24h    bisacodyl  10 mg rectal Daily    budesonide  0.5 mg nebulization BID    fluconazole  400 mg intravenous q24h    heparin (porcine)  5,000 Units subcutaneous q8h    hydrocortisone sodium succinate  50 mg intravenous q6h    insulin lispro  0-5 Units subcutaneous q4h    ipratropium-albuteroL  3 mL nebulization q6h    lidocaine  1 patch transdermal Daily    midazolam  5 mg intravenous Once     oxygen   inhalation Continuous - Inhalation    pantoprazole  40 mg intravenous Daily before breakfast    perflutren lipid microspheres  0.5-10 mL of dilution intravenous Once in imaging    perflutren lipid microspheres  0.5-10 mL of dilution intravenous Once in imaging    perflutren protein A microsphere  0.5 mL intravenous Once in imaging    piperacillin-tazobactam  4.5 g intravenous q6h    sulfur hexafluoride microsphr  2 mL intravenous Once in imaging    vancomycin  750 mg intravenous q12h     PRN medications   Medication    albuterol    calcium gluconate    calcium gluconate    dextrose    dextrose    glucagon    glucagon    HYDROmorphone    magnesium sulfate    magnesium sulfate    potassium chloride    potassium chloride    vancomycin     Continuous Medications   Medication Dose Last Rate    dexmedeTOMIDine  0-0.6 mcg/kg/hr 0.5 mcg/kg/hr (01/19/25 0826)    epoprostenol  0.05 mcg/kg/min (Ideal) 0.05 mcg/kg/min (01/19/25 1501)    fentaNYL  50 mcg/hr 50 mcg/hr (01/19/25 1234)    milrinone  0.375 mcg/kg/min 0.375 mcg/kg/min (01/19/25 0805)    norepinephrine  0-0.2 mcg/kg/min 0.04 mcg/kg/min (01/19/25 1200)    vasopressin  0.06 Units/min 0.06 Units/min (01/19/25 0805)       Physical Exam:  updates:  - Patient intubated overnight for acute hypoxic resp failure.   - [place] numbers this AM: CO3.9, CI 2.5, Mixed venous 65%, CVP 7, mPAP 20, SVR 1220 --> indicating that her shock is less likely to be cardiogenic in etiology.   - Tachycardic; EKG showing sinus tachycardia with -150s         Objective     Physical Exam  General: intubated and sedated  Neck: -JVD, supple  Cardiac: Normal S, S2. No murmurs noted  Lungs:  Good bilateral air entry, clear lungs bilaterally  Abdomen: Soft,  Non-distended   Extremities: No LE edema   Neuro: intubated and sedated     Assessment/Plan     Jannie Vick is a 64 y.o. female with PMH significant for DEEPAK, tobacco use disorder, stage III squamous cell carcinoma of right lung patient  presented to [facility] on 113 for scheduled right pneumonectomy, intrapericardial with en bloc wall resection by Dr. Jonah Wagner.  Patient had a preoperative hypotension requiring vasopressors and postoperative hypotension requiring vasopressors which she has been subsequently weaned off of, increasing oxygen demand status post surgery which had subsequently been weaned off.  However on 1/16/2025 patient had borderline low blood pressures with 90s/60s, increasing oxygen requirements, and an echocardiogram illustrating biventricular Takotsubo cardiomyopathy for which cardiology was consulted for further recommendations. Given that the patient was showing signs of low output state with altered mental status, decreased urine output with a lactate of 2 along with echo showing signs of elevated left and right sided pressures. A shock call was done where the recommendation was start the patient on inotrope (milrinone) along with a lasix drip. Now s/p [place] placement with normal filling pressures along with CI of 3.      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   - Tachycardic; EKG showing sinus tachycardia with -150s  - Lactic acid 2.7-->1.4     Plan:  - As noted above, [place] numbers are not consistent with cardiogenic shock.   - Would recommend weaning down milrinone and potentially off as this will also help with rate control (her tachycardia is likely exacerbated by levo and milrinone)  -Agree with holding lasix and monitoring filling pressures.  -Target MAP 65-70 and avoid severe afterload (Elevated afterload/SVR will lead to a reduction in CO/CI)  -If the patient develops worsening shock, requiring increased doses of inotropes and or adding new inotrope, would recommend  activating a cardiac shock call to reconvene and discuss possible options.  -Remainder of care per primary team.         Thank you for involving us in this patient care. Recommendations not final until signed by attending.     General Cardiology Consult Pager: [phone] (weekday 7AM-6PM and weekend 7AM-2PM) and other: [phone]  EP Consult Pager: [phone] (weekday 7AM-6PM and weekend 7AM-2PM) and other: [phone]  CICU Fellow Pager: [phone] anytime  EP Device Nurse Pager: [phone] (weekday 7AM-4PM)  Advanced Heart Failure Consult Pager: [phone] anytime       Code Status:  Full Code    I spent 30 minutes in the professional and overall care of this patient.        Darrick Livingston MD

## 2025-01-19 NOTE — PROGRESS NOTES
"Faustina Vick \"Noam" is a 64 y.o. female on day 6 of admission presenting with Primary cancer of right upper lobe of lung (Multi).    Subjective   Weaning levo  Marginal CI  No change in milrinone dose  1 unit RBC, 250ml 5% albumin  Montiel changed, UA sent  Rising LFTs  Bedside TTE essentially unchanged function      Objective     Physical Exam  Vitals reviewed.   Constitutional:       Appearance: She is underweight.      Interventions: She is sedated, intubated and restrained.   HENT:      Head: Normocephalic and atraumatic.      Nose: Nose normal.      Mouth/Throat:      Mouth: Mucous membranes are moist.   Eyes:      Extraocular Movements: Extraocular movements intact.      Pupils: Pupils are equal, round, and reactive to light.   Neck:      Comments: RIJ introducer with PA cath in place, 58cm depth. dressing c/d/i.   Cardiovascular:      Rate and Rhythm: Regular rhythm. Tachycardia present.      Pulses:           Dorsalis pedis pulses are 1+ on the right side and detected w/ Doppler on the left side.        Posterior tibial pulses are 1+ on the right side and detected w/ Doppler on the left side.      Heart sounds: Normal heart sounds.      Comments: Radial pulses detected with doppler. Skin pink, warm and dry  Pulmonary:      Effort: She is intubated.      Breath sounds: Rhonchi (left side) present.      Comments: Rhonchi throughout. Mechanically ventilated via ETT  Chest:      Comments: Left chest mediport accessed, dressing c/d/i. Right lateral chest dressing c/d/i  Abdominal:      General: Bowel sounds are decreased. There is distension.      Palpations: Abdomen is soft.   Genitourinary:     Comments: Montiel in place  Musculoskeletal:      Cervical back: Neck supple.   Skin:     General: Skin is warm and dry.   Neurological:      General: No focal deficit present.      Mental Status: She is easily aroused.      GCS: GCS eye subscore is 3. GCS verbal subscore is 1. GCS motor subscore is 6.      Motor: " "Weakness (generalized) present.      Comments: Awakens to name, follows commands       Last Recorded Vitals  Blood pressure 100/64, pulse (!) 111, temperature 37 °C (98.6 °F), temperature source Temporal, resp. rate 18, height 1.651 m (5' 5\"), weight 54.6 kg (120 lb 5.9 oz), SpO2 98%.    VS over last 24h  Heart Rate:  [102-149]   Temp:  [35.3 °C (95.5 °F)-38.1 °C (100.6 °F)]   Resp:  [12-40]   BP: ()/(51-79)   Weight:  [54.6 kg (120 lb 5.9 oz)]   SpO2:  [72 %-100 %]      Intake/Output last 3 Shifts:  I/O last 3 completed shifts:  In: 4969.4 (91 mL/kg) [I.V.:1009.4 (18.5 mL/kg); Blood:600; IV Piggyback:3360]  Out: 1025 (18.8 mL/kg) [Urine:1025 (0.5 mL/kg/hr)]  Weight: 54.6 kg       Intake/Output Summary (Last 24 hours) at 1/19/2025 0728  Last data filed at 1/19/2025 0643  Gross per 24 hour   Intake 4001.92 ml   Output 615 ml   Net 3386.92 ml        Relevant Results  Scheduled medications  azithromycin, 250 mg, intravenous, q24h  bisacodyl, 10 mg, rectal, Daily  budesonide, 0.5 mg, nebulization, BID  fluconazole, 400 mg, intravenous, q24h  heparin (porcine), 5,000 Units, subcutaneous, q8h  hydrocortisone sodium succinate, 50 mg, intravenous, q6h  insulin lispro, 0-5 Units, subcutaneous, q4h  ipratropium-albuteroL, 3 mL, nebulization, q6h  lidocaine, 1 patch, transdermal, Daily  midazolam, 5 mg, intravenous, Once  oxygen, , inhalation, Continuous - Inhalation  pantoprazole, 40 mg, intravenous, Daily before breakfast  perflutren lipid microspheres, 0.5-10 mL of dilution, intravenous, Once in imaging  perflutren lipid microspheres, 0.5-10 mL of dilution, intravenous, Once in imaging  perflutren protein A microsphere, 0.5 mL, intravenous, Once in imaging  piperacillin-tazobactam, 4.5 g, intravenous, q6h  sulfur hexafluoride microsphr, 2 mL, intravenous, Once in imaging  vancomycin, 1,250 mg, intravenous, q12h      Continuous medications  dexmedeTOMIDine, 0-0.6 mcg/kg/hr, Last Rate: 0.5 mcg/kg/hr (01/19/25 " 0400)  epoprostenol, 0.05 mcg/kg/min (Ideal), Last Rate: 0.05 mcg/kg/min (01/19/25 0601)  fentaNYL, 50 mcg/hr, Last Rate: 50 mcg/hr (01/19/25 0400)  milrinone, 0.375 mcg/kg/min, Last Rate: 0.375 mcg/kg/min (01/19/25 0400)  norepinephrine, 0-0.2 mcg/kg/min, Last Rate: 0.06 mcg/kg/min (01/19/25 0645)  vasopressin, 0.06 Units/min, Last Rate: 0.06 Units/min (01/19/25 0532)        PRN medications  PRN medications: albuterol, calcium gluconate, calcium gluconate, dextrose, dextrose, glucagon, glucagon, HYDROmorphone, magnesium sulfate, magnesium sulfate, potassium chloride, potassium chloride, vancomycin      Results for orders placed or performed during the hospital encounter of 01/13/25 (from the past 24 hours)   POCT GLUCOSE   Result Value Ref Range    POCT Glucose 126 (H) 74 - 99 mg/dL   Blood Gas Arterial Full Panel   Result Value Ref Range    POCT pH, Arterial 7.50 (H) 7.38 - 7.42 pH    POCT pCO2, Arterial 46 (H) 38 - 42 mm Hg    POCT pO2, Arterial 80 (L) 85 - 95 mm Hg    POCT SO2, Arterial 98 94 - 100 %    POCT Oxy Hemoglobin, Arterial 95.4 94.0 - 98.0 %    POCT Hematocrit Calculated, Arterial 30.0 (L) 36.0 - 46.0 %    POCT Sodium, Arterial 127 (L) 136 - 145 mmol/L    POCT Potassium, Arterial 4.0 3.5 - 5.3 mmol/L    POCT Chloride, Arterial 91 (L) 98 - 107 mmol/L    POCT Ionized Calcium, Arterial 1.09 (L) 1.10 - 1.33 mmol/L    POCT Glucose, Arterial 139 (H) 74 - 99 mg/dL    POCT Lactate, Arterial 0.9 0.4 - 2.0 mmol/L    POCT Base Excess, Arterial 11.4 (H) -2.0 - 3.0 mmol/L    POCT HCO3 Calculated, Arterial 35.9 (H) 22.0 - 26.0 mmol/L    POCT Hemoglobin, Arterial 9.9 (L) 12.0 - 16.0 g/dL    POCT Anion Gap, Arterial 4 (L) 10 - 25 mmo/L    Patient Temperature 37.0 degrees Celsius    FiO2 90 %   Vancomycin   Result Value Ref Range    Vancomycin 14.3 5.0 - 20.0 ug/mL   Blood Gas Arterial Full Panel   Result Value Ref Range    POCT pH, Arterial 7.46 (H) 7.38 - 7.42 pH    POCT pCO2, Arterial 50 (H) 38 - 42 mm Hg    POCT  pO2, Arterial 69 (L) 85 - 95 mm Hg    POCT SO2, Arterial 96 94 - 100 %    POCT Oxy Hemoglobin, Arterial 93.8 (L) 94.0 - 98.0 %    POCT Hematocrit Calculated, Arterial 35.0 (L) 36.0 - 46.0 %    POCT Sodium, Arterial 124 (L) 136 - 145 mmol/L    POCT Potassium, Arterial 4.2 3.5 - 5.3 mmol/L    POCT Chloride, Arterial 88 (L) 98 - 107 mmol/L    POCT Ionized Calcium, Arterial 1.13 1.10 - 1.33 mmol/L    POCT Glucose, Arterial 147 (H) 74 - 99 mg/dL    POCT Lactate, Arterial 1.3 0.4 - 2.0 mmol/L    POCT Base Excess, Arterial 10.3 (H) -2.0 - 3.0 mmol/L    POCT HCO3 Calculated, Arterial 35.6 (H) 22.0 - 26.0 mmol/L    POCT Hemoglobin, Arterial 11.7 (L) 12.0 - 16.0 g/dL    POCT Anion Gap, Arterial 5 (L) 10 - 25 mmo/L    Patient Temperature 37.0 degrees Celsius    FiO2 80 %   Renal function panel   Result Value Ref Range    Glucose 144 (H) 74 - 99 mg/dL    Sodium 129 (L) 136 - 145 mmol/L    Potassium 4.2 3.5 - 5.3 mmol/L    Chloride 87 (L) 98 - 107 mmol/L    Bicarbonate 35 (H) 21 - 32 mmol/L    Anion Gap 11 10 - 20 mmol/L    Urea Nitrogen 9 6 - 23 mg/dL    Creatinine 0.29 (L) 0.50 - 1.05 mg/dL    eGFR >90 >60 mL/min/1.73m*2    Calcium 8.2 (L) 8.6 - 10.6 mg/dL    Phosphorus 3.1 2.5 - 4.9 mg/dL    Albumin 3.4 3.4 - 5.0 g/dL   BLOOD GAS MIXED VENOUS FULL PANEL   Result Value Ref Range    POCT pH, Mixed 7.43 7.33 - 7.43 pH    POCT pCO2, Mixed 56 (H) 41 - 51 mm Hg    POCT pO2, Mixed 30 (L) 35 - 45 mm Hg    POCT SO2, Mixed 48 45 - 75 %    POCT Oxy Hemoglobin, Mixed 47.7 45.0 - 75.0 %    POCT Hematocrit Calculated, Mixed 36.0 36.0 - 46.0 %    POCT Sodium, Mixed 123 (L) 136 - 145 mmol/L    POCT Potassium, Mixed 4.2 3.5 - 5.3 mmol/L    POCT Chloride, Mixed 88 (L) 98 - 107 mmol/L    POCT Ionized Calcium, Mixed 1.13 1.10 - 1.33 mmol/L    POCT Glucose, Mixed 146 (H) 74 - 99 mg/dL    POCT Lactate, Mixed 1.2 0.4 - 2.0 mmol/L    POCT Base Excess, Mixed 10.9 (H) -2.0 - 3.0 mmol/L    POCT HCO3 Calculated, Mixed 37.2 (H) 22.0 - 26.0 mmol/L    POCT  Hemoglobin, Mixed 12.0 12.0 - 16.0 g/dL    POCT Anion Gap, Mixed 2 (L) 10 - 25 mmo/L    Patient Temperature 37.0 degrees Celsius    FiO2 80 %   POCT GLUCOSE   Result Value Ref Range    POCT Glucose 155 (H) 74 - 99 mg/dL   Blood Gas Arterial Full Panel   Result Value Ref Range    POCT pH, Arterial 7.31 (L) 7.38 - 7.42 pH    POCT pCO2, Arterial 70 (HH) 38 - 42 mm Hg    POCT pO2, Arterial 74 (L) 85 - 95 mm Hg    POCT SO2, Arterial 96 94 - 100 %    POCT Oxy Hemoglobin, Arterial 94.2 94.0 - 98.0 %    POCT Hematocrit Calculated, Arterial 31.0 (L) 36.0 - 46.0 %    POCT Sodium, Arterial 127 (L) 136 - 145 mmol/L    POCT Potassium, Arterial 3.9 3.5 - 5.3 mmol/L    POCT Chloride, Arterial 88 (L) 98 - 107 mmol/L    POCT Ionized Calcium, Arterial 1.15 1.10 - 1.33 mmol/L    POCT Glucose, Arterial 171 (H) 74 - 99 mg/dL    POCT Lactate, Arterial 1.0 0.4 - 2.0 mmol/L    POCT Base Excess, Arterial 7.2 (H) -2.0 - 3.0 mmol/L    POCT HCO3 Calculated, Arterial 35.2 (H) 22.0 - 26.0 mmol/L    POCT Hemoglobin, Arterial 10.2 (L) 12.0 - 16.0 g/dL    POCT Anion Gap, Arterial 8 (L) 10 - 25 mmo/L    Patient Temperature 37.0 degrees Celsius    FiO2 90 %   BLOOD GAS MIXED VENOUS FULL PANEL   Result Value Ref Range    POCT pH, Mixed 7.31 (L) 7.33 - 7.43 pH    POCT pCO2, Mixed 68 (H) 41 - 51 mm Hg    POCT pO2, Mixed 42 35 - 45 mm Hg    POCT SO2, Mixed 65 45 - 75 %    POCT Oxy Hemoglobin, Mixed 64.3 45.0 - 75.0 %    POCT Hematocrit Calculated, Mixed 30.0 (L) 36.0 - 46.0 %    POCT Sodium, Mixed 127 (L) 136 - 145 mmol/L    POCT Potassium, Mixed 3.8 3.5 - 5.3 mmol/L    POCT Chloride, Mixed      POCT Ionized Calcium, Mixed 1.13 1.10 - 1.33 mmol/L    POCT Glucose, Mixed 159 (H) 74 - 99 mg/dL    POCT Lactate, Mixed 1.0 0.4 - 2.0 mmol/L    POCT Base Excess, Mixed 6.4 (H) -2.0 - 3.0 mmol/L    POCT HCO3 Calculated, Mixed 34.2 (H) 22.0 - 26.0 mmol/L    POCT Hemoglobin, Mixed 9.9 (L) 12.0 - 16.0 g/dL    POCT Anion Gap, Mixed      Patient Temperature 37.0  degrees Celsius    FiO2 90 %   POCT GLUCOSE   Result Value Ref Range    POCT Glucose 164 (H) 74 - 99 mg/dL   BLOOD GAS MIXED VENOUS FULL PANEL   Result Value Ref Range    POCT pH, Mixed 7.34 7.33 - 7.43 pH    POCT pCO2, Mixed 66 (H) 41 - 51 mm Hg    POCT pO2, Mixed 44 35 - 45 mm Hg    POCT SO2, Mixed 70 45 - 75 %    POCT Oxy Hemoglobin, Mixed 68.4 45.0 - 75.0 %    POCT Hematocrit Calculated, Mixed 29.0 (L) 36.0 - 46.0 %    POCT Sodium, Mixed 127 (L) 136 - 145 mmol/L    POCT Potassium, Mixed 3.7 3.5 - 5.3 mmol/L    POCT Chloride, Mixed 92 (L) 98 - 107 mmol/L    POCT Ionized Calcium, Mixed 1.08 (L) 1.10 - 1.33 mmol/L    POCT Glucose, Mixed 144 (H) 74 - 99 mg/dL    POCT Lactate, Mixed 0.8 0.4 - 2.0 mmol/L    POCT Base Excess, Mixed 8.2 (H) -2.0 - 3.0 mmol/L    POCT HCO3 Calculated, Mixed 35.6 (H) 22.0 - 26.0 mmol/L    POCT Hemoglobin, Mixed 9.6 (L) 12.0 - 16.0 g/dL    POCT Anion Gap, Mixed 3 (L) 10 - 25 mmo/L    Patient Temperature 37.0 degrees Celsius    FiO2 90 %   Blood Gas Arterial Full Panel   Result Value Ref Range    POCT pH, Arterial 7.30 (L) 7.38 - 7.42 pH    POCT pCO2, Arterial 72 (HH) 38 - 42 mm Hg    POCT pO2, Arterial 92 85 - 95 mm Hg    POCT SO2, Arterial 98 94 - 100 %    POCT Oxy Hemoglobin, Arterial 96.0 94.0 - 98.0 %    POCT Hematocrit Calculated, Arterial 29.0 (L) 36.0 - 46.0 %    POCT Sodium, Arterial 128 (L) 136 - 145 mmol/L    POCT Potassium, Arterial 4.5 3.5 - 5.3 mmol/L    POCT Chloride, Arterial 91 (L) 98 - 107 mmol/L    POCT Ionized Calcium, Arterial 1.14 1.10 - 1.33 mmol/L    POCT Glucose, Arterial 160 (H) 74 - 99 mg/dL    POCT Lactate, Arterial 1.4 0.4 - 2.0 mmol/L    POCT Base Excess, Arterial 7.3 (H) -2.0 - 3.0 mmol/L    POCT HCO3 Calculated, Arterial 35.4 (H) 22.0 - 26.0 mmol/L    POCT Hemoglobin, Arterial 9.6 (L) 12.0 - 16.0 g/dL    POCT Anion Gap, Arterial 6 (L) 10 - 25 mmo/L    Patient Temperature 37.0 degrees Celsius    FiO2 90 %   Blood Gas Arterial Full Panel   Result Value Ref Range     POCT pH, Arterial 7.31 (L) 7.38 - 7.42 pH    POCT pCO2, Arterial 70 (HH) 38 - 42 mm Hg    POCT pO2, Arterial 93 85 - 95 mm Hg    POCT SO2, Arterial 98 94 - 100 %    POCT Oxy Hemoglobin, Arterial 96.1 94.0 - 98.0 %    POCT Hematocrit Calculated, Arterial 29.0 (L) 36.0 - 46.0 %    POCT Sodium, Arterial 127 (L) 136 - 145 mmol/L    POCT Potassium, Arterial 4.1 3.5 - 5.3 mmol/L    POCT Chloride, Arterial 92 (L) 98 - 107 mmol/L    POCT Ionized Calcium, Arterial 1.09 (L) 1.10 - 1.33 mmol/L    POCT Glucose, Arterial 174 (H) 74 - 99 mg/dL    POCT Lactate, Arterial 1.1 0.4 - 2.0 mmol/L    POCT Base Excess, Arterial 7.3 (H) -2.0 - 3.0 mmol/L    POCT HCO3 Calculated, Arterial 35.2 (H) 22.0 - 26.0 mmol/L    POCT Hemoglobin, Arterial 9.7 (L) 12.0 - 16.0 g/dL    POCT Anion Gap, Arterial 4 (L) 10 - 25 mmo/L    Patient Temperature 37.0 degrees Celsius    FiO2 90 %   Blood Gas Arterial Full Panel   Result Value Ref Range    POCT pH, Arterial 7.47 (H) 7.38 - 7.42 pH    POCT pCO2, Arterial 48 (H) 38 - 42 mm Hg    POCT pO2, Arterial 113 (H) 85 - 95 mm Hg    POCT SO2, Arterial 99 94 - 100 %    POCT Oxy Hemoglobin, Arterial 97.1 94.0 - 98.0 %    POCT Hematocrit Calculated, Arterial 33.0 (L) 36.0 - 46.0 %    POCT Sodium, Arterial 126 (L) 136 - 145 mmol/L    POCT Potassium, Arterial 3.9 3.5 - 5.3 mmol/L    POCT Chloride, Arterial 90 (L) 98 - 107 mmol/L    POCT Ionized Calcium, Arterial 1.04 (L) 1.10 - 1.33 mmol/L    POCT Glucose, Arterial 183 (H) 74 - 99 mg/dL    POCT Lactate, Arterial 1.3 0.4 - 2.0 mmol/L    POCT Base Excess, Arterial 9.9 (H) -2.0 - 3.0 mmol/L    POCT HCO3 Calculated, Arterial 34.9 (H) 22.0 - 26.0 mmol/L    POCT Hemoglobin, Arterial 11.1 (L) 12.0 - 16.0 g/dL    POCT Anion Gap, Arterial 5 (L) 10 - 25 mmo/L    Patient Temperature 37.0 degrees Celsius    FiO2 80 %   BLOOD GAS MIXED VENOUS FULL PANEL   Result Value Ref Range    POCT pH, Mixed 7.43 7.33 - 7.43 pH    POCT pCO2, Mixed 50 41 - 51 mm Hg    POCT pO2, Mixed 35 35  - 45 mm Hg    POCT SO2, Mixed 60 45 - 75 %    POCT Oxy Hemoglobin, Mixed 59.4 45.0 - 75.0 %    POCT Hematocrit Calculated, Mixed 27.0 (L) 36.0 - 46.0 %    POCT Sodium, Mixed 127 (L) 136 - 145 mmol/L    POCT Potassium, Mixed 3.8 3.5 - 5.3 mmol/L    POCT Chloride, Mixed 91 (L) 98 - 107 mmol/L    POCT Ionized Calcium, Mixed 1.09 (L) 1.10 - 1.33 mmol/L    POCT Glucose, Mixed 167 (H) 74 - 99 mg/dL    POCT Lactate, Mixed 1.1 0.4 - 2.0 mmol/L    POCT Base Excess, Mixed 7.9 (H) -2.0 - 3.0 mmol/L    POCT HCO3 Calculated, Mixed 33.2 (H) 22.0 - 26.0 mmol/L    POCT Hemoglobin, Mixed 9.0 (L) 12.0 - 16.0 g/dL    POCT Anion Gap, Mixed 7 (L) 10 - 25 mmo/L    Patient Temperature 37.0 degrees Celsius    FiO2 80 %   Renal function panel   Result Value Ref Range    Glucose 185 (H) 74 - 99 mg/dL    Sodium 130 (L) 136 - 145 mmol/L    Potassium 4.0 3.5 - 5.3 mmol/L    Chloride 87 (L) 98 - 107 mmol/L    Bicarbonate 34 (H) 21 - 32 mmol/L    Anion Gap 13 10 - 20 mmol/L    Urea Nitrogen 12 6 - 23 mg/dL    Creatinine 0.36 (L) 0.50 - 1.05 mg/dL    eGFR >90 >60 mL/min/1.73m*2    Calcium 7.8 (L) 8.6 - 10.6 mg/dL    Phosphorus 2.3 (L) 2.5 - 4.9 mg/dL    Albumin 3.4 3.4 - 5.0 g/dL   Magnesium   Result Value Ref Range    Magnesium 1.90 1.60 - 2.40 mg/dL   Coagulation Screen   Result Value Ref Range    Protime 13.6 (H) 9.8 - 12.8 seconds    INR 1.2 (H) 0.9 - 1.1    aPTT 33 27 - 38 seconds   Calcium, Ionized   Result Value Ref Range    POCT Calcium, Ionized 1.05 (L) 1.1 - 1.33 mmol/L   CBC   Result Value Ref Range    WBC 12.0 (H) 4.4 - 11.3 x10*3/uL    nRBC 0.2 (H) 0.0 - 0.0 /100 WBCs    RBC 3.24 (L) 4.00 - 5.20 x10*6/uL    Hemoglobin 8.3 (L) 12.0 - 16.0 g/dL    Hematocrit 24.9 (L) 36.0 - 46.0 %    MCV 77 (L) 80 - 100 fL    MCH 25.6 (L) 26.0 - 34.0 pg    MCHC 33.3 32.0 - 36.0 g/dL    RDW 13.5 11.5 - 14.5 %    Platelets 195 150 - 450 x10*3/uL   Prepare RBC: 1 Units   Result Value Ref Range    PRODUCT CODE [id number]     Unit Number [id number]      Unit ABO B     Unit RH POS     XM INTEP COMP     Dispense Status TR     Blood Expiration Date 2/9/2025 11:59:00 PM EST     PRODUCT BLOOD TYPE 7300     UNIT VOLUME 350    Urinalysis with Reflex Culture and Microscopic   Result Value Ref Range    Color, Urine Colorless (N) Light-Yellow, Yellow, Dark-Yellow    Appearance, Urine Turbid (N) Clear    Specific Gravity, Urine 1.020 1.005 - 1.035    pH, Urine 6.5 5.0, 5.5, 6.0, 6.5, 7.0, 7.5, 8.0    Protein, Urine 70 (1+) (A) NEGATIVE, 10 (TRACE), 20 (TRACE) mg/dL    Glucose, Urine 70 (1+) (A) Normal mg/dL    Blood, Urine OVER (3+) (A) NEGATIVE    Ketones, Urine NEGATIVE NEGATIVE mg/dL    Bilirubin, Urine NEGATIVE NEGATIVE    Urobilinogen, Urine Normal Normal mg/dL    Nitrite, Urine NEGATIVE NEGATIVE    Leukocyte Esterase, Urine NEGATIVE NEGATIVE   Urinalysis Microscopic   Result Value Ref Range    WBC, Urine >50 (A) 1-5, NONE /HPF    RBC, Urine >20 (A) NONE, 1-2, 3-5 /HPF    Mucus, Urine FEW Reference range not established. /LPF   BLOOD GAS MIXED VENOUS FULL PANEL   Result Value Ref Range    POCT pH, Mixed 7.41 7.33 - 7.43 pH    POCT pCO2, Mixed 52 (H) 41 - 51 mm Hg    POCT pO2, Mixed 35 35 - 45 mm Hg    POCT SO2, Mixed 59 45 - 75 %    POCT Oxy Hemoglobin, Mixed 57.9 45.0 - 75.0 %    POCT Hematocrit Calculated, Mixed 32.0 (L) 36.0 - 46.0 %    POCT Sodium, Mixed 127 (L) 136 - 145 mmol/L    POCT Potassium, Mixed 3.6 3.5 - 5.3 mmol/L    POCT Chloride, Mixed 91 (L) 98 - 107 mmol/L    POCT Ionized Calcium, Mixed 1.09 (L) 1.10 - 1.33 mmol/L    POCT Glucose, Mixed 219 (H) 74 - 99 mg/dL    POCT Lactate, Mixed 1.4 0.4 - 2.0 mmol/L    POCT Base Excess, Mixed 7.2 (H) -2.0 - 3.0 mmol/L    POCT HCO3 Calculated, Mixed 33.0 (H) 22.0 - 26.0 mmol/L    POCT Hemoglobin, Mixed 10.5 (L) 12.0 - 16.0 g/dL    POCT Anion Gap, Mixed 7 (L) 10 - 25 mmo/L    Patient Temperature 37.0 degrees Celsius    FiO2 80 %   Renal function panel   Result Value Ref Range    Glucose 223 (H) 74 - 99 mg/dL    Sodium  130 (L) 136 - 145 mmol/L    Potassium 3.8 3.5 - 5.3 mmol/L    Chloride 89 (L) 98 - 107 mmol/L    Bicarbonate 32 21 - 32 mmol/L    Anion Gap 13 10 - 20 mmol/L    Urea Nitrogen 15 6 - 23 mg/dL    Creatinine 0.56 0.50 - 1.05 mg/dL    eGFR >90 >60 mL/min/1.73m*2    Calcium 8.0 (L) 8.6 - 10.6 mg/dL    Phosphorus 3.3 2.5 - 4.9 mg/dL    Albumin 3.2 (L) 3.4 - 5.0 g/dL   Magnesium   Result Value Ref Range    Magnesium 2.75 (H) 1.60 - 2.40 mg/dL   Coagulation Screen   Result Value Ref Range    Protime 13.2 (H) 9.8 - 12.8 seconds    INR 1.2 (H) 0.9 - 1.1    aPTT 33 27 - 38 seconds   CBC   Result Value Ref Range    WBC 13.5 (H) 4.4 - 11.3 x10*3/uL    nRBC 0.2 (H) 0.0 - 0.0 /100 WBCs    RBC 3.89 (L) 4.00 - 5.20 x10*6/uL    Hemoglobin 10.5 (L) 12.0 - 16.0 g/dL    Hematocrit 30.5 (L) 36.0 - 46.0 %    MCV 78 (L) 80 - 100 fL    MCH 27.0 26.0 - 34.0 pg    MCHC 34.4 32.0 - 36.0 g/dL    RDW 14.6 (H) 11.5 - 14.5 %    Platelets 189 150 - 450 x10*3/uL   Calcium, Ionized   Result Value Ref Range    POCT Calcium, Ionized 1.08 (L) 1.1 - 1.33 mmol/L   Hepatic function panel   Result Value Ref Range    Albumin 3.2 (L) 3.4 - 5.0 g/dL    Bilirubin, Total 0.9 0.0 - 1.2 mg/dL    Bilirubin, Direct 0.3 0.0 - 0.3 mg/dL    Alkaline Phosphatase 66 33 - 136 U/L     (H) 7 - 45 U/L     (H) 9 - 39 U/L    Total Protein 4.8 (L) 6.4 - 8.2 g/dL   Blood Gas Arterial Full Panel   Result Value Ref Range    POCT pH, Arterial 7.44 (H) 7.38 - 7.42 pH    POCT pCO2, Arterial 44 (H) 38 - 42 mm Hg    POCT pO2, Arterial 80 (L) 85 - 95 mm Hg    POCT SO2, Arterial 97 94 - 100 %    POCT Oxy Hemoglobin, Arterial 95.1 94.0 - 98.0 %    POCT Hematocrit Calculated, Arterial 32.0 (L) 36.0 - 46.0 %    POCT Sodium, Arterial 126 (L) 136 - 145 mmol/L    POCT Potassium, Arterial 3.6 3.5 - 5.3 mmol/L    POCT Chloride, Arterial 92 (L) 98 - 107 mmol/L    POCT Ionized Calcium, Arterial 1.06 (L) 1.10 - 1.33 mmol/L    POCT Glucose, Arterial 214 (H) 74 - 99 mg/dL    POCT  Lactate, Arterial 1.4 0.4 - 2.0 mmol/L    POCT Base Excess, Arterial 5.1 (H) -2.0 - 3.0 mmol/L    POCT HCO3 Calculated, Arterial 29.9 (H) 22.0 - 26.0 mmol/L    POCT Hemoglobin, Arterial 10.8 (L) 12.0 - 16.0 g/dL    POCT Anion Gap, Arterial 8 (L) 10 - 25 mmo/L    Patient Temperature 37.0 degrees Celsius    FiO2 80 %   Blood Gas Arterial Full Panel   Result Value Ref Range    POCT pH, Arterial 7.44 (H) 7.38 - 7.42 pH    POCT pCO2, Arterial 44 (H) 38 - 42 mm Hg    POCT pO2, Arterial 81 (L) 85 - 95 mm Hg    POCT SO2, Arterial 97 94 - 100 %    POCT Oxy Hemoglobin, Arterial 95.4 94.0 - 98.0 %    POCT Hematocrit Calculated, Arterial 34.0 (L) 36.0 - 46.0 %    POCT Sodium, Arterial 126 (L) 136 - 145 mmol/L    POCT Potassium, Arterial 3.4 (L) 3.5 - 5.3 mmol/L    POCT Chloride, Arterial 92 (L) 98 - 107 mmol/L    POCT Ionized Calcium, Arterial 1.14 1.10 - 1.33 mmol/L    POCT Glucose, Arterial 221 (H) 74 - 99 mg/dL    POCT Lactate, Arterial 2.7 (H) 0.4 - 2.0 mmol/L    POCT Base Excess, Arterial 5.1 (H) -2.0 - 3.0 mmol/L    POCT HCO3 Calculated, Arterial 29.9 (H) 22.0 - 26.0 mmol/L    POCT Hemoglobin, Arterial 11.4 (L) 12.0 - 16.0 g/dL    POCT Anion Gap, Arterial 8 (L) 10 - 25 mmo/L    Patient Temperature 37.0 degrees Celsius    FiO2 80 %   BLOOD GAS MIXED VENOUS FULL PANEL   Result Value Ref Range    POCT pH, Mixed 7.41 7.33 - 7.43 pH    POCT pCO2, Mixed 51 41 - 51 mm Hg    POCT pO2, Mixed 38 35 - 45 mm Hg    POCT SO2, Mixed 66 45 - 75 %    POCT Oxy Hemoglobin, Mixed 64.2 45.0 - 75.0 %    POCT Hematocrit Calculated, Mixed 34.0 (L) 36.0 - 46.0 %    POCT Sodium, Mixed 126 (L) 136 - 145 mmol/L    POCT Potassium, Mixed 3.4 (L) 3.5 - 5.3 mmol/L    POCT Chloride, Mixed 90 (L) 98 - 107 mmol/L    POCT Ionized Calcium, Mixed 1.15 1.10 - 1.33 mmol/L    POCT Glucose, Mixed 229 (H) 74 - 99 mg/dL    POCT Lactate, Mixed 2.6 (H) 0.4 - 2.0 mmol/L    POCT Base Excess, Mixed 6.5 (H) -2.0 - 3.0 mmol/L    POCT HCO3 Calculated, Mixed 32.3 (H) 22.0 -  26.0 mmol/L    POCT Hemoglobin, Mixed 11.2 (L) 12.0 - 16.0 g/dL    POCT Anion Gap, Mixed 7 (L) 10 - 25 mmo/L    Patient Temperature 37.0 degrees Celsius    FiO2 80 %   POCT GLUCOSE   Result Value Ref Range    POCT Glucose 216 (H) 74 - 99 mg/dL   BLOOD GAS MIXED VENOUS FULL PANEL   Result Value Ref Range    POCT pH, Mixed 7.42 7.33 - 7.43 pH    POCT pCO2, Mixed 44 41 - 51 mm Hg    POCT pO2, Mixed 36 35 - 45 mm Hg    POCT SO2, Mixed 62 45 - 75 %    POCT Oxy Hemoglobin, Mixed 60.8 45.0 - 75.0 %    POCT Hematocrit Calculated, Mixed 31.0 (L) 36.0 - 46.0 %    POCT Sodium, Mixed 128 (L) 136 - 145 mmol/L    POCT Potassium, Mixed 3.8 3.5 - 5.3 mmol/L    POCT Chloride, Mixed 94 (L) 98 - 107 mmol/L    POCT Ionized Calcium, Mixed 1.06 (L) 1.10 - 1.33 mmol/L    POCT Glucose, Mixed 185 (H) 74 - 99 mg/dL    POCT Lactate, Mixed 2.7 (H) 0.4 - 2.0 mmol/L    POCT Base Excess, Mixed 3.5 (H) -2.0 - 3.0 mmol/L    POCT HCO3 Calculated, Mixed 28.5 (H) 22.0 - 26.0 mmol/L    POCT Hemoglobin, Mixed 10.4 (L) 12.0 - 16.0 g/dL    POCT Anion Gap, Mixed 9 (L) 10 - 25 mmo/L    Patient Temperature 37.0 degrees Celsius    FiO2 80 %   Blood Gas Arterial Full Panel   Result Value Ref Range    POCT pH, Arterial 7.45 (H) 7.38 - 7.42 pH    POCT pCO2, Arterial 39 38 - 42 mm Hg    POCT pO2, Arterial 105 (H) 85 - 95 mm Hg    POCT SO2, Arterial 99 94 - 100 %    POCT Oxy Hemoglobin, Arterial 96.9 94.0 - 98.0 %    POCT Hematocrit Calculated, Arterial 33.0 (L) 36.0 - 46.0 %    POCT Sodium, Arterial 126 (L) 136 - 145 mmol/L    POCT Potassium, Arterial 3.8 3.5 - 5.3 mmol/L    POCT Chloride, Arterial 93 (L) 98 - 107 mmol/L    POCT Ionized Calcium, Arterial 1.08 (L) 1.10 - 1.33 mmol/L    POCT Glucose, Arterial 187 (H) 74 - 99 mg/dL    POCT Lactate, Arterial 2.9 (H) 0.4 - 2.0 mmol/L    POCT Base Excess, Arterial 2.9 -2.0 - 3.0 mmol/L    POCT HCO3 Calculated, Arterial 27.1 (H) 22.0 - 26.0 mmol/L    POCT Hemoglobin, Arterial 10.9 (L) 12.0 - 16.0 g/dL    POCT Anion Gap,  Arterial 10 10 - 25 mmo/L    Patient Temperature 37.0 degrees Celsius    FiO2 80 %       XR chest 1 view         XR chest 1 view   Final Result   1. Worsening aeration of the left lung with increasing left basilar   infiltrate and pleural effusion. Correlate with mucous plugging and   component of lobar collapse.   2. Component of interstitial prominence of the left lung, also   slightly increased which may be due to edema.   3. Postsurgical changes of right pneumonectomy as described                  Signed by: Vick Xiao 1/18/2025 11:35 AM   Dictation workstation:   [id number]      Cardiac Catheterization Procedure   Final Result      Transthoracic Echo (TTE) Limited   Final Result      XR chest 1 view   Final Result   1.  Stable exam with postsurgical changes of right pneumonectomy/rib   resection and large layering fluid component within the right   hemithorax.   2. Similar findings of left pulmonary edema with slightly increased   size of small left pleural effusion. No left-sided pneumothorax.   3. Medical devices as above.                  MACRO:   None        Signed by: Alexx Anne 1/17/2025 8:16 AM   Dictation workstation:   [id number]      XR chest 1 view   Final Result   1.  Postsurgical changes of right-sided pneumonectomy with increased   layering opacification of the right hemithorax, likely due to   increasing fluid.   2. Similar trace left-sided pleural effusion with linear left basilar   airspace opacity, likely atelectasis on a background of likely   pulmonary edema. Infection is not definitively excluded.   3. Medical devices as above with interval insertion of right internal   jugular central venous catheter projecting at the mid SVC.        I personally reviewed the images/study and I agree with the findings   as stated by resident Davidson Mcguire. This study was interpreted   at [facility], [place], [place].        MACRO:   None        Signed  by: Alexx Anne 1/17/2025 8:14 AM   Dictation workstation:   [id number]      Transthoracic Echo (TTE) Limited   Final Result      US right upper quadrant   Final Result   Small amount of simple appearing ascites in the right upper quadrant   without evidence of cholelithiasis, cholecystitis or biliary dilation.        Signed by: Alberto Salas 1/16/2025 1:02 PM   Dictation workstation:   [id number]      XR chest 1 view   Final Result   1. Slight interval increase in left basilar airspace opacity which   may be due to increasing atelectasis. Cannot exclude developing   infectious infiltrate. Question trace left pleural effusion.   2. Again seen postsurgical changes related to right-sided   pneumonectomy as described above                  Signed by: Vick Xiao 1/16/2025 9:47 AM   Dictation workstation:   [id number]      US renal complete   Final Result   1. Unremarkable sonographic evaluation of the kidneys.   2. Incomplete evaluation of a gallbladder with some pericholecystic   fluid and biliary sludge. Dedicated right upper quadrant ultrasound   could be obtained as clinically warranted.   3. A small amount of free fluid present in the right upper quadrant   and pelvis.        Findings relayed by phone by me to SICU provider at 5:11 p.m.        I personally reviewed the images/study and I agree with the findings   as stated by Erickson Best MD (resident) . This study was   interpreted at [facility],   [place], [place].        MACRO:   None        Signed by: Alberto Salas 1/15/2025 6:17 PM   Dictation workstation:   [id number]      XR chest 1 view   Final Result   1.  Status post pneumonectomy with interval increase in fluid   component of right-sided hydropneumothorax.   2. Medical devices as above.                  MACRO:   None        Signed by: Alexx Anne 1/15/2025 9:28 AM   Dictation workstation:   [id number]      XR chest 1 view   Final  Result   1. Interval removal of right chest tube. Other medical devices as   described above.   2. Postsurgical changes related to right pneumonectomy.   3. Faint left basilar opacity, likely atelectatic.                  Signed by: Vick Xiao 1/14/2025 2:53 PM   Dictation workstation:   [id number]      XR chest 1 view   Final Result   1.  Stable right-sided pneumonectomy changes.             Signed by: Nino Barbour 1/14/2025 12:56 PM   Dictation workstation:   [id number]      Transthoracic Echo (TTE) Limited   Final Result      XR chest 1 view   Final Result   1. Postsurgical changes related to right sided pneumonectomy with   multiple medical devices as described above.   2. Mild interstitial prominence of the left lung.                  Signed by: Vick Xiao 1/13/2025 3:16 PM   Dictation workstation:   [id number]      XR chest 1 view    (Results Pending)   Transthoracic Echo (TTE) Limited    (Results Pending)        Assessment/Plan   Assessment:  Faustina Vick is a 65 y/o with PMHx of COPD and 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 Wagner on 1/13 requiring post-op vasoactive therapy. Post-op course c/b acute hyponatremia, multifactorial delirium, cardiogenic shock requiring inotropic support and acute hypoxic/hypercarbic respiratory failure requiring intubation.     Plan:  NEURO: History of anxiety on alprazolam 1mg qd, last filled 1/4/2025 per [facility] review. Family states she was not taking for several months but then restarted prn about 3 wks prior to surgery for situational anxiety. A&Ox4 at baseline. Acute post-op pain. Multifactorial delirium improved. Currently sedated and intubated. On precedex 0.5mcg/kg/hr and fentanyl 50mcg/hr. Able to briefly awaken and follow commands without sedation interruption.  - ongoing neuro and pain assessments  -  continue precedex and fentanyl infusions  - goal RASS 0 to -3  - stop iv acetaminophen given rising LFTs  - Lidocaine patch to right chest wall  - PT/OT consulted -> hold off for now     CV: No history of cardiac disease. Baseline /79, -110. TTE 9/2024 normal biV function, RVSP 10mmHg. Intra-op hypotension requiring vaso and epi. Arrived to SICU on vaso 0.06 units/min, started on levo 0.05 mcg/kg/min. TTE 1/13 with EF 60-65%, low normal RV systolic function. Weaned off vasopressors 1/15. Elevated lactate to 2.9 on 1/16 with SOB and new O2 requirement. Repeat TTE 1/16: acute biV dysfunction with signs of takotsubo cardiomyopathy, EF 34%, mod reduced RV systolic function, mildly elevated RVSP, mild to mod TR. Cardiogenic shock, started milrinone, lasix infusion, and vasopressin. Shock Call on 1/16, no indication for mechanical circulatory support at this time. Initial troponin 928 in the setting of demand ischemia, with repeat troponin downtrend to 625. Lactate normalized. Epinephrine infusion added overnight 1/16-1/17, discontinued 2/2 tachycardia in the 130s. Milrinone increased to 0.25mcg/kg/min (decreased to 0.125 thereafter). Lasix infusion stopped 1/17 given contraction alkalosis. Taken to cath lab 1/17 for RHC/PA cath placement, opening CI 2.9. Milrinone uptitrated to 0.375mcg/kg/min 1/17-1/18. iEpo 0.05 added 1/18. Vasopressin increased to 0.06 and levo added to 0.15 1/18 evening at time of intubation. Given total of 750ml 5% albumin and 1 unit RBC since evening 1/18 through overnight 1/19. Currently on vaso 0.06, levo 0.06. CI range 1.5-2 via thermodilution, 2.4-2.9 via FRANCISCO. Elevated lactate. HR .  - goal map > 65  - continuous EKG/abp/cvp/pap monitoring  - thermodilution and FRANCISCO q4h and prn  - Cardiology following, appreciate recs   - continue milrinone 0.375mcg/kg/min  - continue iEpo at 0.05  - continue levo and vaso -> wean levo first  - continue to trend central venous SvO2 q4h  -  continue to hold Lasix infusion   - continue to trend lactate  - continue hydrocortisone 50mg, increase frequency to q6h  - continue holding metoprolol  - 12 lead EKG  - shock call if condition worsens today     PULM: Hx of 30 pack year smoking history (quit 4/2024), COPD, and stage III right lung SCC s/p chemo/XRT/immunotherapy. 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 Arrived to SICU extubated on 10L SFM, weaned to room air 1/14. Chest tube removed 1/14. Acute on chronic hypoxic respiratory failure 1/16. Escalation to Airvo 50L 90% overnight 1/17-1/18. Acute hypercapnia 1/18 with increase WOB, trial of BIPAP with iEPO. Eventual intubation 1/18 evening. Currently on AC-VC/18/80%/350/5. Mediastinal shift on AM cxr  - continue iEpo, no plan to wean today  - decrease Vt  - continue mechanical ventilation  - wean FiO2 to maintain SpO2 >92%  - scheduled budesonide/duoneb  - PRN albuterol  - Daily CXR  - ABG q4h and  prn  - holding home benzonatate     GI: No GI history. LFTs WNL 12/2/24. Evidence of pericholecystic fluid and biliary sludge on renal US 1/15 but no abdominal symptoms. RUQ US with small ascites, otherwise no acute findings. Abdominal distention this am. Elevated LFTs  - insert OG tube, place to LIWS  - continue IV PPI for GI prophylaxis  - bisacodyl MA daily  - continue NPO  - nutrition consult for enteral feed recs  - continue to trend LFTs     : No history of renal disease. Baseline creatinine 0.6. Symptomatic acute hyponatremia (SIADH vs vaso mediated) to 120 overnight 1/14 - 1/15, corrected to 124 after 150cc 3% NaCl x2 on 1/15. Hypochloremia. Renal US on 1/15 unremarkable. Started on lasix infusion 1/16 as above in CV, stopped 1/17. Net positive 3.4L for past 24hrs. Hypokalemia, hypophosphatemia, hypocalcemia, hypomagnesemia. Montiel changed overnight 1/18-1/19. Oliguria improving.  - RFP q12h and PRN  - Nephrology following, appreciate  recs  - Maintain yoo for strict I/Os   - Maintain U/O >0.5ml/kg/hr  - Replete electrolytes to maintain K>4, Phos>2.5, iCal>1.1, Mag>2.      HEME: Hx of DOC. Baseline H/H 12/40. Acute blood loss anemia, OR EBL 150mL. Acute coagulopathy with INR elevated to 2.3 2/2 to poor nutritional status. Received IV Vit K 10mg x1 with improved INR thereafter. Transfused 1 rbc overnight, Hgb increased from 8.3 to 10.5  - Check CBC and coags daily and PRN  - goal Hgb >9  - SCDs and SQH for DVT ppx  - ongoing monitoring for s/s bleeding  - maintain active T&S (1/17) -> resend 1/20     ENDO: No history of DM or thyroid disease. TSH 1.86. Recent 5d course of prednisone 10mg 12/2024 for URI. Adequate glycemic control. Started on SDS out of OR 1/13  - BG q4h, SSI #1  - Stress dose steroids as above  - repeat TSH 1/18 was WNL     MSK: Arthritis, Vit D deficiency.  - not on meds at home  - monitor for symptoms     ID: Febrile, Tmax 38.7. Completed yunior-op course of cefazolin. Leukocytosis to 17.2 after initiation of stress dose steroids, now resolved. Worsened clinical condition 1/17, started on vanco, zosyn and azithromycin. Blood cultures sent. Respiratory viral panel pending. Yoo changed 1/18, UA with reflex culture sent. Added fluc overnight 1/18-1/19  - temp q4h, wbc daily  - ongoing monitoring for s/s infection  - continue vanco, zosyn, azithro and fluc  - F/U cultures  - droplet precautions while respiratory viral panel pending     Lines:  - RIJ MAC (SICU, 1/16)  - PA cath (1/17)  - L brachial A-line (1/16)  - Left chest mediport accessed   - PIV x2  - yoo (1/18)  - ETT (1/18)     Dispo: Continue ICU care. Patient seen and discussed with ICU attending Dr. Herbert.      Kathy Ambriz, APRN-CNP  SICU Phone [phone]    Critical Care time: 90 minutes

## 2025-01-19 NOTE — PROGRESS NOTES
"Faustina Vick \"Noam" is a 64 y.o. female on day 6 of admission presenting with Primary cancer of right upper lobe of lung (Multi).    Subjective   Patient was reintubated yesterday as a result of worsening respiratory and mental status. She also had increased pressor requirements, requiring levo at 0.1, which has since been weaned to 0.05. On vaso 0.05, epoprostenol 0.05, and milrinonine. Echo repeated, was stable. Remains critically ill this AM.        Objective     General: critically ill in ICU bed  HEENT: ETT in place  Resp: intubated, RR 18  PEEP 5  CV: tachycardic on monitor  Abd: soft, NTND  : yoo in place  MSK: moves all extremities  Ext: mottling to BLE  Psych: intubated, sedated, unable to assess    Last Recorded Vitals  Blood pressure 100/64, pulse (!) 111, temperature 37 °C (98.6 °F), temperature source Temporal, resp. rate 18, height 1.651 m (5' 5\"), weight 54.6 kg (120 lb 5.9 oz), SpO2 98%.  Intake/Output last 3 Shifts:  I/O last 3 completed shifts:  In: 4969.4 (91 mL/kg) [I.V.:1009.4 (18.5 mL/kg); Blood:600; IV Piggyback:3360]  Out: 1025 (18.8 mL/kg) [Urine:1025 (0.5 mL/kg/hr)]  Weight: 54.6 kg     Relevant Results            10.5     13.5>-----<189              30.5   130  89  15                  ----------------<223     3.8  32  0.56          Ca 8.0 Phos 3.3 Mg 2.75         AlkPhos 66 tBili 0.9         CXR reviewed 1/19: hyperinflation of lung with slight mediastinal shift, expected R-sided changes s/p R pneumonectomy      This patient has a central line   Reason for the central line remaining today? Hemodynamic monitoring    Assessment/Plan   Assessment & Plan  Primary cancer of right upper lobe of lung (Multi)    Chronic obstructive pulmonary disease (Multi)    Malignant neoplasm of upper lobe of right lung (Multi)    Cardiogenic shock (Multi)    Faustina Vick (Jannie) is a 64F with Hx of Stage III lung cancer, asthma, anxiety, and arthritis who is now s/p R " pneumonectomy with Dr. Wagner 1/13/25. 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 Cardiology was consulted and recommended Lasix gtt and milrinone. Lasix gtt held 1/17, hyponatremia improving. Patient underwent RHC and SGC placement with cardiology 1/17. She was reintubated on 1/18 d/t worsening respiratory status and AMS. Also had increased pressor requirements, now also needing levo and vaso. Repeat echo stable on 1/18.    - Appreciate cardiology and nephrology recommendations regarding patient cardiomyopathy and electrolyte derangements  - Continue supportive care  - Requiring ICU level care at this time  - Please page with concerns    Patient seen and discussed with attending Dr. Seymour.    Victoria Bolivar MD  Thoracic surgery [id number]

## 2025-01-20 ENCOUNTER — APPOINTMENT (OUTPATIENT)
Dept: CARDIOLOGY | Facility: HOSPITAL | Age: 65
End: 2025-01-20
Payer: COMMERCIAL

## 2025-01-20 ENCOUNTER — APPOINTMENT (OUTPATIENT)
Dept: VASCULAR MEDICINE | Facility: HOSPITAL | Age: 65
End: 2025-01-20
Payer: COMMERCIAL

## 2025-01-20 ENCOUNTER — APPOINTMENT (OUTPATIENT)
Dept: RADIOLOGY | Facility: HOSPITAL | Age: 65
End: 2025-01-20
Payer: COMMERCIAL

## 2025-01-20 VITALS
TEMPERATURE: 98.6 F | DIASTOLIC BLOOD PRESSURE: 64 MMHG | HEART RATE: 132 BPM | SYSTOLIC BLOOD PRESSURE: 100 MMHG | RESPIRATION RATE: 18 BRPM | OXYGEN SATURATION: 97 % | HEIGHT: 65 IN | BODY MASS INDEX: 20.06 KG/M2 | WEIGHT: 120.37 LBS

## 2025-01-20 LAB
ACID FAST STN SPEC: NORMAL
ALBUMIN SERPL BCP-MCNC: 3.1 G/DL (ref 3.4–5)
ALBUMIN SERPL BCP-MCNC: 3.1 G/DL (ref 3.4–5)
ALBUMIN SERPL BCP-MCNC: 3.6 G/DL (ref 3.4–5)
ALP SERPL-CCNC: 65 U/L (ref 33–136)
ALT SERPL W P-5'-P-CCNC: 114 U/L (ref 7–45)
ANION GAP BLDA CALCULATED.4IONS-SCNC: 4 MMO/L (ref 10–25)
ANION GAP BLDA CALCULATED.4IONS-SCNC: 5 MMO/L (ref 10–25)
ANION GAP BLDA CALCULATED.4IONS-SCNC: 5 MMO/L (ref 10–25)
ANION GAP BLDA CALCULATED.4IONS-SCNC: 6 MMO/L (ref 10–25)
ANION GAP BLDMV CALCULATED.4IONS-SCNC: 3 MMO/L (ref 10–25)
ANION GAP BLDMV CALCULATED.4IONS-SCNC: 5 MMO/L (ref 10–25)
ANION GAP BLDMV CALCULATED.4IONS-SCNC: 6 MMO/L (ref 10–25)
ANION GAP BLDMV CALCULATED.4IONS-SCNC: 6 MMO/L (ref 10–25)
ANION GAP BLDMV CALCULATED.4IONS-SCNC: 8 MMO/L (ref 10–25)
ANION GAP BLDMV CALCULATED.4IONS-SCNC: 8 MMO/L (ref 10–25)
ANION GAP SERPL CALC-SCNC: 13 MMOL/L (ref 10–20)
ANION GAP SERPL CALC-SCNC: 15 MMOL/L (ref 10–20)
APTT PPP: 40 SECONDS (ref 27–38)
AST SERPL W P-5'-P-CCNC: 157 U/L (ref 9–39)
BACTERIA UR CULT: NO GROWTH
BASE EXCESS BLDA CALC-SCNC: 3.9 MMOL/L (ref -2–3)
BASE EXCESS BLDA CALC-SCNC: 4.9 MMOL/L (ref -2–3)
BASE EXCESS BLDA CALC-SCNC: 6.4 MMOL/L (ref -2–3)
BASE EXCESS BLDA CALC-SCNC: 6.8 MMOL/L (ref -2–3)
BASE EXCESS BLDMV CALC-SCNC: 10.8 MMOL/L (ref -2–3)
BASE EXCESS BLDMV CALC-SCNC: 2.7 MMOL/L (ref -2–3)
BASE EXCESS BLDMV CALC-SCNC: 4.5 MMOL/L (ref -2–3)
BASE EXCESS BLDMV CALC-SCNC: 5.5 MMOL/L (ref -2–3)
BASE EXCESS BLDMV CALC-SCNC: 8.6 MMOL/L (ref -2–3)
BASE EXCESS BLDMV CALC-SCNC: 8.9 MMOL/L (ref -2–3)
BASE EXCESS BLDMV CALC-SCNC: 9.4 MMOL/L (ref -2–3)
BILIRUB DIRECT SERPL-MCNC: 0.3 MG/DL (ref 0–0.3)
BILIRUB SERPL-MCNC: 0.8 MG/DL (ref 0–1.2)
BODY TEMPERATURE: 37 DEGREES CELSIUS
BUN SERPL-MCNC: 16 MG/DL (ref 6–23)
BUN SERPL-MCNC: 16 MG/DL (ref 6–23)
CA-I BLD-SCNC: 1.13 MMOL/L (ref 1.1–1.33)
CA-I BLD-SCNC: 1.14 MMOL/L (ref 1.1–1.33)
CA-I BLDA-SCNC: 1.1 MMOL/L (ref 1.1–1.33)
CA-I BLDA-SCNC: 1.12 MMOL/L (ref 1.1–1.33)
CA-I BLDA-SCNC: 1.13 MMOL/L (ref 1.1–1.33)
CA-I BLDA-SCNC: 1.13 MMOL/L (ref 1.1–1.33)
CA-I BLDMV-SCNC: 1.08 MMOL/L (ref 1.1–1.33)
CA-I BLDMV-SCNC: 1.1 MMOL/L (ref 1.1–1.33)
CA-I BLDMV-SCNC: 1.12 MMOL/L (ref 1.1–1.33)
CA-I BLDMV-SCNC: 1.16 MMOL/L (ref 1.1–1.33)
CA-I BLDMV-SCNC: 1.16 MMOL/L (ref 1.1–1.33)
CA-I BLDMV-SCNC: 1.18 MMOL/L (ref 1.1–1.33)
CALCIUM SERPL-MCNC: 7.9 MG/DL (ref 8.6–10.6)
CALCIUM SERPL-MCNC: 8.3 MG/DL (ref 8.6–10.6)
CHLORIDE BLD-SCNC: 89 MMOL/L (ref 98–107)
CHLORIDE BLD-SCNC: 92 MMOL/L (ref 98–107)
CHLORIDE BLD-SCNC: 93 MMOL/L (ref 98–107)
CHLORIDE BLD-SCNC: 93 MMOL/L (ref 98–107)
CHLORIDE BLD-SCNC: 95 MMOL/L (ref 98–107)
CHLORIDE BLD-SCNC: 95 MMOL/L (ref 98–107)
CHLORIDE BLDA-SCNC: 93 MMOL/L (ref 98–107)
CHLORIDE BLDA-SCNC: 93 MMOL/L (ref 98–107)
CHLORIDE BLDA-SCNC: 94 MMOL/L (ref 98–107)
CHLORIDE BLDA-SCNC: 94 MMOL/L (ref 98–107)
CHLORIDE SERPL-SCNC: 90 MMOL/L (ref 98–107)
CHLORIDE SERPL-SCNC: 92 MMOL/L (ref 98–107)
CO2 SERPL-SCNC: 29 MMOL/L (ref 21–32)
CO2 SERPL-SCNC: 33 MMOL/L (ref 21–32)
CREAT SERPL-MCNC: 0.51 MG/DL (ref 0.5–1.05)
CREAT SERPL-MCNC: 0.53 MG/DL (ref 0.5–1.05)
EGFRCR SERPLBLD CKD-EPI 2021: >90 ML/MIN/1.73M*2
EGFRCR SERPLBLD CKD-EPI 2021: >90 ML/MIN/1.73M*2
EJECTION FRACTION APICAL 4 CHAMBER: 30.4
EJECTION FRACTION: 33 %
EJECTION FRACTION: 35 %
ERYTHROCYTE [DISTWIDTH] IN BLOOD BY AUTOMATED COUNT: 14.8 % (ref 11.5–14.5)
ERYTHROCYTE [DISTWIDTH] IN BLOOD BY AUTOMATED COUNT: 15.2 % (ref 11.5–14.5)
FUNGUS SPEC CULT: NORMAL
FUNGUS SPEC FUNGUS STN: NORMAL
GLUCOSE BLD MANUAL STRIP-MCNC: 136 MG/DL (ref 74–99)
GLUCOSE BLD MANUAL STRIP-MCNC: 140 MG/DL (ref 74–99)
GLUCOSE BLD MANUAL STRIP-MCNC: 141 MG/DL (ref 74–99)
GLUCOSE BLD MANUAL STRIP-MCNC: 157 MG/DL (ref 74–99)
GLUCOSE BLD MANUAL STRIP-MCNC: 161 MG/DL (ref 74–99)
GLUCOSE BLD-MCNC: 128 MG/DL (ref 74–99)
GLUCOSE BLD-MCNC: 142 MG/DL (ref 74–99)
GLUCOSE BLD-MCNC: 143 MG/DL (ref 74–99)
GLUCOSE BLD-MCNC: 154 MG/DL (ref 74–99)
GLUCOSE BLD-MCNC: 156 MG/DL (ref 74–99)
GLUCOSE BLD-MCNC: 171 MG/DL (ref 74–99)
GLUCOSE BLDA-MCNC: 139 MG/DL (ref 74–99)
GLUCOSE BLDA-MCNC: 141 MG/DL (ref 74–99)
GLUCOSE BLDA-MCNC: 144 MG/DL (ref 74–99)
GLUCOSE BLDA-MCNC: 171 MG/DL (ref 74–99)
GLUCOSE SERPL-MCNC: 138 MG/DL (ref 74–99)
GLUCOSE SERPL-MCNC: 148 MG/DL (ref 74–99)
HCO3 BLDA-SCNC: 29.2 MMOL/L (ref 22–26)
HCO3 BLDA-SCNC: 30.4 MMOL/L (ref 22–26)
HCO3 BLDA-SCNC: 31.8 MMOL/L (ref 22–26)
HCO3 BLDA-SCNC: 32.8 MMOL/L (ref 22–26)
HCO3 BLDMV-SCNC: 28.8 MMOL/L (ref 22–26)
HCO3 BLDMV-SCNC: 30.3 MMOL/L (ref 22–26)
HCO3 BLDMV-SCNC: 32.2 MMOL/L (ref 22–26)
HCO3 BLDMV-SCNC: 34.9 MMOL/L (ref 22–26)
HCO3 BLDMV-SCNC: 35.6 MMOL/L (ref 22–26)
HCO3 BLDMV-SCNC: 36.8 MMOL/L (ref 22–26)
HCO3 BLDMV-SCNC: 37.2 MMOL/L (ref 22–26)
HCT VFR BLD AUTO: 29.6 % (ref 36–46)
HCT VFR BLD AUTO: 30.2 % (ref 36–46)
HCT VFR BLD EST: 30 % (ref 36–46)
HCT VFR BLD EST: 32 % (ref 36–46)
HCT VFR BLD EST: 33 % (ref 36–46)
HCT VFR BLD EST: 33 % (ref 36–46)
HGB BLD-MCNC: 10.1 G/DL (ref 12–16)
HGB BLD-MCNC: 9.7 G/DL (ref 12–16)
HGB BLDA-MCNC: 10.1 G/DL (ref 12–16)
HGB BLDA-MCNC: 10.6 G/DL (ref 12–16)
HGB BLDA-MCNC: 11 G/DL (ref 12–16)
HGB BLDA-MCNC: 11.1 G/DL (ref 12–16)
HGB BLDMV-MCNC: 10 G/DL (ref 12–16)
HGB BLDMV-MCNC: 10 G/DL (ref 12–16)
HGB BLDMV-MCNC: 10.1 G/DL (ref 12–16)
HGB BLDMV-MCNC: 10.5 G/DL (ref 12–16)
HGB BLDMV-MCNC: 10.7 G/DL (ref 12–16)
HGB BLDMV-MCNC: 9.9 G/DL (ref 12–16)
INHALED O2 CONCENTRATION: 50 %
INHALED O2 CONCENTRATION: 60 %
INHALED O2 CONCENTRATION: 70 %
INHALED O2 CONCENTRATION: 70 %
INR PPP: 1.1 (ref 0.9–1.1)
LACTATE BLDA-SCNC: 0.8 MMOL/L (ref 0.4–2)
LACTATE BLDA-SCNC: 1.2 MMOL/L (ref 0.4–2)
LACTATE BLDA-SCNC: 1.2 MMOL/L (ref 0.4–2)
LACTATE BLDA-SCNC: 1.9 MMOL/L (ref 0.4–2)
LACTATE BLDMV-SCNC: 0.7 MMOL/L (ref 0.4–2)
LACTATE BLDMV-SCNC: 1.1 MMOL/L (ref 0.4–2)
LACTATE BLDMV-SCNC: 1.6 MMOL/L (ref 0.4–2)
LACTATE BLDMV-SCNC: 1.9 MMOL/L (ref 0.4–2)
LEFT VENTRICLE INTERNAL DIMENSION DIASTOLE: 4.1 CM (ref 3.5–6)
MAGNESIUM SERPL-MCNC: 2.13 MG/DL (ref 1.6–2.4)
MAGNESIUM SERPL-MCNC: 2.22 MG/DL (ref 1.6–2.4)
MCH RBC QN AUTO: 27.2 PG (ref 26–34)
MCH RBC QN AUTO: 27.3 PG (ref 26–34)
MCHC RBC AUTO-ENTMCNC: 32.1 G/DL (ref 32–36)
MCHC RBC AUTO-ENTMCNC: 34.1 G/DL (ref 32–36)
MCV RBC AUTO: 80 FL (ref 80–100)
MCV RBC AUTO: 85 FL (ref 80–100)
MITRAL VALVE E/A RATIO: 0.73
MYCOBACTERIUM SPEC CULT: NORMAL
NRBC BLD-RTO: 0.2 /100 WBCS (ref 0–0)
NRBC BLD-RTO: 0.3 /100 WBCS (ref 0–0)
OXYHGB MFR BLDA: 95.4 % (ref 94–98)
OXYHGB MFR BLDA: 95.8 % (ref 94–98)
OXYHGB MFR BLDA: 96.6 % (ref 94–98)
OXYHGB MFR BLDA: 96.9 % (ref 94–98)
OXYHGB MFR BLDMV: 47 % (ref 45–75)
OXYHGB MFR BLDMV: 53 % (ref 45–75)
OXYHGB MFR BLDMV: 60.8 % (ref 45–75)
OXYHGB MFR BLDMV: 61.8 % (ref 45–75)
OXYHGB MFR BLDMV: 66.5 % (ref 45–75)
OXYHGB MFR BLDMV: 66.6 % (ref 45–75)
OXYHGB MFR BLDMV: 69 % (ref 45–75)
PCO2 BLDA: 46 MM HG (ref 38–42)
PCO2 BLDA: 48 MM HG (ref 38–42)
PCO2 BLDA: 49 MM HG (ref 38–42)
PCO2 BLDA: 53 MM HG (ref 38–42)
PCO2 BLDMV: 50 MM HG (ref 41–51)
PCO2 BLDMV: 51 MM HG (ref 41–51)
PCO2 BLDMV: 53 MM HG (ref 41–51)
PCO2 BLDMV: 55 MM HG (ref 41–51)
PCO2 BLDMV: 57 MM HG (ref 41–51)
PCO2 BLDMV: 63 MM HG (ref 41–51)
PCO2 BLDMV: 69 MM HG (ref 41–51)
PH BLDA: 7.4 PH (ref 7.38–7.42)
PH BLDA: 7.41 PH (ref 7.38–7.42)
PH BLDA: 7.41 PH (ref 7.38–7.42)
PH BLDA: 7.42 PH (ref 7.38–7.42)
PH BLDMV: 7.34 PH (ref 7.33–7.43)
PH BLDMV: 7.36 PH (ref 7.33–7.43)
PH BLDMV: 7.39 PH (ref 7.33–7.43)
PH BLDMV: 7.41 PH (ref 7.33–7.43)
PH BLDMV: 7.45 PH (ref 7.33–7.43)
PHOSPHATE SERPL-MCNC: 2.9 MG/DL (ref 2.5–4.9)
PHOSPHATE SERPL-MCNC: 3.5 MG/DL (ref 2.5–4.9)
PLATELET # BLD AUTO: 159 X10*3/UL (ref 150–450)
PLATELET # BLD AUTO: 191 X10*3/UL (ref 150–450)
PO2 BLDA: 101 MM HG (ref 85–95)
PO2 BLDA: 113 MM HG (ref 85–95)
PO2 BLDA: 88 MM HG (ref 85–95)
PO2 BLDA: 90 MM HG (ref 85–95)
PO2 BLDMV: 30 MM HG (ref 35–45)
PO2 BLDMV: 33 MM HG (ref 35–45)
PO2 BLDMV: 38 MM HG (ref 35–45)
PO2 BLDMV: 38 MM HG (ref 35–45)
PO2 BLDMV: 40 MM HG (ref 35–45)
POTASSIUM BLDA-SCNC: 3.2 MMOL/L (ref 3.5–5.3)
POTASSIUM BLDA-SCNC: 3.7 MMOL/L (ref 3.5–5.3)
POTASSIUM BLDA-SCNC: 3.7 MMOL/L (ref 3.5–5.3)
POTASSIUM BLDA-SCNC: 3.8 MMOL/L (ref 3.5–5.3)
POTASSIUM BLDMV-SCNC: 3.4 MMOL/L (ref 3.5–5.3)
POTASSIUM BLDMV-SCNC: 3.5 MMOL/L (ref 3.5–5.3)
POTASSIUM BLDMV-SCNC: 3.6 MMOL/L (ref 3.5–5.3)
POTASSIUM BLDMV-SCNC: 3.8 MMOL/L (ref 3.5–5.3)
POTASSIUM BLDMV-SCNC: 3.8 MMOL/L (ref 3.5–5.3)
POTASSIUM BLDMV-SCNC: 3.9 MMOL/L (ref 3.5–5.3)
POTASSIUM SERPL-SCNC: 3.7 MMOL/L (ref 3.5–5.3)
POTASSIUM SERPL-SCNC: 3.9 MMOL/L (ref 3.5–5.3)
PROT SERPL-MCNC: 4.8 G/DL (ref 6.4–8.2)
PROTHROMBIN TIME: 12.7 SECONDS (ref 9.8–12.8)
RBC # BLD AUTO: 3.55 X10*6/UL (ref 4–5.2)
RBC # BLD AUTO: 3.72 X10*6/UL (ref 4–5.2)
RIGHT VENTRICLE FREE WALL PEAK S': 6.74 CM/S
RIGHT VENTRICLE PEAK SYSTOLIC PRESSURE: 36.2 MMHG
SAO2 % BLDA: 97 % (ref 94–100)
SAO2 % BLDA: 98 % (ref 94–100)
SAO2 % BLDA: 99 % (ref 94–100)
SAO2 % BLDA: 99 % (ref 94–100)
SAO2 % BLDMV: 48 % (ref 45–75)
SAO2 % BLDMV: 54 % (ref 45–75)
SAO2 % BLDMV: 62 % (ref 45–75)
SAO2 % BLDMV: 63 % (ref 45–75)
SAO2 % BLDMV: 68 % (ref 45–75)
SAO2 % BLDMV: 68 % (ref 45–75)
SAO2 % BLDMV: 70 % (ref 45–75)
SARS-COV-2 RNA RESP QL NAA+PROBE: DETECTED
SODIUM BLDA-SCNC: 125 MMOL/L (ref 136–145)
SODIUM BLDA-SCNC: 126 MMOL/L (ref 136–145)
SODIUM BLDA-SCNC: 126 MMOL/L (ref 136–145)
SODIUM BLDA-SCNC: 127 MMOL/L (ref 136–145)
SODIUM BLDMV-SCNC: 126 MMOL/L (ref 136–145)
SODIUM BLDMV-SCNC: 127 MMOL/L (ref 136–145)
SODIUM BLDMV-SCNC: 127 MMOL/L (ref 136–145)
SODIUM BLDMV-SCNC: 128 MMOL/L (ref 136–145)
SODIUM BLDMV-SCNC: 130 MMOL/L (ref 136–145)
SODIUM BLDMV-SCNC: 131 MMOL/L (ref 136–145)
SODIUM SERPL-SCNC: 132 MMOL/L (ref 136–145)
SODIUM SERPL-SCNC: 132 MMOL/L (ref 136–145)
TRICUSPID ANNULAR PLANE SYSTOLIC EXCURSION: 1.2 CM
WBC # BLD AUTO: 10.4 X10*3/UL (ref 4.4–11.3)
WBC # BLD AUTO: 12.7 X10*3/UL (ref 4.4–11.3)

## 2025-01-20 PROCEDURE — 93308 TTE F-UP OR LMTD: CPT | Performed by: INTERNAL MEDICINE

## 2025-01-20 PROCEDURE — 37799 UNLISTED PX VASCULAR SURGERY: CPT | Performed by: NURSE PRACTITIONER

## 2025-01-20 PROCEDURE — P9047 ALBUMIN (HUMAN), 25%, 50ML: HCPCS | Mod: JZ

## 2025-01-20 PROCEDURE — 99291 CRITICAL CARE FIRST HOUR: CPT | Performed by: STUDENT IN AN ORGANIZED HEALTH CARE EDUCATION/TRAINING PROGRAM

## 2025-01-20 PROCEDURE — 94003 VENT MGMT INPAT SUBQ DAY: CPT

## 2025-01-20 PROCEDURE — 2500000002 HC RX 250 W HCPCS SELF ADMINISTERED DRUGS (ALT 637 FOR MEDICARE OP, ALT 636 FOR OP/ED): Performed by: STUDENT IN AN ORGANIZED HEALTH CARE EDUCATION/TRAINING PROGRAM

## 2025-01-20 PROCEDURE — 2500000005 HC RX 250 GENERAL PHARMACY W/O HCPCS

## 2025-01-20 PROCEDURE — 83735 ASSAY OF MAGNESIUM: CPT | Performed by: NURSE PRACTITIONER

## 2025-01-20 PROCEDURE — 82330 ASSAY OF CALCIUM: CPT | Performed by: NURSE PRACTITIONER

## 2025-01-20 PROCEDURE — 2500000004 HC RX 250 GENERAL PHARMACY W/ HCPCS (ALT 636 FOR OP/ED)

## 2025-01-20 PROCEDURE — 2500000004 HC RX 250 GENERAL PHARMACY W/ HCPCS (ALT 636 FOR OP/ED): Mod: JZ

## 2025-01-20 PROCEDURE — 82947 ASSAY GLUCOSE BLOOD QUANT: CPT

## 2025-01-20 PROCEDURE — 99291 CRITICAL CARE FIRST HOUR: CPT

## 2025-01-20 PROCEDURE — 85730 THROMBOPLASTIN TIME PARTIAL: CPT | Performed by: NURSE PRACTITIONER

## 2025-01-20 PROCEDURE — 2500000004 HC RX 250 GENERAL PHARMACY W/ HCPCS (ALT 636 FOR OP/ED): Performed by: NURSE PRACTITIONER

## 2025-01-20 PROCEDURE — 87449 NOS EACH ORGANISM AG IA: CPT

## 2025-01-20 PROCEDURE — 93971 EXTREMITY STUDY: CPT

## 2025-01-20 PROCEDURE — 93325 DOPPLER ECHO COLOR FLOW MAPG: CPT

## 2025-01-20 PROCEDURE — 93321 DOPPLER ECHO F-UP/LMTD STD: CPT | Performed by: INTERNAL MEDICINE

## 2025-01-20 PROCEDURE — 87635 SARS-COV-2 COVID-19 AMP PRB: CPT

## 2025-01-20 PROCEDURE — 84132 ASSAY OF SERUM POTASSIUM: CPT

## 2025-01-20 PROCEDURE — 84132 ASSAY OF SERUM POTASSIUM: CPT | Performed by: NURSE PRACTITIONER

## 2025-01-20 PROCEDURE — 93325 DOPPLER ECHO COLOR FLOW MAPG: CPT | Performed by: INTERNAL MEDICINE

## 2025-01-20 PROCEDURE — XW043E5 INTRODUCTION OF REMDESIVIR ANTI-INFECTIVE INTO CENTRAL VEIN, PERCUTANEOUS APPROACH, NEW TECHNOLOGY GROUP 5: ICD-10-PCS | Performed by: THORACIC SURGERY (CARDIOTHORACIC VASCULAR SURGERY)

## 2025-01-20 PROCEDURE — 87899 AGENT NOS ASSAY W/OPTIC: CPT

## 2025-01-20 PROCEDURE — 2500000004 HC RX 250 GENERAL PHARMACY W/ HCPCS (ALT 636 FOR OP/ED): Performed by: STUDENT IN AN ORGANIZED HEALTH CARE EDUCATION/TRAINING PROGRAM

## 2025-01-20 PROCEDURE — 82435 ASSAY OF BLOOD CHLORIDE: CPT | Performed by: NURSE PRACTITIONER

## 2025-01-20 PROCEDURE — 93971 EXTREMITY STUDY: CPT | Performed by: SURGERY

## 2025-01-20 PROCEDURE — 2500000005 HC RX 250 GENERAL PHARMACY W/O HCPCS: Performed by: PHYSICIAN ASSISTANT

## 2025-01-20 PROCEDURE — 94645 CONT INHLJ TX EACH ADDL HOUR: CPT

## 2025-01-20 PROCEDURE — 85027 COMPLETE CBC AUTOMATED: CPT | Performed by: NURSE PRACTITIONER

## 2025-01-20 PROCEDURE — 2500000001 HC RX 250 WO HCPCS SELF ADMINISTERED DRUGS (ALT 637 FOR MEDICARE OP): Performed by: NURSE PRACTITIONER

## 2025-01-20 PROCEDURE — 71045 X-RAY EXAM CHEST 1 VIEW: CPT | Performed by: RADIOLOGY

## 2025-01-20 PROCEDURE — 85610 PROTHROMBIN TIME: CPT | Performed by: NURSE PRACTITIONER

## 2025-01-20 PROCEDURE — 82040 ASSAY OF SERUM ALBUMIN: CPT

## 2025-01-20 PROCEDURE — 2500000002 HC RX 250 W HCPCS SELF ADMINISTERED DRUGS (ALT 637 FOR MEDICARE OP, ALT 636 FOR OP/ED): Performed by: NURSE PRACTITIONER

## 2025-01-20 PROCEDURE — 2020000001 HC ICU ROOM DAILY

## 2025-01-20 PROCEDURE — 94640 AIRWAY INHALATION TREATMENT: CPT

## 2025-01-20 PROCEDURE — 82810 BLOOD GASES O2 SAT ONLY: CPT | Performed by: NURSE PRACTITIONER

## 2025-01-20 PROCEDURE — 2500000001 HC RX 250 WO HCPCS SELF ADMINISTERED DRUGS (ALT 637 FOR MEDICARE OP)

## 2025-01-20 PROCEDURE — 84295 ASSAY OF SERUM SODIUM: CPT

## 2025-01-20 PROCEDURE — 71045 X-RAY EXAM CHEST 1 VIEW: CPT

## 2025-01-20 RX ORDER — DOCUSATE SODIUM 50 MG/5ML
100 LIQUID ORAL 2 TIMES DAILY
Status: DISCONTINUED | OUTPATIENT
Start: 2025-01-20 | End: 2025-01-25

## 2025-01-20 RX ORDER — DEXMEDETOMIDINE HYDROCHLORIDE 4 UG/ML
0-1.2 INJECTION, SOLUTION INTRAVENOUS CONTINUOUS
Status: DISCONTINUED | OUTPATIENT
Start: 2025-01-20 | End: 2025-01-22

## 2025-01-20 RX ORDER — POTASSIUM CHLORIDE 1.5 G/1.58G
40 POWDER, FOR SOLUTION ORAL ONCE
Status: COMPLETED | OUTPATIENT
Start: 2025-01-20 | End: 2025-01-20

## 2025-01-20 RX ORDER — ALBUMIN HUMAN 250 G/1000ML
25 SOLUTION INTRAVENOUS EVERY 8 HOURS
Status: COMPLETED | OUTPATIENT
Start: 2025-01-20 | End: 2025-01-21

## 2025-01-20 RX ORDER — FUROSEMIDE 10 MG/ML
40 INJECTION INTRAMUSCULAR; INTRAVENOUS ONCE
Status: COMPLETED | OUTPATIENT
Start: 2025-01-20 | End: 2025-01-20

## 2025-01-20 RX ORDER — PANTOPRAZOLE SODIUM 40 MG/10ML
40 INJECTION, POWDER, LYOPHILIZED, FOR SOLUTION INTRAVENOUS DAILY
Status: DISCONTINUED | OUTPATIENT
Start: 2025-01-21 | End: 2025-01-24

## 2025-01-20 RX ORDER — FUROSEMIDE 10 MG/ML
60 INJECTION INTRAMUSCULAR; INTRAVENOUS ONCE
Status: COMPLETED | OUTPATIENT
Start: 2025-01-20 | End: 2025-01-20

## 2025-01-20 RX ORDER — SENNOSIDES 8.8 MG/5ML
5 LIQUID ORAL 2 TIMES DAILY
Status: DISCONTINUED | OUTPATIENT
Start: 2025-01-20 | End: 2025-01-25

## 2025-01-20 RX ORDER — THIAMINE HYDROCHLORIDE 100 MG/ML
100 INJECTION, SOLUTION INTRAMUSCULAR; INTRAVENOUS DAILY
Status: DISCONTINUED | OUTPATIENT
Start: 2025-01-20 | End: 2025-01-29

## 2025-01-20 RX ADMIN — PANTOPRAZOLE SODIUM 40 MG: 40 INJECTION, POWDER, FOR SOLUTION INTRAVENOUS at 06:07

## 2025-01-20 RX ADMIN — Medication 60 PERCENT: at 08:20

## 2025-01-20 RX ADMIN — Medication 50 PERCENT: at 19:31

## 2025-01-20 RX ADMIN — DEXMEDETOMIDINE HYDROCHLORIDE 0.6 MCG/KG/HR: 400 INJECTION INTRAVENOUS at 16:43

## 2025-01-20 RX ADMIN — REMDESIVIR 200 MG: 100 INJECTION, POWDER, LYOPHILIZED, FOR SOLUTION INTRAVENOUS at 20:16

## 2025-01-20 RX ADMIN — HYDROCORTISONE SODIUM SUCCINATE 50 MG: 100 INJECTION, POWDER, FOR SOLUTION INTRAMUSCULAR; INTRAVENOUS at 14:03

## 2025-01-20 RX ADMIN — THIAMINE HYDROCHLORIDE 100 MG: 100 INJECTION, SOLUTION INTRAMUSCULAR; INTRAVENOUS at 14:20

## 2025-01-20 RX ADMIN — FUROSEMIDE 40 MG: 10 INJECTION, SOLUTION INTRAVENOUS at 11:33

## 2025-01-20 RX ADMIN — INSULIN LISPRO 1 UNITS: 100 INJECTION, SOLUTION INTRAVENOUS; SUBCUTANEOUS at 00:40

## 2025-01-20 RX ADMIN — PERFLUTREN 4 ML OF DILUTION: 6.52 INJECTION, SUSPENSION INTRAVENOUS at 12:28

## 2025-01-20 RX ADMIN — INSULIN LISPRO 1 UNITS: 100 INJECTION, SOLUTION INTRAVENOUS; SUBCUTANEOUS at 04:15

## 2025-01-20 RX ADMIN — HYDROCORTISONE SODIUM SUCCINATE 50 MG: 100 INJECTION, POWDER, FOR SOLUTION INTRAMUSCULAR; INTRAVENOUS at 08:50

## 2025-01-20 RX ADMIN — EPOPROSTENOL 0.05 MCG/KG/MIN: 1.5 INJECTION, POWDER, LYOPHILIZED, FOR SOLUTION INTRAVENOUS at 16:23

## 2025-01-20 RX ADMIN — IPRATROPIUM BROMIDE AND ALBUTEROL SULFATE 3 ML: .5; 3 SOLUTION RESPIRATORY (INHALATION) at 08:20

## 2025-01-20 RX ADMIN — ALBUMIN HUMAN 25 G: 0.25 SOLUTION INTRAVENOUS at 11:32

## 2025-01-20 RX ADMIN — POTASSIUM CHLORIDE 40 MEQ: 1.5 POWDER, FOR SOLUTION ORAL at 14:03

## 2025-01-20 RX ADMIN — PIPERACILLIN SODIUM AND TAZOBACTAM SODIUM 4.5 G: 4; .5 INJECTION, SOLUTION INTRAVENOUS at 05:55

## 2025-01-20 RX ADMIN — FUROSEMIDE 60 MG: 10 INJECTION, SOLUTION INTRAMUSCULAR; INTRAVENOUS at 18:42

## 2025-01-20 RX ADMIN — DEXMEDETOMIDINE HYDROCHLORIDE 0.5 MCG/KG/HR: 400 INJECTION INTRAVENOUS at 05:55

## 2025-01-20 RX ADMIN — BISACODYL 10 MG: 10 SUPPOSITORY RECTAL at 08:50

## 2025-01-20 RX ADMIN — PIPERACILLIN SODIUM AND TAZOBACTAM SODIUM 4.5 G: 4; .5 INJECTION, SOLUTION INTRAVENOUS at 14:02

## 2025-01-20 RX ADMIN — ALBUMIN HUMAN 25 G: 0.25 SOLUTION INTRAVENOUS at 17:56

## 2025-01-20 RX ADMIN — HEPARIN SODIUM 5000 UNITS: 5000 INJECTION INTRAVENOUS; SUBCUTANEOUS at 05:55

## 2025-01-20 RX ADMIN — BUDESONIDE 0.5 MG: 0.5 INHALANT RESPIRATORY (INHALATION) at 08:19

## 2025-01-20 RX ADMIN — VASOPRESSIN 0.03 UNITS/MIN: 0.2 INJECTION INTRAVENOUS at 06:31

## 2025-01-20 RX ADMIN — HYDROCORTISONE SODIUM SUCCINATE 50 MG: 100 INJECTION, POWDER, FOR SOLUTION INTRAMUSCULAR; INTRAVENOUS at 20:51

## 2025-01-20 RX ADMIN — IPRATROPIUM BROMIDE AND ALBUTEROL SULFATE 3 ML: .5; 3 SOLUTION RESPIRATORY (INHALATION) at 15:35

## 2025-01-20 RX ADMIN — MILRINONE LACTATE IN DEXTROSE 0.25 MCG/KG/MIN: 200 INJECTION, SOLUTION INTRAVENOUS at 04:14

## 2025-01-20 RX ADMIN — VASOPRESSIN 0.03 UNITS/MIN: 0.2 INJECTION INTRAVENOUS at 16:43

## 2025-01-20 RX ADMIN — DOCUSATE SODIUM LIQUID 100 MG: 100 LIQUID ORAL at 20:51

## 2025-01-20 RX ADMIN — BUDESONIDE 0.5 MG: 0.5 INHALANT RESPIRATORY (INHALATION) at 19:31

## 2025-01-20 RX ADMIN — Medication 50 MCG/HR: at 13:17

## 2025-01-20 RX ADMIN — HEPARIN SODIUM 5000 UNITS: 5000 INJECTION INTRAVENOUS; SUBCUTANEOUS at 14:03

## 2025-01-20 RX ADMIN — IPRATROPIUM BROMIDE AND ALBUTEROL SULFATE 3 ML: .5; 3 SOLUTION RESPIRATORY (INHALATION) at 01:54

## 2025-01-20 RX ADMIN — PIPERACILLIN SODIUM AND TAZOBACTAM SODIUM 4.5 G: 4; .5 INJECTION, SOLUTION INTRAVENOUS at 00:48

## 2025-01-20 RX ADMIN — FLUCONAZOLE 400 MG: 2 INJECTION, SOLUTION INTRAVENOUS at 20:52

## 2025-01-20 RX ADMIN — HYDROCORTISONE SODIUM SUCCINATE 50 MG: 100 INJECTION, POWDER, FOR SOLUTION INTRAMUSCULAR; INTRAVENOUS at 04:14

## 2025-01-20 RX ADMIN — INSULIN LISPRO 1 UNITS: 100 INJECTION, SOLUTION INTRAVENOUS; SUBCUTANEOUS at 20:16

## 2025-01-20 RX ADMIN — HEPARIN SODIUM 5000 UNITS: 5000 INJECTION INTRAVENOUS; SUBCUTANEOUS at 22:56

## 2025-01-20 RX ADMIN — SENNOSIDES 5 ML: 8.8 LIQUID ORAL at 20:51

## 2025-01-20 RX ADMIN — AZITHROMYCIN DIHYDRATE 250 MG: 500 INJECTION, POWDER, LYOPHILIZED, FOR SOLUTION INTRAVENOUS at 23:00

## 2025-01-20 RX ADMIN — IPRATROPIUM BROMIDE AND ALBUTEROL SULFATE 3 ML: .5; 3 SOLUTION RESPIRATORY (INHALATION) at 19:30

## 2025-01-20 RX ADMIN — VANCOMYCIN HYDROCHLORIDE 750 MG: 750 INJECTION, SOLUTION INTRAVENOUS at 05:55

## 2025-01-20 RX ADMIN — LIDOCAINE 4% 1 PATCH: 40 PATCH TOPICAL at 08:50

## 2025-01-20 RX ADMIN — PIPERACILLIN SODIUM AND TAZOBACTAM SODIUM 4.5 G: 4; .5 INJECTION, SOLUTION INTRAVENOUS at 17:56

## 2025-01-20 RX ADMIN — EPOPROSTENOL 0.05 MCG/KG/MIN: 1.5 INJECTION, POWDER, LYOPHILIZED, FOR SOLUTION INTRAVENOUS at 08:25

## 2025-01-20 ASSESSMENT — PAIN - FUNCTIONAL ASSESSMENT
PAIN_FUNCTIONAL_ASSESSMENT: CPOT (CRITICAL CARE PAIN OBSERVATION TOOL)

## 2025-01-20 NOTE — PROGRESS NOTES
Vancomycin Dosing by Pharmacy- Cessation of Therapy    Consult to pharmacy for vancomycin dosing has been discontinued by the prescriber, pharmacy will sign off at this time.    Please call pharmacy if there are further questions or re-enter a consult if vancomycin is resumed.     Davidson Aburto, [facility]

## 2025-01-20 NOTE — PROGRESS NOTES
SICU Staff Progress Note    Briefly, the patient is a 64 year old female with a past medical history significant for COPD and stage III right lung SCC s/p chemo/radiation who presented to SICU from OR following Right Pneumonectomy and intrapericardial dissection with en bloc chest wall resection, and MLND by Dr. Wagner on 1/13 requiring post-op vasoactive therapy. Post-op course c/b acute hyponatremia, multifactorial delirium, cardiogenic shock requiring inotropic support and acute hypoxic/hypercarbic respiratory failure requiring intubation. Currently requiring continuous vasoactive infusions for a mixed shock which includes Takotsubo cardiomyopathy as well as ventilator management for hypoxemic respiratory failure.    I evaluated the patient with an advanced practice provider. I oversaw this patient's care, and I reviewed the necessary labs, vitals, and radiographic imaging.     Plan:  Neuro: currently requiring Precedex/Fentanyl combination to promote ventilator synchrony; currently follows commands and is comfortable; daily SAT; may perform bedside PT if hemodynamics improve  Cardiovascular: currently on milrinone/norepinephrine/vasopressin for cardiogenic support following RV/LV Takotsubo. Continue to trend thermodilution numbers and SvO2 to tailor appropriate therapy. Paroxysmal sinus tachycardia noted; will attempt to limit catecholamines as hemodynamics permit; continue SDS; TTE today  Respiratory: gradually weaned overnight, but still remains on high support; worsening pulmonary edema today; ideally would diurese as hemodynamics permit; continue daily CXR; continue IEPO  GI: enteral feeds via OGT; likely will require Dobbhoff in the future but given surgical resection would avoid blind placement. May require ENT assistance  : hyponatremia improving; gentle diuresis today; will attempt to augment filling pressures with albumin  Endo: SDS; ISS  Heme: maintain active T/S  ID: continue zosyn/fluc; MRSA screen  negative so will hold vancomycin unless clinically worsens; would send strep pneumo/legionella/COVID to broaden infectious investigation  Dispo: SICU Care    Lyndsey ADAMSON  SICU Staff  P. [phone]    Due to the high probability of life threatening clinical decompensation, the patient required critical care time evaluating and managing this patient.  Critical care time included obtaining a history, examining the patient, ordering and reviewing studies, discussing, developing, and implementing a management plan, evaluating the patient's response to treatment, and discussion with other care team providers. I saw and evaluated the patient myself. I reviewed the provider's documentation and discussed the patient with the provider. Critical care time was performed exclusive of billable procedures.    Critical Care Time: 45 minutes

## 2025-01-20 NOTE — PROGRESS NOTES
"Physical Therapy                 Therapy Communication Note    Patient Name: Faustina Vick \"Jannie\"  MRN: [id number]  Department: [facility]  Room: 12/12-A  Today's Date: 1/20/2025     Discipline: Physical Therapy    PT Missed Visit: Yes     Missed Visit Reason: Missed Visit Reason:  (0844: Per ID rounds, pt intubated/sedated and on multiple pressors, not appropriate for PT tx at this time.)    Missed Time: Attempt    Eileen Cedillo, PT    "

## 2025-01-20 NOTE — PROGRESS NOTES
"Faustina Vick \"Noam" is a 64 y.o. female on day 7 of admission presenting with Primary cancer of right upper lobe of lung (Multi).    Subjective   Remains Intubated, pressor requirement weaning. Follows commands.        Objective     General: critically ill in ICU bed  HEENT: ETT in place  Resp: intubated, sats appropriate, symmetric rise, synchronous  CV: tachycardic on monitor, sinus, normotensive with pressor support  Abd: soft, NTND  : yoo in place  MSK: moves all extremities  Ext: mottling to BLE  Psych: intubated, sedated, unable to assess    Last Recorded Vitals  Blood pressure 89/57, pulse (!) 138, temperature 36.5 °C (97.7 °F), temperature source Temporal, resp. rate 18, height 1.651 m (5' 5\"), weight 58.5 kg (128 lb 15.5 oz), SpO2 97%.  Intake/Output last 3 Shifts:  I/O last 3 completed shifts:  In: 4906.2 (83.9 mL/kg) [I.V.:1453.7 (24.8 mL/kg); Blood:600; IV Piggyback:2852.5]  Out: 1440 (24.6 mL/kg) [Urine:1365 (0.6 mL/kg/hr); Emesis/NG output:75]  Weight: 58.5 kg     Relevant Results            10.1     12.7>-----<191              29.6   132  92  16                  ----------------<138     3.9  29  0.51          Ca 7.9 Phos 2.9 Mg 2.22         AlkPhos 65 tBili 0.8         CXR reviewed 1/19:  expected R-sided changes s/p R pneumonectomy, appropriately full, mediastinum stable.       Assessment/Plan   Assessment & Plan  Primary cancer of right upper lobe of lung (Multi)    Chronic obstructive pulmonary disease (Multi)    Malignant neoplasm of upper lobe of right lung (Multi)    Cardiogenic shock (Multi)    Faustina Vick (Jannie) is a 64F with Hx of Stage III lung cancer, asthma, anxiety, and arthritis who is now s/p R pneumonectomy with Dr. Wagner 1/13/25. 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 Cardiology was consulted and recommended Lasix gtt and milrinone. Lasix gtt held 1/17, hyponatremia improving. Patient underwent RHC and [facility] " placement with cardiology 1/17. She was reintubated on 1/18 d/t worsening respiratory status and AMS. Also had increased pressor requirements, now also needing levo and vaso. Repeat echo stable on 1/18.    - Continue supportive ICU  care  - repeat TTE reviewed  - Cardiogenic shock, supportive, improving  - vent weaning as able  - minimize pressors as able, continue inotropic support  - Lung protective ventilation strategies  - consider dobhoff for nutrition  - Requiring ICU level care at this time  - Please page with concerns    Patient seen and discussed with attending Dr. Wagner.    John Brush MD  Thoracic surgery [id number]

## 2025-01-20 NOTE — PROGRESS NOTES
"Faustina Vick \"Noam" is a 64 y.o. female on day 7 of admission presenting with Primary cancer of right upper lobe of lung (Multi).    Subjective   Patient remains intubated and sedated on precedex and fentanyl infusions. Remains on levo at 0.04, vaso at 0.03, milrinone at 0.25, and iEpo at 0.05.      Objective     Physical Exam  Vitals reviewed.   Constitutional:       Appearance: She is underweight.      Interventions: She is sedated, intubated and restrained.   HENT:      Head: Normocephalic and atraumatic.      Nose: Nose normal.      Mouth/Throat:      Mouth: Mucous membranes are moist.   Eyes:      Extraocular Movements: Extraocular movements intact.      Pupils: Pupils are equal, round, and reactive to light.   Neck:      Comments: RIJ MAC with PA cath in place, 58cm depth. dressing c/d/i.   Cardiovascular:      Rate and Rhythm: Regular rhythm. Tachycardia present.      Pulses:           Dorsalis pedis pulses are 1+ on the right side and detected w/ Doppler on the left side.        Posterior tibial pulses are 1+ on the right side and detected w/ Doppler on the left side.      Heart sounds: Normal heart sounds.      Comments: Radial pulses detected with doppler. Skin pink, warm and dry  Pulmonary:      Effort: She is intubated.      Breath sounds: Rhonchi (left side) present.      Comments: Mechanically ventilated via ETT. Current vent settings: AC/VC 60%/280/18/+5.  Chest:      Comments: Left chest mediport accessed, dressing c/d/i. Right lateral chest dressing c/d/i  Abdominal:      General: Bowel sounds are decreased. There is distension.      Palpations: Abdomen is soft.   Genitourinary:     Comments: Montiel in place with clear yellow UOP.   Musculoskeletal:      Cervical back: Neck supple.   Skin:     General: Skin is warm and dry.   Neurological:      General: No focal deficit present.      Mental Status: She is easily aroused.      GCS: GCS eye subscore is 3. GCS verbal subscore is 1. GCS motor subscore " "is 6.      Motor: Weakness (generalized) present.      Comments: Awakens to name, follows commands       Last Recorded Vitals  Blood pressure 89/57, pulse (!) 134, temperature 36.5 °C (97.7 °F), temperature source Core, resp. rate 22, height 1.651 m (5' 5\"), weight 58.5 kg (128 lb 15.5 oz), SpO2 96%.    VS over last 24h  Heart Rate:  []   Temp:  [36 °C (96.8 °F)-37 °C (98.6 °F)]   Resp:  [14-31]   BP: ()/(57-61)   Weight:  [58.5 kg (128 lb 15.5 oz)]   SpO2:  [89 %-99 %]      Intake/Output last 3 Shifts:  I/O last 3 completed shifts:  In: 4906.2 (83.9 mL/kg) [I.V.:1453.7 (24.8 mL/kg); Blood:600; IV Piggyback:2852.5]  Out: 1440 (24.6 mL/kg) [Urine:1365 (0.6 mL/kg/hr); Emesis/NG output:75]  Weight: 58.5 kg       Intake/Output Summary (Last 24 hours) at 1/20/2025 0743  Last data filed at 1/20/2025 0700  Gross per 24 hour   Intake 2393.44 ml   Output 960 ml   Net 1433.44 ml        Relevant Results  Scheduled medications  azithromycin, 250 mg, intravenous, q24h  bisacodyl, 10 mg, rectal, Daily  budesonide, 0.5 mg, nebulization, BID  fluconazole, 400 mg, intravenous, q24h  heparin (porcine), 5,000 Units, subcutaneous, q8h  hydrocortisone sodium succinate, 50 mg, intravenous, q6h  insulin lispro, 0-5 Units, subcutaneous, q4h  ipratropium-albuteroL, 3 mL, nebulization, q6h  lidocaine, 1 patch, transdermal, Daily  oxygen, , inhalation, Continuous - Inhalation  [START ON 1/21/2025] pantoprazole, 40 mg, intravenous, Daily  perflutren lipid microspheres, 0.5-10 mL of dilution, intravenous, Once in imaging  piperacillin-tazobactam, 4.5 g, intravenous, q6h  vancomycin, 750 mg, intravenous, q12h      Continuous medications  dexmedeTOMIDine, 0-0.6 mcg/kg/hr, Last Rate: 0.5 mcg/kg/hr (01/20/25 0555)  epoprostenol, 0.05 mcg/kg/min (Ideal), Last Rate: 0.05 mcg/kg/min (01/20/25 8570)  fentaNYL, 50 mcg/hr, Last Rate: 50 mcg/hr (01/19/25 1234)  milrinone, 0.25 mcg/kg/min, Last Rate: 0.25 mcg/kg/min (01/20/25 " 4732)  norepinephrine, 0-0.2 mcg/kg/min, Last Rate: 0.04 mcg/kg/min (01/20/25 8281)  vasopressin, 0-0.06 Units/min, Last Rate: 0.03 Units/min (01/20/25 4851)        PRN medications  PRN medications: albuterol, calcium gluconate, calcium gluconate, dextrose, dextrose, glucagon, glucagon, HYDROmorphone, magnesium sulfate, magnesium sulfate, potassium chloride, potassium chloride, vancomycin      Results for orders placed or performed during the hospital encounter of 01/13/25 (from the past 24 hours)   Vancomycin   Result Value Ref Range    Vancomycin 31.3 (H) 5.0 - 20.0 ug/mL   Hepatic Function Panel   Result Value Ref Range    Albumin 3.1 (L) 3.4 - 5.0 g/dL    Bilirubin, Total 0.9 0.0 - 1.2 mg/dL    Bilirubin, Direct 0.3 0.0 - 0.3 mg/dL    Alkaline Phosphatase 63 33 - 136 U/L     (H) 7 - 45 U/L     (H) 9 - 39 U/L    Total Protein 5.0 (L) 6.4 - 8.2 g/dL   Magnesium   Result Value Ref Range    Magnesium 2.52 (H) 1.60 - 2.40 mg/dL   Coagulation Screen   Result Value Ref Range    Protime 12.2 9.8 - 12.8 seconds    INR 1.1 0.9 - 1.1    aPTT 33 27 - 38 seconds   CBC   Result Value Ref Range    WBC 11.8 (H) 4.4 - 11.3 x10*3/uL    nRBC 0.2 (H) 0.0 - 0.0 /100 WBCs    RBC 3.97 (L) 4.00 - 5.20 x10*6/uL    Hemoglobin 10.6 (L) 12.0 - 16.0 g/dL    Hematocrit 31.3 (L) 36.0 - 46.0 %    MCV 79 (L) 80 - 100 fL    MCH 26.7 26.0 - 34.0 pg    MCHC 33.9 32.0 - 36.0 g/dL    RDW 14.4 11.5 - 14.5 %    Platelets 193 150 - 450 x10*3/uL   BLOOD GAS MIXED VENOUS FULL PANEL   Result Value Ref Range    POCT pH, Mixed 7.45 (H) 7.33 - 7.43 pH    POCT pCO2, Mixed 46 41 - 51 mm Hg    POCT pO2, Mixed 36 35 - 45 mm Hg    POCT SO2, Mixed 63 45 - 75 %    POCT Oxy Hemoglobin, Mixed 62.2 45.0 - 75.0 %    POCT Hematocrit Calculated, Mixed 33.0 (L) 36.0 - 46.0 %    POCT Sodium, Mixed 126 (L) 136 - 145 mmol/L    POCT Potassium, Mixed 3.9 3.5 - 5.3 mmol/L    POCT Chloride, Mixed 91 (L) 98 - 107 mmol/L    POCT Ionized Calcium, Mixed 1.13 1.10 -  1.33 mmol/L    POCT Glucose, Mixed 169 (H) 74 - 99 mg/dL    POCT Lactate, Mixed 2.8 (H) 0.4 - 2.0 mmol/L    POCT Base Excess, Mixed 7.1 (H) -2.0 - 3.0 mmol/L    POCT HCO3 Calculated, Mixed 32.0 (H) 22.0 - 26.0 mmol/L    POCT Hemoglobin, Mixed 11.1 (L) 12.0 - 16.0 g/dL    POCT Anion Gap, Mixed 7 (L) 10 - 25 mmo/L    Patient Temperature 37.0 degrees Celsius    FiO2 80 %   Phosphorus   Result Value Ref Range    Phosphorus 2.6 2.5 - 4.9 mg/dL   Basic Metabolic Panel   Result Value Ref Range    Glucose 161 (H) 74 - 99 mg/dL    Sodium 129 (L) 136 - 145 mmol/L    Potassium 4.0 3.5 - 5.3 mmol/L    Chloride 89 (L) 98 - 107 mmol/L    Bicarbonate 30 21 - 32 mmol/L    Anion Gap 14 10 - 20 mmol/L    Urea Nitrogen 16 6 - 23 mg/dL    Creatinine 0.67 0.50 - 1.05 mg/dL    eGFR >90 >60 mL/min/1.73m*2    Calcium 8.0 (L) 8.6 - 10.6 mg/dL   Calcium, ionized   Result Value Ref Range    POCT Calcium, Ionized 1.07 (L) 1.1 - 1.33 mmol/L   Blood Gas Arterial Full Panel   Result Value Ref Range    POCT pH, Arterial 7.47 (H) 7.38 - 7.42 pH    POCT pCO2, Arterial 39 38 - 42 mm Hg    POCT pO2, Arterial 80 (L) 85 - 95 mm Hg    POCT SO2, Arterial 98 94 - 100 %    POCT Oxy Hemoglobin, Arterial 95.6 94.0 - 98.0 %    POCT Hematocrit Calculated, Arterial 34.0 (L) 36.0 - 46.0 %    POCT Sodium, Arterial 125 (L) 136 - 145 mmol/L    POCT Potassium, Arterial 3.8 3.5 - 5.3 mmol/L    POCT Chloride, Arterial 91 (L) 98 - 107 mmol/L    POCT Ionized Calcium, Arterial 1.10 1.10 - 1.33 mmol/L    POCT Glucose, Arterial 161 (H) 74 - 99 mg/dL    POCT Lactate, Arterial 2.7 (H) 0.4 - 2.0 mmol/L    POCT Base Excess, Arterial 4.4 (H) -2.0 - 3.0 mmol/L    POCT HCO3 Calculated, Arterial 28.4 (H) 22.0 - 26.0 mmol/L    POCT Hemoglobin, Arterial 11.4 (L) 12.0 - 16.0 g/dL    POCT Anion Gap, Arterial 9 (L) 10 - 25 mmo/L    Patient Temperature 37.0 degrees Celsius    FiO2 80 %   POCT GLUCOSE   Result Value Ref Range    POCT Glucose 182 (H) 74 - 99 mg/dL   BLOOD GAS MIXED VENOUS  FULL PANEL   Result Value Ref Range    POCT pH, Mixed 7.38 7.33 - 7.43 pH    POCT pCO2, Mixed 53 (H) 41 - 51 mm Hg    POCT pO2, Mixed 39 35 - 45 mm Hg    POCT SO2, Mixed 65 45 - 75 %    POCT Oxy Hemoglobin, Mixed 63.7 45.0 - 75.0 %    POCT Hematocrit Calculated, Mixed 33.0 (L) 36.0 - 46.0 %    POCT Sodium, Mixed 126 (L) 136 - 145 mmol/L    POCT Potassium, Mixed 3.5 3.5 - 5.3 mmol/L    POCT Chloride, Mixed 91 (L) 98 - 107 mmol/L    POCT Ionized Calcium, Mixed 1.11 1.10 - 1.33 mmol/L    POCT Glucose, Mixed 170 (H) 74 - 99 mg/dL    POCT Lactate, Mixed 1.3 0.4 - 2.0 mmol/L    POCT Base Excess, Mixed 5.2 (H) -2.0 - 3.0 mmol/L    POCT HCO3 Calculated, Mixed 31.4 (H) 22.0 - 26.0 mmol/L    POCT Hemoglobin, Mixed 11.1 (L) 12.0 - 16.0 g/dL    POCT Anion Gap, Mixed 7 (L) 10 - 25 mmo/L    Patient Temperature 37.0 degrees Celsius    FiO2 70 %   Blood Gas Arterial Full Panel   Result Value Ref Range    POCT pH, Arterial 7.39 7.38 - 7.42 pH    POCT pCO2, Arterial 50 (H) 38 - 42 mm Hg    POCT pO2, Arterial 68 (L) 85 - 95 mm Hg    POCT SO2, Arterial 95 94 - 100 %    POCT Oxy Hemoglobin, Arterial 93.1 (L) 94.0 - 98.0 %    POCT Hematocrit Calculated, Arterial 33.0 (L) 36.0 - 46.0 %    POCT Sodium, Arterial 127 (L) 136 - 145 mmol/L    POCT Potassium, Arterial 3.5 3.5 - 5.3 mmol/L    POCT Chloride, Arterial 91 (L) 98 - 107 mmol/L    POCT Ionized Calcium, Arterial 1.12 1.10 - 1.33 mmol/L    POCT Glucose, Arterial 175 (H) 74 - 99 mg/dL    POCT Lactate, Arterial 1.4 0.4 - 2.0 mmol/L    POCT Base Excess, Arterial 4.5 (H) -2.0 - 3.0 mmol/L    POCT HCO3 Calculated, Arterial 30.3 (H) 22.0 - 26.0 mmol/L    POCT Hemoglobin, Arterial 10.9 (L) 12.0 - 16.0 g/dL    POCT Anion Gap, Arterial 9 (L) 10 - 25 mmo/L    Patient Temperature 37.0 degrees Celsius    FiO2 70 %   Magnesium   Result Value Ref Range    Magnesium 2.43 (H) 1.60 - 2.40 mg/dL   Calcium, Ionized   Result Value Ref Range    POCT Calcium, Ionized 1.08 (L) 1.1 - 1.33 mmol/L   CBC   Result  Value Ref Range    WBC 11.2 4.4 - 11.3 x10*3/uL    nRBC 0.2 (H) 0.0 - 0.0 /100 WBCs    RBC 3.77 (L) 4.00 - 5.20 x10*6/uL    Hemoglobin 10.2 (L) 12.0 - 16.0 g/dL    Hematocrit 29.4 (L) 36.0 - 46.0 %    MCV 78 (L) 80 - 100 fL    MCH 27.1 26.0 - 34.0 pg    MCHC 34.7 32.0 - 36.0 g/dL    RDW 14.4 11.5 - 14.5 %    Platelets 185 150 - 450 x10*3/uL   Renal function panel   Result Value Ref Range    Glucose 143 (H) 74 - 99 mg/dL    Sodium 132 (L) 136 - 145 mmol/L    Potassium 3.6 3.5 - 5.3 mmol/L    Chloride 91 (L) 98 - 107 mmol/L    Bicarbonate 32 21 - 32 mmol/L    Anion Gap 13 10 - 20 mmol/L    Urea Nitrogen 17 6 - 23 mg/dL    Creatinine 0.66 0.50 - 1.05 mg/dL    eGFR >90 >60 mL/min/1.73m*2    Calcium 7.9 (L) 8.6 - 10.6 mg/dL    Phosphorus 2.9 2.5 - 4.9 mg/dL    Albumin 3.2 (L) 3.4 - 5.0 g/dL   Hepatic Function Panel   Result Value Ref Range    Albumin 3.2 (L) 3.4 - 5.0 g/dL    Bilirubin, Total 0.8 0.0 - 1.2 mg/dL    Bilirubin, Direct 0.3 0.0 - 0.3 mg/dL    Alkaline Phosphatase 66 33 - 136 U/L     (H) 7 - 45 U/L     (H) 9 - 39 U/L    Total Protein 4.8 (L) 6.4 - 8.2 g/dL   POCT GLUCOSE   Result Value Ref Range    POCT Glucose 149 (H) 74 - 99 mg/dL   Blood Gas Arterial Full Panel   Result Value Ref Range    POCT pH, Arterial 7.39 7.38 - 7.42 pH    POCT pCO2, Arterial 51 (H) 38 - 42 mm Hg    POCT pO2, Arterial 79 (L) 85 - 95 mm Hg    POCT SO2, Arterial 97 94 - 100 %    POCT Oxy Hemoglobin, Arterial 94.4 94.0 - 98.0 %    POCT Hematocrit Calculated, Arterial 31.0 (L) 36.0 - 46.0 %    POCT Sodium, Arterial 127 (L) 136 - 145 mmol/L    POCT Potassium, Arterial 3.3 (L) 3.5 - 5.3 mmol/L    POCT Chloride, Arterial 93 (L) 98 - 107 mmol/L    POCT Ionized Calcium, Arterial 1.10 1.10 - 1.33 mmol/L    POCT Glucose, Arterial 146 (H) 74 - 99 mg/dL    POCT Lactate, Arterial 1.3 0.4 - 2.0 mmol/L    POCT Base Excess, Arterial 5.0 (H) -2.0 - 3.0 mmol/L    POCT HCO3 Calculated, Arterial 30.9 (H) 22.0 - 26.0 mmol/L    POCT  Hemoglobin, Arterial 10.3 (L) 12.0 - 16.0 g/dL    POCT Anion Gap, Arterial 6 (L) 10 - 25 mmo/L    Patient Temperature 37.0 degrees Celsius    FiO2 70 %   BLOOD GAS MIXED VENOUS FULL PANEL   Result Value Ref Range    POCT pH, Mixed 7.38 7.33 - 7.43 pH    POCT pCO2, Mixed 56 (H) 41 - 51 mm Hg    POCT pO2, Mixed 40 35 - 45 mm Hg    POCT SO2, Mixed 67 45 - 75 %    POCT Oxy Hemoglobin, Mixed 66.2 45.0 - 75.0 %    POCT Hematocrit Calculated, Mixed 31.0 (L) 36.0 - 46.0 %    POCT Sodium, Mixed 127 (L) 136 - 145 mmol/L    POCT Potassium, Mixed 3.3 (L) 3.5 - 5.3 mmol/L    POCT Chloride, Mixed 92 (L) 98 - 107 mmol/L    POCT Ionized Calcium, Mixed 1.09 (L) 1.10 - 1.33 mmol/L    POCT Glucose, Mixed 149 (H) 74 - 99 mg/dL    POCT Lactate, Mixed 1.3 0.4 - 2.0 mmol/L    POCT Base Excess, Mixed 6.7 (H) -2.0 - 3.0 mmol/L    POCT HCO3 Calculated, Mixed 33.1 (H) 22.0 - 26.0 mmol/L    POCT Hemoglobin, Mixed 10.2 (L) 12.0 - 16.0 g/dL    POCT Anion Gap, Mixed 5 (L) 10 - 25 mmo/L    Patient Temperature 37.0 degrees Celsius    FiO2 70 %   POCT GLUCOSE   Result Value Ref Range    POCT Glucose 179 (H) 74 - 99 mg/dL   Blood Gas Arterial Full Panel   Result Value Ref Range    POCT pH, Arterial 7.41 7.38 - 7.42 pH    POCT pCO2, Arterial 48 (H) 38 - 42 mm Hg    POCT pO2, Arterial 90 85 - 95 mm Hg    POCT SO2, Arterial 99 94 - 100 %    POCT Oxy Hemoglobin, Arterial 96.1 94.0 - 98.0 %    POCT Hematocrit Calculated, Arterial 32.0 (L) 36.0 - 46.0 %    POCT Sodium, Arterial 125 (L) 136 - 145 mmol/L    POCT Potassium, Arterial 3.5 3.5 - 5.3 mmol/L    POCT Chloride, Arterial 94 (L) 98 - 107 mmol/L    POCT Ionized Calcium, Arterial 1.11 1.10 - 1.33 mmol/L    POCT Glucose, Arterial 172 (H) 74 - 99 mg/dL    POCT Lactate, Arterial 1.1 0.4 - 2.0 mmol/L    POCT Base Excess, Arterial 5.0 (H) -2.0 - 3.0 mmol/L    POCT HCO3 Calculated, Arterial 30.4 (H) 22.0 - 26.0 mmol/L    POCT Hemoglobin, Arterial 10.6 (L) 12.0 - 16.0 g/dL    POCT Anion Gap, Arterial 4 (L) 10  - 25 mmo/L    Patient Temperature 37.0 degrees Celsius    FiO2 70 %   Type and screen   Result Value Ref Range    ABO TYPE B     Rh TYPE POS     ANTIBODY SCREEN NEG    BLOOD GAS MIXED VENOUS FULL PANEL   Result Value Ref Range    POCT pH, Mixed 7.38 7.33 - 7.43 pH    POCT pCO2, Mixed 54 (H) 41 - 51 mm Hg    POCT pO2, Mixed 39 35 - 45 mm Hg    POCT SO2, Mixed 65 45 - 75 %    POCT Oxy Hemoglobin, Mixed 64.0 45.0 - 75.0 %    POCT Hematocrit Calculated, Mixed 32.0 (L) 36.0 - 46.0 %    POCT Sodium, Mixed 126 (L) 136 - 145 mmol/L    POCT Potassium, Mixed 3.8 3.5 - 5.3 mmol/L    POCT Chloride, Mixed      POCT Ionized Calcium, Mixed 1.15 1.10 - 1.33 mmol/L    POCT Glucose, Mixed 174 (H) 74 - 99 mg/dL    POCT Lactate, Mixed 1.0 0.4 - 2.0 mmol/L    POCT Base Excess, Mixed 5.6 (H) -2.0 - 3.0 mmol/L    POCT HCO3 Calculated, Mixed 31.9 (H) 22.0 - 26.0 mmol/L    POCT Hemoglobin, Mixed 10.7 (L) 12.0 - 16.0 g/dL    POCT Anion Gap, Mixed      Patient Temperature 37.0 degrees Celsius    FiO2 70 %   POCT GLUCOSE   Result Value Ref Range    POCT Glucose 180 (H) 74 - 99 mg/dL   Blood Gas Arterial Full Panel   Result Value Ref Range    POCT pH, Arterial 7.41 7.38 - 7.42 pH    POCT pCO2, Arterial 46 (H) 38 - 42 mm Hg    POCT pO2, Arterial 113 (H) 85 - 95 mm Hg    POCT SO2, Arterial 99 94 - 100 %    POCT Oxy Hemoglobin, Arterial 96.9 94.0 - 98.0 %    POCT Hematocrit Calculated, Arterial 32.0 (L) 36.0 - 46.0 %    POCT Sodium, Arterial 125 (L) 136 - 145 mmol/L    POCT Potassium, Arterial 3.7 3.5 - 5.3 mmol/L    POCT Chloride, Arterial 94 (L) 98 - 107 mmol/L    POCT Ionized Calcium, Arterial 1.12 1.10 - 1.33 mmol/L    POCT Glucose, Arterial 171 (H) 74 - 99 mg/dL    POCT Lactate, Arterial 1.2 0.4 - 2.0 mmol/L    POCT Base Excess, Arterial 3.9 (H) -2.0 - 3.0 mmol/L    POCT HCO3 Calculated, Arterial 29.2 (H) 22.0 - 26.0 mmol/L    POCT Hemoglobin, Arterial 10.6 (L) 12.0 - 16.0 g/dL    POCT Anion Gap, Arterial 6 (L) 10 - 25 mmo/L    Patient  Temperature 37.0 degrees Celsius    FiO2 70 %   BLOOD GAS MIXED VENOUS FULL PANEL   Result Value Ref Range    POCT pH, Mixed 7.39 7.33 - 7.43 pH    POCT pCO2, Mixed 50 41 - 51 mm Hg    POCT pO2, Mixed 40 35 - 45 mm Hg    POCT SO2, Mixed 68 45 - 75 %    POCT Oxy Hemoglobin, Mixed 66.5 45.0 - 75.0 %    POCT Hematocrit Calculated, Mixed 30.0 (L) 36.0 - 46.0 %    POCT Sodium, Mixed 127 (L) 136 - 145 mmol/L    POCT Potassium, Mixed 3.9 3.5 - 5.3 mmol/L    POCT Chloride, Mixed 95 (L) 98 - 107 mmol/L    POCT Ionized Calcium, Mixed 1.08 (L) 1.10 - 1.33 mmol/L    POCT Glucose, Mixed 171 (H) 74 - 99 mg/dL    POCT Lactate, Mixed 1.1 0.4 - 2.0 mmol/L    POCT Base Excess, Mixed 4.5 (H) -2.0 - 3.0 mmol/L    POCT HCO3 Calculated, Mixed 30.3 (H) 22.0 - 26.0 mmol/L    POCT Hemoglobin, Mixed 10.0 (L) 12.0 - 16.0 g/dL    POCT Anion Gap, Mixed 6 (L) 10 - 25 mmo/L    Patient Temperature 37.0 degrees Celsius    FiO2 70 %   POCT GLUCOSE   Result Value Ref Range    POCT Glucose 161 (H) 74 - 99 mg/dL   BLOOD GAS MIXED VENOUS FULL PANEL   Result Value Ref Range    POCT pH, Mixed 7.36 7.33 - 7.43 pH    POCT pCO2, Mixed 51 41 - 51 mm Hg    POCT pO2, Mixed 38 35 - 45 mm Hg    POCT SO2, Mixed 63 45 - 75 %    POCT Oxy Hemoglobin, Mixed 61.8 45.0 - 75.0 %    POCT Hematocrit Calculated, Mixed 30.0 (L) 36.0 - 46.0 %    POCT Sodium, Mixed 128 (L) 136 - 145 mmol/L    POCT Potassium, Mixed 3.6 3.5 - 5.3 mmol/L    POCT Chloride, Mixed 95 (L) 98 - 107 mmol/L    POCT Ionized Calcium, Mixed 1.10 1.10 - 1.33 mmol/L    POCT Glucose, Mixed 128 (H) 74 - 99 mg/dL    POCT Lactate, Mixed 1.6 0.4 - 2.0 mmol/L    POCT Base Excess, Mixed 2.7 -2.0 - 3.0 mmol/L    POCT HCO3 Calculated, Mixed 28.8 (H) 22.0 - 26.0 mmol/L    POCT Hemoglobin, Mixed 10.1 (L) 12.0 - 16.0 g/dL    POCT Anion Gap, Mixed 8 (L) 10 - 25 mmo/L    Patient Temperature 37.0 degrees Celsius    FiO2 60 %   Calcium, Ionized   Result Value Ref Range    POCT Calcium, Ionized 1.14 1.1 - 1.33 mmol/L   CBC    Result Value Ref Range    WBC 12.7 (H) 4.4 - 11.3 x10*3/uL    nRBC 0.2 (H) 0.0 - 0.0 /100 WBCs    RBC 3.72 (L) 4.00 - 5.20 x10*6/uL    Hemoglobin 10.1 (L) 12.0 - 16.0 g/dL    Hematocrit 29.6 (L) 36.0 - 46.0 %    MCV 80 80 - 100 fL    MCH 27.2 26.0 - 34.0 pg    MCHC 34.1 32.0 - 36.0 g/dL    RDW 14.8 (H) 11.5 - 14.5 %    Platelets 191 150 - 450 x10*3/uL   Coagulation Screen   Result Value Ref Range    Protime 12.7 9.8 - 12.8 seconds    INR 1.1 0.9 - 1.1    aPTT 40 (H) 27 - 38 seconds   Renal function panel   Result Value Ref Range    Glucose 138 (H) 74 - 99 mg/dL    Sodium 132 (L) 136 - 145 mmol/L    Potassium 3.9 3.5 - 5.3 mmol/L    Chloride 92 (L) 98 - 107 mmol/L    Bicarbonate 29 21 - 32 mmol/L    Anion Gap 15 10 - 20 mmol/L    Urea Nitrogen 16 6 - 23 mg/dL    Creatinine 0.51 0.50 - 1.05 mg/dL    eGFR >90 >60 mL/min/1.73m*2    Calcium 7.9 (L) 8.6 - 10.6 mg/dL    Phosphorus 2.9 2.5 - 4.9 mg/dL    Albumin 3.1 (L) 3.4 - 5.0 g/dL   Magnesium   Result Value Ref Range    Magnesium 2.22 1.60 - 2.40 mg/dL   Blood Gas Arterial Full Panel   Result Value Ref Range    POCT pH, Arterial 7.41 7.38 - 7.42 pH    POCT pCO2, Arterial 48 (H) 38 - 42 mm Hg    POCT pO2, Arterial 88 85 - 95 mm Hg    POCT SO2, Arterial 98 94 - 100 %    POCT Oxy Hemoglobin, Arterial 95.8 94.0 - 98.0 %    POCT Hematocrit Calculated, Arterial 33.0 (L) 36.0 - 46.0 %    POCT Sodium, Arterial 126 (L) 136 - 145 mmol/L    POCT Potassium, Arterial 3.7 3.5 - 5.3 mmol/L    POCT Chloride, Arterial 94 (L) 98 - 107 mmol/L    POCT Ionized Calcium, Arterial 1.13 1.10 - 1.33 mmol/L    POCT Glucose, Arterial 141 (H) 74 - 99 mg/dL    POCT Lactate, Arterial 1.9 0.4 - 2.0 mmol/L    POCT Base Excess, Arterial 4.9 (H) -2.0 - 3.0 mmol/L    POCT HCO3 Calculated, Arterial 30.4 (H) 22.0 - 26.0 mmol/L    POCT Hemoglobin, Arterial 11.0 (L) 12.0 - 16.0 g/dL    POCT Anion Gap, Arterial 5 (L) 10 - 25 mmo/L    Patient Temperature 37.0 degrees Celsius    FiO2 60 %       XR abdomen 1  view   Final Result   1.  As described above.        Signed by: Vick Xiao 1/19/2025 11:58 AM   Dictation workstation:   [id number]      XR chest 1 view   Final Result   1. Slight worsening of multifocal airspace opacity in the left mid   and lower lung. Findings are concerning for infectious process. Small   left pleural effusion.   2. Postsurgical changes right pneumonectomy with medical devices as   described above. Consider retracting the Parmele-Paco catheter.                  Signed by: Vick Xiao 1/19/2025 8:55 AM   Dictation workstation:   [id number]      Transthoracic Echo (TTE) Limited         XR chest 1 view   Final Result   1. Patchy airspace opacity in the left lower lung with slight   improvement in aeration. Correlate with concern for infection   2. Postsurgical changes of right pneumonectomy as described above.   3. Medical lines and devices as above. Consider retraction of the   Parmele-Paco catheter.        I personally reviewed the images/study and I agree with the findings   as stated. This study was interpreted at [place], [place].        MACRO:   None        Signed by: Vick Xiao 1/19/2025 8:54 AM   Dictation workstation:   [id number]      XR chest 1 view   Final Result   1. Worsening aeration of the left lung with increasing left basilar   infiltrate and pleural effusion. Correlate with mucous plugging and   component of lobar collapse.   2. Component of interstitial prominence of the left lung, also   slightly increased which may be due to edema.   3. Postsurgical changes of right pneumonectomy as described                  Signed by: Vick Xiao 1/18/2025 11:35 AM   Dictation workstation:   [id number]      Cardiac Catheterization Procedure   Final Result      Transthoracic Echo (TTE) Limited   Final Result      XR chest 1 view   Final Result   1.  Stable exam with postsurgical changes of right pneumonectomy/rib    resection and large layering fluid component within the right   hemithorax.   2. Similar findings of left pulmonary edema with slightly increased   size of small left pleural effusion. No left-sided pneumothorax.   3. Medical devices as above.                  MACRO:   None        Signed by: Alexx Anne 1/17/2025 8:16 AM   Dictation workstation:   [id number]      XR chest 1 view   Final Result   1.  Postsurgical changes of right-sided pneumonectomy with increased   layering opacification of the right hemithorax, likely due to   increasing fluid.   2. Similar trace left-sided pleural effusion with linear left basilar   airspace opacity, likely atelectasis on a background of likely   pulmonary edema. Infection is not definitively excluded.   3. Medical devices as above with interval insertion of right internal   jugular central venous catheter projecting at the mid SVC.        I personally reviewed the images/study and I agree with the findings   as stated by resident Davidson Mcguire. This study was interpreted   at [place], [place].        MACRO:   None        Signed by: Alexx Anne 1/17/2025 8:14 AM   Dictation workstation:   [id number]      Transthoracic Echo (TTE) Limited   Final Result      US right upper quadrant   Final Result   Small amount of simple appearing ascites in the right upper quadrant   without evidence of cholelithiasis, cholecystitis or biliary dilation.        Signed by: Alberto Salas 1/16/2025 1:02 PM   Dictation workstation:   [id number]      XR chest 1 view   Final Result   1. Slight interval increase in left basilar airspace opacity which   may be due to increasing atelectasis. Cannot exclude developing   infectious infiltrate. Question trace left pleural effusion.   2. Again seen postsurgical changes related to right-sided   pneumonectomy as described above                  Signed by: Vick Xiao 1/16/2025 9:47 AM    Dictation workstation:   [id number]      US renal complete   Final Result   1. Unremarkable sonographic evaluation of the kidneys.   2. Incomplete evaluation of a gallbladder with some pericholecystic   fluid and biliary sludge. Dedicated right upper quadrant ultrasound   could be obtained as clinically warranted.   3. A small amount of free fluid present in the right upper quadrant   and pelvis.        Findings relayed by phone by me to SICU provider at 5:11 p.m.        I personally reviewed the images/study and I agree with the findings   as stated by Erickson Best MD (resident) . This study was   interpreted at [place], [place].        MACRO:   None        Signed by: Alberto Salas 1/15/2025 6:17 PM   Dictation workstation:   [id number]      XR chest 1 view   Final Result   1.  Status post pneumonectomy with interval increase in fluid   component of right-sided hydropneumothorax.   2. Medical devices as above.                  MACRO:   None        Signed by: Alexx Anne 1/15/2025 9:28 AM   Dictation workstation:   [id number]      XR chest 1 view   Final Result   1. Interval removal of right chest tube. Other medical devices as   described above.   2. Postsurgical changes related to right pneumonectomy.   3. Faint left basilar opacity, likely atelectatic.                  Signed by: Vick Xiao 1/14/2025 2:53 PM   Dictation workstation:   [id number]      XR chest 1 view   Final Result   1.  Stable right-sided pneumonectomy changes.             Signed by: Nino Barbour 1/14/2025 12:56 PM   Dictation workstation:   [id number]      Transthoracic Echo (TTE) Limited   Final Result      XR chest 1 view   Final Result   1. Postsurgical changes related to right sided pneumonectomy with   multiple medical devices as described above.   2. Mild interstitial prominence of the left lung.                  Signed by: Vick Xiao 1/13/2025  3:16 PM   Dictation workstation:   [id number]      XR chest 1 view    (Results Pending)        Assessment/Plan   Assessment:  Faustina Vick is a 63 y/o with PMHx of COPD and 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 Wagner on 1/13 requiring post-op vasoactive therapy. Post-op course c/b acute hyponatremia, multifactorial delirium, cardiogenic shock requiring inotropic support, and acute hypoxic/hypercarbic respiratory failure requiring intubation.     Plan:  NEURO: History of anxiety on alprazolam 1mg qd, last filled 1/4/2025 per [facility] review. Family states she was not taking for several months but then restarted prn about 3 wks prior to surgery for situational anxiety. A&Ox4 at baseline. Acute post-op pain. Multifactorial delirium improved. Currently sedated and intubated. On precedex 0.5mcg/kg/hr and fentanyl 50mcg/hr. Able to briefly awaken and follow commands without sedation interruption.  - ongoing neuro and pain assessments  - continue precedex and fentanyl infusions  - goal RASS 0 to -3  - Lidocaine patch to right chest wall  - PT/OT consulted -> hold off for now  - Restraints indicated while patient remains intubated, restrain with soft wrist restraints until medical devices are discontinued.      CV: No history of cardiac disease. Baseline /79, -110. TTE 9/2024 normal biV function, RVSP 10mmHg. Intra-op hypotension requiring vaso and epi. Arrived to SICU on vaso 0.06 units/min, started on levo 0.05 mcg/kg/min. TTE 1/13 with EF 60-65%, low normal RV systolic function. Weaned off vasopressors 1/15. Elevated lactate to 2.9 on 1/16 with SOB and new O2 requirement. Repeat TTE 1/16: acute biV dysfunction with signs of takotsubo cardiomyopathy, EF 34%, mod reduced RV systolic function, mildly elevated RVSP, mild to mod TR. Cardiogenic shock, started milrinone, lasix  infusion, and vasopressin. Shock Call on 1/16, no indication for mechanical circulatory support at this time. Initial troponin 928 in the setting of demand ischemia, with repeat troponin downtrend to 625. Lactate normalized. Epinephrine infusion added overnight 1/16-1/17, discontinued 2/2 tachycardia in the 130s. Milrinone increased to 0.25mcg/kg/min (decreased to 0.125 thereafter). Lasix infusion stopped 1/17 given contraction alkalosis. Taken to cath lab 1/17 for RHC/PA cath placement, opening CI 2.9. Milrinone uptitrated to 0.375mcg/kg/min 1/17-1/18. iEpo 0.05 added 1/18. Vasopressin increased to 0.06 and levo added to 0.15 1/18 evening at time of intubation. Given total of 750ml 5% albumin and 1 unit RBC since evening 1/18 through overnight 1/19. Thermo CI 2.9 this am. Currently on levo 0.04, vaso 0.03, milrinone 0.25, and iEpo 0.05.  - goal map > 65  - continuous EKG/abp/cvp/pap monitoring  - thermodilution q4h and prn  - Cardiology following, appreciate recs   - repeat formal TTE -> EF improved to 35%  - continue milrinone 0.25mcg/kg/min -> wean to 0.125  - continue iEpo at 0.05 -> maintain for now  - continue levo and vaso -> wean levo first  - continue to trend central venous SvO2 q4h  - add albumin 25% q8h x3 doses   - administer Lasix 40mg IV x1  - continue to trend lactate  - continue hydrocortisone 50mg IV q6h  - continue holding metoprolol  - 12 lead EKG  - shock call if condition worsens     PULM: Hx of 30 pack year smoking history (quit 4/2024), COPD, and stage III right lung SCC s/p chemo/XRT/immunotherapy. 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 Arrived to SICU extubated on 10L SFM, weaned to room air 1/14. Chest tube removed 1/14. Acute on chronic hypoxic respiratory failure 1/16. Escalation to Airvo 50L 90% overnight 1/17-1/18. Acute hypercapnia 1/18 with increase WOB, trial of BIPAP with iEPO. Eventual intubation 1/18 evening. Currently  on AC/VC 60%/280/18/+5. Mediastinal shift on AM cxr.  - continue iEpo, no plan to wean today  - continue mechanical ventilation  - wean FiO2 to maintain SpO2 >92%  - scheduled budesonide/duoneb  - PRN albuterol  - Daily CXR  - ABG q4h and  prn  - holding home benzonatate     GI: No GI history. LFTs WNL 12/2/24. Evidence of pericholecystic fluid and biliary sludge on renal US 1/15 but no abdominal symptoms. RUQ US with small ascites, otherwise no acute findings. Abdominal distention this am. Elevated LFTs, now downtrending. OG tube placed 1/19 for gastric decompression.  - Nutrition consult for TF recs  - initiate TFs via OG tube for now  - add thiamine 100mg IV daily  - continue IV PPI for GI prophylaxis  - bisacodyl TN daily, add colace and senna  - continue to trend LFTs     : No history of renal disease. Baseline creatinine 0.6. Symptomatic acute hyponatremia (SIADH vs vaso mediated) to 120 overnight 1/14 - 1/15, corrected to 124 after 150cc 3% NaCl x2 on 1/15. Hypochloremia. Renal US on 1/15 unremarkable. Started on lasix infusion 1/16 as above in CV, stopped 1/17. Net positive 3.4L for past 24hrs. Hypokalemia, hypophosphatemia, hypocalcemia, hypomagnesemia. Yoo changed overnight 1/18-1/19. Oliguria improving. Net positive 4.8L over past 48hrs.  - diuresis as above in CV  - RFP q12h and PRN  - Nephrology following, appreciate recs  - Maintain yoo for strict I/Os   - Maintain U/O >0.5ml/kg/hr  - Replete electrolytes to maintain K>4, Phos>2.5, iCal>1.1, Mag>2.      HEME: Hx of DOC. Baseline H/H 12/40. Acute blood loss anemia, OR EBL 150mL. Acute coagulopathy with INR elevated to 2.3 2/2 to poor nutritional status. Received IV Vit K 10mg x1 with improved INR thereafter. Transfused 1u PRBC 1/17 and 1u PRBC on 1/18.  - Check CBC and coags daily and PRN  - goal Hgb >9  - SCDs and SQH for DVT ppx  - obtain duplex for LUE swelling  - ongoing monitoring for s/s bleeding  - maintain active T&S (1/19)     ENDO: No  history of DM or thyroid disease. TSH 0.48 (1/17). Recent 5d course of prednisone 10mg 12/2024 for URI. Adequate glycemic control. Started on SDS out of OR 1/13.  - BG q4h, SSI #1  - Stress dose steroids as above     MSK: Arthritis, Vit D deficiency.  - not on meds at home  - monitor for symptoms     ID: Febrile on 1/18, Tmax 38.1. Completed yunior-op course of cefazolin. Leukocytosis to 17.2 after initiation of stress dose steroids, which resolved, but now with recurrent mild leukocytosis. Worsened clinical condition 1/17, started on vanco, zosyn and azithromycin. Blood cultures 1/17 NGTD. MRSA negative 1/17. Respiratory viral panel negative 1/17. Yoo changed 1/18, Urine cx 1/18 negative. Added fluc overnight 1/18-1/19. Sputum culture 1/19 NGTD.  - temp q4h, wbc daily  - ongoing monitoring for s/s infection  - discontinue vancomycin  - continue zosyn, azithromycin, and fluconazole  - obtain covid swab and urine strep/legionella  - F/U cultures     Lines:  - RIJ MAC (SICU, 1/16)  - PA cath (Cath lab. 1/17)  - L brachial A-line (1/16)  - Left chest mediport accessed   - PIV x3  - yoo (1/18)  - ETT (1/18)     Dispo: Continue ICU care. Patient seen and discussed with ICU attending Dr. Solorio.      ANDREA Saucedo-CNP  SICU Phone [phone]    Critical Care time: 60 minutes

## 2025-01-20 NOTE — PROGRESS NOTES
"Occupational Therapy    Communication Note    Patient Name: Faustina Vick \"Jannie\"  MRN: [id number]  Today's Date: 1/20/2025   Room: 12/12-A    Discipline: Occupational Therapy      Missed Visit Reason:  (0855: pt intubated/sedated on pressors. Not appropriate for OT at this time)      01/20/25 at 8:56 AM   Ellen Garcia, OT   Rehab Office: [phone]     "

## 2025-01-20 NOTE — CONSULTS
"Nutrition Initial Assessment:   Nutrition Assessment    Reason for Assessment: Enteral assessment/recommendation (TF)    Patient is a 64 y.o. female with stage III lung cancer presenting to SICU s/p Right Pneumonectomy and intrapericardial dissection with en bloc chest wall resection, and MLND by Dr. Wagner on 1/13.   Post-op course c/b acute hyponatremia, multifactorial delirium, cardiogenic shock requiring inotropic support and acute hypoxic/hypercarbic respiratory failure requiring intubation 1/18.       Nutrition History:  Energy Intake: Poor < 50 % (over past week)  Food and Nutrient History: Clear liquid diet 1/14 > advanced soft/bite sized 1/15 > reintubated 1/18.  Food Allergies/Intolerances:  None  GI Symptoms: last BM recorded 1/19  Oral Problems:  unknown       Anthropometrics:  Height: 165.1 cm (5' 5\")   Weight: 58.5 kg (128 lb 15.5 oz)   BMI (Calculated): 21.46  IBW/kg (Dietitian Calculated): 56.8 kg  Percent of IBW: 103 %       Weight History:   Date/Time Weight   01/20/25 0600 58.5 kg (128 lb 15.5 oz)   01/19/25 0600 54.6 kg (120 lb 5.9 oz)   01/18/25 0600 53.3 kg (117 lb 8.1 oz)   01/17/25 0600 52.2 kg (115 lb 1.3 oz)   01/13/25 1400 52.1 kg (114 lb 13.8 oz)     Wt Readings from Last 10 Encounters:   01/20/25 58.5 kg (128 lb 15.5 oz)   01/02/25 52.3 kg (115 lb 6.4 oz)   10/17/24 57 kg (125 lb 11.2 oz)   08/29/24 54.9 kg (121 lb 0.5 oz)      Weight Change %:  Weight History / % Weight Change: 8.6% loss in 3 months (signif) & 5% loss in ~5 months -- using 52.1 kgs for calculations  Significant Weight Loss: Yes    Nutrition Focused Physical Exam Findings:  Subcutaneous Fat Loss:   Orbital Fat Pads: Defer (visually thin, frial)  Muscle Wasting:  Temporalis: Defer  Edema:  Edema: none  Physical Findings:  Skin: Positive (surgical sites)    Nutrition Significant Labs:  CBC Trend:   Results from last 7 days   Lab Units 01/20/25  3227 01/19/25  1458 01/19/25  0757 01/19/25  0031   WBC AUTO x10*3/uL 12.7* 11.2 " "11.8* 13.5*   RBC AUTO x10*6/uL 3.72* 3.77* 3.97* 3.89*   HEMOGLOBIN g/dL 10.1* 10.2* 10.6* 10.5*   HEMATOCRIT % 29.6* 29.4* 31.3* 30.5*   MCV fL 80 78* 79* 78*   PLATELETS AUTO x10*3/uL 191 185 193 189    , A1C:No results found for: \"HGBA1C\", BG POCT trend:   Results from last 7 days   Lab Units 01/20/25  0811 01/20/25  0325 01/19/25  2331 01/19/25  1931 01/19/25  1548   POCT GLUCOSE mg/dL 140* 161* 180* 179* 149*    , Liver Function Trend:   Results from last 7 days   Lab Units 01/20/25  0427 01/19/25  1458 01/19/25  0757 01/19/25  0031   ALK PHOS U/L 65 66 63 66   AST U/L 157* 257* 301* 375*   ALT U/L 114* 137* 143* 151*   BILIRUBIN TOTAL mg/dL 0.8 0.8 0.9 0.9    , Renal Lab Trend:   Results from last 7 days   Lab Units 01/20/25  0427 01/19/25  1458 01/19/25  0757 01/19/25  0031   POTASSIUM mmol/L 3.9 3.6 4.0 3.8   PHOSPHORUS mg/dL 2.9 2.9 2.6 3.3   SODIUM mmol/L 132* 132* 129* 130*   MAGNESIUM mg/dL 2.22 2.43* 2.52* 2.75*   EGFR mL/min/1.73m*2 >90 >90 >90 >90   BUN mg/dL 16 17 16 15   CREATININE mg/dL 0.51 0.66 0.67 0.56       Nutrition Specific Medications:  Scheduled medications  albumin human, 25 g, intravenous, q8h  azithromycin, 250 mg, intravenous, q24h  bisacodyl, 10 mg, rectal, Daily  budesonide, 0.5 mg, nebulization, BID  fluconazole, 400 mg, intravenous, q24h  furosemide, 40 mg, intravenous, Once  heparin (porcine), 5,000 Units, subcutaneous, q8h  hydrocortisone sodium succinate, 50 mg, intravenous, q6h  insulin lispro, 0-5 Units, subcutaneous, q4h  ipratropium-albuteroL, 3 mL, nebulization, q6h  lidocaine, 1 patch, transdermal, Daily  oxygen, , inhalation, Continuous - Inhalation  [START ON 1/21/2025] pantoprazole, 40 mg, intravenous, Daily  perflutren lipid microspheres, 0.5-10 mL of dilution, intravenous, Once in imaging  perflutren lipid microspheres, 0.5-10 mL of dilution, intravenous, Once in imaging  piperacillin-tazobactam, 4.5 g, intravenous, q6h      Continuous medications  dexmedeTOMIDine, " 0-0.6 mcg/kg/hr, Last Rate: 0.5 mcg/kg/hr (01/20/25 0800)  epoprostenol, 0.05 mcg/kg/min (Ideal), Last Rate: 0.05 mcg/kg/min (01/20/25 0825)  fentaNYL, 50 mcg/hr, Last Rate: 50 mcg/hr (01/20/25 0800)  milrinone, 0.25 mcg/kg/min, Last Rate: 0.25 mcg/kg/min (01/20/25 0800)  norepinephrine, 0-0.2 mcg/kg/min, Last Rate: 0.02 mcg/kg/min (01/20/25 0800)  vasopressin, 0-0.06 Units/min, Last Rate: 0.03 Units/min (01/20/25 0800)      PRN medications  PRN medications: albuterol, calcium gluconate, calcium gluconate, dextrose, dextrose, glucagon, glucagon, HYDROmorphone, magnesium sulfate, magnesium sulfate, potassium chloride, potassium chloride      I/O:   Last BM Date: 01/16/25; Stool Appearance: Unable to assess (01/20/25 0000)    Dietary Orders (From admission, onward)       Start     Ordered    01/18/25 1834  NPO Diet; Effective now  Diet effective now         01/18/25 1833                     Estimated Needs:   Total Energy Estimated Needs (kCal): 1100 kCal (on vent)  Method for Estimating Needs: RMR/[place] state equation  Total Protein Estimated Needs (g):  (75-85)  Method for Estimating Needs: ~1.3-1.5 gm/kg ideal BW  Total Fluid Estimated Needs (mL):  (per team)           Nutrition Diagnosis   Malnutrition Diagnosis  Patient has Malnutrition Diagnosis: Yes  Diagnosis Status: New  Malnutrition Diagnosis: Severe malnutrition related to chronic disease or condition (acute on chronic)  As Evidenced by: clinically significant weight loss x 3 months (8.5%), poor oral intakes past week over post-op course (<50% est needs >5days), suspect longer, visually frail appearing with at least mild losses to temporalis/orbital region & BMI = 19.1    Nutrition Diagnosis  Patient has Nutrition Diagnosis: Yes  Diagnosis Status (1): New  Nutrition Diagnosis 1: Increased nutrient needs  Related to (1): increased metabolic demand  As Evidenced by (1): lung cancer, critical illness       Nutrition Interventions/Recommendations          Nutrition Prescription:  Individualized Nutrition Prescription Provided for : tube feeds on vent, 1080 kcals & 68 gm protein with 540mls free water/d        Nutrition Interventions:   Interventions: Enteral intake, Vitamin supplement therapy  Goal: 1) Pivot 1.5 goal @ 30 mls/hr continuous while intubated -- begin @ 10mls/hr, increase by 10mls every 8-12 hours as tolerated.  Goal: 2) Thiamin 100 mg daily IV (minimum dose)         Nutrition Monitoring and Evaluation   Food/Nutrient Related History Monitoring  Monitoring and Evaluation Plan: Enteral and parenteral nutrition intake  Enteral and Parenteral Nutrition Intake: Enteral nutrition formula/solution  Criteria: >75% est needs met via EN regimen    Body Composition/Growth/Weight History  Monitoring and Evaluation Plan: Weight  Criteria: minimize loss    Biochemical Data, Medical Tests and Procedures  Monitoring and Evaluation Plan: Electrolyte/renal panel, Glucose/endocrine profile  Criteria: lytes & BG levels WNL              Time Spent (min): 60 minutes

## 2025-01-20 NOTE — PROGRESS NOTES
"Faustina Vick \"Noam" is a 64 y.o. female on day 7 of admission presenting with Primary cancer of right upper lobe of lung (Multi).    Subjective   -Patient seen this morning resting comfortably in bed  -VSS: tachycardia with rates into the 140s, remains mechanically ventilated    -Patient's labs this morning significant for sodium 132,WBC 12.7, Hgb 10.1,  -Gifford Numbers today: Mixed venous O2 68%, CO 4.5, CI 2.9, CVP 10, mPAP 22, SVR 1674 --> continues to reinforce shock is not likely cardiogenic  -Patient has titrated off of levo this morning with improvement in sinus tach.  She remains on vasopressin   -UOP: 900 mL yesterday so far today 175mL roughly 30 mL/HR.       Objective     Physical Exam  General: Not in acute distress, somnolent, alert, cooperative, ill-appearing  HEENT: Normocephalic, atraumatic, EOMI, moist mucous membranes, ETT in place, OGT in place   Neck: Neck supple, trachea midline, no evidence of trauma  Cardiovascular: Tachycardic, regular rhythm, S1 and S2 appreciated, no murmurs rubs gallops appreciated, distal pulses 2+ bilaterally (radial and dorsalis pedis)  Respiratory: Coarse mechanical breath sounds appreciated bilaterally, with loss of lung sounds from right mid lobe down,no increased work of breathing on mechanical ventilation  GI: Abdomen soft, nondistended, nontender to palpation, bowel sounds present  : yoo cath draining straw colored urine  Extremities: No edema appreciated in lower extremities bilaterally, no cyanosis  Neuro: no focal deficits, strength and sensation intact bilaterally  Skin: Warm and dry, without lesions or rashes    Last Recorded Vitals  Blood pressure 89/57, pulse 94, temperature 36.5 °C (97.7 °F), temperature source Temporal, resp. rate 16, height 1.651 m (5' 5\"), weight 58.5 kg (128 lb 15.5 oz), SpO2 98%.  Intake/Output last 3 Shifts:  I/O last 3 completed shifts:  In: 4906.2 (83.9 mL/kg) [I.V.:1453.7 (24.8 mL/kg); Blood:600; IV Piggyback:2852.5]  Out: " 1440 (24.6 mL/kg) [Urine:1365 (0.6 mL/kg/hr); Emesis/NG output:75]  Weight: 58.5 kg     Relevant Results  Transthoracic Echo (TTE) Limited    Result Date: 1/17/2025   [facility], [address] [place] [address]                Tel [phone] and Fax [phone] TRANSTHORACIC ECHOCARDIOGRAM REPORT  Patient Name:       RAÚL Rodríguez Physician:    [id number] Rita Graham MD Study Date:         1/17/2025           Ordering Provider:    [id number] ROLANDO ARAGON MRN/PID:            [id number]            Fellow: Accession#:         [id number]        Nurse: Date of Birth/Age:  1960 / 64      Sonographer:          Domonique rocha RDCS Gender assigned at  F                   Additional Staff: Birth: Height:             165.10 cm           Admit Date:           1/13/2025 Weight:             52.16 kg            Admission Status:     Inpatient - STAT BSA / BMI:          1.56 m2 / 19.14     Encounter#:           [id number]                     kg/m2 Blood Pressure:     103/74 mmHg         Department Location:  [facility] Study Type:    TRANSTHORACIC ECHO (TTE) LIMITED Diagnosis/ICD: Acute combined systolic (congestive) and diastolic (congestive)                heart failure (CHF)-I50.41 Indication:    Acute systolic/diastolic failure CPT Code:      Echo Limited-86623; Doppler Limited-72296; Color Doppler-29865 Patient History: Pertinent History: Anxiety; Arthritis; Asthma; Hx of squamous cell carcinoma; Hx                    of chemotherapy; Lung cancer; Prior smoker; Known EF of 34%                    (01/16/24) with described regional wall motion abnormalities                    of the LV and RV suggestive of biventricular Takotsubo. Study Detail: The following Echo studies were performed: 2D,  M-Mode, Doppler and               color flow. Technically challenging study due to body habitus and               patient lying in supine position. Definity used as a contrast               agent for endocardial border definition. Total contrast used for               this procedure was 1 mL via IV push.  PHYSICIAN INTERPRETATION: Left Ventricle: Left ventricular ejection fraction is severely decreased, by visual estimate at 25-30%. There are multiple left ventricular wall motion abnormalities. The left ventricular cavity size is normal. Left ventricular diastolic filling cannot be determined, due to E/A wave fusion. There is no definite left ventricular thrombus visualized. Apical and mid LV segments including the RV free wall are completely akinetic. LV Wall Scoring: The entire apex, mid and apical anterior wall, mid and apical anterior septum, mid and apical inferior septum, mid and apical inferior wall, mid inferolateral segment, and mid anterolateral segment are akinetic. All remaining scored segments are normal. Left Atrium: The left atrium is mild to moderately dilated. Right Ventricle: The right ventricle is normal in size. There is reduced right ventricular systolic function. Right Atrium: The right atrium is mildly dilated. Aortic Valve: The aortic valve is structurally normal. The aortic valve dimensionless index is 0.82. There is no evidence of aortic valve regurgitation. The peak instantaneous gradient of the aortic valve is 3 mmHg. The mean gradient of the aortic valve is 2 mmHg. Mitral Valve: The mitral valve is normal in structure. There is trace mitral valve regurgitation. Tricuspid Valve: The tricuspid valve is structurally normal. There is trace tricuspid regurgitation. The Doppler estimated RVSP is within normal limits at 27.0 mmHg. Pulmonic Valve: The pulmonic valve is not well visualized. Pulmonic valve regurgitation was not assessed. Pericardium: Trivial pericardial effusion. Aorta: The aortic  root is normal. Systemic Veins: The inferior vena cava appears normal in size. In comparison to the previous echocardiogram(s): Compared with study dated 2025, no significant change.  CONCLUSIONS:  1. Left ventricular ejection fraction is severely decreased, by visual estimate at 25-30%.  2. Multiple segmental abnormalities exist. See findings.  3. Apical and mid LV segments including the RV free wall are completely akinetic.  4. There are multiple left ventricular wall motion abnormalities.  5. Left ventricular diastolic filling cannot be determined, due to E/A wave fusion.  6. No left ventricular thrombus visualized.  7. There is reduced right ventricular systolic function.  8. The left atrium is mild to moderately dilated.  9. Right ventricular systolic pressure is within normal limits. 10. Compared with study dated 2025, no significant change. QUANTITATIVE DATA SUMMARY:  2D MEASUREMENTS:          Normal Ranges: Ao Root d:       3.43 cm  (2.0-3.7cm) LAs:             3.03 cm  (2.7-4.0cm) LVEDV Index:     54 ml/m2  RA VOLUME BY A/L METHOD:          Normal Ranges: RA Area A4C:             18.3 cm2  LV SYSTOLIC FUNCTION BY 2D PLANIMETRY (MOD):                      Normal Ranges: EF-A4C View:    19 % (>=55%) EF-A2C View:    22 % EF-Biplane:     19 % EF-Visual:      28 % LV EF Reported: 28 %  LV DIASTOLIC FUNCTION:           Normal Ranges: MV Peak E:             0.99 m/s  (0.7-1.2 m/s) MV e'                  0.091 m/s (>8.0) MV lateral e'          0.12 m/s MV medial e'           0.06 m/s E/e' Ratio:            10.92     (<8.0) MV DT:                 119 msec  (150-240 msec)  AORTIC VALVE:                     Normal Ranges: AoV Vmax:                0.87 m/s (<=1.7m/s) AoV Peak PG:             3.0 mmHg (<20mmHg) AoV Mean P.0 mmHg (1.7-11.5mmHg) LVOT Max Navi:            0.67 m/s (<=1.1m/s) AoV VTI:                 11.40 cm (18-25cm) LVOT VTI:                9.30 cm LVOT Diameter:           1.94  cm  (1.8-2.4cm) AoV Area, VTI:           2.41 cm2 (2.5-5.5cm2) AoV Area,Vmax:           2.26 cm2 (2.5-4.5cm2) AoV Dimensionless Index: 0.82  RIGHT VENTRICLE: RV Basal 3.40 cm RV Mid   2.30 cm RV Major 6.0 cm TAPSE:   11.9 mm RV s'    0.09 m/s  TRICUSPID VALVE/RVSP:          Normal Ranges: Peak TR Velocity:     2.45 m/s RV Syst Pressure:     27 mmHg  (< 30mmHg)  [id number] Rita Graham MD Electronically signed on 1/17/2025 at 12:13:06 PM  Wall Scoring  ** Final **     XR chest 1 view    Result Date: 1/17/2025  Interpreted By:  Alexx Anne, STUDY: XR CHEST 1 VIEW;  1/17/2025 6:40 am   INDICATION: Signs/Symptoms:pneumonectomy.     COMPARISON: Exam dated 01/16/2025   ACCESSION NUMBER(S): [id number]   ORDERING CLINICIAN: GABBY SMITH   FINDINGS: AP radiograph of the chest was provided.   Right IJ approach central venous catheter tip projects over the mid SVC. Left chest wall medication port tip projects over the confluence of the left brachiocephalic vein and SVC. Persistent subcutaneous emphysema overlying the right chest wall.   CARDIOMEDIASTINAL SILHOUETTE: Cardiomediastinal silhouette is stable in size and configuration. Complete obscuration of the right cardiomediastinal contours. Thin curvilinear lucency projecting along the right cardiac border may represent a component of pneumomediastinum.   LUNGS: Status post on bloc pneumonectomy and right rib resection. Similar gradient opacification of the right hemithorax. Similar prominent interstitial markings in the left lung with slightly increased blunting of the left costophrenic angle. Streaky left basilar opacities are most consistent with atelectasis. No evidence of left-sided pneumothorax.   ABDOMEN: No remarkable upper abdominal findings.   BONES: No acute osseous changes.       1.  Stable exam with postsurgical changes of right pneumonectomy/rib resection and large layering fluid component within the right hemithorax. 2. Similar findings of left pulmonary  edema with slightly increased size of small left pleural effusion. No left-sided pneumothorax. 3. Medical devices as above.       MACRO: None   Signed by: Alexx Anne 1/17/2025 8:16 AM Dictation workstation:   [id number]    XR chest 1 view    Result Date: 1/17/2025  Interpreted By:  Alexx Anne and Ritchie Brandon STUDY: XR CHEST 1 VIEW;  1/16/2025 7:01 pm   INDICATION: Signs/Symptoms:central line placement.   COMPARISON: Chest x-ray 01/16/2025, 6:41 a.m.   ACCESSION NUMBER(S): [id number]   ORDERING CLINICIAN: GABBY SMITH   FINDINGS: AP radiograph of the chest was provided.   Leftchest wall MediPort tip projects over the expected location of the upper SVC. Right internal jugular central venous catheter tip projects over the expected location of the mid SVC. There is subcutaneous emphysematous changes along the right lateral chest wall.   CARDIOMEDIASTINAL SILHOUETTE: Cardiomediastinal silhouette is stable in size and configuration. There is again obscuration of the right cardiomediastinal silhouette.   LUNGS: Redemonstration of postsurgical changes compatible with recent history of right pneumonectomy. Gradual, layering opacification of the right hemithorax which is increased when compared to prior examination. There is again trace blunting of the left costophrenic angle with adjacent linear opacities in the left lower lobe and findings of increased interstitial markings in the left lung. No evidence of left-sided pneumothorax.   ABDOMEN: No remarkable upper abdominal findings.   BONES: Resection of multiple upper right-sided ribs. No acute osseous changes.       1.  Postsurgical changes of right-sided pneumonectomy with increased layering opacification of the right hemithorax, likely due to increasing fluid. 2. Similar trace left-sided pleural effusion with linear left basilar airspace opacity, likely atelectasis on a background of likely pulmonary edema. Infection is not definitively excluded. 3.  Medical devices as above with interval insertion of right internal jugular central venous catheter projecting at the mid SVC.   I personally reviewed the images/study and I agree with the findings as stated by resident Davidson Mcguire. This study was interpreted at [place], [place].   MACRO: None   Signed by: Alexx Anne 1/17/2025 8:14 AM Dictation workstation:   [id number]    ECG 12 lead    Result Date: 1/16/2025  Normal sinus rhythm Low voltage QRS Cannot rule out Anterior infarct (cited on or before 16-JAN-2025) Abnormal ECG When compared with ECG of 16-JAN-2025 14:04, No significant change was found    Transthoracic Echo (TTE) Limited    Result Date: 1/16/2025   [facility], [address] [place] [address]                Tel [phone] and Fax [phone] TRANSTHORACIC ECHOCARDIOGRAM REPORT  Patient Name:       RAÚL Rodríguez Physician:    [id number] Fabián Kulkarni MD Study Date:         1/16/2025           Ordering Provider:    [id number] GABBY SMITH MRN/PID:            [id number]            Fellow: Accession#:         [id number]        Nurse: Date of Birth/Age:  1960 / 64      Sonographer:          Naya rocha RDCS Gender assigned at  F                   Additional Staff: Birth: Height:             165.10 cm           Admit Date:           1/20/2025 Weight:             51.71 kg            Admission Status:     Inpatient - STAT BSA / BMI:          1.56 m2 / 18.97     Encounter#:           [id number]                     kg/m2 Blood Pressure:     106/62 mmHg         Department Location:  [facility] Study Type:    TRANSTHORACIC ECHO (TTE) LIMITED Diagnosis/ICD: Acute respiratory failure with hypoxia-J96.01 Indication:    Post  pneumonectomy; new O2 requirements; eval BIV function CPT Code:      Echo Limited-20790; Color Doppler-63754 Patient History: Pertinent History: COPD. Lung cancer; s/p pneumonectomy; immunotherapy. Study Detail: The following Echo studies were performed: 2D, M-Mode and color               flow. Technically challenging study due to body habitus and               prominent lung artifact. Definity used as a contrast agent for               endocardial border definition. Total contrast used for this               procedure was 3 mL via IV push. Unable to obtain suprasternal               notch view.  PHYSICIAN INTERPRETATION: Left Ventricle: Left ventricular ejection fraction is moderately decreased, calculated by Miranda's biplane at 34%. Left venticular wall motion is abnormal. The left ventricular cavity size is upper limits of normal. There is normal septal and normal posterior left ventricular wall thickness. Spectral Doppler shows a Grade II (pseudonormal pattern) of left ventricular diastolic filling with an elevated left atrial pressure. The described regional wall motion abnormalities of the LV and RV are suggestive of biventricular Takotsubo (stress cardiomyopathy). LV Wall Scoring: The mid and apical anterior wall, mid and apical anterior septum, mid and apical inferior septum, mid and apical inferior wall, mid inferolateral segment, mid anterolateral segment, and apical lateral segment are akinetic. All remaining scored segments are normal. Left Atrium: The left atrium is moderately dilated. Right Ventricle: The right ventricle is normal in size. There is moderately reduced right ventricular systolic function. There is akinesis of the mid and apical segments of the RV free wall. Right Atrium: The right atrium is normal in size. A dilated inferior vena cava demonstrates poor inspiratory collapse, consistent with elevated right atrial pressures. Aortic Valve: The aortic valve is trileaflet. There is trace  aortic valve regurgitation. Mitral Valve: The mitral valve is normal in structure. There is mild mitral valve regurgitation. Tricuspid Valve: The tricuspid valve is structurally normal. There is mild to moderate tricuspid regurgitation. The Doppler estimated RVSP is mildly elevated at 45.5 mmHg. Pulmonic Valve: The pulmonic valve is not well visualized. Pulmonic valve regurgitation was not assessed. Pericardium: There is no pericardial effusion noted. Aorta: The aortic root is normal. Systemic Veins: The inferior vena cava appears dilated, with IVC inspiratory collapse less than 50%. In comparison to the previous echocardiogram(s): Compared with study dated 1/13/2025, the LV and RV wall motion abnormalities were not present in prior study.  CONCLUSIONS:  1. Left ventricular ejection fraction is moderately decreased, calculated by Miranda's biplane at 34%.  2. Multiple segmental abnormalities exist. See findings.  3. Spectral Doppler shows a Grade II (pseudonormal pattern) of left ventricular diastolic filling with an elevated left atrial pressure.  4. There is moderately reduced right ventricular systolic function.  5. There is akinesis of the mid and apical segments of the RV free wall.  6. Mildly elevated right ventricular systolic pressure.  7. Mild to moderate tricuspid regurgitation visualized.  8. The left atrium is moderately dilated.  9. A dilated inferior vena cava demonstrates poor inspiratory collapse, consistent with elevated right atrial pressures. 10. The described regional wall motion abnormalities of the LV and RV are suggestive of biventricular Takotsubo (stress cardiomyopathy). QUANTITATIVE DATA SUMMARY:  2D MEASUREMENTS:          Normal Ranges: IVSd:            0.60 cm  (0.6-1.1cm) LVPWd:           0.70 cm  (0.6-1.1cm) LVIDd:           4.80 cm  (3.9-5.9cm) LVIDs:           3.50 cm LV Mass Index:   63 g/m2 LVEDV Index:     61 ml/m2 LV % FS          27.1 %  LA VOLUME:                   Normal  Ranges: LA Vol A4C:        69.1 ml   (22+/-6mL/m2) LA Vol Index A4C:  44.3ml/m2 LA Area A4C:       21.1 cm2 LA Major Axis A4C: 5.5 cm  RA VOLUME BY A/L METHOD:          Normal Ranges: RA Area A4C:             16.3 cm2  M-MODE MEASUREMENTS:         Normal Ranges: Ao Root:             3.00 cm (2.0-3.7cm) LAs:                 3.50 cm (2.7-4.0cm)  LV SYSTOLIC FUNCTION BY 2D PLANIMETRY (MOD):                      Normal Ranges: EF-A4C View:    40 % (>=55%) EF-A2C View:    31 % EF-Biplane:     34 % LV EF Reported: 34 %  LV DIASTOLIC FUNCTION:           Normal Ranges: MV Peak E:             0.87 m/s  (0.7-1.2 m/s) MV Peak A:             0.59 m/s  (0.42-0.7 m/s) E/A Ratio:             1.48      (1.0-2.2) MV e'                  0.057 m/s (>8.0) MV lateral e'          0.06 m/s MV medial e'           0.05 m/s E/e' Ratio:            15.37     (<8.0)  MITRAL VALVE:          Normal Ranges: MV DT:        119 msec (150-240msec)  RIGHT VENTRICLE: RV Basal 3.51 cm RV Mid   2.52 cm RV Major 6.0 cm TAPSE:   9.6 mm RV s'    0.07 m/s  TRICUSPID VALVE/RVSP:          Normal Ranges: Peak TR Velocity:     3.26 m/s RV Syst Pressure:     46 mmHg  (< 30mmHg) IVC Diam:             2.25 cm  [id number] Fabián Kulkarni MD Electronically signed on 1/16/2025 at 2:58:07 PM  Wall Scoring  ** Final (Updated) **     US right upper quadrant    Result Date: 1/16/2025  Interpreted By:  Alberto Salas, STUDY: US RIGHT UPPER QUADRANT; 1/16/2025 11:09 am   INDICATION: Signs/Symptoms:pericholecystic fluid seen on renal us.   COMPARISON: None.   ACCESSION NUMBER(S): [id number]   ORDERING CLINICIAN: WILMER ANTHONY   TECHNIQUE: Multiple images of the right upper quadrant were obtained.   FINDINGS: LIVER: The liver measures up to  15.5 cm in greatest sagittal dimension. Echogenicity is within normal limits. There is no morphologic evidence of cirrhosis and no focal hepatic lesion is evident.   GALLBLADDER: The gallbladder is within normal limits without wall  thickening or cholelithiasis. A sonographic Odom sign was not present. There is a small amount of simple appearing ascites in the right upper quadrant.   BILIARY SYSTEM: There is no intrahepatic biliary dilation. The common bile duct measures up to  5 mm in width.   PANCREAS: The pancreas is partially obscured by bowel gas. The visualized portions of the pancreas are within normal limits.   RIGHT KIDNEY: The right kidney measures up to  9.9 cm in greatest sagittal dimension. Cortical echogenicity and thickness are within normal limits. No hydroureteronephrosis.       Small amount of simple appearing ascites in the right upper quadrant without evidence of cholelithiasis, cholecystitis or biliary dilation.   Signed by: Alberto Salas 1/16/2025 1:02 PM Dictation workstation:   [id number]    XR chest 1 view    Result Date: 1/16/2025  Interpreted By:  Vick Eli, STUDY: XR CHEST 1 VIEW; 1/16/2025 6:43 am   INDICATION: Signs/Symptoms:ICU.   COMPARISON: Radiograph dated 01/15/2025   ACCESSION NUMBER(S): [id number]   ORDERING CLINICIAN: GABBY SMITH   FINDINGS: Postsurgical changes of right pneumonectomy and resection of multiple right-sided ribs. Slight interval increase in opacity in the lower portion of the pneumonectomy site. Slight interval increase in left basilar faint airspace opacity. Blunting of left costophrenic angle. No left pneumothorax.   Cardiac silhouette size is grossly stable, however right heart border is obscured.   Left subclavian approach MediPort is unchanged in position. Interval removal of right IJ central venous catheter sheath.   Subcutaneous emphysema in the right chest wall and right side of the neck.       1. Slight interval increase in left basilar airspace opacity which may be due to increasing atelectasis. Cannot exclude developing infectious infiltrate. Question trace left pleural effusion. 2. Again seen postsurgical changes related to right-sided pneumonectomy as  described above       Signed by: Vick Escobedoani 1/16/2025 9:47 AM Dictation workstation:   [id number]    US renal complete    Result Date: 1/15/2025  Interpreted By:  Alberto Salas,  and Estephania Almendarez STUDY: US RENAL COMPLETE;  1/15/2025 3:25 pm   INDICATION: Signs/Symptoms:symptomatic hyponatremia.     COMPARISON: CT abdomen 08/15/2024 FDG PET-CT 12/30/2024   ACCESSION NUMBER(S): [id number]   ORDERING CLINICIAN: GABBY SMITH   TECHNIQUE: Multiple images of the kidneys were obtained  .   FINDINGS: RIGHT KIDNEY: The right kidney measures 11.17 cm in length. The renal cortical echogenicity and thickness are within normal limits. No hydronephrosis is present; no evidence of nephrolithiasis.   LEFT KIDNEY: The left kidney measures 11.75 cm in length. The renal cortical echogenicity and thickness are within normal limits. No hydronephrosis is present; no evidence of nephrolithiasis.   BLADDER: A Montiel is present within a decompressed bladder.   Incidental note is made of a somewhat edematous gallbladder with some pericholecystic fluid and layering echogenic material likely representing biliary sludge.   Free fluid is present within the right upper quadrant and pelvis.       1. Unremarkable sonographic evaluation of the kidneys. 2. Incomplete evaluation of a gallbladder with some pericholecystic fluid and biliary sludge. Dedicated right upper quadrant ultrasound could be obtained as clinically warranted. 3. A small amount of free fluid present in the right upper quadrant and pelvis.   Findings relayed by phone by me to SICU provider at 5:11 p.m.   I personally reviewed the images/study and I agree with the findings as stated by Erickson Best MD (resident) . This study was interpreted at [place], [place].   MACRO: None   Signed by: Alberto Salas 1/15/2025 6:17 PM Dictation workstation:   [id number]    XR chest 1 view    Result Date:  1/15/2025  Interpreted By:  Alexx Anne, STUDY: XR CHEST 1 VIEW;  1/15/2025 6:46 am   INDICATION: Signs/Symptoms:routine ICU.     COMPARISON: Exam dated 01/14/2025   ACCESSION NUMBER(S): [id number]   ORDERING CLINICIAN: GABBY SMITH   FINDINGS: AP radiograph of the chest was provided.   Left chest wall subclavian approach medication port with catheter tip projecting over the upper SVC. Right IJ central venous catheter tip projects over the upper SVC. Interval increase in subcutaneous emphysema overlying the right lower cervical soft tissues. Similar subcutaneous emphysema along the right chest wall.   CARDIOMEDIASTINAL SILHOUETTE: Cardiomediastinal silhouette is stable in size and configuration.   LUNGS: Postsurgical changes of pneumonectomy are again visualized. There is a moderate amount of layering pleural fluid within the right hemithorax on a background of diffuse lucency. Streaky left basilar atelectasis. The left lung otherwise appears clear.   ABDOMEN: No remarkable upper abdominal findings.   BONES: No acute osseous changes.       1.  Status post pneumonectomy with interval increase in fluid component of right-sided hydropneumothorax. 2. Medical devices as above.       MACRO: None   Signed by: Alexx Anne 1/15/2025 9:28 AM Dictation workstation:   [id number]    XR chest 1 view    Result Date: 1/14/2025  Interpreted By:  Vick Eli, STUDY: XR CHEST 1 VIEW; 1/14/2025 2:40 pm   INDICATION: Signs/Symptoms:Chest tube removal.   COMPARISON: Radiograph dated 01/14/2025   ACCESSION NUMBER(S): [id number]   ORDERING CLINICIAN: ERINN SANCHEZ   FINDINGS: Postsurgical changes related to right pneumonectomy with partially resected ribs.. Interval removal of right chest tube. Left subclavian MediPort and right IJ central venous catheter sheath are unchanged in positioning.   Cardiac silhouette size is slightly enlarged, unchanged.   Faint left basilar airspace opacity, likely atelectatic. No  sizable pneumothorax.   Small amount of subcutaneous and soft tissue emphysema in the right chest wall.       1. Interval removal of right chest tube. Other medical devices as described above. 2. Postsurgical changes related to right pneumonectomy. 3. Faint left basilar opacity, likely atelectatic.       Signed by: Vick Xiao 1/14/2025 2:53 PM Dictation workstation:   [id number]    XR chest 1 view    Result Date: 1/14/2025  Interpreted By:  Nino Barbour, STUDY: XR CHEST 1 VIEW; 1/14/2025 7:06 am   INDICATION: Signs/Symptoms:recent pneumonectomy.   COMPARISON: 01/13/2025.   ACCESSION NUMBER(S): [id number]   ORDERING CLINICIAN: GABBY SMITH   FINDINGS: Right-sided chest tube in place.   CARDIOMEDIASTINAL SILHOUETTE: Right-sided mediastinal shift consistent with recent right-sided pneumonectomy.   LUNGS: Right-sided pneumonectomy changes.   ABDOMEN: No remarkable upper abdominal findings.   BONES: Right-sided thoracotomy changes.       1.  Stable right-sided pneumonectomy changes.     Signed by: Nino Barbour 1/14/2025 12:56 PM Dictation workstation:   [id number]    Transthoracic Echo (TTE) Limited    Result Date: 1/13/2025   [facility] (formerly [facility] [address] [place] [address]                Tel [phone] and Fax [phone] TRANSTHORACIC ECHOCARDIOGRAM REPORT  Patient Name:       RAÚL Rodríguez Physician:    [id number] Cam Llanos MD Study Date:         1/13/2025           Ordering Provider:    [id number] WILMER ANTHONY MRN/PID:            [id number]            Fellow: Accession#:         [id number]        Nurse: Date of Birth/Age:  1960 / 64      Sonographer:          José Miguel rocha                                     CARIE Gender assigned at  F                   Additional Staff: Birth: Height:              165.10 cm           Admit Date:           1/13/2025 Weight:             51.71 kg            Admission Status:     Inpatient - STAT BSA / BMI:          1.56 m2 / 18.97     Encounter#:           [id number]                     kg/m2 Blood Pressure:     100/57 mmHg         Department Location:  [facility] Study Type:    TRANSTHORACIC ECHO (TTE) LIMITED Diagnosis/ICD: Hypotension, unspecified-I95.9 Indication:    Hypotension CPT Code:      Echo Limited-11603; Doppler Limited-53818; Color Doppler-73156  Study Detail: The following Echo studies were performed: 2D, Doppler and color               flow. Technically challenging study due to body habitus and               patient lying in supine position.  PHYSICIAN INTERPRETATION: Left Ventricle: The left ventricular systolic function is normal, with a visually estimated ejection fraction of 60-65%. There are no regional left ventricular wall motion abnormalities. The left ventricular cavity size is normal. Left ventricular diastolic filling was not assessed. Left Atrium: The left atrium was not well visualized. Right Ventricle: The right ventricle is normal in size. There is low normal right ventricular systolic function. RV S' and TAPSE under measured due to technical difficulty visualizing lateral tricuspid valve annulus. Right Atrium: The right atrium was not well visualized. Aortic Valve: The aortic valve was not well visualized. There is no evidence of aortic valve regurgitation. Mitral Valve: The mitral valve is normal in structure. There is trace mitral valve regurgitation. Tricuspid Valve: The tricuspid valve was not well visualized. There is trace tricuspid regurgitation. Pulmonic Valve: The pulmonic valve is not well visualized. The pulmonic valve regurgitation was not well visualized. Pericardium: Trivial pericardial effusion. Aorta: The aortic root is normal. Pulmonary Artery: The tricuspid regurgitant velocity is 2.10 m/s, and with an estimated right atrial  pressure of 8 mmHg, the estimated pulmonary artery pressure is normal with the RVSP at 25.6 mmHg. Systemic Veins: The inferior vena cava appears normal in size, with IVC inspiratory collapse less than 50%. In comparison to the previous echocardiogram(s): Compared with study dated 10/3/2024, no significant change.  CONCLUSIONS:  1. The left ventricular systolic function is normal, with a visually estimated ejection fraction of 60-65%.  2. There is low normal right ventricular systolic function. QUANTITATIVE DATA SUMMARY:  LV SYSTOLIC FUNCTION BY 2D PLANIMETRY (MOD):                      Normal Ranges: EF-A4C View:    65 % (>=55%) EF-Visual:      63 % LV EF Reported: 63 %  RIGHT VENTRICLE: TAPSE: 10.0 mm RV s'  0.06 m/s  TRICUSPID VALVE/RVSP:          Normal Ranges: Peak TR Velocity:     2.10 m/s Est. RA Pressure:     8 mmHg RV Syst Pressure:     26 mmHg  (< 30mmHg) IVC Diam:             2.00 cm  [id number] Cam Llanos MD Electronically signed on 1/13/2025 at 5:06:16 PM  ** Final **     XR chest 1 view    Result Date: 1/13/2025  Interpreted By:  Vick Eli, STUDY: XR CHEST 1 VIEW; 1/13/2025 2:52 pm   INDICATION: Signs/Symptoms:s/p pneumonectomy.   COMPARISON: CT dated 07/17/2024 and PET-CT dated 12/30/2024   ACCESSION NUMBER(S): [id number]   ORDERING CLINICIAN: WILMER ANTHONY   FINDINGS: Right IJ central venous catheter sheath is projecting over mid SV C. Left subclavian approach MediPort is in place with the tip projecting over upper to mid SVC. Right chest tube is in place.   Cardiac silhouette size is within normal limits.   Postsurgical changes related to right pneumonectomy with multiple partial right-sided rib resection. Mild interstitial prominence of the left lung.   Subcutaneous and soft tissue emphysema in the right chest wall.         1. Postsurgical changes related to right sided pneumonectomy with multiple medical devices as described above. 2. Mild interstitial prominence of the left  lung.       Signed by: Vick Vides Dameon 1/13/2025 3:16 PM Dictation workstation:   [id number]    NM PET CT lung CA staging    Result Date: 12/31/2024  Interpreted By:  Kush Hansen and Bera Kaustav STUDY: NM PET CT LUNG CA STAGING;  12/30/2024 11:08 am   INDICATION: Signs/Symptoms:DIAGNOSTIC.   Diagnosed with a large right lung mass in January 20, 2024, status post neoadjuvant chemoradiation as well as immunotherapy ending on July 2024. Currently scheduled for surgery on 01/10/2025.   COMPARISON: PET-CT on 08/15/2024   ACCESSION NUMBER(S): [id number]   ORDERING CLINICIAN: GABBY SMITH   TECHNIQUE: DIVISION OF NUCLEAR MEDICINE POSITRON EMISSION TOMOGRAPHY (PET-CT)   The patient received an intravenous dose of 11.2 mCi of Fluorine-18 fluorodeoxyglucose (FDG).  Positron emission tomographic (PET) images from mid thigh to skull base were then acquired after a one hour delay. Also acquired was a contemporaneous low dose non-contrast CT scan performed for attenuation correction of PET images and anatomic localization.  The PET and CT images were digitally fused for display.  All images were acquired on a combined PET-CT scanner unit. Some areas of FDG accumulation may be described in standardized uptake value (SUV) units.   CODING: Subsequent Treatment Strategy (PS)   CALIBRATION: Dose Injection-to-Scan Interval (mins): 51 min Mediastinal bloodpool SUV (normal 1.5-2.5): 2.1 Blood glucose: NA   FINDINGS: HEAD AND NECK: * No evidence of focal FDG avid lesion in the partially visualized brain parenchyma, noting that evaluation is limited because of the expected physiologic diffuse FDG uptake in the brain. * No FDG avid cervical lymphadenopathy is present. * No paranasal sinus diease. * Thyroid gland is unremarkable.   CHEST: *There has been interval worsening collapse of the right lung, with the right lung now completely fluid-filled. There has been interval worsening of FDG avid mass with central necrosis in   posterior right upper lobe with SUV max of 15, as compared to 8 previously. Left lung is unremarkable *No FDG avid mediastinal, hilar or axillary lymphadenopathy. *Unremarkable both breasts   ABDOMEN AND PELVIS: *No FDG avid lymphadenopathy in the abdomen or pelvis. *Bilateral adrenal glands are unremarkable. *Colonic diverticulosis without evidence of diverticulitis. *Physiologic radiotracer uptake is present in the liver and spleen with excretion into the bowel loops and the genitourinary tract.   MUSCULOSKELETAL: *No concerning FDG avid bone lesion throughout the axial and appendicular skeleton to suggest osseous metastasis.       1. Interval worsening of the FDG-avid mass with central necrosis in the posterior right upper lobe, along with increased collapse of the right lung compared to the prior PET from 08/15/2024, suggestive of disease progression. 2. No other concerning FDG avid disease identified.   I personally reviewed the image(s) / study and agree with the findings and interpretation as stated. This study was interpreted at [facility].   MACRO: None       Signed by: Kush Hansen 12/31/2024 6:13 AM Dictation workstation:   [id number]   Results for orders placed or performed during the hospital encounter of 01/13/25 (from the past 24 hours)   Magnesium   Result Value Ref Range    Magnesium 2.43 (H) 1.60 - 2.40 mg/dL   Calcium, Ionized   Result Value Ref Range    POCT Calcium, Ionized 1.08 (L) 1.1 - 1.33 mmol/L   CBC   Result Value Ref Range    WBC 11.2 4.4 - 11.3 x10*3/uL    nRBC 0.2 (H) 0.0 - 0.0 /100 WBCs    RBC 3.77 (L) 4.00 - 5.20 x10*6/uL    Hemoglobin 10.2 (L) 12.0 - 16.0 g/dL    Hematocrit 29.4 (L) 36.0 - 46.0 %    MCV 78 (L) 80 - 100 fL    MCH 27.1 26.0 - 34.0 pg    MCHC 34.7 32.0 - 36.0 g/dL    RDW 14.4 11.5 - 14.5 %    Platelets 185 150 - 450 x10*3/uL   Renal function panel   Result Value Ref Range    Glucose 143 (H) 74 - 99 mg/dL    Sodium 132 (L) 136 - 145  mmol/L    Potassium 3.6 3.5 - 5.3 mmol/L    Chloride 91 (L) 98 - 107 mmol/L    Bicarbonate 32 21 - 32 mmol/L    Anion Gap 13 10 - 20 mmol/L    Urea Nitrogen 17 6 - 23 mg/dL    Creatinine 0.66 0.50 - 1.05 mg/dL    eGFR >90 >60 mL/min/1.73m*2    Calcium 7.9 (L) 8.6 - 10.6 mg/dL    Phosphorus 2.9 2.5 - 4.9 mg/dL    Albumin 3.2 (L) 3.4 - 5.0 g/dL   Hepatic Function Panel   Result Value Ref Range    Albumin 3.2 (L) 3.4 - 5.0 g/dL    Bilirubin, Total 0.8 0.0 - 1.2 mg/dL    Bilirubin, Direct 0.3 0.0 - 0.3 mg/dL    Alkaline Phosphatase 66 33 - 136 U/L     (H) 7 - 45 U/L     (H) 9 - 39 U/L    Total Protein 4.8 (L) 6.4 - 8.2 g/dL   POCT GLUCOSE   Result Value Ref Range    POCT Glucose 149 (H) 74 - 99 mg/dL   Blood Gas Arterial Full Panel   Result Value Ref Range    POCT pH, Arterial 7.39 7.38 - 7.42 pH    POCT pCO2, Arterial 51 (H) 38 - 42 mm Hg    POCT pO2, Arterial 79 (L) 85 - 95 mm Hg    POCT SO2, Arterial 97 94 - 100 %    POCT Oxy Hemoglobin, Arterial 94.4 94.0 - 98.0 %    POCT Hematocrit Calculated, Arterial 31.0 (L) 36.0 - 46.0 %    POCT Sodium, Arterial 127 (L) 136 - 145 mmol/L    POCT Potassium, Arterial 3.3 (L) 3.5 - 5.3 mmol/L    POCT Chloride, Arterial 93 (L) 98 - 107 mmol/L    POCT Ionized Calcium, Arterial 1.10 1.10 - 1.33 mmol/L    POCT Glucose, Arterial 146 (H) 74 - 99 mg/dL    POCT Lactate, Arterial 1.3 0.4 - 2.0 mmol/L    POCT Base Excess, Arterial 5.0 (H) -2.0 - 3.0 mmol/L    POCT HCO3 Calculated, Arterial 30.9 (H) 22.0 - 26.0 mmol/L    POCT Hemoglobin, Arterial 10.3 (L) 12.0 - 16.0 g/dL    POCT Anion Gap, Arterial 6 (L) 10 - 25 mmo/L    Patient Temperature 37.0 degrees Celsius    FiO2 70 %   BLOOD GAS MIXED VENOUS FULL PANEL   Result Value Ref Range    POCT pH, Mixed 7.38 7.33 - 7.43 pH    POCT pCO2, Mixed 56 (H) 41 - 51 mm Hg    POCT pO2, Mixed 40 35 - 45 mm Hg    POCT SO2, Mixed 67 45 - 75 %    POCT Oxy Hemoglobin, Mixed 66.2 45.0 - 75.0 %    POCT Hematocrit Calculated, Mixed 31.0 (L) 36.0 -  46.0 %    POCT Sodium, Mixed 127 (L) 136 - 145 mmol/L    POCT Potassium, Mixed 3.3 (L) 3.5 - 5.3 mmol/L    POCT Chloride, Mixed 92 (L) 98 - 107 mmol/L    POCT Ionized Calcium, Mixed 1.09 (L) 1.10 - 1.33 mmol/L    POCT Glucose, Mixed 149 (H) 74 - 99 mg/dL    POCT Lactate, Mixed 1.3 0.4 - 2.0 mmol/L    POCT Base Excess, Mixed 6.7 (H) -2.0 - 3.0 mmol/L    POCT HCO3 Calculated, Mixed 33.1 (H) 22.0 - 26.0 mmol/L    POCT Hemoglobin, Mixed 10.2 (L) 12.0 - 16.0 g/dL    POCT Anion Gap, Mixed 5 (L) 10 - 25 mmo/L    Patient Temperature 37.0 degrees Celsius    FiO2 70 %   POCT GLUCOSE   Result Value Ref Range    POCT Glucose 179 (H) 74 - 99 mg/dL   Blood Gas Arterial Full Panel   Result Value Ref Range    POCT pH, Arterial 7.41 7.38 - 7.42 pH    POCT pCO2, Arterial 48 (H) 38 - 42 mm Hg    POCT pO2, Arterial 90 85 - 95 mm Hg    POCT SO2, Arterial 99 94 - 100 %    POCT Oxy Hemoglobin, Arterial 96.1 94.0 - 98.0 %    POCT Hematocrit Calculated, Arterial 32.0 (L) 36.0 - 46.0 %    POCT Sodium, Arterial 125 (L) 136 - 145 mmol/L    POCT Potassium, Arterial 3.5 3.5 - 5.3 mmol/L    POCT Chloride, Arterial 94 (L) 98 - 107 mmol/L    POCT Ionized Calcium, Arterial 1.11 1.10 - 1.33 mmol/L    POCT Glucose, Arterial 172 (H) 74 - 99 mg/dL    POCT Lactate, Arterial 1.1 0.4 - 2.0 mmol/L    POCT Base Excess, Arterial 5.0 (H) -2.0 - 3.0 mmol/L    POCT HCO3 Calculated, Arterial 30.4 (H) 22.0 - 26.0 mmol/L    POCT Hemoglobin, Arterial 10.6 (L) 12.0 - 16.0 g/dL    POCT Anion Gap, Arterial 4 (L) 10 - 25 mmo/L    Patient Temperature 37.0 degrees Celsius    FiO2 70 %   Type and screen   Result Value Ref Range    ABO TYPE B     Rh TYPE POS     ANTIBODY SCREEN NEG    BLOOD GAS MIXED VENOUS FULL PANEL   Result Value Ref Range    POCT pH, Mixed 7.38 7.33 - 7.43 pH    POCT pCO2, Mixed 54 (H) 41 - 51 mm Hg    POCT pO2, Mixed 39 35 - 45 mm Hg    POCT SO2, Mixed 65 45 - 75 %    POCT Oxy Hemoglobin, Mixed 64.0 45.0 - 75.0 %    POCT Hematocrit Calculated, Mixed  32.0 (L) 36.0 - 46.0 %    POCT Sodium, Mixed 126 (L) 136 - 145 mmol/L    POCT Potassium, Mixed 3.8 3.5 - 5.3 mmol/L    POCT Chloride, Mixed      POCT Ionized Calcium, Mixed 1.15 1.10 - 1.33 mmol/L    POCT Glucose, Mixed 174 (H) 74 - 99 mg/dL    POCT Lactate, Mixed 1.0 0.4 - 2.0 mmol/L    POCT Base Excess, Mixed 5.6 (H) -2.0 - 3.0 mmol/L    POCT HCO3 Calculated, Mixed 31.9 (H) 22.0 - 26.0 mmol/L    POCT Hemoglobin, Mixed 10.7 (L) 12.0 - 16.0 g/dL    POCT Anion Gap, Mixed      Patient Temperature 37.0 degrees Celsius    FiO2 70 %   POCT GLUCOSE   Result Value Ref Range    POCT Glucose 180 (H) 74 - 99 mg/dL   Blood Gas Arterial Full Panel   Result Value Ref Range    POCT pH, Arterial 7.41 7.38 - 7.42 pH    POCT pCO2, Arterial 46 (H) 38 - 42 mm Hg    POCT pO2, Arterial 113 (H) 85 - 95 mm Hg    POCT SO2, Arterial 99 94 - 100 %    POCT Oxy Hemoglobin, Arterial 96.9 94.0 - 98.0 %    POCT Hematocrit Calculated, Arterial 32.0 (L) 36.0 - 46.0 %    POCT Sodium, Arterial 125 (L) 136 - 145 mmol/L    POCT Potassium, Arterial 3.7 3.5 - 5.3 mmol/L    POCT Chloride, Arterial 94 (L) 98 - 107 mmol/L    POCT Ionized Calcium, Arterial 1.12 1.10 - 1.33 mmol/L    POCT Glucose, Arterial 171 (H) 74 - 99 mg/dL    POCT Lactate, Arterial 1.2 0.4 - 2.0 mmol/L    POCT Base Excess, Arterial 3.9 (H) -2.0 - 3.0 mmol/L    POCT HCO3 Calculated, Arterial 29.2 (H) 22.0 - 26.0 mmol/L    POCT Hemoglobin, Arterial 10.6 (L) 12.0 - 16.0 g/dL    POCT Anion Gap, Arterial 6 (L) 10 - 25 mmo/L    Patient Temperature 37.0 degrees Celsius    FiO2 70 %   BLOOD GAS MIXED VENOUS FULL PANEL   Result Value Ref Range    POCT pH, Mixed 7.39 7.33 - 7.43 pH    POCT pCO2, Mixed 50 41 - 51 mm Hg    POCT pO2, Mixed 40 35 - 45 mm Hg    POCT SO2, Mixed 68 45 - 75 %    POCT Oxy Hemoglobin, Mixed 66.5 45.0 - 75.0 %    POCT Hematocrit Calculated, Mixed 30.0 (L) 36.0 - 46.0 %    POCT Sodium, Mixed 127 (L) 136 - 145 mmol/L    POCT Potassium, Mixed 3.9 3.5 - 5.3 mmol/L    POCT  Chloride, Mixed 95 (L) 98 - 107 mmol/L    POCT Ionized Calcium, Mixed 1.08 (L) 1.10 - 1.33 mmol/L    POCT Glucose, Mixed 171 (H) 74 - 99 mg/dL    POCT Lactate, Mixed 1.1 0.4 - 2.0 mmol/L    POCT Base Excess, Mixed 4.5 (H) -2.0 - 3.0 mmol/L    POCT HCO3 Calculated, Mixed 30.3 (H) 22.0 - 26.0 mmol/L    POCT Hemoglobin, Mixed 10.0 (L) 12.0 - 16.0 g/dL    POCT Anion Gap, Mixed 6 (L) 10 - 25 mmo/L    Patient Temperature 37.0 degrees Celsius    FiO2 70 %   POCT GLUCOSE   Result Value Ref Range    POCT Glucose 161 (H) 74 - 99 mg/dL   BLOOD GAS MIXED VENOUS FULL PANEL   Result Value Ref Range    POCT pH, Mixed 7.36 7.33 - 7.43 pH    POCT pCO2, Mixed 51 41 - 51 mm Hg    POCT pO2, Mixed 38 35 - 45 mm Hg    POCT SO2, Mixed 63 45 - 75 %    POCT Oxy Hemoglobin, Mixed 61.8 45.0 - 75.0 %    POCT Hematocrit Calculated, Mixed 30.0 (L) 36.0 - 46.0 %    POCT Sodium, Mixed 128 (L) 136 - 145 mmol/L    POCT Potassium, Mixed 3.6 3.5 - 5.3 mmol/L    POCT Chloride, Mixed 95 (L) 98 - 107 mmol/L    POCT Ionized Calcium, Mixed 1.10 1.10 - 1.33 mmol/L    POCT Glucose, Mixed 128 (H) 74 - 99 mg/dL    POCT Lactate, Mixed 1.6 0.4 - 2.0 mmol/L    POCT Base Excess, Mixed 2.7 -2.0 - 3.0 mmol/L    POCT HCO3 Calculated, Mixed 28.8 (H) 22.0 - 26.0 mmol/L    POCT Hemoglobin, Mixed 10.1 (L) 12.0 - 16.0 g/dL    POCT Anion Gap, Mixed 8 (L) 10 - 25 mmo/L    Patient Temperature 37.0 degrees Celsius    FiO2 60 %   Calcium, Ionized   Result Value Ref Range    POCT Calcium, Ionized 1.14 1.1 - 1.33 mmol/L   CBC   Result Value Ref Range    WBC 12.7 (H) 4.4 - 11.3 x10*3/uL    nRBC 0.2 (H) 0.0 - 0.0 /100 WBCs    RBC 3.72 (L) 4.00 - 5.20 x10*6/uL    Hemoglobin 10.1 (L) 12.0 - 16.0 g/dL    Hematocrit 29.6 (L) 36.0 - 46.0 %    MCV 80 80 - 100 fL    MCH 27.2 26.0 - 34.0 pg    MCHC 34.1 32.0 - 36.0 g/dL    RDW 14.8 (H) 11.5 - 14.5 %    Platelets 191 150 - 450 x10*3/uL   Coagulation Screen   Result Value Ref Range    Protime 12.7 9.8 - 12.8 seconds    INR 1.1 0.9 - 1.1     aPTT 40 (H) 27 - 38 seconds   Renal function panel   Result Value Ref Range    Glucose 138 (H) 74 - 99 mg/dL    Sodium 132 (L) 136 - 145 mmol/L    Potassium 3.9 3.5 - 5.3 mmol/L    Chloride 92 (L) 98 - 107 mmol/L    Bicarbonate 29 21 - 32 mmol/L    Anion Gap 15 10 - 20 mmol/L    Urea Nitrogen 16 6 - 23 mg/dL    Creatinine 0.51 0.50 - 1.05 mg/dL    eGFR >90 >60 mL/min/1.73m*2    Calcium 7.9 (L) 8.6 - 10.6 mg/dL    Phosphorus 2.9 2.5 - 4.9 mg/dL    Albumin 3.1 (L) 3.4 - 5.0 g/dL   Magnesium   Result Value Ref Range    Magnesium 2.22 1.60 - 2.40 mg/dL   Hepatic function panel   Result Value Ref Range    Albumin 3.1 (L) 3.4 - 5.0 g/dL    Bilirubin, Total 0.8 0.0 - 1.2 mg/dL    Bilirubin, Direct 0.3 0.0 - 0.3 mg/dL    Alkaline Phosphatase 65 33 - 136 U/L     (H) 7 - 45 U/L     (H) 9 - 39 U/L    Total Protein 4.8 (L) 6.4 - 8.2 g/dL   Blood Gas Arterial Full Panel   Result Value Ref Range    POCT pH, Arterial 7.41 7.38 - 7.42 pH    POCT pCO2, Arterial 48 (H) 38 - 42 mm Hg    POCT pO2, Arterial 88 85 - 95 mm Hg    POCT SO2, Arterial 98 94 - 100 %    POCT Oxy Hemoglobin, Arterial 95.8 94.0 - 98.0 %    POCT Hematocrit Calculated, Arterial 33.0 (L) 36.0 - 46.0 %    POCT Sodium, Arterial 126 (L) 136 - 145 mmol/L    POCT Potassium, Arterial 3.7 3.5 - 5.3 mmol/L    POCT Chloride, Arterial 94 (L) 98 - 107 mmol/L    POCT Ionized Calcium, Arterial 1.13 1.10 - 1.33 mmol/L    POCT Glucose, Arterial 141 (H) 74 - 99 mg/dL    POCT Lactate, Arterial 1.9 0.4 - 2.0 mmol/L    POCT Base Excess, Arterial 4.9 (H) -2.0 - 3.0 mmol/L    POCT HCO3 Calculated, Arterial 30.4 (H) 22.0 - 26.0 mmol/L    POCT Hemoglobin, Arterial 11.0 (L) 12.0 - 16.0 g/dL    POCT Anion Gap, Arterial 5 (L) 10 - 25 mmo/L    Patient Temperature 37.0 degrees Celsius    FiO2 60 %   Blood Gas Arterial Full Panel   Result Value Ref Range    POCT pH, Arterial 7.40 7.38 - 7.42 pH    POCT pCO2, Arterial 53 (H) 38 - 42 mm Hg    POCT pO2, Arterial 90 85 - 95 mm Hg     POCT SO2, Arterial 97 94 - 100 %    POCT Oxy Hemoglobin, Arterial 95.4 94.0 - 98.0 %    POCT Hematocrit Calculated, Arterial 33.0 (L) 36.0 - 46.0 %    POCT Sodium, Arterial 126 (L) 136 - 145 mmol/L    POCT Potassium, Arterial 3.8 3.5 - 5.3 mmol/L    POCT Chloride, Arterial 93 (L) 98 - 107 mmol/L    POCT Ionized Calcium, Arterial 1.10 1.10 - 1.33 mmol/L    POCT Glucose, Arterial 144 (H) 74 - 99 mg/dL    POCT Lactate, Arterial 1.2 0.4 - 2.0 mmol/L    POCT Base Excess, Arterial 6.8 (H) -2.0 - 3.0 mmol/L    POCT HCO3 Calculated, Arterial 32.8 (H) 22.0 - 26.0 mmol/L    POCT Hemoglobin, Arterial 11.1 (L) 12.0 - 16.0 g/dL    POCT Anion Gap, Arterial 4 (L) 10 - 25 mmo/L    Patient Temperature 37.0 degrees Celsius    FiO2 60 %   BLOOD GAS MIXED VENOUS FULL PANEL   Result Value Ref Range    POCT pH, Mixed 7.36 7.33 - 7.43 pH    POCT pCO2, Mixed 57 (H) 41 - 51 mm Hg    POCT pO2, Mixed 40 35 - 45 mm Hg    POCT SO2, Mixed 68 45 - 75 %    POCT Oxy Hemoglobin, Mixed 66.6 45.0 - 75.0 %    POCT Hematocrit Calculated, Mixed 32.0 (L) 36.0 - 46.0 %    POCT Sodium, Mixed 126 (L) 136 - 145 mmol/L    POCT Potassium, Mixed 3.8 3.5 - 5.3 mmol/L    POCT Chloride, Mixed 93 (L) 98 - 107 mmol/L    POCT Ionized Calcium, Mixed 1.16 1.10 - 1.33 mmol/L    POCT Glucose, Mixed 142 (H) 74 - 99 mg/dL    POCT Lactate, Mixed 1.1 0.4 - 2.0 mmol/L    POCT Base Excess, Mixed 5.5 (H) -2.0 - 3.0 mmol/L    POCT HCO3 Calculated, Mixed 32.2 (H) 22.0 - 26.0 mmol/L    POCT Hemoglobin, Mixed 10.5 (L) 12.0 - 16.0 g/dL    POCT Anion Gap, Mixed 5 (L) 10 - 25 mmo/L    Patient Temperature 37.0 degrees Celsius    FiO2 60 %   POCT GLUCOSE   Result Value Ref Range    POCT Glucose 140 (H) 74 - 99 mg/dL   POCT GLUCOSE   Result Value Ref Range    POCT Glucose 136 (H) 74 - 99 mg/dL     Scheduled medications  albumin human, 25 g, intravenous, q8h  azithromycin, 250 mg, intravenous, q24h  bisacodyl, 10 mg, rectal, Daily  budesonide, 0.5 mg, nebulization, BID  fluconazole, 400  mg, intravenous, q24h  heparin (porcine), 5,000 Units, subcutaneous, q8h  hydrocortisone sodium succinate, 50 mg, intravenous, q6h  insulin lispro, 0-5 Units, subcutaneous, q4h  ipratropium-albuteroL, 3 mL, nebulization, q6h  lidocaine, 1 patch, transdermal, Daily  oxygen, , inhalation, Continuous - Inhalation  [START ON 1/21/2025] pantoprazole, 40 mg, intravenous, Daily  perflutren lipid microspheres, 0.5-10 mL of dilution, intravenous, Once in imaging  perflutren lipid microspheres, 0.5-10 mL of dilution, intravenous, Once in imaging  piperacillin-tazobactam, 4.5 g, intravenous, q6h      Continuous medications  dexmedeTOMIDine, 0-0.6 mcg/kg/hr, Last Rate: 0.5 mcg/kg/hr (01/20/25 1200)  epoprostenol, 0.05 mcg/kg/min (Ideal), Last Rate: 0.05 mcg/kg/min (01/20/25 0825)  fentaNYL, 50 mcg/hr, Last Rate: 50 mcg/hr (01/20/25 1200)  milrinone, 0.25 mcg/kg/min, Last Rate: 0.25 mcg/kg/min (01/20/25 1200)  norepinephrine, 0-0.2 mcg/kg/min, Last Rate: Stopped (01/20/25 1000)  vasopressin, 0-0.06 Units/min, Last Rate: 0.03 Units/min (01/20/25 1200)      PRN medications  PRN medications: albuterol, calcium gluconate, calcium gluconate, dextrose, dextrose, glucagon, glucagon, HYDROmorphone, magnesium sulfate, magnesium sulfate, potassium chloride, potassium chloride          Assessment/Plan   Jannie Vick is a 64 y.o. 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 Jonah Wagner.  Patient had a preoperative hypotension requiring vasopressors and postoperative hypotension requiring vasopressors which she has been subsequently weaned off of, increasing oxygen demand status post surgery which had subsequently been weaned off.  However on 1/16/2025 patient had borderline low blood pressures with 90s/60s, increasing oxygen requirements, and an  echocardiogram illustrating biventricular Takotsubo cardiomyopathy for which cardiology was consulted for further recommendations. Given that the patient was showing signs of low output state with altered mental status, decreased urine output with a lactate of 2 along with echo showing signs of elevated left and right sided pressures. A shock call was done where the recommendation was start the patient on inotrope (milrinone) along with a lasix drip and to place a central line to transduce the CVP and run a mixed venous Svo2 along repeating a lactate and if she's clinically worsening to re-conference for a shock call.     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     Thank you for involving us in this patient care. Recommendations not final until signed by attending.     General Cardiology Consult Pager: [phone] (weekday 7AM-6PM and weekend 7AM-2PM) and other: [phone]  EP Consult Pager: [phone] (weekday 7AM-6PM and weekend 7AM-2PM) and other: [phone]  CICU Fellow Pager: [phone] anytime  EP Device Nurse Pager: [phone] (weekday 7AM-4PM)  Advanced Heart Failure Consult Pager: [phone] anytime     Case discussed with cardiology fellow Dr. Cavanaugh, and with Dr. Diana Robledo MD  Internal Medicine PGY-2

## 2025-01-20 NOTE — PROGRESS NOTES
"Faustina Nicanor \"Noam" is a 64 y.o. female on day 6 of admission presenting with Primary cancer of right upper lobe of lung (Multi).    Patient remains intubated. Patient is not medically ready for discharge. SW will continue to follow for progress towards discharge planning.       "

## 2025-01-21 ENCOUNTER — APPOINTMENT (OUTPATIENT)
Dept: RADIOLOGY | Facility: HOSPITAL | Age: 65
End: 2025-01-21
Payer: COMMERCIAL

## 2025-01-21 LAB
ALBUMIN SERPL BCP-MCNC: 3.6 G/DL (ref 3.4–5)
ALBUMIN SERPL BCP-MCNC: 3.6 G/DL (ref 3.4–5)
ALBUMIN SERPL BCP-MCNC: 3.8 G/DL (ref 3.4–5)
ALP SERPL-CCNC: 63 U/L (ref 33–136)
ALT SERPL W P-5'-P-CCNC: 51 U/L (ref 7–45)
ANION GAP BLDA CALCULATED.4IONS-SCNC: 4 MMO/L (ref 10–25)
ANION GAP BLDA CALCULATED.4IONS-SCNC: 4 MMO/L (ref 10–25)
ANION GAP BLDA CALCULATED.4IONS-SCNC: 7 MMO/L (ref 10–25)
ANION GAP BLDA CALCULATED.4IONS-SCNC: 7 MMO/L (ref 10–25)
ANION GAP BLDMV CALCULATED.4IONS-SCNC: 4 MMO/L (ref 10–25)
ANION GAP BLDMV CALCULATED.4IONS-SCNC: 6 MMO/L (ref 10–25)
ANION GAP BLDMV CALCULATED.4IONS-SCNC: 6 MMO/L (ref 10–25)
ANION GAP BLDMV CALCULATED.4IONS-SCNC: 8 MMO/L (ref 10–25)
ANION GAP BLDMV CALCULATED.4IONS-SCNC: ABNORMAL MMOL/L
ANION GAP SERPL CALC-SCNC: 11 MMOL/L (ref 10–20)
ANION GAP SERPL CALC-SCNC: 13 MMOL/L (ref 10–20)
ANION GAP SERPL CALC-SCNC: 14 MMOL/L (ref 10–20)
ANION GAP SERPL CALC-SCNC: 16 MMOL/L (ref 10–20)
APTT PPP: 44 SECONDS (ref 27–38)
APTT PPP: 47 SECONDS (ref 27–38)
AST SERPL W P-5'-P-CCNC: 38 U/L (ref 9–39)
ATRIAL RATE: 112 BPM
ATRIAL RATE: 133 BPM
ATRIAL RATE: 134 BPM
BACTERIA SPEC RESP CULT: NO GROWTH
BASE EXCESS BLDA CALC-SCNC: 10 MMOL/L (ref -2–3)
BASE EXCESS BLDA CALC-SCNC: 6 MMOL/L (ref -2–3)
BASE EXCESS BLDA CALC-SCNC: 8.8 MMOL/L (ref -2–3)
BASE EXCESS BLDA CALC-SCNC: 9.9 MMOL/L (ref -2–3)
BASE EXCESS BLDMV CALC-SCNC: 10.4 MMOL/L (ref -2–3)
BASE EXCESS BLDMV CALC-SCNC: 10.4 MMOL/L (ref -2–3)
BASE EXCESS BLDMV CALC-SCNC: 10.7 MMOL/L (ref -2–3)
BASE EXCESS BLDMV CALC-SCNC: 7 MMOL/L (ref -2–3)
BASE EXCESS BLDMV CALC-SCNC: 7.4 MMOL/L (ref -2–3)
BASE EXCESS BLDMV CALC-SCNC: 7.5 MMOL/L (ref -2–3)
BASE EXCESS BLDMV CALC-SCNC: 8.2 MMOL/L (ref -2–3)
BILIRUB DIRECT SERPL-MCNC: 0.3 MG/DL (ref 0–0.3)
BILIRUB SERPL-MCNC: 0.9 MG/DL (ref 0–1.2)
BODY TEMPERATURE: 37 DEGREES CELSIUS
BUN SERPL-MCNC: 15 MG/DL (ref 6–23)
BUN SERPL-MCNC: 16 MG/DL (ref 6–23)
CA-I BLD-SCNC: 1.08 MMOL/L (ref 1.1–1.33)
CA-I BLD-SCNC: 1.13 MMOL/L (ref 1.1–1.33)
CA-I BLD-SCNC: 1.15 MMOL/L (ref 1.1–1.33)
CA-I BLDA-SCNC: 1.04 MMOL/L (ref 1.1–1.33)
CA-I BLDA-SCNC: 1.12 MMOL/L (ref 1.1–1.33)
CA-I BLDA-SCNC: 1.14 MMOL/L (ref 1.1–1.33)
CA-I BLDA-SCNC: 1.18 MMOL/L (ref 1.1–1.33)
CA-I BLDMV-SCNC: 1.09 MMOL/L (ref 1.1–1.33)
CA-I BLDMV-SCNC: 1.09 MMOL/L (ref 1.1–1.33)
CA-I BLDMV-SCNC: 1.11 MMOL/L (ref 1.1–1.33)
CA-I BLDMV-SCNC: 1.13 MMOL/L (ref 1.1–1.33)
CA-I BLDMV-SCNC: 1.15 MMOL/L (ref 1.1–1.33)
CA-I BLDMV-SCNC: 1.16 MMOL/L (ref 1.1–1.33)
CA-I BLDMV-SCNC: 1.17 MMOL/L (ref 1.1–1.33)
CALCIUM SERPL-MCNC: 8.2 MG/DL (ref 8.6–10.6)
CALCIUM SERPL-MCNC: 8.3 MG/DL (ref 8.6–10.6)
CALCIUM SERPL-MCNC: 8.3 MG/DL (ref 8.6–10.6)
CALCIUM SERPL-MCNC: 8.6 MG/DL (ref 8.6–10.6)
CHLORIDE BLD-SCNC: 92 MMOL/L (ref 98–107)
CHLORIDE BLD-SCNC: 93 MMOL/L (ref 98–107)
CHLORIDE BLD-SCNC: 94 MMOL/L (ref 98–107)
CHLORIDE BLD-SCNC: 95 MMOL/L (ref 98–107)
CHLORIDE BLD-SCNC: 96 MMOL/L (ref 98–107)
CHLORIDE BLD-SCNC: 97 MMOL/L (ref 98–107)
CHLORIDE BLD-SCNC: ABNORMAL MMOL/L
CHLORIDE BLDA-SCNC: 102 MMOL/L (ref 98–107)
CHLORIDE BLDA-SCNC: 91 MMOL/L (ref 98–107)
CHLORIDE BLDA-SCNC: 92 MMOL/L (ref 98–107)
CHLORIDE BLDA-SCNC: 94 MMOL/L (ref 98–107)
CHLORIDE SERPL-SCNC: 89 MMOL/L (ref 98–107)
CHLORIDE SERPL-SCNC: 90 MMOL/L (ref 98–107)
CHLORIDE SERPL-SCNC: 91 MMOL/L (ref 98–107)
CHLORIDE SERPL-SCNC: 92 MMOL/L (ref 98–107)
CO2 SERPL-SCNC: 33 MMOL/L (ref 21–32)
CO2 SERPL-SCNC: 34 MMOL/L (ref 21–32)
CO2 SERPL-SCNC: 37 MMOL/L (ref 21–32)
CO2 SERPL-SCNC: 38 MMOL/L (ref 21–32)
CREAT SERPL-MCNC: 0.44 MG/DL (ref 0.5–1.05)
CREAT SERPL-MCNC: 0.55 MG/DL (ref 0.5–1.05)
CREAT SERPL-MCNC: 0.56 MG/DL (ref 0.5–1.05)
CREAT SERPL-MCNC: 0.57 MG/DL (ref 0.5–1.05)
EGFRCR SERPLBLD CKD-EPI 2021: >90 ML/MIN/1.73M*2
ERYTHROCYTE [DISTWIDTH] IN BLOOD BY AUTOMATED COUNT: 15.5 % (ref 11.5–14.5)
ERYTHROCYTE [DISTWIDTH] IN BLOOD BY AUTOMATED COUNT: 15.9 % (ref 11.5–14.5)
GLUCOSE BLD MANUAL STRIP-MCNC: 131 MG/DL (ref 74–99)
GLUCOSE BLD MANUAL STRIP-MCNC: 136 MG/DL (ref 74–99)
GLUCOSE BLD MANUAL STRIP-MCNC: 136 MG/DL (ref 74–99)
GLUCOSE BLD MANUAL STRIP-MCNC: 142 MG/DL (ref 74–99)
GLUCOSE BLD MANUAL STRIP-MCNC: 144 MG/DL (ref 74–99)
GLUCOSE BLD MANUAL STRIP-MCNC: 146 MG/DL (ref 74–99)
GLUCOSE BLD MANUAL STRIP-MCNC: 150 MG/DL (ref 74–99)
GLUCOSE BLD-MCNC: 100 MG/DL (ref 74–99)
GLUCOSE BLD-MCNC: 102 MG/DL (ref 74–99)
GLUCOSE BLD-MCNC: 128 MG/DL (ref 74–99)
GLUCOSE BLD-MCNC: 130 MG/DL (ref 74–99)
GLUCOSE BLD-MCNC: 138 MG/DL (ref 74–99)
GLUCOSE BLD-MCNC: 141 MG/DL (ref 74–99)
GLUCOSE BLD-MCNC: 194 MG/DL (ref 74–99)
GLUCOSE BLDA-MCNC: 120 MG/DL (ref 74–99)
GLUCOSE BLDA-MCNC: 133 MG/DL (ref 74–99)
GLUCOSE BLDA-MCNC: 135 MG/DL (ref 74–99)
GLUCOSE BLDA-MCNC: 201 MG/DL (ref 74–99)
GLUCOSE SERPL-MCNC: 133 MG/DL (ref 74–99)
GLUCOSE SERPL-MCNC: 140 MG/DL (ref 74–99)
GLUCOSE SERPL-MCNC: 150 MG/DL (ref 74–99)
GLUCOSE SERPL-MCNC: 198 MG/DL (ref 74–99)
GRAM STN SPEC: NORMAL
GRAM STN SPEC: NORMAL
HCO3 BLDA-SCNC: 32.5 MMOL/L (ref 22–26)
HCO3 BLDA-SCNC: 36.1 MMOL/L (ref 22–26)
HCO3 BLDA-SCNC: 36.8 MMOL/L (ref 22–26)
HCO3 BLDA-SCNC: 38.3 MMOL/L (ref 22–26)
HCO3 BLDMV-SCNC: 33.1 MMOL/L (ref 22–26)
HCO3 BLDMV-SCNC: 34.1 MMOL/L (ref 22–26)
HCO3 BLDMV-SCNC: 34.8 MMOL/L (ref 22–26)
HCO3 BLDMV-SCNC: 35.6 MMOL/L (ref 22–26)
HCO3 BLDMV-SCNC: 36.3 MMOL/L (ref 22–26)
HCO3 BLDMV-SCNC: 36.5 MMOL/L (ref 22–26)
HCO3 BLDMV-SCNC: 38.9 MMOL/L (ref 22–26)
HCT VFR BLD AUTO: 28.8 % (ref 36–46)
HCT VFR BLD AUTO: 29.3 % (ref 36–46)
HCT VFR BLD EST: 15 % (ref 36–46)
HCT VFR BLD EST: 26 % (ref 36–46)
HCT VFR BLD EST: 26 % (ref 36–46)
HCT VFR BLD EST: 27 % (ref 36–46)
HCT VFR BLD EST: 27 % (ref 36–46)
HCT VFR BLD EST: 28 % (ref 36–46)
HCT VFR BLD EST: 29 % (ref 36–46)
HCT VFR BLD EST: 29 % (ref 36–46)
HCT VFR BLD EST: 31 % (ref 36–46)
HGB BLD-MCNC: 9 G/DL (ref 12–16)
HGB BLD-MCNC: 9.3 G/DL (ref 12–16)
HGB BLDA-MCNC: 10.2 G/DL (ref 12–16)
HGB BLDA-MCNC: 5.1 G/DL (ref 12–16)
HGB BLDA-MCNC: 9.2 G/DL (ref 12–16)
HGB BLDA-MCNC: 9.5 G/DL (ref 12–16)
HGB BLDMV-MCNC: 8.5 G/DL (ref 12–16)
HGB BLDMV-MCNC: 8.5 G/DL (ref 12–16)
HGB BLDMV-MCNC: 8.9 G/DL (ref 12–16)
HGB BLDMV-MCNC: 9.1 G/DL (ref 12–16)
HGB BLDMV-MCNC: 9.4 G/DL (ref 12–16)
HGB BLDMV-MCNC: 9.4 G/DL (ref 12–16)
HGB BLDMV-MCNC: 9.8 G/DL (ref 12–16)
INHALED O2 CONCENTRATION: 40 %
INHALED O2 CONCENTRATION: 40 %
INHALED O2 CONCENTRATION: 50 %
INHALED O2 CONCENTRATION: 60 %
INR PPP: 1.1 (ref 0.9–1.1)
INR PPP: 1.2 (ref 0.9–1.1)
LACTATE BLDA-SCNC: 0.6 MMOL/L (ref 0.4–2)
LACTATE BLDA-SCNC: 0.7 MMOL/L (ref 0.4–2)
LACTATE BLDA-SCNC: 0.7 MMOL/L (ref 0.4–2)
LACTATE BLDA-SCNC: 1.5 MMOL/L (ref 0.4–2)
LACTATE BLDMV-SCNC: 0.6 MMOL/L (ref 0.4–2)
LACTATE BLDMV-SCNC: 0.6 MMOL/L (ref 0.4–2)
LACTATE BLDMV-SCNC: 0.7 MMOL/L (ref 0.4–2)
LACTATE BLDMV-SCNC: 0.8 MMOL/L (ref 0.4–2)
LACTATE BLDMV-SCNC: 1.2 MMOL/L (ref 0.4–2)
LEGIONELLA AG UR QL: NEGATIVE
MAGNESIUM SERPL-MCNC: 1.65 MG/DL (ref 1.6–2.4)
MAGNESIUM SERPL-MCNC: 2.37 MG/DL (ref 1.6–2.4)
MAGNESIUM SERPL-MCNC: 2.99 MG/DL (ref 1.6–2.4)
MCH RBC QN AUTO: 26.9 PG (ref 26–34)
MCH RBC QN AUTO: 27.1 PG (ref 26–34)
MCHC RBC AUTO-ENTMCNC: 30.7 G/DL (ref 32–36)
MCHC RBC AUTO-ENTMCNC: 32.3 G/DL (ref 32–36)
MCV RBC AUTO: 84 FL (ref 80–100)
MCV RBC AUTO: 88 FL (ref 80–100)
NRBC BLD-RTO: 0 /100 WBCS (ref 0–0)
NRBC BLD-RTO: 0 /100 WBCS (ref 0–0)
OXYHGB MFR BLDA: 92.2 % (ref 94–98)
OXYHGB MFR BLDA: 95.3 % (ref 94–98)
OXYHGB MFR BLDA: 95.8 % (ref 94–98)
OXYHGB MFR BLDA: 96.5 % (ref 94–98)
OXYHGB MFR BLDMV: 63.4 % (ref 45–75)
OXYHGB MFR BLDMV: 65.1 % (ref 45–75)
OXYHGB MFR BLDMV: 65.3 % (ref 45–75)
OXYHGB MFR BLDMV: 65.5 % (ref 45–75)
OXYHGB MFR BLDMV: 70.4 % (ref 45–75)
OXYHGB MFR BLDMV: 70.5 % (ref 45–75)
OXYHGB MFR BLDMV: 71.5 % (ref 45–75)
P AXIS: 53 DEGREES
P AXIS: 62 DEGREES
P OFFSET: 197 MS
P ONSET: 157 MS
PCO2 BLDA: 57 MM HG (ref 38–42)
PCO2 BLDA: 63 MM HG (ref 38–42)
PCO2 BLDA: 73 MM HG (ref 38–42)
PCO2 BLDA: 76 MM HG (ref 38–42)
PCO2 BLDMV: 55 MM HG (ref 41–51)
PCO2 BLDMV: 55 MM HG (ref 41–51)
PCO2 BLDMV: 56 MM HG (ref 41–51)
PCO2 BLDMV: 56 MM HG (ref 41–51)
PCO2 BLDMV: 66 MM HG (ref 41–51)
PCO2 BLDMV: 74 MM HG (ref 41–51)
PCO2 BLDMV: 81 MM HG (ref 41–51)
PH BLDA: 7.31 PH (ref 7.38–7.42)
PH BLDA: 7.31 PH (ref 7.38–7.42)
PH BLDA: 7.32 PH (ref 7.38–7.42)
PH BLDA: 7.41 PH (ref 7.38–7.42)
PH BLDMV: 7.29 PH (ref 7.33–7.43)
PH BLDMV: 7.29 PH (ref 7.33–7.43)
PH BLDMV: 7.33 PH (ref 7.33–7.43)
PH BLDMV: 7.38 PH (ref 7.33–7.43)
PH BLDMV: 7.4 PH (ref 7.33–7.43)
PH BLDMV: 7.42 PH (ref 7.33–7.43)
PH BLDMV: 7.43 PH (ref 7.33–7.43)
PHOSPHATE SERPL-MCNC: 2.9 MG/DL (ref 2.5–4.9)
PHOSPHATE SERPL-MCNC: 3.3 MG/DL (ref 2.5–4.9)
PHOSPHATE SERPL-MCNC: 3.8 MG/DL (ref 2.5–4.9)
PHOSPHATE SERPL-MCNC: 4.2 MG/DL (ref 2.5–4.9)
PLATELET # BLD AUTO: 135 X10*3/UL (ref 150–450)
PLATELET # BLD AUTO: 146 X10*3/UL (ref 150–450)
PO2 BLDA: 112 MM HG (ref 85–95)
PO2 BLDA: 69 MM HG (ref 85–95)
PO2 BLDA: 86 MM HG (ref 85–95)
PO2 BLDA: 87 MM HG (ref 85–95)
PO2 BLDMV: 37 MM HG (ref 35–45)
PO2 BLDMV: 38 MM HG (ref 35–45)
PO2 BLDMV: 39 MM HG (ref 35–45)
PO2 BLDMV: 41 MM HG (ref 35–45)
PO2 BLDMV: 41 MM HG (ref 35–45)
PO2 BLDMV: 43 MM HG (ref 35–45)
PO2 BLDMV: 43 MM HG (ref 35–45)
POTASSIUM BLDA-SCNC: 2.4 MMOL/L (ref 3.5–5.3)
POTASSIUM BLDA-SCNC: 2.9 MMOL/L (ref 3.5–5.3)
POTASSIUM BLDA-SCNC: 3.5 MMOL/L (ref 3.5–5.3)
POTASSIUM BLDA-SCNC: 3.7 MMOL/L (ref 3.5–5.3)
POTASSIUM BLDMV-SCNC: 2.4 MMOL/L (ref 3.5–5.3)
POTASSIUM BLDMV-SCNC: 3.4 MMOL/L (ref 3.5–5.3)
POTASSIUM BLDMV-SCNC: 3.5 MMOL/L (ref 3.5–5.3)
POTASSIUM BLDMV-SCNC: 3.6 MMOL/L (ref 3.5–5.3)
POTASSIUM BLDMV-SCNC: 3.7 MMOL/L (ref 3.5–5.3)
POTASSIUM SERPL-SCNC: 2.7 MMOL/L (ref 3.5–5.3)
POTASSIUM SERPL-SCNC: 3.4 MMOL/L (ref 3.5–5.3)
POTASSIUM SERPL-SCNC: 3.6 MMOL/L (ref 3.5–5.3)
POTASSIUM SERPL-SCNC: 3.7 MMOL/L (ref 3.5–5.3)
PR INTERVAL: 104 MS
PR INTERVAL: 128 MS
PR INTERVAL: 144 MS
PROT SERPL-MCNC: 5.3 G/DL (ref 6.4–8.2)
PROTHROMBIN TIME: 12.8 SECONDS (ref 9.8–12.8)
PROTHROMBIN TIME: 13.5 SECONDS (ref 9.8–12.8)
Q ONSET: 221 MS
Q ONSET: 222 MS
Q ONSET: 222 MS
QRS COUNT: 22 BEATS
QRS DURATION: 82 MS
QRS DURATION: 86 MS
QRS DURATION: 96 MS
QT INTERVAL: 212 MS
QT INTERVAL: 340 MS
QT INTERVAL: 380 MS
QTC CALCULATION(BAZETT): 315 MS
QTC CALCULATION(BAZETT): 506 MS
QTC CALCULATION(BAZETT): 567 MS
QTC FREDERICIA: 276 MS
QTC FREDERICIA: 443 MS
QTC FREDERICIA: 496 MS
R AXIS: 18 DEGREES
R AXIS: 20 DEGREES
R AXIS: 23 DEGREES
RBC # BLD AUTO: 3.34 X10*6/UL (ref 4–5.2)
RBC # BLD AUTO: 3.43 X10*6/UL (ref 4–5.2)
S PNEUM AG UR QL: NEGATIVE
SAO2 % BLDA: 95 % (ref 94–100)
SAO2 % BLDA: 97 % (ref 94–100)
SAO2 % BLDA: 98 % (ref 94–100)
SAO2 % BLDA: 99 % (ref 94–100)
SAO2 % BLDMV: 65 % (ref 45–75)
SAO2 % BLDMV: 66 % (ref 45–75)
SAO2 % BLDMV: 66 % (ref 45–75)
SAO2 % BLDMV: 67 % (ref 45–75)
SAO2 % BLDMV: 72 % (ref 45–75)
SAO2 % BLDMV: 72 % (ref 45–75)
SAO2 % BLDMV: 73 % (ref 45–75)
SODIUM BLDA-SCNC: 129 MMOL/L (ref 136–145)
SODIUM BLDA-SCNC: 134 MMOL/L (ref 136–145)
SODIUM BLDA-SCNC: 134 MMOL/L (ref 136–145)
SODIUM BLDA-SCNC: 136 MMOL/L (ref 136–145)
SODIUM BLDMV-SCNC: 130 MMOL/L (ref 136–145)
SODIUM BLDMV-SCNC: 131 MMOL/L (ref 136–145)
SODIUM BLDMV-SCNC: 132 MMOL/L (ref 136–145)
SODIUM BLDMV-SCNC: 133 MMOL/L (ref 136–145)
SODIUM SERPL-SCNC: 134 MMOL/L (ref 136–145)
SODIUM SERPL-SCNC: 136 MMOL/L (ref 136–145)
SODIUM SERPL-SCNC: 137 MMOL/L (ref 136–145)
SODIUM SERPL-SCNC: 137 MMOL/L (ref 136–145)
T AXIS: 150 DEGREES
T AXIS: 51 DEGREES
T AXIS: 76 DEGREES
T OFFSET: 327 MS
T OFFSET: 392 MS
T OFFSET: 412 MS
VENTRICULAR RATE: 133 BPM
VENTRICULAR RATE: 133 BPM
VENTRICULAR RATE: 134 BPM
WBC # BLD AUTO: 6.8 X10*3/UL (ref 4.4–11.3)
WBC # BLD AUTO: 7.5 X10*3/UL (ref 4.4–11.3)

## 2025-01-21 PROCEDURE — 2020000001 HC ICU ROOM DAILY

## 2025-01-21 PROCEDURE — 2500000004 HC RX 250 GENERAL PHARMACY W/ HCPCS (ALT 636 FOR OP/ED): Performed by: PHYSICIAN ASSISTANT

## 2025-01-21 PROCEDURE — 82330 ASSAY OF CALCIUM: CPT | Performed by: NURSE PRACTITIONER

## 2025-01-21 PROCEDURE — 82248 BILIRUBIN DIRECT: CPT

## 2025-01-21 PROCEDURE — 99291 CRITICAL CARE FIRST HOUR: CPT

## 2025-01-21 PROCEDURE — 84132 ASSAY OF SERUM POTASSIUM: CPT

## 2025-01-21 PROCEDURE — 99291 CRITICAL CARE FIRST HOUR: CPT | Performed by: STUDENT IN AN ORGANIZED HEALTH CARE EDUCATION/TRAINING PROGRAM

## 2025-01-21 PROCEDURE — 94645 CONT INHLJ TX EACH ADDL HOUR: CPT

## 2025-01-21 PROCEDURE — 84132 ASSAY OF SERUM POTASSIUM: CPT | Performed by: NURSE PRACTITIONER

## 2025-01-21 PROCEDURE — 2500000002 HC RX 250 W HCPCS SELF ADMINISTERED DRUGS (ALT 637 FOR MEDICARE OP, ALT 636 FOR OP/ED): Performed by: NURSE PRACTITIONER

## 2025-01-21 PROCEDURE — 94640 AIRWAY INHALATION TREATMENT: CPT

## 2025-01-21 PROCEDURE — P9047 ALBUMIN (HUMAN), 25%, 50ML: HCPCS | Mod: JZ

## 2025-01-21 PROCEDURE — 85610 PROTHROMBIN TIME: CPT | Performed by: NURSE PRACTITIONER

## 2025-01-21 PROCEDURE — 85027 COMPLETE CBC AUTOMATED: CPT | Performed by: NURSE PRACTITIONER

## 2025-01-21 PROCEDURE — 2500000004 HC RX 250 GENERAL PHARMACY W/ HCPCS (ALT 636 FOR OP/ED): Performed by: STUDENT IN AN ORGANIZED HEALTH CARE EDUCATION/TRAINING PROGRAM

## 2025-01-21 PROCEDURE — 83735 ASSAY OF MAGNESIUM: CPT | Performed by: NURSE PRACTITIONER

## 2025-01-21 PROCEDURE — 99233 SBSQ HOSP IP/OBS HIGH 50: CPT

## 2025-01-21 PROCEDURE — 2500000005 HC RX 250 GENERAL PHARMACY W/O HCPCS: Performed by: NURSE PRACTITIONER

## 2025-01-21 PROCEDURE — 37799 UNLISTED PX VASCULAR SURGERY: CPT | Performed by: NURSE PRACTITIONER

## 2025-01-21 PROCEDURE — 94003 VENT MGMT INPAT SUBQ DAY: CPT

## 2025-01-21 PROCEDURE — 99222 1ST HOSP IP/OBS MODERATE 55: CPT | Performed by: INTERNAL MEDICINE

## 2025-01-21 PROCEDURE — 2500000001 HC RX 250 WO HCPCS SELF ADMINISTERED DRUGS (ALT 637 FOR MEDICARE OP)

## 2025-01-21 PROCEDURE — 71045 X-RAY EXAM CHEST 1 VIEW: CPT

## 2025-01-21 PROCEDURE — 2500000001 HC RX 250 WO HCPCS SELF ADMINISTERED DRUGS (ALT 637 FOR MEDICARE OP): Performed by: NURSE PRACTITIONER

## 2025-01-21 PROCEDURE — 82810 BLOOD GASES O2 SAT ONLY: CPT | Performed by: NURSE PRACTITIONER

## 2025-01-21 PROCEDURE — 80069 RENAL FUNCTION PANEL: CPT | Mod: CCI

## 2025-01-21 PROCEDURE — 2500000004 HC RX 250 GENERAL PHARMACY W/ HCPCS (ALT 636 FOR OP/ED)

## 2025-01-21 PROCEDURE — 2500000005 HC RX 250 GENERAL PHARMACY W/O HCPCS: Performed by: PHYSICIAN ASSISTANT

## 2025-01-21 PROCEDURE — 82947 ASSAY GLUCOSE BLOOD QUANT: CPT

## 2025-01-21 PROCEDURE — 82040 ASSAY OF SERUM ALBUMIN: CPT

## 2025-01-21 PROCEDURE — 2500000004 HC RX 250 GENERAL PHARMACY W/ HCPCS (ALT 636 FOR OP/ED): Performed by: NURSE PRACTITIONER

## 2025-01-21 RX ORDER — POTASSIUM CHLORIDE 14.9 MG/ML
20 INJECTION INTRAVENOUS
Status: DISCONTINUED | OUTPATIENT
Start: 2025-01-21 | End: 2025-01-21

## 2025-01-21 RX ORDER — POTASSIUM CHLORIDE 1.5 G/1.58G
40 POWDER, FOR SOLUTION ORAL ONCE
Status: COMPLETED | OUTPATIENT
Start: 2025-01-21 | End: 2025-01-21

## 2025-01-21 RX ORDER — POTASSIUM CHLORIDE 29.8 MG/ML
40 INJECTION INTRAVENOUS ONCE
Status: COMPLETED | OUTPATIENT
Start: 2025-01-21 | End: 2025-01-21

## 2025-01-21 RX ORDER — DEXAMETHASONE SODIUM PHOSPHATE 10 MG/ML
6 INJECTION INTRAMUSCULAR; INTRAVENOUS EVERY 24 HOURS
Status: DISCONTINUED | OUTPATIENT
Start: 2025-01-22 | End: 2025-01-24

## 2025-01-21 RX ORDER — PROPOFOL 10 MG/ML
5-50 INJECTION, EMULSION INTRAVENOUS CONTINUOUS
Status: DISCONTINUED | OUTPATIENT
Start: 2025-01-21 | End: 2025-01-21

## 2025-01-21 RX ORDER — POTASSIUM CHLORIDE 29.8 MG/ML
40 INJECTION INTRAVENOUS ONCE
Status: DISCONTINUED | OUTPATIENT
Start: 2025-01-21 | End: 2025-01-21

## 2025-01-21 RX ORDER — FUROSEMIDE 10 MG/ML
60 INJECTION INTRAMUSCULAR; INTRAVENOUS ONCE
Status: COMPLETED | OUTPATIENT
Start: 2025-01-21 | End: 2025-01-21

## 2025-01-21 RX ORDER — POTASSIUM CHLORIDE 14.9 MG/ML
20 INJECTION INTRAVENOUS ONCE
Status: COMPLETED | OUTPATIENT
Start: 2025-01-21 | End: 2025-01-21

## 2025-01-21 RX ORDER — ACETAZOLAMIDE 500 MG/5ML
500 INJECTION, POWDER, LYOPHILIZED, FOR SOLUTION INTRAVENOUS ONCE
Status: COMPLETED | OUTPATIENT
Start: 2025-01-21 | End: 2025-01-21

## 2025-01-21 RX ORDER — POTASSIUM CHLORIDE 14.9 MG/ML
20 INJECTION INTRAVENOUS
Status: COMPLETED | OUTPATIENT
Start: 2025-01-21 | End: 2025-01-22

## 2025-01-21 RX ORDER — POLYETHYLENE GLYCOL 3350 17 G/17G
17 POWDER, FOR SOLUTION ORAL DAILY
Status: DISCONTINUED | OUTPATIENT
Start: 2025-01-21 | End: 2025-01-25

## 2025-01-21 RX ADMIN — POTASSIUM CHLORIDE 40 MEQ: 1.5 POWDER, FOR SOLUTION ORAL at 16:11

## 2025-01-21 RX ADMIN — PIPERACILLIN SODIUM AND TAZOBACTAM SODIUM 4.5 G: 4; .5 INJECTION, SOLUTION INTRAVENOUS at 11:47

## 2025-01-21 RX ADMIN — POTASSIUM CHLORIDE 20 MEQ: 14.9 INJECTION, SOLUTION INTRAVENOUS at 01:11

## 2025-01-21 RX ADMIN — VASOPRESSIN 0.03 UNITS/MIN: 0.2 INJECTION INTRAVENOUS at 07:18

## 2025-01-21 RX ADMIN — HEPARIN SODIUM 5000 UNITS: 5000 INJECTION INTRAVENOUS; SUBCUTANEOUS at 06:18

## 2025-01-21 RX ADMIN — ACETAZOLAMIDE 500 MG: 500 INJECTION, POWDER, LYOPHILIZED, FOR SOLUTION INTRAVENOUS at 11:47

## 2025-01-21 RX ADMIN — IPRATROPIUM BROMIDE AND ALBUTEROL SULFATE 3 ML: .5; 3 SOLUTION RESPIRATORY (INHALATION) at 19:00

## 2025-01-21 RX ADMIN — DEXMEDETOMIDINE HYDROCHLORIDE 1 MCG/KG/HR: 400 INJECTION INTRAVENOUS at 16:40

## 2025-01-21 RX ADMIN — IPRATROPIUM BROMIDE AND ALBUTEROL SULFATE 3 ML: .5; 3 SOLUTION RESPIRATORY (INHALATION) at 01:34

## 2025-01-21 RX ADMIN — POTASSIUM CHLORIDE 40 MEQ: 1.5 POWDER, FOR SOLUTION ORAL at 10:40

## 2025-01-21 RX ADMIN — CALCIUM GLUCONATE 1 G: 20 INJECTION, SOLUTION INTRAVENOUS at 03:55

## 2025-01-21 RX ADMIN — BUDESONIDE 0.5 MG: 0.5 INHALANT RESPIRATORY (INHALATION) at 08:27

## 2025-01-21 RX ADMIN — IPRATROPIUM BROMIDE AND ALBUTEROL SULFATE 3 ML: .5; 3 SOLUTION RESPIRATORY (INHALATION) at 15:09

## 2025-01-21 RX ADMIN — POTASSIUM CHLORIDE 20 MEQ: 14.9 INJECTION, SOLUTION INTRAVENOUS at 22:54

## 2025-01-21 RX ADMIN — THIAMINE HYDROCHLORIDE 100 MG: 100 INJECTION, SOLUTION INTRAMUSCULAR; INTRAVENOUS at 08:34

## 2025-01-21 RX ADMIN — HYDROCORTISONE SODIUM SUCCINATE 50 MG: 100 INJECTION, POWDER, FOR SOLUTION INTRAMUSCULAR; INTRAVENOUS at 08:35

## 2025-01-21 RX ADMIN — SENNOSIDES 5 ML: 8.8 LIQUID ORAL at 08:34

## 2025-01-21 RX ADMIN — VASOPRESSIN 0.03 UNITS/MIN: 0.2 INJECTION INTRAVENOUS at 16:14

## 2025-01-21 RX ADMIN — PIPERACILLIN SODIUM AND TAZOBACTAM SODIUM 4.5 G: 4; .5 INJECTION, SOLUTION INTRAVENOUS at 01:11

## 2025-01-21 RX ADMIN — LIDOCAINE 4% 1 PATCH: 40 PATCH TOPICAL at 08:34

## 2025-01-21 RX ADMIN — HEPARIN SODIUM 5000 UNITS: 5000 INJECTION INTRAVENOUS; SUBCUTANEOUS at 22:40

## 2025-01-21 RX ADMIN — POTASSIUM CHLORIDE 40 MEQ: 1.5 POWDER, FOR SOLUTION ORAL at 03:50

## 2025-01-21 RX ADMIN — IPRATROPIUM BROMIDE AND ALBUTEROL SULFATE 3 ML: .5; 3 SOLUTION RESPIRATORY (INHALATION) at 08:28

## 2025-01-21 RX ADMIN — DOCUSATE SODIUM LIQUID 100 MG: 100 LIQUID ORAL at 08:34

## 2025-01-21 RX ADMIN — POTASSIUM CHLORIDE 20 MEQ: 14.9 INJECTION, SOLUTION INTRAVENOUS at 15:01

## 2025-01-21 RX ADMIN — PANTOPRAZOLE SODIUM 40 MG: 40 INJECTION, POWDER, FOR SOLUTION INTRAVENOUS at 08:33

## 2025-01-21 RX ADMIN — BISACODYL 10 MG: 10 SUPPOSITORY RECTAL at 08:34

## 2025-01-21 RX ADMIN — PIPERACILLIN SODIUM AND TAZOBACTAM SODIUM 4.5 G: 4; .5 INJECTION, SOLUTION INTRAVENOUS at 17:55

## 2025-01-21 RX ADMIN — ALBUMIN HUMAN 25 G: 0.25 SOLUTION INTRAVENOUS at 03:14

## 2025-01-21 RX ADMIN — MAGNESIUM SULFATE IN WATER 4 G: 4 INJECTION, SOLUTION INTRAVENOUS at 03:55

## 2025-01-21 RX ADMIN — HYDROCORTISONE SODIUM SUCCINATE 50 MG: 100 INJECTION, POWDER, FOR SOLUTION INTRAMUSCULAR; INTRAVENOUS at 20:43

## 2025-01-21 RX ADMIN — DEXMEDETOMIDINE HYDROCHLORIDE 1 MCG/KG/HR: 400 INJECTION INTRAVENOUS at 10:40

## 2025-01-21 RX ADMIN — POLYETHYLENE GLYCOL 3350 17 G: 17 POWDER, FOR SOLUTION ORAL at 08:34

## 2025-01-21 RX ADMIN — REMDESIVIR 100 MG: 100 INJECTION, POWDER, LYOPHILIZED, FOR SOLUTION INTRAVENOUS at 20:43

## 2025-01-21 RX ADMIN — FUROSEMIDE 60 MG: 10 INJECTION, SOLUTION INTRAMUSCULAR; INTRAVENOUS at 11:47

## 2025-01-21 RX ADMIN — Medication 50 MCG/HR: at 07:18

## 2025-01-21 RX ADMIN — HEPARIN SODIUM 5000 UNITS: 5000 INJECTION INTRAVENOUS; SUBCUTANEOUS at 15:02

## 2025-01-21 RX ADMIN — HYDROCORTISONE SODIUM SUCCINATE 50 MG: 100 INJECTION, POWDER, FOR SOLUTION INTRAMUSCULAR; INTRAVENOUS at 03:14

## 2025-01-21 RX ADMIN — HYDROCORTISONE SODIUM SUCCINATE 50 MG: 100 INJECTION, POWDER, FOR SOLUTION INTRAMUSCULAR; INTRAVENOUS at 15:02

## 2025-01-21 RX ADMIN — PIPERACILLIN SODIUM AND TAZOBACTAM SODIUM 4.5 G: 4; .5 INJECTION, SOLUTION INTRAVENOUS at 06:18

## 2025-01-21 RX ADMIN — POTASSIUM CHLORIDE 40 MEQ: 29.8 INJECTION, SOLUTION INTRAVENOUS at 03:50

## 2025-01-21 RX ADMIN — BUDESONIDE 0.5 MG: 0.5 INHALANT RESPIRATORY (INHALATION) at 19:00

## 2025-01-21 RX ADMIN — ACETAZOLAMIDE 500 MG: 500 INJECTION, POWDER, LYOPHILIZED, FOR SOLUTION INTRAVENOUS at 17:55

## 2025-01-21 RX ADMIN — POTASSIUM CHLORIDE 20 MEQ: 14.9 INJECTION, SOLUTION INTRAVENOUS at 20:42

## 2025-01-21 RX ADMIN — DEXMEDETOMIDINE HYDROCHLORIDE 1 MCG/KG/HR: 400 INJECTION INTRAVENOUS at 04:27

## 2025-01-21 RX ADMIN — EPOPROSTENOL 0.05 MCG/KG/MIN: 1.5 INJECTION, POWDER, LYOPHILIZED, FOR SOLUTION INTRAVENOUS at 08:28

## 2025-01-21 ASSESSMENT — PAIN - FUNCTIONAL ASSESSMENT
PAIN_FUNCTIONAL_ASSESSMENT: CPOT (CRITICAL CARE PAIN OBSERVATION TOOL)

## 2025-01-21 NOTE — CONSULTS
"Inpatient consult to Infectious Diseases  Consult performed by: Kat Hernandez MD  Consult ordered by: Hugh Lyons, APRN-CNP        Primary MD: Beata Harris DO  Reason For Consult Co-management of COVID-19    History Of Present Illness  Faustina Vick \"Noam" is a 64 y.o. female with R lung squamous cell carcinoma (dx 4/8/2023, stage 3) s/p chemoradiation (ending 6/25/24) plus nivolumab (ending 8/28/24) and then immunotherapy (ending 12/8/24) presented with right pneumonectomy on 1/13.     From the chart review, patient underwent right pneumonectomy and intrapericardial dissection with en bloc chest wall resection on 1/13. Post-op course showed she was extubated and vasopressors were off on 1/15 but complicated by symptomatic hyponatremia and acute hypoxic/hypercarbic respiratory failure from cardiogenic shock c/f takotsubo cardiomyopathy showing reduced EF 25-30% on 1/16. Worsened clinical condition prompted to start vanco, zosyn and azithromycin.     Respiratory panel, blood/urine cultures 1/17 were negative. X-ray chest showed worsening aeration of the left lung likely from edema with plural effusion 1/18. Patient was incubated on 1/18. Clinically improved and discontinued vancomycin given negative MRSA screening 1/20, while added on remdesivir given positive SARS-CoV-2 PCR.  Patient has been on hydrocortisone 200mg daily since s/p right penumonectomy 1/13. ID was consulted on 1/20.    PET/CT on 12/30/2024 showed localized progression of disease in the right chest and no distant metastatic disease.      Past Medical History  She has a past medical history of Anxiety, Arthritis, Asthma, History of SCC (squamous cell carcinoma) of skin, Personal history of antineoplastic chemotherapy, and Primary cancer of right upper lobe of lung (Multi).    She has no past medical history of Malignant hyperthermia.    Surgical History  She has a past surgical history that includes Insertion / removal / replacement " venous access catheter; Ovarian cyst surgery;  section, low transverse; Other surgical history; and Cardiac catheterization (N/A, 2025).   Allergies  Patient has no known allergies.   Objective  Range of Vitals (last 24 hours)  Heart Rate:  []   Temp:  [36.5 °C (97.7 °F)-37 °C (98.6 °F)]   Resp:  [7-33]   SpO2:  [88 %-100 %]   Daily Weight  25 : 58.5 kg (128 lb 15.5 oz)    Body mass index is 21.46 kg/m².     Physical Exam  General: Intubated PEEP 5 FiO2 40%  HEENT: no conjunctival injection. anicteric.  CVS: RRR. Normal S1 and S2. No m/r/g.   RESP: ctab no w/r/r, no increased wob  Abd: Soft and lax. ND.   Ext: No swelling of the LE b/l.     Relevant Results    Labs  Results from last 72 hours   Lab Units 25  14325  0427   WBC AUTO x10*3/uL 6.8 10.4 12.7*   HEMOGLOBIN g/dL 9.3* 9.7* 10.1*   HEMATOCRIT % 28.8* 30.2* 29.6*   PLATELETS AUTO x10*3/uL 146* 159 191     Results from last 72 hours   Lab Units 25  1437 25  0427   SODIUM mmol/L 134* 132* 132*   POTASSIUM mmol/L 2.7* 3.7 3.9   CHLORIDE mmol/L 89* 90* 92*   CO2 mmol/L 34* 33* 29   BUN mg/dL 15 16 16   CREATININE mg/dL 0.44* 0.53 0.51   GLUCOSE mg/dL 198* 148* 138*   CALCIUM mg/dL 8.3* 8.3* 7.9*   ANION GAP mmol/L 14 13 15   EGFR mL/min/1.73m*2 >90 >90 >90   PHOSPHORUS mg/dL 2.9 3.5 2.9     Results from last 72 hours   Lab Units 25  1437 25  0427 25  1458 25  0757   ALK PHOS U/L  --   --  65 66 63   BILIRUBIN TOTAL mg/dL  --   --  0.8 0.8 0.9   BILIRUBIN DIRECT mg/dL  --   --  0.3 0.3 0.3   PROTEIN TOTAL g/dL  --   --  4.8* 4.8* 5.0*   ALT U/L  --   --  114* 137* 143*   AST U/L  --   --  157* 257* 301*   ALBUMIN g/dL 3.6 3.6 3.1*  3.1* 3.2*  3.2* 3.1*   Imaging  X-ray chest   IMPRESSION:  1. Worsening aeration of the left lung with increasing left basilar  infiltrate and pleural effusion. Correlate with mucous plugging and  component of  lobar collapse.  2. Component of interstitial prominence of the left lung, also  slightly increased which may be due to edema.  3. Postsurgical changes of right pneumonectomy as described  Microbiology  1/13 R pleural negative  1/17 MRSA nare negative   1/17 Blood negative  1/18 Urine negative  1/19 Sputum negative  1/20 Strep/Legionella urine antigen negative            SARS-CoV-2 PCR detected   Antimicrobial agents   1/17- Azithromycin    1/17- Zosyn   1/18- Fluconazole   1/17-1/20 Vancomycin    1/20- Remdesivir    Assessment/Plan  # Detected PCR for SARS-COV2  # Acute respiratory failure from cardiogenic shock c/f takotsubo cardiomyopathy 1/16  # S/p right pneumonectomy and intrapericardial dissection with en bloc chest wall resection 1/13 for R lung squamous cell carcinoma s/p chemoradiation and immunotherapy     A 65 yo female with R lung squamous cell carcinoma (stage 3) s/p chemoradiation and immunotherapy underwent right pneumonectomy 1/13 and developed acute respiratory failure from cardiogenic shock c/f takotsumo cariomyopathy 1/16. Was started on zosyn/azithromycin(1/17-p) fluconazole(1/18-p), and vancomycin (1/17-1/20). Re-intubated given worsening condition on 1/18. Detected COVID19 PCR as one of workups on 1/20, and added on remidesivir 1/20. Respiratory panel, blood/urine cultures 1/17 were negative. X-ray chest showed left lung likely from edema with plural effusion. Patient has been on hydrocortisone 200mg daily since s/p right penumonectomy 1/13. ID was consulted on 1/20 for COVID19.     Now, clinically improved/stable on respiratory status antibiotics above and ICU management. Her clinical scenario of current respiratory failure is likely mainly from cardiogenic issues s/p right pneumonectomy. No organisms grew from blood, sputum, and urine. Respiratory PCR was negative but SARS-COVID19 was detected. Unclear to reflex on the status of recent infection or new infection. Either way, since she was  intubated, it would be reasonable to complete a 5 day course as presuming severe COVID19 with ongoing steroids for post-surgery and HAP/VAP for 7 days with zosyn. From our standpoint, COVID19 infection is unlikely main etiology of developing current respiratory failure based on clinical/radiographic/lab findings. Can stop aztithromycin and fluconazole.     Recommendations  -Stop azithromycin and fluconazole  -Continue IV zosyn 4.5g q6h to complete 7 days (1/23/25)  -Continue IV remdesivir 100mg to complete 5 days (1/24/25)  -Continue isolation per hospital protocol     Discussed with her , the team Dr. Harp. Please text me via Epic chat if you have any questions or concerns regarding this patient. ID will sign off.    Kat Hernandez MD  ID fellow [id number]  Team A   ID pager [phone]

## 2025-01-21 NOTE — PROGRESS NOTES
"Faustina Vick \"Noam" is a 64 y.o. female on day 8 of admission presenting with Primary cancer of right upper lobe of lung (Multi).    Subjective   Patient remains intubated and sedated on precedex and fentanyl infusions. Remains on vaso at 0.03, milrinone at 0.125, and iEpo at 0.05.     Objective     Physical Exam  Vitals reviewed.   Constitutional:       Appearance: She is underweight. She is diaphoretic.      Interventions: She is sedated, intubated and restrained.   HENT:      Head: Normocephalic and atraumatic.      Nose: Nose normal.      Mouth/Throat:      Mouth: Mucous membranes are moist.   Eyes:      Extraocular Movements: Extraocular movements intact.      Pupils: Pupils are equal, round, and reactive to light.   Neck:      Comments: RIJ MAC with PA cath in place, 58cm depth. dressing c/d/i.   Cardiovascular:      Rate and Rhythm: Normal rate and regular rhythm.      Pulses:           Dorsalis pedis pulses are 1+ on the right side and detected w/ Doppler on the left side.        Posterior tibial pulses are 1+ on the right side and detected w/ Doppler on the left side.      Heart sounds: Normal heart sounds.      Comments: Radial pulses detected with doppler. Skin pink, warm and dry  Pulmonary:      Effort: She is intubated.      Breath sounds: Rhonchi (left side) present.      Comments: Mechanically ventilated via ETT. Current vent settings: AC/VC 60%/280/18/+5.  Chest:      Comments: Left chest mediport accessed, dressing c/d/i. Right lateral chest dressing c/d/i  Abdominal:      General: Bowel sounds are decreased. There is distension.      Palpations: Abdomen is soft.   Genitourinary:     Comments: Montiel in place with clear yellow UOP.   Musculoskeletal:      Cervical back: Neck supple.   Skin:     General: Skin is warm.   Neurological:      General: No focal deficit present.      Mental Status: She is easily aroused.      GCS: GCS eye subscore is 3. GCS verbal subscore is 1. GCS motor subscore is 6. " "     Motor: Weakness (generalized) present.      Comments: Awakens to name, follows commands       Last Recorded Vitals  Blood pressure 89/57, pulse 88, temperature 36.6 °C (97.9 °F), temperature source Temporal, resp. rate 20, height 1.651 m (5' 5\"), weight 58.5 kg (128 lb 15.5 oz), SpO2 95%.    VS over last 24h  Heart Rate:  []   Temp:  [36.5 °C (97.7 °F)-37 °C (98.6 °F)]   Resp:  [7-33]   SpO2:  [88 %-100 %]      Intake/Output last 3 Shifts:  I/O last 3 completed shifts:  In: 2913.2 (49.8 mL/kg) [I.V.:1040.7 (17.8 mL/kg); NG/GT:270; IV Piggyback:1602.5]  Out: 2745 (46.9 mL/kg) [Urine:2570 (1.2 mL/kg/hr); Emesis/NG output:175]  Weight: 58.5 kg       Intake/Output Summary (Last 24 hours) at 1/21/2025 0911  Last data filed at 1/21/2025 0700  Gross per 24 hour   Intake 1656.67 ml   Output 2100 ml   Net -443.33 ml        Relevant Results  Scheduled medications  bisacodyl, 10 mg, rectal, Daily  budesonide, 0.5 mg, nebulization, BID  docusate sodium, 100 mg, orogastric tube, BID  fluconazole, 400 mg, intravenous, q24h  heparin (porcine), 5,000 Units, subcutaneous, q8h  hydrocortisone sodium succinate, 50 mg, intravenous, q6h  insulin lispro, 0-5 Units, subcutaneous, q4h  ipratropium-albuteroL, 3 mL, nebulization, q6h  lidocaine, 1 patch, transdermal, Daily  oxygen, , inhalation, Continuous - Inhalation  pantoprazole, 40 mg, intravenous, Daily  perflutren lipid microspheres, 0.5-10 mL of dilution, intravenous, Once in imaging  piperacillin-tazobactam, 4.5 g, intravenous, q6h  polyethylene glycol, 17 g, orogastric tube, Daily  remdesivir, 100 mg, intravenous, q24h  senna, 5 mL, orogastric tube, BID  thiamine, 100 mg, intravenous, Daily      Continuous medications  dexmedeTOMIDine, 0-1.2 mcg/kg/hr, Last Rate: 1 mcg/kg/hr (01/21/25 0427)  epoprostenol, 0.05 mcg/kg/min (Ideal), Last Rate: 0.05 mcg/kg/min (01/21/25 0828)  fentaNYL, 50 mcg/hr, Last Rate: 50 mcg/hr (01/21/25 9418)  milrinone, 0.125 mcg/kg/min, Last Rate: " 0.125 mcg/kg/min (01/20/25 1600)  norepinephrine, 0-0.2 mcg/kg/min, Last Rate: Stopped (01/20/25 1400)  propofol, 5-50 mcg/kg/min  vasopressin, 0-0.06 Units/min, Last Rate: 0.03 Units/min (01/21/25 0718)        PRN medications  PRN medications: albuterol, calcium gluconate, calcium gluconate, dextrose, dextrose, glucagon, glucagon, HYDROmorphone, magnesium sulfate, magnesium sulfate, oxygen, potassium chloride, potassium chloride      Results for orders placed or performed during the hospital encounter of 01/13/25 (from the past 24 hours)   POCT GLUCOSE   Result Value Ref Range    POCT Glucose 136 (H) 74 - 99 mg/dL   Sars-CoV-2 PCR   Result Value Ref Range    Coronavirus 2019, PCR Detected (A) Not Detected   Blood Gas Arterial Full Panel   Result Value Ref Range    POCT pH, Arterial 7.42 7.38 - 7.42 pH    POCT pCO2, Arterial 49 (H) 38 - 42 mm Hg    POCT pO2, Arterial 101 (H) 85 - 95 mm Hg    POCT SO2, Arterial 99 94 - 100 %    POCT Oxy Hemoglobin, Arterial 96.6 94.0 - 98.0 %    POCT Hematocrit Calculated, Arterial 30.0 (L) 36.0 - 46.0 %    POCT Sodium, Arterial 127 (L) 136 - 145 mmol/L    POCT Potassium, Arterial 3.2 (L) 3.5 - 5.3 mmol/L    POCT Chloride, Arterial 93 (L) 98 - 107 mmol/L    POCT Ionized Calcium, Arterial 1.13 1.10 - 1.33 mmol/L    POCT Glucose, Arterial 139 (H) 74 - 99 mg/dL    POCT Lactate, Arterial 0.8 0.4 - 2.0 mmol/L    POCT Base Excess, Arterial 6.4 (H) -2.0 - 3.0 mmol/L    POCT HCO3 Calculated, Arterial 31.8 (H) 22.0 - 26.0 mmol/L    POCT Hemoglobin, Arterial 10.1 (L) 12.0 - 16.0 g/dL    POCT Anion Gap, Arterial 5 (L) 10 - 25 mmo/L    Patient Temperature 37.0 degrees Celsius    FiO2 60 %   BLOOD GAS MIXED VENOUS FULL PANEL   Result Value Ref Range    POCT pH, Mixed 7.41 7.33 - 7.43 pH    POCT pCO2, Mixed 55 (H) 41 - 51 mm Hg    POCT pO2, Mixed 40 35 - 45 mm Hg    POCT SO2, Mixed 70 45 - 75 %    POCT Oxy Hemoglobin, Mixed 69.0 45.0 - 75.0 %    POCT Hematocrit Calculated, Mixed 30.0 (L) 36.0 -  46.0 %    POCT Sodium, Mixed 127 (L) 136 - 145 mmol/L    POCT Potassium, Mixed 3.4 (L) 3.5 - 5.3 mmol/L    POCT Chloride, Mixed 93 (L) 98 - 107 mmol/L    POCT Ionized Calcium, Mixed 1.12 1.10 - 1.33 mmol/L    POCT Glucose, Mixed 143 (H) 74 - 99 mg/dL    POCT Lactate, Mixed 0.7 0.4 - 2.0 mmol/L    POCT Base Excess, Mixed 8.9 (H) -2.0 - 3.0 mmol/L    POCT HCO3 Calculated, Mixed 34.9 (H) 22.0 - 26.0 mmol/L    POCT Hemoglobin, Mixed 10.0 (L) 12.0 - 16.0 g/dL    POCT Anion Gap, Mixed 3 (L) 10 - 25 mmo/L    Patient Temperature 37.0 degrees Celsius    FiO2 60 %   Transthoracic Echo (TTE) Limited   Result Value Ref Range    MV E/A ratio 0.73     Tricuspid annular plane systolic excursion 1.2 cm    LV EF 35 %    RV free wall pk S' 6.74 cm/s    LVIDd 4.10 cm    RVSP 36.2 mmHg    LV A4C EF 30.4    Calcium, Ionized   Result Value Ref Range    POCT Calcium, Ionized 1.13 1.1 - 1.33 mmol/L   CBC   Result Value Ref Range    WBC 10.4 4.4 - 11.3 x10*3/uL    nRBC 0.3 (H) 0.0 - 0.0 /100 WBCs    RBC 3.55 (L) 4.00 - 5.20 x10*6/uL    Hemoglobin 9.7 (L) 12.0 - 16.0 g/dL    Hematocrit 30.2 (L) 36.0 - 46.0 %    MCV 85 80 - 100 fL    MCH 27.3 26.0 - 34.0 pg    MCHC 32.1 32.0 - 36.0 g/dL    RDW 15.2 (H) 11.5 - 14.5 %    Platelets 159 150 - 450 x10*3/uL   Magnesium   Result Value Ref Range    Magnesium 2.13 1.60 - 2.40 mg/dL   Renal function panel   Result Value Ref Range    Glucose 148 (H) 74 - 99 mg/dL    Sodium 132 (L) 136 - 145 mmol/L    Potassium 3.7 3.5 - 5.3 mmol/L    Chloride 90 (L) 98 - 107 mmol/L    Bicarbonate 33 (H) 21 - 32 mmol/L    Anion Gap 13 10 - 20 mmol/L    Urea Nitrogen 16 6 - 23 mg/dL    Creatinine 0.53 0.50 - 1.05 mg/dL    eGFR >90 >60 mL/min/1.73m*2    Calcium 8.3 (L) 8.6 - 10.6 mg/dL    Phosphorus 3.5 2.5 - 4.9 mg/dL    Albumin 3.6 3.4 - 5.0 g/dL   POCT GLUCOSE   Result Value Ref Range    POCT Glucose 141 (H) 74 - 99 mg/dL   BLOOD GAS MIXED VENOUS FULL PANEL   Result Value Ref Range    POCT pH, Mixed 7.36 7.33 - 7.43 pH     POCT pCO2, Mixed 63 (H) 41 - 51 mm Hg    POCT pO2, Mixed 30 (L) 35 - 45 mm Hg    POCT SO2, Mixed 48 45 - 75 %    POCT Oxy Hemoglobin, Mixed 47.0 45.0 - 75.0 %    POCT Hematocrit Calculated, Mixed 30.0 (L) 36.0 - 46.0 %    POCT Sodium, Mixed 130 (L) 136 - 145 mmol/L    POCT Potassium, Mixed 3.8 3.5 - 5.3 mmol/L    POCT Chloride, Mixed 92 (L) 98 - 107 mmol/L    POCT Ionized Calcium, Mixed 1.16 1.10 - 1.33 mmol/L    POCT Glucose, Mixed 154 (H) 74 - 99 mg/dL    POCT Lactate, Mixed 1.1 0.4 - 2.0 mmol/L    POCT Base Excess, Mixed 8.6 (H) -2.0 - 3.0 mmol/L    POCT HCO3 Calculated, Mixed 35.6 (H) 22.0 - 26.0 mmol/L    POCT Hemoglobin, Mixed 9.9 (L) 12.0 - 16.0 g/dL    POCT Anion Gap, Mixed 6 (L) 10 - 25 mmo/L    Patient Temperature 37.0 degrees Celsius    FiO2 60 %   POCT GLUCOSE   Result Value Ref Range    POCT Glucose 157 (H) 74 - 99 mg/dL   BLOOD GAS MIXED VENOUS FULL PANEL   Result Value Ref Range    POCT pH, Mixed 7.34 7.33 - 7.43 pH    POCT pCO2, Mixed 69 (H) 41 - 51 mm Hg    POCT pO2, Mixed 38 35 - 45 mm Hg    POCT SO2, Mixed 62 45 - 75 %    POCT Oxy Hemoglobin, Mixed 60.8 45.0 - 75.0 %    POCT Hematocrit Calculated, Mixed 32.0 (L) 36.0 - 46.0 %    POCT Sodium, Mixed 131 (L) 136 - 145 mmol/L    POCT Potassium, Mixed 3.5 3.5 - 5.3 mmol/L    POCT Chloride, Mixed 89 (L) 98 - 107 mmol/L    POCT Ionized Calcium, Mixed 1.18 1.10 - 1.33 mmol/L    POCT Glucose, Mixed 156 (H) 74 - 99 mg/dL    POCT Lactate, Mixed 1.9 0.4 - 2.0 mmol/L    POCT Base Excess, Mixed 9.4 (H) -2.0 - 3.0 mmol/L    POCT HCO3 Calculated, Mixed 37.2 (H) 22.0 - 26.0 mmol/L    POCT Hemoglobin, Mixed 10.7 (L) 12.0 - 16.0 g/dL    POCT Anion Gap, Mixed 8 (L) 10 - 25 mmo/L    Patient Temperature 37.0 degrees Celsius    FiO2 50 %   POCT GLUCOSE   Result Value Ref Range    POCT Glucose 150 (H) 74 - 99 mg/dL   BLOOD GAS MIXED VENOUS FULL PANEL   Result Value Ref Range    POCT pH, Mixed 7.43 7.33 - 7.43 pH    POCT pCO2, Mixed 55 (H) 41 - 51 mm Hg    POCT pO2,  Mixed 38 35 - 45 mm Hg    POCT SO2, Mixed 66 45 - 75 %    POCT Oxy Hemoglobin, Mixed 65.1 45.0 - 75.0 %    POCT Hematocrit Calculated, Mixed 29.0 (L) 36.0 - 46.0 %    POCT Sodium, Mixed 130 (L) 136 - 145 mmol/L    POCT Potassium, Mixed 2.4 (LL) 3.5 - 5.3 mmol/L    POCT Chloride, Mixed 92 (L) 98 - 107 mmol/L    POCT Ionized Calcium, Mixed 1.11 1.10 - 1.33 mmol/L    POCT Glucose, Mixed 194 (H) 74 - 99 mg/dL    POCT Lactate, Mixed 1.2 0.4 - 2.0 mmol/L    POCT Base Excess, Mixed 10.7 (H) -2.0 - 3.0 mmol/L    POCT HCO3 Calculated, Mixed 36.5 (H) 22.0 - 26.0 mmol/L    POCT Hemoglobin, Mixed 9.8 (L) 12.0 - 16.0 g/dL    POCT Anion Gap, Mixed 4 (L) 10 - 25 mmo/L    Patient Temperature 37.0 degrees Celsius    FiO2 50 %   Blood Gas Arterial Full Panel   Result Value Ref Range    POCT pH, Arterial 7.41 7.38 - 7.42 pH    POCT pCO2, Arterial 57 (H) 38 - 42 mm Hg    POCT pO2, Arterial 87 85 - 95 mm Hg    POCT SO2, Arterial 98 94 - 100 %    POCT Oxy Hemoglobin, Arterial 95.8 94.0 - 98.0 %    POCT Hematocrit Calculated, Arterial 31.0 (L) 36.0 - 46.0 %    POCT Sodium, Arterial 129 (L) 136 - 145 mmol/L    POCT Potassium, Arterial 2.4 (LL) 3.5 - 5.3 mmol/L    POCT Chloride, Arterial 91 (L) 98 - 107 mmol/L    POCT Ionized Calcium, Arterial 1.12 1.10 - 1.33 mmol/L    POCT Glucose, Arterial 201 (H) 74 - 99 mg/dL    POCT Lactate, Arterial 1.5 0.4 - 2.0 mmol/L    POCT Base Excess, Arterial 9.9 (H) -2.0 - 3.0 mmol/L    POCT HCO3 Calculated, Arterial 36.1 (H) 22.0 - 26.0 mmol/L    POCT Hemoglobin, Arterial 10.2 (L) 12.0 - 16.0 g/dL    POCT Anion Gap, Arterial 4 (L) 10 - 25 mmo/L    Patient Temperature 37.0 degrees Celsius    FiO2 50 %   Calcium, Ionized   Result Value Ref Range    POCT Calcium, Ionized 1.08 (L) 1.1 - 1.33 mmol/L   CBC   Result Value Ref Range    WBC 6.8 4.4 - 11.3 x10*3/uL    nRBC 0.0 0.0 - 0.0 /100 WBCs    RBC 3.43 (L) 4.00 - 5.20 x10*6/uL    Hemoglobin 9.3 (L) 12.0 - 16.0 g/dL    Hematocrit 28.8 (L) 36.0 - 46.0 %    MCV 84  80 - 100 fL    MCH 27.1 26.0 - 34.0 pg    MCHC 32.3 32.0 - 36.0 g/dL    RDW 15.5 (H) 11.5 - 14.5 %    Platelets 146 (L) 150 - 450 x10*3/uL   Coagulation Screen   Result Value Ref Range    Protime 13.5 (H) 9.8 - 12.8 seconds    INR 1.2 (H) 0.9 - 1.1    aPTT 47 (H) 27 - 38 seconds   Magnesium   Result Value Ref Range    Magnesium 1.65 1.60 - 2.40 mg/dL   Renal function panel   Result Value Ref Range    Glucose 198 (H) 74 - 99 mg/dL    Sodium 134 (L) 136 - 145 mmol/L    Potassium 2.7 (LL) 3.5 - 5.3 mmol/L    Chloride 89 (L) 98 - 107 mmol/L    Bicarbonate 34 (H) 21 - 32 mmol/L    Anion Gap 14 10 - 20 mmol/L    Urea Nitrogen 15 6 - 23 mg/dL    Creatinine 0.44 (L) 0.50 - 1.05 mg/dL    eGFR >90 >60 mL/min/1.73m*2    Calcium 8.3 (L) 8.6 - 10.6 mg/dL    Phosphorus 2.9 2.5 - 4.9 mg/dL    Albumin 3.6 3.4 - 5.0 g/dL   POCT GLUCOSE   Result Value Ref Range    POCT Glucose 131 (H) 74 - 99 mg/dL   BLOOD GAS MIXED VENOUS FULL PANEL   Result Value Ref Range    POCT pH, Mixed 7.38 7.33 - 7.43 pH    POCT pCO2, Mixed 56 (H) 41 - 51 mm Hg    POCT pO2, Mixed 43 35 - 45 mm Hg    POCT SO2, Mixed 73 45 - 75 %    POCT Oxy Hemoglobin, Mixed 71.5 45.0 - 75.0 %    POCT Hematocrit Calculated, Mixed 26.0 (L) 36.0 - 46.0 %    POCT Sodium, Mixed 133 (L) 136 - 145 mmol/L    POCT Potassium, Mixed 2.4 (LL) 3.5 - 5.3 mmol/L    POCT Chloride, Mixed 96 (L) 98 - 107 mmol/L    POCT Ionized Calcium, Mixed 1.09 (L) 1.10 - 1.33 mmol/L    POCT Glucose, Mixed 100 (H) 74 - 99 mg/dL    POCT Lactate, Mixed 0.7 0.4 - 2.0 mmol/L    POCT Base Excess, Mixed 7.0 (H) -2.0 - 3.0 mmol/L    POCT HCO3 Calculated, Mixed 33.1 (H) 22.0 - 26.0 mmol/L    POCT Hemoglobin, Mixed 8.5 (L) 12.0 - 16.0 g/dL    POCT Anion Gap, Mixed 6 (L) 10 - 25 mmo/L    Patient Temperature 37.0 degrees Celsius    FiO2 60 %   POCT GLUCOSE   Result Value Ref Range    POCT Glucose 146 (H) 74 - 99 mg/dL   BLOOD GAS MIXED VENOUS FULL PANEL   Result Value Ref Range    POCT pH, Mixed 7.33 7.33 - 7.43 pH     POCT pCO2, Mixed 66 (H) 41 - 51 mm Hg    POCT pO2, Mixed 39 35 - 45 mm Hg    POCT SO2, Mixed 66 45 - 75 %    POCT Oxy Hemoglobin, Mixed 65.3 45.0 - 75.0 %    POCT Hematocrit Calculated, Mixed 27.0 (L) 36.0 - 46.0 %    POCT Sodium, Mixed 132 (L) 136 - 145 mmol/L    POCT Potassium, Mixed 3.6 3.5 - 5.3 mmol/L    POCT Chloride, Mixed 93 (L) 98 - 107 mmol/L    POCT Ionized Calcium, Mixed 1.17 1.10 - 1.33 mmol/L    POCT Glucose, Mixed 141 (H) 74 - 99 mg/dL    POCT Lactate, Mixed 0.8 0.4 - 2.0 mmol/L    POCT Base Excess, Mixed 7.5 (H) -2.0 - 3.0 mmol/L    POCT HCO3 Calculated, Mixed 34.8 (H) 22.0 - 26.0 mmol/L    POCT Hemoglobin, Mixed 9.1 (L) 12.0 - 16.0 g/dL    POCT Anion Gap, Mixed 8 (L) 10 - 25 mmo/L    Patient Temperature 37.0 degrees Celsius    FiO2 50 %       Vascular US upper extremity venous duplex left         Transthoracic Echo (TTE) Limited   Final Result      XR chest 1 view   Final Result   1.  Interval opacification of the right hemithorax and correlate with   post pneumonectomy changes.   2. [place]-Paco catheter overlies the left lower lobe pulmonary artery.   3. Slight interval improvement in left-sided pulmonary edema.   Underlying edema and correlate with fluid status.             Signed by: Nino Barbour 1/20/2025 9:57 AM   Dictation workstation:   [id number]      XR abdomen 1 view   Final Result   1.  As described above.        Signed by: Vick Xiao 1/19/2025 11:58 AM   Dictation workstation:   [id number]      XR chest 1 view   Final Result   1. Slight worsening of multifocal airspace opacity in the left mid   and lower lung. Findings are concerning for infectious process. Small   left pleural effusion.   2. Postsurgical changes right pneumonectomy with medical devices as   described above. Consider retracting the [place]-Paco catheter.                  Signed by: Vick Xiao 1/19/2025 8:55 AM   Dictation workstation:   [id number]      Transthoracic Echo (TTE) Limited   Final Result       XR chest 1 view   Final Result   1. Patchy airspace opacity in the left lower lung with slight   improvement in aeration. Correlate with concern for infection   2. Postsurgical changes of right pneumonectomy as described above.   3. Medical lines and devices as above. Consider retraction of the   [place]-Paco catheter.        I personally reviewed the images/study and I agree with the findings   as stated. This study was interpreted at [place], [place].        MACRO:   None        Signed by: Vick Xiao 1/19/2025 8:54 AM   Dictation workstation:   [id number]      XR chest 1 view   Final Result   1. Worsening aeration of the left lung with increasing left basilar   infiltrate and pleural effusion. Correlate with mucous plugging and   component of lobar collapse.   2. Component of interstitial prominence of the left lung, also   slightly increased which may be due to edema.   3. Postsurgical changes of right pneumonectomy as described                  Signed by: Vick Xiao 1/18/2025 11:35 AM   Dictation workstation:   [id number]      Cardiac Catheterization Procedure   Final Result      Transthoracic Echo (TTE) Limited   Final Result      XR chest 1 view   Final Result   1.  Stable exam with postsurgical changes of right pneumonectomy/rib   resection and large layering fluid component within the right   hemithorax.   2. Similar findings of left pulmonary edema with slightly increased   size of small left pleural effusion. No left-sided pneumothorax.   3. Medical devices as above.                  MACRO:   None        Signed by: Alexx Anne 1/17/2025 8:16 AM   Dictation workstation:   [id number]      XR chest 1 view   Final Result   1.  Postsurgical changes of right-sided pneumonectomy with increased   layering opacification of the right hemithorax, likely due to   increasing fluid.   2. Similar trace left-sided pleural effusion with linear left  basilar   airspace opacity, likely atelectasis on a background of likely   pulmonary edema. Infection is not definitively excluded.   3. Medical devices as above with interval insertion of right internal   jugular central venous catheter projecting at the mid SVC.        I personally reviewed the images/study and I agree with the findings   as stated by resident Davidson Mcguire. This study was interpreted   at [facility], [place], [place].        MACRO:   None        Signed by: Alexx Anne 1/17/2025 8:14 AM   Dictation workstation:   [id number]      Transthoracic Echo (TTE) Limited   Final Result      US right upper quadrant   Final Result   Small amount of simple appearing ascites in the right upper quadrant   without evidence of cholelithiasis, cholecystitis or biliary dilation.        Signed by: Alberto Salas 1/16/2025 1:02 PM   Dictation workstation:   [id number]      XR chest 1 view   Final Result   1. Slight interval increase in left basilar airspace opacity which   may be due to increasing atelectasis. Cannot exclude developing   infectious infiltrate. Question trace left pleural effusion.   2. Again seen postsurgical changes related to right-sided   pneumonectomy as described above                  Signed by: Vick Xiao 1/16/2025 9:47 AM   Dictation workstation:   [id number]      US renal complete   Final Result   1. Unremarkable sonographic evaluation of the kidneys.   2. Incomplete evaluation of a gallbladder with some pericholecystic   fluid and biliary sludge. Dedicated right upper quadrant ultrasound   could be obtained as clinically warranted.   3. A small amount of free fluid present in the right upper quadrant   and pelvis.        Findings relayed by phone by me to SICU provider at 5:11 p.m.        I personally reviewed the images/study and I agree with the findings   as stated by Erickson Best MD (resident) . This study was    interpreted at [facility],   [place], [place].        MACRO:   None        Signed by: Alberto Salas 1/15/2025 6:17 PM   Dictation workstation:   [id number]      XR chest 1 view   Final Result   1.  Status post pneumonectomy with interval increase in fluid   component of right-sided hydropneumothorax.   2. Medical devices as above.                  MACRO:   None        Signed by: Alexx Anne 1/15/2025 9:28 AM   Dictation workstation:   [id number]      XR chest 1 view   Final Result   1. Interval removal of right chest tube. Other medical devices as   described above.   2. Postsurgical changes related to right pneumonectomy.   3. Faint left basilar opacity, likely atelectatic.                  Signed by: Vick Xiao 1/14/2025 2:53 PM   Dictation workstation:   [id number]      XR chest 1 view   Final Result   1.  Stable right-sided pneumonectomy changes.             Signed by: Nino Barbour 1/14/2025 12:56 PM   Dictation workstation:   [id number]      Transthoracic Echo (TTE) Limited   Final Result      XR chest 1 view   Final Result   1. Postsurgical changes related to right sided pneumonectomy with   multiple medical devices as described above.   2. Mild interstitial prominence of the left lung.                  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 Wagner on 1/13 requiring post-op vasoactive therapy. Post-op course c/b acute hyponatremia, multifactorial delirium, cardiogenic shock requiring inotropic support, and acute hypoxic/hypercarbic respiratory failure requiring intubation. COVID+ 1/20.      Plan:  NEURO: History of anxiety on alprazolam 1mg qd, last filled 1/4/2025 per [facility] review. Family states she was not taking for several months but then restarted prn about 3 wks prior to surgery for situational anxiety. A&Ox4 at baseline. Acute post-op pain. Multifactorial delirium improved. Currently sedated and intubated. On precedex 1mcg/kg/hr and fentanyl 50mcg/hr. Able to briefly awaken and follow commands without sedation interruption.  - ongoing neuro and pain assessments  - continue precedex and fentanyl infusions, titrate to goal RASS 0 to -3  - Lidocaine patch to right chest wall  - PT/OT consulted -> hold off for now  - Restraints indicated while patient remains intubated, restrain with soft wrist restraints until medical devices are discontinued.      CV: No history of cardiac disease. Baseline /79, -110. TTE 9/2024 normal biV function, RVSP 10mmHg. Intra-op hypotension requiring vaso and epi. Arrived to SICU on vaso 0.06 units/min, started on levo 0.05 mcg/kg/min. TTE 1/13 with EF 60-65%, low normal RV systolic function. Weaned off vasopressors 1/15. Elevated lactate to 2.9 on 1/16 with SOB and new O2 requirement. Repeat TTE 1/16: acute biV dysfunction with signs of takotsubo cardiomyopathy, EF 34%, mod reduced RV systolic function, mildly elevated RVSP, mild to mod TR. Cardiogenic shock, started milrinone, lasix infusion, and vasopressin. Shock Call on 1/16, no indication for mechanical circulatory support at this time. Initial troponin 928 in the setting of demand ischemia, with repeat troponin downtrend to 625. Lactate normalized. Epinephrine infusion added overnight 1/16-1/17, discontinued 2/2 tachycardia in the 130s. Milrinone increased to 0.25mcg/kg/min (decreased to 0.125 thereafter). Lasix infusion stopped 1/17 given contraction alkalosis. Taken to cath lab 1/17 for RHC/PA cath placement, opening CI 2.9. Milrinone uptitrated to 0.375mcg/kg/min 1/17-1/18. iEpo 0.05 added 1/18.  Vasopressin increased to 0.06 and levo added to 0.15 1/18 evening at time of intubation. Given total of 750ml 5% albumin and 1 unit RBC since evening 1/18 through overnight 1/19. Repeat TTE 1/20 with improved EF to 35%. Levo weaned off 1/20, weaning milrinone. Responsive to diuresis. Thermo CI 2.9 this am, mixed SvO2 66. Currently on vaso 0.03, milrinone 0.125, and iEpo 0.05. HR as high as 140s overnight, currently in the 80s.  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 Arrived to SICU extubated on 10L SFM, weaned to room air 1/14. Chest tube removed 1/14. Acute on chronic hypoxic respiratory failure 1/16. Escalation to Airvo 50L 90% overnight 1/17-1/18. Acute hypercapnia 1/18 with increase WOB, trial of BIPAP with iEPO. Eventual intubation 1/18 evening. Currently on AC/VC 50%/280/18/+5. Mediastinal shift on AM cxr.  - continue iEpo, no plan to wean today  - continue mechanical ventilation  - wean FiO2 to maintain SpO2 >92%  - scheduled budesonide/duoneb  - PRN albuterol  - Daily CXR  - ABG q4h and  prn  - holding home benzonatate     GI: No GI history. LFTs WNL 12/2/24. Evidence of pericholecystic fluid and biliary sludge on renal US 1/15 but no abdominal symptoms. RUQ US with small ascites, otherwise no acute findings. Abdominal distention  this am. Elevated LFTs, now downtrending. LBM 1/17. OG tube placed 1/19 for gastric decompression. TFs initiated via OG 1/20.  - continue TFs at goal (30ml/hr) via OG tube for now  - continue thiamine 100mg IV daily  - continue IV PPI for GI prophylaxis  - bisacodyl LA daily, colace and senna BID -> add miralax daily  - continue to trend LFTs     : No history of renal disease. Baseline creatinine 0.6. Symptomatic acute hyponatremia (SIADH vs vaso mediated) to 120 overnight 1/14 - 1/15, corrected to 124 after 150cc 3% NaCl x2 on 1/15. Hypochloremia. Renal US on 1/15 unremarkable. Started on lasix infusion 1/16 as above in CV, stopped 1/17. Net positive 3.4L for past 24hrs. Hypokalemia, hypophosphatemia, hypocalcemia, hypomagnesemia. Yoo changed overnight 1/18-1/19. Oliguria improving. Remains net positive 3.8L for admission, net negative 539ml over past 24hrs.  - diuresis as above in CV  - RFP q12h and PRN  - Nephrology following, appreciate recs  - Maintain yoo for strict I/Os   - Maintain U/O >0.5ml/kg/hr  - Replete electrolytes to maintain K>4, Phos>2.5, iCal>1.1, Mag>2.      HEME: Hx of DOC. Baseline H/H 12/40. Acute blood loss anemia, OR EBL 150mL. Acute coagulopathy with INR elevated to 2.3 2/2 to poor nutritional status. Received IV Vit K 10mg x1 with improved INR thereafter. Transfused 1u PRBC 1/17 and 1u PRBC on 1/18. LUE duplex obtained 1/20 for swelling, negative for DVT.  - Check CBC and coags daily and PRN  - goal Hgb >9  - SCDs and SQH for DVT ppx  - ongoing monitoring for s/s bleeding  - maintain active T&S (1/19)     ENDO: No history of DM or thyroid disease. TSH 0.48 (1/17). Recent 5d course of prednisone 10mg 12/2024 for URI. Adequate glycemic control. Started on SDS out of OR 1/13.  - BG q4h, SSI #1  - Stress dose steroids as above     MSK: Arthritis, Vit D deficiency.  - not on meds at home  - monitor for symptoms     ID: Febrile on 1/18, Tmax 38.1. Completed yunior-op course of cefazolin.  Leukocytosis to 17.2 after initiation of stress dose steroids, which resolved, but now with recurrent mild leukocytosis. Worsened clinical condition 1/17, started on vanco, zosyn and azithromycin. Blood cultures 1/17 NGTD. MRSA negative 1/17. Respiratory viral panel negative 1/17. Yoo changed 1/18, Urine cx 1/18 negative. Added fluc overnight 1/18-1/19. Sputum culture 1/19 negative. Vancomycin discontinued 1/20. Urine strep/legionella 1/20 negative. Found to be COVID+ on 1/20, initiated remdesivir.  - COVID precautions  - ID consulted, appreciate recs  - temp q4h, wbc daily  - ongoing monitoring for s/s infection  - discontinue fluconazole and azithromycin  - continue zosyn  - continue remdesivir  - f/u blood culture final report     Lines:  - DAVID MAC (SICU, 1/16)  - PA cath (Cath lab. 1/17)  - L brachial A-line (1/16)  - Left chest mediport accessed   - PIV x3  - yoo (1/18)  - ETT (1/18)     Dispo: Continue ICU care. Patient seen and discussed with ICU attending Dr. Solorio.      Hugh Lyons, APRN-CNP  SICU Phone [phone]    Critical Care time: 60 minutes

## 2025-01-21 NOTE — PROGRESS NOTES
"Faustina Vick \"Noam" is a 64 y.o. female on day 8 of admission presenting with Primary cancer of right upper lobe of lung (Multi).    Subjective   -Patient seen this morning resting comfortably in bed  -VSS: tachycardia with rates into the 140s, remains mechanically ventilated    -[place] Numbers today: Mixed venous O2 65%, CO 4.6, CI 2.9, CVP 18, mPAP 32,  --> continues to reinforce shock is not likely cardiogenic  -Patient has titrated off of levo this morning with improvement in sinus tach.  She remains on vasopressin   -UOP: 900 mL yesterday so far today 826mL roughly 100 mL/HR.       Objective     Physical Exam  General: Not in acute distress, somnolent, alert, cooperative, ill-appearing  HEENT: Normocephalic, atraumatic, EOMI, moist mucous membranes, ETT in place, OGT in place   Neck: Neck supple, trachea midline, no evidence of trauma  Cardiovascular: Tachycardic, regular rhythm, S1 and S2 appreciated, no murmurs rubs gallops appreciated, distal pulses 2+ bilaterally (radial and dorsalis pedis)  Respiratory: Coarse mechanical breath sounds appreciated bilaterally, with loss of lung sounds from right mid lobe down,no increased work of breathing on mechanical ventilation  GI: Abdomen soft, nondistended, nontender to palpation, bowel sounds present  : yoo cath draining straw colored urine  Extremities: No edema appreciated in lower extremities bilaterally, no cyanosis  Neuro: no focal deficits, strength and sensation intact bilaterally  Skin: Warm and dry, without lesions or rashes    Last Recorded Vitals  Blood pressure 89/57, pulse 104, temperature 36.6 °C (97.9 °F), temperature source Temporal, resp. rate 17, height 1.651 m (5' 5\"), weight 58.5 kg (128 lb 15.5 oz), SpO2 96%.  Intake/Output last 3 Shifts:  I/O last 3 completed shifts:  In: 2913.2 (49.8 mL/kg) [I.V.:1040.7 (17.8 mL/kg); NG/GT:270; IV Piggyback:1602.5]  Out: 2745 (46.9 mL/kg) [Urine:2570 (1.2 mL/kg/hr); Emesis/NG output:175]  Weight: " 19-Jan-2023 10:20 58.5 kg     Relevant Results  Transthoracic Echo (TTE) Limited    Result Date: 1/17/2025   [facility], [address] [place] [address]                Tel [phone] and Fax [phone] TRANSTHORACIC ECHOCARDIOGRAM REPORT  Patient Name:       RAÚL KAMINSKI     Anne Physician:    [id number] Rita Graham MD Study Date:         1/17/2025           Ordering Provider:    [id number] ROLANDO ARAGON MRN/PID:            [id number]            Fellow: Accession#:         [id number]        Nurse: Date of Birth/Age:  1960 / 64      Sonographer:          Domonique rocha                                     RDCARIE Gender assigned at  F                   Additional Staff: Birth: Height:             165.10 cm           Admit Date:           1/13/2025 Weight:             52.16 kg            Admission Status:     Inpatient - STAT BSA / BMI:          1.56 m2 / 19.14     Encounter#:           [id number]                     kg/m2 Blood Pressure:     103/74 mmHg         Department Location:  [facility] Study Type:    TRANSTHORACIC ECHO (TTE) LIMITED Diagnosis/ICD: Acute combined systolic (congestive) and diastolic (congestive)                heart failure (CHF)-I50.41 Indication:    Acute systolic/diastolic failure CPT Code:      Echo Limited-69914; Doppler Limited-79888; Color Doppler-94247 Patient History: Pertinent History: Anxiety; Arthritis; Asthma; Hx of squamous cell carcinoma; Hx                    of chemotherapy; Lung cancer; Prior smoker; Known EF of 34%                    (01/16/24) with described regional wall motion abnormalities                    of the LV and RV suggestive of biventricular Takotsubo. Study Detail: The following Echo studies were performed: 2D, M-Mode, Doppler and               color flow. Technically challenging study due  to body habitus and               patient lying in supine position. Definity used as a contrast               agent for endocardial border definition. Total contrast used for               this procedure was 1 mL via IV push.  PHYSICIAN INTERPRETATION: Left Ventricle: Left ventricular ejection fraction is severely decreased, by visual estimate at 25-30%. There are multiple left ventricular wall motion abnormalities. The left ventricular cavity size is normal. Left ventricular diastolic filling cannot be determined, due to E/A wave fusion. There is no definite left ventricular thrombus visualized. Apical and mid LV segments including the RV free wall are completely akinetic. LV Wall Scoring: The entire apex, mid and apical anterior wall, mid and apical anterior septum, mid and apical inferior septum, mid and apical inferior wall, mid inferolateral segment, and mid anterolateral segment are akinetic. All remaining scored segments are normal. Left Atrium: The left atrium is mild to moderately dilated. Right Ventricle: The right ventricle is normal in size. There is reduced right ventricular systolic function. Right Atrium: The right atrium is mildly dilated. Aortic Valve: The aortic valve is structurally normal. The aortic valve dimensionless index is 0.82. There is no evidence of aortic valve regurgitation. The peak instantaneous gradient of the aortic valve is 3 mmHg. The mean gradient of the aortic valve is 2 mmHg. Mitral Valve: The mitral valve is normal in structure. There is trace mitral valve regurgitation. Tricuspid Valve: The tricuspid valve is structurally normal. There is trace tricuspid regurgitation. The Doppler estimated RVSP is within normal limits at 27.0 mmHg. Pulmonic Valve: The pulmonic valve is not well visualized. Pulmonic valve regurgitation was not assessed. Pericardium: Trivial pericardial effusion. Aorta: The aortic root is normal. Systemic Veins: The inferior vena cava appears normal in size.  In comparison to the previous echocardiogram(s): Compared with study dated 2025, no significant change.  CONCLUSIONS:  1. Left ventricular ejection fraction is severely decreased, by visual estimate at 25-30%.  2. Multiple segmental abnormalities exist. See findings.  3. Apical and mid LV segments including the RV free wall are completely akinetic.  4. There are multiple left ventricular wall motion abnormalities.  5. Left ventricular diastolic filling cannot be determined, due to E/A wave fusion.  6. No left ventricular thrombus visualized.  7. There is reduced right ventricular systolic function.  8. The left atrium is mild to moderately dilated.  9. Right ventricular systolic pressure is within normal limits. 10. Compared with study dated 2025, no significant change. QUANTITATIVE DATA SUMMARY:  2D MEASUREMENTS:          Normal Ranges: Ao Root d:       3.43 cm  (2.0-3.7cm) LAs:             3.03 cm  (2.7-4.0cm) LVEDV Index:     54 ml/m2  RA VOLUME BY A/L METHOD:          Normal Ranges: RA Area A4C:             18.3 cm2  LV SYSTOLIC FUNCTION BY 2D PLANIMETRY (MOD):                      Normal Ranges: EF-A4C View:    19 % (>=55%) EF-A2C View:    22 % EF-Biplane:     19 % EF-Visual:      28 % LV EF Reported: 28 %  LV DIASTOLIC FUNCTION:           Normal Ranges: MV Peak E:             0.99 m/s  (0.7-1.2 m/s) MV e'                  0.091 m/s (>8.0) MV lateral e'          0.12 m/s MV medial e'           0.06 m/s E/e' Ratio:            10.92     (<8.0) MV DT:                 119 msec  (150-240 msec)  AORTIC VALVE:                     Normal Ranges: AoV Vmax:                0.87 m/s (<=1.7m/s) AoV Peak PG:             3.0 mmHg (<20mmHg) AoV Mean P.0 mmHg (1.7-11.5mmHg) LVOT Max Navi:            0.67 m/s (<=1.1m/s) AoV VTI:                 11.40 cm (18-25cm) LVOT VTI:                9.30 cm LVOT Diameter:           1.94 cm  (1.8-2.4cm) AoV Area, VTI:           2.41 cm2 (2.5-5.5cm2) AoV Area,Vmax:            2.26 cm2 (2.5-4.5cm2) AoV Dimensionless Index: 0.82  RIGHT VENTRICLE: RV Basal 3.40 cm RV Mid   2.30 cm RV Major 6.0 cm TAPSE:   11.9 mm RV s'    0.09 m/s  TRICUSPID VALVE/RVSP:          Normal Ranges: Peak TR Velocity:     2.45 m/s RV Syst Pressure:     27 mmHg  (< 30mmHg)  [id number] Rita Graham MD Electronically signed on 1/17/2025 at 12:13:06 PM  Wall Scoring  ** Final **     XR chest 1 view    Result Date: 1/17/2025  Interpreted By:  Alexx Anne, STUDY: XR CHEST 1 VIEW;  1/17/2025 6:40 am   INDICATION: Signs/Symptoms:pneumonectomy.     COMPARISON: Exam dated 01/16/2025   ACCESSION NUMBER(S): [id number]   ORDERING CLINICIAN: GABBY SMITH   FINDINGS: AP radiograph of the chest was provided.   Right IJ approach central venous catheter tip projects over the mid SVC. Left chest wall medication port tip projects over the confluence of the left brachiocephalic vein and SVC. Persistent subcutaneous emphysema overlying the right chest wall.   CARDIOMEDIASTINAL SILHOUETTE: Cardiomediastinal silhouette is stable in size and configuration. Complete obscuration of the right cardiomediastinal contours. Thin curvilinear lucency projecting along the right cardiac border may represent a component of pneumomediastinum.   LUNGS: Status post on bloc pneumonectomy and right rib resection. Similar gradient opacification of the right hemithorax. Similar prominent interstitial markings in the left lung with slightly increased blunting of the left costophrenic angle. Streaky left basilar opacities are most consistent with atelectasis. No evidence of left-sided pneumothorax.   ABDOMEN: No remarkable upper abdominal findings.   BONES: No acute osseous changes.       1.  Stable exam with postsurgical changes of right pneumonectomy/rib resection and large layering fluid component within the right hemithorax. 2. Similar findings of left pulmonary edema with slightly increased size of small left pleural effusion. No left-sided  pneumothorax. 3. Medical devices as above.       MACRO: None   Signed by: Alexx Anne 1/17/2025 8:16 AM Dictation workstation:   [id number]    XR chest 1 view    Result Date: 1/17/2025  Interpreted By:  Alexx Anne and Ritchie Brandon STUDY: XR CHEST 1 VIEW;  1/16/2025 7:01 pm   INDICATION: Signs/Symptoms:central line placement.   COMPARISON: Chest x-ray 01/16/2025, 6:41 a.m.   ACCESSION NUMBER(S): [id number]   ORDERING CLINICIAN: GABBY SMITH   FINDINGS: AP radiograph of the chest was provided.   Leftchest wall MediPort tip projects over the expected location of the upper SVC. Right internal jugular central venous catheter tip projects over the expected location of the mid SVC. There is subcutaneous emphysematous changes along the right lateral chest wall.   CARDIOMEDIASTINAL SILHOUETTE: Cardiomediastinal silhouette is stable in size and configuration. There is again obscuration of the right cardiomediastinal silhouette.   LUNGS: Redemonstration of postsurgical changes compatible with recent history of right pneumonectomy. Gradual, layering opacification of the right hemithorax which is increased when compared to prior examination. There is again trace blunting of the left costophrenic angle with adjacent linear opacities in the left lower lobe and findings of increased interstitial markings in the left lung. No evidence of left-sided pneumothorax.   ABDOMEN: No remarkable upper abdominal findings.   BONES: Resection of multiple upper right-sided ribs. No acute osseous changes.       1.  Postsurgical changes of right-sided pneumonectomy with increased layering opacification of the right hemithorax, likely due to increasing fluid. 2. Similar trace left-sided pleural effusion with linear left basilar airspace opacity, likely atelectasis on a background of likely pulmonary edema. Infection is not definitively excluded. 3. Medical devices as above with interval insertion of right internal jugular central  venous catheter projecting at the mid SVC.   I personally reviewed the images/study and I agree with the findings as stated by resident Davidson Mcguire. This study was interpreted at [facility], [place], [place].   MACRO: None   Signed by: Alexx Anne 1/17/2025 8:14 AM Dictation workstation:   [id number]    ECG 12 lead    Result Date: 1/16/2025  Normal sinus rhythm Low voltage QRS Cannot rule out Anterior infarct (cited on or before 16-JAN-2025) Abnormal ECG When compared with ECG of 16-JAN-2025 14:04, No significant change was found    Transthoracic Echo (TTE) Limited    Result Date: 1/16/2025   [facility], [address] [place] [address]                Tel [phone] and Fax [phone] TRANSTHORACIC ECHOCARDIOGRAM REPORT  Patient Name:       RAÚL Rodríguez Physician:    [id number] Fabián Kulkarni MD Study Date:         1/16/2025           Ordering Provider:    [id number] GABBY SMITH MRN/PID:            [id number]            Fellow: Accession#:         [id number]        Nurse: Date of Birth/Age:  1960 / 64      Sonographer:          Naya rocha RDCS Gender assigned at  F                   Additional Staff: Birth: Height:             165.10 cm           Admit Date:           1/20/2025 Weight:             51.71 kg            Admission Status:     Inpatient - STAT BSA / BMI:          1.56 m2 / 18.97     Encounter#:           [id number]                     kg/m2 Blood Pressure:     106/62 mmHg         Department Location:  [facility] Study Type:    TRANSTHORACIC ECHO (TTE) LIMITED Diagnosis/ICD: Acute respiratory failure with hypoxia-J96.01 Indication:    Post pneumonectomy; new O2 requirements; eval BIV function CPT Code:      Echo  Limited-20953; Color Doppler-21908 Patient History: Pertinent History: COPD. Lung cancer; s/p pneumonectomy; immunotherapy. Study Detail: The following Echo studies were performed: 2D, M-Mode and color               flow. Technically challenging study due to body habitus and               prominent lung artifact. Definity used as a contrast agent for               endocardial border definition. Total contrast used for this               procedure was 3 mL via IV push. Unable to obtain suprasternal               notch view.  PHYSICIAN INTERPRETATION: Left Ventricle: Left ventricular ejection fraction is moderately decreased, calculated by Miranda's biplane at 34%. Left venticular wall motion is abnormal. The left ventricular cavity size is upper limits of normal. There is normal septal and normal posterior left ventricular wall thickness. Spectral Doppler shows a Grade II (pseudonormal pattern) of left ventricular diastolic filling with an elevated left atrial pressure. The described regional wall motion abnormalities of the LV and RV are suggestive of biventricular Takotsubo (stress cardiomyopathy). LV Wall Scoring: The mid and apical anterior wall, mid and apical anterior septum, mid and apical inferior septum, mid and apical inferior wall, mid inferolateral segment, mid anterolateral segment, and apical lateral segment are akinetic. All remaining scored segments are normal. Left Atrium: The left atrium is moderately dilated. Right Ventricle: The right ventricle is normal in size. There is moderately reduced right ventricular systolic function. There is akinesis of the mid and apical segments of the RV free wall. Right Atrium: The right atrium is normal in size. A dilated inferior vena cava demonstrates poor inspiratory collapse, consistent with elevated right atrial pressures. Aortic Valve: The aortic valve is trileaflet. There is trace aortic valve regurgitation. Mitral Valve: The mitral valve is normal in  structure. There is mild mitral valve regurgitation. Tricuspid Valve: The tricuspid valve is structurally normal. There is mild to moderate tricuspid regurgitation. The Doppler estimated RVSP is mildly elevated at 45.5 mmHg. Pulmonic Valve: The pulmonic valve is not well visualized. Pulmonic valve regurgitation was not assessed. Pericardium: There is no pericardial effusion noted. Aorta: The aortic root is normal. Systemic Veins: The inferior vena cava appears dilated, with IVC inspiratory collapse less than 50%. In comparison to the previous echocardiogram(s): Compared with study dated 1/13/2025, the LV and RV wall motion abnormalities were not present in prior study.  CONCLUSIONS:  1. Left ventricular ejection fraction is moderately decreased, calculated by Miranda's biplane at 34%.  2. Multiple segmental abnormalities exist. See findings.  3. Spectral Doppler shows a Grade II (pseudonormal pattern) of left ventricular diastolic filling with an elevated left atrial pressure.  4. There is moderately reduced right ventricular systolic function.  5. There is akinesis of the mid and apical segments of the RV free wall.  6. Mildly elevated right ventricular systolic pressure.  7. Mild to moderate tricuspid regurgitation visualized.  8. The left atrium is moderately dilated.  9. A dilated inferior vena cava demonstrates poor inspiratory collapse, consistent with elevated right atrial pressures. 10. The described regional wall motion abnormalities of the LV and RV are suggestive of biventricular Takotsubo (stress cardiomyopathy). QUANTITATIVE DATA SUMMARY:  2D MEASUREMENTS:          Normal Ranges: IVSd:            0.60 cm  (0.6-1.1cm) LVPWd:           0.70 cm  (0.6-1.1cm) LVIDd:           4.80 cm  (3.9-5.9cm) LVIDs:           3.50 cm LV Mass Index:   63 g/m2 LVEDV Index:     61 ml/m2 LV % FS          27.1 %  LA VOLUME:                   Normal Ranges: LA Vol A4C:        69.1 ml   (22+/-6mL/m2) LA Vol Index A4C:   44.3ml/m2 LA Area A4C:       21.1 cm2 LA Major Axis A4C: 5.5 cm  RA VOLUME BY A/L METHOD:          Normal Ranges: RA Area A4C:             16.3 cm2  M-MODE MEASUREMENTS:         Normal Ranges: Ao Root:             3.00 cm (2.0-3.7cm) LAs:                 3.50 cm (2.7-4.0cm)  LV SYSTOLIC FUNCTION BY 2D PLANIMETRY (MOD):                      Normal Ranges: EF-A4C View:    40 % (>=55%) EF-A2C View:    31 % EF-Biplane:     34 % LV EF Reported: 34 %  LV DIASTOLIC FUNCTION:           Normal Ranges: MV Peak E:             0.87 m/s  (0.7-1.2 m/s) MV Peak A:             0.59 m/s  (0.42-0.7 m/s) E/A Ratio:             1.48      (1.0-2.2) MV e'                  0.057 m/s (>8.0) MV lateral e'          0.06 m/s MV medial e'           0.05 m/s E/e' Ratio:            15.37     (<8.0)  MITRAL VALVE:          Normal Ranges: MV DT:        119 msec (150-240msec)  RIGHT VENTRICLE: RV Basal 3.51 cm RV Mid   2.52 cm RV Major 6.0 cm TAPSE:   9.6 mm RV s'    0.07 m/s  TRICUSPID VALVE/RVSP:          Normal Ranges: Peak TR Velocity:     3.26 m/s RV Syst Pressure:     46 mmHg  (< 30mmHg) IVC Diam:             2.25 cm  [id number] Fabián Kulkarni MD Electronically signed on 1/16/2025 at 2:58:07 PM  Wall Scoring  ** Final (Updated) **     US right upper quadrant    Result Date: 1/16/2025  Interpreted By:  Alberto Salas, STUDY: US RIGHT UPPER QUADRANT; 1/16/2025 11:09 am   INDICATION: Signs/Symptoms:pericholecystic fluid seen on renal us.   COMPARISON: None.   ACCESSION NUMBER(S): [id number]   ORDERING CLINICIAN: WILMER ANTHONY   TECHNIQUE: Multiple images of the right upper quadrant were obtained.   FINDINGS: LIVER: The liver measures up to  15.5 cm in greatest sagittal dimension. Echogenicity is within normal limits. There is no morphologic evidence of cirrhosis and no focal hepatic lesion is evident.   GALLBLADDER: The gallbladder is within normal limits without wall thickening or cholelithiasis. A sonographic Odom sign was not present.  There is a small amount of simple appearing ascites in the right upper quadrant.   BILIARY SYSTEM: There is no intrahepatic biliary dilation. The common bile duct measures up to  5 mm in width.   PANCREAS: The pancreas is partially obscured by bowel gas. The visualized portions of the pancreas are within normal limits.   RIGHT KIDNEY: The right kidney measures up to  9.9 cm in greatest sagittal dimension. Cortical echogenicity and thickness are within normal limits. No hydroureteronephrosis.       Small amount of simple appearing ascites in the right upper quadrant without evidence of cholelithiasis, cholecystitis or biliary dilation.   Signed by: Alberto Salas 1/16/2025 1:02 PM Dictation workstation:   [id number]    XR chest 1 view    Result Date: 1/16/2025  Interpreted By:  Vick Eli, STUDY: XR CHEST 1 VIEW; 1/16/2025 6:43 am   INDICATION: Signs/Symptoms:ICU.   COMPARISON: Radiograph dated 01/15/2025   ACCESSION NUMBER(S): [id number]   ORDERING CLINICIAN: GABBY SMITH   FINDINGS: Postsurgical changes of right pneumonectomy and resection of multiple right-sided ribs. Slight interval increase in opacity in the lower portion of the pneumonectomy site. Slight interval increase in left basilar faint airspace opacity. Blunting of left costophrenic angle. No left pneumothorax.   Cardiac silhouette size is grossly stable, however right heart border is obscured.   Left subclavian approach MediPort is unchanged in position. Interval removal of right IJ central venous catheter sheath.   Subcutaneous emphysema in the right chest wall and right side of the neck.       1. Slight interval increase in left basilar airspace opacity which may be due to increasing atelectasis. Cannot exclude developing infectious infiltrate. Question trace left pleural effusion. 2. Again seen postsurgical changes related to right-sided pneumonectomy as described above       Signed by: Vick Xiao 1/16/2025 9:47 AM  Dictation workstation:   [id number]    US renal complete    Result Date: 1/15/2025  Interpreted By:  Alberto Salas  and Estephania Almendarez STUDY: US RENAL COMPLETE;  1/15/2025 3:25 pm   INDICATION: Signs/Symptoms:symptomatic hyponatremia.     COMPARISON: CT abdomen 08/15/2024 FDG PET-CT 12/30/2024   ACCESSION NUMBER(S): [id number]   ORDERING CLINICIAN: GABBY SMITH   TECHNIQUE: Multiple images of the kidneys were obtained  .   FINDINGS: RIGHT KIDNEY: The right kidney measures 11.17 cm in length. The renal cortical echogenicity and thickness are within normal limits. No hydronephrosis is present; no evidence of nephrolithiasis.   LEFT KIDNEY: The left kidney measures 11.75 cm in length. The renal cortical echogenicity and thickness are within normal limits. No hydronephrosis is present; no evidence of nephrolithiasis.   BLADDER: A Montiel is present within a decompressed bladder.   Incidental note is made of a somewhat edematous gallbladder with some pericholecystic fluid and layering echogenic material likely representing biliary sludge.   Free fluid is present within the right upper quadrant and pelvis.       1. Unremarkable sonographic evaluation of the kidneys. 2. Incomplete evaluation of a gallbladder with some pericholecystic fluid and biliary sludge. Dedicated right upper quadrant ultrasound could be obtained as clinically warranted. 3. A small amount of free fluid present in the right upper quadrant and pelvis.   Findings relayed by phone by me to SICU provider at 5:11 p.m.   I personally reviewed the images/study and I agree with the findings as stated by Erickson Best MD (resident) . This study was interpreted at [place], [place].   MACRO: None   Signed by: Alberto Salas 1/15/2025 6:17 PM Dictation workstation:   [id number]    XR chest 1 view    Result Date: 1/15/2025  Interpreted By:  Alexx Anne, STUDY: XR CHEST 1 VIEW;  1/15/2025  6:46 am   INDICATION: Signs/Symptoms:routine ICU.     COMPARISON: Exam dated 01/14/2025   ACCESSION NUMBER(S): [id number]   ORDERING CLINICIAN: GABBY SMITH   FINDINGS: AP radiograph of the chest was provided.   Left chest wall subclavian approach medication port with catheter tip projecting over the upper SVC. Right IJ central venous catheter tip projects over the upper SVC. Interval increase in subcutaneous emphysema overlying the right lower cervical soft tissues. Similar subcutaneous emphysema along the right chest wall.   CARDIOMEDIASTINAL SILHOUETTE: Cardiomediastinal silhouette is stable in size and configuration.   LUNGS: Postsurgical changes of pneumonectomy are again visualized. There is a moderate amount of layering pleural fluid within the right hemithorax on a background of diffuse lucency. Streaky left basilar atelectasis. The left lung otherwise appears clear.   ABDOMEN: No remarkable upper abdominal findings.   BONES: No acute osseous changes.       1.  Status post pneumonectomy with interval increase in fluid component of right-sided hydropneumothorax. 2. Medical devices as above.       MACRO: None   Signed by: Alexx Anne 1/15/2025 9:28 AM Dictation workstation:   [id number]    XR chest 1 view    Result Date: 1/14/2025  Interpreted By:  Uriel Eli, STUDY: XR CHEST 1 VIEW; 1/14/2025 2:40 pm   INDICATION: Signs/Symptoms:Chest tube removal.   COMPARISON: Radiograph dated 01/14/2025   ACCESSION NUMBER(S): [id number]   ORDERING CLINICIAN: ERINN SANCHEZ   FINDINGS: Postsurgical changes related to right pneumonectomy with partially resected ribs.. Interval removal of right chest tube. Left subclavian MediPort and right IJ central venous catheter sheath are unchanged in positioning.   Cardiac silhouette size is slightly enlarged, unchanged.   Faint left basilar airspace opacity, likely atelectatic. No sizable pneumothorax.   Small amount of subcutaneous and soft tissue emphysema in  the right chest wall.       1. Interval removal of right chest tube. Other medical devices as described above. 2. Postsurgical changes related to right pneumonectomy. 3. Faint left basilar opacity, likely atelectatic.       Signed by: Vick Xiao 1/14/2025 2:53 PM Dictation workstation:   [id number]    XR chest 1 view    Result Date: 1/14/2025  Interpreted By:  Nino Barbour, STUDY: XR CHEST 1 VIEW; 1/14/2025 7:06 am   INDICATION: Signs/Symptoms:recent pneumonectomy.   COMPARISON: 01/13/2025.   ACCESSION NUMBER(S): [id number]   ORDERING CLINICIAN: GABBY SMITH   FINDINGS: Right-sided chest tube in place.   CARDIOMEDIASTINAL SILHOUETTE: Right-sided mediastinal shift consistent with recent right-sided pneumonectomy.   LUNGS: Right-sided pneumonectomy changes.   ABDOMEN: No remarkable upper abdominal findings.   BONES: Right-sided thoracotomy changes.       1.  Stable right-sided pneumonectomy changes.     Signed by: Nino Barbour 1/14/2025 12:56 PM Dictation workstation:   [id number]    Transthoracic Echo (TTE) Limited    Result Date: 1/13/2025   [facility], [address] [place] [address]                Tel [phone] and Fax [phone] TRANSTHORACIC ECHOCARDIOGRAM REPORT  Patient Name:       RAÚL Rodríguez Physician:    [id number] Cam Llanos MD Study Date:         1/13/2025           Ordering Provider:    [id number] WILMER ANTHONY MRN/PID:            [id number]            Fellow: Accession#:         [id number]        Nurse: Date of Birth/Age:  1960 / 64      Sonographer:          José Miguel rocha                                     RDCARIE Gender assigned at  F                   Additional Staff: Birth: Height:             165.10 cm           Admit Date:           1/13/2025 Weight:             51.71 kg             Admission Status:     Inpatient - STAT BSA / BMI:          1.56 m2 / 18.97     Encounter#:           [id number]                     kg/m2 Blood Pressure:     100/57 mmHg         Department Location:  [facility] Study Type:    TRANSTHORACIC ECHO (TTE) LIMITED Diagnosis/ICD: Hypotension, unspecified-I95.9 Indication:    Hypotension CPT Code:      Echo Limited-65901; Doppler Limited-02604; Color Doppler-70989  Study Detail: The following Echo studies were performed: 2D, Doppler and color               flow. Technically challenging study due to body habitus and               patient lying in supine position.  PHYSICIAN INTERPRETATION: Left Ventricle: The left ventricular systolic function is normal, with a visually estimated ejection fraction of 60-65%. There are no regional left ventricular wall motion abnormalities. The left ventricular cavity size is normal. Left ventricular diastolic filling was not assessed. Left Atrium: The left atrium was not well visualized. Right Ventricle: The right ventricle is normal in size. There is low normal right ventricular systolic function. RV S' and TAPSE under measured due to technical difficulty visualizing lateral tricuspid valve annulus. Right Atrium: The right atrium was not well visualized. Aortic Valve: The aortic valve was not well visualized. There is no evidence of aortic valve regurgitation. Mitral Valve: The mitral valve is normal in structure. There is trace mitral valve regurgitation. Tricuspid Valve: The tricuspid valve was not well visualized. There is trace tricuspid regurgitation. Pulmonic Valve: The pulmonic valve is not well visualized. The pulmonic valve regurgitation was not well visualized. Pericardium: Trivial pericardial effusion. Aorta: The aortic root is normal. Pulmonary Artery: The tricuspid regurgitant velocity is 2.10 m/s, and with an estimated right atrial pressure of 8 mmHg, the estimated pulmonary artery pressure is normal with the RVSP  at 25.6 mmHg. Systemic Veins: The inferior vena cava appears normal in size, with IVC inspiratory collapse less than 50%. In comparison to the previous echocardiogram(s): Compared with study dated 10/3/2024, no significant change.  CONCLUSIONS:  1. The left ventricular systolic function is normal, with a visually estimated ejection fraction of 60-65%.  2. There is low normal right ventricular systolic function. QUANTITATIVE DATA SUMMARY:  LV SYSTOLIC FUNCTION BY 2D PLANIMETRY (MOD):                      Normal Ranges: EF-A4C View:    65 % (>=55%) EF-Visual:      63 % LV EF Reported: 63 %  RIGHT VENTRICLE: TAPSE: 10.0 mm RV s'  0.06 m/s  TRICUSPID VALVE/RVSP:          Normal Ranges: Peak TR Velocity:     2.10 m/s Est. RA Pressure:     8 mmHg RV Syst Pressure:     26 mmHg  (< 30mmHg) IVC Diam:             2.00 cm  [id number] Cam Llanos MD Electronically signed on 1/13/2025 at 5:06:16 PM  ** Final **     XR chest 1 view    Result Date: 1/13/2025  Interpreted By:  Vick Eli, STUDY: XR CHEST 1 VIEW; 1/13/2025 2:52 pm   INDICATION: Signs/Symptoms:s/p pneumonectomy.   COMPARISON: CT dated 07/17/2024 and PET-CT dated 12/30/2024   ACCESSION NUMBER(S): [id number]   ORDERING CLINICIAN: WILMER ANTHONY   FINDINGS: Right IJ central venous catheter sheath is projecting over mid SV C. Left subclavian approach MediPort is in place with the tip projecting over upper to mid SVC. Right chest tube is in place.   Cardiac silhouette size is within normal limits.   Postsurgical changes related to right pneumonectomy with multiple partial right-sided rib resection. Mild interstitial prominence of the left lung.   Subcutaneous and soft tissue emphysema in the right chest wall.         1. Postsurgical changes related to right sided pneumonectomy with multiple medical devices as described above. 2. Mild interstitial prominence of the left lung.       Signed by: Vick Xiao 1/13/2025 3:16 PM Dictation workstation:    [id number]    NM PET CT lung CA staging    Result Date: 12/31/2024  Interpreted By:  Kush Hansen and Bera Kaustav STUDY: NM PET CT LUNG CA STAGING;  12/30/2024 11:08 am   INDICATION: Signs/Symptoms:DIAGNOSTIC.   Diagnosed with a large right lung mass in January 20, 2024, status post neoadjuvant chemoradiation as well as immunotherapy ending on July 2024. Currently scheduled for surgery on 01/10/2025.   COMPARISON: PET-CT on 08/15/2024   ACCESSION NUMBER(S): [id number]   ORDERING CLINICIAN: GABBY SMITH   TECHNIQUE: DIVISION OF NUCLEAR MEDICINE POSITRON EMISSION TOMOGRAPHY (PET-CT)   The patient received an intravenous dose of 11.2 mCi of Fluorine-18 fluorodeoxyglucose (FDG).  Positron emission tomographic (PET) images from mid thigh to skull base were then acquired after a one hour delay. Also acquired was a contemporaneous low dose non-contrast CT scan performed for attenuation correction of PET images and anatomic localization.  The PET and CT images were digitally fused for display.  All images were acquired on a combined PET-CT scanner unit. Some areas of FDG accumulation may be described in standardized uptake value (SUV) units.   CODING: Subsequent Treatment Strategy (PS)   CALIBRATION: Dose Injection-to-Scan Interval (mins): 51 min Mediastinal bloodpool SUV (normal 1.5-2.5): 2.1 Blood glucose: NA   FINDINGS: HEAD AND NECK: * No evidence of focal FDG avid lesion in the partially visualized brain parenchyma, noting that evaluation is limited because of the expected physiologic diffuse FDG uptake in the brain. * No FDG avid cervical lymphadenopathy is present. * No paranasal sinus diease. * Thyroid gland is unremarkable.   CHEST: *There has been interval worsening collapse of the right lung, with the right lung now completely fluid-filled. There has been interval worsening of FDG avid mass with central necrosis in  posterior right upper lobe with SUV max of 15, as compared to 8 previously. Left lung is  unremarkable *No FDG avid mediastinal, hilar or axillary lymphadenopathy. *Unremarkable both breasts   ABDOMEN AND PELVIS: *No FDG avid lymphadenopathy in the abdomen or pelvis. *Bilateral adrenal glands are unremarkable. *Colonic diverticulosis without evidence of diverticulitis. *Physiologic radiotracer uptake is present in the liver and spleen with excretion into the bowel loops and the genitourinary tract.   MUSCULOSKELETAL: *No concerning FDG avid bone lesion throughout the axial and appendicular skeleton to suggest osseous metastasis.       1. Interval worsening of the FDG-avid mass with central necrosis in the posterior right upper lobe, along with increased collapse of the right lung compared to the prior PET from 08/15/2024, suggestive of disease progression. 2. No other concerning FDG avid disease identified.   I personally reviewed the image(s) / study and agree with the findings and interpretation as stated. This study was interpreted at [facility].   MACRO: None       Signed by: Kush Hansen 12/31/2024 6:13 AM Dictation workstation:   [id number]   Results for orders placed or performed during the hospital encounter of 01/13/25 (from the past 24 hours)   POCT GLUCOSE   Result Value Ref Range    POCT Glucose 141 (H) 74 - 99 mg/dL   BLOOD GAS MIXED VENOUS FULL PANEL   Result Value Ref Range    POCT pH, Mixed 7.36 7.33 - 7.43 pH    POCT pCO2, Mixed 63 (H) 41 - 51 mm Hg    POCT pO2, Mixed 30 (L) 35 - 45 mm Hg    POCT SO2, Mixed 48 45 - 75 %    POCT Oxy Hemoglobin, Mixed 47.0 45.0 - 75.0 %    POCT Hematocrit Calculated, Mixed 30.0 (L) 36.0 - 46.0 %    POCT Sodium, Mixed 130 (L) 136 - 145 mmol/L    POCT Potassium, Mixed 3.8 3.5 - 5.3 mmol/L    POCT Chloride, Mixed 92 (L) 98 - 107 mmol/L    POCT Ionized Calcium, Mixed 1.16 1.10 - 1.33 mmol/L    POCT Glucose, Mixed 154 (H) 74 - 99 mg/dL    POCT Lactate, Mixed 1.1 0.4 - 2.0 mmol/L    POCT Base Excess, Mixed 8.6 (H) -2.0 - 3.0  mmol/L    POCT HCO3 Calculated, Mixed 35.6 (H) 22.0 - 26.0 mmol/L    POCT Hemoglobin, Mixed 9.9 (L) 12.0 - 16.0 g/dL    POCT Anion Gap, Mixed 6 (L) 10 - 25 mmo/L    Patient Temperature 37.0 degrees Celsius    FiO2 60 %   POCT GLUCOSE   Result Value Ref Range    POCT Glucose 157 (H) 74 - 99 mg/dL   BLOOD GAS MIXED VENOUS FULL PANEL   Result Value Ref Range    POCT pH, Mixed 7.34 7.33 - 7.43 pH    POCT pCO2, Mixed 69 (H) 41 - 51 mm Hg    POCT pO2, Mixed 38 35 - 45 mm Hg    POCT SO2, Mixed 62 45 - 75 %    POCT Oxy Hemoglobin, Mixed 60.8 45.0 - 75.0 %    POCT Hematocrit Calculated, Mixed 32.0 (L) 36.0 - 46.0 %    POCT Sodium, Mixed 131 (L) 136 - 145 mmol/L    POCT Potassium, Mixed 3.5 3.5 - 5.3 mmol/L    POCT Chloride, Mixed 89 (L) 98 - 107 mmol/L    POCT Ionized Calcium, Mixed 1.18 1.10 - 1.33 mmol/L    POCT Glucose, Mixed 156 (H) 74 - 99 mg/dL    POCT Lactate, Mixed 1.9 0.4 - 2.0 mmol/L    POCT Base Excess, Mixed 9.4 (H) -2.0 - 3.0 mmol/L    POCT HCO3 Calculated, Mixed 37.2 (H) 22.0 - 26.0 mmol/L    POCT Hemoglobin, Mixed 10.7 (L) 12.0 - 16.0 g/dL    POCT Anion Gap, Mixed 8 (L) 10 - 25 mmo/L    Patient Temperature 37.0 degrees Celsius    FiO2 50 %   POCT GLUCOSE   Result Value Ref Range    POCT Glucose 150 (H) 74 - 99 mg/dL   BLOOD GAS MIXED VENOUS FULL PANEL   Result Value Ref Range    POCT pH, Mixed 7.43 7.33 - 7.43 pH    POCT pCO2, Mixed 55 (H) 41 - 51 mm Hg    POCT pO2, Mixed 38 35 - 45 mm Hg    POCT SO2, Mixed 66 45 - 75 %    POCT Oxy Hemoglobin, Mixed 65.1 45.0 - 75.0 %    POCT Hematocrit Calculated, Mixed 29.0 (L) 36.0 - 46.0 %    POCT Sodium, Mixed 130 (L) 136 - 145 mmol/L    POCT Potassium, Mixed 2.4 (LL) 3.5 - 5.3 mmol/L    POCT Chloride, Mixed 92 (L) 98 - 107 mmol/L    POCT Ionized Calcium, Mixed 1.11 1.10 - 1.33 mmol/L    POCT Glucose, Mixed 194 (H) 74 - 99 mg/dL    POCT Lactate, Mixed 1.2 0.4 - 2.0 mmol/L    POCT Base Excess, Mixed 10.7 (H) -2.0 - 3.0 mmol/L    POCT HCO3 Calculated, Mixed 36.5 (H) 22.0 -  26.0 mmol/L    POCT Hemoglobin, Mixed 9.8 (L) 12.0 - 16.0 g/dL    POCT Anion Gap, Mixed 4 (L) 10 - 25 mmo/L    Patient Temperature 37.0 degrees Celsius    FiO2 50 %   Blood Gas Arterial Full Panel   Result Value Ref Range    POCT pH, Arterial 7.41 7.38 - 7.42 pH    POCT pCO2, Arterial 57 (H) 38 - 42 mm Hg    POCT pO2, Arterial 87 85 - 95 mm Hg    POCT SO2, Arterial 98 94 - 100 %    POCT Oxy Hemoglobin, Arterial 95.8 94.0 - 98.0 %    POCT Hematocrit Calculated, Arterial 31.0 (L) 36.0 - 46.0 %    POCT Sodium, Arterial 129 (L) 136 - 145 mmol/L    POCT Potassium, Arterial 2.4 (LL) 3.5 - 5.3 mmol/L    POCT Chloride, Arterial 91 (L) 98 - 107 mmol/L    POCT Ionized Calcium, Arterial 1.12 1.10 - 1.33 mmol/L    POCT Glucose, Arterial 201 (H) 74 - 99 mg/dL    POCT Lactate, Arterial 1.5 0.4 - 2.0 mmol/L    POCT Base Excess, Arterial 9.9 (H) -2.0 - 3.0 mmol/L    POCT HCO3 Calculated, Arterial 36.1 (H) 22.0 - 26.0 mmol/L    POCT Hemoglobin, Arterial 10.2 (L) 12.0 - 16.0 g/dL    POCT Anion Gap, Arterial 4 (L) 10 - 25 mmo/L    Patient Temperature 37.0 degrees Celsius    FiO2 50 %   Calcium, Ionized   Result Value Ref Range    POCT Calcium, Ionized 1.08 (L) 1.1 - 1.33 mmol/L   CBC   Result Value Ref Range    WBC 6.8 4.4 - 11.3 x10*3/uL    nRBC 0.0 0.0 - 0.0 /100 WBCs    RBC 3.43 (L) 4.00 - 5.20 x10*6/uL    Hemoglobin 9.3 (L) 12.0 - 16.0 g/dL    Hematocrit 28.8 (L) 36.0 - 46.0 %    MCV 84 80 - 100 fL    MCH 27.1 26.0 - 34.0 pg    MCHC 32.3 32.0 - 36.0 g/dL    RDW 15.5 (H) 11.5 - 14.5 %    Platelets 146 (L) 150 - 450 x10*3/uL   Coagulation Screen   Result Value Ref Range    Protime 13.5 (H) 9.8 - 12.8 seconds    INR 1.2 (H) 0.9 - 1.1    aPTT 47 (H) 27 - 38 seconds   Magnesium   Result Value Ref Range    Magnesium 1.65 1.60 - 2.40 mg/dL   Renal function panel   Result Value Ref Range    Glucose 198 (H) 74 - 99 mg/dL    Sodium 134 (L) 136 - 145 mmol/L    Potassium 2.7 (LL) 3.5 - 5.3 mmol/L    Chloride 89 (L) 98 - 107 mmol/L     Bicarbonate 34 (H) 21 - 32 mmol/L    Anion Gap 14 10 - 20 mmol/L    Urea Nitrogen 15 6 - 23 mg/dL    Creatinine 0.44 (L) 0.50 - 1.05 mg/dL    eGFR >90 >60 mL/min/1.73m*2    Calcium 8.3 (L) 8.6 - 10.6 mg/dL    Phosphorus 2.9 2.5 - 4.9 mg/dL    Albumin 3.6 3.4 - 5.0 g/dL   POCT GLUCOSE   Result Value Ref Range    POCT Glucose 131 (H) 74 - 99 mg/dL   BLOOD GAS MIXED VENOUS FULL PANEL   Result Value Ref Range    POCT pH, Mixed 7.38 7.33 - 7.43 pH    POCT pCO2, Mixed 56 (H) 41 - 51 mm Hg    POCT pO2, Mixed 43 35 - 45 mm Hg    POCT SO2, Mixed 73 45 - 75 %    POCT Oxy Hemoglobin, Mixed 71.5 45.0 - 75.0 %    POCT Hematocrit Calculated, Mixed 26.0 (L) 36.0 - 46.0 %    POCT Sodium, Mixed 133 (L) 136 - 145 mmol/L    POCT Potassium, Mixed 2.4 (LL) 3.5 - 5.3 mmol/L    POCT Chloride, Mixed 96 (L) 98 - 107 mmol/L    POCT Ionized Calcium, Mixed 1.09 (L) 1.10 - 1.33 mmol/L    POCT Glucose, Mixed 100 (H) 74 - 99 mg/dL    POCT Lactate, Mixed 0.7 0.4 - 2.0 mmol/L    POCT Base Excess, Mixed 7.0 (H) -2.0 - 3.0 mmol/L    POCT HCO3 Calculated, Mixed 33.1 (H) 22.0 - 26.0 mmol/L    POCT Hemoglobin, Mixed 8.5 (L) 12.0 - 16.0 g/dL    POCT Anion Gap, Mixed 6 (L) 10 - 25 mmo/L    Patient Temperature 37.0 degrees Celsius    FiO2 60 %   Blood Gas Mixed Venous Full Panel Unsolicited   Result Value Ref Range    POCT pH, Mixed 7.40 7.33 - 7.43 pH    POCT pCO2, Mixed 55 (H) 41 - 51 mm Hg    POCT pO2, Mixed 41 35 - 45 mm Hg    POCT SO2, Mixed 72 45 - 75 %    POCT Oxy Hemoglobin, Mixed 70.5 45.0 - 75.0 %    POCT Hematocrit Calculated, Mixed 26.0 (L) 36.0 - 46.0 %    POCT Sodium, Mixed 133 (L) 136 - 145 mmol/L    POCT Potassium, Mixed 2.4 (LL) 3.5 - 5.3 mmol/L    POCT Chloride, Mixed 97 (L) 98 - 107 mmol/L    POCT Ionized Calcium, Mixed 1.09 (L) 1.10 - 1.33 mmol/L    POCT Glucose, Mixed 102 (H) 74 - 99 mg/dL    POCT Lactate, Mixed 0.7 0.4 - 2.0 mmol/L    POCT Base Excess, Mixed 8.2 (H) -2.0 - 3.0 mmol/L    POCT HCO3 Calculated, Mixed 34.1 (H) 22.0 - 26.0  mmol/L    POCT Hemoglobin, Mixed 8.5 (L) 12.0 - 16.0 g/dL    POCT Anion Gap, Mixed 4 (L) 10 - 25 mmo/L    Patient Temperature 37.0 degrees Celsius   POCT GLUCOSE   Result Value Ref Range    POCT Glucose 146 (H) 74 - 99 mg/dL   Hepatic function panel   Result Value Ref Range    Albumin 3.8 3.4 - 5.0 g/dL    Bilirubin, Total 0.9 0.0 - 1.2 mg/dL    Bilirubin, Direct 0.3 0.0 - 0.3 mg/dL    Alkaline Phosphatase 63 33 - 136 U/L    ALT 51 (H) 7 - 45 U/L    AST 38 9 - 39 U/L    Total Protein 5.3 (L) 6.4 - 8.2 g/dL   Calcium, Ionized   Result Value Ref Range    POCT Calcium, Ionized 1.15 1.1 - 1.33 mmol/L   Magnesium   Result Value Ref Range    Magnesium 2.99 (H) 1.60 - 2.40 mg/dL   Renal function panel   Result Value Ref Range    Glucose 150 (H) 74 - 99 mg/dL    Sodium 137 136 - 145 mmol/L    Potassium 3.7 3.5 - 5.3 mmol/L    Chloride 92 (L) 98 - 107 mmol/L    Bicarbonate 33 (H) 21 - 32 mmol/L    Anion Gap 16 10 - 20 mmol/L    Urea Nitrogen 16 6 - 23 mg/dL    Creatinine 0.55 0.50 - 1.05 mg/dL    eGFR >90 >60 mL/min/1.73m*2    Calcium 8.6 8.6 - 10.6 mg/dL    Phosphorus 3.3 2.5 - 4.9 mg/dL    Albumin 3.8 3.4 - 5.0 g/dL   BLOOD GAS MIXED VENOUS FULL PANEL   Result Value Ref Range    POCT pH, Mixed 7.33 7.33 - 7.43 pH    POCT pCO2, Mixed 66 (H) 41 - 51 mm Hg    POCT pO2, Mixed 39 35 - 45 mm Hg    POCT SO2, Mixed 66 45 - 75 %    POCT Oxy Hemoglobin, Mixed 65.3 45.0 - 75.0 %    POCT Hematocrit Calculated, Mixed 27.0 (L) 36.0 - 46.0 %    POCT Sodium, Mixed 132 (L) 136 - 145 mmol/L    POCT Potassium, Mixed 3.6 3.5 - 5.3 mmol/L    POCT Chloride, Mixed 93 (L) 98 - 107 mmol/L    POCT Ionized Calcium, Mixed 1.17 1.10 - 1.33 mmol/L    POCT Glucose, Mixed 141 (H) 74 - 99 mg/dL    POCT Lactate, Mixed 0.8 0.4 - 2.0 mmol/L    POCT Base Excess, Mixed 7.5 (H) -2.0 - 3.0 mmol/L    POCT HCO3 Calculated, Mixed 34.8 (H) 22.0 - 26.0 mmol/L    POCT Hemoglobin, Mixed 9.1 (L) 12.0 - 16.0 g/dL    POCT Anion Gap, Mixed 8 (L) 10 - 25 mmo/L    Patient  Temperature 37.0 degrees Celsius    FiO2 50 %   BLOOD GAS MIXED VENOUS FULL PANEL   Result Value Ref Range    POCT pH, Mixed 7.42 7.33 - 7.43 pH    POCT pCO2, Mixed 56 (H) 41 - 51 mm Hg    POCT pO2, Mixed 37 35 - 45 mm Hg    POCT SO2, Mixed 65 45 - 75 %    POCT Oxy Hemoglobin, Mixed 63.4 45.0 - 75.0 %    POCT Hematocrit Calculated, Mixed 28.0 (L) 36.0 - 46.0 %    POCT Sodium, Mixed 131 (L) 136 - 145 mmol/L    POCT Potassium, Mixed 3.7 3.5 - 5.3 mmol/L    POCT Chloride, Mixed      POCT Ionized Calcium, Mixed 1.15 1.10 - 1.33 mmol/L    POCT Glucose, Mixed 128 (H) 74 - 99 mg/dL    POCT Lactate, Mixed 0.6 0.4 - 2.0 mmol/L    POCT Base Excess, Mixed 10.4 (H) -2.0 - 3.0 mmol/L    POCT HCO3 Calculated, Mixed 36.3 (H) 22.0 - 26.0 mmol/L    POCT Hemoglobin, Mixed 9.4 (L) 12.0 - 16.0 g/dL    POCT Anion Gap, Mixed      Patient Temperature 37.0 degrees Celsius    FiO2 50 %   POCT GLUCOSE   Result Value Ref Range    POCT Glucose 142 (H) 74 - 99 mg/dL     Scheduled medications  bisacodyl, 10 mg, rectal, Daily  budesonide, 0.5 mg, nebulization, BID  [START ON 1/22/2025] dexAMETHasone, 6 mg, intravenous, q24h  docusate sodium, 100 mg, orogastric tube, BID  heparin (porcine), 5,000 Units, subcutaneous, q8h  hydrocortisone sodium succinate, 50 mg, intravenous, q6h  insulin lispro, 0-5 Units, subcutaneous, q4h  ipratropium-albuteroL, 3 mL, nebulization, q6h  lidocaine, 1 patch, transdermal, Daily  oxygen, , inhalation, Continuous - Inhalation  pantoprazole, 40 mg, intravenous, Daily  perflutren lipid microspheres, 0.5-10 mL of dilution, intravenous, Once in imaging  piperacillin-tazobactam, 4.5 g, intravenous, q6h  polyethylene glycol, 17 g, orogastric tube, Daily  potassium chloride, 20 mEq, intravenous, Once  remdesivir, 100 mg, intravenous, q24h  senna, 5 mL, orogastric tube, BID  thiamine, 100 mg, intravenous, Daily      Continuous medications  dexmedeTOMIDine, 0-1.2 mcg/kg/hr, Last Rate: 1 mcg/kg/hr (01/21/25 1200)  epoprostenol,  0.05 mcg/kg/min (Ideal), Last Rate: 0.05 mcg/kg/min (01/21/25 0828)  fentaNYL, 50 mcg/hr, Last Rate: 50 mcg/hr (01/21/25 1200)  milrinone, 0.125 mcg/kg/min, Last Rate: 0.125 mcg/kg/min (01/21/25 1200)  vasopressin, 0-0.06 Units/min, Last Rate: 0.03 Units/min (01/21/25 1200)      PRN medications  PRN medications: albuterol, calcium gluconate, calcium gluconate, dextrose, dextrose, glucagon, glucagon, HYDROmorphone, magnesium sulfate, magnesium sulfate, oxygen, potassium chloride, potassium chloride          Assessment/Plan   Jannie Vick is a 64 y.o. female with PMH significant for DEEPAK, tobacco use disorder, stage III squamous cell carcinoma of right lung patient presented to [facility] on 113 for scheduled right pneumonectomy, intrapericardial with en bloc wall resection by Dr. Jonah Wagner.  Patient had a preoperative hypotension requiring vasopressors and postoperative hypotension requiring vasopressors which she has been subsequently weaned off of, increasing oxygen demand status post surgery which had subsequently been weaned off.  However on 1/16/2025 patient had borderline low blood pressures with 90s/60s, increasing oxygen requirements, and an echocardiogram illustrating biventricular Takotsubo cardiomyopathy for which cardiology was consulted for further recommendations. Given that the patient was showing signs of low output state with altered mental status, decreased urine output with a lactate of 2 along with echo showing signs of elevated left and right sided pressures. A shock call was done where the recommendation was start the patient on inotrope (milrinone) along with a lasix drip and to place a central line to transduce the CVP and run a mixed venous Svo2 along repeating a lactate and if she's clinically worsening to re-conference for a shock call.     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     Thank you for involving us in this patient care. Recommendations not final until signed by attending.     General Cardiology Consult Pager: [phone] (weekday 7AM-6PM and weekend 7AM-2PM) and other: [phone]  EP Consult Pager: [phone] (weekday 7AM-6PM and weekend 7AM-2PM) and other: [phone]  CICU Fellow Pager: [phone] anytime  EP Device Nurse Pager: [phone] (weekday 7AM-4PM)  Advanced Heart Failure Consult Pager: [phone] anytime     Case discussed with cardiology fellow Dr. Cavanaugh, and with Dr. Diana Robledo MD  Internal Medicine PGY-2

## 2025-01-21 NOTE — PROGRESS NOTES
SICU Staff Progress Note     Briefly, the patient is a 64 year old female with a past medical history significant for COPD and stage III right lung SCC s/p chemo/radiation who presented to SICU from OR following Right Pneumonectomy and intrapericardial dissection with en bloc chest wall resection, and MLND by Dr. Wagner on 1/13 requiring post-op vasoactive therapy. Post-op course c/b acute hyponatremia, multifactorial delirium, cardiogenic shock requiring inotropic support and acute hypoxic/hypercarbic respiratory failure requiring intubation. Currently requiring continuous vasoactive infusions for a mixed shock which includes Takotsubo cardiomyopathy as well as ventilator management for hypoxemic respiratory failure.     I evaluated the patient with an advanced practice provider. I oversaw this patient's care, and I reviewed the necessary labs, vitals, and radiographic imaging.      Plan:  Neuro: currently requiring Precedex/Fentanyl combination to promote ventilator synchrony; currently follows commands and is comfortable; daily SAT; cautious introduction of bedside PT  Cardiovascular: currently on milrinone/vasopressin for cardiogenic support following RV/LV Takotsubo. Continue to trend thermodilution numbers and SvO2 to tailor appropriate therapy. Paroxysmal sinus tachycardia noted; will attempt to limit catecholamines as hemodynamics permit; continue SDS with plan to transition to Decadron for COVID therapy; TTE 1/20 with re-demonstration of biventricular reduction in EF  Respiratory: continues to have pulmonary edema on radiographic imaging, but oxygenation overall improving; paroxysm of dyssynchrony overnight during SBT requiring additional sedation temporarily; ideally would diurese as hemodynamics permit; continue daily CXR; continue IEPO  GI: enteral feeds via OGT; likely will require Dobbhoff in the future but given surgical resection would avoid blind placement. May require ENT assistance  : hyponatremia  improving; continue ongoing diuresis; will add acetazolamide given contraction alkalosis  Endo: SDS--> transition to Decadron for COVID; ISS  Heme: maintain active T/S  ID: continue zosyn, stopping fluconazole; COVID positive; started remdesivir, on stress dose steroids; appreciate ID input  Dispo: SICU Care     Lyndsey ADAMSON  SICU Staff  P. [phone]     Due to the high probability of life threatening clinical decompensation, the patient required critical care time evaluating and managing this patient.  Critical care time included obtaining a history, examining the patient, ordering and reviewing studies, discussing, developing, and implementing a management plan, evaluating the patient's response to treatment, and discussion with other care team providers. I saw and evaluated the patient myself. I reviewed the provider's documentation and discussed the patient with the provider. Critical care time was performed exclusive of billable procedures.     Critical Care Time: 41 minutes

## 2025-01-21 NOTE — PROGRESS NOTES
"Faustina Vick \"Noam" is a 64 y.o. female on day 8 of admission presenting with Primary cancer of right upper lobe of lung (Multi).    Subjective   NAEO. Remains intubated in ICU, FiO2 weaned to 50%. Levophed waned off, vaso was weaned off but restarted due to sedation. Following commands, milrinone weaned.      Objective     General: critically ill in ICU bed  HEENT: ETT in place  Resp: intubated, sats appropriate, symmetric rise, synchronous  CV: tachycardic on monitor, sinus, normotensive with pressor support  Chest: no drainage from surgical incision, healing.   Abd: soft, NTND  : yoo in place  MSK: moves all extremities  Ext: mottling to BLE  Psych: intubated, sedated, unable to assess    Last Recorded Vitals  Blood pressure 89/57, pulse 102, temperature 36.6 °C (97.9 °F), temperature source Temporal, resp. rate 17, height 1.651 m (5' 5\"), weight 58.5 kg (128 lb 15.5 oz), SpO2 96%.  Intake/Output last 3 Shifts:  I/O last 3 completed shifts:  In: 2913.2 (49.8 mL/kg) [I.V.:1040.7 (17.8 mL/kg); NG/GT:270; IV Piggyback:1602.5]  Out: 2745 (46.9 mL/kg) [Urine:2570 (1.2 mL/kg/hr); Emesis/NG output:175]  Weight: 58.5 kg     Relevant Results            9.3     6.8>-----<146              28.8   137  92  16                  ----------------<150     3.7  33  0.55          Ca 8.6 Phos 3.3 Mg 2.99       ALT 51 AST 38 AlkPhos 63 tBili 0.9         CXR reviewed 1/19:  expected R-sided changes s/p R pneumonectomy, appropriately full, mediastinum stable.       Assessment/Plan   Assessment & Plan  Primary cancer of right upper lobe of lung (Multi)    Chronic obstructive pulmonary disease (Multi)    Malignant neoplasm of upper lobe of right lung (Multi)    Cardiogenic shock (Multi)    Faustina Vick (Jannie) is a 64F with Hx of Stage III lung cancer, asthma, anxiety, and arthritis who is now s/p R pneumonectomy with Dr. Wagner 1/13/25. 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 " Cardiology was consulted and recommended Lasix gtt and milrinone. Lasix gtt held 1/17, hyponatremia improving. Patient underwent RHC and SGC placement with cardiology 1/17. She was reintubated on 1/18 d/t worsening respiratory status and AMS. Also had increased pressor requirements, now also needing levo and vaso. Repeat echo stable on 1/18.    - Continue supportive ICU  care  - consider weaning sedation, to facilitate vent weaning (apnea on SBT yesterday, increased vaso this am)  - Cardiogenic shock, supportive, improving  - vent weaning as able-> wean to extubate as able to minimize ventilator related barotrauma to her remaining lung  - Lung protective ventilation strategies  - minimize pressors as able, continue inotropic support  - continue dobhoff for nutrition, titrate feeds to goal as able.   - do not feed if escalating pressors or HD instability.   - Requiring ICU level care at this time  - Please page with concerns    Patient seen and discussed with attending Dr. Wagner.    John Brush MD  Cardiothoracic Fellow  Thoracic surgery [id number]

## 2025-01-22 ENCOUNTER — APPOINTMENT (OUTPATIENT)
Dept: RADIOLOGY | Facility: HOSPITAL | Age: 65
End: 2025-01-22
Payer: COMMERCIAL

## 2025-01-22 ENCOUNTER — APPOINTMENT (OUTPATIENT)
Dept: CARDIOLOGY | Facility: HOSPITAL | Age: 65
End: 2025-01-22
Payer: COMMERCIAL

## 2025-01-22 LAB
ABO GROUP (TYPE) IN BLOOD: NORMAL
ALBUMIN SERPL BCP-MCNC: 3.3 G/DL (ref 3.4–5)
ALBUMIN SERPL BCP-MCNC: 3.3 G/DL (ref 3.4–5)
ALBUMIN SERPL BCP-MCNC: 3.4 G/DL (ref 3.4–5)
ALBUMIN SERPL BCP-MCNC: 3.6 G/DL (ref 3.4–5)
ALP SERPL-CCNC: 56 U/L (ref 33–136)
ALT SERPL W P-5'-P-CCNC: 43 U/L (ref 7–45)
ANION GAP BLDA CALCULATED.4IONS-SCNC: 3 MMO/L (ref 10–25)
ANION GAP BLDA CALCULATED.4IONS-SCNC: 4 MMO/L (ref 10–25)
ANION GAP BLDA CALCULATED.4IONS-SCNC: 6 MMO/L (ref 10–25)
ANION GAP BLDA CALCULATED.4IONS-SCNC: 8 MMO/L (ref 10–25)
ANION GAP BLDA CALCULATED.4IONS-SCNC: 8 MMO/L (ref 10–25)
ANION GAP BLDMV CALCULATED.4IONS-SCNC: 4 MMO/L (ref 10–25)
ANION GAP BLDMV CALCULATED.4IONS-SCNC: 5 MMO/L (ref 10–25)
ANION GAP BLDMV CALCULATED.4IONS-SCNC: 6 MMO/L (ref 10–25)
ANION GAP BLDMV CALCULATED.4IONS-SCNC: 7 MMO/L (ref 10–25)
ANION GAP SERPL CALC-SCNC: 12 MMOL/L (ref 10–20)
ANION GAP SERPL CALC-SCNC: 15 MMOL/L (ref 10–20)
ANION GAP SERPL CALC-SCNC: 15 MMOL/L (ref 10–20)
ANTIBODY SCREEN: NORMAL
APTT PPP: 36 SECONDS (ref 27–38)
AST SERPL W P-5'-P-CCNC: 30 U/L (ref 9–39)
BACTERIA BLD CULT: NORMAL
BACTERIA BLD CULT: NORMAL
BASE EXCESS BLDA CALC-SCNC: 10 MMOL/L (ref -2–3)
BASE EXCESS BLDA CALC-SCNC: 5.3 MMOL/L (ref -2–3)
BASE EXCESS BLDA CALC-SCNC: 5.6 MMOL/L (ref -2–3)
BASE EXCESS BLDA CALC-SCNC: 7.4 MMOL/L (ref -2–3)
BASE EXCESS BLDA CALC-SCNC: 8.3 MMOL/L (ref -2–3)
BASE EXCESS BLDMV CALC-SCNC: 6.6 MMOL/L (ref -2–3)
BASE EXCESS BLDMV CALC-SCNC: 7 MMOL/L (ref -2–3)
BASE EXCESS BLDMV CALC-SCNC: 7.9 MMOL/L (ref -2–3)
BASE EXCESS BLDMV CALC-SCNC: 8.4 MMOL/L (ref -2–3)
BASE EXCESS BLDMV CALC-SCNC: 8.6 MMOL/L (ref -2–3)
BASE EXCESS BLDMV CALC-SCNC: 8.7 MMOL/L (ref -2–3)
BILIRUB DIRECT SERPL-MCNC: 0.3 MG/DL (ref 0–0.3)
BILIRUB SERPL-MCNC: 0.7 MG/DL (ref 0–1.2)
BODY TEMPERATURE: 37 DEGREES CELSIUS
BUN SERPL-MCNC: 14 MG/DL (ref 6–23)
BUN SERPL-MCNC: 18 MG/DL (ref 6–23)
BUN SERPL-MCNC: 19 MG/DL (ref 6–23)
CA-I BLD-SCNC: 1.07 MMOL/L (ref 1.1–1.33)
CA-I BLDA-SCNC: 1.12 MMOL/L (ref 1.1–1.33)
CA-I BLDA-SCNC: 1.13 MMOL/L (ref 1.1–1.33)
CA-I BLDA-SCNC: 1.14 MMOL/L (ref 1.1–1.33)
CA-I BLDA-SCNC: 1.14 MMOL/L (ref 1.1–1.33)
CA-I BLDA-SCNC: 1.16 MMOL/L (ref 1.1–1.33)
CA-I BLDMV-SCNC: 1.1 MMOL/L (ref 1.1–1.33)
CA-I BLDMV-SCNC: 1.13 MMOL/L (ref 1.1–1.33)
CA-I BLDMV-SCNC: 1.14 MMOL/L (ref 1.1–1.33)
CA-I BLDMV-SCNC: 1.16 MMOL/L (ref 1.1–1.33)
CA-I BLDMV-SCNC: 1.17 MMOL/L (ref 1.1–1.33)
CA-I BLDMV-SCNC: 1.19 MMOL/L (ref 1.1–1.33)
CALCIUM SERPL-MCNC: 8.3 MG/DL (ref 8.6–10.6)
CHLORIDE BLD-SCNC: 94 MMOL/L (ref 98–107)
CHLORIDE BLD-SCNC: 97 MMOL/L (ref 98–107)
CHLORIDE BLD-SCNC: 98 MMOL/L (ref 98–107)
CHLORIDE BLDA-SCNC: 97 MMOL/L (ref 98–107)
CHLORIDE BLDA-SCNC: 98 MMOL/L (ref 98–107)
CHLORIDE BLDA-SCNC: 99 MMOL/L (ref 98–107)
CHLORIDE SERPL-SCNC: 95 MMOL/L (ref 98–107)
CHLORIDE SERPL-SCNC: 96 MMOL/L (ref 98–107)
CHLORIDE SERPL-SCNC: 97 MMOL/L (ref 98–107)
CO2 SERPL-SCNC: 31 MMOL/L (ref 21–32)
CO2 SERPL-SCNC: 32 MMOL/L (ref 21–32)
CO2 SERPL-SCNC: 34 MMOL/L (ref 21–32)
CREAT SERPL-MCNC: 0.47 MG/DL (ref 0.5–1.05)
CREAT SERPL-MCNC: 0.5 MG/DL (ref 0.5–1.05)
CREAT SERPL-MCNC: 0.51 MG/DL (ref 0.5–1.05)
EGFRCR SERPLBLD CKD-EPI 2021: >90 ML/MIN/1.73M*2
ERYTHROCYTE [DISTWIDTH] IN BLOOD BY AUTOMATED COUNT: 16.4 % (ref 11.5–14.5)
ERYTHROCYTE [DISTWIDTH] IN BLOOD BY AUTOMATED COUNT: 16.7 % (ref 11.5–14.5)
GLUCOSE BLD MANUAL STRIP-MCNC: 106 MG/DL (ref 74–99)
GLUCOSE BLD MANUAL STRIP-MCNC: 117 MG/DL (ref 74–99)
GLUCOSE BLD MANUAL STRIP-MCNC: 150 MG/DL (ref 74–99)
GLUCOSE BLD MANUAL STRIP-MCNC: 151 MG/DL (ref 74–99)
GLUCOSE BLD MANUAL STRIP-MCNC: 163 MG/DL (ref 74–99)
GLUCOSE BLD-MCNC: 100 MG/DL (ref 74–99)
GLUCOSE BLD-MCNC: 121 MG/DL (ref 74–99)
GLUCOSE BLD-MCNC: 130 MG/DL (ref 74–99)
GLUCOSE BLD-MCNC: 141 MG/DL (ref 74–99)
GLUCOSE BLD-MCNC: 160 MG/DL (ref 74–99)
GLUCOSE BLD-MCNC: 162 MG/DL (ref 74–99)
GLUCOSE BLDA-MCNC: 101 MG/DL (ref 74–99)
GLUCOSE BLDA-MCNC: 131 MG/DL (ref 74–99)
GLUCOSE BLDA-MCNC: 147 MG/DL (ref 74–99)
GLUCOSE BLDA-MCNC: 157 MG/DL (ref 74–99)
GLUCOSE BLDA-MCNC: 167 MG/DL (ref 74–99)
GLUCOSE SERPL-MCNC: 105 MG/DL (ref 74–99)
GLUCOSE SERPL-MCNC: 131 MG/DL (ref 74–99)
GLUCOSE SERPL-MCNC: 159 MG/DL (ref 74–99)
HCO3 BLDA-SCNC: 29.8 MMOL/L (ref 22–26)
HCO3 BLDA-SCNC: 31 MMOL/L (ref 22–26)
HCO3 BLDA-SCNC: 31.2 MMOL/L (ref 22–26)
HCO3 BLDA-SCNC: 34.2 MMOL/L (ref 22–26)
HCO3 BLDA-SCNC: 34.9 MMOL/L (ref 22–26)
HCO3 BLDMV-SCNC: 32 MMOL/L (ref 22–26)
HCO3 BLDMV-SCNC: 33 MMOL/L (ref 22–26)
HCO3 BLDMV-SCNC: 33.4 MMOL/L (ref 22–26)
HCO3 BLDMV-SCNC: 33.4 MMOL/L (ref 22–26)
HCO3 BLDMV-SCNC: 35.1 MMOL/L (ref 22–26)
HCO3 BLDMV-SCNC: 35.3 MMOL/L (ref 22–26)
HCT VFR BLD AUTO: 29.6 % (ref 36–46)
HCT VFR BLD AUTO: 29.6 % (ref 36–46)
HCT VFR BLD EST: 28 % (ref 36–46)
HCT VFR BLD EST: 29 % (ref 36–46)
HCT VFR BLD EST: 30 % (ref 36–46)
HCT VFR BLD EST: 30 % (ref 36–46)
HCT VFR BLD EST: 33 % (ref 36–46)
HCT VFR BLD EST: 38 % (ref 36–46)
HGB BLD-MCNC: 9 G/DL (ref 12–16)
HGB BLD-MCNC: 9.1 G/DL (ref 12–16)
HGB BLDA-MCNC: 10 G/DL (ref 12–16)
HGB BLDA-MCNC: 9.4 G/DL (ref 12–16)
HGB BLDA-MCNC: 9.8 G/DL (ref 12–16)
HGB BLDA-MCNC: 9.8 G/DL (ref 12–16)
HGB BLDA-MCNC: 9.9 G/DL (ref 12–16)
HGB BLDMV-MCNC: 10.9 G/DL (ref 12–16)
HGB BLDMV-MCNC: 12.8 G/DL (ref 12–16)
HGB BLDMV-MCNC: 9.4 G/DL (ref 12–16)
HGB BLDMV-MCNC: 9.4 G/DL (ref 12–16)
HGB BLDMV-MCNC: 9.7 G/DL (ref 12–16)
HGB BLDMV-MCNC: 9.8 G/DL (ref 12–16)
INHALED O2 CONCENTRATION: 40 %
INHALED O2 CONCENTRATION: 50 %
INR PPP: 1.2 (ref 0.9–1.1)
LACTATE BLDA-SCNC: 0.9 MMOL/L (ref 0.4–2)
LACTATE BLDA-SCNC: 1 MMOL/L (ref 0.4–2)
LACTATE BLDA-SCNC: 1 MMOL/L (ref 0.4–2)
LACTATE BLDMV-SCNC: 0.8 MMOL/L (ref 0.4–2)
LACTATE BLDMV-SCNC: 0.9 MMOL/L (ref 0.4–2)
LACTATE BLDMV-SCNC: 1 MMOL/L (ref 0.4–2)
LACTATE BLDMV-SCNC: 1 MMOL/L (ref 0.4–2)
MAGNESIUM SERPL-MCNC: 2.14 MG/DL (ref 1.6–2.4)
MCH RBC QN AUTO: 26.5 PG (ref 26–34)
MCH RBC QN AUTO: 27 PG (ref 26–34)
MCHC RBC AUTO-ENTMCNC: 30.4 G/DL (ref 32–36)
MCHC RBC AUTO-ENTMCNC: 30.7 G/DL (ref 32–36)
MCV RBC AUTO: 87 FL (ref 80–100)
MCV RBC AUTO: 88 FL (ref 80–100)
NRBC BLD-RTO: 0 /100 WBCS (ref 0–0)
NRBC BLD-RTO: 0.3 /100 WBCS (ref 0–0)
OXYHGB MFR BLDA: 96.4 % (ref 94–98)
OXYHGB MFR BLDA: 96.6 % (ref 94–98)
OXYHGB MFR BLDA: 97.8 % (ref 94–98)
OXYHGB MFR BLDMV: 58.2 % (ref 45–75)
OXYHGB MFR BLDMV: 58.3 % (ref 45–75)
OXYHGB MFR BLDMV: 60 % (ref 45–75)
OXYHGB MFR BLDMV: 60.4 % (ref 45–75)
OXYHGB MFR BLDMV: 60.7 % (ref 45–75)
OXYHGB MFR BLDMV: 67.9 % (ref 45–75)
PCO2 BLDA: 40 MM HG (ref 38–42)
PCO2 BLDA: 41 MM HG (ref 38–42)
PCO2 BLDA: 48 MM HG (ref 38–42)
PCO2 BLDA: 50 MM HG (ref 38–42)
PCO2 BLDA: 54 MM HG (ref 38–42)
PCO2 BLDMV: 46 MM HG (ref 41–51)
PCO2 BLDMV: 47 MM HG (ref 41–51)
PCO2 BLDMV: 48 MM HG (ref 41–51)
PCO2 BLDMV: 57 MM HG (ref 41–51)
PCO2 BLDMV: 58 MM HG (ref 41–51)
PCO2 BLDMV: 64 MM HG (ref 41–51)
PH BLDA: 7.4 PH (ref 7.38–7.42)
PH BLDA: 7.41 PH (ref 7.38–7.42)
PH BLDA: 7.47 PH (ref 7.38–7.42)
PH BLDA: 7.47 PH (ref 7.38–7.42)
PH BLDA: 7.5 PH (ref 7.38–7.42)
PH BLDMV: 7.35 PH (ref 7.33–7.43)
PH BLDMV: 7.37 PH (ref 7.33–7.43)
PH BLDMV: 7.39 PH (ref 7.33–7.43)
PH BLDMV: 7.45 PH (ref 7.33–7.43)
PH BLDMV: 7.45 PH (ref 7.33–7.43)
PH BLDMV: 7.46 PH (ref 7.33–7.43)
PHOSPHATE SERPL-MCNC: 2 MG/DL (ref 2.5–4.9)
PHOSPHATE SERPL-MCNC: 2.6 MG/DL (ref 2.5–4.9)
PHOSPHATE SERPL-MCNC: 3.9 MG/DL (ref 2.5–4.9)
PLATELET # BLD AUTO: 115 X10*3/UL (ref 150–450)
PLATELET # BLD AUTO: 125 X10*3/UL (ref 150–450)
PO2 BLDA: 101 MM HG (ref 85–95)
PO2 BLDA: 112 MM HG (ref 85–95)
PO2 BLDA: 254 MM HG (ref 85–95)
PO2 BLDA: 96 MM HG (ref 85–95)
PO2 BLDA: 96 MM HG (ref 85–95)
PO2 BLDMV: 33 MM HG (ref 35–45)
PO2 BLDMV: 34 MM HG (ref 35–45)
PO2 BLDMV: 35 MM HG (ref 35–45)
PO2 BLDMV: 35 MM HG (ref 35–45)
PO2 BLDMV: 37 MM HG (ref 35–45)
PO2 BLDMV: 39 MM HG (ref 35–45)
POTASSIUM BLDA-SCNC: 2.8 MMOL/L (ref 3.5–5.3)
POTASSIUM BLDA-SCNC: 3 MMOL/L (ref 3.5–5.3)
POTASSIUM BLDA-SCNC: 3.3 MMOL/L (ref 3.5–5.3)
POTASSIUM BLDA-SCNC: 3.6 MMOL/L (ref 3.5–5.3)
POTASSIUM BLDA-SCNC: 3.8 MMOL/L (ref 3.5–5.3)
POTASSIUM BLDMV-SCNC: 2.4 MMOL/L (ref 3.5–5.3)
POTASSIUM BLDMV-SCNC: 2.8 MMOL/L (ref 3.5–5.3)
POTASSIUM BLDMV-SCNC: 3.1 MMOL/L (ref 3.5–5.3)
POTASSIUM BLDMV-SCNC: 3.4 MMOL/L (ref 3.5–5.3)
POTASSIUM BLDMV-SCNC: 3.6 MMOL/L (ref 3.5–5.3)
POTASSIUM BLDMV-SCNC: 3.9 MMOL/L (ref 3.5–5.3)
POTASSIUM SERPL-SCNC: 2.8 MMOL/L (ref 3.5–5.3)
POTASSIUM SERPL-SCNC: 3 MMOL/L (ref 3.5–5.3)
POTASSIUM SERPL-SCNC: 3.9 MMOL/L (ref 3.5–5.3)
PROT SERPL-MCNC: 5.3 G/DL (ref 6.4–8.2)
PROTHROMBIN TIME: 13.7 SECONDS (ref 9.8–12.8)
RBC # BLD AUTO: 3.37 X10*6/UL (ref 4–5.2)
RBC # BLD AUTO: 3.39 X10*6/UL (ref 4–5.2)
RH FACTOR (ANTIGEN D): NORMAL
SAO2 % BLDA: 100 % (ref 94–100)
SAO2 % BLDA: 99 % (ref 94–100)
SAO2 % BLDMV: 59 % (ref 45–75)
SAO2 % BLDMV: 59 % (ref 45–75)
SAO2 % BLDMV: 61 % (ref 45–75)
SAO2 % BLDMV: 61 % (ref 45–75)
SAO2 % BLDMV: 62 % (ref 45–75)
SAO2 % BLDMV: 70 % (ref 45–75)
SODIUM BLDA-SCNC: 132 MMOL/L (ref 136–145)
SODIUM BLDA-SCNC: 133 MMOL/L (ref 136–145)
SODIUM BLDA-SCNC: 133 MMOL/L (ref 136–145)
SODIUM BLDA-SCNC: 134 MMOL/L (ref 136–145)
SODIUM BLDA-SCNC: 134 MMOL/L (ref 136–145)
SODIUM BLDMV-SCNC: 132 MMOL/L (ref 136–145)
SODIUM BLDMV-SCNC: 134 MMOL/L (ref 136–145)
SODIUM BLDMV-SCNC: 135 MMOL/L (ref 136–145)
SODIUM BLDMV-SCNC: 135 MMOL/L (ref 136–145)
SODIUM SERPL-SCNC: 139 MMOL/L (ref 136–145)
WBC # BLD AUTO: 7.3 X10*3/UL (ref 4.4–11.3)
WBC # BLD AUTO: 7.4 X10*3/UL (ref 4.4–11.3)

## 2025-01-22 PROCEDURE — 2500000004 HC RX 250 GENERAL PHARMACY W/ HCPCS (ALT 636 FOR OP/ED): Performed by: PHYSICIAN ASSISTANT

## 2025-01-22 PROCEDURE — 82947 ASSAY GLUCOSE BLOOD QUANT: CPT

## 2025-01-22 PROCEDURE — 3E0333Z INTRODUCTION OF ANTI-INFLAMMATORY INTO PERIPHERAL VEIN, PERCUTANEOUS APPROACH: ICD-10-PCS | Performed by: THORACIC SURGERY (CARDIOTHORACIC VASCULAR SURGERY)

## 2025-01-22 PROCEDURE — 2500000004 HC RX 250 GENERAL PHARMACY W/ HCPCS (ALT 636 FOR OP/ED)

## 2025-01-22 PROCEDURE — 99291 CRITICAL CARE FIRST HOUR: CPT

## 2025-01-22 PROCEDURE — 82330 ASSAY OF CALCIUM: CPT | Performed by: NURSE PRACTITIONER

## 2025-01-22 PROCEDURE — 85730 THROMBOPLASTIN TIME PARTIAL: CPT | Performed by: NURSE PRACTITIONER

## 2025-01-22 PROCEDURE — 94640 AIRWAY INHALATION TREATMENT: CPT

## 2025-01-22 PROCEDURE — 84132 ASSAY OF SERUM POTASSIUM: CPT

## 2025-01-22 PROCEDURE — 97530 THERAPEUTIC ACTIVITIES: CPT | Mod: GO

## 2025-01-22 PROCEDURE — 84132 ASSAY OF SERUM POTASSIUM: CPT | Performed by: NURSE PRACTITIONER

## 2025-01-22 PROCEDURE — 97164 PT RE-EVAL EST PLAN CARE: CPT | Mod: GP | Performed by: PHYSICAL THERAPIST

## 2025-01-22 PROCEDURE — 99291 CRITICAL CARE FIRST HOUR: CPT | Performed by: STUDENT IN AN ORGANIZED HEALTH CARE EDUCATION/TRAINING PROGRAM

## 2025-01-22 PROCEDURE — 82810 BLOOD GASES O2 SAT ONLY: CPT | Performed by: NURSE PRACTITIONER

## 2025-01-22 PROCEDURE — 37799 UNLISTED PX VASCULAR SURGERY: CPT | Performed by: NURSE PRACTITIONER

## 2025-01-22 PROCEDURE — 2020000001 HC ICU ROOM DAILY

## 2025-01-22 PROCEDURE — 94003 VENT MGMT INPAT SUBQ DAY: CPT

## 2025-01-22 PROCEDURE — 97530 THERAPEUTIC ACTIVITIES: CPT | Mod: GP | Performed by: PHYSICAL THERAPIST

## 2025-01-22 PROCEDURE — 99233 SBSQ HOSP IP/OBS HIGH 50: CPT

## 2025-01-22 PROCEDURE — 97168 OT RE-EVAL EST PLAN CARE: CPT | Mod: GO

## 2025-01-22 PROCEDURE — 2500000004 HC RX 250 GENERAL PHARMACY W/ HCPCS (ALT 636 FOR OP/ED): Mod: JZ

## 2025-01-22 PROCEDURE — 85610 PROTHROMBIN TIME: CPT | Performed by: NURSE PRACTITIONER

## 2025-01-22 PROCEDURE — 2500000002 HC RX 250 W HCPCS SELF ADMINISTERED DRUGS (ALT 637 FOR MEDICARE OP, ALT 636 FOR OP/ED): Performed by: STUDENT IN AN ORGANIZED HEALTH CARE EDUCATION/TRAINING PROGRAM

## 2025-01-22 PROCEDURE — 74018 RADEX ABDOMEN 1 VIEW: CPT

## 2025-01-22 PROCEDURE — 86923 COMPATIBILITY TEST ELECTRIC: CPT

## 2025-01-22 PROCEDURE — 83735 ASSAY OF MAGNESIUM: CPT | Performed by: NURSE PRACTITIONER

## 2025-01-22 PROCEDURE — 85027 COMPLETE CBC AUTOMATED: CPT | Performed by: NURSE PRACTITIONER

## 2025-01-22 PROCEDURE — 93010 ELECTROCARDIOGRAM REPORT: CPT | Performed by: INTERNAL MEDICINE

## 2025-01-22 PROCEDURE — 93005 ELECTROCARDIOGRAM TRACING: CPT

## 2025-01-22 PROCEDURE — 3E0G76Z INTRODUCTION OF NUTRITIONAL SUBSTANCE INTO UPPER GI, VIA NATURAL OR ARTIFICIAL OPENING: ICD-10-PCS | Performed by: THORACIC SURGERY (CARDIOTHORACIC VASCULAR SURGERY)

## 2025-01-22 PROCEDURE — 2500000004 HC RX 250 GENERAL PHARMACY W/ HCPCS (ALT 636 FOR OP/ED): Performed by: STUDENT IN AN ORGANIZED HEALTH CARE EDUCATION/TRAINING PROGRAM

## 2025-01-22 PROCEDURE — 2500000005 HC RX 250 GENERAL PHARMACY W/O HCPCS

## 2025-01-22 PROCEDURE — 86901 BLOOD TYPING SEROLOGIC RH(D): CPT

## 2025-01-22 PROCEDURE — 2500000001 HC RX 250 WO HCPCS SELF ADMINISTERED DRUGS (ALT 637 FOR MEDICARE OP): Performed by: PHYSICIAN ASSISTANT

## 2025-01-22 PROCEDURE — 71045 X-RAY EXAM CHEST 1 VIEW: CPT | Performed by: RADIOLOGY

## 2025-01-22 PROCEDURE — 2500000001 HC RX 250 WO HCPCS SELF ADMINISTERED DRUGS (ALT 637 FOR MEDICARE OP)

## 2025-01-22 PROCEDURE — 2500000004 HC RX 250 GENERAL PHARMACY W/ HCPCS (ALT 636 FOR OP/ED): Performed by: NURSE PRACTITIONER

## 2025-01-22 PROCEDURE — 2500000005 HC RX 250 GENERAL PHARMACY W/O HCPCS: Performed by: PHYSICIAN ASSISTANT

## 2025-01-22 PROCEDURE — 94645 CONT INHLJ TX EACH ADDL HOUR: CPT

## 2025-01-22 PROCEDURE — 2500000001 HC RX 250 WO HCPCS SELF ADMINISTERED DRUGS (ALT 637 FOR MEDICARE OP): Performed by: NURSE PRACTITIONER

## 2025-01-22 PROCEDURE — 2500000002 HC RX 250 W HCPCS SELF ADMINISTERED DRUGS (ALT 637 FOR MEDICARE OP, ALT 636 FOR OP/ED): Performed by: NURSE PRACTITIONER

## 2025-01-22 PROCEDURE — 71045 X-RAY EXAM CHEST 1 VIEW: CPT

## 2025-01-22 RX ORDER — DEXMEDETOMIDINE HYDROCHLORIDE 4 UG/ML
.1-1.5 INJECTION, SOLUTION INTRAVENOUS CONTINUOUS
Status: DISCONTINUED | OUTPATIENT
Start: 2025-01-22 | End: 2025-01-27

## 2025-01-22 RX ORDER — FENTANYL CITRATE-0.9 % NACL/PF 10 MCG/ML
PLASTIC BAG, INJECTION (ML) INTRAVENOUS
Status: COMPLETED
Start: 2025-01-22 | End: 2025-01-22

## 2025-01-22 RX ORDER — OXYCODONE HCL 5 MG/5 ML
5 SOLUTION, ORAL ORAL EVERY 6 HOURS
Status: DISCONTINUED | OUTPATIENT
Start: 2025-01-22 | End: 2025-01-23

## 2025-01-22 RX ORDER — POTASSIUM CHLORIDE 14.9 MG/ML
20 INJECTION INTRAVENOUS ONCE
Status: DISCONTINUED | OUTPATIENT
Start: 2025-01-22 | End: 2025-01-22

## 2025-01-22 RX ORDER — FENTANYL CITRATE-0.9 % NACL/PF 10 MCG/ML
12.5 PLASTIC BAG, INJECTION (ML) INTRAVENOUS CONTINUOUS
Status: DISCONTINUED | OUTPATIENT
Start: 2025-01-22 | End: 2025-01-25

## 2025-01-22 RX ORDER — POTASSIUM CHLORIDE 1.5 G/1.58G
40 POWDER, FOR SOLUTION ORAL ONCE
Status: COMPLETED | OUTPATIENT
Start: 2025-01-22 | End: 2025-01-22

## 2025-01-22 RX ORDER — CHLOROTHIAZIDE SODIUM 500 MG/1
250 INJECTION INTRAVENOUS ONCE
Status: COMPLETED | OUTPATIENT
Start: 2025-01-22 | End: 2025-01-22

## 2025-01-22 RX ORDER — ACETAMINOPHEN 160 MG/5ML
650 SOLUTION ORAL ONCE
Status: COMPLETED | OUTPATIENT
Start: 2025-01-22 | End: 2025-01-22

## 2025-01-22 RX ORDER — POTASSIUM CHLORIDE 14.9 MG/ML
20 INJECTION INTRAVENOUS
Status: COMPLETED | OUTPATIENT
Start: 2025-01-22 | End: 2025-01-22

## 2025-01-22 RX ADMIN — DEXMEDETOMIDINE HYDROCHLORIDE 1 MCG/KG/HR: 400 INJECTION INTRAVENOUS at 16:02

## 2025-01-22 RX ADMIN — BUDESONIDE 0.5 MG: 0.5 INHALANT RESPIRATORY (INHALATION) at 08:23

## 2025-01-22 RX ADMIN — BISACODYL 10 MG: 10 SUPPOSITORY RECTAL at 08:12

## 2025-01-22 RX ADMIN — DOCUSATE SODIUM LIQUID 100 MG: 100 LIQUID ORAL at 20:36

## 2025-01-22 RX ADMIN — PIPERACILLIN SODIUM AND TAZOBACTAM SODIUM 4.5 G: 4; .5 INJECTION, SOLUTION INTRAVENOUS at 02:27

## 2025-01-22 RX ADMIN — EPOPROSTENOL 0.05 MCG/KG/MIN: 1.5 INJECTION, POWDER, LYOPHILIZED, FOR SOLUTION INTRAVENOUS at 00:18

## 2025-01-22 RX ADMIN — IPRATROPIUM BROMIDE AND ALBUTEROL SULFATE 3 ML: .5; 3 SOLUTION RESPIRATORY (INHALATION) at 08:23

## 2025-01-22 RX ADMIN — DEXAMETHASONE SODIUM PHOSPHATE 6 MG: 10 INJECTION, SOLUTION INTRAMUSCULAR; INTRAVENOUS at 08:12

## 2025-01-22 RX ADMIN — POTASSIUM CHLORIDE 20 MEQ: 14.9 INJECTION, SOLUTION INTRAVENOUS at 08:13

## 2025-01-22 RX ADMIN — DEXMEDETOMIDINE HYDROCHLORIDE 1 MCG/KG/HR: 400 INJECTION INTRAVENOUS at 08:49

## 2025-01-22 RX ADMIN — IPRATROPIUM BROMIDE AND ALBUTEROL SULFATE 3 ML: .5; 3 SOLUTION RESPIRATORY (INHALATION) at 20:36

## 2025-01-22 RX ADMIN — LIDOCAINE 4% 1 PATCH: 40 PATCH TOPICAL at 08:12

## 2025-01-22 RX ADMIN — INSULIN LISPRO 1 UNITS: 100 INJECTION, SOLUTION INTRAVENOUS; SUBCUTANEOUS at 20:30

## 2025-01-22 RX ADMIN — VASOPRESSIN 0.02 UNITS/MIN: 0.2 INJECTION INTRAVENOUS at 23:03

## 2025-01-22 RX ADMIN — PIPERACILLIN SODIUM AND TAZOBACTAM SODIUM 4.5 G: 4; .5 INJECTION, SOLUTION INTRAVENOUS at 17:46

## 2025-01-22 RX ADMIN — Medication 50 PERCENT: at 00:24

## 2025-01-22 RX ADMIN — Medication 50 MCG/HR: at 23:49

## 2025-01-22 RX ADMIN — OXYCODONE HYDROCHLORIDE 5 MG: 5 SOLUTION ORAL at 21:31

## 2025-01-22 RX ADMIN — DEXMEDETOMIDINE HYDROCHLORIDE 1.5 MCG/KG/HR: 400 INJECTION INTRAVENOUS at 21:31

## 2025-01-22 RX ADMIN — REMDESIVIR 100 MG: 100 INJECTION, POWDER, LYOPHILIZED, FOR SOLUTION INTRAVENOUS at 18:36

## 2025-01-22 RX ADMIN — DEXMEDETOMIDINE HYDROCHLORIDE 1 MCG/KG/HR: 400 INJECTION INTRAVENOUS at 02:29

## 2025-01-22 RX ADMIN — ACETAMINOPHEN 650 MG: 160 SOLUTION ORAL at 14:41

## 2025-01-22 RX ADMIN — POTASSIUM PHOSPHATE, MONOBASIC POTASSIUM PHOSPHATE, DIBASIC 15 MMOL: 224; 236 INJECTION, SOLUTION, CONCENTRATE INTRAVENOUS at 12:06

## 2025-01-22 RX ADMIN — POTASSIUM CHLORIDE 20 MEQ: 14.9 INJECTION, SOLUTION INTRAVENOUS at 05:30

## 2025-01-22 RX ADMIN — Medication 40 PERCENT: at 23:31

## 2025-01-22 RX ADMIN — SENNOSIDES 5 ML: 8.8 LIQUID ORAL at 20:36

## 2025-01-22 RX ADMIN — BUDESONIDE 0.5 MG: 0.5 INHALANT RESPIRATORY (INHALATION) at 20:35

## 2025-01-22 RX ADMIN — PIPERACILLIN SODIUM AND TAZOBACTAM SODIUM 4.5 G: 4; .5 INJECTION, SOLUTION INTRAVENOUS at 12:06

## 2025-01-22 RX ADMIN — HEPARIN SODIUM 5000 UNITS: 5000 INJECTION INTRAVENOUS; SUBCUTANEOUS at 05:31

## 2025-01-22 RX ADMIN — PANTOPRAZOLE SODIUM 40 MG: 40 INJECTION, POWDER, FOR SOLUTION INTRAVENOUS at 08:12

## 2025-01-22 RX ADMIN — EPOPROSTENOL 0.03 MCG/KG/MIN: 1.5 INJECTION, POWDER, LYOPHILIZED, FOR SOLUTION INTRAVENOUS at 20:34

## 2025-01-22 RX ADMIN — VASOPRESSIN 0.03 UNITS/MIN: 0.2 INJECTION INTRAVENOUS at 08:36

## 2025-01-22 RX ADMIN — INSULIN LISPRO 1 UNITS: 100 INJECTION, SOLUTION INTRAVENOUS; SUBCUTANEOUS at 05:31

## 2025-01-22 RX ADMIN — THIAMINE HYDROCHLORIDE 100 MG: 100 INJECTION, SOLUTION INTRAMUSCULAR; INTRAVENOUS at 08:13

## 2025-01-22 RX ADMIN — Medication 50 PERCENT: at 08:58

## 2025-01-22 RX ADMIN — PIPERACILLIN SODIUM AND TAZOBACTAM SODIUM 4.5 G: 4; .5 INJECTION, SOLUTION INTRAVENOUS at 06:40

## 2025-01-22 RX ADMIN — POTASSIUM CHLORIDE 40 MEQ: 1.5 POWDER, FOR SOLUTION ORAL at 14:37

## 2025-01-22 RX ADMIN — HEPARIN SODIUM 5000 UNITS: 5000 INJECTION INTRAVENOUS; SUBCUTANEOUS at 14:37

## 2025-01-22 RX ADMIN — CHLOROTHIAZIDE SODIUM 250 MG: 500 INJECTION, POWDER, LYOPHILIZED, FOR SOLUTION INTRAVENOUS at 18:36

## 2025-01-22 RX ADMIN — HEPARIN SODIUM 5000 UNITS: 5000 INJECTION INTRAVENOUS; SUBCUTANEOUS at 21:32

## 2025-01-22 RX ADMIN — OXYCODONE HYDROCHLORIDE 5 MG: 5 SOLUTION ORAL at 16:02

## 2025-01-22 RX ADMIN — Medication 50 MCG/HR: at 06:25

## 2025-01-22 RX ADMIN — IPRATROPIUM BROMIDE AND ALBUTEROL SULFATE 3 ML: .5; 3 SOLUTION RESPIRATORY (INHALATION) at 16:05

## 2025-01-22 RX ADMIN — IPRATROPIUM BROMIDE AND ALBUTEROL SULFATE 3 ML: .5; 3 SOLUTION RESPIRATORY (INHALATION) at 00:24

## 2025-01-22 ASSESSMENT — COGNITIVE AND FUNCTIONAL STATUS - GENERAL
EATING MEALS: TOTAL
CLIMB 3 TO 5 STEPS WITH RAILING: TOTAL
DRESSING REGULAR UPPER BODY CLOTHING: A LOT
PERSONAL GROOMING: A LOT
STANDING UP FROM CHAIR USING ARMS: TOTAL
MOVING TO AND FROM BED TO CHAIR: TOTAL
DAILY ACTIVITIY SCORE: 8
TURNING FROM BACK TO SIDE WHILE IN FLAT BAD: TOTAL
TOILETING: TOTAL
WALKING IN HOSPITAL ROOM: TOTAL
MOVING FROM LYING ON BACK TO SITTING ON SIDE OF FLAT BED WITH BEDRAILS: A LOT
DRESSING REGULAR LOWER BODY CLOTHING: TOTAL
HELP NEEDED FOR BATHING: TOTAL
MOBILITY SCORE: 7

## 2025-01-22 ASSESSMENT — PAIN - FUNCTIONAL ASSESSMENT
PAIN_FUNCTIONAL_ASSESSMENT: CPOT (CRITICAL CARE PAIN OBSERVATION TOOL)
PAIN_FUNCTIONAL_ASSESSMENT: 0-10
PAIN_FUNCTIONAL_ASSESSMENT: 0-10

## 2025-01-22 ASSESSMENT — ACTIVITIES OF DAILY LIVING (ADL)
BATHING_ASSISTANCE: MAXIMAL
ADL_ASSISTANCE: INDEPENDENT
ADL_ASSISTANCE: INDEPENDENT

## 2025-01-22 ASSESSMENT — PAIN SCALES - GENERAL
PAINLEVEL_OUTOF10: 0 - NO PAIN
PAINLEVEL_OUTOF10: 0 - NO PAIN

## 2025-01-22 NOTE — PROGRESS NOTES
LSW contacted the patient’s , Catarino, to introduce myself and provide an overview of my role and responsibilities as the patient’s . A voicemail was left requesting a return call to address any questions or concerns he may have and to gather information needed for assessment purposes.

## 2025-01-22 NOTE — PROGRESS NOTES
Physical Therapy    Physical Therapy Re-Evaluation & Treatment    Patient Name: Jannie Vick  MRN: [id number]  [facility]  Room: 12/12-A  Today's Date: 1/22/2025   Time Calculation  Start Time: 0953  Stop Time: 1028  Time Calculation (min): 35 min    Assessment/Plan   PT Assessment  PT Assessment Results: Decreased strength, Decreased range of motion, Impaired balance, Decreased endurance, Decreased mobility, Decreased safety awareness  Rehab Prognosis: Good  Barriers to Discharge Home: Physical needs, Caregiver assistance  Caregiver Assistance: Caregiver assistance needed per identified barriers - however, level of patient's required assistance exceeds assistance available at home  Physical Needs: Stair navigation into home limited by function/safety, High falls risk due to function or environment, 24hr ADL assistance needed, 24hr mobility assistance needed, Ambulating household distances limited by function/safety  Evaluation/Treatment Tolerance: Patient tolerated treatment well  Medical Staff Made Aware: Yes  End of Session Communication: Bedside nurse  Assessment Comment: Pt is a 63 yo female admitted s/p pneumonectomy c/b intubation wtih covid + status. Upon PT re-eval, pt presents with the listed impairments. Unable to tolerate OOB mobility on re-eval. Skilled PT Indicated to progress tolerance, safety, and independence of mobiilty.  End of Session Patient Position: Bed, 3 rail up (bilat wrist restraints)   IP OR SWING BED PT PLAN  Inpatient or Swing Bed: Inpatient  PT Plan  Treatment/Interventions: Bed mobility, Transfer training, Gait training, Stair training, Balance training, Neuromuscular re-education, Strengthening, Endurance training, Therapeutic exercise, Therapeutic activity, Home exercise program, Postural re-education  PT Plan: Ongoing PT  PT Frequency: 4 times per week  PT Discharge Recommendations: Moderate intensity level of continued care  Equipment Recommended upon Discharge: Wheeled  walker  PT Recommended Transfer Status: Total assist  PT - OK to Discharge: Yes      Subjective     General Visit Information:  General  Reason for Referral: Right pneumonectomy, intrapericardial, with en block chest wall resection on 1/13 c/b hypotension post op. Re-eval s/p intubation with covid dx resulting in change in functional mobility status  Past Medical History Relevant to Rehab: stage III SCC of the lung s/p chemotherapy+radiation last chemotherapy session on 8/28/2024  Co-Treatment: OT  Co-Treatment Reason: Vent dependent with intent to mobilize  Prior to Session Communication: Bedside nurse  Patient Position Received: Bed, 3 rail up (bilat wrist restraints)  General Comment: Pt alert with stimulation start of session. On VCAC 50% PEEP 5 rate 20. 50 fentanyl, .01 vaso start of session (RN increased to .02 during session), 1 precedex, swan, tele, a-line, yoo, IV, OG, .03 iepo.  Home Living:  Home Living  Type of Home: House  Lives With: Spouse  Home Adaptive Equipment: None  Home Layout: One level  Home Access: Stairs to enter without rails  Entrance Stairs-Rails: None  Entrance Stairs-Number of Steps: 3  Bathroom Shower/Tub: Tub only  Bathroom Equipment: None  Home Living Comments: Obtained from initial PT eval  Prior Level of Function:  Prior Function Per Pt/Caregiver Report  Level of [place]: Independent with ADLs and functional transfers  ADL Assistance: Independent  Homemaking Assistance: Independent  Ambulatory Assistance: Independent  Vocational: Retired  Leisure: Gardening, reading  Prior Function Comments: +drives, -falls, Obtained from initial PT eval  Precautions:  Precautions  Medical Precautions: Fall precautions, Oxygen therapy device and L/min, Cardiac precautions    Vitals:      01/22/25 0953 01/22/25 1000 01/22/25 1028   Vital Signs   Vitals Session Pre PT  --  Post PT   Heart Rate 99 103 93   Resp 20 17 20   SpO2 99 % 96 % 98 %   /58  --  103/56   MAP (mmHg) 74  --  71        Vital Signs Comment: Bouts of hypotension noted with anterior lean in bed and HOB flat - SBPs to 80s with MAP to 58. RN aware and titrating meds accordingly.    Objective   Pain:  Pain Assessment  Pain Assessment: 0-10  0-10 (Numeric) Pain Score: 0 - No pain  Cognition:  Cognition  Overall Cognitive Status: Impaired  Orientation Level:  (Via head nods and gestures 2/2 ETT: Oriented to self, month, and year. Increased prompting required for place.)  Following Commands: Follows one step commands with repetition  Cognition Comments: Pt appearing anxious during session. PT/OT Providing comfort as needed to improve resp status and pt performance.    General Assessments:    Activity Tolerance  Endurance: Decreased tolerance for upright activites  Early Mobility/Exercise Safety Screen: Proceed with mobilization - No exclusion criteria met    Sensation  Light Touch: No apparent deficits    Coordination  Movements are Fluid and Coordinated: Yes    Postural Control  Postural Control: Impaired  Head Control: WFL    Static Sitting Balance  Static Sitting-Comment/Number of Minutes: Unable to sit up at EOB this date. Performed anterior lean to long sitting in bed with pt maintaining position with Min A x2 vs Mod A x1.       Functional Assessments:       Bed Mobility 1  Bed Mobility Comments 1: Pt dependently boosted in bed with x2 assist using draw sheet for optimal positioning. Placed bed in chair position for gradual progression to upright for VS monitoring, pulmonary hygiene, and improved activity tolerance.  Bed Mobility 2  Bed Mobility  2: Forward lean  Level of Assistance 2: Minimum assistance (x2)  Bed Mobility Comments 2: With bilat HHA. Pt maintained appropriate head control. Maintained position x1 min. Gradual decrease in MAPs while upright to 58. Unable to progress to EOB sitting at this time.  Bed Mobility 3  Bed Mobility 3: Rolling left  Level of Assistance 3: Maximum assistance  Bed Mobility Comments 3: draw  sheet    Extremity/Trunk Assessments:    RUE   RUE : Within Functional Limits  LUE   LUE: Within Functional Limits  RLE   RLE : Within Functional Limits (AROM grossly WFL in bed)  LLE   LLE : Within Functional Limits (AROM grossly WFL in bed)  Treatments:  x15 mins in chair position in bed for improved activity tolerance and progression of uprigth mobility. Performed AROM in x4 extremities while upright. Increased time required for skilled VS monitoring as well as re-direction as pt anxiety would impede pulmonary status at times.     Outcome Measures:  [facility] Basic Mobility  Turning from your back to your side while in a flat bed without using bedrails: A lot  Moving from lying on your back to sitting on the side of a flat bed without using bedrails: Total  Moving to and from bed to chair (including a wheelchair): Total  Standing up from a chair using your arms (e.g. wheelchair or bedside chair): Total  To walk in hospital room: Total  Climbing 3-5 steps with railing: Total  Basic Mobility - Total Score: 7    FSS-ICU  Ambulation: Unable to attempt due to weakness  Rolling: Maximal assistance (performs 25% - 49% of task)  Sitting: Moderate assistance (performs 50 - 74% of task)  Transfer Sit-to-Stand: Total assistance (performs 25% or requires another person)  Transfer Supine-to-Sit: Total assistance (performs 25% or requires another person)  Total Score: 7      Early Mobility/Exercise Safety Screen: Proceed with mobilization - No exclusion criteria met  ICU Mobility Scale: Sitting in bed, exercises in bed [1]    Encounter Problems       Encounter Problems (Active)       Balance       Pt will demo static sitting balance w/o UE support IND to decrease risk of falls in home environment (Not met)       Start:  01/15/25    Expected End:  02/05/25    Resolved:  01/22/25    Updated to: Pt will maintain standing balance with Min A using LRAD    Update reason: re-eval         Pt will maintain standing balance with Min A using  LRAD       Start:  01/22/25    Expected End:  02/12/25                   Mobility       Pt will mobilize supine to sitting MI to promote functional independence (Not met)       Start:  01/15/25    Expected End:  02/05/25    Resolved:  01/22/25    Updated to: Pt will perform bed mobility with Min A    Update reason: re-eval         Pt will perform bed mobility with Min A       Start:  01/22/25    Expected End:  02/12/25                   Mobility       STG - Patient will ambulate x50 ft with Min A using LRAD       Start:  01/22/25    Expected End:  02/12/25               PT Transfers       Pt will transfer sit to stand w/ LRAD MI to improve functional mobility.  (Not met)       Start:  01/15/25    Expected End:  02/05/25    Resolved:  01/22/25    Updated to: Pt will perform functional transfers with Min A using LRAD    Update reason: Re-eval         Pt will perform functional transfers with Min A using LRAD       Start:  01/22/25    Expected End:  02/12/25                   Pain - Adult              Education Documentation  Mobility Training, taught by Allie Akhtar PT at 1/22/2025 12:54 PM.  Learner: Patient  Readiness: Acceptance  Method: Explanation  Response: Needs Reinforcement  Comment: Limited by ETT    Education Comments  No comments found.    Allie Akhtar PT, DPT

## 2025-01-22 NOTE — PROGRESS NOTES
SICU Staff Progress Note     Briefly, the patient is a 64 year old female with a past medical history significant for COPD and stage III right lung SCC s/p chemo/radiation who presented to SICU from OR following Right Pneumonectomy and intrapericardial dissection with en bloc chest wall resection, and MLND by Dr. Wagner on 1/13 requiring post-op vasoactive therapy. Post-op course c/b acute hyponatremia, multifactorial delirium, cardiogenic shock requiring inotropic support and acute hypoxic/hypercarbic respiratory failure requiring intubation. Currently requiring continuous vasoactive infusions for a mixed shock which includes Takotsubo cardiomyopathy as well as ventilator management for hypoxemic respiratory failure.     I evaluated the patient with an advanced practice provider. I oversaw this patient's care, and I reviewed the necessary labs, vitals, and radiographic imaging.      Plan:  Neuro: currently requiring Precedex/Fentanyl combination to promote ventilator synchrony; currently follows commands and is comfortable; daily SAT; cautious introduction of bedside PT; obtain more robust enteral access and may introduce enteral opioids in an effort to reduce need for fentanyl infusion  Cardiovascular: currently on IEPO for cardiogenic support following RV/LV Takotsubo. Continue to trend thermodilution numbers and SvO2 to tailor appropriate therapy. Currently off milrinone and globally off vasopressin as MAP permits. Paroxysmal sinus tachycardia noted; SDS transitioned to Decadron for COVID therapy; TTE 1/20 with re-demonstration of biventricular reduction in EF. Would support cautious down-titration of IEPO as indices and RV filling pressures permit; ideally will gently diurese to a negative fluid balance. Obtain Qtc for adjuncts such as trazodone  Respiratory: continues to have pulmonary edema on radiographic imaging, but oxygenation overall improving modestly; paroxysm of dyssynchrony during SBTs requiring  additional sedation; ideally would diurese as hemodynamics permit; continue daily CXR; continue to gradually wean IEPO  GI: enteral feeds via OGT; place Dobbhoff today but given surgical resection would avoid blind placement. May require ENT assistance  : hyponatremia resolving; continue ongoing diuresis; continue acetazolamide given contraction alkalosis  Endo: SDS--> transitioned to Decadron for COVID; ISS  Heme: maintain active T/S; SQH  ID: continue zosyn to stop date of 7 days, COVID positive; started remdesivir for five day course, on stress dose steroids; appreciate ID input  Dispo: SICU Care     Lyndsey ADAMSON  SICU Staff  P. [phone]     Due to the high probability of life threatening clinical decompensation, the patient required critical care time evaluating and managing this patient.  Critical care time included obtaining a history, examining the patient, ordering and reviewing studies, discussing, developing, and implementing a management plan, evaluating the patient's response to treatment, and discussion with other care team providers. I saw and evaluated the patient myself. I reviewed the provider's documentation and discussed the patient with the provider. Critical care time was performed exclusive of billable procedures.     Critical Care Time: 39 minutes

## 2025-01-22 NOTE — PROGRESS NOTES
"Faustina Vick \"Noam" is a 64 y.o. female on day 9 of admission presenting with Primary cancer of right upper lobe of lung (Multi).    Subjective   -Patient seen this morning resting comfortably in bed  -VSS: tachycardia with rates into the 120s, remains mechanically ventilated    -[place] Numbers today: Mixed venous O2 62%, CVP 11 mPAP 23, SVR 1650 -> continues to reinforce shock is not likely cardiogenic  -Patient has titrated off of levo this morning with improvement in sinus tach.  She remains on vasopressin   -UOP: 900 mL yesterday so far today 380mL roughly 80 mL/HR.  -Patient weaned off milrinone since 1735 on 1/21/25       Objective     Physical Exam  General: Not in acute distress, somnolent, alert, cooperative, ill-appearing  HEENT: Normocephalic, atraumatic, EOMI, moist mucous membranes, ETT in place, OGT in place   Neck: Neck supple, trachea midline, no evidence of trauma  Cardiovascular: Tachycardic, regular rhythm, S1 and S2 appreciated, no murmurs rubs gallops appreciated, distal pulses 2+ bilaterally (radial and dorsalis pedis)  Respiratory: Coarse mechanical breath sounds appreciated bilaterally, with loss of lung sounds from right mid lobe down,no increased work of breathing on mechanical ventilation  GI: Abdomen soft, nondistended, nontender to palpation, bowel sounds present  : yoo cath draining straw colored urine  Extremities: No edema appreciated in lower extremities bilaterally, no cyanosis  Neuro: no focal deficits, strength and sensation intact bilaterally  Skin: Warm and dry, without lesions or rashes    Last Recorded Vitals  Blood pressure 103/56, pulse 90, temperature 36.9 °C (98.4 °F), temperature source Temporal, resp. rate 20, height 1.651 m (5' 5\"), weight 58.5 kg (128 lb 15.5 oz), SpO2 99%.  Intake/Output last 3 Shifts:  I/O last 3 completed shifts:  In: 2728.8 (46.6 mL/kg) [I.V.:1058.8 (18.1 mL/kg); NG/GT:450; IV Piggyback:1220]  Out: 3405 (58.2 mL/kg) [Urine:2980 (1.4 mL/kg/hr); " Emesis/NG output:425]  Weight: 58.5 kg     Relevant Results  Transthoracic Echo (TTE) Limited    Result Date: 1/17/2025   [facility], [address] [place] [address]                Tel [phone] and Fax [phone] TRANSTHORACIC ECHOCARDIOGRAM REPORT  Patient Name:       RAÚL KAMINSKI     Anne Physician:    [id number] Rita Graham MD Study Date:         1/17/2025           Ordering Provider:    [id number] ROLANDO ARAGON MRN/PID:            [id number]            Fellow: Accession#:         [id number]        Nurse: Date of Birth/Age:  1960 / 64      Sonographer:          Domonique rocha                                     RDCARIE Gender assigned at  F                   Additional Staff: Birth: Height:             165.10 cm           Admit Date:           1/13/2025 Weight:             52.16 kg            Admission Status:     Inpatient - STAT BSA / BMI:          1.56 m2 / 19.14     Encounter#:           [id number]                     kg/m2 Blood Pressure:     103/74 mmHg         Department Location:  [facility] Study Type:    TRANSTHORACIC ECHO (TTE) LIMITED Diagnosis/ICD: Acute combined systolic (congestive) and diastolic (congestive)                heart failure (CHF)-I50.41 Indication:    Acute systolic/diastolic failure CPT Code:      Echo Limited-42352; Doppler Limited-79950; Color Doppler-32578 Patient History: Pertinent History: Anxiety; Arthritis; Asthma; Hx of squamous cell carcinoma; Hx                    of chemotherapy; Lung cancer; Prior smoker; Known EF of 34%                    (01/16/24) with described regional wall motion abnormalities                    of the LV and RV suggestive of biventricular Takotsubo. Study Detail: The following Echo studies were performed: 2D, M-Mode, Doppler and               color flow.  Technically challenging study due to body habitus and               patient lying in supine position. Definity used as a contrast               agent for endocardial border definition. Total contrast used for               this procedure was 1 mL via IV push.  PHYSICIAN INTERPRETATION: Left Ventricle: Left ventricular ejection fraction is severely decreased, by visual estimate at 25-30%. There are multiple left ventricular wall motion abnormalities. The left ventricular cavity size is normal. Left ventricular diastolic filling cannot be determined, due to E/A wave fusion. There is no definite left ventricular thrombus visualized. Apical and mid LV segments including the RV free wall are completely akinetic. LV Wall Scoring: The entire apex, mid and apical anterior wall, mid and apical anterior septum, mid and apical inferior septum, mid and apical inferior wall, mid inferolateral segment, and mid anterolateral segment are akinetic. All remaining scored segments are normal. Left Atrium: The left atrium is mild to moderately dilated. Right Ventricle: The right ventricle is normal in size. There is reduced right ventricular systolic function. Right Atrium: The right atrium is mildly dilated. Aortic Valve: The aortic valve is structurally normal. The aortic valve dimensionless index is 0.82. There is no evidence of aortic valve regurgitation. The peak instantaneous gradient of the aortic valve is 3 mmHg. The mean gradient of the aortic valve is 2 mmHg. Mitral Valve: The mitral valve is normal in structure. There is trace mitral valve regurgitation. Tricuspid Valve: The tricuspid valve is structurally normal. There is trace tricuspid regurgitation. The Doppler estimated RVSP is within normal limits at 27.0 mmHg. Pulmonic Valve: The pulmonic valve is not well visualized. Pulmonic valve regurgitation was not assessed. Pericardium: Trivial pericardial effusion. Aorta: The aortic root is normal. Systemic Veins: The inferior  vena cava appears normal in size. In comparison to the previous echocardiogram(s): Compared with study dated 2025, no significant change.  CONCLUSIONS:  1. Left ventricular ejection fraction is severely decreased, by visual estimate at 25-30%.  2. Multiple segmental abnormalities exist. See findings.  3. Apical and mid LV segments including the RV free wall are completely akinetic.  4. There are multiple left ventricular wall motion abnormalities.  5. Left ventricular diastolic filling cannot be determined, due to E/A wave fusion.  6. No left ventricular thrombus visualized.  7. There is reduced right ventricular systolic function.  8. The left atrium is mild to moderately dilated.  9. Right ventricular systolic pressure is within normal limits. 10. Compared with study dated 2025, no significant change. QUANTITATIVE DATA SUMMARY:  2D MEASUREMENTS:          Normal Ranges: Ao Root d:       3.43 cm  (2.0-3.7cm) LAs:             3.03 cm  (2.7-4.0cm) LVEDV Index:     54 ml/m2  RA VOLUME BY A/L METHOD:          Normal Ranges: RA Area A4C:             18.3 cm2  LV SYSTOLIC FUNCTION BY 2D PLANIMETRY (MOD):                      Normal Ranges: EF-A4C View:    19 % (>=55%) EF-A2C View:    22 % EF-Biplane:     19 % EF-Visual:      28 % LV EF Reported: 28 %  LV DIASTOLIC FUNCTION:           Normal Ranges: MV Peak E:             0.99 m/s  (0.7-1.2 m/s) MV e'                  0.091 m/s (>8.0) MV lateral e'          0.12 m/s MV medial e'           0.06 m/s E/e' Ratio:            10.92     (<8.0) MV DT:                 119 msec  (150-240 msec)  AORTIC VALVE:                     Normal Ranges: AoV Vmax:                0.87 m/s (<=1.7m/s) AoV Peak PG:             3.0 mmHg (<20mmHg) AoV Mean P.0 mmHg (1.7-11.5mmHg) LVOT Max Navi:            0.67 m/s (<=1.1m/s) AoV VTI:                 11.40 cm (18-25cm) LVOT VTI:                9.30 cm LVOT Diameter:           1.94 cm  (1.8-2.4cm) AoV Area, VTI:           2.41  cm2 (2.5-5.5cm2) AoV Area,Vmax:           2.26 cm2 (2.5-4.5cm2) AoV Dimensionless Index: 0.82  RIGHT VENTRICLE: RV Basal 3.40 cm RV Mid   2.30 cm RV Major 6.0 cm TAPSE:   11.9 mm RV s'    0.09 m/s  TRICUSPID VALVE/RVSP:          Normal Ranges: Peak TR Velocity:     2.45 m/s RV Syst Pressure:     27 mmHg  (< 30mmHg)  [id number] Rita Graham MD Electronically signed on 1/17/2025 at 12:13:06 PM  Wall Scoring  ** Final **     XR chest 1 view    Result Date: 1/17/2025  Interpreted By:  Alexx Anne, STUDY: XR CHEST 1 VIEW;  1/17/2025 6:40 am   INDICATION: Signs/Symptoms:pneumonectomy.     COMPARISON: Exam dated 01/16/2025   ACCESSION NUMBER(S): [id number]   ORDERING CLINICIAN: GABBY SMITH   FINDINGS: AP radiograph of the chest was provided.   Right IJ approach central venous catheter tip projects over the mid SVC. Left chest wall medication port tip projects over the confluence of the left brachiocephalic vein and SVC. Persistent subcutaneous emphysema overlying the right chest wall.   CARDIOMEDIASTINAL SILHOUETTE: Cardiomediastinal silhouette is stable in size and configuration. Complete obscuration of the right cardiomediastinal contours. Thin curvilinear lucency projecting along the right cardiac border may represent a component of pneumomediastinum.   LUNGS: Status post on bloc pneumonectomy and right rib resection. Similar gradient opacification of the right hemithorax. Similar prominent interstitial markings in the left lung with slightly increased blunting of the left costophrenic angle. Streaky left basilar opacities are most consistent with atelectasis. No evidence of left-sided pneumothorax.   ABDOMEN: No remarkable upper abdominal findings.   BONES: No acute osseous changes.       1.  Stable exam with postsurgical changes of right pneumonectomy/rib resection and large layering fluid component within the right hemithorax. 2. Similar findings of left pulmonary edema with slightly increased size of small  left pleural effusion. No left-sided pneumothorax. 3. Medical devices as above.       MACRO: None   Signed by: Alexx Anne 1/17/2025 8:16 AM Dictation workstation:   [id number]    XR chest 1 view    Result Date: 1/17/2025  Interpreted By:  Alexx Anne and Ritchie Brandon STUDY: XR CHEST 1 VIEW;  1/16/2025 7:01 pm   INDICATION: Signs/Symptoms:central line placement.   COMPARISON: Chest x-ray 01/16/2025, 6:41 a.m.   ACCESSION NUMBER(S): [id number]   ORDERING CLINICIAN: GABBY SMITH   FINDINGS: AP radiograph of the chest was provided.   Leftchest wall MediPort tip projects over the expected location of the upper SVC. Right internal jugular central venous catheter tip projects over the expected location of the mid SVC. There is subcutaneous emphysematous changes along the right lateral chest wall.   CARDIOMEDIASTINAL SILHOUETTE: Cardiomediastinal silhouette is stable in size and configuration. There is again obscuration of the right cardiomediastinal silhouette.   LUNGS: Redemonstration of postsurgical changes compatible with recent history of right pneumonectomy. Gradual, layering opacification of the right hemithorax which is increased when compared to prior examination. There is again trace blunting of the left costophrenic angle with adjacent linear opacities in the left lower lobe and findings of increased interstitial markings in the left lung. No evidence of left-sided pneumothorax.   ABDOMEN: No remarkable upper abdominal findings.   BONES: Resection of multiple upper right-sided ribs. No acute osseous changes.       1.  Postsurgical changes of right-sided pneumonectomy with increased layering opacification of the right hemithorax, likely due to increasing fluid. 2. Similar trace left-sided pleural effusion with linear left basilar airspace opacity, likely atelectasis on a background of likely pulmonary edema. Infection is not definitively excluded. 3. Medical devices as above with interval  insertion of right internal jugular central venous catheter projecting at the mid SVC.   I personally reviewed the images/study and I agree with the findings as stated by resident Davidson Mcguire. This study was interpreted at [place], [place].   MACRO: None   Signed by: Alexx Anne 1/17/2025 8:14 AM Dictation workstation:   [id number]    ECG 12 lead    Result Date: 1/16/2025  Normal sinus rhythm Low voltage QRS Cannot rule out Anterior infarct (cited on or before 16-JAN-2025) Abnormal ECG When compared with ECG of 16-JAN-2025 14:04, No significant change was found    Transthoracic Echo (TTE) Limited    Result Date: 1/16/2025   [facility] ([facility] [address] [place] [address]                Tel [phone] and Fax [phone] TRANSTHORACIC ECHOCARDIOGRAM REPORT  Patient Name:       RAÚL Rodríguez Physician:    [id number] Fabián Kulkarni MD Study Date:         1/16/2025           Ordering Provider:    [id number] GABBY SMITH MRN/PID:            [id number]            Fellow: Accession#:         [id number]        Nurse: Date of Birth/Age:  1960 / 64      Sonographer:          Naya rocha RDCS Gender assigned at  F                   Additional Staff: Birth: Height:             165.10 cm           Admit Date:           1/20/2025 Weight:             51.71 kg            Admission Status:     Inpatient - STAT BSA / BMI:          1.56 m2 / 18.97     Encounter#:           [id number]                     kg/m2 Blood Pressure:     106/62 mmHg         Department Location:  [facility] Study Type:    TRANSTHORACIC ECHO (TTE) LIMITED Diagnosis/ICD: Acute respiratory failure with hypoxia-J96.01 Indication:    Post pneumonectomy; new O2 requirements; eval  BIV function CPT Code:      Echo Limited-23146; Color Doppler-51277 Patient History: Pertinent History: COPD. Lung cancer; s/p pneumonectomy; immunotherapy. Study Detail: The following Echo studies were performed: 2D, M-Mode and color               flow. Technically challenging study due to body habitus and               prominent lung artifact. Definity used as a contrast agent for               endocardial border definition. Total contrast used for this               procedure was 3 mL via IV push. Unable to obtain suprasternal               notch view.  PHYSICIAN INTERPRETATION: Left Ventricle: Left ventricular ejection fraction is moderately decreased, calculated by Miranda's biplane at 34%. Left venticular wall motion is abnormal. The left ventricular cavity size is upper limits of normal. There is normal septal and normal posterior left ventricular wall thickness. Spectral Doppler shows a Grade II (pseudonormal pattern) of left ventricular diastolic filling with an elevated left atrial pressure. The described regional wall motion abnormalities of the LV and RV are suggestive of biventricular Takotsubo (stress cardiomyopathy). LV Wall Scoring: The mid and apical anterior wall, mid and apical anterior septum, mid and apical inferior septum, mid and apical inferior wall, mid inferolateral segment, mid anterolateral segment, and apical lateral segment are akinetic. All remaining scored segments are normal. Left Atrium: The left atrium is moderately dilated. Right Ventricle: The right ventricle is normal in size. There is moderately reduced right ventricular systolic function. There is akinesis of the mid and apical segments of the RV free wall. Right Atrium: The right atrium is normal in size. A dilated inferior vena cava demonstrates poor inspiratory collapse, consistent with elevated right atrial pressures. Aortic Valve: The aortic valve is trileaflet. There is trace aortic valve regurgitation. Mitral Valve: The  mitral valve is normal in structure. There is mild mitral valve regurgitation. Tricuspid Valve: The tricuspid valve is structurally normal. There is mild to moderate tricuspid regurgitation. The Doppler estimated RVSP is mildly elevated at 45.5 mmHg. Pulmonic Valve: The pulmonic valve is not well visualized. Pulmonic valve regurgitation was not assessed. Pericardium: There is no pericardial effusion noted. Aorta: The aortic root is normal. Systemic Veins: The inferior vena cava appears dilated, with IVC inspiratory collapse less than 50%. In comparison to the previous echocardiogram(s): Compared with study dated 1/13/2025, the LV and RV wall motion abnormalities were not present in prior study.  CONCLUSIONS:  1. Left ventricular ejection fraction is moderately decreased, calculated by Miranda's biplane at 34%.  2. Multiple segmental abnormalities exist. See findings.  3. Spectral Doppler shows a Grade II (pseudonormal pattern) of left ventricular diastolic filling with an elevated left atrial pressure.  4. There is moderately reduced right ventricular systolic function.  5. There is akinesis of the mid and apical segments of the RV free wall.  6. Mildly elevated right ventricular systolic pressure.  7. Mild to moderate tricuspid regurgitation visualized.  8. The left atrium is moderately dilated.  9. A dilated inferior vena cava demonstrates poor inspiratory collapse, consistent with elevated right atrial pressures. 10. The described regional wall motion abnormalities of the LV and RV are suggestive of biventricular Takotsubo (stress cardiomyopathy). QUANTITATIVE DATA SUMMARY:  2D MEASUREMENTS:          Normal Ranges: IVSd:            0.60 cm  (0.6-1.1cm) LVPWd:           0.70 cm  (0.6-1.1cm) LVIDd:           4.80 cm  (3.9-5.9cm) LVIDs:           3.50 cm LV Mass Index:   63 g/m2 LVEDV Index:     61 ml/m2 LV % FS          27.1 %  LA VOLUME:                   Normal Ranges: LA Vol A4C:        69.1 ml   (22+/-6mL/m2)  LA Vol Index A4C:  44.3ml/m2 LA Area A4C:       21.1 cm2 LA Major Axis A4C: 5.5 cm  RA VOLUME BY A/L METHOD:          Normal Ranges: RA Area A4C:             16.3 cm2  M-MODE MEASUREMENTS:         Normal Ranges: Ao Root:             3.00 cm (2.0-3.7cm) LAs:                 3.50 cm (2.7-4.0cm)  LV SYSTOLIC FUNCTION BY 2D PLANIMETRY (MOD):                      Normal Ranges: EF-A4C View:    40 % (>=55%) EF-A2C View:    31 % EF-Biplane:     34 % LV EF Reported: 34 %  LV DIASTOLIC FUNCTION:           Normal Ranges: MV Peak E:             0.87 m/s  (0.7-1.2 m/s) MV Peak A:             0.59 m/s  (0.42-0.7 m/s) E/A Ratio:             1.48      (1.0-2.2) MV e'                  0.057 m/s (>8.0) MV lateral e'          0.06 m/s MV medial e'           0.05 m/s E/e' Ratio:            15.37     (<8.0)  MITRAL VALVE:          Normal Ranges: MV DT:        119 msec (150-240msec)  RIGHT VENTRICLE: RV Basal 3.51 cm RV Mid   2.52 cm RV Major 6.0 cm TAPSE:   9.6 mm RV s'    0.07 m/s  TRICUSPID VALVE/RVSP:          Normal Ranges: Peak TR Velocity:     3.26 m/s RV Syst Pressure:     46 mmHg  (< 30mmHg) IVC Diam:             2.25 cm  [id number] Fabián Kulkarni MD Electronically signed on 1/16/2025 at 2:58:07 PM  Wall Scoring  ** Final (Updated) **     US right upper quadrant    Result Date: 1/16/2025  Interpreted By:  Alberto Salas, STUDY: US RIGHT UPPER QUADRANT; 1/16/2025 11:09 am   INDICATION: Signs/Symptoms:pericholecystic fluid seen on renal us.   COMPARISON: None.   ACCESSION NUMBER(S): [id number]   ORDERING CLINICIAN: WILMER ANTHONY   TECHNIQUE: Multiple images of the right upper quadrant were obtained.   FINDINGS: LIVER: The liver measures up to  15.5 cm in greatest sagittal dimension. Echogenicity is within normal limits. There is no morphologic evidence of cirrhosis and no focal hepatic lesion is evident.   GALLBLADDER: The gallbladder is within normal limits without wall thickening or cholelithiasis. A sonographic Odom  sign was not present. There is a small amount of simple appearing ascites in the right upper quadrant.   BILIARY SYSTEM: There is no intrahepatic biliary dilation. The common bile duct measures up to  5 mm in width.   PANCREAS: The pancreas is partially obscured by bowel gas. The visualized portions of the pancreas are within normal limits.   RIGHT KIDNEY: The right kidney measures up to  9.9 cm in greatest sagittal dimension. Cortical echogenicity and thickness are within normal limits. No hydroureteronephrosis.       Small amount of simple appearing ascites in the right upper quadrant without evidence of cholelithiasis, cholecystitis or biliary dilation.   Signed by: Alberto Salas 1/16/2025 1:02 PM Dictation workstation:   [id number]    XR chest 1 view    Result Date: 1/16/2025  Interpreted By:  Vick Eli, STUDY: XR CHEST 1 VIEW; 1/16/2025 6:43 am   INDICATION: Signs/Symptoms:ICU.   COMPARISON: Radiograph dated 01/15/2025   ACCESSION NUMBER(S): [id number]   ORDERING CLINICIAN: GABBY SMITH   FINDINGS: Postsurgical changes of right pneumonectomy and resection of multiple right-sided ribs. Slight interval increase in opacity in the lower portion of the pneumonectomy site. Slight interval increase in left basilar faint airspace opacity. Blunting of left costophrenic angle. No left pneumothorax.   Cardiac silhouette size is grossly stable, however right heart border is obscured.   Left subclavian approach MediPort is unchanged in position. Interval removal of right IJ central venous catheter sheath.   Subcutaneous emphysema in the right chest wall and right side of the neck.       1. Slight interval increase in left basilar airspace opacity which may be due to increasing atelectasis. Cannot exclude developing infectious infiltrate. Question trace left pleural effusion. 2. Again seen postsurgical changes related to right-sided pneumonectomy as described above       Signed by: Vick Vides  Dameon 1/16/2025 9:47 AM Dictation workstation:   [id number]    US renal complete    Result Date: 1/15/2025  Interpreted By:  Alberto Salas  and Estephania Almendarez STUDY: US RENAL COMPLETE;  1/15/2025 3:25 pm   INDICATION: Signs/Symptoms:symptomatic hyponatremia.     COMPARISON: CT abdomen 08/15/2024 FDG PET-CT 12/30/2024   ACCESSION NUMBER(S): [id number]   ORDERING CLINICIAN: GABBY SMITH   TECHNIQUE: Multiple images of the kidneys were obtained  .   FINDINGS: RIGHT KIDNEY: The right kidney measures 11.17 cm in length. The renal cortical echogenicity and thickness are within normal limits. No hydronephrosis is present; no evidence of nephrolithiasis.   LEFT KIDNEY: The left kidney measures 11.75 cm in length. The renal cortical echogenicity and thickness are within normal limits. No hydronephrosis is present; no evidence of nephrolithiasis.   BLADDER: A Montiel is present within a decompressed bladder.   Incidental note is made of a somewhat edematous gallbladder with some pericholecystic fluid and layering echogenic material likely representing biliary sludge.   Free fluid is present within the right upper quadrant and pelvis.       1. Unremarkable sonographic evaluation of the kidneys. 2. Incomplete evaluation of a gallbladder with some pericholecystic fluid and biliary sludge. Dedicated right upper quadrant ultrasound could be obtained as clinically warranted. 3. A small amount of free fluid present in the right upper quadrant and pelvis.   Findings relayed by phone by me to SICU provider at 5:11 p.m.   I personally reviewed the images/study and I agree with the findings as stated by Erickson Best MD (resident) . This study was interpreted at [place], [place].   MACRO: None   Signed by: Alberto Salas 1/15/2025 6:17 PM Dictation workstation:   [id number]    XR chest 1 view    Result Date: 1/15/2025  Interpreted By:  Alexx Anne, STUDY: XR  CHEST 1 VIEW;  1/15/2025 6:46 am   INDICATION: Signs/Symptoms:routine ICU.     COMPARISON: Exam dated 01/14/2025   ACCESSION NUMBER(S): [id number]   ORDERING CLINICIAN: GABBY SIMTH   FINDINGS: AP radiograph of the chest was provided.   Left chest wall subclavian approach medication port with catheter tip projecting over the upper SVC. Right IJ central venous catheter tip projects over the upper SVC. Interval increase in subcutaneous emphysema overlying the right lower cervical soft tissues. Similar subcutaneous emphysema along the right chest wall.   CARDIOMEDIASTINAL SILHOUETTE: Cardiomediastinal silhouette is stable in size and configuration.   LUNGS: Postsurgical changes of pneumonectomy are again visualized. There is a moderate amount of layering pleural fluid within the right hemithorax on a background of diffuse lucency. Streaky left basilar atelectasis. The left lung otherwise appears clear.   ABDOMEN: No remarkable upper abdominal findings.   BONES: No acute osseous changes.       1.  Status post pneumonectomy with interval increase in fluid component of right-sided hydropneumothorax. 2. Medical devices as above.       MACRO: None   Signed by: Alexx Anne 1/15/2025 9:28 AM Dictation workstation:   [id number]    XR chest 1 view    Result Date: 1/14/2025  Interpreted By:  Vick Eli, STUDY: XR CHEST 1 VIEW; 1/14/2025 2:40 pm   INDICATION: Signs/Symptoms:Chest tube removal.   COMPARISON: Radiograph dated 01/14/2025   ACCESSION NUMBER(S): [id number]   ORDERING CLINICIAN: ERINN SANCHEZ   FINDINGS: Postsurgical changes related to right pneumonectomy with partially resected ribs.. Interval removal of right chest tube. Left subclavian MediPort and right IJ central venous catheter sheath are unchanged in positioning.   Cardiac silhouette size is slightly enlarged, unchanged.   Faint left basilar airspace opacity, likely atelectatic. No sizable pneumothorax.   Small amount of subcutaneous and  soft tissue emphysema in the right chest wall.       1. Interval removal of right chest tube. Other medical devices as described above. 2. Postsurgical changes related to right pneumonectomy. 3. Faint left basilar opacity, likely atelectatic.       Signed by: Vick Xiao 1/14/2025 2:53 PM Dictation workstation:   [id number]    XR chest 1 view    Result Date: 1/14/2025  Interpreted By:  Nino Barbour, STUDY: XR CHEST 1 VIEW; 1/14/2025 7:06 am   INDICATION: Signs/Symptoms:recent pneumonectomy.   COMPARISON: 01/13/2025.   ACCESSION NUMBER(S): [id number]   ORDERING CLINICIAN: GABBY SMITH   FINDINGS: Right-sided chest tube in place.   CARDIOMEDIASTINAL SILHOUETTE: Right-sided mediastinal shift consistent with recent right-sided pneumonectomy.   LUNGS: Right-sided pneumonectomy changes.   ABDOMEN: No remarkable upper abdominal findings.   BONES: Right-sided thoracotomy changes.       1.  Stable right-sided pneumonectomy changes.     Signed by: Nino Barbour 1/14/2025 12:56 PM Dictation workstation:   [id number]    Transthoracic Echo (TTE) Limited    Result Date: 1/13/2025   [facility] [address] [place] [address]                Tel [phone] and Fax [phone] TRANSTHORACIC ECHOCARDIOGRAM REPORT  Patient Name:       RAÚL KAMINSKI     Anne Physician:    [id number] Cam Llanos MD Study Date:         1/13/2025           Ordering Provider:    [id number] WILMER ANTHONY MRN/PID:            [id number]            Fellow: Accession#:         [id number]        Nurse: Date of Birth/Age:  1960 / 64      Sonographer:          José Miguel rocha                                     RDCARIE Gender assigned at  F                   Additional Staff: Birth: Height:             165.10 cm           Admit Date:           1/13/2025 Weight:              51.71 kg            Admission Status:     Inpatient - STAT BSA / BMI:          1.56 m2 / 18.97     Encounter#:           [id number]                     kg/m2 Blood Pressure:     100/57 mmHg         Department Location:  [facility] Study Type:    TRANSTHORACIC ECHO (TTE) LIMITED Diagnosis/ICD: Hypotension, unspecified-I95.9 Indication:    Hypotension CPT Code:      Echo Limited-22612; Doppler Limited-03833; Color Doppler-85244  Study Detail: The following Echo studies were performed: 2D, Doppler and color               flow. Technically challenging study due to body habitus and               patient lying in supine position.  PHYSICIAN INTERPRETATION: Left Ventricle: The left ventricular systolic function is normal, with a visually estimated ejection fraction of 60-65%. There are no regional left ventricular wall motion abnormalities. The left ventricular cavity size is normal. Left ventricular diastolic filling was not assessed. Left Atrium: The left atrium was not well visualized. Right Ventricle: The right ventricle is normal in size. There is low normal right ventricular systolic function. RV S' and TAPSE under measured due to technical difficulty visualizing lateral tricuspid valve annulus. Right Atrium: The right atrium was not well visualized. Aortic Valve: The aortic valve was not well visualized. There is no evidence of aortic valve regurgitation. Mitral Valve: The mitral valve is normal in structure. There is trace mitral valve regurgitation. Tricuspid Valve: The tricuspid valve was not well visualized. There is trace tricuspid regurgitation. Pulmonic Valve: The pulmonic valve is not well visualized. The pulmonic valve regurgitation was not well visualized. Pericardium: Trivial pericardial effusion. Aorta: The aortic root is normal. Pulmonary Artery: The tricuspid regurgitant velocity is 2.10 m/s, and with an estimated right atrial pressure of 8 mmHg, the estimated pulmonary artery pressure  is normal with the RVSP at 25.6 mmHg. Systemic Veins: The inferior vena cava appears normal in size, with IVC inspiratory collapse less than 50%. In comparison to the previous echocardiogram(s): Compared with study dated 10/3/2024, no significant change.  CONCLUSIONS:  1. The left ventricular systolic function is normal, with a visually estimated ejection fraction of 60-65%.  2. There is low normal right ventricular systolic function. QUANTITATIVE DATA SUMMARY:  LV SYSTOLIC FUNCTION BY 2D PLANIMETRY (MOD):                      Normal Ranges: EF-A4C View:    65 % (>=55%) EF-Visual:      63 % LV EF Reported: 63 %  RIGHT VENTRICLE: TAPSE: 10.0 mm RV s'  0.06 m/s  TRICUSPID VALVE/RVSP:          Normal Ranges: Peak TR Velocity:     2.10 m/s Est. RA Pressure:     8 mmHg RV Syst Pressure:     26 mmHg  (< 30mmHg) IVC Diam:             2.00 cm  [id number] Cam Llanos MD Electronically signed on 1/13/2025 at 5:06:16 PM  ** Final **     XR chest 1 view    Result Date: 1/13/2025  Interpreted By:  Vick Eli, STUDY: XR CHEST 1 VIEW; 1/13/2025 2:52 pm   INDICATION: Signs/Symptoms:s/p pneumonectomy.   COMPARISON: CT dated 07/17/2024 and PET-CT dated 12/30/2024   ACCESSION NUMBER(S): [id number]   ORDERING CLINICIAN: WILMER ANTHONY   FINDINGS: Right IJ central venous catheter sheath is projecting over mid SV C. Left subclavian approach MediPort is in place with the tip projecting over upper to mid SVC. Right chest tube is in place.   Cardiac silhouette size is within normal limits.   Postsurgical changes related to right pneumonectomy with multiple partial right-sided rib resection. Mild interstitial prominence of the left lung.   Subcutaneous and soft tissue emphysema in the right chest wall.         1. Postsurgical changes related to right sided pneumonectomy with multiple medical devices as described above. 2. Mild interstitial prominence of the left lung.       Signed by: Vick Xiao 1/13/2025 3:16 PM  Dictation workstation:   [id number]    NM PET CT lung CA staging    Result Date: 12/31/2024  Interpreted By:  Kush Hansen and Bera Kaustav STUDY: NM PET CT LUNG CA STAGING;  12/30/2024 11:08 am   INDICATION: Signs/Symptoms:DIAGNOSTIC.   Diagnosed with a large right lung mass in January 20, 2024, status post neoadjuvant chemoradiation as well as immunotherapy ending on July 2024. Currently scheduled for surgery on 01/10/2025.   COMPARISON: PET-CT on 08/15/2024   ACCESSION NUMBER(S): [id number]   ORDERING CLINICIAN: GABBY SMITH   TECHNIQUE: DIVISION OF NUCLEAR MEDICINE POSITRON EMISSION TOMOGRAPHY (PET-CT)   The patient received an intravenous dose of 11.2 mCi of Fluorine-18 fluorodeoxyglucose (FDG).  Positron emission tomographic (PET) images from mid thigh to skull base were then acquired after a one hour delay. Also acquired was a contemporaneous low dose non-contrast CT scan performed for attenuation correction of PET images and anatomic localization.  The PET and CT images were digitally fused for display.  All images were acquired on a combined PET-CT scanner unit. Some areas of FDG accumulation may be described in standardized uptake value (SUV) units.   CODING: Subsequent Treatment Strategy (PS)   CALIBRATION: Dose Injection-to-Scan Interval (mins): 51 min Mediastinal bloodpool SUV (normal 1.5-2.5): 2.1 Blood glucose: NA   FINDINGS: HEAD AND NECK: * No evidence of focal FDG avid lesion in the partially visualized brain parenchyma, noting that evaluation is limited because of the expected physiologic diffuse FDG uptake in the brain. * No FDG avid cervical lymphadenopathy is present. * No paranasal sinus diease. * Thyroid gland is unremarkable.   CHEST: *There has been interval worsening collapse of the right lung, with the right lung now completely fluid-filled. There has been interval worsening of FDG avid mass with central necrosis in  posterior right upper lobe with SUV max of 15, as compared to 8  previously. Left lung is unremarkable *No FDG avid mediastinal, hilar or axillary lymphadenopathy. *Unremarkable both breasts   ABDOMEN AND PELVIS: *No FDG avid lymphadenopathy in the abdomen or pelvis. *Bilateral adrenal glands are unremarkable. *Colonic diverticulosis without evidence of diverticulitis. *Physiologic radiotracer uptake is present in the liver and spleen with excretion into the bowel loops and the genitourinary tract.   MUSCULOSKELETAL: *No concerning FDG avid bone lesion throughout the axial and appendicular skeleton to suggest osseous metastasis.       1. Interval worsening of the FDG-avid mass with central necrosis in the posterior right upper lobe, along with increased collapse of the right lung compared to the prior PET from 08/15/2024, suggestive of disease progression. 2. No other concerning FDG avid disease identified.   I personally reviewed the image(s) / study and agree with the findings and interpretation as stated. This study was interpreted at [facility].   MACRO: None       Signed by: Kush Hansen 12/31/2024 6:13 AM Dictation workstation:   [id number]   Results for orders placed or performed during the hospital encounter of 01/13/25 (from the past 24 hours)   Blood Gas Arterial Full Panel   Result Value Ref Range    POCT pH, Arterial 7.31 (L) 7.38 - 7.42 pH    POCT pCO2, Arterial 76 (HH) 38 - 42 mm Hg    POCT pO2, Arterial 69 (L) 85 - 95 mm Hg    POCT SO2, Arterial 95 94 - 100 %    POCT Oxy Hemoglobin, Arterial 92.2 (L) 94.0 - 98.0 %    POCT Hematocrit Calculated, Arterial 29.0 (L) 36.0 - 46.0 %    POCT Sodium, Arterial 134 (L) 136 - 145 mmol/L    POCT Potassium, Arterial 3.7 3.5 - 5.3 mmol/L    POCT Chloride, Arterial 92 (L) 98 - 107 mmol/L    POCT Ionized Calcium, Arterial 1.18 1.10 - 1.33 mmol/L    POCT Glucose, Arterial 133 (H) 74 - 99 mg/dL    POCT Lactate, Arterial 0.7 0.4 - 2.0 mmol/L    POCT Base Excess, Arterial 10.0 (H) -2.0 - 3.0 mmol/L     POCT HCO3 Calculated, Arterial 38.3 (H) 22.0 - 26.0 mmol/L    POCT Hemoglobin, Arterial 9.5 (L) 12.0 - 16.0 g/dL    POCT Anion Gap, Arterial 7 (L) 10 - 25 mmo/L    Patient Temperature 37.0 degrees Celsius    FiO2 40 %   BLOOD GAS MIXED VENOUS FULL PANEL   Result Value Ref Range    POCT pH, Mixed 7.29 (L) 7.33 - 7.43 pH    POCT pCO2, Mixed 74 (HH) 41 - 51 mm Hg    POCT pO2, Mixed 41 35 - 45 mm Hg    POCT SO2, Mixed 67 45 - 75 %    POCT Oxy Hemoglobin, Mixed 65.5 45.0 - 75.0 %    POCT Hematocrit Calculated, Mixed 28.0 (L) 36.0 - 46.0 %    POCT Sodium, Mixed 133 (L) 136 - 145 mmol/L    POCT Potassium, Mixed 3.5 3.5 - 5.3 mmol/L    POCT Chloride, Mixed 95 (L) 98 - 107 mmol/L    POCT Ionized Calcium, Mixed 1.13 1.10 - 1.33 mmol/L    POCT Glucose, Mixed 130 (H) 74 - 99 mg/dL    POCT Lactate, Mixed 0.7 0.4 - 2.0 mmol/L    POCT Base Excess, Mixed 7.4 (H) -2.0 - 3.0 mmol/L    POCT HCO3 Calculated, Mixed 35.6 (H) 22.0 - 26.0 mmol/L    POCT Hemoglobin, Mixed 9.4 (L) 12.0 - 16.0 g/dL    POCT Anion Gap, Mixed 6 (L) 10 - 25 mmo/L    Patient Temperature 37.0 degrees Celsius    FiO2 40 %   Calcium, Ionized   Result Value Ref Range    POCT Calcium, Ionized 1.13 1.1 - 1.33 mmol/L   CBC   Result Value Ref Range    WBC 7.5 4.4 - 11.3 x10*3/uL    nRBC 0.0 0.0 - 0.0 /100 WBCs    RBC 3.34 (L) 4.00 - 5.20 x10*6/uL    Hemoglobin 9.0 (L) 12.0 - 16.0 g/dL    Hematocrit 29.3 (L) 36.0 - 46.0 %    MCV 88 80 - 100 fL    MCH 26.9 26.0 - 34.0 pg    MCHC 30.7 (L) 32.0 - 36.0 g/dL    RDW 15.9 (H) 11.5 - 14.5 %    Platelets 135 (L) 150 - 450 x10*3/uL   Coagulation Screen   Result Value Ref Range    Protime 12.8 9.8 - 12.8 seconds    INR 1.1 0.9 - 1.1    aPTT 44 (H) 27 - 38 seconds   Magnesium   Result Value Ref Range    Magnesium 2.37 1.60 - 2.40 mg/dL   Renal function panel   Result Value Ref Range    Glucose 133 (H) 74 - 99 mg/dL    Sodium 137 136 - 145 mmol/L    Potassium 3.6 3.5 - 5.3 mmol/L    Chloride 91 (L) 98 - 107 mmol/L    Bicarbonate 37 (H)  21 - 32 mmol/L    Anion Gap 13 10 - 20 mmol/L    Urea Nitrogen 16 6 - 23 mg/dL    Creatinine 0.57 0.50 - 1.05 mg/dL    eGFR >90 >60 mL/min/1.73m*2    Calcium 8.3 (L) 8.6 - 10.6 mg/dL    Phosphorus 3.8 2.5 - 4.9 mg/dL    Albumin 3.8 3.4 - 5.0 g/dL   POCT GLUCOSE   Result Value Ref Range    POCT Glucose 136 (H) 74 - 99 mg/dL   Blood Gas Arterial Full Panel   Result Value Ref Range    POCT pH, Arterial 7.31 (L) 7.38 - 7.42 pH    POCT pCO2, Arterial 73 (HH) 38 - 42 mm Hg    POCT pO2, Arterial 86 85 - 95 mm Hg    POCT SO2, Arterial 97 94 - 100 %    POCT Oxy Hemoglobin, Arterial 95.3 94.0 - 98.0 %    POCT Hematocrit Calculated, Arterial 28.0 (L) 36.0 - 46.0 %    POCT Sodium, Arterial 134 (L) 136 - 145 mmol/L    POCT Potassium, Arterial 3.5 3.5 - 5.3 mmol/L    POCT Chloride, Arterial 94 (L) 98 - 107 mmol/L    POCT Ionized Calcium, Arterial 1.14 1.10 - 1.33 mmol/L    POCT Glucose, Arterial 135 (H) 74 - 99 mg/dL    POCT Lactate, Arterial 0.7 0.4 - 2.0 mmol/L    POCT Base Excess, Arterial 8.8 (H) -2.0 - 3.0 mmol/L    POCT HCO3 Calculated, Arterial 36.8 (H) 22.0 - 26.0 mmol/L    POCT Hemoglobin, Arterial 9.2 (L) 12.0 - 16.0 g/dL    POCT Anion Gap, Arterial 7 (L) 10 - 25 mmo/L    Patient Temperature 37.0 degrees Celsius    FiO2 50 %   POCT GLUCOSE   Result Value Ref Range    POCT Glucose 136 (H) 74 - 99 mg/dL   Blood Gas Arterial Full Panel   Result Value Ref Range    POCT pH, Arterial 7.32 (L) 7.38 - 7.42 pH    POCT pCO2, Arterial 63 (H) 38 - 42 mm Hg    POCT pO2, Arterial 112 (H) 85 - 95 mm Hg    POCT SO2, Arterial 99 94 - 100 %    POCT Oxy Hemoglobin, Arterial 96.5 94.0 - 98.0 %    POCT Hematocrit Calculated, Arterial 15.0 (L) 36.0 - 46.0 %    POCT Sodium, Arterial 136 136 - 145 mmol/L    POCT Potassium, Arterial 2.9 (LL) 3.5 - 5.3 mmol/L    POCT Chloride, Arterial 102 98 - 107 mmol/L    POCT Ionized Calcium, Arterial 1.04 (L) 1.10 - 1.33 mmol/L    POCT Glucose, Arterial 120 (H) 74 - 99 mg/dL    POCT Lactate, Arterial 0.6  0.4 - 2.0 mmol/L    POCT Base Excess, Arterial 6.0 (H) -2.0 - 3.0 mmol/L    POCT HCO3 Calculated, Arterial 32.5 (H) 22.0 - 26.0 mmol/L    POCT Hemoglobin, Arterial 5.1 (LL) 12.0 - 16.0 g/dL    POCT Anion Gap, Arterial 4 (L) 10 - 25 mmo/L    Patient Temperature 37.0 degrees Celsius    FiO2 50 %   BLOOD GAS MIXED VENOUS FULL PANEL   Result Value Ref Range    POCT pH, Mixed 7.29 (L) 7.33 - 7.43 pH    POCT pCO2, Mixed 81 (HH) 41 - 51 mm Hg    POCT pO2, Mixed 43 35 - 45 mm Hg    POCT SO2, Mixed 72 45 - 75 %    POCT Oxy Hemoglobin, Mixed 70.4 45.0 - 75.0 %    POCT Hematocrit Calculated, Mixed 27.0 (L) 36.0 - 46.0 %    POCT Sodium, Mixed 133 (L) 136 - 145 mmol/L    POCT Potassium, Mixed 3.4 (L) 3.5 - 5.3 mmol/L    POCT Chloride, Mixed 94 (L) 98 - 107 mmol/L    POCT Ionized Calcium, Mixed 1.16 1.10 - 1.33 mmol/L    POCT Glucose, Mixed 138 (H) 74 - 99 mg/dL    POCT Lactate, Mixed 0.6 0.4 - 2.0 mmol/L    POCT Base Excess, Mixed 10.4 (H) -2.0 - 3.0 mmol/L    POCT HCO3 Calculated, Mixed 38.9 (H) 22.0 - 26.0 mmol/L    POCT Hemoglobin, Mixed 8.9 (L) 12.0 - 16.0 g/dL    POCT Anion Gap, Mixed 4 (L) 10 - 25 mmo/L    Patient Temperature 37.0 degrees Celsius    FiO2 50 %   Renal function panel   Result Value Ref Range    Glucose 140 (H) 74 - 99 mg/dL    Sodium 136 136 - 145 mmol/L    Potassium 3.4 (L) 3.5 - 5.3 mmol/L    Chloride 90 (L) 98 - 107 mmol/L    Bicarbonate 38 (H) 21 - 32 mmol/L    Anion Gap 11 10 - 20 mmol/L    Urea Nitrogen 16 6 - 23 mg/dL    Creatinine 0.56 0.50 - 1.05 mg/dL    eGFR >90 >60 mL/min/1.73m*2    Calcium 8.2 (L) 8.6 - 10.6 mg/dL    Phosphorus 4.2 2.5 - 4.9 mg/dL    Albumin 3.6 3.4 - 5.0 g/dL   Hepatic function panel   Result Value Ref Range    Albumin 3.6 3.4 - 5.0 g/dL    Bilirubin, Total 0.7 0.0 - 1.2 mg/dL    Bilirubin, Direct 0.3 0.0 - 0.3 mg/dL    Alkaline Phosphatase 56 33 - 136 U/L    ALT 43 7 - 45 U/L    AST 30 9 - 39 U/L    Total Protein 5.3 (L) 6.4 - 8.2 g/dL   POCT GLUCOSE   Result Value Ref Range     POCT Glucose 144 (H) 74 - 99 mg/dL   BLOOD GAS MIXED VENOUS FULL PANEL   Result Value Ref Range    POCT pH, Mixed 7.35 7.33 - 7.43 pH    POCT pCO2, Mixed 64 (H) 41 - 51 mm Hg    POCT pO2, Mixed 33 (L) 35 - 45 mm Hg    POCT SO2, Mixed 61 45 - 75 %    POCT Oxy Hemoglobin, Mixed 60.0 45.0 - 75.0 %    POCT Hematocrit Calculated, Mixed 33.0 (L) 36.0 - 46.0 %    POCT Sodium, Mixed 132 (L) 136 - 145 mmol/L    POCT Potassium, Mixed 3.6 3.5 - 5.3 mmol/L    POCT Chloride, Mixed 94 (L) 98 - 107 mmol/L    POCT Ionized Calcium, Mixed 1.14 1.10 - 1.33 mmol/L    POCT Glucose, Mixed 141 (H) 74 - 99 mg/dL    POCT Lactate, Mixed 0.9 0.4 - 2.0 mmol/L    POCT Base Excess, Mixed 7.9 (H) -2.0 - 3.0 mmol/L    POCT HCO3 Calculated, Mixed 35.3 (H) 22.0 - 26.0 mmol/L    POCT Hemoglobin, Mixed 10.9 (L) 12.0 - 16.0 g/dL    POCT Anion Gap, Mixed 6 (L) 10 - 25 mmo/L    Patient Temperature 37.0 degrees Celsius    FiO2 50 %   Blood Gas Arterial Full Panel   Result Value Ref Range    POCT pH, Arterial 7.41 7.38 - 7.42 pH    POCT pCO2, Arterial 54 (H) 38 - 42 mm Hg    POCT pO2, Arterial 254 (H) 85 - 95 mm Hg    POCT SO2, Arterial 100 94 - 100 %    POCT Oxy Hemoglobin, Arterial 97.8 94.0 - 98.0 %    POCT Hematocrit Calculated, Arterial 28.0 (L) 36.0 - 46.0 %    POCT Sodium, Arterial 132 (L) 136 - 145 mmol/L    POCT Potassium, Arterial 3.6 3.5 - 5.3 mmol/L    POCT Chloride, Arterial 97 (L) 98 - 107 mmol/L    POCT Ionized Calcium, Arterial 1.12 1.10 - 1.33 mmol/L    POCT Glucose, Arterial 147 (H) 74 - 99 mg/dL    POCT Lactate, Arterial 1.0 0.4 - 2.0 mmol/L    POCT Base Excess, Arterial 8.3 (H) -2.0 - 3.0 mmol/L    POCT HCO3 Calculated, Arterial 34.2 (H) 22.0 - 26.0 mmol/L    POCT Hemoglobin, Arterial 9.4 (L) 12.0 - 16.0 g/dL    POCT Anion Gap, Arterial 4 (L) 10 - 25 mmo/L    Patient Temperature 37.0 degrees Celsius    FiO2 50 %   POCT GLUCOSE   Result Value Ref Range    POCT Glucose 151 (H) 74 - 99 mg/dL   BLOOD GAS MIXED VENOUS FULL PANEL   Result  Value Ref Range    POCT pH, Mixed 7.45 (H) 7.33 - 7.43 pH    POCT pCO2, Mixed 48 41 - 51 mm Hg    POCT pO2, Mixed 39 35 - 45 mm Hg    POCT SO2, Mixed 70 45 - 75 %    POCT Oxy Hemoglobin, Mixed 67.9 45.0 - 75.0 %    POCT Hematocrit Calculated, Mixed 28.0 (L) 36.0 - 46.0 %    POCT Sodium, Mixed 135 (L) 136 - 145 mmol/L    POCT Potassium, Mixed 2.4 (LL) 3.5 - 5.3 mmol/L    POCT Chloride, Mixed 98 98 - 107 mmol/L    POCT Ionized Calcium, Mixed 1.10 1.10 - 1.33 mmol/L    POCT Glucose, Mixed 121 (H) 74 - 99 mg/dL    POCT Lactate, Mixed 0.8 0.4 - 2.0 mmol/L    POCT Base Excess, Mixed 8.4 (H) -2.0 - 3.0 mmol/L    POCT HCO3 Calculated, Mixed 33.4 (H) 22.0 - 26.0 mmol/L    POCT Hemoglobin, Mixed 9.4 (L) 12.0 - 16.0 g/dL    POCT Anion Gap, Mixed 6 (L) 10 - 25 mmo/L    Patient Temperature 37.0 degrees Celsius    FiO2 50 %   Calcium, Ionized   Result Value Ref Range    POCT Calcium, Ionized 1.07 (L) 1.1 - 1.33 mmol/L   CBC   Result Value Ref Range    WBC 7.3 4.4 - 11.3 x10*3/uL    nRBC 0.3 (H) 0.0 - 0.0 /100 WBCs    RBC 3.39 (L) 4.00 - 5.20 x10*6/uL    Hemoglobin 9.0 (L) 12.0 - 16.0 g/dL    Hematocrit 29.6 (L) 36.0 - 46.0 %    MCV 87 80 - 100 fL    MCH 26.5 26.0 - 34.0 pg    MCHC 30.4 (L) 32.0 - 36.0 g/dL    RDW 16.4 (H) 11.5 - 14.5 %    Platelets 125 (L) 150 - 450 x10*3/uL   Coagulation Screen   Result Value Ref Range    Protime 13.7 (H) 9.8 - 12.8 seconds    INR 1.2 (H) 0.9 - 1.1    aPTT 36 27 - 38 seconds   Magnesium   Result Value Ref Range    Magnesium 2.14 1.60 - 2.40 mg/dL   Renal function panel   Result Value Ref Range    Glucose 131 (H) 74 - 99 mg/dL    Sodium 139 136 - 145 mmol/L    Potassium 2.8 (LL) 3.5 - 5.3 mmol/L    Chloride 95 (L) 98 - 107 mmol/L    Bicarbonate 32 21 - 32 mmol/L    Anion Gap 15 10 - 20 mmol/L    Urea Nitrogen 18 6 - 23 mg/dL    Creatinine 0.47 (L) 0.50 - 1.05 mg/dL    eGFR >90 >60 mL/min/1.73m*2    Calcium 8.3 (L) 8.6 - 10.6 mg/dL    Phosphorus 2.6 2.5 - 4.9 mg/dL    Albumin 3.3 (L) 3.4 - 5.0  g/dL   POCT GLUCOSE   Result Value Ref Range    POCT Glucose 106 (H) 74 - 99 mg/dL   Blood Gas Arterial Full Panel   Result Value Ref Range    POCT pH, Arterial 7.47 (H) 7.38 - 7.42 pH    POCT pCO2, Arterial 48 (H) 38 - 42 mm Hg    POCT pO2, Arterial 96 (H) 85 - 95 mm Hg    POCT SO2, Arterial 99 94 - 100 %    POCT Oxy Hemoglobin, Arterial 96.4 94.0 - 98.0 %    POCT Hematocrit Calculated, Arterial 29.0 (L) 36.0 - 46.0 %    POCT Sodium, Arterial 134 (L) 136 - 145 mmol/L    POCT Potassium, Arterial 2.8 (LL) 3.5 - 5.3 mmol/L    POCT Chloride, Arterial 99 98 - 107 mmol/L    POCT Ionized Calcium, Arterial 1.14 1.10 - 1.33 mmol/L    POCT Glucose, Arterial 101 (H) 74 - 99 mg/dL    POCT Lactate, Arterial 0.9 0.4 - 2.0 mmol/L    POCT Base Excess, Arterial 10.0 (H) -2.0 - 3.0 mmol/L    POCT HCO3 Calculated, Arterial 34.9 (H) 22.0 - 26.0 mmol/L    POCT Hemoglobin, Arterial 9.8 (L) 12.0 - 16.0 g/dL    POCT Anion Gap, Arterial 3 (L) 10 - 25 mmo/L    Patient Temperature 37.0 degrees Celsius    FiO2 50 %   BLOOD GAS MIXED VENOUS FULL PANEL   Result Value Ref Range    POCT pH, Mixed 7.39 7.33 - 7.43 pH    POCT pCO2, Mixed 58 (H) 41 - 51 mm Hg    POCT pO2, Mixed 35 35 - 45 mm Hg    POCT SO2, Mixed 59 45 - 75 %    POCT Oxy Hemoglobin, Mixed 58.3 45.0 - 75.0 %    POCT Hematocrit Calculated, Mixed 29.0 (L) 36.0 - 46.0 %    POCT Sodium, Mixed 134 (L) 136 - 145 mmol/L    POCT Potassium, Mixed 2.8 (LL) 3.5 - 5.3 mmol/L    POCT Chloride, Mixed 98 98 - 107 mmol/L    POCT Ionized Calcium, Mixed 1.19 1.10 - 1.33 mmol/L    POCT Glucose, Mixed 100 (H) 74 - 99 mg/dL    POCT Lactate, Mixed 1.0 0.4 - 2.0 mmol/L    POCT Base Excess, Mixed 8.7 (H) -2.0 - 3.0 mmol/L    POCT HCO3 Calculated, Mixed 35.1 (H) 22.0 - 26.0 mmol/L    POCT Hemoglobin, Mixed 9.8 (L) 12.0 - 16.0 g/dL    POCT Anion Gap, Mixed 4 (L) 10 - 25 mmo/L    Patient Temperature 37.0 degrees Celsius    FiO2 50 %   Renal function panel   Result Value Ref Range    Glucose 105 (H) 74 - 99  mg/dL    Sodium 139 136 - 145 mmol/L    Potassium 3.0 (L) 3.5 - 5.3 mmol/L    Chloride 96 (L) 98 - 107 mmol/L    Bicarbonate 34 (H) 21 - 32 mmol/L    Anion Gap 12 10 - 20 mmol/L    Urea Nitrogen 14 6 - 23 mg/dL    Creatinine 0.50 0.50 - 1.05 mg/dL    eGFR >90 >60 mL/min/1.73m*2    Calcium 8.3 (L) 8.6 - 10.6 mg/dL    Phosphorus 2.0 (L) 2.5 - 4.9 mg/dL    Albumin 3.3 (L) 3.4 - 5.0 g/dL     Scheduled medications  bisacodyl, 10 mg, rectal, Daily  budesonide, 0.5 mg, nebulization, BID  dexAMETHasone, 6 mg, intravenous, q24h  docusate sodium, 100 mg, orogastric tube, BID  heparin (porcine), 5,000 Units, subcutaneous, q8h  insulin lispro, 0-5 Units, subcutaneous, q4h  ipratropium-albuteroL, 3 mL, nebulization, q6h  lidocaine, 1 patch, transdermal, Daily  oxygen, , inhalation, Continuous - Inhalation  pantoprazole, 40 mg, intravenous, Daily  piperacillin-tazobactam, 4.5 g, intravenous, q6h  polyethylene glycol, 17 g, orogastric tube, Daily  potassium chloride, 40 mEq, orogastric tube, Once  potassium phosphate, 15 mmol, intravenous, Once  remdesivir, 100 mg, intravenous, q24h  senna, 5 mL, orogastric tube, BID  thiamine, 100 mg, intravenous, Daily      Continuous medications  dexmedeTOMIDine, 0-1.2 mcg/kg/hr, Last Rate: 1 mcg/kg/hr (01/22/25 0849)  epoprostenol, 0.04 mcg/kg/min (Ideal), Last Rate: 0.04 mcg/kg/min (01/22/25 0901)  fentaNYL, 50 mcg/hr, Last Rate: 50 mcg/hr (01/22/25 0945)  vasopressin, 0-0.06 Units/min, Last Rate: 0.02 Units/min (01/22/25 1000)      PRN medications  PRN medications: albuterol, calcium gluconate, calcium gluconate, dextrose, dextrose, glucagon, glucagon, HYDROmorphone, magnesium sulfate, magnesium sulfate, oxygen, potassium chloride, potassium chloride          Assessment/Plan   Jannie Vick is a 64 y.o. female with PMH significant for DEEPAK, tobacco use disorder, stage III squamous cell carcinoma of right lung patient presented to [facility] on 113 for scheduled right pneumonectomy,  intrapericardial with en bloc wall resection by Dr. oJnah Wagner.  Patient had a preoperative hypotension requiring vasopressors and postoperative hypotension requiring vasopressors which she has been subsequently weaned off of, increasing oxygen demand status post surgery which had subsequently been weaned off.  However on 1/16/2025 patient had borderline low blood pressures with 90s/60s, increasing oxygen requirements, and an echocardiogram illustrating biventricular Takotsubo cardiomyopathy for which cardiology was consulted for further recommendations. Given that the patient was showing signs of low output state with altered mental status, decreased urine output with a lactate of 2 along with echo showing signs of elevated left and right sided pressures. A shock call was done where the recommendation was start the patient on inotrope (milrinone) along with a lasix drip and to place a central line to transduce the CVP and run a mixed venous Svo2 along repeating a lactate and if she's clinically worsening to re-conference for a shock call.     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     Thank you for involving us in this patient care. Recommendations not final until signed by attending.     General Cardiology Consult Pager: [phone] (weekday 7AM-6PM and weekend 7AM-2PM) and other: [phone]  EP Consult Pager: [phone] (weekday  7AM-6PM and weekend 7AM-2PM) and other: [phone]  CICU Fellow Pager: [phone] anytime  EP Device Nurse Pager: [phone] (weekday 7AM-4PM)  Advanced Heart Failure Consult Pager: [phone] anytime     Case discussed with cardiology fellow Dr. Cavanaugh, and with Dr. Diana Robledo MD  Internal Medicine PGY-2

## 2025-01-22 NOTE — PROGRESS NOTES
"Occupational Therapy    Re-Evaluation and Treatment    Patient Name: Faustina Vick \"Jannie\"  MRN: [id number]  Today's Date: 1/22/2025  Room: 12/12-A  Time Calculation  Start Time: 0954  Stop Time: 1028  Time Calculation (min): 34 min    Assessment  IP OT Assessment  OT Assessment: Pt is a 64 year old female who demonstrates decreased strength, balance, activity tolerance, and cognition, which impedes occupational performance.  Prognosis: Good  Barriers to Discharge Home: Cognition needs, Physical needs, Caregiver assistance  Caregiver Assistance: Caregiver assistance needed per identified barriers - however, level of patient's required assistance exceeds assistance available at home  Cognition Needs: Insight of patient limited regarding functional ability/needs, Medication and/or medical management daily assist needed  Physical Needs: 24hr mobility assistance needed, 24hr ADL assistance needed  Evaluation/Treatment Tolerance: Patient tolerated treatment well  Medical Staff Made Aware: Yes  End of Session Communication: Bedside nurse  End of Session Patient Position: Bed, 3 rail up, Alarm off, not on at start of session (bilat wrist restraints secure)    Plan:  Inpatient Plan  Treatment Interventions: ADL retraining, Functional transfer training, UE strengthening/ROM, Endurance training, Cognitive reorientation, Patient/family training, Equipment evaluation/education, Neuromuscular reeducation, Fine motor coordination activities, Compensatory technique education  OT Frequency: 3 times per week  OT Discharge Recommendations: Moderate intensity level of continued care  Equipment Recommended upon Discharge:  (TBD)  OT Recommended Transfer Status: Assist of 2  OT - OK to Discharge: Yes  OT Assessment  OT Assessment Results: Decreased ADL status, Decreased upper extremity strength, Decreased cognition, Decreased endurance, Decreased functional mobility, Decreased IADLs, Decreased trunk control for functional " activities  Prognosis: Good  Evaluation/Treatment Tolerance: Patient tolerated treatment well  Medical Staff Made Aware: Yes    Subjective   Current Problem:  1. Malignant neoplasm of upper lobe of right lung (Multi)        2. Primary cancer of right upper lobe of lung (Multi)  AFB Culture/Smear    AFB Culture/Smear    Fungal Culture/Smear    Fungal Culture/Smear    Sterile Fluid Culture/Smear    Sterile Fluid Culture/Smear    Surgical Pathology Exam    Surgical Pathology Exam    Transthoracic Echo (TTE) Limited    Transthoracic Echo (TTE) Limited      3. Hypotension, unspecified hypotension type  Transthoracic Echo (TTE) Limited    Transthoracic Echo (TTE) Limited    Transthoracic Echo (TTE) Limited    Transthoracic Echo (TTE) Limited    Insert arterial line    Insert arterial line      4. Acute respiratory failure with hypoxia (Multi)  Transthoracic Echo (TTE) Limited    Transthoracic Echo (TTE) Limited    Arterial Puncture/Cannulation    Arterial Puncture/Cannulation    Central Line    Central Line      5. Acute combined systolic (congestive) and diastolic (congestive) heart failure  Transthoracic Echo (TTE) Limited    Transthoracic Echo (TTE) Limited    Transthoracic Echo (TTE) Limited    Transthoracic Echo (TTE) Limited      6. Cardiogenic shock (Multi)  Case Request Cath Lab: Right Heart Cath    Case Request Cath Lab: Right Heart Cath    Cardiac Catheterization Procedure    Cardiac Catheterization Procedure      7. Acute systolic (congestive) heart failure  Cardiac Catheterization Procedure    Transthoracic Echo (TTE) Limited    Transthoracic Echo (TTE) Limited    CANCELED: Transthoracic Echo (TTE) Limited    CANCELED: Transthoracic Echo (TTE) Limited      8. Respiratory distress  Intubation    Intubation      9. Left upper extremity swelling  Vascular US upper extremity venous duplex left    Vascular US upper extremity venous duplex left        General:  Reason for Referral: Initial evaluation: right  pneumonectomy, intrapericardial, with en block chest wall resection on 1/13 c/b hypotension post op. Re-eval cardiogenic shock, Takosubo cardiomyopathy with decreased EF 25-30% requiring inotropes and AHRF s/p intubation. 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 ETT VC AC 50% FiO2, PEEP 5, RR 20, 1 precedex, 50 fentanyl, .01 vaso (titrated to .02 by RN).     Precautions:  Medical Precautions: Fall precautions, Cardiac precautions, Oxygen therapy device and L/min  Precautions Comment: MAP 65-70, droplet plus precautions    Vital Signs:   01/22/25 0953 01/22/25 1000 01/22/25 1028   Vital Signs   Vitals Session Pre OT  --  Post OT   Heart Rate 99 103 93   Resp 20 17 20   SpO2 99 % 96 % 98 %   /58  --  103/56   MAP (mmHg) 74  --  71   Vital Signs Comment  --   --  Bouts of hypotension noted with anterior lean in bed and HOB flat - SBPs to 80s with MAP to 58. RN aware and titrating meds accordingly.       Pain:  Pain Assessment  Pain Assessment: 0-10  0-10 (Numeric) Pain Score: 0 - No pain    Lines/Tubes/Drains:  CVC 01/16/25 Double lumen (Active)   Number of days: 5       Arterial Line 01/17/25 Left Brachial (Active)   Number of days: 5       Implantable Port 01/16/25 Left Chest Single lumen port (Active)   Number of days: 5       Pulmonary Artery Catheter Internal jugular (Active)   Number of days: 4       ETT  7 mm (Active)   Number of days: 3       Urethral Catheter (Active)   Number of days: 3       NG/OG/Feeding Tube Nasojejunal Left nostril (Active)   Number of days: 0          Objective   Cognition:  Overall Cognitive Status: Impaired  Orientation Level:  (oriented to self and time with head nods and choices. required cues for orientation to place.)  Following Commands: Follows one step commands with repetition and increased time  Cognition Comments: Pt anxious appearing, occasional biting at ETT but very redirectable.  Processing Speed: Delayed  Confusion Assessment Method (CAM)  Acute Onset and Fluctuating Course (1A): Yes  Acute Onset and Fluctuating Course (1B): Yes  Inattention (2): Yes  Disorganized Thinking (3): Yes  Rate Patient's Level of Consciousness (4): Lethargic (Drowsy, easily aroused), Yes  Delirium Present: Yes     Home Living:  Type of Home: House  Lives With: Spouse  Home Adaptive Equipment: None  Home Layout: One level  Home Access: Stairs to enter without rails  Entrance Stairs-Rails: None  Entrance Stairs-Number of Steps: 3  Bathroom Shower/Tub: Tub only  Bathroom Equipment: None     Prior Function:  Level of [place]: Independent with ADLs and functional transfers  ADL Assistance: Independent  Homemaking Assistance: Independent  Ambulatory Assistance: Independent  Vocational: Retired  Leisure: Gardening, reading  Prior Function Comments: +drives, -falls, Obtained from initial eval     ADL:  Eating Assistance: Total  Eating Deficit: Feeding tube  Grooming Assistance: Maximal  Bathing Assistance: Maximal  UE Dressing Assistance: Maximal  LE Dressing Assistance: Total  LE Dressing Deficit: Thread LLE into pants, Pull up over hips  Toileting Assistance with Device: Total    Activity Tolerance:  Endurance: Decreased tolerance for upright activites  Early Mobility/Exercise Safety Screen: Proceed with mobilization - No exclusion criteria met    Bed Mobility/Transfers: Bed Mobility  Bed Mobility: Yes  Bed Mobility 1  Bed Mobility Comments 1: Pt dependently and progressively placed in chair position with bed controls in order to assess activity tolerance and  hemodynamic stability.  Bed Mobility 2  Bed Mobility  2: Forward lean  Level of Assistance 2: Minimum assistance, +2  Bed Mobility Comments 2: bilat HHA and draw sheet  Bed Mobility 3  Bed Mobility 3: Rolling left  Level of Assistance 3: Maximum assistance  Bed Mobility Comments 3: draw sheet       Sensation:  Light Touch: No apparent deficits    Strength:  Strength Comments: BUE >/=3/5    Coordination:  Movements are Fluid and Coordinated: Yes     Hand Function:  Hand Function  Gross Grasp: Functional    Extremities:   RUE   RUE : Within Functional Limits,   LUE   LUE: Within Functional Limits,        Treatment Completed on Evaluation    Therapy/Activity:     Therapeutic Activity  Therapeutic Activity Performed: Yes  Therapeutic Activity 1: x15 minutes progressive chair position and x1 minute long sitting with minAx2-mod A x1.  Therapeutic Activity 2: Increased time for all tasks d/t delayed response and skilled monitioring of hemodynamic stability.    Outcome Measures: [facility] Daily Activity  Putting on and taking off regular lower body clothing: Total  Bathing (including washing, rinsing, drying): Total  Putting on and taking off regular upper body clothing: A lot  Toileting, which includes using toilet, bedpan or urinal: Total  Taking care of personal grooming such as brushing teeth: A lot  Eating Meals: Total  Daily Activity - Total Score: 8        ICU Mobility Screen  Early Mobility/Exercise Safety Screen: Proceed with mobilization - No exclusion criteria met  ICU Mobility Scale: Sitting in bed, exercises in bed,          Education Documentation  Body Mechanics, taught by Ellen Garcia OT at 1/22/2025  3:21 PM.  Learner: Patient  Readiness: Acceptance  Method: Explanation, Demonstration  Response: Needs Reinforcement    Precautions, taught by Ellen Garcia OT at 1/22/2025  3:21 PM.  Learner: Patient  Readiness: Acceptance  Method: Explanation, Demonstration  Response: Needs  Reinforcement    ADL Training, taught by Ellen Garcia OT at 1/22/2025  3:21 PM.  Learner: Patient  Readiness: Acceptance  Method: Explanation, Demonstration  Response: Needs Reinforcement    Education Comments  No comments found.        Goals:   Encounter Problems       Encounter Problems (Active)       ADLs       Patient will perform UB and LB bathing with modified independent level of assistance. (Progressing)       Start:  01/14/25    Expected End:  01/28/25            Patient with complete upper body dressing with modified independent level of assistance donning and doffing all UE clothes.  (Progressing)       Start:  01/14/25    Expected End:  01/28/25            Patient with complete lower body dressing with modified independent level of assistance donning and doffing all LE clothes  with PRN adaptive equipment  (Progressing)       Start:  01/14/25    Expected End:  01/28/25            Patient will complete toileting including hygiene clothing management/hygiene with modified independent level of assistance. (Progressing)       Start:  01/14/25    Expected End:  01/28/25               MOBILITY       Patient will perform Functional mobility max Household distances/Community Distances with modified independent level of assistance and least restrictive device in order to improve safety and functional mobility. (Progressing)       Start:  01/14/25    Expected End:  01/28/25               TRANSFERS       Patient will perform bed mobility modified independent level of assistance in order to improve safety and independence with mobility (Progressing)       Start:  01/14/25    Expected End:  01/28/25            Patient will complete functional transfer to toilet with least restrictive device with modified independent level of assistance. (Progressing)       Start:  01/14/25    Expected End:  01/28/25 01/22/25 at 3:23 PM   Ellen Garcia OT   Rehab Office: [phone]

## 2025-01-22 NOTE — PROGRESS NOTES
"Faustina Vick \"Noam" is a 64 y.o. female on day 9 of admission presenting with Primary cancer of right upper lobe of lung (Multi).    Subjective          Objective     Physical Exam    Last Recorded Vitals  Blood pressure 103/56, pulse (!) 134, temperature (!) 38 °C (100.4 °F), temperature source Temporal, resp. rate 20, height 1.651 m (5' 5\"), weight 58.5 kg (128 lb 15.5 oz), SpO2 96%.  Intake/Output last 3 Shifts:  I/O last 3 completed shifts:  In: 2728.8 (46.6 mL/kg) [I.V.:1058.8 (18.1 mL/kg); NG/GT:450; IV Piggyback:1220]  Out: 3405 (58.2 mL/kg) [Urine:2980 (1.4 mL/kg/hr); Emesis/NG output:425]  Weight: 58.5 kg     Relevant Results               This patient has a central line   Reason for the central line remaining today?       This patient is intubated   Reason for patient to remain intubated today?               Assessment/Plan   Assessment & Plan  Primary cancer of right upper lobe of lung (Multi)    Chronic obstructive pulmonary disease (Multi)    Malignant neoplasm of upper lobe of right lung (Multi)    Cardiogenic shock (Multi)    Assessment:  Faustina Vick is a 65 y/o with PMHx of COPD and 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 Wagner on 1/13 requiring post-op vasoactive therapy. Post-op course c/b acute symptomatic hyponatremia and cardiogenic shock requiring addition of milrinone therapy.      Plan:  NEURO: History of anxiety on alprazolam 1mg qd, last filled 1/4/2025 per [facility] review. A&Ox4 at baseline. Acute post-op pain. Delirium during post-op course, likely multifactorial in the setting of ICU delirium, hyponatremia, benzo administration vs benzo withdrawal. Oriented this am during exam, on Precedex infusion 0.3mcg/kg/min.  - ongoing neuro and pain assessments  - titrate Precedex to RASS goal 0  - prn IV Tylenol   - prn iv hydromorphone  - trial 2mg iv " diazepam  - Lidocaine patch  - PT/OT consulted -> hold off for now     CV: No known history of cardiac disease. Baseline /79, -110. TTE 9/2024 normal biV function, RVSP 10mmHg. Intra-op hypotension requiring vaso and epi. Arrived to SICU on vaso 0.06 units/min, started on levo 0.05 mcg/kg/min. TTE 1/13 with EF 60-65%, low normal RV systolic function. Weaned off vasopressors 1/15. Elevated lactate to 2.9 on 1/16 with SOB and new O2 requirement. Repeat TTE 1/16: acute biV dysfunction with signs of takotsubo cardiomyopathy, EF 34%, mod reduced RV systolic function, mildly elevated RVSP, mild to mod TR. Cardiogenic shock, started milrinone, lasix infusion, and vasopressin. Shock Call on 1/16, no indication for mechanical circulatory support at this time. Initial troponin 928 in the setting of demand ischemia, with repeat troponin downtrend to 625. Lactate normalized. Epinephrine infusion added overnight 1/16-1/17, discontinued 2/2 tachycardia in the 130s. Milrinone increased to 0.25mcg/kg/min (decreased to 0.125). Vasopressin at 0.06 units/min. Lasix infusion stopped 1/17 given contraction alkalosis. CVP ~0-4. SvO2 remains in the 50s. Taken to cath lab 1/17 for PA cath placement, opening CI 2.9. Repeated on arrival to ICU, down to 1.7. Milrinone increased to 0.25mcg/kg/min. Vaso on/off overnight. Currently on, 0.03units/min. SVT 3654-6747 overnight. NSR at time of exam, became tachycardic to 140s thereafter  - continuous EKG/abp/cvp/pap monitoring  - thermodilution q4h and prn  - Cardiology following, appreciate recs   - decrease vaso to 0.02  - increase milrinone to 0.375 mcg/kg/min   - continue to trend central venous SvO2 q4h  - continue to hold Lasix infusion given contraction alkalosis  - trend lactate  - continue hydrocortisone 50mg q8h  - continue holding metoprolol  - trial iEpo via BIPAP today     PULM: Hx of 30 pack year smoking history (quit 4/2024), COPD, and stage III right lung SCC s/p  chemo/XRT/immunotherapy. 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 Arrived to SICU extubated on 10L SFM, now on room air. CXR 1/15 with appropriate fluid accumulation in R chest after chest tube was pulled 1/14. CXR stable. Acute on chronic hypoxic respiratory failure with new O2 requirement on 1/16 in the setting of cardiogenic shock. Progression to Airvo 50L 90% overnight  - ok for NIPPV per Dr. Seymour and Dr. Wagner  - Maintain SpO2 >92%  - continue formulary exchange for home Trelegy Elipta  - continue 3% saline nebs q6h  - PRN albuterol  - additional pulm toilet prn  - Daily CXR  - ABG prn  - holding home benzonatate     GI: No GI history. LFTs WNL 12/2/24. Evidence of pericholecystic fluid and biliary sludge on renal US 1/15 but no abdominal symptoms. RUQ US with small ascites, otherwise no acute findings. Tolerating soft diet, however patient has decreased appetite.  - continue IV PPI for GI prophylaxis  - bowel regimen  - strict NPO with tenuous respiratory status     : No history of renal disease. Baseline creatinine 0.6. Symptomatic acute hyponatremia (SIADH vs vaso mediated) to 120 overnight 1/14 - 1/15, corrected to 124 after 150cc 3% NaCl x2 on 1/15. Hypochloremia. Renal US on 1/15 unremarkable. Started on lasix infusion 1/16 as above in CV. Net negative 365ml for past 24hrs. Hypokalemia, hypophosphatemia, hypocalcemia, hypomagnesemia.   - RFP q12h and PRN  - Nephrology following, appreciate recs  - Maintain yoo for strict I/Os   - urine cloudy today, will change yoo and send cultures when patient able to tolerate lying flat  - Maintain U/O >0.5ml/kg/hr  - Replete electrolytes to maintain K>4, Phos>2.5, iCal>1.1, Mag>2.      HEME: Hx of DOC. Baseline H/H 12/40. Acute blood loss anemia, OR EBL 150mL. Acute coagulopathy with INR elevated to 2.3 2/2 to poor nutritional status. Received IV Vit K 10mg x1 with improved INR thereafter  - Check CBC  and coags daily and PRN  - goal Hgb >9  - SCDs and SQH for DVT ppx  - ongoing monitoring for s/s bleeding  - maintain active T&S (1/17)      ENDO: No history of DM or thyroid disease. TSH 1.86. Recent 5d course of prednisone 10mg 12/2024 for URI. Adequate glycemic control. Started on SDS out of OR 1/13  - monitor BG on RFP and ABG/VBGs  - Stress dose steroids as above     MSK: Arthritis, Vit D deficiency.  - not on meds at home  - monitor for symptoms     ID: Febrile, Tmax 38.7. Completed yunior-op course of cefazolin. Leukocytosis to 17.2 after initiation of stress dose steroids, now resolved. Worsened clinical condition 1/17, started on vanco, zosyn and azithromycin. Blood cultures sent. Respiratory viral panel pending.  - temp q4h, wbc daily  - ongoing monitoring for s/s infection     Lines:  - RIJ MAC (SICU, 1/16)  - PA cath (1/17)  - L brachial A-line (1/16)  - Left chest mediport accessed   - PIV x2  - yoo (1/15)       I spent 38 minutes in the professional and overall care of this patient.      Jonathan Herbert MD

## 2025-01-22 NOTE — SIGNIFICANT EVENT
Dobhoff Placement    Dobhoff enteral feeding tube placed with [employer]. Tube passed easily and appears post pyloric. Bridled in place at 70 cm. KUB ordered to confirm placement.     ANDREA Saucedo-CNP  SICU phone [phone]    normal

## 2025-01-22 NOTE — PROGRESS NOTES
"Faustina Vick \"Noam" is a 64 y.o. female on day 9 of admission presenting with Primary cancer of right upper lobe of lung (Multi).    Subjective          Objective     Physical Exam    Last Recorded Vitals  Blood pressure 103/56, pulse (!) 134, temperature (!) 38 °C (100.4 °F), temperature source Temporal, resp. rate 20, height 1.651 m (5' 5\"), weight 58.5 kg (128 lb 15.5 oz), SpO2 96%.  Intake/Output last 3 Shifts:  I/O last 3 completed shifts:  In: 2728.8 (46.6 mL/kg) [I.V.:1058.8 (18.1 mL/kg); NG/GT:450; IV Piggyback:1220]  Out: 3405 (58.2 mL/kg) [Urine:2980 (1.4 mL/kg/hr); Emesis/NG output:425]  Weight: 58.5 kg     Relevant Results               This patient has a central line   Reason for the central line remaining today?       This patient is intubated   Reason for patient to remain intubated today?               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 Wagner on 1/13 requiring post-op vasoactive therapy. Post-op course c/b acute hyponatremia, multifactorial delirium, cardiogenic shock requiring inotropic support and acute hypoxic/hypercarbic respiratory failure requiring intubation.     Plan:  NEURO: History of anxiety on alprazolam 1mg qd, last filled 1/4/2025 per [facility] review. Family states she was not taking for several months but then restarted prn about 3 wks prior to surgery for situational anxiety. A&Ox4 at baseline. Acute post-op pain. Multifactorial delirium improved. Currently sedated and intubated. On precedex 0.5mcg/kg/hr and fentanyl 50mcg/hr. Able to briefly awaken and " follow commands without sedation interruption.  - ongoing neuro and pain assessments  - continue precedex and fentanyl infusions  - goal RASS 0 to -3  - stop iv acetaminophen given rising LFTs  - Lidocaine patch to right chest wall  - PT/OT consulted -> hold off for now     CV: No history of cardiac disease. Baseline /79, -110. TTE 9/2024 normal biV function, RVSP 10mmHg. Intra-op hypotension requiring vaso and epi. Arrived to SICU on vaso 0.06 units/min, started on levo 0.05 mcg/kg/min. TTE 1/13 with EF 60-65%, low normal RV systolic function. Weaned off vasopressors 1/15. Elevated lactate to 2.9 on 1/16 with SOB and new O2 requirement. Repeat TTE 1/16: acute biV dysfunction with signs of takotsubo cardiomyopathy, EF 34%, mod reduced RV systolic function, mildly elevated RVSP, mild to mod TR. Cardiogenic shock, started milrinone, lasix infusion, and vasopressin. Shock Call on 1/16, no indication for mechanical circulatory support at this time. Initial troponin 928 in the setting of demand ischemia, with repeat troponin downtrend to 625. Lactate normalized. Epinephrine infusion added overnight 1/16-1/17, discontinued 2/2 tachycardia in the 130s. Milrinone increased to 0.25mcg/kg/min (decreased to 0.125 thereafter). Lasix infusion stopped 1/17 given contraction alkalosis. Taken to cath lab 1/17 for RHC/PA cath placement, opening CI 2.9. Milrinone uptitrated to 0.375mcg/kg/min 1/17-1/18. iEpo 0.05 added 1/18. Vasopressin increased to 0.06 and levo added to 0.15 1/18 evening at time of intubation. Given total of 750ml 5% albumin and 1 unit RBC since evening 1/18 through overnight 1/19. Currently on vaso 0.06, levo 0.06. CI range 1.5-2 via thermodilution, 2.4-2.9 via FRANCISCO. Elevated lactate. HR .  - goal map > 65  - continuous EKG/abp/cvp/pap monitoring  - thermodilution and FRANCISCO q4h and prn  - Cardiology following, appreciate recs   - continue milrinone 0.375mcg/kg/min  - continue iEpo at 0.05  -  continue levo and vaso -> wean levo first  - continue to trend central venous SvO2 q4h  - continue to hold Lasix infusion   - continue to trend lactate  - continue hydrocortisone 50mg, increase frequency to q6h  - continue holding metoprolol  - 12 lead EKG  - shock call if condition worsens today     PULM: Hx of 30 pack year smoking history (quit 4/2024), COPD, and stage III right lung SCC s/p chemo/XRT/immunotherapy. 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 Arrived to SICU extubated on 10L SFM, weaned to room air 1/14. Chest tube removed 1/14. Acute on chronic hypoxic respiratory failure 1/16. Escalation to Airvo 50L 90% overnight 1/17-1/18. Acute hypercapnia 1/18 with increase WOB, trial of BIPAP with iEPO. Eventual intubation 1/18 evening. Currently on AC-VC/18/80%/350/5. Mediastinal shift on AM cxr  - continue iEpo, no plan to wean today  - decrease Vt  - continue mechanical ventilation  - wean FiO2 to maintain SpO2 >92%  - scheduled budesonide/duoneb  - PRN albuterol  - Daily CXR  - ABG q4h and  prn  - holding home benzonatate     GI: No GI history. LFTs WNL 12/2/24. Evidence of pericholecystic fluid and biliary sludge on renal US 1/15 but no abdominal symptoms. RUQ US with small ascites, otherwise no acute findings. Abdominal distention this am. Elevated LFTs  - insert OG tube, place to LIWS  - continue IV PPI for GI prophylaxis  - bisacodyl NH daily  - continue NPO  - nutrition consult for enteral feed recs  - continue to trend LFTs     : No history of renal disease. Baseline creatinine 0.6. Symptomatic acute hyponatremia (SIADH vs vaso mediated) to 120 overnight 1/14 - 1/15, corrected to 124 after 150cc 3% NaCl x2 on 1/15. Hypochloremia. Renal US on 1/15 unremarkable. Started on lasix infusion 1/16 as above in CV, stopped 1/17. Net positive 3.4L for past 24hrs. Hypokalemia, hypophosphatemia, hypocalcemia, hypomagnesemia. Montiel changed overnight  1/18-1/19. Oliguria improving.  - RFP q12h and PRN  - Nephrology following, appreciate recs  - Maintain yoo for strict I/Os   - Maintain U/O >0.5ml/kg/hr  - Replete electrolytes to maintain K>4, Phos>2.5, iCal>1.1, Mag>2.      HEME: Hx of DOC. Baseline H/H 12/40. Acute blood loss anemia, OR EBL 150mL. Acute coagulopathy with INR elevated to 2.3 2/2 to poor nutritional status. Received IV Vit K 10mg x1 with improved INR thereafter. Transfused 1 rbc overnight, Hgb increased from 8.3 to 10.5  - Check CBC and coags daily and PRN  - goal Hgb >9  - SCDs and SQH for DVT ppx  - ongoing monitoring for s/s bleeding  - maintain active T&S (1/17) -> resend 1/20     ENDO: No history of DM or thyroid disease. TSH 1.86. Recent 5d course of prednisone 10mg 12/2024 for URI. Adequate glycemic control. Started on SDS out of OR 1/13  - BG q4h, SSI #1  - Stress dose steroids as above  - repeat TSH 1/18 was WNL     MSK: Arthritis, Vit D deficiency.  - not on meds at home  - monitor for symptoms     ID: Febrile, Tmax 38.7. Completed yunior-op course of cefazolin. Leukocytosis to 17.2 after initiation of stress dose steroids, now resolved. Worsened clinical condition 1/17, started on vanco, zosyn and azithromycin. Blood cultures sent. Respiratory viral panel pending. Yoo changed 1/18, UA with reflex culture sent. Added fluc overnight 1/18-1/19  - temp q4h, wbc daily  - ongoing monitoring for s/s infection  - continue vanco, zosyn, azithro and fluc  - F/U cultures  - droplet precautions while respiratory viral panel pending     Lines:  - RIJ MAC (SICU, 1/16)  - PA cath (1/17)  - L brachial A-line (1/16)  - Left chest mediport accessed   - PIV x2  - yoo (1/18)  - ETT (1/18)       I spent 42 minutes in the professional and overall care of this patient.      Jonathan Herbert MD

## 2025-01-22 NOTE — PROGRESS NOTES
"Faustina Vick \"Noam" is a 64 y.o. female on day 9 of admission presenting with Primary cancer of right upper lobe of lung (Multi).    Subjective   Patient remains intubated and sedated on precedex and fentanyl infusions. Milrinone turned off at ~6pm yesterday and remains off. Remains on iEpo at 0.05. On/off low dose vaso.    Objective     Physical Exam  Vitals reviewed.   Constitutional:       Appearance: She is underweight.      Interventions: She is sedated, intubated and restrained.   HENT:      Head: Normocephalic and atraumatic.      Nose: Nose normal.      Mouth/Throat:      Mouth: Mucous membranes are moist.   Eyes:      Extraocular Movements: Extraocular movements intact.      Pupils: Pupils are equal, round, and reactive to light.   Neck:      Comments: RIJ MAC with PA cath in place, 58cm depth. dressing c/d/i.   Cardiovascular:      Rate and Rhythm: Normal rate and regular rhythm.      Pulses:           Dorsalis pedis pulses are 1+ on the right side and detected w/ Doppler on the left side.        Posterior tibial pulses are 1+ on the right side and detected w/ Doppler on the left side.      Heart sounds: Normal heart sounds.      Comments: Radial pulses detected with doppler. Skin pink, warm and dry  Pulmonary:      Effort: She is intubated.      Breath sounds: Rhonchi (left side) present.      Comments: Mechanically ventilated via ETT. Current vent settings: AC/VC 60%/280/18/+5.  Chest:      Comments: Right lateral chest dressing c/d/i.  Abdominal:      General: Bowel sounds are decreased. There is distension.      Palpations: Abdomen is soft.   Genitourinary:     Comments: Montiel in place with clear yellow UOP.   Musculoskeletal:      Cervical back: Neck supple.   Skin:     General: Skin is warm and dry.   Neurological:      General: No focal deficit present.      Mental Status: She is easily aroused.      GCS: GCS eye subscore is 3. GCS verbal subscore is 1. GCS motor subscore is 6.      Motor: " "Weakness (generalized) present.      Comments: Awakens to name, follows commands       Last Recorded Vitals  Blood pressure 89/57, pulse (!) 126, temperature 36.3 °C (97.3 °F), resp. rate 20, height 1.651 m (5' 5\"), weight 58.5 kg (128 lb 15.5 oz), SpO2 99%.    VS over last 24h  Heart Rate:  []   Temp:  [36 °C (96.8 °F)-37.3 °C (99.1 °F)]   Resp:  [7-25]   SpO2:  [76 %-100 %]      Intake/Output last 3 Shifts:  I/O last 3 completed shifts:  In: 2728.8 (46.6 mL/kg) [I.V.:1058.8 (18.1 mL/kg); NG/GT:450; IV Piggyback:1220]  Out: 3405 (58.2 mL/kg) [Urine:2980 (1.4 mL/kg/hr); Emesis/NG output:425]  Weight: 58.5 kg       Intake/Output Summary (Last 24 hours) at 1/22/2025 0807  Last data filed at 1/22/2025 0714  Gross per 24 hour   Intake 1870.4 ml   Output 2125 ml   Net -254.6 ml        Relevant Results  Scheduled medications  bisacodyl, 10 mg, rectal, Daily  budesonide, 0.5 mg, nebulization, BID  dexAMETHasone, 6 mg, intravenous, q24h  docusate sodium, 100 mg, orogastric tube, BID  heparin (porcine), 5,000 Units, subcutaneous, q8h  insulin lispro, 0-5 Units, subcutaneous, q4h  ipratropium-albuteroL, 3 mL, nebulization, q6h  lidocaine, 1 patch, transdermal, Daily  oxygen, , inhalation, Continuous - Inhalation  pantoprazole, 40 mg, intravenous, Daily  piperacillin-tazobactam, 4.5 g, intravenous, q6h  polyethylene glycol, 17 g, orogastric tube, Daily  potassium chloride, 20 mEq, intravenous, q2h  remdesivir, 100 mg, intravenous, q24h  senna, 5 mL, orogastric tube, BID  thiamine, 100 mg, intravenous, Daily      Continuous medications  dexmedeTOMIDine, 0-1.2 mcg/kg/hr, Last Rate: 1 mcg/kg/hr (01/22/25 0229)  epoprostenol, 0.04 mcg/kg/min (Ideal), Last Rate: 0.05 mcg/kg/min (01/22/25 0018)  [Held by provider] fentaNYL, 50 mcg/hr, Last Rate: Stopped (01/22/25 0806)  vasopressin, 0-0.06 Units/min, Last Rate: Stopped (01/22/25 0646)        PRN medications  PRN medications: albuterol, calcium gluconate, calcium gluconate, " dextrose, dextrose, glucagon, glucagon, HYDROmorphone, magnesium sulfate, magnesium sulfate, oxygen, potassium chloride, potassium chloride      Results for orders placed or performed during the hospital encounter of 01/13/25 (from the past 24 hours)   Hepatic function panel   Result Value Ref Range    Albumin 3.8 3.4 - 5.0 g/dL    Bilirubin, Total 0.9 0.0 - 1.2 mg/dL    Bilirubin, Direct 0.3 0.0 - 0.3 mg/dL    Alkaline Phosphatase 63 33 - 136 U/L    ALT 51 (H) 7 - 45 U/L    AST 38 9 - 39 U/L    Total Protein 5.3 (L) 6.4 - 8.2 g/dL   Calcium, Ionized   Result Value Ref Range    POCT Calcium, Ionized 1.15 1.1 - 1.33 mmol/L   Magnesium   Result Value Ref Range    Magnesium 2.99 (H) 1.60 - 2.40 mg/dL   Renal function panel   Result Value Ref Range    Glucose 150 (H) 74 - 99 mg/dL    Sodium 137 136 - 145 mmol/L    Potassium 3.7 3.5 - 5.3 mmol/L    Chloride 92 (L) 98 - 107 mmol/L    Bicarbonate 33 (H) 21 - 32 mmol/L    Anion Gap 16 10 - 20 mmol/L    Urea Nitrogen 16 6 - 23 mg/dL    Creatinine 0.55 0.50 - 1.05 mg/dL    eGFR >90 >60 mL/min/1.73m*2    Calcium 8.6 8.6 - 10.6 mg/dL    Phosphorus 3.3 2.5 - 4.9 mg/dL    Albumin 3.8 3.4 - 5.0 g/dL   BLOOD GAS MIXED VENOUS FULL PANEL   Result Value Ref Range    POCT pH, Mixed 7.33 7.33 - 7.43 pH    POCT pCO2, Mixed 66 (H) 41 - 51 mm Hg    POCT pO2, Mixed 39 35 - 45 mm Hg    POCT SO2, Mixed 66 45 - 75 %    POCT Oxy Hemoglobin, Mixed 65.3 45.0 - 75.0 %    POCT Hematocrit Calculated, Mixed 27.0 (L) 36.0 - 46.0 %    POCT Sodium, Mixed 132 (L) 136 - 145 mmol/L    POCT Potassium, Mixed 3.6 3.5 - 5.3 mmol/L    POCT Chloride, Mixed 93 (L) 98 - 107 mmol/L    POCT Ionized Calcium, Mixed 1.17 1.10 - 1.33 mmol/L    POCT Glucose, Mixed 141 (H) 74 - 99 mg/dL    POCT Lactate, Mixed 0.8 0.4 - 2.0 mmol/L    POCT Base Excess, Mixed 7.5 (H) -2.0 - 3.0 mmol/L    POCT HCO3 Calculated, Mixed 34.8 (H) 22.0 - 26.0 mmol/L    POCT Hemoglobin, Mixed 9.1 (L) 12.0 - 16.0 g/dL    POCT Anion Gap, Mixed 8 (L)  10 - 25 mmo/L    Patient Temperature 37.0 degrees Celsius    FiO2 50 %   BLOOD GAS MIXED VENOUS FULL PANEL   Result Value Ref Range    POCT pH, Mixed 7.42 7.33 - 7.43 pH    POCT pCO2, Mixed 56 (H) 41 - 51 mm Hg    POCT pO2, Mixed 37 35 - 45 mm Hg    POCT SO2, Mixed 65 45 - 75 %    POCT Oxy Hemoglobin, Mixed 63.4 45.0 - 75.0 %    POCT Hematocrit Calculated, Mixed 28.0 (L) 36.0 - 46.0 %    POCT Sodium, Mixed 131 (L) 136 - 145 mmol/L    POCT Potassium, Mixed 3.7 3.5 - 5.3 mmol/L    POCT Chloride, Mixed      POCT Ionized Calcium, Mixed 1.15 1.10 - 1.33 mmol/L    POCT Glucose, Mixed 128 (H) 74 - 99 mg/dL    POCT Lactate, Mixed 0.6 0.4 - 2.0 mmol/L    POCT Base Excess, Mixed 10.4 (H) -2.0 - 3.0 mmol/L    POCT HCO3 Calculated, Mixed 36.3 (H) 22.0 - 26.0 mmol/L    POCT Hemoglobin, Mixed 9.4 (L) 12.0 - 16.0 g/dL    POCT Anion Gap, Mixed      Patient Temperature 37.0 degrees Celsius    FiO2 50 %   POCT GLUCOSE   Result Value Ref Range    POCT Glucose 142 (H) 74 - 99 mg/dL   Blood Gas Arterial Full Panel   Result Value Ref Range    POCT pH, Arterial 7.31 (L) 7.38 - 7.42 pH    POCT pCO2, Arterial 76 (HH) 38 - 42 mm Hg    POCT pO2, Arterial 69 (L) 85 - 95 mm Hg    POCT SO2, Arterial 95 94 - 100 %    POCT Oxy Hemoglobin, Arterial 92.2 (L) 94.0 - 98.0 %    POCT Hematocrit Calculated, Arterial 29.0 (L) 36.0 - 46.0 %    POCT Sodium, Arterial 134 (L) 136 - 145 mmol/L    POCT Potassium, Arterial 3.7 3.5 - 5.3 mmol/L    POCT Chloride, Arterial 92 (L) 98 - 107 mmol/L    POCT Ionized Calcium, Arterial 1.18 1.10 - 1.33 mmol/L    POCT Glucose, Arterial 133 (H) 74 - 99 mg/dL    POCT Lactate, Arterial 0.7 0.4 - 2.0 mmol/L    POCT Base Excess, Arterial 10.0 (H) -2.0 - 3.0 mmol/L    POCT HCO3 Calculated, Arterial 38.3 (H) 22.0 - 26.0 mmol/L    POCT Hemoglobin, Arterial 9.5 (L) 12.0 - 16.0 g/dL    POCT Anion Gap, Arterial 7 (L) 10 - 25 mmo/L    Patient Temperature 37.0 degrees Celsius    FiO2 40 %   BLOOD GAS MIXED VENOUS FULL PANEL   Result  Value Ref Range    POCT pH, Mixed 7.29 (L) 7.33 - 7.43 pH    POCT pCO2, Mixed 74 (HH) 41 - 51 mm Hg    POCT pO2, Mixed 41 35 - 45 mm Hg    POCT SO2, Mixed 67 45 - 75 %    POCT Oxy Hemoglobin, Mixed 65.5 45.0 - 75.0 %    POCT Hematocrit Calculated, Mixed 28.0 (L) 36.0 - 46.0 %    POCT Sodium, Mixed 133 (L) 136 - 145 mmol/L    POCT Potassium, Mixed 3.5 3.5 - 5.3 mmol/L    POCT Chloride, Mixed 95 (L) 98 - 107 mmol/L    POCT Ionized Calcium, Mixed 1.13 1.10 - 1.33 mmol/L    POCT Glucose, Mixed 130 (H) 74 - 99 mg/dL    POCT Lactate, Mixed 0.7 0.4 - 2.0 mmol/L    POCT Base Excess, Mixed 7.4 (H) -2.0 - 3.0 mmol/L    POCT HCO3 Calculated, Mixed 35.6 (H) 22.0 - 26.0 mmol/L    POCT Hemoglobin, Mixed 9.4 (L) 12.0 - 16.0 g/dL    POCT Anion Gap, Mixed 6 (L) 10 - 25 mmo/L    Patient Temperature 37.0 degrees Celsius    FiO2 40 %   Calcium, Ionized   Result Value Ref Range    POCT Calcium, Ionized 1.13 1.1 - 1.33 mmol/L   CBC   Result Value Ref Range    WBC 7.5 4.4 - 11.3 x10*3/uL    nRBC 0.0 0.0 - 0.0 /100 WBCs    RBC 3.34 (L) 4.00 - 5.20 x10*6/uL    Hemoglobin 9.0 (L) 12.0 - 16.0 g/dL    Hematocrit 29.3 (L) 36.0 - 46.0 %    MCV 88 80 - 100 fL    MCH 26.9 26.0 - 34.0 pg    MCHC 30.7 (L) 32.0 - 36.0 g/dL    RDW 15.9 (H) 11.5 - 14.5 %    Platelets 135 (L) 150 - 450 x10*3/uL   Coagulation Screen   Result Value Ref Range    Protime 12.8 9.8 - 12.8 seconds    INR 1.1 0.9 - 1.1    aPTT 44 (H) 27 - 38 seconds   Magnesium   Result Value Ref Range    Magnesium 2.37 1.60 - 2.40 mg/dL   Renal function panel   Result Value Ref Range    Glucose 133 (H) 74 - 99 mg/dL    Sodium 137 136 - 145 mmol/L    Potassium 3.6 3.5 - 5.3 mmol/L    Chloride 91 (L) 98 - 107 mmol/L    Bicarbonate 37 (H) 21 - 32 mmol/L    Anion Gap 13 10 - 20 mmol/L    Urea Nitrogen 16 6 - 23 mg/dL    Creatinine 0.57 0.50 - 1.05 mg/dL    eGFR >90 >60 mL/min/1.73m*2    Calcium 8.3 (L) 8.6 - 10.6 mg/dL    Phosphorus 3.8 2.5 - 4.9 mg/dL    Albumin 3.8 3.4 - 5.0 g/dL   POCT GLUCOSE    Result Value Ref Range    POCT Glucose 136 (H) 74 - 99 mg/dL   Blood Gas Arterial Full Panel   Result Value Ref Range    POCT pH, Arterial 7.31 (L) 7.38 - 7.42 pH    POCT pCO2, Arterial 73 (HH) 38 - 42 mm Hg    POCT pO2, Arterial 86 85 - 95 mm Hg    POCT SO2, Arterial 97 94 - 100 %    POCT Oxy Hemoglobin, Arterial 95.3 94.0 - 98.0 %    POCT Hematocrit Calculated, Arterial 28.0 (L) 36.0 - 46.0 %    POCT Sodium, Arterial 134 (L) 136 - 145 mmol/L    POCT Potassium, Arterial 3.5 3.5 - 5.3 mmol/L    POCT Chloride, Arterial 94 (L) 98 - 107 mmol/L    POCT Ionized Calcium, Arterial 1.14 1.10 - 1.33 mmol/L    POCT Glucose, Arterial 135 (H) 74 - 99 mg/dL    POCT Lactate, Arterial 0.7 0.4 - 2.0 mmol/L    POCT Base Excess, Arterial 8.8 (H) -2.0 - 3.0 mmol/L    POCT HCO3 Calculated, Arterial 36.8 (H) 22.0 - 26.0 mmol/L    POCT Hemoglobin, Arterial 9.2 (L) 12.0 - 16.0 g/dL    POCT Anion Gap, Arterial 7 (L) 10 - 25 mmo/L    Patient Temperature 37.0 degrees Celsius    FiO2 50 %   POCT GLUCOSE   Result Value Ref Range    POCT Glucose 136 (H) 74 - 99 mg/dL   Blood Gas Arterial Full Panel   Result Value Ref Range    POCT pH, Arterial 7.32 (L) 7.38 - 7.42 pH    POCT pCO2, Arterial 63 (H) 38 - 42 mm Hg    POCT pO2, Arterial 112 (H) 85 - 95 mm Hg    POCT SO2, Arterial 99 94 - 100 %    POCT Oxy Hemoglobin, Arterial 96.5 94.0 - 98.0 %    POCT Hematocrit Calculated, Arterial 15.0 (L) 36.0 - 46.0 %    POCT Sodium, Arterial 136 136 - 145 mmol/L    POCT Potassium, Arterial 2.9 (LL) 3.5 - 5.3 mmol/L    POCT Chloride, Arterial 102 98 - 107 mmol/L    POCT Ionized Calcium, Arterial 1.04 (L) 1.10 - 1.33 mmol/L    POCT Glucose, Arterial 120 (H) 74 - 99 mg/dL    POCT Lactate, Arterial 0.6 0.4 - 2.0 mmol/L    POCT Base Excess, Arterial 6.0 (H) -2.0 - 3.0 mmol/L    POCT HCO3 Calculated, Arterial 32.5 (H) 22.0 - 26.0 mmol/L    POCT Hemoglobin, Arterial 5.1 (LL) 12.0 - 16.0 g/dL    POCT Anion Gap, Arterial 4 (L) 10 - 25 mmo/L    Patient Temperature  37.0 degrees Celsius    FiO2 50 %   BLOOD GAS MIXED VENOUS FULL PANEL   Result Value Ref Range    POCT pH, Mixed 7.29 (L) 7.33 - 7.43 pH    POCT pCO2, Mixed 81 (HH) 41 - 51 mm Hg    POCT pO2, Mixed 43 35 - 45 mm Hg    POCT SO2, Mixed 72 45 - 75 %    POCT Oxy Hemoglobin, Mixed 70.4 45.0 - 75.0 %    POCT Hematocrit Calculated, Mixed 27.0 (L) 36.0 - 46.0 %    POCT Sodium, Mixed 133 (L) 136 - 145 mmol/L    POCT Potassium, Mixed 3.4 (L) 3.5 - 5.3 mmol/L    POCT Chloride, Mixed 94 (L) 98 - 107 mmol/L    POCT Ionized Calcium, Mixed 1.16 1.10 - 1.33 mmol/L    POCT Glucose, Mixed 138 (H) 74 - 99 mg/dL    POCT Lactate, Mixed 0.6 0.4 - 2.0 mmol/L    POCT Base Excess, Mixed 10.4 (H) -2.0 - 3.0 mmol/L    POCT HCO3 Calculated, Mixed 38.9 (H) 22.0 - 26.0 mmol/L    POCT Hemoglobin, Mixed 8.9 (L) 12.0 - 16.0 g/dL    POCT Anion Gap, Mixed 4 (L) 10 - 25 mmo/L    Patient Temperature 37.0 degrees Celsius    FiO2 50 %   Renal function panel   Result Value Ref Range    Glucose 140 (H) 74 - 99 mg/dL    Sodium 136 136 - 145 mmol/L    Potassium 3.4 (L) 3.5 - 5.3 mmol/L    Chloride 90 (L) 98 - 107 mmol/L    Bicarbonate 38 (H) 21 - 32 mmol/L    Anion Gap 11 10 - 20 mmol/L    Urea Nitrogen 16 6 - 23 mg/dL    Creatinine 0.56 0.50 - 1.05 mg/dL    eGFR >90 >60 mL/min/1.73m*2    Calcium 8.2 (L) 8.6 - 10.6 mg/dL    Phosphorus 4.2 2.5 - 4.9 mg/dL    Albumin 3.6 3.4 - 5.0 g/dL   POCT GLUCOSE   Result Value Ref Range    POCT Glucose 144 (H) 74 - 99 mg/dL   BLOOD GAS MIXED VENOUS FULL PANEL   Result Value Ref Range    POCT pH, Mixed 7.35 7.33 - 7.43 pH    POCT pCO2, Mixed 64 (H) 41 - 51 mm Hg    POCT pO2, Mixed 33 (L) 35 - 45 mm Hg    POCT SO2, Mixed 61 45 - 75 %    POCT Oxy Hemoglobin, Mixed 60.0 45.0 - 75.0 %    POCT Hematocrit Calculated, Mixed 33.0 (L) 36.0 - 46.0 %    POCT Sodium, Mixed 132 (L) 136 - 145 mmol/L    POCT Potassium, Mixed 3.6 3.5 - 5.3 mmol/L    POCT Chloride, Mixed 94 (L) 98 - 107 mmol/L    POCT Ionized Calcium, Mixed 1.14 1.10 -  1.33 mmol/L    POCT Glucose, Mixed 141 (H) 74 - 99 mg/dL    POCT Lactate, Mixed 0.9 0.4 - 2.0 mmol/L    POCT Base Excess, Mixed 7.9 (H) -2.0 - 3.0 mmol/L    POCT HCO3 Calculated, Mixed 35.3 (H) 22.0 - 26.0 mmol/L    POCT Hemoglobin, Mixed 10.9 (L) 12.0 - 16.0 g/dL    POCT Anion Gap, Mixed 6 (L) 10 - 25 mmo/L    Patient Temperature 37.0 degrees Celsius    FiO2 50 %   Blood Gas Arterial Full Panel   Result Value Ref Range    POCT pH, Arterial 7.41 7.38 - 7.42 pH    POCT pCO2, Arterial 54 (H) 38 - 42 mm Hg    POCT pO2, Arterial 254 (H) 85 - 95 mm Hg    POCT SO2, Arterial 100 94 - 100 %    POCT Oxy Hemoglobin, Arterial 97.8 94.0 - 98.0 %    POCT Hematocrit Calculated, Arterial 28.0 (L) 36.0 - 46.0 %    POCT Sodium, Arterial 132 (L) 136 - 145 mmol/L    POCT Potassium, Arterial 3.6 3.5 - 5.3 mmol/L    POCT Chloride, Arterial 97 (L) 98 - 107 mmol/L    POCT Ionized Calcium, Arterial 1.12 1.10 - 1.33 mmol/L    POCT Glucose, Arterial 147 (H) 74 - 99 mg/dL    POCT Lactate, Arterial 1.0 0.4 - 2.0 mmol/L    POCT Base Excess, Arterial 8.3 (H) -2.0 - 3.0 mmol/L    POCT HCO3 Calculated, Arterial 34.2 (H) 22.0 - 26.0 mmol/L    POCT Hemoglobin, Arterial 9.4 (L) 12.0 - 16.0 g/dL    POCT Anion Gap, Arterial 4 (L) 10 - 25 mmo/L    Patient Temperature 37.0 degrees Celsius    FiO2 50 %   POCT GLUCOSE   Result Value Ref Range    POCT Glucose 151 (H) 74 - 99 mg/dL   BLOOD GAS MIXED VENOUS FULL PANEL   Result Value Ref Range    POCT pH, Mixed 7.45 (H) 7.33 - 7.43 pH    POCT pCO2, Mixed 48 41 - 51 mm Hg    POCT pO2, Mixed 39 35 - 45 mm Hg    POCT SO2, Mixed 70 45 - 75 %    POCT Oxy Hemoglobin, Mixed 67.9 45.0 - 75.0 %    POCT Hematocrit Calculated, Mixed 28.0 (L) 36.0 - 46.0 %    POCT Sodium, Mixed 135 (L) 136 - 145 mmol/L    POCT Potassium, Mixed 2.4 (LL) 3.5 - 5.3 mmol/L    POCT Chloride, Mixed 98 98 - 107 mmol/L    POCT Ionized Calcium, Mixed 1.10 1.10 - 1.33 mmol/L    POCT Glucose, Mixed 121 (H) 74 - 99 mg/dL    POCT Lactate, Mixed 0.8  0.4 - 2.0 mmol/L    POCT Base Excess, Mixed 8.4 (H) -2.0 - 3.0 mmol/L    POCT HCO3 Calculated, Mixed 33.4 (H) 22.0 - 26.0 mmol/L    POCT Hemoglobin, Mixed 9.4 (L) 12.0 - 16.0 g/dL    POCT Anion Gap, Mixed 6 (L) 10 - 25 mmo/L    Patient Temperature 37.0 degrees Celsius    FiO2 50 %   Calcium, Ionized   Result Value Ref Range    POCT Calcium, Ionized 1.07 (L) 1.1 - 1.33 mmol/L   CBC   Result Value Ref Range    WBC 7.3 4.4 - 11.3 x10*3/uL    nRBC 0.3 (H) 0.0 - 0.0 /100 WBCs    RBC 3.39 (L) 4.00 - 5.20 x10*6/uL    Hemoglobin 9.0 (L) 12.0 - 16.0 g/dL    Hematocrit 29.6 (L) 36.0 - 46.0 %    MCV 87 80 - 100 fL    MCH 26.5 26.0 - 34.0 pg    MCHC 30.4 (L) 32.0 - 36.0 g/dL    RDW 16.4 (H) 11.5 - 14.5 %    Platelets 125 (L) 150 - 450 x10*3/uL   Coagulation Screen   Result Value Ref Range    Protime 13.7 (H) 9.8 - 12.8 seconds    INR 1.2 (H) 0.9 - 1.1    aPTT 36 27 - 38 seconds   Magnesium   Result Value Ref Range    Magnesium 2.14 1.60 - 2.40 mg/dL   Renal function panel   Result Value Ref Range    Glucose 131 (H) 74 - 99 mg/dL    Sodium 139 136 - 145 mmol/L    Potassium 2.8 (LL) 3.5 - 5.3 mmol/L    Chloride 95 (L) 98 - 107 mmol/L    Bicarbonate 32 21 - 32 mmol/L    Anion Gap 15 10 - 20 mmol/L    Urea Nitrogen 18 6 - 23 mg/dL    Creatinine 0.47 (L) 0.50 - 1.05 mg/dL    eGFR >90 >60 mL/min/1.73m*2    Calcium 8.3 (L) 8.6 - 10.6 mg/dL    Phosphorus 2.6 2.5 - 4.9 mg/dL    Albumin 3.3 (L) 3.4 - 5.0 g/dL   POCT GLUCOSE   Result Value Ref Range    POCT Glucose 106 (H) 74 - 99 mg/dL       XR chest 1 view   Final Result   1. Similar near-complete opacification of the right hemithorax, which   may be secondary to a large pleural effusion, pneumonectomy, or   bronchial obstruction.   2. Diffuse interstitial opacities and scattered patchy alveolar   opacities throughout the left lung, similar to the prior exam, which   may reflect prominent pulmonary edema or multifocal infection.   3. Medical devices as above.        I personally  reviewed the image(s)/study and resident interpretation   as stated by Dr. Ange Sue MD. I agree with the findings as   stated. This study was interpreted at [facility], [place], [place].        MACRO:   None        Signed by: Oseas Stroud 1/21/2025 7:58 PM   Dictation workstation:   [id number]      Vascular US upper extremity venous duplex left         Transthoracic Echo (TTE) Limited   Final Result      XR chest 1 view   Final Result   1.  Interval opacification of the right hemithorax and correlate with   post pneumonectomy changes.   2. [place]-Paco catheter overlies the left lower lobe pulmonary artery.   3. Slight interval improvement in left-sided pulmonary edema.   Underlying edema and correlate with fluid status.             Signed by: Nino Barbour 1/20/2025 9:57 AM   Dictation workstation:   [id number]      XR abdomen 1 view   Final Result   1.  As described above.        Signed by: Vick Xiao 1/19/2025 11:58 AM   Dictation workstation:   [id number]      XR chest 1 view   Final Result   1. Slight worsening of multifocal airspace opacity in the left mid   and lower lung. Findings are concerning for infectious process. Small   left pleural effusion.   2. Postsurgical changes right pneumonectomy with medical devices as   described above. Consider retracting the Richmond-Paco catheter.                  Signed by: Vick Xiao 1/19/2025 8:55 AM   Dictation workstation:   [id number]      Transthoracic Echo (TTE) Limited   Final Result      XR chest 1 view   Final Result   1. Patchy airspace opacity in the left lower lung with slight   improvement in aeration. Correlate with concern for infection   2. Postsurgical changes of right pneumonectomy as described above.   3. Medical lines and devices as above. Consider retraction of the   Rich[place]-Paco catheter.        I personally reviewed the images/study and I agree with the findings   as stated. This study was  interpreted at [facility], [place], [place].        MACRO:   None        Signed by: Vick Xiao 1/19/2025 8:54 AM   Dictation workstation:   [id number]      XR chest 1 view   Final Result   1. Worsening aeration of the left lung with increasing left basilar   infiltrate and pleural effusion. Correlate with mucous plugging and   component of lobar collapse.   2. Component of interstitial prominence of the left lung, also   slightly increased which may be due to edema.   3. Postsurgical changes of right pneumonectomy as described                  Signed by: Vick Xiao 1/18/2025 11:35 AM   Dictation workstation:   [id number]      Cardiac Catheterization Procedure   Final Result      Transthoracic Echo (TTE) Limited   Final Result      XR chest 1 view   Final Result   1.  Stable exam with postsurgical changes of right pneumonectomy/rib   resection and large layering fluid component within the right   hemithorax.   2. Similar findings of left pulmonary edema with slightly increased   size of small left pleural effusion. No left-sided pneumothorax.   3. Medical devices as above.                  MACRO:   None        Signed by: Alexx Anne 1/17/2025 8:16 AM   Dictation workstation:   [id number]      XR chest 1 view   Final Result   1.  Postsurgical changes of right-sided pneumonectomy with increased   layering opacification of the right hemithorax, likely due to   increasing fluid.   2. Similar trace left-sided pleural effusion with linear left basilar   airspace opacity, likely atelectasis on a background of likely   pulmonary edema. Infection is not definitively excluded.   3. Medical devices as above with interval insertion of right internal   jugular central venous catheter projecting at the mid SVC.        I personally reviewed the images/study and I agree with the findings   as stated by resident Davidson Mcguire. This study was interpreted   at [facility]  [place], [place].        MACRO:   None        Signed by: Alexx Anne 1/17/2025 8:14 AM   Dictation workstation:   [id number]      Transthoracic Echo (TTE) Limited   Final Result      US right upper quadrant   Final Result   Small amount of simple appearing ascites in the right upper quadrant   without evidence of cholelithiasis, cholecystitis or biliary dilation.        Signed by: Alberto Salas 1/16/2025 1:02 PM   Dictation workstation:   [id number]      XR chest 1 view   Final Result   1. Slight interval increase in left basilar airspace opacity which   may be due to increasing atelectasis. Cannot exclude developing   infectious infiltrate. Question trace left pleural effusion.   2. Again seen postsurgical changes related to right-sided   pneumonectomy as described above                  Signed by: Vick Xiao 1/16/2025 9:47 AM   Dictation workstation:   [id number]      US renal complete   Final Result   1. Unremarkable sonographic evaluation of the kidneys.   2. Incomplete evaluation of a gallbladder with some pericholecystic   fluid and biliary sludge. Dedicated right upper quadrant ultrasound   could be obtained as clinically warranted.   3. A small amount of free fluid present in the right upper quadrant   and pelvis.        Findings relayed by phone by me to SICU provider at 5:11 p.m.        I personally reviewed the images/study and I agree with the findings   as stated by Erickson Best MD (resident) . This study was   interpreted at [place], [place].        MACRO:   None        Signed by: Alberto Salas 1/15/2025 6:17 PM   Dictation workstation:   [id number]      XR chest 1 view   Final Result   1.  Status post pneumonectomy with interval increase in fluid   component of right-sided hydropneumothorax.   2. Medical devices as above.                  MACRO:   None        Signed by: Alexx Anne  1/15/2025 9:28 AM   Dictation workstation:   [id number]      XR chest 1 view   Final Result   1. Interval removal of right chest tube. Other medical devices as   described above.   2. Postsurgical changes related to right pneumonectomy.   3. Faint left basilar opacity, likely atelectatic.                  Signed by: Vick Xiao 1/14/2025 2:53 PM   Dictation workstation:   [id number]      XR chest 1 view   Final Result   1.  Stable right-sided pneumonectomy changes.             Signed by: Nino Barbour 1/14/2025 12:56 PM   Dictation workstation:   [id number]      Transthoracic Echo (TTE) Limited   Final Result      XR chest 1 view   Final Result   1. Postsurgical changes related to right sided pneumonectomy with   multiple medical devices as described above.   2. Mild interstitial prominence of the left lung.                  Signed by: Vick Xiao 1/13/2025 3:16 PM   Dictation workstation:   [id number]      XR chest 1 view    (Results Pending)        Assessment/Plan   Assessment:  Faustina Vick is a 63 y/o with PMHx of COPD and 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 Wagner on 1/13 requiring post-op vasoactive therapy. Post-op course c/b acute hyponatremia, multifactorial delirium, cardiogenic shock requiring inotropic support, and acute hypoxic/hypercarbic respiratory failure requiring intubation. COVID+ 1/20.     Plan:  NEURO: History of anxiety on alprazolam 1mg qd, last filled 1/4/2025 per [facility] review. Family states she was not taking for several months but then restarted prn about 3 wks prior to surgery for situational anxiety. A&Ox4 at baseline. Acute post-op pain. Multifactorial delirium improved. Currently sedated and intubated. On precedex 1mcg/kg/hr and fentanyl 50mcg/hr. Able to briefly awaken and follow commands without sedation  interruption.  - ongoing neuro and pain assessments  - continue precedex, titrate to goal RASS 0  - continue fentanyl infusion at 50mcg/hr for pain control  - Lidocaine patch to right chest wall  - PT/OT consulted -> hold off for now  - Restraints indicated while patient remains intubated, restrain with soft wrist restraints until medical devices are discontinued.      CV: No history of cardiac disease. Baseline /79, -110. TTE 9/2024 normal biV function, RVSP 10mmHg. Intra-op hypotension requiring vaso and epi. Arrived to SICU on vaso 0.06 units/min, started on levo 0.05 mcg/kg/min. TTE 1/13 with EF 60-65%, low normal RV systolic function. Weaned off vasopressors 1/15. Elevated lactate to 2.9 on 1/16 with SOB and new O2 requirement. Repeat TTE 1/16: acute biV dysfunction with signs of takotsubo cardiomyopathy, EF 34%, mod reduced RV systolic function, mildly elevated RVSP, mild to mod TR. Cardiogenic shock, started milrinone, lasix infusion, and vasopressin. Shock Call on 1/16, no indication for mechanical circulatory support at this time. Initial troponin 928 in the setting of demand ischemia, with repeat troponin downtrend to 625. Lactate normalized. Epinephrine infusion added overnight 1/16-1/17, discontinued 2/2 tachycardia in the 130s. Milrinone increased to 0.25mcg/kg/min (decreased to 0.125 thereafter). Lasix infusion stopped 1/17 given contraction alkalosis. Taken to cath lab 1/17 for RHC/PA cath placement, opening CI 2.9. Milrinone uptitrated to 0.375mcg/kg/min 1/17-1/18. iEpo 0.05 added 1/18. Vasopressin increased to 0.06 and levo added to 0.15 1/18 evening at time of intubation. Given total of 750ml 5% albumin and 1 unit RBC since evening 1/18 through overnight 1/19. Repeat TTE 1/20 with improved EF to 35%. Levo weaned off 1/20, weaning milrinone. Responsive to diuresis. Intermittent sinus tachycardia. Milrinone discontinued 1/21 at ~6pm. Thermo CI 2.7 this am, mixed SvO2 59. Remains on iEpo  at 0.05. On/off low dose vaso, currently at 0.01.  - goal map > 65  - continuous EKG/abp/cvp/pap monitoring  - thermodilution and mixed SvO2 q4h and prn  - Cardiology following, appreciate recs  - No diuresis today given contraction alkalosis yesterday  - wean iEpo to 0.04  - titrate vaso to MAP goal >65  - continue holding metoprolol  - shock call if condition worsens     PULM: Hx of 30 pack year smoking history (quit 4/2024), COPD, and stage III right lung SCC s/p chemo/XRT/immunotherapy. 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 Arrived to SICU extubated on 10L SFM, weaned to room air 1/14. Chest tube removed 1/14. Acute on chronic hypoxic respiratory failure 1/16. Escalation to Airvo 50L 90% overnight 1/17-1/18. Acute hypercapnia 1/18 with increase WOB, trial of BIPAP with iEPO. Eventual intubation 1/18 evening. Currently on AC/VC 50%/280/18/+5. Mediastinal shift on AM cxr.  - iEpo as above in CV  - continue mechanical ventilation  - wean FiO2 to maintain SpO2 >92%  - SDS switched to decadron on 1/20 (tentative 10 day course from 1/20)  - scheduled budesonide/duoneb  - PRN albuterol  - Daily CXR  - ABG q4h and  prn  - holding home benzonatate     GI: No GI history. LFTs WNL 12/2/24. Evidence of pericholecystic fluid and biliary sludge on renal US 1/15 but no abdominal symptoms. RUQ US with small ascites, otherwise no acute findings. Abdominal distention this am. Elevated LFTs, now normalized. LBM 1/17. OG tube placed 1/19 for gastric decompression. TFs initiated via OG 1/20, then held on 1/21 due to regurgitation from OG.  - place post-pyloric dobhoff with Iris  - TF resumption pending dobhoff placement.  - continue thiamine 100mg IV daily  - continue IV PPI for GI prophylaxis  - bisacodyl SC daily, colace and senna BID, miralax daily     : No history of renal disease. Baseline creatinine 0.6. Symptomatic acute hyponatremia (SIADH vs vaso mediated) to  120 overnight 1/14 - 1/15, corrected to 124 after 150cc 3% NaCl x2 on 1/15. Hypochloremia. Renal US on 1/15 unremarkable. Started on lasix infusion 1/16 as above in CV, stopped 1/17. Net positive 3.4L for past 24hrs. Hypokalemia, hypophosphatemia, hypocalcemia, hypomagnesemia. Yoo changed overnight 1/18-1/19. Oliguria improving. Remains net positive 3.6L for admission, net negative 333ml over past 24hrs.  - RFP q12h and PRN  - Nephrology following, appreciate recs  - Maintain yoo for strict I/Os   - Maintain U/O >0.5ml/kg/hr  - Replete electrolytes to maintain K>4, Phos>2.5, iCal>1.1, Mag>2.      HEME: Hx of DOC. Baseline H/H 12/40. Acute blood loss anemia, OR EBL 150mL. Acute coagulopathy with INR elevated to 2.3 2/2 to poor nutritional status. Received IV Vit K 10mg x1 with improved INR thereafter. Transfused 1u PRBC 1/17 and 1u PRBC on 1/18. LUE duplex obtained 1/20 for swelling, negative for DVT.  - Check CBC and coags daily and PRN  - goal Hgb >9  - SCDs and SQH for DVT ppx  - ongoing monitoring for s/s bleeding  - maintain active T&S (1/19) -> update today     ENDO: No history of DM or thyroid disease. TSH 0.48 (1/17). Recent 5d course of prednisone 10mg 12/2024 for URI. Adequate glycemic control. Started on SDS out of OR 1/13.  - BG q4h, SSI #1     MSK: Arthritis, Vit D deficiency.  - not on meds at home  - monitor for symptoms     ID: Febrile on 1/18, Tmax 38.1. Completed yunior-op course of cefazolin. Leukocytosis to 17.2 after initiation of stress dose steroids, which resolved, but now with recurrent mild leukocytosis. Worsened clinical condition 1/17, started on vanco, zosyn and azithromycin. Blood cultures 1/17 negative final. MRSA negative 1/17. Respiratory viral panel negative 1/17. Yoo changed 1/18, Urine cx 1/18 negative. Added fluc overnight 1/18-1/19. Sputum culture 1/19 negative. Vancomycin discontinued 1/20. Urine strep/legionella 1/20 negative. Found to be COVID+ on 1/20, initiated  remdesivir. Discontinued fluc and azithro 1/21.  - COVID precautions  - ID consulted, appreciate recs  - temp q4h, wbc daily  - ongoing monitoring for s/s infection  - continue zosyn (10 day course)  - continue remdesivir     Lines:  - DAVID MAC (SICU, 1/16)  - PA cath (Cath lab. 1/17)  - L brachial A-line (1/16)  - Left chest mediport accessed   - PIV x3  - yoo (1/18)  - ETT (1/18)     Dispo: Continue ICU care. Patient seen and discussed with ICU attending Dr. Solorio.      Hugh Lyons, ANDREA-CNP  SICU Phone [phone]    Critical Care time: 60 minutes

## 2025-01-22 NOTE — PROGRESS NOTES
"Faustina Vick \"Jannie\" is a 64 y.o. female on day 9 of admission presenting with Primary cancer of right upper lobe of lung (Multi).    Subjective   NAEO. Weaned from pressors, follows commands. PA pressures stable. Inotropes successfully weaned.     Objective     General: critically ill in ICU bed  HEENT: ETT in place  Resp: intubated, sats appropriate, symmetric rise, synchronous  CV: tachycardic on monitor, sinus, normotensive with pressor support  Chest: no drainage from surgical incision, healing appropriately.   Abd: soft, NTND  : yoo in place  MSK: moves all extremities  Ext: mottling to BLE  Psych: intubated, sedated, unable to assess    Last Recorded Vitals  Blood pressure 89/57, pulse (!) 126, temperature 36.3 °C (97.3 °F), resp. rate 20, height 1.651 m (5' 5\"), weight 58.5 kg (128 lb 15.5 oz), SpO2 99%.  Intake/Output last 3 Shifts:  I/O last 3 completed shifts:  In: 2728.8 (46.6 mL/kg) [I.V.:1058.8 (18.1 mL/kg); NG/GT:450; IV Piggyback:1220]  Out: 3355 (57.4 mL/kg) [Urine:2980 (1.4 mL/kg/hr); Emesis/NG output:375]  Weight: 58.5 kg     Relevant Results            9.0     7.3>-----<125              29.6   139  95  18                  ----------------<131     2.8  32  0.47          Ca 8.3 Phos 2.6 Mg 2.14       ALT 51 AST 38 AlkPhos 63 tBili 0.9         CXR reviewed 1/19:  expected R-sided changes s/p R pneumonectomy, appropriately full, mediastinum stable.       Assessment/Plan   Assessment & Plan  Primary cancer of right upper lobe of lung (Multi)    Chronic obstructive pulmonary disease (Multi)    Malignant neoplasm of upper lobe of right lung (Multi)    Cardiogenic shock (Multi)    Faustina Vick (Jannie) is a 64F with Hx of Stage III lung cancer, asthma, anxiety, and arthritis who is now s/p R pneumonectomy with Dr. Wagner 1/13/25. 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 Cardiology was consulted and recommended Lasix gtt and milrinone. Lasix gtt held " 1/17, hyponatremia improving. Patient underwent RHC and SGC placement with cardiology 1/17. She was reintubated on 1/18 d/t worsening respiratory status and AMS. Also had increased pressor requirements, now also needing levo and vaso. Repeat echo stable on 1/18.    - Continue supportive ICU  care  - consider weaning sedation, to facilitate vent weaning   - vent weaning as able-> wean to extubate as able to minimize ventilator related barotrauma to her remaining lung  - Lung protective ventilation strategies  - Cardiogenic shock, supportive, improving  - weaned from pressor and inotropic support  - continue dobhoff for nutrition, titrate feeds to goal as able.   - do not feed if escalating pressors or HD instability.   - Requiring ICU level care at this time  - Please page with concerns    Patient seen and discussed with attending Dr. Wagner.    John Brush MD  Cardiothoracic Fellow  Thoracic surgery [id number]

## 2025-01-23 ENCOUNTER — APPOINTMENT (OUTPATIENT)
Dept: CARDIOLOGY | Facility: HOSPITAL | Age: 65
End: 2025-01-23
Payer: COMMERCIAL

## 2025-01-23 ENCOUNTER — APPOINTMENT (OUTPATIENT)
Dept: RADIOLOGY | Facility: HOSPITAL | Age: 65
End: 2025-01-23
Payer: COMMERCIAL

## 2025-01-23 LAB
ALBUMIN SERPL BCP-MCNC: 3.3 G/DL (ref 3.4–5)
ALBUMIN SERPL BCP-MCNC: 3.6 G/DL (ref 3.4–5)
ALBUMIN SERPL BCP-MCNC: 3.6 G/DL (ref 3.4–5)
ALBUMIN SERPL BCP-MCNC: 3.7 G/DL (ref 3.4–5)
ALBUMIN SERPL BCP-MCNC: 3.9 G/DL (ref 3.4–5)
ANION GAP BLDA CALCULATED.4IONS-SCNC: 10 MMO/L (ref 10–25)
ANION GAP BLDA CALCULATED.4IONS-SCNC: 11 MMO/L (ref 10–25)
ANION GAP BLDA CALCULATED.4IONS-SCNC: 5 MMO/L (ref 10–25)
ANION GAP BLDA CALCULATED.4IONS-SCNC: 8 MMO/L (ref 10–25)
ANION GAP BLDA CALCULATED.4IONS-SCNC: 9 MMO/L (ref 10–25)
ANION GAP BLDA CALCULATED.4IONS-SCNC: ABNORMAL MMOL/L
ANION GAP BLDMV CALCULATED.4IONS-SCNC: 7 MMO/L (ref 10–25)
ANION GAP BLDMV CALCULATED.4IONS-SCNC: 8 MMO/L (ref 10–25)
ANION GAP BLDMV CALCULATED.4IONS-SCNC: ABNORMAL MMOL/L
ANION GAP BLDMV CALCULATED.4IONS-SCNC: ABNORMAL MMOL/L
ANION GAP SERPL CALC-SCNC: 10 MMOL/L (ref 10–20)
ANION GAP SERPL CALC-SCNC: 12 MMOL/L (ref 10–20)
ANION GAP SERPL CALC-SCNC: 15 MMOL/L (ref 10–20)
ANION GAP SERPL CALC-SCNC: 15 MMOL/L (ref 10–20)
ANION GAP SERPL CALC-SCNC: 19 MMOL/L (ref 10–20)
APTT PPP: 30 SECONDS (ref 27–38)
APTT PPP: 31 SECONDS (ref 27–38)
APTT PPP: 32 SECONDS (ref 27–38)
ATRIAL RATE: 88 BPM
BASE EXCESS BLDA CALC-SCNC: -0.1 MMOL/L (ref -2–3)
BASE EXCESS BLDA CALC-SCNC: -2 MMOL/L (ref -2–3)
BASE EXCESS BLDA CALC-SCNC: 1.8 MMOL/L (ref -2–3)
BASE EXCESS BLDA CALC-SCNC: 3.6 MMOL/L (ref -2–3)
BASE EXCESS BLDA CALC-SCNC: 5.3 MMOL/L (ref -2–3)
BASE EXCESS BLDA CALC-SCNC: 6.7 MMOL/L (ref -2–3)
BASE EXCESS BLDMV CALC-SCNC: 3.2 MMOL/L (ref -2–3)
BASE EXCESS BLDMV CALC-SCNC: 3.9 MMOL/L (ref -2–3)
BASE EXCESS BLDMV CALC-SCNC: 4.1 MMOL/L (ref -2–3)
BASE EXCESS BLDMV CALC-SCNC: 4.2 MMOL/L (ref -2–3)
BASE EXCESS BLDMV CALC-SCNC: 5.6 MMOL/L (ref -2–3)
BASE EXCESS BLDMV CALC-SCNC: 6.6 MMOL/L (ref -2–3)
BODY TEMPERATURE: 37 DEGREES CELSIUS
BUN SERPL-MCNC: 18 MG/DL (ref 6–23)
BUN SERPL-MCNC: 19 MG/DL (ref 6–23)
BUN SERPL-MCNC: 19 MG/DL (ref 6–23)
CA-I BLD-SCNC: 1.13 MMOL/L (ref 1.1–1.33)
CA-I BLD-SCNC: 1.14 MMOL/L (ref 1.1–1.33)
CA-I BLD-SCNC: 1.14 MMOL/L (ref 1.1–1.33)
CA-I BLD-SCNC: 1.16 MMOL/L (ref 1.1–1.33)
CA-I BLD-SCNC: 1.2 MMOL/L (ref 1.1–1.33)
CA-I BLDA-SCNC: 1.14 MMOL/L (ref 1.1–1.33)
CA-I BLDA-SCNC: 1.15 MMOL/L (ref 1.1–1.33)
CA-I BLDA-SCNC: 1.17 MMOL/L (ref 1.1–1.33)
CA-I BLDA-SCNC: 1.18 MMOL/L (ref 1.1–1.33)
CA-I BLDMV-SCNC: 1.15 MMOL/L (ref 1.1–1.33)
CA-I BLDMV-SCNC: 1.19 MMOL/L (ref 1.1–1.33)
CA-I BLDMV-SCNC: 1.19 MMOL/L (ref 1.1–1.33)
CA-I BLDMV-SCNC: 1.2 MMOL/L (ref 1.1–1.33)
CA-I BLDMV-SCNC: 1.2 MMOL/L (ref 1.1–1.33)
CA-I BLDMV-SCNC: 1.22 MMOL/L (ref 1.1–1.33)
CALCIUM SERPL-MCNC: 8.1 MG/DL (ref 8.6–10.6)
CALCIUM SERPL-MCNC: 8.2 MG/DL (ref 8.6–10.6)
CALCIUM SERPL-MCNC: 8.4 MG/DL (ref 8.6–10.6)
CALCIUM SERPL-MCNC: 8.4 MG/DL (ref 8.6–10.6)
CALCIUM SERPL-MCNC: 8.5 MG/DL (ref 8.6–10.6)
CARDIAC TROPONIN I PNL SERPL HS: 51 NG/L (ref 0–34)
CHLORIDE BLD-SCNC: 101 MMOL/L (ref 98–107)
CHLORIDE BLD-SCNC: 103 MMOL/L (ref 98–107)
CHLORIDE BLD-SCNC: 98 MMOL/L (ref 98–107)
CHLORIDE BLD-SCNC: 99 MMOL/L (ref 98–107)
CHLORIDE BLD-SCNC: ABNORMAL MMOL/L
CHLORIDE BLD-SCNC: ABNORMAL MMOL/L
CHLORIDE BLDA-SCNC: 100 MMOL/L (ref 98–107)
CHLORIDE BLDA-SCNC: 101 MMOL/L (ref 98–107)
CHLORIDE BLDA-SCNC: 102 MMOL/L (ref 98–107)
CHLORIDE BLDA-SCNC: 104 MMOL/L (ref 98–107)
CHLORIDE BLDA-SCNC: 99 MMOL/L (ref 98–107)
CHLORIDE BLDA-SCNC: ABNORMAL MMOL/L
CHLORIDE SERPL-SCNC: 100 MMOL/L (ref 98–107)
CHLORIDE SERPL-SCNC: 98 MMOL/L (ref 98–107)
CHLORIDE SERPL-SCNC: 99 MMOL/L (ref 98–107)
CO2 SERPL-SCNC: 27 MMOL/L (ref 21–32)
CO2 SERPL-SCNC: 29 MMOL/L (ref 21–32)
CO2 SERPL-SCNC: 30 MMOL/L (ref 21–32)
CO2 SERPL-SCNC: 31 MMOL/L (ref 21–32)
CO2 SERPL-SCNC: 32 MMOL/L (ref 21–32)
CREAT SERPL-MCNC: 0.45 MG/DL (ref 0.5–1.05)
CREAT SERPL-MCNC: 0.47 MG/DL (ref 0.5–1.05)
CREAT SERPL-MCNC: 0.47 MG/DL (ref 0.5–1.05)
CREAT SERPL-MCNC: 0.49 MG/DL (ref 0.5–1.05)
CREAT SERPL-MCNC: 0.53 MG/DL (ref 0.5–1.05)
EGFRCR SERPLBLD CKD-EPI 2021: >90 ML/MIN/1.73M*2
ERYTHROCYTE [DISTWIDTH] IN BLOOD BY AUTOMATED COUNT: 16.8 % (ref 11.5–14.5)
ERYTHROCYTE [DISTWIDTH] IN BLOOD BY AUTOMATED COUNT: 16.9 % (ref 11.5–14.5)
ERYTHROCYTE [DISTWIDTH] IN BLOOD BY AUTOMATED COUNT: 17 % (ref 11.5–14.5)
GLUCOSE BLD MANUAL STRIP-MCNC: 177 MG/DL (ref 74–99)
GLUCOSE BLD-MCNC: 126 MG/DL (ref 74–99)
GLUCOSE BLD-MCNC: 140 MG/DL (ref 74–99)
GLUCOSE BLD-MCNC: 144 MG/DL (ref 74–99)
GLUCOSE BLD-MCNC: 164 MG/DL (ref 74–99)
GLUCOSE BLD-MCNC: 182 MG/DL (ref 74–99)
GLUCOSE BLD-MCNC: 186 MG/DL (ref 74–99)
GLUCOSE BLDA-MCNC: 122 MG/DL (ref 74–99)
GLUCOSE BLDA-MCNC: 124 MG/DL (ref 74–99)
GLUCOSE BLDA-MCNC: 134 MG/DL (ref 74–99)
GLUCOSE BLDA-MCNC: 153 MG/DL (ref 74–99)
GLUCOSE BLDA-MCNC: 157 MG/DL (ref 74–99)
GLUCOSE BLDA-MCNC: 165 MG/DL (ref 74–99)
GLUCOSE SERPL-MCNC: 123 MG/DL (ref 74–99)
GLUCOSE SERPL-MCNC: 135 MG/DL (ref 74–99)
GLUCOSE SERPL-MCNC: 160 MG/DL (ref 74–99)
GLUCOSE SERPL-MCNC: 182 MG/DL (ref 74–99)
GLUCOSE SERPL-MCNC: 191 MG/DL (ref 74–99)
HCO3 BLDA-SCNC: 26.4 MMOL/L (ref 22–26)
HCO3 BLDA-SCNC: 27.7 MMOL/L (ref 22–26)
HCO3 BLDA-SCNC: 28 MMOL/L (ref 22–26)
HCO3 BLDA-SCNC: 28.5 MMOL/L (ref 22–26)
HCO3 BLDA-SCNC: 30.5 MMOL/L (ref 22–26)
HCO3 BLDA-SCNC: 31.9 MMOL/L (ref 22–26)
HCO3 BLDMV-SCNC: 29.2 MMOL/L (ref 22–26)
HCO3 BLDMV-SCNC: 30.5 MMOL/L (ref 22–26)
HCO3 BLDMV-SCNC: 30.6 MMOL/L (ref 22–26)
HCO3 BLDMV-SCNC: 31.5 MMOL/L (ref 22–26)
HCO3 BLDMV-SCNC: 32.7 MMOL/L (ref 22–26)
HCO3 BLDMV-SCNC: 33.1 MMOL/L (ref 22–26)
HCT VFR BLD AUTO: 25.6 % (ref 36–46)
HCT VFR BLD AUTO: 26.5 % (ref 36–46)
HCT VFR BLD AUTO: 29.1 % (ref 36–46)
HCT VFR BLD AUTO: 29.2 % (ref 36–46)
HCT VFR BLD AUTO: 31.1 % (ref 36–46)
HCT VFR BLD EST: 20 % (ref 36–46)
HCT VFR BLD EST: 25 % (ref 36–46)
HCT VFR BLD EST: 26 % (ref 36–46)
HCT VFR BLD EST: 27 % (ref 36–46)
HCT VFR BLD EST: 28 % (ref 36–46)
HCT VFR BLD EST: 29 % (ref 36–46)
HCT VFR BLD EST: 37 % (ref 36–46)
HCT VFR BLD EST: 41 % (ref 36–46)
HGB BLD-MCNC: 8 G/DL (ref 12–16)
HGB BLD-MCNC: 8.1 G/DL (ref 12–16)
HGB BLD-MCNC: 9 G/DL (ref 12–16)
HGB BLD-MCNC: 9 G/DL (ref 12–16)
HGB BLD-MCNC: 9.6 G/DL (ref 12–16)
HGB BLDA-MCNC: 12.2 G/DL (ref 12–16)
HGB BLDA-MCNC: 8.7 G/DL (ref 12–16)
HGB BLDA-MCNC: 8.7 G/DL (ref 12–16)
HGB BLDA-MCNC: 8.8 G/DL (ref 12–16)
HGB BLDA-MCNC: 9.5 G/DL (ref 12–16)
HGB BLDA-MCNC: 9.7 G/DL (ref 12–16)
HGB BLDMV-MCNC: 13.5 G/DL (ref 12–16)
HGB BLDMV-MCNC: 6.7 G/DL (ref 12–16)
HGB BLDMV-MCNC: 8.4 G/DL (ref 12–16)
HGB BLDMV-MCNC: 9 G/DL (ref 12–16)
HGB BLDMV-MCNC: 9.3 G/DL (ref 12–16)
HGB BLDMV-MCNC: 9.8 G/DL (ref 12–16)
INHALED O2 CONCENTRATION: 40 %
INR PPP: 1.2 (ref 0.9–1.1)
INR PPP: 1.3 (ref 0.9–1.1)
LACTATE BLDA-SCNC: 0.8 MMOL/L (ref 0.4–2)
LACTATE BLDA-SCNC: 0.9 MMOL/L (ref 0.4–2)
LACTATE BLDA-SCNC: 1 MMOL/L (ref 0.4–2)
LACTATE BLDA-SCNC: 1.6 MMOL/L (ref 0.4–2)
LACTATE BLDMV-SCNC: 0.8 MMOL/L (ref 0.4–2)
LACTATE BLDMV-SCNC: 1 MMOL/L (ref 0.4–2)
MAGNESIUM SERPL-MCNC: 2.03 MG/DL (ref 1.6–2.4)
MAGNESIUM SERPL-MCNC: 2.08 MG/DL (ref 1.6–2.4)
MAGNESIUM SERPL-MCNC: 2.17 MG/DL (ref 1.6–2.4)
MAGNESIUM SERPL-MCNC: 2.21 MG/DL (ref 1.6–2.4)
MAGNESIUM SERPL-MCNC: 2.33 MG/DL (ref 1.6–2.4)
MCH RBC QN AUTO: 26.5 PG (ref 26–34)
MCH RBC QN AUTO: 26.6 PG (ref 26–34)
MCH RBC QN AUTO: 26.9 PG (ref 26–34)
MCH RBC QN AUTO: 27.2 PG (ref 26–34)
MCH RBC QN AUTO: 27.4 PG (ref 26–34)
MCHC RBC AUTO-ENTMCNC: 30.6 G/DL (ref 32–36)
MCHC RBC AUTO-ENTMCNC: 30.8 G/DL (ref 32–36)
MCHC RBC AUTO-ENTMCNC: 30.9 G/DL (ref 32–36)
MCHC RBC AUTO-ENTMCNC: 30.9 G/DL (ref 32–36)
MCHC RBC AUTO-ENTMCNC: 31.3 G/DL (ref 32–36)
MCV RBC AUTO: 86 FL (ref 80–100)
MCV RBC AUTO: 87 FL (ref 80–100)
MCV RBC AUTO: 89 FL (ref 80–100)
NRBC BLD-RTO: 0 /100 WBCS (ref 0–0)
NRBC BLD-RTO: 0.1 /100 WBCS (ref 0–0)
NRBC BLD-RTO: 0.2 /100 WBCS (ref 0–0)
OXYHGB MFR BLDA: 91 % (ref 94–98)
OXYHGB MFR BLDA: 92.9 % (ref 94–98)
OXYHGB MFR BLDA: 95.1 % (ref 94–98)
OXYHGB MFR BLDA: 96 % (ref 94–98)
OXYHGB MFR BLDA: 96.4 % (ref 94–98)
OXYHGB MFR BLDA: 96.7 % (ref 94–98)
OXYHGB MFR BLDMV: 58.5 % (ref 45–75)
OXYHGB MFR BLDMV: 60.3 % (ref 45–75)
OXYHGB MFR BLDMV: 64.7 % (ref 45–75)
OXYHGB MFR BLDMV: 68.2 % (ref 45–75)
OXYHGB MFR BLDMV: 68.4 % (ref 45–75)
OXYHGB MFR BLDMV: 74.3 % (ref 45–75)
P AXIS: 52 DEGREES
P OFFSET: 212 MS
P ONSET: 160 MS
PCO2 BLDA: 44 MM HG (ref 38–42)
PCO2 BLDA: 47 MM HG (ref 38–42)
PCO2 BLDA: 48 MM HG (ref 38–42)
PCO2 BLDA: 49 MM HG (ref 38–42)
PCO2 BLDA: 61 MM HG (ref 38–42)
PCO2 BLDA: 66 MM HG (ref 38–42)
PCO2 BLDMV: 46 MM HG (ref 41–51)
PCO2 BLDMV: 52 MM HG (ref 41–51)
PCO2 BLDMV: 54 MM HG (ref 41–51)
PCO2 BLDMV: 58 MM HG (ref 41–51)
PCO2 BLDMV: 62 MM HG (ref 41–51)
PCO2 BLDMV: 72 MM HG (ref 41–51)
PH BLDA: 7.21 PH (ref 7.38–7.42)
PH BLDA: 7.27 PH (ref 7.38–7.42)
PH BLDA: 7.36 PH (ref 7.38–7.42)
PH BLDA: 7.42 PH (ref 7.38–7.42)
PH BLDA: 7.42 PH (ref 7.38–7.42)
PH BLDA: 7.43 PH (ref 7.38–7.42)
PH BLDMV: 7.27 PH (ref 7.33–7.43)
PH BLDMV: 7.3 PH (ref 7.33–7.43)
PH BLDMV: 7.33 PH (ref 7.33–7.43)
PH BLDMV: 7.39 PH (ref 7.33–7.43)
PH BLDMV: 7.39 PH (ref 7.33–7.43)
PH BLDMV: 7.41 PH (ref 7.33–7.43)
PHOSPHATE SERPL-MCNC: 2.1 MG/DL (ref 2.5–4.9)
PHOSPHATE SERPL-MCNC: 2.9 MG/DL (ref 2.5–4.9)
PHOSPHATE SERPL-MCNC: 2.9 MG/DL (ref 2.5–4.9)
PHOSPHATE SERPL-MCNC: 3 MG/DL (ref 2.5–4.9)
PHOSPHATE SERPL-MCNC: 3.2 MG/DL (ref 2.5–4.9)
PLATELET # BLD AUTO: 134 X10*3/UL (ref 150–450)
PLATELET # BLD AUTO: 138 X10*3/UL (ref 150–450)
PLATELET # BLD AUTO: 139 X10*3/UL (ref 150–450)
PLATELET # BLD AUTO: 169 X10*3/UL (ref 150–450)
PLATELET # BLD AUTO: 180 X10*3/UL (ref 150–450)
PO2 BLDA: 101 MM HG (ref 85–95)
PO2 BLDA: 76 MM HG (ref 85–95)
PO2 BLDA: 79 MM HG (ref 85–95)
PO2 BLDA: 82 MM HG (ref 85–95)
PO2 BLDA: 89 MM HG (ref 85–95)
PO2 BLDA: 95 MM HG (ref 85–95)
PO2 BLDMV: 35 MM HG (ref 35–45)
PO2 BLDMV: 36 MM HG (ref 35–45)
PO2 BLDMV: 39 MM HG (ref 35–45)
PO2 BLDMV: 40 MM HG (ref 35–45)
PO2 BLDMV: 43 MM HG (ref 35–45)
PO2 BLDMV: 49 MM HG (ref 35–45)
POTASSIUM BLDA-SCNC: 3.1 MMOL/L (ref 3.5–5.3)
POTASSIUM BLDA-SCNC: 3.4 MMOL/L (ref 3.5–5.3)
POTASSIUM BLDA-SCNC: 3.5 MMOL/L (ref 3.5–5.3)
POTASSIUM BLDA-SCNC: 4 MMOL/L (ref 3.5–5.3)
POTASSIUM BLDA-SCNC: 4 MMOL/L (ref 3.5–5.3)
POTASSIUM BLDA-SCNC: 4.4 MMOL/L (ref 3.5–5.3)
POTASSIUM BLDMV-SCNC: 3 MMOL/L (ref 3.5–5.3)
POTASSIUM BLDMV-SCNC: 3.6 MMOL/L (ref 3.5–5.3)
POTASSIUM BLDMV-SCNC: 3.7 MMOL/L (ref 3.5–5.3)
POTASSIUM BLDMV-SCNC: 3.8 MMOL/L (ref 3.5–5.3)
POTASSIUM BLDMV-SCNC: 4.2 MMOL/L (ref 3.5–5.3)
POTASSIUM BLDMV-SCNC: 4.4 MMOL/L (ref 3.5–5.3)
POTASSIUM SERPL-SCNC: 3.7 MMOL/L (ref 3.5–5.3)
POTASSIUM SERPL-SCNC: 3.8 MMOL/L (ref 3.5–5.3)
POTASSIUM SERPL-SCNC: 3.8 MMOL/L (ref 3.5–5.3)
POTASSIUM SERPL-SCNC: 4.2 MMOL/L (ref 3.5–5.3)
POTASSIUM SERPL-SCNC: 4.6 MMOL/L (ref 3.5–5.3)
PR INTERVAL: 124 MS
PROTHROMBIN TIME: 13.9 SECONDS (ref 9.8–12.8)
PROTHROMBIN TIME: 14.1 SECONDS (ref 9.8–12.8)
PROTHROMBIN TIME: 14.5 SECONDS (ref 9.8–12.8)
PROTHROMBIN TIME: 14.7 SECONDS (ref 9.8–12.8)
PROTHROMBIN TIME: 15.2 SECONDS (ref 9.8–12.8)
Q ONSET: 222 MS
QRS COUNT: 15 BEATS
QRS DURATION: 86 MS
QT INTERVAL: 384 MS
QTC CALCULATION(BAZETT): 464 MS
QTC FREDERICIA: 436 MS
R AXIS: 32 DEGREES
RBC # BLD AUTO: 2.94 X10*6/UL (ref 4–5.2)
RBC # BLD AUTO: 3.06 X10*6/UL (ref 4–5.2)
RBC # BLD AUTO: 3.28 X10*6/UL (ref 4–5.2)
RBC # BLD AUTO: 3.38 X10*6/UL (ref 4–5.2)
RBC # BLD AUTO: 3.57 X10*6/UL (ref 4–5.2)
SAO2 % BLDA: 94 % (ref 94–100)
SAO2 % BLDA: 95 % (ref 94–100)
SAO2 % BLDA: 98 % (ref 94–100)
SAO2 % BLDA: 99 % (ref 94–100)
SAO2 % BLDMV: 60 % (ref 45–75)
SAO2 % BLDMV: 62 % (ref 45–75)
SAO2 % BLDMV: 66 % (ref 45–75)
SAO2 % BLDMV: 70 % (ref 45–75)
SAO2 % BLDMV: 70 % (ref 45–75)
SAO2 % BLDMV: 76 % (ref 45–75)
SODIUM BLDA-SCNC: 133 MMOL/L (ref 136–145)
SODIUM BLDA-SCNC: 134 MMOL/L (ref 136–145)
SODIUM BLDA-SCNC: 136 MMOL/L (ref 136–145)
SODIUM BLDA-SCNC: 136 MMOL/L (ref 136–145)
SODIUM BLDMV-SCNC: 134 MMOL/L (ref 136–145)
SODIUM BLDMV-SCNC: 134 MMOL/L (ref 136–145)
SODIUM BLDMV-SCNC: 135 MMOL/L (ref 136–145)
SODIUM BLDMV-SCNC: 135 MMOL/L (ref 136–145)
SODIUM BLDMV-SCNC: 136 MMOL/L (ref 136–145)
SODIUM BLDMV-SCNC: 136 MMOL/L (ref 136–145)
SODIUM SERPL-SCNC: 138 MMOL/L (ref 136–145)
SODIUM SERPL-SCNC: 138 MMOL/L (ref 136–145)
SODIUM SERPL-SCNC: 140 MMOL/L (ref 136–145)
T AXIS: 201 DEGREES
T OFFSET: 414 MS
VENTRICULAR RATE: 88 BPM
WBC # BLD AUTO: 12.8 X10*3/UL (ref 4.4–11.3)
WBC # BLD AUTO: 23.1 X10*3/UL (ref 4.4–11.3)
WBC # BLD AUTO: 7.7 X10*3/UL (ref 4.4–11.3)
WBC # BLD AUTO: 8.6 X10*3/UL (ref 4.4–11.3)
WBC # BLD AUTO: 9.6 X10*3/UL (ref 4.4–11.3)

## 2025-01-23 PROCEDURE — 2500000004 HC RX 250 GENERAL PHARMACY W/ HCPCS (ALT 636 FOR OP/ED): Performed by: STUDENT IN AN ORGANIZED HEALTH CARE EDUCATION/TRAINING PROGRAM

## 2025-01-23 PROCEDURE — 99233 SBSQ HOSP IP/OBS HIGH 50: CPT

## 2025-01-23 PROCEDURE — 82947 ASSAY GLUCOSE BLOOD QUANT: CPT

## 2025-01-23 PROCEDURE — 84484 ASSAY OF TROPONIN QUANT: CPT

## 2025-01-23 PROCEDURE — 85027 COMPLETE CBC AUTOMATED: CPT | Performed by: NURSE PRACTITIONER

## 2025-01-23 PROCEDURE — 82330 ASSAY OF CALCIUM: CPT | Performed by: NURSE PRACTITIONER

## 2025-01-23 PROCEDURE — 83735 ASSAY OF MAGNESIUM: CPT | Performed by: NURSE PRACTITIONER

## 2025-01-23 PROCEDURE — 2500000005 HC RX 250 GENERAL PHARMACY W/O HCPCS

## 2025-01-23 PROCEDURE — 2500000005 HC RX 250 GENERAL PHARMACY W/O HCPCS: Performed by: PHYSICIAN ASSISTANT

## 2025-01-23 PROCEDURE — 37799 UNLISTED PX VASCULAR SURGERY: CPT | Performed by: NURSE PRACTITIONER

## 2025-01-23 PROCEDURE — 82810 BLOOD GASES O2 SAT ONLY: CPT | Performed by: NURSE PRACTITIONER

## 2025-01-23 PROCEDURE — P9047 ALBUMIN (HUMAN), 25%, 50ML: HCPCS | Mod: JZ

## 2025-01-23 PROCEDURE — 94640 AIRWAY INHALATION TREATMENT: CPT

## 2025-01-23 PROCEDURE — 74018 RADEX ABDOMEN 1 VIEW: CPT

## 2025-01-23 PROCEDURE — 84132 ASSAY OF SERUM POTASSIUM: CPT | Performed by: NURSE PRACTITIONER

## 2025-01-23 PROCEDURE — 93005 ELECTROCARDIOGRAM TRACING: CPT

## 2025-01-23 PROCEDURE — 2500000001 HC RX 250 WO HCPCS SELF ADMINISTERED DRUGS (ALT 637 FOR MEDICARE OP): Performed by: NURSE PRACTITIONER

## 2025-01-23 PROCEDURE — 2500000004 HC RX 250 GENERAL PHARMACY W/ HCPCS (ALT 636 FOR OP/ED): Mod: JZ

## 2025-01-23 PROCEDURE — 2020000001 HC ICU ROOM DAILY

## 2025-01-23 PROCEDURE — 84132 ASSAY OF SERUM POTASSIUM: CPT

## 2025-01-23 PROCEDURE — 85610 PROTHROMBIN TIME: CPT | Performed by: NURSE PRACTITIONER

## 2025-01-23 PROCEDURE — 2500000002 HC RX 250 W HCPCS SELF ADMINISTERED DRUGS (ALT 637 FOR MEDICARE OP, ALT 636 FOR OP/ED): Performed by: NURSE PRACTITIONER

## 2025-01-23 PROCEDURE — 2500000004 HC RX 250 GENERAL PHARMACY W/ HCPCS (ALT 636 FOR OP/ED): Performed by: NURSE PRACTITIONER

## 2025-01-23 PROCEDURE — 94003 VENT MGMT INPAT SUBQ DAY: CPT

## 2025-01-23 PROCEDURE — 99233 SBSQ HOSP IP/OBS HIGH 50: CPT | Performed by: ANESTHESIOLOGY

## 2025-01-23 PROCEDURE — 99291 CRITICAL CARE FIRST HOUR: CPT | Performed by: NURSE PRACTITIONER

## 2025-01-23 PROCEDURE — 93010 ELECTROCARDIOGRAM REPORT: CPT | Performed by: INTERNAL MEDICINE

## 2025-01-23 PROCEDURE — 71045 X-RAY EXAM CHEST 1 VIEW: CPT

## 2025-01-23 PROCEDURE — 82435 ASSAY OF BLOOD CHLORIDE: CPT | Performed by: NURSE PRACTITIONER

## 2025-01-23 PROCEDURE — 2500000001 HC RX 250 WO HCPCS SELF ADMINISTERED DRUGS (ALT 637 FOR MEDICARE OP)

## 2025-01-23 PROCEDURE — 85730 THROMBOPLASTIN TIME PARTIAL: CPT | Performed by: NURSE PRACTITIONER

## 2025-01-23 PROCEDURE — 94645 CONT INHLJ TX EACH ADDL HOUR: CPT

## 2025-01-23 PROCEDURE — 99292 CRITICAL CARE ADDL 30 MIN: CPT | Performed by: ANESTHESIOLOGY

## 2025-01-23 PROCEDURE — 2500000002 HC RX 250 W HCPCS SELF ADMINISTERED DRUGS (ALT 637 FOR MEDICARE OP, ALT 636 FOR OP/ED): Performed by: STUDENT IN AN ORGANIZED HEALTH CARE EDUCATION/TRAINING PROGRAM

## 2025-01-23 PROCEDURE — 2500000004 HC RX 250 GENERAL PHARMACY W/ HCPCS (ALT 636 FOR OP/ED): Performed by: PHYSICIAN ASSISTANT

## 2025-01-23 RX ORDER — ALBUMIN HUMAN 250 G/1000ML
25 SOLUTION INTRAVENOUS ONCE
Status: COMPLETED | OUTPATIENT
Start: 2025-01-23 | End: 2025-01-23

## 2025-01-23 RX ORDER — ALBUMIN HUMAN 250 G/1000ML
SOLUTION INTRAVENOUS
Status: COMPLETED
Start: 2025-01-23 | End: 2025-01-23

## 2025-01-23 RX ORDER — POTASSIUM CHLORIDE 1.5 G/1.58G
40 POWDER, FOR SOLUTION ORAL ONCE
Status: COMPLETED | OUTPATIENT
Start: 2025-01-23 | End: 2025-01-23

## 2025-01-23 RX ORDER — OXYCODONE HCL 5 MG/5 ML
10 SOLUTION, ORAL ORAL EVERY 6 HOURS
Status: DISCONTINUED | OUTPATIENT
Start: 2025-01-23 | End: 2025-01-26

## 2025-01-23 RX ADMIN — HEPARIN SODIUM 5000 UNITS: 5000 INJECTION INTRAVENOUS; SUBCUTANEOUS at 14:00

## 2025-01-23 RX ADMIN — DEXMEDETOMIDINE HYDROCHLORIDE 1.3 MCG/KG/HR: 400 INJECTION INTRAVENOUS at 18:48

## 2025-01-23 RX ADMIN — OXYCODONE HYDROCHLORIDE 5 MG: 5 SOLUTION ORAL at 04:20

## 2025-01-23 RX ADMIN — VASOPRESSIN 0.01 UNITS/MIN: 0.2 INJECTION INTRAVENOUS at 09:30

## 2025-01-23 RX ADMIN — Medication 40 PERCENT: at 09:11

## 2025-01-23 RX ADMIN — SENNOSIDES 5 ML: 8.8 LIQUID ORAL at 20:35

## 2025-01-23 RX ADMIN — HEPARIN SODIUM 5000 UNITS: 5000 INJECTION INTRAVENOUS; SUBCUTANEOUS at 06:18

## 2025-01-23 RX ADMIN — BUDESONIDE 0.5 MG: 0.5 INHALANT RESPIRATORY (INHALATION) at 20:07

## 2025-01-23 RX ADMIN — IPRATROPIUM BROMIDE AND ALBUTEROL SULFATE 3 ML: .5; 3 SOLUTION RESPIRATORY (INHALATION) at 08:47

## 2025-01-23 RX ADMIN — HEPARIN SODIUM 5000 UNITS: 5000 INJECTION INTRAVENOUS; SUBCUTANEOUS at 23:56

## 2025-01-23 RX ADMIN — POTASSIUM CHLORIDE 40 MEQ: 1.5 POWDER, FOR SOLUTION ORAL at 00:29

## 2025-01-23 RX ADMIN — OXYCODONE HYDROCHLORIDE 10 MG: 5 SOLUTION ORAL at 15:53

## 2025-01-23 RX ADMIN — IPRATROPIUM BROMIDE AND ALBUTEROL SULFATE 3 ML: .5; 3 SOLUTION RESPIRATORY (INHALATION) at 02:55

## 2025-01-23 RX ADMIN — INSULIN LISPRO 1 UNITS: 100 INJECTION, SOLUTION INTRAVENOUS; SUBCUTANEOUS at 16:30

## 2025-01-23 RX ADMIN — IPRATROPIUM BROMIDE AND ALBUTEROL SULFATE 3 ML: .5; 3 SOLUTION RESPIRATORY (INHALATION) at 15:42

## 2025-01-23 RX ADMIN — BUDESONIDE 0.5 MG: 0.5 INHALANT RESPIRATORY (INHALATION) at 08:48

## 2025-01-23 RX ADMIN — INSULIN LISPRO 1 UNITS: 100 INJECTION, SOLUTION INTRAVENOUS; SUBCUTANEOUS at 00:29

## 2025-01-23 RX ADMIN — IPRATROPIUM BROMIDE AND ALBUTEROL SULFATE 3 ML: .5; 3 SOLUTION RESPIRATORY (INHALATION) at 20:07

## 2025-01-23 RX ADMIN — DEXAMETHASONE SODIUM PHOSPHATE 6 MG: 10 INJECTION, SOLUTION INTRAMUSCULAR; INTRAVENOUS at 09:56

## 2025-01-23 RX ADMIN — ALBUMIN HUMAN 25 G: 250 SOLUTION INTRAVENOUS at 12:30

## 2025-01-23 RX ADMIN — DOCUSATE SODIUM LIQUID 100 MG: 100 LIQUID ORAL at 20:35

## 2025-01-23 RX ADMIN — DEXMEDETOMIDINE HYDROCHLORIDE 1.5 MCG/KG/HR: 400 INJECTION INTRAVENOUS at 04:20

## 2025-01-23 RX ADMIN — OXYCODONE HYDROCHLORIDE 5 MG: 5 SOLUTION ORAL at 09:44

## 2025-01-23 RX ADMIN — PIPERACILLIN SODIUM AND TAZOBACTAM SODIUM 4.5 G: 4; .5 INJECTION, SOLUTION INTRAVENOUS at 13:45

## 2025-01-23 RX ADMIN — ALBUMIN HUMAN 25 G: 0.25 SOLUTION INTRAVENOUS at 12:30

## 2025-01-23 RX ADMIN — POTASSIUM CHLORIDE 20 MEQ: 14.9 INJECTION, SOLUTION INTRAVENOUS at 00:29

## 2025-01-23 RX ADMIN — THIAMINE HYDROCHLORIDE 100 MG: 100 INJECTION, SOLUTION INTRAMUSCULAR; INTRAVENOUS at 10:00

## 2025-01-23 RX ADMIN — SENNOSIDES 5 ML: 8.8 LIQUID ORAL at 09:44

## 2025-01-23 RX ADMIN — PIPERACILLIN SODIUM AND TAZOBACTAM SODIUM 4.5 G: 4; .5 INJECTION, SOLUTION INTRAVENOUS at 00:42

## 2025-01-23 RX ADMIN — BISACODYL 10 MG: 10 SUPPOSITORY RECTAL at 09:44

## 2025-01-23 RX ADMIN — POTASSIUM CHLORIDE 40 MEQ: 400 INJECTION, SOLUTION INTRAVENOUS at 09:58

## 2025-01-23 RX ADMIN — DEXMEDETOMIDINE HYDROCHLORIDE 1 MCG/KG/HR: 400 INJECTION INTRAVENOUS at 09:44

## 2025-01-23 RX ADMIN — PANTOPRAZOLE SODIUM 40 MG: 40 INJECTION, POWDER, FOR SOLUTION INTRAVENOUS at 09:44

## 2025-01-23 RX ADMIN — PIPERACILLIN SODIUM AND TAZOBACTAM SODIUM 4.5 G: 4; .5 INJECTION, SOLUTION INTRAVENOUS at 06:18

## 2025-01-23 RX ADMIN — LIDOCAINE 4% 1 PATCH: 40 PATCH TOPICAL at 09:44

## 2025-01-23 RX ADMIN — Medication 40 PERCENT: at 20:35

## 2025-01-23 RX ADMIN — DEXMEDETOMIDINE HYDROCHLORIDE 1.5 MCG/KG/HR: 400 INJECTION INTRAVENOUS at 23:56

## 2025-01-23 RX ADMIN — REMDESIVIR 100 MG: 100 INJECTION, POWDER, LYOPHILIZED, FOR SOLUTION INTRAVENOUS at 20:35

## 2025-01-23 RX ADMIN — OXYCODONE HYDROCHLORIDE 10 MG: 5 SOLUTION ORAL at 23:56

## 2025-01-23 RX ADMIN — DOCUSATE SODIUM LIQUID 100 MG: 100 LIQUID ORAL at 09:44

## 2025-01-23 RX ADMIN — POLYETHYLENE GLYCOL 3350 17 G: 17 POWDER, FOR SOLUTION ORAL at 09:44

## 2025-01-23 RX ADMIN — Medication 25 MCG/HR: at 23:43

## 2025-01-23 RX ADMIN — INSULIN LISPRO 1 UNITS: 100 INJECTION, SOLUTION INTRAVENOUS; SUBCUTANEOUS at 20:34

## 2025-01-23 ASSESSMENT — PAIN - FUNCTIONAL ASSESSMENT
PAIN_FUNCTIONAL_ASSESSMENT: CPOT (CRITICAL CARE PAIN OBSERVATION TOOL)

## 2025-01-23 ASSESSMENT — ACTIVITIES OF DAILY LIVING (ADL): LACK_OF_TRANSPORTATION: NO

## 2025-01-23 NOTE — PROGRESS NOTES
LSW spoke with the patient's , Maximilian, who reported a recent conversation with a member of the care coordination team. The team member advised him to select a skilled nursing facility closer to the patient's discharge date. When asked if he had any facilities in mind, Maximilian stated that he did not. This LSW recommended that he receive a list of nearby facilities to review and tour, if desired. Maximilian agreed to this suggestion, and the list was emailed to him at [email or web address].    Additionally, this LSW returned a call to the patient's health insurance Utilization Management , Stephanie, at [phone], and left a message requesting a callback.

## 2025-01-23 NOTE — CARE PLAN
The patient's goals for the shift include      The clinical goals for the shift include Maintain oxygen saturation >88%      Problem: Pain - Adult  Goal: Verbalizes/displays adequate comfort level or baseline comfort level  Outcome: Progressing     Problem: Safety - Adult  Goal: Free from fall injury  Outcome: Progressing     Problem: Discharge Planning  Goal: Discharge to home or other facility with appropriate resources  Outcome: Progressing     Problem: Chronic Conditions and Co-morbidities  Goal: Patient's chronic conditions and co-morbidity symptoms are monitored and maintained or improved  Outcome: Progressing     Problem: Skin  Goal: Decreased wound size/increased tissue granulation at next dressing change  Outcome: Progressing  Flowsheets (Taken 1/23/2025 0659)  Decreased wound size/increased tissue granulation at next dressing change:   Promote sleep for wound healing   Protective dressings over bony prominences  Goal: Participates in plan/prevention/treatment measures  Outcome: Progressing  Flowsheets (Taken 1/23/2025 0659)  Participates in plan/prevention/treatment measures:   Discuss with provider PT/OT consult   Elevate heels   Increase activity/out of bed for meals  Goal: Prevent/manage excess moisture  Outcome: Progressing  Flowsheets (Taken 1/23/2025 0659)  Prevent/manage excess moisture:   Moisturize dry skin   Cleanse incontinence/protect with barrier cream   Monitor for/manage infection if present   Follow provider orders for dressing changes  Goal: Prevent/minimize sheer/friction injuries  Outcome: Progressing  Flowsheets (Taken 1/23/2025 0659)  Prevent/minimize sheer/friction injuries:   Complete micro-shifts as needed if patient unable. Adjust patient position to relieve pressure points, not a full turn   HOB 30 degrees or less   Turn/reposition every 2 hours/use positioning/transfer devices   Use pull sheet  Goal: Promote/optimize nutrition  Outcome: Progressing  Flowsheets (Taken 1/23/2025  0659)  Promote/optimize nutrition:   Discuss with provider if NPO > 2 days   Monitor/record intake including meals   Offer water/supplements/favorite foods   Reassess MST if dietician not consulted  Goal: Promote skin healing  Outcome: Progressing  Flowsheets (Taken 1/23/2025 0659)  Promote skin healing:   Ensure correct size (line/device) and apply per  instructions   Assess skin/pad under line(s)/device(s)   Rotate device position/do not position patient on device   Turn/reposition every 2 hours/use positioning/transfer devices   Protective dressings over bony prominences     Problem: Pain  Goal: Takes deep breaths with improved pain control throughout the shift  Outcome: Progressing  Goal: Turns in bed with improved pain control throughout the shift  Outcome: Progressing  Goal: Walks with improved pain control throughout the shift  Outcome: Progressing  Goal: Performs ADL's with improved pain control throughout shift  Outcome: Progressing  Goal: Participates in PT with improved pain control throughout the shift  Outcome: Progressing  Goal: Free from opioid side effects throughout the shift  Outcome: Progressing  Goal: Free from acute confusion related to pain meds throughout the shift  Outcome: Progressing     Problem: Safety - Medical Restraint  Goal: Remains free of injury from restraints (Restraint for Interference with Medical Device)  1/23/2025 0659 by Zenia Escalera, RN  Flowsheets (Taken 1/23/2025 0659)  Remains free of injury from restraints (restraint for interference with medical device):   Determine that other, less restrictive measures have been tried or would not be effective before applying the restraint   Evaluate the patient's condition at the time of restraint application   Inform patient/family regarding the reason for restraint   Every 2 hours: Monitor safety, psychosocial status, comfort, nutrition and hydration  1/23/2025 0659 by Zenia Escalera RN  Outcome:  Progressing  Flowsheets (Taken 1/23/2025 0659)  Remains free of injury from restraints (restraint for interference with medical device):   Determine that other, less restrictive measures have been tried or would not be effective before applying the restraint   Evaluate the patient's condition at the time of restraint application   Inform patient/family regarding the reason for restraint   Every 2 hours: Monitor safety, psychosocial status, comfort, nutrition and hydration  Goal: Free from restraint(s) (Restraint for Interference with Medical Device)  1/23/2025 0659 by Zenia Escalera RN  Flowsheets (Taken 1/23/2025 0659)  Free from restraint(s) (restraint for interference with medical device):   ONCE/SHIFT or MINIMUM Every 12 hours: Assess and document the continuing need for restraints   Every 24 hours: Continued use of restraint requires Licensed Independent Practitioner to perform face to face examination and written order   Identify and implement measures to help patient regain control  1/23/2025 0659 by Zenia Escalera RN  Outcome: Progressing  Flowsheets (Taken 1/23/2025 0659)  Free from restraint(s) (restraint for interference with medical device):   ONCE/SHIFT or MINIMUM Every 12 hours: Assess and document the continuing need for restraints   Every 24 hours: Continued use of restraint requires Licensed Independent Practitioner to perform face to face examination and written order   Identify and implement measures to help patient regain control     Problem: Knowledge Deficit  Goal: Patient/family/caregiver demonstrates understanding of disease process, treatment plan, medications, and discharge instructions  Outcome: Progressing     Problem: Mechanical Ventilation  Goal: Patient Will Maintain Patent Airway  Outcome: Progressing  Goal: Oral health is maintained or improved  Outcome: Progressing  Goal: Tracheostomy will be managed safely  Outcome: Progressing  Goal: ET tube will be managed  safely  Outcome: Progressing  Goal: Ability to express needs and understand communication  Outcome: Progressing  Goal: Mobility/activity is maintained at optimum level for patient  Outcome: Progressing     Problem: Fall/Injury  Goal: Not fall by end of shift  Outcome: Progressing  Goal: Be free from injury by end of the shift  Outcome: Progressing  Goal: Verbalize understanding of personal risk factors for fall in the hospital  Outcome: Progressing  Goal: Verbalize understanding of risk factor reduction measures to prevent injury from fall in the home  Outcome: Progressing  Goal: Use assistive devices by end of the shift  Outcome: Progressing  Goal: Pace activities to prevent fatigue by end of the shift  Outcome: Progressing

## 2025-01-23 NOTE — PROGRESS NOTES
"Faustina Vikc \"Noam" is a 64 y.o. 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    Subjective   Low grade temp overnight (Tmax-38). Vaso 0 to 0.02units/min. iEpo 0.03.  Fentanyl decreased from 50 to 25mcg/hr.    Objective     Physical Exam  Vitals reviewed.   Constitutional:       Appearance: She is underweight.      Interventions: She is sedated, intubated and restrained.   HENT:      Head: Normocephalic and atraumatic.      Nose: Nose normal.      Comments: Bridled Dobhoff tube. L nare 78cm depth     Mouth/Throat:      Mouth: Mucous membranes are moist.   Eyes:      Extraocular Movements: Extraocular movements intact.      Pupils: Pupils are equal, round, and reactive to light.   Neck:      Comments: RIJ MAC with PA cath in place, 58cm depth. dressing c/d/i.   Cardiovascular:      Rate and Rhythm: Normal rate and regular rhythm.      Pulses: Normal pulses.      Heart sounds: Normal heart sounds.   Pulmonary:      Effort: She is intubated.      Breath sounds: Rhonchi (left side) present.      Comments: Mechanically ventilated via ETT. Current vent settings: AC/VC 40%/280/20/+5. Scattered rhonchi Absent breath sounds right chest  Chest:      Comments: Right lateral chest dressing c/d/i.  Abdominal:      General: Bowel sounds are normal. There is distension.      Palpations: Abdomen is soft.      Comments: Enteral feeding tube in place, tube feeds running at 20ml/hr   Genitourinary:     Comments: Montiel in place with clear yellow UOP.   Musculoskeletal:      Cervical back: Neck supple.   Skin:     General: Skin is warm and dry.   Neurological:      General: No focal deficit present.      Mental Status: She is easily aroused.      GCS: GCS eye subscore is 3. GCS verbal subscore is 1. GCS motor subscore is 6.      Motor: Weakness (generalized) present.      Comments: Awakens to name, follows commands       Last Recorded Vitals  Blood pressure 103/56, pulse (!) 153, " "temperature 37.4 °C (99.3 °F), resp. rate (!) 34, height 1.651 m (5' 5\"), weight 59.5 kg (131 lb 2.8 oz), SpO2 91%.    VS over last 24h  Heart Rate:  []   Temp:  [36.8 °C (98.2 °F)-38 °C (100.4 °F)]   Resp:  [0-39]   Weight:  [59.5 kg (131 lb 2.8 oz)]   SpO2:  [62 %-100 %]      Intake/Output last 3 Shifts:  I/O last 3 completed shifts:  In: 2725.3 (45.8 mL/kg) [I.V.:870.3 (14.6 mL/kg); NG/GT:360; IV Piggyback:1495]  Out: 2168 (36.4 mL/kg) [Urine:2118 (1 mL/kg/hr); Emesis/NG output:50]  Weight: 59.5 kg       Intake/Output Summary (Last 24 hours) at 1/23/2025 1111  Last data filed at 1/23/2025 0700  Gross per 24 hour   Intake 1651.71 ml   Output 1083 ml   Net 568.71 ml        Relevant Results  Scheduled medications  albumin human, 25 g, intravenous, Once  albumin human, , ,   bisacodyl, 10 mg, rectal, Daily  budesonide, 0.5 mg, nebulization, BID  dexAMETHasone, 6 mg, intravenous, q24h  docusate sodium, 100 mg, orogastric tube, BID  heparin (porcine), 5,000 Units, subcutaneous, q8h  insulin lispro, 0-5 Units, subcutaneous, q4h  ipratropium-albuteroL, 3 mL, nebulization, q6h  lidocaine, 1 patch, transdermal, Daily  oxyCODONE, 5 mg, nasogastric tube, q6h  oxygen, , inhalation, Continuous - Inhalation  pantoprazole, 40 mg, intravenous, Daily  piperacillin-tazobactam, 4.5 g, intravenous, q6h  polyethylene glycol, 17 g, orogastric tube, Daily  remdesivir, 100 mg, intravenous, q24h  senna, 5 mL, orogastric tube, BID  thiamine, 100 mg, intravenous, Daily      Continuous medications  dexmedeTOMIDine, 0.1-1.5 mcg/kg/hr, Last Rate: 1 mcg/kg/hr (01/23/25 0944)  epoprostenol, 0.02 mcg/kg/min (Ideal), Last Rate: 0.02 mcg/kg/min (01/23/25 0954)  fentaNYL, 25 mcg/hr, Last Rate: 25 mcg/hr (01/23/25 1105)  vasopressin, 0-0.06 Units/min, Last Rate: Stopped (01/23/25 0943)        PRN medications  PRN medications: albumin human, albuterol, calcium gluconate, calcium gluconate, dextrose, dextrose, glucagon, glucagon, magnesium " sulfate, magnesium sulfate, oxygen, potassium chloride, potassium chloride      Results for orders placed or performed during the hospital encounter of 01/13/25 (from the past 24 hours)   POCT GLUCOSE   Result Value Ref Range    POCT Glucose 117 (H) 74 - 99 mg/dL   Type and Screen   Result Value Ref Range    ABO TYPE B     Rh TYPE POS     ANTIBODY SCREEN NEG    Blood Gas Arterial Full Panel   Result Value Ref Range    POCT pH, Arterial 7.50 (H) 7.38 - 7.42 pH    POCT pCO2, Arterial 40 38 - 42 mm Hg    POCT pO2, Arterial 112 (H) 85 - 95 mm Hg    POCT SO2, Arterial 99 94 - 100 %    POCT Oxy Hemoglobin, Arterial 96.6 94.0 - 98.0 %    POCT Hematocrit Calculated, Arterial 30.0 (L) 36.0 - 46.0 %    POCT Sodium, Arterial 134 (L) 136 - 145 mmol/L    POCT Potassium, Arterial 3.0 (L) 3.5 - 5.3 mmol/L    POCT Chloride, Arterial 98 98 - 107 mmol/L    POCT Ionized Calcium, Arterial 1.16 1.10 - 1.33 mmol/L    POCT Glucose, Arterial 131 (H) 74 - 99 mg/dL    POCT Lactate, Arterial 1.0 0.4 - 2.0 mmol/L    POCT Base Excess, Arterial 7.4 (H) -2.0 - 3.0 mmol/L    POCT HCO3 Calculated, Arterial 31.2 (H) 22.0 - 26.0 mmol/L    POCT Hemoglobin, Arterial 10.0 (L) 12.0 - 16.0 g/dL    POCT Anion Gap, Arterial 8 (L) 10 - 25 mmo/L    Patient Temperature 37.0 degrees Celsius    FiO2 50 %   BLOOD GAS MIXED VENOUS FULL PANEL   Result Value Ref Range    POCT pH, Mixed 7.45 (H) 7.33 - 7.43 pH    POCT pCO2, Mixed 46 41 - 51 mm Hg    POCT pO2, Mixed 35 35 - 45 mm Hg    POCT SO2, Mixed 62 45 - 75 %    POCT Oxy Hemoglobin, Mixed 60.7 45.0 - 75.0 %    POCT Hematocrit Calculated, Mixed 38.0 36.0 - 46.0 %    POCT Sodium, Mixed 132 (L) 136 - 145 mmol/L    POCT Potassium, Mixed 3.1 (L) 3.5 - 5.3 mmol/L    POCT Chloride, Mixed 97 (L) 98 - 107 mmol/L    POCT Ionized Calcium, Mixed 1.17 1.10 - 1.33 mmol/L    POCT Glucose, Mixed 130 (H) 74 - 99 mg/dL    POCT Lactate, Mixed 1.0 0.4 - 2.0 mmol/L    POCT Base Excess, Mixed 7.0 (H) -2.0 - 3.0 mmol/L    POCT HCO3  Calculated, Mixed 32.0 (H) 22.0 - 26.0 mmol/L    POCT Hemoglobin, Mixed 12.8 12.0 - 16.0 g/dL    POCT Anion Gap, Mixed 6 (L) 10 - 25 mmo/L    Patient Temperature 37.0 degrees Celsius    FiO2 50 %   POCT GLUCOSE   Result Value Ref Range    POCT Glucose 150 (H) 74 - 99 mg/dL   BLOOD GAS MIXED VENOUS FULL PANEL   Result Value Ref Range    POCT pH, Mixed 7.46 (H) 7.33 - 7.43 pH    POCT pCO2, Mixed 47 41 - 51 mm Hg    POCT pO2, Mixed 34 (L) 35 - 45 mm Hg    POCT SO2, Mixed 61 45 - 75 %    POCT Oxy Hemoglobin, Mixed 60.4 45.0 - 75.0 %    POCT Hematocrit Calculated, Mixed 29.0 (L) 36.0 - 46.0 %    POCT Sodium, Mixed 132 (L) 136 - 145 mmol/L    POCT Potassium, Mixed 3.9 3.5 - 5.3 mmol/L    POCT Chloride, Mixed 98 98 - 107 mmol/L    POCT Ionized Calcium, Mixed 1.13 1.10 - 1.33 mmol/L    POCT Glucose, Mixed 160 (H) 74 - 99 mg/dL    POCT Lactate, Mixed 0.8 0.4 - 2.0 mmol/L    POCT Base Excess, Mixed 8.6 (H) -2.0 - 3.0 mmol/L    POCT HCO3 Calculated, Mixed 33.4 (H) 22.0 - 26.0 mmol/L    POCT Hemoglobin, Mixed 9.7 (L) 12.0 - 16.0 g/dL    POCT Anion Gap, Mixed 5 (L) 10 - 25 mmo/L    Patient Temperature 37.0 degrees Celsius    FiO2 40 %   Blood Gas Arterial Full Panel   Result Value Ref Range    POCT pH, Arterial 7.47 (H) 7.38 - 7.42 pH    POCT pCO2, Arterial 41 38 - 42 mm Hg    POCT pO2, Arterial 101 (H) 85 - 95 mm Hg    POCT SO2, Arterial 99 94 - 100 %    POCT Oxy Hemoglobin, Arterial 96.4 94.0 - 98.0 %    POCT Hematocrit Calculated, Arterial 30.0 (L) 36.0 - 46.0 %    POCT Sodium, Arterial 133 (L) 136 - 145 mmol/L    POCT Potassium, Arterial 3.8 3.5 - 5.3 mmol/L    POCT Chloride, Arterial 99 98 - 107 mmol/L    POCT Ionized Calcium, Arterial 1.13 1.10 - 1.33 mmol/L    POCT Glucose, Arterial 157 (H) 74 - 99 mg/dL    POCT Lactate, Arterial 0.9 0.4 - 2.0 mmol/L    POCT Base Excess, Arterial 5.6 (H) -2.0 - 3.0 mmol/L    POCT HCO3 Calculated, Arterial 29.8 (H) 22.0 - 26.0 mmol/L    POCT Hemoglobin, Arterial 9.9 (L) 12.0 - 16.0 g/dL     POCT Anion Gap, Arterial 8 (L) 10 - 25 mmo/L    Patient Temperature 37.0 degrees Celsius    FiO2 40 %   Renal function panel   Result Value Ref Range    Glucose 159 (H) 74 - 99 mg/dL    Sodium 139 136 - 145 mmol/L    Potassium 3.9 3.5 - 5.3 mmol/L    Chloride 97 (L) 98 - 107 mmol/L    Bicarbonate 31 21 - 32 mmol/L    Anion Gap 15 10 - 20 mmol/L    Urea Nitrogen 19 6 - 23 mg/dL    Creatinine 0.51 0.50 - 1.05 mg/dL    eGFR >90 >60 mL/min/1.73m*2    Calcium 8.3 (L) 8.6 - 10.6 mg/dL    Phosphorus 3.9 2.5 - 4.9 mg/dL    Albumin 3.4 3.4 - 5.0 g/dL   POCT GLUCOSE   Result Value Ref Range    POCT Glucose 163 (H) 74 - 99 mg/dL   Blood Gas Arterial Full Panel   Result Value Ref Range    POCT pH, Arterial 7.40 7.38 - 7.42 pH    POCT pCO2, Arterial 50 (H) 38 - 42 mm Hg    POCT pO2, Arterial 96 (H) 85 - 95 mm Hg    POCT SO2, Arterial 99 94 - 100 %    POCT Oxy Hemoglobin, Arterial 96.4 94.0 - 98.0 %    POCT Hematocrit Calculated, Arterial 29.0 (L) 36.0 - 46.0 %    POCT Sodium, Arterial 133 (L) 136 - 145 mmol/L    POCT Potassium, Arterial 3.3 (L) 3.5 - 5.3 mmol/L    POCT Chloride, Arterial 99 98 - 107 mmol/L    POCT Ionized Calcium, Arterial 1.14 1.10 - 1.33 mmol/L    POCT Glucose, Arterial 167 (H) 74 - 99 mg/dL    POCT Lactate, Arterial 0.9 0.4 - 2.0 mmol/L    POCT Base Excess, Arterial 5.3 (H) -2.0 - 3.0 mmol/L    POCT HCO3 Calculated, Arterial 31.0 (H) 22.0 - 26.0 mmol/L    POCT Hemoglobin, Arterial 9.8 (L) 12.0 - 16.0 g/dL    POCT Anion Gap, Arterial 6 (L) 10 - 25 mmo/L    Patient Temperature 37.0 degrees Celsius    FiO2 40 %   BLOOD GAS MIXED VENOUS FULL PANEL   Result Value Ref Range    POCT pH, Mixed 7.37 7.33 - 7.43 pH    POCT pCO2, Mixed 57 (H) 41 - 51 mm Hg    POCT pO2, Mixed 37 35 - 45 mm Hg    POCT SO2, Mixed 59 45 - 75 %    POCT Oxy Hemoglobin, Mixed 58.2 45.0 - 75.0 %    POCT Hematocrit Calculated, Mixed 28.0 (L) 36.0 - 46.0 %    POCT Sodium, Mixed 135 (L) 136 - 145 mmol/L    POCT Potassium, Mixed 3.4 (L) 3.5 - 5.3  mmol/L    POCT Chloride, Mixed 98 98 - 107 mmol/L    POCT Ionized Calcium, Mixed 1.16 1.10 - 1.33 mmol/L    POCT Glucose, Mixed 162 (H) 74 - 99 mg/dL    POCT Lactate, Mixed 0.8 0.4 - 2.0 mmol/L    POCT Base Excess, Mixed 6.6 (H) -2.0 - 3.0 mmol/L    POCT HCO3 Calculated, Mixed 33.0 (H) 22.0 - 26.0 mmol/L    POCT Hemoglobin, Mixed 9.4 (L) 12.0 - 16.0 g/dL    POCT Anion Gap, Mixed 7 (L) 10 - 25 mmo/L    Patient Temperature 37.0 degrees Celsius    FiO2 40 %   CBC   Result Value Ref Range    WBC 7.4 4.4 - 11.3 x10*3/uL    nRBC 0.0 0.0 - 0.0 /100 WBCs    RBC 3.37 (L) 4.00 - 5.20 x10*6/uL    Hemoglobin 9.1 (L) 12.0 - 16.0 g/dL    Hematocrit 29.6 (L) 36.0 - 46.0 %    MCV 88 80 - 100 fL    MCH 27.0 26.0 - 34.0 pg    MCHC 30.7 (L) 32.0 - 36.0 g/dL    RDW 16.7 (H) 11.5 - 14.5 %    Platelets 115 (L) 150 - 450 x10*3/uL   BLOOD GAS MIXED VENOUS FULL PANEL   Result Value Ref Range    POCT pH, Mixed 7.39 7.33 - 7.43 pH    POCT pCO2, Mixed 52 (H) 41 - 51 mm Hg    POCT pO2, Mixed 36 35 - 45 mm Hg    POCT SO2, Mixed 62 45 - 75 %    POCT Oxy Hemoglobin, Mixed 60.3 45.0 - 75.0 %    POCT Hematocrit Calculated, Mixed 28.0 (L) 36.0 - 46.0 %    POCT Sodium, Mixed 135 (L) 136 - 145 mmol/L    POCT Potassium, Mixed 3.0 (L) 3.5 - 5.3 mmol/L    POCT Chloride, Mixed 99 98 - 107 mmol/L    POCT Ionized Calcium, Mixed 1.15 1.10 - 1.33 mmol/L    POCT Glucose, Mixed 140 (H) 74 - 99 mg/dL    POCT Lactate, Mixed 0.8 0.4 - 2.0 mmol/L    POCT Base Excess, Mixed 5.6 (H) -2.0 - 3.0 mmol/L    POCT HCO3 Calculated, Mixed 31.5 (H) 22.0 - 26.0 mmol/L    POCT Hemoglobin, Mixed 9.3 (L) 12.0 - 16.0 g/dL    POCT Anion Gap, Mixed 8 (L) 10 - 25 mmo/L    Patient Temperature 37.0 degrees Celsius    FiO2 40 %   Blood Gas Arterial Full Panel   Result Value Ref Range    POCT pH, Arterial 7.43 (H) 7.38 - 7.42 pH    POCT pCO2, Arterial 48 (H) 38 - 42 mm Hg    POCT pO2, Arterial 95 85 - 95 mm Hg    POCT SO2, Arterial 99 94 - 100 %    POCT Oxy Hemoglobin, Arterial 96.7 94.0  - 98.0 %    POCT Hematocrit Calculated, Arterial 29.0 (L) 36.0 - 46.0 %    POCT Sodium, Arterial 133 (L) 136 - 145 mmol/L    POCT Potassium, Arterial 3.1 (L) 3.5 - 5.3 mmol/L    POCT Chloride, Arterial 99 98 - 107 mmol/L    POCT Ionized Calcium, Arterial 1.15 1.10 - 1.33 mmol/L    POCT Glucose, Arterial 153 (H) 74 - 99 mg/dL    POCT Lactate, Arterial 1.0 0.4 - 2.0 mmol/L    POCT Base Excess, Arterial 6.7 (H) -2.0 - 3.0 mmol/L    POCT HCO3 Calculated, Arterial 31.9 (H) 22.0 - 26.0 mmol/L    POCT Hemoglobin, Arterial 9.7 (L) 12.0 - 16.0 g/dL    POCT Anion Gap, Arterial 5 (L) 10 - 25 mmo/L    Patient Temperature 37.0 degrees Celsius    FiO2 40 %   BLOOD GAS MIXED VENOUS FULL PANEL   Result Value Ref Range    POCT pH, Mixed 7.39 7.33 - 7.43 pH    POCT pCO2, Mixed 54 (H) 41 - 51 mm Hg    POCT pO2, Mixed 35 35 - 45 mm Hg    POCT SO2, Mixed 60 45 - 75 %    POCT Oxy Hemoglobin, Mixed 58.5 45.0 - 75.0 %    POCT Hematocrit Calculated, Mixed 29.0 (L) 36.0 - 46.0 %    POCT Sodium, Mixed 134 (L) 136 - 145 mmol/L    POCT Potassium, Mixed 3.6 3.5 - 5.3 mmol/L    POCT Chloride, Mixed      POCT Ionized Calcium, Mixed 1.20 1.10 - 1.33 mmol/L    POCT Glucose, Mixed 126 (H) 74 - 99 mg/dL    POCT Lactate, Mixed 0.8 0.4 - 2.0 mmol/L    POCT Base Excess, Mixed 6.6 (H) -2.0 - 3.0 mmol/L    POCT HCO3 Calculated, Mixed 32.7 (H) 22.0 - 26.0 mmol/L    POCT Hemoglobin, Mixed 9.8 (L) 12.0 - 16.0 g/dL    POCT Anion Gap, Mixed      Patient Temperature 37.0 degrees Celsius    FiO2 40 %   Blood Gas Arterial Full Panel   Result Value Ref Range    POCT pH, Arterial 7.42 7.38 - 7.42 pH    POCT pCO2, Arterial 47 (H) 38 - 42 mm Hg    POCT pO2, Arterial 89 85 - 95 mm Hg    POCT SO2, Arterial 98 94 - 100 %    POCT Oxy Hemoglobin, Arterial 96.0 94.0 - 98.0 %    POCT Hematocrit Calculated, Arterial 29.0 (L) 36.0 - 46.0 %    POCT Sodium, Arterial 134 (L) 136 - 145 mmol/L    POCT Potassium, Arterial 3.5 3.5 - 5.3 mmol/L    POCT Chloride, Arterial      POCT  Ionized Calcium, Arterial 1.18 1.10 - 1.33 mmol/L    POCT Glucose, Arterial 124 (H) 74 - 99 mg/dL    POCT Lactate, Arterial 0.9 0.4 - 2.0 mmol/L    POCT Base Excess, Arterial 5.3 (H) -2.0 - 3.0 mmol/L    POCT HCO3 Calculated, Arterial 30.5 (H) 22.0 - 26.0 mmol/L    POCT Hemoglobin, Arterial 9.5 (L) 12.0 - 16.0 g/dL    POCT Anion Gap, Arterial      Patient Temperature 37.0 degrees Celsius    FiO2 40 %   Calcium, Ionized   Result Value Ref Range    POCT Calcium, Ionized 1.14 1.1 - 1.33 mmol/L   CBC   Result Value Ref Range    WBC 8.6 4.4 - 11.3 x10*3/uL    nRBC 0.0 0.0 - 0.0 /100 WBCs    RBC 3.38 (L) 4.00 - 5.20 x10*6/uL    Hemoglobin 9.0 (L) 12.0 - 16.0 g/dL    Hematocrit 29.1 (L) 36.0 - 46.0 %    MCV 86 80 - 100 fL    MCH 26.6 26.0 - 34.0 pg    MCHC 30.9 (L) 32.0 - 36.0 g/dL    RDW 16.9 (H) 11.5 - 14.5 %    Platelets 134 (L) 150 - 450 x10*3/uL   Coagulation Screen   Result Value Ref Range    Protime 13.9 (H) 9.8 - 12.8 seconds    INR 1.2 (H) 0.9 - 1.1    aPTT 32 27 - 38 seconds   Magnesium   Result Value Ref Range    Magnesium 2.21 1.60 - 2.40 mg/dL   Renal function panel   Result Value Ref Range    Glucose 123 (H) 74 - 99 mg/dL    Sodium 140 136 - 145 mmol/L    Potassium 3.7 3.5 - 5.3 mmol/L    Chloride 98 98 - 107 mmol/L    Bicarbonate 27 21 - 32 mmol/L    Anion Gap 19 10 - 20 mmol/L    Urea Nitrogen 19 6 - 23 mg/dL    Creatinine 0.47 (L) 0.50 - 1.05 mg/dL    eGFR >90 >60 mL/min/1.73m*2    Calcium 8.4 (L) 8.6 - 10.6 mg/dL    Phosphorus 3.2 2.5 - 4.9 mg/dL    Albumin 3.3 (L) 3.4 - 5.0 g/dL   Blood Gas Arterial Full Panel   Result Value Ref Range    POCT pH, Arterial 7.27 (L) 7.38 - 7.42 pH    POCT pCO2, Arterial 61 (H) 38 - 42 mm Hg    POCT pO2, Arterial 76 (L) 85 - 95 mm Hg    POCT SO2, Arterial 94 94 - 100 %    POCT Oxy Hemoglobin, Arterial 91.0 (L) 94.0 - 98.0 %    POCT Hematocrit Calculated, Arterial 37.0 36.0 - 46.0 %    POCT Sodium, Arterial 134 (L) 136 - 145 mmol/L    POCT Potassium, Arterial 3.4 (L) 3.5 -  5.3 mmol/L    POCT Chloride, Arterial 100 98 - 107 mmol/L    POCT Ionized Calcium, Arterial 1.17 1.10 - 1.33 mmol/L    POCT Glucose, Arterial 134 (H) 74 - 99 mg/dL    POCT Lactate, Arterial 0.9 0.4 - 2.0 mmol/L    POCT Base Excess, Arterial -0.1 -2.0 - 3.0 mmol/L    POCT HCO3 Calculated, Arterial 28.0 (H) 22.0 - 26.0 mmol/L    POCT Hemoglobin, Arterial 12.2 12.0 - 16.0 g/dL    POCT Anion Gap, Arterial 9 (L) 10 - 25 mmo/L    Patient Temperature 37.0 degrees Celsius    FiO2 40 %   Calcium, Ionized   Result Value Ref Range    POCT Calcium, Ionized 1.16 1.1 - 1.33 mmol/L   CBC   Result Value Ref Range    WBC 12.8 (H) 4.4 - 11.3 x10*3/uL    nRBC 0.2 (H) 0.0 - 0.0 /100 WBCs    RBC 3.57 (L) 4.00 - 5.20 x10*6/uL    Hemoglobin 9.6 (L) 12.0 - 16.0 g/dL    Hematocrit 31.1 (L) 36.0 - 46.0 %    MCV 87 80 - 100 fL    MCH 26.9 26.0 - 34.0 pg    MCHC 30.9 (L) 32.0 - 36.0 g/dL    RDW 16.9 (H) 11.5 - 14.5 %    Platelets 169 150 - 450 x10*3/uL   Coagulation Screen   Result Value Ref Range    Protime 14.1 (H) 9.8 - 12.8 seconds    INR 1.3 (H) 0.9 - 1.1    aPTT 30 27 - 38 seconds   BLOOD GAS MIXED VENOUS FULL PANEL   Result Value Ref Range    POCT pH, Mixed 7.27 (L) 7.33 - 7.43 pH    POCT pCO2, Mixed 72 (HH) 41 - 51 mm Hg    POCT pO2, Mixed 49 (H) 35 - 45 mm Hg    POCT SO2, Mixed 76 (H) 45 - 75 %    POCT Oxy Hemoglobin, Mixed 74.3 45.0 - 75.0 %    POCT Hematocrit Calculated, Mixed 41.0 36.0 - 46.0 %    POCT Sodium, Mixed 134 (L) 136 - 145 mmol/L    POCT Potassium, Mixed 3.8 3.5 - 5.3 mmol/L    POCT Chloride, Mixed 98 98 - 107 mmol/L    POCT Ionized Calcium, Mixed 1.22 1.10 - 1.33 mmol/L    POCT Glucose, Mixed 144 (H) 74 - 99 mg/dL    POCT Lactate, Mixed 1.0 0.4 - 2.0 mmol/L    POCT Base Excess, Mixed 3.9 (H) -2.0 - 3.0 mmol/L    POCT HCO3 Calculated, Mixed 33.1 (H) 22.0 - 26.0 mmol/L    POCT Hemoglobin, Mixed 13.5 12.0 - 16.0 g/dL    POCT Anion Gap, Mixed 7 (L) 10 - 25 mmo/L    Patient Temperature 37.0 degrees Celsius    FiO2 40 %        XR abdomen 1 view   Final Result   1.  Dobhoff tube is in place with the tip projecting over the 1st   portion of the duodenum.   2. There is gas-filled dilated stomach. A few slight prominent loops   of the bowel in the upper abdomen with overall nonobstructive bowel   gas pattern.   3. Postsurgical changes of right pneumonectomy with resection of ribs   and patchy airspace opacities in the left lung.   4. [place]-Paco catheter projecting over the lower pulmonary artery.   Recommend retraction.        MACRO:   None        Signed by: Oseas Stroud 1/23/2025 10:15 AM   Dictation workstation:   [id number]      XR chest 1 view   Final Result   1. Unchanged appearance of multifocal patchy airspace opacities with   interstitial prominence of the left lung which may be due to   infectious process. Component of edema can not be excluded.   2. Medical devices as described above with similar positioning of the   [place]-Paco catheter projecting over left lower lobe pulmonary artery.   Recommend retraction.   3. Postsurgical changes of right-sided pneumonectomy and resection of   multiple right-sided ribs with complete opacification of the   pneumonectomy bed. The appearance is unchanged compared to prior   study.                  MACRO:   None        Signed by: Oseas Stroud 1/23/2025 9:36 AM   Dictation workstation:   [id number]      XR abdomen 1 view   Final Result   1.  As described above.        Signed by: Vick Xiao 1/22/2025 1:01 PM   Dictation workstation:   [id number]      XR chest 1 view   Final Result   1. Unchanged appearance of multifocal patchy airspace opacity with   interstitial prominence of the left lung which may be due to   infectious process. Component of edema cannot be excluded.   2. Medical devices as described above with interval advancement of   the [place]-Paco catheter projecting over left lower lobe pulmonary   artery. Recommend retraction.   3. Postsurgical changes of right-sided  pneumonectomy and resection of   multiple right-sided ribs with complete opacification of the   pneumonectomy bed. The appearance is unchanged compared to prior   study.                  Signed by: Vick Xiao 1/22/2025 1:00 PM   Dictation workstation:   [id number]      XR chest 1 view   Final Result   1. Similar near-complete opacification of the right hemithorax, which   may be secondary to a large pleural effusion, pneumonectomy, or   bronchial obstruction.   2. Diffuse interstitial opacities and scattered patchy alveolar   opacities throughout the left lung, similar to the prior exam, which   may reflect prominent pulmonary edema or multifocal infection.   3. Medical devices as above.        I personally reviewed the image(s)/study and resident interpretation   as stated by Dr. Ange Sue MD. I agree with the findings as   stated. This study was interpreted at [facility], [place], [place].        MACRO:   None        Signed by: Oseas Stroud 1/21/2025 7:58 PM   Dictation workstation:   [id number]      Vascular US upper extremity venous duplex left   Final Result      Transthoracic Echo (TTE) Limited   Final Result      XR chest 1 view   Final Result   1.  Interval opacification of the right hemithorax and correlate with   post pneumonectomy changes.   2. [place]-Paco catheter overlies the left lower lobe pulmonary artery.   3. Slight interval improvement in left-sided pulmonary edema.   Underlying edema and correlate with fluid status.             Signed by: Nino Barbour 1/20/2025 9:57 AM   Dictation workstation:   [id number]      XR abdomen 1 view   Final Result   1.  As described above.        Signed by: Vick Xiao 1/19/2025 11:58 AM   Dictation workstation:   [id number]      XR chest 1 view   Final Result   1. Slight worsening of multifocal airspace opacity in the left mid   and lower lung. Findings are concerning for infectious process. Small    left pleural effusion.   2. Postsurgical changes right pneumonectomy with medical devices as   described above. Consider retracting the [place]-Paco catheter.                  Signed by: Vick Xiao 1/19/2025 8:55 AM   Dictation workstation:   [id number]      Transthoracic Echo (TTE) Limited   Final Result      XR chest 1 view   Final Result   1. Patchy airspace opacity in the left lower lung with slight   improvement in aeration. Correlate with concern for infection   2. Postsurgical changes of right pneumonectomy as described above.   3. Medical lines and devices as above. Consider retraction of the   [place]-Paco catheter.        I personally reviewed the images/study and I agree with the findings   as stated. This study was interpreted at [place], [place].        MACRO:   None        Signed by: Vick Xiao 1/19/2025 8:54 AM   Dictation workstation:   [id number]      XR chest 1 view   Final Result   1. Worsening aeration of the left lung with increasing left basilar   infiltrate and pleural effusion. Correlate with mucous plugging and   component of lobar collapse.   2. Component of interstitial prominence of the left lung, also   slightly increased which may be due to edema.   3. Postsurgical changes of right pneumonectomy as described                  Signed by: Vick Xiao 1/18/2025 11:35 AM   Dictation workstation:   [id number]      Cardiac Catheterization Procedure   Final Result      Transthoracic Echo (TTE) Limited   Final Result      XR chest 1 view   Final Result   1.  Stable exam with postsurgical changes of right pneumonectomy/rib   resection and large layering fluid component within the right   hemithorax.   2. Similar findings of left pulmonary edema with slightly increased   size of small left pleural effusion. No left-sided pneumothorax.   3. Medical devices as above.                  MACRO:   None        Signed by: Alexx Anne  1/17/2025 8:16 AM   Dictation workstation:   [id number]      XR chest 1 view   Final Result   1.  Postsurgical changes of right-sided pneumonectomy with increased   layering opacification of the right hemithorax, likely due to   increasing fluid.   2. Similar trace left-sided pleural effusion with linear left basilar   airspace opacity, likely atelectasis on a background of likely   pulmonary edema. Infection is not definitively excluded.   3. Medical devices as above with interval insertion of right internal   jugular central venous catheter projecting at the mid SVC.        I personally reviewed the images/study and I agree with the findings   as stated by resident Davidson Mcguire. This study was interpreted   at [place], [place].        MACRO:   None        Signed by: Alexx Anne 1/17/2025 8:14 AM   Dictation workstation:   [id number]      Transthoracic Echo (TTE) Limited   Final Result      US right upper quadrant   Final Result   Small amount of simple appearing ascites in the right upper quadrant   without evidence of cholelithiasis, cholecystitis or biliary dilation.        Signed by: Alberto Salas 1/16/2025 1:02 PM   Dictation workstation:   [id number]      XR chest 1 view   Final Result   1. Slight interval increase in left basilar airspace opacity which   may be due to increasing atelectasis. Cannot exclude developing   infectious infiltrate. Question trace left pleural effusion.   2. Again seen postsurgical changes related to right-sided   pneumonectomy as described above                  Signed by: Vick Xiao 1/16/2025 9:47 AM   Dictation workstation:   [id number]      US renal complete   Final Result   1. Unremarkable sonographic evaluation of the kidneys.   2. Incomplete evaluation of a gallbladder with some pericholecystic   fluid and biliary sludge. Dedicated right upper quadrant ultrasound   could be obtained as clinically  warranted.   3. A small amount of free fluid present in the right upper quadrant   and pelvis.        Findings relayed by phone by me to SICU provider at 5:11 p.m.        I personally reviewed the images/study and I agree with the findings   as stated by Erickson Best MD (resident) . This study was   interpreted at [facility],   [place], [place].        MACRO:   None        Signed by: Alberto Salas 1/15/2025 6:17 PM   Dictation workstation:   [id number]      XR chest 1 view   Final Result   1.  Status post pneumonectomy with interval increase in fluid   component of right-sided hydropneumothorax.   2. Medical devices as above.                  MACRO:   None        Signed by: Alexx Anne 1/15/2025 9:28 AM   Dictation workstation:   [id number]      XR chest 1 view   Final Result   1. Interval removal of right chest tube. Other medical devices as   described above.   2. Postsurgical changes related to right pneumonectomy.   3. Faint left basilar opacity, likely atelectatic.                  Signed by: Vick Xiao 1/14/2025 2:53 PM   Dictation workstation:   [id number]      XR chest 1 view   Final Result   1.  Stable right-sided pneumonectomy changes.             Signed by: Nino aBrbour 1/14/2025 12:56 PM   Dictation workstation:   [id number]      Transthoracic Echo (TTE) Limited   Final Result      XR chest 1 view   Final Result   1. Postsurgical changes related to right sided pneumonectomy with   multiple medical devices as described above.   2. Mild interstitial prominence of the left lung.                  Signed by: Vick Xiao 1/13/2025 3:16 PM   Dictation workstation:   [id number]           Assessment/Plan   Assessment:  Faustina Vick is a 65 y/o with PMHx of COPD and stage III right lung SCC s/p chemo/radiation, presenting to SICU from OR s/p Right Pneumonectomy and intrapericardial dissection with en bloc chest wall resection, and MLND  by Dr. Wagner on 1/13 requiring post-op vasoactive therapy. Post-op course c/b acute hyponatremia, multifactorial delirium, cardiogenic shock requiring inotropic support, and acute hypoxic/hypercarbic respiratory failure requiring intubation. COVID+ 1/20. Weaned off milrinone 1/21. Remains on iEpo 0.03.     Plan:  NEURO: History of anxiety on alprazolam 1mg qd, last filled 1/4/2025 per [facility] review. Family states she was not taking for several months but then restarted prn about 3 wks prior to surgery for situational anxiety. A&Ox4 at baseline. Acute post-op pain. Multifactorial delirium improved. Currently sedated and intubated. On precedex 1mcg/kg/hr and fentanyl 25mcg/hr. Able to briefly awaken and follow commands without sedation interruption.  - ongoing neuro and pain assessments  - continue precedex, titrate to goal RASS 0 to -2  - continue fentanyl infusion at 25mcg/hr for pain control -> slow wean  - continue scheduled oxycodone q6h  - Lidocaine patch to right chest wall  - PT/OT consulted. Advance activity as tolerated  - Restraints indicated while patient remains intubated, restrain with soft wrist restraints until medical devices are discontinued.      CV: No history of cardiac disease. Baseline /79, -110. TTE 9/2024 normal biV function, RVSP 10mmHg. Intra-op hypotension requiring vaso and epi. Arrived to SICU on vaso 0.06 units/min, started on levo 0.05 mcg/kg/min. TTE 1/13 with EF 60-65%, low normal RV systolic function. Weaned off vasopressors 1/15. Elevated lactate to 2.9 on 1/16 with SOB and new O2 requirement. Repeat TTE 1/16: acute biV dysfunction with signs of takotsubo cardiomyopathy, EF 34%, mod reduced RV systolic function, mildly elevated RVSP, mild to mod TR. Cardiogenic shock, started milrinone, lasix infusion, and vasopressin. Shock Call on 1/16, no indication for mechanical circulatory support at this time. Initial troponin 928 in the setting of demand ischemia, with repeat  troponin downtrend to 625. Lactate normalized. Epinephrine infusion added overnight 1/16-1/17, discontinued 2/2 tachycardia in the 130s. Milrinone increased to 0.25mcg/kg/min (decreased to 0.125 thereafter). Lasix infusion stopped 1/17 given contraction alkalosis. Taken to cath lab 1/17 for RHC/PA cath placement, opening CI 2.9. Milrinone uptitrated to 0.375mcg/kg/min 1/17-1/18. iEpo 0.05 added 1/18. Vasopressin increased to 0.06 and levo added to 0.15 1/18 evening at time of intubation. Given total of 750ml 5% albumin and 1 unit RBC since evening 1/18 through overnight 1/19. Repeat TTE 1/20 with improved EF to 35%. Levo weaned off 1/20, weaning milrinone. Responsive to diuresis. Intermittent sinus tachycardia. Milrinone discontinued 1/21 at ~6pm. Thermo CI 2-2.2 overnight, mixed SvO2 59. iEpo at 0.03. On/off low dose vaso overnight t max 0.03.  - continuous EKG/abp/cvp/pap monitoring  - thermodilution and mixed SvO2 q4h and prn  - Cardiology following, appreciate recs  - hold off on diuresis for now  - wean iEpo to 0.02  - titrate vaso to MAP goal >65  - continue holding metoprolol  -25g albumin given x1 for hypotension post-SBT  - shock call if condition worsens     PULM: Hx of 30 pack year smoking history (quit 4/2024), COPD, and stage III right lung SCC s/p chemo/XRT/immunotherapy. 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 Arrived to SICU extubated on 10L SFM, weaned to room air 1/14. Chest tube removed 1/14. Acute on chronic hypoxic respiratory failure 1/16. Escalation to Airvo 50L 90% overnight 1/17-1/18. Acute hypercapnia 1/18 with increase WOB, trial of BIPAP with iEPO. Eventual intubation 1/18 evening. Went on on AC/VC 50%/280/18/+5. Mediastinal shift on AM cxr. Attempted SIMV trial @Rate 16 on 1/23. Tachycardic, increased work of breathing. Returned back to ACVC with marked improvement of work of breathing/restlessness/vent synchrony.   - iEpo as  above in CV  - continue mechanical ventilation -> continue to trial weaning daily  - wean FiO2 to maintain SpO2 >92%  - SDS switched to decadron on 1/22 (tentative 10 day course from 1/20)  - scheduled budesonide/duoneb  - PRN albuterol  - Daily CXR  - ABG q4h and  prn  - holding home benzonatate     GI: No GI history. LFTs WNL 12/2/24. Evidence of pericholecystic fluid and biliary sludge on renal US 1/15 but no abdominal symptoms. RUQ US with small ascites, otherwise no acute findings. Abdominal distention this am. Elevated LFTs, now normalized. LBM 1/17. OG tube placed 1/19 for gastric decompression. TFs initiated via OG 1/20, then held on 1/21 due to regurgitation from OG. Dobhoff placed via IRIS and advanced post-pyloric. Per bedside RN, patient had large liquid stools overnight.   - continue TF, increase to goal rate 30ml/hr  - consider placement of OG for gastric decompression simultaneous with distal enteral feeds  - continue thiamine 100mg IV daily  - continue IV PPI for GI prophylaxis  - bisacodyl [place] daily, colace and senna BID, miralax daily    : No history of renal disease. Baseline creatinine 0.6. Symptomatic acute hyponatremia (SIADH vs vaso mediated) to 120 overnight 1/14 - 1/15, corrected to 124 after 150cc 3% NaCl x2 on 1/15. Renal US on 1/15 unremarkable. Started on lasix infusion 1/16 as above in CV, stopped 1/17. Spot diuresis 1/20. Hypokalemia. Yoo changed overnight 1/18-1/19. Oliguria resolved. Net 684ml over past 24hrs.  - RFP q12h and PRN  - Nephrology following, appreciate recs  - Maintain yoo for strict I/Os   - Maintain U/O >0.5ml/kg/hr  - Replete electrolytes to maintain K>4, Phos>2.5, iCal>1.1, Mag>2.      HEME: Hx of DOC. Baseline H/H 12/40. Acute blood loss anemia, OR EBL 150mL. Acute coagulopathy with INR elevated to 2.3 2/2 to poor nutritional status. Received IV Vit K 10mg x1 with improved INR thereafter. Transfused 1u PRBC 1/17 and 1u PRBC on 1/18. LUE duplex obtained 1/20 for  swelling, negative for DVT.  - Check CBC daily and PRN  - coags prn  - goal Hgb >9  - SCDs and SQH for DVT ppx  - ongoing monitoring for s/s bleeding  - maintain active T&S (1/22)    ENDO: No history of DM or thyroid disease. TSH 0.48 (1/17). Recent 5d course of prednisone 10mg 12/2024 for URI. Adequate glycemic control. Started on SDS out of OR 1/13. Transitioned to decadron 1/22  - BG q4h, SSI #1     MSK: Arthritis, Vit D deficiency.  - not on meds at home  - monitor for symptoms     ID: Febrile on 1/18, Tmax 38.1. Completed yunior-op course of cefazolin. Leukocytosis to 17.2 after initiation of stress dose steroids, which resolved, but now with recurrent mild leukocytosis. Worsened clinical condition 1/17, started on vanco, zosyn and azithromycin. Blood cultures 1/17 negative final. MRSA negative 1/17. Respiratory viral panel negative 1/17. Yoo changed 1/18, Urine cx 1/18 negative. Added fluc overnight 1/18-1/19. Sputum culture 1/19 negative. Vancomycin discontinued 1/20. Urine strep/legionella 1/20 negative. COVID+ on 1/20, initiated remdesivir and decadron. Discontinued fluc and azithro 1/21.  - COVID precautions  - ID consulted, appreciate recs  - temp q4h, wbc daily  - ongoing monitoring for s/s infection  - continue zosyn (7 day course completes today end of day)  - continue remdesivir (5 day course completes end of day 1/24)     Lines:  - RIJ MAC (SICU, 1/16)  - PA cath (Cath lab. 1/17)  - L brachial A-line (1/16)  - Left chest mediport accessed   - PIV x3  - yoo (1/18)  - ETT (1/18)     Dispo: Continue ICU care. Patient seen and discussed with ICU attending Dr. Timo MALONE , RN, CNP student  SICU Phone [phone]    I was present with a nurse practitioner student who participated in the documentation of this note. I personally saw and evaluated the patient and performed my own history and examination.  I discussed the case with the nurse practitioner  student. I have reviewed, verified, and revised  the note as necessary and agree with the content and plan as written by the nurse practitioner student.  ANDREA Disla     Critical Care time: 60 minutes

## 2025-01-23 NOTE — CARE PLAN
Problem: Pain - Adult  Goal: Verbalizes/displays adequate comfort level or baseline comfort level  Outcome: Progressing     Problem: Safety - Adult  Goal: Free from fall injury  Outcome: Progressing     Problem: Skin  Goal: Decreased wound size/increased tissue granulation at next dressing change  1/23/2025 1503 by Alexandria Sanchez RN  Outcome: Progressing  1/23/2025 1500 by Alexandria Sanchez RN  Outcome: Progressing     Problem: Safety - Medical Restraint  Goal: Remains free of injury from restraints (Restraint for Interference with Medical Device)  Outcome: Progressing

## 2025-01-23 NOTE — PROGRESS NOTES
"Faustina Vick \"Noam" is a 64 y.o. 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    Subjective   -Patient seen this morning resting comfortably in bed  -VSS: tachycardia with rates into the 130s, remains mechanically ventilated    -Clifton Numbers today: Mixed venous O2 60%, CVP 9 mPAP 23-> continues to reinforce shock is not likely cardiogenic  -Patient remains on vaso @ 0.03  -UOP: 1463 mL yesterday  -Patient weaned off milrinone since 1735 on 1/21/25, now off of all vasopressor medications       Objective     Physical Exam  General: Not in acute distress, somnolent, alert, cooperative, ill-appearing  HEENT: Normocephalic, atraumatic, EOMI, moist mucous membranes, ETT in place, OGT in place   Neck: Neck supple, trachea midline, no evidence of trauma  Cardiovascular: Tachycardic, regular rhythm, S1 and S2 appreciated, no murmurs rubs gallops appreciated, distal pulses 2+ bilaterally (radial and dorsalis pedis)  Respiratory: Coarse mechanical breath sounds appreciated bilaterally, with loss of lung sounds from right mid lobe down,no increased work of breathing on mechanical ventilation  GI: Abdomen soft, nondistended, nontender to palpation, bowel sounds present  : yoo cath draining straw colored urine  Extremities: No edema appreciated in lower extremities bilaterally, no cyanosis  Neuro: no focal deficits, strength and sensation intact bilaterally  Skin: Warm and dry, without lesions or rashes    Last Recorded Vitals  Blood pressure 103/56, pulse (!) 131, temperature 37.4 °C (99.3 °F), resp. rate 23, height 1.651 m (5' 5\"), weight 59.5 kg (131 lb 2.8 oz), SpO2 95%.  Intake/Output last 3 Shifts:  I/O last 3 completed shifts:  In: 2725.3 (45.8 mL/kg) [I.V.:870.3 (14.6 mL/kg); NG/GT:360; IV Piggyback:1495]  Out: 2168 (36.4 mL/kg) [Urine:2118 (1 mL/kg/hr); Emesis/NG output:50]  Weight: 59.5 kg     Relevant Results  Transthoracic Echo (TTE) Limited    Result Date: " 1/17/2025   [facility] (formerly [facility]3], [address] [place] [address]                Tel [phone] and Fax [phone] TRANSTHORACIC ECHOCARDIOGRAM REPORT  Patient Name:       RAÚL Rodríguez Physician:    [id number] Rita Graham MD Study Date:         1/17/2025           Ordering Provider:    [id number] ROLANDO ARAGON MRN/PID:            [id number]            Fellow: Accession#:         [id number]        Nurse: Date of Birth/Age:  1960 / 64      Sonographer:          Domonique rocha                                     CARIE Gender assigned at  F                   Additional Staff: Birth: Height:             165.10 cm           Admit Date:           1/13/2025 Weight:             52.16 kg            Admission Status:     Inpatient - STAT BSA / BMI:          1.56 m2 / 19.14     Encounter#:           [id number]                     kg/m2 Blood Pressure:     103/74 mmHg         Department Location:  [facility] Study Type:    TRANSTHORACIC ECHO (TTE) LIMITED Diagnosis/ICD: Acute combined systolic (congestive) and diastolic (congestive)                heart failure (CHF)-I50.41 Indication:    Acute systolic/diastolic failure CPT Code:      Echo Limited-67614; Doppler Limited-69824; Color Doppler-08256 Patient History: Pertinent History: Anxiety; Arthritis; Asthma; Hx of squamous cell carcinoma; Hx                    of chemotherapy; Lung cancer; Prior smoker; Known EF of 34%                    (01/16/24) with described regional wall motion abnormalities                    of the LV and RV suggestive of biventricular Takotsubo. Study Detail: The following Echo studies were performed: 2D, M-Mode, Doppler and               color flow. Technically challenging study due to body habitus and               patient lying in supine position. Definity  used as a contrast               agent for endocardial border definition. Total contrast used for               this procedure was 1 mL via IV push.  PHYSICIAN INTERPRETATION: Left Ventricle: Left ventricular ejection fraction is severely decreased, by visual estimate at 25-30%. There are multiple left ventricular wall motion abnormalities. The left ventricular cavity size is normal. Left ventricular diastolic filling cannot be determined, due to E/A wave fusion. There is no definite left ventricular thrombus visualized. Apical and mid LV segments including the RV free wall are completely akinetic. LV Wall Scoring: The entire apex, mid and apical anterior wall, mid and apical anterior septum, mid and apical inferior septum, mid and apical inferior wall, mid inferolateral segment, and mid anterolateral segment are akinetic. All remaining scored segments are normal. Left Atrium: The left atrium is mild to moderately dilated. Right Ventricle: The right ventricle is normal in size. There is reduced right ventricular systolic function. Right Atrium: The right atrium is mildly dilated. Aortic Valve: The aortic valve is structurally normal. The aortic valve dimensionless index is 0.82. There is no evidence of aortic valve regurgitation. The peak instantaneous gradient of the aortic valve is 3 mmHg. The mean gradient of the aortic valve is 2 mmHg. Mitral Valve: The mitral valve is normal in structure. There is trace mitral valve regurgitation. Tricuspid Valve: The tricuspid valve is structurally normal. There is trace tricuspid regurgitation. The Doppler estimated RVSP is within normal limits at 27.0 mmHg. Pulmonic Valve: The pulmonic valve is not well visualized. Pulmonic valve regurgitation was not assessed. Pericardium: Trivial pericardial effusion. Aorta: The aortic root is normal. Systemic Veins: The inferior vena cava appears normal in size. In comparison to the previous echocardiogram(s): Compared with study dated  2025, no significant change.  CONCLUSIONS:  1. Left ventricular ejection fraction is severely decreased, by visual estimate at 25-30%.  2. Multiple segmental abnormalities exist. See findings.  3. Apical and mid LV segments including the RV free wall are completely akinetic.  4. There are multiple left ventricular wall motion abnormalities.  5. Left ventricular diastolic filling cannot be determined, due to E/A wave fusion.  6. No left ventricular thrombus visualized.  7. There is reduced right ventricular systolic function.  8. The left atrium is mild to moderately dilated.  9. Right ventricular systolic pressure is within normal limits. 10. Compared with study dated 2025, no significant change. QUANTITATIVE DATA SUMMARY:  2D MEASUREMENTS:          Normal Ranges: Ao Root d:       3.43 cm  (2.0-3.7cm) LAs:             3.03 cm  (2.7-4.0cm) LVEDV Index:     54 ml/m2  RA VOLUME BY A/L METHOD:          Normal Ranges: RA Area A4C:             18.3 cm2  LV SYSTOLIC FUNCTION BY 2D PLANIMETRY (MOD):                      Normal Ranges: EF-A4C View:    19 % (>=55%) EF-A2C View:    22 % EF-Biplane:     19 % EF-Visual:      28 % LV EF Reported: 28 %  LV DIASTOLIC FUNCTION:           Normal Ranges: MV Peak E:             0.99 m/s  (0.7-1.2 m/s) MV e'                  0.091 m/s (>8.0) MV lateral e'          0.12 m/s MV medial e'           0.06 m/s E/e' Ratio:            10.92     (<8.0) MV DT:                 119 msec  (150-240 msec)  AORTIC VALVE:                     Normal Ranges: AoV Vmax:                0.87 m/s (<=1.7m/s) AoV Peak PG:             3.0 mmHg (<20mmHg) AoV Mean P.0 mmHg (1.7-11.5mmHg) LVOT Max Navi:            0.67 m/s (<=1.1m/s) AoV VTI:                 11.40 cm (18-25cm) LVOT VTI:                9.30 cm LVOT Diameter:           1.94 cm  (1.8-2.4cm) AoV Area, VTI:           2.41 cm2 (2.5-5.5cm2) AoV Area,Vmax:           2.26 cm2 (2.5-4.5cm2) AoV Dimensionless Index: 0.82  RIGHT  VENTRICLE: RV Basal 3.40 cm RV Mid   2.30 cm RV Major 6.0 cm TAPSE:   11.9 mm RV s'    0.09 m/s  TRICUSPID VALVE/RVSP:          Normal Ranges: Peak TR Velocity:     2.45 m/s RV Syst Pressure:     27 mmHg  (< 30mmHg)  [id number] Rita Graham MD Electronically signed on 1/17/2025 at 12:13:06 PM  Wall Scoring  ** Final **     XR chest 1 view    Result Date: 1/17/2025  Interpreted By:  Alexx Anne, STUDY: XR CHEST 1 VIEW;  1/17/2025 6:40 am   INDICATION: Signs/Symptoms:pneumonectomy.     COMPARISON: Exam dated 01/16/2025   ACCESSION NUMBER(S): [id number]   ORDERING CLINICIAN: GABBY SMITH   FINDINGS: AP radiograph of the chest was provided.   Right IJ approach central venous catheter tip projects over the mid SVC. Left chest wall medication port tip projects over the confluence of the left brachiocephalic vein and SVC. Persistent subcutaneous emphysema overlying the right chest wall.   CARDIOMEDIASTINAL SILHOUETTE: Cardiomediastinal silhouette is stable in size and configuration. Complete obscuration of the right cardiomediastinal contours. Thin curvilinear lucency projecting along the right cardiac border may represent a component of pneumomediastinum.   LUNGS: Status post on bloc pneumonectomy and right rib resection. Similar gradient opacification of the right hemithorax. Similar prominent interstitial markings in the left lung with slightly increased blunting of the left costophrenic angle. Streaky left basilar opacities are most consistent with atelectasis. No evidence of left-sided pneumothorax.   ABDOMEN: No remarkable upper abdominal findings.   BONES: No acute osseous changes.       1.  Stable exam with postsurgical changes of right pneumonectomy/rib resection and large layering fluid component within the right hemithorax. 2. Similar findings of left pulmonary edema with slightly increased size of small left pleural effusion. No left-sided pneumothorax. 3. Medical devices as above.       MACRO: None    Signed by: Alexx Anne 1/17/2025 8:16 AM Dictation workstation:   [id number]    XR chest 1 view    Result Date: 1/17/2025  Interpreted By:  Alexx Anne and Ritchie Brandon STUDY: XR CHEST 1 VIEW;  1/16/2025 7:01 pm   INDICATION: Signs/Symptoms:central line placement.   COMPARISON: Chest x-ray 01/16/2025, 6:41 a.m.   ACCESSION NUMBER(S): [id number]   ORDERING CLINICIAN: GABBY SMITH   FINDINGS: AP radiograph of the chest was provided.   Leftchest wall MediPort tip projects over the expected location of the upper SVC. Right internal jugular central venous catheter tip projects over the expected location of the mid SVC. There is subcutaneous emphysematous changes along the right lateral chest wall.   CARDIOMEDIASTINAL SILHOUETTE: Cardiomediastinal silhouette is stable in size and configuration. There is again obscuration of the right cardiomediastinal silhouette.   LUNGS: Redemonstration of postsurgical changes compatible with recent history of right pneumonectomy. Gradual, layering opacification of the right hemithorax which is increased when compared to prior examination. There is again trace blunting of the left costophrenic angle with adjacent linear opacities in the left lower lobe and findings of increased interstitial markings in the left lung. No evidence of left-sided pneumothorax.   ABDOMEN: No remarkable upper abdominal findings.   BONES: Resection of multiple upper right-sided ribs. No acute osseous changes.       1.  Postsurgical changes of right-sided pneumonectomy with increased layering opacification of the right hemithorax, likely due to increasing fluid. 2. Similar trace left-sided pleural effusion with linear left basilar airspace opacity, likely atelectasis on a background of likely pulmonary edema. Infection is not definitively excluded. 3. Medical devices as above with interval insertion of right internal jugular central venous catheter projecting at the mid SVC.   I personally  reviewed the images/study and I agree with the findings as stated by resident Davidson Mcguire. This study was interpreted at [facility], [place], [place].   MACRO: None   Signed by: Alexx Anne 1/17/2025 8:14 AM Dictation workstation:   [id number]    ECG 12 lead    Result Date: 1/16/2025  Normal sinus rhythm Low voltage QRS Cannot rule out Anterior infarct (cited on or before 16-JAN-2025) Abnormal ECG When compared with ECG of 16-JAN-2025 14:04, No significant change was found    Transthoracic Echo (TTE) Limited    Result Date: 1/16/2025   [facility], [address] [place] [address]                Tel [phone] and Fax [phone] TRANSTHORACIC ECHOCARDIOGRAM REPORT  Patient Name:       RAÚL Rodríguez Physician:    [id number] Fabián Kulkarni MD Study Date:         1/16/2025           Ordering Provider:    [id number] GABBY SMITH MRN/PID:            [id number]            Fellow: Accession#:         [id number]        Nurse: Date of Birth/Age:  1960 / 64      Sonographer:          Naya rocha RDCS Gender assigned at  F                   Additional Staff: Birth: Height:             165.10 cm           Admit Date:           1/20/2025 Weight:             51.71 kg            Admission Status:     Inpatient - STAT BSA / BMI:          1.56 m2 / 18.97     Encounter#:           [id number]                     kg/m2 Blood Pressure:     106/62 mmHg         Department Location:  [facility] Study Type:    TRANSTHORACIC ECHO (TTE) LIMITED Diagnosis/ICD: Acute respiratory failure with hypoxia-J96.01 Indication:    Post pneumonectomy; new O2 requirements; eval BIV function CPT Code:      Echo Limited-54858; Color Doppler-52008 Patient History: Pertinent History:  COPD. Lung cancer; s/p pneumonectomy; immunotherapy. Study Detail: The following Echo studies were performed: 2D, M-Mode and color               flow. Technically challenging study due to body habitus and               prominent lung artifact. Definity used as a contrast agent for               endocardial border definition. Total contrast used for this               procedure was 3 mL via IV push. Unable to obtain suprasternal               notch view.  PHYSICIAN INTERPRETATION: Left Ventricle: Left ventricular ejection fraction is moderately decreased, calculated by Miranda's biplane at 34%. Left venticular wall motion is abnormal. The left ventricular cavity size is upper limits of normal. There is normal septal and normal posterior left ventricular wall thickness. Spectral Doppler shows a Grade II (pseudonormal pattern) of left ventricular diastolic filling with an elevated left atrial pressure. The described regional wall motion abnormalities of the LV and RV are suggestive of biventricular Takotsubo (stress cardiomyopathy). LV Wall Scoring: The mid and apical anterior wall, mid and apical anterior septum, mid and apical inferior septum, mid and apical inferior wall, mid inferolateral segment, mid anterolateral segment, and apical lateral segment are akinetic. All remaining scored segments are normal. Left Atrium: The left atrium is moderately dilated. Right Ventricle: The right ventricle is normal in size. There is moderately reduced right ventricular systolic function. There is akinesis of the mid and apical segments of the RV free wall. Right Atrium: The right atrium is normal in size. A dilated inferior vena cava demonstrates poor inspiratory collapse, consistent with elevated right atrial pressures. Aortic Valve: The aortic valve is trileaflet. There is trace aortic valve regurgitation. Mitral Valve: The mitral valve is normal in structure. There is mild mitral valve regurgitation. Tricuspid Valve: The  tricuspid valve is structurally normal. There is mild to moderate tricuspid regurgitation. The Doppler estimated RVSP is mildly elevated at 45.5 mmHg. Pulmonic Valve: The pulmonic valve is not well visualized. Pulmonic valve regurgitation was not assessed. Pericardium: There is no pericardial effusion noted. Aorta: The aortic root is normal. Systemic Veins: The inferior vena cava appears dilated, with IVC inspiratory collapse less than 50%. In comparison to the previous echocardiogram(s): Compared with study dated 1/13/2025, the LV and RV wall motion abnormalities were not present in prior study.  CONCLUSIONS:  1. Left ventricular ejection fraction is moderately decreased, calculated by Miranda's biplane at 34%.  2. Multiple segmental abnormalities exist. See findings.  3. Spectral Doppler shows a Grade II (pseudonormal pattern) of left ventricular diastolic filling with an elevated left atrial pressure.  4. There is moderately reduced right ventricular systolic function.  5. There is akinesis of the mid and apical segments of the RV free wall.  6. Mildly elevated right ventricular systolic pressure.  7. Mild to moderate tricuspid regurgitation visualized.  8. The left atrium is moderately dilated.  9. A dilated inferior vena cava demonstrates poor inspiratory collapse, consistent with elevated right atrial pressures. 10. The described regional wall motion abnormalities of the LV and RV are suggestive of biventricular Takotsubo (stress cardiomyopathy). QUANTITATIVE DATA SUMMARY:  2D MEASUREMENTS:          Normal Ranges: IVSd:            0.60 cm  (0.6-1.1cm) LVPWd:           0.70 cm  (0.6-1.1cm) LVIDd:           4.80 cm  (3.9-5.9cm) LVIDs:           3.50 cm LV Mass Index:   63 g/m2 LVEDV Index:     61 ml/m2 LV % FS          27.1 %  LA VOLUME:                   Normal Ranges: LA Vol A4C:        69.1 ml   (22+/-6mL/m2) LA Vol Index A4C:  44.3ml/m2 LA Area A4C:       21.1 cm2 LA Major Axis A4C: 5.5 cm  RA VOLUME BY A/L  METHOD:          Normal Ranges: RA Area A4C:             16.3 cm2  M-MODE MEASUREMENTS:         Normal Ranges: Ao Root:             3.00 cm (2.0-3.7cm) LAs:                 3.50 cm (2.7-4.0cm)  LV SYSTOLIC FUNCTION BY 2D PLANIMETRY (MOD):                      Normal Ranges: EF-A4C View:    40 % (>=55%) EF-A2C View:    31 % EF-Biplane:     34 % LV EF Reported: 34 %  LV DIASTOLIC FUNCTION:           Normal Ranges: MV Peak E:             0.87 m/s  (0.7-1.2 m/s) MV Peak A:             0.59 m/s  (0.42-0.7 m/s) E/A Ratio:             1.48      (1.0-2.2) MV e'                  0.057 m/s (>8.0) MV lateral e'          0.06 m/s MV medial e'           0.05 m/s E/e' Ratio:            15.37     (<8.0)  MITRAL VALVE:          Normal Ranges: MV DT:        119 msec (150-240msec)  RIGHT VENTRICLE: RV Basal 3.51 cm RV Mid   2.52 cm RV Major 6.0 cm TAPSE:   9.6 mm RV s'    0.07 m/s  TRICUSPID VALVE/RVSP:          Normal Ranges: Peak TR Velocity:     3.26 m/s RV Syst Pressure:     46 mmHg  (< 30mmHg) IVC Diam:             2.25 cm  [id number] Fabián Kulkarni MD Electronically signed on 1/16/2025 at 2:58:07 PM  Wall Scoring  ** Final (Updated) **     US right upper quadrant    Result Date: 1/16/2025  Interpreted By:  Alberto Salas, STUDY: US RIGHT UPPER QUADRANT; 1/16/2025 11:09 am   INDICATION: Signs/Symptoms:pericholecystic fluid seen on renal us.   COMPARISON: None.   ACCESSION NUMBER(S): [id number]   ORDERING CLINICIAN: WILMER ANTHONY   TECHNIQUE: Multiple images of the right upper quadrant were obtained.   FINDINGS: LIVER: The liver measures up to  15.5 cm in greatest sagittal dimension. Echogenicity is within normal limits. There is no morphologic evidence of cirrhosis and no focal hepatic lesion is evident.   GALLBLADDER: The gallbladder is within normal limits without wall thickening or cholelithiasis. A sonographic Odom sign was not present. There is a small amount of simple appearing ascites in the right upper quadrant.    BILIARY SYSTEM: There is no intrahepatic biliary dilation. The common bile duct measures up to  5 mm in width.   PANCREAS: The pancreas is partially obscured by bowel gas. The visualized portions of the pancreas are within normal limits.   RIGHT KIDNEY: The right kidney measures up to  9.9 cm in greatest sagittal dimension. Cortical echogenicity and thickness are within normal limits. No hydroureteronephrosis.       Small amount of simple appearing ascites in the right upper quadrant without evidence of cholelithiasis, cholecystitis or biliary dilation.   Signed by: Alberto Salas 1/16/2025 1:02 PM Dictation workstation:   [id number]    XR chest 1 view    Result Date: 1/16/2025  Interpreted By:  Vick Eli, STUDY: XR CHEST 1 VIEW; 1/16/2025 6:43 am   INDICATION: Signs/Symptoms:ICU.   COMPARISON: Radiograph dated 01/15/2025   ACCESSION NUMBER(S): [id number]   ORDERING CLINICIAN: GABBY SMITH   FINDINGS: Postsurgical changes of right pneumonectomy and resection of multiple right-sided ribs. Slight interval increase in opacity in the lower portion of the pneumonectomy site. Slight interval increase in left basilar faint airspace opacity. Blunting of left costophrenic angle. No left pneumothorax.   Cardiac silhouette size is grossly stable, however right heart border is obscured.   Left subclavian approach MediPort is unchanged in position. Interval removal of right IJ central venous catheter sheath.   Subcutaneous emphysema in the right chest wall and right side of the neck.       1. Slight interval increase in left basilar airspace opacity which may be due to increasing atelectasis. Cannot exclude developing infectious infiltrate. Question trace left pleural effusion. 2. Again seen postsurgical changes related to right-sided pneumonectomy as described above       Signed by: Vick Xiao 1/16/2025 9:47 AM Dictation workstation:   [id number]    US renal complete    Result Date:  1/15/2025  Interpreted By:  Alberto Salas and Velez-Martinez Osvaldo STUDY: US RENAL COMPLETE;  1/15/2025 3:25 pm   INDICATION: Signs/Symptoms:symptomatic hyponatremia.     COMPARISON: CT abdomen 08/15/2024 FDG PET-CT 12/30/2024   ACCESSION NUMBER(S): [id number]   ORDERING CLINICIAN: GABBY SMITH   TECHNIQUE: Multiple images of the kidneys were obtained  .   FINDINGS: RIGHT KIDNEY: The right kidney measures 11.17 cm in length. The renal cortical echogenicity and thickness are within normal limits. No hydronephrosis is present; no evidence of nephrolithiasis.   LEFT KIDNEY: The left kidney measures 11.75 cm in length. The renal cortical echogenicity and thickness are within normal limits. No hydronephrosis is present; no evidence of nephrolithiasis.   BLADDER: A Montiel is present within a decompressed bladder.   Incidental note is made of a somewhat edematous gallbladder with some pericholecystic fluid and layering echogenic material likely representing biliary sludge.   Free fluid is present within the right upper quadrant and pelvis.       1. Unremarkable sonographic evaluation of the kidneys. 2. Incomplete evaluation of a gallbladder with some pericholecystic fluid and biliary sludge. Dedicated right upper quadrant ultrasound could be obtained as clinically warranted. 3. A small amount of free fluid present in the right upper quadrant and pelvis.   Findings relayed by phone by me to SICU provider at 5:11 p.m.   I personally reviewed the images/study and I agree with the findings as stated by Erickson Best MD (resident) . This study was interpreted at [place], [place].   MACRO: None   Signed by: Alberto Salas 1/15/2025 6:17 PM Dictation workstation:   [id number]    XR chest 1 view    Result Date: 1/15/2025  Interpreted By:  Alexx Anne, STUDY: XR CHEST 1 VIEW;  1/15/2025 6:46 am   INDICATION: Signs/Symptoms:routine ICU.     COMPARISON: Exam  dated 01/14/2025   ACCESSION NUMBER(S): [id number]   ORDERING CLINICIAN: GABBY SMITH   FINDINGS: AP radiograph of the chest was provided.   Left chest wall subclavian approach medication port with catheter tip projecting over the upper SVC. Right IJ central venous catheter tip projects over the upper SVC. Interval increase in subcutaneous emphysema overlying the right lower cervical soft tissues. Similar subcutaneous emphysema along the right chest wall.   CARDIOMEDIASTINAL SILHOUETTE: Cardiomediastinal silhouette is stable in size and configuration.   LUNGS: Postsurgical changes of pneumonectomy are again visualized. There is a moderate amount of layering pleural fluid within the right hemithorax on a background of diffuse lucency. Streaky left basilar atelectasis. The left lung otherwise appears clear.   ABDOMEN: No remarkable upper abdominal findings.   BONES: No acute osseous changes.       1.  Status post pneumonectomy with interval increase in fluid component of right-sided hydropneumothorax. 2. Medical devices as above.       MACRO: None   Signed by: Alexx Anne 1/15/2025 9:28 AM Dictation workstation:   [id number]    XR chest 1 view    Result Date: 1/14/2025  Interpreted By:  Vick Eli, STUDY: XR CHEST 1 VIEW; 1/14/2025 2:40 pm   INDICATION: Signs/Symptoms:Chest tube removal.   COMPARISON: Radiograph dated 01/14/2025   ACCESSION NUMBER(S): [id number]   ORDERING CLINICIAN: ERINN SANCHEZ   FINDINGS: Postsurgical changes related to right pneumonectomy with partially resected ribs.. Interval removal of right chest tube. Left subclavian MediPort and right IJ central venous catheter sheath are unchanged in positioning.   Cardiac silhouette size is slightly enlarged, unchanged.   Faint left basilar airspace opacity, likely atelectatic. No sizable pneumothorax.   Small amount of subcutaneous and soft tissue emphysema in the right chest wall.       1. Interval removal of right chest tube.  Other medical devices as described above. 2. Postsurgical changes related to right pneumonectomy. 3. Faint left basilar opacity, likely atelectatic.       Signed by: Vick Xiao 1/14/2025 2:53 PM Dictation workstation:   [id number]    XR chest 1 view    Result Date: 1/14/2025  Interpreted By:  Nino Barbour, STUDY: XR CHEST 1 VIEW; 1/14/2025 7:06 am   INDICATION: Signs/Symptoms:recent pneumonectomy.   COMPARISON: 01/13/2025.   ACCESSION NUMBER(S): [id number]   ORDERING CLINICIAN: GABBY SMITH   FINDINGS: Right-sided chest tube in place.   CARDIOMEDIASTINAL SILHOUETTE: Right-sided mediastinal shift consistent with recent right-sided pneumonectomy.   LUNGS: Right-sided pneumonectomy changes.   ABDOMEN: No remarkable upper abdominal findings.   BONES: Right-sided thoracotomy changes.       1.  Stable right-sided pneumonectomy changes.     Signed by: Nino Barbour 1/14/2025 12:56 PM Dictation workstation:   [id number]    Transthoracic Echo (TTE) Limited    Result Date: 1/13/2025   [facility], [address] [place] [address]                Tel [phone] and Fax [phone] TRANSTHORACIC ECHOCARDIOGRAM REPORT  Patient Name:       RAÚL Rodríguez Physician:    [id number] Cam Llanos MD Study Date:         1/13/2025           Ordering Provider:    [id number] WILMER ANTHONY MRN/PID:            [id number]            Fellow: Accession#:         [id number]        Nurse: Date of Birth/Age:  1960 / 64      Sonographer:          José Miguel rocha                                     [facility] Gender assigned at  F                   Additional Staff: Birth: Height:             165.10 cm           Admit Date:           1/13/2025 Weight:             51.71 kg            Admission Status:     Inpatient - STAT BSA / BMI:           1.56 m2 / 18.97     Encounter#:           [id number]                     kg/m2 Blood Pressure:     100/57 mmHg         Department Location:  [facility] Study Type:    TRANSTHORACIC ECHO (TTE) LIMITED Diagnosis/ICD: Hypotension, unspecified-I95.9 Indication:    Hypotension CPT Code:      Echo Limited-40527; Doppler Limited-82190; Color Doppler-52985  Study Detail: The following Echo studies were performed: 2D, Doppler and color               flow. Technically challenging study due to body habitus and               patient lying in supine position.  PHYSICIAN INTERPRETATION: Left Ventricle: The left ventricular systolic function is normal, with a visually estimated ejection fraction of 60-65%. There are no regional left ventricular wall motion abnormalities. The left ventricular cavity size is normal. Left ventricular diastolic filling was not assessed. Left Atrium: The left atrium was not well visualized. Right Ventricle: The right ventricle is normal in size. There is low normal right ventricular systolic function. RV S' and TAPSE under measured due to technical difficulty visualizing lateral tricuspid valve annulus. Right Atrium: The right atrium was not well visualized. Aortic Valve: The aortic valve was not well visualized. There is no evidence of aortic valve regurgitation. Mitral Valve: The mitral valve is normal in structure. There is trace mitral valve regurgitation. Tricuspid Valve: The tricuspid valve was not well visualized. There is trace tricuspid regurgitation. Pulmonic Valve: The pulmonic valve is not well visualized. The pulmonic valve regurgitation was not well visualized. Pericardium: Trivial pericardial effusion. Aorta: The aortic root is normal. Pulmonary Artery: The tricuspid regurgitant velocity is 2.10 m/s, and with an estimated right atrial pressure of 8 mmHg, the estimated pulmonary artery pressure is normal with the RVSP at 25.6 mmHg. Systemic Veins: The inferior vena cava appears  normal in size, with IVC inspiratory collapse less than 50%. In comparison to the previous echocardiogram(s): Compared with study dated 10/3/2024, no significant change.  CONCLUSIONS:  1. The left ventricular systolic function is normal, with a visually estimated ejection fraction of 60-65%.  2. There is low normal right ventricular systolic function. QUANTITATIVE DATA SUMMARY:  LV SYSTOLIC FUNCTION BY 2D PLANIMETRY (MOD):                      Normal Ranges: EF-A4C View:    65 % (>=55%) EF-Visual:      63 % LV EF Reported: 63 %  RIGHT VENTRICLE: TAPSE: 10.0 mm RV s'  0.06 m/s  TRICUSPID VALVE/RVSP:          Normal Ranges: Peak TR Velocity:     2.10 m/s Est. RA Pressure:     8 mmHg RV Syst Pressure:     26 mmHg  (< 30mmHg) IVC Diam:             2.00 cm  [id number] Cam Llanos MD Electronically signed on 1/13/2025 at 5:06:16 PM  ** Final **     XR chest 1 view    Result Date: 1/13/2025  Interpreted By:  Vick Eli, STUDY: XR CHEST 1 VIEW; 1/13/2025 2:52 pm   INDICATION: Signs/Symptoms:s/p pneumonectomy.   COMPARISON: CT dated 07/17/2024 and PET-CT dated 12/30/2024   ACCESSION NUMBER(S): [id number]   ORDERING CLINICIAN: WILMER ANTHONY   FINDINGS: Right IJ central venous catheter sheath is projecting over mid SV C. Left subclavian approach MediPort is in place with the tip projecting over upper to mid SVC. Right chest tube is in place.   Cardiac silhouette size is within normal limits.   Postsurgical changes related to right pneumonectomy with multiple partial right-sided rib resection. Mild interstitial prominence of the left lung.   Subcutaneous and soft tissue emphysema in the right chest wall.         1. Postsurgical changes related to right sided pneumonectomy with multiple medical devices as described above. 2. Mild interstitial prominence of the left lung.       Signed by: Vick Xiao 1/13/2025 3:16 PM Dictation workstation:   [id number]    NM PET CT lung CA staging    Result Date:  12/31/2024  Interpreted By:  Kush Hansen and Bera Kaustav STUDY: NM PET CT LUNG CA STAGING;  12/30/2024 11:08 am   INDICATION: Signs/Symptoms:DIAGNOSTIC.   Diagnosed with a large right lung mass in January 20, 2024, status post neoadjuvant chemoradiation as well as immunotherapy ending on July 2024. Currently scheduled for surgery on 01/10/2025.   COMPARISON: PET-CT on 08/15/2024   ACCESSION NUMBER(S): [id number]   ORDERING CLINICIAN: GABBY SMITH   TECHNIQUE: DIVISION OF NUCLEAR MEDICINE POSITRON EMISSION TOMOGRAPHY (PET-CT)   The patient received an intravenous dose of 11.2 mCi of Fluorine-18 fluorodeoxyglucose (FDG).  Positron emission tomographic (PET) images from mid thigh to skull base were then acquired after a one hour delay. Also acquired was a contemporaneous low dose non-contrast CT scan performed for attenuation correction of PET images and anatomic localization.  The PET and CT images were digitally fused for display.  All images were acquired on a combined PET-CT scanner unit. Some areas of FDG accumulation may be described in standardized uptake value (SUV) units.   CODING: Subsequent Treatment Strategy (PS)   CALIBRATION: Dose Injection-to-Scan Interval (mins): 51 min Mediastinal bloodpool SUV (normal 1.5-2.5): 2.1 Blood glucose: NA   FINDINGS: HEAD AND NECK: * No evidence of focal FDG avid lesion in the partially visualized brain parenchyma, noting that evaluation is limited because of the expected physiologic diffuse FDG uptake in the brain. * No FDG avid cervical lymphadenopathy is present. * No paranasal sinus diease. * Thyroid gland is unremarkable.   CHEST: *There has been interval worsening collapse of the right lung, with the right lung now completely fluid-filled. There has been interval worsening of FDG avid mass with central necrosis in  posterior right upper lobe with SUV max of 15, as compared to 8 previously. Left lung is unremarkable *No FDG avid mediastinal, hilar or axillary  lymphadenopathy. *Unremarkable both breasts   ABDOMEN AND PELVIS: *No FDG avid lymphadenopathy in the abdomen or pelvis. *Bilateral adrenal glands are unremarkable. *Colonic diverticulosis without evidence of diverticulitis. *Physiologic radiotracer uptake is present in the liver and spleen with excretion into the bowel loops and the genitourinary tract.   MUSCULOSKELETAL: *No concerning FDG avid bone lesion throughout the axial and appendicular skeleton to suggest osseous metastasis.       1. Interval worsening of the FDG-avid mass with central necrosis in the posterior right upper lobe, along with increased collapse of the right lung compared to the prior PET from 08/15/2024, suggestive of disease progression. 2. No other concerning FDG avid disease identified.   I personally reviewed the image(s) / study and agree with the findings and interpretation as stated. This study was interpreted at [facility].   MACRO: None       Signed by: Kush Hansen 12/31/2024 6:13 AM Dictation workstation:   [id number]   Results for orders placed or performed during the hospital encounter of 01/13/25 (from the past 24 hours)   POCT GLUCOSE   Result Value Ref Range    POCT Glucose 117 (H) 74 - 99 mg/dL   Type and Screen   Result Value Ref Range    ABO TYPE B     Rh TYPE POS     ANTIBODY SCREEN NEG    Blood Gas Arterial Full Panel   Result Value Ref Range    POCT pH, Arterial 7.50 (H) 7.38 - 7.42 pH    POCT pCO2, Arterial 40 38 - 42 mm Hg    POCT pO2, Arterial 112 (H) 85 - 95 mm Hg    POCT SO2, Arterial 99 94 - 100 %    POCT Oxy Hemoglobin, Arterial 96.6 94.0 - 98.0 %    POCT Hematocrit Calculated, Arterial 30.0 (L) 36.0 - 46.0 %    POCT Sodium, Arterial 134 (L) 136 - 145 mmol/L    POCT Potassium, Arterial 3.0 (L) 3.5 - 5.3 mmol/L    POCT Chloride, Arterial 98 98 - 107 mmol/L    POCT Ionized Calcium, Arterial 1.16 1.10 - 1.33 mmol/L    POCT Glucose, Arterial 131 (H) 74 - 99 mg/dL    POCT Lactate,  Arterial 1.0 0.4 - 2.0 mmol/L    POCT Base Excess, Arterial 7.4 (H) -2.0 - 3.0 mmol/L    POCT HCO3 Calculated, Arterial 31.2 (H) 22.0 - 26.0 mmol/L    POCT Hemoglobin, Arterial 10.0 (L) 12.0 - 16.0 g/dL    POCT Anion Gap, Arterial 8 (L) 10 - 25 mmo/L    Patient Temperature 37.0 degrees Celsius    FiO2 50 %   BLOOD GAS MIXED VENOUS FULL PANEL   Result Value Ref Range    POCT pH, Mixed 7.45 (H) 7.33 - 7.43 pH    POCT pCO2, Mixed 46 41 - 51 mm Hg    POCT pO2, Mixed 35 35 - 45 mm Hg    POCT SO2, Mixed 62 45 - 75 %    POCT Oxy Hemoglobin, Mixed 60.7 45.0 - 75.0 %    POCT Hematocrit Calculated, Mixed 38.0 36.0 - 46.0 %    POCT Sodium, Mixed 132 (L) 136 - 145 mmol/L    POCT Potassium, Mixed 3.1 (L) 3.5 - 5.3 mmol/L    POCT Chloride, Mixed 97 (L) 98 - 107 mmol/L    POCT Ionized Calcium, Mixed 1.17 1.10 - 1.33 mmol/L    POCT Glucose, Mixed 130 (H) 74 - 99 mg/dL    POCT Lactate, Mixed 1.0 0.4 - 2.0 mmol/L    POCT Base Excess, Mixed 7.0 (H) -2.0 - 3.0 mmol/L    POCT HCO3 Calculated, Mixed 32.0 (H) 22.0 - 26.0 mmol/L    POCT Hemoglobin, Mixed 12.8 12.0 - 16.0 g/dL    POCT Anion Gap, Mixed 6 (L) 10 - 25 mmo/L    Patient Temperature 37.0 degrees Celsius    FiO2 50 %   POCT GLUCOSE   Result Value Ref Range    POCT Glucose 150 (H) 74 - 99 mg/dL   BLOOD GAS MIXED VENOUS FULL PANEL   Result Value Ref Range    POCT pH, Mixed 7.46 (H) 7.33 - 7.43 pH    POCT pCO2, Mixed 47 41 - 51 mm Hg    POCT pO2, Mixed 34 (L) 35 - 45 mm Hg    POCT SO2, Mixed 61 45 - 75 %    POCT Oxy Hemoglobin, Mixed 60.4 45.0 - 75.0 %    POCT Hematocrit Calculated, Mixed 29.0 (L) 36.0 - 46.0 %    POCT Sodium, Mixed 132 (L) 136 - 145 mmol/L    POCT Potassium, Mixed 3.9 3.5 - 5.3 mmol/L    POCT Chloride, Mixed 98 98 - 107 mmol/L    POCT Ionized Calcium, Mixed 1.13 1.10 - 1.33 mmol/L    POCT Glucose, Mixed 160 (H) 74 - 99 mg/dL    POCT Lactate, Mixed 0.8 0.4 - 2.0 mmol/L    POCT Base Excess, Mixed 8.6 (H) -2.0 - 3.0 mmol/L    POCT HCO3 Calculated, Mixed 33.4 (H) 22.0 -  26.0 mmol/L    POCT Hemoglobin, Mixed 9.7 (L) 12.0 - 16.0 g/dL    POCT Anion Gap, Mixed 5 (L) 10 - 25 mmo/L    Patient Temperature 37.0 degrees Celsius    FiO2 40 %   Blood Gas Arterial Full Panel   Result Value Ref Range    POCT pH, Arterial 7.47 (H) 7.38 - 7.42 pH    POCT pCO2, Arterial 41 38 - 42 mm Hg    POCT pO2, Arterial 101 (H) 85 - 95 mm Hg    POCT SO2, Arterial 99 94 - 100 %    POCT Oxy Hemoglobin, Arterial 96.4 94.0 - 98.0 %    POCT Hematocrit Calculated, Arterial 30.0 (L) 36.0 - 46.0 %    POCT Sodium, Arterial 133 (L) 136 - 145 mmol/L    POCT Potassium, Arterial 3.8 3.5 - 5.3 mmol/L    POCT Chloride, Arterial 99 98 - 107 mmol/L    POCT Ionized Calcium, Arterial 1.13 1.10 - 1.33 mmol/L    POCT Glucose, Arterial 157 (H) 74 - 99 mg/dL    POCT Lactate, Arterial 0.9 0.4 - 2.0 mmol/L    POCT Base Excess, Arterial 5.6 (H) -2.0 - 3.0 mmol/L    POCT HCO3 Calculated, Arterial 29.8 (H) 22.0 - 26.0 mmol/L    POCT Hemoglobin, Arterial 9.9 (L) 12.0 - 16.0 g/dL    POCT Anion Gap, Arterial 8 (L) 10 - 25 mmo/L    Patient Temperature 37.0 degrees Celsius    FiO2 40 %   Renal function panel   Result Value Ref Range    Glucose 159 (H) 74 - 99 mg/dL    Sodium 139 136 - 145 mmol/L    Potassium 3.9 3.5 - 5.3 mmol/L    Chloride 97 (L) 98 - 107 mmol/L    Bicarbonate 31 21 - 32 mmol/L    Anion Gap 15 10 - 20 mmol/L    Urea Nitrogen 19 6 - 23 mg/dL    Creatinine 0.51 0.50 - 1.05 mg/dL    eGFR >90 >60 mL/min/1.73m*2    Calcium 8.3 (L) 8.6 - 10.6 mg/dL    Phosphorus 3.9 2.5 - 4.9 mg/dL    Albumin 3.4 3.4 - 5.0 g/dL   POCT GLUCOSE   Result Value Ref Range    POCT Glucose 163 (H) 74 - 99 mg/dL   Blood Gas Arterial Full Panel   Result Value Ref Range    POCT pH, Arterial 7.40 7.38 - 7.42 pH    POCT pCO2, Arterial 50 (H) 38 - 42 mm Hg    POCT pO2, Arterial 96 (H) 85 - 95 mm Hg    POCT SO2, Arterial 99 94 - 100 %    POCT Oxy Hemoglobin, Arterial 96.4 94.0 - 98.0 %    POCT Hematocrit Calculated, Arterial 29.0 (L) 36.0 - 46.0 %    POCT  Sodium, Arterial 133 (L) 136 - 145 mmol/L    POCT Potassium, Arterial 3.3 (L) 3.5 - 5.3 mmol/L    POCT Chloride, Arterial 99 98 - 107 mmol/L    POCT Ionized Calcium, Arterial 1.14 1.10 - 1.33 mmol/L    POCT Glucose, Arterial 167 (H) 74 - 99 mg/dL    POCT Lactate, Arterial 0.9 0.4 - 2.0 mmol/L    POCT Base Excess, Arterial 5.3 (H) -2.0 - 3.0 mmol/L    POCT HCO3 Calculated, Arterial 31.0 (H) 22.0 - 26.0 mmol/L    POCT Hemoglobin, Arterial 9.8 (L) 12.0 - 16.0 g/dL    POCT Anion Gap, Arterial 6 (L) 10 - 25 mmo/L    Patient Temperature 37.0 degrees Celsius    FiO2 40 %   BLOOD GAS MIXED VENOUS FULL PANEL   Result Value Ref Range    POCT pH, Mixed 7.37 7.33 - 7.43 pH    POCT pCO2, Mixed 57 (H) 41 - 51 mm Hg    POCT pO2, Mixed 37 35 - 45 mm Hg    POCT SO2, Mixed 59 45 - 75 %    POCT Oxy Hemoglobin, Mixed 58.2 45.0 - 75.0 %    POCT Hematocrit Calculated, Mixed 28.0 (L) 36.0 - 46.0 %    POCT Sodium, Mixed 135 (L) 136 - 145 mmol/L    POCT Potassium, Mixed 3.4 (L) 3.5 - 5.3 mmol/L    POCT Chloride, Mixed 98 98 - 107 mmol/L    POCT Ionized Calcium, Mixed 1.16 1.10 - 1.33 mmol/L    POCT Glucose, Mixed 162 (H) 74 - 99 mg/dL    POCT Lactate, Mixed 0.8 0.4 - 2.0 mmol/L    POCT Base Excess, Mixed 6.6 (H) -2.0 - 3.0 mmol/L    POCT HCO3 Calculated, Mixed 33.0 (H) 22.0 - 26.0 mmol/L    POCT Hemoglobin, Mixed 9.4 (L) 12.0 - 16.0 g/dL    POCT Anion Gap, Mixed 7 (L) 10 - 25 mmo/L    Patient Temperature 37.0 degrees Celsius    FiO2 40 %   CBC   Result Value Ref Range    WBC 7.4 4.4 - 11.3 x10*3/uL    nRBC 0.0 0.0 - 0.0 /100 WBCs    RBC 3.37 (L) 4.00 - 5.20 x10*6/uL    Hemoglobin 9.1 (L) 12.0 - 16.0 g/dL    Hematocrit 29.6 (L) 36.0 - 46.0 %    MCV 88 80 - 100 fL    MCH 27.0 26.0 - 34.0 pg    MCHC 30.7 (L) 32.0 - 36.0 g/dL    RDW 16.7 (H) 11.5 - 14.5 %    Platelets 115 (L) 150 - 450 x10*3/uL   BLOOD GAS MIXED VENOUS FULL PANEL   Result Value Ref Range    POCT pH, Mixed 7.39 7.33 - 7.43 pH    POCT pCO2, Mixed 52 (H) 41 - 51 mm Hg    POCT pO2,  Mixed 36 35 - 45 mm Hg    POCT SO2, Mixed 62 45 - 75 %    POCT Oxy Hemoglobin, Mixed 60.3 45.0 - 75.0 %    POCT Hematocrit Calculated, Mixed 28.0 (L) 36.0 - 46.0 %    POCT Sodium, Mixed 135 (L) 136 - 145 mmol/L    POCT Potassium, Mixed 3.0 (L) 3.5 - 5.3 mmol/L    POCT Chloride, Mixed 99 98 - 107 mmol/L    POCT Ionized Calcium, Mixed 1.15 1.10 - 1.33 mmol/L    POCT Glucose, Mixed 140 (H) 74 - 99 mg/dL    POCT Lactate, Mixed 0.8 0.4 - 2.0 mmol/L    POCT Base Excess, Mixed 5.6 (H) -2.0 - 3.0 mmol/L    POCT HCO3 Calculated, Mixed 31.5 (H) 22.0 - 26.0 mmol/L    POCT Hemoglobin, Mixed 9.3 (L) 12.0 - 16.0 g/dL    POCT Anion Gap, Mixed 8 (L) 10 - 25 mmo/L    Patient Temperature 37.0 degrees Celsius    FiO2 40 %   Blood Gas Arterial Full Panel   Result Value Ref Range    POCT pH, Arterial 7.43 (H) 7.38 - 7.42 pH    POCT pCO2, Arterial 48 (H) 38 - 42 mm Hg    POCT pO2, Arterial 95 85 - 95 mm Hg    POCT SO2, Arterial 99 94 - 100 %    POCT Oxy Hemoglobin, Arterial 96.7 94.0 - 98.0 %    POCT Hematocrit Calculated, Arterial 29.0 (L) 36.0 - 46.0 %    POCT Sodium, Arterial 133 (L) 136 - 145 mmol/L    POCT Potassium, Arterial 3.1 (L) 3.5 - 5.3 mmol/L    POCT Chloride, Arterial 99 98 - 107 mmol/L    POCT Ionized Calcium, Arterial 1.15 1.10 - 1.33 mmol/L    POCT Glucose, Arterial 153 (H) 74 - 99 mg/dL    POCT Lactate, Arterial 1.0 0.4 - 2.0 mmol/L    POCT Base Excess, Arterial 6.7 (H) -2.0 - 3.0 mmol/L    POCT HCO3 Calculated, Arterial 31.9 (H) 22.0 - 26.0 mmol/L    POCT Hemoglobin, Arterial 9.7 (L) 12.0 - 16.0 g/dL    POCT Anion Gap, Arterial 5 (L) 10 - 25 mmo/L    Patient Temperature 37.0 degrees Celsius    FiO2 40 %   BLOOD GAS MIXED VENOUS FULL PANEL   Result Value Ref Range    POCT pH, Mixed 7.39 7.33 - 7.43 pH    POCT pCO2, Mixed 54 (H) 41 - 51 mm Hg    POCT pO2, Mixed 35 35 - 45 mm Hg    POCT SO2, Mixed 60 45 - 75 %    POCT Oxy Hemoglobin, Mixed 58.5 45.0 - 75.0 %    POCT Hematocrit Calculated, Mixed 29.0 (L) 36.0 - 46.0 %     POCT Sodium, Mixed 134 (L) 136 - 145 mmol/L    POCT Potassium, Mixed 3.6 3.5 - 5.3 mmol/L    POCT Chloride, Mixed      POCT Ionized Calcium, Mixed 1.20 1.10 - 1.33 mmol/L    POCT Glucose, Mixed 126 (H) 74 - 99 mg/dL    POCT Lactate, Mixed 0.8 0.4 - 2.0 mmol/L    POCT Base Excess, Mixed 6.6 (H) -2.0 - 3.0 mmol/L    POCT HCO3 Calculated, Mixed 32.7 (H) 22.0 - 26.0 mmol/L    POCT Hemoglobin, Mixed 9.8 (L) 12.0 - 16.0 g/dL    POCT Anion Gap, Mixed      Patient Temperature 37.0 degrees Celsius    FiO2 40 %   Blood Gas Arterial Full Panel   Result Value Ref Range    POCT pH, Arterial 7.42 7.38 - 7.42 pH    POCT pCO2, Arterial 47 (H) 38 - 42 mm Hg    POCT pO2, Arterial 89 85 - 95 mm Hg    POCT SO2, Arterial 98 94 - 100 %    POCT Oxy Hemoglobin, Arterial 96.0 94.0 - 98.0 %    POCT Hematocrit Calculated, Arterial 29.0 (L) 36.0 - 46.0 %    POCT Sodium, Arterial 134 (L) 136 - 145 mmol/L    POCT Potassium, Arterial 3.5 3.5 - 5.3 mmol/L    POCT Chloride, Arterial      POCT Ionized Calcium, Arterial 1.18 1.10 - 1.33 mmol/L    POCT Glucose, Arterial 124 (H) 74 - 99 mg/dL    POCT Lactate, Arterial 0.9 0.4 - 2.0 mmol/L    POCT Base Excess, Arterial 5.3 (H) -2.0 - 3.0 mmol/L    POCT HCO3 Calculated, Arterial 30.5 (H) 22.0 - 26.0 mmol/L    POCT Hemoglobin, Arterial 9.5 (L) 12.0 - 16.0 g/dL    POCT Anion Gap, Arterial      Patient Temperature 37.0 degrees Celsius    FiO2 40 %   Calcium, Ionized   Result Value Ref Range    POCT Calcium, Ionized 1.14 1.1 - 1.33 mmol/L   CBC   Result Value Ref Range    WBC 8.6 4.4 - 11.3 x10*3/uL    nRBC 0.0 0.0 - 0.0 /100 WBCs    RBC 3.38 (L) 4.00 - 5.20 x10*6/uL    Hemoglobin 9.0 (L) 12.0 - 16.0 g/dL    Hematocrit 29.1 (L) 36.0 - 46.0 %    MCV 86 80 - 100 fL    MCH 26.6 26.0 - 34.0 pg    MCHC 30.9 (L) 32.0 - 36.0 g/dL    RDW 16.9 (H) 11.5 - 14.5 %    Platelets 134 (L) 150 - 450 x10*3/uL   Coagulation Screen   Result Value Ref Range    Protime 13.9 (H) 9.8 - 12.8 seconds    INR 1.2 (H) 0.9 - 1.1    aPTT  32 27 - 38 seconds   Magnesium   Result Value Ref Range    Magnesium 2.21 1.60 - 2.40 mg/dL   Renal function panel   Result Value Ref Range    Glucose 123 (H) 74 - 99 mg/dL    Sodium 140 136 - 145 mmol/L    Potassium 3.7 3.5 - 5.3 mmol/L    Chloride 98 98 - 107 mmol/L    Bicarbonate 27 21 - 32 mmol/L    Anion Gap 19 10 - 20 mmol/L    Urea Nitrogen 19 6 - 23 mg/dL    Creatinine 0.47 (L) 0.50 - 1.05 mg/dL    eGFR >90 >60 mL/min/1.73m*2    Calcium 8.4 (L) 8.6 - 10.6 mg/dL    Phosphorus 3.2 2.5 - 4.9 mg/dL    Albumin 3.3 (L) 3.4 - 5.0 g/dL     Scheduled medications  bisacodyl, 10 mg, rectal, Daily  budesonide, 0.5 mg, nebulization, BID  dexAMETHasone, 6 mg, intravenous, q24h  docusate sodium, 100 mg, orogastric tube, BID  heparin (porcine), 5,000 Units, subcutaneous, q8h  insulin lispro, 0-5 Units, subcutaneous, q4h  ipratropium-albuteroL, 3 mL, nebulization, q6h  lidocaine, 1 patch, transdermal, Daily  oxyCODONE, 5 mg, nasogastric tube, q6h  oxygen, , inhalation, Continuous - Inhalation  pantoprazole, 40 mg, intravenous, Daily  piperacillin-tazobactam, 4.5 g, intravenous, q6h  polyethylene glycol, 17 g, orogastric tube, Daily  remdesivir, 100 mg, intravenous, q24h  senna, 5 mL, orogastric tube, BID  thiamine, 100 mg, intravenous, Daily      Continuous medications  dexmedeTOMIDine, 0.1-1.5 mcg/kg/hr, Last Rate: 1 mcg/kg/hr (01/23/25 0944)  epoprostenol, 0.02 mcg/kg/min (Ideal), Last Rate: 0.02 mcg/kg/min (01/23/25 0954)  fentaNYL, 25 mcg/hr, Last Rate: 25 mcg/hr (01/23/25 0453)  vasopressin, 0-0.06 Units/min, Last Rate: Stopped (01/23/25 0943)      PRN medications  PRN medications: albuterol, calcium gluconate, calcium gluconate, dextrose, dextrose, glucagon, glucagon, magnesium sulfate, magnesium sulfate, oxygen, potassium chloride, potassium chloride          Assessment/Plan   Jannie Vick is a 64 y.o. female with PMH significant for DEEPAK, tobacco use disorder, stage III squamous cell carcinoma of right lung patient  presented to [facility] on 113 for scheduled right pneumonectomy, intrapericardial with en bloc wall resection by Dr. Jonah Wagner.  Patient had a preoperative hypotension requiring vasopressors and postoperative hypotension requiring vasopressors which she has been subsequently weaned off of, increasing oxygen demand status post surgery which had subsequently been weaned off.  However on 1/16/2025 patient had borderline low blood pressures with 90s/60s, increasing oxygen requirements, and an echocardiogram illustrating biventricular Takotsubo cardiomyopathy for which cardiology was consulted for further recommendations. Given that the patient was showing signs of low output state with altered mental status, decreased urine output with a lactate of 2 along with echo showing signs of elevated left and right sided pressures. A shock call was done where the recommendation was start the patient on inotrope (milrinone) along with a lasix drip and to place a central line to transduce the CVP and run a mixed venous Svo2 along repeating a lactate and if she's clinically worsening to re-conference for a shock call.     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 Also recommend repeat echo after extubation to assess cardiac function.   -Remainder per primary team.     Thank you for involving us in this patient care. Recommendations not final until signed by attending.     General Cardiology Consult  Pager: [phone] (weekday 7AM-6PM and weekend 7AM-2PM) and other: [phone]  EP Consult Pager: [phone] (weekday 7AM-6PM and weekend 7AM-2PM) and other: [phone]  CICU Fellow Pager: [phone] anytime  EP Device Nurse Pager: [phone] (weekday 7AM-4PM)  Advanced Heart Failure Consult Pager: [phone] anytime     Case discussed with cardiology fellow Dr. William, and with Dr. Diana Robledo MD  Internal Medicine PGY-2

## 2025-01-23 NOTE — DOCUMENTATION CLARIFICATION NOTE
"    PATIENT:               RAÚL KAMINSKI  ACCT #:                  [id number]  MRN:                       [id number]  :                       1960  ADMIT DATE:       2025 5:40 AM  DISCH DATE:  RESPONDING PROVIDER #:        [id number]          PROVIDER RESPONSE TEXT:    I agree with dietician diagnosis of severe malnutrition on 25.    CDI QUERY TEXT:    Clarification    Instruction:    Based on your assessment of the patient and the clinical information, please provide the requested documentation by clicking on the appropriate radio button and enter any additional information if prompted.    Question: Please further clarify this patient nutritional status as    When answering this query, please exercise your independent professional judgment. The fact that a question is being asked, does not imply that any particular answer is desired or expected.    The patient's clinical indicators include:  Clinical Information: Patient is a 64 year old female who presented with stage 3 lung cancer.    Clinical Indicators: Nutrition Consult  \"Malnutrition Diagnosis  Patient has Malnutrition Diagnosis: Yes  Diagnosis Status: New  Malnutrition Diagnosis: Severe malnutrition related to chronic disease or condition (acute on chronic)  As Evidenced by: clinically significant weight loss x 3 months (8.5 percent), poor oral intakes past week over post-op course (less than 50 percent est needs greater than 5days), suspect longer, visually frail appearing with at least mild losses to temporalis/orbital region and BMI = 19.1\"    Treatment: nutrition consult; monitoring    Risk Factors: malignancy/chronic disease  Options provided:  -- I agree with dietician diagnosis of severe malnutrition on 25.  -- Other - I will add my own diagnosis  -- Refer to Clinical Documentation Reviewer    Query created by: Lalita Oliveros on 2025 1:21 PM      Electronically signed by:  WILMER MENDEZ-CNP 2025 6:45 " AM

## 2025-01-23 NOTE — PROGRESS NOTES
01/23/25 1100   Discharge Planning   Living Arrangements Spouse/significant other   Support Systems Spouse/significant other   Assistance Needed independent prior to hospital   Type of Residence Private residence   Number of Stairs to Enter Residence 2   Number of Stairs Within Residence 0   Do you have animals or pets at home? No   Home or Post Acute Services None   Expected Discharge Disposition SNF   Does the patient need discharge transport arranged? Yes   RoundTrip coordination needed? Yes   Financial Resource Strain   How hard is it for you to pay for the very basics like food, housing, medical care, and heating? Not hard   Housing Stability   In the last 12 months, was there a time when you were not able to pay the mortgage or rent on time? N   At any time in the past 12 months, were you homeless or living in a shelter (including now)? N   Transportation Needs   In the past 12 months, has lack of transportation kept you from medical appointments or from getting medications? no   In the past 12 months, has lack of transportation kept you from meetings, work, or from getting things needed for daily living? No     TCC admission assessment completed. Explained role of TCC - verbalized understanding.     Demographics/Insurance: Confirmed in chart.   Living Environment: Lives at home with spouse. Independent in care prior to hospitalization.  Primary Support Person: Spouse, Maximilian, [phone]  PCP: Dr. Beata Harris  Recent Falls: Denies   Assistive Devices/DME: Denies   Home Oxygen: Denies   Dialysis: Denies   Home Care Agency: Denies   Transportation at Discharge: Family will transport home  Pharmacy: [facility] [place] on [address]  Concerns about discharge: PT recommending SNF. Stated they would like to discuss closer to discharge.     Itzel Ortiz RN, TCC

## 2025-01-23 NOTE — PROGRESS NOTES
Plan:  - slow iEPO wean  - attempt SIMV today  - optimize PO analgesia  - zosyn to stop today  -  PT/OT as able  - cont dexamethasone for COVID x 10days; remdes x5 days       Assessment:    Neuro/MSK: expected acute post op pain   A-F bundle; Sleep Hygiene measures: appropriate daytime stimulation/physical activity. Lights out at 2100, avoidance of night time lab draws/night baths, minimize mind altering medicaments  PT/OT and OOB as appropriate      CV: JOSS cards assessment: cardiogenic shock takotsubo type - resolving slowly   -      -     now off pressers    Pulm: COPD without exacerbation  and acute post op pulmonary insufficiency   BPH with IS/flutter/OOB  Vent Mode: Volume control/assist control  FiO2 (%):  [40 %-50 %] 40 %  S RR:  [16-22] 22  S VT:  [280 mL] 280 mL  PEEP/CPAP (cm H2O):  [5 cm H20] 5 cm H20  MAP (cm H2O):  [10-13] 10      Renal: na   Trend UOP and renal indices; replete lytes PRN     GI: JOSS GI assessment: PCM sev   Proph with PPI  TF adv to goal    ID/rheum: Sepsis with acute organ dysfunction 2/2 presumed aspiration v HCAP and COVID  Current abx: zosyn ending today  Follow cx data     Endo: Stress hyperglycemia  Start ISS if needed for BG goal <180      Heme: ABL anemia   -     Trend Hb and transfuse PRN for goal Hb>7  DVT proph with scd and subcutaneous heparin    Lines/tubes/restraints:   Central line: hemodynamic monitoring, parenteral medications (I.e. chemo, vasoactive), and access  Montiel: critically ill patient who need accurate urinary output measurements  Restraint: needed, pulling at lines/tubes and agitation    Dispo: ICU    Exam:    A&O x 0; intubated and sedated but follows commands through sedation  NSR  Adequate air-exchange  Abd soft, NT  Extremities warm     REASON FOR ADMISSION    64 y.o. 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 Wagner on 1/13 requiring post-op vasoactive therapy. Post-op course c/b acute hyponatremia, multifactorial delirium, cardiogenic shock requiring inotropic support, and acute hypoxic/hypercarbic respiratory failure requiring intubation. COVID+ 1/20. Weaned off milrinone 1/21. Remains on iEpo 0.03.     TODAY'S EVENTS    1/23: failed attempt toward SBT    This critically ill patient continues to be at-risk for clinically significant deterioration / failure due to the above mentioned dysfunctional, unstable organ systems.  I have personally identified and managed all complex critical care issues to prevent aforementioned clinical deterioration.  Critical care time is spent at bedside and/or the immediate area and has included, but is not limited to, the review of diagnostic tests, labs, radiographs, serial assessments of hemodynamics, respiratory status, ventilatory management, and family updates.  Time spent in procedures and teaching are reported separately. CF CRITICAL CARE TIME: 75 minutes          LABS:  CMP:  Results from last 7 days   Lab Units 01/23/25  1222 01/23/25  1008 01/23/25  0410 01/22/25  1605 01/22/25  0806 01/22/25  0442 01/21/25  2010 01/21/25  1610 01/21/25  0836 01/20/25  1437 01/20/25  0427 01/19/25  1458 01/19/25  0757   SODIUM mmol/L 140 138 140 139 139 139 136 137 137   < > 132* 132* 129*   POTASSIUM mmol/L 4.6 3.8 3.7 3.9 3.0* 2.8* 3.4* 3.6 3.7   < > 3.9 3.6 4.0   CHLORIDE mmol/L 99 100 98 97* 96* 95* 90* 91* 92*   < > 92* 91* 89*   CO2 mmol/L 31 32 27 31 34* 32 38* 37* 33*   < > 29 32 30   ANION GAP mmol/L 15 10 19 15 12 15 11 13 16   < > 15 13 14   BUN mg/dL 18 18 19 19 14 18 16 16 16   < > 16 17 16   CREATININE mg/dL 0.53 0.47* 0.47* 0.51 0.50 0.47* 0.56 0.57 0.55   < > 0.51 0.66 0.67   EGFR mL/min/1.73m*2 >90 >90 >90 >90 >90 >90 >90 >90 >90   < > >90 >90 >90   MAGNESIUM mg/dL 2.33 2.17 2.21  --   --  2.14  --  2.37 2.99*   < > 2.22 2.43* 2.52*   ALBUMIN g/dL 3.9  3.6 3.3* 3.4 3.3* 3.3* 3.6  3.6 3.8 3.8  3.8   < > 3.1*  3.1* 3.2*  3.2* 3.1*   ALK PHOS U/L  --   --   --   --   --   --  56  --  63  --  65 66 63   ALT U/L  --   --   --   --   --   --  43  --  51*  --  114* 137* 143*   AST U/L  --   --   --   --   --   --  30  --  38  --  157* 257* 301*   BILIRUBIN TOTAL mg/dL  --   --   --   --   --   --  0.7  --  0.9  --  0.8 0.8 0.9    < > = values in this interval not displayed.     CBC:  Results from last 7 days   Lab Units 01/23/25  1222 01/23/25  1008 01/23/25  0410 01/22/25 2026 01/22/25 0442 01/21/25  1610 01/21/25  0128 01/20/25  1437   WBC AUTO x10*3/uL 23.1* 12.8* 8.6 7.4 7.3 7.5 6.8 10.4   HEMOGLOBIN g/dL 9.0* 9.6* 9.0* 9.1* 9.0* 9.0* 9.3* 9.7*   HEMATOCRIT % 29.2* 31.1* 29.1* 29.6* 29.6* 29.3* 28.8* 30.2*   PLATELETS AUTO x10*3/uL 180 169 134* 115* 125* 135* 146* 159   MCV fL 89 87 86 88 87 88 84 85     COAG:   Results from last 7 days   Lab Units 01/23/25  1222 01/23/25  1008 01/23/25  0410 01/22/25 0442 01/21/25  1610 01/21/25  0128 01/20/25  0427 01/19/25  0757   INR  1.3* 1.3* 1.2* 1.2* 1.1 1.2* 1.1 1.1     ABO:   ABO TYPE   Date Value Ref Range Status   01/22/2025 B  Final     HEME/ENDO:  Results from last 7 days   Lab Units 01/17/25  1534   TSH mIU/L 0.48      CARDIAC:   Results from last 7 days   Lab Units 01/16/25  2354   TROPHSCMC ng/L 625*     MICRO: No results found for the last 90 days.

## 2025-01-23 NOTE — PROGRESS NOTES
"Faustina Vick \"Jannie\" is a 64 y.o. 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    Subjective   NAEO. Low dose vaso resumed when fentanyl added and precedex titrated to 1.5. Sedated this am.     Objective     General: critically ill in ICU bed  HEENT: ETT in place  Resp: intubated, sats appropriate, symmetric rise, synchronous  CV: tachycardic on monitor, sinus, normotensive with low dose vasopressin.   Chest: no drainage from surgical incision, healing appropriately.   Abd: soft, NTND  : yoo in place  MSK: moves all extremities  Ext: mottling to BLE  Psych: intubated, sedated, unable to assess    Last Recorded Vitals  Blood pressure 103/56, pulse (!) 131, temperature 37.4 °C (99.3 °F), resp. rate 23, height 1.651 m (5' 5\"), weight 59.5 kg (131 lb 2.8 oz), SpO2 95%.  Intake/Output last 3 Shifts:  I/O last 3 completed shifts:  In: 2725.3 (45.8 mL/kg) [I.V.:870.3 (14.6 mL/kg); NG/GT:360; IV Piggyback:1495]  Out: 2168 (36.4 mL/kg) [Urine:2118 (1 mL/kg/hr); Emesis/NG output:50]  Weight: 59.5 kg     Relevant Results            9.0     8.6>-----<134              29.1   140  98  19                  ----------------<123     3.7  27  0.47          Ca 8.4 Phos 3.2 Mg 2.21       ALT 43 AST 30 AlkPhos 56 tBili 0.7         CXR reviewed 1/19:  Expected filling of the Right hemithorax, some left hilar congestion, edema. No significant PTX/effusion.      Assessment/Plan   Assessment & Plan  Primary cancer of right upper lobe of lung (Multi)    Chronic obstructive pulmonary disease (Multi)    Malignant neoplasm of upper lobe of right lung (Multi)    Cardiogenic shock (Multi)    Faustina Vick (Jannie) is a 64F with Hx of Stage III lung cancer, asthma, anxiety, and arthritis who is now s/p R pneumonectomy with Dr. Wagner 1/13/25. 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 Cardiology was consulted and recommended Lasix gtt and " milrinone. Lasix gtt held 1/17, hyponatremia improving. Patient underwent RHC and SGC placement with cardiology 1/17. She was reintubated on 1/18 d/t worsening respiratory status and AMS. Also had increased pressor requirements, now also needing levo and vaso. Repeat echo stable on 1/18.    - Continue supportive ICU  care  - Wean sedation  - Lung protective ventilation strategies, wean to extubate  - Cardiogenic shock, supportive, improving  - weaned inotropic support  - pressor support resumed with increased sedation  - continue dobhoff for nutrition, titrate feeds to goal as able.   - do not feed if escalating pressors or HD instability.   - Requiring ICU level care at this time  - Please page with concerns    Patient seen and discussed with attending Dr. Wagner.    John Brush MD  Cardiothoracic Fellow  Thoracic surgery [id number]

## 2025-01-24 ENCOUNTER — APPOINTMENT (OUTPATIENT)
Dept: RADIOLOGY | Facility: HOSPITAL | Age: 65
End: 2025-01-24
Payer: COMMERCIAL

## 2025-01-24 LAB
ALBUMIN SERPL BCP-MCNC: 3.7 G/DL (ref 3.4–5)
ALBUMIN SERPL BCP-MCNC: 3.8 G/DL (ref 3.4–5)
ANION GAP BLDA CALCULATED.4IONS-SCNC: 10 MMO/L (ref 10–25)
ANION GAP BLDA CALCULATED.4IONS-SCNC: 4 MMO/L (ref 10–25)
ANION GAP BLDA CALCULATED.4IONS-SCNC: 6 MMO/L (ref 10–25)
ANION GAP BLDA CALCULATED.4IONS-SCNC: 7 MMO/L (ref 10–25)
ANION GAP BLDA CALCULATED.4IONS-SCNC: 7 MMO/L (ref 10–25)
ANION GAP BLDA CALCULATED.4IONS-SCNC: 8 MMO/L (ref 10–25)
ANION GAP BLDA CALCULATED.4IONS-SCNC: 8 MMO/L (ref 10–25)
ANION GAP BLDA CALCULATED.4IONS-SCNC: 9 MMO/L (ref 10–25)
ANION GAP BLDMV CALCULATED.4IONS-SCNC: 11 MMO/L (ref 10–25)
ANION GAP BLDMV CALCULATED.4IONS-SCNC: 5 MMO/L (ref 10–25)
ANION GAP BLDMV CALCULATED.4IONS-SCNC: 8 MMO/L (ref 10–25)
ANION GAP BLDMV CALCULATED.4IONS-SCNC: 9 MMO/L (ref 10–25)
ANION GAP BLDMV CALCULATED.4IONS-SCNC: ABNORMAL MMOL/L
ANION GAP BLDMV CALCULATED.4IONS-SCNC: ABNORMAL MMOL/L
ANION GAP SERPL CALC-SCNC: 12 MMOL/L (ref 10–20)
ANION GAP SERPL CALC-SCNC: 14 MMOL/L (ref 10–20)
APTT PPP: 29 SECONDS (ref 27–38)
APTT PPP: 31 SECONDS (ref 27–38)
BASE EXCESS BLDA CALC-SCNC: 3 MMOL/L (ref -2–3)
BASE EXCESS BLDA CALC-SCNC: 3.1 MMOL/L (ref -2–3)
BASE EXCESS BLDA CALC-SCNC: 3.3 MMOL/L (ref -2–3)
BASE EXCESS BLDA CALC-SCNC: 3.5 MMOL/L (ref -2–3)
BASE EXCESS BLDA CALC-SCNC: 4.2 MMOL/L (ref -2–3)
BASE EXCESS BLDA CALC-SCNC: 6.1 MMOL/L (ref -2–3)
BASE EXCESS BLDA CALC-SCNC: 6.5 MMOL/L (ref -2–3)
BASE EXCESS BLDA CALC-SCNC: 6.8 MMOL/L (ref -2–3)
BASE EXCESS BLDMV CALC-SCNC: -0.5 MMOL/L (ref -2–3)
BASE EXCESS BLDMV CALC-SCNC: -1.1 MMOL/L (ref -2–3)
BASE EXCESS BLDMV CALC-SCNC: 3.3 MMOL/L (ref -2–3)
BASE EXCESS BLDMV CALC-SCNC: 4.5 MMOL/L (ref -2–3)
BASE EXCESS BLDMV CALC-SCNC: 6.1 MMOL/L (ref -2–3)
BASE EXCESS BLDMV CALC-SCNC: 8.4 MMOL/L (ref -2–3)
BODY TEMPERATURE: 37 DEGREES CELSIUS
BUN SERPL-MCNC: 19 MG/DL (ref 6–23)
BUN SERPL-MCNC: 22 MG/DL (ref 6–23)
CA-I BLD-SCNC: 1.1 MMOL/L (ref 1.1–1.33)
CA-I BLD-SCNC: 1.22 MMOL/L (ref 1.1–1.33)
CA-I BLDA-SCNC: 1.12 MMOL/L (ref 1.1–1.33)
CA-I BLDA-SCNC: 1.12 MMOL/L (ref 1.1–1.33)
CA-I BLDA-SCNC: 1.16 MMOL/L (ref 1.1–1.33)
CA-I BLDA-SCNC: 1.16 MMOL/L (ref 1.1–1.33)
CA-I BLDA-SCNC: 1.17 MMOL/L (ref 1.1–1.33)
CA-I BLDA-SCNC: 1.18 MMOL/L (ref 1.1–1.33)
CA-I BLDA-SCNC: 1.2 MMOL/L (ref 1.1–1.33)
CA-I BLDA-SCNC: 1.25 MMOL/L (ref 1.1–1.33)
CA-I BLDMV-SCNC: 1.07 MMOL/L (ref 1.1–1.33)
CA-I BLDMV-SCNC: 1.13 MMOL/L (ref 1.1–1.33)
CA-I BLDMV-SCNC: 1.14 MMOL/L (ref 1.1–1.33)
CA-I BLDMV-SCNC: 1.18 MMOL/L (ref 1.1–1.33)
CA-I BLDMV-SCNC: 1.19 MMOL/L (ref 1.1–1.33)
CA-I BLDMV-SCNC: 1.2 MMOL/L (ref 1.1–1.33)
CALCIUM SERPL-MCNC: 8.2 MG/DL (ref 8.6–10.6)
CALCIUM SERPL-MCNC: 8.4 MG/DL (ref 8.6–10.6)
CARDIAC TROPONIN I PNL SERPL HS: 48 NG/L (ref 0–34)
CHLORIDE BLD-SCNC: 100 MMOL/L (ref 98–107)
CHLORIDE BLD-SCNC: 103 MMOL/L (ref 98–107)
CHLORIDE BLD-SCNC: 106 MMOL/L (ref 98–107)
CHLORIDE BLD-SCNC: 108 MMOL/L (ref 98–107)
CHLORIDE BLD-SCNC: ABNORMAL MMOL/L
CHLORIDE BLD-SCNC: ABNORMAL MMOL/L
CHLORIDE BLDA-SCNC: 100 MMOL/L (ref 98–107)
CHLORIDE BLDA-SCNC: 100 MMOL/L (ref 98–107)
CHLORIDE BLDA-SCNC: 101 MMOL/L (ref 98–107)
CHLORIDE BLDA-SCNC: 102 MMOL/L (ref 98–107)
CHLORIDE BLDA-SCNC: 103 MMOL/L (ref 98–107)
CHLORIDE BLDA-SCNC: 103 MMOL/L (ref 98–107)
CHLORIDE BLDA-SCNC: 104 MMOL/L (ref 98–107)
CHLORIDE BLDA-SCNC: 105 MMOL/L (ref 98–107)
CHLORIDE SERPL-SCNC: 101 MMOL/L (ref 98–107)
CHLORIDE SERPL-SCNC: 99 MMOL/L (ref 98–107)
CO2 SERPL-SCNC: 31 MMOL/L (ref 21–32)
CO2 SERPL-SCNC: 32 MMOL/L (ref 21–32)
CREAT SERPL-MCNC: 0.44 MG/DL (ref 0.5–1.05)
CREAT SERPL-MCNC: 0.45 MG/DL (ref 0.5–1.05)
EGFRCR SERPLBLD CKD-EPI 2021: >90 ML/MIN/1.73M*2
EGFRCR SERPLBLD CKD-EPI 2021: >90 ML/MIN/1.73M*2
ERYTHROCYTE [DISTWIDTH] IN BLOOD BY AUTOMATED COUNT: 17.1 % (ref 11.5–14.5)
ERYTHROCYTE [DISTWIDTH] IN BLOOD BY AUTOMATED COUNT: 17.2 % (ref 11.5–14.5)
GLUCOSE BLD MANUAL STRIP-MCNC: 128 MG/DL (ref 74–99)
GLUCOSE BLD MANUAL STRIP-MCNC: 153 MG/DL (ref 74–99)
GLUCOSE BLD MANUAL STRIP-MCNC: 171 MG/DL (ref 74–99)
GLUCOSE BLD MANUAL STRIP-MCNC: 177 MG/DL (ref 74–99)
GLUCOSE BLD MANUAL STRIP-MCNC: 183 MG/DL (ref 74–99)
GLUCOSE BLD-MCNC: 129 MG/DL (ref 74–99)
GLUCOSE BLD-MCNC: 137 MG/DL (ref 74–99)
GLUCOSE BLD-MCNC: 137 MG/DL (ref 74–99)
GLUCOSE BLD-MCNC: 149 MG/DL (ref 74–99)
GLUCOSE BLD-MCNC: 155 MG/DL (ref 74–99)
GLUCOSE BLD-MCNC: 161 MG/DL (ref 74–99)
GLUCOSE BLDA-MCNC: 112 MG/DL (ref 74–99)
GLUCOSE BLDA-MCNC: 134 MG/DL (ref 74–99)
GLUCOSE BLDA-MCNC: 139 MG/DL (ref 74–99)
GLUCOSE BLDA-MCNC: 141 MG/DL (ref 74–99)
GLUCOSE BLDA-MCNC: 150 MG/DL (ref 74–99)
GLUCOSE BLDA-MCNC: 161 MG/DL (ref 74–99)
GLUCOSE BLDA-MCNC: 163 MG/DL (ref 74–99)
GLUCOSE BLDA-MCNC: 165 MG/DL (ref 74–99)
GLUCOSE SERPL-MCNC: 136 MG/DL (ref 74–99)
GLUCOSE SERPL-MCNC: 159 MG/DL (ref 74–99)
HCO3 BLDA-SCNC: 28.5 MMOL/L (ref 22–26)
HCO3 BLDA-SCNC: 29.4 MMOL/L (ref 22–26)
HCO3 BLDA-SCNC: 29.7 MMOL/L (ref 22–26)
HCO3 BLDA-SCNC: 30.6 MMOL/L (ref 22–26)
HCO3 BLDA-SCNC: 31.6 MMOL/L (ref 22–26)
HCO3 BLDA-SCNC: 31.9 MMOL/L (ref 22–26)
HCO3 BLDA-SCNC: 32 MMOL/L (ref 22–26)
HCO3 BLDA-SCNC: 32.2 MMOL/L (ref 22–26)
HCO3 BLDMV-SCNC: 24.3 MMOL/L (ref 22–26)
HCO3 BLDMV-SCNC: 27.2 MMOL/L (ref 22–26)
HCO3 BLDMV-SCNC: 30.9 MMOL/L (ref 22–26)
HCO3 BLDMV-SCNC: 32.2 MMOL/L (ref 22–26)
HCO3 BLDMV-SCNC: 32.4 MMOL/L (ref 22–26)
HCO3 BLDMV-SCNC: 34.2 MMOL/L (ref 22–26)
HCT VFR BLD AUTO: 25 % (ref 36–46)
HCT VFR BLD AUTO: 28 % (ref 36–46)
HCT VFR BLD EST: 16 % (ref 36–46)
HCT VFR BLD EST: 23 % (ref 36–46)
HCT VFR BLD EST: 23 % (ref 36–46)
HCT VFR BLD EST: 24 % (ref 36–46)
HCT VFR BLD EST: 24 % (ref 36–46)
HCT VFR BLD EST: 25 % (ref 36–46)
HCT VFR BLD EST: 25 % (ref 36–46)
HCT VFR BLD EST: 26 % (ref 36–46)
HCT VFR BLD EST: 27 % (ref 36–46)
HCT VFR BLD EST: 29 % (ref 36–46)
HCT VFR BLD EST: 29 % (ref 36–46)
HGB BLD-MCNC: 8.1 G/DL (ref 12–16)
HGB BLD-MCNC: 8.7 G/DL (ref 12–16)
HGB BLDA-MCNC: 5.2 G/DL (ref 12–16)
HGB BLDA-MCNC: 7.5 G/DL (ref 12–16)
HGB BLDA-MCNC: 7.8 G/DL (ref 12–16)
HGB BLDA-MCNC: 8 G/DL (ref 12–16)
HGB BLDA-MCNC: 8.6 G/DL (ref 12–16)
HGB BLDA-MCNC: 8.7 G/DL (ref 12–16)
HGB BLDA-MCNC: 8.7 G/DL (ref 12–16)
HGB BLDA-MCNC: 9.6 G/DL (ref 12–16)
HGB BLDMV-MCNC: 8.1 G/DL (ref 12–16)
HGB BLDMV-MCNC: 8.3 G/DL (ref 12–16)
HGB BLDMV-MCNC: 8.3 G/DL (ref 12–16)
HGB BLDMV-MCNC: 8.8 G/DL (ref 12–16)
HGB BLDMV-MCNC: 8.9 G/DL (ref 12–16)
HGB BLDMV-MCNC: 9.6 G/DL (ref 12–16)
INHALED O2 CONCENTRATION: 40 %
INHALED O2 CONCENTRATION: 50 %
INR PPP: 1.2 (ref 0.9–1.1)
INR PPP: 1.3 (ref 0.9–1.1)
LACTATE BLDA-SCNC: 0.7 MMOL/L (ref 0.4–2)
LACTATE BLDA-SCNC: 0.8 MMOL/L (ref 0.4–2)
LACTATE BLDA-SCNC: 1 MMOL/L (ref 0.4–2)
LACTATE BLDA-SCNC: 1 MMOL/L (ref 0.4–2)
LACTATE BLDA-SCNC: 1.2 MMOL/L (ref 0.4–2)
LACTATE BLDA-SCNC: 1.8 MMOL/L (ref 0.4–2)
LACTATE BLDMV-SCNC: 0.6 MMOL/L (ref 0.4–2)
LACTATE BLDMV-SCNC: 0.6 MMOL/L (ref 0.4–2)
LACTATE BLDMV-SCNC: 0.9 MMOL/L (ref 0.4–2)
LACTATE BLDMV-SCNC: 1 MMOL/L (ref 0.4–2)
LACTATE BLDMV-SCNC: 1.3 MMOL/L (ref 0.4–2)
LACTATE BLDMV-SCNC: 1.3 MMOL/L (ref 0.4–2)
MAGNESIUM SERPL-MCNC: 1.93 MG/DL (ref 1.6–2.4)
MAGNESIUM SERPL-MCNC: 2.12 MG/DL (ref 1.6–2.4)
MCH RBC QN AUTO: 27.1 PG (ref 26–34)
MCH RBC QN AUTO: 27.1 PG (ref 26–34)
MCHC RBC AUTO-ENTMCNC: 31.1 G/DL (ref 32–36)
MCHC RBC AUTO-ENTMCNC: 32.4 G/DL (ref 32–36)
MCV RBC AUTO: 84 FL (ref 80–100)
MCV RBC AUTO: 87 FL (ref 80–100)
NRBC BLD-RTO: 0 /100 WBCS (ref 0–0)
NRBC BLD-RTO: 0 /100 WBCS (ref 0–0)
OXYHGB MFR BLDA: 88 % (ref 94–98)
OXYHGB MFR BLDA: 95 % (ref 94–98)
OXYHGB MFR BLDA: 95.3 % (ref 94–98)
OXYHGB MFR BLDA: 95.6 % (ref 94–98)
OXYHGB MFR BLDA: 95.8 % (ref 94–98)
OXYHGB MFR BLDA: 96.1 % (ref 94–98)
OXYHGB MFR BLDA: 96.4 % (ref 94–98)
OXYHGB MFR BLDA: 97.4 % (ref 94–98)
OXYHGB MFR BLDMV: 60.6 % (ref 45–75)
OXYHGB MFR BLDMV: 60.6 % (ref 45–75)
OXYHGB MFR BLDMV: 61.6 % (ref 45–75)
OXYHGB MFR BLDMV: 67.1 % (ref 45–75)
OXYHGB MFR BLDMV: 67.4 % (ref 45–75)
OXYHGB MFR BLDMV: 76.1 % (ref 45–75)
PCO2 BLDA: 47 MM HG (ref 38–42)
PCO2 BLDA: 48 MM HG (ref 38–42)
PCO2 BLDA: 49 MM HG (ref 38–42)
PCO2 BLDA: 51 MM HG (ref 38–42)
PCO2 BLDA: 52 MM HG (ref 38–42)
PCO2 BLDA: 52 MM HG (ref 38–42)
PCO2 BLDA: 73 MM HG (ref 38–42)
PCO2 BLDA: 73 MM HG (ref 38–42)
PCO2 BLDMV: 43 MM HG (ref 41–51)
PCO2 BLDMV: 54 MM HG (ref 41–51)
PCO2 BLDMV: 56 MM HG (ref 41–51)
PCO2 BLDMV: 56 MM HG (ref 41–51)
PCO2 BLDMV: 62 MM HG (ref 41–51)
PCO2 BLDMV: 77 MM HG (ref 41–51)
PH BLDA: 7.23 PH (ref 7.38–7.42)
PH BLDA: 7.25 PH (ref 7.38–7.42)
PH BLDA: 7.36 PH (ref 7.38–7.42)
PH BLDA: 7.39 PH (ref 7.38–7.42)
PH BLDA: 7.39 PH (ref 7.38–7.42)
PH BLDA: 7.4 PH (ref 7.38–7.42)
PH BLDA: 7.4 PH (ref 7.38–7.42)
PH BLDA: 7.43 PH (ref 7.38–7.42)
PH BLDMV: 7.23 PH (ref 7.33–7.43)
PH BLDMV: 7.25 PH (ref 7.33–7.43)
PH BLDMV: 7.35 PH (ref 7.33–7.43)
PH BLDMV: 7.36 PH (ref 7.33–7.43)
PH BLDMV: 7.37 PH (ref 7.33–7.43)
PH BLDMV: 7.41 PH (ref 7.33–7.43)
PHOSPHATE SERPL-MCNC: 3.3 MG/DL (ref 2.5–4.9)
PHOSPHATE SERPL-MCNC: 3.8 MG/DL (ref 2.5–4.9)
PLATELET # BLD AUTO: 172 X10*3/UL (ref 150–450)
PLATELET # BLD AUTO: 204 X10*3/UL (ref 150–450)
PO2 BLDA: 152 MM HG (ref 85–95)
PO2 BLDA: 62 MM HG (ref 85–95)
PO2 BLDA: 78 MM HG (ref 85–95)
PO2 BLDA: 82 MM HG (ref 85–95)
PO2 BLDA: 84 MM HG (ref 85–95)
PO2 BLDA: 86 MM HG (ref 85–95)
PO2 BLDA: 88 MM HG (ref 85–95)
PO2 BLDA: 88 MM HG (ref 85–95)
PO2 BLDMV: 35 MM HG (ref 35–45)
PO2 BLDMV: 37 MM HG (ref 35–45)
PO2 BLDMV: 40 MM HG (ref 35–45)
PO2 BLDMV: 41 MM HG (ref 35–45)
PO2 BLDMV: 41 MM HG (ref 35–45)
PO2 BLDMV: 51 MM HG (ref 35–45)
POTASSIUM BLDA-SCNC: 3.5 MMOL/L (ref 3.5–5.3)
POTASSIUM BLDA-SCNC: 3.5 MMOL/L (ref 3.5–5.3)
POTASSIUM BLDA-SCNC: 3.8 MMOL/L (ref 3.5–5.3)
POTASSIUM BLDA-SCNC: 3.8 MMOL/L (ref 3.5–5.3)
POTASSIUM BLDA-SCNC: 3.9 MMOL/L (ref 3.5–5.3)
POTASSIUM BLDA-SCNC: 3.9 MMOL/L (ref 3.5–5.3)
POTASSIUM BLDA-SCNC: 4.2 MMOL/L (ref 3.5–5.3)
POTASSIUM BLDA-SCNC: 4.8 MMOL/L (ref 3.5–5.3)
POTASSIUM BLDMV-SCNC: 2.9 MMOL/L (ref 3.5–5.3)
POTASSIUM BLDMV-SCNC: 3.6 MMOL/L (ref 3.5–5.3)
POTASSIUM BLDMV-SCNC: 3.6 MMOL/L (ref 3.5–5.3)
POTASSIUM BLDMV-SCNC: 3.8 MMOL/L (ref 3.5–5.3)
POTASSIUM BLDMV-SCNC: 3.8 MMOL/L (ref 3.5–5.3)
POTASSIUM BLDMV-SCNC: 3.9 MMOL/L (ref 3.5–5.3)
POTASSIUM SERPL-SCNC: 4.1 MMOL/L (ref 3.5–5.3)
POTASSIUM SERPL-SCNC: 4.6 MMOL/L (ref 3.5–5.3)
PROTHROMBIN TIME: 13.7 SECONDS (ref 9.8–12.8)
PROTHROMBIN TIME: 14.4 SECONDS (ref 9.8–12.8)
RBC # BLD AUTO: 2.99 X10*6/UL (ref 4–5.2)
RBC # BLD AUTO: 3.21 X10*6/UL (ref 4–5.2)
SAO2 % BLDA: 100 % (ref 94–100)
SAO2 % BLDA: 90 % (ref 94–100)
SAO2 % BLDA: 98 % (ref 94–100)
SAO2 % BLDA: 99 % (ref 94–100)
SAO2 % BLDMV: 62 % (ref 45–75)
SAO2 % BLDMV: 62 % (ref 45–75)
SAO2 % BLDMV: 63 % (ref 45–75)
SAO2 % BLDMV: 69 % (ref 45–75)
SAO2 % BLDMV: 69 % (ref 45–75)
SAO2 % BLDMV: 78 % (ref 45–75)
SODIUM BLDA-SCNC: 134 MMOL/L (ref 136–145)
SODIUM BLDA-SCNC: 136 MMOL/L (ref 136–145)
SODIUM BLDA-SCNC: 137 MMOL/L (ref 136–145)
SODIUM BLDA-SCNC: 137 MMOL/L (ref 136–145)
SODIUM BLDA-SCNC: 138 MMOL/L (ref 136–145)
SODIUM BLDMV-SCNC: 136 MMOL/L (ref 136–145)
SODIUM BLDMV-SCNC: 136 MMOL/L (ref 136–145)
SODIUM BLDMV-SCNC: 137 MMOL/L (ref 136–145)
SODIUM BLDMV-SCNC: 138 MMOL/L (ref 136–145)
SODIUM BLDMV-SCNC: 139 MMOL/L (ref 136–145)
SODIUM BLDMV-SCNC: 140 MMOL/L (ref 136–145)
SODIUM SERPL-SCNC: 140 MMOL/L (ref 136–145)
SODIUM SERPL-SCNC: 140 MMOL/L (ref 136–145)
WBC # BLD AUTO: 14.5 X10*3/UL (ref 4.4–11.3)
WBC # BLD AUTO: 17.2 X10*3/UL (ref 4.4–11.3)

## 2025-01-24 PROCEDURE — 2500000004 HC RX 250 GENERAL PHARMACY W/ HCPCS (ALT 636 FOR OP/ED): Performed by: PHYSICIAN ASSISTANT

## 2025-01-24 PROCEDURE — 84132 ASSAY OF SERUM POTASSIUM: CPT | Performed by: NURSE PRACTITIONER

## 2025-01-24 PROCEDURE — 2500000005 HC RX 250 GENERAL PHARMACY W/O HCPCS: Performed by: PHYSICIAN ASSISTANT

## 2025-01-24 PROCEDURE — 85610 PROTHROMBIN TIME: CPT | Performed by: NURSE PRACTITIONER

## 2025-01-24 PROCEDURE — P9045 ALBUMIN (HUMAN), 5%, 250 ML: HCPCS | Mod: JZ

## 2025-01-24 PROCEDURE — 94645 CONT INHLJ TX EACH ADDL HOUR: CPT

## 2025-01-24 PROCEDURE — 83735 ASSAY OF MAGNESIUM: CPT | Performed by: NURSE PRACTITIONER

## 2025-01-24 PROCEDURE — 84132 ASSAY OF SERUM POTASSIUM: CPT

## 2025-01-24 PROCEDURE — 94640 AIRWAY INHALATION TREATMENT: CPT

## 2025-01-24 PROCEDURE — 85027 COMPLETE CBC AUTOMATED: CPT | Performed by: NURSE PRACTITIONER

## 2025-01-24 PROCEDURE — 80069 RENAL FUNCTION PANEL: CPT | Performed by: NURSE PRACTITIONER

## 2025-01-24 PROCEDURE — 71045 X-RAY EXAM CHEST 1 VIEW: CPT

## 2025-01-24 PROCEDURE — 99291 CRITICAL CARE FIRST HOUR: CPT | Performed by: ANESTHESIOLOGY

## 2025-01-24 PROCEDURE — 2500000001 HC RX 250 WO HCPCS SELF ADMINISTERED DRUGS (ALT 637 FOR MEDICARE OP): Performed by: NURSE PRACTITIONER

## 2025-01-24 PROCEDURE — 2500000002 HC RX 250 W HCPCS SELF ADMINISTERED DRUGS (ALT 637 FOR MEDICARE OP, ALT 636 FOR OP/ED): Performed by: STUDENT IN AN ORGANIZED HEALTH CARE EDUCATION/TRAINING PROGRAM

## 2025-01-24 PROCEDURE — 2500000004 HC RX 250 GENERAL PHARMACY W/ HCPCS (ALT 636 FOR OP/ED): Performed by: NURSE PRACTITIONER

## 2025-01-24 PROCEDURE — 82330 ASSAY OF CALCIUM: CPT | Performed by: NURSE PRACTITIONER

## 2025-01-24 PROCEDURE — 2500000004 HC RX 250 GENERAL PHARMACY W/ HCPCS (ALT 636 FOR OP/ED)

## 2025-01-24 PROCEDURE — 2500000004 HC RX 250 GENERAL PHARMACY W/ HCPCS (ALT 636 FOR OP/ED): Mod: JZ

## 2025-01-24 PROCEDURE — 2500000001 HC RX 250 WO HCPCS SELF ADMINISTERED DRUGS (ALT 637 FOR MEDICARE OP)

## 2025-01-24 PROCEDURE — 37799 UNLISTED PX VASCULAR SURGERY: CPT | Performed by: NURSE PRACTITIONER

## 2025-01-24 PROCEDURE — P9047 ALBUMIN (HUMAN), 25%, 50ML: HCPCS | Mod: JZ

## 2025-01-24 PROCEDURE — 2500000005 HC RX 250 GENERAL PHARMACY W/O HCPCS

## 2025-01-24 PROCEDURE — 99231 SBSQ HOSP IP/OBS SF/LOW 25: CPT | Performed by: NURSE PRACTITIONER

## 2025-01-24 PROCEDURE — 99292 CRITICAL CARE ADDL 30 MIN: CPT | Performed by: ANESTHESIOLOGY

## 2025-01-24 PROCEDURE — 94003 VENT MGMT INPAT SUBQ DAY: CPT

## 2025-01-24 PROCEDURE — 93010 ELECTROCARDIOGRAM REPORT: CPT | Performed by: INTERNAL MEDICINE

## 2025-01-24 PROCEDURE — 71045 X-RAY EXAM CHEST 1 VIEW: CPT | Performed by: RADIOLOGY

## 2025-01-24 PROCEDURE — 82947 ASSAY GLUCOSE BLOOD QUANT: CPT

## 2025-01-24 PROCEDURE — 2020000001 HC ICU ROOM DAILY

## 2025-01-24 PROCEDURE — 2500000002 HC RX 250 W HCPCS SELF ADMINISTERED DRUGS (ALT 637 FOR MEDICARE OP, ALT 636 FOR OP/ED): Performed by: NURSE PRACTITIONER

## 2025-01-24 PROCEDURE — 82435 ASSAY OF BLOOD CHLORIDE: CPT

## 2025-01-24 RX ORDER — TRAZODONE HYDROCHLORIDE 50 MG/1
25 TABLET ORAL NIGHTLY
Status: DISCONTINUED | OUTPATIENT
Start: 2025-01-24 | End: 2025-01-28

## 2025-01-24 RX ORDER — ESOMEPRAZOLE MAGNESIUM 40 MG/1
40 GRANULE, DELAYED RELEASE ORAL
Status: DISCONTINUED | OUTPATIENT
Start: 2025-01-25 | End: 2025-01-25

## 2025-01-24 RX ORDER — HALOPERIDOL LACTATE 5 MG/ML
5 INJECTION, SOLUTION INTRAMUSCULAR ONCE
Status: COMPLETED | OUTPATIENT
Start: 2025-01-24 | End: 2025-01-24

## 2025-01-24 RX ORDER — METOPROLOL TARTRATE 1 MG/ML
5 INJECTION, SOLUTION INTRAVENOUS ONCE
Status: COMPLETED | OUTPATIENT
Start: 2025-01-24 | End: 2025-01-24

## 2025-01-24 RX ORDER — ALBUMIN HUMAN 50 G/1000ML
12.5 SOLUTION INTRAVENOUS ONCE
Status: COMPLETED | OUTPATIENT
Start: 2025-01-24 | End: 2025-01-24

## 2025-01-24 RX ORDER — FENTANYL CITRATE 50 UG/ML
50 INJECTION, SOLUTION INTRAMUSCULAR; INTRAVENOUS ONCE
Status: COMPLETED | OUTPATIENT
Start: 2025-01-24 | End: 2025-01-24

## 2025-01-24 RX ORDER — DEXAMETHASONE 6 MG/1
6 TABLET ORAL DAILY
Status: DISCONTINUED | OUTPATIENT
Start: 2025-01-25 | End: 2025-01-25

## 2025-01-24 RX ORDER — FUROSEMIDE 10 MG/ML
40 INJECTION INTRAMUSCULAR; INTRAVENOUS EVERY 12 HOURS
Status: DISCONTINUED | OUTPATIENT
Start: 2025-01-24 | End: 2025-01-25

## 2025-01-24 RX ORDER — ALBUMIN HUMAN 250 G/1000ML
25 SOLUTION INTRAVENOUS ONCE
Status: COMPLETED | OUTPATIENT
Start: 2025-01-24 | End: 2025-01-24

## 2025-01-24 RX ORDER — CLONAZEPAM 0.5 MG/1
0.5 TABLET ORAL 3 TIMES DAILY
Status: DISCONTINUED | OUTPATIENT
Start: 2025-01-24 | End: 2025-01-25

## 2025-01-24 RX ORDER — HYDROMORPHONE HYDROCHLORIDE 1 MG/ML
1 INJECTION, SOLUTION INTRAMUSCULAR; INTRAVENOUS; SUBCUTANEOUS ONCE
Status: COMPLETED | OUTPATIENT
Start: 2025-01-24 | End: 2025-01-24

## 2025-01-24 RX ADMIN — INSULIN LISPRO 1 UNITS: 100 INJECTION, SOLUTION INTRAVENOUS; SUBCUTANEOUS at 03:56

## 2025-01-24 RX ADMIN — LIDOCAINE 4% 1 PATCH: 40 PATCH TOPICAL at 08:45

## 2025-01-24 RX ADMIN — BISACODYL 10 MG: 10 SUPPOSITORY RECTAL at 08:16

## 2025-01-24 RX ADMIN — ALBUMIN HUMAN 25 G: 0.25 SOLUTION INTRAVENOUS at 22:07

## 2025-01-24 RX ADMIN — VASOPRESSIN 0.01 UNITS/MIN: 0.2 INJECTION INTRAVENOUS at 05:30

## 2025-01-24 RX ADMIN — HALOPERIDOL LACTATE 5 MG: 5 INJECTION, SOLUTION INTRAMUSCULAR at 08:16

## 2025-01-24 RX ADMIN — DEXMEDETOMIDINE HYDROCHLORIDE 1.5 MCG/KG/HR: 400 INJECTION INTRAVENOUS at 08:16

## 2025-01-24 RX ADMIN — IPRATROPIUM BROMIDE AND ALBUTEROL SULFATE 3 ML: .5; 3 SOLUTION RESPIRATORY (INHALATION) at 07:10

## 2025-01-24 RX ADMIN — CLONAZEPAM 0.5 MG: 0.5 TABLET ORAL at 21:00

## 2025-01-24 RX ADMIN — IPRATROPIUM BROMIDE AND ALBUTEROL SULFATE 3 ML: .5; 3 SOLUTION RESPIRATORY (INHALATION) at 17:14

## 2025-01-24 RX ADMIN — BUDESONIDE 0.5 MG: 0.5 INHALANT RESPIRATORY (INHALATION) at 07:10

## 2025-01-24 RX ADMIN — PIPERACILLIN SODIUM AND TAZOBACTAM SODIUM 4.5 G: 4; .5 INJECTION, SOLUTION INTRAVENOUS at 00:16

## 2025-01-24 RX ADMIN — DEXMEDETOMIDINE HYDROCHLORIDE 0.97 MCG/KG/HR: 400 INJECTION INTRAVENOUS at 22:26

## 2025-01-24 RX ADMIN — BUDESONIDE 0.5 MG: 0.5 INHALANT RESPIRATORY (INHALATION) at 20:35

## 2025-01-24 RX ADMIN — HEPARIN SODIUM 5000 UNITS: 5000 INJECTION INTRAVENOUS; SUBCUTANEOUS at 05:30

## 2025-01-24 RX ADMIN — HEPARIN SODIUM 5000 UNITS: 5000 INJECTION INTRAVENOUS; SUBCUTANEOUS at 14:34

## 2025-01-24 RX ADMIN — TRAZODONE HYDROCHLORIDE 25 MG: 50 TABLET ORAL at 21:00

## 2025-01-24 RX ADMIN — PIPERACILLIN SODIUM AND TAZOBACTAM SODIUM 4.5 G: 4; .5 INJECTION, SOLUTION INTRAVENOUS at 05:46

## 2025-01-24 RX ADMIN — DEXAMETHASONE SODIUM PHOSPHATE 6 MG: 10 INJECTION, SOLUTION INTRAMUSCULAR; INTRAVENOUS at 08:45

## 2025-01-24 RX ADMIN — INSULIN LISPRO 2 UNITS: 100 INJECTION, SOLUTION INTRAVENOUS; SUBCUTANEOUS at 20:57

## 2025-01-24 RX ADMIN — REMDESIVIR 100 MG: 100 INJECTION, POWDER, LYOPHILIZED, FOR SOLUTION INTRAVENOUS at 19:06

## 2025-01-24 RX ADMIN — HYDROMORPHONE HYDROCHLORIDE 1 MG: 1 INJECTION, SOLUTION INTRAMUSCULAR; INTRAVENOUS; SUBCUTANEOUS at 04:07

## 2025-01-24 RX ADMIN — FUROSEMIDE 40 MG: 10 INJECTION, SOLUTION INTRAMUSCULAR; INTRAVENOUS at 21:01

## 2025-01-24 RX ADMIN — PIPERACILLIN SODIUM AND TAZOBACTAM SODIUM 4.5 G: 4; .5 INJECTION, SOLUTION INTRAVENOUS at 12:00

## 2025-01-24 RX ADMIN — METOPROLOL TARTRATE 5 MG: 1 INJECTION, SOLUTION INTRAVENOUS at 22:36

## 2025-01-24 RX ADMIN — DOCUSATE SODIUM LIQUID 100 MG: 100 LIQUID ORAL at 08:45

## 2025-01-24 RX ADMIN — IPRATROPIUM BROMIDE AND ALBUTEROL SULFATE 3 ML: .5; 3 SOLUTION RESPIRATORY (INHALATION) at 20:35

## 2025-01-24 RX ADMIN — DEXMEDETOMIDINE HYDROCHLORIDE 0.97 MCG/KG/HR: 400 INJECTION INTRAVENOUS at 14:33

## 2025-01-24 RX ADMIN — PANTOPRAZOLE SODIUM 40 MG: 40 INJECTION, POWDER, FOR SOLUTION INTRAVENOUS at 08:45

## 2025-01-24 RX ADMIN — VASOPRESSIN 0.01 UNITS/MIN: 0.2 INJECTION INTRAVENOUS at 21:01

## 2025-01-24 RX ADMIN — ALBUMIN HUMAN 12.5 G: 0.05 INJECTION, SOLUTION INTRAVENOUS at 23:12

## 2025-01-24 RX ADMIN — OXYCODONE HYDROCHLORIDE 10 MG: 5 SOLUTION ORAL at 09:05

## 2025-01-24 RX ADMIN — POLYETHYLENE GLYCOL 3350 17 G: 17 POWDER, FOR SOLUTION ORAL at 08:45

## 2025-01-24 RX ADMIN — OXYCODONE HYDROCHLORIDE 10 MG: 5 SOLUTION ORAL at 03:40

## 2025-01-24 RX ADMIN — HEPARIN SODIUM 5000 UNITS: 5000 INJECTION INTRAVENOUS; SUBCUTANEOUS at 21:00

## 2025-01-24 RX ADMIN — Medication 40 PERCENT: at 20:42

## 2025-01-24 RX ADMIN — PIPERACILLIN SODIUM AND TAZOBACTAM SODIUM 4.5 G: 4; .5 INJECTION, SOLUTION INTRAVENOUS at 17:47

## 2025-01-24 RX ADMIN — THIAMINE HYDROCHLORIDE 100 MG: 100 INJECTION, SOLUTION INTRAMUSCULAR; INTRAVENOUS at 08:45

## 2025-01-24 RX ADMIN — OXYCODONE HYDROCHLORIDE 10 MG: 5 SOLUTION ORAL at 16:08

## 2025-01-24 RX ADMIN — OXYCODONE HYDROCHLORIDE 10 MG: 5 SOLUTION ORAL at 21:01

## 2025-01-24 RX ADMIN — IPRATROPIUM BROMIDE AND ALBUTEROL SULFATE 3 ML: .5; 3 SOLUTION RESPIRATORY (INHALATION) at 01:07

## 2025-01-24 RX ADMIN — FENTANYL CITRATE 50 MCG: 50 INJECTION, SOLUTION INTRAMUSCULAR; INTRAVENOUS at 02:48

## 2025-01-24 RX ADMIN — POTASSIUM PHOSPHATE, MONOBASIC POTASSIUM PHOSPHATE, DIBASIC 15 MMOL: 224; 236 INJECTION, SOLUTION, CONCENTRATE INTRAVENOUS at 01:31

## 2025-01-24 RX ADMIN — Medication 40 PERCENT: at 08:00

## 2025-01-24 RX ADMIN — CLONAZEPAM 0.5 MG: 0.5 TABLET ORAL at 14:33

## 2025-01-24 RX ADMIN — INSULIN LISPRO 1 UNITS: 100 INJECTION, SOLUTION INTRAVENOUS; SUBCUTANEOUS at 00:14

## 2025-01-24 RX ADMIN — INSULIN LISPRO 1 UNITS: 100 INJECTION, SOLUTION INTRAVENOUS; SUBCUTANEOUS at 12:26

## 2025-01-24 RX ADMIN — DEXMEDETOMIDINE HYDROCHLORIDE 1.5 MCG/KG/HR: 400 INJECTION INTRAVENOUS at 03:31

## 2025-01-24 RX ADMIN — SENNOSIDES 5 ML: 8.8 LIQUID ORAL at 08:45

## 2025-01-24 RX ADMIN — CLONAZEPAM 0.5 MG: 0.5 TABLET ORAL at 09:54

## 2025-01-24 RX ADMIN — FUROSEMIDE 40 MG: 10 INJECTION, SOLUTION INTRAMUSCULAR; INTRAVENOUS at 09:54

## 2025-01-24 ASSESSMENT — PAIN - FUNCTIONAL ASSESSMENT
PAIN_FUNCTIONAL_ASSESSMENT: CPOT (CRITICAL CARE PAIN OBSERVATION TOOL)
PAIN_FUNCTIONAL_ASSESSMENT: CPOT (CRITICAL CARE PAIN OBSERVATION TOOL)
PAIN_FUNCTIONAL_ASSESSMENT: 0-10
PAIN_FUNCTIONAL_ASSESSMENT: CPOT (CRITICAL CARE PAIN OBSERVATION TOOL)

## 2025-01-24 ASSESSMENT — PAIN SCALES - GENERAL: PAINLEVEL_OUTOF10: 5 - MODERATE PAIN

## 2025-01-24 NOTE — PROGRESS NOTES
This LSW met with the patient’s son at bedside to introduce myself and explain my role and responsibilities as the patient’s . The son, who is employed in the medical field, expressed familiarity with the work of social workers. This LSW provided him with a list of skilled nursing facilities, identical to the list previously given to his father. This was done to enable the son to assist his father in selecting an appropriate facility.    Additionally, this LSW advised the son to contact me directly if he identified a facility of interest that was not included on the provided list. Contact information, including phone numbers, was shared via business cards. This LSW will continue to follow up with the patient to ensure a safe and timely discharge.

## 2025-01-24 NOTE — CARE PLAN
Problem: Pain - Adult  Goal: Verbalizes/displays adequate comfort level or baseline comfort level  Outcome: Progressing     Problem: Safety - Adult  Goal: Free from fall injury  Outcome: Progressing     Problem: Discharge Planning  Goal: Discharge to home or other facility with appropriate resources  Outcome: Progressing     Problem: Chronic Conditions and Co-morbidities  Goal: Patient's chronic conditions and co-morbidity symptoms are monitored and maintained or improved  Outcome: Progressing     Problem: Skin  Goal: Decreased wound size/increased tissue granulation at next dressing change  Outcome: Progressing  Goal: Participates in plan/prevention/treatment measures  Outcome: Progressing  Goal: Promote skin healing  Outcome: Progressing     Problem: Safety - Medical Restraint  Goal: Remains free of injury from restraints (Restraint for Interference with Medical Device)  Outcome: Progressing  Goal: Free from restraint(s) (Restraint for Interference with Medical Device)  Outcome: Progressing     Problem: Knowledge Deficit  Goal: Patient/family/caregiver demonstrates understanding of disease process, treatment plan, medications, and discharge instructions  Outcome: Progressing     Problem: Fall/Injury  Goal: Not fall by end of shift  Outcome: Progressing

## 2025-01-24 NOTE — PROGRESS NOTES
"Faustina Vick \"Noam" is a 64 y.o. 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    Subjective   WBC uptrend 9 to 17, IEPO successfully weaned down 0.01; pAp slight uptrended when agitated overnight then returned to baseline. Was on 1.5 Dex, 25 Fent, bolused fentanyl and got 1mg dilaudid calmed her down, repeat gas at 5:30 am back to baseline, had to briefly inc. FiO2 50% when agitated. Troponin checked, 40s.     Objective     Physical Exam  Vitals reviewed.   Constitutional:       Appearance: She is underweight.      Interventions: She is sedated, intubated and restrained.   HENT:      Head: Normocephalic and atraumatic.      Nose: Nose normal.      Comments: Bridled Dobhoff tube. L nare 78cm depth     Mouth/Throat:      Mouth: Mucous membranes are moist.   Eyes:      Extraocular Movements: Extraocular movements intact.      Pupils: Pupils are equal, round, and reactive to light.   Neck:      Comments: RIJ MAC with PA cath in place, 58cm depth. dressing c/d/i.   Cardiovascular:      Rate and Rhythm: Normal rate and regular rhythm.      Pulses: Normal pulses.      Heart sounds: Normal heart sounds.   Pulmonary:      Effort: She is intubated.      Breath sounds: Rhonchi (left side) present.      Comments: Mechanically ventilated VC/C, moderately course breath sounds bilaterally, good effort when calm and sleeping; increased distress when placed on SIMV or CPAP during SBT.  Chest:      Comments: Right lateral chest dressing c/d/i.  Abdominal:      General: Bowel sounds are normal. There is no distension.      Palpations: Abdomen is soft.      Comments: Enteral feeding tube in place, tube feeds running at 20ml/hr   Genitourinary:     Comments: Montiel in place with clear yellow UOP.   Musculoskeletal:         General: No swelling or tenderness. Normal range of motion.      Cervical back: Neck supple.      Comments: Puffy left arm, stable   Skin:     General: Skin is " "warm and dry.   Neurological:      General: No focal deficit present.      Mental Status: She is easily aroused.      GCS: GCS eye subscore is 3. GCS verbal subscore is 1. GCS motor subscore is 6.      Motor: No weakness (generalized).      Comments: Awakens to name, follows commands, squeezes hands on command     Last Recorded Vitals  Blood pressure 126/79, pulse 79, temperature 37.3 °C (99.1 °F), resp. rate 18, height 1.651 m (5' 5\"), weight 57.8 kg (127 lb 6.8 oz), SpO2 98%.    VS over last 24h  Heart Rate:  []   Temp:  [36.8 °C (98.2 °F)-37.3 °C (99.1 °F)]   Resp:  [10-51]   BP: ()/()   Weight:  [57.8 kg (127 lb 6.8 oz)]   SpO2:  [83 %-100 %]      Intake/Output last 3 Shifts:  I/O last 3 completed shifts:  In: 2974.5 (50 mL/kg) [I.V.:839.5 (14.1 mL/kg); Blood:100; NG/GT:860; IV Piggyback:1175]  Out: 2253 (37.9 mL/kg) [Urine:2253 (1.1 mL/kg/hr)]  Weight: 59.5 kg       Intake/Output Summary (Last 24 hours) at 1/24/2025 0653  Last data filed at 1/24/2025 0612  Gross per 24 hour   Intake 2289.25 ml   Output 1405 ml   Net 884.25 ml        Relevant Results  Scheduled medications  bisacodyl, 10 mg, rectal, Daily  budesonide, 0.5 mg, nebulization, BID  dexAMETHasone, 6 mg, intravenous, q24h  docusate sodium, 100 mg, orogastric tube, BID  heparin (porcine), 5,000 Units, subcutaneous, q8h  insulin lispro, 0-5 Units, subcutaneous, q4h  ipratropium-albuteroL, 3 mL, nebulization, q6h  lidocaine, 1 patch, transdermal, Daily  oxyCODONE, 10 mg, nasogastric tube, q6h  oxygen, , inhalation, Continuous - Inhalation  pantoprazole, 40 mg, intravenous, Daily  piperacillin-tazobactam, 4.5 g, intravenous, q6h  polyethylene glycol, 17 g, orogastric tube, Daily  remdesivir, 100 mg, intravenous, q24h  senna, 5 mL, orogastric tube, BID  thiamine, 100 mg, intravenous, Daily      Continuous medications  dexmedeTOMIDine, 0.1-1.5 mcg/kg/hr, Last Rate: 1.5 mcg/kg/hr (01/24/25 0600)  epoprostenol, 0.01 mcg/kg/min (Ideal), Last " Rate: 0.01 mcg/kg/min (01/23/25 2129)  fentaNYL, 25 mcg/hr, Last Rate: 25 mcg/hr (01/24/25 0600)  vasopressin, 0-0.06 Units/min, Last Rate: 0.01 Units/min (01/24/25 0600)        PRN medications  PRN medications: albuterol, calcium gluconate, calcium gluconate, dextrose, dextrose, glucagon, glucagon, magnesium sulfate, magnesium sulfate, oxygen, potassium chloride, potassium chloride      Results for orders placed or performed during the hospital encounter of 01/13/25 (from the past 24 hours)   Blood Gas Arterial Full Panel   Result Value Ref Range    POCT pH, Arterial 7.27 (L) 7.38 - 7.42 pH    POCT pCO2, Arterial 61 (H) 38 - 42 mm Hg    POCT pO2, Arterial 76 (L) 85 - 95 mm Hg    POCT SO2, Arterial 94 94 - 100 %    POCT Oxy Hemoglobin, Arterial 91.0 (L) 94.0 - 98.0 %    POCT Hematocrit Calculated, Arterial 37.0 36.0 - 46.0 %    POCT Sodium, Arterial 134 (L) 136 - 145 mmol/L    POCT Potassium, Arterial 3.4 (L) 3.5 - 5.3 mmol/L    POCT Chloride, Arterial 100 98 - 107 mmol/L    POCT Ionized Calcium, Arterial 1.17 1.10 - 1.33 mmol/L    POCT Glucose, Arterial 134 (H) 74 - 99 mg/dL    POCT Lactate, Arterial 0.9 0.4 - 2.0 mmol/L    POCT Base Excess, Arterial -0.1 -2.0 - 3.0 mmol/L    POCT HCO3 Calculated, Arterial 28.0 (H) 22.0 - 26.0 mmol/L    POCT Hemoglobin, Arterial 12.2 12.0 - 16.0 g/dL    POCT Anion Gap, Arterial 9 (L) 10 - 25 mmo/L    Patient Temperature 37.0 degrees Celsius    FiO2 40 %   Calcium, Ionized   Result Value Ref Range    POCT Calcium, Ionized 1.16 1.1 - 1.33 mmol/L   CBC   Result Value Ref Range    WBC 12.8 (H) 4.4 - 11.3 x10*3/uL    nRBC 0.2 (H) 0.0 - 0.0 /100 WBCs    RBC 3.57 (L) 4.00 - 5.20 x10*6/uL    Hemoglobin 9.6 (L) 12.0 - 16.0 g/dL    Hematocrit 31.1 (L) 36.0 - 46.0 %    MCV 87 80 - 100 fL    MCH 26.9 26.0 - 34.0 pg    MCHC 30.9 (L) 32.0 - 36.0 g/dL    RDW 16.9 (H) 11.5 - 14.5 %    Platelets 169 150 - 450 x10*3/uL   Coagulation Screen   Result Value Ref Range    Protime 14.1 (H) 9.8 - 12.8  seconds    INR 1.3 (H) 0.9 - 1.1    aPTT 30 27 - 38 seconds   Magnesium   Result Value Ref Range    Magnesium 2.17 1.60 - 2.40 mg/dL   Renal function panel   Result Value Ref Range    Glucose 135 (H) 74 - 99 mg/dL    Sodium 138 136 - 145 mmol/L    Potassium 3.8 3.5 - 5.3 mmol/L    Chloride 100 98 - 107 mmol/L    Bicarbonate 32 21 - 32 mmol/L    Anion Gap 10 10 - 20 mmol/L    Urea Nitrogen 18 6 - 23 mg/dL    Creatinine 0.47 (L) 0.50 - 1.05 mg/dL    eGFR >90 >60 mL/min/1.73m*2    Calcium 8.1 (L) 8.6 - 10.6 mg/dL    Phosphorus 2.9 2.5 - 4.9 mg/dL    Albumin 3.6 3.4 - 5.0 g/dL   BLOOD GAS MIXED VENOUS FULL PANEL   Result Value Ref Range    POCT pH, Mixed 7.27 (L) 7.33 - 7.43 pH    POCT pCO2, Mixed 72 (HH) 41 - 51 mm Hg    POCT pO2, Mixed 49 (H) 35 - 45 mm Hg    POCT SO2, Mixed 76 (H) 45 - 75 %    POCT Oxy Hemoglobin, Mixed 74.3 45.0 - 75.0 %    POCT Hematocrit Calculated, Mixed 41.0 36.0 - 46.0 %    POCT Sodium, Mixed 134 (L) 136 - 145 mmol/L    POCT Potassium, Mixed 3.8 3.5 - 5.3 mmol/L    POCT Chloride, Mixed 98 98 - 107 mmol/L    POCT Ionized Calcium, Mixed 1.22 1.10 - 1.33 mmol/L    POCT Glucose, Mixed 144 (H) 74 - 99 mg/dL    POCT Lactate, Mixed 1.0 0.4 - 2.0 mmol/L    POCT Base Excess, Mixed 3.9 (H) -2.0 - 3.0 mmol/L    POCT HCO3 Calculated, Mixed 33.1 (H) 22.0 - 26.0 mmol/L    POCT Hemoglobin, Mixed 13.5 12.0 - 16.0 g/dL    POCT Anion Gap, Mixed 7 (L) 10 - 25 mmo/L    Patient Temperature 37.0 degrees Celsius    FiO2 40 %   Blood Gas Arterial Full Panel   Result Value Ref Range    POCT pH, Arterial 7.21 (LL) 7.38 - 7.42 pH    POCT pCO2, Arterial 66 (H) 38 - 42 mm Hg    POCT pO2, Arterial 79 (L) 85 - 95 mm Hg    POCT SO2, Arterial 95 94 - 100 %    POCT Oxy Hemoglobin, Arterial 92.9 (L) 94.0 - 98.0 %    POCT Hematocrit Calculated, Arterial 26.0 (L) 36.0 - 46.0 %    POCT Sodium, Arterial 136 136 - 145 mmol/L    POCT Potassium, Arterial 4.4 3.5 - 5.3 mmol/L    POCT Chloride, Arterial 104 98 - 107 mmol/L    POCT  Ionized Calcium, Arterial 1.14 1.10 - 1.33 mmol/L    POCT Glucose, Arterial 122 (H) 74 - 99 mg/dL    POCT Lactate, Arterial 1.6 0.4 - 2.0 mmol/L    POCT Base Excess, Arterial -2.0 -2.0 - 3.0 mmol/L    POCT HCO3 Calculated, Arterial 26.4 (H) 22.0 - 26.0 mmol/L    POCT Hemoglobin, Arterial 8.8 (L) 12.0 - 16.0 g/dL    POCT Anion Gap, Arterial 10 10 - 25 mmo/L    Patient Temperature 37.0 degrees Celsius    FiO2 40 %   Renal function panel   Result Value Ref Range    Glucose 160 (H) 74 - 99 mg/dL    Sodium 140 136 - 145 mmol/L    Potassium 4.6 3.5 - 5.3 mmol/L    Chloride 99 98 - 107 mmol/L    Bicarbonate 31 21 - 32 mmol/L    Anion Gap 15 10 - 20 mmol/L    Urea Nitrogen 18 6 - 23 mg/dL    Creatinine 0.53 0.50 - 1.05 mg/dL    eGFR >90 >60 mL/min/1.73m*2    Calcium 8.5 (L) 8.6 - 10.6 mg/dL    Phosphorus 3.0 2.5 - 4.9 mg/dL    Albumin 3.9 3.4 - 5.0 g/dL   Coagulation Screen   Result Value Ref Range    Protime 14.5 (H) 9.8 - 12.8 seconds    INR 1.3 (H) 0.9 - 1.1    aPTT 31 27 - 38 seconds   Magnesium   Result Value Ref Range    Magnesium 2.33 1.60 - 2.40 mg/dL   Calcium, Ionized   Result Value Ref Range    POCT Calcium, Ionized 1.20 1.1 - 1.33 mmol/L   CBC   Result Value Ref Range    WBC 23.1 (H) 4.4 - 11.3 x10*3/uL    nRBC 0.1 (H) 0.0 - 0.0 /100 WBCs    RBC 3.28 (L) 4.00 - 5.20 x10*6/uL    Hemoglobin 9.0 (L) 12.0 - 16.0 g/dL    Hematocrit 29.2 (L) 36.0 - 46.0 %    MCV 89 80 - 100 fL    MCH 27.4 26.0 - 34.0 pg    MCHC 30.8 (L) 32.0 - 36.0 g/dL    RDW 16.9 (H) 11.5 - 14.5 %    Platelets 180 150 - 450 x10*3/uL   BLOOD GAS MIXED VENOUS FULL PANEL   Result Value Ref Range    POCT pH, Mixed 7.30 (L) 7.33 - 7.43 pH    POCT pCO2, Mixed 62 (H) 41 - 51 mm Hg    POCT pO2, Mixed 43 35 - 45 mm Hg    POCT SO2, Mixed 70 45 - 75 %    POCT Oxy Hemoglobin, Mixed 68.4 45.0 - 75.0 %    POCT Hematocrit Calculated, Mixed 27.0 (L) 36.0 - 46.0 %    POCT Sodium, Mixed 136 136 - 145 mmol/L    POCT Potassium, Mixed 4.4 3.5 - 5.3 mmol/L    POCT  Chloride, Mixed      POCT Ionized Calcium, Mixed 1.19 1.10 - 1.33 mmol/L    POCT Glucose, Mixed 164 (H) 74 - 99 mg/dL    POCT Lactate, Mixed 1.0 0.4 - 2.0 mmol/L    POCT Base Excess, Mixed 3.2 (H) -2.0 - 3.0 mmol/L    POCT HCO3 Calculated, Mixed 30.5 (H) 22.0 - 26.0 mmol/L    POCT Hemoglobin, Mixed 9.0 (L) 12.0 - 16.0 g/dL    POCT Anion Gap, Mixed      Patient Temperature 37.0 degrees Celsius    FiO2 40 %   Blood Gas Arterial Full Panel   Result Value Ref Range    POCT pH, Arterial 7.42 7.38 - 7.42 pH    POCT pCO2, Arterial 44 (H) 38 - 42 mm Hg    POCT pO2, Arterial 101 (H) 85 - 95 mm Hg    POCT SO2, Arterial 98 94 - 100 %    POCT Oxy Hemoglobin, Arterial 96.4 94.0 - 98.0 %    POCT Hematocrit Calculated, Arterial 26.0 (L) 36.0 - 46.0 %    POCT Sodium, Arterial 136 136 - 145 mmol/L    POCT Potassium, Arterial 4.0 3.5 - 5.3 mmol/L    POCT Chloride, Arterial 101 98 - 107 mmol/L    POCT Ionized Calcium, Arterial 1.18 1.10 - 1.33 mmol/L    POCT Glucose, Arterial 157 (H) 74 - 99 mg/dL    POCT Lactate, Arterial 0.8 0.4 - 2.0 mmol/L    POCT Base Excess, Arterial 3.6 (H) -2.0 - 3.0 mmol/L    POCT HCO3 Calculated, Arterial 28.5 (H) 22.0 - 26.0 mmol/L    POCT Hemoglobin, Arterial 8.7 (L) 12.0 - 16.0 g/dL    POCT Anion Gap, Arterial 11 10 - 25 mmo/L    Patient Temperature 37.0 degrees Celsius    FiO2 40 %   Blood Gas Arterial Full Panel   Result Value Ref Range    POCT pH, Arterial 7.36 (L) 7.38 - 7.42 pH    POCT pCO2, Arterial 49 (H) 38 - 42 mm Hg    POCT pO2, Arterial 82 (L) 85 - 95 mm Hg    POCT SO2, Arterial 98 94 - 100 %    POCT Oxy Hemoglobin, Arterial 95.1 94.0 - 98.0 %    POCT Hematocrit Calculated, Arterial 26.0 (L) 36.0 - 46.0 %    POCT Sodium, Arterial 134 (L) 136 - 145 mmol/L    POCT Potassium, Arterial 4.0 3.5 - 5.3 mmol/L    POCT Chloride, Arterial 102 98 - 107 mmol/L    POCT Ionized Calcium, Arterial 1.18 1.10 - 1.33 mmol/L    POCT Glucose, Arterial 165 (H) 74 - 99 mg/dL    POCT Lactate, Arterial 0.9 0.4 - 2.0  mmol/L    POCT Base Excess, Arterial 1.8 -2.0 - 3.0 mmol/L    POCT HCO3 Calculated, Arterial 27.7 (H) 22.0 - 26.0 mmol/L    POCT Hemoglobin, Arterial 8.7 (L) 12.0 - 16.0 g/dL    POCT Anion Gap, Arterial 8 (L) 10 - 25 mmo/L    Patient Temperature 37.0 degrees Celsius    FiO2 40 %   BLOOD GAS MIXED VENOUS FULL PANEL   Result Value Ref Range    POCT pH, Mixed 7.33 7.33 - 7.43 pH    POCT pCO2, Mixed 58 (H) 41 - 51 mm Hg    POCT pO2, Mixed 40 35 - 45 mm Hg    POCT SO2, Mixed 66 45 - 75 %    POCT Oxy Hemoglobin, Mixed 64.7 45.0 - 75.0 %    POCT Hematocrit Calculated, Mixed 20.0 (L) 36.0 - 46.0 %    POCT Sodium, Mixed 135 (L) 136 - 145 mmol/L    POCT Potassium, Mixed 4.2 3.5 - 5.3 mmol/L    POCT Chloride, Mixed 101 98 - 107 mmol/L    POCT Ionized Calcium, Mixed 1.19 1.10 - 1.33 mmol/L    POCT Glucose, Mixed 182 (H) 74 - 99 mg/dL    POCT Lactate, Mixed 1.0 0.4 - 2.0 mmol/L    POCT Base Excess, Mixed 4.2 (H) -2.0 - 3.0 mmol/L    POCT HCO3 Calculated, Mixed 30.6 (H) 22.0 - 26.0 mmol/L    POCT Hemoglobin, Mixed 6.7 (L) 12.0 - 16.0 g/dL    POCT Anion Gap, Mixed 8 (L) 10 - 25 mmo/L    Patient Temperature 37.0 degrees Celsius    FiO2 40 %   Calcium, Ionized   Result Value Ref Range    POCT Calcium, Ionized 1.13 1.1 - 1.33 mmol/L   Magnesium   Result Value Ref Range    Magnesium 2.08 1.60 - 2.40 mg/dL   Renal function panel   Result Value Ref Range    Glucose 182 (H) 74 - 99 mg/dL    Sodium 138 136 - 145 mmol/L    Potassium 4.2 3.5 - 5.3 mmol/L    Chloride 100 98 - 107 mmol/L    Bicarbonate 30 21 - 32 mmol/L    Anion Gap 12 10 - 20 mmol/L    Urea Nitrogen 18 6 - 23 mg/dL    Creatinine 0.49 (L) 0.50 - 1.05 mg/dL    eGFR >90 >60 mL/min/1.73m*2    Calcium 8.4 (L) 8.6 - 10.6 mg/dL    Phosphorus 2.9 2.5 - 4.9 mg/dL    Albumin 3.7 3.4 - 5.0 g/dL   Troponin I, High Sensitivity   Result Value Ref Range    Troponin I, High Sensitivity (CMC) 51 (H) 0 - 34 ng/L   Coagulation Screen   Result Value Ref Range    Protime 15.2 (H) 9.8 - 12.8  seconds    INR 1.3 (H) 0.9 - 1.1    aPTT 31 27 - 38 seconds   CBC   Result Value Ref Range    WBC 9.6 4.4 - 11.3 x10*3/uL    nRBC 0.0 0.0 - 0.0 /100 WBCs    RBC 3.06 (L) 4.00 - 5.20 x10*6/uL    Hemoglobin 8.1 (L) 12.0 - 16.0 g/dL    Hematocrit 26.5 (L) 36.0 - 46.0 %    MCV 87 80 - 100 fL    MCH 26.5 26.0 - 34.0 pg    MCHC 30.6 (L) 32.0 - 36.0 g/dL    RDW 17.0 (H) 11.5 - 14.5 %    Platelets 139 (L) 150 - 450 x10*3/uL   POCT GLUCOSE   Result Value Ref Range    POCT Glucose 177 (H) 74 - 99 mg/dL   BLOOD GAS MIXED VENOUS FULL PANEL   Result Value Ref Range    POCT pH, Mixed 7.41 7.33 - 7.43 pH    POCT pCO2, Mixed 46 41 - 51 mm Hg    POCT pO2, Mixed 39 35 - 45 mm Hg    POCT SO2, Mixed 70 45 - 75 %    POCT Oxy Hemoglobin, Mixed 68.2 45.0 - 75.0 %    POCT Hematocrit Calculated, Mixed 25.0 (L) 36.0 - 46.0 %    POCT Sodium, Mixed 136 136 - 145 mmol/L    POCT Potassium, Mixed 3.7 3.5 - 5.3 mmol/L    POCT Chloride, Mixed 103 98 - 107 mmol/L    POCT Ionized Calcium, Mixed 1.20 1.10 - 1.33 mmol/L    POCT Glucose, Mixed 186 (H) 74 - 99 mg/dL    POCT Lactate, Mixed 0.8 0.4 - 2.0 mmol/L    POCT Base Excess, Mixed 4.1 (H) -2.0 - 3.0 mmol/L    POCT HCO3 Calculated, Mixed 29.2 (H) 22.0 - 26.0 mmol/L    POCT Hemoglobin, Mixed 8.4 (L) 12.0 - 16.0 g/dL    POCT Anion Gap, Mixed 8 (L) 10 - 25 mmo/L    Patient Temperature 37.0 degrees Celsius    FiO2 40 %   Calcium, Ionized   Result Value Ref Range    POCT Calcium, Ionized 1.14 1.1 - 1.33 mmol/L   CBC   Result Value Ref Range    WBC 7.7 4.4 - 11.3 x10*3/uL    nRBC 0.0 0.0 - 0.0 /100 WBCs    RBC 2.94 (L) 4.00 - 5.20 x10*6/uL    Hemoglobin 8.0 (L) 12.0 - 16.0 g/dL    Hematocrit 25.6 (L) 36.0 - 46.0 %    MCV 87 80 - 100 fL    MCH 27.2 26.0 - 34.0 pg    MCHC 31.3 (L) 32.0 - 36.0 g/dL    RDW 16.8 (H) 11.5 - 14.5 %    Platelets 138 (L) 150 - 450 x10*3/uL   Coagulation Screen   Result Value Ref Range    Protime 14.7 (H) 9.8 - 12.8 seconds    INR 1.3 (H) 0.9 - 1.1    aPTT 31 27 - 38 seconds    Magnesium   Result Value Ref Range    Magnesium 2.03 1.60 - 2.40 mg/dL   Renal function panel   Result Value Ref Range    Glucose 191 (H) 74 - 99 mg/dL    Sodium 140 136 - 145 mmol/L    Potassium 3.8 3.5 - 5.3 mmol/L    Chloride 100 98 - 107 mmol/L    Bicarbonate 29 21 - 32 mmol/L    Anion Gap 15 10 - 20 mmol/L    Urea Nitrogen 19 6 - 23 mg/dL    Creatinine 0.45 (L) 0.50 - 1.05 mg/dL    eGFR >90 >60 mL/min/1.73m*2    Calcium 8.2 (L) 8.6 - 10.6 mg/dL    Phosphorus 2.1 (L) 2.5 - 4.9 mg/dL    Albumin 3.6 3.4 - 5.0 g/dL   Troponin I, High Sensitivity   Result Value Ref Range    Troponin I, High Sensitivity (CMC) 48 (H) 0 - 34 ng/L   POCT GLUCOSE   Result Value Ref Range    POCT Glucose 171 (H) 74 - 99 mg/dL   Blood Gas Arterial Full Panel   Result Value Ref Range    POCT pH, Arterial 7.39 7.38 - 7.42 pH    POCT pCO2, Arterial 47 (H) 38 - 42 mm Hg    POCT pO2, Arterial 88 85 - 95 mm Hg    POCT SO2, Arterial 98 94 - 100 %    POCT Oxy Hemoglobin, Arterial 95.8 94.0 - 98.0 %    POCT Hematocrit Calculated, Arterial 24.0 (L) 36.0 - 46.0 %    POCT Sodium, Arterial 136 136 - 145 mmol/L    POCT Potassium, Arterial 3.5 3.5 - 5.3 mmol/L    POCT Chloride, Arterial 104 98 - 107 mmol/L    POCT Ionized Calcium, Arterial 1.16 1.10 - 1.33 mmol/L    POCT Glucose, Arterial 165 (H) 74 - 99 mg/dL    POCT Lactate, Arterial 0.8 0.4 - 2.0 mmol/L    POCT Base Excess, Arterial 3.1 (H) -2.0 - 3.0 mmol/L    POCT HCO3 Calculated, Arterial 28.5 (H) 22.0 - 26.0 mmol/L    POCT Hemoglobin, Arterial 8.0 (L) 12.0 - 16.0 g/dL    POCT Anion Gap, Arterial 7 (L) 10 - 25 mmo/L    Patient Temperature 37.0 degrees Celsius    FiO2 40 %   BLOOD GAS MIXED VENOUS FULL PANEL   Result Value Ref Range    POCT pH, Mixed 7.36 7.33 - 7.43 pH    POCT pCO2, Mixed 43 41 - 51 mm Hg    POCT pO2, Mixed 41 35 - 45 mm Hg    POCT SO2, Mixed 69 45 - 75 %    POCT Oxy Hemoglobin, Mixed 67.1 45.0 - 75.0 %    POCT Hematocrit Calculated, Mixed 24.0 (L) 36.0 - 46.0 %    POCT Sodium,  Mixed 140 136 - 145 mmol/L    POCT Potassium, Mixed 2.9 (LL) 3.5 - 5.3 mmol/L    POCT Chloride, Mixed 108 (H) 98 - 107 mmol/L    POCT Ionized Calcium, Mixed 1.07 (L) 1.10 - 1.33 mmol/L    POCT Glucose, Mixed 149 (H) 74 - 99 mg/dL    POCT Lactate, Mixed 0.6 0.4 - 2.0 mmol/L    POCT Base Excess, Mixed -1.1 -2.0 - 3.0 mmol/L    POCT HCO3 Calculated, Mixed 24.3 22.0 - 26.0 mmol/L    POCT Hemoglobin, Mixed 8.1 (L) 12.0 - 16.0 g/dL    POCT Anion Gap, Mixed 11 10 - 25 mmo/L    Patient Temperature 37.0 degrees Celsius    FiO2 40 %   POCT GLUCOSE   Result Value Ref Range    POCT Glucose 153 (H) 74 - 99 mg/dL   Blood Gas Arterial Full Panel   Result Value Ref Range    POCT pH, Arterial 7.23 (LL) 7.38 - 7.42 pH    POCT pCO2, Arterial 73 (HH) 38 - 42 mm Hg    POCT pO2, Arterial 82 (L) 85 - 95 mm Hg    POCT SO2, Arterial 98 94 - 100 %    POCT Oxy Hemoglobin, Arterial 95.0 94.0 - 98.0 %    POCT Hematocrit Calculated, Arterial 16.0 (L) 36.0 - 46.0 %    POCT Sodium, Arterial 136 136 - 145 mmol/L    POCT Potassium, Arterial 4.8 3.5 - 5.3 mmol/L    POCT Chloride, Arterial 102 98 - 107 mmol/L    POCT Ionized Calcium, Arterial 1.25 1.10 - 1.33 mmol/L    POCT Glucose, Arterial 139 (H) 74 - 99 mg/dL    POCT Lactate, Arterial 1.8 0.4 - 2.0 mmol/L    POCT Base Excess, Arterial 3.0 -2.0 - 3.0 mmol/L    POCT HCO3 Calculated, Arterial 30.6 (H) 22.0 - 26.0 mmol/L    POCT Hemoglobin, Arterial 5.2 (LL) 12.0 - 16.0 g/dL    POCT Anion Gap, Arterial 8 (L) 10 - 25 mmo/L    Patient Temperature 37.0 degrees Celsius    FiO2 40 %   BLOOD GAS MIXED VENOUS FULL PANEL   Result Value Ref Range    POCT pH, Mixed 7.25 (LL) 7.33 - 7.43 pH    POCT pCO2, Mixed 62 (H) 41 - 51 mm Hg    POCT pO2, Mixed 51 (H) 35 - 45 mm Hg    POCT SO2, Mixed 78 (H) 45 - 75 %    POCT Oxy Hemoglobin, Mixed 76.1 (H) 45.0 - 75.0 %    POCT Hematocrit Calculated, Mixed 25.0 (L) 36.0 - 46.0 %    POCT Sodium, Mixed 139 136 - 145 mmol/L    POCT Potassium, Mixed 3.6 3.5 - 5.3 mmol/L    POCT  Chloride, Mixed 106 98 - 107 mmol/L    POCT Ionized Calcium, Mixed 1.13 1.10 - 1.33 mmol/L    POCT Glucose, Mixed 129 (H) 74 - 99 mg/dL    POCT Lactate, Mixed 1.3 0.4 - 2.0 mmol/L    POCT Base Excess, Mixed -0.5 -2.0 - 3.0 mmol/L    POCT HCO3 Calculated, Mixed 27.2 (H) 22.0 - 26.0 mmol/L    POCT Hemoglobin, Mixed 8.3 (L) 12.0 - 16.0 g/dL    POCT Anion Gap, Mixed 9 (L) 10 - 25 mmo/L    Patient Temperature 37.0 degrees Celsius    FiO2 40 %   Calcium, Ionized   Result Value Ref Range    POCT Calcium, Ionized 1.22 1.1 - 1.33 mmol/L   CBC   Result Value Ref Range    WBC 17.2 (H) 4.4 - 11.3 x10*3/uL    nRBC 0.0 0.0 - 0.0 /100 WBCs    RBC 3.21 (L) 4.00 - 5.20 x10*6/uL    Hemoglobin 8.7 (L) 12.0 - 16.0 g/dL    Hematocrit 28.0 (L) 36.0 - 46.0 %    MCV 87 80 - 100 fL    MCH 27.1 26.0 - 34.0 pg    MCHC 31.1 (L) 32.0 - 36.0 g/dL    RDW 17.1 (H) 11.5 - 14.5 %    Platelets 204 150 - 450 x10*3/uL   Coagulation Screen   Result Value Ref Range    Protime 14.4 (H) 9.8 - 12.8 seconds    INR 1.3 (H) 0.9 - 1.1    aPTT 31 27 - 38 seconds   Magnesium   Result Value Ref Range    Magnesium 2.12 1.60 - 2.40 mg/dL   Renal function panel   Result Value Ref Range    Glucose 136 (H) 74 - 99 mg/dL    Sodium 140 136 - 145 mmol/L    Potassium 4.6 3.5 - 5.3 mmol/L    Chloride 101 98 - 107 mmol/L    Bicarbonate 32 21 - 32 mmol/L    Anion Gap 12 10 - 20 mmol/L    Urea Nitrogen 19 6 - 23 mg/dL    Creatinine 0.45 (L) 0.50 - 1.05 mg/dL    eGFR >90 >60 mL/min/1.73m*2    Calcium 8.4 (L) 8.6 - 10.6 mg/dL    Phosphorus 3.8 2.5 - 4.9 mg/dL    Albumin 3.8 3.4 - 5.0 g/dL   Blood Gas Arterial Full Panel   Result Value Ref Range    POCT pH, Arterial 7.39 7.38 - 7.42 pH    POCT pCO2, Arterial 49 (H) 38 - 42 mm Hg    POCT pO2, Arterial 152 (H) 85 - 95 mm Hg    POCT SO2, Arterial 100 94 - 100 %    POCT Oxy Hemoglobin, Arterial 97.4 94.0 - 98.0 %    POCT Hematocrit Calculated, Arterial 23.0 (L) 36.0 - 46.0 %    POCT Sodium, Arterial 134 (L) 136 - 145 mmol/L    POCT  Potassium, Arterial 4.2 3.5 - 5.3 mmol/L    POCT Chloride, Arterial 105 98 - 107 mmol/L    POCT Ionized Calcium, Arterial 1.16 1.10 - 1.33 mmol/L    POCT Glucose, Arterial 112 (H) 74 - 99 mg/dL    POCT Lactate, Arterial 0.8 0.4 - 2.0 mmol/L    POCT Base Excess, Arterial 4.2 (H) -2.0 - 3.0 mmol/L    POCT HCO3 Calculated, Arterial 29.7 (H) 22.0 - 26.0 mmol/L    POCT Hemoglobin, Arterial 7.8 (L) 12.0 - 16.0 g/dL    POCT Anion Gap, Arterial 4 (L) 10 - 25 mmo/L    Patient Temperature 37.0 degrees Celsius    FiO2 50 %       XR chest 1 view         XR abdomen 1 view   Final Result   1.  Dobhoff tube is in place with the tip projecting over the 1st   portion of the duodenum.   2. There is gas-filled dilated stomach. A few slight prominent loops   of the bowel in the upper abdomen with overall nonobstructive bowel   gas pattern.   3. Postsurgical changes of right pneumonectomy with resection of ribs   and patchy airspace opacities in the left lung.   4. Bogart-Paco catheter projecting over the lower pulmonary artery.   Recommend retraction.        MACRO:   None        Signed by: Oseas Stroud 1/23/2025 10:15 AM   Dictation workstation:   [id number]      XR chest 1 view   Final Result   1. Unchanged appearance of multifocal patchy airspace opacities with   interstitial prominence of the left lung which may be due to   infectious process. Component of edema can not be excluded.   2. Medical devices as described above with similar positioning of the   Bogart-Paco catheter projecting over left lower lobe pulmonary artery.   Recommend retraction.   3. Postsurgical changes of right-sided pneumonectomy and resection of   multiple right-sided ribs with complete opacification of the   pneumonectomy bed. The appearance is unchanged compared to prior   study.                  MACRO:   None        Signed by: Oseas Stroud 1/23/2025 9:36 AM   Dictation workstation:   [id number]      XR abdomen 1 view   Final Result   1.  As described above.         Signed by: Vick Xiao 1/22/2025 1:01 PM   Dictation workstation:   [id number]      XR chest 1 view   Final Result   1. Unchanged appearance of multifocal patchy airspace opacity with   interstitial prominence of the left lung which may be due to   infectious process. Component of edema cannot be excluded.   2. Medical devices as described above with interval advancement of   the [place]-Paco catheter projecting over left lower lobe pulmonary   artery. Recommend retraction.   3. Postsurgical changes of right-sided pneumonectomy and resection of   multiple right-sided ribs with complete opacification of the   pneumonectomy bed. The appearance is unchanged compared to prior   study.                  Signed by: Vick Xiao 1/22/2025 1:00 PM   Dictation workstation:   [id number]      XR chest 1 view   Final Result   1. Similar near-complete opacification of the right hemithorax, which   may be secondary to a large pleural effusion, pneumonectomy, or   bronchial obstruction.   2. Diffuse interstitial opacities and scattered patchy alveolar   opacities throughout the left lung, similar to the prior exam, which   may reflect prominent pulmonary edema or multifocal infection.   3. Medical devices as above.        I personally reviewed the image(s)/study and resident interpretation   as stated by Dr. Ange Sue MD. I agree with the findings as   stated. This study was interpreted at [facility], [place], [place].        MACRO:   None        Signed by: Oseas Stroud 1/21/2025 7:58 PM   Dictation workstation:   [id number]      Vascular US upper extremity venous duplex left   Final Result      Transthoracic Echo (TTE) Limited   Final Result      XR chest 1 view   Final Result   1.  Interval opacification of the right hemithorax and correlate with   post pneumonectomy changes.   2. [place]-Paco catheter overlies the left lower lobe pulmonary artery.   3. Slight  interval improvement in left-sided pulmonary edema.   Underlying edema and correlate with fluid status.             Signed by: Nino Barbour 1/20/2025 9:57 AM   Dictation workstation:   [id number]      XR abdomen 1 view   Final Result   1.  As described above.        Signed by: Vick Xiao 1/19/2025 11:58 AM   Dictation workstation:   [id number]      XR chest 1 view   Final Result   1. Slight worsening of multifocal airspace opacity in the left mid   and lower lung. Findings are concerning for infectious process. Small   left pleural effusion.   2. Postsurgical changes right pneumonectomy with medical devices as   described above. Consider retracting the [place]-Paco catheter.                  Signed by: Vick Xiao 1/19/2025 8:55 AM   Dictation workstation:   [id number]      Transthoracic Echo (TTE) Limited   Final Result      XR chest 1 view   Final Result   1. Patchy airspace opacity in the left lower lung with slight   improvement in aeration. Correlate with concern for infection   2. Postsurgical changes of right pneumonectomy as described above.   3. Medical lines and devices as above. Consider retraction of the   [place] catheter.        I personally reviewed the images/study and I agree with the findings   as stated. This study was interpreted at [place], [place].        MACRO:   None        Signed by: Vick Xiao 1/19/2025 8:54 AM   Dictation workstation:   [id number]      XR chest 1 view   Final Result   1. Worsening aeration of the left lung with increasing left basilar   infiltrate and pleural effusion. Correlate with mucous plugging and   component of lobar collapse.   2. Component of interstitial prominence of the left lung, also   slightly increased which may be due to edema.   3. Postsurgical changes of right pneumonectomy as described                  Signed by: Vick Xiao 1/18/2025 11:35 AM   Dictation  workstation:   [id number]      Cardiac Catheterization Procedure   Final Result      Transthoracic Echo (TTE) Limited   Final Result      XR chest 1 view   Final Result   1.  Stable exam with postsurgical changes of right pneumonectomy/rib   resection and large layering fluid component within the right   hemithorax.   2. Similar findings of left pulmonary edema with slightly increased   size of small left pleural effusion. No left-sided pneumothorax.   3. Medical devices as above.                  MACRO:   None        Signed by: Alexx Anne 1/17/2025 8:16 AM   Dictation workstation:   [id number]      XR chest 1 view   Final Result   1.  Postsurgical changes of right-sided pneumonectomy with increased   layering opacification of the right hemithorax, likely due to   increasing fluid.   2. Similar trace left-sided pleural effusion with linear left basilar   airspace opacity, likely atelectasis on a background of likely   pulmonary edema. Infection is not definitively excluded.   3. Medical devices as above with interval insertion of right internal   jugular central venous catheter projecting at the mid SVC.        I personally reviewed the images/study and I agree with the findings   as stated by resident Davidson Mcguire. This study was interpreted   at [place], [place].        MACRO:   None        Signed by: Alexx Anne 1/17/2025 8:14 AM   Dictation workstation:   [id number]      Transthoracic Echo (TTE) Limited   Final Result      US right upper quadrant   Final Result   Small amount of simple appearing ascites in the right upper quadrant   without evidence of cholelithiasis, cholecystitis or biliary dilation.        Signed by: Alberto Salas 1/16/2025 1:02 PM   Dictation workstation:   [id number]      XR chest 1 view   Final Result   1. Slight interval increase in left basilar airspace opacity which   may be due to increasing atelectasis. Cannot exclude  developing   infectious infiltrate. Question trace left pleural effusion.   2. Again seen postsurgical changes related to right-sided   pneumonectomy as described above                  Signed by: Vick Xiao 1/16/2025 9:47 AM   Dictation workstation:   [id number]      US renal complete   Final Result   1. Unremarkable sonographic evaluation of the kidneys.   2. Incomplete evaluation of a gallbladder with some pericholecystic   fluid and biliary sludge. Dedicated right upper quadrant ultrasound   could be obtained as clinically warranted.   3. A small amount of free fluid present in the right upper quadrant   and pelvis.        Findings relayed by phone by me to SICU provider at 5:11 p.m.        I personally reviewed the images/study and I agree with the findings   as stated by Erickson Best MD (resident) . This study was   interpreted at [place], [place].        MACRO:   None        Signed by: Alberto Salas 1/15/2025 6:17 PM   Dictation workstation:   [id number]      XR chest 1 view   Final Result   1.  Status post pneumonectomy with interval increase in fluid   component of right-sided hydropneumothorax.   2. Medical devices as above.                  MACRO:   None        Signed by: Alexx Anne 1/15/2025 9:28 AM   Dictation workstation:   [id number]      XR chest 1 view   Final Result   1. Interval removal of right chest tube. Other medical devices as   described above.   2. Postsurgical changes related to right pneumonectomy.   3. Faint left basilar opacity, likely atelectatic.                  Signed by: Vick Xiao 1/14/2025 2:53 PM   Dictation workstation:   [id number]      XR chest 1 view   Final Result   1.  Stable right-sided pneumonectomy changes.             Signed by: Nino Barbour 1/14/2025 12:56 PM   Dictation workstation:   [id number]      Transthoracic Echo (TTE) Limited   Final Result      XR chest 1 view   Final  Result   1. Postsurgical changes related to right sided pneumonectomy with   multiple medical devices as described above.   2. Mild interstitial prominence of the left lung.                  Signed by: Vick Xiao 1/13/2025 3:16 PM   Dictation workstation:   [id number]      XR chest 1 view    (Results Pending)        Assessment/Plan   Assessment:  Faustina Vick is a 65 y/o with PMHx of COPD and 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 Wagner on 1/13 requiring post-op vasoactive therapy. Post-op course c/b acute hyponatremia, multifactorial delirium, cardiogenic shock requiring inotropic support, and acute hypoxic/hypercarbic respiratory failure requiring intubation. COVID+ 1/20. Weaned off milrinone 1/21.     Plan:  NEURO: History of anxiety on alprazolam 1mg qd, last filled 1/4/2025 per [facility] review. Family states she was not taking for several months but then restarted prn about 3 wks prior to surgery for situational anxiety. A&Ox4 at baseline. Multifactorial delirium improved. Currently sedated and intubated. On precedex 1mcg/kg/hr and fentanyl 25mcg/hr. Awakens and follows commands on precedex and fentanyl. Had episode agitation around 3am, didn't respond to fentanyl bolus, 1mg dilaudid helped. When precedex weaned and pt more alert, denies pain.   - QTC normal; will try Haldol today prior to SBT  - Resume Trazodone at night with rest protocol  - Considering on high dose Xanax at home  - Will start Klonopin 0.5 mg TID  - Ongoing neuro and pain assessments  - Continue precedex, titrate to goal RASS 0 to -2  - Continue fentanyl infusion at 25mcg/hr for pain control -> slow wean- will cut down to 12.5 then shut off  - Continue scheduled oxycodone q6h  - Lidocaine patch to right chest wall  - PT/OT consulted. Advance activity as tolerated  - Restraints indicated while  patient remains intubated, restrain with soft wrist restraints until medical devices are discontinued.      CV: No history of cardiac disease. Baseline /79, -110. TTE 9/2024 normal biV function, RVSP 10mmHg. Intra-op hypotension requiring vaso and epi. Arrived to SICU on vaso 0.06 units/min, started on levo 0.05 mcg/kg/min. TTE 1/13 with EF 60-65%, low normal RV systolic function. Weaned off vasopressors 1/15. Elevated lactate to 2.9 on 1/16 with SOB and new O2 requirement. Repeat TTE 1/16: acute biV dysfunction with signs of takotsubo cardiomyopathy, EF 34%, mod reduced RV systolic function, mildly elevated RVSP, mild to mod TR. Cardiogenic shock, started milrinone, lasix infusion, and vasopressin. Shock Call on 1/16, no indication for mechanical circulatory support at this time. Initial troponin 928 in the setting of demand ischemia, with repeat troponin downtrend to 625. Lactate normalized.   Overnight: On 0.01 Vaso, iEPO wean from 0.02 to 0.01 correlated with CI  3.2->3.05, SVO2 70->65; MAPs 70s, PAP currently 20; on sedation. +600 I/O 24hrs  - Wean iEPO to 0.01  - Vaso at 0.01- wean as tolerated   - Considering fluid on CXR and volume, will schedule 40 mg IV Lasix x 4 doses   - Continuous EKG/abp/cvp/pap monitoring  - Thermodilution and mixed SvO2 q4h and prn  - Cardiology following, appreciate recs  - MAP goal >65  - Continue holding metoprolol  - Shock call if condition worsens     PULM: Hx of 30 pack year smoking history (quit 4/2024), COPD, and stage III right lung SCC s/p chemo/XRT/immunotherapy. 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 Arrived to SICU extubated on 10L SFM, weaned to room air 1/14. Chest tube removed 1/14. Acute on chronic hypoxic respiratory failure 1/16. Escalation to Airvo 50L 90% overnight 1/17-1/18. Acute hypercapnia 1/18 with increase WOB, trial of BIPAP with iEPO. Eventual intubation 1/18 evening. Went on on  AC/VC 50%/280/18/+5. Mediastinal shift on AM cxr. Overnight: ABG w/resp acidosis 3am associated w/agitation and tachycardia, normalized by 5:30am. This a.m. saturating well AC/VC 40% Fio2 peep 5 PS 8. CXR appears improved with ongoing evidence pulmonary edema and left pleural effusion. Has been unable to tolerate SIMV or CPAP without desaturating.   - Prior to SBT this morning will try dose of Haldol 5 mg   - iEpo weaned overnight 0.02 to 0.01   - Continue mechanical ventilation -> continue to trial weaning daily-->see if alternate ventilator modes may help patient exercise more of her own recruitment.   - Lasix as above   - wean FiO2 to maintain SpO2 >92%  - SDS switched to decadron on 1/22 (tentative 10 day course from 1/20)  - scheduled budesonide/duoneb  - PRN albuterol  - Daily CXR  - ABG q4h and  prn  - holding home benzonatate    GI: No GI history. LFTs WNL 12/2/24. Evidence of pericholecystic fluid and biliary sludge on renal US 1/15 but no abdominal symptoms. RUQ US with small ascites, otherwise no acute findings. Abdominal distention this am. Elevated LFTs, now normalized. LBM 1/17. OG tube placed 1/19 for gastric decompression. TFs initiated via OG 1/20, then held on 1/21 due to regurgitation from OG. Dobhoff placed via IRIS and advanced post-pyloric. Per bedside RN, patient had large liquid stools previous evening. Had another stool this morning 9am and then another larger loose stool about 2pm.   - Continue TF, increase to goal rate 30ml/hr  - Consider placement of OG for gastric decompression simultaneous with distal enteral feeds  - Continue thiamine 100mg IV daily  - Continue IV PPI for GI prophylaxis  - Bisacodyl OK daily, colace and senna BID, miralax daily - changed to PRN after late morning large BM; sending CDIFF    : No history of renal disease. Baseline creatinine 0.6. Symptomatic acute hyponatremia (SIADH vs vaso mediated) to 120 overnight 1/14 - 1/15, corrected to 124 after 150cc 3% NaCl  x2 on 1/15. Renal US on 1/15 unremarkable. Started on lasix infusion 1/16 as above in CV, stopped 1/17. Spot diuresis 1/20. Hypokalemia. Yoo changed overnight 1/18-1/19. Oliguria resolved. Net +600ml over past 24hrs, RFP wnl, UOP 0.5-1.6 ml/kg/hr  - RFP q12h and PRN  - Lasix 40 mg BID for 4 doses   - Nephrology following, appreciate recs  - Maintain yoo for strict I/Os   - Maintain U/O >0.5ml/kg/hr  - Replete electrolytes to maintain K>4, Phos>2.5, iCal>1.1, Mag>2.   -afternoon RFP      HEME: Hx of DOC. Baseline H/H 12/40. Acute blood loss anemia, OR EBL 150mL. Acute coagulopathy with INR elevated to 2.3 2/2 to poor nutritional status. Received IV Vit K 10mg x1 with improved INR thereafter. Transfused 1u PRBC 1/17 and 1u PRBC on 1/18. LUE duplex obtained 1/20 for swelling, negative for DVT. Stable edema in LUE, negative DVT ultrasound 1/20 ultrasound. INR stable, Hb incremented 8.7  - Check CBC daily and PRN  - coags prn  - goal Hgb >9  - SCDs and SQH for DVT ppx  - ongoing monitoring for s/s bleeding  - maintain active T&S (1/22)      ENDO: No history of DM or thyroid disease. TSH 0.48 (1/17). Recent 5d course of prednisone 10mg 12/2024 for URI. Adequate glycemic control. Started on SDS out of OR 1/13. Transitioned to decadron 1/22  - BG q4h, SSI #1     MSK: Arthritis, Vit D deficiency.  - not on meds at home  - monitor for symptoms     ID: Febrile on 1/18, Tmax 38.1. Completed yunior-op course of cefazolin. Leukocytosis to 17.2 after initiation of stress dose steroids, which resolved, but now with recurrent mild leukocytosis. Worsened clinical condition 1/17, started on vanco, zosyn and azithromycin. Blood cultures 1/17 negative final. MRSA negative 1/17. Respiratory viral panel negative 1/17. Yoo changed 1/18, Urine cx 1/18 negative. Added fluc overnight 1/18-1/19. Sputum culture 1/19 negative. Vancomycin discontinued 1/20. Urine strep/legionella 1/20 negative. COVID+ on 1/20, initiated remdesivir and  decadron. Discontinued fluc and azithro 1/21. Overnight: Afebrile 37.2, WBC inc 7.7 to 17. No lactate on ABG. Is having more frequent loose stools.    - Will send C.Diff   - Follow up afternoon CBC  - COVID precautions  - ID consulted, appreciate recs  - temp q4h, wbc daily  - Ongoing monitoring for s/s infection  - Continue zosyn (7 day course completes today end of day)- will continue for now   - Continue remdesivir (5 day course completes end of day 1/24)     Lines:  - DAVID MAC (SICU, 1/16)  - PA cath (Cath lab. 1/17)  - L brachial A-line (1/16)  - Left chest mediport accessed   - PIV  x1  - Montiel (1/18)  - ETT (1/18)     Dispo: Continue ICU care. Patient seen and discussed with ICU attending Dr. Timo Silva MD   Anesthesiology, [facility]-2  SICU Phone [phone]

## 2025-01-24 NOTE — PROGRESS NOTES
Plan:  - slow iEPO wean  - attempt volume support vent mode for weaning  - optimize PO analgesia; add clonazepam 0.5q8; decrease fent gtt to 12.5  - zosyn to stop today  -  PT/OT as able  - cont dexamethasone for COVID x 10days; remdes x5 days  - cxr  - pm rfp; lasix 40bid [id number]      Assessment:    Neuro/MSK: expected acute post op pain  and severe debility/deconditioning with lack of physiologic reserve  A-F bundle; Sleep Hygiene measures: appropriate daytime stimulation/physical activity. Lights out at 2100, avoidance of night time lab draws/night baths, minimize mind altering medicaments  PT/OT and OOB as appropriate      CV: JOSS cards assessment: cardiogenic shock takotsubo type - resolving slowly   -      -     now off pressers    Pulm: COPD without exacerbation  and acute post op pulmonary insufficiency   BPH with IS/flutter/OOB  Vent Mode: Assist control/Volume control plus  FiO2 (%):  [40 %-50 %] 40 %  S RR:  [16-22] 22  S VT:  [280 mL-580 mL] 580 mL  PEEP/CPAP (cm H2O):  [5 cm H20] 5 cm H20  MAP (cm H2O):  [12-14] 13      Renal: na   Trend UOP and renal indices; replete lytes PRN     GI: JOSS GI assessment: PCM sev   Proph with PPI  TF adv to goal    ID/rheum: Sepsis with acute organ dysfunction 2/2 presumed aspiration v HCAP and COVID  Current abx: zosyn ending today  Follow cx data     Endo: Stress hyperglycemia  Start ISS if needed for BG goal <180      Heme: ABL anemia   -     Trend Hb and transfuse PRN for goal Hb>7  DVT proph with scd and subcutaneous heparin    Lines/tubes/restraints:   Central line: hemodynamic monitoring, parenteral medications (I.e. chemo, vasoactive), and access  Montiel: critically ill patient who need accurate urinary output measurements  Restraint: needed, pulling at lines/tubes and agitation    Dispo: ICU    Exam:    A&O x 0; intubated and sedated but follows commands through sedation  NSR  Adequate air-exchange  Abd soft, NT  Extremities warm     REASON FOR ADMISSION    64  y.o. female with PMHx of COPD and 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 Wagner on 1/13 requiring post-op vasoactive therapy. Post-op course c/b acute hyponatremia, multifactorial delirium, cardiogenic shock requiring inotropic support, and acute hypoxic/hypercarbic respiratory failure requiring intubation. COVID+ 1/20. Weaned off milrinone 1/21. Remains on iEpo 0.03.     TODAY'S EVENTS    1/23: failed attempt toward SBT  1/24: ongoing issues with anxiety and reserve/debility preventing meaningful weaning from MV    This critically ill patient continues to be at-risk for clinically significant deterioration / failure due to the above mentioned dysfunctional, unstable organ systems.  I have personally identified and managed all complex critical care issues to prevent aforementioned clinical deterioration.  Critical care time is spent at bedside and/or the immediate area and has included, but is not limited to, the review of diagnostic tests, labs, radiographs, serial assessments of hemodynamics, respiratory status, ventilatory management, and family updates.  Time spent in procedures and teaching are reported separately. CF CRITICAL CARE TIME: 75 minutes          LABS:  CMP:  Results from last 7 days   Lab Units 01/24/25  0354 01/23/25  2051 01/23/25  1532 01/23/25  1222 01/23/25  1008 01/22/25  0442 01/21/25 2010 01/21/25  1610 01/21/25  0836 01/20/25  1437 01/20/25  0427 01/19/25  1458 01/19/25  0757   SODIUM mmol/L 140 140 138 140 138   < > 136   < > 137   < > 132* 132* 129*   POTASSIUM mmol/L 4.6 3.8 4.2 4.6 3.8   < > 3.4*   < > 3.7   < > 3.9 3.6 4.0   CHLORIDE mmol/L 101 100 100 99 100   < > 90*   < > 92*   < > 92* 91* 89*   CO2 mmol/L 32 29 30 31 32   < > 38*   < > 33*   < > 29 32 30   ANION GAP mmol/L 12 15 12 15 10   < > 11   < > 16   < > 15 13 14   BUN mg/dL 19 19 18 18  18   < > 16   < > 16   < > 16 17 16   CREATININE mg/dL 0.45* 0.45* 0.49* 0.53 0.47*   < > 0.56   < > 0.55   < > 0.51 0.66 0.67   EGFR mL/min/1.73m*2 >90 >90 >90 >90 >90   < > >90   < > >90   < > >90 >90 >90   MAGNESIUM mg/dL 2.12 2.03 2.08 2.33 2.17   < >  --    < > 2.99*   < > 2.22 2.43* 2.52*   ALBUMIN g/dL 3.8 3.6 3.7 3.9 3.6   < > 3.6  3.6   < > 3.8  3.8   < > 3.1*  3.1* 3.2*  3.2* 3.1*   ALK PHOS U/L  --   --   --   --   --   --  56  --  63  --  65 66 63   ALT U/L  --   --   --   --   --   --  43  --  51*  --  114* 137* 143*   AST U/L  --   --   --   --   --   --  30  --  38  --  157* 257* 301*   BILIRUBIN TOTAL mg/dL  --   --   --   --   --   --  0.7  --  0.9  --  0.8 0.8 0.9    < > = values in this interval not displayed.     CBC:  Results from last 7 days   Lab Units 01/24/25  0354 01/23/25  2051 01/23/25  1552 01/23/25  1222 01/23/25  1008 01/23/25  0410 01/22/25 2026 01/22/25  0442   WBC AUTO x10*3/uL 17.2* 7.7 9.6 23.1* 12.8* 8.6 7.4 7.3   HEMOGLOBIN g/dL 8.7* 8.0* 8.1* 9.0* 9.6* 9.0* 9.1* 9.0*   HEMATOCRIT % 28.0* 25.6* 26.5* 29.2* 31.1* 29.1* 29.6* 29.6*   PLATELETS AUTO x10*3/uL 204 138* 139* 180 169 134* 115* 125*   MCV fL 87 87 87 89 87 86 88 87     COAG:   Results from last 7 days   Lab Units 01/24/25  0354 01/23/25 2051 01/23/25  1549 01/23/25  1222 01/23/25  1008 01/23/25  0410 01/22/25  0442 01/21/25  1610   INR  1.3* 1.3* 1.3* 1.3* 1.3* 1.2* 1.2* 1.1     ABO:   ABO TYPE   Date Value Ref Range Status   01/22/2025 B  Final     HEME/ENDO:  Results from last 7 days   Lab Units 01/17/25  1534   TSH mIU/L 0.48      CARDIAC:   Results from last 7 days   Lab Units 01/23/25 2051 01/23/25  1532   TROPHSCMC ng/L 48* 51*     MICRO: No results found for the last 90 days.

## 2025-01-24 NOTE — PROGRESS NOTES
"Faustina Vick \"Noam" is a 64 y.o. female who presents for Primary cancer of right upper lobe of lung (Multi) [C34.11].  She initially received chemotherapy and radiation, but appeared to have residual radiographic abnormalities.  PET/CT suggested progression of disease, and in this setting and Dr. Wagner felt that surgical resection in a \"salvage\" setting might facilitate long-term overall survival given the failure of nonsurgical management   She is now  11 days post op from PNEUMONECTOMY (Right) (en bloc with chest wall resection and reconstruction), mediastinal lymph node dissection for a  Primary cancer of right upper lobe of lung (Multi).      Subjective   WBC 17 on zosyn and decadron  Haldol + dilaudid given overnight in addition to her fentanyl and precedex gtts.  Remains on AC vent settings + veletri with acceptable ABG  Interval imaging appears slightly more congested this am.   SGC remains in placed, vasopressin at 0.01    Objective     General: critically ill in ICU bed  HEENT: OETT in place. Enteral feeds via dobhoff secure in nares.   Resp: orally intubated, sats appropriate, symmetric rise, synchronous w/vent  CV:   NSR/ST on monitor, sinus, normotensive with low dose vasopressin.   Chest: surgical incision ss denita, no drainage or erythema  Abd: soft, NTND  : yoo in place w/clear yellow uop  MSK: UE restraints  Psych: intubated, sedated.     Last Recorded Vitals  Blood pressure 126/79, pulse 110, temperature 37.3 °C (99.1 °F), resp. rate (!) 29, height 1.651 m (5' 5\"), weight 57.8 kg (127 lb 6.8 oz), SpO2 95%.  Intake/Output last 3 Shifts:  I/O last 3 completed shifts:  In: 2933.4 (50.8 mL/kg) [I.V.:873.4 (15.1 mL/kg); Blood:100; NG/GT:1210; IV Piggyback:750]  Out: 2198 (38 mL/kg) [Urine:2198 (1.1 mL/kg/hr)]  Weight: 57.8 kg     Relevant Results            8.7     17.2>-----<204              28.0   140  101  19                  ----------------<136     4.6  32  0.45          Ca 8.4 Phos 3.8 Mg " 2.12       ALT 43 AST 30 AlkPhos 56 tBili 0.7         Serial AM imaging personally reviewed.  This am-Expected post op and physiologic changes after R pneumonectomy. Sasha PROCTOR, SGC.  Increased congestion of L hemithorax. No formal report.      Assessment/Plan   Assessment & Plan  Primary cancer of right upper lobe of lung (Multi)    Chronic obstructive pulmonary disease (Multi)    Malignant neoplasm of upper lobe of right lung (Multi)    Cardiogenic shock (Multi)    Faustina Vick (Jannie) is a 64F with Hx of Stage III lung cancer, asthma, anxiety, and arthritis who is now s/p R pneumonectomy with Dr. Wagner 1/13/25. 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 Cardiology was consulted and recommended Lasix gtt and milrinone. Lasix gtt held 1/17, hyponatremia improving. Patient underwent RHC and SGC placement with cardiology 1/17. She was reintubated on 1/18 d/t worsening respiratory status and AMS. Also had increased pressor requirements, now also needing levo and vaso. Repeat echo stable on 1/18. COVID positive on 1/20.     - Continue & appreciate supportive ICU care  - fluid balance goal even to slightly negative for today.   - Wean sedation & trial of extubation prior to committing to trach  - leukocytosis today, possibly related to steroid therapy, if any fevers--bronch and repeat pan culture.     Patient seen and discussed with attending Dr. Wagner via phone and Dr. Rowe covering for Dr. Wagner.    Alondra Cheung, APRN-CNP  Thoracic surgery [id number]

## 2025-01-25 ENCOUNTER — APPOINTMENT (OUTPATIENT)
Dept: RADIOLOGY | Facility: HOSPITAL | Age: 65
End: 2025-01-25
Payer: COMMERCIAL

## 2025-01-25 LAB
ALBUMIN SERPL BCP-MCNC: 4.1 G/DL (ref 3.4–5)
ALBUMIN SERPL BCP-MCNC: 4.3 G/DL (ref 3.4–5)
ANION GAP BLDA CALCULATED.4IONS-SCNC: 4 MMO/L (ref 10–25)
ANION GAP BLDA CALCULATED.4IONS-SCNC: 6 MMO/L (ref 10–25)
ANION GAP BLDA CALCULATED.4IONS-SCNC: 7 MMO/L (ref 10–25)
ANION GAP BLDMV CALCULATED.4IONS-SCNC: 7 MMO/L (ref 10–25)
ANION GAP BLDMV CALCULATED.4IONS-SCNC: 8 MMO/L (ref 10–25)
ANION GAP BLDMV CALCULATED.4IONS-SCNC: ABNORMAL MMOL/L
ANION GAP SERPL CALC-SCNC: 14 MMOL/L (ref 10–20)
ANION GAP SERPL CALC-SCNC: 14 MMOL/L (ref 10–20)
APTT PPP: 32 SECONDS (ref 27–38)
BASE EXCESS BLDA CALC-SCNC: 12.1 MMOL/L (ref -2–3)
BASE EXCESS BLDA CALC-SCNC: 6.7 MMOL/L (ref -2–3)
BASE EXCESS BLDA CALC-SCNC: 6.8 MMOL/L (ref -2–3)
BASE EXCESS BLDA CALC-SCNC: 6.9 MMOL/L (ref -2–3)
BASE EXCESS BLDA CALC-SCNC: 7.2 MMOL/L (ref -2–3)
BASE EXCESS BLDA CALC-SCNC: 7.6 MMOL/L (ref -2–3)
BASE EXCESS BLDMV CALC-SCNC: 1.5 MMOL/L (ref -2–3)
BASE EXCESS BLDMV CALC-SCNC: 3 MMOL/L (ref -2–3)
BASE EXCESS BLDMV CALC-SCNC: 5.8 MMOL/L (ref -2–3)
BASE EXCESS BLDMV CALC-SCNC: 6.7 MMOL/L (ref -2–3)
BASE EXCESS BLDMV CALC-SCNC: 6.8 MMOL/L (ref -2–3)
BODY TEMPERATURE: 37 DEGREES CELSIUS
BUN SERPL-MCNC: 19 MG/DL (ref 6–23)
BUN SERPL-MCNC: 21 MG/DL (ref 6–23)
CA-I BLD-SCNC: 1.1 MMOL/L (ref 1.1–1.33)
CA-I BLDA-SCNC: 1.11 MMOL/L (ref 1.1–1.33)
CA-I BLDA-SCNC: 1.14 MMOL/L (ref 1.1–1.33)
CA-I BLDA-SCNC: 1.15 MMOL/L (ref 1.1–1.33)
CA-I BLDA-SCNC: 1.17 MMOL/L (ref 1.1–1.33)
CA-I BLDMV-SCNC: 1 MMOL/L (ref 1.1–1.33)
CA-I BLDMV-SCNC: 1.05 MMOL/L (ref 1.1–1.33)
CA-I BLDMV-SCNC: 1.09 MMOL/L (ref 1.1–1.33)
CA-I BLDMV-SCNC: 1.11 MMOL/L (ref 1.1–1.33)
CA-I BLDMV-SCNC: 1.16 MMOL/L (ref 1.1–1.33)
CALCIUM SERPL-MCNC: 8.8 MG/DL (ref 8.6–10.6)
CALCIUM SERPL-MCNC: 8.8 MG/DL (ref 8.6–10.6)
CHLORIDE BLD-SCNC: 102 MMOL/L (ref 98–107)
CHLORIDE BLD-SCNC: 103 MMOL/L (ref 98–107)
CHLORIDE BLD-SCNC: 106 MMOL/L (ref 98–107)
CHLORIDE BLD-SCNC: 107 MMOL/L (ref 98–107)
CHLORIDE BLD-SCNC: 97 MMOL/L (ref 98–107)
CHLORIDE BLDA-SCNC: 100 MMOL/L (ref 98–107)
CHLORIDE BLDA-SCNC: 101 MMOL/L (ref 98–107)
CHLORIDE BLDA-SCNC: 101 MMOL/L (ref 98–107)
CHLORIDE BLDA-SCNC: 102 MMOL/L (ref 98–107)
CHLORIDE BLDA-SCNC: 97 MMOL/L (ref 98–107)
CHLORIDE BLDA-SCNC: 99 MMOL/L (ref 98–107)
CHLORIDE SERPL-SCNC: 100 MMOL/L (ref 98–107)
CHLORIDE SERPL-SCNC: 95 MMOL/L (ref 98–107)
CO2 SERPL-SCNC: 32 MMOL/L (ref 21–32)
CO2 SERPL-SCNC: 35 MMOL/L (ref 21–32)
CREAT SERPL-MCNC: 0.34 MG/DL (ref 0.5–1.05)
CREAT SERPL-MCNC: 0.4 MG/DL (ref 0.5–1.05)
EGFRCR SERPLBLD CKD-EPI 2021: >90 ML/MIN/1.73M*2
EGFRCR SERPLBLD CKD-EPI 2021: >90 ML/MIN/1.73M*2
ERYTHROCYTE [DISTWIDTH] IN BLOOD BY AUTOMATED COUNT: 16.3 % (ref 11.5–14.5)
ERYTHROCYTE [DISTWIDTH] IN BLOOD BY AUTOMATED COUNT: 16.8 % (ref 11.5–14.5)
GLUCOSE BLD MANUAL STRIP-MCNC: 110 MG/DL (ref 74–99)
GLUCOSE BLD MANUAL STRIP-MCNC: 116 MG/DL (ref 74–99)
GLUCOSE BLD MANUAL STRIP-MCNC: 136 MG/DL (ref 74–99)
GLUCOSE BLD-MCNC: 103 MG/DL (ref 74–99)
GLUCOSE BLD-MCNC: 122 MG/DL (ref 74–99)
GLUCOSE BLD-MCNC: 134 MG/DL (ref 74–99)
GLUCOSE BLD-MCNC: 140 MG/DL (ref 74–99)
GLUCOSE BLD-MCNC: 141 MG/DL (ref 74–99)
GLUCOSE BLDA-MCNC: 109 MG/DL (ref 74–99)
GLUCOSE BLDA-MCNC: 120 MG/DL (ref 74–99)
GLUCOSE BLDA-MCNC: 134 MG/DL (ref 74–99)
GLUCOSE BLDA-MCNC: 145 MG/DL (ref 74–99)
GLUCOSE BLDA-MCNC: 147 MG/DL (ref 74–99)
GLUCOSE BLDA-MCNC: 153 MG/DL (ref 74–99)
GLUCOSE SERPL-MCNC: 106 MG/DL (ref 74–99)
GLUCOSE SERPL-MCNC: 145 MG/DL (ref 74–99)
HCO3 BLDA-SCNC: 31.9 MMOL/L (ref 22–26)
HCO3 BLDA-SCNC: 32.4 MMOL/L (ref 22–26)
HCO3 BLDA-SCNC: 32.7 MMOL/L (ref 22–26)
HCO3 BLDA-SCNC: 33.4 MMOL/L (ref 22–26)
HCO3 BLDA-SCNC: 33.7 MMOL/L (ref 22–26)
HCO3 BLDA-SCNC: 36.6 MMOL/L (ref 22–26)
HCO3 BLDMV-SCNC: 27.2 MMOL/L (ref 22–26)
HCO3 BLDMV-SCNC: 28.5 MMOL/L (ref 22–26)
HCO3 BLDMV-SCNC: 32.1 MMOL/L (ref 22–26)
HCO3 BLDMV-SCNC: 32.8 MMOL/L (ref 22–26)
HCO3 BLDMV-SCNC: 33.5 MMOL/L (ref 22–26)
HCT VFR BLD AUTO: 22.6 % (ref 36–46)
HCT VFR BLD AUTO: 33.1 % (ref 36–46)
HCT VFR BLD EST: 20 % (ref 36–46)
HCT VFR BLD EST: 30 % (ref 36–46)
HCT VFR BLD EST: 32 % (ref 36–46)
HCT VFR BLD EST: 33 % (ref 36–46)
HCT VFR BLD EST: 34 % (ref 36–46)
HCT VFR BLD EST: 35 % (ref 36–46)
HCT VFR BLD EST: 35 % (ref 36–46)
HCT VFR BLD EST: 36 % (ref 36–46)
HCT VFR BLD EST: 37 % (ref 36–46)
HCT VFR BLD EST: 38 % (ref 36–46)
HCT VFR BLD EST: 38 % (ref 36–46)
HGB BLD-MCNC: 10.7 G/DL (ref 12–16)
HGB BLD-MCNC: 7.4 G/DL (ref 12–16)
HGB BLDA-MCNC: 11.4 G/DL (ref 12–16)
HGB BLDA-MCNC: 11.7 G/DL (ref 12–16)
HGB BLDA-MCNC: 12 G/DL (ref 12–16)
HGB BLDA-MCNC: 12.4 G/DL (ref 12–16)
HGB BLDA-MCNC: 12.6 G/DL (ref 12–16)
HGB BLDA-MCNC: 6.5 G/DL (ref 12–16)
HGB BLDMV-MCNC: 10 G/DL (ref 12–16)
HGB BLDMV-MCNC: 10.7 G/DL (ref 12–16)
HGB BLDMV-MCNC: 11.1 G/DL (ref 12–16)
HGB BLDMV-MCNC: 11.6 G/DL (ref 12–16)
HGB BLDMV-MCNC: 12.7 G/DL (ref 12–16)
INHALED O2 CONCENTRATION: 40 %
INR PPP: 1.3 (ref 0.9–1.1)
LACTATE BLDA-SCNC: 1 MMOL/L (ref 0.4–2)
LACTATE BLDA-SCNC: 1 MMOL/L (ref 0.4–2)
LACTATE BLDA-SCNC: 1.1 MMOL/L (ref 0.4–2)
LACTATE BLDA-SCNC: 1.1 MMOL/L (ref 0.4–2)
LACTATE BLDA-SCNC: 1.2 MMOL/L (ref 0.4–2)
LACTATE BLDA-SCNC: 1.8 MMOL/L (ref 0.4–2)
LACTATE BLDMV-SCNC: 0.8 MMOL/L (ref 0.4–2)
LACTATE BLDMV-SCNC: 0.8 MMOL/L (ref 0.4–2)
LACTATE BLDMV-SCNC: 0.9 MMOL/L (ref 0.4–2)
LACTATE BLDMV-SCNC: 1 MMOL/L (ref 0.4–2)
LACTATE BLDMV-SCNC: 1.9 MMOL/L (ref 0.4–2)
MAGNESIUM SERPL-MCNC: 1.88 MG/DL (ref 1.6–2.4)
MAGNESIUM SERPL-MCNC: 2.44 MG/DL (ref 1.6–2.4)
MCH RBC QN AUTO: 27 PG (ref 26–34)
MCH RBC QN AUTO: 28.5 PG (ref 26–34)
MCHC RBC AUTO-ENTMCNC: 32.3 G/DL (ref 32–36)
MCHC RBC AUTO-ENTMCNC: 32.7 G/DL (ref 32–36)
MCV RBC AUTO: 83 FL (ref 80–100)
MCV RBC AUTO: 88 FL (ref 80–100)
NRBC BLD-RTO: 0 /100 WBCS (ref 0–0)
NRBC BLD-RTO: 0 /100 WBCS (ref 0–0)
OXYHGB MFR BLDA: 84.8 % (ref 94–98)
OXYHGB MFR BLDA: 90.1 % (ref 94–98)
OXYHGB MFR BLDA: 91.9 % (ref 94–98)
OXYHGB MFR BLDA: 92.9 % (ref 94–98)
OXYHGB MFR BLDA: 94.8 % (ref 94–98)
OXYHGB MFR BLDA: 96.1 % (ref 94–98)
OXYHGB MFR BLDMV: 46.7 % (ref 45–75)
OXYHGB MFR BLDMV: 60.9 % (ref 45–75)
OXYHGB MFR BLDMV: 67.8 % (ref 45–75)
OXYHGB MFR BLDMV: 71.9 % (ref 45–75)
OXYHGB MFR BLDMV: 75.4 % (ref 45–75)
PCO2 BLDA: 47 MM HG (ref 38–42)
PCO2 BLDA: 47 MM HG (ref 38–42)
PCO2 BLDA: 48 MM HG (ref 38–42)
PCO2 BLDA: 50 MM HG (ref 38–42)
PCO2 BLDA: 54 MM HG (ref 38–42)
PCO2 BLDA: 57 MM HG (ref 38–42)
PCO2 BLDMV: 47 MM HG (ref 41–51)
PCO2 BLDMV: 47 MM HG (ref 41–51)
PCO2 BLDMV: 53 MM HG (ref 41–51)
PCO2 BLDMV: 53 MM HG (ref 41–51)
PCO2 BLDMV: 58 MM HG (ref 41–51)
PH BLDA: 7.38 PH (ref 7.38–7.42)
PH BLDA: 7.4 PH (ref 7.38–7.42)
PH BLDA: 7.42 PH (ref 7.38–7.42)
PH BLDA: 7.44 PH (ref 7.38–7.42)
PH BLDA: 7.45 PH (ref 7.38–7.42)
PH BLDA: 7.49 PH (ref 7.38–7.42)
PH BLDMV: 7.37 PH (ref 7.33–7.43)
PH BLDMV: 7.37 PH (ref 7.33–7.43)
PH BLDMV: 7.39 PH (ref 7.33–7.43)
PH BLDMV: 7.39 PH (ref 7.33–7.43)
PH BLDMV: 7.4 PH (ref 7.33–7.43)
PHOSPHATE SERPL-MCNC: 1.8 MG/DL (ref 2.5–4.9)
PHOSPHATE SERPL-MCNC: 2.1 MG/DL (ref 2.5–4.9)
PLATELET # BLD AUTO: 151 X10*3/UL (ref 150–450)
PLATELET # BLD AUTO: 196 X10*3/UL (ref 150–450)
PO2 BLDA: 55 MM HG (ref 85–95)
PO2 BLDA: 63 MM HG (ref 85–95)
PO2 BLDA: 68 MM HG (ref 85–95)
PO2 BLDA: 71 MM HG (ref 85–95)
PO2 BLDA: 80 MM HG (ref 85–95)
PO2 BLDA: 89 MM HG (ref 85–95)
PO2 BLDMV: 31 MM HG (ref 35–45)
PO2 BLDMV: 36 MM HG (ref 35–45)
PO2 BLDMV: 38 MM HG (ref 35–45)
PO2 BLDMV: 43 MM HG (ref 35–45)
PO2 BLDMV: 44 MM HG (ref 35–45)
POTASSIUM BLDA-SCNC: 3.1 MMOL/L (ref 3.5–5.3)
POTASSIUM BLDA-SCNC: 3.2 MMOL/L (ref 3.5–5.3)
POTASSIUM BLDA-SCNC: 3.3 MMOL/L (ref 3.5–5.3)
POTASSIUM BLDA-SCNC: 4.3 MMOL/L (ref 3.5–5.3)
POTASSIUM BLDA-SCNC: 4.5 MMOL/L (ref 3.5–5.3)
POTASSIUM BLDA-SCNC: 4.6 MMOL/L (ref 3.5–5.3)
POTASSIUM BLDMV-SCNC: 2.6 MMOL/L (ref 3.5–5.3)
POTASSIUM BLDMV-SCNC: 3.2 MMOL/L (ref 3.5–5.3)
POTASSIUM BLDMV-SCNC: 3.6 MMOL/L (ref 3.5–5.3)
POTASSIUM BLDMV-SCNC: 4.2 MMOL/L (ref 3.5–5.3)
POTASSIUM BLDMV-SCNC: 4.3 MMOL/L (ref 3.5–5.3)
POTASSIUM SERPL-SCNC: 3.2 MMOL/L (ref 3.5–5.3)
POTASSIUM SERPL-SCNC: 4.8 MMOL/L (ref 3.5–5.3)
PROTHROMBIN TIME: 14.4 SECONDS (ref 9.8–12.8)
RBC # BLD AUTO: 2.74 X10*6/UL (ref 4–5.2)
RBC # BLD AUTO: 3.76 X10*6/UL (ref 4–5.2)
SAO2 % BLDA: 87 % (ref 94–100)
SAO2 % BLDA: 93 % (ref 94–100)
SAO2 % BLDA: 95 % (ref 94–100)
SAO2 % BLDA: 96 % (ref 94–100)
SAO2 % BLDA: 98 % (ref 94–100)
SAO2 % BLDA: 99 % (ref 94–100)
SAO2 % BLDMV: 48 % (ref 45–75)
SAO2 % BLDMV: 63 % (ref 45–75)
SAO2 % BLDMV: 70 % (ref 45–75)
SAO2 % BLDMV: 74 % (ref 45–75)
SAO2 % BLDMV: 78 % (ref 45–75)
SODIUM BLDA-SCNC: 134 MMOL/L (ref 136–145)
SODIUM BLDA-SCNC: 135 MMOL/L (ref 136–145)
SODIUM BLDA-SCNC: 136 MMOL/L (ref 136–145)
SODIUM BLDA-SCNC: 137 MMOL/L (ref 136–145)
SODIUM BLDMV-SCNC: 137 MMOL/L (ref 136–145)
SODIUM BLDMV-SCNC: 138 MMOL/L (ref 136–145)
SODIUM BLDMV-SCNC: 138 MMOL/L (ref 136–145)
SODIUM BLDMV-SCNC: 140 MMOL/L (ref 136–145)
SODIUM BLDMV-SCNC: ABNORMAL MMOL/L
SODIUM SERPL-SCNC: 141 MMOL/L (ref 136–145)
SODIUM SERPL-SCNC: 141 MMOL/L (ref 136–145)
WBC # BLD AUTO: 12.1 X10*3/UL (ref 4.4–11.3)
WBC # BLD AUTO: 20 X10*3/UL (ref 4.4–11.3)

## 2025-01-25 PROCEDURE — 2020000001 HC ICU ROOM DAILY

## 2025-01-25 PROCEDURE — 2500000005 HC RX 250 GENERAL PHARMACY W/O HCPCS: Performed by: PHYSICIAN ASSISTANT

## 2025-01-25 PROCEDURE — 2500000004 HC RX 250 GENERAL PHARMACY W/ HCPCS (ALT 636 FOR OP/ED)

## 2025-01-25 PROCEDURE — 2500000004 HC RX 250 GENERAL PHARMACY W/ HCPCS (ALT 636 FOR OP/ED): Performed by: NURSE PRACTITIONER

## 2025-01-25 PROCEDURE — 84132 ASSAY OF SERUM POTASSIUM: CPT

## 2025-01-25 PROCEDURE — 2500000002 HC RX 250 W HCPCS SELF ADMINISTERED DRUGS (ALT 637 FOR MEDICARE OP, ALT 636 FOR OP/ED): Performed by: STUDENT IN AN ORGANIZED HEALTH CARE EDUCATION/TRAINING PROGRAM

## 2025-01-25 PROCEDURE — 85027 COMPLETE CBC AUTOMATED: CPT | Performed by: NURSE PRACTITIONER

## 2025-01-25 PROCEDURE — 2500000002 HC RX 250 W HCPCS SELF ADMINISTERED DRUGS (ALT 637 FOR MEDICARE OP, ALT 636 FOR OP/ED): Performed by: NURSE PRACTITIONER

## 2025-01-25 PROCEDURE — 84132 ASSAY OF SERUM POTASSIUM: CPT | Performed by: NURSE PRACTITIONER

## 2025-01-25 PROCEDURE — 85610 PROTHROMBIN TIME: CPT | Performed by: NURSE PRACTITIONER

## 2025-01-25 PROCEDURE — 99292 CRITICAL CARE ADDL 30 MIN: CPT | Performed by: ANESTHESIOLOGY

## 2025-01-25 PROCEDURE — 37799 UNLISTED PX VASCULAR SURGERY: CPT | Performed by: NURSE PRACTITIONER

## 2025-01-25 PROCEDURE — 2500000001 HC RX 250 WO HCPCS SELF ADMINISTERED DRUGS (ALT 637 FOR MEDICARE OP)

## 2025-01-25 PROCEDURE — 36430 TRANSFUSION BLD/BLD COMPNT: CPT

## 2025-01-25 PROCEDURE — 82947 ASSAY GLUCOSE BLOOD QUANT: CPT

## 2025-01-25 PROCEDURE — 99291 CRITICAL CARE FIRST HOUR: CPT | Performed by: STUDENT IN AN ORGANIZED HEALTH CARE EDUCATION/TRAINING PROGRAM

## 2025-01-25 PROCEDURE — 94640 AIRWAY INHALATION TREATMENT: CPT

## 2025-01-25 PROCEDURE — 82330 ASSAY OF CALCIUM: CPT | Performed by: NURSE PRACTITIONER

## 2025-01-25 PROCEDURE — 71045 X-RAY EXAM CHEST 1 VIEW: CPT | Performed by: RADIOLOGY

## 2025-01-25 PROCEDURE — P9016 RBC LEUKOCYTES REDUCED: HCPCS

## 2025-01-25 PROCEDURE — 71045 X-RAY EXAM CHEST 1 VIEW: CPT

## 2025-01-25 PROCEDURE — 94003 VENT MGMT INPAT SUBQ DAY: CPT

## 2025-01-25 PROCEDURE — 83735 ASSAY OF MAGNESIUM: CPT | Performed by: NURSE PRACTITIONER

## 2025-01-25 PROCEDURE — 99291 CRITICAL CARE FIRST HOUR: CPT | Performed by: NURSE PRACTITIONER

## 2025-01-25 PROCEDURE — 2500000001 HC RX 250 WO HCPCS SELF ADMINISTERED DRUGS (ALT 637 FOR MEDICARE OP): Performed by: NURSE PRACTITIONER

## 2025-01-25 PROCEDURE — 82805 BLOOD GASES W/O2 SATURATION: CPT | Performed by: NURSE PRACTITIONER

## 2025-01-25 PROCEDURE — 2500000005 HC RX 250 GENERAL PHARMACY W/O HCPCS

## 2025-01-25 PROCEDURE — 87493 C DIFF AMPLIFIED PROBE: CPT

## 2025-01-25 PROCEDURE — 2500000004 HC RX 250 GENERAL PHARMACY W/ HCPCS (ALT 636 FOR OP/ED): Performed by: PHYSICIAN ASSISTANT

## 2025-01-25 PROCEDURE — 80069 RENAL FUNCTION PANEL: CPT

## 2025-01-25 RX ORDER — DEXAMETHASONE SODIUM PHOSPHATE 10 MG/ML
6 INJECTION INTRAMUSCULAR; INTRAVENOUS EVERY 24 HOURS
Status: DISPENSED | OUTPATIENT
Start: 2025-01-25 | End: 2025-01-30

## 2025-01-25 RX ORDER — POTASSIUM CHLORIDE 14.9 MG/ML
40 INJECTION INTRAVENOUS ONCE
Status: COMPLETED | OUTPATIENT
Start: 2025-01-25 | End: 2025-01-25

## 2025-01-25 RX ORDER — METOPROLOL TARTRATE 1 MG/ML
5 INJECTION, SOLUTION INTRAVENOUS ONCE
Status: COMPLETED | OUTPATIENT
Start: 2025-01-25 | End: 2025-01-25

## 2025-01-25 RX ORDER — METOPROLOL TARTRATE 1 MG/ML
INJECTION, SOLUTION INTRAVENOUS
Status: COMPLETED
Start: 2025-01-25 | End: 2025-01-25

## 2025-01-25 RX ORDER — POTASSIUM CHLORIDE 29.8 MG/ML
40 INJECTION INTRAVENOUS ONCE
Status: COMPLETED | OUTPATIENT
Start: 2025-01-25 | End: 2025-01-25

## 2025-01-25 RX ORDER — CLONAZEPAM 0.5 MG/1
0.5 TABLET ORAL EVERY 8 HOURS
Status: DISCONTINUED | OUTPATIENT
Start: 2025-01-25 | End: 2025-01-28

## 2025-01-25 RX ORDER — MIDODRINE HYDROCHLORIDE 10 MG/1
10 TABLET ORAL EVERY 8 HOURS
Status: DISCONTINUED | OUTPATIENT
Start: 2025-01-25 | End: 2025-01-30

## 2025-01-25 RX ORDER — PANTOPRAZOLE SODIUM 40 MG/10ML
40 INJECTION, POWDER, LYOPHILIZED, FOR SOLUTION INTRAVENOUS DAILY
Status: DISCONTINUED | OUTPATIENT
Start: 2025-01-26 | End: 2025-01-29

## 2025-01-25 RX ORDER — SODIUM,POTASSIUM PHOSPHATES 280-250MG
1 POWDER IN PACKET (EA) ORAL EVERY 6 HOURS
Status: COMPLETED | OUTPATIENT
Start: 2025-01-25 | End: 2025-01-26

## 2025-01-25 RX ADMIN — BUDESONIDE 0.5 MG: 0.5 INHALANT RESPIRATORY (INHALATION) at 20:05

## 2025-01-25 RX ADMIN — MIDODRINE HYDROCHLORIDE 10 MG: 10 TABLET ORAL at 16:59

## 2025-01-25 RX ADMIN — IPRATROPIUM BROMIDE AND ALBUTEROL SULFATE 3 ML: .5; 3 SOLUTION RESPIRATORY (INHALATION) at 20:05

## 2025-01-25 RX ADMIN — TRAZODONE HYDROCHLORIDE 25 MG: 50 TABLET ORAL at 20:38

## 2025-01-25 RX ADMIN — DEXAMETHASONE 6 MG: 6 TABLET ORAL at 08:29

## 2025-01-25 RX ADMIN — MIDODRINE HYDROCHLORIDE 10 MG: 10 TABLET ORAL at 23:54

## 2025-01-25 RX ADMIN — INSULIN LISPRO 1 UNITS: 100 INJECTION, SOLUTION INTRAVENOUS; SUBCUTANEOUS at 12:50

## 2025-01-25 RX ADMIN — CLONAZEPAM 0.5 MG: 0.5 TABLET ORAL at 15:24

## 2025-01-25 RX ADMIN — DEXMEDETOMIDINE HYDROCHLORIDE 0.7 MCG/KG/HR: 400 INJECTION INTRAVENOUS at 21:43

## 2025-01-25 RX ADMIN — PIPERACILLIN SODIUM AND TAZOBACTAM SODIUM 4.5 G: 4; .5 INJECTION, SOLUTION INTRAVENOUS at 00:21

## 2025-01-25 RX ADMIN — DEXMEDETOMIDINE HYDROCHLORIDE 0.97 MCG/KG/HR: 400 INJECTION INTRAVENOUS at 11:19

## 2025-01-25 RX ADMIN — MIDODRINE HYDROCHLORIDE 10 MG: 10 TABLET ORAL at 09:59

## 2025-01-25 RX ADMIN — POTASSIUM CHLORIDE 40 MEQ: 14.9 INJECTION, SOLUTION INTRAVENOUS at 03:24

## 2025-01-25 RX ADMIN — Medication 40 PERCENT: at 08:00

## 2025-01-25 RX ADMIN — METOPROLOL TARTRATE 5 MG: 1 INJECTION, SOLUTION INTRAVENOUS at 20:33

## 2025-01-25 RX ADMIN — Medication 40 PERCENT: at 20:39

## 2025-01-25 RX ADMIN — POTASSIUM & SODIUM PHOSPHATES POWDER PACK 280-160-250 MG 1 PACKET: 280-160-250 PACK at 20:39

## 2025-01-25 RX ADMIN — OXYCODONE HYDROCHLORIDE 10 MG: 5 SOLUTION ORAL at 03:24

## 2025-01-25 RX ADMIN — THIAMINE HYDROCHLORIDE 100 MG: 100 INJECTION, SOLUTION INTRAMUSCULAR; INTRAVENOUS at 08:29

## 2025-01-25 RX ADMIN — PIPERACILLIN SODIUM AND TAZOBACTAM SODIUM 4.5 G: 4; .5 INJECTION, SOLUTION INTRAVENOUS at 06:05

## 2025-01-25 RX ADMIN — HEPARIN SODIUM 5000 UNITS: 5000 INJECTION INTRAVENOUS; SUBCUTANEOUS at 05:59

## 2025-01-25 RX ADMIN — LIDOCAINE 4% 1 PATCH: 40 PATCH TOPICAL at 08:31

## 2025-01-25 RX ADMIN — HEPARIN SODIUM 5000 UNITS: 5000 INJECTION INTRAVENOUS; SUBCUTANEOUS at 14:17

## 2025-01-25 RX ADMIN — BUDESONIDE 0.5 MG: 0.5 INHALANT RESPIRATORY (INHALATION) at 08:29

## 2025-01-25 RX ADMIN — POTASSIUM & SODIUM PHOSPHATES POWDER PACK 280-160-250 MG 1 PACKET: 280-160-250 PACK at 08:59

## 2025-01-25 RX ADMIN — POTASSIUM & SODIUM PHOSPHATES POWDER PACK 280-160-250 MG 1 PACKET: 280-160-250 PACK at 15:24

## 2025-01-25 RX ADMIN — OXYCODONE HYDROCHLORIDE 10 MG: 5 SOLUTION ORAL at 09:00

## 2025-01-25 RX ADMIN — ESOMEPRAZOLE MAGNESIUM 40 MG: 40 GRANULE, DELAYED RELEASE ORAL at 07:52

## 2025-01-25 RX ADMIN — CLONAZEPAM 0.5 MG: 0.5 TABLET ORAL at 08:29

## 2025-01-25 RX ADMIN — OXYCODONE HYDROCHLORIDE 10 MG: 5 SOLUTION ORAL at 15:24

## 2025-01-25 RX ADMIN — HEPARIN SODIUM 5000 UNITS: 5000 INJECTION INTRAVENOUS; SUBCUTANEOUS at 21:44

## 2025-01-25 RX ADMIN — IPRATROPIUM BROMIDE AND ALBUTEROL SULFATE 3 ML: .5; 3 SOLUTION RESPIRATORY (INHALATION) at 15:36

## 2025-01-25 RX ADMIN — IPRATROPIUM BROMIDE AND ALBUTEROL SULFATE 3 ML: .5; 3 SOLUTION RESPIRATORY (INHALATION) at 01:14

## 2025-01-25 RX ADMIN — OXYCODONE HYDROCHLORIDE 10 MG: 5 SOLUTION ORAL at 21:44

## 2025-01-25 RX ADMIN — IPRATROPIUM BROMIDE AND ALBUTEROL SULFATE 3 ML: .5; 3 SOLUTION RESPIRATORY (INHALATION) at 08:29

## 2025-01-25 RX ADMIN — DEXMEDETOMIDINE HYDROCHLORIDE 0.97 MCG/KG/HR: 400 INJECTION INTRAVENOUS at 04:52

## 2025-01-25 RX ADMIN — MAGNESIUM SULFATE HEPTAHYDRATE 2 G: 40 INJECTION, SOLUTION INTRAVENOUS at 03:24

## 2025-01-25 RX ADMIN — POTASSIUM CHLORIDE 40 MEQ: 400 INJECTION, SOLUTION INTRAVENOUS at 08:55

## 2025-01-25 RX ADMIN — CLONAZEPAM 0.5 MG: 0.5 TABLET ORAL at 23:54

## 2025-01-25 ASSESSMENT — PAIN - FUNCTIONAL ASSESSMENT
PAIN_FUNCTIONAL_ASSESSMENT: CPOT (CRITICAL CARE PAIN OBSERVATION TOOL)
PAIN_FUNCTIONAL_ASSESSMENT: 0-10
PAIN_FUNCTIONAL_ASSESSMENT: CPOT (CRITICAL CARE PAIN OBSERVATION TOOL)
PAIN_FUNCTIONAL_ASSESSMENT: 0-10
PAIN_FUNCTIONAL_ASSESSMENT: 0-10

## 2025-01-25 ASSESSMENT — PAIN SCALES - GENERAL
PAINLEVEL_OUTOF10: 0 - NO PAIN
PAINLEVEL_OUTOF10: 0 - NO PAIN
PAINLEVEL_OUTOF10: 5 - MODERATE PAIN
PAINLEVEL_OUTOF10: 0 - NO PAIN

## 2025-01-25 ASSESSMENT — COGNITIVE AND FUNCTIONAL STATUS - GENERAL: MOVING FROM LYING ON BACK TO SITTING ON SIDE OF FLAT BED WITH BEDRAILS: TOTAL

## 2025-01-25 NOTE — PROGRESS NOTES
Plan:  - slow iEPO wean - now off  - attempt volume support vent mode for weaning  - PO analgesia; cont clonazepam 0.5q8; fent gtt off  - zosyn to stop today  -  PT/OT as able  - cont dexamethasone for COVID x 10days; remdes x5 days  - cxr  - hold lasix as she had a robust response to the last doses, if her uop trails off restart at 20mg daily perhaps - overall seems fluid even and need to maintain that  - midodrine  - likely remove PAC later today v tomorrow morning    Assessment:    Neuro/MSK: expected acute post op pain  and severe debility/deconditioning with lack of physiologic reserve  A-F bundle; Sleep Hygiene measures: appropriate daytime stimulation/physical activity. Lights out at 2100, avoidance of night time lab draws/night baths, minimize mind altering medicaments  PT/OT and OOB as appropriate      CV: JOSS cards assessment: cardiogenic shock takotsubo type - resolving slowly   -      -     now off pressers    Pulm: COPD without exacerbation  and acute post op pulmonary insufficiency   BPH with IS/flutter/OOB  Vent Mode: Volume control/assist control  FiO2 (%):  [40 %] 40 %  S RR:  [22] 22  S VT:  [280 mL-300 mL] 300 mL  PEEP/CPAP (cm H2O):  [5 cm H20] 5 cm H20  MAP (cm H2O):  [12-16] 16      Renal: na   Trend UOP and renal indices; replete lytes PRN     GI: JOSS GI assessment: PCM sev   Proph with PPI  TF adv to goal    ID/rheum: Sepsis with acute organ dysfunction 2/2 presumed aspiration v HCAP and COVID  Current abx: zosyn ending today  Follow cx data     Endo: Stress hyperglycemia  Start ISS if needed for BG goal <180      Heme: ABL anemia   -     Trend Hb and transfuse PRN for goal Hb>7  DVT proph with scd and subcutaneous heparin    Lines/tubes/restraints:   Central line: hemodynamic monitoring, parenteral medications (I.e. chemo, vasoactive), and access  Montiel: critically ill patient who need accurate urinary output measurements  Restraint: needed, pulling at lines/tubes and agitation    Dispo:  ICU    Exam:    A&O x 0; intubated and sedated but follows commands through sedation  NSR  Adequate air-exchange  Abd soft, NT  Extremities warm     REASON FOR ADMISSION    64 y.o. female with PMHx of COPD and 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 Wagner on 1/13 requiring post-op vasoactive therapy. Post-op course c/b acute hyponatremia, multifactorial delirium, cardiogenic shock requiring inotropic support, and acute hypoxic/hypercarbic respiratory failure requiring intubation. COVID+ 1/20. Weaned off milrinone 1/21. Remains on iEpo 0.03.     TODAY'S EVENTS    1/23: failed attempt toward SBT  1/24: ongoing issues with anxiety and reserve/debility preventing meaningful weaning from MV  1/25: tolerated VS mode on the vent longer than other spont trials, will cont with this; remains with HD lability and reactivity depending on resp status    This critically ill patient continues to be at-risk for clinically significant deterioration / failure due to the above mentioned dysfunctional, unstable organ systems.  I have personally identified and managed all complex critical care issues to prevent aforementioned clinical deterioration.  Critical care time is spent at bedside and/or the immediate area and has included, but is not limited to, the review of diagnostic tests, labs, radiographs, serial assessments of hemodynamics, respiratory status, ventilatory management, and family updates.  Time spent in procedures and teaching are reported separately. CF CRITICAL CARE TIME: 65 minutes          LABS:  CMP:  Results from last 7 days   Lab Units 01/25/25  0024 01/24/25  1658 01/24/25  0354 01/23/25  2051 01/23/25  1532 01/22/25  0442 01/21/25  2010 01/21/25  1610 01/21/25  0836 01/20/25  1437 01/20/25  0427 01/19/25  1458 01/19/25  0757   SODIUM mmol/L 141 140 140 140 138   < > 136   < >  137   < > 132* 132* 129*   POTASSIUM mmol/L 3.2* 4.1 4.6 3.8 4.2   < > 3.4*   < > 3.7   < > 3.9 3.6 4.0   CHLORIDE mmol/L 95* 99 101 100 100   < > 90*   < > 92*   < > 92* 91* 89*   CO2 mmol/L 35* 31 32 29 30   < > 38*   < > 33*   < > 29 32 30   ANION GAP mmol/L 14 14 12 15 12   < > 11   < > 16   < > 15 13 14   BUN mg/dL 21 22 19 19 18   < > 16   < > 16   < > 16 17 16   CREATININE mg/dL 0.40* 0.44* 0.45* 0.45* 0.49*   < > 0.56   < > 0.55   < > 0.51 0.66 0.67   EGFR mL/min/1.73m*2 >90 >90 >90 >90 >90   < > >90   < > >90   < > >90 >90 >90   MAGNESIUM mg/dL 1.88 1.93 2.12 2.03 2.08   < >  --    < > 2.99*   < > 2.22 2.43* 2.52*   ALBUMIN g/dL 4.3 3.7 3.8 3.6 3.7   < > 3.6  3.6   < > 3.8  3.8   < > 3.1*  3.1* 3.2*  3.2* 3.1*   ALK PHOS U/L  --   --   --   --   --   --  56  --  63  --  65 66 63   ALT U/L  --   --   --   --   --   --  43  --  51*  --  114* 137* 143*   AST U/L  --   --   --   --   --   --  30  --  38  --  157* 257* 301*   BILIRUBIN TOTAL mg/dL  --   --   --   --   --   --  0.7  --  0.9  --  0.8 0.8 0.9    < > = values in this interval not displayed.     CBC:  Results from last 7 days   Lab Units 01/25/25  0329 01/25/25  0024 01/24/25  1658 01/24/25  0354 01/23/25  2051 01/23/25  1552 01/23/25  1222 01/23/25  1008   WBC AUTO x10*3/uL 20.0* 12.1* 14.5* 17.2* 7.7 9.6 23.1* 12.8*   HEMOGLOBIN g/dL 10.7* 7.4* 8.1* 8.7* 8.0* 8.1* 9.0* 9.6*   HEMATOCRIT % 33.1* 22.6* 25.0* 28.0* 25.6* 26.5* 29.2* 31.1*   PLATELETS AUTO x10*3/uL 196 151 172 204 138* 139* 180 169   MCV fL 88 83 84 87 87 87 89 87     COAG:   Results from last 7 days   Lab Units 01/25/25  0024 01/24/25  1658 01/24/25  0354 01/23/25 2051 01/23/25  1549 01/23/25  1222 01/23/25  1008 01/23/25  0410   INR  1.3* 1.2* 1.3* 1.3* 1.3* 1.3* 1.3* 1.2*     ABO:   ABO TYPE   Date Value Ref Range Status   01/22/2025 B  Final     HEME/ENDO:         CARDIAC:   Results from last 7 days   Lab Units 01/23/25 2051 01/23/25  1532   [facility] ng/L 48* 51*     MICRO:  No results found for the last 90 days.

## 2025-01-25 NOTE — PROGRESS NOTES
0800 pt assessed HTN noted and vaso held. Kathy HOSKINS notified.    0900 vaso continues to be held family at bedside. Updated on plan of care     1000 rounds completed. Reviewed plan of care. Potential removal of swan today or tomorrow. Watch fluid volume status. Goal for even. Team holding lasix for now. Midodrine started. Maintains off vaso for now. VC/AC settings on vent   1124 Vent CPAP trail begin

## 2025-01-25 NOTE — PROGRESS NOTES
"Faustina Vick \"Noam" is a 64 y.o. female who presents for Primary cancer of right upper lobe of lung (Multi) [C34.11].  She initially received chemotherapy and radiation, but appeared to have residual radiographic abnormalities.  PET/CT suggested progression of disease, and in this setting and Dr. Wagner felt that surgical resection in a \"salvage\" setting might facilitate long-term overall survival given the failure of nonsurgical management   She is now  12 days post op from PNEUMONECTOMY (Right) (en bloc with chest wall resection and reconstruction), mediastinal lymph node dissection for a  Primary cancer of right upper lobe of lung (Multi).      Subjective   NAEON. Off milrinone. Remains on .01 vaso. Feeling a little less anxious after addition of benzos and precedex.    Objective     General: critically ill in ICU bed  HEENT: ETT in place. Enteral feeds via dobhoff secure in nares.   Resp: orally intubated, sats appropriate, symmetric rise, synchronous w/vent  CV:   NSR/ST on monitor, sinus, normotensive with low dose vasopressin.   Chest: surgical incision ss denita, no drainage or erythema  Abd: soft, NTND  : yoo in place w/clear yellow uop  MSK: UE restraints  Psych: intubated, sedated on precedex    Last Recorded Vitals  Blood pressure 132/71, pulse (!) 119, temperature 36.7 °C (98.1 °F), temperature source Temporal, resp. rate 17, height 1.651 m (5' 5\"), weight 57.8 kg (127 lb 6.8 oz), SpO2 100%.  Intake/Output last 3 Shifts:  I/O last 3 completed shifts:  In: 2886.8 (49.9 mL/kg) [I.V.:726.8 (12.6 mL/kg); NG/GT:1510; IV Piggyback:650]  Out: 4230 (73.2 mL/kg) [Urine:4230 (2 mL/kg/hr)]  Weight: 57.8 kg     Relevant Results            10.7     20.0>-----<196              33.1   141  95  21                  ----------------<106     3.2  35  0.40          Ca 8.8 Phos 1.8 Mg 1.88       ALT 43 AST 30 AlkPhos 56 tBili 0.7         CXR 1/25: Personally reviewed. Expected fluid filled R pleural cavity s/p R " pneumonectomy. Interval improved L lung aeration and edema. Formal read pending.     Assessment/Plan   Assessment & Plan  Primary cancer of right upper lobe of lung (Multi)    Chronic obstructive pulmonary disease (Multi)    Malignant neoplasm of upper lobe of right lung (Multi)    Cardiogenic shock (Multi)    Faustina Vick (Jannie) is a 64F with Hx of Stage III lung cancer, asthma, anxiety, and arthritis who is now s/p R pneumonectomy with Dr. Wagner 1/13/25. 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 Cardiology was consulted and recommended Lasix gtt and milrinone. Lasix gtt held 1/17, hyponatremia improving. Patient underwent RHC and SGC placement with cardiology 1/17. She was reintubated on 1/18 d/t worsening respiratory status and AMS. Also had increased pressor requirements, now weaned down to low dose vaso. Repeat echo stable on 1/18. COVID positive on 1/20.     - Continue & appreciate supportive ICU care  - Goal net even to negative  - Wean sedation & trial of extubation prior to committing to trach  - Follow up C diff results  - If any fevers--bronch and repeat pan culture.     Patient seen and discussed with attending Dr. Wagner via phone and Dr. Rowe covering for Dr. Wagner.    Brisa Szymanski MD  Thoracic surgery [id number]

## 2025-01-25 NOTE — PROGRESS NOTES
"Faustina Vick \"Noam" is a 64 y.o. female on day 12 of admission presenting with Primary cancer of right upper lobe of lung (Multi).    Subjective   Diuresis 1/24  Fluid replacement with 250ml 5% albumin, 100ml 25% albumin as well as 1 unit RBC overnight  Vaso weaned off this am  Remains on precedex    Objective     Physical Exam  Vitals reviewed.   Constitutional:       Appearance: She is underweight.      Interventions: She is sedated, intubated and restrained.   HENT:      Head: Normocephalic and atraumatic.      Nose: Nose normal.      Comments: Bridled Dobhoff tube. L nare 78cm depth     Mouth/Throat:      Mouth: Mucous membranes are moist.   Eyes:      Extraocular Movements: Extraocular movements intact.      Pupils: Pupils are equal, round, and reactive to light.   Neck:      Comments: RIJ MAC with PA cath in place, 58cm depth. dressing c/d/i.   Cardiovascular:      Rate and Rhythm: Normal rate and regular rhythm.      Pulses: Normal pulses.      Heart sounds: Normal heart sounds.   Pulmonary:      Effort: She is intubated.      Breath sounds: Rhonchi (left side) present.      Comments: Mechanically ventilated via ETT  Chest:      Comments: Right lateral chest dressing c/d/i.  Abdominal:      General: Bowel sounds are normal. There is no distension.      Palpations: Abdomen is soft.      Comments: Enteral feeding tube in place, tube feeds running at 20ml/hr   Genitourinary:     Comments: Montiel in place with clear yellow UOP.   Musculoskeletal:         General: No swelling or tenderness. Normal range of motion.      Cervical back: Neck supple.      Comments: LUE edema   Skin:     General: Skin is warm and dry.   Neurological:      General: No focal deficit present.      Mental Status: She is easily aroused.      GCS: GCS eye subscore is 3. GCS verbal subscore is 1. GCS motor subscore is 6.      Motor: Weakness (generalized) present.      Comments: Awakens to name, follows commands, squeezes hands on command " "    Last Recorded Vitals  Blood pressure 132/71, pulse 95, temperature 35.8 °C (96.4 °F), temperature source Temporal, resp. rate 23, height 1.651 m (5' 5\"), weight 57.8 kg (127 lb 6.8 oz), SpO2 98%.    VS over last 24h  Heart Rate:  []   Temp:  [35.6 °C (96.1 °F)-36.9 °C (98.4 °F)]   Resp:  [9-33]   BP: (132)/(71)   SpO2:  [83 %-100 %]      Intake/Output last 3 Shifts:  I/O last 3 completed shifts:  In: 2886.8 (49.9 mL/kg) [I.V.:726.8 (12.6 mL/kg); NG/GT:1510; IV Piggyback:650]  Out: 4230 (73.2 mL/kg) [Urine:4230 (2 mL/kg/hr)]  Weight: 57.8 kg       Intake/Output Summary (Last 24 hours) at 1/25/2025 0709  Last data filed at 1/25/2025 0600  Gross per 24 hour   Intake 1601.13 ml   Output 3525 ml   Net -1923.87 ml        Relevant Results  Scheduled medications  [Held by provider] bisacodyl, 10 mg, rectal, Daily  budesonide, 0.5 mg, nebulization, BID  clonazePAM, 0.5 mg, oral, TID  dexAMETHasone, 6 mg, oral, Daily  [Held by provider] docusate sodium, 100 mg, orogastric tube, BID  esomeprazole, 40 mg, nasogastric tube, Daily before breakfast  furosemide, 40 mg, intravenous, q12h  heparin (porcine), 5,000 Units, subcutaneous, q8h  insulin lispro, 0-5 Units, subcutaneous, q4h  ipratropium-albuteroL, 3 mL, nebulization, q6h  lidocaine, 1 patch, transdermal, Daily  midodrine, 10 mg, nasogastric tube, q8h  oxyCODONE, 10 mg, nasogastric tube, q6h  oxygen, , inhalation, Continuous - Inhalation  piperacillin-tazobactam, 4.5 g, intravenous, q6h  [Held by provider] polyethylene glycol, 17 g, orogastric tube, Daily  [Held by provider] senna, 5 mL, orogastric tube, BID  thiamine, 100 mg, intravenous, Daily  traZODone, 25 mg, oral, Nightly      Continuous medications  dexmedeTOMIDine, 0.1-1.5 mcg/kg/hr, Last Rate: 0.975 mcg/kg/hr (01/25/25 0452)  [Held by provider] fentaNYL, 12.5 mcg/hr, Last Rate: Stopped (01/24/25 1646)  vasopressin, 0-0.06 Units/min, Last Rate: 0.01 Units/min (01/25/25 0430)        PRN medications  PRN " medications: albuterol, calcium gluconate, calcium gluconate, dextrose, dextrose, glucagon, glucagon, magnesium sulfate, magnesium sulfate, oxygen, potassium chloride, potassium chloride      Results for orders placed or performed during the hospital encounter of 01/13/25 (from the past 24 hours)   POCT GLUCOSE   Result Value Ref Range    POCT Glucose 128 (H) 74 - 99 mg/dL   Blood Gas Arterial Full Panel   Result Value Ref Range    POCT pH, Arterial 7.36 (L) 7.38 - 7.42 pH    POCT pCO2, Arterial 52 (H) 38 - 42 mm Hg    POCT pO2, Arterial 84 (L) 85 - 95 mm Hg    POCT SO2, Arterial 98 94 - 100 %    POCT Oxy Hemoglobin, Arterial 95.3 94.0 - 98.0 %    POCT Hematocrit Calculated, Arterial 26.0 (L) 36.0 - 46.0 %    POCT Sodium, Arterial 136 136 - 145 mmol/L    POCT Potassium, Arterial 3.9 3.5 - 5.3 mmol/L    POCT Chloride, Arterial 103 98 - 107 mmol/L    POCT Ionized Calcium, Arterial 1.18 1.10 - 1.33 mmol/L    POCT Glucose, Arterial 141 (H) 74 - 99 mg/dL    POCT Lactate, Arterial 1.0 0.4 - 2.0 mmol/L    POCT Base Excess, Arterial 3.3 (H) -2.0 - 3.0 mmol/L    POCT HCO3 Calculated, Arterial 29.4 (H) 22.0 - 26.0 mmol/L    POCT Hemoglobin, Arterial 8.7 (L) 12.0 - 16.0 g/dL    POCT Anion Gap, Arterial 8 (L) 10 - 25 mmo/L    Patient Temperature 37.0 degrees Celsius    FiO2 40 %   BLOOD GAS MIXED VENOUS FULL PANEL   Result Value Ref Range    POCT pH, Mixed 7.35 7.33 - 7.43 pH    POCT pCO2, Mixed 56 (H) 41 - 51 mm Hg    POCT pO2, Mixed 37 35 - 45 mm Hg    POCT SO2, Mixed 62 45 - 75 %    POCT Oxy Hemoglobin, Mixed 60.6 45.0 - 75.0 %    POCT Hematocrit Calculated, Mixed 25.0 (L) 36.0 - 46.0 %    POCT Sodium, Mixed 138 136 - 145 mmol/L    POCT Potassium, Mixed 3.8 3.5 - 5.3 mmol/L    POCT Chloride, Mixed 103 98 - 107 mmol/L    POCT Ionized Calcium, Mixed 1.19 1.10 - 1.33 mmol/L    POCT Glucose, Mixed 137 (H) 74 - 99 mg/dL    POCT Lactate, Mixed 0.9 0.4 - 2.0 mmol/L    POCT Base Excess, Mixed 4.5 (H) -2.0 - 3.0 mmol/L    POCT HCO3  Calculated, Mixed 30.9 (H) 22.0 - 26.0 mmol/L    POCT Hemoglobin, Mixed 8.3 (L) 12.0 - 16.0 g/dL    POCT Anion Gap, Mixed 8 (L) 10 - 25 mmo/L    Patient Temperature 37.0 degrees Celsius    FiO2 40 %   Blood Gas Arterial Full Panel   Result Value Ref Range    POCT pH, Arterial 7.40 7.38 - 7.42 pH    POCT pCO2, Arterial 52 (H) 38 - 42 mm Hg    POCT pO2, Arterial 78 (L) 85 - 95 mm Hg    POCT SO2, Arterial 99 94 - 100 %    POCT Oxy Hemoglobin, Arterial 96.4 94.0 - 98.0 %    POCT Hematocrit Calculated, Arterial 26.0 (L) 36.0 - 46.0 %    POCT Sodium, Arterial 136 136 - 145 mmol/L    POCT Potassium, Arterial 3.8 3.5 - 5.3 mmol/L    POCT Chloride, Arterial 101 98 - 107 mmol/L    POCT Ionized Calcium, Arterial 1.12 1.10 - 1.33 mmol/L    POCT Glucose, Arterial 161 (H) 74 - 99 mg/dL    POCT Lactate, Arterial 0.7 0.4 - 2.0 mmol/L    POCT Base Excess, Arterial 6.5 (H) -2.0 - 3.0 mmol/L    POCT HCO3 Calculated, Arterial 32.2 (H) 22.0 - 26.0 mmol/L    POCT Hemoglobin, Arterial 8.6 (L) 12.0 - 16.0 g/dL    POCT Anion Gap, Arterial 7 (L) 10 - 25 mmo/L    Patient Temperature 37.0 degrees Celsius    FiO2 40 %   BLOOD GAS MIXED VENOUS FULL PANEL   Result Value Ref Range    POCT pH, Mixed 7.37 7.33 - 7.43 pH    POCT pCO2, Mixed 56 (H) 41 - 51 mm Hg    POCT pO2, Mixed 35 35 - 45 mm Hg    POCT SO2, Mixed 63 45 - 75 %    POCT Oxy Hemoglobin, Mixed 61.6 45.0 - 75.0 %    POCT Hematocrit Calculated, Mixed 27.0 (L) 36.0 - 46.0 %    POCT Sodium, Mixed 136 136 - 145 mmol/L    POCT Potassium, Mixed 3.8 3.5 - 5.3 mmol/L    POCT Chloride, Mixed      POCT Ionized Calcium, Mixed 1.14 1.10 - 1.33 mmol/L    POCT Glucose, Mixed 155 (H) 74 - 99 mg/dL    POCT Lactate, Mixed 0.6 0.4 - 2.0 mmol/L    POCT Base Excess, Mixed 6.1 (H) -2.0 - 3.0 mmol/L    POCT HCO3 Calculated, Mixed 32.4 (H) 22.0 - 26.0 mmol/L    POCT Hemoglobin, Mixed 8.9 (L) 12.0 - 16.0 g/dL    POCT Anion Gap, Mixed      Patient Temperature 37.0 degrees Celsius    FiO2 40 %   POCT GLUCOSE    Result Value Ref Range    POCT Glucose 177 (H) 74 - 99 mg/dL   Blood Gas Arterial Full Panel   Result Value Ref Range    POCT pH, Arterial 7.25 (LL) 7.38 - 7.42 pH    POCT pCO2, Arterial 73 (HH) 38 - 42 mm Hg    POCT pO2, Arterial 62 (L) 85 - 95 mm Hg    POCT SO2, Arterial 90 (L) 94 - 100 %    POCT Oxy Hemoglobin, Arterial 88.0 (L) 94.0 - 98.0 %    POCT Hematocrit Calculated, Arterial 29.0 (L) 36.0 - 46.0 %    POCT Sodium, Arterial 138 136 - 145 mmol/L    POCT Potassium, Arterial 3.9 3.5 - 5.3 mmol/L    POCT Chloride, Arterial 100 98 - 107 mmol/L    POCT Ionized Calcium, Arterial 1.20 1.10 - 1.33 mmol/L    POCT Glucose, Arterial 134 (H) 74 - 99 mg/dL    POCT Lactate, Arterial 1.0 0.4 - 2.0 mmol/L    POCT Base Excess, Arterial 3.5 (H) -2.0 - 3.0 mmol/L    POCT HCO3 Calculated, Arterial 32.0 (H) 22.0 - 26.0 mmol/L    POCT Hemoglobin, Arterial 9.6 (L) 12.0 - 16.0 g/dL    POCT Anion Gap, Arterial 10 10 - 25 mmo/L    Patient Temperature 37.0 degrees Celsius    FiO2 40 %   BLOOD GAS MIXED VENOUS FULL PANEL   Result Value Ref Range    POCT pH, Mixed 7.23 (LL) 7.33 - 7.43 pH    POCT pCO2, Mixed 77 (HH) 41 - 51 mm Hg    POCT pO2, Mixed 40 35 - 45 mm Hg    POCT SO2, Mixed 62 45 - 75 %    POCT Oxy Hemoglobin, Mixed 60.6 45.0 - 75.0 %    POCT Hematocrit Calculated, Mixed 29.0 (L) 36.0 - 46.0 %    POCT Sodium, Mixed 137 136 - 145 mmol/L    POCT Potassium, Mixed 3.9 3.5 - 5.3 mmol/L    POCT Chloride, Mixed      POCT Ionized Calcium, Mixed 1.20 1.10 - 1.33 mmol/L    POCT Glucose, Mixed 137 (H) 74 - 99 mg/dL    POCT Lactate, Mixed 1.0 0.4 - 2.0 mmol/L    POCT Base Excess, Mixed 3.3 (H) -2.0 - 3.0 mmol/L    POCT HCO3 Calculated, Mixed 32.2 (H) 22.0 - 26.0 mmol/L    POCT Hemoglobin, Mixed 9.6 (L) 12.0 - 16.0 g/dL    POCT Anion Gap, Mixed      Patient Temperature 37.0 degrees Celsius    FiO2 40 %   Blood Gas Arterial Full Panel   Result Value Ref Range    POCT pH, Arterial 7.40 7.38 - 7.42 pH    POCT pCO2, Arterial 51 (H) 38 - 42 mm  Hg    POCT pO2, Arterial 86 85 - 95 mm Hg    POCT SO2, Arterial 98 94 - 100 %    POCT Oxy Hemoglobin, Arterial 96.1 94.0 - 98.0 %    POCT Hematocrit Calculated, Arterial 23.0 (L) 36.0 - 46.0 %    POCT Sodium, Arterial 137 136 - 145 mmol/L    POCT Potassium, Arterial 3.8 3.5 - 5.3 mmol/L    POCT Chloride, Arterial 103 98 - 107 mmol/L    POCT Ionized Calcium, Arterial 1.12 1.10 - 1.33 mmol/L    POCT Glucose, Arterial 150 (H) 74 - 99 mg/dL    POCT Lactate, Arterial 0.8 0.4 - 2.0 mmol/L    POCT Base Excess, Arterial 6.1 (H) -2.0 - 3.0 mmol/L    POCT HCO3 Calculated, Arterial 31.6 (H) 22.0 - 26.0 mmol/L    POCT Hemoglobin, Arterial 7.5 (L) 12.0 - 16.0 g/dL    POCT Anion Gap, Arterial 6 (L) 10 - 25 mmo/L    Patient Temperature 37.0 degrees Celsius    FiO2 40 %   Calcium, Ionized   Result Value Ref Range    POCT Calcium, Ionized 1.10 1.1 - 1.33 mmol/L   CBC   Result Value Ref Range    WBC 14.5 (H) 4.4 - 11.3 x10*3/uL    nRBC 0.0 0.0 - 0.0 /100 WBCs    RBC 2.99 (L) 4.00 - 5.20 x10*6/uL    Hemoglobin 8.1 (L) 12.0 - 16.0 g/dL    Hematocrit 25.0 (L) 36.0 - 46.0 %    MCV 84 80 - 100 fL    MCH 27.1 26.0 - 34.0 pg    MCHC 32.4 32.0 - 36.0 g/dL    RDW 17.2 (H) 11.5 - 14.5 %    Platelets 172 150 - 450 x10*3/uL   Coagulation Screen   Result Value Ref Range    Protime 13.7 (H) 9.8 - 12.8 seconds    INR 1.2 (H) 0.9 - 1.1    aPTT 29 27 - 38 seconds   Magnesium   Result Value Ref Range    Magnesium 1.93 1.60 - 2.40 mg/dL   Renal function panel   Result Value Ref Range    Glucose 159 (H) 74 - 99 mg/dL    Sodium 140 136 - 145 mmol/L    Potassium 4.1 3.5 - 5.3 mmol/L    Chloride 99 98 - 107 mmol/L    Bicarbonate 31 21 - 32 mmol/L    Anion Gap 14 10 - 20 mmol/L    Urea Nitrogen 22 6 - 23 mg/dL    Creatinine 0.44 (L) 0.50 - 1.05 mg/dL    eGFR >90 >60 mL/min/1.73m*2    Calcium 8.2 (L) 8.6 - 10.6 mg/dL    Phosphorus 3.3 2.5 - 4.9 mg/dL    Albumin 3.7 3.4 - 5.0 g/dL   POCT GLUCOSE   Result Value Ref Range    POCT Glucose 183 (H) 74 - 99 mg/dL    BLOOD GAS MIXED VENOUS FULL PANEL   Result Value Ref Range    POCT pH, Mixed 7.41 7.33 - 7.43 pH    POCT pCO2, Mixed 54 (H) 41 - 51 mm Hg    POCT pO2, Mixed 41 35 - 45 mm Hg    POCT SO2, Mixed 69 45 - 75 %    POCT Oxy Hemoglobin, Mixed 67.4 45.0 - 75.0 %    POCT Hematocrit Calculated, Mixed 26.0 (L) 36.0 - 46.0 %    POCT Sodium, Mixed 136 136 - 145 mmol/L    POCT Potassium, Mixed 3.6 3.5 - 5.3 mmol/L    POCT Chloride, Mixed 100 98 - 107 mmol/L    POCT Ionized Calcium, Mixed 1.18 1.10 - 1.33 mmol/L    POCT Glucose, Mixed 161 (H) 74 - 99 mg/dL    POCT Lactate, Mixed 1.3 0.4 - 2.0 mmol/L    POCT Base Excess, Mixed 8.4 (H) -2.0 - 3.0 mmol/L    POCT HCO3 Calculated, Mixed 34.2 (H) 22.0 - 26.0 mmol/L    POCT Hemoglobin, Mixed 8.8 (L) 12.0 - 16.0 g/dL    POCT Anion Gap, Mixed 5 (L) 10 - 25 mmo/L    Patient Temperature 37.0 degrees Celsius    FiO2 40 %   Blood Gas Arterial Full Panel   Result Value Ref Range    POCT pH, Arterial 7.43 (H) 7.38 - 7.42 pH    POCT pCO2, Arterial 48 (H) 38 - 42 mm Hg    POCT pO2, Arterial 88 85 - 95 mm Hg    POCT SO2, Arterial 98 94 - 100 %    POCT Oxy Hemoglobin, Arterial 95.6 94.0 - 98.0 %    POCT Hematocrit Calculated, Arterial 26.0 (L) 36.0 - 46.0 %    POCT Sodium, Arterial 137 136 - 145 mmol/L    POCT Potassium, Arterial 3.5 3.5 - 5.3 mmol/L    POCT Chloride, Arterial 100 98 - 107 mmol/L    POCT Ionized Calcium, Arterial 1.17 1.10 - 1.33 mmol/L    POCT Glucose, Arterial 163 (H) 74 - 99 mg/dL    POCT Lactate, Arterial 1.2 0.4 - 2.0 mmol/L    POCT Base Excess, Arterial 6.8 (H) -2.0 - 3.0 mmol/L    POCT HCO3 Calculated, Arterial 31.9 (H) 22.0 - 26.0 mmol/L    POCT Hemoglobin, Arterial 8.7 (L) 12.0 - 16.0 g/dL    POCT Anion Gap, Arterial 9 (L) 10 - 25 mmo/L    Patient Temperature 37.0 degrees Celsius    FiO2 40 %   Blood Gas Arterial Full Panel   Result Value Ref Range    POCT pH, Arterial 7.49 (H) 7.38 - 7.42 pH    POCT pCO2, Arterial 48 (H) 38 - 42 mm Hg    POCT pO2, Arterial 80 (L) 85 - 95  mm Hg    POCT SO2, Arterial 99 94 - 100 %    POCT Oxy Hemoglobin, Arterial 96.1 94.0 - 98.0 %    POCT Hematocrit Calculated, Arterial 20.0 (L) 36.0 - 46.0 %    POCT Sodium, Arterial 137 136 - 145 mmol/L    POCT Potassium, Arterial 3.1 (L) 3.5 - 5.3 mmol/L    POCT Chloride, Arterial 100 98 - 107 mmol/L    POCT Ionized Calcium, Arterial 1.11 1.10 - 1.33 mmol/L    POCT Glucose, Arterial 109 (H) 74 - 99 mg/dL    POCT Lactate, Arterial 1.1 0.4 - 2.0 mmol/L    POCT Base Excess, Arterial 12.1 (H) -2.0 - 3.0 mmol/L    POCT HCO3 Calculated, Arterial 36.6 (H) 22.0 - 26.0 mmol/L    POCT Hemoglobin, Arterial 6.5 (LL) 12.0 - 16.0 g/dL    POCT Anion Gap, Arterial 4 (L) 10 - 25 mmo/L    Patient Temperature 37.0 degrees Celsius    FiO2 40 %   Calcium, Ionized   Result Value Ref Range    POCT Calcium, Ionized 1.10 1.1 - 1.33 mmol/L   CBC   Result Value Ref Range    WBC 12.1 (H) 4.4 - 11.3 x10*3/uL    nRBC 0.0 0.0 - 0.0 /100 WBCs    RBC 2.74 (L) 4.00 - 5.20 x10*6/uL    Hemoglobin 7.4 (L) 12.0 - 16.0 g/dL    Hematocrit 22.6 (L) 36.0 - 46.0 %    MCV 83 80 - 100 fL    MCH 27.0 26.0 - 34.0 pg    MCHC 32.7 32.0 - 36.0 g/dL    RDW 16.8 (H) 11.5 - 14.5 %    Platelets 151 150 - 450 x10*3/uL   Coagulation Screen   Result Value Ref Range    Protime 14.4 (H) 9.8 - 12.8 seconds    INR 1.3 (H) 0.9 - 1.1    aPTT 32 27 - 38 seconds   Magnesium   Result Value Ref Range    Magnesium 1.88 1.60 - 2.40 mg/dL   Renal function panel   Result Value Ref Range    Glucose 106 (H) 74 - 99 mg/dL    Sodium 141 136 - 145 mmol/L    Potassium 3.2 (L) 3.5 - 5.3 mmol/L    Chloride 95 (L) 98 - 107 mmol/L    Bicarbonate 35 (H) 21 - 32 mmol/L    Anion Gap 14 10 - 20 mmol/L    Urea Nitrogen 21 6 - 23 mg/dL    Creatinine 0.40 (L) 0.50 - 1.05 mg/dL    eGFR >90 >60 mL/min/1.73m*2    Calcium 8.8 8.6 - 10.6 mg/dL    Phosphorus 1.8 (L) 2.5 - 4.9 mg/dL    Albumin 4.3 3.4 - 5.0 g/dL   POCT GLUCOSE   Result Value Ref Range    POCT Glucose 116 (H) 74 - 99 mg/dL   Prepare RBC: 1  Units   Result Value Ref Range    PRODUCT CODE B9572V83     Unit Number [id number]     Unit ABO B     Unit RH POS     XM INTEP COMP     Dispense Status IS     Blood Expiration Date 2/7/2025 11:59:00 PM EST     PRODUCT BLOOD TYPE 7300     UNIT VOLUME 350    CBC   Result Value Ref Range    WBC 20.0 (H) 4.4 - 11.3 x10*3/uL    nRBC 0.0 0.0 - 0.0 /100 WBCs    RBC 3.76 (L) 4.00 - 5.20 x10*6/uL    Hemoglobin 10.7 (L) 12.0 - 16.0 g/dL    Hematocrit 33.1 (L) 36.0 - 46.0 %    MCV 88 80 - 100 fL    MCH 28.5 26.0 - 34.0 pg    MCHC 32.3 32.0 - 36.0 g/dL    RDW 16.3 (H) 11.5 - 14.5 %    Platelets 196 150 - 450 x10*3/uL   Blood Gas Arterial Full Panel   Result Value Ref Range    POCT pH, Arterial 7.38 7.38 - 7.42 pH    POCT pCO2, Arterial 57 (H) 38 - 42 mm Hg    POCT pO2, Arterial 68 (L) 85 - 95 mm Hg    POCT SO2, Arterial 95 94 - 100 %    POCT Oxy Hemoglobin, Arterial 91.9 (L) 94.0 - 98.0 %    POCT Hematocrit Calculated, Arterial 38.0 36.0 - 46.0 %    POCT Sodium, Arterial 134 (L) 136 - 145 mmol/L    POCT Potassium, Arterial 3.2 (L) 3.5 - 5.3 mmol/L    POCT Chloride, Arterial 97 (L) 98 - 107 mmol/L    POCT Ionized Calcium, Arterial 1.15 1.10 - 1.33 mmol/L    POCT Glucose, Arterial 147 (H) 74 - 99 mg/dL    POCT Lactate, Arterial 1.8 0.4 - 2.0 mmol/L    POCT Base Excess, Arterial 6.9 (H) -2.0 - 3.0 mmol/L    POCT HCO3 Calculated, Arterial 33.7 (H) 22.0 - 26.0 mmol/L    POCT Hemoglobin, Arterial 12.6 12.0 - 16.0 g/dL    POCT Anion Gap, Arterial 7 (L) 10 - 25 mmo/L    Patient Temperature 37.0 degrees Celsius    FiO2 40 %   BLOOD GAS MIXED VENOUS FULL PANEL   Result Value Ref Range    POCT pH, Mixed 7.37 7.33 - 7.43 pH    POCT pCO2, Mixed 58 (H) 41 - 51 mm Hg    POCT pO2, Mixed 43 35 - 45 mm Hg    POCT SO2, Mixed 74 45 - 75 %    POCT Oxy Hemoglobin, Mixed 71.9 45.0 - 75.0 %    POCT Hematocrit Calculated, Mixed 35.0 (L) 36.0 - 46.0 %    POCT Sodium, Mixed      POCT Potassium, Mixed 3.2 (L) 3.5 - 5.3 mmol/L    POCT Chloride, Mixed  97 (L) 98 - 107 mmol/L    POCT Ionized Calcium, Mixed 1.16 1.10 - 1.33 mmol/L    POCT Glucose, Mixed 141 (H) 74 - 99 mg/dL    POCT Lactate, Mixed 1.9 0.4 - 2.0 mmol/L    POCT Base Excess, Mixed 6.7 (H) -2.0 - 3.0 mmol/L    POCT HCO3 Calculated, Mixed 33.5 (H) 22.0 - 26.0 mmol/L    POCT Hemoglobin, Mixed 11.6 (L) 12.0 - 16.0 g/dL    POCT Anion Gap, Mixed      Patient Temperature 37.0 degrees Celsius    FiO2 40 %       XR chest 1 view   Final Result   1.  Similar left lung pulmonary edema with small left pleural   effusion.   2. Status post right pneumonectomy.   3. [place]-Paco catheter with tip projecting over the left lower lobe,   in similar position.                  MACRO:   None        Signed by: Alexx Anne 1/24/2025 10:24 AM   Dictation workstation:   [id number]      XR chest 1 view   Final Result   Addendum (preliminary) 1 of 1   Interpreted By:  Alexx Anne,    ADDENDUM:   Right IJ approach [place] Paco catheter is also noted with tip   projecting over the left lower lobe in similar position to prior exam.        Signed by: Alexx Anne 1/24/2025 10:25 AM        -------- ORIGINAL REPORT --------   Dictation workstation:   [id number]      Final   1.  Stable findings of pulmonary edema within the left lung.   2. Postsurgical changes of right pneumonectomy.   3. Medical devices as above.                  MACRO:   None        Signed by: Alexx Anne 1/24/2025 9:09 AM   Dictation workstation:   [id number]      XR chest 1 view   Final Result   1. The tip of the endotracheal tube terminates 7.5 cm of the sina.   2. Slightly decreased  interstitial prominence and airspace opacities   within the left lung when compared to prior radiograph,  suggestive   of improving infectious process or improving pulmonary edema.   3. No significant interval change in postoperative changes from   right-sided pneumonectomy, resection of multiple right-sided ribs,   complete opacification of the pneumonectomy bed, and rightward    mediastinal shift.        I personally reviewed the images/study and I agree with radiology   resident  Dr. Meghan Ramesh findings as stated. This study was   interpreted at [facility],   [place], [place]        MACRO:   None        Signed by: Oseas Stroud 1/24/2025 8:09 AM   Dictation workstation:   [id number]      XR abdomen 1 view   Final Result   1.  Dobhoff tube is in place with the tip projecting over the 1st   portion of the duodenum.   2. There is gas-filled dilated stomach. A few slight prominent loops   of the bowel in the upper abdomen with overall nonobstructive bowel   gas pattern.   3. Postsurgical changes of right pneumonectomy with resection of ribs   and patchy airspace opacities in the left lung.   4. [place]-Paco catheter projecting over the lower pulmonary artery.   Recommend retraction.        MACRO:   None        Signed by: Oseas Stroud 1/23/2025 10:15 AM   Dictation workstation:   [id number]      XR chest 1 view   Final Result   1. Unchanged appearance of multifocal patchy airspace opacities with   interstitial prominence of the left lung which may be due to   infectious process. Component of edema can not be excluded.   2. Medical devices as described above with similar positioning of the   [place]-Paco catheter projecting over left lower lobe pulmonary artery.   Recommend retraction.   3. Postsurgical changes of right-sided pneumonectomy and resection of   multiple right-sided ribs with complete opacification of the   pneumonectomy bed. The appearance is unchanged compared to prior   study.                  MACRO:   None        Signed by: Oseas Stroud 1/23/2025 9:36 AM   Dictation workstation:   [id number]      XR abdomen 1 view   Final Result   1.  As described above.        Signed by: Vick Xioa 1/22/2025 1:01 PM   Dictation workstation:   [id number]      XR chest 1 view   Final Result   1. Unchanged appearance of multifocal patchy airspace  opacity with   interstitial prominence of the left lung which may be due to   infectious process. Component of edema cannot be excluded.   2. Medical devices as described above with interval advancement of   the [place]-Paco catheter projecting over left lower lobe pulmonary   artery. Recommend retraction.   3. Postsurgical changes of right-sided pneumonectomy and resection of   multiple right-sided ribs with complete opacification of the   pneumonectomy bed. The appearance is unchanged compared to prior   study.                  Signed by: Vick Xiao 1/22/2025 1:00 PM   Dictation workstation:   [id number]      XR chest 1 view   Final Result   1. Similar near-complete opacification of the right hemithorax, which   may be secondary to a large pleural effusion, pneumonectomy, or   bronchial obstruction.   2. Diffuse interstitial opacities and scattered patchy alveolar   opacities throughout the left lung, similar to the prior exam, which   may reflect prominent pulmonary edema or multifocal infection.   3. Medical devices as above.        I personally reviewed the image(s)/study and resident interpretation   as stated by Dr. Ange Sue MD. I agree with the findings as   stated. This study was interpreted at [facility], [place], [place].        MACRO:   None        Signed by: Oseas Stroud 1/21/2025 7:58 PM   Dictation workstation:   [id number]      Vascular US upper extremity venous duplex left   Final Result      Transthoracic Echo (TTE) Limited   Final Result      XR chest 1 view   Final Result   1.  Interval opacification of the right hemithorax and correlate with   post pneumonectomy changes.   2. [place]-Paco catheter overlies the left lower lobe pulmonary artery.   3. Slight interval improvement in left-sided pulmonary edema.   Underlying edema and correlate with fluid status.             Signed by: Nino Barbour 1/20/2025 9:57 AM   Dictation workstation:    [id number]      XR abdomen 1 view   Final Result   1.  As described above.        Signed by: Vick Xiao 1/19/2025 11:58 AM   Dictation workstation:   [id number]      XR chest 1 view   Final Result   1. Slight worsening of multifocal airspace opacity in the left mid   and lower lung. Findings are concerning for infectious process. Small   left pleural effusion.   2. Postsurgical changes right pneumonectomy with medical devices as   described above. Consider retracting the [place]-Paco catheter.                  Signed by: Vick Xiao 1/19/2025 8:55 AM   Dictation workstation:   [id number]      Transthoracic Echo (TTE) Limited   Final Result      XR chest 1 view   Final Result   1. Patchy airspace opacity in the left lower lung with slight   improvement in aeration. Correlate with concern for infection   2. Postsurgical changes of right pneumonectomy as described above.   3. Medical lines and devices as above. Consider retraction of the   Winthrop-Paco catheter.        I personally reviewed the images/study and I agree with the findings   as stated. This study was interpreted at [place], [place].        MACRO:   None        Signed by: Vick Xiao 1/19/2025 8:54 AM   Dictation workstation:   [id number]      XR chest 1 view   Final Result   1. Worsening aeration of the left lung with increasing left basilar   infiltrate and pleural effusion. Correlate with mucous plugging and   component of lobar collapse.   2. Component of interstitial prominence of the left lung, also   slightly increased which may be due to edema.   3. Postsurgical changes of right pneumonectomy as described                  Signed by: Vick Xiao 1/18/2025 11:35 AM   Dictation workstation:   [id number]      Cardiac Catheterization Procedure   Final Result      Transthoracic Echo (TTE) Limited   Final Result      XR chest 1 view   Final Result   1.  Stable exam with  postsurgical changes of right pneumonectomy/rib   resection and large layering fluid component within the right   hemithorax.   2. Similar findings of left pulmonary edema with slightly increased   size of small left pleural effusion. No left-sided pneumothorax.   3. Medical devices as above.                  MACRO:   None        Signed by: Alexx Anne 1/17/2025 8:16 AM   Dictation workstation:   [id number]      XR chest 1 view   Final Result   1.  Postsurgical changes of right-sided pneumonectomy with increased   layering opacification of the right hemithorax, likely due to   increasing fluid.   2. Similar trace left-sided pleural effusion with linear left basilar   airspace opacity, likely atelectasis on a background of likely   pulmonary edema. Infection is not definitively excluded.   3. Medical devices as above with interval insertion of right internal   jugular central venous catheter projecting at the mid SVC.        I personally reviewed the images/study and I agree with the findings   as stated by resident Davidson Mcguire. This study was interpreted   at [place], [place].        MACRO:   None        Signed by: Alexx Anne 1/17/2025 8:14 AM   Dictation workstation:   [id number]      Transthoracic Echo (TTE) Limited   Final Result      US right upper quadrant   Final Result   Small amount of simple appearing ascites in the right upper quadrant   without evidence of cholelithiasis, cholecystitis or biliary dilation.        Signed by: Alberto Salas 1/16/2025 1:02 PM   Dictation workstation:   [id number]      XR chest 1 view   Final Result   1. Slight interval increase in left basilar airspace opacity which   may be due to increasing atelectasis. Cannot exclude developing   infectious infiltrate. Question trace left pleural effusion.   2. Again seen postsurgical changes related to right-sided   pneumonectomy as described above                  Signed  by: Vick Xiao 1/16/2025 9:47 AM   Dictation workstation:   [id number]      US renal complete   Final Result   1. Unremarkable sonographic evaluation of the kidneys.   2. Incomplete evaluation of a gallbladder with some pericholecystic   fluid and biliary sludge. Dedicated right upper quadrant ultrasound   could be obtained as clinically warranted.   3. A small amount of free fluid present in the right upper quadrant   and pelvis.        Findings relayed by phone by me to SICU provider at 5:11 p.m.        I personally reviewed the images/study and I agree with the findings   as stated by Erickson Best MD (resident) . This study was   interpreted at [place], [place].        MACRO:   None        Signed by: Alberto Salas 1/15/2025 6:17 PM   Dictation workstation:   [id number]      XR chest 1 view   Final Result   1.  Status post pneumonectomy with interval increase in fluid   component of right-sided hydropneumothorax.   2. Medical devices as above.                  MACRO:   None        Signed by: Alexx Anne 1/15/2025 9:28 AM   Dictation workstation:   [id number]      XR chest 1 view   Final Result   1. Interval removal of right chest tube. Other medical devices as   described above.   2. Postsurgical changes related to right pneumonectomy.   3. Faint left basilar opacity, likely atelectatic.                  Signed by: Vick Xiao 1/14/2025 2:53 PM   Dictation workstation:   [id number]      XR chest 1 view   Final Result   1.  Stable right-sided pneumonectomy changes.             Signed by: Nino Barbour 1/14/2025 12:56 PM   Dictation workstation:   [id number]      Transthoracic Echo (TTE) Limited   Final Result      XR chest 1 view   Final Result   1. Postsurgical changes related to right sided pneumonectomy with   multiple medical devices as described above.   2. Mild interstitial prominence of the left lung.                   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 Wagner on 1/13 requiring post-op vasoactive therapy. Post-op course c/b acute hyponatremia, multifactorial delirium, cardiogenic shock requiring inotropic support, and acute hypoxic/hypercarbic respiratory failure requiring intubation. COVID+ 1/20. Weaned off milrinone 1/21.     Plan:  NEURO: History of anxiety on alprazolam 1mg qd, last filled 1/4/2025 per [facility] review. Family states she was not taking for several months but then restarted prn about 3 wks prior to surgery for situational anxiety. A&Ox4 at baseline. Multifactorial delirium improved. Currently sedated and intubated. On precedex 1mcg/kg/hr, scheduled oxycodone, scheduled clonazepam and nightly trazodone. Awakens and follows commands without sedation interruption.    - continue trazodone nightly  - continue clonazepam 0.5 mg q8h  - Ongoing neuro and pain assessments  - Continue precedex, titrate to goal RASS 0 to -2  - Continue scheduled oxycodone q6h  - Lidocaine patch to right chest wall  - PT/OT consulted. Advance activity as tolerated  - Restraints indicated while patient remains intubated, restrain with soft wrist restraints until medical devices are discontinued.      CV: No history of cardiac disease. Baseline /79, -110. TTE 9/2024 normal biV function, RVSP 10mmHg. Intra-op hypotension requiring vaso and epi. Arrived to SICU on vaso 0.06 units/min, started on levo 0.05 mcg/kg/min. TTE 1/13 with EF 60-65%, low normal RV systolic function. Weaned off vasopressors 1/15. Elevated lactate to 2.9 on 1/16 with SOB and new O2 requirement. Repeat TTE  1/16: acute biV dysfunction with signs of takotsubo cardiomyopathy, EF 34%, mod reduced RV systolic function, mildly elevated RVSP, mild to mod TR. Cardiogenic shock, started milrinone, lasix infusion, and vasopressin. Shock Call on 1/16, no indication for mechanical circulatory support. Initial troponin 928 with downtrend and normalization of lactate. Epinephrine infusion added overnight 1/16-1/17, discontinued 2/2 tachycardia in the 130s. Milrinone increased to 0.25mcg/kg/min (decreased to 0.125 thereafter). Lasix infusion stopped 1/17 given contraction alkalosis. Taken to cath lab 1/17 for RHC/PA cath placement, opening CI 2.9. Milrinone uptitrated to 0.375mcg/kg/min 1/17-1/18. iEpo 0.05 added 1/18, high dose levo/vaso. Repeat TTE 1/20 EF 35%. Levo weaned off 1/20. Intermittent sinus tachycardia. Milrinone discontinued 1/21 at ~6pm. iEpo weaned off 1/24 ~ 2300. Responsive to diuresis. Vaso weaned off this am.  - start midodrine if need for vaso persists  - Continuous EKG/abp/cvp/pap monitoring  - Thermodilution and mixed SvO2 q4h and prn  - Cardiology following peripherally, call with questions  - MAP goal >65  - Continue holding metoprolol  - Shock call if condition worsens  - goal even to negative fluid balance     PULM: Hx of 30 pack year smoking history (quit 4/2024), COPD, and stage III right lung SCC s/p chemo/XRT/immunotherapy. 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 Arrived to SICU extubated on 10L SFM, weaned to room air 1/14. Chest tube removed 1/14. Acute on chronic hypoxic respiratory failure 1/16. Escalation to Airvo 50L 90% overnight 1/17-1/18. Acute hypercapnia 1/18 with increase WOB, trial of BIPAP with iEPO. Eventual intubation 1/18 evening. Currently on AC-VC/22/40%/300/5. Tolerated 1.5hr volume targeted SBT 1/24.  - Continue mechanical ventilation -> continue weaning trials daily  - wean FiO2 to maintain SpO2 >92%  - SDS switched to  decadron on 1/22 for treatment of COVID (end date 1/29)  - scheduled budesonide/duoneb  - PRN albuterol  - Daily CXR  - ABG q4h and  prn  - holding home benzonatate    GI: No GI history. LFTs WNL 12/2/24. Evidence of pericholecystic fluid and biliary sludge on renal US 1/15 but no abdominal symptoms. RUQ US with small ascites, otherwise no acute findings. Abdominal distention this am. Elevated LFTs, now normalized. LBM 1/17. OG tube placed 1/19 for gastric decompression. TFs initiated via OG 1/20, then held on 1/21 due to regurgitation from OG. Dobhoff placed via IRIS and advanced post-pyloric. Tolerating TF at goal rate. Liquid Bms x4 last 24h   - Continue TF at goal rate 30ml/hr  - Continue thiamine 100mg IV daily  - Continue IV PPI for GI prophylaxis  - hold bowel reg  - C.diff PCR sent this am    : No history of renal disease. Baseline creatinine 0.6. Symptomatic acute hyponatremia (SIADH vs vaso mediated) to 120 overnight 1/14 - 1/15, corrected to 124 after 150cc 3% NaCl x2 on 1/15. Renal US on 1/15 unremarkable. Started on lasix infusion 1/16-1/17. Spot diuresis 1/20 and 1/24. Hypokalemia. Yoo changed overnight 1/18-1/19. Net negative 1.9L  - nephrology signed off 1/18, call with questions  - RFP q12h and PRN  - Maintain yoo for strict I/Os   - Maintain U/O >0.5ml/kg/hr  - Replete electrolytes to maintain K>4, Phos>2.5, iCal>1.1, Mag>2.      HEME: Hx of DOC. Baseline H/H 12/40. Acute blood loss anemia, OR EBL 150mL. Acute coagulopathy with INR elevated to 2.3 2/2 to poor nutritional status. Received IV Vit K 10mg x1 with improved INR thereafter. Transfused 1u PRBC 1/17 and 1u PRBC on 1/18. LUE duplex obtained 1/20 for swelling, negative for DVT. Stable edema in LUE, negative DVT ultrasound 1/20 ultrasound. Hgb 7.4, transfused 1 RBC with Hgb increase to 10.7 thereafter  - Check CBC daily and PRN  - coags prn  - goal Hgb >9  - SCDs and SQH for DVT ppx  - ongoing monitoring for s/s bleeding  - maintain  active T&S (1/22)      ENDO: No history of DM or thyroid disease. TSH 0.48 (1/17). Recent 5d course of prednisone 10mg 12/2024 for URI. Adequate glycemic control. Started on SDS out of OR 1/13. Transitioned to decadron 1/22  - BG q4h, SSI #1     MSK: Arthritis, Vit D deficiency.  - not on meds at home  - monitor for symptoms     ID: Afebrile, leukocytosis. Completed yunior-op course of cefazolin. Worsened clinical condition 1/17, started on vanco, zosyn and azithromycin. Blood cultures, MRSA and Respiratory viral panel negative 1/17. Montiel changed 1/18, Urine cx 1/18 negative. Added fluc overnight 1/18-1/19. Sputum culture 1/19 negative. Vancomycin discontinued 1/20. Urine strep/legionella 1/20 negative. COVID+ on 1/20, initiated remdesivir and decadron. Discontinued fluc and azithro 1/21. Liquid stools  - COVID precautions  - ID consulted 1/20, appreciate recs, signed off, call with questions  - temp q4h, wbc daily  - Ongoing monitoring for s/s infection  - Continue zosyn (7 day course completed 1/24 but continued for leukocytosis)- will continue for now   - Completed 5 day course of remdesivir 1/24. On Decadron through 1/29     Lines:  - RIJ MAC (SICU, 1/16)  - PA cath (Cath lab. 1/17)  - L brachial A-line (1/16)  - Left chest mediport accessed   - PIV  x1  - Montiel (1/18)  - ETT (1/18)     Dispo: Continue ICU care. Patient seen and discussed with ICU attending Dr. Timo Ambriz, ANDREA    SICU Phone [phone]    Critical care time: 60 minutes

## 2025-01-26 ENCOUNTER — APPOINTMENT (OUTPATIENT)
Dept: RADIOLOGY | Facility: HOSPITAL | Age: 65
End: 2025-01-26
Payer: COMMERCIAL

## 2025-01-26 LAB
ABO GROUP (TYPE) IN BLOOD: NORMAL
ALBUMIN SERPL BCP-MCNC: 3.7 G/DL (ref 3.4–5)
ANION GAP BLDA CALCULATED.4IONS-SCNC: 5 MMO/L (ref 10–25)
ANION GAP BLDA CALCULATED.4IONS-SCNC: 6 MMO/L (ref 10–25)
ANION GAP BLDA CALCULATED.4IONS-SCNC: 7 MMO/L (ref 10–25)
ANION GAP BLDA CALCULATED.4IONS-SCNC: 7 MMO/L (ref 10–25)
ANION GAP BLDA CALCULATED.4IONS-SCNC: 8 MMO/L (ref 10–25)
ANION GAP BLDMV CALCULATED.4IONS-SCNC: 6 MMO/L (ref 10–25)
ANION GAP SERPL CALC-SCNC: 13 MMOL/L (ref 10–20)
ANTIBODY SCREEN: NORMAL
APTT PPP: 30 SECONDS (ref 27–38)
BASE EXCESS BLDA CALC-SCNC: 1.1 MMOL/L (ref -2–3)
BASE EXCESS BLDA CALC-SCNC: 1.8 MMOL/L (ref -2–3)
BASE EXCESS BLDA CALC-SCNC: 7 MMOL/L (ref -2–3)
BASE EXCESS BLDA CALC-SCNC: 7.3 MMOL/L (ref -2–3)
BASE EXCESS BLDA CALC-SCNC: 8.4 MMOL/L (ref -2–3)
BASE EXCESS BLDMV CALC-SCNC: 6.8 MMOL/L (ref -2–3)
BASOPHILS # BLD AUTO: 0.04 X10*3/UL (ref 0–0.1)
BASOPHILS NFR BLD AUTO: 0.2 %
BLOOD EXPIRATION DATE: NORMAL
BODY TEMPERATURE: 37 DEGREES CELSIUS
BUN SERPL-MCNC: 18 MG/DL (ref 6–23)
C DIF TOX TCDA+TCDB STL QL NAA+PROBE: NOT DETECTED
CA-I BLD-SCNC: 1.11 MMOL/L (ref 1.1–1.33)
CA-I BLDA-SCNC: 0.95 MMOL/L (ref 1.1–1.33)
CA-I BLDA-SCNC: 1 MMOL/L (ref 1.1–1.33)
CA-I BLDA-SCNC: 1.1 MMOL/L (ref 1.1–1.33)
CA-I BLDA-SCNC: 1.14 MMOL/L (ref 1.1–1.33)
CA-I BLDA-SCNC: 1.15 MMOL/L (ref 1.1–1.33)
CA-I BLDMV-SCNC: 1.12 MMOL/L (ref 1.1–1.33)
CALCIUM SERPL-MCNC: 8.5 MG/DL (ref 8.6–10.6)
CHLORIDE BLD-SCNC: 103 MMOL/L (ref 98–107)
CHLORIDE BLDA-SCNC: 101 MMOL/L (ref 98–107)
CHLORIDE BLDA-SCNC: 102 MMOL/L (ref 98–107)
CHLORIDE BLDA-SCNC: 102 MMOL/L (ref 98–107)
CHLORIDE BLDA-SCNC: 110 MMOL/L (ref 98–107)
CHLORIDE BLDA-SCNC: 111 MMOL/L (ref 98–107)
CHLORIDE SERPL-SCNC: 101 MMOL/L (ref 98–107)
CO2 SERPL-SCNC: 32 MMOL/L (ref 21–32)
CREAT SERPL-MCNC: 0.28 MG/DL (ref 0.5–1.05)
DISPENSE STATUS: NORMAL
EGFRCR SERPLBLD CKD-EPI 2021: >90 ML/MIN/1.73M*2
EOSINOPHIL # BLD AUTO: 0.03 X10*3/UL (ref 0–0.7)
EOSINOPHIL NFR BLD AUTO: 0.1 %
ERYTHROCYTE [DISTWIDTH] IN BLOOD BY AUTOMATED COUNT: 17 % (ref 11.5–14.5)
ERYTHROCYTE [DISTWIDTH] IN BLOOD BY AUTOMATED COUNT: 17 % (ref 11.5–14.5)
GLUCOSE BLD MANUAL STRIP-MCNC: 127 MG/DL (ref 74–99)
GLUCOSE BLD MANUAL STRIP-MCNC: 128 MG/DL (ref 74–99)
GLUCOSE BLD MANUAL STRIP-MCNC: 138 MG/DL (ref 74–99)
GLUCOSE BLD MANUAL STRIP-MCNC: 184 MG/DL (ref 74–99)
GLUCOSE BLD-MCNC: 121 MG/DL (ref 74–99)
GLUCOSE BLDA-MCNC: 106 MG/DL (ref 74–99)
GLUCOSE BLDA-MCNC: 116 MG/DL (ref 74–99)
GLUCOSE BLDA-MCNC: 131 MG/DL (ref 74–99)
GLUCOSE BLDA-MCNC: 144 MG/DL (ref 74–99)
GLUCOSE BLDA-MCNC: 180 MG/DL (ref 74–99)
GLUCOSE SERPL-MCNC: 128 MG/DL (ref 74–99)
HCO3 BLDA-SCNC: 25 MMOL/L (ref 22–26)
HCO3 BLDA-SCNC: 25.7 MMOL/L (ref 22–26)
HCO3 BLDA-SCNC: 32 MMOL/L (ref 22–26)
HCO3 BLDA-SCNC: 32.6 MMOL/L (ref 22–26)
HCO3 BLDA-SCNC: 33.4 MMOL/L (ref 22–26)
HCO3 BLDMV-SCNC: 32.4 MMOL/L (ref 22–26)
HCT VFR BLD AUTO: 33.6 % (ref 36–46)
HCT VFR BLD AUTO: 33.6 % (ref 36–46)
HCT VFR BLD EST: 32 % (ref 36–46)
HCT VFR BLD EST: 34 % (ref 36–46)
HCT VFR BLD EST: 35 % (ref 36–46)
HCT VFR BLD EST: 36 % (ref 36–46)
HCT VFR BLD EST: 36 % (ref 36–46)
HCT VFR BLD EST: 37 % (ref 36–46)
HGB BLD-MCNC: 11.3 G/DL (ref 12–16)
HGB BLD-MCNC: 11.3 G/DL (ref 12–16)
HGB BLDA-MCNC: 10.5 G/DL (ref 12–16)
HGB BLDA-MCNC: 11.6 G/DL (ref 12–16)
HGB BLDA-MCNC: 11.9 G/DL (ref 12–16)
HGB BLDA-MCNC: 12.1 G/DL (ref 12–16)
HGB BLDA-MCNC: 12.2 G/DL (ref 12–16)
HGB BLDMV-MCNC: 11.2 G/DL (ref 12–16)
IMM GRANULOCYTES # BLD AUTO: 0.37 X10*3/UL (ref 0–0.7)
IMM GRANULOCYTES NFR BLD AUTO: 1.7 % (ref 0–0.9)
INHALED O2 CONCENTRATION: 40 %
INR PPP: 1.3 (ref 0.9–1.1)
LACTATE BLDA-SCNC: 0.8 MMOL/L (ref 0.4–2)
LACTATE BLDA-SCNC: 0.8 MMOL/L (ref 0.4–2)
LACTATE BLDA-SCNC: 1.2 MMOL/L (ref 0.4–2)
LACTATE BLDA-SCNC: 1.2 MMOL/L (ref 0.4–2)
LACTATE BLDA-SCNC: 1.3 MMOL/L (ref 0.4–2)
LACTATE BLDMV-SCNC: 1 MMOL/L (ref 0.4–2)
LYMPHOCYTES # BLD AUTO: 0.82 X10*3/UL (ref 1.2–4.8)
LYMPHOCYTES NFR BLD AUTO: 3.7 %
MAGNESIUM SERPL-MCNC: 2.23 MG/DL (ref 1.6–2.4)
MCH RBC QN AUTO: 28.1 PG (ref 26–34)
MCH RBC QN AUTO: 28.1 PG (ref 26–34)
MCHC RBC AUTO-ENTMCNC: 33.6 G/DL (ref 32–36)
MCHC RBC AUTO-ENTMCNC: 33.6 G/DL (ref 32–36)
MCV RBC AUTO: 84 FL (ref 80–100)
MCV RBC AUTO: 84 FL (ref 80–100)
MONOCYTES # BLD AUTO: 0.8 X10*3/UL (ref 0.1–1)
MONOCYTES NFR BLD AUTO: 3.6 %
NEUTROPHILS # BLD AUTO: 20.36 X10*3/UL (ref 1.2–7.7)
NEUTROPHILS NFR BLD AUTO: 90.7 %
NRBC BLD-RTO: 0 /100 WBCS (ref 0–0)
NRBC BLD-RTO: 0 /100 WBCS (ref 0–0)
OXYHGB MFR BLDA: 91.8 % (ref 94–98)
OXYHGB MFR BLDA: 92.1 % (ref 94–98)
OXYHGB MFR BLDA: 92.8 % (ref 94–98)
OXYHGB MFR BLDA: 93 % (ref 94–98)
OXYHGB MFR BLDA: 95.9 % (ref 94–98)
OXYHGB MFR BLDMV: 65 % (ref 45–75)
PCO2 BLDA: 36 MM HG (ref 38–42)
PCO2 BLDA: 37 MM HG (ref 38–42)
PCO2 BLDA: 46 MM HG (ref 38–42)
PCO2 BLDA: 47 MM HG (ref 38–42)
PCO2 BLDA: 48 MM HG (ref 38–42)
PCO2 BLDMV: 50 MM HG (ref 41–51)
PH BLDA: 7.44 PH (ref 7.38–7.42)
PH BLDA: 7.45 PH (ref 7.38–7.42)
PH BLDA: 7.46 PH (ref 7.38–7.42)
PH BLDMV: 7.42 PH (ref 7.33–7.43)
PHOSPHATE SERPL-MCNC: 2.2 MG/DL (ref 2.5–4.9)
PLATELET # BLD AUTO: 263 X10*3/UL (ref 150–450)
PLATELET # BLD AUTO: 263 X10*3/UL (ref 150–450)
PO2 BLDA: 67 MM HG (ref 85–95)
PO2 BLDA: 69 MM HG (ref 85–95)
PO2 BLDA: 70 MM HG (ref 85–95)
PO2 BLDA: 71 MM HG (ref 85–95)
PO2 BLDA: 80 MM HG (ref 85–95)
PO2 BLDMV: 38 MM HG (ref 35–45)
POTASSIUM BLDA-SCNC: 3 MMOL/L (ref 3.5–5.3)
POTASSIUM BLDA-SCNC: 3.1 MMOL/L (ref 3.5–5.3)
POTASSIUM BLDA-SCNC: 4.4 MMOL/L (ref 3.5–5.3)
POTASSIUM BLDA-SCNC: 4.6 MMOL/L (ref 3.5–5.3)
POTASSIUM BLDA-SCNC: 4.7 MMOL/L (ref 3.5–5.3)
POTASSIUM BLDMV-SCNC: 4.5 MMOL/L (ref 3.5–5.3)
POTASSIUM SERPL-SCNC: 4.7 MMOL/L (ref 3.5–5.3)
PRODUCT BLOOD TYPE: 7300
PRODUCT CODE: NORMAL
PROTHROMBIN TIME: 14.9 SECONDS (ref 9.8–12.8)
RBC # BLD AUTO: 4.02 X10*6/UL (ref 4–5.2)
RBC # BLD AUTO: 4.02 X10*6/UL (ref 4–5.2)
RH FACTOR (ANTIGEN D): NORMAL
SAO2 % BLDA: 100 % (ref 94–100)
SAO2 % BLDA: 95 % (ref 94–100)
SAO2 % BLDA: 95 % (ref 94–100)
SAO2 % BLDA: 96 % (ref 94–100)
SAO2 % BLDA: 96 % (ref 94–100)
SAO2 % BLDMV: 67 % (ref 45–75)
SODIUM BLDA-SCNC: 135 MMOL/L (ref 136–145)
SODIUM BLDA-SCNC: 136 MMOL/L (ref 136–145)
SODIUM BLDA-SCNC: 137 MMOL/L (ref 136–145)
SODIUM BLDA-SCNC: 139 MMOL/L (ref 136–145)
SODIUM BLDA-SCNC: 141 MMOL/L (ref 136–145)
SODIUM BLDMV-SCNC: 137 MMOL/L (ref 136–145)
SODIUM SERPL-SCNC: 141 MMOL/L (ref 136–145)
UNIT ABO: NORMAL
UNIT NUMBER: NORMAL
UNIT RH: NORMAL
UNIT VOLUME: 350
WBC # BLD AUTO: 22.8 X10*3/UL (ref 4.4–11.3)
WBC # BLD AUTO: 22.8 X10*3/UL (ref 4.4–11.3)
XM INTEP: NORMAL

## 2025-01-26 PROCEDURE — 37799 UNLISTED PX VASCULAR SURGERY: CPT

## 2025-01-26 PROCEDURE — 82947 ASSAY GLUCOSE BLOOD QUANT: CPT

## 2025-01-26 PROCEDURE — 2500000004 HC RX 250 GENERAL PHARMACY W/ HCPCS (ALT 636 FOR OP/ED): Performed by: NURSE PRACTITIONER

## 2025-01-26 PROCEDURE — 2500000002 HC RX 250 W HCPCS SELF ADMINISTERED DRUGS (ALT 637 FOR MEDICARE OP, ALT 636 FOR OP/ED): Performed by: NURSE PRACTITIONER

## 2025-01-26 PROCEDURE — 2500000001 HC RX 250 WO HCPCS SELF ADMINISTERED DRUGS (ALT 637 FOR MEDICARE OP): Performed by: NURSE PRACTITIONER

## 2025-01-26 PROCEDURE — 2500000005 HC RX 250 GENERAL PHARMACY W/O HCPCS: Performed by: NURSE PRACTITIONER

## 2025-01-26 PROCEDURE — 85730 THROMBOPLASTIN TIME PARTIAL: CPT | Performed by: NURSE PRACTITIONER

## 2025-01-26 PROCEDURE — 71045 X-RAY EXAM CHEST 1 VIEW: CPT | Performed by: RADIOLOGY

## 2025-01-26 PROCEDURE — 84132 ASSAY OF SERUM POTASSIUM: CPT | Performed by: NURSE PRACTITIONER

## 2025-01-26 PROCEDURE — 2020000001 HC ICU ROOM DAILY

## 2025-01-26 PROCEDURE — 2500000004 HC RX 250 GENERAL PHARMACY W/ HCPCS (ALT 636 FOR OP/ED)

## 2025-01-26 PROCEDURE — 99292 CRITICAL CARE ADDL 30 MIN: CPT | Performed by: ANESTHESIOLOGY

## 2025-01-26 PROCEDURE — 99233 SBSQ HOSP IP/OBS HIGH 50: CPT | Performed by: NURSE PRACTITIONER

## 2025-01-26 PROCEDURE — 83605 ASSAY OF LACTIC ACID: CPT | Performed by: NURSE PRACTITIONER

## 2025-01-26 PROCEDURE — 99231 SBSQ HOSP IP/OBS SF/LOW 25: CPT | Performed by: NURSE PRACTITIONER

## 2025-01-26 PROCEDURE — 2500000005 HC RX 250 GENERAL PHARMACY W/O HCPCS

## 2025-01-26 PROCEDURE — 82330 ASSAY OF CALCIUM: CPT | Performed by: NURSE PRACTITIONER

## 2025-01-26 PROCEDURE — 83735 ASSAY OF MAGNESIUM: CPT | Performed by: NURSE PRACTITIONER

## 2025-01-26 PROCEDURE — 71045 X-RAY EXAM CHEST 1 VIEW: CPT

## 2025-01-26 PROCEDURE — 85610 PROTHROMBIN TIME: CPT | Performed by: NURSE PRACTITIONER

## 2025-01-26 PROCEDURE — 82435 ASSAY OF BLOOD CHLORIDE: CPT

## 2025-01-26 PROCEDURE — 94640 AIRWAY INHALATION TREATMENT: CPT

## 2025-01-26 PROCEDURE — 85025 COMPLETE CBC W/AUTO DIFF WBC: CPT | Performed by: NURSE PRACTITIONER

## 2025-01-26 PROCEDURE — 2500000001 HC RX 250 WO HCPCS SELF ADMINISTERED DRUGS (ALT 637 FOR MEDICARE OP)

## 2025-01-26 PROCEDURE — 94003 VENT MGMT INPAT SUBQ DAY: CPT

## 2025-01-26 PROCEDURE — 37799 UNLISTED PX VASCULAR SURGERY: CPT | Performed by: NURSE PRACTITIONER

## 2025-01-26 PROCEDURE — 84132 ASSAY OF SERUM POTASSIUM: CPT

## 2025-01-26 PROCEDURE — 2500000004 HC RX 250 GENERAL PHARMACY W/ HCPCS (ALT 636 FOR OP/ED): Performed by: PHYSICIAN ASSISTANT

## 2025-01-26 PROCEDURE — 85027 COMPLETE CBC AUTOMATED: CPT | Performed by: NURSE PRACTITIONER

## 2025-01-26 PROCEDURE — 2500000002 HC RX 250 W HCPCS SELF ADMINISTERED DRUGS (ALT 637 FOR MEDICARE OP, ALT 636 FOR OP/ED): Performed by: STUDENT IN AN ORGANIZED HEALTH CARE EDUCATION/TRAINING PROGRAM

## 2025-01-26 PROCEDURE — 86900 BLOOD TYPING SEROLOGIC ABO: CPT

## 2025-01-26 PROCEDURE — 2500000005 HC RX 250 GENERAL PHARMACY W/O HCPCS: Performed by: PHYSICIAN ASSISTANT

## 2025-01-26 RX ORDER — METOPROLOL TARTRATE 1 MG/ML
INJECTION, SOLUTION INTRAVENOUS
Status: COMPLETED
Start: 2025-01-26 | End: 2025-01-26

## 2025-01-26 RX ORDER — METOPROLOL TARTRATE 1 MG/ML
5 INJECTION, SOLUTION INTRAVENOUS ONCE
Status: COMPLETED | OUTPATIENT
Start: 2025-01-26 | End: 2025-01-26

## 2025-01-26 RX ORDER — OXYCODONE HCL 5 MG/5 ML
5 SOLUTION, ORAL ORAL EVERY 6 HOURS
Status: DISCONTINUED | OUTPATIENT
Start: 2025-01-26 | End: 2025-01-29

## 2025-01-26 RX ADMIN — OXYCODONE HYDROCHLORIDE 10 MG: 5 SOLUTION ORAL at 16:37

## 2025-01-26 RX ADMIN — MIDODRINE HYDROCHLORIDE 10 MG: 10 TABLET ORAL at 15:00

## 2025-01-26 RX ADMIN — Medication 40 PERCENT: at 08:00

## 2025-01-26 RX ADMIN — DEXAMETHASONE SODIUM PHOSPHATE 6 MG: 10 INJECTION, SOLUTION INTRAMUSCULAR; INTRAVENOUS at 10:24

## 2025-01-26 RX ADMIN — BUDESONIDE 0.5 MG: 0.5 INHALANT RESPIRATORY (INHALATION) at 08:29

## 2025-01-26 RX ADMIN — Medication 40 PERCENT: at 19:56

## 2025-01-26 RX ADMIN — OXYCODONE HYDROCHLORIDE 5 MG: 5 SOLUTION ORAL at 21:07

## 2025-01-26 RX ADMIN — MIDODRINE HYDROCHLORIDE 10 MG: 10 TABLET ORAL at 23:46

## 2025-01-26 RX ADMIN — IPRATROPIUM BROMIDE AND ALBUTEROL SULFATE 3 ML: .5; 3 SOLUTION RESPIRATORY (INHALATION) at 04:24

## 2025-01-26 RX ADMIN — METOPROLOL TARTRATE 5 MG: 1 INJECTION, SOLUTION INTRAVENOUS at 21:49

## 2025-01-26 RX ADMIN — CLONAZEPAM 0.5 MG: 0.5 TABLET ORAL at 09:19

## 2025-01-26 RX ADMIN — BUDESONIDE 0.5 MG: 0.5 INHALANT RESPIRATORY (INHALATION) at 20:37

## 2025-01-26 RX ADMIN — DEXMEDETOMIDINE HYDROCHLORIDE 0.7 MCG/KG/HR: 400 INJECTION INTRAVENOUS at 09:19

## 2025-01-26 RX ADMIN — TRAZODONE HYDROCHLORIDE 25 MG: 50 TABLET ORAL at 21:07

## 2025-01-26 RX ADMIN — INSULIN LISPRO 1 UNITS: 100 INJECTION, SOLUTION INTRAVENOUS; SUBCUTANEOUS at 16:37

## 2025-01-26 RX ADMIN — IPRATROPIUM BROMIDE AND ALBUTEROL SULFATE 3 ML: .5; 3 SOLUTION RESPIRATORY (INHALATION) at 08:29

## 2025-01-26 RX ADMIN — OXYCODONE HYDROCHLORIDE 10 MG: 5 SOLUTION ORAL at 09:18

## 2025-01-26 RX ADMIN — CLONAZEPAM 0.5 MG: 0.5 TABLET ORAL at 16:37

## 2025-01-26 RX ADMIN — POTASSIUM PHOSPHATE, MONOBASIC AND POTASSIUM PHOSPHATE, DIBASIC 15 MMOL: 224; 236 INJECTION, SOLUTION, CONCENTRATE INTRAVENOUS at 10:29

## 2025-01-26 RX ADMIN — HEPARIN SODIUM 5000 UNITS: 5000 INJECTION INTRAVENOUS; SUBCUTANEOUS at 21:07

## 2025-01-26 RX ADMIN — PANTOPRAZOLE SODIUM 40 MG: 40 INJECTION, POWDER, FOR SOLUTION INTRAVENOUS at 09:18

## 2025-01-26 RX ADMIN — OXYCODONE HYDROCHLORIDE 10 MG: 5 SOLUTION ORAL at 04:21

## 2025-01-26 RX ADMIN — HEPARIN SODIUM 5000 UNITS: 5000 INJECTION INTRAVENOUS; SUBCUTANEOUS at 05:15

## 2025-01-26 RX ADMIN — HEPARIN SODIUM 5000 UNITS: 5000 INJECTION INTRAVENOUS; SUBCUTANEOUS at 15:00

## 2025-01-26 RX ADMIN — MIDODRINE HYDROCHLORIDE 10 MG: 10 TABLET ORAL at 06:35

## 2025-01-26 RX ADMIN — CLONAZEPAM 0.5 MG: 0.5 TABLET ORAL at 23:45

## 2025-01-26 RX ADMIN — LIDOCAINE 4% 1 PATCH: 40 PATCH TOPICAL at 09:20

## 2025-01-26 RX ADMIN — POTASSIUM & SODIUM PHOSPHATES POWDER PACK 280-160-250 MG 1 PACKET: 280-160-250 PACK at 02:47

## 2025-01-26 RX ADMIN — IPRATROPIUM BROMIDE AND ALBUTEROL SULFATE 3 ML: .5; 3 SOLUTION RESPIRATORY (INHALATION) at 15:37

## 2025-01-26 RX ADMIN — THIAMINE HYDROCHLORIDE 100 MG: 100 INJECTION, SOLUTION INTRAMUSCULAR; INTRAVENOUS at 09:18

## 2025-01-26 RX ADMIN — IPRATROPIUM BROMIDE AND ALBUTEROL SULFATE 3 ML: .5; 3 SOLUTION RESPIRATORY (INHALATION) at 20:36

## 2025-01-26 ASSESSMENT — PAIN - FUNCTIONAL ASSESSMENT
PAIN_FUNCTIONAL_ASSESSMENT: 0-10
PAIN_FUNCTIONAL_ASSESSMENT: CPOT (CRITICAL CARE PAIN OBSERVATION TOOL)
PAIN_FUNCTIONAL_ASSESSMENT: CPOT (CRITICAL CARE PAIN OBSERVATION TOOL)

## 2025-01-26 ASSESSMENT — PAIN SCALES - GENERAL: PAINLEVEL_OUTOF10: 0 - NO PAIN

## 2025-01-26 NOTE — PROGRESS NOTES
"Faustina Vick \"Noam" is a 64 y.o. female on day 13 of admission presenting with Primary cancer of right upper lobe of lung (Multi).    Subjective   -Episode of tachycardia in the 170s that improved with Metoprolol 5 mg IV.     Objective     Physical Exam  Vitals reviewed.   Constitutional:       Appearance: She is underweight.      Interventions: She is sedated, intubated and restrained.   HENT:      Head: Normocephalic and atraumatic.      Nose: Nose normal.      Comments: Bridled Dobhoff tube. L nare 78cm depth     Mouth/Throat:      Mouth: Mucous membranes are moist.   Eyes:      Extraocular Movements: Extraocular movements intact.      Pupils: Pupils are equal, round, and reactive to light.   Neck:      Comments: RIJ MAC with PA cath in place, 58cm depth. dressing c/d/i.   Cardiovascular:      Rate and Rhythm: Normal rate and regular rhythm.      Pulses: Normal pulses.      Heart sounds: Normal heart sounds.   Pulmonary:      Effort: She is intubated.      Breath sounds: Rhonchi (left side) present.      Comments: Mechanically ventilated via ETT  Chest:      Comments: Right lateral chest dressing c/d/i.  Abdominal:      General: Bowel sounds are normal. There is no distension.      Palpations: Abdomen is soft.      Comments: Enteral feeding tube in place, tube feeds running at 20ml/hr   Genitourinary:     Comments: Montiel in place with clear yellow UOP.   Musculoskeletal:         General: No swelling or tenderness. Normal range of motion.      Cervical back: Neck supple.      Comments: LUE edema   Skin:     General: Skin is warm and dry.   Neurological:      General: No focal deficit present.      Mental Status: She is easily aroused.      GCS: GCS eye subscore is 3. GCS verbal subscore is 1. GCS motor subscore is 6.      Motor: Weakness (generalized) present.      Comments: Awakens to name, follows commands, squeezes hands on command     Last Recorded Vitals  Blood pressure 132/71, pulse 110, temperature 36.4 " "°C (97.5 °F), temperature source Temporal, resp. rate 25, height 1.651 m (5' 5\"), weight 58.7 kg (129 lb 6.6 oz), SpO2 93%.    VS over last 24h  Heart Rate:  []   Temp:  [36 °C (96.8 °F)-36.7 °C (98.1 °F)]   Resp:  [17-36]   Weight:  [58.7 kg (129 lb 6.6 oz)]   SpO2:  [89 %-100 %]      Intake/Output last 3 Shifts:  I/O last 3 completed shifts:  In: 2711.6 (46.9 mL/kg) [I.V.:591.6 (10.2 mL/kg); NG/GT:1620; IV Piggyback:500]  Out: 4993 (86.4 mL/kg) [Urine:4993 (2.4 mL/kg/hr)]  Weight: 57.8 kg       Intake/Output Summary (Last 24 hours) at 1/26/2025 0642  Last data filed at 1/26/2025 0600  Gross per 24 hour   Intake 1555.74 ml   Output 2338 ml   Net -782.26 ml        Relevant Results  Scheduled medications  budesonide, 0.5 mg, nebulization, BID  clonazePAM, 0.5 mg, oral, q8h  dexAMETHasone, 6 mg, intravenous, q24h  heparin (porcine), 5,000 Units, subcutaneous, q8h  insulin lispro, 0-5 Units, subcutaneous, q4h  ipratropium-albuteroL, 3 mL, nebulization, q6h  lidocaine, 1 patch, transdermal, Daily  midodrine, 10 mg, nasogastric tube, q8h  oxyCODONE, 10 mg, nasogastric tube, q6h  oxygen, , inhalation, Continuous - Inhalation  pantoprazole, 40 mg, intravenous, Daily  thiamine, 100 mg, intravenous, Daily  traZODone, 25 mg, oral, Nightly      Continuous medications  dexmedeTOMIDine, 0.1-1.5 mcg/kg/hr, Last Rate: 0.7 mcg/kg/hr (01/25/25 2143)        PRN medications  PRN medications: albuterol, calcium gluconate, calcium gluconate, dextrose, dextrose, glucagon, glucagon, magnesium sulfate, magnesium sulfate, oxygen, potassium chloride, potassium chloride      Results for orders placed or performed during the hospital encounter of 01/13/25 (from the past 24 hours)   BLOOD GAS MIXED VENOUS FULL PANEL   Result Value Ref Range    POCT pH, Mixed 7.37 7.33 - 7.43 pH    POCT pCO2, Mixed 47 41 - 51 mm Hg    POCT pO2, Mixed 44 35 - 45 mm Hg    POCT SO2, Mixed 78 (H) 45 - 75 %    POCT Oxy Hemoglobin, Mixed 75.4 (H) 45.0 - 75.0 % "    POCT Hematocrit Calculated, Mixed 30.0 (L) 36.0 - 46.0 %    POCT Sodium, Mixed 140 136 - 145 mmol/L    POCT Potassium, Mixed 2.6 (LL) 3.5 - 5.3 mmol/L    POCT Chloride, Mixed 107 98 - 107 mmol/L    POCT Ionized Calcium, Mixed 1.00 (L) 1.10 - 1.33 mmol/L    POCT Glucose, Mixed 103 (H) 74 - 99 mg/dL    POCT Lactate, Mixed 0.8 0.4 - 2.0 mmol/L    POCT Base Excess, Mixed 1.5 -2.0 - 3.0 mmol/L    POCT HCO3 Calculated, Mixed 27.2 (H) 22.0 - 26.0 mmol/L    POCT Hemoglobin, Mixed 10.0 (L) 12.0 - 16.0 g/dL    POCT Anion Gap, Mixed 8 (L) 10 - 25 mmo/L    Patient Temperature 37.0 degrees Celsius    FiO2 40 %   Blood Gas Arterial Full Panel   Result Value Ref Range    POCT pH, Arterial 7.40 7.38 - 7.42 pH    POCT pCO2, Arterial 54 (H) 38 - 42 mm Hg    POCT pO2, Arterial 89 85 - 95 mm Hg    POCT SO2, Arterial 98 94 - 100 %    POCT Oxy Hemoglobin, Arterial 94.8 94.0 - 98.0 %    POCT Hematocrit Calculated, Arterial 34.0 (L) 36.0 - 46.0 %    POCT Sodium, Arterial 136 136 - 145 mmol/L    POCT Potassium, Arterial 3.3 (L) 3.5 - 5.3 mmol/L    POCT Chloride, Arterial 99 98 - 107 mmol/L    POCT Ionized Calcium, Arterial 1.11 1.10 - 1.33 mmol/L    POCT Glucose, Arterial 120 (H) 74 - 99 mg/dL    POCT Lactate, Arterial 1.1 0.4 - 2.0 mmol/L    POCT Base Excess, Arterial 7.2 (H) -2.0 - 3.0 mmol/L    POCT HCO3 Calculated, Arterial 33.4 (H) 22.0 - 26.0 mmol/L    POCT Hemoglobin, Arterial 11.4 (L) 12.0 - 16.0 g/dL    POCT Anion Gap, Arterial 7 (L) 10 - 25 mmo/L    Patient Temperature 37.0 degrees Celsius    FiO2 40 %   Blood Gas Arterial Full Panel   Result Value Ref Range    POCT pH, Arterial 7.42 7.38 - 7.42 pH    POCT pCO2, Arterial 50 (H) 38 - 42 mm Hg    POCT pO2, Arterial 63 (L) 85 - 95 mm Hg    POCT SO2, Arterial 93 (L) 94 - 100 %    POCT Oxy Hemoglobin, Arterial 90.1 (L) 94.0 - 98.0 %    POCT Hematocrit Calculated, Arterial 35.0 (L) 36.0 - 46.0 %    POCT Sodium, Arterial 136 136 - 145 mmol/L    POCT Potassium, Arterial 4.3 3.5 - 5.3  mmol/L    POCT Chloride, Arterial 101 98 - 107 mmol/L    POCT Ionized Calcium, Arterial 1.11 1.10 - 1.33 mmol/L    POCT Glucose, Arterial 153 (H) 74 - 99 mg/dL    POCT Lactate, Arterial 1.0 0.4 - 2.0 mmol/L    POCT Base Excess, Arterial 6.8 (H) -2.0 - 3.0 mmol/L    POCT HCO3 Calculated, Arterial 32.4 (H) 22.0 - 26.0 mmol/L    POCT Hemoglobin, Arterial 11.7 (L) 12.0 - 16.0 g/dL    POCT Anion Gap, Arterial 7 (L) 10 - 25 mmo/L    Patient Temperature 37.0 degrees Celsius    FiO2 40 %   BLOOD GAS MIXED VENOUS FULL PANEL   Result Value Ref Range    POCT pH, Mixed 7.39 7.33 - 7.43 pH    POCT pCO2, Mixed 47 41 - 51 mm Hg    POCT pO2, Mixed 38 35 - 45 mm Hg    POCT SO2, Mixed 70 45 - 75 %    POCT Oxy Hemoglobin, Mixed 67.8 45.0 - 75.0 %    POCT Hematocrit Calculated, Mixed 32.0 (L) 36.0 - 46.0 %    POCT Sodium, Mixed 138 136 - 145 mmol/L    POCT Potassium, Mixed 3.6 3.5 - 5.3 mmol/L    POCT Chloride, Mixed 106 98 - 107 mmol/L    POCT Ionized Calcium, Mixed 1.05 (L) 1.10 - 1.33 mmol/L    POCT Glucose, Mixed 134 (H) 74 - 99 mg/dL    POCT Lactate, Mixed 0.8 0.4 - 2.0 mmol/L    POCT Base Excess, Mixed 3.0 -2.0 - 3.0 mmol/L    POCT HCO3 Calculated, Mixed 28.5 (H) 22.0 - 26.0 mmol/L    POCT Hemoglobin, Mixed 10.7 (L) 12.0 - 16.0 g/dL    POCT Anion Gap, Mixed 7 (L) 10 - 25 mmo/L    Patient Temperature 37.0 degrees Celsius    FiO2 40 %   Blood Gas Arterial Full Panel   Result Value Ref Range    POCT pH, Arterial 7.44 (H) 7.38 - 7.42 pH    POCT pCO2, Arterial 47 (H) 38 - 42 mm Hg    POCT pO2, Arterial 55 (L) 85 - 95 mm Hg    POCT SO2, Arterial 87 (L) 94 - 100 %    POCT Oxy Hemoglobin, Arterial 84.8 (L) 94.0 - 98.0 %    POCT Hematocrit Calculated, Arterial 37.0 36.0 - 46.0 %    POCT Sodium, Arterial 135 (L) 136 - 145 mmol/L    POCT Potassium, Arterial 4.6 3.5 - 5.3 mmol/L    POCT Chloride, Arterial 101 98 - 107 mmol/L    POCT Ionized Calcium, Arterial 1.14 1.10 - 1.33 mmol/L    POCT Glucose, Arterial 145 (H) 74 - 99 mg/dL    POCT  Lactate, Arterial 1.2 0.4 - 2.0 mmol/L    POCT Base Excess, Arterial 6.7 (H) -2.0 - 3.0 mmol/L    POCT HCO3 Calculated, Arterial 31.9 (H) 22.0 - 26.0 mmol/L    POCT Hemoglobin, Arterial 12.4 12.0 - 16.0 g/dL    POCT Anion Gap, Arterial 7 (L) 10 - 25 mmo/L    Patient Temperature 37.0 degrees Celsius    FiO2 40 %   BLOOD GAS MIXED VENOUS FULL PANEL   Result Value Ref Range    POCT pH, Mixed 7.39 7.33 - 7.43 pH    POCT pCO2, Mixed 53 (H) 41 - 51 mm Hg    POCT pO2, Mixed 31 (L) 35 - 45 mm Hg    POCT SO2, Mixed 48 45 - 75 %    POCT Oxy Hemoglobin, Mixed 46.7 45.0 - 75.0 %    POCT Hematocrit Calculated, Mixed 38.0 36.0 - 46.0 %    POCT Sodium, Mixed 137 136 - 145 mmol/L    POCT Potassium, Mixed 4.2 3.5 - 5.3 mmol/L    POCT Chloride, Mixed 102 98 - 107 mmol/L    POCT Ionized Calcium, Mixed 1.09 (L) 1.10 - 1.33 mmol/L    POCT Glucose, Mixed 140 (H) 74 - 99 mg/dL    POCT Lactate, Mixed 1.0 0.4 - 2.0 mmol/L    POCT Base Excess, Mixed 5.8 (H) -2.0 - 3.0 mmol/L    POCT HCO3 Calculated, Mixed 32.1 (H) 22.0 - 26.0 mmol/L    POCT Hemoglobin, Mixed 12.7 12.0 - 16.0 g/dL    POCT Anion Gap, Mixed 7 (L) 10 - 25 mmo/L    Patient Temperature 37.0 degrees Celsius    FiO2 40 %   Renal function panel   Result Value Ref Range    Glucose 145 (H) 74 - 99 mg/dL    Sodium 141 136 - 145 mmol/L    Potassium 4.8 3.5 - 5.3 mmol/L    Chloride 100 98 - 107 mmol/L    Bicarbonate 32 21 - 32 mmol/L    Anion Gap 14 10 - 20 mmol/L    Urea Nitrogen 19 6 - 23 mg/dL    Creatinine 0.34 (L) 0.50 - 1.05 mg/dL    eGFR >90 >60 mL/min/1.73m*2    Calcium 8.8 8.6 - 10.6 mg/dL    Phosphorus 2.1 (L) 2.5 - 4.9 mg/dL    Albumin 4.1 3.4 - 5.0 g/dL   Magnesium   Result Value Ref Range    Magnesium 2.44 (H) 1.60 - 2.40 mg/dL   POCT GLUCOSE   Result Value Ref Range    POCT Glucose 136 (H) 74 - 99 mg/dL   Blood Gas Arterial Full Panel   Result Value Ref Range    POCT pH, Arterial 7.45 (H) 7.38 - 7.42 pH    POCT pCO2, Arterial 47 (H) 38 - 42 mm Hg    POCT pO2, Arterial 71 (L)  85 - 95 mm Hg    POCT SO2, Arterial 96 94 - 100 %    POCT Oxy Hemoglobin, Arterial 92.9 (L) 94.0 - 98.0 %    POCT Hematocrit Calculated, Arterial 36.0 36.0 - 46.0 %    POCT Sodium, Arterial 136 136 - 145 mmol/L    POCT Potassium, Arterial 4.5 3.5 - 5.3 mmol/L    POCT Chloride, Arterial 102 98 - 107 mmol/L    POCT Ionized Calcium, Arterial 1.17 1.10 - 1.33 mmol/L    POCT Glucose, Arterial 134 (H) 74 - 99 mg/dL    POCT Lactate, Arterial 1.0 0.4 - 2.0 mmol/L    POCT Base Excess, Arterial 7.6 (H) -2.0 - 3.0 mmol/L    POCT HCO3 Calculated, Arterial 32.7 (H) 22.0 - 26.0 mmol/L    POCT Hemoglobin, Arterial 12.0 12.0 - 16.0 g/dL    POCT Anion Gap, Arterial 6 (L) 10 - 25 mmo/L    Patient Temperature 37.0 degrees Celsius    FiO2 40 %   BLOOD GAS MIXED VENOUS FULL PANEL   Result Value Ref Range    POCT pH, Mixed 7.40 7.33 - 7.43 pH    POCT pCO2, Mixed 53 (H) 41 - 51 mm Hg    POCT pO2, Mixed 36 35 - 45 mm Hg    POCT SO2, Mixed 63 45 - 75 %    POCT Oxy Hemoglobin, Mixed 60.9 45.0 - 75.0 %    POCT Hematocrit Calculated, Mixed 33.0 (L) 36.0 - 46.0 %    POCT Sodium, Mixed 138 136 - 145 mmol/L    POCT Potassium, Mixed 4.3 3.5 - 5.3 mmol/L    POCT Chloride, Mixed 103 98 - 107 mmol/L    POCT Ionized Calcium, Mixed 1.11 1.10 - 1.33 mmol/L    POCT Glucose, Mixed 122 (H) 74 - 99 mg/dL    POCT Lactate, Mixed 0.9 0.4 - 2.0 mmol/L    POCT Base Excess, Mixed 6.8 (H) -2.0 - 3.0 mmol/L    POCT HCO3 Calculated, Mixed 32.8 (H) 22.0 - 26.0 mmol/L    POCT Hemoglobin, Mixed 11.1 (L) 12.0 - 16.0 g/dL    POCT Anion Gap, Mixed 7 (L) 10 - 25 mmo/L    Patient Temperature 37.0 degrees Celsius    FiO2 40 %   POCT GLUCOSE   Result Value Ref Range    POCT Glucose 110 (H) 74 - 99 mg/dL   POCT GLUCOSE   Result Value Ref Range    POCT Glucose 128 (H) 74 - 99 mg/dL   Blood Gas Arterial Full Panel   Result Value Ref Range    POCT pH, Arterial 7.46 (H) 7.38 - 7.42 pH    POCT pCO2, Arterial 47 (H) 38 - 42 mm Hg    POCT pO2, Arterial 69 (L) 85 - 95 mm Hg    POCT  SO2, Arterial 95 94 - 100 %    POCT Oxy Hemoglobin, Arterial 92.1 (L) 94.0 - 98.0 %    POCT Hematocrit Calculated, Arterial 36.0 36.0 - 46.0 %    POCT Sodium, Arterial 135 (L) 136 - 145 mmol/L    POCT Potassium, Arterial 4.7 3.5 - 5.3 mmol/L    POCT Chloride, Arterial 101 98 - 107 mmol/L    POCT Ionized Calcium, Arterial 1.15 1.10 - 1.33 mmol/L    POCT Glucose, Arterial 131 (H) 74 - 99 mg/dL    POCT Lactate, Arterial 1.2 0.4 - 2.0 mmol/L    POCT Base Excess, Arterial 8.4 (H) -2.0 - 3.0 mmol/L    POCT HCO3 Calculated, Arterial 33.4 (H) 22.0 - 26.0 mmol/L    POCT Hemoglobin, Arterial 12.1 12.0 - 16.0 g/dL    POCT Anion Gap, Arterial 5 (L) 10 - 25 mmo/L    Patient Temperature 37.0 degrees Celsius    FiO2 40 %   BLOOD GAS MIXED VENOUS FULL PANEL   Result Value Ref Range    POCT pH, Mixed 7.42 7.33 - 7.43 pH    POCT pCO2, Mixed 50 41 - 51 mm Hg    POCT pO2, Mixed 38 35 - 45 mm Hg    POCT SO2, Mixed 67 45 - 75 %    POCT Oxy Hemoglobin, Mixed 65.0 45.0 - 75.0 %    POCT Hematocrit Calculated, Mixed 34.0 (L) 36.0 - 46.0 %    POCT Sodium, Mixed 137 136 - 145 mmol/L    POCT Potassium, Mixed 4.5 3.5 - 5.3 mmol/L    POCT Chloride, Mixed 103 98 - 107 mmol/L    POCT Ionized Calcium, Mixed 1.12 1.10 - 1.33 mmol/L    POCT Glucose, Mixed 121 (H) 74 - 99 mg/dL    POCT Lactate, Mixed 1.0 0.4 - 2.0 mmol/L    POCT Base Excess, Mixed 6.8 (H) -2.0 - 3.0 mmol/L    POCT HCO3 Calculated, Mixed 32.4 (H) 22.0 - 26.0 mmol/L    POCT Hemoglobin, Mixed 11.2 (L) 12.0 - 16.0 g/dL    POCT Anion Gap, Mixed 6 (L) 10 - 25 mmo/L    Patient Temperature 37.0 degrees Celsius    FiO2 40 %   Calcium, Ionized   Result Value Ref Range    POCT Calcium, Ionized 1.11 1.1 - 1.33 mmol/L   CBC   Result Value Ref Range    WBC 22.8 (H) 4.4 - 11.3 x10*3/uL    nRBC 0.0 0.0 - 0.0 /100 WBCs    RBC 4.02 4.00 - 5.20 x10*6/uL    Hemoglobin 11.3 (L) 12.0 - 16.0 g/dL    Hematocrit 33.6 (L) 36.0 - 46.0 %    MCV 84 80 - 100 fL    MCH 28.1 26.0 - 34.0 pg    MCHC 33.6 32.0 - 36.0  g/dL    RDW 17.0 (H) 11.5 - 14.5 %    Platelets 263 150 - 450 x10*3/uL   Coagulation Screen   Result Value Ref Range    Protime 14.9 (H) 9.8 - 12.8 seconds    INR 1.3 (H) 0.9 - 1.1    aPTT 30 27 - 38 seconds   Magnesium   Result Value Ref Range    Magnesium 2.23 1.60 - 2.40 mg/dL   Renal function panel   Result Value Ref Range    Glucose 128 (H) 74 - 99 mg/dL    Sodium 141 136 - 145 mmol/L    Potassium 4.7 3.5 - 5.3 mmol/L    Chloride 101 98 - 107 mmol/L    Bicarbonate 32 21 - 32 mmol/L    Anion Gap 13 10 - 20 mmol/L    Urea Nitrogen 18 6 - 23 mg/dL    Creatinine 0.28 (L) 0.50 - 1.05 mg/dL    eGFR >90 >60 mL/min/1.73m*2    Calcium 8.5 (L) 8.6 - 10.6 mg/dL    Phosphorus 2.2 (L) 2.5 - 4.9 mg/dL    Albumin 3.7 3.4 - 5.0 g/dL   Type And Screen   Result Value Ref Range    ABO TYPE B     Rh TYPE POS     ANTIBODY SCREEN NEG        XR chest 1 view   Final Result   1.  Post pneumonectomy changes with continued left perihilar edema.             Signed by: Nino Barbour 1/25/2025 9:51 AM   Dictation workstation:   [id number]      XR chest 1 view   Final Result   1.  Similar left lung pulmonary edema with small left pleural   effusion.   2. Status post right pneumonectomy.   3. [place]-Paco catheter with tip projecting over the left lower lobe,   in similar position.                  MACRO:   None        Signed by: Alexx Anne 1/24/2025 10:24 AM   Dictation workstation:   [id number]      XR chest 1 view   Final Result   Addendum (preliminary) 1 of 1   Interpreted By:  Alexx Anne,    ADDENDUM:   Right IJ approach [place] Paco catheter is also noted with tip   projecting over the left lower lobe in similar position to prior exam.        Signed by: Alexx Anne 1/24/2025 10:25 AM        -------- ORIGINAL REPORT --------   Dictation workstation:   [id number]      Final   1.  Stable findings of pulmonary edema within the left lung.   2. Postsurgical changes of right pneumonectomy.   3. Medical devices as above.                   MACRO:   None        Signed by: Alexx Anne 1/24/2025 9:09 AM   Dictation workstation:   [id number]      XR chest 1 view   Final Result   1. The tip of the endotracheal tube terminates 7.5 cm of the sina.   2. Slightly decreased  interstitial prominence and airspace opacities   within the left lung when compared to prior radiograph,  suggestive   of improving infectious process or improving pulmonary edema.   3. No significant interval change in postoperative changes from   right-sided pneumonectomy, resection of multiple right-sided ribs,   complete opacification of the pneumonectomy bed, and rightward   mediastinal shift.        I personally reviewed the images/study and I agree with radiology   resident  Dr. Meghan Ramesh findings as stated. This study was   interpreted at [place], [place]        MACRO:   None        Signed by: Oseas Stroud 1/24/2025 8:09 AM   Dictation workstation:   [id number]      XR abdomen 1 view   Final Result   1.  Dobhoff tube is in place with the tip projecting over the 1st   portion of the duodenum.   2. There is gas-filled dilated stomach. A few slight prominent loops   of the bowel in the upper abdomen with overall nonobstructive bowel   gas pattern.   3. Postsurgical changes of right pneumonectomy with resection of ribs   and patchy airspace opacities in the left lung.   4. [place]-Paco catheter projecting over the lower pulmonary artery.   Recommend retraction.        MACRO:   None        Signed by: Oseas Stroud 1/23/2025 10:15 AM   Dictation workstation:   [id number]      XR chest 1 view   Final Result   1. Unchanged appearance of multifocal patchy airspace opacities with   interstitial prominence of the left lung which may be due to   infectious process. Component of edema can not be excluded.   2. Medical devices as described above with similar positioning of the   [place]-Paco catheter projecting over left lower lobe pulmonary  artery.   Recommend retraction.   3. Postsurgical changes of right-sided pneumonectomy and resection of   multiple right-sided ribs with complete opacification of the   pneumonectomy bed. The appearance is unchanged compared to prior   study.                  MACRO:   None        Signed by: Oseas Stroud 1/23/2025 9:36 AM   Dictation workstation:   [id number]      XR abdomen 1 view   Final Result   1.  As described above.        Signed by: Vick Xiao 1/22/2025 1:01 PM   Dictation workstation:   [id number]      XR chest 1 view   Final Result   1. Unchanged appearance of multifocal patchy airspace opacity with   interstitial prominence of the left lung which may be due to   infectious process. Component of edema cannot be excluded.   2. Medical devices as described above with interval advancement of   the [place]-Paco catheter projecting over left lower lobe pulmonary   artery. Recommend retraction.   3. Postsurgical changes of right-sided pneumonectomy and resection of   multiple right-sided ribs with complete opacification of the   pneumonectomy bed. The appearance is unchanged compared to prior   study.                  Signed by: Vick Xiao 1/22/2025 1:00 PM   Dictation workstation:   [id number]      XR chest 1 view   Final Result   1. Similar near-complete opacification of the right hemithorax, which   may be secondary to a large pleural effusion, pneumonectomy, or   bronchial obstruction.   2. Diffuse interstitial opacities and scattered patchy alveolar   opacities throughout the left lung, similar to the prior exam, which   may reflect prominent pulmonary edema or multifocal infection.   3. Medical devices as above.        I personally reviewed the image(s)/study and resident interpretation   as stated by Dr. Ange Sue MD. I agree with the findings as   stated. This study was interpreted at [facility], [place], [place].        MACRO:   None         Signed by: Oseas Stroud 1/21/2025 7:58 PM   Dictation workstation:   [id number]      Vascular US upper extremity venous duplex left   Final Result      Transthoracic Echo (TTE) Limited   Final Result      XR chest 1 view   Final Result   1.  Interval opacification of the right hemithorax and correlate with   post pneumonectomy changes.   2. [place]-Paco catheter overlies the left lower lobe pulmonary artery.   3. Slight interval improvement in left-sided pulmonary edema.   Underlying edema and correlate with fluid status.             Signed by: Nino Barbour 1/20/2025 9:57 AM   Dictation workstation:   [id number]      XR abdomen 1 view   Final Result   1.  As described above.        Signed by: Vick Xiao 1/19/2025 11:58 AM   Dictation workstation:   [id number]      XR chest 1 view   Final Result   1. Slight worsening of multifocal airspace opacity in the left mid   and lower lung. Findings are concerning for infectious process. Small   left pleural effusion.   2. Postsurgical changes right pneumonectomy with medical devices as   described above. Consider retracting the [place]-Paco catheter.                  Signed by: Vick Xiao 1/19/2025 8:55 AM   Dictation workstation:   [id number]      Transthoracic Echo (TTE) Limited   Final Result      XR chest 1 view   Final Result   1. Patchy airspace opacity in the left lower lung with slight   improvement in aeration. Correlate with concern for infection   2. Postsurgical changes of right pneumonectomy as described above.   3. Medical lines and devices as above. Consider retraction of the   [place]-Paco catheter.        I personally reviewed the images/study and I agree with the findings   as stated. This study was interpreted at [place], [place].        MACRO:   None        Signed by: Vick Xiao 1/19/2025 8:54 AM   Dictation workstation:   [id number]      XR chest 1 view   Final Result   1.  Worsening aeration of the left lung with increasing left basilar   infiltrate and pleural effusion. Correlate with mucous plugging and   component of lobar collapse.   2. Component of interstitial prominence of the left lung, also   slightly increased which may be due to edema.   3. Postsurgical changes of right pneumonectomy as described                  Signed by: Vick Xiao 1/18/2025 11:35 AM   Dictation workstation:   [id number]      Cardiac Catheterization Procedure   Final Result      Transthoracic Echo (TTE) Limited   Final Result      XR chest 1 view   Final Result   1.  Stable exam with postsurgical changes of right pneumonectomy/rib   resection and large layering fluid component within the right   hemithorax.   2. Similar findings of left pulmonary edema with slightly increased   size of small left pleural effusion. No left-sided pneumothorax.   3. Medical devices as above.                  MACRO:   None        Signed by: Alexx Anne 1/17/2025 8:16 AM   Dictation workstation:   [id number]      XR chest 1 view   Final Result   1.  Postsurgical changes of right-sided pneumonectomy with increased   layering opacification of the right hemithorax, likely due to   increasing fluid.   2. Similar trace left-sided pleural effusion with linear left basilar   airspace opacity, likely atelectasis on a background of likely   pulmonary edema. Infection is not definitively excluded.   3. Medical devices as above with interval insertion of right internal   jugular central venous catheter projecting at the mid SVC.        I personally reviewed the images/study and I agree with the findings   as stated by resident Davidson Mcguire. This study was interpreted   at [facility], [place], [place].        MACRO:   None        Signed by: Alexx Anne 1/17/2025 8:14 AM   Dictation workstation:   [id number]      Transthoracic Echo (TTE) Limited   Final Result      US right upper  quadrant   Final Result   Small amount of simple appearing ascites in the right upper quadrant   without evidence of cholelithiasis, cholecystitis or biliary dilation.        Signed by: Alberto Salas 1/16/2025 1:02 PM   Dictation workstation:   [id number]      XR chest 1 view   Final Result   1. Slight interval increase in left basilar airspace opacity which   may be due to increasing atelectasis. Cannot exclude developing   infectious infiltrate. Question trace left pleural effusion.   2. Again seen postsurgical changes related to right-sided   pneumonectomy as described above                  Signed by: Vick Xiao 1/16/2025 9:47 AM   Dictation workstation:   [id number]      US renal complete   Final Result   1. Unremarkable sonographic evaluation of the kidneys.   2. Incomplete evaluation of a gallbladder with some pericholecystic   fluid and biliary sludge. Dedicated right upper quadrant ultrasound   could be obtained as clinically warranted.   3. A small amount of free fluid present in the right upper quadrant   and pelvis.        Findings relayed by phone by me to SICU provider at 5:11 p.m.        I personally reviewed the images/study and I agree with the findings   as stated by Erickson Best MD (resident) . This study was   interpreted at [facility],   [place], [place].        MACRO:   None        Signed by: Alberto Salas 1/15/2025 6:17 PM   Dictation workstation:   [id number]      XR chest 1 view   Final Result   1.  Status post pneumonectomy with interval increase in fluid   component of right-sided hydropneumothorax.   2. Medical devices as above.                  MACRO:   None        Signed by: Alexx Anne 1/15/2025 9:28 AM   Dictation workstation:   [id number]      XR chest 1 view   Final Result   1. Interval removal of right chest tube. Other medical devices as   described above.   2. Postsurgical changes related to right pneumonectomy.    3. Faint left basilar opacity, likely atelectatic.                  Signed by: Vick Xiao 1/14/2025 2:53 PM   Dictation workstation:   [id number]      XR chest 1 view   Final Result   1.  Stable right-sided pneumonectomy changes.             Signed by: Nino Barbour 1/14/2025 12:56 PM   Dictation workstation:   [id number]      Transthoracic Echo (TTE) Limited   Final Result      XR chest 1 view   Final Result   1. Postsurgical changes related to right sided pneumonectomy with   multiple medical devices as described above.   2. Mild interstitial prominence of the left lung.                  Signed by: Vick Xiao 1/13/2025 3:16 PM   Dictation workstation:   [id number]      XR chest 1 view    (Results Pending)        Assessment/Plan   Assessment:  Faustina Vick is a 65 y/o with PMHx of COPD and 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 Wagner on 1/13 requiring post-op vasoactive therapy. Post-op course c/b acute hyponatremia, multifactorial delirium, cardiogenic shock requiring inotropic support, and acute hypoxic/hypercarbic respiratory failure requiring intubation. COVID+ 1/20. Weaned off milrinone 1/21.     Plan:  NEURO: History of anxiety on alprazolam 1mg qd, last filled 1/4/2025 per [facility] review. Family states she was not taking for several months but then restarted prn about 3 wks prior to surgery for situational anxiety. A&Ox4 at baseline. Multifactorial delirium improved. Currently sedated and intubated. On precedex 1mcg/kg/hr, scheduled oxycodone, scheduled clonazepam and nightly trazodone. Awakens and follows commands without sedation interruption.    - continue trazodone nightly  - continue clonazepam 0.5 mg q8h  - Ongoing neuro and pain assessments  - Continue precedex, titrate to goal RASS 0 to -2  - Continue scheduled oxycodone q6h  -  Lidocaine patch to right chest wall  - PT/OT consulted. Advance activity as tolerated  - Restraints indicated while patient remains intubated, restrain with soft wrist restraints until medical devices are discontinued.      CV: No history of cardiac disease. Baseline /79, -110. TTE 9/2024 normal biV function, RVSP 10mmHg. Intra-op hypotension requiring vaso and epi. Arrived to SICU on vaso 0.06 units/min, started on levo 0.05 mcg/kg/min. TTE 1/13 with EF 60-65%, low normal RV systolic function. Weaned off vasopressors 1/15. Elevated lactate to 2.9 on 1/16 with SOB and new O2 requirement. Repeat TTE 1/16: acute biV dysfunction with signs of takotsubo cardiomyopathy, EF 34%, mod reduced RV systolic function, mildly elevated RVSP, mild to mod TR. Cardiogenic shock, started milrinone, lasix infusion, and vasopressin. Shock Call on 1/16, no indication for mechanical circulatory support. Initial troponin 928 with downtrend and normalization of lactate. Epinephrine infusion added overnight 1/16-1/17, discontinued 2/2 tachycardia in the 130s. Milrinone increased to 0.25mcg/kg/min (decreased to 0.125 thereafter). Lasix infusion stopped 1/17 given contraction alkalosis. Taken to cath lab 1/17 for RHC/PA cath placement, opening CI 2.9. Milrinone uptitrated to 0.375mcg/kg/min 1/17-1/18. iEpo 0.05 added 1/18, high dose levo/vaso. Repeat TTE 1/20 EF 35%. Levo weaned off 1/20. Intermittent sinus tachycardia. Milrinone discontinued 1/21 at ~6pm. iEpo weaned off 1/24 ~ 2300. Responsive to diuresis. Vaso weaned off 1/26.  #ADHF 2/2 Takotsubo cardiomyopathy (EF 25-30%)   #Biventricular Takotsubo cardiomyopathy 2/2 pneumonectomy  - Continuous EKG/abp/cvp/pap monitoring  - Thermodilution and mixed SvO2 q4h and prn  - Cardiology following peripherally, call with questions  - MAP goal >65  - Continue holding metoprolol  - Shock call if condition worsens  - goal even to negative fluid balance  - continue midodrine, 10 mg,  nasogastric tube, q8h     PULM: Hx of 30 pack year smoking history (quit 4/2024), COPD, and stage III right lung SCC s/p chemo/XRT/immunotherapy. 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 Arrived to SICU extubated on 10L SFM, weaned to room air 1/14. Chest tube removed 1/14. Acute on chronic hypoxic respiratory failure 1/16. Escalation to Airvo 50L 90% overnight 1/17-1/18. Acute hypercapnia 1/18 with increase WOB, trial of BIPAP with iEPO. Eventual intubation 1/18 evening. Currently on AC-VC/22/40%/300/5. Tolerated 1.5hr volume targeted SBT 1/24.  - Continue mechanical ventilation -> continue weaning trials daily  - wean FiO2 to maintain SpO2 >92%  - SDS switched to decadron on 1/22 for treatment of COVID (end date 1/29)  - scheduled budesonide/duoneb  - PRN albuterol  - Daily CXR  - ABG q4h and  prn  - holding home benzonatate  dexAMETHasone, 6 mg, intravenous, q24h  ipratropium-albuteroL, 3 mL, nebulization, q6h  budesonide, 0.5 mg, nebulization, BID    GI: No GI history. LFTs WNL 12/2/24. Evidence of pericholecystic fluid and biliary sludge on renal US 1/15 but no abdominal symptoms. RUQ US with small ascites, otherwise no acute findings. Abdominal distention this am. Elevated LFTs, now normalized. LBM 1/17. OG tube placed 1/19 for gastric decompression. TFs initiated via OG 1/20, then held on 1/21 due to regurgitation from OG. Dobhoff placed via IRIS and advanced post-pyloric. Tolerating TF at goal rate. Liquid Bms x4 1/25.  - Continue TF at goal rate 30ml/hr  - Continue thiamine 100mg IV daily  - Continue IV PPI for GI prophylaxis  - hold bowel reg  - C.diff PCR sent 1/25.    : No history of renal disease. Baseline creatinine 0.6. Symptomatic acute hyponatremia (SIADH vs vaso mediated) to 120 overnight 1/14 - 1/15, corrected to 124 after 150cc 3% NaCl x2 on 1/15. Renal US on 1/15 unremarkable. Started on lasix infusion 1/16-1/17. Spot diuresis 1/20 and  1/24. Hypokalemia. Yoo changed overnight 1/18-1/19. Net negative 1.9L  - nephrology signed off 1/18, call with questions  - RFP q12h and PRN  - Maintain yoo for strict I/Os   - Maintain U/O >0.5ml/kg/hr  - Replete electrolytes to maintain K>4, Phos>2.5, iCal>1.1, Mag>2     HEME: Hx of DOC. Baseline H/H 12/40. Acute blood loss anemia, OR EBL 150mL. Acute coagulopathy with INR elevated to 2.3 2/2 to poor nutritional status. Received IV Vit K 10mg x1 with improved INR thereafter. Transfused 1u PRBC 1/17 and 1u PRBC on 1/18. LUE duplex obtained 1/20 for swelling, negative for DVT. Stable edema in LUE, negative DVT ultrasound 1/20 ultrasound. Hgb 7.4, transfused 1 RBC with Hgb increase to 10.7 thereafter  - Check CBC daily and PRN  - coags prn  - goal Hgb >9  - SCDs and SQH for DVT ppx  - ongoing monitoring for s/s bleeding  - maintain active T&S (1/26)     ENDO: No history of DM or thyroid disease. TSH 0.48 (1/17). Recent 5d course of prednisone 10mg 12/2024 for URI. Adequate glycemic control. Started on SDS out of OR 1/13. Transitioned to dexAMETHasone 1/22  - BG q4h, SSI #1     MSK: Arthritis, Vit D deficiency.  - not on meds at home  - monitor for symptoms     ID: Afebrile, leukocytosis. Completed yunior-op course of cefazolin. Worsened clinical condition 1/17, started on vanco, zosyn and azithromycin. Blood cultures, MRSA and Respiratory viral panel negative 1/17. Yoo changed 1/18, Urine cx 1/18 negative. Added fluc overnight 1/18-1/19. Sputum culture 1/19 negative. Vancomycin discontinued 1/20. Urine strep/legionella 1/20 negative. COVID+ on 1/20, initiated remdesivir and decadron. Discontinued fluc and azithro 1/21. Liquid stools  - COVID precautions  - ID consulted 1/20, appreciate recs, signed off, call with questions  - temp q4h, wbc daily  - Ongoing monitoring for s/s infection  - Continue zosyn (7 day course completed 1/24 but continued for leukocytosis)- will continue for now   - Completed 5 day course  of remdesivir 1/24. On Decadron through 1/29     Lines:  - DAVID MAC (SICU, 1/16)   - PA cath (Cath lab. 1/17) - remove today  - L brachial A-line (1/16)  - Left chest mediport accessed   - PIV  x1  - Montiel (1/18)  - ETT (1/18)     Dispo: Continue ICU care. Patient seen and discussed with ICU attending Dr. Greenwood    SIC Phone [phone]    Critical care time: 60 minutes    Gaudencio Art, ANDREA-CNP, DNP

## 2025-01-26 NOTE — PROGRESS NOTES
"Faustina Vick \"Noam" is a 64 y.o. female who presents for Primary cancer of right upper lobe of lung (Multi) [C34.11].  She initially received chemotherapy and radiation, but appeared to have residual radiographic abnormalities.  PET/CT suggested progression of disease, and in this setting and Dr. Wagner felt that surgical resection in a \"salvage\" setting might facilitate long-term overall survival given the failure of nonsurgical management She is now  13 days post op from PNEUMONECTOMY (Right) (en bloc with chest wall resection and reconstruction), mediastinal lymph node dissection for a  Primary cancer of right upper lobe of lung (Multi).      Subjective   NAEON. Off pressors. Yesterday, failed attempts at vent weaning. Unable to tolerate PS trial with low lung volumes and continued high levels of PS. This am, appears comfortable with CPAP trial, tachycardia to 110-120s.  WBC 22 on steroids, off abx. Cdif rule out.     Objective     General: critically ill in ICU bed. Sedated/calm.   HEENT: ETT in place. Enteral feeds via dobhoff secure in nares.   Resp: orally intubated, sats appropriate, symmetric rise, synchronous w/vent  CV:   NSR/ST on monitor, sinus, normotensive with low dose vasopressin.   Chest: surgical incision ss denita, no drainage or erythema  Abd: soft, NTND  : yoo in place w/clear yellow uop  MSK: UE restraints  Psych: intubated, sedated    Last Recorded Vitals  Blood pressure 132/71, pulse (!) 118, temperature 36.4 °C (97.5 °F), temperature source Temporal, resp. rate (!) 28, height 1.651 m (5' 5\"), weight 58.7 kg (129 lb 6.6 oz), SpO2 92%.  Intake/Output last 3 Shifts:  I/O last 3 completed shifts:  In: 2197.5 (37.4 mL/kg) [I.V.:457.5 (7.8 mL/kg); NG/GT:1440; IV Piggyback:300]  Out: 4263 (72.6 mL/kg) [Urine:4263 (2 mL/kg/hr)]  Weight: 58.7 kg     Relevant Results            11.3     22.8>-----<263              33.6   141  101  18                  ----------------<128     4.7  32  0.28       " "   Ca 8.5 Phos 2.2 Mg 2.23       ALT 43 AST 30 AlkPhos 56 tBili 0.7         CXR 1/26: Personally reviewed. Expected fluid filled R pleural cavity s/p R pneumonectomy. Interval improved L lung aeration and edema. Formal read pending.     Assessment/Plan   Assessment & Plan  Primary cancer of right upper lobe of lung (Multi)    Chronic obstructive pulmonary disease (Multi)    Malignant neoplasm of upper lobe of right lung (Multi)    Cardiogenic shock (Multi)    Faustina Vick (Jannie) is a 64F with Hx of Stage III lung cancer, asthma, anxiety, and arthritis who is now s/p R pneumonectomy with Dr. Wagner 1/13/25. 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 Cardiology was consulted and recommended Lasix gtt and milrinone. Lasix gtt held 1/17, hyponatremia improving. Patient underwent RHC and SGC placement with cardiology 1/17. She was reintubated on 1/18 d/t worsening respiratory status and AMS. Also had increased pressor requirements, now weaned down to low dose vaso. Repeat echo stable on 1/18. COVID positive on 1/20.     - Continue & appreciate supportive ICU care  - Goal net even to negative  - Plan for-\"wean sedation & trial of extubation prior to committing to trach\" d/w weekend attending coverage.  Given failed PS trial yesterday,the risk may outweigh the benefit of trial of extubation.  Will address this and possible trach with Dr. Wagner when he is back on service Tuesday.   - Follow up C diff results  - If any fevers develop--bronch and repeat pan culture.     Patient seen and discussed with attending Dr. Wagner via phone and Dr. Rowe covering for Dr. Wagner.    Alondra Cheung, APRN-CNP  Thoracic surgery [id number]    "

## 2025-01-26 NOTE — PROGRESS NOTES
Plan:  - volume support weaning trial again today  - PO analgesia; cont clonazepam 0.5q8; cont trazodone hs, considering increasing to 50mg  -  PT/OT as able  - cont dexamethasone for COVID x 10days; remdes x5 days  - cxr  - hold lasix as she had a robust response to the last doses, if her uop trails off restart at 20mg daily perhaps - overall seems fluid even and need to maintain that  - midodrine  - remove PAC  - discussing possibility of trach with family    Assessment:    Neuro/MSK: expected acute post op pain  and severe debility/deconditioning with lack of physiologic reserve  A-F bundle; Sleep Hygiene measures: appropriate daytime stimulation/physical activity. Lights out at 2100, avoidance of night time lab draws/night baths, minimize mind altering medicaments  PT/OT and OOB as appropriate      CV: JOSS cards assessment: cardiogenic shock takotsubo type - resolving slowly   -      -     now off pressers    Pulm: COPD without exacerbation  and acute post op pulmonary insufficiency   BPH with IS/flutter/OOB  Vent Mode: Volume control/assist control  FiO2 (%):  [40 %] 40 %  S RR:  [22] 22  S VT:  [300 mL] 300 mL  PEEP/CPAP (cm H2O):  [5 cm H20] 5 cm H20  MAP (cm H2O):  [12-15] 12      Renal: na   Trend UOP and renal indices; replete lytes PRN     GI: JOSS GI assessment: PCM sev   Proph with PPI  TF adv to goal    ID/rheum: Sepsis with acute organ dysfunction 2/2 presumed aspiration v HCAP and COVID  Current abx: zosyn ending today  Follow cx data     Endo: Stress hyperglycemia  Start ISS if needed for BG goal <180      Heme: ABL anemia   -     Trend Hb and transfuse PRN for goal Hb>7  DVT proph with scd and subcutaneous heparin    Lines/tubes/restraints:   Central line: hemodynamic monitoring, parenteral medications (I.e. chemo, vasoactive), and access  Montiel: critically ill patient who need accurate urinary output measurements  Restraint: needed, pulling at lines/tubes and agitation    Dispo: ICU    Exam:    A&O  x 0; intubated and sedated but follows commands through sedation  NSR  Adequate air-exchange  Abd soft, NT  Extremities warm     REASON FOR ADMISSION    64 y.o. female with PMHx of COPD and 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 Wagner on 1/13 requiring post-op vasoactive therapy. Post-op course c/b acute hyponatremia, multifactorial delirium, cardiogenic shock requiring inotropic support, and acute hypoxic/hypercarbic respiratory failure requiring intubation. COVID+ 1/20. Weaned off milrinone 1/21. Remains on iEpo 0.03.     TODAY'S EVENTS    1/23: failed attempt toward SBT  1/24: ongoing issues with anxiety and reserve/debility preventing meaningful weaning from MV  1/25: tolerated VS mode on the vent longer than other spont trials, will cont with this; remains with HD lability and reactivity depending on resp status  1/26: tolerated VS yesterday for approx 4hrs, making slow progress    This critically ill patient continues to be at-risk for clinically significant deterioration / failure due to the above mentioned dysfunctional, unstable organ systems.  I have personally identified and managed all complex critical care issues to prevent aforementioned clinical deterioration.  Critical care time is spent at bedside and/or the immediate area and has included, but is not limited to, the review of diagnostic tests, labs, radiographs, serial assessments of hemodynamics, respiratory status, ventilatory management, and family updates.  Time spent in procedures and teaching are reported separately. CF CRITICAL CARE TIME: 65 minutes          LABS:  CMP:  Results from last 7 days   Lab Units 01/26/25  0435 01/25/25  1604 01/25/25  0024 01/24/25  1658 01/24/25  0354 01/22/25  0442 01/21/25  2010 01/21/25  1610 01/21/25  0836 01/20/25  1437 01/20/25  0427 01/19/25  1458   SODIUM mmol/L 141  141 141 140 140   < > 136   < > 137   < > 132* 132*   POTASSIUM mmol/L 4.7 4.8 3.2* 4.1 4.6   < > 3.4*   < > 3.7   < > 3.9 3.6   CHLORIDE mmol/L 101 100 95* 99 101   < > 90*   < > 92*   < > 92* 91*   CO2 mmol/L 32 32 35* 31 32   < > 38*   < > 33*   < > 29 32   ANION GAP mmol/L 13 14 14 14 12   < > 11   < > 16   < > 15 13   BUN mg/dL 18 19 21 22 19   < > 16   < > 16   < > 16 17   CREATININE mg/dL 0.28* 0.34* 0.40* 0.44* 0.45*   < > 0.56   < > 0.55   < > 0.51 0.66   EGFR mL/min/1.73m*2 >90 >90 >90 >90 >90   < > >90   < > >90   < > >90 >90   MAGNESIUM mg/dL 2.23 2.44* 1.88 1.93 2.12   < >  --    < > 2.99*   < > 2.22 2.43*   ALBUMIN g/dL 3.7 4.1 4.3 3.7 3.8   < > 3.6  3.6   < > 3.8  3.8   < > 3.1*  3.1* 3.2*  3.2*   ALK PHOS U/L  --   --   --   --   --   --  56  --  63  --  65 66   ALT U/L  --   --   --   --   --   --  43  --  51*  --  114* 137*   AST U/L  --   --   --   --   --   --  30  --  38  --  157* 257*   BILIRUBIN TOTAL mg/dL  --   --   --   --   --   --  0.7  --  0.9  --  0.8 0.8    < > = values in this interval not displayed.     CBC:  Results from last 7 days   Lab Units 01/26/25  0435 01/25/25  0329 01/25/25  0024 01/24/25  1658 01/24/25  0354 01/23/25  2051 01/23/25  1552 01/23/25  1222   WBC AUTO x10*3/uL 22.8*  22.8* 20.0* 12.1* 14.5* 17.2* 7.7 9.6 23.1*   HEMOGLOBIN g/dL 11.3*  11.3* 10.7* 7.4* 8.1* 8.7* 8.0* 8.1* 9.0*   HEMATOCRIT % 33.6*  33.6* 33.1* 22.6* 25.0* 28.0* 25.6* 26.5* 29.2*   PLATELETS AUTO x10*3/uL 263  263 196 151 172 204 138* 139* 180   MCV fL 84  84 88 83 84 87 87 87 89     COAG:   Results from last 7 days   Lab Units 01/26/25  0435 01/25/25  0024 01/24/25  1658 01/24/25  0354 01/23/25 2051 01/23/25  1549 01/23/25  1222 01/23/25  1008   INR  1.3* 1.3* 1.2* 1.3* 1.3* 1.3* 1.3* 1.3*     ABO:   ABO TYPE   Date Value Ref Range Status   01/26/2025 B  Final     HEME/ENDO:         CARDIAC:   Results from last 7 days   Lab Units 01/23/25 2051 01/23/25  1532   [facility] ng/L 48* 51*      MICRO: No results found for the last 90 days.

## 2025-01-27 ENCOUNTER — APPOINTMENT (OUTPATIENT)
Dept: RADIOLOGY | Facility: HOSPITAL | Age: 65
End: 2025-01-27
Payer: COMMERCIAL

## 2025-01-27 ENCOUNTER — APPOINTMENT (OUTPATIENT)
Dept: CARDIOLOGY | Facility: HOSPITAL | Age: 65
End: 2025-01-27
Payer: COMMERCIAL

## 2025-01-27 VITALS
SYSTOLIC BLOOD PRESSURE: 115 MMHG | BODY MASS INDEX: 21.56 KG/M2 | HEART RATE: 107 BPM | OXYGEN SATURATION: 93 % | RESPIRATION RATE: 24 BRPM | HEIGHT: 65 IN | DIASTOLIC BLOOD PRESSURE: 72 MMHG | TEMPERATURE: 98.1 F | WEIGHT: 129.41 LBS

## 2025-01-27 LAB
ALBUMIN SERPL BCP-MCNC: 3.5 G/DL (ref 3.4–5)
ANION GAP BLDA CALCULATED.4IONS-SCNC: 4 MMO/L (ref 10–25)
ANION GAP BLDA CALCULATED.4IONS-SCNC: 5 MMO/L (ref 10–25)
ANION GAP BLDA CALCULATED.4IONS-SCNC: 8 MMO/L (ref 10–25)
ANION GAP BLDA CALCULATED.4IONS-SCNC: 8 MMO/L (ref 10–25)
ANION GAP SERPL CALC-SCNC: 12 MMOL/L (ref 10–20)
APTT PPP: 30 SECONDS (ref 27–38)
BASE EXCESS BLDA CALC-SCNC: 7.6 MMOL/L (ref -2–3)
BASE EXCESS BLDA CALC-SCNC: 8.2 MMOL/L (ref -2–3)
BASE EXCESS BLDA CALC-SCNC: 9 MMOL/L (ref -2–3)
BASE EXCESS BLDA CALC-SCNC: 9.5 MMOL/L (ref -2–3)
BODY TEMPERATURE: 37 DEGREES CELSIUS
BUN SERPL-MCNC: 19 MG/DL (ref 6–23)
CA-I BLD-SCNC: 1.17 MMOL/L (ref 1.1–1.33)
CA-I BLDA-SCNC: 1.15 MMOL/L (ref 1.1–1.33)
CA-I BLDA-SCNC: 1.16 MMOL/L (ref 1.1–1.33)
CA-I BLDA-SCNC: 1.18 MMOL/L (ref 1.1–1.33)
CA-I BLDA-SCNC: 1.2 MMOL/L (ref 1.1–1.33)
CALCIUM SERPL-MCNC: 8.9 MG/DL (ref 8.6–10.6)
CHLORIDE BLDA-SCNC: 101 MMOL/L (ref 98–107)
CHLORIDE BLDA-SCNC: 102 MMOL/L (ref 98–107)
CHLORIDE BLDA-SCNC: 102 MMOL/L (ref 98–107)
CHLORIDE BLDA-SCNC: 104 MMOL/L (ref 98–107)
CHLORIDE SERPL-SCNC: 101 MMOL/L (ref 98–107)
CO2 SERPL-SCNC: 34 MMOL/L (ref 21–32)
CREAT SERPL-MCNC: 0.24 MG/DL (ref 0.5–1.05)
EGFRCR SERPLBLD CKD-EPI 2021: >90 ML/MIN/1.73M*2
EJECTION FRACTION APICAL 4 CHAMBER: 27.1
EJECTION FRACTION: 38 %
ERYTHROCYTE [DISTWIDTH] IN BLOOD BY AUTOMATED COUNT: 17.7 % (ref 11.5–14.5)
FUNGUS SPEC CULT: NORMAL
FUNGUS SPEC FUNGUS STN: NORMAL
GLUCOSE BLD MANUAL STRIP-MCNC: 121 MG/DL (ref 74–99)
GLUCOSE BLD MANUAL STRIP-MCNC: 122 MG/DL (ref 74–99)
GLUCOSE BLD MANUAL STRIP-MCNC: 124 MG/DL (ref 74–99)
GLUCOSE BLD MANUAL STRIP-MCNC: 148 MG/DL (ref 74–99)
GLUCOSE BLD MANUAL STRIP-MCNC: 154 MG/DL (ref 74–99)
GLUCOSE BLDA-MCNC: 105 MG/DL (ref 74–99)
GLUCOSE BLDA-MCNC: 118 MG/DL (ref 74–99)
GLUCOSE BLDA-MCNC: 129 MG/DL (ref 74–99)
GLUCOSE BLDA-MCNC: 156 MG/DL (ref 74–99)
GLUCOSE SERPL-MCNC: 111 MG/DL (ref 74–99)
HCO3 BLDA-SCNC: 33.2 MMOL/L (ref 22–26)
HCO3 BLDA-SCNC: 33.4 MMOL/L (ref 22–26)
HCO3 BLDA-SCNC: 33.5 MMOL/L (ref 22–26)
HCO3 BLDA-SCNC: 34.1 MMOL/L (ref 22–26)
HCT VFR BLD AUTO: 34.5 % (ref 36–46)
HCT VFR BLD EST: 36 % (ref 36–46)
HCT VFR BLD EST: 37 % (ref 36–46)
HGB BLD-MCNC: 11.6 G/DL (ref 12–16)
HGB BLDA-MCNC: 12 G/DL (ref 12–16)
HGB BLDA-MCNC: 12.1 G/DL (ref 12–16)
HGB BLDA-MCNC: 12.1 G/DL (ref 12–16)
HGB BLDA-MCNC: 12.2 G/DL (ref 12–16)
INHALED O2 CONCENTRATION: 40 %
INR PPP: 1.2 (ref 0.9–1.1)
LACTATE BLDA-SCNC: 1 MMOL/L (ref 0.4–2)
LACTATE BLDA-SCNC: 1 MMOL/L (ref 0.4–2)
LACTATE BLDA-SCNC: 1.1 MMOL/L (ref 0.4–2)
LACTATE BLDA-SCNC: 1.2 MMOL/L (ref 0.4–2)
LEFT VENTRICLE INTERNAL DIMENSION DIASTOLE: 3.8 CM (ref 3.5–6)
MAGNESIUM SERPL-MCNC: 2.25 MG/DL (ref 1.6–2.4)
MCH RBC QN AUTO: 28 PG (ref 26–34)
MCHC RBC AUTO-ENTMCNC: 33.6 G/DL (ref 32–36)
MCV RBC AUTO: 83 FL (ref 80–100)
NRBC BLD-RTO: 0 /100 WBCS (ref 0–0)
OXYHGB MFR BLDA: 91.1 % (ref 94–98)
OXYHGB MFR BLDA: 91.2 % (ref 94–98)
OXYHGB MFR BLDA: 92.6 % (ref 94–98)
OXYHGB MFR BLDA: 94.5 % (ref 94–98)
PCO2 BLDA: 42 MM HG (ref 38–42)
PCO2 BLDA: 48 MM HG (ref 38–42)
PCO2 BLDA: 48 MM HG (ref 38–42)
PCO2 BLDA: 50 MM HG (ref 38–42)
PH BLDA: 7.43 PH (ref 7.38–7.42)
PH BLDA: 7.45 PH (ref 7.38–7.42)
PH BLDA: 7.46 PH (ref 7.38–7.42)
PH BLDA: 7.51 PH (ref 7.38–7.42)
PHOSPHATE SERPL-MCNC: 2.9 MG/DL (ref 2.5–4.9)
PLATELET # BLD AUTO: 284 X10*3/UL (ref 150–450)
PO2 BLDA: 64 MM HG (ref 85–95)
PO2 BLDA: 65 MM HG (ref 85–95)
PO2 BLDA: 72 MM HG (ref 85–95)
PO2 BLDA: 82 MM HG (ref 85–95)
POTASSIUM BLDA-SCNC: 4.3 MMOL/L (ref 3.5–5.3)
POTASSIUM BLDA-SCNC: 4.4 MMOL/L (ref 3.5–5.3)
POTASSIUM BLDA-SCNC: 4.6 MMOL/L (ref 3.5–5.3)
POTASSIUM BLDA-SCNC: 5.2 MMOL/L (ref 3.5–5.3)
POTASSIUM SERPL-SCNC: 4.6 MMOL/L (ref 3.5–5.3)
PROTHROMBIN TIME: 13.8 SECONDS (ref 9.8–12.8)
RBC # BLD AUTO: 4.14 X10*6/UL (ref 4–5.2)
RIGHT VENTRICLE FREE WALL PEAK S': 10.6 CM/S
SAO2 % BLDA: 94 % (ref 94–100)
SAO2 % BLDA: 94 % (ref 94–100)
SAO2 % BLDA: 96 % (ref 94–100)
SAO2 % BLDA: 97 % (ref 94–100)
SODIUM BLDA-SCNC: 136 MMOL/L (ref 136–145)
SODIUM BLDA-SCNC: 137 MMOL/L (ref 136–145)
SODIUM BLDA-SCNC: 138 MMOL/L (ref 136–145)
SODIUM BLDA-SCNC: 139 MMOL/L (ref 136–145)
SODIUM SERPL-SCNC: 142 MMOL/L (ref 136–145)
TRICUSPID ANNULAR PLANE SYSTOLIC EXCURSION: 1.1 CM
WBC # BLD AUTO: 23.9 X10*3/UL (ref 4.4–11.3)

## 2025-01-27 PROCEDURE — 93005 ELECTROCARDIOGRAM TRACING: CPT

## 2025-01-27 PROCEDURE — 2500000004 HC RX 250 GENERAL PHARMACY W/ HCPCS (ALT 636 FOR OP/ED): Performed by: NURSE PRACTITIONER

## 2025-01-27 PROCEDURE — 84132 ASSAY OF SERUM POTASSIUM: CPT | Performed by: NURSE PRACTITIONER

## 2025-01-27 PROCEDURE — 2500000005 HC RX 250 GENERAL PHARMACY W/O HCPCS

## 2025-01-27 PROCEDURE — 2500000004 HC RX 250 GENERAL PHARMACY W/ HCPCS (ALT 636 FOR OP/ED): Mod: JZ

## 2025-01-27 PROCEDURE — 99232 SBSQ HOSP IP/OBS MODERATE 35: CPT | Performed by: NURSE PRACTITIONER

## 2025-01-27 PROCEDURE — 2500000001 HC RX 250 WO HCPCS SELF ADMINISTERED DRUGS (ALT 637 FOR MEDICARE OP)

## 2025-01-27 PROCEDURE — 94640 AIRWAY INHALATION TREATMENT: CPT

## 2025-01-27 PROCEDURE — 71045 X-RAY EXAM CHEST 1 VIEW: CPT

## 2025-01-27 PROCEDURE — 97530 THERAPEUTIC ACTIVITIES: CPT | Mod: GP | Performed by: PHYSICAL THERAPIST

## 2025-01-27 PROCEDURE — 2500000001 HC RX 250 WO HCPCS SELF ADMINISTERED DRUGS (ALT 637 FOR MEDICARE OP): Performed by: NURSE PRACTITIONER

## 2025-01-27 PROCEDURE — 36620 INSERTION CATHETER ARTERY: CPT | Mod: GC

## 2025-01-27 PROCEDURE — 37799 UNLISTED PX VASCULAR SURGERY: CPT | Performed by: NURSE PRACTITIONER

## 2025-01-27 PROCEDURE — 82330 ASSAY OF CALCIUM: CPT | Performed by: NURSE PRACTITIONER

## 2025-01-27 PROCEDURE — 93325 DOPPLER ECHO COLOR FLOW MAPG: CPT | Performed by: INTERNAL MEDICINE

## 2025-01-27 PROCEDURE — 85610 PROTHROMBIN TIME: CPT | Performed by: NURSE PRACTITIONER

## 2025-01-27 PROCEDURE — 2500000004 HC RX 250 GENERAL PHARMACY W/ HCPCS (ALT 636 FOR OP/ED)

## 2025-01-27 PROCEDURE — 97530 THERAPEUTIC ACTIVITIES: CPT | Mod: GO

## 2025-01-27 PROCEDURE — 2500000002 HC RX 250 W HCPCS SELF ADMINISTERED DRUGS (ALT 637 FOR MEDICARE OP, ALT 636 FOR OP/ED): Performed by: NURSE PRACTITIONER

## 2025-01-27 PROCEDURE — 84132 ASSAY OF SERUM POTASSIUM: CPT

## 2025-01-27 PROCEDURE — 2020000001 HC ICU ROOM DAILY

## 2025-01-27 PROCEDURE — 2500000005 HC RX 250 GENERAL PHARMACY W/O HCPCS: Performed by: PHYSICIAN ASSISTANT

## 2025-01-27 PROCEDURE — 85027 COMPLETE CBC AUTOMATED: CPT | Performed by: NURSE PRACTITIONER

## 2025-01-27 PROCEDURE — P9045 ALBUMIN (HUMAN), 5%, 250 ML: HCPCS | Mod: JZ

## 2025-01-27 PROCEDURE — 99291 CRITICAL CARE FIRST HOUR: CPT | Performed by: NURSE PRACTITIONER

## 2025-01-27 PROCEDURE — 94003 VENT MGMT INPAT SUBQ DAY: CPT

## 2025-01-27 PROCEDURE — 93325 DOPPLER ECHO COLOR FLOW MAPG: CPT

## 2025-01-27 PROCEDURE — 82947 ASSAY GLUCOSE BLOOD QUANT: CPT

## 2025-01-27 PROCEDURE — 2500000002 HC RX 250 W HCPCS SELF ADMINISTERED DRUGS (ALT 637 FOR MEDICARE OP, ALT 636 FOR OP/ED): Performed by: STUDENT IN AN ORGANIZED HEALTH CARE EDUCATION/TRAINING PROGRAM

## 2025-01-27 PROCEDURE — 99291 CRITICAL CARE FIRST HOUR: CPT | Performed by: STUDENT IN AN ORGANIZED HEALTH CARE EDUCATION/TRAINING PROGRAM

## 2025-01-27 PROCEDURE — 83735 ASSAY OF MAGNESIUM: CPT | Performed by: NURSE PRACTITIONER

## 2025-01-27 PROCEDURE — 93308 TTE F-UP OR LMTD: CPT | Performed by: INTERNAL MEDICINE

## 2025-01-27 RX ORDER — ALBUMIN HUMAN 50 G/1000ML
12.5 SOLUTION INTRAVENOUS ONCE
Status: DISCONTINUED | OUTPATIENT
Start: 2025-01-27 | End: 2025-01-27

## 2025-01-27 RX ORDER — HYDROMORPHONE HYDROCHLORIDE 0.2 MG/ML
0.2 INJECTION INTRAMUSCULAR; INTRAVENOUS; SUBCUTANEOUS ONCE
Status: DISCONTINUED | OUTPATIENT
Start: 2025-01-27 | End: 2025-01-28

## 2025-01-27 RX ORDER — METOPROLOL TARTRATE 1 MG/ML
INJECTION, SOLUTION INTRAVENOUS
Status: COMPLETED
Start: 2025-01-27 | End: 2025-01-27

## 2025-01-27 RX ORDER — METOPROLOL TARTRATE 1 MG/ML
5 INJECTION, SOLUTION INTRAVENOUS ONCE
Status: COMPLETED | OUTPATIENT
Start: 2025-01-28 | End: 2025-01-27

## 2025-01-27 RX ORDER — ALBUMIN HUMAN 50 G/1000ML
12.5 SOLUTION INTRAVENOUS ONCE
Status: COMPLETED | OUTPATIENT
Start: 2025-01-27 | End: 2025-01-27

## 2025-01-27 RX ADMIN — IPRATROPIUM BROMIDE AND ALBUTEROL SULFATE 3 ML: .5; 3 SOLUTION RESPIRATORY (INHALATION) at 01:19

## 2025-01-27 RX ADMIN — OXYCODONE HYDROCHLORIDE 5 MG: 5 SOLUTION ORAL at 03:57

## 2025-01-27 RX ADMIN — MIDODRINE HYDROCHLORIDE 10 MG: 10 TABLET ORAL at 06:25

## 2025-01-27 RX ADMIN — Medication 40 PERCENT: at 08:12

## 2025-01-27 RX ADMIN — BUDESONIDE 0.5 MG: 0.5 INHALANT RESPIRATORY (INHALATION) at 19:48

## 2025-01-27 RX ADMIN — LIDOCAINE 4% 1 PATCH: 40 PATCH TOPICAL at 08:11

## 2025-01-27 RX ADMIN — METOPROLOL TARTRATE 5 MG: 1 INJECTION, SOLUTION INTRAVENOUS at 23:48

## 2025-01-27 RX ADMIN — PANTOPRAZOLE SODIUM 40 MG: 40 INJECTION, POWDER, FOR SOLUTION INTRAVENOUS at 08:11

## 2025-01-27 RX ADMIN — IPRATROPIUM BROMIDE AND ALBUTEROL SULFATE 3 ML: .5; 3 SOLUTION RESPIRATORY (INHALATION) at 16:18

## 2025-01-27 RX ADMIN — MIDODRINE HYDROCHLORIDE 10 MG: 10 TABLET ORAL at 23:47

## 2025-01-27 RX ADMIN — TRAZODONE HYDROCHLORIDE 25 MG: 50 TABLET ORAL at 20:51

## 2025-01-27 RX ADMIN — Medication 40 PERCENT: at 19:48

## 2025-01-27 RX ADMIN — CLONAZEPAM 0.5 MG: 0.5 TABLET ORAL at 23:47

## 2025-01-27 RX ADMIN — IPRATROPIUM BROMIDE AND ALBUTEROL SULFATE 3 ML: .5; 3 SOLUTION RESPIRATORY (INHALATION) at 08:26

## 2025-01-27 RX ADMIN — OXYCODONE HYDROCHLORIDE 5 MG: 5 SOLUTION ORAL at 10:06

## 2025-01-27 RX ADMIN — HYDROMORPHONE HYDROCHLORIDE 0.5 MG: 0.5 INJECTION, SOLUTION INTRAMUSCULAR; INTRAVENOUS; SUBCUTANEOUS at 06:23

## 2025-01-27 RX ADMIN — IPRATROPIUM BROMIDE AND ALBUTEROL SULFATE 3 ML: .5; 3 SOLUTION RESPIRATORY (INHALATION) at 19:47

## 2025-01-27 RX ADMIN — THIAMINE HYDROCHLORIDE 100 MG: 100 INJECTION, SOLUTION INTRAMUSCULAR; INTRAVENOUS at 08:11

## 2025-01-27 RX ADMIN — ALBUMIN HUMAN 12.5 G: 0.05 INJECTION, SOLUTION INTRAVENOUS at 20:51

## 2025-01-27 RX ADMIN — OXYCODONE HYDROCHLORIDE 5 MG: 5 SOLUTION ORAL at 21:53

## 2025-01-27 RX ADMIN — MIDODRINE HYDROCHLORIDE 10 MG: 10 TABLET ORAL at 14:40

## 2025-01-27 RX ADMIN — CLONAZEPAM 0.5 MG: 0.5 TABLET ORAL at 08:11

## 2025-01-27 RX ADMIN — HEPARIN SODIUM 5000 UNITS: 5000 INJECTION INTRAVENOUS; SUBCUTANEOUS at 21:54

## 2025-01-27 RX ADMIN — CLONAZEPAM 0.5 MG: 0.5 TABLET ORAL at 15:23

## 2025-01-27 RX ADMIN — HEPARIN SODIUM 5000 UNITS: 5000 INJECTION INTRAVENOUS; SUBCUTANEOUS at 06:23

## 2025-01-27 RX ADMIN — PERFLUTREN 1 ML OF DILUTION: 6.52 INJECTION, SUSPENSION INTRAVENOUS at 14:39

## 2025-01-27 RX ADMIN — OXYCODONE HYDROCHLORIDE 5 MG: 5 SOLUTION ORAL at 15:23

## 2025-01-27 RX ADMIN — HEPARIN SODIUM 5000 UNITS: 5000 INJECTION INTRAVENOUS; SUBCUTANEOUS at 14:39

## 2025-01-27 RX ADMIN — BUDESONIDE 0.5 MG: 0.5 INHALANT RESPIRATORY (INHALATION) at 08:25

## 2025-01-27 RX ADMIN — DEXAMETHASONE SODIUM PHOSPHATE 6 MG: 10 INJECTION, SOLUTION INTRAMUSCULAR; INTRAVENOUS at 11:19

## 2025-01-27 RX ADMIN — INSULIN LISPRO 1 UNITS: 100 INJECTION, SOLUTION INTRAVENOUS; SUBCUTANEOUS at 16:00

## 2025-01-27 ASSESSMENT — COGNITIVE AND FUNCTIONAL STATUS - GENERAL
DRESSING REGULAR UPPER BODY CLOTHING: A LOT
TURNING FROM BACK TO SIDE WHILE IN FLAT BAD: TOTAL
MOVING FROM LYING ON BACK TO SITTING ON SIDE OF FLAT BED WITH BEDRAILS: A LOT
DAILY ACTIVITIY SCORE: 9
PERSONAL GROOMING: A LOT
HELP NEEDED FOR BATHING: A LOT
TOILETING: TOTAL
WALKING IN HOSPITAL ROOM: TOTAL
STANDING UP FROM CHAIR USING ARMS: TOTAL
EATING MEALS: TOTAL
MOVING TO AND FROM BED TO CHAIR: TOTAL
CLIMB 3 TO 5 STEPS WITH RAILING: TOTAL
MOBILITY SCORE: 7
DRESSING REGULAR LOWER BODY CLOTHING: TOTAL

## 2025-01-27 ASSESSMENT — PAIN - FUNCTIONAL ASSESSMENT
PAIN_FUNCTIONAL_ASSESSMENT: CPOT (CRITICAL CARE PAIN OBSERVATION TOOL)
PAIN_FUNCTIONAL_ASSESSMENT: CPOT (CRITICAL CARE PAIN OBSERVATION TOOL)
PAIN_FUNCTIONAL_ASSESSMENT: 0-10
PAIN_FUNCTIONAL_ASSESSMENT: 0-10
PAIN_FUNCTIONAL_ASSESSMENT: CPOT (CRITICAL CARE PAIN OBSERVATION TOOL)

## 2025-01-27 ASSESSMENT — PAIN SCALES - GENERAL
PAINLEVEL_OUTOF10: 0 - NO PAIN

## 2025-01-27 NOTE — PROGRESS NOTES
"Occupational Therapy    Occupational Therapy Treatment    Name: Faustina Vick \"Jannie\"  MRN: [id number]  : 1960  Date: 25  Room: A      Time Calculation  Start Time:   Stop Time: 1331  Time Calculation (min): 34 min    Assessment:  End of Session Communication: Bedside nurse  End of Session Patient Position: Bed, 3 rail up, Alarm off, not on at start of session    Plan:  Treatment Interventions: ADL retraining, Functional transfer training, UE strengthening/ROM, Endurance training, Cognitive reorientation, Patient/family training, Equipment evaluation/education, Neuromuscular reeducation, Fine motor coordination activities, Compensatory technique education  OT Frequency: 3 times per week  OT Discharge Recommendations: Moderate intensity level of continued care  Equipment Recommended upon Discharge:  (TBD)  OT Recommended Transfer Status: Assist of 2  OT - OK to Discharge: Yes    Subjective   General:  OT Last Visit  OT Received On: 25  Co-Treatment: PT  Co-Treatment Reason: Vent dependent with intent to mobilize  Prior to Session Communication: Bedside nurse  Patient Position Received: Bed, 3 rail up, Alarm off, not on at start of session (restraints not secure; okay per RN)  Family/Caregiver Present: Yes  Caregiver Feedback: family at bedside, supportive  General Comment: Pt supine in bed on arrival. ETT VC AC 40% FiO2, PEEP 8, RR 22, off all sedation.     Precautions:  Medical Precautions: Fall precautions, Cardiac precautions, Oxygen therapy device and L/min  Precautions Comment: MAP 65-70, droplet plus precautions    Vitals:   25 1257 25 1300 25 1331   Vital Signs   Vitals Session Pre OT  --  Post OT   Heart Rate 109 108 (!) 119   Resp 23 (!) 9 24   SpO2 95 % 95 % 95 %   /60 98/62 110/76   MAP (mmHg) 87 72 87   Vital Signs Comment  --   --  DURING: SBP 120s-140s with activity and HR 120s       Lines/Tubes/Drains:  CVC 25 Double lumen (Active)   Number of " days: 10       Arterial Line 01/17/25 Left Brachial (Active)   Number of days: 10       Arterial Line 01/27/25 Right Radial (Active)   Number of days: 0       Implantable Port 01/16/25 Left Chest Single lumen port (Active)   Number of days: 10       ETT  7 mm (Active)   Number of days: 8       Urethral Catheter (Active)   Number of days: 8       NG/OG/Feeding Tube Nasojejunal Left nostril (Active)   Number of days: 4       Cognition:  Overall Cognitive Status: Impaired  Orientation Level: Oriented X4 (with choices)  Following Commands: Follows one step commands with increased time  Cognition Comments: Drowsy, CAM(-) but with difficulty.  Attention: Exceptions to WFL  Sustained Attention: Impaired  Processing Speed: Delayed    Pain Assessment:  Pain Assessment  Pain Assessment: 0-10  0-10 (Numeric) Pain Score: 0 - No pain     Objective   Bed Mobility/Transfers:   Bed Mobility  Bed Mobility: Yes  Bed Mobility 1  Bed Mobility 1: Supine to sitting, Sitting to supine  Level of Assistance 1: Dependent, +2  Bed Mobility Comments 1: HOB elevated, draw sheet  Bed Mobility 2  Bed Mobility Comments 2: Pt dependently placed in chair position prior to progressing to EOB in order to assess activity tolerance and hemodynamic stability prior to progressing to EOB.  Transfers  Transfer: No         Therapy/Activity:      Therapeutic Activity  Therapeutic Activity Performed: Yes  Therapeutic Activity 1: Pt tolerated EOB sitting ~8 minutes. Mostly max A with posterior lean with progressions to brief min A with bilat HHA.     Outcome Measures:  [facility] Daily Activity  Putting on and taking off regular lower body clothing: Total  Bathing (including washing, rinsing, drying): A lot  Putting on and taking off regular upper body clothing: A lot  Toileting, which includes using toilet, bedpan or urinal: Total  Taking care of personal grooming such as brushing teeth: A lot  Eating Meals: Total  Daily Activity - Total Score: 9  ICU Mobility  Screen  ICU Mobility Scale: Sitting over edge of bed     Education Documentation  Body Mechanics, taught by Ellen Garcia, OT at 1/27/2025  2:52 PM.  Learner: Patient  Readiness: Acceptance  Method: Explanation, Demonstration  Response: Demonstrated Understanding    Precautions, taught by Ellen Garcia OT at 1/27/2025  2:52 PM.  Learner: Patient  Readiness: Acceptance  Method: Explanation, Demonstration  Response: Demonstrated Understanding    ADL Training, taught by Ellen Garcia, OT at 1/27/2025  2:52 PM.  Learner: Patient  Readiness: Acceptance  Method: Explanation, Demonstration  Response: Demonstrated Understanding    Education Comments  No comments found.        Goals:  Encounter Problems       Encounter Problems (Active)       ADLs       Patient will perform UB and LB bathing with modified independent level of assistance. (Not met)       Start:  01/14/25    Expected End:  01/28/25    Resolved:  01/22/25    Updated to: Patient will perform UB and LB bathing with moderate level of assistance.    Update reason: re eval         Patient with complete upper body dressing with modified independent level of assistance donning and doffing all UE clothes.  (Not met)       Start:  01/14/25    Expected End:  01/28/25    Resolved:  01/22/25    Updated to: Patient with complete upper body dressing with minimal level of assistance donning and doffing all UE clothes.    Update reason: re eval         Patient with complete lower body dressing with modified independent level of assistance donning and doffing all LE clothes  with PRN adaptive equipment  (Not met)       Start:  01/14/25    Expected End:  01/28/25    Resolved:  01/22/25    Updated to: Patient with complete lower body dressing with moderate level of assistance donning and doffing all LE clothes  with PRN adaptive equipment    Update reason: re eval         Patient will complete toileting including hygiene clothing  management/hygiene with modified independent level of assistance. (Not met)       Start:  01/14/25    Expected End:  01/28/25    Resolved:  01/22/25    Updated to: Patient will complete grooming with SBA    Update reason: re eval         Patient will perform UB and LB bathing with moderate level of assistance. (Progressing)       Start:  01/22/25    Expected End:  02/05/25                Patient with complete upper body dressing with minimal level of assistance donning and doffing all UE clothes. (Progressing)       Start:  01/22/25    Expected End:  02/05/25                Patient with complete lower body dressing with moderate level of assistance donning and doffing all LE clothes  with PRN adaptive equipment (Progressing)       Start:  01/22/25    Expected End:  02/05/25                Patient will complete grooming with SBA (Progressing)       Start:  01/22/25    Expected End:  02/05/25                   TRANSFERS       Patient will perform bed mobility modified independent level of assistance in order to improve safety and independence with mobility (Not met)       Start:  01/14/25    Expected End:  01/28/25    Resolved:  01/22/25    Updated to: Patient will perform bed mobility moderate level of assistance in order to improve safety and independence with mobility    Update reason: re eval         Patient will complete functional transfer to toilet with least restrictive device with modified independent level of assistance. (Not met)       Start:  01/14/25    Expected End:  01/28/25    Resolved:  01/22/25    Updated to: Patient will complete STS with moderate assistance and LRAD    Update reason: re eval         Patient will perform bed mobility moderate level of assistance in order to improve safety and independence with mobility (Progressing)       Start:  01/22/25    Expected End:  02/05/25                Patient will complete STS with moderate assistance and LRAD (Progressing)       Start:  01/22/25     Expected End:  02/05/25 01/27/25 at 2:52 PM   Ellen Garcia, OT   [phone]

## 2025-01-27 NOTE — PROGRESS NOTES
"Faustnia Vick \"Noam" is a 64 y.o. female on day 14 of admission presenting with Primary cancer of right upper lobe of lung (Multi).    Subjective   -Episode of tachycardia in the 160s that improved with Metoprolol 5 mg IV.     Objective     Physical Exam  Vitals reviewed.   Constitutional:       Appearance: She is underweight.      Interventions: She is sedated, intubated and restrained.   HENT:      Head: Normocephalic and atraumatic.      Nose: Nose normal.      Comments: Bridled Dobhoff tube. L nare 78cm depth. TF running at 30ml/hr     Mouth/Throat:      Mouth: Mucous membranes are moist.   Eyes:      Extraocular Movements: Extraocular movements intact.      Pupils: Pupils are equal, round, and reactive to light.   Neck:      Comments: RIJ MAC dressing c/d/i.   Cardiovascular:      Rate and Rhythm: Normal rate and regular rhythm.      Pulses: Normal pulses.      Heart sounds: Normal heart sounds.   Pulmonary:      Effort: She is intubated.      Breath sounds: Rhonchi (left side) present.      Comments: Mechanically ventilated via ETT  Chest:      Comments: Right lateral chest dressing c/d/i.  Abdominal:      General: Bowel sounds are normal. There is no distension.      Palpations: Abdomen is soft.      Comments: Enteral feeding tube in place, tube feeds running at 30ml/hr   Genitourinary:     Comments: Montiel in place with clear yellow UOP.   Musculoskeletal:         General: No swelling or tenderness. Normal range of motion.      Cervical back: Neck supple.      Comments: LUE edema improved   Skin:     General: Skin is warm and dry.   Neurological:      General: No focal deficit present.      Mental Status: She is easily aroused.      GCS: GCS eye subscore is 3. GCS verbal subscore is 1. GCS motor subscore is 6.      Motor: Weakness (generalized) present.      Comments: Awakens to name, follows commands, squeezes hands on command       Last Recorded Vitals  Blood pressure 116/75, pulse (!) 115, temperature " "37.3 °C (99.1 °F), temperature source Temporal, resp. rate 23, height 1.651 m (5' 5\"), weight 54.9 kg (121 lb 0.5 oz), SpO2 92%.    VS over last 24h  Heart Rate:  [100-135]   Temp:  [36.4 °C (97.5 °F)-37.5 °C (99.5 °F)]   Resp:  [19-32]   BP: ()/()   Weight:  [54.9 kg (121 lb 0.5 oz)]   SpO2:  [89 %-97 %]      Intake/Output last 3 Shifts:  I/O last 3 completed shifts:  In: 1909.8 (34.8 mL/kg) [I.V.:199.8 (3.6 mL/kg); NG/GT:1710]  Out: 2870 (52.3 mL/kg) [Urine:2870 (1.5 mL/kg/hr)]  Weight: 54.9 kg       Intake/Output Summary (Last 24 hours) at 1/27/2025 0738  Last data filed at 1/27/2025 0600  Gross per 24 hour   Intake 1340.31 ml   Output 1760 ml   Net -419.69 ml        Relevant Results  Scheduled medications  budesonide, 0.5 mg, nebulization, BID  clonazePAM, 0.5 mg, oral, q8h  dexAMETHasone, 6 mg, intravenous, q24h  heparin (porcine), 5,000 Units, subcutaneous, q8h  insulin lispro, 0-5 Units, subcutaneous, q4h  ipratropium-albuteroL, 3 mL, nebulization, q6h  lidocaine, 1 patch, transdermal, Daily  midodrine, 10 mg, nasogastric tube, q8h  oxyCODONE, 5 mg, nasogastric tube, q6h  oxygen, , inhalation, Continuous - Inhalation  pantoprazole, 40 mg, intravenous, Daily  perflutren lipid microspheres, 0.5-10 mL of dilution, intravenous, Once in imaging  perflutren protein A microsphere, 0.5 mL, intravenous, Once in imaging  sulfur hexafluoride microsphr, 2 mL, intravenous, Once in imaging  thiamine, 100 mg, intravenous, Daily  traZODone, 25 mg, oral, Nightly      Continuous medications  dexmedeTOMIDine, 0.1-1.5 mcg/kg/hr, Last Rate: Stopped (01/26/25 1800)        PRN medications  PRN medications: albuterol, calcium gluconate, calcium gluconate, dextrose, dextrose, glucagon, glucagon, HYDROmorphone, magnesium sulfate, magnesium sulfate, oxygen, potassium chloride, potassium chloride      Results for orders placed or performed during the hospital encounter of 01/13/25 (from the past 24 hours)   POCT GLUCOSE "   Result Value Ref Range    POCT Glucose 127 (H) 74 - 99 mg/dL   Blood Gas Arterial Full Panel   Result Value Ref Range    POCT pH, Arterial 7.45 (H) 7.38 - 7.42 pH    POCT pCO2, Arterial 37 (L) 38 - 42 mm Hg    POCT pO2, Arterial 70 (L) 85 - 95 mm Hg    POCT SO2, Arterial 96 94 - 100 %    POCT Oxy Hemoglobin, Arterial 93.0 (L) 94.0 - 98.0 %    POCT Hematocrit Calculated, Arterial 35.0 (L) 36.0 - 46.0 %    POCT Sodium, Arterial 139 136 - 145 mmol/L    POCT Potassium, Arterial 3.1 (L) 3.5 - 5.3 mmol/L    POCT Chloride, Arterial 110 (H) 98 - 107 mmol/L    POCT Ionized Calcium, Arterial 1.00 (L) 1.10 - 1.33 mmol/L    POCT Glucose, Arterial 106 (H) 74 - 99 mg/dL    POCT Lactate, Arterial 0.8 0.4 - 2.0 mmol/L    POCT Base Excess, Arterial 1.8 -2.0 - 3.0 mmol/L    POCT HCO3 Calculated, Arterial 25.7 22.0 - 26.0 mmol/L    POCT Hemoglobin, Arterial 11.6 (L) 12.0 - 16.0 g/dL    POCT Anion Gap, Arterial 6 (L) 10 - 25 mmo/L    Patient Temperature 37.0 degrees Celsius    FiO2 40 %   Blood Gas Arterial Full Panel   Result Value Ref Range    POCT pH, Arterial 7.45 (H) 7.38 - 7.42 pH    POCT pCO2, Arterial 36 (L) 38 - 42 mm Hg    POCT pO2, Arterial 80 (L) 85 - 95 mm Hg    POCT SO2, Arterial 100 94 - 100 %    POCT Oxy Hemoglobin, Arterial 95.9 94.0 - 98.0 %    POCT Hematocrit Calculated, Arterial 32.0 (L) 36.0 - 46.0 %    POCT Sodium, Arterial 141 136 - 145 mmol/L    POCT Potassium, Arterial 3.0 (L) 3.5 - 5.3 mmol/L    POCT Chloride, Arterial 111 (H) 98 - 107 mmol/L    POCT Ionized Calcium, Arterial 0.95 (L) 1.10 - 1.33 mmol/L    POCT Glucose, Arterial 116 (H) 74 - 99 mg/dL    POCT Lactate, Arterial 0.8 0.4 - 2.0 mmol/L    POCT Base Excess, Arterial 1.1 -2.0 - 3.0 mmol/L    POCT HCO3 Calculated, Arterial 25.0 22.0 - 26.0 mmol/L    POCT Hemoglobin, Arterial 10.5 (L) 12.0 - 16.0 g/dL    POCT Anion Gap, Arterial 8 (L) 10 - 25 mmo/L    Patient Temperature 37.0 degrees Celsius    FiO2 40 %   POCT GLUCOSE   Result Value Ref Range    POCT  Glucose 184 (H) 74 - 99 mg/dL   Blood Gas Arterial Full Panel   Result Value Ref Range    POCT pH, Arterial 7.45 (H) 7.38 - 7.42 pH    POCT pCO2, Arterial 46 (H) 38 - 42 mm Hg    POCT pO2, Arterial 67 (L) 85 - 95 mm Hg    POCT SO2, Arterial 95 94 - 100 %    POCT Oxy Hemoglobin, Arterial 91.8 (L) 94.0 - 98.0 %    POCT Hematocrit Calculated, Arterial 37.0 36.0 - 46.0 %    POCT Sodium, Arterial 136 136 - 145 mmol/L    POCT Potassium, Arterial 4.6 3.5 - 5.3 mmol/L    POCT Chloride, Arterial 102 98 - 107 mmol/L    POCT Ionized Calcium, Arterial 1.10 1.10 - 1.33 mmol/L    POCT Glucose, Arterial 180 (H) 74 - 99 mg/dL    POCT Lactate, Arterial 1.3 0.4 - 2.0 mmol/L    POCT Base Excess, Arterial 7.0 (H) -2.0 - 3.0 mmol/L    POCT HCO3 Calculated, Arterial 32.0 (H) 22.0 - 26.0 mmol/L    POCT Hemoglobin, Arterial 12.2 12.0 - 16.0 g/dL    POCT Anion Gap, Arterial 7 (L) 10 - 25 mmo/L    Patient Temperature 37.0 degrees Celsius    FiO2 40 %   Blood Gas Arterial Full Panel   Result Value Ref Range    POCT pH, Arterial 7.44 (H) 7.38 - 7.42 pH    POCT pCO2, Arterial 48 (H) 38 - 42 mm Hg    POCT pO2, Arterial 71 (L) 85 - 95 mm Hg    POCT SO2, Arterial 96 94 - 100 %    POCT Oxy Hemoglobin, Arterial 92.8 (L) 94.0 - 98.0 %    POCT Hematocrit Calculated, Arterial 36.0 36.0 - 46.0 %    POCT Sodium, Arterial 137 136 - 145 mmol/L    POCT Potassium, Arterial 4.4 3.5 - 5.3 mmol/L    POCT Chloride, Arterial 102 98 - 107 mmol/L    POCT Ionized Calcium, Arterial 1.14 1.10 - 1.33 mmol/L    POCT Glucose, Arterial 144 (H) 74 - 99 mg/dL    POCT Lactate, Arterial 1.2 0.4 - 2.0 mmol/L    POCT Base Excess, Arterial 7.3 (H) -2.0 - 3.0 mmol/L    POCT HCO3 Calculated, Arterial 32.6 (H) 22.0 - 26.0 mmol/L    POCT Hemoglobin, Arterial 11.9 (L) 12.0 - 16.0 g/dL    POCT Anion Gap, Arterial 7 (L) 10 - 25 mmo/L    Patient Temperature 37.0 degrees Celsius    FiO2 40 %   POCT GLUCOSE   Result Value Ref Range    POCT Glucose 138 (H) 74 - 99 mg/dL   POCT GLUCOSE    Result Value Ref Range    POCT Glucose 122 (H) 74 - 99 mg/dL   Blood Gas Arterial Full Panel   Result Value Ref Range    POCT pH, Arterial 7.45 (H) 7.38 - 7.42 pH    POCT pCO2, Arterial 48 (H) 38 - 42 mm Hg    POCT pO2, Arterial 65 (L) 85 - 95 mm Hg    POCT SO2, Arterial 94 94 - 100 %    POCT Oxy Hemoglobin, Arterial 91.1 (L) 94.0 - 98.0 %    POCT Hematocrit Calculated, Arterial 36.0 36.0 - 46.0 %    POCT Sodium, Arterial 138 136 - 145 mmol/L    POCT Potassium, Arterial 4.4 3.5 - 5.3 mmol/L    POCT Chloride, Arterial 101 98 - 107 mmol/L    POCT Ionized Calcium, Arterial 1.18 1.10 - 1.33 mmol/L    POCT Glucose, Arterial 105 (H) 74 - 99 mg/dL    POCT Lactate, Arterial 1.0 0.4 - 2.0 mmol/L    POCT Base Excess, Arterial 8.2 (H) -2.0 - 3.0 mmol/L    POCT HCO3 Calculated, Arterial 33.4 (H) 22.0 - 26.0 mmol/L    POCT Hemoglobin, Arterial 12.1 12.0 - 16.0 g/dL    POCT Anion Gap, Arterial 8 (L) 10 - 25 mmo/L    Patient Temperature 37.0 degrees Celsius    FiO2 40 %   Calcium, Ionized   Result Value Ref Range    POCT Calcium, Ionized 1.17 1.1 - 1.33 mmol/L   CBC   Result Value Ref Range    WBC 23.9 (H) 4.4 - 11.3 x10*3/uL    nRBC 0.0 0.0 - 0.0 /100 WBCs    RBC 4.14 4.00 - 5.20 x10*6/uL    Hemoglobin 11.6 (L) 12.0 - 16.0 g/dL    Hematocrit 34.5 (L) 36.0 - 46.0 %    MCV 83 80 - 100 fL    MCH 28.0 26.0 - 34.0 pg    MCHC 33.6 32.0 - 36.0 g/dL    RDW 17.7 (H) 11.5 - 14.5 %    Platelets 284 150 - 450 x10*3/uL   Coagulation Screen   Result Value Ref Range    Protime 13.8 (H) 9.8 - 12.8 seconds    INR 1.2 (H) 0.9 - 1.1    aPTT 30 27 - 38 seconds   Magnesium   Result Value Ref Range    Magnesium 2.25 1.60 - 2.40 mg/dL   Renal function panel   Result Value Ref Range    Glucose 111 (H) 74 - 99 mg/dL    Sodium 142 136 - 145 mmol/L    Potassium 4.6 3.5 - 5.3 mmol/L    Chloride 101 98 - 107 mmol/L    Bicarbonate 34 (H) 21 - 32 mmol/L    Anion Gap 12 10 - 20 mmol/L    Urea Nitrogen 19 6 - 23 mg/dL    Creatinine 0.24 (L) 0.50 - 1.05 mg/dL     eGFR >90 >60 mL/min/1.73m*2    Calcium 8.9 8.6 - 10.6 mg/dL    Phosphorus 2.9 2.5 - 4.9 mg/dL    Albumin 3.5 3.4 - 5.0 g/dL   POCT GLUCOSE   Result Value Ref Range    POCT Glucose 124 (H) 74 - 99 mg/dL   Blood Gas Arterial Full Panel   Result Value Ref Range    POCT pH, Arterial 7.51 (H) 7.38 - 7.42 pH    POCT pCO2, Arterial 42 38 - 42 mm Hg    POCT pO2, Arterial 64 (L) 85 - 95 mm Hg    POCT SO2, Arterial 94 94 - 100 %    POCT Oxy Hemoglobin, Arterial 91.2 (L) 94.0 - 98.0 %    POCT Hematocrit Calculated, Arterial 36.0 36.0 - 46.0 %    POCT Sodium, Arterial 137 136 - 145 mmol/L    POCT Potassium, Arterial 4.3 3.5 - 5.3 mmol/L    POCT Chloride, Arterial 104 98 - 107 mmol/L    POCT Ionized Calcium, Arterial 1.15 1.10 - 1.33 mmol/L    POCT Glucose, Arterial 118 (H) 74 - 99 mg/dL    POCT Lactate, Arterial 1.2 0.4 - 2.0 mmol/L    POCT Base Excess, Arterial 9.5 (H) -2.0 - 3.0 mmol/L    POCT HCO3 Calculated, Arterial 33.5 (H) 22.0 - 26.0 mmol/L    POCT Hemoglobin, Arterial 12.1 12.0 - 16.0 g/dL    POCT Anion Gap, Arterial 4 (L) 10 - 25 mmo/L    Patient Temperature 37.0 degrees Celsius    FiO2 40 %       XR chest 1 view   Final Result   1.  Slight interval worsening in left-sided airspace disease/edema   status post right-sided pneumonectomy.             Signed by: Nino Barbour 1/26/2025 1:16 PM   Dictation workstation:   [id number]      XR chest 1 view   Final Result   1.  Post pneumonectomy changes with continued left perihilar edema.             Signed by: Nino Barbour 1/25/2025 9:51 AM   Dictation workstation:   [id number]      XR chest 1 view   Final Result   1.  Similar left lung pulmonary edema with small left pleural   effusion.   2. Status post right pneumonectomy.   3. [place]-Paco catheter with tip projecting over the left lower lobe,   in similar position.                  MACRO:   None        Signed by: Alexx Anne 1/24/2025 10:24 AM   Dictation workstation:   [id number]      XR chest 1 view   Final  Result   Addendum (preliminary) 1 of 1   Interpreted By:  Alexx Anne,    ADDENDUM:   Right IJ approach Aim[place] Paco catheter is also noted with tip   projecting over the left lower lobe in similar position to prior exam.        Signed by: Alexx Anne 1/24/2025 10:25 AM        -------- ORIGINAL REPORT --------   Dictation workstation:   [id number]      Final   1.  Stable findings of pulmonary edema within the left lung.   2. Postsurgical changes of right pneumonectomy.   3. Medical devices as above.                  MACRO:   None        Signed by: Alexx Anne 1/24/2025 9:09 AM   Dictation workstation:   [id number]      XR chest 1 view   Final Result   1. The tip of the endotracheal tube terminates 7.5 cm of the sina.   2. Slightly decreased  interstitial prominence and airspace opacities   within the left lung when compared to prior radiograph,  suggestive   of improving infectious process or improving pulmonary edema.   3. No significant interval change in postoperative changes from   right-sided pneumonectomy, resection of multiple right-sided ribs,   complete opacification of the pneumonectomy bed, and rightward   mediastinal shift.        I personally reviewed the images/study and I agree with radiology   resident  Dr. Meghan Ramesh findings as stated. This study was   interpreted at [facility],   [place], [place]        MACRO:   None        Signed by: Oseas Stroud 1/24/2025 8:09 AM   Dictation workstation:   [id number]      XR abdomen 1 view   Final Result   1.  Dobhoff tube is in place with the tip projecting over the 1st   portion of the duodenum.   2. There is gas-filled dilated stomach. A few slight prominent loops   of the bowel in the upper abdomen with overall nonobstructive bowel   gas pattern.   3. Postsurgical changes of right pneumonectomy with resection of ribs   and patchy airspace opacities in the left lung.   4. [place]well-Paco catheter projecting over the lower  pulmonary artery.   Recommend retraction.        MACRO:   None        Signed by: Oseas Stroud 1/23/2025 10:15 AM   Dictation workstation:   [id number]      XR chest 1 view   Final Result   1. Unchanged appearance of multifocal patchy airspace opacities with   interstitial prominence of the left lung which may be due to   infectious process. Component of edema can not be excluded.   2. Medical devices as described above with similar positioning of the   [place]-Paco catheter projecting over left lower lobe pulmonary artery.   Recommend retraction.   3. Postsurgical changes of right-sided pneumonectomy and resection of   multiple right-sided ribs with complete opacification of the   pneumonectomy bed. The appearance is unchanged compared to prior   study.                  MACRO:   None        Signed by: Oseas Stroud 1/23/2025 9:36 AM   Dictation workstation:   [id number]      XR abdomen 1 view   Final Result   1.  As described above.        Signed by: Vick Xiao 1/22/2025 1:01 PM   Dictation workstation:   [id number]      XR chest 1 view   Final Result   1. Unchanged appearance of multifocal patchy airspace opacity with   interstitial prominence of the left lung which may be due to   infectious process. Component of edema cannot be excluded.   2. Medical devices as described above with interval advancement of   the [place]-Paco catheter projecting over left lower lobe pulmonary   artery. Recommend retraction.   3. Postsurgical changes of right-sided pneumonectomy and resection of   multiple right-sided ribs with complete opacification of the   pneumonectomy bed. The appearance is unchanged compared to prior   study.                  Signed by: Vick Xiao 1/22/2025 1:00 PM   Dictation workstation:   [id number]      XR chest 1 view   Final Result   1. Similar near-complete opacification of the right hemithorax, which   may be secondary to a large pleural effusion, pneumonectomy, or   bronchial  obstruction.   2. Diffuse interstitial opacities and scattered patchy alveolar   opacities throughout the left lung, similar to the prior exam, which   may reflect prominent pulmonary edema or multifocal infection.   3. Medical devices as above.        I personally reviewed the image(s)/study and resident interpretation   as stated by Dr. Ange Sue MD. I agree with the findings as   stated. This study was interpreted at [facility], [place], [place].        MACRO:   None        Signed by: Oseas Stroud 1/21/2025 7:58 PM   Dictation workstation:   [id number]      Vascular US upper extremity venous duplex left   Final Result      Transthoracic Echo (TTE) Limited   Final Result      XR chest 1 view   Final Result   1.  Interval opacification of the right hemithorax and correlate with   post pneumonectomy changes.   2. [place]-Paco catheter overlies the left lower lobe pulmonary artery.   3. Slight interval improvement in left-sided pulmonary edema.   Underlying edema and correlate with fluid status.             Signed by: Nino Barbour 1/20/2025 9:57 AM   Dictation workstation:   [id number]      XR abdomen 1 view   Final Result   1.  As described above.        Signed by: Vick Xiao 1/19/2025 11:58 AM   Dictation workstation:   [id number]      XR chest 1 view   Final Result   1. Slight worsening of multifocal airspace opacity in the left mid   and lower lung. Findings are concerning for infectious process. Small   left pleural effusion.   2. Postsurgical changes right pneumonectomy with medical devices as   described above. Consider retracting the [place]-Paco catheter.                  Signed by: Vick Xiao 1/19/2025 8:55 AM   Dictation workstation:   [id number]      Transthoracic Echo (TTE) Limited   Final Result      XR chest 1 view   Final Result   1. Patchy airspace opacity in the left lower lung with slight   improvement in aeration. Correlate with  concern for infection   2. Postsurgical changes of right pneumonectomy as described above.   3. Medical lines and devices as above. Consider retraction of the   [place]-Paco catheter.        I personally reviewed the images/study and I agree with the findings   as stated. This study was interpreted at [facility], [place], [place].        MACRO:   None        Signed by: Vick Xiao 1/19/2025 8:54 AM   Dictation workstation:   [id number]      XR chest 1 view   Final Result   1. Worsening aeration of the left lung with increasing left basilar   infiltrate and pleural effusion. Correlate with mucous plugging and   component of lobar collapse.   2. Component of interstitial prominence of the left lung, also   slightly increased which may be due to edema.   3. Postsurgical changes of right pneumonectomy as described                  Signed by: Vick Xiao 1/18/2025 11:35 AM   Dictation workstation:   [id number]      Cardiac Catheterization Procedure   Final Result      Transthoracic Echo (TTE) Limited   Final Result      XR chest 1 view   Final Result   1.  Stable exam with postsurgical changes of right pneumonectomy/rib   resection and large layering fluid component within the right   hemithorax.   2. Similar findings of left pulmonary edema with slightly increased   size of small left pleural effusion. No left-sided pneumothorax.   3. Medical devices as above.                  MACRO:   None        Signed by: Alexx Anne 1/17/2025 8:16 AM   Dictation workstation:   [id number]      XR chest 1 view   Final Result   1.  Postsurgical changes of right-sided pneumonectomy with increased   layering opacification of the right hemithorax, likely due to   increasing fluid.   2. Similar trace left-sided pleural effusion with linear left basilar   airspace opacity, likely atelectasis on a background of likely   pulmonary edema. Infection is not definitively excluded.   3. Medical  devices as above with interval insertion of right internal   jugular central venous catheter projecting at the mid SVC.        I personally reviewed the images/study and I agree with the findings   as stated by resident Davidson Mcguire. This study was interpreted   at [place], [place].        MACRO:   None        Signed by: Alexx Anne 1/17/2025 8:14 AM   Dictation workstation:   [id number]      Transthoracic Echo (TTE) Limited   Final Result      US right upper quadrant   Final Result   Small amount of simple appearing ascites in the right upper quadrant   without evidence of cholelithiasis, cholecystitis or biliary dilation.        Signed by: Alberto Salas 1/16/2025 1:02 PM   Dictation workstation:   [id number]      XR chest 1 view   Final Result   1. Slight interval increase in left basilar airspace opacity which   may be due to increasing atelectasis. Cannot exclude developing   infectious infiltrate. Question trace left pleural effusion.   2. Again seen postsurgical changes related to right-sided   pneumonectomy as described above                  Signed by: Vick Xiao 1/16/2025 9:47 AM   Dictation workstation:   [id number]      US renal complete   Final Result   1. Unremarkable sonographic evaluation of the kidneys.   2. Incomplete evaluation of a gallbladder with some pericholecystic   fluid and biliary sludge. Dedicated right upper quadrant ultrasound   could be obtained as clinically warranted.   3. A small amount of free fluid present in the right upper quadrant   and pelvis.        Findings relayed by phone by me to SICU provider at 5:11 p.m.        I personally reviewed the images/study and I agree with the findings   as stated by Erickson Best MD (resident) . This study was   interpreted at [place], [place].        MACRO:   None        Signed by: Alberto Salas 1/15/2025 6:17 PM    Dictation workstation:   [id number]      XR chest 1 view   Final Result   1.  Status post pneumonectomy with interval increase in fluid   component of right-sided hydropneumothorax.   2. Medical devices as above.                  MACRO:   None        Signed by: Alexx Anne 1/15/2025 9:28 AM   Dictation workstation:   [id number]      XR chest 1 view   Final Result   1. Interval removal of right chest tube. Other medical devices as   described above.   2. Postsurgical changes related to right pneumonectomy.   3. Faint left basilar opacity, likely atelectatic.                  Signed by: Vick Xiao 1/14/2025 2:53 PM   Dictation workstation:   [id number]      XR chest 1 view   Final Result   1.  Stable right-sided pneumonectomy changes.             Signed by: Nino Barbour 1/14/2025 12:56 PM   Dictation workstation:   [id number]      Transthoracic Echo (TTE) Limited   Final Result      XR chest 1 view   Final Result   1. Postsurgical changes related to right sided pneumonectomy with   multiple medical devices as described above.   2. Mild interstitial prominence of the left lung.                  Signed by: Vick Xiao 1/13/2025 3:16 PM   Dictation workstation:   [id number]      Transthoracic Echo (TTE) Limited    (Results Pending)        Assessment/Plan   Assessment:  Faustina Vick is a 65 y/o with PMHx of COPD and 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 Wagner on 1/13 requiring post-op vasoactive therapy. Post-op course c/b acute hyponatremia, multifactorial delirium, cardiogenic shock requiring inotropic support, and acute hypoxic/hypercarbic respiratory failure requiring intubation. COVID+ 1/20. Weaned off milrinone 1/21. Weaned off iEpo 1/24.      Plan:  NEURO: History of anxiety on alprazolam 1mg qd, last filled 1/4/2025 per [facility] review. Family  states she was not taking for several months but then restarted prn about 3 wks prior to surgery for situational anxiety. A&Ox4 at baseline. Multifactorial delirium improved. Currently intubated. On scheduled oxycodone, scheduled clonazepam and nightly trazodone. Awakens and follows commands. Anxious during SBT today.  Severe deconditioning  - Qtc 484 today  - continue trazodone nightly  - continue clonazepam 0.5 mg q8h  - Ongoing neuro and pain assessments  - Continue scheduled oxycodone q6h  - Lidocaine patch to right chest wall  - PT/OT consulted. Advance activity as tolerated  - Restraints indicated while patient remains intubated, restrain with soft wrist restraints until medical devices are discontinued.      CV: No history of cardiac disease. Baseline /79, -110. TTE 9/2024 normal biV function, RVSP 10mmHg. Intra-op hypotension requiring vaso and epi. Arrived to SICU on vaso 0.06 units/min, started on levo 0.05 mcg/kg/min. TTE 1/13 with EF 60-65%, low normal RV systolic function. Weaned off vasopressors 1/15. Elevated lactate to 2.9 on 1/16 with SOB and new O2 requirement. Repeat TTE 1/16: acute biV dysfunction with signs of takotsubo cardiomyopathy, EF 34%, mod reduced RV systolic function, mildly elevated RVSP, mild to mod TR. Cardiogenic shock, started milrinone, lasix infusion, and vasopressin. Shock Call on 1/16, no indication for mechanical circulatory support. Initial troponin 928 with downtrend and normalization of lactate. Epinephrine infusion added overnight 1/16-1/17, discontinued 2/2 tachycardia in the 130s. Milrinone increased to 0.25mcg/kg/min (decreased to 0.125 thereafter). Lasix infusion stopped 1/17 given contraction alkalosis. Taken to cath lab 1/17 for RHC/PA cath placement, opening CI 2.9. Milrinone uptitrated to 0.375mcg/kg/min 1/17-1/18. iEpo 0.05 added 1/18, high dose levo/vaso. Repeat TTE 1/20 EF 35% (on iEpo and milrinone). Levo weaned off 1/20. Intermittent sinus  tachycardia. Milrinone discontinued 1/21 at ~6pm. iEpo weaned off 1/24 ~ 2300. Responsive to diuresis. Started midodrine 1/25. Vaso weaned off 1/25. PA cath removed 1/26. Episodes of sinus tachycardia to 160-170 range, last given 5mg iv metoprolol 2215. Recurrent episode today with SBT, improved after return to full vent support.  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 Arrived to SICU extubated on 10L SFM, weaned to room air 1/14. Chest tube removed 1/14. Acute on chronic hypoxic respiratory failure 1/16. Escalation to Airvo 50L 90% overnight 1/17-1/18. Acute hypercapnia 1/18 with increase WOB, trial of BIPAP with iEPO. Eventual intubation 1/18 evening. Currently on AC-VC/22/40%/300/5. Tolerated volume support SBTs 1/24, 1/25 and 1/26. Failed today, became tachycardic to 150s with desaturation to 88%. CXR with improved penetration, new basilar atelectasis.  - increase peep to 8  - Continue mechanical ventilation -> continue weaning trials daily  - wean FiO2 to maintain SpO2 >92%  - SDS switched to decadron on 1/22 for treatment of COVID (end date 1/29)  - scheduled budesonide/duoneb  - PRN albuterol  - Daily CXR  - ABG prn  - holding home benzonatate  - Dr. Wagner to meet with family this week to discuss trach and peg    GI: No GI history. LFTs WNL 12/2/24. Evidence of pericholecystic fluid and biliary sludge on renal US 1/15 but no abdominal  symptoms. RUQ US with small ascites, otherwise no acute findings. Abdominal distention this am. Elevated LFTs, now normalized. LBM 1/17. OG tube placed 1/19 for gastric decompression. TFs initiated via OG 1/20, then held on 1/21 due to regurgitation from OG. Dobhoff placed via IRIS and advanced post-pyloric. Tolerating TF at goal rate. Liquid Bms x4 1/25. Cdiff negative 1/25  - Continue TF at goal rate 30ml/hr  - Continue thiamine 100mg IV daily  - Continue IV PPI for GI prophylaxis  - resume bowel reg if no bm today    : No history of renal disease. Baseline creatinine 0.6. Symptomatic acute hyponatremia (SIADH vs vaso mediated) to 120 overnight 1/14 - 1/15, corrected to 124 after 150cc 3% NaCl x2 on 1/15. Renal US on 1/15 unremarkable. Started on lasix infusion 1/16-1/17. Spot diuresis 1/20 and 1/24. Yoo changed overnight 1/18-1/19. Net negative 400ml  - nephrology signed off 1/18, call with questions  - RFP daily and prn  - Maintain yoo for strict I/Os   - Maintain U/O >0.5ml/kg/hr  - Replete electrolytes to maintain K>4, Phos>2.5, iCal>1.1, Mag>2     HEME: Hx of DOC. Baseline H/H 12/40. Acute blood loss anemia, OR EBL 150mL. Acute coagulopathy with INR elevated to 2.3 2/2 to poor nutritional status. Received IV Vit K 10mg x1 with improved INR thereafter. Transfused 1u PRBC 1/17 and 1u PRBC on 1/18. LUE duplex obtained 1/20 for swelling, negative for DVT. Stable edema in LUE, negative DVT ultrasound 1/20 ultrasound. Hgb 7.4 1/25, transfused 1 RBC with Hgb increase to 10.7 thereafter. H/H essentially stable since  - Check CBC daily and PRN  - coags prn  - goal Hgb >8  - SCDs and SQH for DVT ppx  - ongoing monitoring for s/s bleeding  - maintain active T&S (1/26)     ENDO: No history of DM or thyroid disease. TSH 0.48 (1/17). Recent 5d course of prednisone 10mg 12/2024 for URI. Adequate glycemic control. Started on SDS out of OR 1/13. Transitioned to dexAMETHasone 1/22  - BG q4h, SSI #1     MSK: Arthritis,  Vit D deficiency.  - not on meds at home  - monitor for symptoms     ID: Afebrile, leukocytosis. Completed yunior-op course of cefazolin. Worsened clinical condition 1/17, started on vanco, zosyn and azithromycin. Blood cultures, MRSA and Respiratory viral panel negative 1/17. Montiel changed 1/18, Urine cx 1/18 negative. Added fluc overnight 1/18-1/19. Sputum culture 1/19 negative. Vancomycin discontinued 1/20. Urine strep/legionella 1/20 negative. COVID+ on 1/20, initiated remdesivir and decadron. Discontinued fluc and azithro 1/21. Liquid stools. C.diff negative 1/26. Zosyn completed 1/26.  - COVID precautions  - ID consulted 1/20, appreciate recs, signed off, call with questions  - temp q4h, wbc daily  - Ongoing monitoring for s/s infection  - Completed 5 day course of remdesivir 1/24. On Decadron through 1/29     Lines:  - RIJ MAC (SICU, 1/16) -> consider removing later today or tomorrow  - L brachial A-line (1/16) -> re-site today  - Left chest mediport (not currently accessed)  - PIV  x2  - Montiel (1/18)  - ETT (1/18)     Dispo: Continue ICU care. Patient seen and discussed with ICU attending Dr. Tran    Family: son Yg and  Maximilian updated over the phone. Will further update at bedside if they visit today and prn.    SICU Phone [phone]    Critical care time: 60 minutes    Kathy Ambriz, APRN-CNP

## 2025-01-27 NOTE — PROGRESS NOTES
SICU Attending Note     Patient seen, evaluated, and discussed with the SARAH.  I have personally obtained key components of the history and physical and have performed my own medical decision making.      64 year old female with history of right upper lobe lung cancer s/p chemoradiation and now s/p pneumonectomy with Dr. Wagner on 1/13. Post-operatively, patient in SICU on vasoactive therapy, as well as with cardiogenic shock in setting of Takotsubo cardiomyopathy. Reintubated 1/18 due to acute on chronic hypoxic respiratory failure. Has remained on ventilator with failure to wean since then. Weaned off inotropes, PAC discontinued. Repeat TTE pending today.     Neuro: History of anxiety on home benzodiazepines. Will continue clonazepam. Trazodone at night for sleep. Currently off dexmedetomidine infusion, continue to reassess for appropriate control of anxiety. PT/OT with goal edge of bed minimum. Pain control.   CV: No history of cardiac dysfunction. Post-operatively TTE 1/16 demonstrated acute biventricular dysfunction with evidence of Takotsubo cardiomyopathy. Repeat TTE today to assess for function and volume status; last TTE 1/20 with EF ~35%. Acute tachycardia, rate 150-160 during spontaneous breathing trial today, EKG with sinus tachycardia. Tachycardia improved with return to full ventilator support. Has remained off vasoactive infusions; continue midodrine.   Pulm: History of tobacco use, COPD, right lung squamous cell carcinoma s/p chemoradiation and now s/p right pneumonectomy. Post-operative acute on chronic hypoxic respiratory failure with reintubation on 1/18. COVID + 1/20. Has failed spontaneous breathing trials daily. Trach conversation initiated with family over weekend, will continue to discuss with Thoracic Surgery and Dr. Wagner.   GI: No history of GI disease. Continue nutrition with tube feeds. PPI IV. Bowel regimen.   : Post-operatively with acute symptomatic hyponatremia, which has resolved.  Continue to monitor and optimize electrolytes. Goal fluid balance even to negative, will assess volume status on TTE and gently volume resuscitate as indicated. Monitor urine output.   Heme: No current indications for transfusion, maintain active type and screen. SCDs and SQH for DVT prophylaxis.   ID: COVID + 1/20 - completed Remdesevir, continue dexamethasone. No current indication for antibiotics, continue to monitor for signs/symptoms of infection.   Endo: Steroids for COVID. SSI for glycemic control.     Lines: Goal to re-site brachial arterial line   Pending TTE, discontinue central line     Dispo: Continue ICU care. Discussed with patient and family friend at bedside.     This critically ill patient continues to be at risk for clinically significant deterioration / failure due to the above mentioned dysfunctional, unstable organ systems.  I have personally identified and managed all complex critical care issues to prevent aforementioned clinical deterioration.  Critical care time is spent at bedside and/or the immediate area and has included, but is not limited to, the review of diagnostic tests, labs, radiographs, serial assessments of hemodynamics, respiratory status, ventilatory management, review of consult team recommendations, and family updates.  Time spent in procedures and teaching are reported separately. Critical Care Time: 48 minutes.

## 2025-01-27 NOTE — PROGRESS NOTES
"Physical Therapy    Physical Therapy Treatment    Patient Name: Faustina Vick \"Jannie\"  MRN: [id number]  [facility]  Room: 12/12-A  Today's Date: 1/27/2025  Time Calculation  Start Time: 1303  Stop Time: 1331  Time Calculation (min): 28 min         Assessment/Plan   PT Assessment  Barriers to Discharge Home: Physical needs, Caregiver assistance  Caregiver Assistance: Caregiver assistance needed per identified barriers - however, level of patient's required assistance exceeds assistance available at home  Physical Needs: Stair navigation into home limited by function/safety, High falls risk due to function or environment, 24hr ADL assistance needed, 24hr mobility assistance needed, Ambulating household distances limited by function/safety  Medical Staff Made Aware: Yes  End of Session Communication: Bedside nurse  End of Session Patient Position: Bed, 3 rail up, Alarm off, not on at start of session     PT Plan  Treatment/Interventions: Bed mobility, Transfer training, Gait training, Stair training, Balance training, Neuromuscular re-education, Strengthening, Endurance training, Therapeutic exercise, Therapeutic activity, Home exercise program, Postural re-education  PT Plan: Ongoing PT  PT Frequency: 4 times per week  PT Discharge Recommendations: Moderate intensity level of continued care  Equipment Recommended upon Discharge: Wheeled walker  PT Recommended Transfer Status: Total assist  PT - OK to Discharge: Yes      General Visit Information:   PT  Visit  PT Received On: 01/27/25  General  Family/Caregiver Present: Yes  Caregiver Feedback: family at bedside, supportive  Co-Treatment: OT  Co-Treatment Reason: Vent dependent with intent to mobilize  Prior to Session Communication: Bedside nurse  Patient Position Received: Bed, 3 rail up, Alarm off, not on at start of session (restraints not secure; okay per RN)  General Comment: Pt sitting up in chair position start of session working with OT. ETRIVKA CONNORS " 40% FiO2, PEEP 8, RR 22, off all sedation. A-line (not functioning), dobhoff, tele.    Subjective   Precautions:  Precautions  Medical Precautions: Fall precautions, Cardiac precautions, Oxygen therapy device and L/min, Infection precautions  Precautions Comment: MAP 65-70, droplet plus precautions      01/27/25 1257 01/27/25 1300 01/27/25 1331    Vital Signs   Vitals Session Pre PT  --  Post PT   Heart Rate 109 108 (!) 119   Resp 23 (!) 9 24   SpO2 95 % 95 % 95 %   /60 98/62 110/76   MAP (mmHg) 87 72 87   Vital Signs Comment  --   --  DURING: SBP 120s-140s with activity and HR 120s       Objective   Pain:  Pain Assessment  Pain Assessment: 0-10  0-10 (Numeric) Pain Score: 0 - No pain  Cognition:  Cognition  Overall Cognitive Status: Impaired  Orientation Level: Oriented X4 (with choices)  Following Commands: Follows one step commands with increased time    Postural Control:  Postural Control  Postural Control: Impaired    Activity Tolerance:  Activity Tolerance  Endurance: Tolerates 10 - 20 min exercise with multiple rests  Early Mobility/Exercise Safety Screen: Proceed with mobilization - No exclusion criteria met  Treatments:    Therapeutic Activity  Therapeutic Activity Performed: Yes  Therapeutic Activity 1: Chair position x10 mins prior to EOB mobility for gradual progression of upright tolerance. VSS.  Therapeutic Activity 2: EOB sitting x8 mins with primarily Max A for sitting balance. Progressed to brief moments of Min A when using UEs to pull anteriorly.    Bed Mobility  Bed Mobility: Yes  Bed Mobility 1  Bed Mobility 1: Supine to sitting, Sitting to supine  Level of Assistance 1: Dependent, +2  Bed Mobility Comments 1: HOB elevated, draw sheet       Transfers  Transfer: No    Outcome Measures:  [facility] Basic Mobility  Turning from your back to your side while in a flat bed without using bedrails: A lot  Moving from lying on your back to sitting on the side of a flat bed without using bedrails:  Total  Moving to and from bed to chair (including a wheelchair): Total  Standing up from a chair using your arms (e.g. wheelchair or bedside chair): Total  To walk in hospital room: Total  Climbing 3-5 steps with railing: Total  Basic Mobility - Total Score: 7    Education Documentation  Body Mechanics, taught by Allie Akhtar PT at 1/27/2025  3:40 PM.  Learner: Patient  Readiness: Acceptance  Method: Explanation, Demonstration  Response: Needs Reinforcement  Comment: PT POC    Home Exercise Program, taught by Allie Akhtar PT at 1/27/2025  3:40 PM.  Learner: Patient  Readiness: Acceptance  Method: Explanation, Demonstration  Response: Needs Reinforcement  Comment: PT POC    Mobility Training, taught by Allie Akhtar PT at 1/27/2025  3:40 PM.  Learner: Patient  Readiness: Acceptance  Method: Explanation, Demonstration  Response: Needs Reinforcement  Comment: PT POC    Education Comments  No comments found.        Encounter Problems       Encounter Problems (Active)       Balance       Pt will demo static sitting balance w/o UE support IND to decrease risk of falls in home environment (Not met)       Start:  01/15/25    Expected End:  02/05/25    Resolved:  01/22/25    Updated to: Pt will maintain standing balance with Min A using LRAD    Update reason: re-eval         Pt will maintain standing balance with Min A using LRAD (Progressing)       Start:  01/22/25    Expected End:  02/12/25                   Mobility       Pt will mobilize supine to sitting MI to promote functional independence (Not met)       Start:  01/15/25    Expected End:  02/05/25    Resolved:  01/22/25    Updated to: Pt will perform bed mobility with Min A    Update reason: re-eval         Pt will perform bed mobility with Min A (Progressing)       Start:  01/22/25    Expected End:  02/12/25                   Mobility       STG - Patient will ambulate x50 ft with Min A using LRAD (Progressing)       Start:  01/22/25    Expected End:   02/12/25               PT Transfers       Pt will transfer sit to stand w/ LRAD MI to improve functional mobility.  (Not met)       Start:  01/15/25    Expected End:  02/05/25    Resolved:  01/22/25    Updated to: Pt will perform functional transfers with Min A using LRAD    Update reason: Re-eval         Pt will perform functional transfers with Min A using LRAD (Progressing)       Start:  01/22/25    Expected End:  02/12/25                   Pain - Adult            Allie Akhtar PT, DPT

## 2025-01-27 NOTE — PROGRESS NOTES
Nutrition Follow Up Assessment:   Nutrition Assessment    Remains intubated, off sedation. Weaned off milrinone 1/21 and iEpo 1/24. Has been receiving enteral feeds--enteral feeds reached goal rate on 1/23 and have run at goal with minimal holds since. Remains in isolation for Covid.       Anthropometrics:  Weight History:   Date/Time Weight   01/27/25 0600 54.9 kg   01/26/25 0600 58.7 kg   01/24/25 0430 57.8 kg   01/23/25 0600 59.5 kg   01/20/25 0600 58.5 kg   01/19/25 0600 54.6 kg   01/18/25 0600 53.3 kg   01/17/25 0600 52.2 kg   01/13/25 1400 52.1 kg   01/02/25 1018 52.3 kg     Nutrition Focused Physical Exam Findings:    Subcutaneous Fat Loss:   Defer Subcutaneous Fat Loss Assessment: Defer all (Covid+)  Muscle Wasting:  Defer Muscle Wasting Assessment:  (Covid+)  Edema:  Edema: +2 mild  Edema Location: nonpitting  Physical Findings:  Skin:  (surgical sites)    Nutrition Significant Labs:  BG POCT trend:   Results from last 7 days   Lab Units 01/27/25  1137 01/27/25  0758 01/27/25  0402 01/26/25  2344 01/26/25  2104   POCT GLUCOSE mg/dL 148* 121* 124* 122* 138*    , Renal Lab Trend:   Results from last 7 days   Lab Units 01/27/25  0040 01/26/25  0435 01/25/25  1604 01/25/25  0024   POTASSIUM mmol/L 4.6 4.7 4.8 3.2*   PHOSPHORUS mg/dL 2.9 2.2* 2.1* 1.8*   SODIUM mmol/L 142 141 141 141   MAGNESIUM mg/dL 2.25 2.23 2.44* 1.88   EGFR mL/min/1.73m*2 >90 >90 >90 >90   BUN mg/dL 19 18 19 21   CREATININE mg/dL 0.24* 0.28* 0.34* 0.40*        Nutrition Specific Medications:  Scheduled medications  dexAMETHasone, 6 mg, intravenous, q24h  insulin lispro, 0-5 Units, subcutaneous, q4h  pantoprazole, 40 mg, intravenous, Daily  thiamine, 100 mg, intravenous, Daily      I/O:   Last BM Date: 01/25/25; Stool Appearance: Other (Comment) (No BM at this time) (01/26/25 2000)    Dietary Orders (From admission, onward)       Start     Ordered    01/26/25 1046  Enteral feeding with NPO Pivot 1.5; OG (orogastic tube); 30; 100; Water;  Every 6 hours  Diet effective now        Question Answer Comment   Tube feeding formula: Pivot 1.5    Feeding route: OG (orogastic tube)    Tube feeding continuous rate (mL/hr): 30    Tube feeding flush (mL): 100    Flush type: Water    Flush frequency: Every 6 hours        01/26/25 1045                     Estimated Needs:   Total Energy Estimated Needs in 24 hours (kCal): 1100 kCal (on vent)  Method for Estimating Needs: RMR/Taurus state equation  Total Protein Estimated Needs in 24 Hours (g):  (75-85)  Method for Estimating 24 Hour Protein Needs: ~1.3-1.5 gm/kg ideal BW  Total Fluid Estimated Needs in 24 Hours (mL):  (per team)        Nutrition Diagnosis   Malnutrition Diagnosis  Patient has Malnutrition Diagnosis: Yes  Diagnosis Status: Active  Malnutrition Diagnosis: Severe malnutrition related to chronic disease or condition  As Evidenced by: clinically significant weight loss x 3 months (8.5%), poor oral intakes past week over post-op course (<50% est needs >5days), suspect longer, visually frail appearing with at least mild losses to temporalis/orbital region & BMI = 19.1    Nutrition Diagnosis  Patient has Nutrition Diagnosis: Yes  Diagnosis Status (1): Active  Nutrition Diagnosis 1: Increased nutrient needs  Related to (1): increased metabolic demand  As Evidenced by (1): lung cancer, critical illness       Nutrition Interventions/Recommendations   Nutrition prescription for enteral nutrition    Nutrition Recommendations:  Individualized Nutrition Prescription Provided for : Continue with Pivot 1.5 @ 30 ml/hr = 1080 kcal, 68 g protein, 540 ml free H20    Nutrition Interventions/Goals:   Interventions: Enteral intake, Vitamin and mineral supplement therapy  Enteral Intake: Other (Comment)  Goal: Continue tube feed at current rate  Vitamin and Mineral Supplement Therapy: Thiamin supplement therapy  Goal: Change to 100 mg tablet daily         Nutrition Monitoring and Evaluation   Food/Nutrient Related History  Monitoring  Monitoring and Evaluation Plan: Enteral and parenteral nutrition intake determination  Enteral and Parenteral Nutrition Intake Determination: Enteral nutrition intake - Tolerate TF at goal rate         Biochemical Data, Medical Tests and Procedures  Monitoring and Evaluation Plan: Electrolyte/renal panel, Glucose/endocrine profile  Electrolyte and Renal Panel: Electrolytes within normal limits  Glucose/Endocrine Profile: Glucose within normal limits - ICU (140-180 mg/dL)         Goal Status: Goal(s) achieved    Time Spent (min): 30 minutes

## 2025-01-27 NOTE — CONSULTS
Wound Care Consult     Visit Date: 1/27/2025      Patient Name: Jannie Vick         MRN: [id number]           YOB: 1960     Reason for Consult: mouth/lip wound     Wound History: known medical device-related pressure injury from ETT     Wound Assessment:  Wound 01/13/25 Incision Chest Right;Upper (Active)   Site Assessment Clean;Dry 01/27/25 0800   Aleja-Wound Assessment Clean;Dry;Intact 01/27/25 0800   Closure Steri strips 01/25/25 1600   Treatments Site care 01/22/25 0800   Sutures/Staple Line Approximated 01/27/25 0800   Drainage Description None 01/27/25 0800   Drainage Amount None 01/27/25 0800   Dressing Steri-strips 01/27/25 0800   Dressing Changed Other (Comment) 01/25/25 2000   Dressing Status Dry 01/27/25 0800   Adhesive Closure Strips Intact 01/26/25 2000       Wound 01/25/25 Pressure Injury Mouth (Active)   Wound Image   01/27/25 1548   Site Assessment Brown;Razo;Eschar 01/27/25 1548   Aleja-Wound Assessment Intact;Pink;Painful 01/27/25 0800   Non-staged Wound Description Full thickness 01/27/25 1548   Pressure Injury Stage U 01/27/25 1548   Shape Circular 01/25/25 1654   Wound Length (cm) 1.5 cm 01/27/25 1548   Wound Width (cm) 0.8 cm 01/27/25 1548   Wound Surface Area (cm^2) 1.2 cm^2 01/27/25 1548   Wound Depth (cm) 0 cm 01/25/25 1654   Wound Volume (cm^3) 0 cm^3 01/25/25 1654   State of Healing Eschar 01/27/25 1548   Margins Well-defined edges 01/25/25 1654   Drainage Description None 01/27/25 0800   Drainage Amount None 01/27/25 1548   Dressing Open to air 01/27/25 1548   Dressing Changed New 01/25/25 1654   Dressing Status Clean 01/26/25 1600     Wound Team Summary Assessment: Pt seen resting intubated in TSICU. Razo, unstageable (but very likely full thickness) ulcer (medical device-related pressure injury) noted to R side of upper lip. Appears dry and cracked. Per family, site now being offloaded often to keep tube off wound. Bite block in place. Recs below -->     Wound Team Recs:  Change position of ETT BID or per unit protocol to offload wound. Diligent mouth care to keep mouth clean and bacteria low. Apply Medihoney gel (oracle #[id number]) BID to pressure injury on R upper lip. May continue with strip of Mepilex Lite to nasolabial space (skin between nose and lip) to protect from ETT julien.     Provider, please review recs above and sign saved wound care orders accordingly.      Ai Kapadia RN, CWON  Wound/Ostomy Nurse  1/27/2025  3:49 PM

## 2025-01-27 NOTE — POST-PROCEDURE NOTE
After a timeout, confirming the correct patient, procedure, and site, the right wrist was prepped and draped.  Local anesthesia was administered. The right radial artery was accessed, but guidewire advancement was unsuccessful. The procedure was aborted. The left wrist was then prepped and draped, and local anesthesia was administered.  Access to the left radial artery was not obtained. The procedure was aborted.    .Gaudencio Art, APRN-CNP, DNP

## 2025-01-27 NOTE — PROCEDURES
Insert arterial line    Date/Time: 1/27/2025 2:20 PM    Performed by: Sho Jhaveri MD  Authorized by: Rosalie Tran DO    Consent:     Consent obtained:  Verbal    Consent given by:  Guardian    Risks, benefits, and alternatives were discussed: yes    Universal protocol:     Patient identity confirmed:  Hospital-assigned identification number and arm band  Indications:     Indications: hemodynamic monitoring and multiple ABGs    Pre-procedure details:     Skin preparation:  Chlorhexidine    Preparation: Patient was prepped and draped in sterile fashion    Sedation:     Sedation type:  None  Anesthesia:     Anesthesia method:  Local infiltration    Local anesthetic:  Lidocaine 1% w/o epi  Procedure details:     Location:  R radial    Needle gauge:  20 G    Placement technique:  Ultrasound guided    Number of attempts:  1    Transducer: waveform confirmed    Post-procedure details:     Post-procedure:  Biopatch applied, secured with tape and sterile dressing applied    CMS:  Normal    Procedure completion:  Tolerated  Comments:      The patient was prepped and draped in the usual sterile manner using chlorhexidine scrub. Approx 1cc of 1% lidocaine was infiltrated under the skin for local anesthesia. The R radial artery was palpated and a 20g angiocath was used to cannulate the vessel. Pulsatile, arterial blood was visualized and a guidewire was passed through the catheter into the lumen of the vessel. The angiocath was then advanced over the wire using the Seldinger technique. A pressure transducer was then put in line that revealed an arterial pulsatile waveform.     The patient tolerated the procedure well without any immediate complications. The area was cleaned and a transparent dressing was applied.

## 2025-01-27 NOTE — PROGRESS NOTES
"Faustina Vick \"Jannie\" is a 64 y.o. female who presents for Primary cancer of right upper lobe of lung (Multi) [C34.11].  She initially received chemotherapy and radiation, but appeared to have residual radiographic abnormalities.  PET/CT suggested progression of disease, and in this setting and Dr. Wagner felt that surgical resection in a \"salvage\" setting might facilitate long-term overall survival given the failure of nonsurgical management She is now  14 days post op from PNEUMONECTOMY (Right) (en bloc with chest wall resection and reconstruction), mediastinal lymph node dissection for a  Primary cancer of right upper lobe of lung (Multi).      Subjective   Off pressors. This am again failed attempt at vent weaning (CPAP trial).     Objective     General: critically ill in ICU bed, intubated, follows commands.   HEENT: ETT in place. Enteral feeds via dobhoff secure in nares.   Resp: orally intubated, sats appropriate, symmetric rise, synchronous w/vent  CV:   ST on monitor, sinus  : yoo in place w/clear yellow uop    Last Recorded Vitals  Blood pressure 114/73, pulse (!) 123, temperature 36.8 °C (98.2 °F), temperature source Temporal, resp. rate 24, height 1.651 m (5' 5\"), weight 54.9 kg (121 lb 0.5 oz), SpO2 92%.  Intake/Output last 3 Shifts:  I/O last 3 completed shifts:  In: 1909.8 (34.8 mL/kg) [I.V.:199.8 (3.6 mL/kg); NG/GT:1710]  Out: 2870 (52.3 mL/kg) [Urine:2870 (1.5 mL/kg/hr)]  Weight: 54.9 kg     Relevant Results  Scheduled medications  budesonide, 0.5 mg, nebulization, BID  clonazePAM, 0.5 mg, oral, q8h  dexAMETHasone, 6 mg, intravenous, q24h  heparin (porcine), 5,000 Units, subcutaneous, q8h  insulin lispro, 0-5 Units, subcutaneous, q4h  ipratropium-albuteroL, 3 mL, nebulization, q6h  lidocaine, 1 patch, transdermal, Daily  midodrine, 10 mg, nasogastric tube, q8h  oxyCODONE, 5 mg, nasogastric tube, q6h  oxygen, , inhalation, Continuous - Inhalation  pantoprazole, 40 mg, intravenous, " Daily  perflutren lipid microspheres, 0.5-10 mL of dilution, intravenous, Once in imaging  perflutren protein A microsphere, 0.5 mL, intravenous, Once in imaging  sulfur hexafluoride microsphr, 2 mL, intravenous, Once in imaging  thiamine, 100 mg, intravenous, Daily  traZODone, 25 mg, oral, Nightly      Continuous medications     PRN medications  PRN medications: albuterol, calcium gluconate, calcium gluconate, dextrose, dextrose, glucagon, glucagon, HYDROmorphone, magnesium sulfate, magnesium sulfate, oxygen, potassium chloride, potassium chloride    Results for orders placed or performed during the hospital encounter of 01/13/25 (from the past 24 hours)   Blood Gas Arterial Full Panel   Result Value Ref Range    POCT pH, Arterial 7.45 (H) 7.38 - 7.42 pH    POCT pCO2, Arterial 36 (L) 38 - 42 mm Hg    POCT pO2, Arterial 80 (L) 85 - 95 mm Hg    POCT SO2, Arterial 100 94 - 100 %    POCT Oxy Hemoglobin, Arterial 95.9 94.0 - 98.0 %    POCT Hematocrit Calculated, Arterial 32.0 (L) 36.0 - 46.0 %    POCT Sodium, Arterial 141 136 - 145 mmol/L    POCT Potassium, Arterial 3.0 (L) 3.5 - 5.3 mmol/L    POCT Chloride, Arterial 111 (H) 98 - 107 mmol/L    POCT Ionized Calcium, Arterial 0.95 (L) 1.10 - 1.33 mmol/L    POCT Glucose, Arterial 116 (H) 74 - 99 mg/dL    POCT Lactate, Arterial 0.8 0.4 - 2.0 mmol/L    POCT Base Excess, Arterial 1.1 -2.0 - 3.0 mmol/L    POCT HCO3 Calculated, Arterial 25.0 22.0 - 26.0 mmol/L    POCT Hemoglobin, Arterial 10.5 (L) 12.0 - 16.0 g/dL    POCT Anion Gap, Arterial 8 (L) 10 - 25 mmo/L    Patient Temperature 37.0 degrees Celsius    FiO2 40 %   POCT GLUCOSE   Result Value Ref Range    POCT Glucose 184 (H) 74 - 99 mg/dL   Blood Gas Arterial Full Panel   Result Value Ref Range    POCT pH, Arterial 7.45 (H) 7.38 - 7.42 pH    POCT pCO2, Arterial 46 (H) 38 - 42 mm Hg    POCT pO2, Arterial 67 (L) 85 - 95 mm Hg    POCT SO2, Arterial 95 94 - 100 %    POCT Oxy Hemoglobin, Arterial 91.8 (L) 94.0 - 98.0 %    POCT  Hematocrit Calculated, Arterial 37.0 36.0 - 46.0 %    POCT Sodium, Arterial 136 136 - 145 mmol/L    POCT Potassium, Arterial 4.6 3.5 - 5.3 mmol/L    POCT Chloride, Arterial 102 98 - 107 mmol/L    POCT Ionized Calcium, Arterial 1.10 1.10 - 1.33 mmol/L    POCT Glucose, Arterial 180 (H) 74 - 99 mg/dL    POCT Lactate, Arterial 1.3 0.4 - 2.0 mmol/L    POCT Base Excess, Arterial 7.0 (H) -2.0 - 3.0 mmol/L    POCT HCO3 Calculated, Arterial 32.0 (H) 22.0 - 26.0 mmol/L    POCT Hemoglobin, Arterial 12.2 12.0 - 16.0 g/dL    POCT Anion Gap, Arterial 7 (L) 10 - 25 mmo/L    Patient Temperature 37.0 degrees Celsius    FiO2 40 %   Blood Gas Arterial Full Panel   Result Value Ref Range    POCT pH, Arterial 7.44 (H) 7.38 - 7.42 pH    POCT pCO2, Arterial 48 (H) 38 - 42 mm Hg    POCT pO2, Arterial 71 (L) 85 - 95 mm Hg    POCT SO2, Arterial 96 94 - 100 %    POCT Oxy Hemoglobin, Arterial 92.8 (L) 94.0 - 98.0 %    POCT Hematocrit Calculated, Arterial 36.0 36.0 - 46.0 %    POCT Sodium, Arterial 137 136 - 145 mmol/L    POCT Potassium, Arterial 4.4 3.5 - 5.3 mmol/L    POCT Chloride, Arterial 102 98 - 107 mmol/L    POCT Ionized Calcium, Arterial 1.14 1.10 - 1.33 mmol/L    POCT Glucose, Arterial 144 (H) 74 - 99 mg/dL    POCT Lactate, Arterial 1.2 0.4 - 2.0 mmol/L    POCT Base Excess, Arterial 7.3 (H) -2.0 - 3.0 mmol/L    POCT HCO3 Calculated, Arterial 32.6 (H) 22.0 - 26.0 mmol/L    POCT Hemoglobin, Arterial 11.9 (L) 12.0 - 16.0 g/dL    POCT Anion Gap, Arterial 7 (L) 10 - 25 mmo/L    Patient Temperature 37.0 degrees Celsius    FiO2 40 %   POCT GLUCOSE   Result Value Ref Range    POCT Glucose 138 (H) 74 - 99 mg/dL   POCT GLUCOSE   Result Value Ref Range    POCT Glucose 122 (H) 74 - 99 mg/dL   Blood Gas Arterial Full Panel   Result Value Ref Range    POCT pH, Arterial 7.45 (H) 7.38 - 7.42 pH    POCT pCO2, Arterial 48 (H) 38 - 42 mm Hg    POCT pO2, Arterial 65 (L) 85 - 95 mm Hg    POCT SO2, Arterial 94 94 - 100 %    POCT Oxy Hemoglobin, Arterial  91.1 (L) 94.0 - 98.0 %    POCT Hematocrit Calculated, Arterial 36.0 36.0 - 46.0 %    POCT Sodium, Arterial 138 136 - 145 mmol/L    POCT Potassium, Arterial 4.4 3.5 - 5.3 mmol/L    POCT Chloride, Arterial 101 98 - 107 mmol/L    POCT Ionized Calcium, Arterial 1.18 1.10 - 1.33 mmol/L    POCT Glucose, Arterial 105 (H) 74 - 99 mg/dL    POCT Lactate, Arterial 1.0 0.4 - 2.0 mmol/L    POCT Base Excess, Arterial 8.2 (H) -2.0 - 3.0 mmol/L    POCT HCO3 Calculated, Arterial 33.4 (H) 22.0 - 26.0 mmol/L    POCT Hemoglobin, Arterial 12.1 12.0 - 16.0 g/dL    POCT Anion Gap, Arterial 8 (L) 10 - 25 mmo/L    Patient Temperature 37.0 degrees Celsius    FiO2 40 %   Calcium, Ionized   Result Value Ref Range    POCT Calcium, Ionized 1.17 1.1 - 1.33 mmol/L   CBC   Result Value Ref Range    WBC 23.9 (H) 4.4 - 11.3 x10*3/uL    nRBC 0.0 0.0 - 0.0 /100 WBCs    RBC 4.14 4.00 - 5.20 x10*6/uL    Hemoglobin 11.6 (L) 12.0 - 16.0 g/dL    Hematocrit 34.5 (L) 36.0 - 46.0 %    MCV 83 80 - 100 fL    MCH 28.0 26.0 - 34.0 pg    MCHC 33.6 32.0 - 36.0 g/dL    RDW 17.7 (H) 11.5 - 14.5 %    Platelets 284 150 - 450 x10*3/uL   Coagulation Screen   Result Value Ref Range    Protime 13.8 (H) 9.8 - 12.8 seconds    INR 1.2 (H) 0.9 - 1.1    aPTT 30 27 - 38 seconds   Magnesium   Result Value Ref Range    Magnesium 2.25 1.60 - 2.40 mg/dL   Renal function panel   Result Value Ref Range    Glucose 111 (H) 74 - 99 mg/dL    Sodium 142 136 - 145 mmol/L    Potassium 4.6 3.5 - 5.3 mmol/L    Chloride 101 98 - 107 mmol/L    Bicarbonate 34 (H) 21 - 32 mmol/L    Anion Gap 12 10 - 20 mmol/L    Urea Nitrogen 19 6 - 23 mg/dL    Creatinine 0.24 (L) 0.50 - 1.05 mg/dL    eGFR >90 >60 mL/min/1.73m*2    Calcium 8.9 8.6 - 10.6 mg/dL    Phosphorus 2.9 2.5 - 4.9 mg/dL    Albumin 3.5 3.4 - 5.0 g/dL   POCT GLUCOSE   Result Value Ref Range    POCT Glucose 124 (H) 74 - 99 mg/dL   Blood Gas Arterial Full Panel   Result Value Ref Range    POCT pH, Arterial 7.51 (H) 7.38 - 7.42 pH    POCT pCO2,  Arterial 42 38 - 42 mm Hg    POCT pO2, Arterial 64 (L) 85 - 95 mm Hg    POCT SO2, Arterial 94 94 - 100 %    POCT Oxy Hemoglobin, Arterial 91.2 (L) 94.0 - 98.0 %    POCT Hematocrit Calculated, Arterial 36.0 36.0 - 46.0 %    POCT Sodium, Arterial 137 136 - 145 mmol/L    POCT Potassium, Arterial 4.3 3.5 - 5.3 mmol/L    POCT Chloride, Arterial 104 98 - 107 mmol/L    POCT Ionized Calcium, Arterial 1.15 1.10 - 1.33 mmol/L    POCT Glucose, Arterial 118 (H) 74 - 99 mg/dL    POCT Lactate, Arterial 1.2 0.4 - 2.0 mmol/L    POCT Base Excess, Arterial 9.5 (H) -2.0 - 3.0 mmol/L    POCT HCO3 Calculated, Arterial 33.5 (H) 22.0 - 26.0 mmol/L    POCT Hemoglobin, Arterial 12.1 12.0 - 16.0 g/dL    POCT Anion Gap, Arterial 4 (L) 10 - 25 mmo/L    Patient Temperature 37.0 degrees Celsius    FiO2 40 %   POCT GLUCOSE   Result Value Ref Range    POCT Glucose 121 (H) 74 - 99 mg/dL       XR chest 1 view 01/27/2025    Narrative  Interpreted By:  Oseas Stroud,  and Matilda Mckenzie  STUDY:  XR CHEST 1 VIEW;  1/27/2025 8:33 am    INDICATION:  Signs/Symptoms:am cxr.    COMPARISON:  Chest radiograph 01/26/2025 and 01/25/2025    ACCESSION NUMBER(S):  [id number]    ORDERING CLINICIAN:  WILMER ANTHONY    FINDINGS:  Semi-erect AP radiograph of the chest was provided.    Endotracheal tube positioned 2.1 cm above the sina.  Enteric tube courses below the level of the diaphragm and beyond the  field of view. Left chest wall single lumen MediPort with distal tip  overlying the innominate SVC junction. Interval removal of pulmonary  artery catheter.    CARDIOMEDIASTINAL SILHOUETTE:  Cardiomediastinal silhouette is stable in size and configuration.    LUNGS:  Postsurgical changes of right-sided pneumonectomy. Interval  improvement in left-sided edema and patchy airspace opacity. Small  left-sided pleural effusion. No left-sided pneumothorax.    ABDOMEN:  No remarkable upper abdominal findings.    BONES:  Postsurgical changes of  right-sided chest wall and rib resection.    Impression  1.  Interval improvement in the left-sided edema and patchy airspace  opacities. Small left-sided pleural effusion.  2. Postsurgical changes of right-sided pneumonectomy.  3. Medical lines and devices as above.    I personally reviewed the images/study and I agree with the findings  as stated by Jonah Velez MD (Radiology Resident).  This study was interpreted at [facility], [place], [place].    MACRO:  None    Signed by: Oseas Stroud 1/27/2025 10:55 AM  Dictation workstation:   [id number]     Assessment/Plan   Assessment & Plan  Primary cancer of right upper lobe of lung (Multi)    Chronic obstructive pulmonary disease (Multi)    Malignant neoplasm of upper lobe of right lung (Multi)    Cardiogenic shock (Multi)    Faustina Vick (Jannie) is a 64F with Hx of Stage III lung cancer, asthma, anxiety, and arthritis who is now s/p R pneumonectomy with Dr. Wagner 1/13/25. 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 Cardiology was consulted and recommended Lasix gtt and milrinone. Lasix gtt held 1/17, hyponatremia improving. Patient underwent RHC and SGC placement with cardiology 1/17. She was reintubated on 1/18 d/t worsening respiratory status and AMS. Also had increased pressor requirements, now weaned off. Repeat echo stable on 1/18. COVID positive on 1/20.   Failing extubation trials.     - Continue & appreciate supportive ICU care  - Goal net even to negative  - Plan trach + PEG later this week, exact timing to be determined. Dr. Wagner is available to discuss this plan with family.   - Slight rise in WBC, if  any fevers develop--bronch and repeat pan culture.     Patient seen and discussed with Dr. Boswell and attending Dr. Wagner via phone.     Jana Peralta, APRN-CNP  Thoracic surgery [id number]

## 2025-01-28 ENCOUNTER — APPOINTMENT (OUTPATIENT)
Dept: RADIOLOGY | Facility: HOSPITAL | Age: 65
End: 2025-01-28
Payer: COMMERCIAL

## 2025-01-28 PROBLEM — R06.03 RESPIRATORY DISTRESS: Status: ACTIVE | Noted: 2024-11-20

## 2025-01-28 LAB
ALBUMIN SERPL BCP-MCNC: 3.6 G/DL (ref 3.4–5)
ANION GAP BLDA CALCULATED.4IONS-SCNC: 5 MMO/L (ref 10–25)
ANION GAP BLDA CALCULATED.4IONS-SCNC: 5 MMO/L (ref 10–25)
ANION GAP BLDA CALCULATED.4IONS-SCNC: 6 MMO/L (ref 10–25)
ANION GAP SERPL CALC-SCNC: 12 MMOL/L (ref 10–20)
APTT PPP: 30 SECONDS (ref 27–38)
ATRIAL RATE: 125 BPM
ATRIAL RATE: 143 BPM
ATRIAL RATE: 88 BPM
BASE EXCESS BLDA CALC-SCNC: 8.5 MMOL/L (ref -2–3)
BASE EXCESS BLDA CALC-SCNC: 8.8 MMOL/L (ref -2–3)
BASE EXCESS BLDA CALC-SCNC: 8.9 MMOL/L (ref -2–3)
BODY TEMPERATURE: 37 DEGREES CELSIUS
BUN SERPL-MCNC: 22 MG/DL (ref 6–23)
CA-I BLD-SCNC: 1.11 MMOL/L (ref 1.1–1.33)
CA-I BLDA-SCNC: 1.17 MMOL/L (ref 1.1–1.33)
CA-I BLDA-SCNC: 1.18 MMOL/L (ref 1.1–1.33)
CA-I BLDA-SCNC: 1.19 MMOL/L (ref 1.1–1.33)
CALCIUM SERPL-MCNC: 8.9 MG/DL (ref 8.6–10.6)
CHLORIDE BLDA-SCNC: 102 MMOL/L (ref 98–107)
CHLORIDE BLDA-SCNC: 103 MMOL/L (ref 98–107)
CHLORIDE BLDA-SCNC: 105 MMOL/L (ref 98–107)
CHLORIDE SERPL-SCNC: 100 MMOL/L (ref 98–107)
CO2 SERPL-SCNC: 33 MMOL/L (ref 21–32)
CREAT SERPL-MCNC: 0.24 MG/DL (ref 0.5–1.05)
EGFRCR SERPLBLD CKD-EPI 2021: >90 ML/MIN/1.73M*2
ERYTHROCYTE [DISTWIDTH] IN BLOOD BY AUTOMATED COUNT: 18.8 % (ref 11.5–14.5)
GLUCOSE BLD MANUAL STRIP-MCNC: 107 MG/DL (ref 74–99)
GLUCOSE BLD MANUAL STRIP-MCNC: 115 MG/DL (ref 74–99)
GLUCOSE BLD MANUAL STRIP-MCNC: 131 MG/DL (ref 74–99)
GLUCOSE BLD MANUAL STRIP-MCNC: 136 MG/DL (ref 74–99)
GLUCOSE BLD MANUAL STRIP-MCNC: 145 MG/DL (ref 74–99)
GLUCOSE BLDA-MCNC: 112 MG/DL (ref 74–99)
GLUCOSE BLDA-MCNC: 121 MG/DL (ref 74–99)
GLUCOSE BLDA-MCNC: 159 MG/DL (ref 74–99)
GLUCOSE SERPL-MCNC: 123 MG/DL (ref 74–99)
HCO3 BLDA-SCNC: 33.5 MMOL/L (ref 22–26)
HCO3 BLDA-SCNC: 34.1 MMOL/L (ref 22–26)
HCO3 BLDA-SCNC: 34.4 MMOL/L (ref 22–26)
HCT VFR BLD AUTO: 32.9 % (ref 36–46)
HCT VFR BLD EST: 32 % (ref 36–46)
HCT VFR BLD EST: 33 % (ref 36–46)
HCT VFR BLD EST: 34 % (ref 36–46)
HGB BLD-MCNC: 10.5 G/DL (ref 12–16)
HGB BLDA-MCNC: 10.5 G/DL (ref 12–16)
HGB BLDA-MCNC: 11 G/DL (ref 12–16)
HGB BLDA-MCNC: 11.2 G/DL (ref 12–16)
INHALED O2 CONCENTRATION: 40 %
INHALED O2 CONCENTRATION: 40 %
INHALED O2 CONCENTRATION: 50 %
INR PPP: 1.2 (ref 0.9–1.1)
LACTATE BLDA-SCNC: 0.9 MMOL/L (ref 0.4–2)
MAGNESIUM SERPL-MCNC: 2.21 MG/DL (ref 1.6–2.4)
MCH RBC QN AUTO: 28.1 PG (ref 26–34)
MCHC RBC AUTO-ENTMCNC: 31.9 G/DL (ref 32–36)
MCV RBC AUTO: 88 FL (ref 80–100)
NRBC BLD-RTO: 0 /100 WBCS (ref 0–0)
OXYHGB MFR BLDA: 90.3 % (ref 94–98)
OXYHGB MFR BLDA: 91.6 % (ref 94–98)
OXYHGB MFR BLDA: 93.1 % (ref 94–98)
P AXIS: 47 DEGREES
P AXIS: 52 DEGREES
P AXIS: 58 DEGREES
P OFFSET: 208 MS
P OFFSET: 211 MS
P OFFSET: 212 MS
P ONSET: 154 MS
P ONSET: 160 MS
P ONSET: 167 MS
PCO2 BLDA: 46 MM HG (ref 38–42)
PCO2 BLDA: 49 MM HG (ref 38–42)
PCO2 BLDA: 53 MM HG (ref 38–42)
PH BLDA: 7.42 PH (ref 7.38–7.42)
PH BLDA: 7.45 PH (ref 7.38–7.42)
PH BLDA: 7.47 PH (ref 7.38–7.42)
PHOSPHATE SERPL-MCNC: 2.8 MG/DL (ref 2.5–4.9)
PLATELET # BLD AUTO: 273 X10*3/UL (ref 150–450)
PO2 BLDA: 65 MM HG (ref 85–95)
PO2 BLDA: 65 MM HG (ref 85–95)
PO2 BLDA: 72 MM HG (ref 85–95)
POTASSIUM BLDA-SCNC: 4.2 MMOL/L (ref 3.5–5.3)
POTASSIUM BLDA-SCNC: 4.4 MMOL/L (ref 3.5–5.3)
POTASSIUM BLDA-SCNC: 4.6 MMOL/L (ref 3.5–5.3)
POTASSIUM SERPL-SCNC: 4.4 MMOL/L (ref 3.5–5.3)
PR INTERVAL: 116 MS
PR INTERVAL: 124 MS
PR INTERVAL: 144 MS
PROTHROMBIN TIME: 13 SECONDS (ref 9.8–12.8)
Q ONSET: 222 MS
Q ONSET: 225 MS
Q ONSET: 226 MS
QRS COUNT: 15 BEATS
QRS COUNT: 21 BEATS
QRS COUNT: 24 BEATS
QRS DURATION: 76 MS
QRS DURATION: 80 MS
QRS DURATION: 86 MS
QT INTERVAL: 328 MS
QT INTERVAL: 344 MS
QT INTERVAL: 384 MS
QTC CALCULATION(BAZETT): 464 MS
QTC CALCULATION(BAZETT): 473 MS
QTC CALCULATION(BAZETT): 530 MS
QTC FREDERICIA: 418 MS
QTC FREDERICIA: 436 MS
QTC FREDERICIA: 459 MS
R AXIS: 20 DEGREES
R AXIS: 32 DEGREES
R AXIS: 40 DEGREES
RBC # BLD AUTO: 3.74 X10*6/UL (ref 4–5.2)
SAO2 % BLDA: 93 % (ref 94–100)
SAO2 % BLDA: 94 % (ref 94–100)
SAO2 % BLDA: 96 % (ref 94–100)
SODIUM BLDA-SCNC: 138 MMOL/L (ref 136–145)
SODIUM BLDA-SCNC: 138 MMOL/L (ref 136–145)
SODIUM BLDA-SCNC: 139 MMOL/L (ref 136–145)
SODIUM SERPL-SCNC: 141 MMOL/L (ref 136–145)
T AXIS: 192 DEGREES
T AXIS: 201 DEGREES
T AXIS: 239 DEGREES
T OFFSET: 390 MS
T OFFSET: 397 MS
T OFFSET: 414 MS
VENTRICULAR RATE: 125 BPM
VENTRICULAR RATE: 143 BPM
VENTRICULAR RATE: 88 BPM
WBC # BLD AUTO: 19.5 X10*3/UL (ref 4.4–11.3)

## 2025-01-28 PROCEDURE — 2500000004 HC RX 250 GENERAL PHARMACY W/ HCPCS (ALT 636 FOR OP/ED)

## 2025-01-28 PROCEDURE — 84132 ASSAY OF SERUM POTASSIUM: CPT | Performed by: NURSE PRACTITIONER

## 2025-01-28 PROCEDURE — 2500000001 HC RX 250 WO HCPCS SELF ADMINISTERED DRUGS (ALT 637 FOR MEDICARE OP)

## 2025-01-28 PROCEDURE — 2500000004 HC RX 250 GENERAL PHARMACY W/ HCPCS (ALT 636 FOR OP/ED): Performed by: NURSE PRACTITIONER

## 2025-01-28 PROCEDURE — 2500000005 HC RX 250 GENERAL PHARMACY W/O HCPCS: Performed by: PHYSICIAN ASSISTANT

## 2025-01-28 PROCEDURE — 2500000002 HC RX 250 W HCPCS SELF ADMINISTERED DRUGS (ALT 637 FOR MEDICARE OP, ALT 636 FOR OP/ED): Performed by: NURSE PRACTITIONER

## 2025-01-28 PROCEDURE — 94640 AIRWAY INHALATION TREATMENT: CPT

## 2025-01-28 PROCEDURE — 97530 THERAPEUTIC ACTIVITIES: CPT | Mod: GO

## 2025-01-28 PROCEDURE — 99291 CRITICAL CARE FIRST HOUR: CPT | Performed by: STUDENT IN AN ORGANIZED HEALTH CARE EDUCATION/TRAINING PROGRAM

## 2025-01-28 PROCEDURE — 71045 X-RAY EXAM CHEST 1 VIEW: CPT

## 2025-01-28 PROCEDURE — 2500000005 HC RX 250 GENERAL PHARMACY W/O HCPCS

## 2025-01-28 PROCEDURE — 2500000001 HC RX 250 WO HCPCS SELF ADMINISTERED DRUGS (ALT 637 FOR MEDICARE OP): Performed by: NURSE PRACTITIONER

## 2025-01-28 PROCEDURE — 2020000001 HC ICU ROOM DAILY

## 2025-01-28 PROCEDURE — 94003 VENT MGMT INPAT SUBQ DAY: CPT

## 2025-01-28 PROCEDURE — 97530 THERAPEUTIC ACTIVITIES: CPT | Mod: GP | Performed by: PHYSICAL THERAPIST

## 2025-01-28 PROCEDURE — 82947 ASSAY GLUCOSE BLOOD QUANT: CPT

## 2025-01-28 RX ORDER — SENNOSIDES 8.8 MG/5ML
5 LIQUID ORAL NIGHTLY
Status: DISCONTINUED | OUTPATIENT
Start: 2025-01-28 | End: 2025-01-28

## 2025-01-28 RX ORDER — CLONAZEPAM 0.5 MG/1
0.5 TABLET ORAL EVERY 8 HOURS
Status: DISCONTINUED | OUTPATIENT
Start: 2025-01-29 | End: 2025-01-30

## 2025-01-28 RX ORDER — DOCUSATE SODIUM 100 MG/1
100 CAPSULE, LIQUID FILLED ORAL 2 TIMES DAILY
Status: DISCONTINUED | OUTPATIENT
Start: 2025-01-28 | End: 2025-02-01

## 2025-01-28 RX ORDER — TRAZODONE HYDROCHLORIDE 50 MG/1
25 TABLET ORAL NIGHTLY
Status: DISCONTINUED | OUTPATIENT
Start: 2025-01-28 | End: 2025-01-30

## 2025-01-28 RX ORDER — METOPROLOL TARTRATE 1 MG/ML
INJECTION, SOLUTION INTRAVENOUS
Status: COMPLETED
Start: 2025-01-28 | End: 2025-01-28

## 2025-01-28 RX ORDER — SENNOSIDES 8.8 MG/5ML
5 LIQUID ORAL NIGHTLY
Status: DISCONTINUED | OUTPATIENT
Start: 2025-01-28 | End: 2025-01-30

## 2025-01-28 RX ORDER — METOPROLOL TARTRATE 1 MG/ML
5 INJECTION, SOLUTION INTRAVENOUS ONCE
Status: COMPLETED | OUTPATIENT
Start: 2025-01-28 | End: 2025-01-28

## 2025-01-28 RX ADMIN — DEXAMETHASONE SODIUM PHOSPHATE 6 MG: 10 INJECTION, SOLUTION INTRAMUSCULAR; INTRAVENOUS at 12:26

## 2025-01-28 RX ADMIN — MIDODRINE HYDROCHLORIDE 10 MG: 10 TABLET ORAL at 23:25

## 2025-01-28 RX ADMIN — Medication 50 PERCENT: at 08:22

## 2025-01-28 RX ADMIN — OXYCODONE HYDROCHLORIDE 5 MG: 5 SOLUTION ORAL at 04:03

## 2025-01-28 RX ADMIN — IPRATROPIUM BROMIDE AND ALBUTEROL SULFATE 3 ML: .5; 3 SOLUTION RESPIRATORY (INHALATION) at 01:33

## 2025-01-28 RX ADMIN — THIAMINE HYDROCHLORIDE 100 MG: 100 INJECTION, SOLUTION INTRAMUSCULAR; INTRAVENOUS at 09:04

## 2025-01-28 RX ADMIN — MIDODRINE HYDROCHLORIDE 10 MG: 10 TABLET ORAL at 15:26

## 2025-01-28 RX ADMIN — IPRATROPIUM BROMIDE AND ALBUTEROL SULFATE 3 ML: .5; 3 SOLUTION RESPIRATORY (INHALATION) at 08:08

## 2025-01-28 RX ADMIN — OXYCODONE HYDROCHLORIDE 5 MG: 5 SOLUTION ORAL at 15:26

## 2025-01-28 RX ADMIN — HEPARIN SODIUM 5000 UNITS: 5000 INJECTION INTRAVENOUS; SUBCUTANEOUS at 21:50

## 2025-01-28 RX ADMIN — HEPARIN SODIUM 5000 UNITS: 5000 INJECTION INTRAVENOUS; SUBCUTANEOUS at 06:19

## 2025-01-28 RX ADMIN — PANTOPRAZOLE SODIUM 40 MG: 40 INJECTION, POWDER, FOR SOLUTION INTRAVENOUS at 09:04

## 2025-01-28 RX ADMIN — CLONAZEPAM 0.5 MG: 0.5 TABLET ORAL at 09:03

## 2025-01-28 RX ADMIN — IPRATROPIUM BROMIDE AND ALBUTEROL SULFATE 3 ML: .5; 3 SOLUTION RESPIRATORY (INHALATION) at 14:04

## 2025-01-28 RX ADMIN — BUDESONIDE 0.5 MG: 0.5 INHALANT RESPIRATORY (INHALATION) at 20:42

## 2025-01-28 RX ADMIN — OXYCODONE HYDROCHLORIDE 5 MG: 5 SOLUTION ORAL at 21:50

## 2025-01-28 RX ADMIN — TRAZODONE HYDROCHLORIDE 25 MG: 50 TABLET ORAL at 21:50

## 2025-01-28 RX ADMIN — BUDESONIDE 0.5 MG: 0.5 INHALANT RESPIRATORY (INHALATION) at 08:08

## 2025-01-28 RX ADMIN — CLONAZEPAM 0.5 MG: 0.5 TABLET ORAL at 15:26

## 2025-01-28 RX ADMIN — METOPROLOL TARTRATE 5 MG: 1 INJECTION, SOLUTION INTRAVENOUS at 02:21

## 2025-01-28 RX ADMIN — LIDOCAINE 4% 1 PATCH: 40 PATCH TOPICAL at 09:04

## 2025-01-28 RX ADMIN — SENNOSIDES 5 ML: 8.8 LIQUID ORAL at 21:50

## 2025-01-28 RX ADMIN — OXYCODONE HYDROCHLORIDE 5 MG: 5 SOLUTION ORAL at 09:04

## 2025-01-28 RX ADMIN — HEPARIN SODIUM 5000 UNITS: 5000 INJECTION INTRAVENOUS; SUBCUTANEOUS at 15:26

## 2025-01-28 RX ADMIN — MIDODRINE HYDROCHLORIDE 10 MG: 10 TABLET ORAL at 09:07

## 2025-01-28 RX ADMIN — Medication 50 PERCENT: at 09:00

## 2025-01-28 RX ADMIN — IPRATROPIUM BROMIDE AND ALBUTEROL SULFATE 3 ML: .5; 3 SOLUTION RESPIRATORY (INHALATION) at 20:42

## 2025-01-28 ASSESSMENT — PAIN SCALES - GENERAL
PAINLEVEL_OUTOF10: 0 - NO PAIN

## 2025-01-28 ASSESSMENT — COGNITIVE AND FUNCTIONAL STATUS - GENERAL
WALKING IN HOSPITAL ROOM: TOTAL
STANDING UP FROM CHAIR USING ARMS: TOTAL
PERSONAL GROOMING: A LOT
MOVING TO AND FROM BED TO CHAIR: TOTAL
CLIMB 3 TO 5 STEPS WITH RAILING: TOTAL
TURNING FROM BACK TO SIDE WHILE IN FLAT BAD: TOTAL
MOBILITY SCORE: 7
DRESSING REGULAR UPPER BODY CLOTHING: A LOT
DRESSING REGULAR LOWER BODY CLOTHING: TOTAL
MOVING FROM LYING ON BACK TO SITTING ON SIDE OF FLAT BED WITH BEDRAILS: A LOT
EATING MEALS: TOTAL
HELP NEEDED FOR BATHING: A LOT
DAILY ACTIVITIY SCORE: 9
TOILETING: TOTAL

## 2025-01-28 ASSESSMENT — PAIN - FUNCTIONAL ASSESSMENT
PAIN_FUNCTIONAL_ASSESSMENT: 0-10
PAIN_FUNCTIONAL_ASSESSMENT: 0-10
PAIN_FUNCTIONAL_ASSESSMENT: CPOT (CRITICAL CARE PAIN OBSERVATION TOOL)
PAIN_FUNCTIONAL_ASSESSMENT: 0-10
PAIN_FUNCTIONAL_ASSESSMENT: CPOT (CRITICAL CARE PAIN OBSERVATION TOOL)
PAIN_FUNCTIONAL_ASSESSMENT: CPOT (CRITICAL CARE PAIN OBSERVATION TOOL)
PAIN_FUNCTIONAL_ASSESSMENT: 0-10

## 2025-01-28 NOTE — PROCEDURES
"Arterial Puncture/Cannulation    Date/Time: 1/28/2025 1:36 PM    Performed by: Kendall Barbour MD  Authorized by: Rosalie Tran DO  Consent: Verbal consent obtained.  Risks and benefits: risks, benefits and alternatives were discussed  Consent given by: spouse  Procedure consent: procedure consent matches procedure scheduled  Relevant documents: relevant documents present and verified  Site marked: the operative site was marked  Patient identity confirmed: arm band  Time out: Immediately prior to procedure a \"time out\" was called to verify the correct patient, procedure, equipment, support staff and site/side marked as required.  Preparation: Patient was prepped and draped in the usual sterile fashion.  Local anesthesia used: yes    Anesthesia:  Local anesthesia used: yes  Local Anesthetic: lidocaine 1% without epinephrine    Sedation:  Patient sedated: no    Patient tolerance: patient tolerated the procedure well with no immediate complications  Comments: R axillary line   Procedure: Axillary artery was identified using ultrasound technique; vessel confirmed via doppler flow. Using a steel needle, arterial access was obtained and guidewire passed easily through vessel (there was minimal resistance that resolved after arm repositioning). Arterial line catheter passed over guidewire, advancing easily and guidewire was removed intact. Patient tolerated procedure well. Chlorhexidine containing Tegaderm dressing was used to secure arterial line in place. Expected waveform was confirmed on monitor.               "

## 2025-01-28 NOTE — PROGRESS NOTES
"Physical Therapy                 Therapy Communication Note    Patient Name: Faustina Vick \"Jannie\"  MRN: [id number]  Department: [facility]  Room: 12/12-A  Today's Date: 1/28/2025     Discipline: Physical Therapy    PT Missed Visit: Yes     Missed Visit Reason: Missed Visit Reason:  (Attempted to see pt this AM however upon arrival, pt's  at rest. RN aware and addressing. Returned at 1340 to re-attempt and pt in procedure at bedside. Will continue to follow and re-attemt schedule permitting.)    Missed Time: Attempt      "

## 2025-01-28 NOTE — PROGRESS NOTES
Social Work Note:    LEANNE reviewed patient's chart.  Per thoracic surgery note, patient is going to get trach and peg later this week, but the surgery isnt scheduled yet.  LEANNE reached out to patient's  to discuss LTACH as a possibility but was unable to reach him.  LEANNE will continue to follow  LESLIE Boyce, LEANNE-S

## 2025-01-28 NOTE — PROGRESS NOTES
"Physical Therapy    Physical Therapy Treatment    Patient Name: Faustina Vick \"Jannie\"  MRN: [id number]  [facility]  Room: 12/12-A  Today's Date: 1/28/2025  Time Calculation  Start Time: 1450  Stop Time: 1516  Time Calculation (min): 26 min         Assessment/Plan   PT Assessment  Barriers to Discharge Home: Physical needs, Caregiver assistance  Caregiver Assistance: Caregiver assistance needed per identified barriers - however, level of patient's required assistance exceeds assistance available at home  Physical Needs: Stair navigation into home limited by function/safety, High falls risk due to function or environment, 24hr ADL assistance needed, 24hr mobility assistance needed, Ambulating household distances limited by function/safety  Medical Staff Made Aware: Yes  End of Session Communication: Bedside nurse  End of Session Patient Position: Bed, 3 rail up, Alarm off, not on at start of session     PT Plan  Treatment/Interventions: Bed mobility, Transfer training, Gait training, Stair training, Balance training, Neuromuscular re-education, Strengthening, Endurance training, Therapeutic exercise, Therapeutic activity, Home exercise program, Postural re-education  PT Plan: Ongoing PT  PT Frequency: 4 times per week  PT Discharge Recommendations: Moderate intensity level of continued care  Equipment Recommended upon Discharge: Wheeled walker  PT Recommended Transfer Status: Total assist  PT - OK to Discharge: Yes      General Visit Information:   PT  Visit  PT Received On: 01/28/25  General  PT Missed Visit: Yes  Missed Visit Reason:  (Attempted to see pt this AM however upon arrival, pt's  at rest. RN aware and addressing. Returned at 1340 to re-attempt and pt in procedure at bedside. Will continue to follow and re-attemt schedule permitting.)  Family/Caregiver Present: Yes  Caregiver Feedback: family at bedside, supportive  Co-Treatment: OT  Co-Treatment Reason: Vent dependent with intent to " mobilize  Prior to Session Communication: Bedside nurse  Patient Position Received: Bed, 3 rail up, Alarm off, not on at start of session (bilat wrist restraints)  General Comment: Pt supine in bed start of session, alert and agreeable. Off sedation SIMV 40%, rate 16, PEEP 8, PS 12, a-line, dobhoff, tele, yoo.    Subjective   Precautions:  Precautions  Medical Precautions: Fall precautions, Cardiac precautions, Oxygen therapy device and L/min, Infection precautions  Precautions Comment: MAP 65-70, droplet plus precautions     Date/Time Vitals Session Patient Position Pulse Resp SpO2 BP MAP (mmHg)    01/28/25 1450 Pre PT  --  117  22  91 %  107/55  75     01/28/25 1516 Post PT  --  120  22  94 %  107/59  77           Vital Signs Comment: HR to 122 with activity, SBP remained >/= 112 with standing     Objective   Pain:  Pain Assessment  Pain Assessment: 0-10  0-10 (Numeric) Pain Score:  (gestured for a little pain, positioned for comfort end of session)  Cognition:  Cognition  Overall Cognitive Status: Impaired  Orientation Level: Oriented X4 (with choices)  Following Commands: Follows one step commands with increased time    Postural Control:  Postural Control  Postural Control: Impaired  Head Control: L cervical rotation preferance likely retated to vent on L side + previous central line in R neck    Activity Tolerance:  Activity Tolerance  Endurance: Tolerates 10 - 20 min exercise with multiple rests  Early Mobility/Exercise Safety Screen: Proceed with mobilization - No exclusion criteria met  Rate of Perceived Exertion (RPE): Pt reported 6/10 RPE during session  Treatments:  Therapeutic Exercise  Therapeutic Exercise Performed: Yes  Therapeutic Exercise Activity 1: Performed sets of x5 LAQs and reverse LAQs (to focus on eccentric control of lowering) in R/LLE while seated EOB.    Therapeutic Activity  Therapeutic Activity 1: EOB sitting x10 mins with assistance from CGA-Mod A. Primarily Min A for sitting  balance. Performed x1 standing trial with Max A x2 with bilat UE support. Improved midline sitting position post standing.    Bed Mobility  Bed Mobility: Yes  Bed Mobility 1  Bed Mobility 1: Supine to sitting, Sitting to supine  Level of Assistance 1: Maximum assistance (x2)  Bed Mobility Comments 1: HOB elevated + draw sheet  Bed Mobility 2  Bed Mobility  2: Rolling right  Level of Assistance 2: Maximum assistance  Bed Mobility Comments 2: draw sheet       Transfers  Transfer: No  Transfer 1  Technique 1: Sit to stand, Stand to sit  Transfer Device 1:  (bilat arm in arm assist)  Transfer Level of Assistance 1: Maximum assistance (x2)  Trials/Comments 1: Draw sheet assist at hips    Outcome Measures:  [facility] Basic Mobility  Turning from your back to your side while in a flat bed without using bedrails: A lot  Moving from lying on your back to sitting on the side of a flat bed without using bedrails: Total  Moving to and from bed to chair (including a wheelchair): Total  Standing up from a chair using your arms (e.g. wheelchair or bedside chair): Total  To walk in hospital room: Total  Climbing 3-5 steps with railing: Total  Basic Mobility - Total Score: 7    FSS-ICU  Ambulation: Unable to attempt due to weakness  Rolling: Maximal assistance (performs 25% - 49% of task)  Sitting: Maximal assistance (performs 25% - 49% of task)  Transfer Sit-to-Stand: Total assistance (performs 25% or requires another person)  Transfer Supine-to-Sit: Total assistance (performs 25% or requires another person)  Total Score: 6      Early Mobility/Exercise Safety Screen: Proceed with mobilization - No exclusion criteria met  ICU Mobility Scale: Standing [4]    Education Documentation  Body Mechanics, taught by Allie Akhtar PT at 1/28/2025  3:36 PM.  Learner: Patient  Readiness: Acceptance  Method: Explanation  Response: Demonstrated Understanding, Needs Reinforcement  Comment: Continued progression of mobility    Home Exercise Program,  taught by Allie Akhtar PT at 1/28/2025  3:36 PM.  Learner: Patient  Readiness: Acceptance  Method: Explanation  Response: Demonstrated Understanding, Needs Reinforcement  Comment: Continued progression of mobility    Mobility Training, taught by Allie Akhtar PT at 1/28/2025  3:36 PM.  Learner: Patient  Readiness: Acceptance  Method: Explanation  Response: Demonstrated Understanding, Needs Reinforcement  Comment: Continued progression of mobility    Education Comments  No comments found.        Encounter Problems       Encounter Problems (Active)       Balance       Pt will demo static sitting balance w/o UE support IND to decrease risk of falls in home environment (Not met)       Start:  01/15/25    Expected End:  02/05/25    Resolved:  01/22/25    Updated to: Pt will maintain standing balance with Min A using LRAD    Update reason: re-eval         Pt will maintain standing balance with Min A using LRAD (Progressing)       Start:  01/22/25    Expected End:  02/12/25                   Mobility       Pt will mobilize supine to sitting MI to promote functional independence (Not met)       Start:  01/15/25    Expected End:  02/05/25    Resolved:  01/22/25    Updated to: Pt will perform bed mobility with Min A    Update reason: re-eval         Pt will perform bed mobility with Min A (Progressing)       Start:  01/22/25    Expected End:  02/12/25                   Mobility       STG - Patient will ambulate x50 ft with Min A using LRAD (Progressing)       Start:  01/22/25    Expected End:  02/12/25               PT Transfers       Pt will transfer sit to stand w/ LRAD MI to improve functional mobility.  (Not met)       Start:  01/15/25    Expected End:  02/05/25    Resolved:  01/22/25    Updated to: Pt will perform functional transfers with Min A using LRAD    Update reason: Re-eval         Pt will perform functional transfers with Min A using LRAD (Progressing)       Start:  01/22/25    Expected End:  02/12/25                    Pain - Adult            Allie Akhtar PT, DPT

## 2025-01-28 NOTE — PROGRESS NOTES
"Faustina Vick \"Noam" is a 64 y.o. female on day 15 of admission presenting with Primary cancer of right upper lobe of lung (Multi).    Subjective   Patient was tachycardic to the 160s yesterday and overnight. Got 250cc albumin, which did not alter HR. Then received 5mg  metoprolol twice which brought HR down low 100s, blood pressure was stable. Off sedation and still on klonopin scheduled. Visit with Dr. Wagner planned for later today to discuss trache and peg with patient and family.     Objective   Physical Exam  Vitals reviewed.   Constitutional:       Appearance: She is underweight.      Interventions: She is sedated, intubated and restrained.      Comments: Deconditioned, lying in ICU bed   HENT:      Head: Normocephalic and atraumatic.      Nose: Nose normal.      Comments: Bridled Dobhoff tube. L nare 78cm depth. TF running at 30ml/hr     Mouth/Throat:      Mouth: Mucous membranes are moist.   Eyes:      Extraocular Movements: Extraocular movements intact.      Pupils: Pupils are equal, round, and reactive to light.   Neck:      Comments: RIJ MAC dressing c/d/i.   Cardiovascular:      Rate and Rhythm: Normal rate and regular rhythm.      Pulses: Normal pulses.      Heart sounds: Normal heart sounds.   Pulmonary:      Effort: She is intubated.      Breath sounds: Normal breath sounds. Rhonchi: left side.      Comments: Mechanically ventilated via ETT, AC-VC PEEP 8, FiO2 40%  Chest:      Comments: Right lateral chest dressing c/d/i.  Abdominal:      General: There is no distension.      Palpations: Abdomen is soft.      Comments: Enteral feeding tube in place, tube feeds running at 30ml/hr   Genitourinary:     Comments: Montiel in place with clear yellow UOP.   Musculoskeletal:         General: No swelling or tenderness. Normal range of motion.      Cervical back: Normal range of motion and neck supple.      Comments: LUE edema improved   Skin:     General: Skin is warm and dry.   Neurological:      General: No " "focal deficit present.      Mental Status: She is alert and easily aroused.      GCS: GCS eye subscore is 3. GCS verbal subscore is 1. GCS motor subscore is 6.      Motor: Weakness (generalized) present.      Comments: Following commands, squeezes hands, wiggles toes; raises legs, moves extremities equally.    Psychiatric:         Mood and Affect: Mood normal.         Behavior: Behavior normal.      Comments: Appears less anxious and more interactive than Friday when I last saw patient myself        Last Recorded Vitals  Blood pressure 107/75, pulse 103, temperature 36.1 °C (97 °F), temperature source Temporal, resp. rate 13, height 1.651 m (5' 5\"), weight 54.9 kg (121 lb 0.5 oz), SpO2 92%.    VS over last 24h  Heart Rate:  []   Temp:  [36.1 °C (97 °F)-37 °C (98.6 °F)]   Resp:  [0-26]   BP: ()/(60-90)   SpO2:  [91 %-100 %]      Intake/Output last 3 Shifts:  I/O last 3 completed shifts:  In: 2039.4 (37.1 mL/kg) [I.V.:89.4 (1.6 mL/kg); NG/GT:1950]  Out: 2480 (45.2 mL/kg) [Urine:2480 (1.3 mL/kg/hr)]  Weight: 54.9 kg       Intake/Output Summary (Last 24 hours) at 1/28/2025 0642  Last data filed at 1/28/2025 0600  Gross per 24 hour   Intake 1820 ml   Output 1645 ml   Net 175 ml        Relevant Results  Scheduled medications  budesonide, 0.5 mg, nebulization, BID  clonazePAM, 0.5 mg, oral, q8h  dexAMETHasone, 6 mg, intravenous, q24h  heparin (porcine), 5,000 Units, subcutaneous, q8h  HYDROmorphone, 0.2 mg, intravenous, Once  insulin lispro, 0-5 Units, subcutaneous, q4h  ipratropium-albuteroL, 3 mL, nebulization, q6h  lidocaine, 1 patch, transdermal, Daily  midodrine, 10 mg, nasogastric tube, q8h  oxyCODONE, 5 mg, nasogastric tube, q6h  oxygen, , inhalation, Continuous - Inhalation  pantoprazole, 40 mg, intravenous, Daily  perflutren protein A microsphere, 0.5 mL, intravenous, Once in imaging  sulfur hexafluoride microsphr, 2 mL, intravenous, Once in imaging  thiamine, 100 mg, intravenous, Daily  traZODone, 25 " mg, oral, Nightly      PRN medications  PRN medications: albuterol, calcium gluconate, calcium gluconate, dextrose, dextrose, glucagon, glucagon, HYDROmorphone, magnesium sulfate, magnesium sulfate, oxygen, potassium chloride, potassium chloride      Results for orders placed or performed during the hospital encounter of 01/13/25 (from the past 24 hours)   POCT GLUCOSE   Result Value Ref Range    POCT Glucose 121 (H) 74 - 99 mg/dL   POCT GLUCOSE   Result Value Ref Range    POCT Glucose 148 (H) 74 - 99 mg/dL   Blood Gas Arterial Full Panel   Result Value Ref Range    POCT pH, Arterial 7.46 (H) 7.38 - 7.42 pH    POCT pCO2, Arterial 48 (H) 38 - 42 mm Hg    POCT pO2, Arterial 82 (L) 85 - 95 mm Hg    POCT SO2, Arterial 97 94 - 100 %    POCT Oxy Hemoglobin, Arterial 94.5 94.0 - 98.0 %    POCT Hematocrit Calculated, Arterial 37.0 36.0 - 46.0 %    POCT Sodium, Arterial 136 136 - 145 mmol/L    POCT Potassium, Arterial 5.2 3.5 - 5.3 mmol/L    POCT Chloride, Arterial 102 98 - 107 mmol/L    POCT Ionized Calcium, Arterial 1.16 1.10 - 1.33 mmol/L    POCT Glucose, Arterial 156 (H) 74 - 99 mg/dL    POCT Lactate, Arterial 1.1 0.4 - 2.0 mmol/L    POCT Base Excess, Arterial 9.0 (H) -2.0 - 3.0 mmol/L    POCT HCO3 Calculated, Arterial 34.1 (H) 22.0 - 26.0 mmol/L    POCT Hemoglobin, Arterial 12.2 12.0 - 16.0 g/dL    POCT Anion Gap, Arterial 5 (L) 10 - 25 mmo/L    Patient Temperature 37.0 degrees Celsius    FiO2 40 %   Transthoracic Echo (TTE) Limited   Result Value Ref Range    Tricuspid annular plane systolic excursion 1.1 cm    LV EF 38 %    RV free wall pk S' 10.60 cm/s    LVIDd 3.80 cm    LV A4C EF 27.1    POCT GLUCOSE   Result Value Ref Range    POCT Glucose 154 (H) 74 - 99 mg/dL   Blood Gas Arterial Full Panel   Result Value Ref Range    POCT pH, Arterial 7.43 (H) 7.38 - 7.42 pH    POCT pCO2, Arterial 50 (H) 38 - 42 mm Hg    POCT pO2, Arterial 72 (L) 85 - 95 mm Hg    POCT SO2, Arterial 96 94 - 100 %    POCT Oxy Hemoglobin, Arterial  92.6 (L) 94.0 - 98.0 %    POCT Hematocrit Calculated, Arterial 36.0 36.0 - 46.0 %    POCT Sodium, Arterial 139 136 - 145 mmol/L    POCT Potassium, Arterial 4.6 3.5 - 5.3 mmol/L    POCT Chloride, Arterial 102 98 - 107 mmol/L    POCT Ionized Calcium, Arterial 1.20 1.10 - 1.33 mmol/L    POCT Glucose, Arterial 129 (H) 74 - 99 mg/dL    POCT Lactate, Arterial 1.0 0.4 - 2.0 mmol/L    POCT Base Excess, Arterial 7.6 (H) -2.0 - 3.0 mmol/L    POCT HCO3 Calculated, Arterial 33.2 (H) 22.0 - 26.0 mmol/L    POCT Hemoglobin, Arterial 12.0 12.0 - 16.0 g/dL    POCT Anion Gap, Arterial 8 (L) 10 - 25 mmo/L    Patient Temperature 37.0 degrees Celsius    FiO2 40 %   Calcium, Ionized   Result Value Ref Range    POCT Calcium, Ionized 1.11 1.1 - 1.33 mmol/L   CBC   Result Value Ref Range    WBC 19.5 (H) 4.4 - 11.3 x10*3/uL    nRBC 0.0 0.0 - 0.0 /100 WBCs    RBC 3.74 (L) 4.00 - 5.20 x10*6/uL    Hemoglobin 10.5 (L) 12.0 - 16.0 g/dL    Hematocrit 32.9 (L) 36.0 - 46.0 %    MCV 88 80 - 100 fL    MCH 28.1 26.0 - 34.0 pg    MCHC 31.9 (L) 32.0 - 36.0 g/dL    RDW 18.8 (H) 11.5 - 14.5 %    Platelets 273 150 - 450 x10*3/uL   Coagulation Screen   Result Value Ref Range    Protime 13.0 (H) 9.8 - 12.8 seconds    INR 1.2 (H) 0.9 - 1.1    aPTT 30 27 - 38 seconds   Magnesium   Result Value Ref Range    Magnesium 2.21 1.60 - 2.40 mg/dL   Renal function panel   Result Value Ref Range    Glucose 123 (H) 74 - 99 mg/dL    Sodium 141 136 - 145 mmol/L    Potassium 4.4 3.5 - 5.3 mmol/L    Chloride 100 98 - 107 mmol/L    Bicarbonate 33 (H) 21 - 32 mmol/L    Anion Gap 12 10 - 20 mmol/L    Urea Nitrogen 22 6 - 23 mg/dL    Creatinine 0.24 (L) 0.50 - 1.05 mg/dL    eGFR >90 >60 mL/min/1.73m*2    Calcium 8.9 8.6 - 10.6 mg/dL    Phosphorus 2.8 2.5 - 4.9 mg/dL    Albumin 3.6 3.4 - 5.0 g/dL   Blood Gas Arterial Full Panel   Result Value Ref Range    POCT pH, Arterial 7.45 (H) 7.38 - 7.42 pH    POCT pCO2, Arterial 49 (H) 38 - 42 mm Hg    POCT pO2, Arterial 65 (L) 85 - 95 mm  Hg    POCT SO2, Arterial 93 (L) 94 - 100 %    POCT Oxy Hemoglobin, Arterial 90.3 (L) 94.0 - 98.0 %    POCT Hematocrit Calculated, Arterial 33.0 (L) 36.0 - 46.0 %    POCT Sodium, Arterial 138 136 - 145 mmol/L    POCT Potassium, Arterial 4.4 3.5 - 5.3 mmol/L    POCT Chloride, Arterial 103 98 - 107 mmol/L    POCT Ionized Calcium, Arterial 1.18 1.10 - 1.33 mmol/L    POCT Glucose, Arterial 121 (H) 74 - 99 mg/dL    POCT Lactate, Arterial 0.9 0.4 - 2.0 mmol/L    POCT Base Excess, Arterial 8.9 (H) -2.0 - 3.0 mmol/L    POCT HCO3 Calculated, Arterial 34.1 (H) 22.0 - 26.0 mmol/L    POCT Hemoglobin, Arterial 11.0 (L) 12.0 - 16.0 g/dL    POCT Anion Gap, Arterial 5 (L) 10 - 25 mmo/L    Patient Temperature 37.0 degrees Celsius    FiO2 40 %   Blood Gas Arterial Full Panel   Result Value Ref Range    POCT pH, Arterial 7.47 (H) 7.38 - 7.42 pH    POCT pCO2, Arterial 46 (H) 38 - 42 mm Hg    POCT pO2, Arterial 65 (L) 85 - 95 mm Hg    POCT SO2, Arterial 94 94 - 100 %    POCT Oxy Hemoglobin, Arterial 91.6 (L) 94.0 - 98.0 %    POCT Hematocrit Calculated, Arterial 32.0 (L) 36.0 - 46.0 %    POCT Sodium, Arterial 139 136 - 145 mmol/L    POCT Potassium, Arterial 4.2 3.5 - 5.3 mmol/L    POCT Chloride, Arterial 105 98 - 107 mmol/L    POCT Ionized Calcium, Arterial 1.17 1.10 - 1.33 mmol/L    POCT Glucose, Arterial 112 (H) 74 - 99 mg/dL    POCT Lactate, Arterial 0.9 0.4 - 2.0 mmol/L    POCT Base Excess, Arterial 8.8 (H) -2.0 - 3.0 mmol/L    POCT HCO3 Calculated, Arterial 33.5 (H) 22.0 - 26.0 mmol/L    POCT Hemoglobin, Arterial 10.5 (L) 12.0 - 16.0 g/dL    POCT Anion Gap, Arterial 5 (L) 10 - 25 mmo/L    Patient Temperature 37.0 degrees Celsius    FiO2 40 %       Transthoracic Echo (TTE) Limited   Final Result      XR chest 1 view   Final Result   1.  Interval improvement in the left-sided edema and patchy airspace   opacities. Small left-sided pleural effusion.   2. Postsurgical changes of right-sided pneumonectomy.   3. Medical lines and devices  as above.        I personally reviewed the images/study and I agree with the findings   as stated by Jonah Velez MD (Radiology Resident).   This study was interpreted at [facility], [place], [place].        MACRO:   None        Signed by: Oseas Stroud 1/27/2025 10:55 AM   Dictation workstation:   [id number]      XR chest 1 view   Final Result   1.  Slight interval worsening in left-sided airspace disease/edema   status post right-sided pneumonectomy.             Signed by: Nino Barbour 1/26/2025 1:16 PM   Dictation workstation:   [id number]      XR chest 1 view   Final Result   1.  Post pneumonectomy changes with continued left perihilar edema.             Signed by: Nino Barbour 1/25/2025 9:51 AM   Dictation workstation:   [id number]      XR chest 1 view   Final Result   1.  Similar left lung pulmonary edema with small left pleural   effusion.   2. Status post right pneumonectomy.   3. [place]-Paco catheter with tip projecting over the left lower lobe,   in similar position.                  MACRO:   None        Signed by: Alexx Anne 1/24/2025 10:24 AM   Dictation workstation:   [id number]      XR chest 1 view   Final Result   Addendum (preliminary) 1 of 1   Interpreted By:  Alexx Anne,    ADDENDUM:   Right IJ approach [place] Paco catheter is also noted with tip   projecting over the left lower lobe in similar position to prior exam.        Signed by: Alexx Anne 1/24/2025 10:25 AM        -------- ORIGINAL REPORT --------   Dictation workstation:   [id number]      Final   1.  Stable findings of pulmonary edema within the left lung.   2. Postsurgical changes of right pneumonectomy.   3. Medical devices as above.                  MACRO:   None        Signed by: Alexx Anne 1/24/2025 9:09 AM   Dictation workstation:   [id number]      XR chest 1 view   Final Result   1. The tip of the endotracheal tube terminates 7.5 cm of the sina.   2.  Slightly decreased  interstitial prominence and airspace opacities   within the left lung when compared to prior radiograph,  suggestive   of improving infectious process or improving pulmonary edema.   3. No significant interval change in postoperative changes from   right-sided pneumonectomy, resection of multiple right-sided ribs,   complete opacification of the pneumonectomy bed, and rightward   mediastinal shift.        I personally reviewed the images/study and I agree with radiology   resident  Dr. Meghan Ramesh findings as stated. This study was   interpreted at [place], [place]        MACRO:   None        Signed by: Oseas Stroud 1/24/2025 8:09 AM   Dictation workstation:   [id number]      XR abdomen 1 view   Final Result   1.  Dobhoff tube is in place with the tip projecting over the 1st   portion of the duodenum.   2. There is gas-filled dilated stomach. A few slight prominent loops   of the bowel in the upper abdomen with overall nonobstructive bowel   gas pattern.   3. Postsurgical changes of right pneumonectomy with resection of ribs   and patchy airspace opacities in the left lung.   4. [place]-Paco catheter projecting over the lower pulmonary artery.   Recommend retraction.        MACRO:   None        Signed by: Oseas Stroud 1/23/2025 10:15 AM   Dictation workstation:   [id number]      XR chest 1 view   Final Result   1. Unchanged appearance of multifocal patchy airspace opacities with   interstitial prominence of the left lung which may be due to   infectious process. Component of edema can not be excluded.   2. Medical devices as described above with similar positioning of the   [place]-Paco catheter projecting over left lower lobe pulmonary artery.   Recommend retraction.   3. Postsurgical changes of right-sided pneumonectomy and resection of   multiple right-sided ribs with complete opacification of the   pneumonectomy bed. The appearance is unchanged  compared to prior   study.                  MACRO:   None        Signed by: Oseas Stroud 1/23/2025 9:36 AM   Dictation workstation:   [id number]      XR abdomen 1 view   Final Result   1.  As described above.        Signed by: Vick Xiao 1/22/2025 1:01 PM   Dictation workstation:   [id number]      XR chest 1 view   Final Result   1. Unchanged appearance of multifocal patchy airspace opacity with   interstitial prominence of the left lung which may be due to   infectious process. Component of edema cannot be excluded.   2. Medical devices as described above with interval advancement of   the [place]-Paco catheter projecting over left lower lobe pulmonary   artery. Recommend retraction.   3. Postsurgical changes of right-sided pneumonectomy and resection of   multiple right-sided ribs with complete opacification of the   pneumonectomy bed. The appearance is unchanged compared to prior   study.                  Signed by: Vick Xiao 1/22/2025 1:00 PM   Dictation workstation:   [id number]      XR chest 1 view   Final Result   1. Similar near-complete opacification of the right hemithorax, which   may be secondary to a large pleural effusion, pneumonectomy, or   bronchial obstruction.   2. Diffuse interstitial opacities and scattered patchy alveolar   opacities throughout the left lung, similar to the prior exam, which   may reflect prominent pulmonary edema or multifocal infection.   3. Medical devices as above.        I personally reviewed the image(s)/study and resident interpretation   as stated by Dr. Ange Sue MD. I agree with the findings as   stated. This study was interpreted at [facility], [place], [place].        MACRO:   None        Signed by: Oseas Stroud 1/21/2025 7:58 PM   Dictation workstation:   [id number]      Vascular US upper extremity venous duplex left   Final Result      Transthoracic Echo (TTE) Limited   Final Result      XR  chest 1 view   Final Result   1.  Interval opacification of the right hemithorax and correlate with   post pneumonectomy changes.   2. [place]-Paco catheter overlies the left lower lobe pulmonary artery.   3. Slight interval improvement in left-sided pulmonary edema.   Underlying edema and correlate with fluid status.             Signed by: Nino Barbour 1/20/2025 9:57 AM   Dictation workstation:   [id number]      XR abdomen 1 view   Final Result   1.  As described above.        Signed by: Vick Xiao 1/19/2025 11:58 AM   Dictation workstation:   [id number]      XR chest 1 view   Final Result   1. Slight worsening of multifocal airspace opacity in the left mid   and lower lung. Findings are concerning for infectious process. Small   left pleural effusion.   2. Postsurgical changes right pneumonectomy with medical devices as   described above. Consider retracting the [place]-Paco catheter.                  Signed by: Vick Xiao 1/19/2025 8:55 AM   Dictation workstation:   [id number]      Transthoracic Echo (TTE) Limited   Final Result      XR chest 1 view   Final Result   1. Patchy airspace opacity in the left lower lung with slight   improvement in aeration. Correlate with concern for infection   2. Postsurgical changes of right pneumonectomy as described above.   3. Medical lines and devices as above. Consider retraction of the   [place]-Paco catheter.        I personally reviewed the images/study and I agree with the findings   as stated. This study was interpreted at [place], [place].        MACRO:   None        Signed by: Vick Xiao 1/19/2025 8:54 AM   Dictation workstation:   [id number]      XR chest 1 view   Final Result   1. Worsening aeration of the left lung with increasing left basilar   infiltrate and pleural effusion. Correlate with mucous plugging and   component of lobar collapse.   2. Component of interstitial prominence of the  left lung, also   slightly increased which may be due to edema.   3. Postsurgical changes of right pneumonectomy as described                  Signed by: Thiernoalvino Vasquezprateek Xiao 1/18/2025 11:35 AM   Dictation workstation:   [id number]      Cardiac Catheterization Procedure   Final Result      Transthoracic Echo (TTE) Limited   Final Result      XR chest 1 view   Final Result   1.  Stable exam with postsurgical changes of right pneumonectomy/rib   resection and large layering fluid component within the right   hemithorax.   2. Similar findings of left pulmonary edema with slightly increased   size of small left pleural effusion. No left-sided pneumothorax.   3. Medical devices as above.                  MACRO:   None        Signed by: Alexx Anne 1/17/2025 8:16 AM   Dictation workstation:   [id number]      XR chest 1 view   Final Result   1.  Postsurgical changes of right-sided pneumonectomy with increased   layering opacification of the right hemithorax, likely due to   increasing fluid.   2. Similar trace left-sided pleural effusion with linear left basilar   airspace opacity, likely atelectasis on a background of likely   pulmonary edema. Infection is not definitively excluded.   3. Medical devices as above with interval insertion of right internal   jugular central venous catheter projecting at the mid SVC.        I personally reviewed the images/study and I agree with the findings   as stated by resident Davidson Mcguire. This study was interpreted   at [facility], [place], [place].        MACRO:   None        Signed by: Alexx Anne 1/17/2025 8:14 AM   Dictation workstation:   [id number]      Transthoracic Echo (TTE) Limited   Final Result      US right upper quadrant   Final Result   Small amount of simple appearing ascites in the right upper quadrant   without evidence of cholelithiasis, cholecystitis or biliary dilation.        Signed by: Alberto Salas 1/16/2025 1:02  PM   Dictation workstation:   [id number]      XR chest 1 view   Final Result   1. Slight interval increase in left basilar airspace opacity which   may be due to increasing atelectasis. Cannot exclude developing   infectious infiltrate. Question trace left pleural effusion.   2. Again seen postsurgical changes related to right-sided   pneumonectomy as described above                  Signed by: Vick Xiao 1/16/2025 9:47 AM   Dictation workstation:   [id number]      US renal complete   Final Result   1. Unremarkable sonographic evaluation of the kidneys.   2. Incomplete evaluation of a gallbladder with some pericholecystic   fluid and biliary sludge. Dedicated right upper quadrant ultrasound   could be obtained as clinically warranted.   3. A small amount of free fluid present in the right upper quadrant   and pelvis.        Findings relayed by phone by me to SICU provider at 5:11 p.m.        I personally reviewed the images/study and I agree with the findings   as stated by Erickson Best MD (resident) . This study was   interpreted at [place], [place].        MACRO:   None        Signed by: Alberto Salas 1/15/2025 6:17 PM   Dictation workstation:   [id number]      XR chest 1 view   Final Result   1.  Status post pneumonectomy with interval increase in fluid   component of right-sided hydropneumothorax.   2. Medical devices as above.                  MACRO:   None        Signed by: Alexx Anne 1/15/2025 9:28 AM   Dictation workstation:   [id number]      XR chest 1 view   Final Result   1. Interval removal of right chest tube. Other medical devices as   described above.   2. Postsurgical changes related to right pneumonectomy.   3. Faint left basilar opacity, likely atelectatic.                  Signed by: Vick Xiao 1/14/2025 2:53 PM   Dictation workstation:   [id number]      XR chest 1 view   Final Result   1.  Stable right-sided  pneumonectomy changes.             Signed by: Nino Barbour 1/14/2025 12:56 PM   Dictation workstation:   [id number]      Transthoracic Echo (TTE) Limited   Final Result      XR chest 1 view   Final Result   1. Postsurgical changes related to right sided pneumonectomy with   multiple medical devices as described above.   2. Mild interstitial prominence of the left lung.                  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 Wagner on 1/13 requiring post-op vasoactive therapy. Post-op course c/b acute hyponatremia, multifactorial delirium, cardiogenic shock requiring inotropic support, and acute hypoxic/hypercarbic respiratory failure requiring intubation. COVID+ 1/20. Weaned off milrinone 1/21. Weaned off iEpo 1/24.      Plan:  NEURO: History of anxiety on alprazolam 1mg qd, last filled 1/4/2025 per [facility] review. Family states she was not taking for several months but then restarted prn about 3 wks prior to surgery for situational anxiety. A&Ox4 at baseline. Multifactorial delirium improved. Currently intubated, off sedation. On scheduled oxycodone, scheduled clonazepam and nightly trazodone; appears much more calm today. Denies pain. Strong squeeze, wiggles toes, moves all extremities equally but severely deconditioned.   - Last Qtc 530  - Continue Trazodone nightly  - Continue Clonazepam 0.5 mg q8h  - Continue scheduled Oxycodone 5 mg q6h  - Ongoing neuro and pain assessments  - Lidocaine patch to right chest wall  - PT/OT- have work with patient today  - Restraints indicated while patient remains intubated, restrain with soft wrist restraints until medical  devices are discontinued.      CV: No history of cardiac disease. Baseline /79, -110. TTE 9/2024 normal biV function, RVSP 10mmHg. Intra-op hypotension requiring vaso and epi. Arrived to SICU on vaso 0.06 units/min, started on levo 0.05 mcg/kg/min. TTE 1/13 with EF 60-65%, low normal RV systolic function. Weaned off vasopressors 1/15. Elevated lactate to 2.9 on 1/16 with SOB and new O2 requirement. Repeat TTE 1/16: acute biV dysfunction with signs of takotsubo cardiomyopathy, EF 34%, mod reduced RV systolic function, mildly elevated RVSP, mild to mod TR. Cardiogenic shock, started milrinone, lasix infusion, and vasopressin. Shock Call on 1/16, no indication for mechanical circulatory support. Initial troponin 928 with downtrend and normalization of lactate. Epinephrine infusion added overnight 1/16-1/17, discontinued 2/2 tachycardia in the 130s. Milrinone increased to 0.25mcg/kg/min (decreased to 0.125 thereafter). Lasix infusion stopped 1/17 given contraction alkalosis. Taken to cath lab 1/17 for RHC/PA cath placement, opening CI 2.9. Milrinone uptitrated to 0.375mcg/kg/min 1/17-1/18. iEpo was added. Repeat TTE 1/20 EF 35% (on iEpo and milrinone). Levo weaned off 1/20. Intermittent sinus tachycardia. Off Milrinone /21; iEpo off 1/24. Vaso weaned off 1/25. Responsive to diuresis. Started midodrine 1/25. PA cath removed 1/26. Recurrent ep. Tachy w/SBT and over-stimulation. Repeat TTE yesterday, LV EF 38%, There is reduced right ventricular systolic function with continued Takastubo picture with hypo- akinesis of the apical 2/3 of the LV.  Overnight: MAP 67-84, episodes of tachycardia 160s, unresponsive to fluids, responsive to metoprolol 5mg x2. Off all drips. Per nurse had small dip BP after Klonopin, quickly recovered with midodrine.    - Continuous EKG/abp monitoring  - Cardiology following peripherally, call with questions  - MAP goal >65  - Continue holding metoprolol- avoid spot dosing for  tachycardia if patient otherwise stable- assess other causes (pain, discomfort, anxiety).   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 Arrived to SICU extubated on 10L SFM, weaned to room air 1/14. Chest tube removed 1/14. Acute on chronic hypoxic respiratory failure 1/16. Escalation to Airvo 50L 90% overnight 1/17-1/18. Acute hypercapnia 1/18 with increase WOB, trial of BIPAP with iEPO. Eventual intubation 1/18 evening. Currently on AC-VC/22/40%/300/8. Tolerated SIMV about an hour yesterday. PEEP increased during day yesterday 5 to 8. ABGs show stable PaO2 65 but decreased from yestrday, 82.  Breath sounds are clear. Some increased haziness lower left lobe appreciated on CXR. Last gas: 7.47/46/65/33.5  -SBT this a.m: Tolerating SIMV- will continuously evaluate    Tried CPAP-VC which helped last week, only lasted an hour on Sunday on this mode.   - Continue mechanical ventilation -> continue weaning trials daily  - Wean FiO2 to maintain SpO2 >92%  - SDS switched to decadron on 1/22 for treatment of COVID (end date 1/29)  - Can discontinue precautions this Friday   - Scheduled budesonide/duoneb  - PRN albuterol  - Daily CXR  - ABG prn  - Holding home benzonatate  - Patient-family discussion with Dr. Wagner later today (trache-peg)    GI: No GI history. LFTs WNL 12/2/24. Evidence of pericholecystic fluid and biliary sludge on renal US 1/15 but no abdominal symptoms. RUQ US with small ascites, otherwise no acute findings. Abdominal distention this am. Elevated LFTs, now normalized. LBM 1/17. OG tube placed 1/19 for gastric decompression. TFs initiated via  OG 1/20, then held on 1/21 due to regurgitation from OG. Dobhoff placed via IRIS and advanced post-pyloric. Tolerating TF at goal rate. Liquid Bms x4 1/25. Cdiff negative 1/25. No BM 24hrs.   - Continue TF at goal rate 30ml/hr  - Continue thiamine 100mg IV daily  - Continue IV PPI for GI prophylaxis  - Will restart bowel regimen today- will start slow, with Senna     : No history of renal disease. Baseline creatinine 0.6. Symptomatic acute hyponatremia (SIADH vs vaso mediated) to 120 overnight 1/14 - 1/15, corrected to 124 after 150cc 3% NaCl x2 on 1/15. Renal US on 1/15 unremarkable. Started on lasix infusion 1/16-1/17. Spot diuresis 1/20 and 1/24. Yoo changed overnight 1/18-1/19. Continues to make good urine output. Net +215  RFP grossly normal, bicarb slight inc 33, ABGs suggest mild metabolic alkalosis, continue to to monitor.  - Nephrology signed off 1/18, call with questions  - RFP daily and prn  - Maintain yoo for strict I/Os   - Maintain U/O >0.5ml/kg/hr  - Replete electrolytes to maintain K>4, Phos>2.5, iCal>1.1, Mag>2     HEME: Hx of DOC. Baseline H/H 12/40. Acute blood loss anemia, OR EBL 150mL. Acute coagulopathy with INR elevated to 2.3 2/2 to poor nutritional status. Received IV Vit K 10mg x1 with improved INR thereafter. Transfused 1u PRBC 1/17 and 1u PRBC on 1/18. LUE duplex obtained 1/20 for swelling, negative for DVT. Stable edema in LUE, negative DVT ultrasound 1/20 ultrasound. Hgb 7.4 1/25, transfused 1 RBC with Hgb increase to 10.7 thereafter. H/H essentially stable. Hb 10.5   - Check CBC daily and PRN  - Coags prn  - Goal Hgb >8  - SCDs and SQH for DVT ppx  - Ongoing monitoring for s/s bleeding  - Maintain active T&S (1/26)     ENDO: No history of DM or thyroid disease. TSH 0.48 (1/17). Recent 5d course of prednisone 10mg 12/2024 for URI. Adequate glycemic control. Started on SDS out of OR 1/13. Transitioned to dexAMETHasone 1/22  - BG q4h, SSI #1     MSK: Arthritis, Vit D deficiency.  -  not on meds at home  - monitor for symptoms     ID: Afebrile, leukocytosis. Completed yunior-op course of cefazolin. Worsened clinical condition 1/17, started on vanco, zosyn and azithromycin. Blood cultures, MRSA and Respiratory viral panel negative 1/17. Montiel changed 1/18, Urine cx 1/18 negative. Added fluc overnight 1/18-1/19. Sputum culture 1/19 negative. Vancomycin discontinued 1/20. Urine strep/legionella 1/20 negative. COVID+ on 1/20, initiated remdesivir and decadron. Discontinued fluc and azithro 1/21. Liquid stools. C.diff negative 1/26. Zosyn completed 1/26. WBC downtrending 19  - COVID precautions--- 10 days (can discontinue Friday)   - ID consulted 1/20, appreciate recs, signed off, call with questions  - temp q4h, wbc daily  - Ongoing monitoring for s/s infection  - Completed 5 day course of remdesivir 1/24. On Decadron through 1/29     Lines:  - RIJ MAC (SICU, 1/16) - discontinue today  - L brachial A-line (replaced 1/27)- discontinue today  - Left chest mediport (not currently accessed)  - PIV  x2  - Montiel (1/18)  - ETT (1/18)     Dispo: Continue ICU care. Patient seen and discussed with ICU attending Dr. Tran    Family: son Yg and  Maximilian updated over the phone. Will further update at bedside if they visit today and prn.    SICU Phone [phone]  Maricruz Silva MD   Anesthesiology, PGY-3

## 2025-01-28 NOTE — PROGRESS NOTES
64 year old female with history of right upper lobe lung cancer s/p chemoradiation and now s/p pneumonectomy with Dr. Wagner on 1/13. Post-operatively, patient in SICU on vasoactive therapy, as well as with cardiogenic shock in setting of Takotsubo cardiomyopathy. Reintubated 1/18 due to acute on chronic hypoxic respiratory failure. Has remained on ventilator with failure to wean since then. Weaned off inotropes, PAC discontinued.     Neuro: History of anxiety on home benzodiazepines. Will continue clonazepam. Trazodone at night for sleep. Currently off dexmedetomidine infusion, continue to reassess for appropriate control of anxiety. PT/OT with goal edge of bed minimum. Pain control.   CV: No history of cardiac dysfunction. Post-operatively TTE 1/16 demonstrated acute biventricular dysfunction with evidence of Takotsubo cardiomyopathy. Repeat TTE 1/27 showing mod RV dysfunction, LVEF 38%; last TTE 1/20 with EF ~35%. Acute tachycardia, rate 150-160 during spontaneous breathing trial today. Tachycardia improved without treatment. Has remained off vasoactive infusions; continue midodrine.   Pulm: History of tobacco use, COPD, right lung squamous cell carcinoma s/p chemoradiation and now s/p right pneumonectomy. Post-operative acute on chronic hypoxic respiratory failure with reintubation on 1/18. COVID + 1/20. Has failed spontaneous breathing trials daily. Family meeting today, plan for trach and PEG on Thursday or Friday with Dr. Wagner.   GI: No history of GI disease. Continue nutrition with tube feeds. PPI IV. Bowel regimen.   : Post-operatively with acute symptomatic hyponatremia, which has resolved. Continue to monitor and optimize electrolytes. Goal fluid balance even to negative. Appears euvolemic on exam. Monitor urine output.   Heme: No current indications for transfusion, maintain active type and screen. SCDs and SQH for DVT prophylaxis.   ID: COVID + 1/20 - completed Remdesevir, continue dexamethasone. No  current indication for antibiotics, continue to monitor for signs/symptoms of infection.   Endo: Steroids for COVID. SSI for glycemic control.      Lines: New   - Right axillary arterial line placed 1/28  - Discontinue central line   - Discontinue left brachial arterial line     Dispo: Continue ICU care.     Kendall Barbour MD  PGY-5 Anesthesiology and critical care medicine.

## 2025-01-28 NOTE — PROGRESS NOTES
"Occupational Therapy    Occupational Therapy Treatment    Name: Faustina Vick \"Jannie\"  MRN: [id number]  : 1960  Date: 25  Room: -      Time Calculation  Start Time: 1449  Stop Time: 1514  Time Calculation (min): 25 min    Assessment:  Barriers to Discharge Home: Cognition needs, Physical needs, Caregiver assistance  Caregiver Assistance: Caregiver assistance needed per identified barriers - however, level of patient's required assistance exceeds assistance available at home  Cognition Needs: Insight of patient limited regarding functional ability/needs, Medication and/or medical management daily assist needed  Physical Needs: 24hr mobility assistance needed, 24hr ADL assistance needed  Medical Staff Made Aware: Yes  End of Session Communication: Bedside nurse  End of Session Patient Position: Bed, 3 rail up, Alarm off, not on at start of session (bilat wrist restraints secure)    Plan:  Treatment Interventions: ADL retraining, Functional transfer training, UE strengthening/ROM, Endurance training, Cognitive reorientation, Patient/family training, Equipment evaluation/education, Neuromuscular reeducation, Fine motor coordination activities, Compensatory technique education  OT Frequency: 4 times per week  OT Discharge Recommendations: Moderate intensity level of continued care  Equipment Recommended upon Discharge:  (TBD)  OT Recommended Transfer Status: Assist of 2  OT - OK to Discharge: Yes    Subjective   General:  OT Last Visit  OT Received On: 25  Co-Treatment: PT  Co-Treatment Reason: Vent dependent with intent to mobilize  Prior to Session Communication: Bedside nurse  Patient Position Received: Bed, 3 rail up, Alarm off, not on at start of session (bilat restraints secure)  Family/Caregiver Present: Yes  Caregiver Feedback: family at bedside, supportive  General Comment: Pt supine in bed on arrival, agreeable. On SiMV 40% FiO2, PEEP 8, PS 12, RR 16, off all sedation. " "    Precautions:  Medical Precautions: Fall precautions, Cardiac precautions, Oxygen therapy device and L/min, Infection precautions  Precautions Comment: MAP 65-70, droplet plus precautions    Vitals:   01/28/25 1450 01/28/25 1516   Vital Signs   Vitals Session Pre OT Post OT   Heart Rate (!) 117 (!) 120   Resp 22 22   SpO2 91 % 94 %   /55 107/59   MAP (mmHg) 75 77   Vital Signs Comment HR to 122 with activity, SBP remained >/= 112 with standing  --        Lines/Tubes/Drains:  Implantable Port 01/16/25 Left Chest Single lumen port (Active)   Number of days: 11       ETT  7 mm (Active)   Number of days: 9       Urethral Catheter (Active)   Number of days: 9       NG/OG/Feeding Tube Nasojejunal Left nostril (Active)   Number of days: 6       Cognition:  Overall Cognitive Status: Impaired  Orientation Level: Oriented X4 (with choices)  Following Commands: Follows one step commands with increased time    Pain Assessment:  Pain Assessment  Pain Assessment:  (reports \"a little\" pain with gesture)     Objective   Bed Mobility/Transfers:   Bed Mobility  Bed Mobility: Yes  Bed Mobility 1  Bed Mobility 1: Supine to sitting, Sitting to supine  Level of Assistance 1: Maximum assistance, +2  Bed Mobility Comments 1: HOB elevated, draw sheet  Bed Mobility 2  Bed Mobility  2: Rolling right  Level of Assistance 2: Maximum assistance  Bed Mobility Comments 2: draw sheet  Transfers  Transfer: Yes  Transfer 1  Transfer From 1: Sit to  Transfer to 1: Stand  Transfer Level of Assistance 1: Maximum assistance, +2  Trials/Comments 1: bilat arm in arm assist, bilat knee block. Flexed posture.    Therapy/Activity:      Therapeutic Activity  Therapeutic Activity Performed: Yes  Therapeutic Activity 1: Pt tolerated 10 minutes seated EOB with assist needs fluctuating from CGA-mod A though primarily min A. Flexed posture with neck laterally flexed right. Tolerated passive scapular retraction at EOB during LE exercises guided by " PT.  Therapeutic Activity 2: Pt performed x3 repetitions cervical lateral flexion R and x3 repetitions cervical rotation right while seated EOB.    Outcome Measures:  [facility] Daily Activity  Putting on and taking off regular lower body clothing: Total  Bathing (including washing, rinsing, drying): A lot  Putting on and taking off regular upper body clothing: A lot  Toileting, which includes using toilet, bedpan or urinal: Total  Taking care of personal grooming such as brushing teeth: A lot  Eating Meals: Total  Daily Activity - Total Score: 9  ICU Mobility Screen  ICU Mobility Scale: Standing     Education Documentation  Body Mechanics, taught by Ellen Garcia OT at 1/28/2025  3:49 PM.  Learner: Family, Patient  Readiness: Acceptance  Method: Explanation, Demonstration  Response: Demonstrated Understanding    Precautions, taught by Ellen Garcia OT at 1/28/2025  3:49 PM.  Learner: Family, Patient  Readiness: Acceptance  Method: Explanation, Demonstration  Response: Demonstrated Understanding    Home Exercise Program, taught by Ellen Garcia OT at 1/28/2025  3:49 PM.  Learner: Family, Patient  Readiness: Acceptance  Method: Explanation, Demonstration  Response: Demonstrated Understanding    ADL Training, taught by Ellen Garcia OT at 1/28/2025  3:49 PM.  Learner: Family, Patient  Readiness: Acceptance  Method: Explanation, Demonstration  Response: Demonstrated Understanding    Education Comments  No comments found.        Goals:  Encounter Problems       Encounter Problems (Active)       ADLs       Patient will perform UB and LB bathing with modified independent level of assistance. (Not met)       Start:  01/14/25    Expected End:  01/28/25    Resolved:  01/22/25    Updated to: Patient will perform UB and LB bathing with moderate level of assistance.    Update reason: re eval         Patient with complete upper body dressing with modified independent level of assistance  donning and doffing all UE clothes.  (Not met)       Start:  01/14/25    Expected End:  01/28/25    Resolved:  01/22/25    Updated to: Patient with complete upper body dressing with minimal level of assistance donning and doffing all UE clothes.    Update reason: re eval         Patient with complete lower body dressing with modified independent level of assistance donning and doffing all LE clothes  with PRN adaptive equipment  (Not met)       Start:  01/14/25    Expected End:  01/28/25    Resolved:  01/22/25    Updated to: Patient with complete lower body dressing with moderate level of assistance donning and doffing all LE clothes  with PRN adaptive equipment    Update reason: re eval         Patient will complete toileting including hygiene clothing management/hygiene with modified independent level of assistance. (Not met)       Start:  01/14/25    Expected End:  01/28/25    Resolved:  01/22/25    Updated to: Patient will complete grooming with SBA    Update reason: re eval         Patient will perform UB and LB bathing with moderate level of assistance. (Progressing)       Start:  01/22/25    Expected End:  02/05/25                Patient with complete upper body dressing with minimal level of assistance donning and doffing all UE clothes. (Progressing)       Start:  01/22/25    Expected End:  02/05/25                Patient with complete lower body dressing with moderate level of assistance donning and doffing all LE clothes  with PRN adaptive equipment (Progressing)       Start:  01/22/25    Expected End:  02/05/25                Patient will complete grooming with SBA (Progressing)       Start:  01/22/25    Expected End:  02/05/25                   TRANSFERS       Patient will perform bed mobility modified independent level of assistance in order to improve safety and independence with mobility (Not met)       Start:  01/14/25    Expected End:  01/28/25    Resolved:  01/22/25    Updated to: Patient will  perform bed mobility moderate level of assistance in order to improve safety and independence with mobility    Update reason: re eval         Patient will complete functional transfer to toilet with least restrictive device with modified independent level of assistance. (Not met)       Start:  01/14/25    Expected End:  01/28/25    Resolved:  01/22/25    Updated to: Patient will complete STS with moderate assistance and LRAD    Update reason: re eval         Patient will perform bed mobility moderate level of assistance in order to improve safety and independence with mobility (Progressing)       Start:  01/22/25    Expected End:  02/05/25                Patient will complete STS with moderate assistance and LRAD (Progressing)       Start:  01/22/25    Expected End:  02/05/25 01/28/25 at 3:50 PM   Ellen Garcia, OT   [phone]

## 2025-01-28 NOTE — PROGRESS NOTES
"Faustina Vick \"Jannie\" is a 64 y.o. female who presents for Primary cancer of right upper lobe of lung (Multi) [C34.11].  She initially received chemotherapy and radiation, but appeared to have residual radiographic abnormalities.  PET/CT suggested progression of disease, and in this setting and Dr. Wagner felt that surgical resection in a \"salvage\" setting might facilitate long-term overall survival given the failure of nonsurgical management She is now  15 days post op from PNEUMONECTOMY (Right) (en bloc with chest wall resection and reconstruction), mediastinal lymph node dissection for a  Primary cancer of right upper lobe of lung (Multi).      Subjective   NAEO. Continued tachycardia s/p Bb pushes. Off Vasopressor support. Minimal vent settings, failed CPAP trials.     Objective     General: critically ill in ICU bed, intubated, follows commands.   HEENT: ETT in place. Enteral feeds via dobhoff secure in nares.   Resp: orally intubated, sats appropriate, symmetric rise, synchronous w/vent  CV:   ST on monitor, sinus  : yoo in place w/clear yellow uop    Last Recorded Vitals  Blood pressure 111/71, pulse 104, temperature 36.6 °C (97.9 °F), temperature source Temporal, resp. rate 23, height 1.651 m (5' 5\"), weight 54.9 kg (121 lb 0.5 oz), SpO2 92%.  Intake/Output last 3 Shifts:  I/O last 3 completed shifts:  In: 2520 (45.9 mL/kg) [Blood:250; NG/GT:2020; IV Piggyback:250]  Out: 2605 (47.5 mL/kg) [Urine:2605 (1.3 mL/kg/hr)]  Weight: 54.9 kg     Relevant Results  Scheduled medications  budesonide, 0.5 mg, nebulization, BID  clonazePAM, 0.5 mg, oral, q8h  dexAMETHasone, 6 mg, intravenous, q24h  heparin (porcine), 5,000 Units, subcutaneous, q8h  HYDROmorphone, 0.2 mg, intravenous, Once  insulin lispro, 0-5 Units, subcutaneous, q4h  ipratropium-albuteroL, 3 mL, nebulization, q6h  lidocaine, 1 patch, transdermal, Daily  midodrine, 10 mg, nasogastric tube, q8h  oxyCODONE, 5 mg, nasogastric tube, q6h  oxygen, , " inhalation, Continuous - Inhalation  pantoprazole, 40 mg, intravenous, Daily  perflutren protein A microsphere, 0.5 mL, intravenous, Once in imaging  sulfur hexafluoride microsphr, 2 mL, intravenous, Once in imaging  thiamine, 100 mg, intravenous, Daily  traZODone, 25 mg, oral, Nightly      Continuous medications     PRN medications  PRN medications: albuterol, calcium gluconate, calcium gluconate, dextrose, dextrose, glucagon, glucagon, HYDROmorphone, magnesium sulfate, magnesium sulfate, oxygen, potassium chloride, potassium chloride    Results for orders placed or performed during the hospital encounter of 01/13/25 (from the past 24 hours)   POCT GLUCOSE   Result Value Ref Range    POCT Glucose 148 (H) 74 - 99 mg/dL   Blood Gas Arterial Full Panel   Result Value Ref Range    POCT pH, Arterial 7.46 (H) 7.38 - 7.42 pH    POCT pCO2, Arterial 48 (H) 38 - 42 mm Hg    POCT pO2, Arterial 82 (L) 85 - 95 mm Hg    POCT SO2, Arterial 97 94 - 100 %    POCT Oxy Hemoglobin, Arterial 94.5 94.0 - 98.0 %    POCT Hematocrit Calculated, Arterial 37.0 36.0 - 46.0 %    POCT Sodium, Arterial 136 136 - 145 mmol/L    POCT Potassium, Arterial 5.2 3.5 - 5.3 mmol/L    POCT Chloride, Arterial 102 98 - 107 mmol/L    POCT Ionized Calcium, Arterial 1.16 1.10 - 1.33 mmol/L    POCT Glucose, Arterial 156 (H) 74 - 99 mg/dL    POCT Lactate, Arterial 1.1 0.4 - 2.0 mmol/L    POCT Base Excess, Arterial 9.0 (H) -2.0 - 3.0 mmol/L    POCT HCO3 Calculated, Arterial 34.1 (H) 22.0 - 26.0 mmol/L    POCT Hemoglobin, Arterial 12.2 12.0 - 16.0 g/dL    POCT Anion Gap, Arterial 5 (L) 10 - 25 mmo/L    Patient Temperature 37.0 degrees Celsius    FiO2 40 %   Transthoracic Echo (TTE) Limited   Result Value Ref Range    Tricuspid annular plane systolic excursion 1.1 cm    LV EF 38 %    RV free wall pk S' 10.60 cm/s    LVIDd 3.80 cm    LV A4C EF 27.1    POCT GLUCOSE   Result Value Ref Range    POCT Glucose 154 (H) 74 - 99 mg/dL   Blood Gas Arterial Full Panel   Result  Value Ref Range    POCT pH, Arterial 7.43 (H) 7.38 - 7.42 pH    POCT pCO2, Arterial 50 (H) 38 - 42 mm Hg    POCT pO2, Arterial 72 (L) 85 - 95 mm Hg    POCT SO2, Arterial 96 94 - 100 %    POCT Oxy Hemoglobin, Arterial 92.6 (L) 94.0 - 98.0 %    POCT Hematocrit Calculated, Arterial 36.0 36.0 - 46.0 %    POCT Sodium, Arterial 139 136 - 145 mmol/L    POCT Potassium, Arterial 4.6 3.5 - 5.3 mmol/L    POCT Chloride, Arterial 102 98 - 107 mmol/L    POCT Ionized Calcium, Arterial 1.20 1.10 - 1.33 mmol/L    POCT Glucose, Arterial 129 (H) 74 - 99 mg/dL    POCT Lactate, Arterial 1.0 0.4 - 2.0 mmol/L    POCT Base Excess, Arterial 7.6 (H) -2.0 - 3.0 mmol/L    POCT HCO3 Calculated, Arterial 33.2 (H) 22.0 - 26.0 mmol/L    POCT Hemoglobin, Arterial 12.0 12.0 - 16.0 g/dL    POCT Anion Gap, Arterial 8 (L) 10 - 25 mmo/L    Patient Temperature 37.0 degrees Celsius    FiO2 40 %   Calcium, Ionized   Result Value Ref Range    POCT Calcium, Ionized 1.11 1.1 - 1.33 mmol/L   CBC   Result Value Ref Range    WBC 19.5 (H) 4.4 - 11.3 x10*3/uL    nRBC 0.0 0.0 - 0.0 /100 WBCs    RBC 3.74 (L) 4.00 - 5.20 x10*6/uL    Hemoglobin 10.5 (L) 12.0 - 16.0 g/dL    Hematocrit 32.9 (L) 36.0 - 46.0 %    MCV 88 80 - 100 fL    MCH 28.1 26.0 - 34.0 pg    MCHC 31.9 (L) 32.0 - 36.0 g/dL    RDW 18.8 (H) 11.5 - 14.5 %    Platelets 273 150 - 450 x10*3/uL   Coagulation Screen   Result Value Ref Range    Protime 13.0 (H) 9.8 - 12.8 seconds    INR 1.2 (H) 0.9 - 1.1    aPTT 30 27 - 38 seconds   Magnesium   Result Value Ref Range    Magnesium 2.21 1.60 - 2.40 mg/dL   Renal function panel   Result Value Ref Range    Glucose 123 (H) 74 - 99 mg/dL    Sodium 141 136 - 145 mmol/L    Potassium 4.4 3.5 - 5.3 mmol/L    Chloride 100 98 - 107 mmol/L    Bicarbonate 33 (H) 21 - 32 mmol/L    Anion Gap 12 10 - 20 mmol/L    Urea Nitrogen 22 6 - 23 mg/dL    Creatinine 0.24 (L) 0.50 - 1.05 mg/dL    eGFR >90 >60 mL/min/1.73m*2    Calcium 8.9 8.6 - 10.6 mg/dL    Phosphorus 2.8 2.5 - 4.9 mg/dL     Albumin 3.6 3.4 - 5.0 g/dL   Blood Gas Arterial Full Panel   Result Value Ref Range    POCT pH, Arterial 7.45 (H) 7.38 - 7.42 pH    POCT pCO2, Arterial 49 (H) 38 - 42 mm Hg    POCT pO2, Arterial 65 (L) 85 - 95 mm Hg    POCT SO2, Arterial 93 (L) 94 - 100 %    POCT Oxy Hemoglobin, Arterial 90.3 (L) 94.0 - 98.0 %    POCT Hematocrit Calculated, Arterial 33.0 (L) 36.0 - 46.0 %    POCT Sodium, Arterial 138 136 - 145 mmol/L    POCT Potassium, Arterial 4.4 3.5 - 5.3 mmol/L    POCT Chloride, Arterial 103 98 - 107 mmol/L    POCT Ionized Calcium, Arterial 1.18 1.10 - 1.33 mmol/L    POCT Glucose, Arterial 121 (H) 74 - 99 mg/dL    POCT Lactate, Arterial 0.9 0.4 - 2.0 mmol/L    POCT Base Excess, Arterial 8.9 (H) -2.0 - 3.0 mmol/L    POCT HCO3 Calculated, Arterial 34.1 (H) 22.0 - 26.0 mmol/L    POCT Hemoglobin, Arterial 11.0 (L) 12.0 - 16.0 g/dL    POCT Anion Gap, Arterial 5 (L) 10 - 25 mmo/L    Patient Temperature 37.0 degrees Celsius    FiO2 40 %   Blood Gas Arterial Full Panel   Result Value Ref Range    POCT pH, Arterial 7.47 (H) 7.38 - 7.42 pH    POCT pCO2, Arterial 46 (H) 38 - 42 mm Hg    POCT pO2, Arterial 65 (L) 85 - 95 mm Hg    POCT SO2, Arterial 94 94 - 100 %    POCT Oxy Hemoglobin, Arterial 91.6 (L) 94.0 - 98.0 %    POCT Hematocrit Calculated, Arterial 32.0 (L) 36.0 - 46.0 %    POCT Sodium, Arterial 139 136 - 145 mmol/L    POCT Potassium, Arterial 4.2 3.5 - 5.3 mmol/L    POCT Chloride, Arterial 105 98 - 107 mmol/L    POCT Ionized Calcium, Arterial 1.17 1.10 - 1.33 mmol/L    POCT Glucose, Arterial 112 (H) 74 - 99 mg/dL    POCT Lactate, Arterial 0.9 0.4 - 2.0 mmol/L    POCT Base Excess, Arterial 8.8 (H) -2.0 - 3.0 mmol/L    POCT HCO3 Calculated, Arterial 33.5 (H) 22.0 - 26.0 mmol/L    POCT Hemoglobin, Arterial 10.5 (L) 12.0 - 16.0 g/dL    POCT Anion Gap, Arterial 5 (L) 10 - 25 mmo/L    Patient Temperature 37.0 degrees Celsius    FiO2 40 %   POCT GLUCOSE   Result Value Ref Range    POCT Glucose 115 (H) 74 - 99 mg/dL        XR chest 1 view 01/28/2025: The right post pneumonectomy space is stable wihtout new air fluid level. The left hemithorax shows trace effusion, and clearing intersitial infiltrate/edema. Improved overall.            Assessment/Plan   Assessment & Plan  Primary cancer of right upper lobe of lung (Multi)    Chronic obstructive pulmonary disease (Multi)    Malignant neoplasm of upper lobe of right lung (Multi)    Cardiogenic shock (Multi)    Faustina Vick (Jannie) is a 64F with Hx of Stage III lung cancer, asthma, anxiety, and arthritis who is now s/p R pneumonectomy with Dr. Wagner 1/13/25. 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 Cardiology was consulted and recommended Lasix gtt and milrinone. Lasix gtt held 1/17, hyponatremia improving. Patient underwent RHC and SGC placement with cardiology 1/17. She was reintubated on 1/18 d/t worsening respiratory status and AMS. Also had increased pressor requirements, now weaned off. Repeat echo stable on 1/18. COVID positive on 1/20.   Failing extubation trials.     - Continue & appreciate supportive ICU care  - Goal net even to negative  - Plan trach + PEG later this week, exact timing to be determined.   - Dr. Wagner is available to discuss this plan with family this afternoon.   - Monitor WBC, 19 from 23K,  if  any fevers develop--bronch/BAL and repeat pan culture.     Patient discussed with Dr. Wagner.    John Brush MD  Cardiothoracic Surgery Fellow  Thoracic surgery [id number]

## 2025-01-28 NOTE — CARE PLAN
Problem: Pain - Adult  Goal: Verbalizes/displays adequate comfort level or baseline comfort level  Outcome: Progressing     Problem: Safety - Adult  Goal: Free from fall injury  Outcome: Progressing     Problem: Skin  Goal: Decreased wound size/increased tissue granulation at next dressing change  Outcome: Progressing  Flowsheets (Taken 1/28/2025 1830)  Decreased wound size/increased tissue granulation at next dressing change:   Promote sleep for wound healing   Protective dressings over bony prominences  Goal: Participates in plan/prevention/treatment measures  Outcome: Progressing  Flowsheets (Taken 1/28/2025 1830)  Participates in plan/prevention/treatment measures:   Discuss with provider PT/OT consult   Elevate heels  Goal: Prevent/manage excess moisture  Outcome: Progressing  Flowsheets (Taken 1/28/2025 1830)  Prevent/manage excess moisture:   Monitor for/manage infection if present   Use wicking fabric (obtain order)   Follow provider orders for dressing changes   Moisturize dry skin   Cleanse incontinence/protect with barrier cream  Goal: Prevent/minimize sheer/friction injuries  Outcome: Progressing  Flowsheets (Taken 1/28/2025 1830)  Prevent/minimize sheer/friction injuries:   Complete micro-shifts as needed if patient unable. Adjust patient position to relieve pressure points, not a full turn   Increase activity/out of bed for meals   Use pull sheet   HOB 30 degrees or less   Turn/reposition every 2 hours/use positioning/transfer devices  Goal: Promote/optimize nutrition  Outcome: Progressing  Flowsheets (Taken 1/28/2025 1830)  Promote/optimize nutrition:   Offer water/supplements/favorite foods   Monitor/record intake including meals  Goal: Promote skin healing  Outcome: Progressing  Flowsheets (Taken 1/28/2025 1830)  Promote skin healing:   Assess skin/pad under line(s)/device(s)   Protective dressings over bony prominences   Turn/reposition every 2 hours/use positioning/transfer devices   Ensure  correct size (line/device) and apply per  instructions   Rotate device position/do not position patient on device     Problem: Safety - Medical Restraint  Goal: Remains free of injury from restraints (Restraint for Interference with Medical Device)  Outcome: Progressing  Flowsheets (Taken 1/28/2025 1830)  Remains free of injury from restraints (restraint for interference with medical device):   Determine that other, less restrictive measures have been tried or would not be effective before applying the restraint   Evaluate the patient's condition at the time of restraint application   Inform patient/family regarding the reason for restraint   Every 2 hours: Monitor safety, psychosocial status, comfort, nutrition and hydration  Goal: Free from restraint(s) (Restraint for Interference with Medical Device)  Outcome: Progressing  Flowsheets (Taken 1/28/2025 1830)  Free from restraint(s) (restraint for interference with medical device):   ONCE/SHIFT or MINIMUM Every 12 hours: Assess and document the continuing need for restraints   Every 24 hours: Continued use of restraint requires Licensed Independent Practitioner to perform face to face examination and written order   Identify and implement measures to help patient regain control     Problem: Knowledge Deficit  Goal: Patient/family/caregiver demonstrates understanding of disease process, treatment plan, medications, and discharge instructions  Outcome: Progressing     Problem: Mechanical Ventilation  Goal: Patient Will Maintain Patent Airway  Outcome: Progressing  Goal: Oral health is maintained or improved  Outcome: Progressing  Goal: Tracheostomy will be managed safely  Outcome: Progressing  Goal: ET tube will be managed safely  Outcome: Progressing  Goal: Ability to express needs and understand communication  Outcome: Progressing  Goal: Mobility/activity is maintained at optimum level for patient  Outcome: Progressing     Problem: Fall/Injury  Goal: Not  fall by end of shift  Outcome: Progressing  Goal: Be free from injury by end of the shift  Outcome: Progressing  Goal: Verbalize understanding of personal risk factors for fall in the hospital  Outcome: Progressing  Goal: Verbalize understanding of risk factor reduction measures to prevent injury from fall in the home  Outcome: Progressing  Goal: Use assistive devices by end of the shift  Outcome: Progressing  Goal: Pace activities to prevent fatigue by end of the shift  Outcome: Progressing

## 2025-01-29 ENCOUNTER — APPOINTMENT (OUTPATIENT)
Dept: RADIOLOGY | Facility: HOSPITAL | Age: 65
End: 2025-01-29
Payer: COMMERCIAL

## 2025-01-29 LAB
ABO GROUP (TYPE) IN BLOOD: NORMAL
ALBUMIN SERPL BCP-MCNC: 3.4 G/DL (ref 3.4–5)
ANION GAP BLDA CALCULATED.4IONS-SCNC: 5 MMO/L (ref 10–25)
ANION GAP BLDA CALCULATED.4IONS-SCNC: 6 MMO/L (ref 10–25)
ANION GAP BLDA CALCULATED.4IONS-SCNC: 8 MMO/L (ref 10–25)
ANION GAP SERPL CALC-SCNC: 13 MMOL/L (ref 10–20)
ANTIBODY SCREEN: NORMAL
APTT PPP: 31 SECONDS (ref 27–38)
BASE EXCESS BLDA CALC-SCNC: 6.3 MMOL/L (ref -2–3)
BASE EXCESS BLDA CALC-SCNC: 7.1 MMOL/L (ref -2–3)
BASE EXCESS BLDA CALC-SCNC: 8.9 MMOL/L (ref -2–3)
BODY TEMPERATURE: 37 DEGREES CELSIUS
BUN SERPL-MCNC: 23 MG/DL (ref 6–23)
CA-I BLD-SCNC: 1.17 MMOL/L (ref 1.1–1.33)
CA-I BLDA-SCNC: 1.13 MMOL/L (ref 1.1–1.33)
CA-I BLDA-SCNC: 1.19 MMOL/L (ref 1.1–1.33)
CA-I BLDA-SCNC: 1.2 MMOL/L (ref 1.1–1.33)
CALCIUM SERPL-MCNC: 8.8 MG/DL (ref 8.6–10.6)
CHLORIDE BLDA-SCNC: 103 MMOL/L (ref 98–107)
CHLORIDE BLDA-SCNC: 104 MMOL/L (ref 98–107)
CHLORIDE BLDA-SCNC: 106 MMOL/L (ref 98–107)
CHLORIDE SERPL-SCNC: 102 MMOL/L (ref 98–107)
CO2 SERPL-SCNC: 34 MMOL/L (ref 21–32)
CREAT SERPL-MCNC: 0.22 MG/DL (ref 0.5–1.05)
EGFRCR SERPLBLD CKD-EPI 2021: >90 ML/MIN/1.73M*2
ERYTHROCYTE [DISTWIDTH] IN BLOOD BY AUTOMATED COUNT: 19.3 % (ref 11.5–14.5)
GLUCOSE BLD MANUAL STRIP-MCNC: 112 MG/DL (ref 74–99)
GLUCOSE BLD MANUAL STRIP-MCNC: 123 MG/DL (ref 74–99)
GLUCOSE BLD MANUAL STRIP-MCNC: 123 MG/DL (ref 74–99)
GLUCOSE BLD MANUAL STRIP-MCNC: 134 MG/DL (ref 74–99)
GLUCOSE BLD MANUAL STRIP-MCNC: 167 MG/DL (ref 74–99)
GLUCOSE BLDA-MCNC: 119 MG/DL (ref 74–99)
GLUCOSE BLDA-MCNC: 153 MG/DL (ref 74–99)
GLUCOSE BLDA-MCNC: 160 MG/DL (ref 74–99)
GLUCOSE SERPL-MCNC: 102 MG/DL (ref 74–99)
HCO3 BLDA-SCNC: 31.8 MMOL/L (ref 22–26)
HCO3 BLDA-SCNC: 33 MMOL/L (ref 22–26)
HCO3 BLDA-SCNC: 34.1 MMOL/L (ref 22–26)
HCT VFR BLD AUTO: 31.3 % (ref 36–46)
HCT VFR BLD EST: 30 % (ref 36–46)
HCT VFR BLD EST: 33 % (ref 36–46)
HCT VFR BLD EST: 35 % (ref 36–46)
HGB BLD-MCNC: 10.1 G/DL (ref 12–16)
HGB BLDA-MCNC: 10.1 G/DL (ref 12–16)
HGB BLDA-MCNC: 11 G/DL (ref 12–16)
HGB BLDA-MCNC: 11.5 G/DL (ref 12–16)
INHALED O2 CONCENTRATION: 40 %
INR PPP: 1.1 (ref 0.9–1.1)
LACTATE BLDA-SCNC: 0.7 MMOL/L (ref 0.4–2)
LACTATE BLDA-SCNC: 0.8 MMOL/L (ref 0.4–2)
LACTATE BLDA-SCNC: 0.8 MMOL/L (ref 0.4–2)
MAGNESIUM SERPL-MCNC: 2.24 MG/DL (ref 1.6–2.4)
MCH RBC QN AUTO: 27.9 PG (ref 26–34)
MCHC RBC AUTO-ENTMCNC: 32.3 G/DL (ref 32–36)
MCV RBC AUTO: 87 FL (ref 80–100)
NRBC BLD-RTO: 0 /100 WBCS (ref 0–0)
OXYHGB MFR BLDA: 93.9 % (ref 94–98)
OXYHGB MFR BLDA: 94 % (ref 94–98)
OXYHGB MFR BLDA: 94.8 % (ref 94–98)
PCO2 BLDA: 49 MM HG (ref 38–42)
PCO2 BLDA: 49 MM HG (ref 38–42)
PCO2 BLDA: 52 MM HG (ref 38–42)
PH BLDA: 7.41 PH (ref 7.38–7.42)
PH BLDA: 7.42 PH (ref 7.38–7.42)
PH BLDA: 7.45 PH (ref 7.38–7.42)
PHOSPHATE SERPL-MCNC: 3.2 MG/DL (ref 2.5–4.9)
PLATELET # BLD AUTO: 273 X10*3/UL (ref 150–450)
PO2 BLDA: 75 MM HG (ref 85–95)
PO2 BLDA: 80 MM HG (ref 85–95)
PO2 BLDA: 83 MM HG (ref 85–95)
POTASSIUM BLDA-SCNC: 3.9 MMOL/L (ref 3.5–5.3)
POTASSIUM BLDA-SCNC: 4.1 MMOL/L (ref 3.5–5.3)
POTASSIUM BLDA-SCNC: 4.3 MMOL/L (ref 3.5–5.3)
POTASSIUM SERPL-SCNC: 4.5 MMOL/L (ref 3.5–5.3)
PRESSURE SUPPORT: 5 CM H2O
PROTHROMBIN TIME: 12.8 SECONDS (ref 9.8–12.8)
RBC # BLD AUTO: 3.62 X10*6/UL (ref 4–5.2)
RH FACTOR (ANTIGEN D): NORMAL
SAO2 % BLDA: 97 % (ref 94–100)
SODIUM BLDA-SCNC: 139 MMOL/L (ref 136–145)
SODIUM BLDA-SCNC: 140 MMOL/L (ref 136–145)
SODIUM BLDA-SCNC: 140 MMOL/L (ref 136–145)
SODIUM SERPL-SCNC: 144 MMOL/L (ref 136–145)
SPONTANEOUS TIDAL VOLUME: 285 ML
TOTAL MINUTE VOLUME: 5.5 LITER
VENTILATOR MODE: ABNORMAL
WBC # BLD AUTO: 14.6 X10*3/UL (ref 4.4–11.3)

## 2025-01-29 PROCEDURE — 71045 X-RAY EXAM CHEST 1 VIEW: CPT | Performed by: STUDENT IN AN ORGANIZED HEALTH CARE EDUCATION/TRAINING PROGRAM

## 2025-01-29 PROCEDURE — 83735 ASSAY OF MAGNESIUM: CPT | Performed by: NURSE PRACTITIONER

## 2025-01-29 PROCEDURE — 2500000001 HC RX 250 WO HCPCS SELF ADMINISTERED DRUGS (ALT 637 FOR MEDICARE OP): Performed by: NURSE PRACTITIONER

## 2025-01-29 PROCEDURE — 94640 AIRWAY INHALATION TREATMENT: CPT

## 2025-01-29 PROCEDURE — 2020000001 HC ICU ROOM DAILY

## 2025-01-29 PROCEDURE — 80069 RENAL FUNCTION PANEL: CPT

## 2025-01-29 PROCEDURE — 94762 N-INVAS EAR/PLS OXIMTRY CONT: CPT

## 2025-01-29 PROCEDURE — 94003 VENT MGMT INPAT SUBQ DAY: CPT

## 2025-01-29 PROCEDURE — 71045 X-RAY EXAM CHEST 1 VIEW: CPT

## 2025-01-29 PROCEDURE — 2500000005 HC RX 250 GENERAL PHARMACY W/O HCPCS: Performed by: PHYSICIAN ASSISTANT

## 2025-01-29 PROCEDURE — 82947 ASSAY GLUCOSE BLOOD QUANT: CPT

## 2025-01-29 PROCEDURE — 82330 ASSAY OF CALCIUM: CPT | Performed by: NURSE PRACTITIONER

## 2025-01-29 PROCEDURE — 86901 BLOOD TYPING SEROLOGIC RH(D): CPT

## 2025-01-29 PROCEDURE — 86923 COMPATIBILITY TEST ELECTRIC: CPT

## 2025-01-29 PROCEDURE — 84132 ASSAY OF SERUM POTASSIUM: CPT | Performed by: NURSE PRACTITIONER

## 2025-01-29 PROCEDURE — 2500000004 HC RX 250 GENERAL PHARMACY W/ HCPCS (ALT 636 FOR OP/ED): Performed by: NURSE PRACTITIONER

## 2025-01-29 PROCEDURE — 2500000001 HC RX 250 WO HCPCS SELF ADMINISTERED DRUGS (ALT 637 FOR MEDICARE OP)

## 2025-01-29 PROCEDURE — 37799 UNLISTED PX VASCULAR SURGERY: CPT

## 2025-01-29 PROCEDURE — 99291 CRITICAL CARE FIRST HOUR: CPT | Performed by: STUDENT IN AN ORGANIZED HEALTH CARE EDUCATION/TRAINING PROGRAM

## 2025-01-29 PROCEDURE — 51701 INSERT BLADDER CATHETER: CPT

## 2025-01-29 PROCEDURE — 85610 PROTHROMBIN TIME: CPT | Performed by: NURSE PRACTITIONER

## 2025-01-29 PROCEDURE — 2500000002 HC RX 250 W HCPCS SELF ADMINISTERED DRUGS (ALT 637 FOR MEDICARE OP, ALT 636 FOR OP/ED): Performed by: STUDENT IN AN ORGANIZED HEALTH CARE EDUCATION/TRAINING PROGRAM

## 2025-01-29 PROCEDURE — 2500000004 HC RX 250 GENERAL PHARMACY W/ HCPCS (ALT 636 FOR OP/ED)

## 2025-01-29 PROCEDURE — 37799 UNLISTED PX VASCULAR SURGERY: CPT | Performed by: NURSE PRACTITIONER

## 2025-01-29 PROCEDURE — 2500000005 HC RX 250 GENERAL PHARMACY W/O HCPCS

## 2025-01-29 PROCEDURE — 97530 THERAPEUTIC ACTIVITIES: CPT | Mod: GP | Performed by: PHYSICAL THERAPIST

## 2025-01-29 PROCEDURE — 85027 COMPLETE CBC AUTOMATED: CPT | Performed by: NURSE PRACTITIONER

## 2025-01-29 PROCEDURE — 2500000002 HC RX 250 W HCPCS SELF ADMINISTERED DRUGS (ALT 637 FOR MEDICARE OP, ALT 636 FOR OP/ED): Performed by: NURSE PRACTITIONER

## 2025-01-29 RX ORDER — ESOMEPRAZOLE MAGNESIUM 40 MG/1
40 GRANULE, DELAYED RELEASE ORAL
Status: DISCONTINUED | OUTPATIENT
Start: 2025-01-30 | End: 2025-01-30

## 2025-01-29 RX ORDER — LANOLIN ALCOHOL/MO/W.PET/CERES
100 CREAM (GRAM) TOPICAL DAILY
Status: DISCONTINUED | OUTPATIENT
Start: 2025-01-29 | End: 2025-01-30

## 2025-01-29 RX ORDER — POLYETHYLENE GLYCOL 3350 17 G/17G
17 POWDER, FOR SOLUTION ORAL DAILY
Status: DISCONTINUED | OUTPATIENT
Start: 2025-01-29 | End: 2025-01-30

## 2025-01-29 RX ORDER — OXYCODONE HCL 5 MG/5 ML
2.5 SOLUTION, ORAL ORAL EVERY 6 HOURS PRN
Status: DISCONTINUED | OUTPATIENT
Start: 2025-01-29 | End: 2025-01-30

## 2025-01-29 RX ADMIN — SENNOSIDES 5 ML: 8.8 LIQUID ORAL at 20:31

## 2025-01-29 RX ADMIN — PANTOPRAZOLE SODIUM 40 MG: 40 INJECTION, POWDER, FOR SOLUTION INTRAVENOUS at 08:30

## 2025-01-29 RX ADMIN — IPRATROPIUM BROMIDE AND ALBUTEROL SULFATE 3 ML: .5; 3 SOLUTION RESPIRATORY (INHALATION) at 20:01

## 2025-01-29 RX ADMIN — INSULIN LISPRO 1 UNITS: 100 INJECTION, SOLUTION INTRAVENOUS; SUBCUTANEOUS at 16:12

## 2025-01-29 RX ADMIN — CLONAZEPAM 0.5 MG: 0.5 TABLET ORAL at 00:35

## 2025-01-29 RX ADMIN — OXYCODONE HYDROCHLORIDE 5 MG: 5 SOLUTION ORAL at 03:27

## 2025-01-29 RX ADMIN — MIDODRINE HYDROCHLORIDE 10 MG: 10 TABLET ORAL at 14:08

## 2025-01-29 RX ADMIN — CLONAZEPAM 0.5 MG: 0.5 TABLET ORAL at 16:02

## 2025-01-29 RX ADMIN — HEPARIN SODIUM 5000 UNITS: 5000 INJECTION INTRAVENOUS; SUBCUTANEOUS at 13:48

## 2025-01-29 RX ADMIN — DEXAMETHASONE SODIUM PHOSPHATE 6 MG: 10 INJECTION, SOLUTION INTRAMUSCULAR; INTRAVENOUS at 09:47

## 2025-01-29 RX ADMIN — CLONAZEPAM 0.5 MG: 0.5 TABLET ORAL at 23:21

## 2025-01-29 RX ADMIN — OXYCODONE HYDROCHLORIDE 2.5 MG: 5 SOLUTION ORAL at 16:02

## 2025-01-29 RX ADMIN — IPRATROPIUM BROMIDE AND ALBUTEROL SULFATE 3 ML: .5; 3 SOLUTION RESPIRATORY (INHALATION) at 02:10

## 2025-01-29 RX ADMIN — BUDESONIDE 0.5 MG: 0.5 INHALANT RESPIRATORY (INHALATION) at 20:08

## 2025-01-29 RX ADMIN — HEPARIN SODIUM 5000 UNITS: 5000 INJECTION INTRAVENOUS; SUBCUTANEOUS at 23:21

## 2025-01-29 RX ADMIN — THIAMINE HYDROCHLORIDE 100 MG: 100 INJECTION, SOLUTION INTRAMUSCULAR; INTRAVENOUS at 08:30

## 2025-01-29 RX ADMIN — POLYETHYLENE GLYCOL 3350 17 G: 17 POWDER, FOR SOLUTION ORAL at 11:35

## 2025-01-29 RX ADMIN — Medication 40 PERCENT: at 20:34

## 2025-01-29 RX ADMIN — OXYCODONE HYDROCHLORIDE 5 MG: 5 SOLUTION ORAL at 09:47

## 2025-01-29 RX ADMIN — OXYCODONE HYDROCHLORIDE 2.5 MG: 5 SOLUTION ORAL at 23:20

## 2025-01-29 RX ADMIN — Medication 40 PERCENT: at 08:06

## 2025-01-29 RX ADMIN — BUDESONIDE 0.5 MG: 0.5 INHALANT RESPIRATORY (INHALATION) at 08:01

## 2025-01-29 RX ADMIN — MIDODRINE HYDROCHLORIDE 10 MG: 10 TABLET ORAL at 08:30

## 2025-01-29 RX ADMIN — IPRATROPIUM BROMIDE AND ALBUTEROL SULFATE 3 ML: .5; 3 SOLUTION RESPIRATORY (INHALATION) at 08:01

## 2025-01-29 RX ADMIN — CLONAZEPAM 0.5 MG: 0.5 TABLET ORAL at 08:30

## 2025-01-29 RX ADMIN — IPRATROPIUM BROMIDE AND ALBUTEROL SULFATE 3 ML: .5; 3 SOLUTION RESPIRATORY (INHALATION) at 14:22

## 2025-01-29 RX ADMIN — MIDODRINE HYDROCHLORIDE 10 MG: 10 TABLET ORAL at 23:21

## 2025-01-29 RX ADMIN — LIDOCAINE 4% 1 PATCH: 40 PATCH TOPICAL at 08:31

## 2025-01-29 RX ADMIN — TRAZODONE HYDROCHLORIDE 25 MG: 50 TABLET ORAL at 20:31

## 2025-01-29 RX ADMIN — HEPARIN SODIUM 5000 UNITS: 5000 INJECTION INTRAVENOUS; SUBCUTANEOUS at 06:23

## 2025-01-29 ASSESSMENT — COGNITIVE AND FUNCTIONAL STATUS - GENERAL
MOVING TO AND FROM BED TO CHAIR: TOTAL
MOBILITY SCORE: 7
STANDING UP FROM CHAIR USING ARMS: TOTAL
WALKING IN HOSPITAL ROOM: TOTAL
CLIMB 3 TO 5 STEPS WITH RAILING: TOTAL
TURNING FROM BACK TO SIDE WHILE IN FLAT BAD: TOTAL
MOVING TO AND FROM BED TO CHAIR: TOTAL
EATING MEALS: TOTAL
CLIMB 3 TO 5 STEPS WITH RAILING: A LOT
HELP NEEDED FOR BATHING: TOTAL
PERSONAL GROOMING: TOTAL
TURNING FROM BACK TO SIDE WHILE IN FLAT BAD: TOTAL
DAILY ACTIVITIY SCORE: 6
DRESSING REGULAR LOWER BODY CLOTHING: TOTAL
TOILETING: TOTAL
MOVING FROM LYING ON BACK TO SITTING ON SIDE OF FLAT BED WITH BEDRAILS: TOTAL
STANDING UP FROM CHAIR USING ARMS: TOTAL
DRESSING REGULAR UPPER BODY CLOTHING: TOTAL
MOBILITY SCORE: 7
WALKING IN HOSPITAL ROOM: TOTAL
MOVING FROM LYING ON BACK TO SITTING ON SIDE OF FLAT BED WITH BEDRAILS: A LOT

## 2025-01-29 ASSESSMENT — PAIN SCALES - GENERAL
PAINLEVEL_OUTOF10: 0 - NO PAIN
PAINLEVEL_OUTOF10: 6
PAINLEVEL_OUTOF10: 0 - NO PAIN

## 2025-01-29 ASSESSMENT — PAIN - FUNCTIONAL ASSESSMENT
PAIN_FUNCTIONAL_ASSESSMENT: CPOT (CRITICAL CARE PAIN OBSERVATION TOOL)
PAIN_FUNCTIONAL_ASSESSMENT: 0-10
PAIN_FUNCTIONAL_ASSESSMENT: 0-10
PAIN_FUNCTIONAL_ASSESSMENT: CPOT (CRITICAL CARE PAIN OBSERVATION TOOL)
PAIN_FUNCTIONAL_ASSESSMENT: 0-10

## 2025-01-29 NOTE — PROGRESS NOTES
"Faustina Vick \"Jannie\" is a 64 y.o. female who presents for Primary cancer of right upper lobe of lung (Multi) [C34.11].  She initially received chemotherapy and radiation, but appeared to have residual radiographic abnormalities.  PET/CT suggested progression of disease, and in this setting and Dr. Wagner felt that surgical resection in a \"salvage\" setting might facilitate long-term overall survival given the failure of nonsurgical management She is now  16 days post op from PNEUMONECTOMY (Right) (en bloc with chest wall resection and reconstruction), mediastinal lymph node dissection for a  Primary cancer of right upper lobe of lung (Multi).      Subjective   NAEO.     Objective     General: Critically ill in ICU bed, intubated, follows commands, appears more alert this AM.   HEENT: ETT in place. Enteral feeds via dobhoff secure in nares.   Resp: orally intubated, sats appropriate, symmetric rise, synchronous w/vent. R chest incision well approximated  CV:  ST on monitor  : yoo in place w/clear yellow uop  Ext: Minimal LE edema    Last Recorded Vitals  Blood pressure 125/69, pulse 110, temperature 36 °C (96.8 °F), resp. rate 23, height 1.651 m (5' 5\"), weight 54.9 kg (121 lb 0.5 oz), SpO2 96%.  Intake/Output last 3 Shifts:  I/O last 3 completed shifts:  In: 2380 (43.4 mL/kg) [Blood:250; NG/GT:1880; IV Piggyback:250]  Out: 2240 (40.8 mL/kg) [Urine:2240 (1.1 mL/kg/hr)]  Weight: 54.9 kg     Relevant Results  Scheduled medications  budesonide, 0.5 mg, nebulization, BID  clonazePAM, 0.5 mg, nasogastric tube, q8h  dexAMETHasone, 6 mg, intravenous, q24h  [Held by provider] docusate sodium, 100 mg, oral, BID  heparin (porcine), 5,000 Units, subcutaneous, q8h  insulin lispro, 0-5 Units, subcutaneous, q4h  ipratropium-albuteroL, 3 mL, nebulization, q6h  lidocaine, 1 patch, transdermal, Daily  midodrine, 10 mg, nasogastric tube, q8h  oxyCODONE, 5 mg, nasogastric tube, q6h  oxygen, , inhalation, Continuous - " Inhalation  pantoprazole, 40 mg, intravenous, Daily  perflutren protein A microsphere, 0.5 mL, intravenous, Once in imaging  senna, 5 mL, nasogastric tube, Nightly  sulfur hexafluoride microsphr, 2 mL, intravenous, Once in imaging  thiamine, 100 mg, intravenous, Daily  traZODone, 25 mg, nasogastric tube, Nightly      Continuous medications     PRN medications  PRN medications: albuterol, calcium gluconate, calcium gluconate, dextrose, dextrose, glucagon, glucagon, HYDROmorphone, magnesium sulfate, magnesium sulfate, oxygen, potassium chloride, potassium chloride    Results for orders placed or performed during the hospital encounter of 01/13/25 (from the past 24 hours)   POCT GLUCOSE   Result Value Ref Range    POCT Glucose 115 (H) 74 - 99 mg/dL   POCT GLUCOSE   Result Value Ref Range    POCT Glucose 107 (H) 74 - 99 mg/dL   Blood Gas Arterial Full Panel   Result Value Ref Range    POCT pH, Arterial 7.42 7.38 - 7.42 pH    POCT pCO2, Arterial 53 (H) 38 - 42 mm Hg    POCT pO2, Arterial 72 (L) 85 - 95 mm Hg    POCT SO2, Arterial 96 94 - 100 %    POCT Oxy Hemoglobin, Arterial 93.1 (L) 94.0 - 98.0 %    POCT Hematocrit Calculated, Arterial 34.0 (L) 36.0 - 46.0 %    POCT Sodium, Arterial 138 136 - 145 mmol/L    POCT Potassium, Arterial 4.6 3.5 - 5.3 mmol/L    POCT Chloride, Arterial 102 98 - 107 mmol/L    POCT Ionized Calcium, Arterial 1.19 1.10 - 1.33 mmol/L    POCT Glucose, Arterial 159 (H) 74 - 99 mg/dL    POCT Lactate, Arterial 0.9 0.4 - 2.0 mmol/L    POCT Base Excess, Arterial 8.5 (H) -2.0 - 3.0 mmol/L    POCT HCO3 Calculated, Arterial 34.4 (H) 22.0 - 26.0 mmol/L    POCT Hemoglobin, Arterial 11.2 (L) 12.0 - 16.0 g/dL    POCT Anion Gap, Arterial 6 (L) 10 - 25 mmo/L    Patient Temperature 37.0 degrees Celsius    FiO2 50 %   POCT GLUCOSE   Result Value Ref Range    POCT Glucose 136 (H) 74 - 99 mg/dL   POCT GLUCOSE   Result Value Ref Range    POCT Glucose 145 (H) 74 - 99 mg/dL   POCT GLUCOSE   Result Value Ref Range     POCT Glucose 131 (H) 74 - 99 mg/dL   Renal function panel   Result Value Ref Range    Glucose 102 (H) 74 - 99 mg/dL    Sodium 144 136 - 145 mmol/L    Potassium 4.5 3.5 - 5.3 mmol/L    Chloride 102 98 - 107 mmol/L    Bicarbonate 34 (H) 21 - 32 mmol/L    Anion Gap 13 10 - 20 mmol/L    Urea Nitrogen 23 6 - 23 mg/dL    Creatinine 0.22 (L) 0.50 - 1.05 mg/dL    eGFR >90 >60 mL/min/1.73m*2    Calcium 8.8 8.6 - 10.6 mg/dL    Phosphorus 3.2 2.5 - 4.9 mg/dL    Albumin 3.4 3.4 - 5.0 g/dL   CBC   Result Value Ref Range    WBC 14.6 (H) 4.4 - 11.3 x10*3/uL    nRBC 0.0 0.0 - 0.0 /100 WBCs    RBC 3.62 (L) 4.00 - 5.20 x10*6/uL    Hemoglobin 10.1 (L) 12.0 - 16.0 g/dL    Hematocrit 31.3 (L) 36.0 - 46.0 %    MCV 87 80 - 100 fL    MCH 27.9 26.0 - 34.0 pg    MCHC 32.3 32.0 - 36.0 g/dL    RDW 19.3 (H) 11.5 - 14.5 %    Platelets 273 150 - 450 x10*3/uL   Coagulation Screen   Result Value Ref Range    Protime 12.8 9.8 - 12.8 seconds    INR 1.1 0.9 - 1.1    aPTT 31 27 - 38 seconds   Magnesium   Result Value Ref Range    Magnesium 2.24 1.60 - 2.40 mg/dL   Calcium, Ionized   Result Value Ref Range    POCT Calcium, Ionized 1.17 1.1 - 1.33 mmol/L   POCT GLUCOSE   Result Value Ref Range    POCT Glucose 123 (H) 74 - 99 mg/dL       XR chest 1 view 01/28/2025: The right post pneumonectomy space is stable wihtout new air fluid level. The left hemithorax shows trace effusion, and clearing intersitial infiltrate/edema. Improved overall.            Assessment/Plan   Assessment & Plan  Primary cancer of right upper lobe of lung (Multi)    Chronic obstructive pulmonary disease (Multi)    Malignant neoplasm of upper lobe of right lung (Multi)    Cardiogenic shock (Multi)    Respiratory distress    Faustina Nicanor (Jannie) is a 64F with Hx of Stage III lung cancer, asthma, anxiety, and arthritis who is now s/p R pneumonectomy with Dr. Wagner 1/13/25. 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 Cardiology was consulted and  recommended Lasix gtt and milrinone. Lasix gtt held 1/17, hyponatremia improving. Patient underwent RHC and SGC placement with cardiology 1/17. She was reintubated on 1/18 d/t worsening respiratory status and AMS. Also had increased pressor requirements, now weaned off. Repeat echo stable on 1/18. COVID positive on 1/20.   Failing extubation trials, after discussion with family plan for tracheostomy.     - Continue & appreciate supportive ICU care  - Goal net even to negative  - Plan trach + PEG tomorrow.   - WBC continues to downtrend     Patient seen with CT fellow Dr. Brush and discussed with Dr. Wagner.    Sarah Miranda MD  Gen Surg PGY2  Thoracic surgery [id number]

## 2025-01-29 NOTE — PROGRESS NOTES
"Faustina Vick \"Noam" is a 64 y.o. female on day 16 of admission presenting with Primary cancer of right upper lobe of lung (Multi).    Subjective   No acute events overnight. Did well on SIMV, was switched back to ac/vc about 8pm.     Objective   Physical Exam  Vitals reviewed.   Constitutional:       Appearance: She is underweight.      Interventions: She is sedated, intubated and restrained.      Comments: Deconditioned, lying in ICU bed   HENT:      Head: Normocephalic and atraumatic.      Nose: Nose normal.      Comments: Bridled Dobhoff tube. L nare 78cm depth. TF running at 30ml/hr     Mouth/Throat:      Mouth: Mucous membranes are moist.   Eyes:      Extraocular Movements: Extraocular movements intact.      Pupils: Pupils are equal, round, and reactive to light.   Neck:      Comments: RIJ MAC removed- site clean and covered with tape  Cardiovascular:      Rate and Rhythm: Normal rate and regular rhythm.      Pulses: Normal pulses.      Heart sounds: Normal heart sounds.   Pulmonary:      Effort: She is intubated.      Breath sounds: Rhonchi: left side.      Comments: Mechanically ventilated via ETT, AC-VC PEEP 8, FiO2 40%  Coarse rhonchi on left increased from yesterday  Chest:      Comments: Right lateral chest dressing c/d/i.  Abdominal:      General: There is no distension.      Palpations: Abdomen is soft.      Comments: Enteral feeding tube in place, tube feeds running at 30ml/hr   Genitourinary:     Comments: Montiel in place with clear yellow UOP.   Musculoskeletal:         General: No swelling or tenderness. Normal range of motion.      Cervical back: Normal range of motion and neck supple.      Comments: LUE edema improved   Skin:     General: Skin is warm and dry.   Neurological:      General: No focal deficit present.      Mental Status: She is alert and easily aroused.      GCS: GCS eye subscore is 3. GCS verbal subscore is 1. GCS motor subscore is 6.      Motor: Weakness (generalized) present.    " "  Comments: Following commands, squeezes hands, wiggles toes; raises legs, moves extremities equally.    Psychiatric:         Mood and Affect: Mood normal.         Behavior: Behavior normal.      Comments: Calm and responding appropriately to questions with nods       Last Recorded Vitals  Blood pressure 125/69, pulse 110, temperature 36 °C (96.8 °F), resp. rate 23, height 1.651 m (5' 5\"), weight 54.9 kg (121 lb 0.5 oz), SpO2 96%.    VS over last 24h  Heart Rate:  [102-143]   Temp:  [35.8 °C (96.4 °F)-36.6 °C (97.9 °F)]   Resp:  [9-29]   BP: ()/(52-91)   SpO2:  [90 %-99 %]      Intake/Output last 3 Shifts:  I/O last 3 completed shifts:  In: 2320 (42.3 mL/kg) [Blood:250; NG/GT:1820; IV Piggyback:250]  Out: 2545 (46.4 mL/kg) [Urine:2545 (1.3 mL/kg/hr)]  Weight: 54.9 kg       Intake/Output Summary (Last 24 hours) at 1/29/2025 0719  Last data filed at 1/29/2025 0600  Gross per 24 hour   Intake 1130 ml   Output 1495 ml   Net -365 ml        Relevant Results  Scheduled medications  budesonide, 0.5 mg, nebulization, BID  clonazePAM, 0.5 mg, nasogastric tube, q8h  dexAMETHasone, 6 mg, intravenous, q24h  [Held by provider] docusate sodium, 100 mg, oral, BID  heparin (porcine), 5,000 Units, subcutaneous, q8h  insulin lispro, 0-5 Units, subcutaneous, q4h  ipratropium-albuteroL, 3 mL, nebulization, q6h  lidocaine, 1 patch, transdermal, Daily  midodrine, 10 mg, nasogastric tube, q8h  oxyCODONE, 5 mg, nasogastric tube, q6h  oxygen, , inhalation, Continuous - Inhalation  pantoprazole, 40 mg, intravenous, Daily  perflutren protein A microsphere, 0.5 mL, intravenous, Once in imaging  senna, 5 mL, nasogastric tube, Nightly  sulfur hexafluoride microsphr, 2 mL, intravenous, Once in imaging  thiamine, 100 mg, intravenous, Daily  traZODone, 25 mg, nasogastric tube, Nightly      PRN medications  PRN medications: albuterol, calcium gluconate, calcium gluconate, dextrose, dextrose, glucagon, glucagon, HYDROmorphone, magnesium sulfate, " magnesium sulfate, oxygen, potassium chloride, potassium chloride      Results for orders placed or performed during the hospital encounter of 01/13/25 (from the past 24 hours)   POCT GLUCOSE   Result Value Ref Range    POCT Glucose 115 (H) 74 - 99 mg/dL   POCT GLUCOSE   Result Value Ref Range    POCT Glucose 107 (H) 74 - 99 mg/dL   Blood Gas Arterial Full Panel   Result Value Ref Range    POCT pH, Arterial 7.42 7.38 - 7.42 pH    POCT pCO2, Arterial 53 (H) 38 - 42 mm Hg    POCT pO2, Arterial 72 (L) 85 - 95 mm Hg    POCT SO2, Arterial 96 94 - 100 %    POCT Oxy Hemoglobin, Arterial 93.1 (L) 94.0 - 98.0 %    POCT Hematocrit Calculated, Arterial 34.0 (L) 36.0 - 46.0 %    POCT Sodium, Arterial 138 136 - 145 mmol/L    POCT Potassium, Arterial 4.6 3.5 - 5.3 mmol/L    POCT Chloride, Arterial 102 98 - 107 mmol/L    POCT Ionized Calcium, Arterial 1.19 1.10 - 1.33 mmol/L    POCT Glucose, Arterial 159 (H) 74 - 99 mg/dL    POCT Lactate, Arterial 0.9 0.4 - 2.0 mmol/L    POCT Base Excess, Arterial 8.5 (H) -2.0 - 3.0 mmol/L    POCT HCO3 Calculated, Arterial 34.4 (H) 22.0 - 26.0 mmol/L    POCT Hemoglobin, Arterial 11.2 (L) 12.0 - 16.0 g/dL    POCT Anion Gap, Arterial 6 (L) 10 - 25 mmo/L    Patient Temperature 37.0 degrees Celsius    FiO2 50 %   POCT GLUCOSE   Result Value Ref Range    POCT Glucose 136 (H) 74 - 99 mg/dL   POCT GLUCOSE   Result Value Ref Range    POCT Glucose 145 (H) 74 - 99 mg/dL   POCT GLUCOSE   Result Value Ref Range    POCT Glucose 131 (H) 74 - 99 mg/dL   Renal function panel   Result Value Ref Range    Glucose 102 (H) 74 - 99 mg/dL    Sodium 144 136 - 145 mmol/L    Potassium 4.5 3.5 - 5.3 mmol/L    Chloride 102 98 - 107 mmol/L    Bicarbonate 34 (H) 21 - 32 mmol/L    Anion Gap 13 10 - 20 mmol/L    Urea Nitrogen 23 6 - 23 mg/dL    Creatinine 0.22 (L) 0.50 - 1.05 mg/dL    eGFR >90 >60 mL/min/1.73m*2    Calcium 8.8 8.6 - 10.6 mg/dL    Phosphorus 3.2 2.5 - 4.9 mg/dL    Albumin 3.4 3.4 - 5.0 g/dL   CBC   Result Value  Ref Range    WBC 14.6 (H) 4.4 - 11.3 x10*3/uL    nRBC 0.0 0.0 - 0.0 /100 WBCs    RBC 3.62 (L) 4.00 - 5.20 x10*6/uL    Hemoglobin 10.1 (L) 12.0 - 16.0 g/dL    Hematocrit 31.3 (L) 36.0 - 46.0 %    MCV 87 80 - 100 fL    MCH 27.9 26.0 - 34.0 pg    MCHC 32.3 32.0 - 36.0 g/dL    RDW 19.3 (H) 11.5 - 14.5 %    Platelets 273 150 - 450 x10*3/uL   Coagulation Screen   Result Value Ref Range    Protime 12.8 9.8 - 12.8 seconds    INR 1.1 0.9 - 1.1    aPTT 31 27 - 38 seconds   Magnesium   Result Value Ref Range    Magnesium 2.24 1.60 - 2.40 mg/dL   Calcium, Ionized   Result Value Ref Range    POCT Calcium, Ionized 1.17 1.1 - 1.33 mmol/L   POCT GLUCOSE   Result Value Ref Range    POCT Glucose 123 (H) 74 - 99 mg/dL       XR chest 1 view   Final Result   1.  Unchanged aeration of the lungs with complete opacification of   the right hemithorax and coarse interstitial markings throughout the   left lung with left basilar atelectasis. Superimposed infection is   not excluded.        I personally reviewed the images/study and I agree with the findings   as stated by resident physician Dr. Yair Deng . This study   was interpreted at [place], [place].        MACRO:   None        Signed by: Oseas tSroud 1/28/2025 9:31 AM   Dictation workstation:   [id number]      Transthoracic Echo (TTE) Limited   Final Result      XR chest 1 view   Final Result   1.  Interval improvement in the left-sided edema and patchy airspace   opacities. Small left-sided pleural effusion.   2. Postsurgical changes of right-sided pneumonectomy.   3. Medical lines and devices as above.        I personally reviewed the images/study and I agree with the findings   as stated by Jonah Velez MD (Radiology Resident).   This study was interpreted at [place], [place].        MACRO:   None        Signed by: Oseas Stroud 1/27/2025 10:55 AM   Dictation  workstation:   [id number]      XR chest 1 view   Final Result   1.  Slight interval worsening in left-sided airspace disease/edema   status post right-sided pneumonectomy.             Signed by: Nino Barbour 1/26/2025 1:16 PM   Dictation workstation:   [id number]      XR chest 1 view   Final Result   1.  Post pneumonectomy changes with continued left perihilar edema.             Signed by: Nino Barbour 1/25/2025 9:51 AM   Dictation workstation:   [id number]      XR chest 1 view   Final Result   1.  Similar left lung pulmonary edema with small left pleural   effusion.   2. Status post right pneumonectomy.   3. [place]-Paco catheter with tip projecting over the left lower lobe,   in similar position.                  MACRO:   None        Signed by: Alexx Anne 1/24/2025 10:24 AM   Dictation workstation:   [id number]      XR chest 1 view   Final Result   Addendum (preliminary) 1 of 1   Interpreted By:  Alexx Anne,    ADDENDUM:   Right IJ approach [place] Paco catheter is also noted with tip   projecting over the left lower lobe in similar position to prior exam.        Signed by: Alexx Anne 1/24/2025 10:25 AM        -------- ORIGINAL REPORT --------   Dictation workstation:   [id number]      Final   1.  Stable findings of pulmonary edema within the left lung.   2. Postsurgical changes of right pneumonectomy.   3. Medical devices as above.                  MACRO:   None        Signed by: Alexx Anne 1/24/2025 9:09 AM   Dictation workstation:   [id number]      XR chest 1 view   Final Result   1. The tip of the endotracheal tube terminates 7.5 cm of the sina.   2. Slightly decreased  interstitial prominence and airspace opacities   within the left lung when compared to prior radiograph,  suggestive   of improving infectious process or improving pulmonary edema.   3. No significant interval change in postoperative changes from   right-sided pneumonectomy, resection of multiple right-sided ribs,    complete opacification of the pneumonectomy bed, and rightward   mediastinal shift.        I personally reviewed the images/study and I agree with radiology   resident  Dr. Meghan Ramesh findings as stated. This study was   interpreted at [place], [place]        MACRO:   None        Signed by: Oseas Stroud 1/24/2025 8:09 AM   Dictation workstation:   [id number]      XR abdomen 1 view   Final Result   1.  Dobhoff tube is in place with the tip projecting over the 1st   portion of the duodenum.   2. There is gas-filled dilated stomach. A few slight prominent loops   of the bowel in the upper abdomen with overall nonobstructive bowel   gas pattern.   3. Postsurgical changes of right pneumonectomy with resection of ribs   and patchy airspace opacities in the left lung.   4. [place]-Paco catheter projecting over the lower pulmonary artery.   Recommend retraction.        MACRO:   None        Signed by: Oseas Stroud 1/23/2025 10:15 AM   Dictation workstation:   [id number]      XR chest 1 view   Final Result   1. Unchanged appearance of multifocal patchy airspace opacities with   interstitial prominence of the left lung which may be due to   infectious process. Component of edema can not be excluded.   2. Medical devices as described above with similar positioning of the   [place]-Paco catheter projecting over left lower lobe pulmonary artery.   Recommend retraction.   3. Postsurgical changes of right-sided pneumonectomy and resection of   multiple right-sided ribs with complete opacification of the   pneumonectomy bed. The appearance is unchanged compared to prior   study.                  MACRO:   None        Signed by: Oseas Stroud 1/23/2025 9:36 AM   Dictation workstation:   [id number]      XR abdomen 1 view   Final Result   1.  As described above.        Signed by: Vick Xiao 1/22/2025 1:01 PM   Dictation workstation:   [id number]      XR chest 1 view   Final  Result   1. Unchanged appearance of multifocal patchy airspace opacity with   interstitial prominence of the left lung which may be due to   infectious process. Component of edema cannot be excluded.   2. Medical devices as described above with interval advancement of   the [place]-Paco catheter projecting over left lower lobe pulmonary   artery. Recommend retraction.   3. Postsurgical changes of right-sided pneumonectomy and resection of   multiple right-sided ribs with complete opacification of the   pneumonectomy bed. The appearance is unchanged compared to prior   study.                  Signed by: Vick Xiao 1/22/2025 1:00 PM   Dictation workstation:   [id number]      XR chest 1 view   Final Result   1. Similar near-complete opacification of the right hemithorax, which   may be secondary to a large pleural effusion, pneumonectomy, or   bronchial obstruction.   2. Diffuse interstitial opacities and scattered patchy alveolar   opacities throughout the left lung, similar to the prior exam, which   may reflect prominent pulmonary edema or multifocal infection.   3. Medical devices as above.        I personally reviewed the image(s)/study and resident interpretation   as stated by Dr. Ange Sue MD. I agree with the findings as   stated. This study was interpreted at [facility], [place], [place].        MACRO:   None        Signed by: Oseas Stroud 1/21/2025 7:58 PM   Dictation workstation:   [id number]      Vascular US upper extremity venous duplex left   Final Result      Transthoracic Echo (TTE) Limited   Final Result      XR chest 1 view   Final Result   1.  Interval opacification of the right hemithorax and correlate with   post pneumonectomy changes.   2. [place]-Paco catheter overlies the left lower lobe pulmonary artery.   3. Slight interval improvement in left-sided pulmonary edema.   Underlying edema and correlate with fluid status.             Signed by:  Nino Barbour 1/20/2025 9:57 AM   Dictation workstation:   [id number]      XR abdomen 1 view   Final Result   1.  As described above.        Signed by: Vick Xiao 1/19/2025 11:58 AM   Dictation workstation:   [id number]      XR chest 1 view   Final Result   1. Slight worsening of multifocal airspace opacity in the left mid   and lower lung. Findings are concerning for infectious process. Small   left pleural effusion.   2. Postsurgical changes right pneumonectomy with medical devices as   described above. Consider retracting the [place]-Paco catheter.                  Signed by: Vick Xiao 1/19/2025 8:55 AM   Dictation workstation:   [id number]      Transthoracic Echo (TTE) Limited   Final Result      XR chest 1 view   Final Result   1. Patchy airspace opacity in the left lower lung with slight   improvement in aeration. Correlate with concern for infection   2. Postsurgical changes of right pneumonectomy as described above.   3. Medical lines and devices as above. Consider retraction of the   [place]-Paco catheter.        I personally reviewed the images/study and I agree with the findings   as stated. This study was interpreted at [place], [place].        MACRO:   None        Signed by: Vick Xiao 1/19/2025 8:54 AM   Dictation workstation:   [id number]      XR chest 1 view   Final Result   1. Worsening aeration of the left lung with increasing left basilar   infiltrate and pleural effusion. Correlate with mucous plugging and   component of lobar collapse.   2. Component of interstitial prominence of the left lung, also   slightly increased which may be due to edema.   3. Postsurgical changes of right pneumonectomy as described                  Signed by: Vick Xiao 1/18/2025 11:35 AM   Dictation workstation:   [id number]      Cardiac Catheterization Procedure   Final Result      Transthoracic Echo (TTE) Limited   Final Result       XR chest 1 view   Final Result   1.  Stable exam with postsurgical changes of right pneumonectomy/rib   resection and large layering fluid component within the right   hemithorax.   2. Similar findings of left pulmonary edema with slightly increased   size of small left pleural effusion. No left-sided pneumothorax.   3. Medical devices as above.                  MACRO:   None        Signed by: Alexx Anne 1/17/2025 8:16 AM   Dictation workstation:   [id number]      XR chest 1 view   Final Result   1.  Postsurgical changes of right-sided pneumonectomy with increased   layering opacification of the right hemithorax, likely due to   increasing fluid.   2. Similar trace left-sided pleural effusion with linear left basilar   airspace opacity, likely atelectasis on a background of likely   pulmonary edema. Infection is not definitively excluded.   3. Medical devices as above with interval insertion of right internal   jugular central venous catheter projecting at the mid SVC.        I personally reviewed the images/study and I agree with the findings   as stated by resident Davidson Mcguire. This study was interpreted   at [place], [place].        MACRO:   None        Signed by: Alexx Anne 1/17/2025 8:14 AM   Dictation workstation:   [id number]      Transthoracic Echo (TTE) Limited   Final Result      US right upper quadrant   Final Result   Small amount of simple appearing ascites in the right upper quadrant   without evidence of cholelithiasis, cholecystitis or biliary dilation.        Signed by: Alberto Salas 1/16/2025 1:02 PM   Dictation workstation:   [id number]      XR chest 1 view   Final Result   1. Slight interval increase in left basilar airspace opacity which   may be due to increasing atelectasis. Cannot exclude developing   infectious infiltrate. Question trace left pleural effusion.   2. Again seen postsurgical changes related to right-sided    pneumonectomy as described above                  Signed by: Vick Xiao 1/16/2025 9:47 AM   Dictation workstation:   [id number]      US renal complete   Final Result   1. Unremarkable sonographic evaluation of the kidneys.   2. Incomplete evaluation of a gallbladder with some pericholecystic   fluid and biliary sludge. Dedicated right upper quadrant ultrasound   could be obtained as clinically warranted.   3. A small amount of free fluid present in the right upper quadrant   and pelvis.        Findings relayed by phone by me to SICU provider at 5:11 p.m.        I personally reviewed the images/study and I agree with the findings   as stated by Erickson Best MD (resident) . This study was   interpreted at [place], [place].        MACRO:   None        Signed by: Alberto Salas 1/15/2025 6:17 PM   Dictation workstation:   [id number]      XR chest 1 view   Final Result   1.  Status post pneumonectomy with interval increase in fluid   component of right-sided hydropneumothorax.   2. Medical devices as above.                  MACRO:   None        Signed by: Alexx Anne 1/15/2025 9:28 AM   Dictation workstation:   [id number]      XR chest 1 view   Final Result   1. Interval removal of right chest tube. Other medical devices as   described above.   2. Postsurgical changes related to right pneumonectomy.   3. Faint left basilar opacity, likely atelectatic.                  Signed by: Vick Xiao 1/14/2025 2:53 PM   Dictation workstation:   [id number]      XR chest 1 view   Final Result   1.  Stable right-sided pneumonectomy changes.             Signed by: Nino Barbour 1/14/2025 12:56 PM   Dictation workstation:   [id number]      Transthoracic Echo (TTE) Limited   Final Result      XR chest 1 view   Final Result   1. Postsurgical changes related to right sided pneumonectomy with   multiple medical devices as described above.   2. Mild  interstitial prominence of the left lung.                  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 Wagner on 1/13 requiring post-op vasoactive therapy. Post-op course c/b acute hyponatremia, multifactorial delirium, cardiogenic shock requiring inotropic support, and acute hypoxic/hypercarbic respiratory failure requiring intubation. COVID+ 1/20. Weaned off milrinone 1/21. Weaned off iEpo 1/24.      Plan:  NEURO: History of anxiety on alprazolam 1mg qd, last filled 1/4/2025 per [facility] review. Family states she was not taking for several months but then restarted prn about 3 wks prior to surgery for situational anxiety. A&Ox4 at baseline. Multifactorial delirium improved. Currently intubated, off sedation. On scheduled oxycodone, scheduled clonazepam and nightly trazodone. Got up to stand yesterday with PT. Alert and responds to commands, answering questions with nods, attempts to write but couldn't manage however has notably increased strength today in upper and lower extremities.   - Last Qtc 530  - Continue Trazodone nightly  - Continue Clonazepam 0.5 mg q8h  - Will change Oxycodone to prn  - Ongoing neuro and pain assessments  - Lidocaine patch to right chest wall  - PT/OT- have work with patient today  - Restraints indicated while patient remains intubated, restrain with soft wrist restraints until medical devices are discontinued.      CV: No history of cardiac disease. Baseline /79, -110. TTE 9/2024 normal biV function, RVSP 10mmHg. Intra-op hypotension requiring vaso and epi. Arrived to SICU on vaso 0.06 units/min, started on levo 0.05 mcg/kg/min.  TTE 1/13 with EF 60-65%, low normal RV systolic function. Weaned off vasopressors 1/15. Elevated lactate to 2.9 on 1/16 with SOB and new O2 requirement. Repeat TTE 1/16: acute biV dysfunction with signs of takotsubo cardiomyopathy, EF 34%, mod reduced RV systolic function, mildly elevated RVSP, mild to mod TR. Cardiogenic shock, started milrinone, lasix infusion, and vasopressin. Shock Call on 1/16, no indication for mechanical circulatory support. Initial troponin 928 with downtrend and normalization of lactate. Epinephrine infusion added overnight 1/16-1/17, discontinued 2/2 tachycardia in the 130s. Milrinone increased to 0.25mcg/kg/min (decreased to 0.125 thereafter). Lasix infusion stopped 1/17 given contraction alkalosis. Taken to cath lab 1/17 for RHC/PA cath placement, opening CI 2.9. Milrinone uptitrated to 0.375mcg/kg/min 1/17-1/18. iEpo was added. Repeat TTE 1/20 EF 35% (on iEpo and milrinone). Levo weaned off 1/20. Intermittent sinus tachycardia. Off Milrinone /21; iEpo off 1/24. Vaso weaned off 1/25. Responsive to diuresis. Started midodrine 1/25. PA cath removed 1/26. Recurrent ep. Tachy w/SBT and over-stimulation. Repeat TTE yesterday, LV EF 38%, There is reduced right ventricular systolic function with continued Takastubo picture with hypo- akinesis of the apical 2/3 of the LV.    - Overnight: MAP 65-80, had a few brief episodes of tachycardia in 150s, stayed off all drips.   - Continuous EKG/abp monitoring  - Cardiology following peripherally, call with questions  - MAP goal >65  - Continue holding metoprolol- avoid spot dosing for tachycardia if patient otherwise stable- assess other causes (pain, discomfort, anxiety).   - Goal even to negative fluid balance  - Gentle volume resuscitation or diuresis as indicated, patient with very narrow therapeutic index  - Continue midodrine, 10 mg, nasogastric tube, q8h     PULM: Hx of 30 pack year smoking history (quit 4/2024), COPD, and stage III right lung  SCC s/p chemo/XRT/immunotherapy. 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 Arrived to SICU extubated on 10L SFM, weaned to room air 1/14. Chest tube removed 1/14. Acute on chronic hypoxic respiratory failure 1/16. Escalation to Airvo 50L 90% overnight 1/17-1/18. Acute hypercapnia 1/18 with increase WOB, trial of BIPAP with iEPO. Eventual intubation 1/18 evening. Currently on AC-VC/22/40%/300/8. Tolerated SIMV about an hour yesterday. PEEP increased during day yesterday 5 to 8. ABGs show stable PaO2 65 but decreased from yestrday, 82.  Breath sounds are coarse compared to yesterday; haziness lower left lobe appreciated on CXR unchanged from yesterday. No recent gas. Tolerating SIMV this am. Rate 14, PS 12, PEEP 8; will get serial gases and wean as tolerated.   -SBT this a.m: Tolerating SIMV well; will wean pressure support today, following serial ABGs  - Continue mechanical ventilation -> continue weaning trials daily  - Wean FiO2 to maintain SpO2 >92%  - SDS switched to decadron on 1/22 for treatment of COVID (end date 1/29)  - Can discontinue precautions this Friday   - Scheduled budesonide/duoneb  - PRN albuterol  - Daily CXR  - ABG prn  - Holding home benzonatate  - Patient-family discussion with Dr. Wagner- plan for trache and peg tomorrow 1/30    GI: No GI history. LFTs WNL 12/2/24. Evidence of pericholecystic fluid and biliary sludge on renal US 1/15 but no abdominal symptoms. RUQ US with small ascites, otherwise no acute findings. Abdominal distention this am. Elevated LFTs, now normalized. LBM 1/17. OG tube placed 1/19 for gastric decompression. TFs initiated via OG 1/20, then held on 1/21 due to regurgitation from OG. Dobhoff placed via IRIS and advanced post-pyloric. Tolerating TF at goal rate. Liquid Bms x4 1/25. Cdiff negative 1/25. Last stool on 1/26, added back Senna, add additional miralax.   - Continue TF at goal rate 30ml/hr  - Continue  thiamine 100mg IV daily  - Continue IV PPI for GI prophylaxis  - Continue bowel regimen Miralax and Senna     : No history of renal disease. Baseline creatinine 0.6. Symptomatic acute hyponatremia (SIADH vs vaso mediated) to 120 overnight 1/14 - 1/15, corrected to 124 after 150cc 3% NaCl x2 on 1/15. Renal US on 1/15 unremarkable. Started on lasix infusion 1/16-1/17. Spot diuresis 1/20 and 1/24. Yoo changed overnight 1/18-1/19. Continues to make good urine output. Net -385, RFP grossly normal.  - Nephrology signed off 1/18, call with questions  - RFP daily and prn  - Maintain yoo for strict I/Os   - Maintain U/O >0.5ml/kg/hr  - Replete electrolytes to maintain K>4, Phos>2.5, iCal>1.1, Mag>2     HEME: Hx of DOC. Baseline H/H 12/40. Acute blood loss anemia, OR EBL 150mL. Acute coagulopathy with INR elevated to 2.3 2/2 to poor nutritional status. Received IV Vit K 10mg x1 with improved INR thereafter. Transfused 1u PRBC 1/17 and 1u PRBC on 1/18. LUE duplex obtained 1/20 for swelling, negative for DVT. Stable edema in LUE, negative DVT ultrasound 1/20 ultrasound. Hgb 7.4 1/25, transfused 1 RBC with Hgb increase to 10.7 thereafter. Hb stable 10.1.  - Check CBC daily and PRN  - Coags PRN  - Goal Hgb >8  - SCDs and SQH for DVT ppx  - Ongoing monitoring for s/s bleeding  - Maintain active T&S (1/26)     ENDO: No history of DM or thyroid disease. TSH 0.48 (1/17). Recent 5d course of prednisone 10mg 12/2024 for URI. Adequate glycemic control. Started on SDS out of OR 1/13. Transitioned to dexAMETHasone 1/22. BG have been wnl.   - BG q4h, SSI #1     MSK: Arthritis, Vit D deficiency  - not on meds at home  - monitor for symptoms     ID: Afebrile, leukocytosis. Completed yunior-op course of cefazolin. Worsened clinical condition 1/17, started on vanco, zosyn and azithromycin. Blood cultures, MRSA and Respiratory viral panel negative 1/17. Yoo changed 1/18, Urine cx 1/18 negative. Added fluc overnight 1/18-1/19. Sputum  culture 1/19 negative. Vancomycin discontinued 1/20. Urine strep/legionella 1/20 negative. COVID+ on 1/20, initiated remdesivir and decadron. Discontinued fluc and azithro 1/21. Liquid stools. C.diff negative 1/26. Zosyn completed 1/26. Patient appeared slightly diaphoretic this morning on exam, but has been afebrile with WBC downtrending 19->14  - COVID precautions--- 10 days (can discontinue Friday) - be sure to clean our her room when she goes for trache and peg placement   - Temp q4h, wbc daily  - Ongoing monitoring for s/s infection  - Completed 5 day course of remdesivir 1/24. On Decadron through 1/29     Lines:  - R axillary a line (1/28-)  - Left chest mediport (not currently accessed)  - PIV  x2  - Montiel (1/18) - will discontinue   - ETT (1/18)     Dispo: Continue ICU care. Patient seen and discussed with ICU attending Dr. Tran    Family: son Yg and  Maximilian updated over the phone. Will further update at bedside if they visit today and prn.    SICU Phone [phone]    Maricruz Silva MD   Anesthesiology, PGY-3

## 2025-01-29 NOTE — PROGRESS NOTES
64 year old female with history of right upper lobe lung cancer s/p chemoradiation and now s/p pneumonectomy with Dr. Wagner on 1/13. Post-operatively, patient in SICU on vasoactive therapy, as well as with cardiogenic shock in setting of Takotsubo cardiomyopathy. Reintubated 1/18 due to acute on chronic hypoxic respiratory failure. Has remained on ventilator with failure to wean since then. Weaned off inotropes, PAC discontinued.     Neuro: History of anxiety on home benzodiazepines. Will continue clonazepam. Trazodone at night for sleep. Currently off dexmedetomidine infusion, continue to reassess for appropriate control of anxiety. PT/OT with goal edge of bed minimum. Pain control.   CV: No history of cardiac dysfunction. Post-operatively TTE 1/16 demonstrated acute biventricular dysfunction with evidence of Takotsubo cardiomyopathy. Repeat TTE 1/27 showing mod RV dysfunction, LVEF 38%; last TTE 1/20 with EF ~35%. Has remained off vasoactive infusions; continue midodrine. Low 100s (sinus tachycardia) during morning rounds.   Pulm: History of tobacco use, COPD, right lung squamous cell carcinoma s/p chemoradiation and now s/p right pneumonectomy. Post-operative acute on chronic hypoxic respiratory failure with reintubation on 1/18. COVID + 1/20. On a rate overnight, switched to SIMV at 6 am. Plan for tracheostomy on Thursday or Friday with Dr. Wagner.   GI: No history of GI disease. Continue nutrition with tube feeds. PPI IV. Bowel regimen.    : Post-operatively with acute symptomatic hyponatremia, which has resolved. Continue to monitor and optimize electrolytes. Goal fluid balance even to negative. Appears euvolemic on exam. Monitor urine output. DC yoo, place purewick.   Heme: No current indications for transfusion, maintain active type and screen. SCDs and SQH for DVT prophylaxis.   ID: COVID + 1/20 - completed Remdesevir, continue dexamethasone until 1/30. No current indication for antibiotics, continue to  monitor for signs/symptoms of infection.   Endo: Steroids for COVID will complete on 1/30. SSI for glycemic control.      Lines: New   - Right axillary arterial line placed 1/28  - PIVs     Dispo: Continue ICU care.      Kendall Barbour MD  PGY-5 Anesthesiology and critical care medicine.

## 2025-01-29 NOTE — PROGRESS NOTES
"Physical Therapy    Physical Therapy Treatment    Patient Name: Faustina Vick \"Jannie\"  MRN: [id number]  [facility]  Room: 12/12-A  Today's Date: 1/29/2025  Time Calculation  Start Time: 1429  Stop Time: 1458  Time Calculation (min): 29 min         Assessment/Plan   PT Assessment  Barriers to Discharge Home: Physical needs, Caregiver assistance  Caregiver Assistance: Caregiver assistance needed per identified barriers - however, level of patient's required assistance exceeds assistance available at home  Physical Needs: Stair navigation into home limited by function/safety, High falls risk due to function or environment, 24hr ADL assistance needed, 24hr mobility assistance needed, Ambulating household distances limited by function/safety  Evaluation/Treatment Tolerance: Patient limited by fatigue  Medical Staff Made Aware: Yes  End of Session Communication: Bedside nurse  End of Session Patient Position: Bed, 3 rail up (bilat wrist restraints)     PT Plan  Treatment/Interventions: Bed mobility, Transfer training, Gait training, Stair training, Balance training, Neuromuscular re-education, Strengthening, Endurance training, Therapeutic exercise, Therapeutic activity, Home exercise program, Postural re-education  PT Plan: Ongoing PT  PT Frequency: 4 times per week  PT Discharge Recommendations: Moderate intensity level of continued care  Equipment Recommended upon Discharge: Wheeled walker  PT Recommended Transfer Status: Total assist  PT - OK to Discharge: Yes      General Visit Information:   PT  Visit  PT Received On: 01/29/25  General  Prior to Session Communication: Bedside nurse  Patient Position Received: Bed, 3 rail up (bilat wrist restraints)  General Comment: Pt alert and agreeable, SIMV 40% rate 10, PEEP 8, PS 8. Tele, a-line, dobhoff.    Subjective   Precautions:  Precautions  Medical Precautions: Fall precautions, Cardiac precautions, Oxygen therapy device and L/min, Infection " precautions  Precautions Comment: MAP 65-70, droplet plus precautions     Date/Time Vitals Session Patient Position Pulse Resp SpO2 BP MAP (mmHg)    01/29/25 1400 --  --  98  20  97 %  123/80  95     01/29/25 1429 Pre PT  --  108  22  99 %  114/63  --     01/29/25 1458 Post PT  --  117  23  96 %  124/68  --           Vital Signs Comment: HR to 162 end of sitting activity, BP stable, RN aware     Objective   Pain:  Pain Assessment  Pain Assessment: 0-10  0-10 (Numeric) Pain Score: 0 - No pain (Reported some discomfort leaning to R side, did not rate)  Cognition:  Cognition  Overall Cognitive Status: Impaired  Orientation Level: Oriented X4  Following Commands: Follows one step commands with repetition     Postural Control:  Postural Control  Postural Control: Impaired  Head Control: L cervical rotation and lateral flexion noted - max cues and assist to correct to midline    Activity Tolerance:  Activity Tolerance  Endurance: Tolerates 10 - 20 min exercise with multiple rests  Early Mobility/Exercise Safety Screen: Proceed with mobilization - No exclusion criteria met  Treatments:    Therapeutic Activity  Therapeutic Activity 1: EOB sitting x8 mins with Max A for sitting balance - L lateral trunk flexion noted. Performed R/L lateral leaning onto forearms but met with max resistance when going toward R side. Pt appearing uncomfortable with this and became tachycardic for which activity was limited      Bed Mobility 1  Bed Mobility 1: Supine to sitting, Sitting to supine  Level of Assistance 1: Maximum assistance, +2  Bed Mobility Comments 1: HOB elevated + draw sheet  Bed Mobility 2  Bed Mobility  2: Rolling right  Level of Assistance 2: Dependent  Bed Mobility Comments 2: draw sheet    Outcome Measures:  [facility] Basic Mobility  Turning from your back to your side while in a flat bed without using bedrails: A lot  Moving from lying on your back to sitting on the side of a flat bed without using bedrails: Total  Moving to  and from bed to chair (including a wheelchair): Total  Standing up from a chair using your arms (e.g. wheelchair or bedside chair): Total  To walk in hospital room: Total  Climbing 3-5 steps with railing: Total  Basic Mobility - Total Score: 7    FSS-ICU  Ambulation: Unable to attempt due to weakness  Rolling: Maximal assistance (performs 25% - 49% of task)  Sitting: Maximal assistance (performs 25% - 49% of task)  Transfer Sit-to-Stand: Total assistance (performs 25% or requires another person)  Transfer Supine-to-Sit: Total assistance (performs 25% or requires another person)  Total Score: 6      Early Mobility/Exercise Safety Screen: Proceed with mobilization - No exclusion criteria met  ICU Mobility Scale: Sitting over edge of bed [3]    Education Documentation  Body Mechanics, taught by Allie Akhtar PT at 1/29/2025  3:26 PM.  Learner: Patient  Readiness: Acceptance  Method: Explanation  Response: Needs Reinforcement  Comment: Limited 2/2 fatigue this date    Home Exercise Program, taught by Allie Akhtar PT at 1/29/2025  3:26 PM.  Learner: Patient  Readiness: Acceptance  Method: Explanation  Response: Needs Reinforcement  Comment: Limited 2/2 fatigue this date    Mobility Training, taught by Allie Akhtar PT at 1/29/2025  3:26 PM.  Learner: Patient  Readiness: Acceptance  Method: Explanation  Response: Needs Reinforcement  Comment: Limited 2/2 fatigue this date    Education Comments  No comments found.      Encounter Problems       Encounter Problems (Active)       Balance       Pt will demo static sitting balance w/o UE support IND to decrease risk of falls in home environment (Not met)       Start:  01/15/25    Expected End:  02/05/25    Resolved:  01/22/25    Updated to: Pt will maintain standing balance with Min A using LRAD    Update reason: re-eval         Pt will maintain standing balance with Min A using LRAD (Progressing)       Start:  01/22/25    Expected End:  02/12/25                    Mobility       Pt will mobilize supine to sitting MI to promote functional independence (Not met)       Start:  01/15/25    Expected End:  02/05/25    Resolved:  01/22/25    Updated to: Pt will perform bed mobility with Min A    Update reason: re-eval         Pt will perform bed mobility with Min A (Progressing)       Start:  01/22/25    Expected End:  02/12/25                   Mobility       STG - Patient will ambulate x50 ft with Min A using LRAD (Progressing)       Start:  01/22/25    Expected End:  02/12/25               PT Transfers       Pt will transfer sit to stand w/ LRAD MI to improve functional mobility.  (Not met)       Start:  01/15/25    Expected End:  02/05/25    Resolved:  01/22/25    Updated to: Pt will perform functional transfers with Min A using LRAD    Update reason: Re-eval         Pt will perform functional transfers with Min A using LRAD (Progressing)       Start:  01/22/25    Expected End:  02/12/25                   Pain - Adult            Allie Akhtar PT, DPT

## 2025-01-29 NOTE — CARE PLAN
Problem: Skin  Goal: Decreased wound size/increased tissue granulation at next dressing change  Outcome: Progressing  Flowsheets (Taken 1/29/2025 1027)  Decreased wound size/increased tissue granulation at next dressing change:   Promote sleep for wound healing   Protective dressings over bony prominences   Utilize specialty bed per algorithm  Goal: Participates in plan/prevention/treatment measures  Outcome: Progressing  Flowsheets (Taken 1/29/2025 1027)  Participates in plan/prevention/treatment measures:   Discuss with provider PT/OT consult   Elevate heels   Increase activity/out of bed for meals  Goal: Prevent/manage excess moisture  Outcome: Progressing  Flowsheets (Taken 1/29/2025 1027)  Prevent/manage excess moisture:   Monitor for/manage infection if present   Cleanse incontinence/protect with barrier cream   Follow provider orders for dressing changes   Moisturize dry skin  Goal: Prevent/minimize sheer/friction injuries  Outcome: Progressing  Flowsheets (Taken 1/29/2025 1027)  Prevent/minimize sheer/friction injuries:   Complete micro-shifts as needed if patient unable. Adjust patient position to relieve pressure points, not a full turn   Increase activity/out of bed for meals   Use pull sheet   Utilize specialty bed per algorithm   Turn/reposition every 2 hours/use positioning/transfer devices   HOB 30 degrees or less  Goal: Promote/optimize nutrition  Outcome: Progressing  Flowsheets (Taken 1/29/2025 1027)  Promote/optimize nutrition:   Assist with feeding   Monitor/record intake including meals   Consume > 50% meals/supplements   Offer water/supplements/favorite foods   Discuss with provider if NPO > 2 days   Reassess MST if dietician not consulted  Goal: Promote skin healing  Outcome: Progressing  Flowsheets (Taken 1/29/2025 1027)  Promote skin healing:   Assess skin/pad under line(s)/device(s)   Protective dressings over bony prominences   Turn/reposition every 2 hours/use positioning/transfer  devices   Rotate device position/do not position patient on device   Ensure correct size (line/device) and apply per  instructions

## 2025-01-29 NOTE — PROGRESS NOTES
Patient is not medically ready for discharge as she is scheduled to receive a peg and trach. According to medical she may be ready for discharge next week. The family is still reviewing facilities.

## 2025-01-29 NOTE — CARE PLAN
Problem: Pain - Adult  Goal: Verbalizes/displays adequate comfort level or baseline comfort level  Outcome: Progressing     Problem: Safety - Adult  Goal: Free from fall injury  Outcome: Progressing     Problem: Discharge Planning  Goal: Discharge to home or other facility with appropriate resources  Outcome: Progressing     Problem: Chronic Conditions and Co-morbidities  Goal: Patient's chronic conditions and co-morbidity symptoms are monitored and maintained or improved  Outcome: Progressing     Problem: Skin  Goal: Decreased wound size/increased tissue granulation at next dressing change  Outcome: Progressing  Goal: Participates in plan/prevention/treatment measures  Outcome: Progressing  Goal: Prevent/manage excess moisture  Outcome: Progressing  Goal: Prevent/minimize sheer/friction injuries  Outcome: Progressing  Goal: Promote/optimize nutrition  Outcome: Progressing  Goal: Promote skin healing  Outcome: Progressing     Problem: Pain  Goal: Takes deep breaths with improved pain control throughout the shift  Outcome: Progressing  Goal: Turns in bed with improved pain control throughout the shift  Outcome: Progressing  Goal: Walks with improved pain control throughout the shift  Outcome: Progressing  Goal: Performs ADL's with improved pain control throughout shift  Outcome: Progressing  Goal: Participates in PT with improved pain control throughout the shift  Outcome: Progressing  Goal: Free from opioid side effects throughout the shift  Outcome: Progressing  Goal: Free from acute confusion related to pain meds throughout the shift  Outcome: Progressing     Problem: Safety - Medical Restraint  Goal: Remains free of injury from restraints (Restraint for Interference with Medical Device)  Outcome: Progressing  Goal: Free from restraint(s) (Restraint for Interference with Medical Device)  Outcome: Progressing     Problem: Knowledge Deficit  Goal: Patient/family/caregiver demonstrates understanding of disease  process, treatment plan, medications, and discharge instructions  Outcome: Progressing     Problem: Mechanical Ventilation  Goal: Patient Will Maintain Patent Airway  Outcome: Progressing  Goal: Oral health is maintained or improved  Outcome: Progressing  Goal: Tracheostomy will be managed safely  Outcome: Progressing  Goal: ET tube will be managed safely  Outcome: Progressing  Goal: Ability to express needs and understand communication  Outcome: Progressing  Goal: Mobility/activity is maintained at optimum level for patient  Outcome: Progressing     Problem: Fall/Injury  Goal: Not fall by end of shift  Outcome: Progressing  Goal: Be free from injury by end of the shift  Outcome: Progressing  Goal: Verbalize understanding of personal risk factors for fall in the hospital  Outcome: Progressing  Goal: Verbalize understanding of risk factor reduction measures to prevent injury from fall in the home  Outcome: Progressing  Goal: Use assistive devices by end of the shift  Outcome: Progressing  Goal: Pace activities to prevent fatigue by end of the shift  Outcome: Progressing   The patient's goals for the shift include      The clinical goals for the shift include Patient remain hemodynamically stable through end of shift.

## 2025-01-30 ENCOUNTER — APPOINTMENT (OUTPATIENT)
Dept: RADIOLOGY | Facility: HOSPITAL | Age: 65
End: 2025-01-30
Payer: COMMERCIAL

## 2025-01-30 ENCOUNTER — ANESTHESIA EVENT (OUTPATIENT)
Dept: OPERATING ROOM | Facility: HOSPITAL | Age: 65
End: 2025-01-30
Payer: COMMERCIAL

## 2025-01-30 ENCOUNTER — TELEPHONE (OUTPATIENT)
Dept: CASE MANAGEMENT | Age: 65
End: 2025-01-30

## 2025-01-30 ENCOUNTER — ANESTHESIA (OUTPATIENT)
Dept: OPERATING ROOM | Facility: HOSPITAL | Age: 65
End: 2025-01-30
Payer: COMMERCIAL

## 2025-01-30 ENCOUNTER — APPOINTMENT (OUTPATIENT)
Dept: SURGERY | Facility: HOSPITAL | Age: 65
End: 2025-01-30
Payer: COMMERCIAL

## 2025-01-30 LAB
ALBUMIN SERPL BCP-MCNC: 3.3 G/DL (ref 3.4–5)
ANION GAP BLDA CALCULATED.4IONS-SCNC: 4 MMO/L (ref 10–25)
ANION GAP BLDA CALCULATED.4IONS-SCNC: ABNORMAL MMOL/L
ANION GAP SERPL CALC-SCNC: 14 MMOL/L (ref 10–20)
APTT PPP: 31 SECONDS (ref 27–38)
BASE EXCESS BLDA CALC-SCNC: 1.7 MMOL/L (ref -2–3)
BASE EXCESS BLDA CALC-SCNC: 10.3 MMOL/L (ref -2–3)
BODY TEMPERATURE: 37 DEGREES CELSIUS
BODY TEMPERATURE: 37 DEGREES CELSIUS
BUN SERPL-MCNC: 22 MG/DL (ref 6–23)
CA-I BLD-SCNC: 1.17 MMOL/L (ref 1.1–1.33)
CA-I BLDA-SCNC: 1.09 MMOL/L (ref 1.1–1.33)
CA-I BLDA-SCNC: 1.15 MMOL/L (ref 1.1–1.33)
CALCIUM SERPL-MCNC: 8.9 MG/DL (ref 8.6–10.6)
CHLORIDE BLDA-SCNC: 103 MMOL/L (ref 98–107)
CHLORIDE BLDA-SCNC: ABNORMAL MMOL/L
CHLORIDE SERPL-SCNC: 101 MMOL/L (ref 98–107)
CO2 SERPL-SCNC: 34 MMOL/L (ref 21–32)
CREAT SERPL-MCNC: <0.2 MG/DL (ref 0.5–1.05)
EGFRCR SERPLBLD CKD-EPI 2021: ABNORMAL ML/MIN/{1.73_M2}
ERYTHROCYTE [DISTWIDTH] IN BLOOD BY AUTOMATED COUNT: 19.6 % (ref 11.5–14.5)
GLUCOSE BLD MANUAL STRIP-MCNC: 103 MG/DL (ref 74–99)
GLUCOSE BLD MANUAL STRIP-MCNC: 104 MG/DL (ref 74–99)
GLUCOSE BLD MANUAL STRIP-MCNC: 110 MG/DL (ref 74–99)
GLUCOSE BLD MANUAL STRIP-MCNC: 111 MG/DL (ref 74–99)
GLUCOSE BLD MANUAL STRIP-MCNC: 114 MG/DL (ref 74–99)
GLUCOSE BLDA-MCNC: 109 MG/DL (ref 74–99)
GLUCOSE BLDA-MCNC: 83 MG/DL (ref 74–99)
GLUCOSE SERPL-MCNC: 110 MG/DL (ref 74–99)
HCO3 BLDA-SCNC: 28.1 MMOL/L (ref 22–26)
HCO3 BLDA-SCNC: 36 MMOL/L (ref 22–26)
HCT VFR BLD AUTO: 30.4 % (ref 36–46)
HCT VFR BLD EST: 29 % (ref 36–46)
HCT VFR BLD EST: 32 % (ref 36–46)
HGB BLD-MCNC: 9.9 G/DL (ref 12–16)
HGB BLDA-MCNC: 10.5 G/DL (ref 12–16)
HGB BLDA-MCNC: 9.6 G/DL (ref 12–16)
INHALED O2 CONCENTRATION: 40 %
INHALED O2 CONCENTRATION: 50 %
INR PPP: 1.2 (ref 0.9–1.1)
LACTATE BLDA-SCNC: 0.5 MMOL/L (ref 0.4–2)
LACTATE BLDA-SCNC: 0.7 MMOL/L (ref 0.4–2)
MAGNESIUM SERPL-MCNC: 2.14 MG/DL (ref 1.6–2.4)
MCH RBC QN AUTO: 28.4 PG (ref 26–34)
MCHC RBC AUTO-ENTMCNC: 32.6 G/DL (ref 32–36)
MCV RBC AUTO: 87 FL (ref 80–100)
NRBC BLD-RTO: 0 /100 WBCS (ref 0–0)
OXYHGB MFR BLDA: 91.6 % (ref 94–98)
OXYHGB MFR BLDA: 92.8 % (ref 94–98)
PCO2 BLDA: 52 MM HG (ref 38–42)
PCO2 BLDA: 53 MM HG (ref 38–42)
PH BLDA: 7.34 PH (ref 7.38–7.42)
PH BLDA: 7.44 PH (ref 7.38–7.42)
PHOSPHATE SERPL-MCNC: 3.3 MG/DL (ref 2.5–4.9)
PLATELET # BLD AUTO: 245 X10*3/UL (ref 150–450)
PO2 BLDA: 70 MM HG (ref 85–95)
PO2 BLDA: 73 MM HG (ref 85–95)
POTASSIUM BLDA-SCNC: 3.2 MMOL/L (ref 3.5–5.3)
POTASSIUM BLDA-SCNC: 4.2 MMOL/L (ref 3.5–5.3)
POTASSIUM SERPL-SCNC: 4.2 MMOL/L (ref 3.5–5.3)
PROTHROMBIN TIME: 13 SECONDS (ref 9.8–12.8)
RBC # BLD AUTO: 3.48 X10*6/UL (ref 4–5.2)
SAO2 % BLDA: 94 % (ref 94–100)
SAO2 % BLDA: 96 % (ref 94–100)
SODIUM BLDA-SCNC: 139 MMOL/L (ref 136–145)
SODIUM BLDA-SCNC: 142 MMOL/L (ref 136–145)
SODIUM SERPL-SCNC: 145 MMOL/L (ref 136–145)
WBC # BLD AUTO: 12.8 X10*3/UL (ref 4.4–11.3)

## 2025-01-30 PROCEDURE — 82947 ASSAY GLUCOSE BLOOD QUANT: CPT

## 2025-01-30 PROCEDURE — 2500000001 HC RX 250 WO HCPCS SELF ADMINISTERED DRUGS (ALT 637 FOR MEDICARE OP)

## 2025-01-30 PROCEDURE — 2780000003 HC OR 278 NO HCPCS: Performed by: THORACIC SURGERY (CARDIOTHORACIC VASCULAR SURGERY)

## 2025-01-30 PROCEDURE — 84132 ASSAY OF SERUM POTASSIUM: CPT

## 2025-01-30 PROCEDURE — 2500000005 HC RX 250 GENERAL PHARMACY W/O HCPCS

## 2025-01-30 PROCEDURE — 94762 N-INVAS EAR/PLS OXIMTRY CONT: CPT

## 2025-01-30 PROCEDURE — 51701 INSERT BLADDER CATHETER: CPT

## 2025-01-30 PROCEDURE — 2720000007 HC OR 272 NO HCPCS: Performed by: THORACIC SURGERY (CARDIOTHORACIC VASCULAR SURGERY)

## 2025-01-30 PROCEDURE — 3700000002 HC GENERAL ANESTHESIA TIME - EACH INCREMENTAL 1 MINUTE: Performed by: THORACIC SURGERY (CARDIOTHORACIC VASCULAR SURGERY)

## 2025-01-30 PROCEDURE — 2500000004 HC RX 250 GENERAL PHARMACY W/ HCPCS (ALT 636 FOR OP/ED)

## 2025-01-30 PROCEDURE — 84132 ASSAY OF SERUM POTASSIUM: CPT | Performed by: NURSE PRACTITIONER

## 2025-01-30 PROCEDURE — 2500000004 HC RX 250 GENERAL PHARMACY W/ HCPCS (ALT 636 FOR OP/ED): Performed by: PHYSICIAN ASSISTANT

## 2025-01-30 PROCEDURE — 2020000001 HC ICU ROOM DAILY

## 2025-01-30 PROCEDURE — 2500000005 HC RX 250 GENERAL PHARMACY W/O HCPCS: Performed by: THORACIC SURGERY (CARDIOTHORACIC VASCULAR SURGERY)

## 2025-01-30 PROCEDURE — 3600000004 HC OR TIME - INITIAL BASE CHARGE - PROCEDURE LEVEL FOUR: Performed by: THORACIC SURGERY (CARDIOTHORACIC VASCULAR SURGERY)

## 2025-01-30 PROCEDURE — 71045 X-RAY EXAM CHEST 1 VIEW: CPT

## 2025-01-30 PROCEDURE — 85027 COMPLETE CBC AUTOMATED: CPT | Performed by: NURSE PRACTITIONER

## 2025-01-30 PROCEDURE — 31600 PLANNED TRACHEOSTOMY: CPT | Performed by: THORACIC SURGERY (CARDIOTHORACIC VASCULAR SURGERY)

## 2025-01-30 PROCEDURE — 94640 AIRWAY INHALATION TREATMENT: CPT

## 2025-01-30 PROCEDURE — 51702 INSERT TEMP BLADDER CATH: CPT

## 2025-01-30 PROCEDURE — 99291 CRITICAL CARE FIRST HOUR: CPT

## 2025-01-30 PROCEDURE — 83735 ASSAY OF MAGNESIUM: CPT | Performed by: NURSE PRACTITIONER

## 2025-01-30 PROCEDURE — 0B113F4 BYPASS TRACHEA TO CUTANEOUS WITH TRACHEOSTOMY DEVICE, PERCUTANEOUS APPROACH: ICD-10-PCS | Performed by: THORACIC SURGERY (CARDIOTHORACIC VASCULAR SURGERY)

## 2025-01-30 PROCEDURE — 3600000009 HC OR TIME - EACH INCREMENTAL 1 MINUTE - PROCEDURE LEVEL FOUR: Performed by: THORACIC SURGERY (CARDIOTHORACIC VASCULAR SURGERY)

## 2025-01-30 PROCEDURE — 2500000001 HC RX 250 WO HCPCS SELF ADMINISTERED DRUGS (ALT 637 FOR MEDICARE OP): Performed by: NURSE PRACTITIONER

## 2025-01-30 PROCEDURE — 2500000005 HC RX 250 GENERAL PHARMACY W/O HCPCS: Performed by: PHYSICIAN ASSISTANT

## 2025-01-30 PROCEDURE — 2500000001 HC RX 250 WO HCPCS SELF ADMINISTERED DRUGS (ALT 637 FOR MEDICARE OP): Performed by: STUDENT IN AN ORGANIZED HEALTH CARE EDUCATION/TRAINING PROGRAM

## 2025-01-30 PROCEDURE — 2500000002 HC RX 250 W HCPCS SELF ADMINISTERED DRUGS (ALT 637 FOR MEDICARE OP, ALT 636 FOR OP/ED)

## 2025-01-30 PROCEDURE — 2500000002 HC RX 250 W HCPCS SELF ADMINISTERED DRUGS (ALT 637 FOR MEDICARE OP, ALT 636 FOR OP/ED): Performed by: NURSE PRACTITIONER

## 2025-01-30 PROCEDURE — A31600 PR TRACHEOSTOMY, PLANNED: Performed by: ANESTHESIOLOGY

## 2025-01-30 PROCEDURE — 0DH63UZ INSERTION OF FEEDING DEVICE INTO STOMACH, PERCUTANEOUS APPROACH: ICD-10-PCS | Performed by: THORACIC SURGERY (CARDIOTHORACIC VASCULAR SURGERY)

## 2025-01-30 PROCEDURE — P9045 ALBUMIN (HUMAN), 5%, 250 ML: HCPCS | Mod: JZ

## 2025-01-30 PROCEDURE — 37799 UNLISTED PX VASCULAR SURGERY: CPT | Performed by: NURSE PRACTITIONER

## 2025-01-30 PROCEDURE — 43246 EGD PLACE GASTROSTOMY TUBE: CPT | Performed by: THORACIC SURGERY (CARDIOTHORACIC VASCULAR SURGERY)

## 2025-01-30 PROCEDURE — 94003 VENT MGMT INPAT SUBQ DAY: CPT

## 2025-01-30 PROCEDURE — 85610 PROTHROMBIN TIME: CPT | Performed by: NURSE PRACTITIONER

## 2025-01-30 PROCEDURE — 82330 ASSAY OF CALCIUM: CPT | Performed by: NURSE PRACTITIONER

## 2025-01-30 PROCEDURE — 3700000001 HC GENERAL ANESTHESIA TIME - INITIAL BASE CHARGE: Performed by: THORACIC SURGERY (CARDIOTHORACIC VASCULAR SURGERY)

## 2025-01-30 PROCEDURE — 2500000004 HC RX 250 GENERAL PHARMACY W/ HCPCS (ALT 636 FOR OP/ED): Performed by: NURSE PRACTITIONER

## 2025-01-30 RX ORDER — FENTANYL CITRATE 50 UG/ML
INJECTION, SOLUTION INTRAMUSCULAR; INTRAVENOUS AS NEEDED
Status: DISCONTINUED | OUTPATIENT
Start: 2025-01-30 | End: 2025-01-30

## 2025-01-30 RX ORDER — PHENYLEPHRINE HCL IN 0.9% NACL 0.4MG/10ML
SYRINGE (ML) INTRAVENOUS AS NEEDED
Status: DISCONTINUED | OUTPATIENT
Start: 2025-01-30 | End: 2025-01-30

## 2025-01-30 RX ORDER — ONDANSETRON HYDROCHLORIDE 2 MG/ML
INJECTION, SOLUTION INTRAVENOUS AS NEEDED
Status: DISCONTINUED | OUTPATIENT
Start: 2025-01-30 | End: 2025-01-30

## 2025-01-30 RX ORDER — MIDODRINE HYDROCHLORIDE 10 MG/1
10 TABLET ORAL EVERY 8 HOURS
Status: DISCONTINUED | OUTPATIENT
Start: 2025-01-30 | End: 2025-02-04

## 2025-01-30 RX ORDER — LANOLIN ALCOHOL/MO/W.PET/CERES
100 CREAM (GRAM) TOPICAL DAILY
Status: DISCONTINUED | OUTPATIENT
Start: 2025-01-31 | End: 2025-02-13 | Stop reason: HOSPADM

## 2025-01-30 RX ORDER — ROCURONIUM BROMIDE 10 MG/ML
INJECTION, SOLUTION INTRAVENOUS AS NEEDED
Status: DISCONTINUED | OUTPATIENT
Start: 2025-01-30 | End: 2025-01-30

## 2025-01-30 RX ORDER — MIDAZOLAM HYDROCHLORIDE 1 MG/ML
INJECTION INTRAMUSCULAR; INTRAVENOUS AS NEEDED
Status: DISCONTINUED | OUTPATIENT
Start: 2025-01-30 | End: 2025-01-30

## 2025-01-30 RX ORDER — POLYETHYLENE GLYCOL 3350 17 G/17G
17 POWDER, FOR SOLUTION ORAL DAILY
Status: DISCONTINUED | OUTPATIENT
Start: 2025-01-31 | End: 2025-02-06

## 2025-01-30 RX ORDER — OXYCODONE HCL 5 MG/5 ML
2.5 SOLUTION, ORAL ORAL EVERY 6 HOURS PRN
Status: DISCONTINUED | OUTPATIENT
Start: 2025-01-30 | End: 2025-02-13

## 2025-01-30 RX ORDER — SODIUM CHLORIDE 0.9 G/100ML
INJECTION, SOLUTION IRRIGATION AS NEEDED
Status: DISCONTINUED | OUTPATIENT
Start: 2025-01-30 | End: 2025-01-30 | Stop reason: HOSPADM

## 2025-01-30 RX ORDER — POTASSIUM CHLORIDE 1.5 G/1.58G
20 POWDER, FOR SOLUTION ORAL ONCE
Status: COMPLETED | OUTPATIENT
Start: 2025-01-30 | End: 2025-01-30

## 2025-01-30 RX ORDER — TRAZODONE HYDROCHLORIDE 50 MG/1
25 TABLET ORAL NIGHTLY
Status: DISCONTINUED | OUTPATIENT
Start: 2025-01-31 | End: 2025-02-10

## 2025-01-30 RX ORDER — SODIUM CHLORIDE, SODIUM LACTATE, POTASSIUM CHLORIDE, CALCIUM CHLORIDE 600; 310; 30; 20 MG/100ML; MG/100ML; MG/100ML; MG/100ML
INJECTION, SOLUTION INTRAVENOUS CONTINUOUS PRN
Status: DISCONTINUED | OUTPATIENT
Start: 2025-01-30 | End: 2025-01-30

## 2025-01-30 RX ORDER — CEFAZOLIN 1 G/1
INJECTION, POWDER, FOR SOLUTION INTRAVENOUS AS NEEDED
Status: DISCONTINUED | OUTPATIENT
Start: 2025-01-30 | End: 2025-01-30

## 2025-01-30 RX ORDER — SENNOSIDES 8.8 MG/5ML
5 LIQUID ORAL NIGHTLY
Status: DISCONTINUED | OUTPATIENT
Start: 2025-01-31 | End: 2025-02-06

## 2025-01-30 RX ORDER — BISACODYL 10 MG/1
10 SUPPOSITORY RECTAL DAILY
Status: DISCONTINUED | OUTPATIENT
Start: 2025-01-30 | End: 2025-02-06

## 2025-01-30 RX ORDER — POTASSIUM CHLORIDE 14.9 MG/ML
20 INJECTION INTRAVENOUS
Status: DISPENSED | OUTPATIENT
Start: 2025-01-30 | End: 2025-01-30

## 2025-01-30 RX ORDER — CLONAZEPAM 0.5 MG/1
0.5 TABLET ORAL EVERY 8 HOURS
Status: DISCONTINUED | OUTPATIENT
Start: 2025-01-31 | End: 2025-02-07

## 2025-01-30 RX ORDER — LIDOCAINE HYDROCHLORIDE 20 MG/ML
INJECTION, SOLUTION INFILTRATION; PERINEURAL AS NEEDED
Status: DISCONTINUED | OUTPATIENT
Start: 2025-01-30 | End: 2025-01-30

## 2025-01-30 RX ORDER — ESOMEPRAZOLE MAGNESIUM 40 MG/1
40 GRANULE, DELAYED RELEASE ORAL
Status: DISCONTINUED | OUTPATIENT
Start: 2025-01-31 | End: 2025-02-13 | Stop reason: HOSPADM

## 2025-01-30 RX ORDER — CALCIUM GLUCONATE 20 MG/ML
1 INJECTION, SOLUTION INTRAVENOUS EVERY 4 HOURS
Status: COMPLETED | OUTPATIENT
Start: 2025-01-30 | End: 2025-01-30

## 2025-01-30 RX ORDER — ALBUMIN HUMAN 50 G/1000ML
SOLUTION INTRAVENOUS AS NEEDED
Status: DISCONTINUED | OUTPATIENT
Start: 2025-01-30 | End: 2025-01-30

## 2025-01-30 RX ORDER — ETOMIDATE 2 MG/ML
INJECTION INTRAVENOUS AS NEEDED
Status: DISCONTINUED | OUTPATIENT
Start: 2025-01-30 | End: 2025-01-30

## 2025-01-30 RX ADMIN — HEPARIN SODIUM 5000 UNITS: 5000 INJECTION INTRAVENOUS; SUBCUTANEOUS at 05:07

## 2025-01-30 RX ADMIN — Medication 40 PERCENT: at 20:23

## 2025-01-30 RX ADMIN — CEFAZOLIN 2 G: 1 INJECTION, POWDER, FOR SOLUTION INTRAMUSCULAR; INTRAVENOUS at 12:46

## 2025-01-30 RX ADMIN — CALCIUM GLUCONATE 1 G: 20 INJECTION, SOLUTION INTRAVENOUS at 16:35

## 2025-01-30 RX ADMIN — Medication 160 MCG: at 12:51

## 2025-01-30 RX ADMIN — POTASSIUM CHLORIDE 20 MEQ: 1.5 POWDER, FOR SOLUTION ORAL at 20:33

## 2025-01-30 RX ADMIN — Medication 160 MCG: at 13:05

## 2025-01-30 RX ADMIN — ROCURONIUM BROMIDE 50 MG: 10 INJECTION INTRAVENOUS at 12:45

## 2025-01-30 RX ADMIN — ALBUMIN HUMAN 250 ML: 0.05 INJECTION, SOLUTION INTRAVENOUS at 13:18

## 2025-01-30 RX ADMIN — Medication 40 PERCENT: at 08:32

## 2025-01-30 RX ADMIN — LIDOCAINE HYDROCHLORIDE 40 MG: 20 INJECTION, SOLUTION INFILTRATION; PERINEURAL at 12:45

## 2025-01-30 RX ADMIN — SODIUM CHLORIDE, POTASSIUM CHLORIDE, SODIUM LACTATE AND CALCIUM CHLORIDE 500 ML: 600; 310; 30; 20 INJECTION, SOLUTION INTRAVENOUS at 14:29

## 2025-01-30 RX ADMIN — BISACODYL 10 MG: 10 SUPPOSITORY RECTAL at 20:04

## 2025-01-30 RX ADMIN — IPRATROPIUM BROMIDE AND ALBUTEROL SULFATE 3 ML: .5; 3 SOLUTION RESPIRATORY (INHALATION) at 07:49

## 2025-01-30 RX ADMIN — Medication 160 MCG: at 13:30

## 2025-01-30 RX ADMIN — CALCIUM GLUCONATE 1 G: 20 INJECTION, SOLUTION INTRAVENOUS at 20:05

## 2025-01-30 RX ADMIN — SODIUM CHLORIDE, POTASSIUM CHLORIDE, SODIUM LACTATE AND CALCIUM CHLORIDE: 600; 310; 30; 20 INJECTION, SOLUTION INTRAVENOUS at 12:39

## 2025-01-30 RX ADMIN — HYDROCORTISONE SODIUM SUCCINATE 100 MG: 100 INJECTION, POWDER, FOR SOLUTION INTRAMUSCULAR; INTRAVENOUS at 12:50

## 2025-01-30 RX ADMIN — ETOMIDATE 40 MG: 20 INJECTION, SOLUTION INTRAVENOUS at 12:45

## 2025-01-30 RX ADMIN — Medication 200 MCG: at 12:55

## 2025-01-30 RX ADMIN — HEPARIN SODIUM 5000 UNITS: 5000 INJECTION INTRAVENOUS; SUBCUTANEOUS at 21:09

## 2025-01-30 RX ADMIN — ALBUTEROL SULFATE 2.5 MG: 2.5 SOLUTION RESPIRATORY (INHALATION) at 14:29

## 2025-01-30 RX ADMIN — BUDESONIDE 0.5 MG: 0.5 INHALANT RESPIRATORY (INHALATION) at 07:49

## 2025-01-30 RX ADMIN — MIDODRINE HYDROCHLORIDE 10 MG: 10 TABLET ORAL at 08:31

## 2025-01-30 RX ADMIN — BUDESONIDE 0.5 MG: 0.5 INHALANT RESPIRATORY (INHALATION) at 20:35

## 2025-01-30 RX ADMIN — FENTANYL CITRATE 50 MCG: 50 INJECTION, SOLUTION INTRAMUSCULAR; INTRAVENOUS at 13:15

## 2025-01-30 RX ADMIN — ALBUTEROL SULFATE 2.5 MG: 2.5 SOLUTION RESPIRATORY (INHALATION) at 20:35

## 2025-01-30 RX ADMIN — LIDOCAINE 4% 1 PATCH: 40 PATCH TOPICAL at 08:31

## 2025-01-30 RX ADMIN — Medication 160 MCG: at 13:15

## 2025-01-30 RX ADMIN — ONDANSETRON 4 MG: 2 INJECTION INTRAMUSCULAR; INTRAVENOUS at 13:37

## 2025-01-30 RX ADMIN — SUGAMMADEX 200 MG: 100 INJECTION, SOLUTION INTRAVENOUS at 13:36

## 2025-01-30 RX ADMIN — Medication 160 MCG: at 13:34

## 2025-01-30 RX ADMIN — Medication 160 MCG: at 13:13

## 2025-01-30 RX ADMIN — CLONAZEPAM 0.5 MG: 0.5 TABLET ORAL at 08:31

## 2025-01-30 RX ADMIN — Medication 80 MCG: at 12:50

## 2025-01-30 RX ADMIN — Medication 160 MCG: at 13:26

## 2025-01-30 RX ADMIN — POLYETHYLENE GLYCOL 3350 17 G: 17 POWDER, FOR SOLUTION ORAL at 08:31

## 2025-01-30 RX ADMIN — IPRATROPIUM BROMIDE AND ALBUTEROL SULFATE 3 ML: .5; 3 SOLUTION RESPIRATORY (INHALATION) at 01:33

## 2025-01-30 RX ADMIN — OXYCODONE HYDROCHLORIDE 2.5 MG: 5 SOLUTION ORAL at 08:32

## 2025-01-30 RX ADMIN — FENTANYL CITRATE 50 MCG: 50 INJECTION, SOLUTION INTRAMUSCULAR; INTRAVENOUS at 12:42

## 2025-01-30 RX ADMIN — POTASSIUM CHLORIDE 20 MEQ: 14.9 INJECTION, SOLUTION INTRAVENOUS at 18:12

## 2025-01-30 RX ADMIN — ESOMEPRAZOLE MAGNESIUM 40 MG: 40 GRANULE, DELAYED RELEASE ORAL at 08:34

## 2025-01-30 RX ADMIN — MIDAZOLAM HYDROCHLORIDE 2 MG: 1 INJECTION, SOLUTION INTRAMUSCULAR; INTRAVENOUS at 12:40

## 2025-01-30 RX ADMIN — SENNOSIDES 5 ML: 8.8 LIQUID ORAL at 20:04

## 2025-01-30 RX ADMIN — THIAMINE HCL TAB 100 MG 100 MG: 100 TAB at 08:32

## 2025-01-30 RX ADMIN — TRAZODONE HYDROCHLORIDE 25 MG: 50 TABLET ORAL at 20:04

## 2025-01-30 RX ADMIN — Medication 160 MCG: at 13:19

## 2025-01-30 RX ADMIN — MIDODRINE HYDROCHLORIDE 10 MG: 10 TABLET ORAL at 16:34

## 2025-01-30 ASSESSMENT — PAIN SCALES - GENERAL
PAINLEVEL_OUTOF10: 0 - NO PAIN
PAINLEVEL_OUTOF10: 8

## 2025-01-30 ASSESSMENT — PAIN - FUNCTIONAL ASSESSMENT
PAIN_FUNCTIONAL_ASSESSMENT: CPOT (CRITICAL CARE PAIN OBSERVATION TOOL)
PAIN_FUNCTIONAL_ASSESSMENT: 0-10
PAIN_FUNCTIONAL_ASSESSMENT: CPOT (CRITICAL CARE PAIN OBSERVATION TOOL)

## 2025-01-30 NOTE — ANESTHESIA PREPROCEDURE EVALUATION
"Patient: Faustina Vick \"Jannie\"    Procedure Information       Date/Time: 25 1135    Procedures:       INSERTION, GASTROSTOMY TUBE, PERCUTANEOUS      CREATION, TRACHEOSTOMY    Location: [facility] OR 20 / [facility] OR    Surgeons: Jonah Wagner MD            Relevant Problems   Anesthesia  Past Surgical History:  2025: CARDIAC CATHETERIZATION; N/A      Comment:  Procedure: Right Heart Cath;  Surgeon: Aryan Singh MD;  Location: [place] [address] Cardiac Cath Lab;                 Service: Cardiovascular;  Laterality: N/A;  No date:  SECTION, LOW TRANSVERSE  No date: INSERTION / REMOVAL / REPLACEMENT VENOUS ACCESS CATHETER  No date: OTHER SURGICAL HISTORY      Comment:  MEDI PORT INSERTION  No date: OVARIAN CYST SURGERY        Pulmonary  S/P left pneumonectomy 25   (+) Chronic obstructive pulmonary disease (Multi)   (+) Malignant neoplasm of upper lobe of right lung (Multi)   (+) Primary cancer of right upper lobe of lung (Multi)   (+) Squamous cell carcinoma of right lung (Multi)      GI   (+) Hemorrhage of rectum and anus      Hematology   (+) Iron deficiency anemia secondary to inadequate dietary iron intake   (+) Normocytic anemia       Clinical information reviewed:   Tobacco  Allergies  Meds  Problems  Med Hx  Surg Hx  OB Status    Fam Hx  Soc Hx        NPO Detail:  No data recorded     Physical Exam    Airway  Mallampati: unable to assess     Cardiovascular   Rhythm: regular  Rate: normal     Dental    Pulmonary   (+) decreased breath sounds     Abdominal          Anesthesia Plan    History of general anesthesia?: yes  History of complications of general anesthesia?: no    ASA 4     general     Anesthetic plan and risks discussed with patient (Discussed anesthetic plan with patient and son at SICU bedside).  Use of blood products discussed with patient who consented to blood products.  Blood Products Consent Comment: Discussed anesthetic plan " with patient and son at SICU bedside  Plan discussed with attending.

## 2025-01-30 NOTE — TELEPHONE ENCOUNTER
Spoke with Dr. Oh's office. Patient is still admitted at  from her surgery at the beginning of the month. She is currently in the SICU. Will follow up next week on patient's discharge.

## 2025-01-30 NOTE — SIGNIFICANT EVENT
Ms. Vick was taken to the OR for trache and peg placement this afternoon. Patient underwent successful uncomplicated procedure, requiring a few boluses of phenylephrine intra-op for blood pressure support. Returned to unit breathing spontaneously through trache, off pressors, hemodynamically stable and reversed. Received stress dose steroids and 2g ancef.

## 2025-01-30 NOTE — ANESTHESIA POSTPROCEDURE EVALUATION
"Patient: Faustina Vick \"Jannie\"    Procedure Summary       Date: 01/30/25 Room / Location: [facility] OR 20 / [facility] OR    Anesthesia Start: 1240 Anesthesia Stop:     Procedures:       INSERTION, GASTROSTOMY TUBE, PERCUTANEOUS      CREATION, TRACHEOSTOMY Diagnosis:       Malignant neoplasm of upper lobe of right lung (Multi)      Respiratory distress      (Malignant neoplasm of upper lobe of right lung (Multi) [C34.11])      (Respiratory distress [R06.03])    Surgeons: Jonah Wagner MD Responsible Provider: Rosalie Tran DO    Anesthesia Type: general ASA Status: 3            Anesthesia Type: general    Vitals Value Taken Time   /78 01/30/25 1358   Temp 36.5 01/30/25 1358   Pulse 98 01/30/25 1358   Resp 16 01/30/25 1358   SpO2 95 01/30/25 1358       Anesthesia Post Evaluation    Patient location during evaluation: ICU  Patient participation: waiting for patient participation  Level of consciousness: sleepy but conscious  Pain management: adequate  Airway patency: patent  Cardiovascular status: acceptable  Respiratory status: acceptable and ventilator  Hydration status: acceptable  Postoperative Nausea and Vomiting: none        No notable events documented.    "

## 2025-01-30 NOTE — PROGRESS NOTES
"Faustina Vick \"Noam" is a 64 y.o. female on day 17 of admission presenting with Primary cancer of right upper lobe of lung (Multi).    Subjective   No events. SIMV overnight PS 5, PEEP 5. Tube feeds held 2am, trache this morning potentially.     Objective   Physical Exam  Vitals reviewed.   Constitutional:       Appearance: She is underweight.      Interventions: She is sedated, intubated and restrained.      Comments: Deconditioned, lying in ICU bed   HENT:      Head: Normocephalic and atraumatic.      Nose: Nose normal.      Comments: Bridled Dobhoff in place      Mouth/Throat:      Mouth: Mucous membranes are moist.   Eyes:      Extraocular Movements: Extraocular movements intact.      Pupils: Pupils are equal, round, and reactive to light.   Neck:      Comments: RIJ MAC removed- site clean and covered with tape  Cardiovascular:      Rate and Rhythm: Normal rate and regular rhythm.      Pulses: Normal pulses.      Heart sounds: Normal heart sounds.   Pulmonary:      Effort: She is intubated.      Breath sounds: No rhonchi (left side).      Comments: Breath sounds clear bilaterally; mechanically ventilated SIMV peep 5, ps 5   Chest:      Comments: Right lateral chest dressing c/d/i.  Abdominal:      General: There is no distension.      Palpations: Abdomen is soft.   Genitourinary:     Comments: Montiel in place with clear yellow UOP.   Musculoskeletal:         General: No swelling or tenderness. Normal range of motion.      Cervical back: Normal range of motion and neck supple.      Comments: LUE edema resolved    Skin:     General: Skin is warm and dry.   Neurological:      General: No focal deficit present.      Mental Status: She is alert and easily aroused.      GCS: GCS eye subscore is 3. GCS verbal subscore is 1. GCS motor subscore is 6.      Motor: Weakness (generalized) present.      Comments: Following commands, moves extremities equally.    Psychiatric:         Mood and Affect: Mood normal.         " "Behavior: Behavior normal.      Comments: Calm and responding appropriately to questions with nods       Last Recorded Vitals  Blood pressure 97/61, pulse 107, temperature 36.6 °C (97.9 °F), temperature source Temporal, resp. rate 16, height 1.651 m (5' 5\"), weight 54.9 kg (121 lb 0.5 oz), SpO2 98%.    VS over last 24h  Heart Rate:  []   Temp:  [36.2 °C (97.2 °F)-36.7 °C (98.1 °F)]   Resp:  [13-24]   BP: ()/(58-83)   SpO2:  [94 %-98 %]      Intake/Output last 3 Shifts:  I/O last 3 completed shifts:  In: 1680 (30.6 mL/kg) [NG/GT:1680]  Out: 2790 (50.8 mL/kg) [Urine:2790 (1.4 mL/kg/hr)]  Weight: 54.9 kg       Intake/Output Summary (Last 24 hours) at 1/30/2025 1557  Last data filed at 1/30/2025 1400  Gross per 24 hour   Intake 960 ml   Output 1930 ml   Net -970 ml        Relevant Results  Scheduled medications  budesonide, 0.5 mg, nebulization, BID  clonazePAM, 0.5 mg, nasogastric tube, q8h  [Held by provider] docusate sodium, 100 mg, oral, BID  esomeprazole, 40 mg, nasogastric tube, Daily before breakfast  heparin (porcine), 5,000 Units, subcutaneous, q8h  insulin lispro, 0-5 Units, subcutaneous, q4h  ipratropium-albuteroL, 3 mL, nebulization, q6h  lactated Ringer's, 500 mL, intravenous, Once  lidocaine, 1 patch, transdermal, Daily  midodrine, 10 mg, nasogastric tube, q8h  oxygen, , inhalation, Continuous - Inhalation  perflutren protein A microsphere, 0.5 mL, intravenous, Once in imaging  polyethylene glycol, 17 g, nasogastric tube, Daily  senna, 5 mL, nasogastric tube, Nightly  sulfur hexafluoride microsphr, 2 mL, intravenous, Once in imaging  thiamine, 100 mg, nasogastric tube, Daily  traZODone, 25 mg, nasogastric tube, Nightly      PRN medications  PRN medications: albuterol, calcium gluconate, calcium gluconate, dextrose, dextrose, glucagon, glucagon, HYDROmorphone, magnesium sulfate, magnesium sulfate, oxyCODONE, oxygen, potassium chloride, potassium chloride      Results for orders placed or " performed during the hospital encounter of 01/13/25 (from the past 24 hours)   POCT GLUCOSE   Result Value Ref Range    POCT Glucose 167 (H) 74 - 99 mg/dL   POCT GLUCOSE   Result Value Ref Range    POCT Glucose 123 (H) 74 - 99 mg/dL   POCT GLUCOSE   Result Value Ref Range    POCT Glucose 111 (H) 74 - 99 mg/dL   Renal function panel   Result Value Ref Range    Glucose 110 (H) 74 - 99 mg/dL    Sodium 145 136 - 145 mmol/L    Potassium 4.2 3.5 - 5.3 mmol/L    Chloride 101 98 - 107 mmol/L    Bicarbonate 34 (H) 21 - 32 mmol/L    Anion Gap 14 10 - 20 mmol/L    Urea Nitrogen 22 6 - 23 mg/dL    Creatinine <0.20 (L) 0.50 - 1.05 mg/dL    eGFR      Calcium 8.9 8.6 - 10.6 mg/dL    Phosphorus 3.3 2.5 - 4.9 mg/dL    Albumin 3.3 (L) 3.4 - 5.0 g/dL   Magnesium   Result Value Ref Range    Magnesium 2.14 1.60 - 2.40 mg/dL   Coagulation Screen   Result Value Ref Range    Protime 13.0 (H) 9.8 - 12.8 seconds    INR 1.2 (H) 0.9 - 1.1    aPTT 31 27 - 38 seconds   CBC   Result Value Ref Range    WBC 12.8 (H) 4.4 - 11.3 x10*3/uL    nRBC 0.0 0.0 - 0.0 /100 WBCs    RBC 3.48 (L) 4.00 - 5.20 x10*6/uL    Hemoglobin 9.9 (L) 12.0 - 16.0 g/dL    Hematocrit 30.4 (L) 36.0 - 46.0 %    MCV 87 80 - 100 fL    MCH 28.4 26.0 - 34.0 pg    MCHC 32.6 32.0 - 36.0 g/dL    RDW 19.6 (H) 11.5 - 14.5 %    Platelets 245 150 - 450 x10*3/uL   Calcium, Ionized   Result Value Ref Range    POCT Calcium, Ionized 1.17 1.1 - 1.33 mmol/L   Blood Gas Arterial Full Panel   Result Value Ref Range    POCT pH, Arterial 7.44 (H) 7.38 - 7.42 pH    POCT pCO2, Arterial 53 (H) 38 - 42 mm Hg    POCT pO2, Arterial 73 (L) 85 - 95 mm Hg    POCT SO2, Arterial 96 94 - 100 %    POCT Oxy Hemoglobin, Arterial 92.8 (L) 94.0 - 98.0 %    POCT Hematocrit Calculated, Arterial 32.0 (L) 36.0 - 46.0 %    POCT Sodium, Arterial 139 136 - 145 mmol/L    POCT Potassium, Arterial 4.2 3.5 - 5.3 mmol/L    POCT Chloride, Arterial 103 98 - 107 mmol/L    POCT Ionized Calcium, Arterial 1.15 1.10 - 1.33 mmol/L     POCT Glucose, Arterial 109 (H) 74 - 99 mg/dL    POCT Lactate, Arterial 0.7 0.4 - 2.0 mmol/L    POCT Base Excess, Arterial 10.3 (H) -2.0 - 3.0 mmol/L    POCT HCO3 Calculated, Arterial 36.0 (H) 22.0 - 26.0 mmol/L    POCT Hemoglobin, Arterial 10.5 (L) 12.0 - 16.0 g/dL    POCT Anion Gap, Arterial 4 (L) 10 - 25 mmo/L    Patient Temperature 37.0 degrees Celsius    FiO2 40 %   POCT GLUCOSE   Result Value Ref Range    POCT Glucose 103 (H) 74 - 99 mg/dL   Prepare RBC: 2 Units   Result Value Ref Range    PRODUCT CODE L5625K01     Unit Number [id number]     Unit ABO B     Unit RH POS     XM INTEP COMP     Dispense Status XM     Blood Expiration Date 2/20/2025 11:59:00 PM EST     PRODUCT BLOOD TYPE 7300     UNIT VOLUME 350    POCT GLUCOSE   Result Value Ref Range    POCT Glucose 104 (H) 74 - 99 mg/dL   Blood Gas Arterial Full Panel   Result Value Ref Range    POCT pH, Arterial 7.34 (L) 7.38 - 7.42 pH    POCT pCO2, Arterial 52 (H) 38 - 42 mm Hg    POCT pO2, Arterial 70 (L) 85 - 95 mm Hg    POCT SO2, Arterial 94 94 - 100 %    POCT Oxy Hemoglobin, Arterial 91.6 (L) 94.0 - 98.0 %    POCT Hematocrit Calculated, Arterial 29.0 (L) 36.0 - 46.0 %    POCT Sodium, Arterial 142 136 - 145 mmol/L    POCT Potassium, Arterial 3.2 (L) 3.5 - 5.3 mmol/L    POCT Chloride, Arterial      POCT Ionized Calcium, Arterial 1.09 (L) 1.10 - 1.33 mmol/L    POCT Glucose, Arterial 83 74 - 99 mg/dL    POCT Lactate, Arterial 0.5 0.4 - 2.0 mmol/L    POCT Base Excess, Arterial 1.7 -2.0 - 3.0 mmol/L    POCT HCO3 Calculated, Arterial 28.1 (H) 22.0 - 26.0 mmol/L    POCT Hemoglobin, Arterial 9.6 (L) 12.0 - 16.0 g/dL    POCT Anion Gap, Arterial      Patient Temperature 37.0 degrees Celsius    FiO2 50 %   POCT GLUCOSE   Result Value Ref Range    POCT Glucose 114 (H) 74 - 99 mg/dL       XR chest 1 view   Final Result   1. Similar mild interstitial edema, small left pleural effusion, and   left basilar atelectasis.   2. Postsurgical changes of right pneumonectomy.    3. Medical devices as above.             I personally reviewed the images/study and I agree with the findings   as stated above by resident physician, Ankit Varela MD. This study   was interpreted at [place], [place].        MACRO:   None.        Signed by: Oseas Stroud 1/30/2025 9:31 AM   Dictation workstation:   [id number]      XR chest 1 view   Final Result   1. Postoperative changes and medical devices as above.   2. Similar mild interstitial edema and left basilar presumed   atelectasis with small pleural effusion.   3. Persistent complete opacification of right hemithorax, correlating   with patient's history of pneumonectomy.        Signed by: Real Rashid 1/29/2025 7:28 AM   Dictation workstation:   [id number]      XR chest 1 view   Final Result   1.  Unchanged aeration of the lungs with complete opacification of   the right hemithorax and coarse interstitial markings throughout the   left lung with left basilar atelectasis. Superimposed infection is   not excluded.        I personally reviewed the images/study and I agree with the findings   as stated by resident physician Dr. Yair Deng . This study   was interpreted at [place], [place].        MACRO:   None        Signed by: Oseas Stroud 1/28/2025 9:31 AM   Dictation workstation:   [id number]      Transthoracic Echo (TTE) Limited   Final Result      XR chest 1 view   Final Result   1.  Interval improvement in the left-sided edema and patchy airspace   opacities. Small left-sided pleural effusion.   2. Postsurgical changes of right-sided pneumonectomy.   3. Medical lines and devices as above.        I personally reviewed the images/study and I agree with the findings   as stated by Jonah Velez MD (Radiology Resident).   This study was interpreted at [place], [place].        MACRO:    None        Signed by: Oseas Stroud 1/27/2025 10:55 AM   Dictation workstation:   [id number]      XR chest 1 view   Final Result   1.  Slight interval worsening in left-sided airspace disease/edema   status post right-sided pneumonectomy.             Signed by: Nino Barbour 1/26/2025 1:16 PM   Dictation workstation:   [id number]      XR chest 1 view   Final Result   1.  Post pneumonectomy changes with continued left perihilar edema.             Signed by: Nino Barbour 1/25/2025 9:51 AM   Dictation workstation:   [id number]      XR chest 1 view   Final Result   1.  Similar left lung pulmonary edema with small left pleural   effusion.   2. Status post right pneumonectomy.   3. Waldo-Paco catheter with tip projecting over the left lower lobe,   in similar position.                  MACRO:   None        Signed by: Alexx Anne 1/24/2025 10:24 AM   Dictation workstation:   [id number]      XR chest 1 view   Final Result   Addendum (preliminary) 1 of 1   Interpreted By:  Alexx Anne,    ADDENDUM:   Right IJ approach Waldo Paco catheter is also noted with tip   projecting over the left lower lobe in similar position to prior exam.        Signed by: Alexx Anne 1/24/2025 10:25 AM        -------- ORIGINAL REPORT --------   Dictation workstation:   [id number]      Final   1.  Stable findings of pulmonary edema within the left lung.   2. Postsurgical changes of right pneumonectomy.   3. Medical devices as above.                  MACRO:   None        Signed by: Alexx Anne 1/24/2025 9:09 AM   Dictation workstation:   [id number]      XR chest 1 view   Final Result   1. The tip of the endotracheal tube terminates 7.5 cm of the sina.   2. Slightly decreased  interstitial prominence and airspace opacities   within the left lung when compared to prior radiograph,  suggestive   of improving infectious process or improving pulmonary edema.   3. No significant interval change in postoperative changes from    right-sided pneumonectomy, resection of multiple right-sided ribs,   complete opacification of the pneumonectomy bed, and rightward   mediastinal shift.        I personally reviewed the images/study and I agree with radiology   resident  Dr. Meghan Ramesh findings as stated. This study was   interpreted at [place], [place]        MACRO:   None        Signed by: Oseas Stroud 1/24/2025 8:09 AM   Dictation workstation:   [id number]      XR abdomen 1 view   Final Result   1.  Dobhoff tube is in place with the tip projecting over the 1st   portion of the duodenum.   2. There is gas-filled dilated stomach. A few slight prominent loops   of the bowel in the upper abdomen with overall nonobstructive bowel   gas pattern.   3. Postsurgical changes of right pneumonectomy with resection of ribs   and patchy airspace opacities in the left lung.   4. [place]-Paco catheter projecting over the lower pulmonary artery.   Recommend retraction.        MACRO:   None        Signed by: Oseas Stroud 1/23/2025 10:15 AM   Dictation workstation:   [id number]      XR chest 1 view   Final Result   1. Unchanged appearance of multifocal patchy airspace opacities with   interstitial prominence of the left lung which may be due to   infectious process. Component of edema can not be excluded.   2. Medical devices as described above with similar positioning of the   [place] Rito-Paco catheter projecting over left lower lobe pulmonary artery.   Recommend retraction.   3. Postsurgical changes of right-sided pneumonectomy and resection of   multiple right-sided ribs with complete opacification of the   pneumonectomy bed. The appearance is unchanged compared to prior   study.                  MACRO:   None        Signed by: Oseas Stroud 1/23/2025 9:36 AM   Dictation workstation:   [id number]      XR abdomen 1 view   Final Result   1.  As described above.        Signed by: Vick Xiao 1/22/2025 1:01 PM    Dictation workstation:   [id number]      XR chest 1 view   Final Result   1. Unchanged appearance of multifocal patchy airspace opacity with   interstitial prominence of the left lung which may be due to   infectious process. Component of edema cannot be excluded.   2. Medical devices as described above with interval advancement of   the Melrose-Paco catheter projecting over left lower lobe pulmonary   artery. Recommend retraction.   3. Postsurgical changes of right-sided pneumonectomy and resection of   multiple right-sided ribs with complete opacification of the   pneumonectomy bed. The appearance is unchanged compared to prior   study.                  Signed by: Vick Xiao 1/22/2025 1:00 PM   Dictation workstation:   [id number]      XR chest 1 view   Final Result   1. Similar near-complete opacification of the right hemithorax, which   may be secondary to a large pleural effusion, pneumonectomy, or   bronchial obstruction.   2. Diffuse interstitial opacities and scattered patchy alveolar   opacities throughout the left lung, similar to the prior exam, which   may reflect prominent pulmonary edema or multifocal infection.   3. Medical devices as above.        I personally reviewed the image(s)/study and resident interpretation   as stated by Dr. Ange Sue MD. I agree with the findings as   stated. This study was interpreted at [facility], [place], [place].        MACRO:   None        Signed by: Oseas Stroud 1/21/2025 7:58 PM   Dictation workstation:   [id number]      Vascular US upper extremity venous duplex left   Final Result      Transthoracic Echo (TTE) Limited   Final Result      XR chest 1 view   Final Result   1.  Interval opacification of the right hemithorax and correlate with   post pneumonectomy changes.   2. Melrose-Paco catheter overlies the left lower lobe pulmonary artery.   3. Slight interval improvement in left-sided pulmonary edema.    Underlying edema and correlate with fluid status.             Signed by: Nino Barbour 1/20/2025 9:57 AM   Dictation workstation:   [id number]      XR abdomen 1 view   Final Result   1.  As described above.        Signed by: Vick Xiao 1/19/2025 11:58 AM   Dictation workstation:   [id number]      XR chest 1 view   Final Result   1. Slight worsening of multifocal airspace opacity in the left mid   and lower lung. Findings are concerning for infectious process. Small   left pleural effusion.   2. Postsurgical changes right pneumonectomy with medical devices as   described above. Consider retracting the Glasco-Paco catheter.                  Signed by: Vick Xiao 1/19/2025 8:55 AM   Dictation workstation:   [id number]      Transthoracic Echo (TTE) Limited   Final Result      XR chest 1 view   Final Result   1. Patchy airspace opacity in the left lower lung with slight   improvement in aeration. Correlate with concern for infection   2. Postsurgical changes of right pneumonectomy as described above.   3. Medical lines and devices as above. Consider retraction of the   Glasco-Pcao catheter.        I personally reviewed the images/study and I agree with the findings   as stated. This study was interpreted at [place], [place].        MACRO:   None        Signed by: Vick Xiao 1/19/2025 8:54 AM   Dictation workstation:   [id number]      XR chest 1 view   Final Result   1. Worsening aeration of the left lung with increasing left basilar   infiltrate and pleural effusion. Correlate with mucous plugging and   component of lobar collapse.   2. Component of interstitial prominence of the left lung, also   slightly increased which may be due to edema.   3. Postsurgical changes of right pneumonectomy as described                  Signed by: Vick Xiao 1/18/2025 11:35 AM   Dictation workstation:   [id number]      Cardiac Catheterization  Procedure   Final Result      Transthoracic Echo (TTE) Limited   Final Result      XR chest 1 view   Final Result   1.  Stable exam with postsurgical changes of right pneumonectomy/rib   resection and large layering fluid component within the right   hemithorax.   2. Similar findings of left pulmonary edema with slightly increased   size of small left pleural effusion. No left-sided pneumothorax.   3. Medical devices as above.                  MACRO:   None        Signed by: Alexx Anne 1/17/2025 8:16 AM   Dictation workstation:   [id number]      XR chest 1 view   Final Result   1.  Postsurgical changes of right-sided pneumonectomy with increased   layering opacification of the right hemithorax, likely due to   increasing fluid.   2. Similar trace left-sided pleural effusion with linear left basilar   airspace opacity, likely atelectasis on a background of likely   pulmonary edema. Infection is not definitively excluded.   3. Medical devices as above with interval insertion of right internal   jugular central venous catheter projecting at the mid SVC.        I personally reviewed the images/study and I agree with the findings   as stated by resident Davidson Mcguire. This study was interpreted   at [place], [place].        MACRO:   None        Signed by: Alexx Anne 1/17/2025 8:14 AM   Dictation workstation:   [id number]      Transthoracic Echo (TTE) Limited   Final Result      US right upper quadrant   Final Result   Small amount of simple appearing ascites in the right upper quadrant   without evidence of cholelithiasis, cholecystitis or biliary dilation.        Signed by: Alberto Salas 1/16/2025 1:02 PM   Dictation workstation:   [id number]      XR chest 1 view   Final Result   1. Slight interval increase in left basilar airspace opacity which   may be due to increasing atelectasis. Cannot exclude developing   infectious infiltrate. Question trace left  pleural effusion.   2. Again seen postsurgical changes related to right-sided   pneumonectomy as described above                  Signed by: Vick Xiao 1/16/2025 9:47 AM   Dictation workstation:   [id number]      US renal complete   Final Result   1. Unremarkable sonographic evaluation of the kidneys.   2. Incomplete evaluation of a gallbladder with some pericholecystic   fluid and biliary sludge. Dedicated right upper quadrant ultrasound   could be obtained as clinically warranted.   3. A small amount of free fluid present in the right upper quadrant   and pelvis.        Findings relayed by phone by me to SICU provider at 5:11 p.m.        I personally reviewed the images/study and I agree with the findings   as stated by Erickson Best MD (resident) . This study was   interpreted at [place], [place].        MACRO:   None        Signed by: Alberto Salas 1/15/2025 6:17 PM   Dictation workstation:   [id number]      XR chest 1 view   Final Result   1.  Status post pneumonectomy with interval increase in fluid   component of right-sided hydropneumothorax.   2. Medical devices as above.                  MACRO:   None        Signed by: Alexx Anne 1/15/2025 9:28 AM   Dictation workstation:   [id number]      XR chest 1 view   Final Result   1. Interval removal of right chest tube. Other medical devices as   described above.   2. Postsurgical changes related to right pneumonectomy.   3. Faint left basilar opacity, likely atelectatic.                  Signed by: Vick Xioa 1/14/2025 2:53 PM   Dictation workstation:   [id number]      XR chest 1 view   Final Result   1.  Stable right-sided pneumonectomy changes.             Signed by: Nino Barbour 1/14/2025 12:56 PM   Dictation workstation:   [id number]      Transthoracic Echo (TTE) Limited   Final Result      XR chest 1 view   Final Result   1. Postsurgical changes related to right sided  pneumonectomy with   multiple medical devices as described above.   2. Mild interstitial prominence of the left lung.                  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 Wagner on 1/13 requiring post-op vasoactive therapy. Post-op course c/b acute hyponatremia, multifactorial delirium, cardiogenic shock requiring inotropic support, and acute hypoxic/hypercarbic respiratory failure requiring intubation. COVID+ 1/20. Weaned off milrinone 1/21. Weaned off iEpo 1/24.      Plan:  NEURO: History of anxiety on alprazolam 1mg qd, last filled 1/4/2025 per [facility] review. Family states she was not taking for several months but then restarted prn about 3 wks prior to surgery for situational anxiety. A&Ox4 at baseline. Multifactorial delirium improved. Currently intubated, off sedation. On scheduled oxycodone, scheduled clonazepam and nightly trazodone. Got up to stand 2 days ago with PT. Alert and responds to commands, answering questions with nods, reasonable strength upper and lower extremities. Endorses pain, difficult to ascertain where. Will give PRN Oxy.  - Last Qtc 530  - Continue Trazodone nightly  - Continue Clonazepam 0.5 mg q8h  - Oxycodone to prn  - Ongoing neuro and pain assessments  - Lidocaine patch to right chest wall  - PT/OT- have work with patient today  - Restraints indicated while patient remains intubated, restrain with soft wrist restraints until medical devices are discontinued     CV: No history of cardiac disease. Baseline /79, -110. TTE 9/2024 normal biV function, RVSP 10mmHg. Intra-op hypotension requiring vaso and epi. Arrived to SICU  on vaso 0.06 units/min, started on levo 0.05 mcg/kg/min. TTE 1/13 with EF 60-65%, low normal RV systolic function. Weaned off vasopressors 1/15. Elevated lactate to 2.9 on 1/16 with SOB and new O2 requirement. Repeat TTE 1/16: acute biV dysfunction with signs of takotsubo cardiomyopathy, EF 34%, mod reduced RV systolic function, mildly elevated RVSP, mild to mod TR. Cardiogenic shock, started milrinone, lasix infusion, and vasopressin. Shock Call on 1/16, no indication for mechanical circulatory support. Initial troponin 928 with downtrend and normalization of lactate. Epinephrine infusion added overnight 1/16-1/17, discontinued 2/2 tachycardia in the 130s. Milrinone increased to 0.25mcg/kg/min (decreased to 0.125 thereafter). Lasix infusion stopped 1/17 given contraction alkalosis. Taken to cath lab 1/17 for RHC/PA cath placement, opening CI 2.9. Milrinone uptitrated to 0.375mcg/kg/min 1/17-1/18. iEpo was added. Repeat TTE 1/20 EF 35% (on iEpo and milrinone). Levo weaned off 1/20. Intermittent sinus tachycardia. Off Milrinone /21; iEpo off 1/24. Vaso weaned off 1/25. Responsive to diuresis. Started midodrine 1/25. PA cath removed 1/26. Repeat TTE this week showed LV EF 38%, There is reduced right ventricular systolic function with continued Takastubo picture with hypo- akinesis of the apical 2/3 of the LV.   Overnight: BP stable without episodes of tachycardia.    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 Arrived  to SICU extubated on 10L SFM, weaned to room air 1/14. Chest tube removed 1/14. Acute on chronic hypoxic respiratory failure 1/16. Escalation to Airvo 50L 90% overnight 1/17-1/18. Acute hypercapnia 1/18 with increase WOB, trial of BIPAP with iEPO. Eventual intubation 1/18 evening. Tolerated CPAP few hours yesterday. Brought down PS from 12 to 5 yesterday, down to 5 PEEP. CXR: similar interstitial edema, breath sounds are significantly improved.   -SBT this a.m: Did well on CPAP FiO2 40% PS 5 PEEP 5   - Continue mechanical ventilation ->trache procedure today   - Wean FiO2 to maintain SpO2 >92%  - SDS switched to decadron on 1/22 for treatment of COVID (end date 1/29)  - Can discontinue precautions this Friday   - Scheduled budesonide/duoneb  - PRN albuterol  - Daily CXR  - ABG prn  - Holding home benzonatate    GI: No GI history. LFTs WNL 12/2/24. Evidence of pericholecystic fluid and biliary sludge on renal US 1/15 but no abdominal symptoms. RUQ US with small ascites, otherwise no acute findings. Abdominal distention this am. Elevated LFTs, now normalized. LBM 1/17. OG tube placed 1/19 for gastric decompression. TFs initiated via OG 1/20, then held on 1/21 due to regurgitation from OG. Dobhoff placed via IRIS and advanced post-pyloric. Tolerating TF at goal rate. Liquid Bms x4 1/25. Cdiff negative 1/25. Last stool on 1/26. Add suppository later today.   - PEG placement today   - Continue TF at goal rate 30ml/hr  - Continue thiamine 100mg IV daily  - Continue IV PPI for GI prophylaxis  - Continue bowel regimen Miralax and Senna   - Add suppository     : No history of renal disease. Baseline creatinine 0.6. Symptomatic acute hyponatremia (SIADH vs vaso mediated) to 120 overnight 1/14 - 1/15, corrected to 124 after 150cc 3% NaCl x2 on 1/15. Renal US on 1/15 unremarkable. Started on lasix infusion 1/16-1/17. Spot diuresis 1/20 and 1/24. Montiel changed overnight 1/18-1/19. Continues to make good urine output. Net -760,  RFP grossly normal. Yoo removed yesterday, however retained urine overnight (straight cathed 3x; last output 400 mL)  - Replace Yoo   - Consider Tamsulosin later today if BP holds   - Nephrology signed off 1/18, call with questions  - RFP daily and prn  - Maintain yoo for strict I/Os   - Maintain U/O >0.5ml/kg/hr  - Replete electrolytes to maintain K>4, Phos>2.5, iCal>1.1, Mag>2     HEME: Hx of DOC. Baseline H/H 12/40. Acute blood loss anemia, OR EBL 150mL. Acute coagulopathy with INR elevated to 2.3 2/2 to poor nutritional status. Received IV Vit K 10mg x1 with improved INR thereafter. Transfused 1u PRBC 1/17 and 1u PRBC on 1/18. LUE duplex obtained 1/20 for swelling, negative for DVT. Stable edema in LUE, negative DVT ultrasound 1/20 ultrasound. Hgb 7.4 1/25, transfused 1 RBC with Hgb increase to 10.7 thereafter. Hb stable 9.9.   - Will put 2 unit PRBCs on hold for OR   - Check CBC daily and PRN  - Coags PRN  - Goal Hgb >8  - SCDs and SQH for DVT ppx  - Ongoing monitoring for s/s bleeding  - Maintain active T&S (1/26)     ENDO: No history of DM or thyroid disease. TSH 0.48 (1/17). Recent 5d course of prednisone 10mg 12/2024 for URI. Adequate glycemic control. Started on SDS out of OR 1/13. Transitioned to dexAMETHasone 1/22. BG have been wnl.   - BG q4h, SSI #1     MSK: Arthritis, Vit D deficiency  - Not on meds at home  - Monitor for symptoms     ID: Afebrile, leukocytosis. Completed yunior-op course of cefazolin. Worsened clinical condition 1/17, started on vanco, zosyn and azithromycin. Blood cultures, MRSA and Respiratory viral panel negative 1/17. Yoo changed 1/18, Urine cx 1/18 negative. Added fluc overnight 1/18-1/19. Sputum culture 1/19 negative. Vancomycin discontinued 1/20. Urine strep/legionella 1/20 negative. COVID+ on 1/20, initiated remdesivir and decadron. Discontinued fluc and azithro 1/21. Liquid stools. C.diff negative 1/26. Zosyn completed 1/26. Patient appeared slightly diaphoretic this  morning on exam, but has been afebrile with WBC downtrending 14->12  - COVID precautions will life today; will deep clean room while patient in OR   - Temp q4h, wbc daily  - Ongoing monitoring for s/s infection  - Completed 5 day course of remdesivir 1/24. On Decadron through 1/29     Lines:  - R axillary a line (1/28-)  - Left chest mediport (not currently accessed)  - PIV  x2  - Montiel (1/30-)  - ETT (1/18)     Dispo: Continue ICU care. Patient seen and discussed with ICU attending Dr. Solorio     Family: son Yg and  Maximilian updated over the phone. Will further update at bedside if they visit today and prn.    SICU Phone [phone]    Maricruz Silva MD   Anesthesiology, PGY-3

## 2025-01-30 NOTE — BRIEF OP NOTE
"Date: 2025  OR Location: [facility] OR    Name: Faustina Vick \"Noam", : 1960, Age: 64 y.o., MRN: [id number], Sex: female    Diagnosis  Pre-op Diagnosis      * Malignant neoplasm of upper lobe of right lung (Multi) [C34.11]     * Respiratory distress [R06.03] Post-op Diagnosis     * Malignant neoplasm of upper lobe of right lung (Multi) [C34.11]     * Respiratory distress [R06.03]     Procedures  INSERTION, GASTROSTOMY TUBE, PERCUTANEOUS  90132 - [place] EGD PERCUTANEOUS PLACEMENT GASTROSTOMY TUBE    CREATION, TRACHEOSTOMY  72161 - [place] TRACHEOSTOMY PLANNED SEPARATE PROCEDURE    1. Trach, hybrid, with blue rhino kit 8 shiley  2. EGD/PEG    Surgeons      * Jonah Wagner - Primary    Resident/Fellow/Other Assistant:  Surgeons and Role:     * John Brush MD - Fellow    Staff:   Circulator: Rosalie Odonnell Person: Sigrid    Anesthesia Staff: Anesthesiologist: Rosalie Tran DO; Brayden Esposito MD  Anesthesia Resident: Johanna Rankin MD    Procedure Summary  Anesthesia: General  ASA: IV  Estimated Blood Loss: 2mL  Intra-op Medications:   Administrations occurring from 1135 to 1425 on 25:   Medication Name Total Dose   sodium chloride 0.9 % irrigation solution 1,000 mL   albuterol 2.5 mg /3 mL (0.083 %) nebulizer solution 2.5 mg Cannot be calculated   budesonide (Pulmicort) 0.5 mg/2 mL nebulizer solution 0.5 mg Cannot be calculated   calcium gluconate 1 g in sodium chloride (iso) IV 50 mL Cannot be calculated   calcium gluconate 2 g in sodium chloride (iso)  mL Cannot be calculated   clonazePAM (KlonoPIN) tablet 0.5 mg Cannot be calculated   dextrose 50 % injection 12.5 g Cannot be calculated   dextrose 50 % injection 25 g Cannot be calculated   esomeprazole (NexIUM) suspension 40 mg Cannot be calculated   glucagon (Glucagen) injection 1 mg Cannot be calculated   glucagon (Glucagen) injection 1 mg Cannot be calculated   heparin (porcine) injection 5,000 Units Cannot be calculated   HYDROmorphone " (Dilaudid) injection 0.5 mg Cannot be calculated   insulin lispro injection 0-5 Units Cannot be calculated   ipratropium-albuteroL (Duo-Neb) 0.5-2.5 mg/3 mL nebulizer solution 3 mL Cannot be calculated   lidocaine 4 % patch 1 patch Cannot be calculated   magnesium sulfate 2 g in sterile water for injection 50 mL Cannot be calculated   magnesium sulfate 4 g in sterile water for injection 100 mL Cannot be calculated   midodrine (Proamatine) tablet 10 mg Cannot be calculated   oxyCODONE (Roxicodone) solution 2.5 mg Cannot be calculated   perflutren protein A microsphere (Optison) injection 0.5 mL Cannot be calculated   polyethylene glycol (Glycolax, Miralax) packet 17 g Cannot be calculated   potassium chloride 20 mEq in sterile water for injection 100 mL Cannot be calculated   potassium chloride 40 mEq in sterile water for injection 100 mL Cannot be calculated   senna (Senokot) 8.8 mg/5 mL syrup 5 mL Cannot be calculated   sulfur hexafluoride microsphr (Lumason) injection 24.28 mg Cannot be calculated   thiamine (Vitamin B-1) tablet 100 mg Cannot be calculated   traZODone (Desyrel) tablet 25 mg Cannot be calculated   albumin human 5 % 250 mL   ceFAZolin (Ancef) vial 1 g 2 g   etomidate (Amidate) injection 40 mg   fentaNYL (Sublimaze) injection 50 mcg/mL 100 mcg   hydrocortisone sodium succinate PF (Solu-CORTEF) injection 100 mg/2 mL 100 mg   lactated Ringer's infusion Cannot be calculated   lidocaine (Xylocaine) injection 2 % 40 mg   midazolam PF (Versed) injection 1 mg/mL 2 mg   ondansetron (Zofran) 2 mg/mL injection 4 mg   phenylephrine 40 mcg/mL syringe 10 mL 1,560 mcg   rocuronium (ZeMuron) 50 mg/5 mL injection 50 mg   sugammadex (Bridion) 200 mg/2 mL injection 200 mg              Anesthesia Record               Intraprocedure I/O Totals          Intake    lactated Ringer's 200.00 mL    Total Intake 200 mL          Specimen: No specimens collected               Findings: Successful trach/peg.     Complications:   None; patient tolerated the procedure well.     Disposition: PACU - hemodynamically stable.  Condition: stable  Specimens Collected: No specimens collected  Attending Attestation:     Jonah Wagner  Phone Number: [phone]

## 2025-01-31 ENCOUNTER — APPOINTMENT (OUTPATIENT)
Dept: RADIOLOGY | Facility: HOSPITAL | Age: 65
End: 2025-01-31
Payer: COMMERCIAL

## 2025-01-31 LAB
ALBUMIN SERPL BCP-MCNC: 3.2 G/DL (ref 3.4–5)
ANION GAP BLDA CALCULATED.4IONS-SCNC: 7 MMO/L (ref 10–25)
ANION GAP SERPL CALC-SCNC: 11 MMOL/L (ref 10–20)
APTT PPP: 31 SECONDS (ref 27–38)
BASE EXCESS BLDA CALC-SCNC: 8.4 MMOL/L (ref -2–3)
BODY TEMPERATURE: 37 DEGREES CELSIUS
BUN SERPL-MCNC: 19 MG/DL (ref 6–23)
CA-I BLD-SCNC: 1.2 MMOL/L (ref 1.1–1.33)
CA-I BLDA-SCNC: 1.26 MMOL/L (ref 1.1–1.33)
CALCIUM SERPL-MCNC: 8.8 MG/DL (ref 8.6–10.6)
CHLORIDE BLDA-SCNC: 102 MMOL/L (ref 98–107)
CHLORIDE SERPL-SCNC: 101 MMOL/L (ref 98–107)
CO2 SERPL-SCNC: 34 MMOL/L (ref 21–32)
CREAT SERPL-MCNC: <0.2 MG/DL (ref 0.5–1.05)
EGFRCR SERPLBLD CKD-EPI 2021: ABNORMAL ML/MIN/{1.73_M2}
ERYTHROCYTE [DISTWIDTH] IN BLOOD BY AUTOMATED COUNT: 19.6 % (ref 11.5–14.5)
GLUCOSE BLD MANUAL STRIP-MCNC: 104 MG/DL (ref 74–99)
GLUCOSE BLD MANUAL STRIP-MCNC: 104 MG/DL (ref 74–99)
GLUCOSE BLD MANUAL STRIP-MCNC: 105 MG/DL (ref 74–99)
GLUCOSE BLD MANUAL STRIP-MCNC: 116 MG/DL (ref 74–99)
GLUCOSE BLD MANUAL STRIP-MCNC: 126 MG/DL (ref 74–99)
GLUCOSE BLD MANUAL STRIP-MCNC: 78 MG/DL (ref 74–99)
GLUCOSE BLD MANUAL STRIP-MCNC: 85 MG/DL (ref 74–99)
GLUCOSE BLDA-MCNC: 76 MG/DL (ref 74–99)
GLUCOSE SERPL-MCNC: 78 MG/DL (ref 74–99)
HCO3 BLDA-SCNC: 33.4 MMOL/L (ref 22–26)
HCT VFR BLD AUTO: 27.6 % (ref 36–46)
HCT VFR BLD EST: 27 % (ref 36–46)
HGB BLD-MCNC: 8.7 G/DL (ref 12–16)
HGB BLDA-MCNC: 9.1 G/DL (ref 12–16)
INHALED O2 CONCENTRATION: 40 %
INR PPP: 1.2 (ref 0.9–1.1)
LAB AP BLOCK FOR ADDITIONAL STUDIES: NORMAL
LABORATORY COMMENT REPORT: NORMAL
LACTATE BLDA-SCNC: 0.8 MMOL/L (ref 0.4–2)
MAGNESIUM SERPL-MCNC: 1.97 MG/DL (ref 1.6–2.4)
MCH RBC QN AUTO: 28 PG (ref 26–34)
MCHC RBC AUTO-ENTMCNC: 31.5 G/DL (ref 32–36)
MCV RBC AUTO: 89 FL (ref 80–100)
NRBC BLD-RTO: 0 /100 WBCS (ref 0–0)
OXYHGB MFR BLDA: 95.4 % (ref 94–98)
PATH REPORT.FINAL DX SPEC: NORMAL
PATH REPORT.GROSS SPEC: NORMAL
PATH REPORT.RELEVANT HX SPEC: NORMAL
PATH REPORT.TOTAL CANCER: NORMAL
PATHOLOGY SYNOPTIC REPORT: NORMAL
PCO2 BLDA: 48 MM HG (ref 38–42)
PH BLDA: 7.45 PH (ref 7.38–7.42)
PHOSPHATE SERPL-MCNC: 3.7 MG/DL (ref 2.5–4.9)
PLATELET # BLD AUTO: 199 X10*3/UL (ref 150–450)
PO2 BLDA: 91 MM HG (ref 85–95)
POTASSIUM BLDA-SCNC: 4.4 MMOL/L (ref 3.5–5.3)
POTASSIUM SERPL-SCNC: 4.3 MMOL/L (ref 3.5–5.3)
PROTHROMBIN TIME: 13.2 SECONDS (ref 9.8–12.8)
RBC # BLD AUTO: 3.11 X10*6/UL (ref 4–5.2)
SAO2 % BLDA: 99 % (ref 94–100)
SODIUM BLDA-SCNC: 138 MMOL/L (ref 136–145)
SODIUM SERPL-SCNC: 142 MMOL/L (ref 136–145)
WBC # BLD AUTO: 9 X10*3/UL (ref 4.4–11.3)

## 2025-01-31 PROCEDURE — 82947 ASSAY GLUCOSE BLOOD QUANT: CPT

## 2025-01-31 PROCEDURE — 84132 ASSAY OF SERUM POTASSIUM: CPT

## 2025-01-31 PROCEDURE — 85027 COMPLETE CBC AUTOMATED: CPT

## 2025-01-31 PROCEDURE — 71045 X-RAY EXAM CHEST 1 VIEW: CPT

## 2025-01-31 PROCEDURE — 2020000001 HC ICU ROOM DAILY

## 2025-01-31 PROCEDURE — 97530 THERAPEUTIC ACTIVITIES: CPT | Mod: GO

## 2025-01-31 PROCEDURE — 2500000002 HC RX 250 W HCPCS SELF ADMINISTERED DRUGS (ALT 637 FOR MEDICARE OP, ALT 636 FOR OP/ED)

## 2025-01-31 PROCEDURE — 82330 ASSAY OF CALCIUM: CPT

## 2025-01-31 PROCEDURE — 71045 X-RAY EXAM CHEST 1 VIEW: CPT | Performed by: RADIOLOGY

## 2025-01-31 PROCEDURE — 99291 CRITICAL CARE FIRST HOUR: CPT

## 2025-01-31 PROCEDURE — 94640 AIRWAY INHALATION TREATMENT: CPT

## 2025-01-31 PROCEDURE — 83735 ASSAY OF MAGNESIUM: CPT

## 2025-01-31 PROCEDURE — 97535 SELF CARE MNGMENT TRAINING: CPT | Mod: GO

## 2025-01-31 PROCEDURE — 2500000004 HC RX 250 GENERAL PHARMACY W/ HCPCS (ALT 636 FOR OP/ED)

## 2025-01-31 PROCEDURE — 2500000005 HC RX 250 GENERAL PHARMACY W/O HCPCS

## 2025-01-31 PROCEDURE — 94003 VENT MGMT INPAT SUBQ DAY: CPT

## 2025-01-31 PROCEDURE — 97530 THERAPEUTIC ACTIVITIES: CPT | Mod: GP | Performed by: PHYSICAL THERAPIST

## 2025-01-31 PROCEDURE — 2500000004 HC RX 250 GENERAL PHARMACY W/ HCPCS (ALT 636 FOR OP/ED): Performed by: STUDENT IN AN ORGANIZED HEALTH CARE EDUCATION/TRAINING PROGRAM

## 2025-01-31 PROCEDURE — 85610 PROTHROMBIN TIME: CPT

## 2025-01-31 PROCEDURE — 37799 UNLISTED PX VASCULAR SURGERY: CPT

## 2025-01-31 PROCEDURE — 2500000001 HC RX 250 WO HCPCS SELF ADMINISTERED DRUGS (ALT 637 FOR MEDICARE OP): Performed by: STUDENT IN AN ORGANIZED HEALTH CARE EDUCATION/TRAINING PROGRAM

## 2025-01-31 PROCEDURE — 2500000001 HC RX 250 WO HCPCS SELF ADMINISTERED DRUGS (ALT 637 FOR MEDICARE OP)

## 2025-01-31 RX ADMIN — OXYCODONE HYDROCHLORIDE 2.5 MG: 5 SOLUTION ORAL at 08:13

## 2025-01-31 RX ADMIN — BUDESONIDE 0.5 MG: 0.5 INHALANT RESPIRATORY (INHALATION) at 07:57

## 2025-01-31 RX ADMIN — MAGNESIUM SULFATE HEPTAHYDRATE 2 G: 40 INJECTION, SOLUTION INTRAVENOUS at 13:40

## 2025-01-31 RX ADMIN — HEPARIN SODIUM 5000 UNITS: 5000 INJECTION INTRAVENOUS; SUBCUTANEOUS at 13:39

## 2025-01-31 RX ADMIN — CLONAZEPAM 0.5 MG: 0.5 TABLET ORAL at 16:18

## 2025-01-31 RX ADMIN — LIDOCAINE 4% 1 PATCH: 40 PATCH TOPICAL at 08:13

## 2025-01-31 RX ADMIN — BISACODYL 10 MG: 10 SUPPOSITORY RECTAL at 08:13

## 2025-01-31 RX ADMIN — HEPARIN SODIUM 5000 UNITS: 5000 INJECTION INTRAVENOUS; SUBCUTANEOUS at 05:22

## 2025-01-31 RX ADMIN — MIDODRINE HYDROCHLORIDE 10 MG: 10 TABLET ORAL at 23:35

## 2025-01-31 RX ADMIN — POLYETHYLENE GLYCOL 3350 17 G: 17 POWDER, FOR SOLUTION ORAL at 08:13

## 2025-01-31 RX ADMIN — MIDODRINE HYDROCHLORIDE 10 MG: 10 TABLET ORAL at 14:52

## 2025-01-31 RX ADMIN — MIDODRINE HYDROCHLORIDE 10 MG: 10 TABLET ORAL at 00:23

## 2025-01-31 RX ADMIN — Medication 40 PERCENT: at 20:38

## 2025-01-31 RX ADMIN — CLONAZEPAM 0.5 MG: 0.5 TABLET ORAL at 00:23

## 2025-01-31 RX ADMIN — MIDODRINE HYDROCHLORIDE 10 MG: 10 TABLET ORAL at 06:44

## 2025-01-31 RX ADMIN — IPRATROPIUM BROMIDE AND ALBUTEROL SULFATE 3 ML: .5; 3 SOLUTION RESPIRATORY (INHALATION) at 07:57

## 2025-01-31 RX ADMIN — OXYCODONE HYDROCHLORIDE 2.5 MG: 5 SOLUTION ORAL at 14:52

## 2025-01-31 RX ADMIN — TRAZODONE HYDROCHLORIDE 25 MG: 50 TABLET ORAL at 20:20

## 2025-01-31 RX ADMIN — Medication 40 PERCENT: at 08:00

## 2025-01-31 RX ADMIN — IPRATROPIUM BROMIDE AND ALBUTEROL SULFATE 3 ML: .5; 3 SOLUTION RESPIRATORY (INHALATION) at 20:36

## 2025-01-31 RX ADMIN — CLONAZEPAM 0.5 MG: 0.5 TABLET ORAL at 08:13

## 2025-01-31 RX ADMIN — THIAMINE HCL TAB 100 MG 100 MG: 100 TAB at 08:13

## 2025-01-31 RX ADMIN — IPRATROPIUM BROMIDE AND ALBUTEROL SULFATE 3 ML: .5; 3 SOLUTION RESPIRATORY (INHALATION) at 03:46

## 2025-01-31 RX ADMIN — IPRATROPIUM BROMIDE AND ALBUTEROL SULFATE 3 ML: .5; 3 SOLUTION RESPIRATORY (INHALATION) at 15:27

## 2025-01-31 RX ADMIN — ESOMEPRAZOLE MAGNESIUM 40 MG: 40 FOR SUSPENSION ORAL at 06:44

## 2025-01-31 RX ADMIN — BUDESONIDE 0.5 MG: 0.5 INHALANT RESPIRATORY (INHALATION) at 20:36

## 2025-01-31 RX ADMIN — HEPARIN SODIUM 5000 UNITS: 5000 INJECTION INTRAVENOUS; SUBCUTANEOUS at 23:14

## 2025-01-31 RX ADMIN — CLONAZEPAM 0.5 MG: 0.5 TABLET ORAL at 23:35

## 2025-01-31 ASSESSMENT — COGNITIVE AND FUNCTIONAL STATUS - GENERAL
WALKING IN HOSPITAL ROOM: TOTAL
STANDING UP FROM CHAIR USING ARMS: TOTAL
DAILY ACTIVITIY SCORE: 9
TURNING FROM BACK TO SIDE WHILE IN FLAT BAD: TOTAL
DRESSING REGULAR LOWER BODY CLOTHING: TOTAL
TOILETING: TOTAL
MOVING FROM LYING ON BACK TO SITTING ON SIDE OF FLAT BED WITH BEDRAILS: A LOT
MOBILITY SCORE: 7
HELP NEEDED FOR BATHING: A LOT
DRESSING REGULAR UPPER BODY CLOTHING: A LOT
CLIMB 3 TO 5 STEPS WITH RAILING: TOTAL
EATING MEALS: TOTAL
PERSONAL GROOMING: A LOT
MOVING TO AND FROM BED TO CHAIR: TOTAL

## 2025-01-31 ASSESSMENT — PAIN - FUNCTIONAL ASSESSMENT
PAIN_FUNCTIONAL_ASSESSMENT: 0-10
PAIN_FUNCTIONAL_ASSESSMENT: CPOT (CRITICAL CARE PAIN OBSERVATION TOOL)
PAIN_FUNCTIONAL_ASSESSMENT: 0-10
PAIN_FUNCTIONAL_ASSESSMENT: 0-10
PAIN_FUNCTIONAL_ASSESSMENT: CPOT (CRITICAL CARE PAIN OBSERVATION TOOL)
PAIN_FUNCTIONAL_ASSESSMENT: 0-10
PAIN_FUNCTIONAL_ASSESSMENT: CPOT (CRITICAL CARE PAIN OBSERVATION TOOL)
PAIN_FUNCTIONAL_ASSESSMENT: CPOT (CRITICAL CARE PAIN OBSERVATION TOOL)
PAIN_FUNCTIONAL_ASSESSMENT: 0-10
PAIN_FUNCTIONAL_ASSESSMENT: CPOT (CRITICAL CARE PAIN OBSERVATION TOOL)

## 2025-01-31 ASSESSMENT — PAIN SCALES - GENERAL
PAINLEVEL_OUTOF10: 0 - NO PAIN

## 2025-01-31 ASSESSMENT — ACTIVITIES OF DAILY LIVING (ADL): HOME_MANAGEMENT_TIME_ENTRY: 10

## 2025-01-31 ASSESSMENT — PAIN SCALES - PAIN ASSESSMENT IN ADVANCED DEMENTIA (PAINAD): TOTALSCORE: MEDICATION (SEE MAR)

## 2025-01-31 NOTE — PROGRESS NOTES
Mrs. Vick was taken to the OR for trach and peg placement on January 30, 2025. Pre-cert have not been initiated at this time. The family has a list of skilled facilities; however, this  will continue to monitor to ensure the appropriate placement is facilitate.

## 2025-01-31 NOTE — PROGRESS NOTES
"Faustina Vick \"Jannie\" is a 64 y.o. female who presents for Primary cancer of right upper lobe of lung (Multi) [C34.11].  She initially received chemotherapy and radiation, but appeared to have residual radiographic abnormalities.  PET/CT suggested progression of disease, and in this setting and Dr. Wagner felt that surgical resection in a \"salvage\" setting might facilitate long-term overall survival given the failure of nonsurgical management She is now  18 days post op from PNEUMONECTOMY (Right) (en bloc with chest wall resection and reconstruction), mediastinal lymph node dissection for a  Primary cancer of right upper lobe of lung (Multi).      Subjective   NAEO.     Objective     General: Critically ill in ICU bed, on vent via trach, follows commands, fatigued  HEENT: Trach in place.   Resp: sats appropriate, symmetric rise  CV: NSR, intermittently tachycardic  Ext: Minimal LE edema    Last Recorded Vitals  Blood pressure 112/74, pulse (!) 149, temperature 36.9 °C (98.4 °F), temperature source Temporal, resp. rate 19, height 1.651 m (5' 5\"), weight 54.9 kg (121 lb 0.5 oz), SpO2 96%.  Intake/Output last 3 Shifts:  I/O last 3 completed shifts:  In: 2080 (37.9 mL/kg) [I.V.:200 (3.6 mL/kg); NG/GT:1180; IV Piggyback:700]  Out: 2985 (54.4 mL/kg) [Urine:2985 (1.5 mL/kg/hr)]  Weight: 54.9 kg     Relevant Results  Scheduled medications  bisacodyl, 10 mg, rectal, Daily  budesonide, 0.5 mg, nebulization, BID  clonazePAM, 0.5 mg, g-tube, q8h  [Held by provider] docusate sodium, 100 mg, oral, BID  esomeprazole, 40 mg, g-tube, Daily before breakfast  heparin (porcine), 5,000 Units, subcutaneous, q8h  insulin lispro, 0-5 Units, subcutaneous, q4h  ipratropium-albuteroL, 3 mL, nebulization, q6h  lidocaine, 1 patch, transdermal, Daily  midodrine, 10 mg, g-tube, q8h  oxygen, , inhalation, Continuous - Inhalation  perflutren protein A microsphere, 0.5 mL, intravenous, Once in imaging  polyethylene glycol, 17 g, g-tube, Daily  senna, " 5 mL, g-tube, Nightly  sulfur hexafluoride microsphr, 2 mL, intravenous, Once in imaging  thiamine, 100 mg, g-tube, Daily  traZODone, 25 mg, g-tube, Nightly      Continuous medications     PRN medications  PRN medications: albuterol, calcium gluconate, calcium gluconate, dextrose, dextrose, glucagon, glucagon, HYDROmorphone, magnesium sulfate, magnesium sulfate, oxyCODONE, oxygen, potassium chloride, potassium chloride    Results for orders placed or performed during the hospital encounter of 01/13/25 (from the past 24 hours)   Blood Gas Arterial Full Panel   Result Value Ref Range    POCT pH, Arterial 7.34 (L) 7.38 - 7.42 pH    POCT pCO2, Arterial 52 (H) 38 - 42 mm Hg    POCT pO2, Arterial 70 (L) 85 - 95 mm Hg    POCT SO2, Arterial 94 94 - 100 %    POCT Oxy Hemoglobin, Arterial 91.6 (L) 94.0 - 98.0 %    POCT Hematocrit Calculated, Arterial 29.0 (L) 36.0 - 46.0 %    POCT Sodium, Arterial 142 136 - 145 mmol/L    POCT Potassium, Arterial 3.2 (L) 3.5 - 5.3 mmol/L    POCT Chloride, Arterial      POCT Ionized Calcium, Arterial 1.09 (L) 1.10 - 1.33 mmol/L    POCT Glucose, Arterial 83 74 - 99 mg/dL    POCT Lactate, Arterial 0.5 0.4 - 2.0 mmol/L    POCT Base Excess, Arterial 1.7 -2.0 - 3.0 mmol/L    POCT HCO3 Calculated, Arterial 28.1 (H) 22.0 - 26.0 mmol/L    POCT Hemoglobin, Arterial 9.6 (L) 12.0 - 16.0 g/dL    POCT Anion Gap, Arterial      Patient Temperature 37.0 degrees Celsius    FiO2 50 %   POCT GLUCOSE   Result Value Ref Range    POCT Glucose 114 (H) 74 - 99 mg/dL   POCT GLUCOSE   Result Value Ref Range    POCT Glucose 110 (H) 74 - 99 mg/dL   POCT GLUCOSE   Result Value Ref Range    POCT Glucose 78 74 - 99 mg/dL   POCT GLUCOSE   Result Value Ref Range    POCT Glucose 85 74 - 99 mg/dL   Blood Gas Arterial Full Panel   Result Value Ref Range    POCT pH, Arterial 7.45 (H) 7.38 - 7.42 pH    POCT pCO2, Arterial 48 (H) 38 - 42 mm Hg    POCT pO2, Arterial 91 85 - 95 mm Hg    POCT SO2, Arterial 99 94 - 100 %    POCT Oxy  Hemoglobin, Arterial 95.4 94.0 - 98.0 %    POCT Hematocrit Calculated, Arterial 27.0 (L) 36.0 - 46.0 %    POCT Sodium, Arterial 138 136 - 145 mmol/L    POCT Potassium, Arterial 4.4 3.5 - 5.3 mmol/L    POCT Chloride, Arterial 102 98 - 107 mmol/L    POCT Ionized Calcium, Arterial 1.26 1.10 - 1.33 mmol/L    POCT Glucose, Arterial 76 74 - 99 mg/dL    POCT Lactate, Arterial 0.8 0.4 - 2.0 mmol/L    POCT Base Excess, Arterial 8.4 (H) -2.0 - 3.0 mmol/L    POCT HCO3 Calculated, Arterial 33.4 (H) 22.0 - 26.0 mmol/L    POCT Hemoglobin, Arterial 9.1 (L) 12.0 - 16.0 g/dL    POCT Anion Gap, Arterial 7 (L) 10 - 25 mmo/L    Patient Temperature 37.0 degrees Celsius    FiO2 40 %   Calcium, Ionized   Result Value Ref Range    POCT Calcium, Ionized 1.20 1.1 - 1.33 mmol/L   CBC   Result Value Ref Range    WBC 9.0 4.4 - 11.3 x10*3/uL    nRBC 0.0 0.0 - 0.0 /100 WBCs    RBC 3.11 (L) 4.00 - 5.20 x10*6/uL    Hemoglobin 8.7 (L) 12.0 - 16.0 g/dL    Hematocrit 27.6 (L) 36.0 - 46.0 %    MCV 89 80 - 100 fL    MCH 28.0 26.0 - 34.0 pg    MCHC 31.5 (L) 32.0 - 36.0 g/dL    RDW 19.6 (H) 11.5 - 14.5 %    Platelets 199 150 - 450 x10*3/uL   Coagulation Screen   Result Value Ref Range    Protime 13.2 (H) 9.8 - 12.8 seconds    INR 1.2 (H) 0.9 - 1.1    aPTT 31 27 - 38 seconds   Magnesium   Result Value Ref Range    Magnesium 1.97 1.60 - 2.40 mg/dL   Renal function panel   Result Value Ref Range    Glucose 78 74 - 99 mg/dL    Sodium 142 136 - 145 mmol/L    Potassium 4.3 3.5 - 5.3 mmol/L    Chloride 101 98 - 107 mmol/L    Bicarbonate 34 (H) 21 - 32 mmol/L    Anion Gap 11 10 - 20 mmol/L    Urea Nitrogen 19 6 - 23 mg/dL    Creatinine <0.20 (L) 0.50 - 1.05 mg/dL    eGFR      Calcium 8.8 8.6 - 10.6 mg/dL    Phosphorus 3.7 2.5 - 4.9 mg/dL    Albumin 3.2 (L) 3.4 - 5.0 g/dL   POCT GLUCOSE   Result Value Ref Range    POCT Glucose 105 (H) 74 - 99 mg/dL   POCT GLUCOSE   Result Value Ref Range    POCT Glucose 116 (H) 74 - 99 mg/dL       XR chest 1 view 01/28/2025: The  right post pneumonectomy space is stable wihtout new air fluid level. The left hemithorax shows trace effusion, and clearing intersitial infiltrate/edema. Improved overall.            Assessment/Plan   Assessment & Plan  Primary cancer of right upper lobe of lung (Multi)    Chronic obstructive pulmonary disease (Multi)    Malignant neoplasm of upper lobe of right lung (Multi)    Cardiogenic shock (Multi)    Respiratory distress    Faustina Vick (Jannie) is a 64F with Hx of Stage III lung cancer, asthma, anxiety, and arthritis who is now s/p R pneumonectomy with Dr. Wagner 1/13/25. 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 Cardiology was consulted and recommended Lasix gtt and milrinone. Lasix gtt held 1/17, hyponatremia improving. Patient underwent RHC and SGC placement with cardiology 1/17. She was reintubated on 1/18 d/t worsening respiratory status and AMS. Also had increased pressor requirements, now weaned off. Repeat echo stable on 1/18. COVID positive on 1/20.   Failed extubation trials, now s/p trach/PEG with Dr. Wagner 1/30.     - Continue & appreciate supportive ICU care  - Advance TF to goal as tolerated  - Pulmonary toilet  - Please place duoderm under trach plate to prevent erosion    Patient seen with CT fellow Dr. Brush and discussed with Dr. Wagner.    Sarah Miranda MD  Gen Surg PGY2  Thoracic surgery [id number]

## 2025-01-31 NOTE — PROGRESS NOTES
"Physical Therapy    Physical Therapy Treatment    Patient Name: Faustina Vick \"Jannie\"  MRN: [id number]  Department: [facility] [place] [facility]  Room: 12/12-A  Today's Date: 1/31/2025  Time Calculation  Start Time: 1309  Stop Time: 1333  Time Calculation (min): 24 min    Assessment/Plan   PT Assessment  PT Assessment Results: Decreased strength, Decreased range of motion, Impaired balance, Decreased endurance, Decreased mobility, Decreased safety awareness  Rehab Prognosis: Good  Barriers to Discharge Home: Physical needs, Caregiver assistance  Caregiver Assistance: Caregiver assistance needed per identified barriers - however, level of patient's required assistance exceeds assistance available at home  Physical Needs: Stair navigation into home limited by function/safety, High falls risk due to function or environment, 24hr ADL assistance needed, 24hr mobility assistance needed, Ambulating household distances limited by function/safety  Evaluation/Treatment Tolerance: Patient limited by fatigue  Medical Staff Made Aware: Yes  Strengths: Ability to acquire knowledge, Attitude of self  Barriers to Participation: Comorbidities, Housing layout  End of Session Communication: Bedside nurse  Assessment Comment: Pt is currently limited by fatigue and activity tolerance. Pt reports no pain this date and was able to mobilize to chair with stand pivot t/f maxA x2. Demos decreased BLE strength, ROM, ability to mobilize in bed, activity tolerance, and cardiovascular endurance. Skilled PT Indicated to progress tolerance, safety, and independence of mobiilty.  End of Session Patient Position: Up in chair, Alarm on  PT Plan  Inpatient/Swing Bed or Outpatient: Inpatient  PT Plan  Treatment/Interventions: Bed mobility, Transfer training, Gait training, Balance training, Stair training, Strengthening, Range of motion, Endurance training, Therapeutic exercise, Home exercise program, Therapeutic activity  PT Plan: Ongoing PT  PT Frequency: 4 times per " week  PT Discharge Recommendations: Moderate intensity level of continued care  Equipment Recommended upon Discharge: Wheeled walker  PT Recommended Transfer Status: Total assist  PT - OK to Discharge: Yes    General Visit Information:   PT  Visit  PT Received On: 01/31/25  General  Family/Caregiver Present: Yes  Caregiver Feedback: family at bedside, supportive  Prior to Session Communication: Bedside nurse  Patient Position Received: Bed, 3 rail up, Alarm off, not on at start of session  General Comment: Pt supine in bed on arrival, agreeable to therapy. Vent, yoo, IV, tele, R art line, PIVx2, PEG, 40 fiO2, 8.0 PEEP, 14 RR, PS 5    Subjective   Precautions:  Precautions  Medical Precautions: Fall precautions, Cardiac precautions, Oxygen therapy device and L/min, Infection precautions  Precautions Comment: MAP >65     Date/Time Vitals Session Patient Position Pulse Resp SpO2 BP MAP (mmHg)    01/31/25 1400 --  --  118  21  98 %  108/79  89     01/31/25 1500 --  --  118  17  96 %  99/64  74           Objective   Pain:  Pain Assessment  Pain Assessment: 0-10  0-10 (Numeric) Pain Score: 0 - No pain  Cognition:  Cognition  Orientation Level: Oriented X4  Following Commands: Follows one step commands with repetition  Cognition Comments: drowsy with flat affect this date  Coordination:  Movements are Fluid and Coordinated: Yes  Postural Control:  Postural Control  Trunk Control: Cueing to retract scapula and roll shoulders to improve upright sitting posture.  Static Sitting Balance  Static Sitting-Balance Support: Bilateral upper extremity supported, Feet supported  Static Sitting-Level of Assistance: Maximum assistance  Static Sitting-Comment/Number of Minutes: Sat EOB ~10 min with cues to improve upright sitting posture.  Extremity/Trunk Assessments:  Activity Tolerance:  Activity Tolerance  Endurance: Tolerates 10 - 20 min exercise with multiple rests  Early Mobility/Exercise Safety Screen: Proceed with mobilization  - No exclusion criteria met  Activity Tolerance Comments: Hemodynamically stable with change in postions during activity.  Treatments:  Therapeutic Activity  Therapeutic Activity Performed: Yes  Therapeutic Activity 1: Pt tolerated ~10min seated EOB with cues for safety and correcting upright posture.  Therapeutic Activity 2: Increased time for tasks.    Outcome Measures:  [facility] Basic Mobility  Turning from your back to your side while in a flat bed without using bedrails: A lot  Moving from lying on your back to sitting on the side of a flat bed without using bedrails: Total  Moving to and from bed to chair (including a wheelchair): Total  Standing up from a chair using your arms (e.g. wheelchair or bedside chair): Total  To walk in hospital room: Total  Climbing 3-5 steps with railing: Total  Basic Mobility - Total Score: 7    FSS-ICU  Ambulation: Unable to attempt due to weakness  Rolling: Maximal assistance (performs 25% - 49% of task)  Sitting: Maximal assistance (performs 25% - 49% of task)  Transfer Sit-to-Stand: Total assistance (performs 25% or requires another person)  Transfer Supine-to-Sit: Total assistance (performs 25% or requires another person)  Total Score: 6      Early Mobility/Exercise Safety Screen: Proceed with mobilization - No exclusion criteria met  ICU Mobility Scale: Passively moved to chair (no standing) [2]    Education Documentation  Body Mechanics, taught by MONTSERRAT Cheung at 1/31/2025  2:42 PM.  Learner: Patient  Readiness: Acceptance  Method: Explanation  Response: Verbalizes Understanding  Comment: [facility] pt on proper body mechanics to maintain an upright posture seated EOB.    Home Exercise Program, taught by MONTSERRAT Cheung at 1/31/2025  2:42 PM.  Learner: Patient  Readiness: Acceptance  Method: Explanation  Response: Verbalizes Understanding  Comment: [facility] pt on proper body mechanics to maintain an upright posture seated EOB.    Mobility Training, taught by MONTSERRAT Cheung  at 1/31/2025  2:42 PM.  Learner: Patient  Readiness: Acceptance  Method: Explanation  Response: Verbalizes Understanding  Comment: Edu pt on proper body mechanics to maintain an upright posture seated EOB.    Education Comments  No comments found.      OP EDUCATION:       Encounter Problems       Encounter Problems (Active)       Balance       Pt will demo static sitting balance w/o UE support IND to decrease risk of falls in home environment (Not met)       Start:  01/15/25    Expected End:  02/05/25    Resolved:  01/22/25    Updated to: Pt will maintain standing balance with Min A using LRAD    Update reason: re-eval         Pt will maintain standing balance with Min A using LRAD (Progressing)       Start:  01/22/25    Expected End:  02/12/25                   Mobility       Pt will mobilize supine to sitting MI to promote functional independence (Not met)       Start:  01/15/25    Expected End:  02/05/25    Resolved:  01/22/25    Updated to: Pt will perform bed mobility with Min A    Update reason: re-eval         Pt will perform bed mobility with Min A (Progressing)       Start:  01/22/25    Expected End:  02/12/25                   Mobility       STG - Patient will ambulate x50 ft with Min A using LRAD (Progressing)       Start:  01/22/25    Expected End:  02/12/25               PT Transfers       Pt will transfer sit to stand w/ LRAD MI to improve functional mobility.  (Not met)       Start:  01/15/25    Expected End:  02/05/25    Resolved:  01/22/25    Updated to: Pt will perform functional transfers with Min A using LRAD    Update reason: Re-eval         Pt will perform functional transfers with Min A using LRAD (Progressing)       Start:  01/22/25    Expected End:  02/12/25                   Pain - Adult            JANE Cheung  Student physical therapist working under the direct supervision of licensed therapist

## 2025-01-31 NOTE — OP NOTE
"INSERTION, GASTROSTOMY TUBE, PERCUTANEOUS, CREATION, TRACHEOSTOMY Operative Note  Patient: Faustina Vick  : 1960  MRN: [id number]    Preoperative Diagnosis: Malignant neoplasm of upper lobe of right lung (Multi) [C34.11]  Respiratory distress [R06.03]  Postoperative Diagnosis: Malignant neoplasm of upper lobe of right lung (Multi) [C34.11]  Respiratory distress [R06.03]    Procedure: Procedure(s) (LRB):  INSERTION, GASTROSTOMY TUBE, PERCUTANEOUS (N/A)  CREATION, TRACHEOSTOMY (N/A)    Surgeon: Surgeon(s):  MD John Kim MD    Other Staff:   Surgeons and Role:     * John Brush MD - Fellow   Circulator: Rosalie Prakash RN  Scrub Person: Sigrid Gillespie RN    Anesthesia Type: General    Indications: Faustina Vick \"Noam" is an 64 y.o. female who presents for  respiratory failure after recent lung resection surgery . I recommended tracheostomy and gastrostomy for management of her respiratory failure    The patient was seen in the preoperative area. The risks, benefits, complications, treatment options, non-operative alternatives, expected recovery and outcomes were discussed with the patient. The possibilities of reaction to medication, pulmonary aspiration, injury to surrounding structures, bleeding, recurrent infection, the need for additional procedures, failure to diagnose a condition, and creating a complication requiring transfusion or operation were discussed with the patient. The patient concurred with the proposed plan, giving informed consent.  The site of surgery was properly noted/marked if necessary per policy.     Procedure Details:     The patient was brought to the operating room intubated. Appropriate time out procedures were completed. He was placed on the operating table in supine position. The existing endotracheal tube was used to perform bronchoscopy, which show grossly normal endobronchial anatomy and mucous secretions which were aspirated. The endotracheal tube " "was withdrawn into the proximal trachea. The neck was prepped and draped in the standard fashion. I started by making an incision between the sternal notch and the cricoid cartilage. I performed some simple blunt dissection using a Nimo clamp.  We encountered an unusually large external jugular vein which was ligated.  I inserted an introducer needle into the lumen of the trachea. I used this needle to pass a guidewire into the lumen of the trachea, which was visualized by bronchoscopy. The endotracheal tube was further withdrawn until the entry site of the wire was visualized. The entry site was appropriate. I used the guidewire to dilate the tract using at first a small rigid dilator, and then the large curvilinear dilator. Once the tracheotomy was created using the large dilator, I used the guidewire to place a size 7.5 Shiley tracheostomy device. The device was confirmed to be in the trachea by bronchoscopy. The tracheostomy device was connected to the ventilator circuit with appropriate return of CO2 and normal tidal volumes. The tracheostomy device was secured with stitches and tracheostomy tie.     The patient was prepped and draped for gastrostomy. I inserted an adult gastroscope transorally and advanced this through the esophagus and into the stomach. The esophagus and stomach appeared grossly normal. The pylorus was patent and the first portion of duodenum appeared normal as well. I insufflated the stomach and chose a place for gastrostomy tube insertion with appropriate ballottement and transillumination. A skin incision was made in this area and an introducer needle was inserted into the lumen of the stomach. The needle was visualized by the endoscope and a guidewire was placed through this needle and into the lumen of the stomach. The guidewire was snared by the endoscope and withdrawn transorally. The wire was connected to a \"pull  style\" gastrostomy tube. The tube was then pulled into position using " the wire. The gastrostomy appeared in appropriate position with the hub at approximately 3.5 cm. the appropriate hardware was attached to the gastrostomy tube, and the air in the lumen of the stomach was aspirated by the endoscope. The endoscope was removed, and the procedure was completed.     The patient was brought back to the ICU with no evidence of immediate postoperative complications having tolerated the procedure well.      Estimated blood loss: minimal  Complications: None  Disposition: Recovery  Condition: stable    Attending Attestation: I was present and scrubbed for the entire procedure.    Specimens:   No specimens collected    Jonah Wagner  Phone Number: [phone]

## 2025-01-31 NOTE — PROGRESS NOTES
"Occupational Therapy    Occupational Therapy Treatment    Name: Faustina Vick \"Jannie\"  MRN: [id number]  : 1960  Date: 25  Room: -A      Time Calculation  Start Time: 1418  Stop Time: 1443  Time Calculation (min): 25 min    Assessment:  Barriers to Discharge Home: Cognition needs, Physical needs, Caregiver assistance  Caregiver Assistance: Caregiver assistance needed per identified barriers - however, level of patient's required assistance exceeds assistance available at home  Cognition Needs: Insight of patient limited regarding functional ability/needs, Medication and/or medical management daily assist needed  Physical Needs: 24hr mobility assistance needed, 24hr ADL assistance needed  End of Session Communication: Bedside nurse  End of Session Patient Position: Bed, 3 rail up, Alarm off, caregiver present    Plan:  Treatment Interventions: ADL retraining, Functional transfer training, UE strengthening/ROM, Endurance training, Cognitive reorientation, Patient/family training, Equipment evaluation/education, Neuromuscular reeducation, Fine motor coordination activities, Compensatory technique education  OT Frequency: 4 times per week  OT Discharge Recommendations: Moderate intensity level of continued care  Equipment Recommended upon Discharge: Wheeled walker  OT Recommended Transfer Status: Assist of 2  OT - OK to Discharge: Yes    Subjective   General:  OT Last Visit  OT Received On: 25  Co-Treatment:  (rehab aide assisted with session)  Prior to Session Communication: Bedside nurse  Patient Position Received: Up in chair, Alarm on  Family/Caregiver Present: Yes  Caregiver Feedback: family at bedside, supportive  General Comment: Pt seated in chair on arrival, agreeable and ready to return to bed. Since last OT session, s/p trach and PEG     Precautions:  Medical Precautions: Fall precautions, Cardiac precautions, Oxygen therapy device and L/min  Precautions Comment: MAP >65    Vitals:   " 01/31/25 1418 01/31/25 1443   Vital Signs   Vitals Session Pre OT Post OT   Heart Rate (!) 113 (!) 125   Resp 20 22   SpO2 98 % 94 %   /67 94/56   MAP (mmHg) 79 72       Lines/Tubes/Drains:  Arterial Line 01/28/25 Right Axillary (Active)   Number of days: 3       Implantable Port 01/16/25 Left Chest Single lumen port (Active)   Number of days: 15       Surgical Airway Shiley 7.5 (Active)   Number of days: 1       Urethral Catheter Straight-tip 18 Fr. (Active)   Number of days: 1       Gastrostomy/Enterostomy Percutaneous endoscopic gastrostomy (PEG) 1 20 Fr. LUQ (Active)   Number of days: 1       Cognition:  Overall Cognitive Status: Impaired  Orientation Level: Oriented X4  Following Commands: Follows one step commands with repetition (and increased time)  Cognition Comments: lethargic  Processing Speed: Delayed    Pain Assessment:  Pain Assessment  Pain Assessment: 0-10  0-10 (Numeric) Pain Score: 0 - No pain     Objective   Activities of Daily Living:    UE Dressing  UE Dressing Level of Assistance: Maximum assistance  UE Dressing Comments: don gown     Toileting  Toileting Level of Assistance: Dependent  Where Assessed: Bed level  Toileting Comments: with rolls       Bed Mobility/Transfers:   Bed Mobility  Bed Mobility: Yes  Bed Mobility 1  Bed Mobility 1: Sitting to supine  Level of Assistance 1: Dependent, +2  Bed Mobility 2  Bed Mobility  2: Rolling right, Rolling left  Level of Assistance 2: Dependent  Bed Mobility Comments 2: draw sheet  Transfers  Transfer: Yes  Transfer 1  Transfer From 1: Chair with drop arm to  Transfer to 1: Bed  Technique 1: Sit pivot  Transfer Level of Assistance 1: Maximum assistance, +2, Arm in arm assistance  Trials/Comments 1: draw sheet; x2 scoots  Transfers 2  Transfer From 2: Sit to  Transfer to 2: Stand  Transfer Level of Assistance 2: Maximum assistance, +2  Trials/Comments 2: bilat arm in arm assist     Balance:  Static Sitting Balance  Static Sitting-Level of  Assistance: Moderate assistance    Outcome Measures:  [facility] Daily Activity  Putting on and taking off regular lower body clothing: Total  Bathing (including washing, rinsing, drying): A lot  Putting on and taking off regular upper body clothing: A lot  Toileting, which includes using toilet, bedpan or urinal: Total  Taking care of personal grooming such as brushing teeth: A lot  Eating Meals: Total  Daily Activity - Total Score: 9  ICU Mobility Screen  ICU Mobility Scale: Transferring bed to chair     Education Documentation  Body Mechanics, taught by Ellen Garcia, OT at 1/31/2025  4:27 PM.  Learner: Family, Patient  Readiness: Acceptance  Method: Explanation, Demonstration  Response: Demonstrated Understanding    Precautions, taught by Ellen Garcia, OT at 1/31/2025  4:27 PM.  Learner: Family, Patient  Readiness: Acceptance  Method: Explanation, Demonstration  Response: Demonstrated Understanding    ADL Training, taught by Ellen Garcia, OT at 1/31/2025  4:27 PM.  Learner: Family, Patient  Readiness: Acceptance  Method: Explanation, Demonstration  Response: Demonstrated Understanding    Education Comments  No comments found.        Goals:  Encounter Problems       Encounter Problems (Active)       ADLs       Patient will perform UB and LB bathing with modified independent level of assistance. (Not met)       Start:  01/14/25    Expected End:  01/28/25    Resolved:  01/22/25    Updated to: Patient will perform UB and LB bathing with moderate level of assistance.    Update reason: re eval         Patient with complete upper body dressing with modified independent level of assistance donning and doffing all UE clothes.  (Not met)       Start:  01/14/25    Expected End:  01/28/25    Resolved:  01/22/25    Updated to: Patient with complete upper body dressing with minimal level of assistance donning and doffing all UE clothes.    Update reason: re eval         Patient with complete  lower body dressing with modified independent level of assistance donning and doffing all LE clothes  with PRN adaptive equipment  (Not met)       Start:  01/14/25    Expected End:  01/28/25    Resolved:  01/22/25    Updated to: Patient with complete lower body dressing with moderate level of assistance donning and doffing all LE clothes  with PRN adaptive equipment    Update reason: re eval         Patient will complete toileting including hygiene clothing management/hygiene with modified independent level of assistance. (Not met)       Start:  01/14/25    Expected End:  01/28/25    Resolved:  01/22/25    Updated to: Patient will complete grooming with SBA    Update reason: re eval         Patient will perform UB and LB bathing with moderate level of assistance. (Progressing)       Start:  01/22/25    Expected End:  02/05/25                Patient with complete upper body dressing with minimal level of assistance donning and doffing all UE clothes. (Progressing)       Start:  01/22/25    Expected End:  02/05/25                Patient with complete lower body dressing with moderate level of assistance donning and doffing all LE clothes  with PRN adaptive equipment (Progressing)       Start:  01/22/25    Expected End:  02/05/25                Patient will complete grooming with SBA (Progressing)       Start:  01/22/25    Expected End:  02/05/25                   TRANSFERS       Patient will perform bed mobility modified independent level of assistance in order to improve safety and independence with mobility (Not met)       Start:  01/14/25    Expected End:  01/28/25    Resolved:  01/22/25    Updated to: Patient will perform bed mobility moderate level of assistance in order to improve safety and independence with mobility    Update reason: re eval         Patient will complete functional transfer to toilet with least restrictive device with modified independent level of assistance. (Not met)       Start:  01/14/25     Expected End:  01/28/25    Resolved:  01/22/25    Updated to: Patient will complete STS with moderate assistance and LRAD    Update reason: re eval         Patient will perform bed mobility moderate level of assistance in order to improve safety and independence with mobility (Progressing)       Start:  01/22/25    Expected End:  02/05/25                Patient will complete STS with moderate assistance and LRAD (Progressing)       Start:  01/22/25    Expected End:  02/05/25 01/31/25 at 4:28 PM   Ellen Garcia, OT   [phone]

## 2025-01-31 NOTE — PROGRESS NOTES
"Faustina Vick \"Noam" is a 64 y.o. female on day 18 of admission presenting with Primary cancer of right upper lobe of lung (Multi).    Subjective   S/p trach/PEG. Tolerating PEG feeds. Hypoglycemic responded well to 2 units apple juice. Hb 8.7 from 9.9.    Objective   Physical Exam  Vitals reviewed.   Constitutional:       Appearance: She is underweight.      Interventions: She is sedated, intubated and restrained.      Comments: Deconditioned, lying in ICU bed   HENT:      Head: Normocephalic and atraumatic.      Nose: Nose normal.      Comments: Bridled Dobhoff in place      Mouth/Throat:      Mouth: Mucous membranes are moist.   Eyes:      Extraocular Movements: Extraocular movements intact.      Pupils: Pupils are equal, round, and reactive to light.   Neck:      Comments: RIJ MAC removed- site clean and covered with tape  Cardiovascular:      Rate and Rhythm: Normal rate and regular rhythm.      Pulses: Normal pulses.      Heart sounds: Normal heart sounds.   Pulmonary:      Effort: She is intubated.      Breath sounds: No rhonchi (left side).      Comments: Breath sounds clear bilaterally; mechanically ventilated SIMV peep 5, ps 5   Chest:      Comments: Right lateral chest dressing c/d/i.  Abdominal:      General: There is no distension.      Palpations: Abdomen is soft.   Genitourinary:     Comments: Montiel in place with clear yellow UOP.   Musculoskeletal:         General: No swelling or tenderness. Normal range of motion.      Cervical back: Normal range of motion and neck supple.      Comments: LUE edema resolved    Skin:     General: Skin is warm and dry.   Neurological:      General: No focal deficit present.      Mental Status: She is alert and easily aroused.      GCS: GCS eye subscore is 3. GCS verbal subscore is 1. GCS motor subscore is 6.      Motor: Weakness (generalized) present.      Comments: Following commands, moves extremities equally.    Psychiatric:         Mood and Affect: Mood normal.   " "      Behavior: Behavior normal.      Comments: Calm and responding appropriately to questions with nods     Last Recorded Vitals  Blood pressure 106/67, pulse 94, temperature 36.2 °C (97.2 °F), temperature source Temporal, resp. rate 20, height 1.651 m (5' 5\"), weight 54.9 kg (121 lb 0.5 oz), SpO2 98%.    VS over last 24h  Heart Rate:  []   Temp:  [36.2 °C (97.2 °F)-36.6 °C (97.9 °F)]   Resp:  [14-23]   BP: ()/(58-83)   SpO2:  [94 %-100 %]      Intake/Output last 3 Shifts:  I/O last 3 completed shifts:  In: 2080 (37.9 mL/kg) [I.V.:200 (3.6 mL/kg); NG/GT:1180; IV Piggyback:700]  Out: 2985 (54.4 mL/kg) [Urine:2985 (1.5 mL/kg/hr)]  Weight: 54.9 kg       Intake/Output Summary (Last 24 hours) at 1/31/2025 0725  Last data filed at 1/31/2025 0700  Gross per 24 hour   Intake 1590 ml   Output 1690 ml   Net -100 ml        Relevant Results  Scheduled medications  bisacodyl, 10 mg, rectal, Daily  budesonide, 0.5 mg, nebulization, BID  clonazePAM, 0.5 mg, g-tube, q8h  [Held by provider] docusate sodium, 100 mg, oral, BID  esomeprazole, 40 mg, g-tube, Daily before breakfast  heparin (porcine), 5,000 Units, subcutaneous, q8h  insulin lispro, 0-5 Units, subcutaneous, q4h  ipratropium-albuteroL, 3 mL, nebulization, q6h  lidocaine, 1 patch, transdermal, Daily  midodrine, 10 mg, g-tube, q8h  oxygen, , inhalation, Continuous - Inhalation  perflutren protein A microsphere, 0.5 mL, intravenous, Once in imaging  polyethylene glycol, 17 g, g-tube, Daily  senna, 5 mL, g-tube, Nightly  sulfur hexafluoride microsphr, 2 mL, intravenous, Once in imaging  thiamine, 100 mg, g-tube, Daily  traZODone, 25 mg, g-tube, Nightly      PRN medications  PRN medications: albuterol, calcium gluconate, calcium gluconate, dextrose, dextrose, glucagon, glucagon, HYDROmorphone, magnesium sulfate, magnesium sulfate, oxyCODONE, oxygen, potassium chloride, potassium chloride      Results for orders placed or performed during the hospital encounter of " 01/13/25 (from the past 24 hours)   POCT GLUCOSE   Result Value Ref Range    POCT Glucose 103 (H) 74 - 99 mg/dL   Prepare RBC: 2 Units   Result Value Ref Range    PRODUCT CODE O3590O32     Unit Number [id number]     Unit ABO B     Unit RH POS     XM INTEP COMP     Dispense Status XM     Blood Expiration Date 2/20/2025 11:59:00 PM EST     PRODUCT BLOOD TYPE 7300     UNIT VOLUME 350    POCT GLUCOSE   Result Value Ref Range    POCT Glucose 104 (H) 74 - 99 mg/dL   Blood Gas Arterial Full Panel   Result Value Ref Range    POCT pH, Arterial 7.34 (L) 7.38 - 7.42 pH    POCT pCO2, Arterial 52 (H) 38 - 42 mm Hg    POCT pO2, Arterial 70 (L) 85 - 95 mm Hg    POCT SO2, Arterial 94 94 - 100 %    POCT Oxy Hemoglobin, Arterial 91.6 (L) 94.0 - 98.0 %    POCT Hematocrit Calculated, Arterial 29.0 (L) 36.0 - 46.0 %    POCT Sodium, Arterial 142 136 - 145 mmol/L    POCT Potassium, Arterial 3.2 (L) 3.5 - 5.3 mmol/L    POCT Chloride, Arterial      POCT Ionized Calcium, Arterial 1.09 (L) 1.10 - 1.33 mmol/L    POCT Glucose, Arterial 83 74 - 99 mg/dL    POCT Lactate, Arterial 0.5 0.4 - 2.0 mmol/L    POCT Base Excess, Arterial 1.7 -2.0 - 3.0 mmol/L    POCT HCO3 Calculated, Arterial 28.1 (H) 22.0 - 26.0 mmol/L    POCT Hemoglobin, Arterial 9.6 (L) 12.0 - 16.0 g/dL    POCT Anion Gap, Arterial      Patient Temperature 37.0 degrees Celsius    FiO2 50 %   POCT GLUCOSE   Result Value Ref Range    POCT Glucose 114 (H) 74 - 99 mg/dL   POCT GLUCOSE   Result Value Ref Range    POCT Glucose 110 (H) 74 - 99 mg/dL   POCT GLUCOSE   Result Value Ref Range    POCT Glucose 78 74 - 99 mg/dL   POCT GLUCOSE   Result Value Ref Range    POCT Glucose 85 74 - 99 mg/dL   Blood Gas Arterial Full Panel   Result Value Ref Range    POCT pH, Arterial 7.45 (H) 7.38 - 7.42 pH    POCT pCO2, Arterial 48 (H) 38 - 42 mm Hg    POCT pO2, Arterial 91 85 - 95 mm Hg    POCT SO2, Arterial 99 94 - 100 %    POCT Oxy Hemoglobin, Arterial 95.4 94.0 - 98.0 %    POCT Hematocrit  Calculated, Arterial 27.0 (L) 36.0 - 46.0 %    POCT Sodium, Arterial 138 136 - 145 mmol/L    POCT Potassium, Arterial 4.4 3.5 - 5.3 mmol/L    POCT Chloride, Arterial 102 98 - 107 mmol/L    POCT Ionized Calcium, Arterial 1.26 1.10 - 1.33 mmol/L    POCT Glucose, Arterial 76 74 - 99 mg/dL    POCT Lactate, Arterial 0.8 0.4 - 2.0 mmol/L    POCT Base Excess, Arterial 8.4 (H) -2.0 - 3.0 mmol/L    POCT HCO3 Calculated, Arterial 33.4 (H) 22.0 - 26.0 mmol/L    POCT Hemoglobin, Arterial 9.1 (L) 12.0 - 16.0 g/dL    POCT Anion Gap, Arterial 7 (L) 10 - 25 mmo/L    Patient Temperature 37.0 degrees Celsius    FiO2 40 %   Calcium, Ionized   Result Value Ref Range    POCT Calcium, Ionized 1.20 1.1 - 1.33 mmol/L   CBC   Result Value Ref Range    WBC 9.0 4.4 - 11.3 x10*3/uL    nRBC 0.0 0.0 - 0.0 /100 WBCs    RBC 3.11 (L) 4.00 - 5.20 x10*6/uL    Hemoglobin 8.7 (L) 12.0 - 16.0 g/dL    Hematocrit 27.6 (L) 36.0 - 46.0 %    MCV 89 80 - 100 fL    MCH 28.0 26.0 - 34.0 pg    MCHC 31.5 (L) 32.0 - 36.0 g/dL    RDW 19.6 (H) 11.5 - 14.5 %    Platelets 199 150 - 450 x10*3/uL   Coagulation Screen   Result Value Ref Range    Protime 13.2 (H) 9.8 - 12.8 seconds    INR 1.2 (H) 0.9 - 1.1    aPTT 31 27 - 38 seconds   Magnesium   Result Value Ref Range    Magnesium 1.97 1.60 - 2.40 mg/dL   Renal function panel   Result Value Ref Range    Glucose 78 74 - 99 mg/dL    Sodium 142 136 - 145 mmol/L    Potassium 4.3 3.5 - 5.3 mmol/L    Chloride 101 98 - 107 mmol/L    Bicarbonate 34 (H) 21 - 32 mmol/L    Anion Gap 11 10 - 20 mmol/L    Urea Nitrogen 19 6 - 23 mg/dL    Creatinine <0.20 (L) 0.50 - 1.05 mg/dL    eGFR      Calcium 8.8 8.6 - 10.6 mg/dL    Phosphorus 3.7 2.5 - 4.9 mg/dL    Albumin 3.2 (L) 3.4 - 5.0 g/dL       XR chest 1 view   Final Result   1. Interval slightly increased patchy opacities in the left lower   lung field with blunting of the left costophrenic angle. Findings may   represent small pleural effusion with atelectasis/infiltrate. There   is  mild pulmonary vascular congestion.   2. Complete opacification of the right hemithorax representing   postsurgical changes of right pneumonectomy.   3. Interval extubation with placement of a tracheostomy tube.        I personally reviewed the images/study and I agree with the findings   as stated by resident physician Dr. Yair Deng . This study   was interpreted at [place], [place].        MACRO:   None        Signed by: Oseas Stroud 1/30/2025 4:36 PM   Dictation workstation:   [id number]      XR chest 1 view   Final Result   1. Similar mild interstitial edema, small left pleural effusion, and   left basilar atelectasis.   2. Postsurgical changes of right pneumonectomy.   3. Medical devices as above.             I personally reviewed the images/study and I agree with the findings   as stated above by resident physician, Ankit Varela MD. This study   was interpreted at [place], [place].        MACRO:   None.        Signed by: Oseas Stroud 1/30/2025 9:31 AM   Dictation workstation:   [id number]      XR chest 1 view   Final Result   1. Postoperative changes and medical devices as above.   2. Similar mild interstitial edema and left basilar presumed   atelectasis with small pleural effusion.   3. Persistent complete opacification of right hemithorax, correlating   with patient's history of pneumonectomy.        Signed by: Real Rashid 1/29/2025 7:28 AM   Dictation workstation:   [id number]      XR chest 1 view   Final Result   1.  Unchanged aeration of the lungs with complete opacification of   the right hemithorax and coarse interstitial markings throughout the   left lung with left basilar atelectasis. Superimposed infection is   not excluded.        I personally reviewed the images/study and I agree with the findings   as stated by resident physician Dr. Yair Deng . This study   was interpreted at  [place], [place].        MACRO:   None        Signed by: Oseas Stroud 1/28/2025 9:31 AM   Dictation workstation:   [id number]      Transthoracic Echo (TTE) Limited   Final Result      XR chest 1 view   Final Result   1.  Interval improvement in the left-sided edema and patchy airspace   opacities. Small left-sided pleural effusion.   2. Postsurgical changes of right-sided pneumonectomy.   3. Medical lines and devices as above.        I personally reviewed the images/study and I agree with the findings   as stated by Jonah Velez MD (Radiology Resident).   This study was interpreted at [place], [place].        MACRO:   None        Signed by: Oseas Stroud 1/27/2025 10:55 AM   Dictation workstation:   [id number]      XR chest 1 view   Final Result   1.  Slight interval worsening in left-sided airspace disease/edema   status post right-sided pneumonectomy.             Signed by: Nino Barbour 1/26/2025 1:16 PM   Dictation workstation:   [id number]      XR chest 1 view   Final Result   1.  Post pneumonectomy changes with continued left perihilar edema.             Signed by: Nino Barbour 1/25/2025 9:51 AM   Dictation workstation:   [id number]      XR chest 1 view   Final Result   1.  Similar left lung pulmonary edema with small left pleural   effusion.   2. Status post right pneumonectomy.   3. [place]Paco catheter with tip projecting over the left lower lobe,   in similar position.                  MACRO:   None        Signed by: Alexx Anne 1/24/2025 10:24 AM   Dictation workstation:   [id number]      XR chest 1 view   Final Result   Addendum (preliminary) 1 of 1   Interpreted By:  Alexx Anne,    ADDENDUM:   Right IJ approach [place] Paco catheter is also noted with tip   projecting over the left lower lobe in similar position to prior exam.        Signed by: Alexx Anne 1/24/2025 10:25 AM         -------- ORIGINAL REPORT --------   Dictation workstation:   [id number]      Final   1.  Stable findings of pulmonary edema within the left lung.   2. Postsurgical changes of right pneumonectomy.   3. Medical devices as above.                  MACRO:   None        Signed by: Alexx Anne 1/24/2025 9:09 AM   Dictation workstation:   [id number]      XR chest 1 view   Final Result   1. The tip of the endotracheal tube terminates 7.5 cm of the sina.   2. Slightly decreased  interstitial prominence and airspace opacities   within the left lung when compared to prior radiograph,  suggestive   of improving infectious process or improving pulmonary edema.   3. No significant interval change in postoperative changes from   right-sided pneumonectomy, resection of multiple right-sided ribs,   complete opacification of the pneumonectomy bed, and rightward   mediastinal shift.        I personally reviewed the images/study and I agree with radiology   resident  Dr. Meghan Ramesh findings as stated. This study was   interpreted at [place], [place]        MACRO:   None        Signed by: Oseas Stroud 1/24/2025 8:09 AM   Dictation workstation:   [id number]      XR abdomen 1 view   Final Result   1.  Dobhoff tube is in place with the tip projecting over the 1st   portion of the duodenum.   2. There is gas-filled dilated stomach. A few slight prominent loops   of the bowel in the upper abdomen with overall nonobstructive bowel   gas pattern.   3. Postsurgical changes of right pneumonectomy with resection of ribs   and patchy airspace opacities in the left lung.   4. [place]-Paco catheter projecting over the lower pulmonary artery.   Recommend retraction.        MACRO:   None        Signed by: Oseas Stroud 1/23/2025 10:15 AM   Dictation workstation:   [id number]      XR chest 1 view   Final Result   1. Unchanged appearance of multifocal patchy airspace opacities with   interstitial  prominence of the left lung which may be due to   infectious process. Component of edema can not be excluded.   2. Medical devices as described above with similar positioning of the   [place]-Paco catheter projecting over left lower lobe pulmonary artery.   Recommend retraction.   3. Postsurgical changes of right-sided pneumonectomy and resection of   multiple right-sided ribs with complete opacification of the   pneumonectomy bed. The appearance is unchanged compared to prior   study.                  MACRO:   None        Signed by: Oseas Stroud 1/23/2025 9:36 AM   Dictation workstation:   [id number]      XR abdomen 1 view   Final Result   1.  As described above.        Signed by: Vick Xiao 1/22/2025 1:01 PM   Dictation workstation:   [id number]      XR chest 1 view   Final Result   1. Unchanged appearance of multifocal patchy airspace opacity with   interstitial prominence of the left lung which may be due to   infectious process. Component of edema cannot be excluded.   2. Medical devices as described above with interval advancement of   the [place]-Paco catheter projecting over left lower lobe pulmonary   artery. Recommend retraction.   3. Postsurgical changes of right-sided pneumonectomy and resection of   multiple right-sided ribs with complete opacification of the   pneumonectomy bed. The appearance is unchanged compared to prior   study.                  Signed by: Vick Xiao 1/22/2025 1:00 PM   Dictation workstation:   [id number]      XR chest 1 view   Final Result   1. Similar near-complete opacification of the right hemithorax, which   may be secondary to a large pleural effusion, pneumonectomy, or   bronchial obstruction.   2. Diffuse interstitial opacities and scattered patchy alveolar   opacities throughout the left lung, similar to the prior exam, which   may reflect prominent pulmonary edema or multifocal infection.   3. Medical devices as above.        I personally reviewed  the image(s)/study and resident interpretation   as stated by Dr. Ange Sue MD. I agree with the findings as   stated. This study was interpreted at [facility], [place], [place].        MACRO:   None        Signed by: Oseas Stroud 1/21/2025 7:58 PM   Dictation workstation:   [id number]      Vascular US upper extremity venous duplex left   Final Result      Transthoracic Echo (TTE) Limited   Final Result      XR chest 1 view   Final Result   1.  Interval opacification of the right hemithorax and correlate with   post pneumonectomy changes.   2. [place]-Paco catheter overlies the left lower lobe pulmonary artery.   3. Slight interval improvement in left-sided pulmonary edema.   Underlying edema and correlate with fluid status.             Signed by: Nino Barbour 1/20/2025 9:57 AM   Dictation workstation:   [id number]      XR abdomen 1 view   Final Result   1.  As described above.        Signed by: Vick Xiao 1/19/2025 11:58 AM   Dictation workstation:   [id number]      XR chest 1 view   Final Result   1. Slight worsening of multifocal airspace opacity in the left mid   and lower lung. Findings are concerning for infectious process. Small   left pleural effusion.   2. Postsurgical changes right pneumonectomy with medical devices as   described above. Consider retracting the [place]-Paco catheter.                  Signed by: Vick Xiao 1/19/2025 8:55 AM   Dictation workstation:   [id number]      Transthoracic Echo (TTE) Limited   Final Result      XR chest 1 view   Final Result   1. Patchy airspace opacity in the left lower lung with slight   improvement in aeration. Correlate with concern for infection   2. Postsurgical changes of right pneumonectomy as described above.   3. Medical lines and devices as above. Consider retraction of the   [place]-Paco catheter.        I personally reviewed the images/study and I agree with the findings   as stated. This study  was interpreted at [facility], [place], [place].        MACRO:   None        Signed by: Vick Xiao 1/19/2025 8:54 AM   Dictation workstation:   [id number]      XR chest 1 view   Final Result   1. Worsening aeration of the left lung with increasing left basilar   infiltrate and pleural effusion. Correlate with mucous plugging and   component of lobar collapse.   2. Component of interstitial prominence of the left lung, also   slightly increased which may be due to edema.   3. Postsurgical changes of right pneumonectomy as described                  Signed by: Vick Xiao 1/18/2025 11:35 AM   Dictation workstation:   [id number]      Cardiac Catheterization Procedure   Final Result      Transthoracic Echo (TTE) Limited   Final Result      XR chest 1 view   Final Result   1.  Stable exam with postsurgical changes of right pneumonectomy/rib   resection and large layering fluid component within the right   hemithorax.   2. Similar findings of left pulmonary edema with slightly increased   size of small left pleural effusion. No left-sided pneumothorax.   3. Medical devices as above.                  MACRO:   None        Signed by: Alexx Anne 1/17/2025 8:16 AM   Dictation workstation:   [id number]      XR chest 1 view   Final Result   1.  Postsurgical changes of right-sided pneumonectomy with increased   layering opacification of the right hemithorax, likely due to   increasing fluid.   2. Similar trace left-sided pleural effusion with linear left basilar   airspace opacity, likely atelectasis on a background of likely   pulmonary edema. Infection is not definitively excluded.   3. Medical devices as above with interval insertion of right internal   jugular central venous catheter projecting at the mid SVC.        I personally reviewed the images/study and I agree with the findings   as stated by resident Davidson Mcguire. This study was interpreted   at  [place], [place].        MACRO:   None        Signed by: Alexx Anne 1/17/2025 8:14 AM   Dictation workstation:   [id number]      Transthoracic Echo (TTE) Limited   Final Result      US right upper quadrant   Final Result   Small amount of simple appearing ascites in the right upper quadrant   without evidence of cholelithiasis, cholecystitis or biliary dilation.        Signed by: Alberto Salas 1/16/2025 1:02 PM   Dictation workstation:   [id number]      XR chest 1 view   Final Result   1. Slight interval increase in left basilar airspace opacity which   may be due to increasing atelectasis. Cannot exclude developing   infectious infiltrate. Question trace left pleural effusion.   2. Again seen postsurgical changes related to right-sided   pneumonectomy as described above                  Signed by: Vick Xiao 1/16/2025 9:47 AM   Dictation workstation:   [id number]      US renal complete   Final Result   1. Unremarkable sonographic evaluation of the kidneys.   2. Incomplete evaluation of a gallbladder with some pericholecystic   fluid and biliary sludge. Dedicated right upper quadrant ultrasound   could be obtained as clinically warranted.   3. A small amount of free fluid present in the right upper quadrant   and pelvis.        Findings relayed by phone by me to SICU provider at 5:11 p.m.        I personally reviewed the images/study and I agree with the findings   as stated by Erickson Best MD (resident) . This study was   interpreted at [place], [place].        MACRO:   None        Signed by: Alberto Salas 1/15/2025 6:17 PM   Dictation workstation:   [id number]      XR chest 1 view   Final Result   1.  Status post pneumonectomy with interval increase in fluid   component of right-sided hydropneumothorax.   2. Medical devices as above.                  MACRO:   None        Signed by: Alexx  Omid 1/15/2025 9:28 AM   Dictation workstation:   [id number]      XR chest 1 view   Final Result   1. Interval removal of right chest tube. Other medical devices as   described above.   2. Postsurgical changes related to right pneumonectomy.   3. Faint left basilar opacity, likely atelectatic.                  Signed by: Vick Xiao 1/14/2025 2:53 PM   Dictation workstation:   [id number]      XR chest 1 view   Final Result   1.  Stable right-sided pneumonectomy changes.             Signed by: Nino Barbour 1/14/2025 12:56 PM   Dictation workstation:   [id number]      Transthoracic Echo (TTE) Limited   Final Result      XR chest 1 view   Final Result   1. Postsurgical changes related to right sided pneumonectomy with   multiple medical devices as described above.   2. Mild interstitial prominence of the left lung.                  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 Wagner on 1/13 requiring post-op vasoactive therapy. Post-op course c/b acute hyponatremia, multifactorial delirium, cardiogenic shock requiring inotropic support, and acute hypoxic/hypercarbic respiratory failure requiring intubation. COVID+ 1/20. Weaned off milrinone 1/21. Weaned off iEpo 1/24. S/p trach/PEG 1/30 now tolerating TF through PEG.     Plan:  NEURO: History of anxiety on alprazolam 1mg qd, last filled 1/4/2025 per [facility] review. Family states she was not taking for several months but then restarted prn about 3 wks prior to surgery for situational anxiety. A&Ox4 at baseline. Multifactorial delirium improved. Currently intubated, off sedation. On scheduled oxycodone,  scheduled clonazepam and nightly trazodone. Got up to stand 2 days ago with PT. Alert and responds to commands, answering questions with nods, reasonable strength upper and lower extremities. Endorses pain, difficult to ascertain where. Will give PRN Oxy. Mood worse this week.  - Last Qtc 530  - Continue Trazodone nightly  - Continue Clonazepam 0.5 mg q8h  - Oxycodone to prn  - Ongoing neuro and pain assessments  - Lidocaine patch to right chest wall  - PT/OT- have work with patient today  - Encourage music/pet therapy etc     CV: No history of cardiac disease. Baseline /79, -110. TTE 9/2024 normal biV function, RVSP 10mmHg. Intra-op hypotension requiring vaso and epi. Arrived to SICU on vaso 0.06 units/min, started on levo 0.05 mcg/kg/min. TTE 1/13 with EF 60-65%, low normal RV systolic function. Weaned off vasopressors 1/15. Elevated lactate to 2.9 on 1/16 with SOB and new O2 requirement. Repeat TTE 1/16: acute biV dysfunction with signs of takotsubo cardiomyopathy, EF 34%, mod reduced RV systolic function, mildly elevated RVSP, mild to mod TR. Cardiogenic shock, started milrinone, lasix infusion, and vasopressin. Shock Call on 1/16, no indication for mechanical circulatory support. Initial troponin 928 with downtrend and normalization of lactate. Epinephrine infusion added overnight 1/16-1/17, discontinued 2/2 tachycardia in the 130s. Milrinone increased to 0.25mcg/kg/min (decreased to 0.125 thereafter). Lasix infusion stopped 1/17 given contraction alkalosis. Taken to cath lab 1/17 for RHC/PA cath placement, opening CI 2.9. Milrinone uptitrated to 0.375mcg/kg/min 1/17-1/18. iEpo was added. Repeat TTE 1/20 EF 35% (on iEpo and milrinone). Levo weaned off 1/20. Intermittent sinus tachycardia. Off Milrinone /21; iEpo off 1/24. Vaso weaned off 1/25. Responsive to diuresis. Started midodrine 1/25. PA cath removed 1/26. Repeat TTE this week showed LV EF 38%, There is reduced right ventricular systolic  function with continued Takastubo picture with hypo- akinesis of the apical 2/3 of the LV.   Overnight: BP stable without episodes of tachycardia. 1/31 MAPs 65-70 with intermittent tachycardia not requiring treatment due to short duration and auto-resolution.  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 Arrived to SICU extubated on 10L SFM, weaned to room air 1/14. Chest tube removed 1/14. Acute on chronic hypoxic respiratory failure 1/16. Escalation to Airvo 50L 90% overnight 1/17-1/18. Acute hypercapnia 1/18 with increase WOB, trial of BIPAP with iEPO. Eventual intubation 1/18 evening. Tolerated CPAP few hours yesterday. Brought down PS from 12 to 5 yesterday, down to 5 PEEP. CXR: similar interstitial edema, breath sounds are significantly improved. S/p trach 1/30  - Wean FiO2 to maintain SpO2 >92%  - SDS switched to decadron on 1/22 for treatment of COVID (end date 1/29)  - Scheduled budesonide/duoneb  - PRN albuterol  - Daily CXR  - ABG prn  - Holding home benzonatate  - Daily CPAP trials for pulmonary rehab    GI: No GI history. LFTs WNL 12/2/24. Evidence of pericholecystic fluid and biliary sludge on renal US 1/15 but no abdominal symptoms. RUQ US with small ascites, otherwise no acute findings. Abdominal distention this am. Elevated LFTs, now normalized. LBM 1/17. OG tube placed 1/19 for gastric decompression. TFs initiated via OG 1/20, then held on 1/21 due to regurgitation from OG. Dobhoff placed via IRIS and  advanced post-pyloric. Tolerating TF at goal rate. Liquid Bms x4 1/25. Cdiff negative 1/25. Last stool on 1/26. Add suppository later today. S/p PEG placement, tolerating TF.  - PEG placement today   - Continue TF at goal rate 30ml/hr  - Continue thiamine 100mg IV daily  - Continue IV PPI for GI prophylaxis  - Continue bowel regimen Miralax and Senna   - Add suppository     : No history of renal disease. Baseline creatinine 0.6. Symptomatic acute hyponatremia (SIADH vs vaso mediated) to 120 overnight 1/14 - 1/15, corrected to 124 after 150cc 3% NaCl x2 on 1/15. Renal US on 1/15 unremarkable. Started on lasix infusion 1/16-1/17. Spot diuresis 1/20 and 1/24. Yoo changed overnight 1/18-1/19. Continues to make good urine output. Net -760, RFP grossly normal. Yoo removed yesterday, however retained urine overnight (straight cathed 3x; last output 400 mL) 1/31 failed TOV, replaced Yoo. Discussed alpha blockers as a potential option however given MAP of 65-70 holding trial.   - Consider Silodosin 2/1 if BP allows. Dose with midodrine to allow for BP support.  - Nephrology signed off 1/18, call with questions  - RFP daily and prn  - Maintain yoo for strict I/Os   - Maintain U/O >0.5ml/kg/hr  - Replete electrolytes to maintain K>4, Phos>2.5, iCal>1.1, Mag>2     HEME: Hx of DOC. Baseline H/H 12/40. Acute blood loss anemia, OR EBL 150mL. Acute coagulopathy with INR elevated to 2.3 2/2 to poor nutritional status. Received IV Vit K 10mg x1 with improved INR thereafter. Transfused 1u PRBC 1/17 and 1u PRBC on 1/18. LUE duplex obtained 1/20 for swelling, negative for DVT. Stable edema in LUE, negative DVT ultrasound 1/20 ultrasound. Hgb 7.4 1/25, transfused 1 RBC with Hgb increase to 10.7 thereafter. Hb stable 9.9 on 1/30, Hb 8.7 s/p trach/PEG placement.  - Check CBC daily and PRN  - Coags PRN  - Goal Hgb >8  - SCDs and SQH for DVT ppx  - Ongoing monitoring for s/s bleeding  - Maintain active T&S (1/29)     ENDO: No  history of DM or thyroid disease. TSH 0.48 (1/17). Recent 5d course of prednisone 10mg 12/2024 for URI. Adequate glycemic control. Started on SDS out of OR 1/13. Transitioned to dexAMETHasone 1/22 for COVID, end date 1/29. BG have been wnl.   - BG q4h, SSI #1     MSK: Arthritis, Vit D deficiency  - Not on meds at home  - Monitor for symptoms     ID: Afebrile, leukocytosis. Completed yunior-op course of cefazolin. Worsened clinical condition 1/17, started on vanco, zosyn and azithromycin. Blood cultures, MRSA and Respiratory viral panel negative 1/17. Montiel changed 1/18, Urine cx 1/18 negative. Added fluc overnight 1/18-1/19. Sputum culture 1/19 negative. Vancomycin discontinued 1/20. Urine strep/legionella 1/20 negative. COVID+ on 1/20, initiated remdesivir and decadron. Discontinued fluc and azithro 1/21. Liquid stools. C.diff negative 1/26. Zosyn completed 1/26. Patient appeared slightly diaphoretic this morning on exam, but has been afebrile with WBC downtrending 14->12->9  - COVID precautions will life today; will deep clean room while patient in OR   - Temp q4h, wbc daily  - Ongoing monitoring for s/s infection  - Completed 5 day course of remdesivir 1/24. On Decadron through 1/29     Access:  - R axillary a line (1/28-)  - Left chest mediport (not currently accessed)  - PIV  x2  - Montiel (1/30-)  - Trach (1/30)  - PEG (1/30)     Dispo: Continue ICU care. Patient seen and discussed with ICU attending Dr. Solorio     Family: son Yg and  Maximilian updated over the phone. Will further update at bedside if they visit today and prn.    SICU Phone [phone]    Micha Carranza DO  Anesthesiology, PGY1

## 2025-02-01 ENCOUNTER — APPOINTMENT (OUTPATIENT)
Dept: CARDIOLOGY | Facility: HOSPITAL | Age: 65
End: 2025-02-01
Payer: COMMERCIAL

## 2025-02-01 ENCOUNTER — APPOINTMENT (OUTPATIENT)
Dept: RADIOLOGY | Facility: HOSPITAL | Age: 65
End: 2025-02-01
Payer: COMMERCIAL

## 2025-02-01 LAB
ALBUMIN SERPL BCP-MCNC: 3.4 G/DL (ref 3.4–5)
ANION GAP BLDA CALCULATED.4IONS-SCNC: 4 MMO/L (ref 10–25)
ANION GAP SERPL CALC-SCNC: 11 MMOL/L (ref 10–20)
APPEARANCE UR: ABNORMAL
BASE EXCESS BLDA CALC-SCNC: 9.8 MMOL/L (ref -2–3)
BILIRUB UR STRIP.AUTO-MCNC: NEGATIVE MG/DL
BLOOD EXPIRATION DATE: NORMAL
BODY TEMPERATURE: 37 DEGREES CELSIUS
BUN SERPL-MCNC: 16 MG/DL (ref 6–23)
CA-I BLD-SCNC: 1.14 MMOL/L (ref 1.1–1.33)
CA-I BLDA-SCNC: 1.17 MMOL/L (ref 1.1–1.33)
CALCIUM SERPL-MCNC: 8.8 MG/DL (ref 8.6–10.6)
CHLORIDE BLDA-SCNC: 101 MMOL/L (ref 98–107)
CHLORIDE SERPL-SCNC: 98 MMOL/L (ref 98–107)
CO2 SERPL-SCNC: 35 MMOL/L (ref 21–32)
COLOR UR: YELLOW
CREAT SERPL-MCNC: <0.2 MG/DL (ref 0.5–1.05)
DISPENSE STATUS: NORMAL
EGFRCR SERPLBLD CKD-EPI 2021: ABNORMAL ML/MIN/{1.73_M2}
ERYTHROCYTE [DISTWIDTH] IN BLOOD BY AUTOMATED COUNT: 19.7 % (ref 11.5–14.5)
GLUCOSE BLD MANUAL STRIP-MCNC: 109 MG/DL (ref 74–99)
GLUCOSE BLD MANUAL STRIP-MCNC: 117 MG/DL (ref 74–99)
GLUCOSE BLD MANUAL STRIP-MCNC: 121 MG/DL (ref 74–99)
GLUCOSE BLD MANUAL STRIP-MCNC: 122 MG/DL (ref 74–99)
GLUCOSE BLD MANUAL STRIP-MCNC: 126 MG/DL (ref 74–99)
GLUCOSE BLD MANUAL STRIP-MCNC: 132 MG/DL (ref 74–99)
GLUCOSE BLDA-MCNC: 126 MG/DL (ref 74–99)
GLUCOSE SERPL-MCNC: 123 MG/DL (ref 74–99)
GLUCOSE UR STRIP.AUTO-MCNC: NORMAL MG/DL
HCO3 BLDA-SCNC: 36.1 MMOL/L (ref 22–26)
HCT VFR BLD AUTO: 30.1 % (ref 36–46)
HCT VFR BLD EST: 31 % (ref 36–46)
HGB BLD-MCNC: 9.7 G/DL (ref 12–16)
HGB BLDA-MCNC: 10.4 G/DL (ref 12–16)
INHALED O2 CONCENTRATION: 40 %
KETONES UR STRIP.AUTO-MCNC: NEGATIVE MG/DL
LACTATE BLDA-SCNC: 0.8 MMOL/L (ref 0.4–2)
LEUKOCYTE ESTERASE UR QL STRIP.AUTO: ABNORMAL
MAGNESIUM SERPL-MCNC: 2.19 MG/DL (ref 1.6–2.4)
MCH RBC QN AUTO: 28.2 PG (ref 26–34)
MCHC RBC AUTO-ENTMCNC: 32.2 G/DL (ref 32–36)
MCV RBC AUTO: 88 FL (ref 80–100)
MUCOUS THREADS #/AREA URNS AUTO: ABNORMAL /LPF
NITRITE UR QL STRIP.AUTO: NEGATIVE
NRBC BLD-RTO: 0 /100 WBCS (ref 0–0)
OXYHGB MFR BLDA: 94.8 % (ref 94–98)
PCO2 BLDA: 57 MM HG (ref 38–42)
PH BLDA: 7.41 PH (ref 7.38–7.42)
PH UR STRIP.AUTO: 6 [PH]
PHOSPHATE SERPL-MCNC: 3.1 MG/DL (ref 2.5–4.9)
PLATELET # BLD AUTO: 184 X10*3/UL (ref 150–450)
PO2 BLDA: 88 MM HG (ref 85–95)
POTASSIUM BLDA-SCNC: 3.9 MMOL/L (ref 3.5–5.3)
POTASSIUM SERPL-SCNC: 3.9 MMOL/L (ref 3.5–5.3)
PRODUCT BLOOD TYPE: 7300
PRODUCT CODE: NORMAL
PROT UR STRIP.AUTO-MCNC: ABNORMAL MG/DL
RBC # BLD AUTO: 3.44 X10*6/UL (ref 4–5.2)
RBC # UR STRIP.AUTO: ABNORMAL /UL
RBC #/AREA URNS AUTO: >20 /HPF
SAO2 % BLDA: 98 % (ref 94–100)
SODIUM BLDA-SCNC: 137 MMOL/L (ref 136–145)
SODIUM SERPL-SCNC: 140 MMOL/L (ref 136–145)
SP GR UR STRIP.AUTO: 1.02
UNIT ABO: NORMAL
UNIT NUMBER: NORMAL
UNIT RH: NORMAL
UNIT VOLUME: 350
UROBILINOGEN UR STRIP.AUTO-MCNC: ABNORMAL MG/DL
WBC # BLD AUTO: 9.5 X10*3/UL (ref 4.4–11.3)
WBC #/AREA URNS AUTO: >50 /HPF
WBC CLUMPS #/AREA URNS AUTO: ABNORMAL /HPF
XM INTEP: NORMAL

## 2025-02-01 PROCEDURE — 82330 ASSAY OF CALCIUM: CPT

## 2025-02-01 PROCEDURE — 2500000004 HC RX 250 GENERAL PHARMACY W/ HCPCS (ALT 636 FOR OP/ED)

## 2025-02-01 PROCEDURE — 93010 ELECTROCARDIOGRAM REPORT: CPT | Performed by: INTERNAL MEDICINE

## 2025-02-01 PROCEDURE — 2500000005 HC RX 250 GENERAL PHARMACY W/O HCPCS

## 2025-02-01 PROCEDURE — 37799 UNLISTED PX VASCULAR SURGERY: CPT

## 2025-02-01 PROCEDURE — 81001 URINALYSIS AUTO W/SCOPE: CPT

## 2025-02-01 PROCEDURE — 74018 RADEX ABDOMEN 1 VIEW: CPT | Performed by: STUDENT IN AN ORGANIZED HEALTH CARE EDUCATION/TRAINING PROGRAM

## 2025-02-01 PROCEDURE — 99291 CRITICAL CARE FIRST HOUR: CPT | Performed by: THORACIC SURGERY (CARDIOTHORACIC VASCULAR SURGERY)

## 2025-02-01 PROCEDURE — 2500000004 HC RX 250 GENERAL PHARMACY W/ HCPCS (ALT 636 FOR OP/ED): Performed by: STUDENT IN AN ORGANIZED HEALTH CARE EDUCATION/TRAINING PROGRAM

## 2025-02-01 PROCEDURE — 84132 ASSAY OF SERUM POTASSIUM: CPT

## 2025-02-01 PROCEDURE — 2500000001 HC RX 250 WO HCPCS SELF ADMINISTERED DRUGS (ALT 637 FOR MEDICARE OP)

## 2025-02-01 PROCEDURE — 94640 AIRWAY INHALATION TREATMENT: CPT

## 2025-02-01 PROCEDURE — 71045 X-RAY EXAM CHEST 1 VIEW: CPT | Performed by: STUDENT IN AN ORGANIZED HEALTH CARE EDUCATION/TRAINING PROGRAM

## 2025-02-01 PROCEDURE — 83735 ASSAY OF MAGNESIUM: CPT

## 2025-02-01 PROCEDURE — 80069 RENAL FUNCTION PANEL: CPT

## 2025-02-01 PROCEDURE — 87086 URINE CULTURE/COLONY COUNT: CPT

## 2025-02-01 PROCEDURE — 94003 VENT MGMT INPAT SUBQ DAY: CPT

## 2025-02-01 PROCEDURE — 2500000002 HC RX 250 W HCPCS SELF ADMINISTERED DRUGS (ALT 637 FOR MEDICARE OP, ALT 636 FOR OP/ED)

## 2025-02-01 PROCEDURE — 71045 X-RAY EXAM CHEST 1 VIEW: CPT

## 2025-02-01 PROCEDURE — 85027 COMPLETE CBC AUTOMATED: CPT

## 2025-02-01 PROCEDURE — 93005 ELECTROCARDIOGRAM TRACING: CPT

## 2025-02-01 PROCEDURE — 82947 ASSAY GLUCOSE BLOOD QUANT: CPT

## 2025-02-01 PROCEDURE — 74018 RADEX ABDOMEN 1 VIEW: CPT

## 2025-02-01 PROCEDURE — 99291 CRITICAL CARE FIRST HOUR: CPT

## 2025-02-01 PROCEDURE — 2500000001 HC RX 250 WO HCPCS SELF ADMINISTERED DRUGS (ALT 637 FOR MEDICARE OP): Performed by: STUDENT IN AN ORGANIZED HEALTH CARE EDUCATION/TRAINING PROGRAM

## 2025-02-01 PROCEDURE — 2020000001 HC ICU ROOM DAILY

## 2025-02-01 RX ORDER — SIMETHICONE 80 MG
40 TABLET,CHEWABLE ORAL EVERY 12 HOURS
Status: COMPLETED | OUTPATIENT
Start: 2025-02-01 | End: 2025-02-02

## 2025-02-01 RX ORDER — SILODOSIN 4 MG/1
4 CAPSULE ORAL EVERY 24 HOURS
Status: DISCONTINUED | OUTPATIENT
Start: 2025-02-01 | End: 2025-02-02

## 2025-02-01 RX ORDER — MAGNESIUM SULFATE HEPTAHYDRATE 40 MG/ML
2 INJECTION, SOLUTION INTRAVENOUS ONCE
Status: COMPLETED | OUTPATIENT
Start: 2025-02-01 | End: 2025-02-02

## 2025-02-01 RX ORDER — METOPROLOL TARTRATE 1 MG/ML
2.5 INJECTION, SOLUTION INTRAVENOUS ONCE
Status: COMPLETED | OUTPATIENT
Start: 2025-02-01 | End: 2025-02-01

## 2025-02-01 RX ORDER — OXYCODONE HCL 5 MG/5 ML
5 SOLUTION, ORAL ORAL ONCE
Status: COMPLETED | OUTPATIENT
Start: 2025-02-01 | End: 2025-02-01

## 2025-02-01 RX ORDER — DOCUSATE SODIUM 50 MG/5ML
100 LIQUID ORAL 2 TIMES DAILY
Status: DISCONTINUED | OUTPATIENT
Start: 2025-02-01 | End: 2025-02-06

## 2025-02-01 RX ORDER — ACETAMINOPHEN 160 MG/5ML
650 SOLUTION ORAL ONCE
Status: COMPLETED | OUTPATIENT
Start: 2025-02-01 | End: 2025-02-01

## 2025-02-01 RX ORDER — SIMETHICONE 80 MG
40 TABLET,CHEWABLE ORAL ONCE
Status: DISCONTINUED | OUTPATIENT
Start: 2025-02-01 | End: 2025-02-01

## 2025-02-01 RX ADMIN — SILODOSIN 4 MG: 4 CAPSULE ORAL at 15:37

## 2025-02-01 RX ADMIN — TRAZODONE HYDROCHLORIDE 25 MG: 50 TABLET ORAL at 20:30

## 2025-02-01 RX ADMIN — HEPARIN SODIUM 5000 UNITS: 5000 INJECTION INTRAVENOUS; SUBCUTANEOUS at 06:22

## 2025-02-01 RX ADMIN — SENNOSIDES 5 ML: 8.8 LIQUID ORAL at 20:30

## 2025-02-01 RX ADMIN — MAGNESIUM SULFATE HEPTAHYDRATE 2 G: 40 INJECTION, SOLUTION INTRAVENOUS at 22:47

## 2025-02-01 RX ADMIN — BUDESONIDE 0.5 MG: 0.5 INHALANT RESPIRATORY (INHALATION) at 08:05

## 2025-02-01 RX ADMIN — CLONAZEPAM 0.5 MG: 0.5 TABLET ORAL at 15:37

## 2025-02-01 RX ADMIN — IPRATROPIUM BROMIDE AND ALBUTEROL SULFATE 3 ML: .5; 3 SOLUTION RESPIRATORY (INHALATION) at 20:34

## 2025-02-01 RX ADMIN — HEPARIN SODIUM 5000 UNITS: 5000 INJECTION INTRAVENOUS; SUBCUTANEOUS at 21:54

## 2025-02-01 RX ADMIN — MIDODRINE HYDROCHLORIDE 10 MG: 10 TABLET ORAL at 22:46

## 2025-02-01 RX ADMIN — THIAMINE HCL TAB 100 MG 100 MG: 100 TAB at 08:39

## 2025-02-01 RX ADMIN — CLONAZEPAM 0.5 MG: 0.5 TABLET ORAL at 08:39

## 2025-02-01 RX ADMIN — METOPROLOL TARTRATE 2.5 MG: 1 INJECTION, SOLUTION INTRAVENOUS at 23:15

## 2025-02-01 RX ADMIN — LIDOCAINE 4% 1 PATCH: 40 PATCH TOPICAL at 08:39

## 2025-02-01 RX ADMIN — IPRATROPIUM BROMIDE AND ALBUTEROL SULFATE 3 ML: .5; 3 SOLUTION RESPIRATORY (INHALATION) at 15:18

## 2025-02-01 RX ADMIN — IPRATROPIUM BROMIDE AND ALBUTEROL SULFATE 3 ML: .5; 3 SOLUTION RESPIRATORY (INHALATION) at 01:03

## 2025-02-01 RX ADMIN — OXYCODONE HYDROCHLORIDE 2.5 MG: 5 SOLUTION ORAL at 08:39

## 2025-02-01 RX ADMIN — MIDODRINE HYDROCHLORIDE 10 MG: 10 TABLET ORAL at 06:34

## 2025-02-01 RX ADMIN — DOCUSATE SODIUM 100 MG: 50 LIQUID ORAL at 20:30

## 2025-02-01 RX ADMIN — ACETAMINOPHEN 650 MG: 160 SOLUTION ORAL at 11:58

## 2025-02-01 RX ADMIN — OXYCODONE HYDROCHLORIDE 5 MG: 5 SOLUTION ORAL at 11:58

## 2025-02-01 RX ADMIN — SIMETHICONE 40 MG: 80 TABLET, CHEWABLE ORAL at 15:37

## 2025-02-01 RX ADMIN — HYDROMORPHONE HYDROCHLORIDE 0.5 MG: 0.5 INJECTION, SOLUTION INTRAMUSCULAR; INTRAVENOUS; SUBCUTANEOUS at 17:54

## 2025-02-01 RX ADMIN — DOCUSATE SODIUM 100 MG: 50 LIQUID ORAL at 11:58

## 2025-02-01 RX ADMIN — METOPROLOL TARTRATE 2.5 MG: 1 INJECTION, SOLUTION INTRAVENOUS at 22:47

## 2025-02-01 RX ADMIN — BISACODYL 10 MG: 10 SUPPOSITORY RECTAL at 08:39

## 2025-02-01 RX ADMIN — BUDESONIDE 0.5 MG: 0.5 INHALANT RESPIRATORY (INHALATION) at 20:34

## 2025-02-01 RX ADMIN — ESOMEPRAZOLE MAGNESIUM 40 MG: 40 FOR SUSPENSION ORAL at 06:22

## 2025-02-01 RX ADMIN — IPRATROPIUM BROMIDE AND ALBUTEROL SULFATE 3 ML: .5; 3 SOLUTION RESPIRATORY (INHALATION) at 08:05

## 2025-02-01 RX ADMIN — Medication 40 PERCENT: at 08:09

## 2025-02-01 RX ADMIN — MIDODRINE HYDROCHLORIDE 10 MG: 10 TABLET ORAL at 15:37

## 2025-02-01 RX ADMIN — HEPARIN SODIUM 5000 UNITS: 5000 INJECTION INTRAVENOUS; SUBCUTANEOUS at 14:50

## 2025-02-01 RX ADMIN — POLYETHYLENE GLYCOL 3350 17 G: 17 POWDER, FOR SOLUTION ORAL at 08:39

## 2025-02-01 RX ADMIN — Medication 40 PERCENT: at 20:34

## 2025-02-01 ASSESSMENT — PAIN - FUNCTIONAL ASSESSMENT
PAIN_FUNCTIONAL_ASSESSMENT: CPOT (CRITICAL CARE PAIN OBSERVATION TOOL)

## 2025-02-01 ASSESSMENT — COGNITIVE AND FUNCTIONAL STATUS - GENERAL
CLIMB 3 TO 5 STEPS WITH RAILING: TOTAL
MOBILITY SCORE: 6
MOBILITY SCORE: 6
DAILY ACTIVITIY SCORE: 6
MOVING FROM LYING ON BACK TO SITTING ON SIDE OF FLAT BED WITH BEDRAILS: TOTAL
DRESSING REGULAR LOWER BODY CLOTHING: TOTAL
MOVING TO AND FROM BED TO CHAIR: TOTAL
MOVING FROM LYING ON BACK TO SITTING ON SIDE OF FLAT BED WITH BEDRAILS: TOTAL
STANDING UP FROM CHAIR USING ARMS: TOTAL
DRESSING REGULAR UPPER BODY CLOTHING: TOTAL
EATING MEALS: TOTAL
WALKING IN HOSPITAL ROOM: TOTAL
CLIMB 3 TO 5 STEPS WITH RAILING: TOTAL
PERSONAL GROOMING: TOTAL
TOILETING: TOTAL
HELP NEEDED FOR BATHING: TOTAL
WALKING IN HOSPITAL ROOM: TOTAL
TURNING FROM BACK TO SIDE WHILE IN FLAT BAD: TOTAL
STANDING UP FROM CHAIR USING ARMS: TOTAL
TURNING FROM BACK TO SIDE WHILE IN FLAT BAD: TOTAL
DRESSING REGULAR LOWER BODY CLOTHING: TOTAL
DAILY ACTIVITIY SCORE: 6
MOVING TO AND FROM BED TO CHAIR: TOTAL
PERSONAL GROOMING: TOTAL
EATING MEALS: TOTAL
DRESSING REGULAR UPPER BODY CLOTHING: TOTAL
TOILETING: TOTAL
HELP NEEDED FOR BATHING: TOTAL

## 2025-02-01 NOTE — CARE PLAN
The clinical goals for the shift include Patient will remain comfortable and hemodynamically stable throughout the shift.    Problem: Pain - Adult  Goal: Verbalizes/displays adequate comfort level or baseline comfort level  Outcome: Progressing     Problem: Safety - Adult  Goal: Free from fall injury  Outcome: Progressing     Problem: Discharge Planning  Goal: Discharge to home or other facility with appropriate resources  Outcome: Progressing     Problem: Chronic Conditions and Co-morbidities  Goal: Patient's chronic conditions and co-morbidity symptoms are monitored and maintained or improved  Outcome: Progressing     Problem: Skin  Goal: Decreased wound size/increased tissue granulation at next dressing change  Outcome: Progressing  Goal: Participates in plan/prevention/treatment measures  Outcome: Progressing  Goal: Prevent/manage excess moisture  Outcome: Progressing  Goal: Prevent/minimize sheer/friction injuries  Outcome: Progressing  Goal: Promote/optimize nutrition  Outcome: Progressing  Goal: Promote skin healing  Outcome: Progressing     Problem: Pain  Goal: Takes deep breaths with improved pain control throughout the shift  Outcome: Progressing  Goal: Turns in bed with improved pain control throughout the shift  Outcome: Progressing  Goal: Walks with improved pain control throughout the shift  Outcome: Progressing  Goal: Performs ADL's with improved pain control throughout shift  Outcome: Progressing  Goal: Participates in PT with improved pain control throughout the shift  Outcome: Progressing  Goal: Free from opioid side effects throughout the shift  Outcome: Progressing  Goal: Free from acute confusion related to pain meds throughout the shift  Outcome: Progressing     Problem: Safety - Medical Restraint  Goal: Remains free of injury from restraints (Restraint for Interference with Medical Device)  Outcome: Progressing  Goal: Free from restraint(s) (Restraint for Interference with Medical  Device)  Outcome: Progressing     Problem: Knowledge Deficit  Goal: Patient/family/caregiver demonstrates understanding of disease process, treatment plan, medications, and discharge instructions  Outcome: Progressing     Problem: Mechanical Ventilation  Goal: Patient Will Maintain Patent Airway  Outcome: Progressing  Goal: Oral health is maintained or improved  Outcome: Progressing  Goal: Tracheostomy will be managed safely  Outcome: Progressing  Goal: ET tube will be managed safely  Outcome: Progressing  Goal: Ability to express needs and understand communication  Outcome: Progressing  Goal: Mobility/activity is maintained at optimum level for patient  Outcome: Progressing     Problem: Fall/Injury  Goal: Not fall by end of shift  Outcome: Progressing  Goal: Be free from injury by end of the shift  Outcome: Progressing  Goal: Verbalize understanding of personal risk factors for fall in the hospital  Outcome: Progressing  Goal: Verbalize understanding of risk factor reduction measures to prevent injury from fall in the home  Outcome: Progressing  Goal: Use assistive devices by end of the shift  Outcome: Progressing  Goal: Pace activities to prevent fatigue by end of the shift  Outcome: Progressing

## 2025-02-01 NOTE — PROGRESS NOTES
"Faustina Vick \"Noam" is a 64 y.o. female on day 19 of admission presenting with Primary cancer of right upper lobe of lung (Multi).    Subjective   S/p trach/PEG. Tolerating PEG feeds. OOB to chair 1hr yesterday. SIMV overnight.     Objective   Physical Exam  Vitals reviewed.   Constitutional:       Appearance: She is underweight.      Interventions: She is sedated and restrained. She is not intubated.     Comments: Deconditioned, lying in ICU bed   HENT:      Head: Normocephalic and atraumatic.      Nose: Nose normal.      Mouth/Throat:      Mouth: Mucous membranes are moist.   Eyes:      Extraocular Movements: Extraocular movements intact.      Pupils: Pupils are equal, round, and reactive to light.   Cardiovascular:      Rate and Rhythm: Normal rate and regular rhythm.      Pulses: Normal pulses.      Heart sounds: Normal heart sounds.   Pulmonary:      Effort: Pulmonary effort is normal. She is not intubated.      Breath sounds: Normal breath sounds. No rhonchi (left side).      Comments: Vented via trache CPAP PS 5 PEEP 5 this morning   Chest:      Comments: Right lateral chest dressing c/d/i.  Abdominal:      General: There is no distension.      Palpations: Abdomen is soft.      Comments: Pain to palpation mid-suprapubic abdomen; PEG appears intact, adequate laxity, normal skin surrounding.    Genitourinary:     Comments: Montiel in place - darker concentrated urine with some sediment   Musculoskeletal:         General: No swelling or tenderness. Normal range of motion.      Cervical back: Normal range of motion and neck supple.      Comments: LUE edema resolved    Skin:     General: Skin is warm and dry.   Neurological:      General: No focal deficit present.      Mental Status: She is alert and easily aroused.      GCS: GCS eye subscore is 3. GCS verbal subscore is 1. GCS motor subscore is 6.      Motor: Weakness (generalized) present.      Comments: Following commands, moves extremities equally.  " "  Psychiatric:         Mood and Affect: Mood normal.         Behavior: Behavior normal.      Comments: Appears mildly distressed due to pain       Last Recorded Vitals  Blood pressure 129/84, pulse (!) 114, temperature 36.9 °C (98.4 °F), temperature source Temporal, resp. rate 19, height 1.651 m (5' 5\"), weight 55 kg (121 lb 4.1 oz), SpO2 97%.    VS over last 24h  Heart Rate:  []   Temp:  [36.3 °C (97.3 °F)-37.2 °C (99 °F)]   Resp:  [12-29]   BP: ()/(56-84)   Weight:  [55 kg (121 lb 4.1 oz)]   SpO2:  [93 %-99 %]      Intake/Output last 3 Shifts:  I/O last 3 completed shifts:  In: 2336.7 (42.6 mL/kg) [I.V.:296.7 (5.4 mL/kg); NG/GT:1340; IV Piggyback:700]  Out: 2270 (41.3 mL/kg) [Urine:2270 (1.1 mL/kg/hr)]  Weight: 54.9 kg       Intake/Output Summary (Last 24 hours) at 2/1/2025 0659  Last data filed at 2/1/2025 0600  Gross per 24 hour   Intake 1336.67 ml   Output 1585 ml   Net -248.33 ml        Relevant Results  Scheduled medications  bisacodyl, 10 mg, rectal, Daily  budesonide, 0.5 mg, nebulization, BID  clonazePAM, 0.5 mg, g-tube, q8h  [Held by provider] docusate sodium, 100 mg, oral, BID  esomeprazole, 40 mg, g-tube, Daily before breakfast  heparin (porcine), 5,000 Units, subcutaneous, q8h  insulin lispro, 0-5 Units, subcutaneous, q4h  ipratropium-albuteroL, 3 mL, nebulization, q6h  lidocaine, 1 patch, transdermal, Daily  midodrine, 10 mg, g-tube, q8h  oxygen, , inhalation, Continuous - Inhalation  perflutren protein A microsphere, 0.5 mL, intravenous, Once in imaging  polyethylene glycol, 17 g, g-tube, Daily  senna, 5 mL, g-tube, Nightly  sulfur hexafluoride microsphr, 2 mL, intravenous, Once in imaging  thiamine, 100 mg, g-tube, Daily  traZODone, 25 mg, g-tube, Nightly      PRN medications  PRN medications: albuterol, calcium gluconate, calcium gluconate, dextrose, dextrose, glucagon, glucagon, HYDROmorphone, magnesium sulfate, magnesium sulfate, oxyCODONE, oxygen, potassium chloride, potassium " chloride      Results for orders placed or performed during the hospital encounter of 01/13/25 (from the past 24 hours)   POCT GLUCOSE   Result Value Ref Range    POCT Glucose 105 (H) 74 - 99 mg/dL   POCT GLUCOSE   Result Value Ref Range    POCT Glucose 116 (H) 74 - 99 mg/dL   POCT GLUCOSE   Result Value Ref Range    POCT Glucose 126 (H) 74 - 99 mg/dL   POCT GLUCOSE   Result Value Ref Range    POCT Glucose 104 (H) 74 - 99 mg/dL   POCT GLUCOSE   Result Value Ref Range    POCT Glucose 104 (H) 74 - 99 mg/dL   Calcium, Ionized   Result Value Ref Range    POCT Calcium, Ionized 1.14 1.1 - 1.33 mmol/L   CBC   Result Value Ref Range    WBC 9.5 4.4 - 11.3 x10*3/uL    nRBC 0.0 0.0 - 0.0 /100 WBCs    RBC 3.44 (L) 4.00 - 5.20 x10*6/uL    Hemoglobin 9.7 (L) 12.0 - 16.0 g/dL    Hematocrit 30.1 (L) 36.0 - 46.0 %    MCV 88 80 - 100 fL    MCH 28.2 26.0 - 34.0 pg    MCHC 32.2 32.0 - 36.0 g/dL    RDW 19.7 (H) 11.5 - 14.5 %    Platelets 184 150 - 450 x10*3/uL   Magnesium   Result Value Ref Range    Magnesium 2.19 1.60 - 2.40 mg/dL   Renal function panel   Result Value Ref Range    Glucose 123 (H) 74 - 99 mg/dL    Sodium 140 136 - 145 mmol/L    Potassium 3.9 3.5 - 5.3 mmol/L    Chloride 98 98 - 107 mmol/L    Bicarbonate 35 (H) 21 - 32 mmol/L    Anion Gap 11 10 - 20 mmol/L    Urea Nitrogen 16 6 - 23 mg/dL    Creatinine <0.20 (L) 0.50 - 1.05 mg/dL    eGFR      Calcium 8.8 8.6 - 10.6 mg/dL    Phosphorus 3.1 2.5 - 4.9 mg/dL    Albumin 3.4 3.4 - 5.0 g/dL   POCT GLUCOSE   Result Value Ref Range    POCT Glucose 121 (H) 74 - 99 mg/dL       XR chest 1 view   Final Result   1. Stable to slightly improved patchy left lung airspace opacities,   which may represent edema/infiltrates.   2. Stable postsurgical changes related to right-sided pneumonectomy.   3. Medical devices as above.        Signed by: Real Rashid 2/1/2025 6:54 AM   Dictation workstation:   [id number]      XR chest 1 view   Final Result   1.  Redemonstration of postsurgical changes  of right pneumonectomy.   When compared to the prior exam, the opacities in the left lung most   prominently in the left mid to lower lung are more confluent.   Findings may represent a consolidative process such as pneumonia or   aspiration pneumonitis with a component of pulmonary vascular   congestion. There is possible small left pleural effusion and left   basilar atelectasis.        I personally reviewed the images/study and I agree with the findings   as stated by resident physician Dr. Yair Deng . This study   was interpreted at [place], [place].        MACRO:   None        Signed by: Nino Barbour 1/31/2025 12:27 PM   Dictation workstation:   [id number]      XR chest 1 view   Final Result   1. Interval slightly increased patchy opacities in the left lower   lung field with blunting of the left costophrenic angle. Findings may   represent small pleural effusion with atelectasis/infiltrate. There   is mild pulmonary vascular congestion.   2. Complete opacification of the right hemithorax representing   postsurgical changes of right pneumonectomy.   3. Interval extubation with placement of a tracheostomy tube.        I personally reviewed the images/study and I agree with the findings   as stated by resident physician Dr. Yair Deng . This study   was interpreted at [place], [place].        MACRO:   None        Signed by: Oseas Stroud 1/30/2025 4:36 PM   Dictation workstation:   [id number]      XR chest 1 view   Final Result   1. Similar mild interstitial edema, small left pleural effusion, and   left basilar atelectasis.   2. Postsurgical changes of right pneumonectomy.   3. Medical devices as above.             I personally reviewed the images/study and I agree with the findings   as stated above by resident physician, Ankit Varela MD. This study   was interpreted at [facility]  [place], [place].        MACRO:   None.        Signed by: Oseas Stroud 1/30/2025 9:31 AM   Dictation workstation:   [id number]      XR chest 1 view   Final Result   1. Postoperative changes and medical devices as above.   2. Similar mild interstitial edema and left basilar presumed   atelectasis with small pleural effusion.   3. Persistent complete opacification of right hemithorax, correlating   with patient's history of pneumonectomy.        Signed by: Real Rashid 1/29/2025 7:28 AM   Dictation workstation:   [id number]      XR chest 1 view   Final Result   1.  Unchanged aeration of the lungs with complete opacification of   the right hemithorax and coarse interstitial markings throughout the   left lung with left basilar atelectasis. Superimposed infection is   not excluded.        I personally reviewed the images/study and I agree with the findings   as stated by resident physician Dr. Yair Deng . This study   was interpreted at [place], [place].        MACRO:   None        Signed by: Oseas Stroud 1/28/2025 9:31 AM   Dictation workstation:   [id number]      Transthoracic Echo (TTE) Limited   Final Result      XR chest 1 view   Final Result   1.  Interval improvement in the left-sided edema and patchy airspace   opacities. Small left-sided pleural effusion.   2. Postsurgical changes of right-sided pneumonectomy.   3. Medical lines and devices as above.        I personally reviewed the images/study and I agree with the findings   as stated by Jonah Velez MD (Radiology Resident).   This study was interpreted at [place], [place].        MACRO:   None        Signed by: Oseas Stroud 1/27/2025 10:55 AM   Dictation workstation:   [id number]      XR chest 1 view   Final Result   1.  Slight interval worsening in left-sided airspace disease/edema   status post right-sided  pneumonectomy.             Signed by: Nino Barbour 1/26/2025 1:16 PM   Dictation workstation:   [id number]      XR chest 1 view   Final Result   1.  Post pneumonectomy changes with continued left perihilar edema.             Signed by: Nino Barbour 1/25/2025 9:51 AM   Dictation workstation:   [id number]      XR chest 1 view   Final Result   1.  Similar left lung pulmonary edema with small left pleural   effusion.   2. Status post right pneumonectomy.   3. [place]-Paco catheter with tip projecting over the left lower lobe,   in similar position.                  MACRO:   None        Signed by: Alexx Anne 1/24/2025 10:24 AM   Dictation workstation:   [id number]      XR chest 1 view   Final Result   Addendum (preliminary) 1 of 1   Interpreted By:  Alexx Anne,    ADDENDUM:   Right IJ approach [place] Paco catheter is also noted with tip   projecting over the left lower lobe in similar position to prior exam.        Signed by: Alxex Anne 1/24/2025 10:25 AM        -------- ORIGINAL REPORT --------   Dictation workstation:   [id number]      Final   1.  Stable findings of pulmonary edema within the left lung.   2. Postsurgical changes of right pneumonectomy.   3. Medical devices as above.                  MACRO:   None        Signed by: Alexx Anne 1/24/2025 9:09 AM   Dictation workstation:   [id number]      XR chest 1 view   Final Result   1. The tip of the endotracheal tube terminates 7.5 cm of the sina.   2. Slightly decreased  interstitial prominence and airspace opacities   within the left lung when compared to prior radiograph,  suggestive   of improving infectious process or improving pulmonary edema.   3. No significant interval change in postoperative changes from   right-sided pneumonectomy, resection of multiple right-sided ribs,   complete opacification of the pneumonectomy bed, and rightward   mediastinal shift.        I personally reviewed the images/study and I agree with radiology    resident  Dr. Meghan Ramesh findings as stated. This study was   interpreted at [facility],   [place], [place]        MACRO:   None        Signed by: Oseas Stroud 1/24/2025 8:09 AM   Dictation workstation:   [id number]      XR abdomen 1 view   Final Result   1.  Dobhoff tube is in place with the tip projecting over the 1st   portion of the duodenum.   2. There is gas-filled dilated stomach. A few slight prominent loops   of the bowel in the upper abdomen with overall nonobstructive bowel   gas pattern.   3. Postsurgical changes of right pneumonectomy with resection of ribs   and patchy airspace opacities in the left lung.   4. [place]-Paco catheter projecting over the lower pulmonary artery.   Recommend retraction.        MACRO:   None        Signed by: Oseas Stroud 1/23/2025 10:15 AM   Dictation workstation:   [id number]      XR chest 1 view   Final Result   1. Unchanged appearance of multifocal patchy airspace opacities with   interstitial prominence of the left lung which may be due to   infectious process. Component of edema can not be excluded.   2. Medical devices as described above with similar positioning of the   [place]-Paco catheter projecting over left lower lobe pulmonary artery.   Recommend retraction.   3. Postsurgical changes of right-sided pneumonectomy and resection of   multiple right-sided ribs with complete opacification of the   pneumonectomy bed. The appearance is unchanged compared to prior   study.                  MACRO:   None        Signed by: Oseas Stroud 1/23/2025 9:36 AM   Dictation workstation:   [id number]      XR abdomen 1 view   Final Result   1.  As described above.        Signed by: Vick Xiao 1/22/2025 1:01 PM   Dictation workstation:   [id number]      XR chest 1 view   Final Result   1. Unchanged appearance of multifocal patchy airspace opacity with   interstitial prominence of the left lung which may be due to   infectious process.  Component of edema cannot be excluded.   2. Medical devices as described above with interval advancement of   the [place]-Paco catheter projecting over left lower lobe pulmonary   artery. Recommend retraction.   3. Postsurgical changes of right-sided pneumonectomy and resection of   multiple right-sided ribs with complete opacification of the   pneumonectomy bed. The appearance is unchanged compared to prior   study.                  Signed by: Vick Xiao 1/22/2025 1:00 PM   Dictation workstation:   [id number]      XR chest 1 view   Final Result   1. Similar near-complete opacification of the right hemithorax, which   may be secondary to a large pleural effusion, pneumonectomy, or   bronchial obstruction.   2. Diffuse interstitial opacities and scattered patchy alveolar   opacities throughout the left lung, similar to the prior exam, which   may reflect prominent pulmonary edema or multifocal infection.   3. Medical devices as above.        I personally reviewed the image(s)/study and resident interpretation   as stated by Dr. Ange Sue MD. I agree with the findings as   stated. This study was interpreted at [facility], [place], [place].        MACRO:   None        Signed by: Oseas Stroud 1/21/2025 7:58 PM   Dictation workstation:   [id number]      Vascular US upper extremity venous duplex left   Final Result      Transthoracic Echo (TTE) Limited   Final Result      XR chest 1 view   Final Result   1.  Interval opacification of the right hemithorax and correlate with   post pneumonectomy changes.   2. [place]-Paco catheter overlies the left lower lobe pulmonary artery.   3. Slight interval improvement in left-sided pulmonary edema.   Underlying edema and correlate with fluid status.             Signed by: Nino Barbour 1/20/2025 9:57 AM   Dictation workstation:   [id number]      XR abdomen 1 view   Final Result   1.  As described above.        Signed by:  Vick Xiao 1/19/2025 11:58 AM   Dictation workstation:   [id number]      XR chest 1 view   Final Result   1. Slight worsening of multifocal airspace opacity in the left mid   and lower lung. Findings are concerning for infectious process. Small   left pleural effusion.   2. Postsurgical changes right pneumonectomy with medical devices as   described above. Consider retracting the [place]-Paco catheter.                  Signed by: Vick Xiao 1/19/2025 8:55 AM   Dictation workstation:   [id number]      Transthoracic Echo (TTE) Limited   Final Result      XR chest 1 view   Final Result   1. Patchy airspace opacity in the left lower lung with slight   improvement in aeration. Correlate with concern for infection   2. Postsurgical changes of right pneumonectomy as described above.   3. Medical lines and devices as above. Consider retraction of the   [place]-Paco catheter.        I personally reviewed the images/study and I agree with the findings   as stated. This study was interpreted at [place], [place].        MACRO:   None        Signed by: Vick Xiao 1/19/2025 8:54 AM   Dictation workstation:   [id number]      XR chest 1 view   Final Result   1. Worsening aeration of the left lung with increasing left basilar   infiltrate and pleural effusion. Correlate with mucous plugging and   component of lobar collapse.   2. Component of interstitial prominence of the left lung, also   slightly increased which may be due to edema.   3. Postsurgical changes of right pneumonectomy as described                  Signed by: Vick Xiao 1/18/2025 11:35 AM   Dictation workstation:   [id number]      Cardiac Catheterization Procedure   Final Result      Transthoracic Echo (TTE) Limited   Final Result      XR chest 1 view   Final Result   1.  Stable exam with postsurgical changes of right pneumonectomy/rib   resection and large layering fluid component  within the right   hemithorax.   2. Similar findings of left pulmonary edema with slightly increased   size of small left pleural effusion. No left-sided pneumothorax.   3. Medical devices as above.                  MACRO:   None        Signed by: Alexx Anne 1/17/2025 8:16 AM   Dictation workstation:   [id number]      XR chest 1 view   Final Result   1.  Postsurgical changes of right-sided pneumonectomy with increased   layering opacification of the right hemithorax, likely due to   increasing fluid.   2. Similar trace left-sided pleural effusion with linear left basilar   airspace opacity, likely atelectasis on a background of likely   pulmonary edema. Infection is not definitively excluded.   3. Medical devices as above with interval insertion of right internal   jugular central venous catheter projecting at the mid SVC.        I personally reviewed the images/study and I agree with the findings   as stated by resident Davidson Mcguire. This study was interpreted   at [place], [place].        MACRO:   None        Signed by: Alexx Anne 1/17/2025 8:14 AM   Dictation workstation:   [id number]      Transthoracic Echo (TTE) Limited   Final Result      US right upper quadrant   Final Result   Small amount of simple appearing ascites in the right upper quadrant   without evidence of cholelithiasis, cholecystitis or biliary dilation.        Signed by: Alberto Salas 1/16/2025 1:02 PM   Dictation workstation:   [id number]      XR chest 1 view   Final Result   1. Slight interval increase in left basilar airspace opacity which   may be due to increasing atelectasis. Cannot exclude developing   infectious infiltrate. Question trace left pleural effusion.   2. Again seen postsurgical changes related to right-sided   pneumonectomy as described above                  Signed by: Vick Xiao 1/16/2025 9:47 AM   Dictation workstation:   [id number]      US renal  complete   Final Result   1. Unremarkable sonographic evaluation of the kidneys.   2. Incomplete evaluation of a gallbladder with some pericholecystic   fluid and biliary sludge. Dedicated right upper quadrant ultrasound   could be obtained as clinically warranted.   3. A small amount of free fluid present in the right upper quadrant   and pelvis.        Findings relayed by phone by me to SICU provider at 5:11 p.m.        I personally reviewed the images/study and I agree with the findings   as stated by Erickson Best MD (resident) . This study was   interpreted at [place], [place].        MACRO:   None        Signed by: Alberto Salas 1/15/2025 6:17 PM   Dictation workstation:   [id number]      XR chest 1 view   Final Result   1.  Status post pneumonectomy with interval increase in fluid   component of right-sided hydropneumothorax.   2. Medical devices as above.                  MACRO:   None        Signed by: Alexx Anne 1/15/2025 9:28 AM   Dictation workstation:   [id number]      XR chest 1 view   Final Result   1. Interval removal of right chest tube. Other medical devices as   described above.   2. Postsurgical changes related to right pneumonectomy.   3. Faint left basilar opacity, likely atelectatic.                  Signed by: Vick Xiao 1/14/2025 2:53 PM   Dictation workstation:   [id number]      XR chest 1 view   Final Result   1.  Stable right-sided pneumonectomy changes.             Signed by: Nino Barbour 1/14/2025 12:56 PM   Dictation workstation:   [id number]      Transthoracic Echo (TTE) Limited   Final Result      XR chest 1 view   Final Result   1. Postsurgical changes related to right sided pneumonectomy with   multiple medical devices as described above.   2. Mild interstitial prominence of the left lung.                  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 Wagner on 1/13 requiring post-op vasoactive therapy. Post-op course c/b acute hyponatremia, multifactorial delirium, cardiogenic shock requiring inotropic support, and acute hypoxic/hypercarbic respiratory failure requiring intubation. COVID+ 1/20. Weaned off milrinone 1/21. Weaned off iEpo 1/24. S/p trach/PEG 1/30 now tolerating TF through PEG.     Plan:  NEURO: History of anxiety on alprazolam 1mg qd, last filled 1/4/2025 per [facility] review. Family states she was not taking for several months but then restarted prn about 3 wks prior to surgery for situational anxiety. A&Ox4 at baseline. Multifactorial delirium improved. Currently intubated, off sedation. On scheduled oxycodone, scheduled clonazepam and nightly trazodone. Was OOB to chair 1hr yesterday. Alert and responds to commands, answering questions with nods, reasonable strength upper and lower extremities. Endorsing pain suprapubic and lower abdominal region.   - Last Qtc 530  - Will get try to get her to chair position today again  - Continue Trazodone nightly  - Continue Clonazepam 0.5 mg q8h  - Oxycodone to prn; will add 1x Tylenol and Oxy 5 mg   - Ongoing neuro and pain assessments  - Lidocaine patch to right chest wall  - PT/OT as tolerated   - Pet therapy      CV: No history of cardiac disease. Baseline /79, -110. TTE 9/2024 normal biV function, RVSP 10mmHg. Intra-op hypotension requiring vaso and epi. Arrived to SICU on vaso 0.06 units/min, started on levo 0.05 mcg/kg/min. TTE 1/13 with EF 60-65%, low normal RV systolic function. Weaned off vasopressors 1/15. Elevated lactate to 2.9 on 1/16 with SOB and new O2 requirement. Repeat TTE 1/16: acute biV dysfunction with signs of  takotsubo cardiomyopathy, EF 34%, mod reduced RV systolic function, mildly elevated RVSP, mild to mod TR. Cardiogenic shock, started milrinone, lasix infusion, and vasopressin. Shock Call on 1/16, no indication for mechanical circulatory support. Initial troponin 928 with downtrend and normalization of lactate. Epinephrine infusion added overnight 1/16-1/17, discontinued 2/2 tachycardia in the 130s. Milrinone increased to 0.25mcg/kg/min (decreased to 0.125 thereafter). Lasix infusion stopped 1/17 given contraction alkalosis. Taken to cath lab 1/17 for RHC/PA cath placement, opening CI 2.9. Milrinone uptitrated to 0.375mcg/kg/min 1/17-1/18. iEpo was added. Repeat TTE 1/20 EF 35% (on iEpo and milrinone). Levo weaned off 1/20. Intermittent sinus tachycardia. Off Milrinone /21; iEpo off 1/24. Vaso weaned off 1/25. Responsive to diuresis. Started midodrine 1/25. PA cath removed 1/26. Repeat TTE this week showed LV EF 38%, There is reduced right ventricular systolic function with continued Takastubo picture with hypo- akinesis of the apical 2/3 of the LV. Overnight: BP stable MAP 70s-90s, brief episodes sinus tach 150s.   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 Arrived to SICU extubated on 10L SFM, weaned to room air 1/14. Chest tube removed 1/14. Acute on chronic hypoxic respiratory failure 1/16. Escalation to Airvo 50L 90% overnight 1/17-1/18. Acute hypercapnia 1/18 with  increase WOB, trial of BIPAP with iEPO. S/p trach 1/30.  Overnight: SIMV 40% PS 5, PEEP 8. Breath sounds clear, significantly improved patchy left lung airspace opacities CXR  SBT: tolerating CPAP well on minimal settings  - Wean FiO2 to maintain SpO2 >92%  - SDS switched to decadron on 1/22 for treatment of COVID (end date 1/29)  - Scheduled budesonide/duoneb  - PRN albuterol  - Daily CXR  - ABG prn  - Holding home benzonatate  - Plan for day: CPAP as tolerated, may move to trache-collar if doing really well; will not allow patient to over-exert herself/will switch to SIMV later if needed after physical therapy.     GI: No GI history. LFTs WNL 12/2/24. Evidence of pericholecystic fluid and biliary sludge on renal US 1/15 but no abdominal symptoms. RUQ US with small ascites, otherwise no acute findings. Abdominal distention this am. Elevated LFTs, now normalized. LBM 1/17. OG tube placed 1/19 for gastric decompression. TFs initiated via OG 1/20, then held on 1/21 due to regurgitation from OG. Dobhoff placed via IRIS and advanced post-pyloric. Tolerating TF at goal rate. Liquid Bms x4 1/25. Cdiff negative 1/25. Last stool on 1/26. Add suppository later today. S/p PEG placement, tolerating TF. 2 BM last 24hrs; endorsing abdominal and suprapubic pain today; abdomen tender to palpation (non-peritonitic).  - Continue TF at goal rate 30ml/hr  - Continue thiamine 100mg IV daily  - Continue IV PPI for GI prophylaxis  - Continue bowel regimen: Miralax, Senna and Suppository   - Will order KUB  - Obtain ABG (lactate and e- check)     : No history of renal disease. Baseline creatinine 0.6. Symptomatic acute hyponatremia (SIADH vs vaso mediated) to 120 overnight 1/14 - 1/15, corrected to 124 after 150cc 3% NaCl x2 on 1/15. Renal US on 1/15 unremarkable. Started on lasix infusion 1/16-1/17. Spot diuresis 1/20 and 1/24. Montiel changed overnight 1/18-1/19. Today: Continues to make good urine output. RFP stable, net -248; may be  party retaining (suprapubic pain, sediment).   - Bladder scan  - Yoo exchange and UA   - RFP daily and prn  - Maintain yoo for strict I/Os   - Maintain U/O >0.5ml/kg/hr  - Replete electrolytes to maintain K>4, Phos>2.5, iCal>1.1, Mag>2     HEME: Hx of DOC. Baseline H/H 12/40. Acute blood loss anemia, OR EBL 150mL. Acute coagulopathy with INR elevated to 2.3 2/2 to poor nutritional status. Received IV Vit K 10mg x1 with improved INR thereafter. Transfused 1u PRBC 1/17 and 1u PRBC on 1/18. LUE duplex obtained 1/20 for swelling, negative for DVT. Stable edema in LUE, negative DVT ultrasound 1/20 ultrasound. Hgb 7.4 1/25, transfused 1 RBC with Hgb increase to 10.7 thereafter. Hb stable 9.9 on 1/30, Hb 9.7.   - Check CBC daily and PRN  - Coags PRN  - Goal Hgb >8  - SCDs and SQH for DVT ppx  - Ongoing monitoring for s/s bleeding  - Maintain active T&S (1/29)     ENDO: No history of DM or thyroid disease. TSH 0.48 (1/17). Recent 5d course of prednisone 10mg 12/2024 for URI. Adequate glycemic control. Started on SDS out of OR 1/13. Transitioned to dexAMETHasone 1/22 for COVID, end date 1/29. BG have been wnl.   - BG q4h, SSI #1     MSK: Arthritis, Vit D deficiency  - Not on meds at home  - Monitor for symptoms     ID:  Worsened clinical condition 1/17, started on vanco, zosyn and azithromycin. Blood cultures, MRSA and Respiratory viral panel negative 1/17. Yoo changed 1/30, Urine cx 1/18 negative. Added fluc overnight 1/18-1/19. Sputum culture 1/19 negative. Vancomycin discontinued 1/20. Urine strep/legionella 1/20 negative. COVID+ on 1/20, initiated remdesivir and decadron. Liquid stools last week;C.diff negative 1/26. Zosyn completed 1/26. Currently afebrile, no leukocytosis.   - COVID precautions will life today; will deep clean room while patient in OR   - Temp q4h, wbc daily  - Ongoing monitoring for s/s infection     Access:  - R axillary a line (1/28-)  - Left chest mediport (not currently accessed)  - PIV  x2  - Montiel (1/30-)  - Trach (1/30)  - PEG (1/30)     Dispo: Continue ICU care. Patient seen and discussed with ICU attending Dr. Solorio     Family: son Yg and  Maximilian updated over the phone. Will further update at bedside if they visit today and prn.    SICU Phone [phone]    Maricruz Silva MD   Anesthesiology, CA-2

## 2025-02-01 NOTE — PROGRESS NOTES
"Faustina Vick \"Jannie\" is a 64 y.o. female who presents for Primary cancer of right upper lobe of lung (Multi) [C34.11].  She initially received chemotherapy and radiation, but appeared to have residual radiographic abnormalities.  PET/CT suggested progression of disease, and in this setting and Dr. Wagner felt that surgical resection in a \"salvage\" setting might facilitate long-term overall survival given the failure of nonsurgical management. She is now  19 days post op from PNEUMONECTOMY (Right) (en bloc with chest wall resection and reconstruction), mediastinal lymph node dissection for a  Primary cancer of right upper lobe of lung (Multi).      Subjective   NAEO.     Objective     General: Critically ill in ICU bed, on vent via trach, follows commands, fatigued  HEENT: Trach in place, on CPAP  Resp: sats appropriate, symmetric rise  CV: NSR, intermittently tachycardic  Ext: Minimal LE edema    Last Recorded Vitals  Blood pressure 127/79, pulse (!) 131, temperature 36.2 °C (97.2 °F), temperature source Temporal, resp. rate 23, height 1.651 m (5' 5\"), weight 55 kg (121 lb 4.1 oz), SpO2 94%.  Intake/Output last 3 Shifts:  I/O last 3 completed shifts:  In: 2036.7 (37 mL/kg) [I.V.:96.7 (1.8 mL/kg); NG/GT:1790; IV Piggyback:150]  Out: 2440 (44.4 mL/kg) [Urine:2440 (1.2 mL/kg/hr)]  Weight: 55 kg     Relevant Results  Scheduled medications  acetaminophen, 650 mg, g-tube, Once  bisacodyl, 10 mg, rectal, Daily  budesonide, 0.5 mg, nebulization, BID  clonazePAM, 0.5 mg, g-tube, q8h  docusate sodium, 100 mg, g-tube, BID  esomeprazole, 40 mg, g-tube, Daily before breakfast  heparin (porcine), 5,000 Units, subcutaneous, q8h  insulin lispro, 0-5 Units, subcutaneous, q4h  ipratropium-albuteroL, 3 mL, nebulization, q6h  lidocaine, 1 patch, transdermal, Daily  midodrine, 10 mg, g-tube, q8h  oxyCODONE, 5 mg, g-tube, Once  oxygen, , inhalation, Continuous - Inhalation  polyethylene glycol, 17 g, g-tube, Daily  senna, 5 mL, g-tube, " Nightly  silodosin, 4 mg, g-tube, q24h  thiamine, 100 mg, g-tube, Daily  traZODone, 25 mg, g-tube, Nightly      Continuous medications     PRN medications  PRN medications: albuterol, calcium gluconate, calcium gluconate, dextrose, dextrose, glucagon, glucagon, HYDROmorphone, magnesium sulfate, magnesium sulfate, oxyCODONE, oxygen, potassium chloride    Results for orders placed or performed during the hospital encounter of 01/13/25 (from the past 24 hours)   POCT GLUCOSE   Result Value Ref Range    POCT Glucose 116 (H) 74 - 99 mg/dL   POCT GLUCOSE   Result Value Ref Range    POCT Glucose 126 (H) 74 - 99 mg/dL   POCT GLUCOSE   Result Value Ref Range    POCT Glucose 104 (H) 74 - 99 mg/dL   POCT GLUCOSE   Result Value Ref Range    POCT Glucose 104 (H) 74 - 99 mg/dL   Calcium, Ionized   Result Value Ref Range    POCT Calcium, Ionized 1.14 1.1 - 1.33 mmol/L   CBC   Result Value Ref Range    WBC 9.5 4.4 - 11.3 x10*3/uL    nRBC 0.0 0.0 - 0.0 /100 WBCs    RBC 3.44 (L) 4.00 - 5.20 x10*6/uL    Hemoglobin 9.7 (L) 12.0 - 16.0 g/dL    Hematocrit 30.1 (L) 36.0 - 46.0 %    MCV 88 80 - 100 fL    MCH 28.2 26.0 - 34.0 pg    MCHC 32.2 32.0 - 36.0 g/dL    RDW 19.7 (H) 11.5 - 14.5 %    Platelets 184 150 - 450 x10*3/uL   Magnesium   Result Value Ref Range    Magnesium 2.19 1.60 - 2.40 mg/dL   Renal function panel   Result Value Ref Range    Glucose 123 (H) 74 - 99 mg/dL    Sodium 140 136 - 145 mmol/L    Potassium 3.9 3.5 - 5.3 mmol/L    Chloride 98 98 - 107 mmol/L    Bicarbonate 35 (H) 21 - 32 mmol/L    Anion Gap 11 10 - 20 mmol/L    Urea Nitrogen 16 6 - 23 mg/dL    Creatinine <0.20 (L) 0.50 - 1.05 mg/dL    eGFR      Calcium 8.8 8.6 - 10.6 mg/dL    Phosphorus 3.1 2.5 - 4.9 mg/dL    Albumin 3.4 3.4 - 5.0 g/dL   POCT GLUCOSE   Result Value Ref Range    POCT Glucose 121 (H) 74 - 99 mg/dL   POCT GLUCOSE   Result Value Ref Range    POCT Glucose 109 (H) 74 - 99 mg/dL     CXR 2/1:  Stable to slightly improved patchy left lung airspace  opacities, which may represent edema/infiltrates.  Stable postsurgical changes related to right-sided pneumonectomy.       Assessment/Plan   Assessment & Plan  Primary cancer of right upper lobe of lung (Multi)    Chronic obstructive pulmonary disease (Multi)    Malignant neoplasm of upper lobe of right lung (Multi)    Cardiogenic shock (Multi)    Respiratory distress    Faustina Vick (Jannie) is a 64F with Hx of Stage III lung cancer, asthma, anxiety, and arthritis who is now s/p R pneumonectomy with Dr. Wagner 1/13/25. 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 Cardiology was consulted and recommended Lasix gtt and milrinone. Lasix gtt held 1/17, hyponatremia improving. Patient underwent RHC and SGC placement with cardiology 1/17. She was reintubated on 1/18 d/t worsening respiratory status and AMS. Also had increased pressor requirements, now weaned off. Repeat echo stable on 1/18. COVID positive on 1/20.     Failed extubation trials, now s/p trach/PEG with Dr. Wagner 1/30.     - Continue & appreciate supportive ICU care  - Advance TF to goal as tolerated  - Pulmonary toilet  - PT/OT for rehabilitation   - Please place duoderm under trach plate to prevent erosion    Patient seen with attending Dr. Wagner.    Victoria Bolivar MD  Gen Surg PGY2  Thoracic Surgery [id number]

## 2025-02-02 ENCOUNTER — APPOINTMENT (OUTPATIENT)
Dept: RADIOLOGY | Facility: HOSPITAL | Age: 65
End: 2025-02-02
Payer: COMMERCIAL

## 2025-02-02 VITALS
DIASTOLIC BLOOD PRESSURE: 65 MMHG | SYSTOLIC BLOOD PRESSURE: 106 MMHG | OXYGEN SATURATION: 98 % | WEIGHT: 121.25 LBS | TEMPERATURE: 96.8 F | HEIGHT: 65 IN | HEART RATE: 107 BPM | RESPIRATION RATE: 19 BRPM | BODY MASS INDEX: 20.2 KG/M2

## 2025-02-02 LAB
ALBUMIN SERPL BCP-MCNC: 3.1 G/DL (ref 3.4–5)
ANION GAP BLDA CALCULATED.4IONS-SCNC: 2 MMO/L (ref 10–25)
ANION GAP SERPL CALC-SCNC: 12 MMOL/L (ref 10–20)
BACTERIA UR CULT: ABNORMAL
BASE EXCESS BLDA CALC-SCNC: 10.5 MMOL/L (ref -2–3)
BASE EXCESS BLDA CALC-SCNC: 10.5 MMOL/L (ref -2–3)
BODY TEMPERATURE: 37 DEGREES CELSIUS
BODY TEMPERATURE: 37 DEGREES CELSIUS
BUN SERPL-MCNC: 15 MG/DL (ref 6–23)
CA-I BLD-SCNC: 1.2 MMOL/L (ref 1.1–1.33)
CA-I BLDA-SCNC: 1.25 MMOL/L (ref 1.1–1.33)
CALCIUM SERPL-MCNC: 8.7 MG/DL (ref 8.6–10.6)
CHLORIDE BLDA-SCNC: 101 MMOL/L (ref 98–107)
CHLORIDE SERPL-SCNC: 99 MMOL/L (ref 98–107)
CO2 SERPL-SCNC: 35 MMOL/L (ref 21–32)
CREAT SERPL-MCNC: <0.2 MG/DL (ref 0.5–1.05)
EGFRCR SERPLBLD CKD-EPI 2021: ABNORMAL ML/MIN/{1.73_M2}
ERYTHROCYTE [DISTWIDTH] IN BLOOD BY AUTOMATED COUNT: 19.3 % (ref 11.5–14.5)
GLUCOSE BLD MANUAL STRIP-MCNC: 112 MG/DL (ref 74–99)
GLUCOSE BLD MANUAL STRIP-MCNC: 116 MG/DL (ref 74–99)
GLUCOSE BLD MANUAL STRIP-MCNC: 124 MG/DL (ref 74–99)
GLUCOSE BLD MANUAL STRIP-MCNC: 143 MG/DL (ref 74–99)
GLUCOSE BLD MANUAL STRIP-MCNC: 152 MG/DL (ref 74–99)
GLUCOSE BLDA-MCNC: 156 MG/DL (ref 74–99)
GLUCOSE SERPL-MCNC: 111 MG/DL (ref 74–99)
HCO3 BLDA-SCNC: 36.1 MMOL/L (ref 22–26)
HCO3 BLDA-SCNC: 36.1 MMOL/L (ref 22–26)
HCT VFR BLD AUTO: 30.8 % (ref 36–46)
HCT VFR BLD EST: 35 % (ref 36–46)
HGB BLD-MCNC: 9.9 G/DL (ref 12–16)
HGB BLDA-MCNC: 11.7 G/DL (ref 12–16)
HOLD SPECIMEN: NORMAL
INHALED O2 CONCENTRATION: 40 %
INHALED O2 CONCENTRATION: 40 %
LACTATE BLDA-SCNC: 0.6 MMOL/L (ref 0.4–2)
MAGNESIUM SERPL-MCNC: 2.93 MG/DL (ref 1.6–2.4)
MCH RBC QN AUTO: 28.2 PG (ref 26–34)
MCHC RBC AUTO-ENTMCNC: 32.1 G/DL (ref 32–36)
MCV RBC AUTO: 88 FL (ref 80–100)
NRBC BLD-RTO: 0 /100 WBCS (ref 0–0)
OXYHGB MFR BLDA: 95 % (ref 94–98)
OXYHGB MFR BLDA: 95 % (ref 94–98)
PCO2 BLDA: 52 MM HG (ref 38–42)
PCO2 BLDA: 52 MM HG (ref 38–42)
PH BLDA: 7.45 PH (ref 7.38–7.42)
PH BLDA: 7.45 PH (ref 7.38–7.42)
PHOSPHATE SERPL-MCNC: 2.9 MG/DL (ref 2.5–4.9)
PLATELET # BLD AUTO: 162 X10*3/UL (ref 150–450)
PO2 BLDA: 85 MM HG (ref 85–95)
PO2 BLDA: 85 MM HG (ref 85–95)
POTASSIUM BLDA-SCNC: 3.9 MMOL/L (ref 3.5–5.3)
POTASSIUM SERPL-SCNC: 3.9 MMOL/L (ref 3.5–5.3)
RBC # BLD AUTO: 3.51 X10*6/UL (ref 4–5.2)
SAO2 % BLDA: 98 % (ref 94–100)
SAO2 % BLDA: 98 % (ref 94–100)
SODIUM BLDA-SCNC: 135 MMOL/L (ref 136–145)
SODIUM SERPL-SCNC: 142 MMOL/L (ref 136–145)
WBC # BLD AUTO: 8.5 X10*3/UL (ref 4.4–11.3)

## 2025-02-02 PROCEDURE — 2500000005 HC RX 250 GENERAL PHARMACY W/O HCPCS

## 2025-02-02 PROCEDURE — 94003 VENT MGMT INPAT SUBQ DAY: CPT

## 2025-02-02 PROCEDURE — 71045 X-RAY EXAM CHEST 1 VIEW: CPT | Performed by: STUDENT IN AN ORGANIZED HEALTH CARE EDUCATION/TRAINING PROGRAM

## 2025-02-02 PROCEDURE — 99291 CRITICAL CARE FIRST HOUR: CPT | Performed by: THORACIC SURGERY (CARDIOTHORACIC VASCULAR SURGERY)

## 2025-02-02 PROCEDURE — 99232 SBSQ HOSP IP/OBS MODERATE 35: CPT | Performed by: STUDENT IN AN ORGANIZED HEALTH CARE EDUCATION/TRAINING PROGRAM

## 2025-02-02 PROCEDURE — 80069 RENAL FUNCTION PANEL: CPT

## 2025-02-02 PROCEDURE — 82805 BLOOD GASES W/O2 SATURATION: CPT

## 2025-02-02 PROCEDURE — 71045 X-RAY EXAM CHEST 1 VIEW: CPT

## 2025-02-02 PROCEDURE — 2500000004 HC RX 250 GENERAL PHARMACY W/ HCPCS (ALT 636 FOR OP/ED)

## 2025-02-02 PROCEDURE — 2500000002 HC RX 250 W HCPCS SELF ADMINISTERED DRUGS (ALT 637 FOR MEDICARE OP, ALT 636 FOR OP/ED)

## 2025-02-02 PROCEDURE — 83735 ASSAY OF MAGNESIUM: CPT

## 2025-02-02 PROCEDURE — 94640 AIRWAY INHALATION TREATMENT: CPT

## 2025-02-02 PROCEDURE — 99291 CRITICAL CARE FIRST HOUR: CPT

## 2025-02-02 PROCEDURE — 84132 ASSAY OF SERUM POTASSIUM: CPT

## 2025-02-02 PROCEDURE — 2500000004 HC RX 250 GENERAL PHARMACY W/ HCPCS (ALT 636 FOR OP/ED): Performed by: STUDENT IN AN ORGANIZED HEALTH CARE EDUCATION/TRAINING PROGRAM

## 2025-02-02 PROCEDURE — 82947 ASSAY GLUCOSE BLOOD QUANT: CPT

## 2025-02-02 PROCEDURE — 2500000001 HC RX 250 WO HCPCS SELF ADMINISTERED DRUGS (ALT 637 FOR MEDICARE OP)

## 2025-02-02 PROCEDURE — 2020000001 HC ICU ROOM DAILY

## 2025-02-02 PROCEDURE — 37799 UNLISTED PX VASCULAR SURGERY: CPT

## 2025-02-02 PROCEDURE — 85027 COMPLETE CBC AUTOMATED: CPT

## 2025-02-02 PROCEDURE — 82330 ASSAY OF CALCIUM: CPT

## 2025-02-02 PROCEDURE — 2500000001 HC RX 250 WO HCPCS SELF ADMINISTERED DRUGS (ALT 637 FOR MEDICARE OP): Performed by: STUDENT IN AN ORGANIZED HEALTH CARE EDUCATION/TRAINING PROGRAM

## 2025-02-02 PROCEDURE — 2500000001 HC RX 250 WO HCPCS SELF ADMINISTERED DRUGS (ALT 637 FOR MEDICARE OP): Performed by: NURSE PRACTITIONER

## 2025-02-02 RX ORDER — DIGOXIN 0.25 MG/ML
250 INJECTION INTRAMUSCULAR; INTRAVENOUS ONCE
Status: DISCONTINUED | OUTPATIENT
Start: 2025-02-02 | End: 2025-02-03

## 2025-02-02 RX ORDER — METOPROLOL TARTRATE 1 MG/ML
5 INJECTION, SOLUTION INTRAVENOUS ONCE
Status: COMPLETED | OUTPATIENT
Start: 2025-02-02 | End: 2025-02-02

## 2025-02-02 RX ORDER — METOPROLOL TARTRATE 25 MG/1
12.5 TABLET, FILM COATED ORAL 2 TIMES DAILY
Status: DISCONTINUED | OUTPATIENT
Start: 2025-02-02 | End: 2025-02-06

## 2025-02-02 RX ORDER — ACETAMINOPHEN 10 MG/ML
1000 INJECTION, SOLUTION INTRAVENOUS EVERY 6 HOURS SCHEDULED
Status: COMPLETED | OUTPATIENT
Start: 2025-02-02 | End: 2025-02-03

## 2025-02-02 RX ORDER — METOPROLOL TARTRATE 1 MG/ML
INJECTION, SOLUTION INTRAVENOUS
Status: COMPLETED
Start: 2025-02-02 | End: 2025-02-02

## 2025-02-02 RX ORDER — SILODOSIN 4 MG/1
4 CAPSULE ORAL EVERY 24 HOURS
Status: DISCONTINUED | OUTPATIENT
Start: 2025-02-02 | End: 2025-02-03

## 2025-02-02 RX ORDER — SIMETHICONE 80 MG
40 TABLET,CHEWABLE ORAL EVERY 12 HOURS
Status: COMPLETED | OUTPATIENT
Start: 2025-02-02 | End: 2025-02-02

## 2025-02-02 RX ORDER — CEFTRIAXONE 1 G/50ML
1 INJECTION, SOLUTION INTRAVENOUS EVERY 24 HOURS
Status: DISCONTINUED | OUTPATIENT
Start: 2025-02-02 | End: 2025-02-03

## 2025-02-02 RX ADMIN — ACETAMINOPHEN 1000 MG: 10 INJECTION INTRAVENOUS at 20:00

## 2025-02-02 RX ADMIN — SIMETHICONE 40 MG: 80 TABLET, CHEWABLE ORAL at 10:55

## 2025-02-02 RX ADMIN — SENNOSIDES 5 ML: 8.8 LIQUID ORAL at 21:25

## 2025-02-02 RX ADMIN — BUDESONIDE 0.5 MG: 0.5 INHALANT RESPIRATORY (INHALATION) at 09:02

## 2025-02-02 RX ADMIN — METOPROLOL TARTRATE 5 MG: 1 INJECTION, SOLUTION INTRAVENOUS at 10:43

## 2025-02-02 RX ADMIN — HEPARIN SODIUM 5000 UNITS: 5000 INJECTION INTRAVENOUS; SUBCUTANEOUS at 13:26

## 2025-02-02 RX ADMIN — Medication 40 PERCENT: at 00:17

## 2025-02-02 RX ADMIN — Medication 40 PERCENT: at 20:09

## 2025-02-02 RX ADMIN — DOCUSATE SODIUM 100 MG: 50 LIQUID ORAL at 09:13

## 2025-02-02 RX ADMIN — MIDODRINE HYDROCHLORIDE 10 MG: 10 TABLET ORAL at 15:20

## 2025-02-02 RX ADMIN — IPRATROPIUM BROMIDE AND ALBUTEROL SULFATE 3 ML: .5; 3 SOLUTION RESPIRATORY (INHALATION) at 02:36

## 2025-02-02 RX ADMIN — DOCUSATE SODIUM 100 MG: 50 LIQUID ORAL at 21:25

## 2025-02-02 RX ADMIN — Medication 40 PERCENT: at 04:06

## 2025-02-02 RX ADMIN — IPRATROPIUM BROMIDE AND ALBUTEROL SULFATE 3 ML: .5; 3 SOLUTION RESPIRATORY (INHALATION) at 09:03

## 2025-02-02 RX ADMIN — HEPARIN SODIUM 5000 UNITS: 5000 INJECTION INTRAVENOUS; SUBCUTANEOUS at 21:25

## 2025-02-02 RX ADMIN — CEFTRIAXONE SODIUM 1 G: 1 INJECTION, SOLUTION INTRAVENOUS at 17:25

## 2025-02-02 RX ADMIN — LIDOCAINE 4% 1 PATCH: 40 PATCH TOPICAL at 09:13

## 2025-02-02 RX ADMIN — CLONAZEPAM 0.5 MG: 0.5 TABLET ORAL at 00:56

## 2025-02-02 RX ADMIN — CLONAZEPAM 0.5 MG: 0.5 TABLET ORAL at 09:14

## 2025-02-02 RX ADMIN — INSULIN LISPRO 1 UNITS: 100 INJECTION, SOLUTION INTRAVENOUS; SUBCUTANEOUS at 12:45

## 2025-02-02 RX ADMIN — SILODOSIN 4 MG: 4 CAPSULE ORAL at 15:20

## 2025-02-02 RX ADMIN — TRAZODONE HYDROCHLORIDE 25 MG: 50 TABLET ORAL at 21:25

## 2025-02-02 RX ADMIN — ACETAMINOPHEN 1000 MG: 10 INJECTION INTRAVENOUS at 13:26

## 2025-02-02 RX ADMIN — SIMETHICONE 40 MG: 80 TABLET, CHEWABLE ORAL at 04:02

## 2025-02-02 RX ADMIN — Medication 40 PERCENT: at 08:00

## 2025-02-02 RX ADMIN — THIAMINE HCL TAB 100 MG 100 MG: 100 TAB at 09:14

## 2025-02-02 RX ADMIN — MIDODRINE HYDROCHLORIDE 10 MG: 10 TABLET ORAL at 22:41

## 2025-02-02 RX ADMIN — SIMETHICONE 40 MG: 80 TABLET, CHEWABLE ORAL at 22:42

## 2025-02-02 RX ADMIN — HEPARIN SODIUM 5000 UNITS: 5000 INJECTION INTRAVENOUS; SUBCUTANEOUS at 06:29

## 2025-02-02 RX ADMIN — MIDODRINE HYDROCHLORIDE 10 MG: 10 TABLET ORAL at 06:30

## 2025-02-02 RX ADMIN — METOPROLOL TARTRATE 12.5 MG: 25 TABLET, FILM COATED ORAL at 11:21

## 2025-02-02 RX ADMIN — POLYETHYLENE GLYCOL 3350 17 G: 17 POWDER, FOR SOLUTION ORAL at 09:13

## 2025-02-02 RX ADMIN — ESOMEPRAZOLE MAGNESIUM 40 MG: 40 FOR SUSPENSION ORAL at 06:28

## 2025-02-02 RX ADMIN — INSULIN LISPRO 1 UNITS: 100 INJECTION, SOLUTION INTRAVENOUS; SUBCUTANEOUS at 16:20

## 2025-02-02 RX ADMIN — IPRATROPIUM BROMIDE AND ALBUTEROL SULFATE 3 ML: .5; 3 SOLUTION RESPIRATORY (INHALATION) at 20:09

## 2025-02-02 RX ADMIN — BISACODYL 10 MG: 10 SUPPOSITORY RECTAL at 09:13

## 2025-02-02 RX ADMIN — BUDESONIDE 0.5 MG: 0.5 INHALANT RESPIRATORY (INHALATION) at 20:09

## 2025-02-02 RX ADMIN — METOPROLOL TARTRATE 5 MG: 1 INJECTION, SOLUTION INTRAVENOUS at 00:46

## 2025-02-02 RX ADMIN — IPRATROPIUM BROMIDE AND ALBUTEROL SULFATE 3 ML: .5; 3 SOLUTION RESPIRATORY (INHALATION) at 15:53

## 2025-02-02 ASSESSMENT — PAIN - FUNCTIONAL ASSESSMENT
PAIN_FUNCTIONAL_ASSESSMENT: CPOT (CRITICAL CARE PAIN OBSERVATION TOOL)

## 2025-02-02 NOTE — PROGRESS NOTES
"Faustina Vick \"Jannie\" is a 64 y.o. female who presents for Primary cancer of right upper lobe of lung (Multi) [C34.11].  She initially received chemotherapy and radiation, but appeared to have residual radiographic abnormalities.  PET/CT suggested progression of disease, and in this setting and Dr. Wagner felt that surgical resection in a \"salvage\" setting might facilitate long-term overall survival given the failure of nonsurgical management. She is now  20 days post op from PNEUMONECTOMY (Right) (en bloc with chest wall resection and reconstruction), mediastinal lymph node dissection for a  Primary cancer of right upper lobe of lung (Multi).      Subjective   NAEO. Pain controlled.     Objective     General: Resting in bed, NAD  HEENT: Trach in place, no surrounding drainage or bleeding  Resp: symmetric chest rise, on CPAP via trach, FiO2 40%  CV: NSR, intermittently tachycardic  : yoo draining clear yellow urine  Abd: soft, ND  MSK: PERALES  Neuro: awake and alert, no focal deficits    Last Recorded Vitals  Blood pressure 136/80, pulse (!) 150, temperature 36.8 °C (98.2 °F), temperature source Temporal, resp. rate 24, height 1.651 m (5' 5\"), weight 55 kg (121 lb 4.1 oz), SpO2 97%.  Intake/Output last 3 Shifts:  I/O last 3 completed shifts:  In: 1830 (33.3 mL/kg) [NG/GT:1830]  Out: 3005 (54.6 mL/kg) [Urine:3005 (1.5 mL/kg/hr)]  Weight: 55 kg     Relevant Results  Scheduled medications  bisacodyl, 10 mg, rectal, Daily  budesonide, 0.5 mg, nebulization, BID  clonazePAM, 0.5 mg, g-tube, q8h  [Held by provider] digoxin, 250 mcg, intravenous, Once  docusate sodium, 100 mg, g-tube, BID  esomeprazole, 40 mg, g-tube, Daily before breakfast  heparin (porcine), 5,000 Units, subcutaneous, q8h  insulin lispro, 0-5 Units, subcutaneous, q4h  ipratropium-albuteroL, 3 mL, nebulization, q6h  lidocaine, 1 patch, transdermal, Daily  metoprolol tartrate, 12.5 mg, g-tube, BID  midodrine, 10 mg, g-tube, q8h  oxygen, , inhalation, " Continuous - Inhalation  polyethylene glycol, 17 g, g-tube, Daily  senna, 5 mL, g-tube, Nightly  silodosin, 4 mg, g-tube, q24h  simethicone, 40 mg, g-tube, q12h  thiamine, 100 mg, g-tube, Daily  traZODone, 25 mg, g-tube, Nightly      Continuous medications     PRN medications  PRN medications: albuterol, calcium gluconate, calcium gluconate, dextrose, dextrose, glucagon, glucagon, HYDROmorphone, magnesium sulfate, magnesium sulfate, oxyCODONE, oxygen, oxygen, potassium chloride    Results for orders placed or performed during the hospital encounter of 01/13/25 (from the past 24 hours)   POCT GLUCOSE   Result Value Ref Range    POCT Glucose 132 (H) 74 - 99 mg/dL   Blood Gas Arterial Full Panel   Result Value Ref Range    POCT pH, Arterial 7.41 7.38 - 7.42 pH    POCT pCO2, Arterial 57 (H) 38 - 42 mm Hg    POCT pO2, Arterial 88 85 - 95 mm Hg    POCT SO2, Arterial 98 94 - 100 %    POCT Oxy Hemoglobin, Arterial 94.8 94.0 - 98.0 %    POCT Hematocrit Calculated, Arterial 31.0 (L) 36.0 - 46.0 %    POCT Sodium, Arterial 137 136 - 145 mmol/L    POCT Potassium, Arterial 3.9 3.5 - 5.3 mmol/L    POCT Chloride, Arterial 101 98 - 107 mmol/L    POCT Ionized Calcium, Arterial 1.17 1.10 - 1.33 mmol/L    POCT Glucose, Arterial 126 (H) 74 - 99 mg/dL    POCT Lactate, Arterial 0.8 0.4 - 2.0 mmol/L    POCT Base Excess, Arterial 9.8 (H) -2.0 - 3.0 mmol/L    POCT HCO3 Calculated, Arterial 36.1 (H) 22.0 - 26.0 mmol/L    POCT Hemoglobin, Arterial 10.4 (L) 12.0 - 16.0 g/dL    POCT Anion Gap, Arterial 4 (L) 10 - 25 mmo/L    Patient Temperature 37.0 degrees Celsius    FiO2 40 %   POCT GLUCOSE   Result Value Ref Range    POCT Glucose 122 (H) 74 - 99 mg/dL   Urinalysis with Reflex Culture and Microscopic   Result Value Ref Range    Color, Urine Yellow Light-Yellow, Yellow, Dark-Yellow    Appearance, Urine Turbid (N) Clear    Specific Gravity, Urine 1.023 1.005 - 1.035    pH, Urine 6.0 5.0, 5.5, 6.0, 6.5, 7.0, 7.5, 8.0    Protein, Urine 30 (1+) (A)  NEGATIVE, 10 (TRACE), 20 (TRACE) mg/dL    Glucose, Urine Normal Normal mg/dL    Blood, Urine 0.2 (2+) (A) NEGATIVE    Ketones, Urine NEGATIVE NEGATIVE mg/dL    Bilirubin, Urine NEGATIVE NEGATIVE    Urobilinogen, Urine 6 (2+) (A) Normal mg/dL    Nitrite, Urine NEGATIVE NEGATIVE    Leukocyte Esterase, Urine 500 Marry/uL (A) NEGATIVE   Extra Urine Gray Tube   Result Value Ref Range    Extra Tube Hold for add-ons.    Microscopic Only, Urine   Result Value Ref Range    WBC, Urine >50 (A) 1-5, NONE /HPF    WBC Clumps, Urine OCCASIONAL Reference range not established. /HPF    RBC, Urine >20 (A) NONE, 1-2, 3-5 /HPF    Mucus, Urine 1+ Reference range not established. /LPF   POCT GLUCOSE   Result Value Ref Range    POCT Glucose 126 (H) 74 - 99 mg/dL   POCT GLUCOSE   Result Value Ref Range    POCT Glucose 117 (H) 74 - 99 mg/dL   CBC   Result Value Ref Range    WBC 8.5 4.4 - 11.3 x10*3/uL    nRBC 0.0 0.0 - 0.0 /100 WBCs    RBC 3.51 (L) 4.00 - 5.20 x10*6/uL    Hemoglobin 9.9 (L) 12.0 - 16.0 g/dL    Hematocrit 30.8 (L) 36.0 - 46.0 %    MCV 88 80 - 100 fL    MCH 28.2 26.0 - 34.0 pg    MCHC 32.1 32.0 - 36.0 g/dL    RDW 19.3 (H) 11.5 - 14.5 %    Platelets 162 150 - 450 x10*3/uL   Calcium, Ionized   Result Value Ref Range    POCT Calcium, Ionized 1.20 1.1 - 1.33 mmol/L   Magnesium   Result Value Ref Range    Magnesium 2.93 (H) 1.60 - 2.40 mg/dL   Renal function panel   Result Value Ref Range    Glucose 111 (H) 74 - 99 mg/dL    Sodium 142 136 - 145 mmol/L    Potassium 3.9 3.5 - 5.3 mmol/L    Chloride 99 98 - 107 mmol/L    Bicarbonate 35 (H) 21 - 32 mmol/L    Anion Gap 12 10 - 20 mmol/L    Urea Nitrogen 15 6 - 23 mg/dL    Creatinine <0.20 (L) 0.50 - 1.05 mg/dL    eGFR      Calcium 8.7 8.6 - 10.6 mg/dL    Phosphorus 2.9 2.5 - 4.9 mg/dL    Albumin 3.1 (L) 3.4 - 5.0 g/dL   POCT GLUCOSE   Result Value Ref Range    POCT Glucose 116 (H) 74 - 99 mg/dL   POCT GLUCOSE   Result Value Ref Range    POCT Glucose 143 (H) 74 - 99 mg/dL     CXR  2/2:    IMPRESSION:  1. No significant change in patchy left lung airspace opacities,  which may represent edema/infiltrates.  2. Stable postsurgical changes related to right-sided pneumonectomy.       Assessment/Plan   Assessment & Plan  Primary cancer of right upper lobe of lung (Multi)    Chronic obstructive pulmonary disease (Multi)    Malignant neoplasm of upper lobe of right lung (Multi)    Cardiogenic shock (Multi)    Respiratory distress    Faustina Vick (Jannie) is a 64F with Hx of Stage III lung cancer, asthma, anxiety, and arthritis who is now s/p R pneumonectomy with Dr. Wagner 1/13/25. 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 Cardiology was consulted and recommended Lasix gtt and milrinone. Lasix gtt held 1/17, hyponatremia improving. Patient underwent RHC and SGC placement with cardiology 1/17. She was reintubated on 1/18 d/t worsening respiratory status and AMS. Also had increased pressor requirements, now weaned off. Repeat echo stable on 1/18. COVID positive on 1/20.     Failed extubation trials, now s/p trach/PEG with Dr. Wagner 1/30.     - Continue & appreciate supportive ICU care  - Continue TFs  - Pulmonary toilet  - PT/OT for rehabilitation   - Please place duoderm under trach plate to prevent erosion    Patient seen with attending Dr. Wagner.    Brisa Szymanski MD  Thoracic Surgery [id number]

## 2025-02-02 NOTE — PROGRESS NOTES
"Faustina Vick \"Noam" is a 64 y.o. female on day 20 of admission presenting with Primary cancer of right upper lobe of lung (Multi).    Subjective   Continued episodes stable tachycardia 150s; c/f possible Aflutter on tele but appears sinus tach on 12 lead.     Objective   Physical Exam  Vitals reviewed.   Constitutional:       Appearance: She is underweight.      Interventions: She is sedated and restrained. She is not intubated.     Comments: Deconditioned, lying in ICU bed   HENT:      Head: Normocephalic and atraumatic.      Nose: Nose normal.      Mouth/Throat:      Mouth: Mucous membranes are moist.   Eyes:      Extraocular Movements: Extraocular movements intact.      Pupils: Pupils are equal, round, and reactive to light.   Cardiovascular:      Rate and Rhythm: Normal rate and regular rhythm.      Pulses: Normal pulses.      Heart sounds: Normal heart sounds.   Pulmonary:      Effort: Pulmonary effort is normal. She is not intubated.      Breath sounds: No rhonchi (left side).      Comments: Vented via trache CPAP PS 5 PEEP 5 this morning; breath sounds slightly coarse compared to yesterday  Chest:      Comments: Right lateral chest dressing c/d/i.  Abdominal:      General: There is no distension.      Palpations: Abdomen is soft.      Comments: Less pain to palpation on exam today; PEG appears intact, adequate laxity, normal skin surrounding.    Genitourinary:     Comments: Montiel in place - darker concentrated urine with some sediment   Musculoskeletal:         General: No swelling or tenderness. Normal range of motion.      Cervical back: Normal range of motion and neck supple.   Skin:     General: Skin is warm and dry.   Neurological:      General: No focal deficit present.      Mental Status: She is alert and easily aroused.      GCS: GCS eye subscore is 3. GCS verbal subscore is 1. GCS motor subscore is 6.      Motor: Weakness (generalized) present.      Comments: Following commands, moves extremities " "equally.    Psychiatric:         Mood and Affect: Mood normal.         Behavior: Behavior normal.      Comments: In poor spirits       Last Recorded Vitals  Blood pressure 134/84, pulse (!) 118, temperature 36.5 °C (97.7 °F), temperature source Temporal, resp. rate 25, height 1.651 m (5' 5\"), weight 55 kg (121 lb 4.1 oz), SpO2 96%.    VS over last 24h  Heart Rate:  []   Temp:  [35.8 °C (96.4 °F)-37.2 °C (99 °F)]   Resp:  [12-27]   BP: (108-134)/(68-92)   Height:  [165.1 cm (5' 5\")]   Weight:  [55 kg (121 lb 4.1 oz)]   SpO2:  [93 %-98 %]      Intake/Output last 3 Shifts:  I/O last 3 completed shifts:  In: 1830 (33.3 mL/kg) [NG/GT:1830]  Out: 3005 (54.6 mL/kg) [Urine:3005 (1.5 mL/kg/hr)]  Weight: 55 kg       Intake/Output Summary (Last 24 hours) at 2/2/2025 0842  Last data filed at 2/2/2025 0700  Gross per 24 hour   Intake 1090 ml   Output 1830 ml   Net -740 ml        Relevant Results  Scheduled medications  bisacodyl, 10 mg, rectal, Daily  budesonide, 0.5 mg, nebulization, BID  clonazePAM, 0.5 mg, g-tube, q8h  docusate sodium, 100 mg, g-tube, BID  esomeprazole, 40 mg, g-tube, Daily before breakfast  heparin (porcine), 5,000 Units, subcutaneous, q8h  insulin lispro, 0-5 Units, subcutaneous, q4h  ipratropium-albuteroL, 3 mL, nebulization, q6h  lidocaine, 1 patch, transdermal, Daily  midodrine, 10 mg, g-tube, q8h  oxygen, , inhalation, Continuous - Inhalation  polyethylene glycol, 17 g, g-tube, Daily  senna, 5 mL, g-tube, Nightly  silodosin, 4 mg, g-tube, q24h  thiamine, 100 mg, g-tube, Daily  traZODone, 25 mg, g-tube, Nightly      PRN medications  PRN medications: albuterol, calcium gluconate, calcium gluconate, dextrose, dextrose, glucagon, glucagon, HYDROmorphone, magnesium sulfate, magnesium sulfate, oxyCODONE, oxygen, oxygen, potassium chloride      Results for orders placed or performed during the hospital encounter of 01/13/25 (from the past 24 hours)   POCT GLUCOSE   Result Value Ref Range    POCT Glucose " 132 (H) 74 - 99 mg/dL   Blood Gas Arterial Full Panel   Result Value Ref Range    POCT pH, Arterial 7.41 7.38 - 7.42 pH    POCT pCO2, Arterial 57 (H) 38 - 42 mm Hg    POCT pO2, Arterial 88 85 - 95 mm Hg    POCT SO2, Arterial 98 94 - 100 %    POCT Oxy Hemoglobin, Arterial 94.8 94.0 - 98.0 %    POCT Hematocrit Calculated, Arterial 31.0 (L) 36.0 - 46.0 %    POCT Sodium, Arterial 137 136 - 145 mmol/L    POCT Potassium, Arterial 3.9 3.5 - 5.3 mmol/L    POCT Chloride, Arterial 101 98 - 107 mmol/L    POCT Ionized Calcium, Arterial 1.17 1.10 - 1.33 mmol/L    POCT Glucose, Arterial 126 (H) 74 - 99 mg/dL    POCT Lactate, Arterial 0.8 0.4 - 2.0 mmol/L    POCT Base Excess, Arterial 9.8 (H) -2.0 - 3.0 mmol/L    POCT HCO3 Calculated, Arterial 36.1 (H) 22.0 - 26.0 mmol/L    POCT Hemoglobin, Arterial 10.4 (L) 12.0 - 16.0 g/dL    POCT Anion Gap, Arterial 4 (L) 10 - 25 mmo/L    Patient Temperature 37.0 degrees Celsius    FiO2 40 %   POCT GLUCOSE   Result Value Ref Range    POCT Glucose 122 (H) 74 - 99 mg/dL   Urinalysis with Reflex Culture and Microscopic   Result Value Ref Range    Color, Urine Yellow Light-Yellow, Yellow, Dark-Yellow    Appearance, Urine Turbid (N) Clear    Specific Gravity, Urine 1.023 1.005 - 1.035    pH, Urine 6.0 5.0, 5.5, 6.0, 6.5, 7.0, 7.5, 8.0    Protein, Urine 30 (1+) (A) NEGATIVE, 10 (TRACE), 20 (TRACE) mg/dL    Glucose, Urine Normal Normal mg/dL    Blood, Urine 0.2 (2+) (A) NEGATIVE    Ketones, Urine NEGATIVE NEGATIVE mg/dL    Bilirubin, Urine NEGATIVE NEGATIVE    Urobilinogen, Urine 6 (2+) (A) Normal mg/dL    Nitrite, Urine NEGATIVE NEGATIVE    Leukocyte Esterase, Urine 500 Marry/uL (A) NEGATIVE   Extra Urine Gray Tube   Result Value Ref Range    Extra Tube Hold for add-ons.    Microscopic Only, Urine   Result Value Ref Range    WBC, Urine >50 (A) 1-5, NONE /HPF    WBC Clumps, Urine OCCASIONAL Reference range not established. /HPF    RBC, Urine >20 (A) NONE, 1-2, 3-5 /HPF    Mucus, Urine 1+ Reference range  not established. /LPF   POCT GLUCOSE   Result Value Ref Range    POCT Glucose 126 (H) 74 - 99 mg/dL   POCT GLUCOSE   Result Value Ref Range    POCT Glucose 117 (H) 74 - 99 mg/dL   CBC   Result Value Ref Range    WBC 8.5 4.4 - 11.3 x10*3/uL    nRBC 0.0 0.0 - 0.0 /100 WBCs    RBC 3.51 (L) 4.00 - 5.20 x10*6/uL    Hemoglobin 9.9 (L) 12.0 - 16.0 g/dL    Hematocrit 30.8 (L) 36.0 - 46.0 %    MCV 88 80 - 100 fL    MCH 28.2 26.0 - 34.0 pg    MCHC 32.1 32.0 - 36.0 g/dL    RDW 19.3 (H) 11.5 - 14.5 %    Platelets 162 150 - 450 x10*3/uL   Calcium, Ionized   Result Value Ref Range    POCT Calcium, Ionized 1.20 1.1 - 1.33 mmol/L   Magnesium   Result Value Ref Range    Magnesium 2.93 (H) 1.60 - 2.40 mg/dL   Renal function panel   Result Value Ref Range    Glucose 111 (H) 74 - 99 mg/dL    Sodium 142 136 - 145 mmol/L    Potassium 3.9 3.5 - 5.3 mmol/L    Chloride 99 98 - 107 mmol/L    Bicarbonate 35 (H) 21 - 32 mmol/L    Anion Gap 12 10 - 20 mmol/L    Urea Nitrogen 15 6 - 23 mg/dL    Creatinine <0.20 (L) 0.50 - 1.05 mg/dL    eGFR      Calcium 8.7 8.6 - 10.6 mg/dL    Phosphorus 2.9 2.5 - 4.9 mg/dL    Albumin 3.1 (L) 3.4 - 5.0 g/dL   POCT GLUCOSE   Result Value Ref Range    POCT Glucose 116 (H) 74 - 99 mg/dL       XR chest 1 view   Final Result   1. No significant change in patchy left lung airspace opacities,   which may represent edema/infiltrates.   2. Stable postsurgical changes related to right-sided pneumonectomy.   3. Medical devices as above.        Signed by: Real Rashid 2/2/2025 8:23 AM   Dictation workstation:   [id number]      XR abdomen 1 view   Final Result   1. Interval placement of percutaneous gastrostomy tube which overlies   the left upper quadrant, within expected location of gastric body.   2. Nonobstructive bowel gas pattern.        I personally reviewed the images/study and I agree with the findings   as stated by Jonah Velez MD (Radiology Resident).   This study was interpreted at [facility]  [place], [place].        MACRO:   None        Signed by: Real Rashid 2/1/2025 1:20 PM   Dictation workstation:   [id number]      XR chest 1 view   Final Result   1. Stable to slightly improved patchy left lung airspace opacities,   which may represent edema/infiltrates.   2. Stable postsurgical changes related to right-sided pneumonectomy.   3. Medical devices as above.        Signed by: Real Rashid 2/1/2025 6:54 AM   Dictation workstation:   [id number]      XR chest 1 view   Final Result   1.  Redemonstration of postsurgical changes of right pneumonectomy.   When compared to the prior exam, the opacities in the left lung most   prominently in the left mid to lower lung are more confluent.   Findings may represent a consolidative process such as pneumonia or   aspiration pneumonitis with a component of pulmonary vascular   congestion. There is possible small left pleural effusion and left   basilar atelectasis.        I personally reviewed the images/study and I agree with the findings   as stated by resident physician Dr. Yair Deng . This study   was interpreted at [place], [place].        MACRO:   None        Signed by: Nino Barbour 1/31/2025 12:27 PM   Dictation workstation:   [id number]      XR chest 1 view   Final Result   1. Interval slightly increased patchy opacities in the left lower   lung field with blunting of the left costophrenic angle. Findings may   represent small pleural effusion with atelectasis/infiltrate. There   is mild pulmonary vascular congestion.   2. Complete opacification of the right hemithorax representing   postsurgical changes of right pneumonectomy.   3. Interval extubation with placement of a tracheostomy tube.        I personally reviewed the images/study and I agree with the findings   as stated by resident physician Dr. Yair Deng . This study   was interpreted at  [place], [place].        MACRO:   None        Signed by: Oseas Stroud 1/30/2025 4:36 PM   Dictation workstation:   [id number]      XR chest 1 view   Final Result   1. Similar mild interstitial edema, small left pleural effusion, and   left basilar atelectasis.   2. Postsurgical changes of right pneumonectomy.   3. Medical devices as above.             I personally reviewed the images/study and I agree with the findings   as stated above by resident physician, Ankit Varela MD. This study   was interpreted at [place], [place].        MACRO:   None.        Signed by: Oseas Stroud 1/30/2025 9:31 AM   Dictation workstation:   [id number]      XR chest 1 view   Final Result   1. Postoperative changes and medical devices as above.   2. Similar mild interstitial edema and left basilar presumed   atelectasis with small pleural effusion.   3. Persistent complete opacification of right hemithorax, correlating   with patient's history of pneumonectomy.        Signed by: Real Rashid 1/29/2025 7:28 AM   Dictation workstation:   [id number]      XR chest 1 view   Final Result   1.  Unchanged aeration of the lungs with complete opacification of   the right hemithorax and coarse interstitial markings throughout the   left lung with left basilar atelectasis. Superimposed infection is   not excluded.        I personally reviewed the images/study and I agree with the findings   as stated by resident physician Dr. Yair Deng . This study   was interpreted at [place], [place].        MACRO:   None        Signed by: Oseas Stroud 1/28/2025 9:31 AM   Dictation workstation:   [id number]      Transthoracic Echo (TTE) Limited   Final Result      XR chest 1 view   Final Result   1.  Interval improvement in the left-sided edema and patchy airspace   opacities. Small left-sided pleural effusion.    2. Postsurgical changes of right-sided pneumonectomy.   3. Medical lines and devices as above.        I personally reviewed the images/study and I agree with the findings   as stated by Jonah Velez MD (Radiology Resident).   This study was interpreted at [facility], [place], [place].        MACRO:   None        Signed by: Oseas Stroud 1/27/2025 10:55 AM   Dictation workstation:   [id number]      XR chest 1 view   Final Result   1.  Slight interval worsening in left-sided airspace disease/edema   status post right-sided pneumonectomy.             Signed by: Nino Barbour 1/26/2025 1:16 PM   Dictation workstation:   [id number]      XR chest 1 view   Final Result   1.  Post pneumonectomy changes with continued left perihilar edema.             Signed by: Nino Barbour 1/25/2025 9:51 AM   Dictation workstation:   [id number]      XR chest 1 view   Final Result   1.  Similar left lung pulmonary edema with small left pleural   effusion.   2. Status post right pneumonectomy.   3. [place]-Paco catheter with tip projecting over the left lower lobe,   in similar position.                  MACRO:   None        Signed by: Alexx Anne 1/24/2025 10:24 AM   Dictation workstation:   [id number]      XR chest 1 view   Final Result   Addendum (preliminary) 1 of 1   Interpreted By:  Alexx Anne,    ADDENDUM:   Right IJ approach [place] Paco catheter is also noted with tip   projecting over the left lower lobe in similar position to prior exam.        Signed by: Alexx Anne 1/24/2025 10:25 AM        -------- ORIGINAL REPORT --------   Dictation workstation:   [id number]      Final   1.  Stable findings of pulmonary edema within the left lung.   2. Postsurgical changes of right pneumonectomy.   3. Medical devices as above.                  MACRO:   None        Signed by: Alexx Anne 1/24/2025 9:09 AM   Dictation workstation:   [id number]      XR chest 1 view   Final  Result   1. The tip of the endotracheal tube terminates 7.5 cm of the sina.   2. Slightly decreased  interstitial prominence and airspace opacities   within the left lung when compared to prior radiograph,  suggestive   of improving infectious process or improving pulmonary edema.   3. No significant interval change in postoperative changes from   right-sided pneumonectomy, resection of multiple right-sided ribs,   complete opacification of the pneumonectomy bed, and rightward   mediastinal shift.        I personally reviewed the images/study and I agree with radiology   resident  Dr. Meghan Ramesh findings as stated. This study was   interpreted at [place], [place]        MACRO:   None        Signed by: Oseas Stroud 1/24/2025 8:09 AM   Dictation workstation:   [id number]      XR abdomen 1 view   Final Result   1.  Dobhoff tube is in place with the tip projecting over the 1st   portion of the duodenum.   2. There is gas-filled dilated stomach. A few slight prominent loops   of the bowel in the upper abdomen with overall nonobstructive bowel   gas pattern.   3. Postsurgical changes of right pneumonectomy with resection of ribs   and patchy airspace opacities in the left lung.   4. [place]-Paco catheter projecting over the lower pulmonary artery.   Recommend retraction.        MACRO:   None        Signed by: Oseas Stroud 1/23/2025 10:15 AM   Dictation workstation:   [id number]      XR chest 1 view   Final Result   1. Unchanged appearance of multifocal patchy airspace opacities with   interstitial prominence of the left lung which may be due to   infectious process. Component of edema can not be excluded.   2. Medical devices as described above with similar positioning of the   [place]-Paco catheter projecting over left lower lobe pulmonary artery.   Recommend retraction.   3. Postsurgical changes of right-sided pneumonectomy and resection of   multiple right-sided ribs with  complete opacification of the   pneumonectomy bed. The appearance is unchanged compared to prior   study.                  MACRO:   None        Signed by: Oseas Stroud 1/23/2025 9:36 AM   Dictation workstation:   [id number]      XR abdomen 1 view   Final Result   1.  As described above.        Signed by: Vick Xiao 1/22/2025 1:01 PM   Dictation workstation:   [id number]      XR chest 1 view   Final Result   1. Unchanged appearance of multifocal patchy airspace opacity with   interstitial prominence of the left lung which may be due to   infectious process. Component of edema cannot be excluded.   2. Medical devices as described above with interval advancement of   the [place]-Paco catheter projecting over left lower lobe pulmonary   artery. Recommend retraction.   3. Postsurgical changes of right-sided pneumonectomy and resection of   multiple right-sided ribs with complete opacification of the   pneumonectomy bed. The appearance is unchanged compared to prior   study.                  Signed by: Vick Xiao 1/22/2025 1:00 PM   Dictation workstation:   [id number]      XR chest 1 view   Final Result   1. Similar near-complete opacification of the right hemithorax, which   may be secondary to a large pleural effusion, pneumonectomy, or   bronchial obstruction.   2. Diffuse interstitial opacities and scattered patchy alveolar   opacities throughout the left lung, similar to the prior exam, which   may reflect prominent pulmonary edema or multifocal infection.   3. Medical devices as above.        I personally reviewed the image(s)/study and resident interpretation   as stated by Dr. Ange Sue MD. I agree with the findings as   stated. This study was interpreted at [facility], [place], [place].        MACRO:   None        Signed by: Oseas Stroud 1/21/2025 7:58 PM   Dictation workstation:   [id number]      Vascular US upper extremity venous duplex left    Final Result      Transthoracic Echo (TTE) Limited   Final Result      XR chest 1 view   Final Result   1.  Interval opacification of the right hemithorax and correlate with   post pneumonectomy changes.   2. [place]-Paco catheter overlies the left lower lobe pulmonary artery.   3. Slight interval improvement in left-sided pulmonary edema.   Underlying edema and correlate with fluid status.             Signed by: Nino Barbour 1/20/2025 9:57 AM   Dictation workstation:   [id number]      XR abdomen 1 view   Final Result   1.  As described above.        Signed by: Vick Xiao 1/19/2025 11:58 AM   Dictation workstation:   [id number]      XR chest 1 view   Final Result   1. Slight worsening of multifocal airspace opacity in the left mid   and lower lung. Findings are concerning for infectious process. Small   left pleural effusion.   2. Postsurgical changes right pneumonectomy with medical devices as   described above. Consider retracting the [place]-Paco catheter.                  Signed by: Vick Xiao 1/19/2025 8:55 AM   Dictation workstation:   [id number]      Transthoracic Echo (TTE) Limited   Final Result      XR chest 1 view   Final Result   1. Patchy airspace opacity in the left lower lung with slight   improvement in aeration. Correlate with concern for infection   2. Postsurgical changes of right pneumonectomy as described above.   3. Medical lines and devices as above. Consider retraction of the   [place]-Paco catheter.        I personally reviewed the images/study and I agree with the findings   as stated. This study was interpreted at [place], [place].        MACRO:   None        Signed by: Vick Xiao 1/19/2025 8:54 AM   Dictation workstation:   [id number]      XR chest 1 view   Final Result   1. Worsening aeration of the left lung with increasing left basilar   infiltrate and pleural effusion. Correlate with mucous plugging and    component of lobar collapse.   2. Component of interstitial prominence of the left lung, also   slightly increased which may be due to edema.   3. Postsurgical changes of right pneumonectomy as described                  Signed by: Vick Xiao 1/18/2025 11:35 AM   Dictation workstation:   [id number]      Cardiac Catheterization Procedure   Final Result      Transthoracic Echo (TTE) Limited   Final Result      XR chest 1 view   Final Result   1.  Stable exam with postsurgical changes of right pneumonectomy/rib   resection and large layering fluid component within the right   hemithorax.   2. Similar findings of left pulmonary edema with slightly increased   size of small left pleural effusion. No left-sided pneumothorax.   3. Medical devices as above.                  MACRO:   None        Signed by: Alexx Anne 1/17/2025 8:16 AM   Dictation workstation:   [id number]      XR chest 1 view   Final Result   1.  Postsurgical changes of right-sided pneumonectomy with increased   layering opacification of the right hemithorax, likely due to   increasing fluid.   2. Similar trace left-sided pleural effusion with linear left basilar   airspace opacity, likely atelectasis on a background of likely   pulmonary edema. Infection is not definitively excluded.   3. Medical devices as above with interval insertion of right internal   jugular central venous catheter projecting at the mid SVC.        I personally reviewed the images/study and I agree with the findings   as stated by resident Davidson Mcguire. This study was interpreted   at [facility], [place], [place].        MACRO:   None        Signed by: Alexx Anne 1/17/2025 8:14 AM   Dictation workstation:   [id number]      Transthoracic Echo (TTE) Limited   Final Result      US right upper quadrant   Final Result   Small amount of simple appearing ascites in the right upper quadrant   without evidence of cholelithiasis,  cholecystitis or biliary dilation.        Signed by: Alberto Salas 1/16/2025 1:02 PM   Dictation workstation:   [id number]      XR chest 1 view   Final Result   1. Slight interval increase in left basilar airspace opacity which   may be due to increasing atelectasis. Cannot exclude developing   infectious infiltrate. Question trace left pleural effusion.   2. Again seen postsurgical changes related to right-sided   pneumonectomy as described above                  Signed by: Vick Xiao 1/16/2025 9:47 AM   Dictation workstation:   [id number]      US renal complete   Final Result   1. Unremarkable sonographic evaluation of the kidneys.   2. Incomplete evaluation of a gallbladder with some pericholecystic   fluid and biliary sludge. Dedicated right upper quadrant ultrasound   could be obtained as clinically warranted.   3. A small amount of free fluid present in the right upper quadrant   and pelvis.        Findings relayed by phone by me to SICU provider at 5:11 p.m.        I personally reviewed the images/study and I agree with the findings   as stated by Erickson Best MD (resident) . This study was   interpreted at [place], [place].        MACRO:   None        Signed by: Alberto Salas 1/15/2025 6:17 PM   Dictation workstation:   [id number]      XR chest 1 view   Final Result   1.  Status post pneumonectomy with interval increase in fluid   component of right-sided hydropneumothorax.   2. Medical devices as above.                  MACRO:   None        Signed by: Alexx Anne 1/15/2025 9:28 AM   Dictation workstation:   [id number]      XR chest 1 view   Final Result   1. Interval removal of right chest tube. Other medical devices as   described above.   2. Postsurgical changes related to right pneumonectomy.   3. Faint left basilar opacity, likely atelectatic.                  Signed by: Vick Xiao 1/14/2025 2:53 PM   Dictation  workstation:   [id number]      XR chest 1 view   Final Result   1.  Stable right-sided pneumonectomy changes.             Signed by: Nino Barbour 1/14/2025 12:56 PM   Dictation workstation:   [id number]      Transthoracic Echo (TTE) Limited   Final Result      XR chest 1 view   Final Result   1. Postsurgical changes related to right sided pneumonectomy with   multiple medical devices as described above.   2. Mild interstitial prominence of the left lung.                  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 Wagner on 1/13 requiring post-op vasoactive therapy. Post-op course c/b acute hyponatremia, multifactorial delirium, cardiogenic shock requiring inotropic support, and acute hypoxic/hypercarbic respiratory failure requiring intubation. COVID+ 1/20. Weaned off milrinone 1/21. Weaned off iEpo 1/24. S/p trach/PEG 1/30 now tolerating TF through PEG.     Plan:  NEURO: History of anxiety on alprazolam 1mg qd, last filled 1/4/2025 per [facility] review. Family states she was not taking for several months but then restarted prn about 3 wks prior to surgery for situational anxiety. A&Ox4 at baseline. Multifactorial delirium improved. Currently intubated, off sedation. On scheduled oxycodone, scheduled clonazepam and nightly trazodone. Was OOB to chair 1hr yesterday. Alert and responds to commands, answering questions with nods, reasonable strength upper and lower extremities. Endorsing pain suprapubic and lower abdominal region, motions it's the same as yesterday.   - Last Qtc 530  - If tolerated will get patient OOB to chair  - Continue Trazodone nightly  - Continue Clonazepam 0.5 mg q8h  - Continue Oxycodone  and Dilaudid prn   - Will schedule Tylenol   - Ongoing neuro and pain assessments  - Lidocaine patch to right chest wall  - PT/OT as tolerated   - Pet therapy when able     CV: No history of cardiac disease. Baseline /79, -110. TTE 9/2024 normal biV function, RVSP 10mmHg. Intra-op hypotension requiring vaso and epi. Arrived to SICU on vaso 0.06 units/min, started on levo 0.05 mcg/kg/min. TTE 1/13 with EF 60-65%, low normal RV systolic function. Weaned off vasopressors 1/15. Elevated lactate to 2.9 on 1/16 with SOB and new O2 requirement. Repeat TTE 1/16: acute biV dysfunction with signs of takotsubo cardiomyopathy, EF 34%, mod reduced RV systolic function, mildly elevated RVSP, mild to mod TR. Cardiogenic shock, started milrinone, lasix infusion, and vasopressin.  Initial troponin 928 resolved. Was on pressors and iEPO. Lasix infusion stopped 1/17 given contraction alkalosis. Cath lab 1/17 for RHC/PA cath placement, opening CI 2.9. Repeat TTE 1/20 EF 35% (on iEpo and milrinone). Levo weaned off 1/20. Intermittent sinus tachycardia. Off Milrinone /21; iEpo off 1/24. Vaso weaned off 1/25. Responsive to diuresis. Started midodrine 1/25. PA cath removed 1/26. Repeat TTE this week showed LV EF 38%, There is reduced right ventricular systolic function with continued Takastubo picture with hypo- akinesis of the apical 2/3 of the LV. Overnight: BP stable MAP 70s-90s, brief episodes tachycardia 150s are becoming more prolonged, question of aflutter though 12 lead appears to be sinus tachycardia. Got 2.5, 2.5 then 5 mg metop this am which brought rate to 100s-120s.   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 Arrived to SICU extubated on 10L SFM, weaned to room air 1/14. Chest tube removed 1/14. Acute on chronic hypoxic respiratory failure 1/16. Escalation to Airvo 50L 90% overnight 1/17-1/18. Acute hypercapnia 1/18 with increase WOB, trial of BIPAP with iEPO. S/p trach 1/30. Treated for COVID during this admission. Overnight: Tolerated CPAP peep5, CXR unchanged (mild edema), sounds more coarse.   - Trache collar today as tolerated; CPAP at night   - Will put in consult for speaking valve (possibly tomorrow)  - Add additional hygiene - patient has minimal secretions and is getting scheduled broncho-pulmonary care; coarse sounds could be remaining fluid in lung.   - Wean FiO2 to maintain SpO2 >92%  - Scheduled budesonide/duoneb  - PRN albuterol  - Daily CXR  - ABG prn  - Holding home benzonatate    GI: No GI history. LFTs WNL 12/2/24. Evidence of pericholecystic fluid and biliary sludge on renal US 1/15 but no abdominal symptoms. RUQ US with small ascites, otherwise no acute findings. Abdominal distention this am. Elevated LFTs, now normalized. LBM 1/17. OG tube placed 1/19 for gastric decompression. TFs initiated via OG 1/20, then held on 1/21 due to regurgitation from OG. Dobhoff placed via IRIS and advanced post-pyloric. Tolerating TF at goal rate. Liquid Bms x4 1/25. Cdiff negative 1/25. Last stool on 1/26. S/p PEG placement, tolerating TF. Endorsing continued abdominal and suprapubic pain; abdomen less tender to palpation today.   - Continue TF at goal rate 30ml/hr  - Continue thiamine 100mg IV daily  - Continue IV PPI for GI prophylaxis  - Continue bowel regimen: Miralax, Senna and Suppository   - Continue simethicone via  PEG    : No history of renal disease. Baseline creatinine 0.6. Symptomatic acute hyponatremia (SIADH vs vaso mediated) to 120 overnight 1/14 - 1/15, corrected to 124 after 150cc 3% NaCl x2 on 1/15. Renal US on 1/15 unremarkable. Started on lasix infusion 1/16-1/17. Spot diuresis 1/20 and 1/24. Yoo changed overnight 1/18-1/19. Today: Continues to make good urine output. RFP stable, net even to negative.   - Yoo exchange and UA yesterday ->no growth   - Continue Siladosin once per day with Midodrine, today and tomorrow  - Voiding trial tomorrow   - RFP daily and prn  - Maintain yoo for strict I/Os   - Maintain U/O >0.5ml/kg/hr  - Replete electrolytes to maintain K>4, Phos>2.5, iCal>1.1, Mag>2     HEME: Hx of DOC. Baseline H/H 12/40. Acute blood loss anemia, OR EBL 150mL. Acute coagulopathy with INR elevated to 2.3 2/2 to poor nutritional status. Received IV Vit K 10mg x1 with improved INR thereafter. Transfused 1u PRBC 1/17 and 1u PRBC on 1/18. LUE duplex obtained 1/20 for swelling, negative for DVT. Stable edema in LUE, negative DVT ultrasound 1/20 ultrasound. Hgb 7.4 1/25, transfused 1 RBC with Hgb increase to 10.7 thereafter. Today, CBC stable.   - Check CBC daily and PRN  - Coags PRN  - Goal Hgb >8  - SCDs and SQH for DVT ppx  - Ongoing monitoring for s/s bleeding  - Maintain active T&S (1/29)     ENDO: No history of DM or thyroid disease. TSH 0.48 (1/17). Recent 5d course of prednisone 10mg 12/2024 for URI. Adequate glycemic control. Started on SDS out of OR 1/13. Transitioned to dexAMETHasone 1/22 for COVID, end date 1/29. BG have been wnl.   - BG q4h, SSI #1     MSK: Arthritis, Vit D deficiency  - Not on meds at home  - Monitor for symptoms     ID:  Worsened clinical condition 1/17, started on vanco, zosyn and azithromycin. Blood cultures, MRSA and Respiratory viral panel negative 1/17. Yoo changed 1/30, Urine cx 1/18 negative. Added fluc overnight 1/18-1/19. Sputum culture 1/19 negative. Vancomycin  discontinued 1/20. Urine strep/legionella 1/20 negative. COVID+ on 1/20, initiated remdesivir and decadron. Off precautions. Liquid stools last week;C.diff negative 1/26. Zosyn completed 1/26. Currently afebrile, no leukocytosis.   - Temp q4h, wbc daily  - Ongoing monitoring for s/s infection     Access:  - R axillary a line (1/28-)  - Left chest mediport (not currently accessed)  - PIV x2  - Montiel (1/30-)  - Trach (1/30)  - PEG (1/30)     Dispo: Continue ICU care. Patient seen and discussed with ICU attending Dr. Solorio     Code Status: Full Code     Family: son Yg and  Maximilian updated over the phone. Will further update at bedside if they visit today and prn.    SICU Phone [phone]    Maricruz Silva MD   Anesthesiology, CA-2

## 2025-02-02 NOTE — CARE PLAN
Problem: Pain - Adult  Goal: Verbalizes/displays adequate comfort level or baseline comfort level  Outcome: Progressing     Problem: Safety - Adult  Goal: Free from fall injury  Outcome: Progressing     Problem: Discharge Planning  Goal: Discharge to home or other facility with appropriate resources  Outcome: Progressing     Problem: Chronic Conditions and Co-morbidities  Goal: Patient's chronic conditions and co-morbidity symptoms are monitored and maintained or improved  Outcome: Progressing     Problem: Skin  Goal: Decreased wound size/increased tissue granulation at next dressing change  Outcome: Progressing  Goal: Participates in plan/prevention/treatment measures  Outcome: Progressing  Goal: Prevent/manage excess moisture  Outcome: Progressing  Goal: Prevent/minimize sheer/friction injuries  Outcome: Progressing  Goal: Promote/optimize nutrition  Outcome: Progressing  Goal: Promote skin healing  Outcome: Progressing     Problem: Pain  Goal: Takes deep breaths with improved pain control throughout the shift  Outcome: Progressing  Goal: Turns in bed with improved pain control throughout the shift  Outcome: Progressing  Goal: Walks with improved pain control throughout the shift  Outcome: Progressing  Goal: Performs ADL's with improved pain control throughout shift  Outcome: Progressing  Goal: Participates in PT with improved pain control throughout the shift  Outcome: Progressing  Goal: Free from opioid side effects throughout the shift  Outcome: Progressing  Goal: Free from acute confusion related to pain meds throughout the shift  Outcome: Progressing     Problem: Safety - Medical Restraint  Goal: Remains free of injury from restraints (Restraint for Interference with Medical Device)  Outcome: Progressing  Goal: Free from restraint(s) (Restraint for Interference with Medical Device)  Outcome: Progressing     Problem: Mechanical Ventilation  Goal: Patient Will Maintain Patent Airway  Outcome:  Progressing  Goal: Oral health is maintained or improved  Outcome: Progressing  Goal: Tracheostomy will be managed safely  Outcome: Progressing  Goal: ET tube will be managed safely  Outcome: Progressing  Goal: Ability to express needs and understand communication  Outcome: Progressing  Goal: Mobility/activity is maintained at optimum level for patient  Outcome: Progressing     Problem: Fall/Injury  Goal: Not fall by end of shift  Outcome: Progressing  Goal: Be free from injury by end of the shift  Outcome: Progressing  Goal: Verbalize understanding of personal risk factors for fall in the hospital  Outcome: Progressing  Goal: Verbalize understanding of risk factor reduction measures to prevent injury from fall in the home  Outcome: Progressing  Goal: Use assistive devices by end of the shift  Outcome: Progressing  Goal: Pace activities to prevent fatigue by end of the shift  Outcome: Progressing   The patient's goals for the shift include      The clinical goals for the shift include Patient will remain comfortable and hemodynamically stable throughout the shift.    Over the shift, the patient did not make progress toward the following goals. Barriers to progression include included ventilation support and agitation. Recommendations to address these barriers include manage agitation and wean ventilation as tolerated.

## 2025-02-02 NOTE — PROGRESS NOTES
Subjective Data/Overnight Events:    Patient with recurrent episodes of what appears to be SVT.   Sharp onsent and offset with set HR at 150.   Seems to be more consistent with SVT (likely 1:1 atrial tachycardia rather than sinus tachycardia).     Received multiple IV metop doses overnight.   Off pressors.   Doing well on trach vent.      Repeat echocardiogram 1/27:  CONCLUSIONS:   1. The left ventricular systolic function is moderately decreased, with a Miranda's biplane calculated ejection fraction of 38%.   2. Abnormal left venticular wall motion.   3. Left ventricular cavity size is decreased.   4. There is reduced right ventricular systolic function.       Objective Data:  Last Recorded Vitals:  Vitals:    02/02/25 1430 02/02/25 1500 02/02/25 1600 02/02/25 1602   BP:  104/71 109/69    BP Location:   Left arm    Patient Position:   Lying    Pulse: 110 108 108 106   Resp: 24 20 21 20   Temp:    36.3 °C (97.3 °F)   TempSrc:    Temporal   SpO2: 100% 97% 97% 97%   Weight:       Height:           Last Labs:  CBC - 2/2/2025: 12:52 AM  8.5 9.9 162    30.8      CMP - 2/2/2025:  2:35 AM  8.7 5.3 30 --- 0.7   2.9 3.1 43 56      PTT - 1/31/2025:  3:02 AM  1.2   13.2 31     TROPHS   Date/Time Value Ref Range Status   01/23/2025 08:51 PM 48 0 - 34 ng/L Final   01/23/2025 03:32 PM 51 0 - 34 ng/L Final   01/16/2025 11:54  0 - 34 ng/L Final     Comment:     Previous result verified on 1/16/2025 1530 on specimen/case [id number] called with component [facility] for procedure Troponin I, High Sensitivity with value 928 ng/L.     BNP   Date/Time Value Ref Range Status   01/16/2025 03:57  0 - 99 pg/mL Final      Last I/O:  I/O last 3 completed shifts:  In: 1830 (33.3 mL/kg) [NG/GT:1830]  Out: 3005 (54.6 mL/kg) [Urine:3005 (1.5 mL/kg/hr)]  Weight: 55 kg       Inpatient Medications:  Scheduled medications   Medication Dose Route Frequency    acetaminophen  1,000 mg intravenous q6h WILMER    bisacodyl  10 mg rectal Daily     budesonide  0.5 mg nebulization BID    cefTRIAXone  1 g intravenous q24h    clonazePAM  0.5 mg g-tube q8h    [Held by provider] digoxin  250 mcg intravenous Once    docusate sodium  100 mg g-tube BID    esomeprazole  40 mg g-tube Daily before breakfast    heparin (porcine)  5,000 Units subcutaneous q8h    insulin lispro  0-5 Units subcutaneous q4h    ipratropium-albuteroL  3 mL nebulization q6h    lidocaine  1 patch transdermal Daily    metoprolol tartrate  12.5 mg g-tube BID    midodrine  10 mg g-tube q8h    oxygen   inhalation Continuous - Inhalation    polyethylene glycol  17 g g-tube Daily    senna  5 mL g-tube Nightly    silodosin  4 mg oral q24h    simethicone  40 mg g-tube q12h    thiamine  100 mg g-tube Daily    traZODone  25 mg g-tube Nightly     PRN medications   Medication    albuterol    calcium gluconate    calcium gluconate    dextrose    dextrose    glucagon    glucagon    HYDROmorphone    magnesium sulfate    magnesium sulfate    oxyCODONE    oxygen    oxygen    potassium chloride     Continuous Medications   Medication Dose Last Rate       Physical Exam:  General: NAD  HEENT: EOMI, MMM, no LAD, no thyroid nodule or enlargement.   Neck: -JVD, supple  Cardiac: Normal S, S2. No murmurs noted  Lungs:  Good bilateral air entry, clear lungs bilaterally  Abdomen: Soft, non-tender, Non-distended   Extremities: No LE edema   Neuro: no apparent focal deficits       Assessment/Plan   Jannie Vick is a 64 y.o. female with PMH significant for DEEPAK, tobacco use disorder, stage III squamous cell carcinoma of right lung patient presented to [facility] on 113 for scheduled right pneumonectomy, intrapericardial with en bloc wall resection by Dr. Jonah Wagner.  Course complicated by prolonged intubation, shock and cardiomyopathy.      #ADHF 2/2 Takotsubo cardiomyopathy (EF 25-30%)   #Biventricular Takotsubo cardiomyopathy 2/2 pneumonectomy    Updates:  Repeat echocardiogram with EF 38%  Off pressors.   Doing well on  trach vent.     Patient with recurrent episodes of what appears to be SVT.   Sharp onsent and offset with set HR at 150.   Seems to be more consistent with SVT (likely 1:1 atrial tachycardia rather than sinus tachycardia).     Recommendations:  - Would discontinue IV metop   - May start low dose metop tartrate 12.5 mg BID if okay from a hemodynamic and surgical standpoint   - If tolerating well with no hemodynamic issues, may uptitrate  - Will review with EP tracings to confirm if this is 1:1 Atach rather than sinus tach  - Will need initiation of GDMT at some point and when able to tolerate from hemodynamic standpoint      Case discussed with Dr. Pereyra  Cardiology will continue to follow    I spent 30 minutes in the professional and overall care of this patient.    Darrick Livingston MD

## 2025-02-03 ENCOUNTER — APPOINTMENT (OUTPATIENT)
Dept: CARDIOLOGY | Facility: HOSPITAL | Age: 65
End: 2025-02-03
Payer: COMMERCIAL

## 2025-02-03 ENCOUNTER — APPOINTMENT (OUTPATIENT)
Dept: RADIOLOGY | Facility: HOSPITAL | Age: 65
End: 2025-02-03
Payer: COMMERCIAL

## 2025-02-03 LAB
ALBUMIN SERPL BCP-MCNC: 3.2 G/DL (ref 3.4–5)
ANION GAP SERPL CALC-SCNC: 11 MMOL/L (ref 10–20)
AORTIC VALVE PEAK VELOCITY: 1.17 M/S
APTT PPP: 39 SECONDS (ref 27–38)
AV PEAK GRADIENT: 5 MMHG
AVA (PEAK VEL): 2.4 CM2
BACTERIA UR CULT: ABNORMAL
BUN SERPL-MCNC: 17 MG/DL (ref 6–23)
CA-I BLD-SCNC: 1.18 MMOL/L (ref 1.1–1.33)
CALCIUM SERPL-MCNC: 9.2 MG/DL (ref 8.6–10.6)
CHLORIDE SERPL-SCNC: 99 MMOL/L (ref 98–107)
CO2 SERPL-SCNC: 35 MMOL/L (ref 21–32)
CREAT SERPL-MCNC: <0.2 MG/DL (ref 0.5–1.05)
EGFRCR SERPLBLD CKD-EPI 2021: ABNORMAL ML/MIN/{1.73_M2}
EJECTION FRACTION APICAL 4 CHAMBER: 73.4
EJECTION FRACTION: 63 %
ERYTHROCYTE [DISTWIDTH] IN BLOOD BY AUTOMATED COUNT: 19 % (ref 11.5–14.5)
FUNGUS SPEC CULT: NORMAL
FUNGUS SPEC FUNGUS STN: NORMAL
GLUCOSE BLD MANUAL STRIP-MCNC: 101 MG/DL (ref 74–99)
GLUCOSE BLD MANUAL STRIP-MCNC: 107 MG/DL (ref 74–99)
GLUCOSE BLD MANUAL STRIP-MCNC: 112 MG/DL (ref 74–99)
GLUCOSE BLD MANUAL STRIP-MCNC: 121 MG/DL (ref 74–99)
GLUCOSE BLD MANUAL STRIP-MCNC: 128 MG/DL (ref 74–99)
GLUCOSE SERPL-MCNC: 112 MG/DL (ref 74–99)
HCT VFR BLD AUTO: 30.8 % (ref 36–46)
HGB BLD-MCNC: 9.9 G/DL (ref 12–16)
INR PPP: 1.3 (ref 0.9–1.1)
LEFT ATRIUM VOLUME AREA LENGTH INDEX BSA: 16.9 ML/M2
LEFT VENTRICLE INTERNAL DIMENSION DIASTOLE: 4.2 CM (ref 3.5–6)
LEFT VENTRICULAR OUTFLOW TRACT DIAMETER: 2 CM
MAGNESIUM SERPL-MCNC: 2.09 MG/DL (ref 1.6–2.4)
MCH RBC QN AUTO: 28.1 PG (ref 26–34)
MCHC RBC AUTO-ENTMCNC: 32.1 G/DL (ref 32–36)
MCV RBC AUTO: 88 FL (ref 80–100)
MITRAL VALVE E/A RATIO: 0.9
NRBC BLD-RTO: 0 /100 WBCS (ref 0–0)
PHOSPHATE SERPL-MCNC: 3.1 MG/DL (ref 2.5–4.9)
PLATELET # BLD AUTO: 152 X10*3/UL (ref 150–450)
POTASSIUM SERPL-SCNC: 3.7 MMOL/L (ref 3.5–5.3)
PROTHROMBIN TIME: 14.5 SECONDS (ref 9.8–12.8)
RBC # BLD AUTO: 3.52 X10*6/UL (ref 4–5.2)
RIGHT VENTRICLE FREE WALL PEAK S': 12 CM/S
RIGHT VENTRICLE PEAK SYSTOLIC PRESSURE: 42.1 MMHG
SODIUM SERPL-SCNC: 141 MMOL/L (ref 136–145)
TRICUSPID ANNULAR PLANE SYSTOLIC EXCURSION: 1 CM
WBC # BLD AUTO: 7.5 X10*3/UL (ref 4.4–11.3)

## 2025-02-03 PROCEDURE — 85027 COMPLETE CBC AUTOMATED: CPT

## 2025-02-03 PROCEDURE — 93010 ELECTROCARDIOGRAM REPORT: CPT | Performed by: INTERNAL MEDICINE

## 2025-02-03 PROCEDURE — 82330 ASSAY OF CALCIUM: CPT

## 2025-02-03 PROCEDURE — 2500000004 HC RX 250 GENERAL PHARMACY W/ HCPCS (ALT 636 FOR OP/ED)

## 2025-02-03 PROCEDURE — 2500000002 HC RX 250 W HCPCS SELF ADMINISTERED DRUGS (ALT 637 FOR MEDICARE OP, ALT 636 FOR OP/ED)

## 2025-02-03 PROCEDURE — 2500000005 HC RX 250 GENERAL PHARMACY W/O HCPCS

## 2025-02-03 PROCEDURE — 99291 CRITICAL CARE FIRST HOUR: CPT | Performed by: THORACIC SURGERY (CARDIOTHORACIC VASCULAR SURGERY)

## 2025-02-03 PROCEDURE — 2500000001 HC RX 250 WO HCPCS SELF ADMINISTERED DRUGS (ALT 637 FOR MEDICARE OP)

## 2025-02-03 PROCEDURE — 2500000004 HC RX 250 GENERAL PHARMACY W/ HCPCS (ALT 636 FOR OP/ED): Performed by: NURSE PRACTITIONER

## 2025-02-03 PROCEDURE — 94003 VENT MGMT INPAT SUBQ DAY: CPT

## 2025-02-03 PROCEDURE — 83735 ASSAY OF MAGNESIUM: CPT

## 2025-02-03 PROCEDURE — 71045 X-RAY EXAM CHEST 1 VIEW: CPT | Performed by: RADIOLOGY

## 2025-02-03 PROCEDURE — 2500000001 HC RX 250 WO HCPCS SELF ADMINISTERED DRUGS (ALT 637 FOR MEDICARE OP): Performed by: STUDENT IN AN ORGANIZED HEALTH CARE EDUCATION/TRAINING PROGRAM

## 2025-02-03 PROCEDURE — 85610 PROTHROMBIN TIME: CPT

## 2025-02-03 PROCEDURE — 71045 X-RAY EXAM CHEST 1 VIEW: CPT

## 2025-02-03 PROCEDURE — 82947 ASSAY GLUCOSE BLOOD QUANT: CPT

## 2025-02-03 PROCEDURE — 37799 UNLISTED PX VASCULAR SURGERY: CPT

## 2025-02-03 PROCEDURE — 2020000001 HC ICU ROOM DAILY

## 2025-02-03 PROCEDURE — P9047 ALBUMIN (HUMAN), 25%, 50ML: HCPCS

## 2025-02-03 PROCEDURE — 80069 RENAL FUNCTION PANEL: CPT

## 2025-02-03 PROCEDURE — 2500000004 HC RX 250 GENERAL PHARMACY W/ HCPCS (ALT 636 FOR OP/ED): Performed by: STUDENT IN AN ORGANIZED HEALTH CARE EDUCATION/TRAINING PROGRAM

## 2025-02-03 PROCEDURE — 93306 TTE W/DOPPLER COMPLETE: CPT | Performed by: INTERNAL MEDICINE

## 2025-02-03 PROCEDURE — 99291 CRITICAL CARE FIRST HOUR: CPT

## 2025-02-03 PROCEDURE — 94640 AIRWAY INHALATION TREATMENT: CPT

## 2025-02-03 PROCEDURE — 97530 THERAPEUTIC ACTIVITIES: CPT | Mod: GP | Performed by: PHYSICAL THERAPIST

## 2025-02-03 PROCEDURE — C8929 TTE W OR WO FOL WCON,DOPPLER: HCPCS

## 2025-02-03 PROCEDURE — 2500000005 HC RX 250 GENERAL PHARMACY W/O HCPCS: Performed by: NURSE PRACTITIONER

## 2025-02-03 PROCEDURE — 99233 SBSQ HOSP IP/OBS HIGH 50: CPT | Performed by: STUDENT IN AN ORGANIZED HEALTH CARE EDUCATION/TRAINING PROGRAM

## 2025-02-03 PROCEDURE — 99223 1ST HOSP IP/OBS HIGH 75: CPT | Performed by: STUDENT IN AN ORGANIZED HEALTH CARE EDUCATION/TRAINING PROGRAM

## 2025-02-03 RX ORDER — METOPROLOL TARTRATE 25 MG/1
12.5 TABLET, FILM COATED ORAL ONCE
Status: COMPLETED | OUTPATIENT
Start: 2025-02-03 | End: 2025-02-03

## 2025-02-03 RX ORDER — POTASSIUM CHLORIDE 1.5 G/1.58G
40 POWDER, FOR SOLUTION ORAL ONCE
Status: COMPLETED | OUTPATIENT
Start: 2025-02-03 | End: 2025-02-03

## 2025-02-03 RX ORDER — CEFTRIAXONE 1 G/50ML
1 INJECTION, SOLUTION INTRAVENOUS EVERY 24 HOURS
Status: DISCONTINUED | OUTPATIENT
Start: 2025-02-03 | End: 2025-02-03

## 2025-02-03 RX ORDER — SILODOSIN 4 MG/1
4 CAPSULE ORAL EVERY 24 HOURS
Status: DISCONTINUED | OUTPATIENT
Start: 2025-02-03 | End: 2025-02-06

## 2025-02-03 RX ORDER — ALBUMIN HUMAN 250 G/1000ML
SOLUTION INTRAVENOUS
Status: COMPLETED
Start: 2025-02-03 | End: 2025-02-03

## 2025-02-03 RX ORDER — ALBUMIN HUMAN 250 G/1000ML
12.5 SOLUTION INTRAVENOUS ONCE
Status: COMPLETED | OUTPATIENT
Start: 2025-02-03 | End: 2025-02-03

## 2025-02-03 RX ORDER — DIGOXIN 0.25 MG/ML
125 INJECTION INTRAMUSCULAR; INTRAVENOUS DAILY
Status: DISCONTINUED | OUTPATIENT
Start: 2025-02-03 | End: 2025-02-13 | Stop reason: HOSPADM

## 2025-02-03 RX ADMIN — METOPROLOL TARTRATE 12.5 MG: 25 TABLET, FILM COATED ORAL at 20:42

## 2025-02-03 RX ADMIN — TRAZODONE HYDROCHLORIDE 25 MG: 50 TABLET ORAL at 20:41

## 2025-02-03 RX ADMIN — CLONAZEPAM 0.5 MG: 0.5 TABLET ORAL at 08:04

## 2025-02-03 RX ADMIN — Medication 40 PERCENT: at 00:17

## 2025-02-03 RX ADMIN — ACETAMINOPHEN 1000 MG: 10 INJECTION INTRAVENOUS at 01:00

## 2025-02-03 RX ADMIN — POLYETHYLENE GLYCOL 3350 17 G: 17 POWDER, FOR SOLUTION ORAL at 08:04

## 2025-02-03 RX ADMIN — BUDESONIDE 0.5 MG: 0.5 INHALANT RESPIRATORY (INHALATION) at 07:57

## 2025-02-03 RX ADMIN — Medication 40 PERCENT: at 20:10

## 2025-02-03 RX ADMIN — MIDODRINE HYDROCHLORIDE 10 MG: 10 TABLET ORAL at 22:30

## 2025-02-03 RX ADMIN — PIPERACILLIN SODIUM AND TAZOBACTAM SODIUM 4.5 G: 4; .5 INJECTION, SOLUTION INTRAVENOUS at 15:41

## 2025-02-03 RX ADMIN — ACETAMINOPHEN 1000 MG: 10 INJECTION INTRAVENOUS at 06:26

## 2025-02-03 RX ADMIN — CLONAZEPAM 0.5 MG: 0.5 TABLET ORAL at 16:26

## 2025-02-03 RX ADMIN — METOPROLOL TARTRATE 12.5 MG: 25 TABLET, FILM COATED ORAL at 08:49

## 2025-02-03 RX ADMIN — HEPARIN SODIUM 5000 UNITS: 5000 INJECTION INTRAVENOUS; SUBCUTANEOUS at 14:44

## 2025-02-03 RX ADMIN — ESOMEPRAZOLE MAGNESIUM 40 MG: 40 FOR SUSPENSION ORAL at 06:27

## 2025-02-03 RX ADMIN — OXYCODONE HYDROCHLORIDE 2.5 MG: 5 SOLUTION ORAL at 08:48

## 2025-02-03 RX ADMIN — HEPARIN SODIUM 5000 UNITS: 5000 INJECTION INTRAVENOUS; SUBCUTANEOUS at 06:26

## 2025-02-03 RX ADMIN — IPRATROPIUM BROMIDE AND ALBUTEROL SULFATE 3 ML: .5; 3 SOLUTION RESPIRATORY (INHALATION) at 02:31

## 2025-02-03 RX ADMIN — DIGOXIN 125 MCG: 0.25 INJECTION INTRAMUSCULAR; INTRAVENOUS at 16:26

## 2025-02-03 RX ADMIN — ALBUMIN HUMAN 12.5 G: 250 SOLUTION INTRAVENOUS at 13:41

## 2025-02-03 RX ADMIN — IPRATROPIUM BROMIDE AND ALBUTEROL SULFATE 3 ML: .5; 3 SOLUTION RESPIRATORY (INHALATION) at 07:57

## 2025-02-03 RX ADMIN — Medication 40 PERCENT: at 07:57

## 2025-02-03 RX ADMIN — DOCUSATE SODIUM 100 MG: 50 LIQUID ORAL at 08:04

## 2025-02-03 RX ADMIN — Medication 40 PERCENT: at 04:12

## 2025-02-03 RX ADMIN — SILODOSIN 4 MG: 4 CAPSULE ORAL at 14:43

## 2025-02-03 RX ADMIN — METOPROLOL TARTRATE 12.5 MG: 25 TABLET, FILM COATED ORAL at 02:09

## 2025-02-03 RX ADMIN — IPRATROPIUM BROMIDE AND ALBUTEROL SULFATE 3 ML: .5; 3 SOLUTION RESPIRATORY (INHALATION) at 16:33

## 2025-02-03 RX ADMIN — THIAMINE HCL TAB 100 MG 100 MG: 100 TAB at 08:04

## 2025-02-03 RX ADMIN — PIPERACILLIN SODIUM AND TAZOBACTAM SODIUM 4.5 G: 4; .5 INJECTION, SOLUTION INTRAVENOUS at 20:42

## 2025-02-03 RX ADMIN — ALBUMIN HUMAN 12.5 G: 0.25 SOLUTION INTRAVENOUS at 13:41

## 2025-02-03 RX ADMIN — BUDESONIDE 0.5 MG: 0.5 INHALANT RESPIRATORY (INHALATION) at 19:41

## 2025-02-03 RX ADMIN — PERFLUTREN 10 ML OF DILUTION: 6.52 INJECTION, SUSPENSION INTRAVENOUS at 10:29

## 2025-02-03 RX ADMIN — HEPARIN SODIUM 5000 UNITS: 5000 INJECTION INTRAVENOUS; SUBCUTANEOUS at 22:30

## 2025-02-03 RX ADMIN — MIDODRINE HYDROCHLORIDE 10 MG: 10 TABLET ORAL at 14:44

## 2025-02-03 RX ADMIN — LIDOCAINE 4% 1 PATCH: 40 PATCH TOPICAL at 08:05

## 2025-02-03 RX ADMIN — IPRATROPIUM BROMIDE AND ALBUTEROL SULFATE 3 ML: .5; 3 SOLUTION RESPIRATORY (INHALATION) at 19:40

## 2025-02-03 RX ADMIN — POTASSIUM CHLORIDE 40 MEQ: 1.5 POWDER, FOR SOLUTION ORAL at 04:32

## 2025-02-03 RX ADMIN — MIDODRINE HYDROCHLORIDE 10 MG: 10 TABLET ORAL at 08:08

## 2025-02-03 ASSESSMENT — COGNITIVE AND FUNCTIONAL STATUS - GENERAL
STANDING UP FROM CHAIR USING ARMS: TOTAL
MOVING FROM LYING ON BACK TO SITTING ON SIDE OF FLAT BED WITH BEDRAILS: A LOT
MOVING FROM LYING ON BACK TO SITTING ON SIDE OF FLAT BED WITH BEDRAILS: TOTAL
MOVING TO AND FROM BED TO CHAIR: TOTAL
DRESSING REGULAR LOWER BODY CLOTHING: TOTAL
HELP NEEDED FOR BATHING: TOTAL
PERSONAL GROOMING: TOTAL
CLIMB 3 TO 5 STEPS WITH RAILING: TOTAL
TURNING FROM BACK TO SIDE WHILE IN FLAT BAD: TOTAL
WALKING IN HOSPITAL ROOM: TOTAL
MOBILITY SCORE: 7
DAILY ACTIVITIY SCORE: 6
STANDING UP FROM CHAIR USING ARMS: TOTAL
DRESSING REGULAR UPPER BODY CLOTHING: TOTAL
MOBILITY SCORE: 6
MOVING TO AND FROM BED TO CHAIR: TOTAL
EATING MEALS: TOTAL
TOILETING: TOTAL
TURNING FROM BACK TO SIDE WHILE IN FLAT BAD: TOTAL
WALKING IN HOSPITAL ROOM: TOTAL
CLIMB 3 TO 5 STEPS WITH RAILING: TOTAL

## 2025-02-03 ASSESSMENT — PAIN - FUNCTIONAL ASSESSMENT
PAIN_FUNCTIONAL_ASSESSMENT: 0-10

## 2025-02-03 ASSESSMENT — PAIN SCALES - GENERAL
PAINLEVEL_OUTOF10: 0 - NO PAIN
PAINLEVEL_OUTOF10: 5 - MODERATE PAIN
PAINLEVEL_OUTOF10: 0 - NO PAIN

## 2025-02-03 NOTE — PROGRESS NOTES
"Physical Therapy    Physical Therapy Treatment    Patient Name: Faustina Vick \"Noam"  MRN: [id number]  [facility]  Room: 12/12-A  Today's Date: 2/3/2025  Time Calculation  Start Time: 1050  Stop Time: 1113  Time Calculation (min): 23 min         Assessment/Plan   PT Assessment  PT Assessment Results: Decreased strength, Decreased range of motion, Impaired balance, Decreased endurance, Decreased mobility, Decreased safety awareness  Rehab Prognosis: Good  Barriers to Discharge Home: Physical needs, Caregiver assistance  Caregiver Assistance: Caregiver assistance needed per identified barriers - however, level of patient's required assistance exceeds assistance available at home  Physical Needs: Stair navigation into home limited by function/safety, High falls risk due to function or environment, 24hr ADL assistance needed, 24hr mobility assistance needed, Ambulating household distances limited by function/safety  End of Session Communication: Bedside nurse  End of Session Patient Position: Up in chair, Alarm on  PT Plan  Inpatient/Swing Bed or Outpatient: Inpatient  PT Plan  Treatment/Interventions: Bed mobility, Transfer training, Gait training, Balance training, Stair training, Strengthening, Range of motion, Endurance training, Therapeutic exercise, Home exercise program, Therapeutic activity  PT Plan: Ongoing PT  PT Frequency: 4 times per week  PT Discharge Recommendations: Moderate intensity level of continued care  Equipment Recommended upon Discharge: Wheeled walker  PT Recommended Transfer Status: Total assist  PT - OK to Discharge: Yes      General Visit Information:   PT  Visit  PT Received On: 02/03/25  General  Family/Caregiver Present: Yes  Caregiver Feedback: family at bedside, supportive  Prior to Session Communication: Bedside nurse  Patient Position Received: Bed, 3 rail up, Alarm off, not on at start of session  General Comment: Pt supine in bed on arrival, agreeable to therapy. CPAP/PS " on vent. A-line, tele, naila, lyudmila.    Subjective   Precautions:  Precautions  Medical Precautions: Fall precautions, Cardiac precautions, Oxygen therapy device and L/min  Precautions Comment: MAP >65       02/03/25 1050   Vital Signs   Vitals Session Pre PT   Heart Rate 94   Resp 15   SpO2 98 %   /56       Vital Signs Comment: VSS with mobility     Objective   Pain:  Pain Assessment  Pain Assessment: 0-10  0-10 (Numeric) Pain Score: 0 - No pain  Cognition:  Cognition  Overall Cognitive Status: Within Functional Limits  Orientation Level: Oriented X4  Cognition Comments: communicating via head nods 2/2 trach  Coordination:  Movements are Fluid and Coordinated: Yes    Activity Tolerance:  Activity Tolerance  Endurance: Tolerates 10 - 20 min exercise with multiple rests  Early Mobility/Exercise Safety Screen: Proceed with mobilization - No exclusion criteria met  Treatments:  Therapeutic Exercise  Therapeutic Exercise Activity 1: Performed sets of x5-10 LAQs and reverse LAQs (to focus on eccentric control of lowering) in R/LLE while seated EOB.    Therapeutic Activity  Therapeutic Activity Performed: Yes  Therapeutic Activity 1: x10 Mins EOB sitting varying from Mod A to Min A and brief moments of CGA when cued. x1 standing trial 15  sec with Max A x2    Bed Mobility  Bed Mobility: Yes  Bed Mobility 1  Bed Mobility 1: Supine to sitting, Sitting to supine  Level of Assistance 1: Maximum assistance, +2  Bed Mobility Comments 1: HOB elevated + draw sheet  Bed Mobility 2  Bed Mobility  2: Rolling left  Level of Assistance 2: Maximum assistance  Bed Mobility Comments 2: draw sheet    Ambulation/Gait Training  Ambulation/Gait Training Performed: No  Transfers  Transfer: Yes  Transfer 1  Technique 1: Stand to sit  Transfer Level of Assistance 1: Maximum assistance, +2  Trials/Comments 1: Fully upright stance with verbal/tactie cues    Stairs  Stairs: No    Outcome Measures:  [facility] Basic Mobility  Turning from your  back to your side while in a flat bed without using bedrails: A lot  Moving from lying on your back to sitting on the side of a flat bed without using bedrails: Total  Moving to and from bed to chair (including a wheelchair): Total  Standing up from a chair using your arms (e.g. wheelchair or bedside chair): Total  To walk in hospital room: Total  Climbing 3-5 steps with railing: Total  Basic Mobility - Total Score: 7    FSS-ICU  Ambulation: Unable to attempt due to weakness  Rolling: Maximal assistance (performs 25% - 49% of task)  Sitting: Moderate assistance (performs 50 - 74% of task)  Transfer Sit-to-Stand: Total assistance (performs 25% or requires another person)  Transfer Supine-to-Sit: Total assistance (performs 25% or requires another person)  Total Score: 7      Early Mobility/Exercise Safety Screen: Proceed with mobilization - No exclusion criteria met  ICU Mobility Scale: Standing [4]    Education Documentation  Body Mechanics, taught by Allie Akhtar PT at 2/3/2025  3:17 PM.  Learner: Patient  Readiness: Acceptance  Method: Explanation  Response: Demonstrated Understanding, Needs Reinforcement  Comment: Progression of therapy working toward both transfers OOB to chair and also quality of EOB sitting/standing/strengthening.    Home Exercise Program, taught by Allie Akhtar PT at 2/3/2025  3:17 PM.  Learner: Patient  Readiness: Acceptance  Method: Explanation  Response: Demonstrated Understanding, Needs Reinforcement  Comment: Progression of therapy working toward both transfers OOB to chair and also quality of EOB sitting/standing/strengthening.    Mobility Training, taught by Allie Akhtar PT at 2/3/2025  3:17 PM.  Learner: Patient  Readiness: Acceptance  Method: Explanation  Response: Demonstrated Understanding, Needs Reinforcement  Comment: Progression of therapy working toward both transfers OOB to chair and also quality of EOB sitting/standing/strengthening.    Handouts, taught by Allie  Hermilo PT at 2/3/2025  3:17 PM.  Learner: Patient  Readiness: Acceptance  Method: Explanation  Response: Demonstrated Understanding, Needs Reinforcement  Comment: Progression of therapy working toward both transfers OOB to chair and also quality of EOB sitting/standing/strengthening.    Body Mechanics, taught by Allie Akhtar PT at 2/3/2025  3:17 PM.  Learner: Patient  Readiness: Acceptance  Method: Explanation  Response: Demonstrated Understanding, Needs Reinforcement  Comment: Progression of therapy working toward both transfers OOB to chair and also quality of EOB sitting/standing/strengthening.    Precautions, taught by Allie Akhtar PT at 2/3/2025  3:17 PM.  Learner: Patient  Readiness: Acceptance  Method: Explanation  Response: Demonstrated Understanding, Needs Reinforcement  Comment: Progression of therapy working toward both transfers OOB to chair and also quality of EOB sitting/standing/strengthening.    Home Exercise Program, taught by Allie Akhtar PT at 2/3/2025  3:17 PM.  Learner: Patient  Readiness: Acceptance  Method: Explanation  Response: Demonstrated Understanding, Needs Reinforcement  Comment: Progression of therapy working toward both transfers OOB to chair and also quality of EOB sitting/standing/strengthening.    ADL Training, taught by Allie Akhtar PT at 2/3/2025  3:17 PM.  Learner: Patient  Readiness: Acceptance  Method: Explanation  Response: Demonstrated Understanding, Needs Reinforcement  Comment: Progression of therapy working toward both transfers OOB to chair and also quality of EOB sitting/standing/strengthening.    Education Comments  No comments found.        OP EDUCATION:       Encounter Problems       Encounter Problems (Active)       Balance       Pt will demo static sitting balance w/o UE support IND to decrease risk of falls in home environment (Not met)       Start:  01/15/25    Expected End:  02/05/25    Resolved:  01/22/25    Updated to: Pt will maintain  standing balance with Min A using LRAD    Update reason: re-eval         Pt will maintain standing balance with Min A using LRAD (Progressing)       Start:  01/22/25    Expected End:  02/12/25                   Mobility       Pt will mobilize supine to sitting MI to promote functional independence (Not met)       Start:  01/15/25    Expected End:  02/05/25    Resolved:  01/22/25    Updated to: Pt will perform bed mobility with Min A    Update reason: re-eval         Pt will perform bed mobility with Min A (Progressing)       Start:  01/22/25    Expected End:  02/12/25                   Mobility       STG - Patient will ambulate x50 ft with Min A using LRAD (Progressing)       Start:  01/22/25    Expected End:  02/12/25               PT Transfers       Pt will transfer sit to stand w/ LRAD MI to improve functional mobility.  (Not met)       Start:  01/15/25    Expected End:  02/05/25    Resolved:  01/22/25    Updated to: Pt will perform functional transfers with Min A using LRAD    Update reason: Re-eval         Pt will perform functional transfers with Min A using LRAD (Progressing)       Start:  01/22/25    Expected End:  02/12/25                   Pain - Adult            Allie Akhtar PT, DPT

## 2025-02-03 NOTE — PROGRESS NOTES
"Speech-Language Pathology                 Therapy Communication Note    Patient Name: Faustina Vick \"Jannie\"  MRN: [id number]  Department: [facility]  Room: 12/12-A  Today's Date: 2/3/2025     Discipline: Speech Language Pathology    Missed Time: Attempt    Comment: External speaking valve placement attempted. Upon SLP arrival pt being placed back on ventilator support. Per RT, pt with increased HR and increased lethargy after approx 1 hour on trach collar. Will re-attempt speaking valve next date as pt able/schedule permits. Nursing aware.            "

## 2025-02-03 NOTE — PROGRESS NOTES
"Faustina Vick \"Jannie\" is a 64 y.o. female who presents for Primary cancer of right upper lobe of lung (Multi) [C34.11].  She initially received chemotherapy and radiation, but appeared to have residual radiographic abnormalities.  PET/CT suggested progression of disease, and in this setting and Dr. Wagner felt that surgical resection in a \"salvage\" setting might facilitate long-term overall survival given the failure of nonsurgical management. She is now  21 days post op from PNEUMONECTOMY (Right) (en bloc with chest wall resection and reconstruction), mediastinal lymph node dissection for a  Primary cancer of right upper lobe of lung (Multi).      Subjective   NAEO.     Objective     General: Resting in bed, NAD  HEENT: Trach in place, no surrounding drainage or bleeding  Resp: symmetric chest rise, on CPAP via trach, FiO2 40%  CV: NSR, intermittently tachycardic  : yoo draining clear yellow urine  Abd: soft, ND, PEG in place with TF running at 30  MSK: PERALES  Neuro: awake and alert, no focal deficits    Last Recorded Vitals  Blood pressure 103/68, pulse (!) 111, temperature 36.3 °C (97.3 °F), temperature source Temporal, resp. rate 21, height 1.651 m (5' 5\"), weight 55 kg (121 lb 4.1 oz), SpO2 98%.  Intake/Output last 3 Shifts:  I/O last 3 completed shifts:  In: 2060 (37.5 mL/kg) [NG/GT:1660; IV Piggyback:400]  Out: 1740 (31.6 mL/kg) [Urine:1740 (0.9 mL/kg/hr)]  Weight: 55 kg     Relevant Results  Scheduled medications  bisacodyl, 10 mg, rectal, Daily  budesonide, 0.5 mg, nebulization, BID  cefTRIAXone, 1 g, intravenous, q24h  clonazePAM, 0.5 mg, g-tube, q8h  [Held by provider] digoxin, 250 mcg, intravenous, Once  docusate sodium, 100 mg, g-tube, BID  esomeprazole, 40 mg, g-tube, Daily before breakfast  heparin (porcine), 5,000 Units, subcutaneous, q8h  insulin lispro, 0-5 Units, subcutaneous, q4h  ipratropium-albuteroL, 3 mL, nebulization, q6h  lidocaine, 1 patch, transdermal, Daily  metoprolol tartrate, 12.5 " mg, g-tube, BID  midodrine, 10 mg, g-tube, q8h  oxygen, , inhalation, Continuous - Inhalation  perflutren lipid microspheres, 0.5-10 mL of dilution, intravenous, Once in imaging  perflutren protein A microsphere, 0.5 mL, intravenous, Once in imaging  polyethylene glycol, 17 g, g-tube, Daily  senna, 5 mL, g-tube, Nightly  silodosin, 4 mg, oral, q24h  sulfur hexafluoride microsphr, 2 mL, intravenous, Once in imaging  thiamine, 100 mg, g-tube, Daily  traZODone, 25 mg, g-tube, Nightly      Continuous medications     PRN medications  PRN medications: albuterol, calcium gluconate, calcium gluconate, dextrose, dextrose, glucagon, glucagon, HYDROmorphone, magnesium sulfate, magnesium sulfate, oxyCODONE, oxygen, oxygen, potassium chloride    Results for orders placed or performed during the hospital encounter of 01/13/25 (from the past 24 hours)   POCT GLUCOSE   Result Value Ref Range    POCT Glucose 152 (H) 74 - 99 mg/dL   Blood Gas Arterial   Result Value Ref Range    POCT pH, Arterial 7.45 (H) 7.38 - 7.42 pH    POCT pCO2, Arterial 52 (H) 38 - 42 mm Hg    POCT pO2, Arterial 85 85 - 95 mm Hg    POCT SO2, Arterial 98 94 - 100 %    POCT Oxy Hemoglobin, Arterial 95.0 94.0 - 98.0 %    POCT Base Excess, Arterial 10.5 (H) -2.0 - 3.0 mmol/L    POCT HCO3 Calculated, Arterial 36.1 (H) 22.0 - 26.0 mmol/L    Patient Temperature 37.0 degrees Celsius    FiO2 40 %   Blood Gas Arterial Full Panel   Result Value Ref Range    POCT pH, Arterial 7.45 (H) 7.38 - 7.42 pH    POCT pCO2, Arterial 52 (H) 38 - 42 mm Hg    POCT pO2, Arterial 85 85 - 95 mm Hg    POCT SO2, Arterial 98 94 - 100 %    POCT Oxy Hemoglobin, Arterial 95.0 94.0 - 98.0 %    POCT Hematocrit Calculated, Arterial 35.0 (L) 36.0 - 46.0 %    POCT Sodium, Arterial 135 (L) 136 - 145 mmol/L    POCT Potassium, Arterial 3.9 3.5 - 5.3 mmol/L    POCT Chloride, Arterial 101 98 - 107 mmol/L    POCT Ionized Calcium, Arterial 1.25 1.10 - 1.33 mmol/L    POCT Glucose, Arterial 156 (H) 74 - 99  mg/dL    POCT Lactate, Arterial 0.6 0.4 - 2.0 mmol/L    POCT Base Excess, Arterial 10.5 (H) -2.0 - 3.0 mmol/L    POCT HCO3 Calculated, Arterial 36.1 (H) 22.0 - 26.0 mmol/L    POCT Hemoglobin, Arterial 11.7 (L) 12.0 - 16.0 g/dL    POCT Anion Gap, Arterial 2 (L) 10 - 25 mmo/L    Patient Temperature 37.0 degrees Celsius    FiO2 40 %   POCT GLUCOSE   Result Value Ref Range    POCT Glucose 112 (H) 74 - 99 mg/dL   POCT GLUCOSE   Result Value Ref Range    POCT Glucose 124 (H) 74 - 99 mg/dL   Calcium, Ionized   Result Value Ref Range    POCT Calcium, Ionized 1.18 1.1 - 1.33 mmol/L   CBC   Result Value Ref Range    WBC 7.5 4.4 - 11.3 x10*3/uL    nRBC 0.0 0.0 - 0.0 /100 WBCs    RBC 3.52 (L) 4.00 - 5.20 x10*6/uL    Hemoglobin 9.9 (L) 12.0 - 16.0 g/dL    Hematocrit 30.8 (L) 36.0 - 46.0 %    MCV 88 80 - 100 fL    MCH 28.1 26.0 - 34.0 pg    MCHC 32.1 32.0 - 36.0 g/dL    RDW 19.0 (H) 11.5 - 14.5 %    Platelets 152 150 - 450 x10*3/uL   Coagulation Screen   Result Value Ref Range    Protime 14.5 (H) 9.8 - 12.8 seconds    INR 1.3 (H) 0.9 - 1.1    aPTT 39 (H) 27 - 38 seconds   Magnesium   Result Value Ref Range    Magnesium 2.09 1.60 - 2.40 mg/dL   Renal function panel   Result Value Ref Range    Glucose 112 (H) 74 - 99 mg/dL    Sodium 141 136 - 145 mmol/L    Potassium 3.7 3.5 - 5.3 mmol/L    Chloride 99 98 - 107 mmol/L    Bicarbonate 35 (H) 21 - 32 mmol/L    Anion Gap 11 10 - 20 mmol/L    Urea Nitrogen 17 6 - 23 mg/dL    Creatinine <0.20 (L) 0.50 - 1.05 mg/dL    eGFR      Calcium 9.2 8.6 - 10.6 mg/dL    Phosphorus 3.1 2.5 - 4.9 mg/dL    Albumin 3.2 (L) 3.4 - 5.0 g/dL   POCT GLUCOSE   Result Value Ref Range    POCT Glucose 112 (H) 74 - 99 mg/dL   POCT GLUCOSE   Result Value Ref Range    POCT Glucose 107 (H) 74 - 99 mg/dL     CXR 2/2:    IMPRESSION:  1. No significant change in patchy left lung airspace opacities,  which may represent edema/infiltrates.  2. Stable postsurgical changes related to right-sided pneumonectomy.        Assessment/Plan   Assessment & Plan  Primary cancer of right upper lobe of lung (Multi)    Chronic obstructive pulmonary disease (Multi)    Malignant neoplasm of upper lobe of right lung (Multi)    Cardiogenic shock (Multi)    Respiratory distress    Faustina Vick (Jannie) is a 64F with Hx of Stage III lung cancer, asthma, anxiety, and arthritis who is now s/p R pneumonectomy with Dr. Wagner 1/13/25. 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 Cardiology was consulted and recommended Lasix gtt and milrinone. Lasix gtt held 1/17, hyponatremia improving. Patient underwent RHC and SGC placement with cardiology 1/17. She was reintubated on 1/18 d/t worsening respiratory status and AMS. Also had increased pressor requirements, now weaned off. Repeat echo stable on 1/18. COVID positive on 1/20.     Failed extubation trials, now s/p trach/PEG with Dr. Wagner 1/30.     - Continue & appreciate supportive ICU care  - Continue Tfs at goal, appreciate nutrition recs  - Pulmonary toilet  - PT/OT for rehabilitation   - Please place duoderm under trach plate to prevent erosion    Patient discussed with attending Dr. Wagner.    Sarah Miranda MD  Thoracic Surgery [id number]

## 2025-02-03 NOTE — PROGRESS NOTES
"Faustina Kaminski \"Noam" is a 64 y.o. female on day 21 of admission presenting with Primary cancer of right upper lobe of lung (Multi).    Subjective   -Patient seen this morning resting comfortably in bed  -[place] removed 1/26, remained off vaso since 1/25  -On trach, satting well. /68  -Recurrent episodes of tachycardia, AT/Aflutter      Objective     Physical Exam  General: Not in acute distress, alert, cooperative, ill-appearing  HEENT: Normocephalic, atraumatic, EOMI, moist mucous membranes, trach in place  Neck: Neck supple, trachea midline, no evidence of trauma  Cardiovascular: regular rhythm, S1 and S2 appreciated, no murmurs rubs gallops appreciated, distal pulses 2+ bilaterally (radial and dorsalis pedis)  Respiratory: Coarse mechanical breath sounds appreciated bilaterally  GI: Abdomen soft, nondistended, nontender to palpation, bowel sounds present  Extremities: No edema appreciated in lower extremities bilaterally, no cyanosis  Neuro: no focal deficits, strength and sensation intact bilaterally  Skin: Warm and dry, without lesions or rashes    Last Recorded Vitals  Blood pressure 125/80, pulse 98, temperature 36.3 °C (97.3 °F), temperature source Temporal, resp. rate 21, height 1.651 m (5' 5\"), weight 55 kg (121 lb 4.1 oz), SpO2 96%.  Intake/Output last 3 Shifts:  I/O last 3 completed shifts:  In: 2060 (37.5 mL/kg) [NG/GT:1660; IV Piggyback:400]  Out: 1740 (31.6 mL/kg) [Urine:1740 (0.9 mL/kg/hr)]  Weight: 55 kg     Relevant Results  Transthoracic Echo (TTE) Limited    Result Date: 1/17/2025   [facility], [address] [place] [address]                Tel [phone] and Fax [phone] TRANSTHORACIC ECHOCARDIOGRAM REPORT  Patient Name:       FAUSTINA KAMINSKI     Reading Physician:    [id number] Rita Graham MD Study Date:         1/17/2025           Ordering Provider:    [id number] ROLANDO HERRERA"                               MARGO MRN/PID:            [id number]            Fellow: Accession#:         [id number]        Nurse: Date of Birth/Age:  1960 / 64      Sonographer:          Domonique rocha RDCS Gender assigned at  F                   Additional Staff: Birth: Height:             165.10 cm           Admit Date:           1/13/2025 Weight:             52.16 kg            Admission Status:     Inpatient - STAT BSA / BMI:          1.56 m2 / 19.14     Encounter#:           [id number]                     kg/m2 Blood Pressure:     103/74 mmHg         Department Location:  [facility] Study Type:    TRANSTHORACIC ECHO (TTE) LIMITED Diagnosis/ICD: Acute combined systolic (congestive) and diastolic (congestive)                heart failure (CHF)-I50.41 Indication:    Acute systolic/diastolic failure CPT Code:      Echo Limited-28768; Doppler Limited-51316; Color Doppler-72064 Patient History: Pertinent History: Anxiety; Arthritis; Asthma; Hx of squamous cell carcinoma; Hx                    of chemotherapy; Lung cancer; Prior smoker; Known EF of 34%                    (01/16/24) with described regional wall motion abnormalities                    of the LV and RV suggestive of biventricular Takotsubo. Study Detail: The following Echo studies were performed: 2D, M-Mode, Doppler and               color flow. Technically challenging study due to body habitus and               patient lying in supine position. Definity used as a contrast               agent for endocardial border definition. Total contrast used for               this procedure was 1 mL via IV push.  PHYSICIAN INTERPRETATION: Left Ventricle: Left ventricular ejection fraction is severely decreased, by visual estimate at 25-30%. There are multiple left ventricular wall motion abnormalities. The left ventricular cavity size is normal. Left ventricular diastolic filling cannot  be determined, due to E/A wave fusion. There is no definite left ventricular thrombus visualized. Apical and mid LV segments including the RV free wall are completely akinetic. LV Wall Scoring: The entire apex, mid and apical anterior wall, mid and apical anterior septum, mid and apical inferior septum, mid and apical inferior wall, mid inferolateral segment, and mid anterolateral segment are akinetic. All remaining scored segments are normal. Left Atrium: The left atrium is mild to moderately dilated. Right Ventricle: The right ventricle is normal in size. There is reduced right ventricular systolic function. Right Atrium: The right atrium is mildly dilated. Aortic Valve: The aortic valve is structurally normal. The aortic valve dimensionless index is 0.82. There is no evidence of aortic valve regurgitation. The peak instantaneous gradient of the aortic valve is 3 mmHg. The mean gradient of the aortic valve is 2 mmHg. Mitral Valve: The mitral valve is normal in structure. There is trace mitral valve regurgitation. Tricuspid Valve: The tricuspid valve is structurally normal. There is trace tricuspid regurgitation. The Doppler estimated RVSP is within normal limits at 27.0 mmHg. Pulmonic Valve: The pulmonic valve is not well visualized. Pulmonic valve regurgitation was not assessed. Pericardium: Trivial pericardial effusion. Aorta: The aortic root is normal. Systemic Veins: The inferior vena cava appears normal in size. In comparison to the previous echocardiogram(s): Compared with study dated 1/16/2025, no significant change.  CONCLUSIONS:  1. Left ventricular ejection fraction is severely decreased, by visual estimate at 25-30%.  2. Multiple segmental abnormalities exist. See findings.  3. Apical and mid LV segments including the RV free wall are completely akinetic.  4. There are multiple left ventricular wall motion abnormalities.  5. Left ventricular diastolic filling cannot be determined, due to E/A wave  fusion.  6. No left ventricular thrombus visualized.  7. There is reduced right ventricular systolic function.  8. The left atrium is mild to moderately dilated.  9. Right ventricular systolic pressure is within normal limits. 10. Compared with study dated 2025, no significant change. QUANTITATIVE DATA SUMMARY:  2D MEASUREMENTS:          Normal Ranges: Ao Root d:       3.43 cm  (2.0-3.7cm) LAs:             3.03 cm  (2.7-4.0cm) LVEDV Index:     54 ml/m2  RA VOLUME BY A/L METHOD:          Normal Ranges: RA Area A4C:             18.3 cm2  LV SYSTOLIC FUNCTION BY 2D PLANIMETRY (MOD):                      Normal Ranges: EF-A4C View:    19 % (>=55%) EF-A2C View:    22 % EF-Biplane:     19 % EF-Visual:      28 % LV EF Reported: 28 %  LV DIASTOLIC FUNCTION:           Normal Ranges: MV Peak E:             0.99 m/s  (0.7-1.2 m/s) MV e'                  0.091 m/s (>8.0) MV lateral e'          0.12 m/s MV medial e'           0.06 m/s E/e' Ratio:            10.92     (<8.0) MV DT:                 119 msec  (150-240 msec)  AORTIC VALVE:                     Normal Ranges: AoV Vmax:                0.87 m/s (<=1.7m/s) AoV Peak PG:             3.0 mmHg (<20mmHg) AoV Mean P.0 mmHg (1.7-11.5mmHg) LVOT Max Navi:            0.67 m/s (<=1.1m/s) AoV VTI:                 11.40 cm (18-25cm) LVOT VTI:                9.30 cm LVOT Diameter:           1.94 cm  (1.8-2.4cm) AoV Area, VTI:           2.41 cm2 (2.5-5.5cm2) AoV Area,Vmax:           2.26 cm2 (2.5-4.5cm2) AoV Dimensionless Index: 0.82  RIGHT VENTRICLE: RV Basal 3.40 cm RV Mid   2.30 cm RV Major 6.0 cm TAPSE:   11.9 mm RV s'    0.09 m/s  TRICUSPID VALVE/RVSP:          Normal Ranges: Peak TR Velocity:     2.45 m/s RV Syst Pressure:     27 mmHg  (< 30mmHg)  [id number] Rita Graham MD Electronically signed on 2025 at 12:13:06 PM  Wall Scoring  ** Final **     XR chest 1 view    Result Date: 2025  Interpreted By:  Alexx Anne, STUDY: XR CHEST 1 VIEW;  2025  6:40 am   INDICATION: Signs/Symptoms:pneumonectomy.     COMPARISON: Exam dated 01/16/2025   ACCESSION NUMBER(S): [id number]   ORDERING CLINICIAN: GABBY SMITH   FINDINGS: AP radiograph of the chest was provided.   Right IJ approach central venous catheter tip projects over the mid SVC. Left chest wall medication port tip projects over the confluence of the left brachiocephalic vein and SVC. Persistent subcutaneous emphysema overlying the right chest wall.   CARDIOMEDIASTINAL SILHOUETTE: Cardiomediastinal silhouette is stable in size and configuration. Complete obscuration of the right cardiomediastinal contours. Thin curvilinear lucency projecting along the right cardiac border may represent a component of pneumomediastinum.   LUNGS: Status post on bloc pneumonectomy and right rib resection. Similar gradient opacification of the right hemithorax. Similar prominent interstitial markings in the left lung with slightly increased blunting of the left costophrenic angle. Streaky left basilar opacities are most consistent with atelectasis. No evidence of left-sided pneumothorax.   ABDOMEN: No remarkable upper abdominal findings.   BONES: No acute osseous changes.       1.  Stable exam with postsurgical changes of right pneumonectomy/rib resection and large layering fluid component within the right hemithorax. 2. Similar findings of left pulmonary edema with slightly increased size of small left pleural effusion. No left-sided pneumothorax. 3. Medical devices as above.       MACRO: None   Signed by: Alexx Anne 1/17/2025 8:16 AM Dictation workstation:   [id number]    XR chest 1 view    Result Date: 1/17/2025  Interpreted By:  Alexx Anne and Ritchie Brandon STUDY: XR CHEST 1 VIEW;  1/16/2025 7:01 pm   INDICATION: Signs/Symptoms:central line placement.   COMPARISON: Chest x-ray 01/16/2025, 6:41 a.m.   ACCESSION NUMBER(S): [id number]   ORDERING CLINICIAN: GABBY SMITH   FINDINGS: AP radiograph of the chest  was provided.   Leftchest wall MediPort tip projects over the expected location of the upper SVC. Right internal jugular central venous catheter tip projects over the expected location of the mid SVC. There is subcutaneous emphysematous changes along the right lateral chest wall.   CARDIOMEDIASTINAL SILHOUETTE: Cardiomediastinal silhouette is stable in size and configuration. There is again obscuration of the right cardiomediastinal silhouette.   LUNGS: Redemonstration of postsurgical changes compatible with recent history of right pneumonectomy. Gradual, layering opacification of the right hemithorax which is increased when compared to prior examination. There is again trace blunting of the left costophrenic angle with adjacent linear opacities in the left lower lobe and findings of increased interstitial markings in the left lung. No evidence of left-sided pneumothorax.   ABDOMEN: No remarkable upper abdominal findings.   BONES: Resection of multiple upper right-sided ribs. No acute osseous changes.       1.  Postsurgical changes of right-sided pneumonectomy with increased layering opacification of the right hemithorax, likely due to increasing fluid. 2. Similar trace left-sided pleural effusion with linear left basilar airspace opacity, likely atelectasis on a background of likely pulmonary edema. Infection is not definitively excluded. 3. Medical devices as above with interval insertion of right internal jugular central venous catheter projecting at the mid SVC.   I personally reviewed the images/study and I agree with the findings as stated by resident Davidson Mcguire. This study was interpreted at [facility], [place], [place].   MACRO: None   Signed by: Alexx Anne 1/17/2025 8:14 AM Dictation workstation:   [id number]    ECG 12 lead    Result Date: 1/16/2025  Normal sinus rhythm Low voltage QRS Cannot rule out Anterior infarct (cited on or before 16-JAN-2025) Abnormal  ECG When compared with ECG of 16-JAN-2025 14:04, No significant change was found    Transthoracic Echo (TTE) Limited    Result Date: 1/16/2025   [facility], [address] [place] [address]                Tel [phone] and Fax [phone] TRANSTHORACIC ECHOCARDIOGRAM REPORT  Patient Name:       RAÚL KAMINSKI     Anne Physician:    [id number] Fabián Kulkarni MD Study Date:         1/16/2025           Ordering Provider:    [id number] GABBY SMITH MRN/PID:            [id number]            Fellow: Accession#:         [id number]        Nurse: Date of Birth/Age:  1960 / 64      Sonographer:          Naya rocha RDCS Gender assigned at  F                   Additional Staff: Birth: Height:             165.10 cm           Admit Date:           1/20/2025 Weight:             51.71 kg            Admission Status:     Inpatient - STAT BSA / BMI:          1.56 m2 / 18.97     Encounter#:           [id number]                     kg/m2 Blood Pressure:     106/62 mmHg         Department Location:  [facility] Study Type:    TRANSTHORACIC ECHO (TTE) LIMITED Diagnosis/ICD: Acute respiratory failure with hypoxia-J96.01 Indication:    Post pneumonectomy; new O2 requirements; eval BIV function CPT Code:      Echo Limited-37039; Color Doppler-59935 Patient History: Pertinent History: COPD. Lung cancer; s/p pneumonectomy; immunotherapy. Study Detail: The following Echo studies were performed: 2D, M-Mode and color               flow. Technically challenging study due to body habitus and               prominent lung artifact. Definity used as a contrast agent for               endocardial border definition. Total contrast used for this               procedure was 3 mL via IV push. Unable to obtain suprasternal                notch view.  PHYSICIAN INTERPRETATION: Left Ventricle: Left ventricular ejection fraction is moderately decreased, calculated by Miranda's biplane at 34%. Left venticular wall motion is abnormal. The left ventricular cavity size is upper limits of normal. There is normal septal and normal posterior left ventricular wall thickness. Spectral Doppler shows a Grade II (pseudonormal pattern) of left ventricular diastolic filling with an elevated left atrial pressure. The described regional wall motion abnormalities of the LV and RV are suggestive of biventricular Takotsubo (stress cardiomyopathy). LV Wall Scoring: The mid and apical anterior wall, mid and apical anterior septum, mid and apical inferior septum, mid and apical inferior wall, mid inferolateral segment, mid anterolateral segment, and apical lateral segment are akinetic. All remaining scored segments are normal. Left Atrium: The left atrium is moderately dilated. Right Ventricle: The right ventricle is normal in size. There is moderately reduced right ventricular systolic function. There is akinesis of the mid and apical segments of the RV free wall. Right Atrium: The right atrium is normal in size. A dilated inferior vena cava demonstrates poor inspiratory collapse, consistent with elevated right atrial pressures. Aortic Valve: The aortic valve is trileaflet. There is trace aortic valve regurgitation. Mitral Valve: The mitral valve is normal in structure. There is mild mitral valve regurgitation. Tricuspid Valve: The tricuspid valve is structurally normal. There is mild to moderate tricuspid regurgitation. The Doppler estimated RVSP is mildly elevated at 45.5 mmHg. Pulmonic Valve: The pulmonic valve is not well visualized. Pulmonic valve regurgitation was not assessed. Pericardium: There is no pericardial effusion noted. Aorta: The aortic root is normal. Systemic Veins: The inferior vena cava appears dilated, with IVC inspiratory collapse less than 50%.  In comparison to the previous echocardiogram(s): Compared with study dated 1/13/2025, the LV and RV wall motion abnormalities were not present in prior study.  CONCLUSIONS:  1. Left ventricular ejection fraction is moderately decreased, calculated by Miranda's biplane at 34%.  2. Multiple segmental abnormalities exist. See findings.  3. Spectral Doppler shows a Grade II (pseudonormal pattern) of left ventricular diastolic filling with an elevated left atrial pressure.  4. There is moderately reduced right ventricular systolic function.  5. There is akinesis of the mid and apical segments of the RV free wall.  6. Mildly elevated right ventricular systolic pressure.  7. Mild to moderate tricuspid regurgitation visualized.  8. The left atrium is moderately dilated.  9. A dilated inferior vena cava demonstrates poor inspiratory collapse, consistent with elevated right atrial pressures. 10. The described regional wall motion abnormalities of the LV and RV are suggestive of biventricular Takotsubo (stress cardiomyopathy). QUANTITATIVE DATA SUMMARY:  2D MEASUREMENTS:          Normal Ranges: IVSd:            0.60 cm  (0.6-1.1cm) LVPWd:           0.70 cm  (0.6-1.1cm) LVIDd:           4.80 cm  (3.9-5.9cm) LVIDs:           3.50 cm LV Mass Index:   63 g/m2 LVEDV Index:     61 ml/m2 LV % FS          27.1 %  LA VOLUME:                   Normal Ranges: LA Vol A4C:        69.1 ml   (22+/-6mL/m2) LA Vol Index A4C:  44.3ml/m2 LA Area A4C:       21.1 cm2 LA Major Axis A4C: 5.5 cm  RA VOLUME BY A/L METHOD:          Normal Ranges: RA Area A4C:             16.3 cm2  M-MODE MEASUREMENTS:         Normal Ranges: Ao Root:             3.00 cm (2.0-3.7cm) LAs:                 3.50 cm (2.7-4.0cm)  LV SYSTOLIC FUNCTION BY 2D PLANIMETRY (MOD):                      Normal Ranges: EF-A4C View:    40 % (>=55%) EF-A2C View:    31 % EF-Biplane:     34 % LV EF Reported: 34 %  LV DIASTOLIC FUNCTION:           Normal Ranges: MV Peak E:             0.87  m/s  (0.7-1.2 m/s) MV Peak A:             0.59 m/s  (0.42-0.7 m/s) E/A Ratio:             1.48      (1.0-2.2) MV e'                  0.057 m/s (>8.0) MV lateral e'          0.06 m/s MV medial e'           0.05 m/s E/e' Ratio:            15.37     (<8.0)  MITRAL VALVE:          Normal Ranges: MV DT:        119 msec (150-240msec)  RIGHT VENTRICLE: RV Basal 3.51 cm RV Mid   2.52 cm RV Major 6.0 cm TAPSE:   9.6 mm RV s'    0.07 m/s  TRICUSPID VALVE/RVSP:          Normal Ranges: Peak TR Velocity:     3.26 m/s RV Syst Pressure:     46 mmHg  (< 30mmHg) IVC Diam:             2.25 cm  [id number] Fabián Kulkrani MD Electronically signed on 1/16/2025 at 2:58:07 PM  Wall Scoring  ** Final (Updated) **     US right upper quadrant    Result Date: 1/16/2025  Interpreted By:  Alberto Salas, STUDY: US RIGHT UPPER QUADRANT; 1/16/2025 11:09 am   INDICATION: Signs/Symptoms:pericholecystic fluid seen on renal us.   COMPARISON: None.   ACCESSION NUMBER(S): [id number]   ORDERING CLINICIAN: WILMER ANTHONY   TECHNIQUE: Multiple images of the right upper quadrant were obtained.   FINDINGS: LIVER: The liver measures up to  15.5 cm in greatest sagittal dimension. Echogenicity is within normal limits. There is no morphologic evidence of cirrhosis and no focal hepatic lesion is evident.   GALLBLADDER: The gallbladder is within normal limits without wall thickening or cholelithiasis. A sonographic Odom sign was not present. There is a small amount of simple appearing ascites in the right upper quadrant.   BILIARY SYSTEM: There is no intrahepatic biliary dilation. The common bile duct measures up to  5 mm in width.   PANCREAS: The pancreas is partially obscured by bowel gas. The visualized portions of the pancreas are within normal limits.   RIGHT KIDNEY: The right kidney measures up to  9.9 cm in greatest sagittal dimension. Cortical echogenicity and thickness are within normal limits. No hydroureteronephrosis.       Small amount of simple  appearing ascites in the right upper quadrant without evidence of cholelithiasis, cholecystitis or biliary dilation.   Signed by: Alberto Salas 1/16/2025 1:02 PM Dictation workstation:   [id number]    XR chest 1 view    Result Date: 1/16/2025  Interpreted By:  Vick Eli, STUDY: XR CHEST 1 VIEW; 1/16/2025 6:43 am   INDICATION: Signs/Symptoms:ICU.   COMPARISON: Radiograph dated 01/15/2025   ACCESSION NUMBER(S): [id number]   ORDERING CLINICIAN: GABBY SMITH   FINDINGS: Postsurgical changes of right pneumonectomy and resection of multiple right-sided ribs. Slight interval increase in opacity in the lower portion of the pneumonectomy site. Slight interval increase in left basilar faint airspace opacity. Blunting of left costophrenic angle. No left pneumothorax.   Cardiac silhouette size is grossly stable, however right heart border is obscured.   Left subclavian approach MediPort is unchanged in position. Interval removal of right IJ central venous catheter sheath.   Subcutaneous emphysema in the right chest wall and right side of the neck.       1. Slight interval increase in left basilar airspace opacity which may be due to increasing atelectasis. Cannot exclude developing infectious infiltrate. Question trace left pleural effusion. 2. Again seen postsurgical changes related to right-sided pneumonectomy as described above       Signed by: Vick Xiao 1/16/2025 9:47 AM Dictation workstation:   [id number]    US renal complete    Result Date: 1/15/2025  Interpreted By:  Alberto Salas,  and Estephania Almendarez STUDY: US RENAL COMPLETE;  1/15/2025 3:25 pm   INDICATION: Signs/Symptoms:symptomatic hyponatremia.     COMPARISON: CT abdomen 08/15/2024 FDG PET-CT 12/30/2024   ACCESSION NUMBER(S): [id number]   ORDERING CLINICIAN: GABBY SMITH   TECHNIQUE: Multiple images of the kidneys were obtained  .   FINDINGS: RIGHT KIDNEY: The right kidney measures 11.17 cm in length. The renal  cortical echogenicity and thickness are within normal limits. No hydronephrosis is present; no evidence of nephrolithiasis.   LEFT KIDNEY: The left kidney measures 11.75 cm in length. The renal cortical echogenicity and thickness are within normal limits. No hydronephrosis is present; no evidence of nephrolithiasis.   BLADDER: A Montiel is present within a decompressed bladder.   Incidental note is made of a somewhat edematous gallbladder with some pericholecystic fluid and layering echogenic material likely representing biliary sludge.   Free fluid is present within the right upper quadrant and pelvis.       1. Unremarkable sonographic evaluation of the kidneys. 2. Incomplete evaluation of a gallbladder with some pericholecystic fluid and biliary sludge. Dedicated right upper quadrant ultrasound could be obtained as clinically warranted. 3. A small amount of free fluid present in the right upper quadrant and pelvis.   Findings relayed by phone by me to SICU provider at 5:11 p.m.   I personally reviewed the images/study and I agree with the findings as stated by Erickson Best MD (resident) . This study was interpreted at [place], [place].   MACRO: None   Signed by: Alberto Salas 1/15/2025 6:17 PM Dictation workstation:   [id number]    XR chest 1 view    Result Date: 1/15/2025  Interpreted By:  Alexx Anne, STUDY: XR CHEST 1 VIEW;  1/15/2025 6:46 am   INDICATION: Signs/Symptoms:routine ICU.     COMPARISON: Exam dated 01/14/2025   ACCESSION NUMBER(S): [id number]   ORDERING CLINICIAN: GABBY SMITH   FINDINGS: AP radiograph of the chest was provided.   Left chest wall subclavian approach medication port with catheter tip projecting over the upper SVC. Right IJ central venous catheter tip projects over the upper SVC. Interval increase in subcutaneous emphysema overlying the right lower cervical soft tissues. Similar subcutaneous emphysema along the right  chest wall.   CARDIOMEDIASTINAL SILHOUETTE: Cardiomediastinal silhouette is stable in size and configuration.   LUNGS: Postsurgical changes of pneumonectomy are again visualized. There is a moderate amount of layering pleural fluid within the right hemithorax on a background of diffuse lucency. Streaky left basilar atelectasis. The left lung otherwise appears clear.   ABDOMEN: No remarkable upper abdominal findings.   BONES: No acute osseous changes.       1.  Status post pneumonectomy with interval increase in fluid component of right-sided hydropneumothorax. 2. Medical devices as above.       MACRO: None   Signed by: Alexx Anne 1/15/2025 9:28 AM Dictation workstation:   [id number]    XR chest 1 view    Result Date: 1/14/2025  Interpreted By:  Vick Eli, STUDY: XR CHEST 1 VIEW; 1/14/2025 2:40 pm   INDICATION: Signs/Symptoms:Chest tube removal.   COMPARISON: Radiograph dated 01/14/2025   ACCESSION NUMBER(S): [id number]   ORDERING CLINICIAN: ERINN SANCHEZ   FINDINGS: Postsurgical changes related to right pneumonectomy with partially resected ribs.. Interval removal of right chest tube. Left subclavian MediPort and right IJ central venous catheter sheath are unchanged in positioning.   Cardiac silhouette size is slightly enlarged, unchanged.   Faint left basilar airspace opacity, likely atelectatic. No sizable pneumothorax.   Small amount of subcutaneous and soft tissue emphysema in the right chest wall.       1. Interval removal of right chest tube. Other medical devices as described above. 2. Postsurgical changes related to right pneumonectomy. 3. Faint left basilar opacity, likely atelectatic.       Signed by: Vick Xiao 1/14/2025 2:53 PM Dictation workstation:   [id number]    XR chest 1 view    Result Date: 1/14/2025  Interpreted By:  Nino Barbour, STUDY: XR CHEST 1 VIEW; 1/14/2025 7:06 am   INDICATION: Signs/Symptoms:recent pneumonectomy.   COMPARISON: 01/13/2025.   ACCESSION  NUMBER(S): [id number]   ORDERING CLINICIAN: GABBY SMITH   FINDINGS: Right-sided chest tube in place.   CARDIOMEDIASTINAL SILHOUETTE: Right-sided mediastinal shift consistent with recent right-sided pneumonectomy.   LUNGS: Right-sided pneumonectomy changes.   ABDOMEN: No remarkable upper abdominal findings.   BONES: Right-sided thoracotomy changes.       1.  Stable right-sided pneumonectomy changes.     Signed by: Nino Barbour 1/14/2025 12:56 PM Dictation workstation:   [id number]    Transthoracic Echo (TTE) Limited    Result Date: 1/13/2025   [facility], [address] [place] [address]                Tel [phone] and Fax [phone] TRANSTHORACIC ECHOCARDIOGRAM REPORT  Patient Name:       RAÚL Rodríguez Physician:    [id number] Cam Llanos MD Study Date:         1/13/2025           Ordering Provider:    [id number] WILMER ANTHONY MRN/PID:            [id number]            Fellow: Accession#:         [id number]        Nurse: Date of Birth/Age:  1960 / 64      Sonographer:          José Miguel rocha                                     [facility] Gender assigned at  F                   Additional Staff: Birth: Height:             165.10 cm           Admit Date:           1/13/2025 Weight:             51.71 kg            Admission Status:     Inpatient - STAT BSA / BMI:          1.56 m2 / 18.97     Encounter#:           [id number]                     kg/m2 Blood Pressure:     100/57 mmHg         Department Location:  [facility] Study Type:    TRANSTHORACIC ECHO (TTE) LIMITED Diagnosis/ICD: Hypotension, unspecified-I95.9 Indication:    Hypotension CPT Code:      Echo Limited-79707; Doppler Limited-85144; Color Doppler-48126  Study Detail: The following Echo studies were performed: 2D, Doppler and color               flow.  Technically challenging study due to body habitus and               patient lying in supine position.  PHYSICIAN INTERPRETATION: Left Ventricle: The left ventricular systolic function is normal, with a visually estimated ejection fraction of 60-65%. There are no regional left ventricular wall motion abnormalities. The left ventricular cavity size is normal. Left ventricular diastolic filling was not assessed. Left Atrium: The left atrium was not well visualized. Right Ventricle: The right ventricle is normal in size. There is low normal right ventricular systolic function. RV S' and TAPSE under measured due to technical difficulty visualizing lateral tricuspid valve annulus. Right Atrium: The right atrium was not well visualized. Aortic Valve: The aortic valve was not well visualized. There is no evidence of aortic valve regurgitation. Mitral Valve: The mitral valve is normal in structure. There is trace mitral valve regurgitation. Tricuspid Valve: The tricuspid valve was not well visualized. There is trace tricuspid regurgitation. Pulmonic Valve: The pulmonic valve is not well visualized. The pulmonic valve regurgitation was not well visualized. Pericardium: Trivial pericardial effusion. Aorta: The aortic root is normal. Pulmonary Artery: The tricuspid regurgitant velocity is 2.10 m/s, and with an estimated right atrial pressure of 8 mmHg, the estimated pulmonary artery pressure is normal with the RVSP at 25.6 mmHg. Systemic Veins: The inferior vena cava appears normal in size, with IVC inspiratory collapse less than 50%. In comparison to the previous echocardiogram(s): Compared with study dated 10/3/2024, no significant change.  CONCLUSIONS:  1. The left ventricular systolic function is normal, with a visually estimated ejection fraction of 60-65%.  2. There is low normal right ventricular systolic function. QUANTITATIVE DATA SUMMARY:  LV SYSTOLIC FUNCTION BY 2D PLANIMETRY (MOD):                      Normal  Ranges: EF-A4C View:    65 % (>=55%) EF-Visual:      63 % LV EF Reported: 63 %  RIGHT VENTRICLE: TAPSE: 10.0 mm RV s'  0.06 m/s  TRICUSPID VALVE/RVSP:          Normal Ranges: Peak TR Velocity:     2.10 m/s Est. RA Pressure:     8 mmHg RV Syst Pressure:     26 mmHg  (< 30mmHg) IVC Diam:             2.00 cm  [id number] Cam Llanos MD Electronically signed on 1/13/2025 at 5:06:16 PM  ** Final **     XR chest 1 view    Result Date: 1/13/2025  Interpreted By:  Vick Eli, STUDY: XR CHEST 1 VIEW; 1/13/2025 2:52 pm   INDICATION: Signs/Symptoms:s/p pneumonectomy.   COMPARISON: CT dated 07/17/2024 and PET-CT dated 12/30/2024   ACCESSION NUMBER(S): [id number]   ORDERING CLINICIAN: WILMER ANTHONY   FINDINGS: Right IJ central venous catheter sheath is projecting over mid SV C. Left subclavian approach MediPort is in place with the tip projecting over upper to mid SVC. Right chest tube is in place.   Cardiac silhouette size is within normal limits.   Postsurgical changes related to right pneumonectomy with multiple partial right-sided rib resection. Mild interstitial prominence of the left lung.   Subcutaneous and soft tissue emphysema in the right chest wall.         1. Postsurgical changes related to right sided pneumonectomy with multiple medical devices as described above. 2. Mild interstitial prominence of the left lung.       Signed by: Vick Xiao 1/13/2025 3:16 PM Dictation workstation:   [id number]    NM PET CT lung CA staging    Result Date: 12/31/2024  Interpreted By:  Kush Hansen and Bera Kaustav STUDY: NM PET CT LUNG CA STAGING;  12/30/2024 11:08 am   INDICATION: Signs/Symptoms:DIAGNOSTIC.   Diagnosed with a large right lung mass in January 20, 2024, status post neoadjuvant chemoradiation as well as immunotherapy ending on July 2024. Currently scheduled for surgery on 01/10/2025.   COMPARISON: PET-CT on 08/15/2024   ACCESSION NUMBER(S): [id number]   ORDERING CLINICIAN: GABBY SMITH    TECHNIQUE: DIVISION OF NUCLEAR MEDICINE POSITRON EMISSION TOMOGRAPHY (PET-CT)   The patient received an intravenous dose of 11.2 mCi of Fluorine-18 fluorodeoxyglucose (FDG).  Positron emission tomographic (PET) images from mid thigh to skull base were then acquired after a one hour delay. Also acquired was a contemporaneous low dose non-contrast CT scan performed for attenuation correction of PET images and anatomic localization.  The PET and CT images were digitally fused for display.  All images were acquired on a combined PET-CT scanner unit. Some areas of FDG accumulation may be described in standardized uptake value (SUV) units.   CODING: Subsequent Treatment Strategy (PS)   CALIBRATION: Dose Injection-to-Scan Interval (mins): 51 min Mediastinal bloodpool SUV (normal 1.5-2.5): 2.1 Blood glucose: NA   FINDINGS: HEAD AND NECK: * No evidence of focal FDG avid lesion in the partially visualized brain parenchyma, noting that evaluation is limited because of the expected physiologic diffuse FDG uptake in the brain. * No FDG avid cervical lymphadenopathy is present. * No paranasal sinus diease. * Thyroid gland is unremarkable.   CHEST: *There has been interval worsening collapse of the right lung, with the right lung now completely fluid-filled. There has been interval worsening of FDG avid mass with central necrosis in  posterior right upper lobe with SUV max of 15, as compared to 8 previously. Left lung is unremarkable *No FDG avid mediastinal, hilar or axillary lymphadenopathy. *Unremarkable both breasts   ABDOMEN AND PELVIS: *No FDG avid lymphadenopathy in the abdomen or pelvis. *Bilateral adrenal glands are unremarkable. *Colonic diverticulosis without evidence of diverticulitis. *Physiologic radiotracer uptake is present in the liver and spleen with excretion into the bowel loops and the genitourinary tract.   MUSCULOSKELETAL: *No concerning FDG avid bone lesion throughout the axial and appendicular skeleton to  suggest osseous metastasis.       1. Interval worsening of the FDG-avid mass with central necrosis in the posterior right upper lobe, along with increased collapse of the right lung compared to the prior PET from 08/15/2024, suggestive of disease progression. 2. No other concerning FDG avid disease identified.   I personally reviewed the image(s) / study and agree with the findings and interpretation as stated. This study was interpreted at [facility].   MACRO: None       Signed by: Kush Hansen 12/31/2024 6:13 AM Dictation workstation:   [id number]   Results for orders placed or performed during the hospital encounter of 01/13/25 (from the past 24 hours)   POCT GLUCOSE   Result Value Ref Range    POCT Glucose 152 (H) 74 - 99 mg/dL   Blood Gas Arterial   Result Value Ref Range    POCT pH, Arterial 7.45 (H) 7.38 - 7.42 pH    POCT pCO2, Arterial 52 (H) 38 - 42 mm Hg    POCT pO2, Arterial 85 85 - 95 mm Hg    POCT SO2, Arterial 98 94 - 100 %    POCT Oxy Hemoglobin, Arterial 95.0 94.0 - 98.0 %    POCT Base Excess, Arterial 10.5 (H) -2.0 - 3.0 mmol/L    POCT HCO3 Calculated, Arterial 36.1 (H) 22.0 - 26.0 mmol/L    Patient Temperature 37.0 degrees Celsius    FiO2 40 %   Blood Gas Arterial Full Panel   Result Value Ref Range    POCT pH, Arterial 7.45 (H) 7.38 - 7.42 pH    POCT pCO2, Arterial 52 (H) 38 - 42 mm Hg    POCT pO2, Arterial 85 85 - 95 mm Hg    POCT SO2, Arterial 98 94 - 100 %    POCT Oxy Hemoglobin, Arterial 95.0 94.0 - 98.0 %    POCT Hematocrit Calculated, Arterial 35.0 (L) 36.0 - 46.0 %    POCT Sodium, Arterial 135 (L) 136 - 145 mmol/L    POCT Potassium, Arterial 3.9 3.5 - 5.3 mmol/L    POCT Chloride, Arterial 101 98 - 107 mmol/L    POCT Ionized Calcium, Arterial 1.25 1.10 - 1.33 mmol/L    POCT Glucose, Arterial 156 (H) 74 - 99 mg/dL    POCT Lactate, Arterial 0.6 0.4 - 2.0 mmol/L    POCT Base Excess, Arterial 10.5 (H) -2.0 - 3.0 mmol/L    POCT HCO3 Calculated, Arterial 36.1 (H)  22.0 - 26.0 mmol/L    POCT Hemoglobin, Arterial 11.7 (L) 12.0 - 16.0 g/dL    POCT Anion Gap, Arterial 2 (L) 10 - 25 mmo/L    Patient Temperature 37.0 degrees Celsius    FiO2 40 %   POCT GLUCOSE   Result Value Ref Range    POCT Glucose 112 (H) 74 - 99 mg/dL   POCT GLUCOSE   Result Value Ref Range    POCT Glucose 124 (H) 74 - 99 mg/dL   Calcium, Ionized   Result Value Ref Range    POCT Calcium, Ionized 1.18 1.1 - 1.33 mmol/L   CBC   Result Value Ref Range    WBC 7.5 4.4 - 11.3 x10*3/uL    nRBC 0.0 0.0 - 0.0 /100 WBCs    RBC 3.52 (L) 4.00 - 5.20 x10*6/uL    Hemoglobin 9.9 (L) 12.0 - 16.0 g/dL    Hematocrit 30.8 (L) 36.0 - 46.0 %    MCV 88 80 - 100 fL    MCH 28.1 26.0 - 34.0 pg    MCHC 32.1 32.0 - 36.0 g/dL    RDW 19.0 (H) 11.5 - 14.5 %    Platelets 152 150 - 450 x10*3/uL   Coagulation Screen   Result Value Ref Range    Protime 14.5 (H) 9.8 - 12.8 seconds    INR 1.3 (H) 0.9 - 1.1    aPTT 39 (H) 27 - 38 seconds   Magnesium   Result Value Ref Range    Magnesium 2.09 1.60 - 2.40 mg/dL   Renal function panel   Result Value Ref Range    Glucose 112 (H) 74 - 99 mg/dL    Sodium 141 136 - 145 mmol/L    Potassium 3.7 3.5 - 5.3 mmol/L    Chloride 99 98 - 107 mmol/L    Bicarbonate 35 (H) 21 - 32 mmol/L    Anion Gap 11 10 - 20 mmol/L    Urea Nitrogen 17 6 - 23 mg/dL    Creatinine <0.20 (L) 0.50 - 1.05 mg/dL    eGFR      Calcium 9.2 8.6 - 10.6 mg/dL    Phosphorus 3.1 2.5 - 4.9 mg/dL    Albumin 3.2 (L) 3.4 - 5.0 g/dL   POCT GLUCOSE   Result Value Ref Range    POCT Glucose 112 (H) 74 - 99 mg/dL   POCT GLUCOSE   Result Value Ref Range    POCT Glucose 107 (H) 74 - 99 mg/dL   Transthoracic Echo (TTE) Complete   Result Value Ref Range    BSA 1.59 m2     Scheduled medications  bisacodyl, 10 mg, rectal, Daily  budesonide, 0.5 mg, nebulization, BID  cefTRIAXone, 1 g, intravenous, q24h  clonazePAM, 0.5 mg, g-tube, q8h  [Held by provider] digoxin, 250 mcg, intravenous, Once  docusate sodium, 100 mg, g-tube, BID  esomeprazole, 40 mg, g-tube,  Daily before breakfast  heparin (porcine), 5,000 Units, subcutaneous, q8h  insulin lispro, 0-5 Units, subcutaneous, q4h  ipratropium-albuteroL, 3 mL, nebulization, q6h  lidocaine, 1 patch, transdermal, Daily  metoprolol tartrate, 12.5 mg, g-tube, BID  midodrine, 10 mg, g-tube, q8h  oxygen, , inhalation, Continuous - Inhalation  perflutren protein A microsphere, 0.5 mL, intravenous, Once in imaging  polyethylene glycol, 17 g, g-tube, Daily  senna, 5 mL, g-tube, Nightly  silodosin, 4 mg, oral, q24h  sulfur hexafluoride microsphr, 2 mL, intravenous, Once in imaging  thiamine, 100 mg, g-tube, Daily  traZODone, 25 mg, g-tube, Nightly      Continuous medications       PRN medications  PRN medications: albuterol, calcium gluconate, calcium gluconate, dextrose, dextrose, glucagon, glucagon, HYDROmorphone, magnesium sulfate, magnesium sulfate, oxyCODONE, oxygen, oxygen, potassium chloride           Assessment/Plan       Jannie Vick is a 64 y.o. female with PMH significant for DEEPAK, tobacco use disorder, stage III squamous cell carcinoma of right lung patient presented to [facility] on 113 for scheduled right pneumonectomy, intrapericardial with en bloc wall resection by Dr. Jonah Wagner.  Course complicated by prolonged intubation, shock and cardiomyopathy.      #ADHF 2/2 Takotsubo cardiomyopathy (EF 25-30%), 2/2 pneumonectomy. resolved   EF 2/3/25: 60-65%  #Atrial tachycardia/Aflutter        Patient with recurrent episodes of what appears to be Atach/Aflutter. She is being treated for UTI.      Recommendations:  -Tachyarrhythmias are likely triggered by acute illness, Continue metoprolol 12.5mg BID, can increase to 25mg BID if BP allows  -If recurrent episodes can use digoxin 125mcg daily  -If inadequate response to beta-blockade/digoxin then will need to involve EP for potential ablation at some point   -Her [id number] is 1 (Age), low risk for thromboembolic event, will defer  anticoagulation for now. Please check A1C  to rule out DM  -Will need a ziopatch on discharge with EP follow up        Thank you for involving us in this patient care. Recommendations not final until signed by attending.     General Cardiology Consult Pager: [phone] (weekday 7AM-6PM and weekend 7AM-2PM) and other: [phone]  EP Consult Pager: [phone] (weekday 7AM-6PM and weekend 7AM-2PM) and other: [phone]  CICU Fellow Pager: [phone] anytime  EP Device Nurse Pager: [phone] (weekday 7AM-4PM)  Advanced Heart Failure Consult Pager: [phone] anytime              Aryan Stewart MD  Cardiology Fellow PGY-6

## 2025-02-03 NOTE — PROGRESS NOTES
"Faustina Vick \"Noam" is a 64 y.o. female on day 21 of admission presenting with Primary cancer of right upper lobe of lung (Multi).    Subjective   No acute events.    Objective   Physical Exam  Vitals reviewed.   Constitutional:       Appearance: She is underweight.      Interventions: She is sedated and restrained. She is not intubated.     Comments: Deconditioned in ICU bed.    HENT:      Head: Normocephalic and atraumatic.      Nose: Nose normal.      Mouth/Throat:      Mouth: Mucous membranes are moist.   Eyes:      Extraocular Movements: Extraocular movements intact.      Pupils: Pupils are equal, round, and reactive to light.   Cardiovascular:      Rate and Rhythm: Normal rate and regular rhythm.      Pulses: Normal pulses.      Heart sounds: Normal heart sounds.   Pulmonary:      Effort: Pulmonary effort is normal. She is not intubated.      Breath sounds: No rhonchi (left side).      Comments: Vented via trache CPAP PS 5 PEEP 5 this morning; moderate inspiratory rhonchi on left   Chest:      Comments: Right lateral chest dressing c/d/i.  Abdominal:      General: There is no distension.      Palpations: Abdomen is soft.      Comments: Less pain to palpation on exam today; PEG appears intact, adequate laxity, normal skin surrounding.    Genitourinary:     Comments: Montiel in place - yellow urine  Musculoskeletal:         General: No swelling or tenderness. Normal range of motion.      Cervical back: Normal range of motion and neck supple.   Skin:     General: Skin is warm and dry.   Neurological:      General: No focal deficit present.      Mental Status: She is alert and easily aroused.      GCS: GCS eye subscore is 3. GCS verbal subscore is 1. GCS motor subscore is 6.      Motor: Weakness (generalized) present.      Comments: Following commands, moves extremities equally.    Psychiatric:         Mood and Affect: Mood normal.         Behavior: Behavior normal.      Comments: In poor spirits     Last " "Recorded Vitals  Blood pressure 104/67, pulse 104, temperature 36.1 °C (97 °F), temperature source Temporal, resp. rate 20, height 1.651 m (5' 5\"), weight 55 kg (121 lb 4.1 oz), SpO2 99%.    VS over last 24h  Heart Rate:  [101-162]   Temp:  [36 °C (96.8 °F)-36.8 °C (98.2 °F)]   Resp:  [11-30]   BP: ()/(55-91)   SpO2:  [91 %-100 %]      Intake/Output last 3 Shifts:  I/O last 3 completed shifts:  In: 1990 (36.2 mL/kg) [NG/GT:1890; IV Piggyback:100]  Out: 2755 (50.1 mL/kg) [Urine:2755 (1.4 mL/kg/hr)]  Weight: 55 kg       Intake/Output Summary (Last 24 hours) at 2/3/2025 0649  Last data filed at 2/3/2025 0646  Gross per 24 hour   Intake 1600 ml   Output 1095 ml   Net 505 ml        Relevant Results  Scheduled medications  bisacodyl, 10 mg, rectal, Daily  budesonide, 0.5 mg, nebulization, BID  cefTRIAXone, 1 g, intravenous, q24h  clonazePAM, 0.5 mg, g-tube, q8h  [Held by provider] digoxin, 250 mcg, intravenous, Once  docusate sodium, 100 mg, g-tube, BID  esomeprazole, 40 mg, g-tube, Daily before breakfast  heparin (porcine), 5,000 Units, subcutaneous, q8h  insulin lispro, 0-5 Units, subcutaneous, q4h  ipratropium-albuteroL, 3 mL, nebulization, q6h  lidocaine, 1 patch, transdermal, Daily  metoprolol tartrate, 12.5 mg, g-tube, BID  midodrine, 10 mg, g-tube, q8h  oxygen, , inhalation, Continuous - Inhalation  polyethylene glycol, 17 g, g-tube, Daily  senna, 5 mL, g-tube, Nightly  silodosin, 4 mg, oral, q24h  thiamine, 100 mg, g-tube, Daily  traZODone, 25 mg, g-tube, Nightly      PRN medications  PRN medications: albuterol, calcium gluconate, calcium gluconate, dextrose, dextrose, glucagon, glucagon, HYDROmorphone, magnesium sulfate, magnesium sulfate, oxyCODONE, oxygen, oxygen, potassium chloride      Results for orders placed or performed during the hospital encounter of 01/13/25 (from the past 24 hours)   POCT GLUCOSE   Result Value Ref Range    POCT Glucose 143 (H) 74 - 99 mg/dL   POCT GLUCOSE   Result Value Ref " Range    POCT Glucose 152 (H) 74 - 99 mg/dL   Blood Gas Arterial   Result Value Ref Range    POCT pH, Arterial 7.45 (H) 7.38 - 7.42 pH    POCT pCO2, Arterial 52 (H) 38 - 42 mm Hg    POCT pO2, Arterial 85 85 - 95 mm Hg    POCT SO2, Arterial 98 94 - 100 %    POCT Oxy Hemoglobin, Arterial 95.0 94.0 - 98.0 %    POCT Base Excess, Arterial 10.5 (H) -2.0 - 3.0 mmol/L    POCT HCO3 Calculated, Arterial 36.1 (H) 22.0 - 26.0 mmol/L    Patient Temperature 37.0 degrees Celsius    FiO2 40 %   Blood Gas Arterial Full Panel   Result Value Ref Range    POCT pH, Arterial 7.45 (H) 7.38 - 7.42 pH    POCT pCO2, Arterial 52 (H) 38 - 42 mm Hg    POCT pO2, Arterial 85 85 - 95 mm Hg    POCT SO2, Arterial 98 94 - 100 %    POCT Oxy Hemoglobin, Arterial 95.0 94.0 - 98.0 %    POCT Hematocrit Calculated, Arterial 35.0 (L) 36.0 - 46.0 %    POCT Sodium, Arterial 135 (L) 136 - 145 mmol/L    POCT Potassium, Arterial 3.9 3.5 - 5.3 mmol/L    POCT Chloride, Arterial 101 98 - 107 mmol/L    POCT Ionized Calcium, Arterial 1.25 1.10 - 1.33 mmol/L    POCT Glucose, Arterial 156 (H) 74 - 99 mg/dL    POCT Lactate, Arterial 0.6 0.4 - 2.0 mmol/L    POCT Base Excess, Arterial 10.5 (H) -2.0 - 3.0 mmol/L    POCT HCO3 Calculated, Arterial 36.1 (H) 22.0 - 26.0 mmol/L    POCT Hemoglobin, Arterial 11.7 (L) 12.0 - 16.0 g/dL    POCT Anion Gap, Arterial 2 (L) 10 - 25 mmo/L    Patient Temperature 37.0 degrees Celsius    FiO2 40 %   POCT GLUCOSE   Result Value Ref Range    POCT Glucose 112 (H) 74 - 99 mg/dL   POCT GLUCOSE   Result Value Ref Range    POCT Glucose 124 (H) 74 - 99 mg/dL   CBC   Result Value Ref Range    WBC 7.5 4.4 - 11.3 x10*3/uL    nRBC 0.0 0.0 - 0.0 /100 WBCs    RBC 3.52 (L) 4.00 - 5.20 x10*6/uL    Hemoglobin 9.9 (L) 12.0 - 16.0 g/dL    Hematocrit 30.8 (L) 36.0 - 46.0 %    MCV 88 80 - 100 fL    MCH 28.1 26.0 - 34.0 pg    MCHC 32.1 32.0 - 36.0 g/dL    RDW 19.0 (H) 11.5 - 14.5 %    Platelets 152 150 - 450 x10*3/uL   Coagulation Screen   Result Value Ref Range     Protime 14.5 (H) 9.8 - 12.8 seconds    INR 1.3 (H) 0.9 - 1.1    aPTT 39 (H) 27 - 38 seconds   Magnesium   Result Value Ref Range    Magnesium 2.09 1.60 - 2.40 mg/dL   Renal function panel   Result Value Ref Range    Glucose 112 (H) 74 - 99 mg/dL    Sodium 141 136 - 145 mmol/L    Potassium 3.7 3.5 - 5.3 mmol/L    Chloride 99 98 - 107 mmol/L    Bicarbonate 35 (H) 21 - 32 mmol/L    Anion Gap 11 10 - 20 mmol/L    Urea Nitrogen 17 6 - 23 mg/dL    Creatinine <0.20 (L) 0.50 - 1.05 mg/dL    eGFR      Calcium 9.2 8.6 - 10.6 mg/dL    Phosphorus 3.1 2.5 - 4.9 mg/dL    Albumin 3.2 (L) 3.4 - 5.0 g/dL   POCT GLUCOSE   Result Value Ref Range    POCT Glucose 112 (H) 74 - 99 mg/dL       XR chest 1 view   Final Result   1. No significant change in patchy left lung airspace opacities,   which may represent edema/infiltrates.   2. Stable postsurgical changes related to right-sided pneumonectomy.   3. Medical devices as above.        Signed by: Real Rashid 2/2/2025 8:23 AM   Dictation workstation:   [id number]      XR abdomen 1 view   Final Result   1. Interval placement of percutaneous gastrostomy tube which overlies   the left upper quadrant, within expected location of gastric body.   2. Nonobstructive bowel gas pattern.        I personally reviewed the images/study and I agree with the findings   as stated by Jonah Velez MD (Radiology Resident).   This study was interpreted at [place], [place].        MACRO:   None        Signed by: Real Rashid 2/1/2025 1:20 PM   Dictation workstation:   [id number]      XR chest 1 view   Final Result   1. Stable to slightly improved patchy left lung airspace opacities,   which may represent edema/infiltrates.   2. Stable postsurgical changes related to right-sided pneumonectomy.   3. Medical devices as above.        Signed by: Real Rashid 2/1/2025 6:54 AM   Dictation workstation:   [id number]      XR chest 1 view   Final  Result   1.  Redemonstration of postsurgical changes of right pneumonectomy.   When compared to the prior exam, the opacities in the left lung most   prominently in the left mid to lower lung are more confluent.   Findings may represent a consolidative process such as pneumonia or   aspiration pneumonitis with a component of pulmonary vascular   congestion. There is possible small left pleural effusion and left   basilar atelectasis.        I personally reviewed the images/study and I agree with the findings   as stated by resident physician Dr. Yair Deng . This study   was interpreted at [place], [place].        MACRO:   None        Signed by: Nino Barbour 1/31/2025 12:27 PM   Dictation workstation:   [id number]      XR chest 1 view   Final Result   1. Interval slightly increased patchy opacities in the left lower   lung field with blunting of the left costophrenic angle. Findings may   represent small pleural effusion with atelectasis/infiltrate. There   is mild pulmonary vascular congestion.   2. Complete opacification of the right hemithorax representing   postsurgical changes of right pneumonectomy.   3. Interval extubation with placement of a tracheostomy tube.        I personally reviewed the images/study and I agree with the findings   as stated by resident physician Dr. Yair Deng . This study   was interpreted at [place], [place].        MACRO:   None        Signed by: Oseas Stroud 1/30/2025 4:36 PM   Dictation workstation:   [id number]      XR chest 1 view   Final Result   1. Similar mild interstitial edema, small left pleural effusion, and   left basilar atelectasis.   2. Postsurgical changes of right pneumonectomy.   3. Medical devices as above.             I personally reviewed the images/study and I agree with the findings   as stated above by resident physician, Ankit Varela MD.  This study   was interpreted at [place], [place].        MACRO:   None.        Signed by: Oseas Stroud 1/30/2025 9:31 AM   Dictation workstation:   [id number]      XR chest 1 view   Final Result   1. Postoperative changes and medical devices as above.   2. Similar mild interstitial edema and left basilar presumed   atelectasis with small pleural effusion.   3. Persistent complete opacification of right hemithorax, correlating   with patient's history of pneumonectomy.        Signed by: Real Rashid 1/29/2025 7:28 AM   Dictation workstation:   [id number]      XR chest 1 view   Final Result   1.  Unchanged aeration of the lungs with complete opacification of   the right hemithorax and coarse interstitial markings throughout the   left lung with left basilar atelectasis. Superimposed infection is   not excluded.        I personally reviewed the images/study and I agree with the findings   as stated by resident physician Dr. Yair Deng . This study   was interpreted at [place], [place].        MACRO:   None        Signed by: Oseas Stroud 1/28/2025 9:31 AM   Dictation workstation:   [id number]      Transthoracic Echo (TTE) Limited   Final Result      XR chest 1 view   Final Result   1.  Interval improvement in the left-sided edema and patchy airspace   opacities. Small left-sided pleural effusion.   2. Postsurgical changes of right-sided pneumonectomy.   3. Medical lines and devices as above.        I personally reviewed the images/study and I agree with the findings   as stated by Jonah Velez MD (Radiology Resident).   This study was interpreted at [place], [place].        MACRO:   None        Signed by: Oseas Stroud 1/27/2025 10:55 AM   Dictation workstation:   [id number]      XR chest 1 view   Final Result   1.  Slight interval worsening in  left-sided airspace disease/edema   status post right-sided pneumonectomy.             Signed by: Nino Barbour 1/26/2025 1:16 PM   Dictation workstation:   [id number]      XR chest 1 view   Final Result   1.  Post pneumonectomy changes with continued left perihilar edema.             Signed by: Nino Barbour 1/25/2025 9:51 AM   Dictation workstation:   [id number]      XR chest 1 view   Final Result   1.  Similar left lung pulmonary edema with small left pleural   effusion.   2. Status post right pneumonectomy.   3. Bradshaw-Paco catheter with tip projecting over the left lower lobe,   in similar position.                  MACRO:   None        Signed by: Alexx Anne 1/24/2025 10:24 AM   Dictation workstation:   [id number]      XR chest 1 view   Final Result   Addendum (preliminary) 1 of 1   Interpreted By:  Alexx Anne,    ADDENDUM:   Right IJ approach Bradshaw Paco catheter is also noted with tip   projecting over the left lower lobe in similar position to prior exam.        Signed by: Alexx Anne 1/24/2025 10:25 AM        -------- ORIGINAL REPORT --------   Dictation workstation:   [id number]      Final   1.  Stable findings of pulmonary edema within the left lung.   2. Postsurgical changes of right pneumonectomy.   3. Medical devices as above.                  MACRO:   None        Signed by: Alexx Anne 1/24/2025 9:09 AM   Dictation workstation:   [id number]      XR chest 1 view   Final Result   1. The tip of the endotracheal tube terminates 7.5 cm of the sina.   2. Slightly decreased  interstitial prominence and airspace opacities   within the left lung when compared to prior radiograph,  suggestive   of improving infectious process or improving pulmonary edema.   3. No significant interval change in postoperative changes from   right-sided pneumonectomy, resection of multiple right-sided ribs,   complete opacification of the pneumonectomy bed, and rightward   mediastinal shift.        I personally  reviewed the images/study and I agree with radiology   resident  Dr. Meghan Ramesh findings as stated. This study was   interpreted at [facility],   [place], [place]        MACRO:   None        Signed by: Oseas Stroud 1/24/2025 8:09 AM   Dictation workstation:   [id number]      XR abdomen 1 view   Final Result   1.  Dobhoff tube is in place with the tip projecting over the 1st   portion of the duodenum.   2. There is gas-filled dilated stomach. A few slight prominent loops   of the bowel in the upper abdomen with overall nonobstructive bowel   gas pattern.   3. Postsurgical changes of right pneumonectomy with resection of ribs   and patchy airspace opacities in the left lung.   4. [place]-Paco catheter projecting over the lower pulmonary artery.   Recommend retraction.        MACRO:   None        Signed by: Oseas Stroud 1/23/2025 10:15 AM   Dictation workstation:   [id number]      XR chest 1 view   Final Result   1. Unchanged appearance of multifocal patchy airspace opacities with   interstitial prominence of the left lung which may be due to   infectious process. Component of edema can not be excluded.   2. Medical devices as described above with similar positioning of the   [place]-Paco catheter projecting over left lower lobe pulmonary artery.   Recommend retraction.   3. Postsurgical changes of right-sided pneumonectomy and resection of   multiple right-sided ribs with complete opacification of the   pneumonectomy bed. The appearance is unchanged compared to prior   study.                  MACRO:   None        Signed by: Oseas Stroud 1/23/2025 9:36 AM   Dictation workstation:   [id number]      XR abdomen 1 view   Final Result   1.  As described above.        Signed by: Vick Xiao 1/22/2025 1:01 PM   Dictation workstation:   [id number]      XR chest 1 view   Final Result   1. Unchanged appearance of multifocal patchy airspace opacity with   interstitial prominence of the  left lung which may be due to   infectious process. Component of edema cannot be excluded.   2. Medical devices as described above with interval advancement of   the [place]-Paco catheter projecting over left lower lobe pulmonary   artery. Recommend retraction.   3. Postsurgical changes of right-sided pneumonectomy and resection of   multiple right-sided ribs with complete opacification of the   pneumonectomy bed. The appearance is unchanged compared to prior   study.                  Signed by: Vick Xiao 1/22/2025 1:00 PM   Dictation workstation:   [id number]      XR chest 1 view   Final Result   1. Similar near-complete opacification of the right hemithorax, which   may be secondary to a large pleural effusion, pneumonectomy, or   bronchial obstruction.   2. Diffuse interstitial opacities and scattered patchy alveolar   opacities throughout the left lung, similar to the prior exam, which   may reflect prominent pulmonary edema or multifocal infection.   3. Medical devices as above.        I personally reviewed the image(s)/study and resident interpretation   as stated by Dr. Ange Sue MD. I agree with the findings as   stated. This study was interpreted at [facility], [place], [place].        MACRO:   None        Signed by: Oseas Stroud 1/21/2025 7:58 PM   Dictation workstation:   [id number]      Vascular US upper extremity venous duplex left   Final Result      Transthoracic Echo (TTE) Limited   Final Result      XR chest 1 view   Final Result   1.  Interval opacification of the right hemithorax and correlate with   post pneumonectomy changes.   2. [place]-Paco catheter overlies the left lower lobe pulmonary artery.   3. Slight interval improvement in left-sided pulmonary edema.   Underlying edema and correlate with fluid status.             Signed by: Nino Barbour 1/20/2025 9:57 AM   Dictation workstation:   [id number]      XR abdomen 1 view   Final  Result   1.  As described above.        Signed by: Vick Xiao 1/19/2025 11:58 AM   Dictation workstation:   [id number]      XR chest 1 view   Final Result   1. Slight worsening of multifocal airspace opacity in the left mid   and lower lung. Findings are concerning for infectious process. Small   left pleural effusion.   2. Postsurgical changes right pneumonectomy with medical devices as   described above. Consider retracting the [place]-Paco catheter.                  Signed by: Vick Xiao 1/19/2025 8:55 AM   Dictation workstation:   [id number]      Transthoracic Echo (TTE) Limited   Final Result      XR chest 1 view   Final Result   1. Patchy airspace opacity in the left lower lung with slight   improvement in aeration. Correlate with concern for infection   2. Postsurgical changes of right pneumonectomy as described above.   3. Medical lines and devices as above. Consider retraction of the   Keavy-Paco catheter.        I personally reviewed the images/study and I agree with the findings   as stated. This study was interpreted at [place], [place].        MACRO:   None        Signed by: Vick Xiao 1/19/2025 8:54 AM   Dictation workstation:   [id number]      XR chest 1 view   Final Result   1. Worsening aeration of the left lung with increasing left basilar   infiltrate and pleural effusion. Correlate with mucous plugging and   component of lobar collapse.   2. Component of interstitial prominence of the left lung, also   slightly increased which may be due to edema.   3. Postsurgical changes of right pneumonectomy as described                  Signed by: Vick Xiao 1/18/2025 11:35 AM   Dictation workstation:   [id number]      Cardiac Catheterization Procedure   Final Result      Transthoracic Echo (TTE) Limited   Final Result      XR chest 1 view   Final Result   1.  Stable exam with postsurgical changes of right pneumonectomy/rib    resection and large layering fluid component within the right   hemithorax.   2. Similar findings of left pulmonary edema with slightly increased   size of small left pleural effusion. No left-sided pneumothorax.   3. Medical devices as above.                  MACRO:   None        Signed by: Alexx Anne 1/17/2025 8:16 AM   Dictation workstation:   [id number]      XR chest 1 view   Final Result   1.  Postsurgical changes of right-sided pneumonectomy with increased   layering opacification of the right hemithorax, likely due to   increasing fluid.   2. Similar trace left-sided pleural effusion with linear left basilar   airspace opacity, likely atelectasis on a background of likely   pulmonary edema. Infection is not definitively excluded.   3. Medical devices as above with interval insertion of right internal   jugular central venous catheter projecting at the mid SVC.        I personally reviewed the images/study and I agree with the findings   as stated by resident Davidson Mcguire. This study was interpreted   at [place], [place].        MACRO:   None        Signed by: Alexx Anne 1/17/2025 8:14 AM   Dictation workstation:   [id number]      Transthoracic Echo (TTE) Limited   Final Result      US right upper quadrant   Final Result   Small amount of simple appearing ascites in the right upper quadrant   without evidence of cholelithiasis, cholecystitis or biliary dilation.        Signed by: Alberto Salas 1/16/2025 1:02 PM   Dictation workstation:   [id number]      XR chest 1 view   Final Result   1. Slight interval increase in left basilar airspace opacity which   may be due to increasing atelectasis. Cannot exclude developing   infectious infiltrate. Question trace left pleural effusion.   2. Again seen postsurgical changes related to right-sided   pneumonectomy as described above                  Signed by: Vick Xiao 1/16/2025 9:47 AM    Dictation workstation:   [id number]      US renal complete   Final Result   1. Unremarkable sonographic evaluation of the kidneys.   2. Incomplete evaluation of a gallbladder with some pericholecystic   fluid and biliary sludge. Dedicated right upper quadrant ultrasound   could be obtained as clinically warranted.   3. A small amount of free fluid present in the right upper quadrant   and pelvis.        Findings relayed by phone by me to SICU provider at 5:11 p.m.        I personally reviewed the images/study and I agree with the findings   as stated by Erickson Best MD (resident) . This study was   interpreted at [place], [place].        MACRO:   None        Signed by: Alberto Salas 1/15/2025 6:17 PM   Dictation workstation:   [id number]      XR chest 1 view   Final Result   1.  Status post pneumonectomy with interval increase in fluid   component of right-sided hydropneumothorax.   2. Medical devices as above.                  MACRO:   None        Signed by: Alexx Anne 1/15/2025 9:28 AM   Dictation workstation:   [id number]      XR chest 1 view   Final Result   1. Interval removal of right chest tube. Other medical devices as   described above.   2. Postsurgical changes related to right pneumonectomy.   3. Faint left basilar opacity, likely atelectatic.                  Signed by: Vick Xiao 1/14/2025 2:53 PM   Dictation workstation:   [id number]      XR chest 1 view   Final Result   1.  Stable right-sided pneumonectomy changes.             Signed by: Nino Barbour 1/14/2025 12:56 PM   Dictation workstation:   [id number]      Transthoracic Echo (TTE) Limited   Final Result      XR chest 1 view   Final Result   1. Postsurgical changes related to right sided pneumonectomy with   multiple medical devices as described above.   2. Mild interstitial prominence of the left lung.                  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 Wagner on 1/13 requiring post-op vasoactive therapy. Post-op course c/b acute hyponatremia, multifactorial delirium, cardiogenic shock requiring inotropic support, and acute hypoxic/hypercarbic respiratory failure requiring intubation. COVID+ 1/20. Weaned off milrinone 1/21. Weaned off iEpo 1/24. S/p trach/PEG 1/30 now tolerating TF through PEG.     Plan:  NEURO: History of anxiety on alprazolam 1mg qd. A&Ox4 at baseline. Multifactorial delirium improved. Off sedation. On scheduled clonazepam and nightly trazodone. Has been up OOB to chair last Friday, minor PT to side of bed over weekend. Alert and responds to commands, answering questions with nods, reasonable strength upper and lower extremities.   - Last Qtc 530  - Continue Trazodone nightly  - Continue Clonazepam 0.5 mg q8h- added parameters drowsiness  - Continue Oxycodone and Dilaudid prn   - Scheduled Tylenol  - Ongoing neuro and pain assessments  - Lidocaine patch to right chest wall  - PT/OT as tolerated   - Pet therapy when able     CV: No history of cardiac disease. Baseline /79, -110. TTE 9/2024 normal biV function, RVSP 10mmHg. Intra-op hypotension requiring vaso and epi. Arrived to SICU on vaso 0.06 units/min, started on levo 0.05 mcg/kg/min. TTE 1/13 with EF 60-65%, low normal RV systolic function. Weaned off vasopressors 1/15. Elevated lactate to 2.9 on 1/16 with SOB and new O2 requirement. Repeat TTE 1/16: acute biV dysfunction with signs of takotsubo cardiomyopathy, EF 34%, mod reduced RV systolic function, mildly elevated RVSP, mild to mod TR. Cardiogenic shock, started milrinone, lasix  infusion, and vasopressin.  Initial troponin 928 resolved. Was on pressors and iEPO. Lasix infusion stopped 1/17 given contraction alkalosis. Cath lab 1/17 for RHC/PA cath placement, opening CI 2.9. Repeat TTE 1/20 EF 35% (on iEpo and milrinone). Levo weaned off 1/20. Intermittent sinus tachycardia. Off Milrinone /21; iEpo off 1/24. Vaso weaned off 1/25. Responsive to diuresis. Started midodrine 1/25. PA cath removed 1/26. Repeat TTE this week showed LV EF 38%, There is reduced right ventricular systolic function with continued Takastubo picture with hypo- akinesis of the apical 2/3 of the LV.  BP stable but always borderline low, started metop 12.5 BID yesterday, getting midodrine 10 q8 and siladosin for urinary retention. Ongoing episodes of atrial tach 150s (with possible Afib on previous ecg and tele); considering Amiodarone vs. Digoxin (little room on pressure for more beta blockade; single lung at risk). Repeat echo today shows significantly improved EF 60-65%, no RWMA, normal RV function,- will discuss with cardiology recommendations. BP stable overnight with continued episodes of tachycardia 150s. On Metoprolol BID. Will touch base with cardiology.  - Reached out to thoracic, okay to anticoagulate if needed (will re-evaluate after Cardiology sees her); however CHADSVASC 1, may not be needed at this time.   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 Arrived to SICU extubated on 10L SFM, weaned to room air 1/14. Chest tube removed 1/14. Acute on chronic hypoxic respiratory failure 1/16. Escalation to Airvo  50L 90% overnight 1/17-1/18. Acute hypercapnia 1/18 with increase WOB, trial of BIPAP with iEPO. S/p trach 1/30. Treated for COVID during this admission. Overnight: Tolerated CPAP peep 5 and trache collared yesterday first time 2 hrs, CXR improved, moderate inspiratory rhonchi on exam.   - Trache collar today as tolerated  - Will put in consult for speaking valve (possibly tomorrow)  - Add additional hygiene - patient has minimal secretions and is getting scheduled broncho-pulmonary care; coarse sounds could be remaining fluid in lung.   - Wean FiO2 to maintain SpO2 >92%  - Scheduled budesonide/duoneb  - PRN albuterol  - Daily CXR  - ABG prn  - Holding home benzonatate    GI: No GI history. LFTs WNL 12/2/24. Evidence of pericholecystic fluid and biliary sludge on renal US 1/15 but no abdominal symptoms. RUQ US with small ascites, otherwise no acute findings. Abdominal distention this am. Elevated LFTs, now normalized. LBM 1/17. OG tube placed 1/19 for gastric decompression. TFs initiated via OG 1/20, then held on 1/21 due to regurgitation from OG. Dobhoff placed via IRIS and advanced post-pyloric. Tolerating TF at goal rate. Liquid Bms x4 1/25. Cdiff negative 1/25. Last stool on 1/26. S/p PEG placement, tolerating TF. Peg site looks healthy; there is some tenderness to palpation to middle abdomen. KUB 2 days ago showed nice amount of gas and she hadn't made a BM in days; did have BM yesterday after adding simethicone.   - Continue TF at goal rate 30ml/hr  - Continue thiamine 100mg IV daily  - Continue IV PPI for GI prophylaxis  - Continue bowel regimen: Miralax, Senna and Suppository   - Continue simethicone via PEG    : No history of renal disease. Baseline creatinine 0.6. Symptomatic acute hyponatremia (SIADH vs vaso mediated) to 120 overnight 1/14 - 1/15, corrected to 124 after 150cc 3% NaCl x2 on 1/15. Renal US on 1/15 unremarkable. Started on lasix infusion 1/16-1/17. Spot diuresis 1/20 and 1/24. Montiel  changed overnight 1/18-1/19. Today: Continues to make good urine output. RFP stable, net even to negative.   - Yoo exchange and UA yesterday ->grew gram - bacilli  - Continue Siladosin for retention, timed with Midodrine  - RFP daily and prn  - Maintain yoo for strict I/Os   - Maintain U/O >0.5ml/kg/hr  - Replete electrolytes to maintain K>4, Phos>2.5, iCal>1.1, Mag>2     HEME: Hx of DOC. Baseline H/H 12/40. Acute blood loss anemia, OR EBL 150mL. Acute coagulopathy with INR elevated to 2.3 2/2 to poor nutritional status. Received IV Vit K 10mg x1 with improved INR thereafter. Transfused 1u PRBC 1/17 and 1u PRBC on 1/18. LUE duplex obtained 1/20 for swelling, negative for DVT. Stable edema in LUE, negative DVT ultrasound 1/20 ultrasound. Hgb 7.4 1/25, transfused 1 RBC with Hgb increase to 10.7 thereafter. Today, CBC stable.   - Check CBC daily and PRN  - Coags PRN  - Goal Hgb >8  - SCDs and SQH for DVT ppx  - Ongoing monitoring for s/s bleeding  - Maintain active T&S (1/29)     ENDO: No history of DM or thyroid disease. TSH 0.48 (1/17). Recent 5d course of prednisone 10mg 12/2024 for URI. Adequate glycemic control. Started on SDS out of OR 1/13. Transitioned to dexAMETHasone 1/22 for COVID, end date 1/29. BG have been wnl.   - BG q4h, SSI #1     MSK: Arthritis, Vit D deficiency  - Not on meds at home  - Monitor for symptoms     ID:  Worsened clinical condition 1/17, started on vanco, zosyn and azithromycin. Blood cultures, MRSA and Respiratory viral panel negative 1/17. Yoo changed 1/30, Urine cx 1/18 negative. Added fluc overnight 1/18-1/19. Sputum culture 1/19 negative. Vancomycin discontinued 1/20. Urine strep/legionella 1/20 negative. COVID+ on 1/20, initiated remdesivir and decadron. Off precautions. Liquid stools last week; C.diff negative 1/26. Zosyn completed 1/26. Currently afebrile, no leukocytosis. Urine positive for gram- bacilli  - Temp q4h, wbc daily  - Ongoing monitoring for s/s infection  -  Started Ceftriaxone yesterday for UTI (gram- bacilli); speciated Pseudomonas, will change to Zosyn      Access:  - R axillary a line (1/28-)  - Left chest mediport (not currently accessed)  - PIV x2  - Montiel (1/30-)  - Trach (1/30)  - PEG (1/30)     Dispo: Continue ICU care. Patient seen and discussed with ICU attending Dr. Ibrahim     Code Status: Full Code     Family: son Yg and  Maximilian updated over the phone. Will further update at bedside if they visit today and prn.    SICU Phone [phone]    Maricruz Silva MD   Anesthesiology, CA-2

## 2025-02-04 ENCOUNTER — APPOINTMENT (OUTPATIENT)
Dept: CARDIOLOGY | Facility: HOSPITAL | Age: 65
End: 2025-02-04
Payer: COMMERCIAL

## 2025-02-04 ENCOUNTER — APPOINTMENT (OUTPATIENT)
Dept: RADIOLOGY | Facility: HOSPITAL | Age: 65
End: 2025-02-04
Payer: COMMERCIAL

## 2025-02-04 LAB
ABO GROUP (TYPE) IN BLOOD: NORMAL
ALBUMIN SERPL BCP-MCNC: 3.1 G/DL (ref 3.4–5)
ANION GAP BLDA CALCULATED.4IONS-SCNC: -1 MMO/L (ref 10–25)
ANION GAP BLDA CALCULATED.4IONS-SCNC: 3 MMO/L (ref 10–25)
ANION GAP SERPL CALC-SCNC: 11 MMOL/L (ref 10–20)
ANTIBODY SCREEN: NORMAL
APTT PPP: 40 SECONDS (ref 27–38)
BASE EXCESS BLDA CALC-SCNC: 10.3 MMOL/L (ref -2–3)
BASE EXCESS BLDA CALC-SCNC: 9.6 MMOL/L (ref -2–3)
BODY TEMPERATURE: 37 DEGREES CELSIUS
BODY TEMPERATURE: 37 DEGREES CELSIUS
BUN SERPL-MCNC: 16 MG/DL (ref 6–23)
CA-I BLD-SCNC: 1.21 MMOL/L (ref 1.1–1.33)
CA-I BLDA-SCNC: 1.22 MMOL/L (ref 1.1–1.33)
CA-I BLDA-SCNC: 1.25 MMOL/L (ref 1.1–1.33)
CALCIUM SERPL-MCNC: 8.8 MG/DL (ref 8.6–10.6)
CHLORIDE BLDA-SCNC: 104 MMOL/L (ref 98–107)
CHLORIDE BLDA-SCNC: 104 MMOL/L (ref 98–107)
CHLORIDE SERPL-SCNC: 100 MMOL/L (ref 98–107)
CO2 SERPL-SCNC: 34 MMOL/L (ref 21–32)
CREAT SERPL-MCNC: <0.2 MG/DL (ref 0.5–1.05)
EGFRCR SERPLBLD CKD-EPI 2021: ABNORMAL ML/MIN/{1.73_M2}
ERYTHROCYTE [DISTWIDTH] IN BLOOD BY AUTOMATED COUNT: 18.8 % (ref 11.5–14.5)
EST. AVERAGE GLUCOSE BLD GHB EST-MCNC: 94 MG/DL
GLUCOSE BLD MANUAL STRIP-MCNC: 102 MG/DL (ref 74–99)
GLUCOSE BLD MANUAL STRIP-MCNC: 118 MG/DL (ref 74–99)
GLUCOSE BLD MANUAL STRIP-MCNC: 128 MG/DL (ref 74–99)
GLUCOSE BLD MANUAL STRIP-MCNC: 141 MG/DL (ref 74–99)
GLUCOSE BLDA-MCNC: 109 MG/DL (ref 74–99)
GLUCOSE BLDA-MCNC: 125 MG/DL (ref 74–99)
GLUCOSE SERPL-MCNC: 124 MG/DL (ref 74–99)
HBA1C MFR BLD: 4.9 %
HCO3 BLDA-SCNC: 34.8 MMOL/L (ref 22–26)
HCO3 BLDA-SCNC: 36.3 MMOL/L (ref 22–26)
HCT VFR BLD AUTO: 28.9 % (ref 36–46)
HCT VFR BLD EST: 28 % (ref 36–46)
HCT VFR BLD EST: 30 % (ref 36–46)
HGB BLD-MCNC: 9.3 G/DL (ref 12–16)
HGB BLDA-MCNC: 9.3 G/DL (ref 12–16)
HGB BLDA-MCNC: 9.9 G/DL (ref 12–16)
INHALED O2 CONCENTRATION: 40 %
INHALED O2 CONCENTRATION: 60 %
INR PPP: 1.2 (ref 0.9–1.1)
LACTATE BLDA-SCNC: 0.4 MMOL/L (ref 0.4–2)
LACTATE BLDA-SCNC: 0.5 MMOL/L (ref 0.4–2)
MAGNESIUM SERPL-MCNC: 1.97 MG/DL (ref 1.6–2.4)
MCH RBC QN AUTO: 28.4 PG (ref 26–34)
MCHC RBC AUTO-ENTMCNC: 32.2 G/DL (ref 32–36)
MCV RBC AUTO: 88 FL (ref 80–100)
NRBC BLD-RTO: 0 /100 WBCS (ref 0–0)
OXYHGB MFR BLDA: 93.8 % (ref 94–98)
OXYHGB MFR BLDA: 96.1 % (ref 94–98)
PCO2 BLDA: 50 MM HG (ref 38–42)
PCO2 BLDA: 56 MM HG (ref 38–42)
PH BLDA: 7.42 PH (ref 7.38–7.42)
PH BLDA: 7.45 PH (ref 7.38–7.42)
PHOSPHATE SERPL-MCNC: 3.7 MG/DL (ref 2.5–4.9)
PLATELET # BLD AUTO: 140 X10*3/UL (ref 150–450)
PO2 BLDA: 106 MM HG (ref 85–95)
PO2 BLDA: 76 MM HG (ref 85–95)
POTASSIUM BLDA-SCNC: 3.9 MMOL/L (ref 3.5–5.3)
POTASSIUM BLDA-SCNC: 4.2 MMOL/L (ref 3.5–5.3)
POTASSIUM SERPL-SCNC: 4.2 MMOL/L (ref 3.5–5.3)
PROTHROMBIN TIME: 14 SECONDS (ref 9.8–12.8)
RBC # BLD AUTO: 3.28 X10*6/UL (ref 4–5.2)
RH FACTOR (ANTIGEN D): NORMAL
SAO2 % BLDA: 97 % (ref 94–100)
SAO2 % BLDA: 99 % (ref 94–100)
SODIUM BLDA-SCNC: 135 MMOL/L (ref 136–145)
SODIUM BLDA-SCNC: 138 MMOL/L (ref 136–145)
SODIUM SERPL-SCNC: 141 MMOL/L (ref 136–145)
WBC # BLD AUTO: 6.1 X10*3/UL (ref 4.4–11.3)

## 2025-02-04 PROCEDURE — 2500000005 HC RX 250 GENERAL PHARMACY W/O HCPCS

## 2025-02-04 PROCEDURE — 93005 ELECTROCARDIOGRAM TRACING: CPT

## 2025-02-04 PROCEDURE — 71045 X-RAY EXAM CHEST 1 VIEW: CPT

## 2025-02-04 PROCEDURE — 86901 BLOOD TYPING SEROLOGIC RH(D): CPT

## 2025-02-04 PROCEDURE — 84132 ASSAY OF SERUM POTASSIUM: CPT

## 2025-02-04 PROCEDURE — 0B9G8ZZ DRAINAGE OF LEFT UPPER LUNG LOBE, VIA NATURAL OR ARTIFICIAL OPENING ENDOSCOPIC: ICD-10-PCS | Performed by: THORACIC SURGERY (CARDIOTHORACIC VASCULAR SURGERY)

## 2025-02-04 PROCEDURE — 2500000001 HC RX 250 WO HCPCS SELF ADMINISTERED DRUGS (ALT 637 FOR MEDICARE OP)

## 2025-02-04 PROCEDURE — 2500000004 HC RX 250 GENERAL PHARMACY W/ HCPCS (ALT 636 FOR OP/ED)

## 2025-02-04 PROCEDURE — 99223 1ST HOSP IP/OBS HIGH 75: CPT | Performed by: STUDENT IN AN ORGANIZED HEALTH CARE EDUCATION/TRAINING PROGRAM

## 2025-02-04 PROCEDURE — 2500000001 HC RX 250 WO HCPCS SELF ADMINISTERED DRUGS (ALT 637 FOR MEDICARE OP): Performed by: PHYSICIAN ASSISTANT

## 2025-02-04 PROCEDURE — 85610 PROTHROMBIN TIME: CPT

## 2025-02-04 PROCEDURE — 2500000004 HC RX 250 GENERAL PHARMACY W/ HCPCS (ALT 636 FOR OP/ED): Performed by: STUDENT IN AN ORGANIZED HEALTH CARE EDUCATION/TRAINING PROGRAM

## 2025-02-04 PROCEDURE — 83735 ASSAY OF MAGNESIUM: CPT

## 2025-02-04 PROCEDURE — 85027 COMPLETE CBC AUTOMATED: CPT

## 2025-02-04 PROCEDURE — 82947 ASSAY GLUCOSE BLOOD QUANT: CPT

## 2025-02-04 PROCEDURE — 87040 BLOOD CULTURE FOR BACTERIA: CPT

## 2025-02-04 PROCEDURE — 97530 THERAPEUTIC ACTIVITIES: CPT | Mod: GO

## 2025-02-04 PROCEDURE — P9047 ALBUMIN (HUMAN), 25%, 50ML: HCPCS

## 2025-02-04 PROCEDURE — 2500000001 HC RX 250 WO HCPCS SELF ADMINISTERED DRUGS (ALT 637 FOR MEDICARE OP): Performed by: STUDENT IN AN ORGANIZED HEALTH CARE EDUCATION/TRAINING PROGRAM

## 2025-02-04 PROCEDURE — 36415 COLL VENOUS BLD VENIPUNCTURE: CPT

## 2025-02-04 PROCEDURE — 82330 ASSAY OF CALCIUM: CPT

## 2025-02-04 PROCEDURE — 2500000004 HC RX 250 GENERAL PHARMACY W/ HCPCS (ALT 636 FOR OP/ED): Mod: JZ

## 2025-02-04 PROCEDURE — 2500000004 HC RX 250 GENERAL PHARMACY W/ HCPCS (ALT 636 FOR OP/ED): Performed by: NURSE PRACTITIONER

## 2025-02-04 PROCEDURE — 94640 AIRWAY INHALATION TREATMENT: CPT

## 2025-02-04 PROCEDURE — 2020000001 HC ICU ROOM DAILY

## 2025-02-04 PROCEDURE — 83036 HEMOGLOBIN GLYCOSYLATED A1C: CPT

## 2025-02-04 PROCEDURE — 93010 ELECTROCARDIOGRAM REPORT: CPT | Performed by: INTERNAL MEDICINE

## 2025-02-04 PROCEDURE — P9045 ALBUMIN (HUMAN), 5%, 250 ML: HCPCS | Mod: JZ

## 2025-02-04 PROCEDURE — 2500000004 HC RX 250 GENERAL PHARMACY W/ HCPCS (ALT 636 FOR OP/ED): Performed by: PHYSICIAN ASSISTANT

## 2025-02-04 PROCEDURE — 0B9J8ZZ DRAINAGE OF LEFT LOWER LUNG LOBE, VIA NATURAL OR ARTIFICIAL OPENING ENDOSCOPIC: ICD-10-PCS | Performed by: THORACIC SURGERY (CARDIOTHORACIC VASCULAR SURGERY)

## 2025-02-04 PROCEDURE — 2500000002 HC RX 250 W HCPCS SELF ADMINISTERED DRUGS (ALT 637 FOR MEDICARE OP, ALT 636 FOR OP/ED)

## 2025-02-04 PROCEDURE — 99291 CRITICAL CARE FIRST HOUR: CPT

## 2025-02-04 PROCEDURE — 37799 UNLISTED PX VASCULAR SURGERY: CPT

## 2025-02-04 PROCEDURE — 0B978ZZ DRAINAGE OF LEFT MAIN BRONCHUS, VIA NATURAL OR ARTIFICIAL OPENING ENDOSCOPIC: ICD-10-PCS | Performed by: THORACIC SURGERY (CARDIOTHORACIC VASCULAR SURGERY)

## 2025-02-04 PROCEDURE — 94003 VENT MGMT INPAT SUBQ DAY: CPT

## 2025-02-04 RX ORDER — ACETYLCYSTEINE 100 MG/ML
6 SOLUTION ORAL; RESPIRATORY (INHALATION) 4 TIMES DAILY
Status: DISCONTINUED | OUTPATIENT
Start: 2025-02-04 | End: 2025-02-05

## 2025-02-04 RX ORDER — METOPROLOL TARTRATE 25 MG/1
12.5 TABLET, FILM COATED ORAL ONCE
Status: COMPLETED | OUTPATIENT
Start: 2025-02-04 | End: 2025-02-04

## 2025-02-04 RX ORDER — ALBUMIN HUMAN 250 G/1000ML
12.5 SOLUTION INTRAVENOUS ONCE
Status: COMPLETED | OUTPATIENT
Start: 2025-02-04 | End: 2025-02-04

## 2025-02-04 RX ORDER — DEXMEDETOMIDINE HYDROCHLORIDE 4 UG/ML
INJECTION, SOLUTION INTRAVENOUS
Status: COMPLETED
Start: 2025-02-04 | End: 2025-02-04

## 2025-02-04 RX ORDER — MIDODRINE HYDROCHLORIDE 10 MG/1
10 TABLET ORAL EVERY 6 HOURS
Status: DISCONTINUED | OUTPATIENT
Start: 2025-02-04 | End: 2025-02-04

## 2025-02-04 RX ORDER — PHENYLEPHRINE HYDROCHLORIDE 10 MG/ML
INJECTION INTRAVENOUS
Status: COMPLETED
Start: 2025-02-04 | End: 2025-02-04

## 2025-02-04 RX ORDER — ALBUMIN HUMAN 50 G/1000ML
12.5 SOLUTION INTRAVENOUS ONCE
Status: COMPLETED | OUTPATIENT
Start: 2025-02-04 | End: 2025-02-04

## 2025-02-04 RX ORDER — DEXMEDETOMIDINE HYDROCHLORIDE 4 UG/ML
0-1.5 INJECTION, SOLUTION INTRAVENOUS CONTINUOUS
Status: DISCONTINUED | OUTPATIENT
Start: 2025-02-04 | End: 2025-02-10

## 2025-02-04 RX ADMIN — PIPERACILLIN SODIUM AND TAZOBACTAM SODIUM 4.5 G: 4; .5 INJECTION, SOLUTION INTRAVENOUS at 20:30

## 2025-02-04 RX ADMIN — ALBUMIN HUMAN 12.5 G: 0.05 INJECTION, SOLUTION INTRAVENOUS at 04:06

## 2025-02-04 RX ADMIN — PIPERACILLIN SODIUM AND TAZOBACTAM SODIUM 4.5 G: 4; .5 INJECTION, SOLUTION INTRAVENOUS at 15:00

## 2025-02-04 RX ADMIN — IPRATROPIUM BROMIDE AND ALBUTEROL SULFATE 3 ML: .5; 3 SOLUTION RESPIRATORY (INHALATION) at 16:50

## 2025-02-04 RX ADMIN — Medication 60 PERCENT: at 08:04

## 2025-02-04 RX ADMIN — CLONAZEPAM 0.5 MG: 0.5 TABLET ORAL at 23:15

## 2025-02-04 RX ADMIN — IPRATROPIUM BROMIDE AND ALBUTEROL SULFATE 3 ML: .5; 3 SOLUTION RESPIRATORY (INHALATION) at 08:24

## 2025-02-04 RX ADMIN — DEXMEDETOMIDINE HYDROCHLORIDE 0.2 MCG/KG/HR: 400 INJECTION INTRAVENOUS at 07:57

## 2025-02-04 RX ADMIN — IPRATROPIUM BROMIDE AND ALBUTEROL SULFATE 3 ML: .5; 3 SOLUTION RESPIRATORY (INHALATION) at 20:34

## 2025-02-04 RX ADMIN — HYDROMORPHONE HYDROCHLORIDE 0.5 MG: 0.5 INJECTION, SOLUTION INTRAMUSCULAR; INTRAVENOUS; SUBCUTANEOUS at 00:54

## 2025-02-04 RX ADMIN — MIDODRINE HYDROCHLORIDE 15 MG: 10 TABLET ORAL at 18:26

## 2025-02-04 RX ADMIN — CLONAZEPAM 0.5 MG: 0.5 TABLET ORAL at 00:01

## 2025-02-04 RX ADMIN — PHENYLEPHRINE HYDROCHLORIDE 0.5 MCG/KG/MIN: 10 INJECTION INTRAVENOUS at 13:10

## 2025-02-04 RX ADMIN — CLONAZEPAM 0.5 MG: 0.5 TABLET ORAL at 08:03

## 2025-02-04 RX ADMIN — CLONAZEPAM 0.5 MG: 0.5 TABLET ORAL at 16:00

## 2025-02-04 RX ADMIN — BUDESONIDE 0.5 MG: 0.5 INHALANT RESPIRATORY (INHALATION) at 08:24

## 2025-02-04 RX ADMIN — BISACODYL 10 MG: 10 SUPPOSITORY RECTAL at 08:03

## 2025-02-04 RX ADMIN — ALBUMIN HUMAN 12.5 G: 0.25 SOLUTION INTRAVENOUS at 06:00

## 2025-02-04 RX ADMIN — LIDOCAINE 4% 1 PATCH: 40 PATCH TOPICAL at 08:03

## 2025-02-04 RX ADMIN — MIDODRINE HYDROCHLORIDE 15 MG: 10 TABLET ORAL at 11:17

## 2025-02-04 RX ADMIN — ESOMEPRAZOLE MAGNESIUM 40 MG: 40 FOR SUSPENSION ORAL at 08:18

## 2025-02-04 RX ADMIN — HEPARIN SODIUM 5000 UNITS: 5000 INJECTION INTRAVENOUS; SUBCUTANEOUS at 23:00

## 2025-02-04 RX ADMIN — Medication 45 PERCENT: at 20:34

## 2025-02-04 RX ADMIN — POLYETHYLENE GLYCOL 3350 17 G: 17 POWDER, FOR SOLUTION ORAL at 08:03

## 2025-02-04 RX ADMIN — IPRATROPIUM BROMIDE AND ALBUTEROL SULFATE 3 ML: .5; 3 SOLUTION RESPIRATORY (INHALATION) at 00:53

## 2025-02-04 RX ADMIN — PIPERACILLIN SODIUM AND TAZOBACTAM SODIUM 4.5 G: 4; .5 INJECTION, SOLUTION INTRAVENOUS at 03:13

## 2025-02-04 RX ADMIN — HYDROCORTISONE SODIUM SUCCINATE 50 MG: 100 INJECTION, POWDER, FOR SOLUTION INTRAMUSCULAR; INTRAVENOUS at 13:10

## 2025-02-04 RX ADMIN — HYDROCORTISONE SODIUM SUCCINATE 100 MG: 100 INJECTION, POWDER, FOR SOLUTION INTRAMUSCULAR; INTRAVENOUS at 08:02

## 2025-02-04 RX ADMIN — VASOPRESSIN 0.03 UNITS/MIN: 0.2 INJECTION INTRAVENOUS at 05:31

## 2025-02-04 RX ADMIN — TRAZODONE HYDROCHLORIDE 25 MG: 50 TABLET ORAL at 20:05

## 2025-02-04 RX ADMIN — HYDROCORTISONE SODIUM SUCCINATE 50 MG: 100 INJECTION, POWDER, FOR SOLUTION INTRAMUSCULAR; INTRAVENOUS at 20:09

## 2025-02-04 RX ADMIN — Medication 45 PERCENT: at 20:00

## 2025-02-04 RX ADMIN — BUDESONIDE 0.5 MG: 0.5 INHALANT RESPIRATORY (INHALATION) at 20:34

## 2025-02-04 RX ADMIN — MIDODRINE HYDROCHLORIDE 10 MG: 10 TABLET ORAL at 06:02

## 2025-02-04 RX ADMIN — THIAMINE HCL TAB 100 MG 100 MG: 100 TAB at 08:02

## 2025-02-04 RX ADMIN — PHENYLEPHRINE HYDROCHLORIDE 1 MCG/KG/MIN: 10 INJECTION INTRAVENOUS at 04:45

## 2025-02-04 RX ADMIN — MAGNESIUM SULFATE HEPTAHYDRATE 2 G: 40 INJECTION, SOLUTION INTRAVENOUS at 04:35

## 2025-02-04 RX ADMIN — HEPARIN SODIUM 5000 UNITS: 5000 INJECTION INTRAVENOUS; SUBCUTANEOUS at 06:00

## 2025-02-04 RX ADMIN — PIPERACILLIN SODIUM AND TAZOBACTAM SODIUM 4.5 G: 4; .5 INJECTION, SOLUTION INTRAVENOUS at 08:15

## 2025-02-04 RX ADMIN — PHENYLEPHRINE HYDROCHLORIDE 10 MG: 10 INJECTION INTRAVENOUS at 05:33

## 2025-02-04 RX ADMIN — DOCUSATE SODIUM 100 MG: 50 LIQUID ORAL at 08:03

## 2025-02-04 RX ADMIN — METOPROLOL TARTRATE 12.5 MG: 25 TABLET, FILM COATED ORAL at 01:51

## 2025-02-04 RX ADMIN — DIGOXIN 125 MCG: 0.25 INJECTION INTRAMUSCULAR; INTRAVENOUS at 08:02

## 2025-02-04 RX ADMIN — ACETYLCYSTEINE 6 ML: 100 SOLUTION ORAL; RESPIRATORY (INHALATION) at 20:34

## 2025-02-04 RX ADMIN — HEPARIN SODIUM 5000 UNITS: 5000 INJECTION INTRAVENOUS; SUBCUTANEOUS at 13:10

## 2025-02-04 ASSESSMENT — PAIN SCALES - GENERAL
PAINLEVEL_OUTOF10: 0 - NO PAIN

## 2025-02-04 ASSESSMENT — PAIN - FUNCTIONAL ASSESSMENT
PAIN_FUNCTIONAL_ASSESSMENT: 0-10
PAIN_FUNCTIONAL_ASSESSMENT: CPOT (CRITICAL CARE PAIN OBSERVATION TOOL)

## 2025-02-04 ASSESSMENT — COGNITIVE AND FUNCTIONAL STATUS - GENERAL
EATING MEALS: TOTAL
HELP NEEDED FOR BATHING: A LOT
DAILY ACTIVITIY SCORE: 9
PERSONAL GROOMING: A LOT
TOILETING: TOTAL
DRESSING REGULAR LOWER BODY CLOTHING: TOTAL
DRESSING REGULAR UPPER BODY CLOTHING: A LOT

## 2025-02-04 NOTE — CONSULTS
Inpatient consult to Electrophysiology  Consult performed by: David Cavanaugh MD  Consult ordered by: Jonah Wagner MD        History Of Present Illness:    Jannie Vick is a 64 y.o. female PMH significant for DEEPAK, tobacco use disorder, stage III squamous cell carcinoma of right lung patient presented to [facility] on 113 for scheduled right pneumonectomy, intrapericardial with en bloc wall resection by Dr. Jonah Wagner. Course complicated by prolonged intubation, shock and cardiomyopathy. EP was consulted given c/f Aflutter/atrial tachycardia.    The patient was initially seen by general cardiology service for newly reduced LVEF along with atrial tachycardia/flutter for which the patient has been managed with metoprolol however given the soft blood pressures it was held.  The general cardiology service recommended digoxin with some improvement in the heart rates to 110s.  Repeat EKG today shows atrial tachycardia with heart rate in the 110.  Of note, the patient is currently struggling with physical deconditioning in the setting of prolonged ICU stay and recent surgery and is being currently treated with Zosyn for suspected UTI.  Since arriving to the trauma ICU the patient has been on phenylephrine and was started on midodrine as well.  Repeat TTE shows improvement of LVEF to 60 to 65%.     Physical Exam:  General: Resting in bed  HEENT: Trach in place, no surrounding drainage or bleeding  Resp: symmetric chest rise, FiO2 60%   CV: well perfused, intermittently tachycardic  : yoo draining clear yellow urine    Assessment/Plan   Faustina Vick (Jannie) is a 64F with Hx of Stage III lung cancer, asthma, anxiety, and arthritis who is now s/p R pneumonectomy with Dr. Wagner 1/13/25. 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 Cardiology was consulted and recommended Lasix gtt and milrinone. Lasix gtt held 1/17, hyponatremia improving. Patient underwent RHC and SGC  placement with cardiology 1/17. She was reintubated on 1/18 d/t worsening respiratory status and AMS. Also had increased pressor requirements, now weaned off. Repeat echo stable on 1/18. COVID positive on 1/20.      Failed extubation trials, now s/p trach/PEG with Dr. Wagner 1/30. EP consulted for intermittent Atach/flutter which is likely secondary to underlying physiological stress from physical deconditioning in the setting of prolonged ICU stay and recent surgery and is being currently treated with Zosyn for suspected UTI.      -Tachyarrhythmias are likely triggered by acute illness.  -Agree with digoxin 125mcg daily  -Resume metoprolol once BP improves.  -Start AC (heparin) if there is no contraindications, will consider starting tikosyn once the patient is medically stable to maintain sinus rhythm.  -Will need a ziopatch on discharge with EP follow up       Code Status:  Full Code    I spent 30 minutes in the professional and overall care of this patient.        David Cavanaugh MD  Cardiology Fellow PGY5    Thank you for involving us in this patient care. Recommendations not final until signed by attending.     General Cardiology Consult Pager: [phone] (weekday 7AM-6PM and weekend 7AM-2PM) and other: [phone]  EP Consult Pager: [phone] (weekday 7AM-6PM and weekend 7AM-2PM) and other: [phone]  CICU Fellow Pager: [phone] anytime  EP Device Nurse Pager: [phone] (weekday 7AM-4PM)  Advanced Heart Failure Consult Pager: [phone] anytime

## 2025-02-04 NOTE — PROGRESS NOTES
"Occupational Therapy    Occupational Therapy Treatment    Name: Faustina Vick \"Jannie\"  MRN: [id number]  : 1960  Date: 25  Room: -A      Time Calculation  Start Time: 1037  Stop Time: 1101  Time Calculation (min): 24 min    Assessment:  Barriers to Discharge Home: Cognition needs, Physical needs, Caregiver assistance  Caregiver Assistance: Caregiver assistance needed per identified barriers - however, level of patient's required assistance exceeds assistance available at home  Cognition Needs: Insight of patient limited regarding functional ability/needs, Medication and/or medical management daily assist needed  Physical Needs: 24hr mobility assistance needed, 24hr ADL assistance needed  End of Session Communication: Bedside nurse  End of Session Patient Position: Bed, 3 rail up, Alarm off, not on at start of session    Plan:  Treatment Interventions: ADL retraining, Functional transfer training, UE strengthening/ROM, Endurance training, Cognitive reorientation, Patient/family training, Equipment evaluation/education, Neuromuscular reeducation, Fine motor coordination activities, Compensatory technique education  OT Frequency: 4 times per week  OT Discharge Recommendations: Moderate intensity level of continued care  Equipment Recommended upon Discharge: Wheeled walker  OT Recommended Transfer Status: Assist of 2  OT - OK to Discharge: Yes    Subjective   General:  OT Last Visit  OT Received On: 25  Co-Treatment:  (rehab aide assisted with session.)  Prior to Session Communication: Bedside nurse  Patient Position Received: Bed, 3 rail up, Alarm off, not on at start of session  Family/Caregiver Present: No  General Comment: Pt supine in bed on arrival, agreeable to participate with goal of session to progress to chair but refused to get into chair with maximal encouragement. Tracheostomy VC AC 50% FiO2, PEEP 5, RR 14, .225 precedex, .5 shadi.     Precautions:  Medical Precautions: Fall " precautions, Cardiac precautions, Oxygen therapy device and L/min  Precautions Comment: MAP >65    Vitals:   02/04/25 1037 02/04/25 1045 02/04/25 1059   Vital Signs   Vitals Session Pre OT  --  Post OT   Heart Rate 105 104 103   Resp 23 22 22   SpO2 93 % 97 % 91 %   BP 96/56  --  (!) 88/49   MAP (mmHg) 72  --  65   Vital Signs Comment  --   --  MAP in 70s with activity; decreased to lowest 63 on return to bed.       Lines/Tubes/Drains:  Arterial Line 01/28/25 Right Axillary (Active)   Number of days: 6       Implantable Port 01/16/25 Left Chest Single lumen port (Active)   Number of days: 18       Surgical Airway Shiley 7.5 (Active)   Number of days: 4       Urethral Catheter (Active)   Number of days: 2       Gastrostomy/Enterostomy Percutaneous endoscopic gastrostomy (PEG) 1 20 Fr. LUQ (Active)   Number of days: 4       Cognition:  Overall Cognitive Status: Impaired  Orientation Level: Oriented X4 (with choices)  Following Commands: Follows one step commands with repetition  Cognition Comments: lethargic  Processing Speed: Delayed    Pain Assessment:  Pain Assessment  Pain Assessment: 0-10  0-10 (Numeric) Pain Score: 0 - No pain     Objective   Bed Mobility/Transfers:   Bed Mobility  Bed Mobility: Yes  Bed Mobility 1  Bed Mobility 1: Supine to sitting, Sitting to supine  Level of Assistance 1: Maximum assistance, +2  Bed Mobility Comments 1: draw sheet  Transfers  Transfer: Yes  Transfer 1  Transfer From 1: Sit to  Transfer to 1: Stand  Transfer Level of Assistance 1: Maximum assistance, +2  Trials/Comments 1: x2 trials; bilat arm in arm assist, draw sheet under hips    Therapy/Activity:      Therapeutic Activity  Therapeutic Activity Performed: Yes  Therapeutic Activity 1: Pt tolerated EOB sitting ~10 minutes with assist needs fluctuating from CGA-max A. With notable attempts to return self to bed despite encouragement.  Therapeutic Activity 2: Pt tolerated ~10 second stand trial with max A x2 for wound care.  Flexed posture.     Outcome Measures:  [facility] Daily Activity  Putting on and taking off regular lower body clothing: Total  Bathing (including washing, rinsing, drying): A lot  Putting on and taking off regular upper body clothing: A lot  Toileting, which includes using toilet, bedpan or urinal: Total  Taking care of personal grooming such as brushing teeth: A lot  Eating Meals: Total  Daily Activity - Total Score: 9  ICU Mobility Screen  ICU Mobility Scale: Standing     Education Documentation  Body Mechanics, taught by Ellen Garcia OT at 2/4/2025 12:30 PM.  Learner: Patient  Readiness: Acceptance  Method: Explanation, Demonstration  Response: Demonstrated Understanding    Precautions, taught by Ellen Garcia OT at 2/4/2025 12:30 PM.  Learner: Patient  Readiness: Acceptance  Method: Explanation, Demonstration  Response: Demonstrated Understanding    ADL Training, taught by Ellen Garcia OT at 2/4/2025 12:30 PM.  Learner: Patient  Readiness: Acceptance  Method: Explanation, Demonstration  Response: Demonstrated Understanding    Education Comments  No comments found.        Goals:  Encounter Problems       Encounter Problems (Active)       ADLs       Patient will perform UB and LB bathing with modified independent level of assistance. (Not met)       Start:  01/14/25    Expected End:  01/28/25    Resolved:  01/22/25    Updated to: Patient will perform UB and LB bathing with moderate level of assistance.    Update reason: re eval         Patient with complete upper body dressing with modified independent level of assistance donning and doffing all UE clothes.  (Not met)       Start:  01/14/25    Expected End:  01/28/25    Resolved:  01/22/25    Updated to: Patient with complete upper body dressing with minimal level of assistance donning and doffing all UE clothes.    Update reason: re eval         Patient with complete lower body dressing with modified independent level of  assistance donning and doffing all LE clothes  with PRN adaptive equipment  (Not met)       Start:  01/14/25    Expected End:  01/28/25    Resolved:  01/22/25    Updated to: Patient with complete lower body dressing with moderate level of assistance donning and doffing all LE clothes  with PRN adaptive equipment    Update reason: re eval         Patient will complete toileting including hygiene clothing management/hygiene with modified independent level of assistance. (Not met)       Start:  01/14/25    Expected End:  01/28/25    Resolved:  01/22/25    Updated to: Patient will complete grooming with SBA    Update reason: re eval         Patient will perform UB and LB bathing with moderate level of assistance. (Progressing)       Start:  01/22/25    Expected End:  02/05/25                Patient with complete upper body dressing with minimal level of assistance donning and doffing all UE clothes. (Progressing)       Start:  01/22/25    Expected End:  02/05/25                Patient with complete lower body dressing with moderate level of assistance donning and doffing all LE clothes  with PRN adaptive equipment (Progressing)       Start:  01/22/25    Expected End:  02/05/25                Patient will complete grooming with SBA (Progressing)       Start:  01/22/25    Expected End:  02/05/25                   TRANSFERS       Patient will perform bed mobility modified independent level of assistance in order to improve safety and independence with mobility (Not met)       Start:  01/14/25    Expected End:  01/28/25    Resolved:  01/22/25    Updated to: Patient will perform bed mobility moderate level of assistance in order to improve safety and independence with mobility    Update reason: re eval         Patient will complete functional transfer to toilet with least restrictive device with modified independent level of assistance. (Not met)       Start:  01/14/25    Expected End:  01/28/25    Resolved:  01/22/25     Updated to: Patient will complete STS with moderate assistance and LRAD    Update reason: re eval         Patient will perform bed mobility moderate level of assistance in order to improve safety and independence with mobility (Progressing)       Start:  01/22/25    Expected End:  02/05/25                Patient will complete STS with moderate assistance and LRAD (Progressing)       Start:  01/22/25    Expected End:  02/05/25 02/04/25 at 12:32 PM   Ellen Garcia, OT   [phone]

## 2025-02-04 NOTE — CARE PLAN
See detailed note dated 2/3/25. Pt has recovered EF, currently having recurrent tachyarrhythmias. EP consulted. Cardiology will sign off. Please reach out to [phone] for any questions.

## 2025-02-04 NOTE — PROGRESS NOTES
"Physical Therapy                 Therapy Communication Note    Patient Name: Faustina Vick \"Jannie\"  MRN: [id number]  Department: [facility]  Room: 12/12-A  Today's Date: 2/4/2025     Discipline: Physical Therapy    PT Missed Visit: Yes     Missed Visit Reason:  (1400: Pt declining PT services this ate due to fatigue following OT session, will re-attempt as able/appropriate.)    Missed Time: Attempt      "

## 2025-02-04 NOTE — PROGRESS NOTES
"Faustina Vick \"Noam" is a 64 y.o. female who presents for Primary cancer of right upper lobe of lung (Multi) [C34.11].  She initially received chemotherapy and radiation, but appeared to have residual radiographic abnormalities.  PET/CT suggested progression of disease, and in this setting and Dr. Wagner felt that surgical resection in a \"salvage\" setting might facilitate long-term overall survival given the failure of nonsurgical management. She is now  22 days post op from PNEUMONECTOMY (Right) (en bloc with chest wall resection and reconstruction), mediastinal lymph node dissection for a  Primary cancer of right upper lobe of lung (Multi).      Subjective   Patient with increasing FiO2 requirements overnight so underwent bronchoscopy showing moderate thick secretions in L lung.  Patient also with pressor requirements this AM.     Objective     General: Resting in bed  HEENT: Trach in place, no surrounding drainage or bleeding  Resp: symmetric chest rise, FiO2 60%  RR 14 PEEP 5  CV: NSR, intermittently tachycardic  : yoo draining clear yellow urine  Abd: soft, ND, PEG in place with TF running at 30  Neuro: awake and alert, withdrawn    Last Recorded Vitals  Blood pressure 104/68, pulse 99, temperature 36.9 °C (98.4 °F), temperature source Temporal, resp. rate 17, height 1.651 m (5' 5\"), weight 55 kg (121 lb 4.1 oz), SpO2 99%.  Intake/Output last 3 Shifts:  I/O last 3 completed shifts:  In: 1590 (28.9 mL/kg) [Blood:50; NG/GT:1140; IV Piggyback:400]  Out: 2040 (37.1 mL/kg) [Urine:2040 (1 mL/kg/hr)]  Weight: 55 kg     Relevant Results  Scheduled medications  acetylcysteine, 6 mL, nebulization, 4x daily  bisacodyl, 10 mg, rectal, Daily  budesonide, 0.5 mg, nebulization, BID  clonazePAM, 0.5 mg, g-tube, q8h  digoxin, 125 mcg, intravenous, Daily  docusate sodium, 100 mg, g-tube, BID  esomeprazole, 40 mg, g-tube, Daily before breakfast  heparin (porcine), 5,000 Units, subcutaneous, q8h  hydrocortisone sodium " succinate, 50 mg, intravenous, q6h  insulin lispro, 0-5 Units, subcutaneous, q4h  ipratropium-albuteroL, 3 mL, nebulization, q6h  lidocaine, 1 patch, transdermal, Daily  [Held by provider] metoprolol tartrate, 12.5 mg, g-tube, BID  midodrine, 15 mg, g-tube, q6h  oxygen, , inhalation, Continuous - Inhalation  perflutren protein A microsphere, 0.5 mL, intravenous, Once in imaging  piperacillin-tazobactam, 4.5 g, intravenous, q6h  polyethylene glycol, 17 g, g-tube, Daily  senna, 5 mL, g-tube, Nightly  silodosin, 4 mg, oral, q24h  sulfur hexafluoride microsphr, 2 mL, intravenous, Once in imaging  thiamine, 100 mg, g-tube, Daily  traZODone, 25 mg, g-tube, Nightly      Continuous medications  dexmedeTOMIDine, 0-1.5 mcg/kg/hr, Last Rate: 0.225 mcg/kg/hr (02/04/25 0815)  phenylephrine, 0-2 mcg/kg/min, Last Rate: 0.5 mcg/kg/min (02/04/25 0730)  vasopressin, 0-0.03 Units/min, Last Rate: Stopped (02/04/25 0730)      PRN medications  PRN medications: albuterol, calcium gluconate, calcium gluconate, dextrose, dextrose, glucagon, glucagon, HYDROmorphone, magnesium sulfate, magnesium sulfate, oxyCODONE, oxygen, oxygen, oxygen, potassium chloride    Results for orders placed or performed during the hospital encounter of 01/13/25 (from the past 24 hours)   POCT GLUCOSE   Result Value Ref Range    POCT Glucose 128 (H) 74 - 99 mg/dL   POCT GLUCOSE   Result Value Ref Range    POCT Glucose 101 (H) 74 - 99 mg/dL   POCT GLUCOSE   Result Value Ref Range    POCT Glucose 102 (H) 74 - 99 mg/dL   Blood Gas Arterial Full Panel   Result Value Ref Range    POCT pH, Arterial 7.42 7.38 - 7.42 pH    POCT pCO2, Arterial 56 (H) 38 - 42 mm Hg    POCT pO2, Arterial 76 (L) 85 - 95 mm Hg    POCT SO2, Arterial 97 94 - 100 %    POCT Oxy Hemoglobin, Arterial 93.8 (L) 94.0 - 98.0 %    POCT Hematocrit Calculated, Arterial 30.0 (L) 36.0 - 46.0 %    POCT Sodium, Arterial 135 (L) 136 - 145 mmol/L    POCT Potassium, Arterial 4.2 3.5 - 5.3 mmol/L    POCT Chloride,  Arterial 104 98 - 107 mmol/L    POCT Ionized Calcium, Arterial 1.25 1.10 - 1.33 mmol/L    POCT Glucose, Arterial 125 (H) 74 - 99 mg/dL    POCT Lactate, Arterial 0.5 0.4 - 2.0 mmol/L    POCT Base Excess, Arterial 10.3 (H) -2.0 - 3.0 mmol/L    POCT HCO3 Calculated, Arterial 36.3 (H) 22.0 - 26.0 mmol/L    POCT Hemoglobin, Arterial 9.9 (L) 12.0 - 16.0 g/dL    POCT Anion Gap, Arterial -1 (L) 10 - 25 mmo/L    Patient Temperature 37.0 degrees Celsius    FiO2 40 %   Calcium, Ionized   Result Value Ref Range    POCT Calcium, Ionized 1.21 1.1 - 1.33 mmol/L   CBC   Result Value Ref Range    WBC 6.1 4.4 - 11.3 x10*3/uL    nRBC 0.0 0.0 - 0.0 /100 WBCs    RBC 3.28 (L) 4.00 - 5.20 x10*6/uL    Hemoglobin 9.3 (L) 12.0 - 16.0 g/dL    Hematocrit 28.9 (L) 36.0 - 46.0 %    MCV 88 80 - 100 fL    MCH 28.4 26.0 - 34.0 pg    MCHC 32.2 32.0 - 36.0 g/dL    RDW 18.8 (H) 11.5 - 14.5 %    Platelets 140 (L) 150 - 450 x10*3/uL   Coagulation Screen   Result Value Ref Range    Protime 14.0 (H) 9.8 - 12.8 seconds    INR 1.2 (H) 0.9 - 1.1    aPTT 40 (H) 27 - 38 seconds   Magnesium   Result Value Ref Range    Magnesium 1.97 1.60 - 2.40 mg/dL   Renal function panel   Result Value Ref Range    Glucose 124 (H) 74 - 99 mg/dL    Sodium 141 136 - 145 mmol/L    Potassium 4.2 3.5 - 5.3 mmol/L    Chloride 100 98 - 107 mmol/L    Bicarbonate 34 (H) 21 - 32 mmol/L    Anion Gap 11 10 - 20 mmol/L    Urea Nitrogen 16 6 - 23 mg/dL    Creatinine <0.20 (L) 0.50 - 1.05 mg/dL    eGFR      Calcium 8.8 8.6 - 10.6 mg/dL    Phosphorus 3.7 2.5 - 4.9 mg/dL    Albumin 3.1 (L) 3.4 - 5.0 g/dL   Blood Gas Arterial Full Panel   Result Value Ref Range    POCT pH, Arterial 7.45 (H) 7.38 - 7.42 pH    POCT pCO2, Arterial 50 (H) 38 - 42 mm Hg    POCT pO2, Arterial 106 (H) 85 - 95 mm Hg    POCT SO2, Arterial 99 94 - 100 %    POCT Oxy Hemoglobin, Arterial 96.1 94.0 - 98.0 %    POCT Hematocrit Calculated, Arterial 28.0 (L) 36.0 - 46.0 %    POCT Sodium, Arterial 138 136 - 145 mmol/L    POCT  Potassium, Arterial 3.9 3.5 - 5.3 mmol/L    POCT Chloride, Arterial 104 98 - 107 mmol/L    POCT Ionized Calcium, Arterial 1.22 1.10 - 1.33 mmol/L    POCT Glucose, Arterial 109 (H) 74 - 99 mg/dL    POCT Lactate, Arterial 0.4 0.4 - 2.0 mmol/L    POCT Base Excess, Arterial 9.6 (H) -2.0 - 3.0 mmol/L    POCT HCO3 Calculated, Arterial 34.8 (H) 22.0 - 26.0 mmol/L    POCT Hemoglobin, Arterial 9.3 (L) 12.0 - 16.0 g/dL    POCT Anion Gap, Arterial 3 (L) 10 - 25 mmo/L    Patient Temperature 37.0 degrees Celsius    FiO2 60 %   ECG 12 lead   Result Value Ref Range    Ventricular Rate 116 BPM    Atrial Rate 116 BPM    DE Interval 114 ms    QRS Duration 74 ms    QT Interval 334 ms    QTC Calculation(Bazett) 464 ms    P Axis 56 degrees    R Axis 46 degrees    T Axis 214 degrees    QRS Count 19 beats    Q Onset 225 ms    P Onset 168 ms    P Offset 216 ms    T Offset 392 ms    QTC Fredericia 416 ms   Electrocardiogram, 12-lead PRN ACS symptoms   Result Value Ref Range    Ventricular Rate 152 BPM    Atrial Rate 152 BPM    DE Interval 116 ms    QRS Duration 70 ms    QT Interval 316 ms    QTC Calculation(Bazett) 502 ms    P Axis 59 degrees    R Axis 44 degrees    T Axis 169 degrees    QRS Count 25 beats    Q Onset 226 ms    P Onset 168 ms    P Offset 210 ms    T Offset 384 ms    QTC Fredericia 430 ms   POCT GLUCOSE   Result Value Ref Range    POCT Glucose 118 (H) 74 - 99 mg/dL     CXR 2/2:    IMPRESSION:  1. No significant change in patchy left lung airspace opacities,  which may represent edema/infiltrates.  2. Stable postsurgical changes related to right-sided pneumonectomy.       Assessment/Plan   Assessment & Plan  Primary cancer of right upper lobe of lung (Multi)    Chronic obstructive pulmonary disease (Multi)    Malignant neoplasm of upper lobe of right lung (Multi)    Cardiogenic shock (Multi)    Respiratory distress    Fuastina Vick (Jannie) is a 64F with Hx of Stage III lung cancer, asthma, anxiety, and arthritis who is now  s/p R pneumonectomy with Dr. Wagner 1/13/25. 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 Cardiology was consulted and recommended Lasix gtt and milrinone. Lasix gtt held 1/17, hyponatremia improving. Patient underwent RHC and SGC placement with cardiology 1/17. She was reintubated on 1/18 d/t worsening respiratory status and AMS. Also had increased pressor requirements, now weaned off. Repeat echo stable on 1/18. COVID positive on 1/20.     Failed extubation trials, now s/p trach/PEG with Dr. Wagner 1/30.     - Continue & appreciate supportive ICU care  - Continue Tfs at goal, appreciate nutrition recs  - Pulmonary toilet  - PT/OT for rehabilitation, patient will likely need LTACH placement  - Please place duoderm under trach plate to prevent erosion  - Agree with blood and sputum cultures given new pressor and O2 requirments  - Continue Abx for UTI     Patient discussed with attending Dr. Wagner.    Sarah Mirnada MD  Thoracic Surgery [id number]

## 2025-02-04 NOTE — NURSING NOTE
0344: SICU emergently called to bedside d/t ventilator non-compliance and suspicion of mucous plugging. RT at bedside and assisting ventilation with BVM to Trach at 100% FiO2.   0347: Bronchoscopy set-up at bedside, SICU resident initiating Bronch.   0348: Moderate thick mucous removed via Bronchoscopy suction, patient now switched back to ventilator, however switched from previous mode of Spontaneous/CPAP to VC-AC at 100% FiO2, PEEP 5, RR currently 20.   0350: Patient now tolerating ventilator. Continuing on the above settings and maintaining 100% SPO2.   0353: Notable ST Depression on telemetry in lead II. Obtaining 12-lead EKG at this time.  0355: 12-lead EKG obtained and provided to SICU resident for review. Per SICU, no acute changes on EKG, and no new orders at this time.  0359: Albumin 5% started for mild hypotension. FiO2 weaned to 50% and patient O2 saturation currently 94%. Continuing to closely monitor. Patient appears to be resting comfortably with eyes closed, denies pain, denies SOB/Dyspnea and no longer appears to be in distress. Will continue to closely monitor.

## 2025-02-04 NOTE — SIGNIFICANT EVENT
Called to bedside by nursing as patient had desaturated, requiring 100% FiO2 and hand ventilation by RT. Upon my arrival pt was saturating high 90s on the monitor. RT informed me that pt's TVs were low, also noted she was unable to advance the suction catheter as normal.     Decision made to bronch. R mainstem stump looked intact, L side with mod thick secretions in the L mainstem bronchi which were suctioned. Bronchoscope was then advanced further into the upper and lower lobe which had marginal amounts of secretions, which were also suctioned.     Patient tolerated the procedure without any issues. After the procedure, she was returned to the ventilator, on AC/% which was quickly able to be weaned to 50%. Decision made to rest patient there and not wean any further this evening.     Questionable concern about EKG changes on monitor. 12 lead obtained, similar in appearance to prior EKGs over the past few days.

## 2025-02-04 NOTE — CONSULTS
Wound Care Consult     Visit Date: 2/4/2025      Patient Name: Jannie Vick         MRN: [id number]           YOB: 1960     Reason for Consult: DTPI on the sacrum         Wound History: DTPI found during skin rounds     Pertinent Labs:   Albumin   Date Value Ref Range Status   02/04/2025 3.1 (L) 3.4 - 5.0 g/dL Final       Wound Assessment:  Wound 02/02/25 Pressure Injury Sacrum (Active)   Wound Image   02/04/25 0445   Site Assessment Clean;Dry 02/04/25 1200   Aleja-Wound Assessment Clean;Dry 02/04/25 1200   Non-staged Wound Description Not applicable 02/04/25 1054   Pressure Injury Stage DTPI 02/04/25 1054   Shape round 02/04/25 1054   Wound Length (cm) 0.5 cm 02/04/25 1054   Wound Width (cm) 0.5 cm 02/04/25 1054   Wound Surface Area (cm^2) 0.25 cm^2 02/04/25 1054   Wound Depth (cm) 0 cm 02/04/25 1054   Wound Volume (cm^3) 0 cm^3 02/04/25 1054   State of Healing Non-healing 02/04/25 1054   Margins Poorly defined 02/04/25 1054   Drainage Description None 02/04/25 1054   Drainage Amount None 02/04/25 1054   Dressing Barrier film;Silicone border dressing 02/04/25 1054   Dressing Changed New 02/04/25 1054   Dressing Status Clean;Dry 02/04/25 1054       Wound Team Summary Assessment: The wound care team came to bedside to assess the patient's sacrum for a potential DTPI. The wound is 0.5 x 0.5 cm, deep purple and nonblanchable. The wound was cleansed with vashe wound cleanser and pat dry. A mepilex border dressing was applied to the wound sacrum.      Wound Team Plan:   Recommendations: Daily  Sacrum: Cleanse the wound area with vashe wound cleanser and gently pat dry   Apply a sacrum mepilex border dressing     Continue to turn the patient every 2 hours  Utilize a repositioning sheet, wedges and heel boots to offload the patient's bony prominences      Jose G Merchant RN CWON  2/4/2025  5:22 PM

## 2025-02-04 NOTE — PROGRESS NOTES
Nutrition Follow Up Assessment:   Nutrition Assessment    S/p trach and PEG on 1/30. Remains on enteral feeds-- currently running at 30 ml/hr. Per flow sheets has been receiving enteral feeds consistently. Still requiring some vent support, plan for LTAC at d/c.       Anthropometrics:  Weight History:   Date/Time Weight   02/01/25 2000 55 kg   02/01/25 0400 55 kg   01/27/25 0600 54.9 kg   01/26/25 0600 58.7 kg   01/24/25 0430 57.8 kg   01/23/25 0600 59.5 kg   01/20/25 0600 58.5 kg   01/19/25 0600 54.6 kg   01/18/25 0600 53.3 kg   01/17/25 0600 52.2 kg   01/13/25 1400 52.1 kg     Nutrition Focused Physical Exam Findings:  Edema:  Edema Location: generalized  Physical Findings:  Skin:  (DTI sacrum)    Nutrition Significant Labs:  BG POCT trend:   Results from last 7 days   Lab Units 02/04/25  1159 02/04/25  0007 02/03/25  1930 02/03/25  1559 02/03/25  1129   POCT GLUCOSE mg/dL 118* 102* 101* 128* 121*    , Renal Lab Trend:   Results from last 7 days   Lab Units 02/04/25  0310 02/03/25  0218 02/02/25  0235 02/02/25  0052 02/01/25  0316 01/30/25  0430 01/29/25  0223   POTASSIUM mmol/L 4.2 3.7 3.9  --  3.9   < > 4.5   PHOSPHORUS mg/dL 3.7 3.1 2.9  --  3.1   < > 3.2   SODIUM mmol/L 141 141 142  --  140   < > 144   MAGNESIUM mg/dL 1.97 2.09  --    < > 2.19   < > 2.24   EGFR mL/min/1.73m*2  --   --   --   --   --   --  >90   BUN mg/dL 16 17 15  --  16   < > 23   CREATININE mg/dL <0.20* <0.20* <0.20*  --  <0.20*   < > 0.22*    < > = values in this interval not displayed.        Nutrition Specific Medications:  Scheduled medications  bisacodyl, 10 mg, rectal, Daily  docusate sodium, 100 mg, g-tube, BID  esomeprazole, 40 mg, g-tube, Daily before breakfast  hydrocortisone sodium succinate, 50 mg, intravenous, q6h  insulin lispro, 0-5 Units, subcutaneous, q4h  polyethylene glycol, 17 g, g-tube, Daily  senna, 5 mL, g-tube, Nightly  thiamine, 100 mg, g-tube, Daily      Continuous medications  dexmedeTOMIDine, 0-1.5 mcg/kg/hr, Last  Rate: 0.225 mcg/kg/hr (02/04/25 0815)  phenylephrine, 0-2 mcg/kg/min, Last Rate: 0.5 mcg/kg/min (02/04/25 1310)  vasopressin, 0-0.03 Units/min, Last Rate: Stopped (02/04/25 0730)        I/O:   Last BM Date: 02/03/25; Stool Appearance: Loose, Liquid (02/03/25 1300)    Dietary Orders (From admission, onward)       Start     Ordered    01/26/25 1046  Enteral feeding with NPO Pivot 1.5; OG (orogastic tube); 30; 100; Water; Every 6 hours  Diet effective now        Question Answer Comment   Tube feeding formula: Pivot 1.5    Feeding route: OG (orogastic tube)    Tube feeding continuous rate (mL/hr): 30    Tube feeding flush (mL): 100    Flush type: Water    Flush frequency: Every 6 hours        01/26/25 1045                     Estimated Needs:   Total Energy Estimated Needs in 24 hours (kCal):  (8729-3493)  Method for Estimating Needs: 25-30 kcal/kg current wt  Total Protein Estimated Needs in 24 Hours (g):  (70-80)  Method for Estimating 24 Hour Protein Needs: ~1.3-1.5 gm/kg current wt  Total Fluid Estimated Needs in 24 Hours (mL):  (per team)      Nutrition Diagnosis   Malnutrition Diagnosis  Patient has Malnutrition Diagnosis: Yes  Diagnosis Status: Active  Malnutrition Diagnosis: Severe malnutrition related to chronic disease or condition  As Evidenced by: clinically significant weight loss x 3 months (8.5%), poor oral intakes past week over post-op course (<50% est needs >5days), suspect longer, visually frail appearing with at least mild losses to temporalis/orbital region & BMI = 19.1    Nutrition Diagnosis  Patient has Nutrition Diagnosis: Yes  Diagnosis Status (1): Active  Nutrition Diagnosis 1: Increased nutrient needs  Related to (1): increased metabolic demand  As Evidenced by (1): lung cancer, critical illness       Nutrition Interventions/Recommendations   Nutrition prescription for enteral nutrition    Nutrition Recommendations:  Individualized Nutrition Prescription Provided for :   Change to a standard  fiber containing formula-- Isosource 1.5 @ 45 ml/hr = 1620 kcal, 73 g protein, 825 ml free H20  Free H20 flush per SICU    Nutrition Interventions/Goals:   Interventions: Enteral intake  Enteral Intake: Management of composition of enteral nutrition  Goal: change to a standard formula        Nutrition Monitoring and Evaluation   Food/Nutrient Related History Monitoring  Monitoring and Evaluation Plan: Enteral and parenteral nutrition intake determination  Enteral and Parenteral Nutrition Intake Determination: Enteral nutrition intake - Tolerate TF at goal rate         Biochemical Data, Medical Tests and Procedures  Monitoring and Evaluation Plan: Electrolyte/renal panel, Glucose/endocrine profile  Electrolyte and Renal Panel: Electrolytes within normal limits  Glucose/Endocrine Profile: Glucose within normal limits ( mg/dL)         Goal Status: New goal(s) identified    Time Spent (min): 30 minutes

## 2025-02-04 NOTE — PROGRESS NOTES
"Faustina Vick \"Noam" is a 64 y.o. female on day 22 of admission presenting with Primary cancer of right upper lobe of lung (Multi).    Subjective   Until about 4 am, patient was on CPAP doing well with a short episode tachycardia around 2am 140s. Had another episode of stable tachycardia 150s that occurred an hour after bronchoscopy was done for desaturation to 80s and increased respiratory rate. Patient was hand vented by RT with 100% FiO2; performed bronch: R mainstem stump intact, left side had moderate thick secretions in left mainstem bronchus. No complications with procedure. She was then put on AC/VC and quickly weaned to 50% O2. Sometime after bronch, MAPs began to decline to 50s.  Got 50cc concentrated albumin- no change   Started on vaso and phenylephrine ~4am after bronch     Objective   Physical Exam  Vitals reviewed.   Constitutional:       Appearance: She is underweight.      Interventions: She is sedated and restrained. She is not intubated.     Comments: Deconditioned in ICU bed.    HENT:      Head: Normocephalic and atraumatic.      Nose: Nose normal.      Mouth/Throat:      Mouth: Mucous membranes are moist.   Eyes:      Extraocular Movements: Extraocular movements intact.      Pupils: Pupils are equal, round, and reactive to light.   Cardiovascular:      Rate and Rhythm: Normal rate and regular rhythm.      Pulses: Normal pulses.      Heart sounds: Normal heart sounds.   Pulmonary:      Effort: Pulmonary effort is normal. She is not intubated.      Breath sounds: No rhonchi (left side).      Comments: Vented via trache CPAP PS 5 PEEP 5 this morning; breath sounds clear on left   Chest:      Comments: Right lateral chest dressing c/d/i.  Abdominal:      General: There is no distension.      Palpations: Abdomen is soft.      Comments: Less pain to palpation on exam today; PEG appears intact, adequate laxity, normal skin surrounding.    Genitourinary:     Comments: Montiel in place - yellow " "urine  Musculoskeletal:         General: No swelling or tenderness. Normal range of motion.      Cervical back: Normal range of motion and neck supple.   Skin:     General: Skin is warm and dry.   Neurological:      General: No focal deficit present.      Mental Status: She is alert and easily aroused.      GCS: GCS eye subscore is 3. GCS verbal subscore is 1. GCS motor subscore is 6.      Motor: Weakness (generalized) present.      Comments: Following commands, moves extremities equally.    Psychiatric:         Mood and Affect: Mood normal.         Behavior: Behavior normal.      Comments: tearful       Last Recorded Vitals  Blood pressure 95/70, pulse (!) 141, temperature 36.6 °C (97.9 °F), temperature source Temporal, resp. rate 18, height 1.651 m (5' 5\"), weight 55 kg (121 lb 4.1 oz), SpO2 100%.    VS over last 24h  Heart Rate:  []   Temp:  [36.5 °C (97.7 °F)-36.8 °C (98.2 °F)]   Resp:  [12-27]   BP: ()/(56-83)   SpO2:  [93 %-100 %]      Intake/Output last 3 Shifts:  I/O last 3 completed shifts:  In: 1590 (28.9 mL/kg) [Blood:50; NG/GT:1140; IV Piggyback:400]  Out: 2040 (37.1 mL/kg) [Urine:2040 (1 mL/kg/hr)]  Weight: 55 kg       Intake/Output Summary (Last 24 hours) at 2/4/2025 0811  Last data filed at 2/4/2025 0600  Gross per 24 hour   Intake 650 ml   Output 1590 ml   Net -940 ml        Relevant Results  Scheduled medications  bisacodyl, 10 mg, rectal, Daily  budesonide, 0.5 mg, nebulization, BID  clonazePAM, 0.5 mg, g-tube, q8h  digoxin, 125 mcg, intravenous, Daily  docusate sodium, 100 mg, g-tube, BID  esomeprazole, 40 mg, g-tube, Daily before breakfast  heparin (porcine), 5,000 Units, subcutaneous, q8h  hydrocortisone sodium succinate, 100 mg, intravenous, q8h  insulin lispro, 0-5 Units, subcutaneous, q4h  ipratropium-albuteroL, 3 mL, nebulization, q6h  lidocaine, 1 patch, transdermal, Daily  [Held by provider] metoprolol tartrate, 12.5 mg, g-tube, BID  midodrine, 10 mg, g-tube, q6h  oxygen, , " inhalation, Continuous - Inhalation  perflutren protein A microsphere, 0.5 mL, intravenous, Once in imaging  piperacillin-tazobactam, 4.5 g, intravenous, q6h  polyethylene glycol, 17 g, g-tube, Daily  senna, 5 mL, g-tube, Nightly  silodosin, 4 mg, oral, q24h  sulfur hexafluoride microsphr, 2 mL, intravenous, Once in imaging  thiamine, 100 mg, g-tube, Daily  traZODone, 25 mg, g-tube, Nightly      PRN medications  PRN medications: albuterol, calcium gluconate, calcium gluconate, dextrose, dextrose, glucagon, glucagon, HYDROmorphone, magnesium sulfate, magnesium sulfate, oxyCODONE, oxygen, oxygen, oxygen, potassium chloride      Results for orders placed or performed during the hospital encounter of 01/13/25 (from the past 24 hours)   Transthoracic Echo (TTE) Complete   Result Value Ref Range    AV pk radha 1.17 m/s    LVOT diam 2.00 cm    MV E/A ratio 0.90     LA vol index A/L 16.9 ml/m2    Tricuspid annular plane systolic excursion 1.0 cm    LV EF 63 %    RV free wall pk S' 12.00 cm/s    LVIDd 4.20 cm    RVSP 42.1 mmHg    Aortic Valve Area by Continuity of Peak Velocity 2.40 cm2    AV pk grad 5 mmHg    LV A4C EF 73.4    POCT GLUCOSE   Result Value Ref Range    POCT Glucose 121 (H) 74 - 99 mg/dL   POCT GLUCOSE   Result Value Ref Range    POCT Glucose 128 (H) 74 - 99 mg/dL   POCT GLUCOSE   Result Value Ref Range    POCT Glucose 101 (H) 74 - 99 mg/dL   POCT GLUCOSE   Result Value Ref Range    POCT Glucose 102 (H) 74 - 99 mg/dL   Blood Gas Arterial Full Panel   Result Value Ref Range    POCT pH, Arterial 7.42 7.38 - 7.42 pH    POCT pCO2, Arterial 56 (H) 38 - 42 mm Hg    POCT pO2, Arterial 76 (L) 85 - 95 mm Hg    POCT SO2, Arterial 97 94 - 100 %    POCT Oxy Hemoglobin, Arterial 93.8 (L) 94.0 - 98.0 %    POCT Hematocrit Calculated, Arterial 30.0 (L) 36.0 - 46.0 %    POCT Sodium, Arterial 135 (L) 136 - 145 mmol/L    POCT Potassium, Arterial 4.2 3.5 - 5.3 mmol/L    POCT Chloride, Arterial 104 98 - 107 mmol/L    POCT Ionized  Calcium, Arterial 1.25 1.10 - 1.33 mmol/L    POCT Glucose, Arterial 125 (H) 74 - 99 mg/dL    POCT Lactate, Arterial 0.5 0.4 - 2.0 mmol/L    POCT Base Excess, Arterial 10.3 (H) -2.0 - 3.0 mmol/L    POCT HCO3 Calculated, Arterial 36.3 (H) 22.0 - 26.0 mmol/L    POCT Hemoglobin, Arterial 9.9 (L) 12.0 - 16.0 g/dL    POCT Anion Gap, Arterial -1 (L) 10 - 25 mmo/L    Patient Temperature 37.0 degrees Celsius    FiO2 40 %   Calcium, Ionized   Result Value Ref Range    POCT Calcium, Ionized 1.21 1.1 - 1.33 mmol/L   CBC   Result Value Ref Range    WBC 6.1 4.4 - 11.3 x10*3/uL    nRBC 0.0 0.0 - 0.0 /100 WBCs    RBC 3.28 (L) 4.00 - 5.20 x10*6/uL    Hemoglobin 9.3 (L) 12.0 - 16.0 g/dL    Hematocrit 28.9 (L) 36.0 - 46.0 %    MCV 88 80 - 100 fL    MCH 28.4 26.0 - 34.0 pg    MCHC 32.2 32.0 - 36.0 g/dL    RDW 18.8 (H) 11.5 - 14.5 %    Platelets 140 (L) 150 - 450 x10*3/uL   Coagulation Screen   Result Value Ref Range    Protime 14.0 (H) 9.8 - 12.8 seconds    INR 1.2 (H) 0.9 - 1.1    aPTT 40 (H) 27 - 38 seconds   Magnesium   Result Value Ref Range    Magnesium 1.97 1.60 - 2.40 mg/dL   Renal function panel   Result Value Ref Range    Glucose 124 (H) 74 - 99 mg/dL    Sodium 141 136 - 145 mmol/L    Potassium 4.2 3.5 - 5.3 mmol/L    Chloride 100 98 - 107 mmol/L    Bicarbonate 34 (H) 21 - 32 mmol/L    Anion Gap 11 10 - 20 mmol/L    Urea Nitrogen 16 6 - 23 mg/dL    Creatinine <0.20 (L) 0.50 - 1.05 mg/dL    eGFR      Calcium 8.8 8.6 - 10.6 mg/dL    Phosphorus 3.7 2.5 - 4.9 mg/dL    Albumin 3.1 (L) 3.4 - 5.0 g/dL   Blood Gas Arterial Full Panel   Result Value Ref Range    POCT pH, Arterial 7.45 (H) 7.38 - 7.42 pH    POCT pCO2, Arterial 50 (H) 38 - 42 mm Hg    POCT pO2, Arterial 106 (H) 85 - 95 mm Hg    POCT SO2, Arterial 99 94 - 100 %    POCT Oxy Hemoglobin, Arterial 96.1 94.0 - 98.0 %    POCT Hematocrit Calculated, Arterial 28.0 (L) 36.0 - 46.0 %    POCT Sodium, Arterial 138 136 - 145 mmol/L    POCT Potassium, Arterial 3.9 3.5 - 5.3 mmol/L     POCT Chloride, Arterial 104 98 - 107 mmol/L    POCT Ionized Calcium, Arterial 1.22 1.10 - 1.33 mmol/L    POCT Glucose, Arterial 109 (H) 74 - 99 mg/dL    POCT Lactate, Arterial 0.4 0.4 - 2.0 mmol/L    POCT Base Excess, Arterial 9.6 (H) -2.0 - 3.0 mmol/L    POCT HCO3 Calculated, Arterial 34.8 (H) 22.0 - 26.0 mmol/L    POCT Hemoglobin, Arterial 9.3 (L) 12.0 - 16.0 g/dL    POCT Anion Gap, Arterial 3 (L) 10 - 25 mmo/L    Patient Temperature 37.0 degrees Celsius    FiO2 60 %       XR chest 1 view   Final Result   1. No significant change in pulmonary aeration status post right   pneumonectomy.   2. Stable trace left pleural effusion and hazy left basilar opacities.        I personally reviewed the images/study and I agree with radiology   resident Dr. Meghan Ramesh findings as stated. This study was   interpreted at [place], [place]        MACRO:   None        Signed by: Oseas Stroud 2/4/2025 7:56 AM   Dictation workstation:   [id number]      Transthoracic Echo (TTE) Complete   Final Result      XR chest 1 view   Final Result   1. Similar appearance of the chest compared to prior with   postsurgical changes of right pneumonectomy. Persistent patchy left   lung base airspace opacities which may represent edema or infectious   infiltrate.   2. Suggestion of small left pleural effusion   3. Medical devices as above.             I personally reviewed the images/study and I agree with the findings   as stated above by resident physician, Ankit Varela MD. This study   was interpreted at [place], [place].        MACRO:   None.        Signed by: Vick Xiao 2/3/2025 1:45 PM   Dictation workstation:   [id number]      XR chest 1 view   Final Result   1. No significant change in patchy left lung airspace opacities,   which may represent edema/infiltrates.   2. Stable postsurgical changes related to right-sided pneumonectomy.    3. Medical devices as above.        Signed by: Real Rashid 2/2/2025 8:23 AM   Dictation workstation:   [id number]      XR abdomen 1 view   Final Result   1. Interval placement of percutaneous gastrostomy tube which overlies   the left upper quadrant, within expected location of gastric body.   2. Nonobstructive bowel gas pattern.        I personally reviewed the images/study and I agree with the findings   as stated by Jonah Velez MD (Radiology Resident).   This study was interpreted at [place], [place].        MACRO:   None        Signed by: Real Rashid 2/1/2025 1:20 PM   Dictation workstation:   [id number]      XR chest 1 view   Final Result   1. Stable to slightly improved patchy left lung airspace opacities,   which may represent edema/infiltrates.   2. Stable postsurgical changes related to right-sided pneumonectomy.   3. Medical devices as above.        Signed by: Real Rashid 2/1/2025 6:54 AM   Dictation workstation:   [id number]      XR chest 1 view   Final Result   1.  Redemonstration of postsurgical changes of right pneumonectomy.   When compared to the prior exam, the opacities in the left lung most   prominently in the left mid to lower lung are more confluent.   Findings may represent a consolidative process such as pneumonia or   aspiration pneumonitis with a component of pulmonary vascular   congestion. There is possible small left pleural effusion and left   basilar atelectasis.        I personally reviewed the images/study and I agree with the findings   as stated by resident physician Dr. Yair Deng . This study   was interpreted at [place], [place].        MACRO:   None        Signed by: Nino Barbour 1/31/2025 12:27 PM   Dictation workstation:   [id number]      XR chest 1 view   Final Result   1. Interval slightly increased patchy opacities in the left lower   lung  field with blunting of the left costophrenic angle. Findings may   represent small pleural effusion with atelectasis/infiltrate. There   is mild pulmonary vascular congestion.   2. Complete opacification of the right hemithorax representing   postsurgical changes of right pneumonectomy.   3. Interval extubation with placement of a tracheostomy tube.        I personally reviewed the images/study and I agree with the findings   as stated by resident physician Dr. Yair Deng . This study   was interpreted at [place], [place].        MACRO:   None        Signed by: Oseas Stroud 1/30/2025 4:36 PM   Dictation workstation:   [id number]      XR chest 1 view   Final Result   1. Similar mild interstitial edema, small left pleural effusion, and   left basilar atelectasis.   2. Postsurgical changes of right pneumonectomy.   3. Medical devices as above.             I personally reviewed the images/study and I agree with the findings   as stated above by resident physician, Ankit Varela MD. This study   was interpreted at [place], [place].        MACRO:   None.        Signed by: Oseas Stroud 1/30/2025 9:31 AM   Dictation workstation:   [id number]      XR chest 1 view   Final Result   1. Postoperative changes and medical devices as above.   2. Similar mild interstitial edema and left basilar presumed   atelectasis with small pleural effusion.   3. Persistent complete opacification of right hemithorax, correlating   with patient's history of pneumonectomy.        Signed by: Real Rashid 1/29/2025 7:28 AM   Dictation workstation:   [id number]      XR chest 1 view   Final Result   1.  Unchanged aeration of the lungs with complete opacification of   the right hemithorax and coarse interstitial markings throughout the   left lung with left basilar atelectasis. Superimposed infection is   not excluded.        I personally reviewed  the images/study and I agree with the findings   as stated by resident physician Dr. Yair Deng . This study   was interpreted at [place], [place].        MACRO:   None        Signed by: Oseas Stroud 1/28/2025 9:31 AM   Dictation workstation:   [id number]      Transthoracic Echo (TTE) Limited   Final Result      XR chest 1 view   Final Result   1.  Interval improvement in the left-sided edema and patchy airspace   opacities. Small left-sided pleural effusion.   2. Postsurgical changes of right-sided pneumonectomy.   3. Medical lines and devices as above.        I personally reviewed the images/study and I agree with the findings   as stated by Jonah Velez MD (Radiology Resident).   This study was interpreted at [place], [place].        MACRO:   None        Signed by: Oseas Stroud 1/27/2025 10:55 AM   Dictation workstation:   [id number]      XR chest 1 view   Final Result   1.  Slight interval worsening in left-sided airspace disease/edema   status post right-sided pneumonectomy.             Signed by: Nino Barbour 1/26/2025 1:16 PM   Dictation workstation:   [id number]      XR chest 1 view   Final Result   1.  Post pneumonectomy changes with continued left perihilar edema.             Signed by: Nino Barbour 1/25/2025 9:51 AM   Dictation workstation:   [id number]      XR chest 1 view   Final Result   1.  Similar left lung pulmonary edema with small left pleural   effusion.   2. Status post right pneumonectomy.   3. [place]-Paco catheter with tip projecting over the left lower lobe,   in similar position.                  MACRO:   None        Signed by: Alexx Anne 1/24/2025 10:24 AM   Dictation workstation:   [id number]      XR chest 1 view   Final Result   Addendum (preliminary) 1 of 1   Interpreted By:  Alexx Anne,    ADDENDUM:   Right IJ approach [place] Paco catheter is also  noted with tip   projecting over the left lower lobe in similar position to prior exam.        Signed by: Alexx Anne 1/24/2025 10:25 AM        -------- ORIGINAL REPORT --------   Dictation workstation:   [id number]      Final   1.  Stable findings of pulmonary edema within the left lung.   2. Postsurgical changes of right pneumonectomy.   3. Medical devices as above.                  MACRO:   None        Signed by: Alexx Anne 1/24/2025 9:09 AM   Dictation workstation:   [id number]      XR chest 1 view   Final Result   1. The tip of the endotracheal tube terminates 7.5 cm of the sina.   2. Slightly decreased  interstitial prominence and airspace opacities   within the left lung when compared to prior radiograph,  suggestive   of improving infectious process or improving pulmonary edema.   3. No significant interval change in postoperative changes from   right-sided pneumonectomy, resection of multiple right-sided ribs,   complete opacification of the pneumonectomy bed, and rightward   mediastinal shift.        I personally reviewed the images/study and I agree with radiology   resident  Dr. Meghan Ramesh findings as stated. This study was   interpreted at [place], [place]        MACRO:   None        Signed by: Oseas Stroud 1/24/2025 8:09 AM   Dictation workstation:   [id number]      XR abdomen 1 view   Final Result   1.  Dobhoff tube is in place with the tip projecting over the 1st   portion of the duodenum.   2. There is gas-filled dilated stomach. A few slight prominent loops   of the bowel in the upper abdomen with overall nonobstructive bowel   gas pattern.   3. Postsurgical changes of right pneumonectomy with resection of ribs   and patchy airspace opacities in the left lung.   4. [place]-Paco catheter projecting over the lower pulmonary artery.   Recommend retraction.        MACRO:   None        Signed by: Oseas Stroud 1/23/2025 10:15 AM   Dictation  workstation:   [id number]      XR chest 1 view   Final Result   1. Unchanged appearance of multifocal patchy airspace opacities with   interstitial prominence of the left lung which may be due to   infectious process. Component of edema can not be excluded.   2. Medical devices as described above with similar positioning of the   [place]-Paco catheter projecting over left lower lobe pulmonary artery.   Recommend retraction.   3. Postsurgical changes of right-sided pneumonectomy and resection of   multiple right-sided ribs with complete opacification of the   pneumonectomy bed. The appearance is unchanged compared to prior   study.                  MACRO:   None        Signed by: Oseas Stroud 1/23/2025 9:36 AM   Dictation workstation:   [id number]      XR abdomen 1 view   Final Result   1.  As described above.        Signed by: Vick Xiao 1/22/2025 1:01 PM   Dictation workstation:   [id number]      XR chest 1 view   Final Result   1. Unchanged appearance of multifocal patchy airspace opacity with   interstitial prominence of the left lung which may be due to   infectious process. Component of edema cannot be excluded.   2. Medical devices as described above with interval advancement of   the [place]-Paco catheter projecting over left lower lobe pulmonary   artery. Recommend retraction.   3. Postsurgical changes of right-sided pneumonectomy and resection of   multiple right-sided ribs with complete opacification of the   pneumonectomy bed. The appearance is unchanged compared to prior   study.                  Signed by: Vick Xiao 1/22/2025 1:00 PM   Dictation workstation:   [id number]      XR chest 1 view   Final Result   1. Similar near-complete opacification of the right hemithorax, which   may be secondary to a large pleural effusion, pneumonectomy, or   bronchial obstruction.   2. Diffuse interstitial opacities and scattered patchy alveolar   opacities throughout the left lung, similar to  the prior exam, which   may reflect prominent pulmonary edema or multifocal infection.   3. Medical devices as above.        I personally reviewed the image(s)/study and resident interpretation   as stated by Dr. Ange Sue MD. I agree with the findings as   stated. This study was interpreted at [facility], [place], [place].        MACRO:   None        Signed by: Oseas Stroud 1/21/2025 7:58 PM   Dictation workstation:   [id number]      Vascular US upper extremity venous duplex left   Final Result      Transthoracic Echo (TTE) Limited   Final Result      XR chest 1 view   Final Result   1.  Interval opacification of the right hemithorax and correlate with   post pneumonectomy changes.   2. [place]-Paco catheter overlies the left lower lobe pulmonary artery.   3. Slight interval improvement in left-sided pulmonary edema.   Underlying edema and correlate with fluid status.             Signed by: Nino Barbour 1/20/2025 9:57 AM   Dictation workstation:   [id number]      XR abdomen 1 view   Final Result   1.  As described above.        Signed by: Vick Xiao 1/19/2025 11:58 AM   Dictation workstation:   [id number]      XR chest 1 view   Final Result   1. Slight worsening of multifocal airspace opacity in the left mid   and lower lung. Findings are concerning for infectious process. Small   left pleural effusion.   2. Postsurgical changes right pneumonectomy with medical devices as   described above. Consider retracting the [place]-Paco catheter.                  Signed by: Vick Xiao 1/19/2025 8:55 AM   Dictation workstation:   [id number]      Transthoracic Echo (TTE) Limited   Final Result      XR chest 1 view   Final Result   1. Patchy airspace opacity in the left lower lung with slight   improvement in aeration. Correlate with concern for infection   2. Postsurgical changes of right pneumonectomy as described above.   3. Medical lines and devices as above.  Consider retraction of the   [place]-Paco catheter.        I personally reviewed the images/study and I agree with the findings   as stated. This study was interpreted at [facility], [place], [place].        MACRO:   None        Signed by: Vick Xiao 1/19/2025 8:54 AM   Dictation workstation:   [id number]      XR chest 1 view   Final Result   1. Worsening aeration of the left lung with increasing left basilar   infiltrate and pleural effusion. Correlate with mucous plugging and   component of lobar collapse.   2. Component of interstitial prominence of the left lung, also   slightly increased which may be due to edema.   3. Postsurgical changes of right pneumonectomy as described                  Signed by: Vick Xiao 1/18/2025 11:35 AM   Dictation workstation:   [id number]      Cardiac Catheterization Procedure   Final Result      Transthoracic Echo (TTE) Limited   Final Result      XR chest 1 view   Final Result   1.  Stable exam with postsurgical changes of right pneumonectomy/rib   resection and large layering fluid component within the right   hemithorax.   2. Similar findings of left pulmonary edema with slightly increased   size of small left pleural effusion. No left-sided pneumothorax.   3. Medical devices as above.                  MACRO:   None        Signed by: Alexx Anne 1/17/2025 8:16 AM   Dictation workstation:   [id number]      XR chest 1 view   Final Result   1.  Postsurgical changes of right-sided pneumonectomy with increased   layering opacification of the right hemithorax, likely due to   increasing fluid.   2. Similar trace left-sided pleural effusion with linear left basilar   airspace opacity, likely atelectasis on a background of likely   pulmonary edema. Infection is not definitively excluded.   3. Medical devices as above with interval insertion of right internal   jugular central venous catheter projecting at the mid SVC.        I  personally reviewed the images/study and I agree with the findings   as stated by resident Davidson Mcguire. This study was interpreted   at [place], [place].        MACRO:   None        Signed by: Alexx Anne 1/17/2025 8:14 AM   Dictation workstation:   [id number]      Transthoracic Echo (TTE) Limited   Final Result      US right upper quadrant   Final Result   Small amount of simple appearing ascites in the right upper quadrant   without evidence of cholelithiasis, cholecystitis or biliary dilation.        Signed by: Alberto Salas 1/16/2025 1:02 PM   Dictation workstation:   [id number]      XR chest 1 view   Final Result   1. Slight interval increase in left basilar airspace opacity which   may be due to increasing atelectasis. Cannot exclude developing   infectious infiltrate. Question trace left pleural effusion.   2. Again seen postsurgical changes related to right-sided   pneumonectomy as described above                  Signed by: Vick Xiao 1/16/2025 9:47 AM   Dictation workstation:   [id number]      US renal complete   Final Result   1. Unremarkable sonographic evaluation of the kidneys.   2. Incomplete evaluation of a gallbladder with some pericholecystic   fluid and biliary sludge. Dedicated right upper quadrant ultrasound   could be obtained as clinically warranted.   3. A small amount of free fluid present in the right upper quadrant   and pelvis.        Findings relayed by phone by me to SICU provider at 5:11 p.m.        I personally reviewed the images/study and I agree with the findings   as stated by Erickson Best MD (resident) . This study was   interpreted at [place], [place].        MACRO:   None        Signed by: Alberto Salas 1/15/2025 6:17 PM   Dictation workstation:   [id number]      XR chest 1 view   Final Result   1.  Status post pneumonectomy with interval  increase in fluid   component of right-sided hydropneumothorax.   2. Medical devices as above.                  MACRO:   None        Signed by: Alexx Anne 1/15/2025 9:28 AM   Dictation workstation:   [id number]      XR chest 1 view   Final Result   1. Interval removal of right chest tube. Other medical devices as   described above.   2. Postsurgical changes related to right pneumonectomy.   3. Faint left basilar opacity, likely atelectatic.                  Signed by: Vick Xiao 1/14/2025 2:53 PM   Dictation workstation:   [id number]      XR chest 1 view   Final Result   1.  Stable right-sided pneumonectomy changes.             Signed by: Nino Barbour 1/14/2025 12:56 PM   Dictation workstation:   [id number]      Transthoracic Echo (TTE) Limited   Final Result      XR chest 1 view   Final Result   1. Postsurgical changes related to right sided pneumonectomy with   multiple medical devices as described above.   2. Mild interstitial prominence of the left lung.                  Signed by: Vick Xiao 1/13/2025 3:16 PM   Dictation workstation:   [id number]      XR chest 1 view    (Results Pending)        Assessment/Plan   Assessment:  Faustina Vick is a 65 y/o with PMHx of COPD and 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 Wagner on 1/13 requiring post-op vasoactive therapy. Post-op course c/b acute hyponatremia, multifactorial delirium, cardiogenic shock requiring inotropic support, and acute hypoxic/hypercarbic respiratory failure requiring intubation. COVID+ 1/20. Weaned off milrinone 1/21. Weaned off iEpo 1/24. S/p trach/PEG 1/30 now tolerating TF through PEG.     Plan:  NEURO: History of anxiety on alprazolam 1mg qd. A&Ox4 at baseline. Multifactorial delirium improved. On scheduled clonazepam and nightly trazodone. Has been up OOB to chair last Friday;  stood to side of bed yesterday. Today, alert and responds to commands, answering questions with nods, reasonable strength upper and lower extremities. She is tearful but communicates that pain is less in abdomen.   - Adding precedex to decrease anxiety and attenuate heart rate  - Last Qtc 530  - Continue Trazodone nightly  - Continue Clonazepam 0.5 mg q8h- added parameters drowsiness  - Continue Oxycodone and Dilaudid prn   - Scheduled Tylenol  - Ongoing neuro and pain assessments  - Lidocaine patch to right chest wall  - PT/OT as tolerated - they saw her this morning- got to side of bed, did not want to go to chair  - Pet therapy when able     CV: No history of cardiac disease. Baseline /79, -110. TTE 9/2024 normal biV function, RVSP 10mmHg. Intra-op hypotension requiring vaso and epi. Arrived to SICU on vaso 0.06 units/min, started on levo 0.05 mcg/kg/min. TTE 1/13 with EF 60-65%, low normal RV systolic function. Weaned off vasopressors 1/15. Repeat TTE 1/16: acute biV dysfunction with signs of takotsubo cardiomyopathy, EF 34%, mod reduced RV systolic function, mildly elevated RVSP, mild to mod TR. Cardiogenic shock- started milrinone, lasix infusion, and vasopressin. Initial troponin 928 resolved. Was on pressors and iEPO. Lasix infusion stopped 1/17 given contraction alkalosis. Cath lab 1/17 for RHC/PA cath placement, opening CI 2.9. Repeat TTE 1/20 EF 35% (on iEpo and milrinone). Started midodrine 1/25. Started metop 12.5 BID 2/2 per Cardiology. Repeat echo 2/3 shows significantly improved EF 60-65%, no RWMA, normal RV function.  Overnight: MAP dropped 50s with HR 150s after desaturation event requiring suctioning and bronch. Phenylephrine 0.1 and Vasopressin .03 were initiated. Currently , MAP 70s with Vaso off, and Phenylephrine at 0.1. Adding precedex to address possible anxiety component and holding metoprolol. Updated cardiology. Continuing digoxin 125mcg daily. Per their note, will  consult EP given lack of response to beta-blockade and digoxin.   -EP suggested anticoagulation- will hold off at this time  -Increasing Midodrine 10 mg to 15 mg q6h  -Starting stress dose steroids solu-cortef 100 mg q8  - Reached out to thoracic, okay to anticoagulate if needed (will re-evaluate after Cardiology sees her); however CHADSVASC 1, may not be needed at this time.   - Continuous EKG/abp monitoring  - MAP goal >65  - Goal even to negative fluid balance  - Gentle volume resuscitation or diuresis as indicated, patient with very narrow therapeutic index  - Continue midodrine,10 mg, PEG tube, q8h     PULM: Hx of 30 pack year smoking history (quit 4/2024), COPD, and stage III right lung SCC s/p chemo/XRT/immunotherapy. 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 Arrived to SICU extubated on 10L SFM, weaned to room air 1/14. Chest tube removed 1/14. Acute on chronic hypoxic respiratory failure 1/16. Escalation to Airvo 50L 90% overnight 1/17-1/18. Acute hypercapnia 1/18 with increase WOB, trial of BIPAP with iEPO. S/p trach 1/30. Treated for COVID during this admission.   Overnight: CPAPed until 4am, desat 80s (unclear cause- exertion vs. mucous plug or dryness, humidifier had been off); bronched and suctioned moderate mucous secretions from left mainstem. Right stump was intact. Put on AC/VC and weaned from 100% to 50% FiO2 quickly. This am: although cxr shows slight increased haziness on left (pleural effusion), breath sounds mostly clear and significantly improved from yesterday. Now on CPAP Fio2 40% peep 5  -Consult for speaking valve   - Add additional hygiene - patient has minimal secretions and is getting scheduled broncho-pulmonary care; coarse sounds could be remaining fluid in lung.   - Wean FiO2 to maintain SpO2 >92%  - Scheduled budesonide/duoneb  - PRN albuterol  - Daily CXR  - ABG prn  - Holding home benzonatate    GI: No GI history. LFTs WNL  12/2/24. Evidence of pericholecystic fluid and biliary sludge on renal US 1/15 but no abdominal symptoms. RUQ US with small ascites, otherwise no acute findings. Elevated LFTs, now normalized. LBM 1/17. OG tube placed 1/19 for gastric decompression. TFs initiated via OG 1/20, then held on 1/21 due to regurgitation from OG. Dobhoff placed. Tolerating TF at goal rate. Liquid Bms x4 1/25. Cdiff negative 1/25. S/p PEG placement, tolerating TF. Peg site looks healthy; much less tenderness to palpation of abdomen. Last BM 2/2 (not documented) after adding simethicone.   - Continue TF at goal rate 30ml/hr  - Continue thiamine 100mg IV daily  - Continue IV PPI for GI prophylaxis  - Continue bowel regimen: Miralax, Senna and Suppository   - Continue simethicone via PEG    : No history of renal disease. Baseline creatinine 0.6. Symptomatic acute hyponatremia (SIADH vs vaso mediated) to 120 overnight 1/14-1/15, corrected to 124 after 150cc 3% NaCl x2 on 1/15. Renal US on 1/15 unremarkable. Started on lasix infusion 1/16-1/17. Yoo changed 1/18-1/19. Today: RFP wnl, UOP 50 ml/hr, -960mL 24 hrs  - Active UTI- Pseudomonas- Zosyn   - Continue Siladosin for retention, timed with Midodrine  - RFP daily and prn  - Maintain yoo for strict I/Os   - Maintain U/O >0.5ml/kg/hr  - Replete electrolytes to maintain K>4, Phos>2.5, iCal>1.1, Mag>2     HEME: Hx of DOC. Baseline H/H 12/40. Acute blood loss anemia, OR EBL 150mL. Acute coagulopathy with INR elevated to 2.3 2/2 to poor nutritional status. Received IV Vit K 10mg x1 with improved INR thereafter. Transfused 1u PRBC 1/17 and 1u PRBC on 1/18. LUE duplex obtained 1/20 for swelling, negative for DVT. Stable edema in LUE, negative DVT ultrasound 1/20 ultrasound. Hgb 7.4 1/25, transfused 1 RBC with Hgb increase to 10.7 thereafter. Today, CBC stable. Hb 9.3.   - Check CBC daily and PRN  - Goal Hgb >8  - SCDs and SQH for DVT ppx  - Ongoing monitoring for s/s bleeding  - Maintain active  T&S - will get today    ENDO: No history of DM or thyroid disease. TSH 0.48 (1/17). Recent 5d course of prednisone 10mg 12/2024 for URI. Adequate glycemic control. Started on SDS out of OR 1/13. Transitioned to dexAMETHasone 1/22 for COVID, end date 1/29. BG have been wnl.   - BG q4h, SSI #1     MSK: Arthritis, Vit D deficiency  - Not on meds at home  - Monitor for symptoms     ID:  Worsened clinical condition 1/17, started on vanco, zosyn and azithromycin. Blood cultures, MRSA and Respiratory viral panel negative 1/17. Montiel changed 1/30, Urine cx 1/18 negative. Added fluc overnight 1/18-1/19. Sputum culture 1/19 negative. Vancomycin discontinued 1/20. Urine strep/legionella 1/20 negative. COVID+ on 1/20, initiated remdesivir and decadron. Off precautions. Liquid stools last week; C.diff negative 1/26. Zosyn completed 1/26. Currently afebrile, no leukocytosis.   - UA + pseudomonas- on Zosyn (7 days 2/3-2/9)  - Will obtain blood and sputum cultures   - Temp q4h, wbc daily  - Ongoing monitoring for s/s infection       Access:  - R axillary a line (1/28-)  - Left chest mediport (not currently accessed)  - PIV x2- will obtain more access today   - Montiel (1/30-)  - Trach (1/30)  - PEG (1/30)     Dispo: Continue ICU care. Patient seen and discussed with ICU attending Dr. Ibrahim     Code Status: Full Code     Family: son Yg and  Maximilian updated over the phone. Will further update at bedside if they visit today and prn.    SICU Phone [phone]    Maricruz Silva MD   Anesthesiology, CA-2

## 2025-02-05 ENCOUNTER — APPOINTMENT (OUTPATIENT)
Dept: RADIOLOGY | Facility: HOSPITAL | Age: 65
End: 2025-02-05
Payer: COMMERCIAL

## 2025-02-05 LAB
ALBUMIN SERPL BCP-MCNC: 3.4 G/DL (ref 3.4–5)
ANION GAP BLDA CALCULATED.4IONS-SCNC: 1 MMO/L (ref 10–25)
ANION GAP BLDA CALCULATED.4IONS-SCNC: 3 MMO/L (ref 10–25)
ANION GAP SERPL CALC-SCNC: 12 MMOL/L (ref 10–20)
APTT PPP: 34 SECONDS (ref 27–38)
APTT PPP: 34 SECONDS (ref 27–38)
ATRIAL RATE: 155 BPM
BASE EXCESS BLDA CALC-SCNC: 7.9 MMOL/L (ref -2–3)
BASE EXCESS BLDA CALC-SCNC: 9.7 MMOL/L (ref -2–3)
BODY TEMPERATURE: 37 DEGREES CELSIUS
BODY TEMPERATURE: 37 DEGREES CELSIUS
BUN SERPL-MCNC: 17 MG/DL (ref 6–23)
CA-I BLD-SCNC: 1.19 MMOL/L (ref 1.1–1.33)
CA-I BLDA-SCNC: 1.26 MMOL/L (ref 1.1–1.33)
CA-I BLDA-SCNC: 1.27 MMOL/L (ref 1.1–1.33)
CALCIUM SERPL-MCNC: 9 MG/DL (ref 8.6–10.6)
CHLORIDE BLDA-SCNC: 104 MMOL/L (ref 98–107)
CHLORIDE BLDA-SCNC: 108 MMOL/L (ref 98–107)
CHLORIDE SERPL-SCNC: 102 MMOL/L (ref 98–107)
CO2 SERPL-SCNC: 34 MMOL/L (ref 21–32)
CREAT SERPL-MCNC: <0.2 MG/DL (ref 0.5–1.05)
DIGOXIN SERPL-MCNC: 0.32 NG/ML (ref 0.8–?)
EGFRCR SERPLBLD CKD-EPI 2021: ABNORMAL ML/MIN/{1.73_M2}
ERYTHROCYTE [DISTWIDTH] IN BLOOD BY AUTOMATED COUNT: 18.8 % (ref 11.5–14.5)
GLUCOSE BLD MANUAL STRIP-MCNC: 110 MG/DL (ref 74–99)
GLUCOSE BLD MANUAL STRIP-MCNC: 123 MG/DL (ref 74–99)
GLUCOSE BLD MANUAL STRIP-MCNC: 128 MG/DL (ref 74–99)
GLUCOSE BLD MANUAL STRIP-MCNC: 129 MG/DL (ref 74–99)
GLUCOSE BLDA-MCNC: 134 MG/DL (ref 74–99)
GLUCOSE BLDA-MCNC: 138 MG/DL (ref 74–99)
GLUCOSE SERPL-MCNC: 135 MG/DL (ref 74–99)
HCO3 BLDA-SCNC: 34.2 MMOL/L (ref 22–26)
HCO3 BLDA-SCNC: 34.3 MMOL/L (ref 22–26)
HCT VFR BLD AUTO: 28.7 % (ref 36–46)
HCT VFR BLD EST: 27 % (ref 36–46)
HCT VFR BLD EST: 39 % (ref 36–46)
HGB BLD-MCNC: 9.1 G/DL (ref 12–16)
HGB BLDA-MCNC: 13 G/DL (ref 12–16)
HGB BLDA-MCNC: 9.1 G/DL (ref 12–16)
INHALED O2 CONCENTRATION: 40 %
INHALED O2 CONCENTRATION: 40 %
INR PPP: 1.2 (ref 0.9–1.1)
LACTATE BLDA-SCNC: 0.5 MMOL/L (ref 0.4–2)
LACTATE BLDA-SCNC: 0.7 MMOL/L (ref 0.4–2)
MAGNESIUM SERPL-MCNC: 2.11 MG/DL (ref 1.6–2.4)
MCH RBC QN AUTO: 27.7 PG (ref 26–34)
MCHC RBC AUTO-ENTMCNC: 31.7 G/DL (ref 32–36)
MCV RBC AUTO: 87 FL (ref 80–100)
NRBC BLD-RTO: 0 /100 WBCS (ref 0–0)
OXYHGB MFR BLDA: 92.5 % (ref 94–98)
OXYHGB MFR BLDA: 92.9 % (ref 94–98)
P AXIS: 47 DEGREES
PCO2 BLDA: 46 MM HG (ref 38–42)
PCO2 BLDA: 54 MM HG (ref 38–42)
PH BLDA: 7.41 PH (ref 7.38–7.42)
PH BLDA: 7.48 PH (ref 7.38–7.42)
PHOSPHATE SERPL-MCNC: 3.4 MG/DL (ref 2.5–4.9)
PLATELET # BLD AUTO: 163 X10*3/UL (ref 150–450)
PO2 BLDA: 65 MM HG (ref 85–95)
PO2 BLDA: 71 MM HG (ref 85–95)
POTASSIUM BLDA-SCNC: 3.2 MMOL/L (ref 3.5–5.3)
POTASSIUM BLDA-SCNC: 3.3 MMOL/L (ref 3.5–5.3)
POTASSIUM SERPL-SCNC: 3.4 MMOL/L (ref 3.5–5.3)
PR INTERVAL: 136 MS
PROTHROMBIN TIME: 13.3 SECONDS (ref 9.8–12.8)
Q ONSET: 227 MS
QRS COUNT: 26 BEATS
QRS DURATION: 68 MS
QT INTERVAL: 304 MS
QTC CALCULATION(BAZETT): 488 MS
QTC FREDERICIA: 416 MS
R AXIS: 28 DEGREES
RBC # BLD AUTO: 3.29 X10*6/UL (ref 4–5.2)
SAO2 % BLDA: 95 % (ref 94–100)
SAO2 % BLDA: 96 % (ref 94–100)
SODIUM BLDA-SCNC: 138 MMOL/L (ref 136–145)
SODIUM BLDA-SCNC: 140 MMOL/L (ref 136–145)
SODIUM SERPL-SCNC: 145 MMOL/L (ref 136–145)
T AXIS: 133 DEGREES
T OFFSET: 379 MS
UFH PPP CHRO-ACNC: 0.1 IU/ML
VENTRICULAR RATE: 155 BPM
WBC # BLD AUTO: 6.3 X10*3/UL (ref 4.4–11.3)

## 2025-02-05 PROCEDURE — 2500000004 HC RX 250 GENERAL PHARMACY W/ HCPCS (ALT 636 FOR OP/ED): Performed by: STUDENT IN AN ORGANIZED HEALTH CARE EDUCATION/TRAINING PROGRAM

## 2025-02-05 PROCEDURE — 84132 ASSAY OF SERUM POTASSIUM: CPT

## 2025-02-05 PROCEDURE — 85027 COMPLETE CBC AUTOMATED: CPT

## 2025-02-05 PROCEDURE — 2500000001 HC RX 250 WO HCPCS SELF ADMINISTERED DRUGS (ALT 637 FOR MEDICARE OP)

## 2025-02-05 PROCEDURE — 2500000005 HC RX 250 GENERAL PHARMACY W/O HCPCS

## 2025-02-05 PROCEDURE — 85730 THROMBOPLASTIN TIME PARTIAL: CPT

## 2025-02-05 PROCEDURE — 2500000002 HC RX 250 W HCPCS SELF ADMINISTERED DRUGS (ALT 637 FOR MEDICARE OP, ALT 636 FOR OP/ED)

## 2025-02-05 PROCEDURE — 94640 AIRWAY INHALATION TREATMENT: CPT

## 2025-02-05 PROCEDURE — 2500000004 HC RX 250 GENERAL PHARMACY W/ HCPCS (ALT 636 FOR OP/ED)

## 2025-02-05 PROCEDURE — 97530 THERAPEUTIC ACTIVITIES: CPT | Mod: GP | Performed by: PHYSICAL THERAPIST

## 2025-02-05 PROCEDURE — 85610 PROTHROMBIN TIME: CPT

## 2025-02-05 PROCEDURE — 82330 ASSAY OF CALCIUM: CPT

## 2025-02-05 PROCEDURE — 2500000001 HC RX 250 WO HCPCS SELF ADMINISTERED DRUGS (ALT 637 FOR MEDICARE OP): Performed by: STUDENT IN AN ORGANIZED HEALTH CARE EDUCATION/TRAINING PROGRAM

## 2025-02-05 PROCEDURE — 37799 UNLISTED PX VASCULAR SURGERY: CPT

## 2025-02-05 PROCEDURE — 82947 ASSAY GLUCOSE BLOOD QUANT: CPT

## 2025-02-05 PROCEDURE — 71045 X-RAY EXAM CHEST 1 VIEW: CPT

## 2025-02-05 PROCEDURE — 71045 X-RAY EXAM CHEST 1 VIEW: CPT | Performed by: RADIOLOGY

## 2025-02-05 PROCEDURE — 2500000001 HC RX 250 WO HCPCS SELF ADMINISTERED DRUGS (ALT 637 FOR MEDICARE OP): Performed by: PHYSICIAN ASSISTANT

## 2025-02-05 PROCEDURE — 2500000004 HC RX 250 GENERAL PHARMACY W/ HCPCS (ALT 636 FOR OP/ED): Performed by: NURSE PRACTITIONER

## 2025-02-05 PROCEDURE — 94003 VENT MGMT INPAT SUBQ DAY: CPT

## 2025-02-05 PROCEDURE — 2020000001 HC ICU ROOM DAILY

## 2025-02-05 PROCEDURE — 74018 RADEX ABDOMEN 1 VIEW: CPT

## 2025-02-05 PROCEDURE — 80162 ASSAY OF DIGOXIN TOTAL: CPT

## 2025-02-05 PROCEDURE — 99291 CRITICAL CARE FIRST HOUR: CPT

## 2025-02-05 PROCEDURE — 2500000004 HC RX 250 GENERAL PHARMACY W/ HCPCS (ALT 636 FOR OP/ED): Performed by: PHYSICIAN ASSISTANT

## 2025-02-05 PROCEDURE — 85520 HEPARIN ASSAY: CPT

## 2025-02-05 PROCEDURE — 83735 ASSAY OF MAGNESIUM: CPT

## 2025-02-05 PROCEDURE — 99233 SBSQ HOSP IP/OBS HIGH 50: CPT | Performed by: STUDENT IN AN ORGANIZED HEALTH CARE EDUCATION/TRAINING PROGRAM

## 2025-02-05 PROCEDURE — 74018 RADEX ABDOMEN 1 VIEW: CPT | Performed by: RADIOLOGY

## 2025-02-05 RX ORDER — ACETYLCYSTEINE 100 MG/ML
6 SOLUTION ORAL; RESPIRATORY (INHALATION) 4 TIMES DAILY PRN
Status: DISCONTINUED | OUTPATIENT
Start: 2025-02-05 | End: 2025-02-13 | Stop reason: HOSPADM

## 2025-02-05 RX ORDER — LIDOCAINE HCL/PF 100 MG/5ML
SYRINGE (ML) INTRAVENOUS
Status: DISPENSED
Start: 2025-02-05 | End: 2025-02-06

## 2025-02-05 RX ORDER — POTASSIUM CHLORIDE 14.9 MG/ML
20 INJECTION INTRAVENOUS ONCE
Status: COMPLETED | OUTPATIENT
Start: 2025-02-05 | End: 2025-02-06

## 2025-02-05 RX ORDER — LIDOCAINE HCL/PF 100 MG/5ML
SYRINGE (ML) INTRAVENOUS
Status: DISPENSED
Start: 2025-02-05 | End: 2025-02-05

## 2025-02-05 RX ORDER — POTASSIUM CHLORIDE 1.5 G/1.58G
40 POWDER, FOR SOLUTION ORAL ONCE
Status: COMPLETED | OUTPATIENT
Start: 2025-02-05 | End: 2025-02-05

## 2025-02-05 RX ORDER — HEPARIN SODIUM 10000 [USP'U]/100ML
0-4000 INJECTION, SOLUTION INTRAVENOUS CONTINUOUS
Status: DISCONTINUED | OUTPATIENT
Start: 2025-02-05 | End: 2025-02-13

## 2025-02-05 RX ADMIN — LIDOCAINE 4% 1 PATCH: 40 PATCH TOPICAL at 10:02

## 2025-02-05 RX ADMIN — MIDODRINE HYDROCHLORIDE 15 MG: 10 TABLET ORAL at 12:00

## 2025-02-05 RX ADMIN — DEXMEDETOMIDINE HYDROCHLORIDE 0.2 MCG/KG/HR: 400 INJECTION INTRAVENOUS at 16:04

## 2025-02-05 RX ADMIN — MIDODRINE HYDROCHLORIDE 15 MG: 10 TABLET ORAL at 00:15

## 2025-02-05 RX ADMIN — BUDESONIDE 0.5 MG: 0.5 INHALANT RESPIRATORY (INHALATION) at 08:39

## 2025-02-05 RX ADMIN — BISACODYL 10 MG: 10 SUPPOSITORY RECTAL at 10:02

## 2025-02-05 RX ADMIN — PIPERACILLIN SODIUM AND TAZOBACTAM SODIUM 4.5 G: 4; .5 INJECTION, SOLUTION INTRAVENOUS at 21:37

## 2025-02-05 RX ADMIN — HYDROCORTISONE SODIUM SUCCINATE 50 MG: 100 INJECTION, POWDER, FOR SOLUTION INTRAMUSCULAR; INTRAVENOUS at 02:00

## 2025-02-05 RX ADMIN — BUDESONIDE 0.5 MG: 0.5 INHALANT RESPIRATORY (INHALATION) at 20:05

## 2025-02-05 RX ADMIN — DOCUSATE SODIUM 100 MG: 50 LIQUID ORAL at 10:02

## 2025-02-05 RX ADMIN — CLONAZEPAM 0.5 MG: 0.5 TABLET ORAL at 16:02

## 2025-02-05 RX ADMIN — POTASSIUM CHLORIDE 20 MEQ: 14.9 INJECTION, SOLUTION INTRAVENOUS at 21:36

## 2025-02-05 RX ADMIN — IPRATROPIUM BROMIDE AND ALBUTEROL SULFATE 3 ML: .5; 3 SOLUTION RESPIRATORY (INHALATION) at 15:30

## 2025-02-05 RX ADMIN — CLONAZEPAM 0.5 MG: 0.5 TABLET ORAL at 10:21

## 2025-02-05 RX ADMIN — PIPERACILLIN SODIUM AND TAZOBACTAM SODIUM 4.5 G: 4; .5 INJECTION, SOLUTION INTRAVENOUS at 15:58

## 2025-02-05 RX ADMIN — DIGOXIN 125 MCG: 0.25 INJECTION INTRAMUSCULAR; INTRAVENOUS at 10:02

## 2025-02-05 RX ADMIN — MIDODRINE HYDROCHLORIDE 15 MG: 10 TABLET ORAL at 05:18

## 2025-02-05 RX ADMIN — HEPARIN SODIUM 700 UNITS/HR: 10000 INJECTION, SOLUTION INTRAVENOUS at 18:16

## 2025-02-05 RX ADMIN — PIPERACILLIN SODIUM AND TAZOBACTAM SODIUM 4.5 G: 4; .5 INJECTION, SOLUTION INTRAVENOUS at 04:10

## 2025-02-05 RX ADMIN — ACETYLCYSTEINE 6 ML: 100 SOLUTION ORAL; RESPIRATORY (INHALATION) at 08:42

## 2025-02-05 RX ADMIN — HEPARIN SODIUM 5000 UNITS: 5000 INJECTION INTRAVENOUS; SUBCUTANEOUS at 05:19

## 2025-02-05 RX ADMIN — Medication 40 PERCENT: at 03:59

## 2025-02-05 RX ADMIN — MIDODRINE HYDROCHLORIDE 15 MG: 10 TABLET ORAL at 18:17

## 2025-02-05 RX ADMIN — HYDROCORTISONE SODIUM SUCCINATE 50 MG: 100 INJECTION, POWDER, FOR SOLUTION INTRAMUSCULAR; INTRAVENOUS at 18:17

## 2025-02-05 RX ADMIN — POTASSIUM CHLORIDE 40 MEQ: 1.5 POWDER, FOR SOLUTION ORAL at 21:36

## 2025-02-05 RX ADMIN — PHENYLEPHRINE HYDROCHLORIDE 0.5 MCG/KG/MIN: 10 INJECTION INTRAVENOUS at 06:11

## 2025-02-05 RX ADMIN — TRAZODONE HYDROCHLORIDE 25 MG: 50 TABLET ORAL at 21:37

## 2025-02-05 RX ADMIN — IPRATROPIUM BROMIDE AND ALBUTEROL SULFATE 3 ML: .5; 3 SOLUTION RESPIRATORY (INHALATION) at 20:05

## 2025-02-05 RX ADMIN — HYDROCORTISONE SODIUM SUCCINATE 50 MG: 100 INJECTION, POWDER, FOR SOLUTION INTRAMUSCULAR; INTRAVENOUS at 10:21

## 2025-02-05 RX ADMIN — IPRATROPIUM BROMIDE AND ALBUTEROL SULFATE 3 ML: .5; 3 SOLUTION RESPIRATORY (INHALATION) at 03:59

## 2025-02-05 RX ADMIN — Medication 40 PERCENT: at 20:08

## 2025-02-05 RX ADMIN — THIAMINE HCL TAB 100 MG 100 MG: 100 TAB at 10:02

## 2025-02-05 RX ADMIN — ACETYLCYSTEINE 6 ML: 100 SOLUTION ORAL; RESPIRATORY (INHALATION) at 15:30

## 2025-02-05 RX ADMIN — POLYETHYLENE GLYCOL 3350 17 G: 17 POWDER, FOR SOLUTION ORAL at 10:01

## 2025-02-05 RX ADMIN — IPRATROPIUM BROMIDE AND ALBUTEROL SULFATE 3 ML: .5; 3 SOLUTION RESPIRATORY (INHALATION) at 08:39

## 2025-02-05 RX ADMIN — POTASSIUM CHLORIDE 40 MEQ: 1.5 POWDER, FOR SOLUTION ORAL at 05:24

## 2025-02-05 RX ADMIN — PIPERACILLIN SODIUM AND TAZOBACTAM SODIUM 4.5 G: 4; .5 INJECTION, SOLUTION INTRAVENOUS at 10:02

## 2025-02-05 ASSESSMENT — COGNITIVE AND FUNCTIONAL STATUS - GENERAL
CLIMB 3 TO 5 STEPS WITH RAILING: TOTAL
STANDING UP FROM CHAIR USING ARMS: TOTAL
MOVING FROM LYING ON BACK TO SITTING ON SIDE OF FLAT BED WITH BEDRAILS: A LOT
TURNING FROM BACK TO SIDE WHILE IN FLAT BAD: TOTAL
MOVING TO AND FROM BED TO CHAIR: TOTAL
WALKING IN HOSPITAL ROOM: TOTAL
MOBILITY SCORE: 7

## 2025-02-05 ASSESSMENT — PAIN SCALES - GENERAL
PAINLEVEL_OUTOF10: 0 - NO PAIN

## 2025-02-05 NOTE — PROGRESS NOTES
MADELAINE  spoke with the patient's son, Yg, to discuss LTACH facilities in the [facility]. There are three facilities in the area which includes [place], [place] and [place]. Yg is familiar with these facilities and he will contact his father to discuss options. Yg is planning to call back with his choices.

## 2025-02-05 NOTE — PROGRESS NOTES
Subjective Data:  Currently off phenylephrine, rates are better controlled after precedex.     Objective Data:  Last Recorded Vitals:  Vitals:    02/05/25 1400 02/05/25 1500 02/05/25 1600 02/05/25 1700   BP: 100/67 103/73 142/82 111/68   Pulse: 97 97 (!) 134 107   Resp: 20 23 24 23   Temp:   36.6 °C (97.9 °F)    TempSrc:       SpO2: 96% 95% 92% 93%   Weight:       Height:             TROPHS   Date/Time Value Ref Range Status   01/23/2025 08:51 PM 48 0 - 34 ng/L Final   01/23/2025 03:32 PM 51 0 - 34 ng/L Final   01/16/2025 11:54  0 - 34 ng/L Final     Comment:     Previous result verified on 1/16/2025 1530 on specimen/case [id number] called with component RUST for procedure Troponin I, High Sensitivity with value 928 ng/L.     BNP   Date/Time Value Ref Range Status   01/16/2025 03:57  0 - 99 pg/mL Final     HGBA1C   Date/Time Value Ref Range Status   02/04/2025 08:12 AM 4.9 See comment % Final      Last I/O:  I/O last 3 completed shifts:  In: 1908.7 (34.7 mL/kg) [I.V.:1008.7 (18.3 mL/kg); NG/GT:800; IV Piggyback:100]  Out: 2530 (46 mL/kg) [Urine:2530 (1.3 mL/kg/hr)]  Weight: 55 kg     Inpatient Medications:  Scheduled medications   Medication Dose Route Frequency    acetylcysteine  6 mL nebulization 4x daily    [Held by provider] bisacodyl  10 mg rectal Daily    budesonide  0.5 mg nebulization BID    clonazePAM  0.5 mg g-tube q8h    digoxin  125 mcg intravenous Daily    [Held by provider] docusate sodium  100 mg g-tube BID    esomeprazole  40 mg g-tube Daily before breakfast    [Held by provider] heparin (porcine)  5,000 Units subcutaneous q8h    hydrocortisone sodium succinate  50 mg intravenous q6h    insulin lispro  0-5 Units subcutaneous q4h    ipratropium-albuteroL  3 mL nebulization q6h    lidocaine (cardiac)        lidocaine (cardiac)        [Held by provider] metoprolol tartrate  12.5 mg g-tube BID    midodrine  15 mg g-tube q6h    oxygen   inhalation Continuous - Inhalation    pancrelipase  (Lip-Prot-Amyl) 2 tablet, sodium bicarbonate 650 mg   g-tube Once    perflutren protein A microsphere  0.5 mL intravenous Once in imaging    piperacillin-tazobactam  4.5 g intravenous q6h    polyethylene glycol  17 g g-tube Daily    [Held by provider] senna  5 mL g-tube Nightly    [Held by provider] silodosin  4 mg oral q24h    sulfur hexafluoride microsphr  2 mL intravenous Once in imaging    thiamine  100 mg g-tube Daily    traZODone  25 mg g-tube Nightly     PRN medications   Medication    albuterol    calcium gluconate    calcium gluconate    dextrose    dextrose    glucagon    glucagon    HYDROmorphone    lidocaine (cardiac)    lidocaine (cardiac)    magnesium sulfate    magnesium sulfate    oxyCODONE    oxygen    oxygen    oxygen    potassium chloride     Continuous Medications   Medication Dose Last Rate    dexmedeTOMIDine  0-1.5 mcg/kg/hr 0.2 mcg/kg/hr (02/05/25 8684)    heparin  0-4,000 Units/hr         Physical Exam:  General: Resting in bed  HEENT: Trach in place, no surrounding drainage or bleeding  Resp: symmetric chest rise,  CV: well perfused, intermittently tachycardic       Assessment/Plan   Faustina Vick (Jannie) is a 64F with Hx of Stage III lung cancer, asthma, anxiety, and arthritis who is now s/p R pneumonectomy with Dr. Wagner 1/13/25. 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 Cardiology was consulted and recommended Lasix gtt and milrinone. Lasix gtt held 1/17, hyponatremia improving. Patient underwent RHC and SGC placement with cardiology 1/17. She was reintubated on 1/18 d/t worsening respiratory status and AMS. Also had increased pressor requirements, now weaned off. Repeat echo stable on 1/18. COVID positive on 1/20.         The patient's recurrent atrial flutter/tachycardia following pneumonectomy presents a therapeutic challenge, given the limitations of available antiarrhythmic options.  -Amiodarone: While amiodarone is generally preferred due  to its efficacy and safety in structural heart disease, its use is concerning in this patient due to the history of pneumonectomy and current tracheostomy. The risk of amiodarone-induced pulmonary toxicity, particularly in a patient with reduced pulmonary reserve, makes it a less favorable option.  -Dofetilide (Tikosyn): Dofetilide is another potential option; however, its use is complicated by the patient's clinical status. The presence of hypotension, intermittent vasopressor requirement (phenylephrine), and the risk of recurrent infections and acute kidney injury (RENAE) increase the likelihood of QT prolongation and subsequent torsades de pointes. Given dofetilide's renal clearance, fluctuating renal function could lead to unpredictable drug levels, further increasing proarrhythmic risk.    Given these challenges, a multidisciplinary approach is warranted as rate control strategies with metoprolol and digoxin were not effective and had been limited in the setting of hypotension.  Close monitoring of renal function and electrolyte status is essential if dofetilide is pursued. Additionally, Pulmonology consultation may provide further insight into potential risk of amiodarone toxicity in this patient.   -Agree with digoxin 125mcg daily  -Start AC (heparin) if there is no contraindications, will consider starting tikosyn once the patient is medically stable to maintain sinus rhythm.  -Will need a ziopatch on discharge with EP follow up      Code Status:  Full Code    I spent 45 minutes in the professional and overall care of this patient.        David Cavanaugh MD  Cardiology Fellow PGY5    Thank you for involving us in this patient care. Recommendations not final until signed by attending.     General Cardiology Consult Pager: [phone] (weekday 7AM-6PM and weekend 7AM-2PM) and other: [phone]  EP Consult Pager: [phone] (weekday 7AM-6PM and weekend 7AM-2PM) and other: [phone]  CICU Fellow Pager: [phone] anytime  EP Device Nurse  Pager: [phone] (weekday 7AM-4PM)  Advanced Heart Failure Consult Pager: [phone] anytime

## 2025-02-05 NOTE — PROGRESS NOTES
"Faustina Vick \"Noam" is a 64 y.o. 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    Subjective   Sign out was start precedex and CPAP her at night. Did not CPAP her overnight with precedex. Started phenylephrine for MAP 57 about 9pm, got tachycardic and started precedex 0.3 at 10pm. Dex 0.3, phenylephrine 0.5 kept all evening, she remained on AC/VC.     Objective   Physical Exam  Vitals reviewed.   Constitutional:       Appearance: She is underweight.      Interventions: She is sedated and restrained. She is not intubated.     Comments: Patient looking closer to baseline/less de-conditioned today, more interactive   HENT:      Head: Normocephalic and atraumatic.      Nose: Nose normal.      Mouth/Throat:      Mouth: Mucous membranes are moist.   Eyes:      Extraocular Movements: Extraocular movements intact.      Pupils: Pupils are equal, round, and reactive to light.   Cardiovascular:      Rate and Rhythm: Normal rate and regular rhythm.      Pulses: Normal pulses.      Heart sounds: Normal heart sounds.   Pulmonary:      Effort: Pulmonary effort is normal. She is not intubated.      Breath sounds: No rhonchi (left side).      Comments: Vented via trache AC/VC 40%, PEEP 5 this morning; breath sounds clear on left   Chest:      Comments: Right lateral chest dressing c/d/i.  Abdominal:      General: There is no distension.      Palpations: Abdomen is soft.      Comments: Less pain to palpation on exam today; PEG appears intact, adequate laxity, normal skin surrounding.    Genitourinary:     Comments: Montiel in place - yellow urine  Musculoskeletal:         General: No swelling or tenderness. Normal range of motion.      Cervical back: Normal range of motion and neck supple.   Skin:     General: Skin is warm and dry.   Neurological:      General: No focal deficit present.      Mental Status: She is alert and easily aroused.      GCS: GCS eye subscore is 3. GCS verbal " "subscore is 1. GCS motor subscore is 6.      Motor: Weakness (generalized) present.      Comments: Following commands, alert and oriented to place and time (she can write, \"[place]\", \"February\") Slightly decreased strength right hand due to edema (likely 2/2 infiltrated 20g IV in wrist); pulses and sensation intact; otherwise strength 3/5 upper extremities. Can lift both legs slightly off bed, 3/5.   Psychiatric:         Mood and Affect: Mood normal.         Behavior: Behavior normal.      Comments: Smiles, appears to be in better mood. Was tearful overnight and this morning when resident and RT mentioned changed vent settings.        Last Recorded Vitals  Blood pressure 109/64, pulse 102, temperature 36.9 °C (98.4 °F), temperature source Temporal, resp. rate 22, height 1.651 m (5' 5\"), weight 55 kg (121 lb 4.1 oz), SpO2 92%.    VS over last 24h  Heart Rate:  []   Temp:  [36.6 °C (97.9 °F)-36.9 °C (98.4 °F)]   Resp:  [12-26]   BP: ()/(49-75)   SpO2:  [91 %-100 %]      Intake/Output last 3 Shifts:  I/O last 3 completed shifts:  In: 1908.7 (34.7 mL/kg) [I.V.:1008.7 (18.3 mL/kg); NG/GT:800; IV Piggyback:100]  Out: 2530 (46 mL/kg) [Urine:2530 (1.3 mL/kg/hr)]  Weight: 55 kg       Intake/Output Summary (Last 24 hours) at 2/5/2025 0701  Last data filed at 2/5/2025 0600  Gross per 24 hour   Intake 1593.11 ml   Output 1500 ml   Net 93.11 ml        Relevant Results  Scheduled medications  acetylcysteine, 6 mL, nebulization, 4x daily  bisacodyl, 10 mg, rectal, Daily  budesonide, 0.5 mg, nebulization, BID  clonazePAM, 0.5 mg, g-tube, q8h  digoxin, 125 mcg, intravenous, Daily  docusate sodium, 100 mg, g-tube, BID  esomeprazole, 40 mg, g-tube, Daily before breakfast  heparin (porcine), 5,000 Units, subcutaneous, q8h  hydrocortisone sodium succinate, 50 mg, intravenous, q6h  insulin lispro, 0-5 Units, subcutaneous, q4h  ipratropium-albuteroL, 3 mL, nebulization, q6h  lidocaine, 1 patch, transdermal, Daily  [Held by " provider] metoprolol tartrate, 12.5 mg, g-tube, BID  midodrine, 15 mg, g-tube, q6h  oxygen, , inhalation, Continuous - Inhalation  perflutren protein A microsphere, 0.5 mL, intravenous, Once in imaging  piperacillin-tazobactam, 4.5 g, intravenous, q6h  polyethylene glycol, 17 g, g-tube, Daily  senna, 5 mL, g-tube, Nightly  [Held by provider] silodosin, 4 mg, oral, q24h  sulfur hexafluoride microsphr, 2 mL, intravenous, Once in imaging  thiamine, 100 mg, g-tube, Daily  traZODone, 25 mg, g-tube, Nightly      PRN medications  PRN medications: albuterol, calcium gluconate, calcium gluconate, dextrose, dextrose, glucagon, glucagon, HYDROmorphone, magnesium sulfate, magnesium sulfate, oxyCODONE, oxygen, oxygen, oxygen, potassium chloride      Results for orders placed or performed during the hospital encounter of 01/13/25 (from the past 24 hours)   Hemoglobin A1c   Result Value Ref Range    Hemoglobin A1C 4.9 See comment %    Estimated Average Glucose 94 Not Established mg/dL   ECG 12 lead   Result Value Ref Range    Ventricular Rate 116 BPM    Atrial Rate 116 BPM    NV Interval 114 ms    QRS Duration 74 ms    QT Interval 334 ms    QTC Calculation(Bazett) 464 ms    P Axis 56 degrees    R Axis 46 degrees    T Axis 214 degrees    QRS Count 19 beats    Q Onset 225 ms    P Onset 168 ms    P Offset 216 ms    T Offset 392 ms    QTC Fredericia 416 ms   Electrocardiogram, 12-lead PRN ACS symptoms   Result Value Ref Range    Ventricular Rate 152 BPM    Atrial Rate 152 BPM    NV Interval 116 ms    QRS Duration 70 ms    QT Interval 316 ms    QTC Calculation(Bazett) 502 ms    P Axis 59 degrees    R Axis 44 degrees    T Axis 169 degrees    QRS Count 25 beats    Q Onset 226 ms    P Onset 168 ms    P Offset 210 ms    T Offset 384 ms    QTC Fredericia 430 ms   Type and screen   Result Value Ref Range    ABO TYPE B     Rh TYPE POS     ANTIBODY SCREEN NEG    POCT GLUCOSE   Result Value Ref Range    POCT Glucose 118 (H) 74 - 99 mg/dL    Blood Culture    Specimen: Peripheral Venipuncture; Blood culture   Result Value Ref Range    Blood Culture Loaded on Instrument - Culture in progress    Blood Culture    Specimen: Peripheral Venipuncture; Blood culture   Result Value Ref Range    Blood Culture Loaded on Instrument - Culture in progress    POCT GLUCOSE   Result Value Ref Range    POCT Glucose 128 (H) 74 - 99 mg/dL   POCT GLUCOSE   Result Value Ref Range    POCT Glucose 141 (H) 74 - 99 mg/dL   Calcium, Ionized   Result Value Ref Range    POCT Calcium, Ionized 1.19 1.1 - 1.33 mmol/L   CBC   Result Value Ref Range    WBC 6.3 4.4 - 11.3 x10*3/uL    nRBC 0.0 0.0 - 0.0 /100 WBCs    RBC 3.29 (L) 4.00 - 5.20 x10*6/uL    Hemoglobin 9.1 (L) 12.0 - 16.0 g/dL    Hematocrit 28.7 (L) 36.0 - 46.0 %    MCV 87 80 - 100 fL    MCH 27.7 26.0 - 34.0 pg    MCHC 31.7 (L) 32.0 - 36.0 g/dL    RDW 18.8 (H) 11.5 - 14.5 %    Platelets 163 150 - 450 x10*3/uL   Coagulation Screen   Result Value Ref Range    Protime 13.3 (H) 9.8 - 12.8 seconds    INR 1.2 (H) 0.9 - 1.1    aPTT 34 27 - 38 seconds   Magnesium   Result Value Ref Range    Magnesium 2.11 1.60 - 2.40 mg/dL   Renal function panel   Result Value Ref Range    Glucose 135 (H) 74 - 99 mg/dL    Sodium 145 136 - 145 mmol/L    Potassium 3.4 (L) 3.5 - 5.3 mmol/L    Chloride 102 98 - 107 mmol/L    Bicarbonate 34 (H) 21 - 32 mmol/L    Anion Gap 12 10 - 20 mmol/L    Urea Nitrogen 17 6 - 23 mg/dL    Creatinine <0.20 (L) 0.50 - 1.05 mg/dL    eGFR      Calcium 9.0 8.6 - 10.6 mg/dL    Phosphorus 3.4 2.5 - 4.9 mg/dL    Albumin 3.4 3.4 - 5.0 g/dL   Blood Gas Arterial Full Panel   Result Value Ref Range    POCT pH, Arterial 7.41 7.38 - 7.42 pH    POCT pCO2, Arterial 54 (H) 38 - 42 mm Hg    POCT pO2, Arterial 71 (L) 85 - 95 mm Hg    POCT SO2, Arterial 96 94 - 100 %    POCT Oxy Hemoglobin, Arterial 92.5 (L) 94.0 - 98.0 %    POCT Hematocrit Calculated, Arterial 39.0 36.0 - 46.0 %    POCT Sodium, Arterial 138 136 - 145 mmol/L    POCT  Potassium, Arterial 3.3 (L) 3.5 - 5.3 mmol/L    POCT Chloride, Arterial 104 98 - 107 mmol/L    POCT Ionized Calcium, Arterial 1.26 1.10 - 1.33 mmol/L    POCT Glucose, Arterial 134 (H) 74 - 99 mg/dL    POCT Lactate, Arterial 0.5 0.4 - 2.0 mmol/L    POCT Base Excess, Arterial 7.9 (H) -2.0 - 3.0 mmol/L    POCT HCO3 Calculated, Arterial 34.2 (H) 22.0 - 26.0 mmol/L    POCT Hemoglobin, Arterial 13.0 12.0 - 16.0 g/dL    POCT Anion Gap, Arterial 3 (L) 10 - 25 mmo/L    Patient Temperature 37.0 degrees Celsius    FiO2 40 %   POCT GLUCOSE   Result Value Ref Range    POCT Glucose 123 (H) 74 - 99 mg/dL       XR chest 1 view   Final Result   1.  No significant changes in pulmonary aeration status post right   pneumonectomy.   2. Trace left pleural effusion and atelectasis/infiltrate.        I personally reviewed the images/study and I agree with the findings   as stated by resident physician Dr. Yair Deng . This study   was interpreted at [place], [place].        MACRO:   None        Signed by: Oseas Stroud 2/4/2025 1:47 PM   Dictation workstation:   [id number]      XR chest 1 view   Final Result   1. No significant change in pulmonary aeration status post right   pneumonectomy.   2. Stable trace left pleural effusion and hazy left basilar opacities.        I personally reviewed the images/study and I agree with radiology   resident Dr. Meghan Ramesh findings as stated. This study was   interpreted at [place], [place]        MACRO:   None        Signed by: Oseas Stroud 2/4/2025 7:56 AM   Dictation workstation:   [id number]      Transthoracic Echo (TTE) Complete   Final Result      XR chest 1 view   Final Result   1. Similar appearance of the chest compared to prior with   postsurgical changes of right pneumonectomy. Persistent patchy left   lung base airspace opacities which may represent edema or infectious   infiltrate.    2. Suggestion of small left pleural effusion   3. Medical devices as above.             I personally reviewed the images/study and I agree with the findings   as stated above by resident physician, Ankit Varela MD. This study   was interpreted at [place], [place].        MACRO:   None.        Signed by: Vick Xiao 2/3/2025 1:45 PM   Dictation workstation:   [id number]      XR chest 1 view   Final Result   1. No significant change in patchy left lung airspace opacities,   which may represent edema/infiltrates.   2. Stable postsurgical changes related to right-sided pneumonectomy.   3. Medical devices as above.        Signed by: Real Rashid 2/2/2025 8:23 AM   Dictation workstation:   [id number]      XR abdomen 1 view   Final Result   1. Interval placement of percutaneous gastrostomy tube which overlies   the left upper quadrant, within expected location of gastric body.   2. Nonobstructive bowel gas pattern.        I personally reviewed the images/study and I agree with the findings   as stated by Jonah Velez MD (Radiology Resident).   This study was interpreted at [place], [place].        MACRO:   None        Signed by: Real Rashid 2/1/2025 1:20 PM   Dictation workstation:   [id number]      XR chest 1 view   Final Result   1. Stable to slightly improved patchy left lung airspace opacities,   which may represent edema/infiltrates.   2. Stable postsurgical changes related to right-sided pneumonectomy.   3. Medical devices as above.        Signed by: Real Rashid 2/1/2025 6:54 AM   Dictation workstation:   [id number]      XR chest 1 view   Final Result   1.  Redemonstration of postsurgical changes of right pneumonectomy.   When compared to the prior exam, the opacities in the left lung most   prominently in the left mid to lower lung are more confluent.   Findings may represent a  consolidative process such as pneumonia or   aspiration pneumonitis with a component of pulmonary vascular   congestion. There is possible small left pleural effusion and left   basilar atelectasis.        I personally reviewed the images/study and I agree with the findings   as stated by resident physician Dr. Yair Deng . This study   was interpreted at [place], [place].        MACRO:   None        Signed by: Nino Barbour 1/31/2025 12:27 PM   Dictation workstation:   [id number]      XR chest 1 view   Final Result   1. Interval slightly increased patchy opacities in the left lower   lung field with blunting of the left costophrenic angle. Findings may   represent small pleural effusion with atelectasis/infiltrate. There   is mild pulmonary vascular congestion.   2. Complete opacification of the right hemithorax representing   postsurgical changes of right pneumonectomy.   3. Interval extubation with placement of a tracheostomy tube.        I personally reviewed the images/study and I agree with the findings   as stated by resident physician Dr. Yair Deng . This study   was interpreted at [place], [place].        MACRO:   None        Signed by: Oseas Stroud 1/30/2025 4:36 PM   Dictation workstation:   [id number]      XR chest 1 view   Final Result   1. Similar mild interstitial edema, small left pleural effusion, and   left basilar atelectasis.   2. Postsurgical changes of right pneumonectomy.   3. Medical devices as above.             I personally reviewed the images/study and I agree with the findings   as stated above by resident physician, Ankit Varela MD. This study   was interpreted at [place], [place].        MACRO:   None.        Signed by: Oseas Stroud 1/30/2025 9:31 AM   Dictation workstation:   [id number]      XR chest 1 view   Final  Result   1. Postoperative changes and medical devices as above.   2. Similar mild interstitial edema and left basilar presumed   atelectasis with small pleural effusion.   3. Persistent complete opacification of right hemithorax, correlating   with patient's history of pneumonectomy.        Signed by: Real Rashid 1/29/2025 7:28 AM   Dictation workstation:   [id number]      XR chest 1 view   Final Result   1.  Unchanged aeration of the lungs with complete opacification of   the right hemithorax and coarse interstitial markings throughout the   left lung with left basilar atelectasis. Superimposed infection is   not excluded.        I personally reviewed the images/study and I agree with the findings   as stated by resident physician Dr. Yair Deng . This study   was interpreted at [place], [place].        MACRO:   None        Signed by: Oseas Stroud 1/28/2025 9:31 AM   Dictation workstation:   [id number]      Transthoracic Echo (TTE) Limited   Final Result      XR chest 1 view   Final Result   1.  Interval improvement in the left-sided edema and patchy airspace   opacities. Small left-sided pleural effusion.   2. Postsurgical changes of right-sided pneumonectomy.   3. Medical lines and devices as above.        I personally reviewed the images/study and I agree with the findings   as stated by Jonah Velez MD (Radiology Resident).   This study was interpreted at [place], [place].        MACRO:   None        Signed by: Oseas Stroud 1/27/2025 10:55 AM   Dictation workstation:   [id number]      XR chest 1 view   Final Result   1.  Slight interval worsening in left-sided airspace disease/edema   status post right-sided pneumonectomy.             Signed by: Nino Barbour 1/26/2025 1:16 PM   Dictation workstation:   [id number]      XR chest 1 view   Final Result   1.  Post pneumonectomy changes  with continued left perihilar edema.             Signed by: Nino Barbour 1/25/2025 9:51 AM   Dictation workstation:   [id number]      XR chest 1 view   Final Result   1.  Similar left lung pulmonary edema with small left pleural   effusion.   2. Status post right pneumonectomy.   3. [place]-Paco catheter with tip projecting over the left lower lobe,   in similar position.                  MACRO:   None        Signed by: Alexx Anne 1/24/2025 10:24 AM   Dictation workstation:   [id number]      XR chest 1 view   Final Result   Addendum (preliminary) 1 of 1   Interpreted By:  Alexx Anne,    ADDENDUM:   Right IJ approach [place] Paco catheter is also noted with tip   projecting over the left lower lobe in similar position to prior exam.        Signed by: Alexx Anne 1/24/2025 10:25 AM        -------- ORIGINAL REPORT --------   Dictation workstation:   [id number]      Final   1.  Stable findings of pulmonary edema within the left lung.   2. Postsurgical changes of right pneumonectomy.   3. Medical devices as above.                  MACRO:   None        Signed by: Alexx Anne 1/24/2025 9:09 AM   Dictation workstation:   [id number]      XR chest 1 view   Final Result   1. The tip of the endotracheal tube terminates 7.5 cm of the sina.   2. Slightly decreased  interstitial prominence and airspace opacities   within the left lung when compared to prior radiograph,  suggestive   of improving infectious process or improving pulmonary edema.   3. No significant interval change in postoperative changes from   right-sided pneumonectomy, resection of multiple right-sided ribs,   complete opacification of the pneumonectomy bed, and rightward   mediastinal shift.        I personally reviewed the images/study and I agree with radiology   resident  Dr. Meghan Ramesh findings as stated. This study was   interpreted at [facility],   [place], [place]        MACRO:   None        Signed by:  Oseas Stroud 1/24/2025 8:09 AM   Dictation workstation:   [id number]      XR abdomen 1 view   Final Result   1.  Dobhoff tube is in place with the tip projecting over the 1st   portion of the duodenum.   2. There is gas-filled dilated stomach. A few slight prominent loops   of the bowel in the upper abdomen with overall nonobstructive bowel   gas pattern.   3. Postsurgical changes of right pneumonectomy with resection of ribs   and patchy airspace opacities in the left lung.   4. [place]-Paco catheter projecting over the lower pulmonary artery.   Recommend retraction.        MACRO:   None        Signed by: Oseas Stroud 1/23/2025 10:15 AM   Dictation workstation:   [id number]      XR chest 1 view   Final Result   1. Unchanged appearance of multifocal patchy airspace opacities with   interstitial prominence of the left lung which may be due to   infectious process. Component of edema can not be excluded.   2. Medical devices as described above with similar positioning of the   [place]-Paco catheter projecting over left lower lobe pulmonary artery.   Recommend retraction.   3. Postsurgical changes of right-sided pneumonectomy and resection of   multiple right-sided ribs with complete opacification of the   pneumonectomy bed. The appearance is unchanged compared to prior   study.                  MACRO:   None        Signed by: Oseas Stroud 1/23/2025 9:36 AM   Dictation workstation:   [id number]      XR abdomen 1 view   Final Result   1.  As described above.        Signed by: Vick Xiao 1/22/2025 1:01 PM   Dictation workstation:   [id number]      XR chest 1 view   Final Result   1. Unchanged appearance of multifocal patchy airspace opacity with   interstitial prominence of the left lung which may be due to   infectious process. Component of edema cannot be excluded.   2. Medical devices as described above with interval advancement of   the [place]-Paco catheter projecting over left lower lobe pulmonary   artery.  Recommend retraction.   3. Postsurgical changes of right-sided pneumonectomy and resection of   multiple right-sided ribs with complete opacification of the   pneumonectomy bed. The appearance is unchanged compared to prior   study.                  Signed by: Vick Xiao 1/22/2025 1:00 PM   Dictation workstation:   [id number]      XR chest 1 view   Final Result   1. Similar near-complete opacification of the right hemithorax, which   may be secondary to a large pleural effusion, pneumonectomy, or   bronchial obstruction.   2. Diffuse interstitial opacities and scattered patchy alveolar   opacities throughout the left lung, similar to the prior exam, which   may reflect prominent pulmonary edema or multifocal infection.   3. Medical devices as above.        I personally reviewed the image(s)/study and resident interpretation   as stated by Dr. Ange Sue MD. I agree with the findings as   stated. This study was interpreted at [facility], [place], [place].        MACRO:   None        Signed by: Oseas Stroud 1/21/2025 7:58 PM   Dictation workstation:   [id number]      Vascular US upper extremity venous duplex left   Final Result      Transthoracic Echo (TTE) Limited   Final Result      XR chest 1 view   Final Result   1.  Interval opacification of the right hemithorax and correlate with   post pneumonectomy changes.   2. [place]-Paco catheter overlies the left lower lobe pulmonary artery.   3. Slight interval improvement in left-sided pulmonary edema.   Underlying edema and correlate with fluid status.             Signed by: Nino Barbour 1/20/2025 9:57 AM   Dictation workstation:   [id number]      XR abdomen 1 view   Final Result   1.  As described above.        Signed by: Vick Xiao 1/19/2025 11:58 AM   Dictation workstation:   [id number]      XR chest 1 view   Final Result   1. Slight worsening of multifocal airspace opacity in the left mid   and  lower lung. Findings are concerning for infectious process. Small   left pleural effusion.   2. Postsurgical changes right pneumonectomy with medical devices as   described above. Consider retracting the [place]-Paco catheter.                  Signed by: Vick Xiao 1/19/2025 8:55 AM   Dictation workstation:   [id number]      Transthoracic Echo (TTE) Limited   Final Result      XR chest 1 view   Final Result   1. Patchy airspace opacity in the left lower lung with slight   improvement in aeration. Correlate with concern for infection   2. Postsurgical changes of right pneumonectomy as described above.   3. Medical lines and devices as above. Consider retraction of the   [place]-Paco catheter.        I personally reviewed the images/study and I agree with the findings   as stated. This study was interpreted at [place], [place].        MACRO:   None        Signed by: Vick Xiao 1/19/2025 8:54 AM   Dictation workstation:   [id number]      XR chest 1 view   Final Result   1. Worsening aeration of the left lung with increasing left basilar   infiltrate and pleural effusion. Correlate with mucous plugging and   component of lobar collapse.   2. Component of interstitial prominence of the left lung, also   slightly increased which may be due to edema.   3. Postsurgical changes of right pneumonectomy as described                  Signed by: Vick Xiao 1/18/2025 11:35 AM   Dictation workstation:   [id number]      Cardiac Catheterization Procedure   Final Result      Transthoracic Echo (TTE) Limited   Final Result      XR chest 1 view   Final Result   1.  Stable exam with postsurgical changes of right pneumonectomy/rib   resection and large layering fluid component within the right   hemithorax.   2. Similar findings of left pulmonary edema with slightly increased   size of small left pleural effusion. No left-sided pneumothorax.   3. Medical devices as  above.                  MACRO:   None        Signed by: Alexx Anne 1/17/2025 8:16 AM   Dictation workstation:   [id number]      XR chest 1 view   Final Result   1.  Postsurgical changes of right-sided pneumonectomy with increased   layering opacification of the right hemithorax, likely due to   increasing fluid.   2. Similar trace left-sided pleural effusion with linear left basilar   airspace opacity, likely atelectasis on a background of likely   pulmonary edema. Infection is not definitively excluded.   3. Medical devices as above with interval insertion of right internal   jugular central venous catheter projecting at the mid SVC.        I personally reviewed the images/study and I agree with the findings   as stated by resident Davidson Mcguire. This study was interpreted   at [place], [place].        MACRO:   None        Signed by: Alexx Anne 1/17/2025 8:14 AM   Dictation workstation:   [id number]      Transthoracic Echo (TTE) Limited   Final Result      US right upper quadrant   Final Result   Small amount of simple appearing ascites in the right upper quadrant   without evidence of cholelithiasis, cholecystitis or biliary dilation.        Signed by: Alberto Salas 1/16/2025 1:02 PM   Dictation workstation:   [id number]      XR chest 1 view   Final Result   1. Slight interval increase in left basilar airspace opacity which   may be due to increasing atelectasis. Cannot exclude developing   infectious infiltrate. Question trace left pleural effusion.   2. Again seen postsurgical changes related to right-sided   pneumonectomy as described above                  Signed by: Vick Xiao 1/16/2025 9:47 AM   Dictation workstation:   [id number]      US renal complete   Final Result   1. Unremarkable sonographic evaluation of the kidneys.   2. Incomplete evaluation of a gallbladder with some pericholecystic   fluid and biliary sludge. Dedicated right  upper quadrant ultrasound   could be obtained as clinically warranted.   3. A small amount of free fluid present in the right upper quadrant   and pelvis.        Findings relayed by phone by me to SICU provider at 5:11 p.m.        I personally reviewed the images/study and I agree with the findings   as stated by Erickson Best MD (resident) . This study was   interpreted at [facility],   [place], [place].        MACRO:   None        Signed by: Alberto Salas 1/15/2025 6:17 PM   Dictation workstation:   [id number]      XR chest 1 view   Final Result   1.  Status post pneumonectomy with interval increase in fluid   component of right-sided hydropneumothorax.   2. Medical devices as above.                  MACRO:   None        Signed by: Alexx Anne 1/15/2025 9:28 AM   Dictation workstation:   [id number]      XR chest 1 view   Final Result   1. Interval removal of right chest tube. Other medical devices as   described above.   2. Postsurgical changes related to right pneumonectomy.   3. Faint left basilar opacity, likely atelectatic.                  Signed by: Vick Xiao 1/14/2025 2:53 PM   Dictation workstation:   [id number]      XR chest 1 view   Final Result   1.  Stable right-sided pneumonectomy changes.             Signed by: Nino Barbour 1/14/2025 12:56 PM   Dictation workstation:   [id number]      Transthoracic Echo (TTE) Limited   Final Result      XR chest 1 view   Final Result   1. Postsurgical changes related to right sided pneumonectomy with   multiple medical devices as described above.   2. Mild interstitial prominence of the left lung.                  Signed by: Vick Xiao 1/13/2025 3:16 PM   Dictation workstation:   [id number]      XR chest 1 view    (Results Pending)        Assessment/Plan   Assessment:  Faustina Vick is a 63 y/o with PMHx of COPD and stage III right lung SCC s/p chemo/radiation, presenting to SICU from OR  s/p Right Pneumonectomy and intrapericardial dissection with en bloc chest wall resection, and MLND by Dr. Wagner on 1/13 requiring post-op vasoactive therapy. Post-op course c/b acute hyponatremia, multifactorial delirium, cardiogenic shock requiring inotropic support, and acute hypoxic/hypercarbic respiratory failure requiring intubation. COVID+ 1/20. Weaned off milrinone 1/21. Weaned off iEpo 1/24. S/p trach/PEG 1/30 now tolerating TF through PEG.     Plan:  NEURO: History of anxiety on alprazolam 1mg qd. A&Ox4 at baseline. Multifactorial delirium improved. On scheduled clonazepam and nightly trazodone. Has gotten up to stand last 2 days. Today, alert and responds to commands, can write on erase board and expresses orientation to place and time. Strength 3/5 upper and lower extremities, slight decrease strength right hand due to edema for IV infiltration. On Precedex 0.3. Denies pain but expresses sacral discomfort and desire to turn in bed.   - Keep Precedex on, low during day and increase at night- facilitating control of anxiety-related tachycardia (when dex is on, she sits about  bpm)   - Last Qtc 530  - Continue Trazodone nightly  - Continue Clonazepam 0.5 mg q8h- added parameters drowsiness  - Continue Oxycodone and Dilaudid prn   - Ongoing neuro and pain assessments  - PT/OT as tolerated - stood up last 2 days- aim for same or chair today   - Pet therapy when able     CV: No history of cardiac disease. Baseline /79, -110. TTE 9/2024 normal biV function, RVSP 10mmHg. Intra-op hypotension requiring vaso and epi. Arrived to SICU on vaso 0.06 units/min, started on levo 0.05 mcg/kg/min. TTE 1/13 with EF 60-65%, low normal RV systolic function. Weaned off vasopressors 1/15. Repeat TTE 1/16: acute biV dysfunction with signs of takotsubo cardiomyopathy, EF 34%, mod reduced RV systolic function, mildly elevated RVSP, mild to mod TR. Cardiogenic shock- started milrinone, lasix infusion, and  vasopressin. Initial troponin 928 resolved. Was on pressors and iEPO. Lasix infusion stopped 1/17 given contraction alkalosis. Cath lab 1/17 for RHC/PA cath placement, opening CI 2.9. Repeat TTE 1/20 EF 35% (on iEpo and milrinone). Started midodrine 1/25. Started metop 12.5 BID 2/2 per Cardiology. Repeat echo 2/3 shows significantly improved EF 60-65%, no RWMA, normal RV function.  Overnight: MAP dropped 57s, systolic 77, while she was sleeping. Phenylephrine 0.5 started 9pm. Subsequently got tachycardic 140s, started precedex 0.3 at 10 pm.   This morning: HR 90s, MAP 77, quickly weaned off phenylephrine. Right hand 20g where phenylephrine was running looks edematous, is cooler, pulse intact (c/f infiltration).   -Established 16 g long IV left upper arm this morning and will remove 20g  - Continuing digoxin 125mcg daily; level pending   -EP consult: suggest tachycardia due to acute illeness, agree with Dixogin 125mcg/day, start heparin if no contraindications, consider tikosyn once patient stable in sinus rhythm, ziopathch at discharge.   -Awaiting vascular response on systemic heparin anticoagulation   -Continue Midodrine 15 mg q6h; add prn dose if needed  -Continue stress dose steroids solu-cortef 50 mg q8h  - Continuous EKG/abp monitoring  - MAP goal >65  - Goal even to negative fluid balance     PULM: Hx of 30 pack year smoking history (quit 4/2024), COPD, and stage III right lung SCC s/p chemo/XRT/immunotherapy. 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 Arrived to SICU extubated on 10L SFM, weaned to room air 1/14. Chest tube removed 1/14. Acute on chronic hypoxic respiratory failure 1/16. Escalation to Airvo 50L 90% overnight 1/17-1/18. Acute hypercapnia 1/18 with increase WOB, trial of BIPAP with iEPO then  was re-intubated. S/p trach 1/30. Treated for COVID during this admission.   Overnight: Was on AC/VC 40% PEEP 5; CXR stable, breath sounds are  clear   - Keep on CPAP as much as tolerated, do not rest overnight unless needed; keep Precedex during day and increase at night to facilitate anxiety management.   -Communicate well with evening team and RT that preference would be to increase pressure support before flipping to AC/VC for minor changes in status   - Wean FiO2 to maintain SpO2 >92%  - Scheduled budesonide/duoneb  - PRN albuterol  - Daily CXR  - ABG prn  - Holding home benzonatate    GI: No GI history. LFTs WNL 12/2/24. Evidence of pericholecystic fluid and biliary sludge on renal US 1/15 but no abdominal symptoms. RUQ US with small ascites, otherwise no acute findings. Elevated LFTs, now normalized. PEG 1/30. PEG site looks healthy; no abdominal tenderness. Last BM 2/5 normal appearance; PEG tube flushing but machine alarming clogged.   -Will flush PEG with pancreatic lipase and obtain KUB  -Changing tube feeds per nutrition recs:  Isosource 1.5 @ 45 ml/hr = 1620 kcal, 73 g protein, 825 ml free H20  Free H20 flush per SICU  - Continue thiamine 100mg IV daily  - Continue IV PPI for GI prophylaxis  - Continue bowel regimen: Miralax, Senna and Suppository   - Continue simethicone via PEG    : No history of renal disease. Baseline creatinine 0.6. Symptomatic acute hyponatremia (SIADH vs vaso mediated) during admission, resolved. Today: RFP wnl, UOP 60 ml/hr, net even 24 hrs, K replaced.   - Active UTI- Pseudomonas- Zosyn   - Stopped Silodosin    - RFP daily and prn  - Maintain yoo for strict I/Os   - Maintain U/O >0.5ml/kg/hr  - Replete electrolytes to maintain K>4, Phos>2.5, iCal>1.1, Mag>2     HEME: Hx of DOC. Baseline H/H 12/40. Acute blood loss anemia, OR EBL 150mL. Acute coagulopathy with INR elevated to 2.3 2/2 to poor nutritional status. Received IV Vit K 10mg x1 with improved INR thereafter. Transfused 1u PRBC 1/17 and 1u PRBC on 1/18. LUE duplex obtained 1/20 for swelling, negative for DVT. Stable edema in LUE, negative DVT ultrasound 1/20  ultrasound. Hgb 7.4 1/25, transfused 1 RBC with Hgb increase to 10.7 thereafter. Today, CBC stable. Hb 9.1  - Check CBC daily and PRN  - Goal Hgb >8  - SCDs and SQH for DVT ppx  - Ongoing monitoring for s/s bleeding  - Maintain active T&S - will get today    ENDO: No history of DM or thyroid disease. BG have been wnl.   - BG q4h, SSI #1     MSK: Arthritis, Vit D deficiency. Sacral wound since admission. Currently 0.5 x 0.5 cm; significantly improved Stage I.   - Turn patient every 2 hrs, continue wound care  - Not on meds at home  - Monitor for symptoms     ID:  Worsened clinical condition 1/17, started on vanco, zosyn and azithromycin. Blood cultures, MRSA and Respiratory viral panel negative 1/17. Montiel changed 1/30, Urine cx 1/18 negative. Added fluc overnight 1/18-1/19. Sputum culture 1/19 negative. Vancomycin discontinued 1/20. Urine strep/legionella 1/20 negative. COVID+ on 1/20, initiated remdesivir and decadron. Off precautions. Liquid stools last week; C.diff negative 1/26. Zosyn completed 1/26. Currently afebrile, no leukocytosis.   - UA + pseudomonas- on Zosyn (7 days 2/3-2/9)  - Blood cultures - in progress  - Did not get sputum, no secretions  - Temp q4h, wbc daily  - Ongoing monitoring for s/s infection       Access:  - R axillary a line (1/28-)  - Left chest mediport (not currently accessed)  - PIV x3- 20g R forearm, 20g wrist IV likely infiltrated, remove and elevate, new 16 g PIV left upper arm    - Montiel (1/30-)  - Trach (1/30)  - PEG (1/30)     Dispo: Continue ICU care. Patient seen and discussed with ICU attending Dr. Ibrahim   -Spoke with social work today- aware family agrees to LTACH in [place]. Will start looking into places with family.    Code Status: Full Code     Family: son Yg and  Maximilian updated over the phone. Will further update at bedside if they visit today and prn.    SICU Phone [phone]    Maricruz Silva MD   Anesthesiology, CA-2

## 2025-02-05 NOTE — PROGRESS NOTES
"Faustina Vick \"Jannie\" is a 64 y.o. female who presents for Primary cancer of right upper lobe of lung (Multi) [C34.11].  She initially received chemotherapy and radiation, but appeared to have residual radiographic abnormalities.  PET/CT suggested progression of disease, and in this setting and Dr. Wagner felt that surgical resection in a \"salvage\" setting might facilitate long-term overall survival given the failure of nonsurgical management. She is now  23 days post op from PNEUMONECTOMY (Right) (en bloc with chest wall resection and reconstruction), mediastinal lymph node dissection for a  Primary cancer of right upper lobe of lung (Multi).      Subjective   Continues to be on vent via trach, pressors have been weaned since yesterday. Patient denies pain.     Objective     General: Resting in bed  HEENT: Trach in place, no surrounding drainage or bleeding  Resp: symmetric chest rise, FiO2 60%  RR 14 PEEP 5  CV: Sinus tachycardia  : yoo draining clear yellow urine  Abd: soft, ND, PEG in place with TF running at 30  Neuro: awake and alert, withdrawn    Last Recorded Vitals  Blood pressure 109/64, pulse 102, temperature 36.2 °C (97.2 °F), resp. rate 22, height 1.651 m (5' 5\"), weight 55 kg (121 lb 4.1 oz), SpO2 92%.  Intake/Output last 3 Shifts:  I/O last 3 completed shifts:  In: 1908.7 (34.7 mL/kg) [I.V.:1008.7 (18.3 mL/kg); NG/GT:800; IV Piggyback:100]  Out: 2530 (46 mL/kg) [Urine:2530 (1.3 mL/kg/hr)]  Weight: 55 kg     Relevant Results  Scheduled medications  acetylcysteine, 6 mL, nebulization, 4x daily  bisacodyl, 10 mg, rectal, Daily  budesonide, 0.5 mg, nebulization, BID  clonazePAM, 0.5 mg, g-tube, q8h  digoxin, 125 mcg, intravenous, Daily  docusate sodium, 100 mg, g-tube, BID  esomeprazole, 40 mg, g-tube, Daily before breakfast  heparin (porcine), 5,000 Units, subcutaneous, q8h  hydrocortisone sodium succinate, 50 mg, intravenous, q6h  insulin lispro, 0-5 Units, subcutaneous, " q4h  ipratropium-albuteroL, 3 mL, nebulization, q6h  lidocaine (cardiac), , ,   lidocaine, 1 patch, transdermal, Daily  [Held by provider] metoprolol tartrate, 12.5 mg, g-tube, BID  midodrine, 15 mg, g-tube, q6h  oxygen, , inhalation, Continuous - Inhalation  pancrelipase (Lip-Prot-Amyl) 2 tablet, sodium bicarbonate 650 mg, , g-tube, Once  perflutren protein A microsphere, 0.5 mL, intravenous, Once in imaging  piperacillin-tazobactam, 4.5 g, intravenous, q6h  polyethylene glycol, 17 g, g-tube, Daily  senna, 5 mL, g-tube, Nightly  [Held by provider] silodosin, 4 mg, oral, q24h  sulfur hexafluoride microsphr, 2 mL, intravenous, Once in imaging  thiamine, 100 mg, g-tube, Daily  traZODone, 25 mg, g-tube, Nightly      Continuous medications  dexmedeTOMIDine, 0-1.5 mcg/kg/hr, Last Rate: 0.3 mcg/kg/hr (02/04/25 2200)      PRN medications  PRN medications: albuterol, calcium gluconate, calcium gluconate, dextrose, dextrose, glucagon, glucagon, HYDROmorphone, lidocaine (cardiac), magnesium sulfate, magnesium sulfate, oxyCODONE, oxygen, oxygen, oxygen, potassium chloride    Results for orders placed or performed during the hospital encounter of 01/13/25 (from the past 24 hours)   Type and screen   Result Value Ref Range    ABO TYPE B     Rh TYPE POS     ANTIBODY SCREEN NEG    POCT GLUCOSE   Result Value Ref Range    POCT Glucose 118 (H) 74 - 99 mg/dL   Blood Culture    Specimen: Peripheral Venipuncture; Blood culture   Result Value Ref Range    Blood Culture Loaded on Instrument - Culture in progress    Blood Culture    Specimen: Peripheral Venipuncture; Blood culture   Result Value Ref Range    Blood Culture Loaded on Instrument - Culture in progress    POCT GLUCOSE   Result Value Ref Range    POCT Glucose 128 (H) 74 - 99 mg/dL   POCT GLUCOSE   Result Value Ref Range    POCT Glucose 141 (H) 74 - 99 mg/dL   Calcium, Ionized   Result Value Ref Range    POCT Calcium, Ionized 1.19 1.1 - 1.33 mmol/L   CBC   Result Value Ref Range     WBC 6.3 4.4 - 11.3 x10*3/uL    nRBC 0.0 0.0 - 0.0 /100 WBCs    RBC 3.29 (L) 4.00 - 5.20 x10*6/uL    Hemoglobin 9.1 (L) 12.0 - 16.0 g/dL    Hematocrit 28.7 (L) 36.0 - 46.0 %    MCV 87 80 - 100 fL    MCH 27.7 26.0 - 34.0 pg    MCHC 31.7 (L) 32.0 - 36.0 g/dL    RDW 18.8 (H) 11.5 - 14.5 %    Platelets 163 150 - 450 x10*3/uL   Coagulation Screen   Result Value Ref Range    Protime 13.3 (H) 9.8 - 12.8 seconds    INR 1.2 (H) 0.9 - 1.1    aPTT 34 27 - 38 seconds   Magnesium   Result Value Ref Range    Magnesium 2.11 1.60 - 2.40 mg/dL   Renal function panel   Result Value Ref Range    Glucose 135 (H) 74 - 99 mg/dL    Sodium 145 136 - 145 mmol/L    Potassium 3.4 (L) 3.5 - 5.3 mmol/L    Chloride 102 98 - 107 mmol/L    Bicarbonate 34 (H) 21 - 32 mmol/L    Anion Gap 12 10 - 20 mmol/L    Urea Nitrogen 17 6 - 23 mg/dL    Creatinine <0.20 (L) 0.50 - 1.05 mg/dL    eGFR      Calcium 9.0 8.6 - 10.6 mg/dL    Phosphorus 3.4 2.5 - 4.9 mg/dL    Albumin 3.4 3.4 - 5.0 g/dL   Blood Gas Arterial Full Panel   Result Value Ref Range    POCT pH, Arterial 7.41 7.38 - 7.42 pH    POCT pCO2, Arterial 54 (H) 38 - 42 mm Hg    POCT pO2, Arterial 71 (L) 85 - 95 mm Hg    POCT SO2, Arterial 96 94 - 100 %    POCT Oxy Hemoglobin, Arterial 92.5 (L) 94.0 - 98.0 %    POCT Hematocrit Calculated, Arterial 39.0 36.0 - 46.0 %    POCT Sodium, Arterial 138 136 - 145 mmol/L    POCT Potassium, Arterial 3.3 (L) 3.5 - 5.3 mmol/L    POCT Chloride, Arterial 104 98 - 107 mmol/L    POCT Ionized Calcium, Arterial 1.26 1.10 - 1.33 mmol/L    POCT Glucose, Arterial 134 (H) 74 - 99 mg/dL    POCT Lactate, Arterial 0.5 0.4 - 2.0 mmol/L    POCT Base Excess, Arterial 7.9 (H) -2.0 - 3.0 mmol/L    POCT HCO3 Calculated, Arterial 34.2 (H) 22.0 - 26.0 mmol/L    POCT Hemoglobin, Arterial 13.0 12.0 - 16.0 g/dL    POCT Anion Gap, Arterial 3 (L) 10 - 25 mmo/L    Patient Temperature 37.0 degrees Celsius    FiO2 40 %   POCT GLUCOSE   Result Value Ref Range    POCT Glucose 123 (H) 74 - 99  mg/dL   POCT GLUCOSE   Result Value Ref Range    POCT Glucose 110 (H) 74 - 99 mg/dL     CXR 2/2:    IMPRESSION:  1. No significant change in patchy left lung airspace opacities,  which may represent edema/infiltrates.  2. Stable postsurgical changes related to right-sided pneumonectomy.       Assessment/Plan   Assessment & Plan  Primary cancer of right upper lobe of lung (Multi)    Chronic obstructive pulmonary disease (Multi)    Malignant neoplasm of upper lobe of right lung (Multi)    Cardiogenic shock (Multi)    Respiratory distress    Faustina Vick (Jannie) is a 64F with Hx of Stage III lung cancer, asthma, anxiety, and arthritis who is now s/p R pneumonectomy with Dr. Wagner 1/13/25. 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 Cardiology was consulted and recommended Lasix gtt and milrinone. Lasix gtt held 1/17, hyponatremia improving. Patient underwent RHC and SGC placement with cardiology 1/17. She was reintubated on 1/18 d/t worsening respiratory status and AMS. Also had increased pressor requirements, now weaned off. Repeat echo stable on 1/18. COVID positive on 1/20.     Failed extubation trials, now s/p trach/PEG with Dr. Wagner 1/30.     - Continue & appreciate supportive ICU care  - Continue Tfs at goal, appreciate nutrition recs  - Pulmonary toilet  - PT/OT for rehabilitation, patient will likely need LTACH placement  - Agree with blood and sputum cultures given new pressor and O2 requirments  - Continue Abx for UTI, recommend removal of Montiel if possible    Patient discussed with attending Dr. Wagner.    Sarah Miranda MD  Thoracic Surgery [id number]

## 2025-02-05 NOTE — PROGRESS NOTES
"Physical Therapy    Physical Therapy Treatment    Patient Name: Faustina Vick \"Jannie\"  MRN: [id number]  [facility]  Room: 12/12-A  Today's Date: 2/5/2025  Time Calculation  Start Time: 1215  Stop Time: 1255  Time Calculation (min): 40 min    Assessment/Plan   PT Assessment  PT Assessment Results: Decreased strength, Decreased range of motion, Impaired balance, Decreased endurance, Decreased mobility, Decreased safety awareness  Rehab Prognosis: Good  Barriers to Discharge Home: Physical needs, Caregiver assistance  Caregiver Assistance: Caregiver assistance needed per identified barriers - however, level of patient's required assistance exceeds assistance available at home  Physical Needs: Stair navigation into home limited by function/safety, High falls risk due to function or environment, 24hr ADL assistance needed, 24hr mobility assistance needed, Ambulating household distances limited by function/safety  Evaluation/Treatment Tolerance: Patient limited by fatigue, Patient limited by pain  Medical Staff Made Aware: Yes  Strengths: Ability to acquire knowledge  Barriers to Participation: Housing layout, Comorbidities  End of Session Communication: Bedside nurse  Assessment Comment: Pt is currently limited by fatigue and activity tolerance. Pt reports no pain this date and was able to mobilize to chair with stand pivot t/f maxA x2. Demos decreased BLE strength, ROM, ability to mobilize in bed, activity tolerance, and cardiovascular endurance. Skilled PT Indicated to progress tolerance, safety, and independence of mobiilty.  PT Plan  Inpatient/Swing Bed or Outpatient: Inpatient  PT Plan  Treatment/Interventions: Bed mobility, Transfer training, Gait training, Stair training, Balance training, Strengthening, Endurance training, Range of motion, Therapeutic exercise, Therapeutic activity, Home exercise program  PT Plan: Ongoing PT  PT Frequency: 4 times per week  PT Discharge Recommendations: Moderate " intensity level of continued care  Equipment Recommended upon Discharge: Wheeled walker  PT Recommended Transfer Status: Total assist  PT - OK to Discharge: Yes      General Visit Information:   PT  Visit  PT Received On: 02/05/25  General  Family/Caregiver Present: No  Prior to Session Communication: Bedside nurse  Patient Position Received: Bed, 3 rail up, Alarm off, not on at start of session  General Comment: Pt supine in bed on arrival, and agreeable to participate. CPAP 40% fio2, 5.0 PEEP, 8.0 PSP    Subjective   Precautions:  Precautions  Medical Precautions: Fall precautions, Cardiac precautions, Oxygen therapy device and L/min  Precautions Comment: MAP >65       02/05/25 1215 02/05/25 1255   Vital Signs   Vitals Session Pre PT Post PT   Heart Rate 99 105   Resp 21 26   SpO2 94 % (!) 89 %   /67 114/81   MAP (mmHg) 79 90     Objective   Pain:     Cognition:  Cognition  Overall Cognitive Status: Impaired  Orientation Level: Oriented X4  Following Commands: Follows one step commands with increased time  Cognition Comments: lethargic  Processing Speed: Delayed  Coordination:  Movements are Fluid and Coordinated: Yes  Postural Control:  Static Sitting Balance  Static Sitting-Balance Support: Bilateral upper extremity supported, Feet supported  Static Sitting-Level of Assistance: Moderate assistance  Static Sitting-Comment/Number of Minutes: ~15 min EOB cueing pt to weight shift laterally to correct trunk position to midline.  Static Standing Balance  Static Standing-Balance Support: Bilateral upper extremity supported  Static Standing-Level of Assistance: Maximum assistance (x2)  Extremity/Trunk Assessments:  Activity Tolerance:  Activity Tolerance  Endurance: Tolerates 10 - 20 min exercise with multiple rests  Early Mobility/Exercise Safety Screen: Proceed with mobilization - No exclusion criteria met  Treatments:  Therapeutic Activity  Therapeutic Activity Performed: Yes  Therapeutic Activity 1: Pt  tolerated ~30 second stand trial with max A x2 for pericare. Flexed posture.  Therapeutic Activity 2: ~15 min EOB cueing pt to weight shift laterally to correct trunk position to midline. Mobilized pt supine<>sitting EOB maxA x2 performing functional tasks in supine with BLE.    Outcome Measures:  [facility] Basic Mobility  Turning from your back to your side while in a flat bed without using bedrails: A lot  Moving from lying on your back to sitting on the side of a flat bed without using bedrails: Total  Moving to and from bed to chair (including a wheelchair): Total  Standing up from a chair using your arms (e.g. wheelchair or bedside chair): Total  To walk in hospital room: Total  Climbing 3-5 steps with railing: Total  Basic Mobility - Total Score: 7    FSS-ICU  Ambulation: Unable to attempt due to weakness  Rolling: Maximal assistance (performs 25% - 49% of task)  Sitting: Moderate assistance (performs 50 - 74% of task)  Transfer Sit-to-Stand: Total assistance (performs 25% or requires another person)  Transfer Supine-to-Sit: Total assistance (performs 25% or requires another person)  Total Score: 7      Early Mobility/Exercise Safety Screen: Proceed with mobilization - No exclusion criteria met  ICU Mobility Scale: Standing [4]    Education Documentation  Body Mechanics, taught by MONTSERRAT Cheung at 2/5/2025  2:41 PM.  Learner: Patient  Readiness: Acceptance  Method: Explanation  Response: Verbalizes Understanding  Comment: Edu pt on correct body mechanics while transferring sit<>stand.    Home Exercise Program, taught by MONTSERRAT Cheung at 2/5/2025  2:41 PM.  Learner: Patient  Readiness: Acceptance  Method: Explanation  Response: Verbalizes Understanding  Comment: Edu pt on correct body mechanics while transferring sit<>stand.    Mobility Training, taught by MONTSERRAT Cheung at 2/5/2025  2:41 PM.  Learner: Patient  Readiness: Acceptance  Method: Explanation  Response: Verbalizes Understanding  Comment:  Edu pt on correct body mechanics while transferring sit<>stand.    Education Comments  No comments found.      OP EDUCATION:  Encounter Problems       Encounter Problems (Active)       Balance       Pt will demo static sitting balance w/o UE support IND to decrease risk of falls in home environment (Not met)       Start:  01/15/25    Expected End:  02/05/25    Resolved:  01/22/25    Updated to: Pt will maintain standing balance with Min A using LRAD    Update reason: re-eval         Pt will maintain standing balance with Min A using LRAD (Progressing)       Start:  01/22/25    Expected End:  02/12/25                   Mobility       Pt will mobilize supine to sitting MI to promote functional independence (Not met)       Start:  01/15/25    Expected End:  02/05/25    Resolved:  01/22/25    Updated to: Pt will perform bed mobility with Min A    Update reason: re-eval         Pt will perform bed mobility with Min A (Progressing)       Start:  01/22/25    Expected End:  02/12/25                   Mobility       STG - Patient will ambulate x50 ft with Min A using LRAD (Progressing)       Start:  01/22/25    Expected End:  02/12/25               PT Transfers       Pt will transfer sit to stand w/ LRAD MI to improve functional mobility.  (Not met)       Start:  01/15/25    Expected End:  02/05/25    Resolved:  01/22/25    Updated to: Pt will perform functional transfers with Min A using LRAD    Update reason: Re-eval         Pt will perform functional transfers with Min A using LRAD (Progressing)       Start:  01/22/25    Expected End:  02/12/25                   Pain - Adult            Alexx Hayes, JANE  Student physical therapist working under the direct supervision of licensed therapist

## 2025-02-05 NOTE — PROGRESS NOTES
"Music Therapy Note    Faustina \"Jannie\" Nicanor     Therapy Session  Referral Type: New referral this admission  Visit Type: New visit  Session Start Time: 1442  Session End Time: 1442  Intervention Delivery: In-person  Conflict of Service: Asleep               Treatment/Interventions       Post-assessment  Total Session Time (min): 0 minutes    Narrative  Assessment Detail: Patient somnolent at time of MT's attempt. MT did not attempt to wake pt.  Follow-up: MT to reattempt at another time as applicable.    Education Documentation  No documentation found.          "

## 2025-02-05 NOTE — PROGRESS NOTES
"Speech-Language Pathology                 Therapy Communication Note    Patient Name: Faustina Vick \"Jannie\"  MRN: [id number]  Department: [facility]  Room: 12/12-A  Today's Date: 2/5/2025     Discipline: Speech Language Pathology    Missed Time: Attempt    Comment: External speaking valve placement attempted. Pt remains on ventilator support with tenuous status; not appropriate for speaking valve placement. Discussed with RT. Medical team aware. Will continue to follow and re-attempt as pt medically appropriate.            "

## 2025-02-06 ENCOUNTER — APPOINTMENT (OUTPATIENT)
Dept: RADIOLOGY | Facility: HOSPITAL | Age: 65
End: 2025-02-06
Payer: COMMERCIAL

## 2025-02-06 ENCOUNTER — TELEPHONE (OUTPATIENT)
Dept: CASE MANAGEMENT | Age: 65
End: 2025-02-06

## 2025-02-06 LAB
ALBUMIN SERPL BCP-MCNC: 3.3 G/DL (ref 3.4–5)
ANION GAP BLDA CALCULATED.4IONS-SCNC: 4 MMO/L (ref 10–25)
ANION GAP SERPL CALC-SCNC: 11 MMOL/L (ref 10–20)
APTT PPP: 44 SECONDS (ref 27–38)
BASE EXCESS BLDA CALC-SCNC: 9.2 MMOL/L (ref -2–3)
BODY TEMPERATURE: 37 DEGREES CELSIUS
BUN SERPL-MCNC: 17 MG/DL (ref 6–23)
CA-I BLD-SCNC: 1.22 MMOL/L (ref 1.1–1.33)
CA-I BLDA-SCNC: 1.27 MMOL/L (ref 1.1–1.33)
CALCIUM SERPL-MCNC: 8.9 MG/DL (ref 8.6–10.6)
CHLORIDE BLDA-SCNC: 108 MMOL/L (ref 98–107)
CHLORIDE SERPL-SCNC: 107 MMOL/L (ref 98–107)
CO2 SERPL-SCNC: 31 MMOL/L (ref 21–32)
CREAT SERPL-MCNC: 0.23 MG/DL (ref 0.5–1.05)
DIGOXIN SERPL-MCNC: 0.51 NG/ML (ref 0.8–?)
EGFRCR SERPLBLD CKD-EPI 2021: >90 ML/MIN/1.73M*2
ERYTHROCYTE [DISTWIDTH] IN BLOOD BY AUTOMATED COUNT: 19 % (ref 11.5–14.5)
GLUCOSE BLD MANUAL STRIP-MCNC: 112 MG/DL (ref 74–99)
GLUCOSE BLD MANUAL STRIP-MCNC: 115 MG/DL (ref 74–99)
GLUCOSE BLD MANUAL STRIP-MCNC: 120 MG/DL (ref 74–99)
GLUCOSE BLD MANUAL STRIP-MCNC: 133 MG/DL (ref 74–99)
GLUCOSE BLD MANUAL STRIP-MCNC: 135 MG/DL (ref 74–99)
GLUCOSE BLD MANUAL STRIP-MCNC: 138 MG/DL (ref 74–99)
GLUCOSE BLD MANUAL STRIP-MCNC: 140 MG/DL (ref 74–99)
GLUCOSE BLDA-MCNC: 147 MG/DL (ref 74–99)
GLUCOSE SERPL-MCNC: 135 MG/DL (ref 74–99)
HCO3 BLDA-SCNC: 33.5 MMOL/L (ref 22–26)
HCT VFR BLD AUTO: 27.9 % (ref 36–46)
HCT VFR BLD EST: 28 % (ref 36–46)
HGB BLD-MCNC: 8.9 G/DL (ref 12–16)
HGB BLDA-MCNC: 9.2 G/DL (ref 12–16)
INHALED O2 CONCENTRATION: 40 %
INR PPP: 1.2 (ref 0.9–1.1)
LACTATE BLDA-SCNC: 0.7 MMOL/L (ref 0.4–2)
MAGNESIUM SERPL-MCNC: 1.94 MG/DL (ref 1.6–2.4)
MCH RBC QN AUTO: 27.7 PG (ref 26–34)
MCHC RBC AUTO-ENTMCNC: 31.9 G/DL (ref 32–36)
MCV RBC AUTO: 87 FL (ref 80–100)
NRBC BLD-RTO: 0 /100 WBCS (ref 0–0)
OXYHGB MFR BLDA: 93.4 % (ref 94–98)
PCO2 BLDA: 44 MM HG (ref 38–42)
PH BLDA: 7.49 PH (ref 7.38–7.42)
PHOSPHATE SERPL-MCNC: 2.6 MG/DL (ref 2.5–4.9)
PLATELET # BLD AUTO: 170 X10*3/UL (ref 150–450)
PO2 BLDA: 66 MM HG (ref 85–95)
POTASSIUM BLDA-SCNC: 4.2 MMOL/L (ref 3.5–5.3)
POTASSIUM SERPL-SCNC: 4.1 MMOL/L (ref 3.5–5.3)
PROTHROMBIN TIME: 13.8 SECONDS (ref 9.8–12.8)
RBC # BLD AUTO: 3.21 X10*6/UL (ref 4–5.2)
SAO2 % BLDA: 96 % (ref 94–100)
SODIUM BLDA-SCNC: 141 MMOL/L (ref 136–145)
SODIUM SERPL-SCNC: 145 MMOL/L (ref 136–145)
UFH PPP CHRO-ACNC: 0.1 IU/ML
UFH PPP CHRO-ACNC: 0.2 IU/ML
UFH PPP CHRO-ACNC: 0.3 IU/ML
UFH PPP CHRO-ACNC: 0.3 IU/ML
WBC # BLD AUTO: 6.7 X10*3/UL (ref 4.4–11.3)

## 2025-02-06 PROCEDURE — 2500000002 HC RX 250 W HCPCS SELF ADMINISTERED DRUGS (ALT 637 FOR MEDICARE OP, ALT 636 FOR OP/ED)

## 2025-02-06 PROCEDURE — 2500000004 HC RX 250 GENERAL PHARMACY W/ HCPCS (ALT 636 FOR OP/ED): Performed by: NURSE PRACTITIONER

## 2025-02-06 PROCEDURE — 2020000001 HC ICU ROOM DAILY

## 2025-02-06 PROCEDURE — 97530 THERAPEUTIC ACTIVITIES: CPT | Mod: GP | Performed by: STUDENT IN AN ORGANIZED HEALTH CARE EDUCATION/TRAINING PROGRAM

## 2025-02-06 PROCEDURE — 85520 HEPARIN ASSAY: CPT

## 2025-02-06 PROCEDURE — 71045 X-RAY EXAM CHEST 1 VIEW: CPT

## 2025-02-06 PROCEDURE — 82947 ASSAY GLUCOSE BLOOD QUANT: CPT

## 2025-02-06 PROCEDURE — 94003 VENT MGMT INPAT SUBQ DAY: CPT

## 2025-02-06 PROCEDURE — 2500000001 HC RX 250 WO HCPCS SELF ADMINISTERED DRUGS (ALT 637 FOR MEDICARE OP)

## 2025-02-06 PROCEDURE — 99291 CRITICAL CARE FIRST HOUR: CPT | Performed by: ANESTHESIOLOGY

## 2025-02-06 PROCEDURE — 84132 ASSAY OF SERUM POTASSIUM: CPT

## 2025-02-06 PROCEDURE — 2500000001 HC RX 250 WO HCPCS SELF ADMINISTERED DRUGS (ALT 637 FOR MEDICARE OP): Performed by: STUDENT IN AN ORGANIZED HEALTH CARE EDUCATION/TRAINING PROGRAM

## 2025-02-06 PROCEDURE — 94640 AIRWAY INHALATION TREATMENT: CPT

## 2025-02-06 PROCEDURE — 2500000005 HC RX 250 GENERAL PHARMACY W/O HCPCS

## 2025-02-06 PROCEDURE — 37799 UNLISTED PX VASCULAR SURGERY: CPT

## 2025-02-06 PROCEDURE — 82330 ASSAY OF CALCIUM: CPT

## 2025-02-06 PROCEDURE — 85027 COMPLETE CBC AUTOMATED: CPT

## 2025-02-06 PROCEDURE — 83735 ASSAY OF MAGNESIUM: CPT

## 2025-02-06 PROCEDURE — 80162 ASSAY OF DIGOXIN TOTAL: CPT

## 2025-02-06 PROCEDURE — 99291 CRITICAL CARE FIRST HOUR: CPT | Performed by: NURSE PRACTITIONER

## 2025-02-06 PROCEDURE — 2500000001 HC RX 250 WO HCPCS SELF ADMINISTERED DRUGS (ALT 637 FOR MEDICARE OP): Performed by: PHYSICIAN ASSISTANT

## 2025-02-06 PROCEDURE — 85610 PROTHROMBIN TIME: CPT

## 2025-02-06 PROCEDURE — 97530 THERAPEUTIC ACTIVITIES: CPT | Mod: GO

## 2025-02-06 PROCEDURE — 2500000004 HC RX 250 GENERAL PHARMACY W/ HCPCS (ALT 636 FOR OP/ED): Performed by: PHYSICIAN ASSISTANT

## 2025-02-06 PROCEDURE — 2500000004 HC RX 250 GENERAL PHARMACY W/ HCPCS (ALT 636 FOR OP/ED)

## 2025-02-06 RX ADMIN — CLONAZEPAM 0.5 MG: 0.5 TABLET ORAL at 09:43

## 2025-02-06 RX ADMIN — HYDROCORTISONE SODIUM SUCCINATE 50 MG: 100 INJECTION, POWDER, FOR SOLUTION INTRAMUSCULAR; INTRAVENOUS at 06:20

## 2025-02-06 RX ADMIN — MIDODRINE HYDROCHLORIDE 15 MG: 10 TABLET ORAL at 00:57

## 2025-02-06 RX ADMIN — DIGOXIN 125 MCG: 0.25 INJECTION INTRAMUSCULAR; INTRAVENOUS at 09:44

## 2025-02-06 RX ADMIN — BUDESONIDE 0.5 MG: 0.5 INHALANT RESPIRATORY (INHALATION) at 19:48

## 2025-02-06 RX ADMIN — Medication 40 PERCENT: at 08:00

## 2025-02-06 RX ADMIN — IPRATROPIUM BROMIDE AND ALBUTEROL SULFATE 3 ML: .5; 3 SOLUTION RESPIRATORY (INHALATION) at 14:32

## 2025-02-06 RX ADMIN — BUDESONIDE 0.5 MG: 0.5 INHALANT RESPIRATORY (INHALATION) at 08:17

## 2025-02-06 RX ADMIN — PIPERACILLIN SODIUM AND TAZOBACTAM SODIUM 4.5 G: 4; .5 INJECTION, SOLUTION INTRAVENOUS at 09:44

## 2025-02-06 RX ADMIN — IPRATROPIUM BROMIDE AND ALBUTEROL SULFATE 3 ML: .5; 3 SOLUTION RESPIRATORY (INHALATION) at 01:15

## 2025-02-06 RX ADMIN — DEXMEDETOMIDINE HYDROCHLORIDE 0.24 MCG/KG/HR: 400 INJECTION INTRAVENOUS at 16:13

## 2025-02-06 RX ADMIN — HYDROCORTISONE SODIUM SUCCINATE 50 MG: 100 INJECTION, POWDER, FOR SOLUTION INTRAMUSCULAR; INTRAVENOUS at 23:30

## 2025-02-06 RX ADMIN — HEPARIN SODIUM 1000 UNITS/HR: 10000 INJECTION, SOLUTION INTRAVENOUS at 21:52

## 2025-02-06 RX ADMIN — ESOMEPRAZOLE MAGNESIUM 40 MG: 40 FOR SUSPENSION ORAL at 09:43

## 2025-02-06 RX ADMIN — HYDROCORTISONE SODIUM SUCCINATE 50 MG: 100 INJECTION, POWDER, FOR SOLUTION INTRAMUSCULAR; INTRAVENOUS at 00:58

## 2025-02-06 RX ADMIN — PIPERACILLIN SODIUM AND TAZOBACTAM SODIUM 4.5 G: 4; .5 INJECTION, SOLUTION INTRAVENOUS at 16:14

## 2025-02-06 RX ADMIN — CLONAZEPAM 0.5 MG: 0.5 TABLET ORAL at 16:13

## 2025-02-06 RX ADMIN — PIPERACILLIN SODIUM AND TAZOBACTAM SODIUM 4.5 G: 4; .5 INJECTION, SOLUTION INTRAVENOUS at 04:33

## 2025-02-06 RX ADMIN — TRAZODONE HYDROCHLORIDE 25 MG: 50 TABLET ORAL at 20:14

## 2025-02-06 RX ADMIN — THIAMINE HCL TAB 100 MG 100 MG: 100 TAB at 09:44

## 2025-02-06 RX ADMIN — IPRATROPIUM BROMIDE AND ALBUTEROL SULFATE 3 ML: .5; 3 SOLUTION RESPIRATORY (INHALATION) at 08:17

## 2025-02-06 RX ADMIN — HYDROCORTISONE SODIUM SUCCINATE 50 MG: 100 INJECTION, POWDER, FOR SOLUTION INTRAMUSCULAR; INTRAVENOUS at 13:14

## 2025-02-06 RX ADMIN — MIDODRINE HYDROCHLORIDE 15 MG: 10 TABLET ORAL at 18:56

## 2025-02-06 RX ADMIN — MIDODRINE HYDROCHLORIDE 15 MG: 10 TABLET ORAL at 23:30

## 2025-02-06 RX ADMIN — CLONAZEPAM 0.5 MG: 0.5 TABLET ORAL at 23:30

## 2025-02-06 RX ADMIN — PIPERACILLIN SODIUM AND TAZOBACTAM SODIUM 4.5 G: 4; .5 INJECTION, SOLUTION INTRAVENOUS at 21:36

## 2025-02-06 RX ADMIN — MIDODRINE HYDROCHLORIDE 15 MG: 10 TABLET ORAL at 06:20

## 2025-02-06 RX ADMIN — CLONAZEPAM 0.5 MG: 0.5 TABLET ORAL at 00:57

## 2025-02-06 RX ADMIN — IPRATROPIUM BROMIDE AND ALBUTEROL SULFATE 3 ML: .5; 3 SOLUTION RESPIRATORY (INHALATION) at 19:47

## 2025-02-06 RX ADMIN — MIDODRINE HYDROCHLORIDE 15 MG: 10 TABLET ORAL at 13:14

## 2025-02-06 RX ADMIN — HYDROCORTISONE SODIUM SUCCINATE 50 MG: 100 INJECTION, POWDER, FOR SOLUTION INTRAMUSCULAR; INTRAVENOUS at 18:56

## 2025-02-06 ASSESSMENT — COGNITIVE AND FUNCTIONAL STATUS - GENERAL
MOVING FROM LYING ON BACK TO SITTING ON SIDE OF FLAT BED WITH BEDRAILS: A LOT
TOILETING: TOTAL
CLIMB 3 TO 5 STEPS WITH RAILING: TOTAL
DRESSING REGULAR UPPER BODY CLOTHING: A LOT
HELP NEEDED FOR BATHING: A LOT
DAILY ACTIVITIY SCORE: 9
STANDING UP FROM CHAIR USING ARMS: TOTAL
WALKING IN HOSPITAL ROOM: TOTAL
MOBILITY SCORE: 9
EATING MEALS: TOTAL
TURNING FROM BACK TO SIDE WHILE IN FLAT BAD: A LOT
MOVING TO AND FROM BED TO CHAIR: A LOT
PERSONAL GROOMING: A LOT
DRESSING REGULAR LOWER BODY CLOTHING: TOTAL

## 2025-02-06 ASSESSMENT — PAIN SCALES - GENERAL
PAINLEVEL_OUTOF10: 0 - NO PAIN
PAINLEVEL_OUTOF10: 0 - NO PAIN

## 2025-02-06 NOTE — CARE PLAN
Problem: Pain - Adult  Goal: Verbalizes/displays adequate comfort level or baseline comfort level  Outcome: Progressing     Problem: Safety - Adult  Goal: Free from fall injury  Outcome: Progressing     Problem: Discharge Planning  Goal: Discharge to home or other facility with appropriate resources  Outcome: Progressing     Problem: Chronic Conditions and Co-morbidities  Goal: Patient's chronic conditions and co-morbidity symptoms are monitored and maintained or improved  Outcome: Progressing     Problem: Skin  Goal: Decreased wound size/increased tissue granulation at next dressing change  Outcome: Progressing  Flowsheets (Taken 2/6/2025 1219)  Decreased wound size/increased tissue granulation at next dressing change:   Promote sleep for wound healing   Protective dressings over bony prominences  Goal: Participates in plan/prevention/treatment measures  Outcome: Progressing  Flowsheets (Taken 2/6/2025 1219)  Participates in plan/prevention/treatment measures:   Discuss with provider PT/OT consult   Increase activity/out of bed for meals   Elevate heels  Goal: Prevent/manage excess moisture  Outcome: Progressing  Flowsheets (Taken 2/6/2025 1219)  Prevent/manage excess moisture:   Cleanse incontinence/protect with barrier cream   Follow provider orders for dressing changes   Monitor for/manage infection if present   Moisturize dry skin  Goal: Prevent/minimize sheer/friction injuries  Outcome: Progressing  Flowsheets (Taken 2/6/2025 1219)  Prevent/minimize sheer/friction injuries:   Complete micro-shifts as needed if patient unable. Adjust patient position to relieve pressure points, not a full turn   HOB 30 degrees or less   Increase activity/out of bed for meals   Turn/reposition every 2 hours/use positioning/transfer devices   Use pull sheet  Goal: Promote/optimize nutrition  Outcome: Progressing  Flowsheets (Taken 2/6/2025 1219)  Promote/optimize nutrition: Monitor/record intake including meals  Goal: Promote  skin healing  Outcome: Progressing  Flowsheets (Taken 2/6/2025 1219)  Promote skin healing:   Assess skin/pad under line(s)/device(s)   Ensure correct size (line/device) and apply per  instructions   Protective dressings over bony prominences   Rotate device position/do not position patient on device   Turn/reposition every 2 hours/use positioning/transfer devices     Problem: Pain  Goal: Takes deep breaths with improved pain control throughout the shift  Outcome: Progressing  Goal: Turns in bed with improved pain control throughout the shift  Outcome: Progressing  Goal: Walks with improved pain control throughout the shift  Outcome: Progressing  Goal: Performs ADL's with improved pain control throughout shift  Outcome: Progressing  Goal: Participates in PT with improved pain control throughout the shift  Outcome: Progressing  Goal: Free from opioid side effects throughout the shift  Outcome: Progressing  Goal: Free from acute confusion related to pain meds throughout the shift  Outcome: Progressing     Problem: Knowledge Deficit  Goal: Patient/family/caregiver demonstrates understanding of disease process, treatment plan, medications, and discharge instructions  Outcome: Progressing     Problem: Mechanical Ventilation  Goal: Patient Will Maintain Patent Airway  Outcome: Progressing  Goal: Oral health is maintained or improved  Outcome: Progressing  Goal: Tracheostomy will be managed safely  Outcome: Progressing  Goal: ET tube will be managed safely  Outcome: Progressing  Goal: Ability to express needs and understand communication  Outcome: Progressing  Goal: Mobility/activity is maintained at optimum level for patient  Outcome: Progressing     Problem: Fall/Injury  Goal: Not fall by end of shift  Outcome: Progressing  Goal: Be free from injury by end of the shift  Outcome: Progressing  Goal: Verbalize understanding of personal risk factors for fall in the hospital  Outcome: Progressing  Goal: Verbalize  understanding of risk factor reduction measures to prevent injury from fall in the home  Outcome: Progressing  Goal: Use assistive devices by end of the shift  Outcome: Progressing  Goal: Pace activities to prevent fatigue by end of the shift  Outcome: Progressing   The patient's goals for the shift include      The clinical goals for the shift include Patient will remain HDS throughout shift.

## 2025-02-06 NOTE — PROGRESS NOTES
"Faustina Vick \"Noam" is a 64 y.o. female on day 24 of admission presenting with Primary cancer of right upper lobe of lung (Multi).    Subjective   CAM + this am  No pressor requirement overnight    Objective   Physical Exam  Vitals reviewed.   Constitutional:       General: She is awake.      Appearance: She is underweight.      Comments: Precedex on at 0.25mcg/kg/hr   HENT:      Head: Normocephalic and atraumatic.      Nose: Nose normal.      Mouth/Throat:      Mouth: Mucous membranes are moist.   Eyes:      Extraocular Movements: Extraocular movements intact.      Pupils: Pupils are equal, round, and reactive to light.   Neck:      Comments: 7.5 cuffed Shiley Trach midline, no drainage  Cardiovascular:      Rate and Rhythm: Regular rhythm. Tachycardia present.      Pulses: Normal pulses.      Heart sounds: Normal heart sounds.   Pulmonary:      Effort: Pulmonary effort is normal.      Breath sounds: Rhonchi: left side.      Comments: Mechanically ventilated via trach. Scattered rhonchi left chest field  Chest:      Comments: Right lateral chest dressing c/d/i.  Abdominal:      Palpations: Abdomen is soft.      Comments: PEG tube in place with TF infusing at 45ml/hr. Insertion site without erythema or drainage   Genitourinary:     Comments: Montiel in place, clear yellow urine  Musculoskeletal:         General: No swelling or tenderness. Normal range of motion.      Cervical back: Normal range of motion and neck supple.   Skin:     General: Skin is warm and dry.   Neurological:      General: No focal deficit present.      Mental Status: She is alert. She is confused.      GCS: GCS eye subscore is 4. GCS verbal subscore is 1. GCS motor subscore is 6.      Motor: Weakness: generalized.      Comments: Has white board and mouthes words to assist with communication   Psychiatric:         Mood and Affect: Mood normal.         Behavior: Behavior normal. Behavior is cooperative.       Last Recorded Vitals  Blood pressure " "143/80, pulse 107, temperature 36.8 °C (98.2 °F), temperature source Temporal, resp. rate (!) 27, height 1.651 m (5' 5\"), weight 55.2 kg (121 lb 11.1 oz), SpO2 94%.    VS over last 24h  Heart Rate:  []   Temp:  [36 °C (96.8 °F)-36.9 °C (98.4 °F)]   Resp:  [16-32]   BP: (100-143)/(64-86)   Weight:  [55.2 kg (121 lb 11.1 oz)]   SpO2:  [89 %-100 %]      Intake/Output last 3 Shifts:  I/O last 3 completed shifts:  In: 2586.7 (46.9 mL/kg) [I.V.:681.7 (12.3 mL/kg); NG/GT:1705; IV Piggyback:200]  Out: 2335 (42.3 mL/kg) [Urine:2335 (1.2 mL/kg/hr)]  Weight: 55.2 kg       Intake/Output Summary (Last 24 hours) at 2/6/2025 0756  Last data filed at 2/6/2025 0700  Gross per 24 hour   Intake 1776.65 ml   Output 1550 ml   Net 226.65 ml        Relevant Results  Scheduled medications  budesonide, 0.5 mg, nebulization, BID  clonazePAM, 0.5 mg, g-tube, q8h  digoxin, 125 mcg, intravenous, Daily  esomeprazole, 40 mg, g-tube, Daily before breakfast  hydrocortisone sodium succinate, 50 mg, intravenous, q6h  insulin lispro, 0-5 Units, subcutaneous, q4h  ipratropium-albuteroL, 3 mL, nebulization, q6h  midodrine, 15 mg, g-tube, q6h  oxygen, , inhalation, Continuous - Inhalation  pancrelipase (Lip-Prot-Amyl) 2 tablet, sodium bicarbonate 650 mg, , g-tube, Once  perflutren protein A microsphere, 0.5 mL, intravenous, Once in imaging  piperacillin-tazobactam, 4.5 g, intravenous, q6h  polyethylene glycol, 17 g, g-tube, Daily  [Held by provider] senna, 5 mL, g-tube, Nightly  [Held by provider] silodosin, 4 mg, oral, q24h  sulfur hexafluoride microsphr, 2 mL, intravenous, Once in imaging  thiamine, 100 mg, g-tube, Daily  traZODone, 25 mg, g-tube, Nightly      PRN medications  PRN medications: acetylcysteine, albuterol, calcium gluconate, calcium gluconate, dextrose, dextrose, glucagon, glucagon, HYDROmorphone, magnesium sulfate, magnesium sulfate, oxyCODONE, oxygen, oxygen, oxygen, potassium chloride        Results from last 7 days   Lab Units " 02/06/25  0205 02/05/25  0405 02/04/25  0310   WBC AUTO x10*3/uL 6.7 6.3 6.1   HEMOGLOBIN g/dL 8.9* 9.1* 9.3*   PLATELETS AUTO x10*3/uL 170 163 140*      Results from last 7 days   Lab Units 02/06/25  0205 02/05/25  0405 02/04/25  0310   SODIUM mmol/L 145 145 141   POTASSIUM mmol/L 4.1 3.4* 4.2   CHLORIDE mmol/L 107 102 100   CO2 mmol/L 31 34* 34*   BUN mg/dL 17 17 16   CREATININE mg/dL 0.23* <0.20* <0.20*   GLUCOSE mg/dL 135* 135* 124*   MAGNESIUM mg/dL 1.94 2.11 1.97   PHOSPHORUS mg/dL 2.6 3.4 3.7      Results from last 7 days   Lab Units 02/06/25  0205 02/05/25  1643 02/05/25  0405 02/04/25  0310   INR  1.2*  --  1.2* 1.2*   PROTIME seconds 13.8*  --  13.3* 14.0*   APTT seconds 44*   < > 34 40*    < > = values in this interval not displayed.              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 Wagner on 1/13 requiring post-op vasoactive therapy. Post-op course c/b acute hyponatremia, multifactorial delirium, cardiogenic shock requiring inotropic support, and acute hypoxic/hypercarbic respiratory failure requiring intubation. COVID+ 1/20. Weaned off milrinone 1/21. Weaned off iEpo 1/24. S/p trach/PEG 1/30 tolerating TF through PEG. TTE 2/3 with improved EF to 60-65%     Plan:  NEURO: History of anxiety on alprazolam 1mg qd. A&Ox4 at baseline. Physical deconditioning, tolerating increased activity with PT/OT. Situational depression/anxiety, on Precedex 0.25 this am. Also on scheduled clonazepam and nightly trazodone. Acute delirium.  - Qtc 502 and 513 on 2/3  - Continue Trazodone nightly  - Continue Clonazepam 0.5 mg q8h with hold parameters  - Continue Oxycodone and Dilaudid prn   - Ongoing neuro and pain assessments  - PT/OT -> continue to progress activity  - CAM assessments q shift  - continue  pet therapy  - PICS team consult  - delirium precautions, REST protocol     CV: No history of cardiac disease. Baseline /79, -110. TTE 9/2024 normal biV function. Intra-op and immediate post op hypotension requiring pressor support. TTE 1/13 with EF 60-65%, low normal RV systolic function. Acutely elevated lactate 1/15-1/16. Repeat TTE 1/16: acute biV dysfunction/takotsubo cardiomyopathy, EF 34%, mod reduced RV systolic function, mild to mod TR. Cardiogenic shock resolved. Weaned off milrinone 1/21, iEpo 1/24. Difficulty weaning off low dose vaso, started midodrine 1/25. Repeat TTE 2/3 with improved EF to 60-65%. Ongoing intermittent episodes of tachycardia to 150s. Suspected Aflutter/atrial tachycardia, added digoxin 2/3. Started low intensity heparin infusion 2/5. On/off phenyl and vaso for nighttime hypotension. Midodrine increased to 15mg q6h. Restarted stress dose steroids 2/4. No pressor support needed overnight 2/5-2/6  - Continue digoxin 125mcg daily, level 0.51 today  - EP following: tachycardia due to acute illness. Continue dig and low intensity heparin infusion. Consider tikosyn once patient stable in sinus rhythm, ziopathch at discharge. Further discuss risk/benefit of amiodarone with thoracic surgery  - Continue Midodrine 15 mg q6h  - Continue stress dose steroids q6h  - Continuous EKG/abp monitoring  - MAP goal >65  - Goal even to negative fluid balance     PULM: Hx of 30 pack year smoking history (quit 4/2024), COPD, and stage III right lung SCC s/p chemo/XRT/immunotherapy. 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 Arrived to SICU extubated on 10L SFM, weaned to room air 1/14. Chest tube removed 1/14. Acute on chronic hypoxic respiratory failure 1/16 in the setting of COVID. Intubated 1/18. S/p trach 1/30. Tolerating SBTs, currently on PS 8, peep 5, 40%  - Wean FiO2 to maintain SpO2 >92%  - Scheduled budesonide/duoneb  - continue  humidified vent  - PRN albuterol  - Daily CXR prn  - ABG prn  - Holding home benzonatate    GI: No GI history. Elevated LFT 1/16-1/21 resolved. Evidence of pericholecystic fluid and biliary sludge on renal US 1/15 but no abdominal symptoms. RUQ US without significant findings. S/P PEG 1/30. Tolerating TF at goal rate. Last Bms 2/5, liquidy.  - Continue thiamine 100mg IV daily  - Continue IV PPI for GI prophylaxis  - hold bowel reg for diarrhea  - consider sending C.diff if persistent diarrhea after holding bowel reg  - consider adding fiber if C.diff negative    : No history of renal disease. Baseline creatinine 0.6. Symptomatic acute hyponatremia resolved, was given 3% saline infusions. Yoo replaced 2/1 for urine retention. Trial of silodosin stopped given recurrent hypotension  - remove yoo for TOV today, bladder scan q6h thereafter x24h  - RFP daily and prn  - Maintain U/O >0.5ml/kg/hr  - Replete electrolytes to maintain K>4, Phos>2.5, iCal>1.1, Mag>2     HEME: Hx of DOC. Baseline H/H 12/40. Acute blood loss anemia. Acute coagulopathy resolved. H/H essentially stable. Started on low intensity heparin infusion 2/5  - Check CBC daily and PRN  - coags prn  - Ongoing monitoring for s/s bleeding  - Maintain active T&S (2/4)  - check assays and adjust heparin infusion per nomogram    ENDO: No history of DM or thyroid disease. Adequate glycemic control  - BG q4h, SSI #1     MSK: Arthritis, Vit D deficiency. Sacral wound since admission. Currently 0.5 x 0.5 cm; significantly improved Stage I.   - Turn patient every 2 hrs, continue wound care  - Not on meds at home  - Monitor for symptoms     ID:  Completed treatment of COVID with decadron and remdesivir. Off precautions since 1/30. Pseudomonas UTI 2/1, started on cetriaxone 2/2, switched to zosyn 2/3 for 7 day course. Blood cultures sent 2/4, NGTD. Currently afebrile, no leukocytosis.  - continue zosyn through 2/9  - F/U blood cultures  - obtain sputum culture if  able  - Temp q4h, wbc daily  - Ongoing monitoring for s/s infection    Access:  - R axillary a line (1/28-)  - Left chest mediport (not currently accessed)  - PIV x2  - Montiel (2/1)  - Trach (1/30)  - PEG (1/30)     Dispo: Continue ICU care. Patient seen and discussed with ICU attending Dr. Ibrahim. DELMAR helping guide family with LTACH placement [facility].    Code Status: Full Code     Family: will update at bedside or over phone today    SICU Phone [phone]    ANDREA Disla    Critical care time: 45 minutes

## 2025-02-06 NOTE — CARE PLAN
Problem: Pain - Adult  Goal: Verbalizes/displays adequate comfort level or baseline comfort level  2/6/2025 0757 by Zenia Escalera RN  Outcome: Progressing  2/6/2025 0654 by Zenia Escalera RN  Outcome: Progressing     Problem: Safety - Adult  Goal: Free from fall injury  2/6/2025 0757 by Zenia Escalera RN  Outcome: Progressing  2/6/2025 0654 by Zenia Escalera RN  Outcome: Progressing     Problem: Discharge Planning  Goal: Discharge to home or other facility with appropriate resources  2/6/2025 0757 by Zenia Escalera RN  Outcome: Progressing  2/6/2025 0654 by Zenia Escalera RN  Outcome: Progressing     Problem: Chronic Conditions and Co-morbidities  Goal: Patient's chronic conditions and co-morbidity symptoms are monitored and maintained or improved  2/6/2025 0757 by Zenia Escalera RN  Outcome: Progressing  2/6/2025 0654 by Zenia Escalera RN  Outcome: Progressing     Problem: Skin  Goal: Decreased wound size/increased tissue granulation at next dressing change  2/6/2025 0757 by Zenia Escalera RN  Outcome: Progressing  Flowsheets (Taken 2/6/2025 0757)  Decreased wound size/increased tissue granulation at next dressing change:   Promote sleep for wound healing   Protective dressings over bony prominences  2/6/2025 0654 by Zenia Escalera RN  Outcome: Progressing  Flowsheets (Taken 2/6/2025 0654)  Decreased wound size/increased tissue granulation at next dressing change:   Promote sleep for wound healing   Protective dressings over bony prominences  Goal: Participates in plan/prevention/treatment measures  2/6/2025 0757 by Zenia Escalera RN  Outcome: Progressing  Flowsheets (Taken 2/6/2025 0757)  Participates in plan/prevention/treatment measures:   Discuss with provider PT/OT consult   Elevate heels   Increase activity/out of bed for meals  2/6/2025 0654 by Zenia Escalera RN  Outcome: Progressing  Goal:  Prevent/manage excess moisture  2/6/2025 0757 by Zenia Escalera RN  Outcome: Progressing  Flowsheets (Taken 2/6/2025 0757)  Prevent/manage excess moisture:   Cleanse incontinence/protect with barrier cream   Follow provider orders for dressing changes   Monitor for/manage infection if present   Moisturize dry skin  2/6/2025 0654 by Zenia Escalera RN  Outcome: Progressing  Goal: Prevent/minimize sheer/friction injuries  2/6/2025 0757 by Zenia Escalera RN  Outcome: Progressing  Flowsheets (Taken 2/6/2025 0757)  Prevent/minimize sheer/friction injuries:   HOB 30 degrees or less   Complete micro-shifts as needed if patient unable. Adjust patient position to relieve pressure points, not a full turn   Increase activity/out of bed for meals   Turn/reposition every 2 hours/use positioning/transfer devices   Use pull sheet  2/6/2025 0654 by Zenia Escalera RN  Outcome: Progressing  Goal: Promote/optimize nutrition  2/6/2025 0757 by Zenia Escalera RN  Outcome: Progressing  Flowsheets (Taken 2/6/2025 0757)  Promote/optimize nutrition:   Discuss with provider if NPO > 2 days   Monitor/record intake including meals   Consume > 50% meals/supplements   Reassess MST if dietician not consulted   Offer water/supplements/favorite foods   Assist with feeding  2/6/2025 0654 by Zenia Escalera RN  Outcome: Progressing  Goal: Promote skin healing  2/6/2025 0757 by Zenia Escalera RN  Outcome: Progressing  Flowsheets (Taken 2/6/2025 0757)  Promote skin healing:   Assess skin/pad under line(s)/device(s)   Ensure correct size (line/device) and apply per  instructions   Protective dressings over bony prominences   Rotate device position/do not position patient on device   Turn/reposition every 2 hours/use positioning/transfer devices  2/6/2025 0654 by Zenia Escalera RN  Outcome: Progressing     Problem: Pain  Goal: Takes deep breaths with improved pain control  throughout the shift  2/6/2025 0757 by Zenia Escalera RN  Outcome: Progressing  2/6/2025 0654 by Zenia Escalera RN  Outcome: Progressing  Goal: Turns in bed with improved pain control throughout the shift  2/6/2025 0757 by Zenia Escalera RN  Outcome: Progressing  2/6/2025 0654 by Zenia Escalera RN  Outcome: Progressing  Goal: Walks with improved pain control throughout the shift  2/6/2025 0757 by Zenia Escalera RN  Outcome: Progressing  2/6/2025 0654 by Zenia Escalera RN  Outcome: Progressing  Goal: Performs ADL's with improved pain control throughout shift  2/6/2025 0757 by Zenia Escalera RN  Outcome: Progressing  2/6/2025 0654 by Zenia Escalera RN  Outcome: Progressing  Goal: Participates in PT with improved pain control throughout the shift  2/6/2025 0757 by Zenia Escalera RN  Outcome: Progressing  2/6/2025 0654 by Zenia Escalera RN  Outcome: Progressing  Goal: Free from opioid side effects throughout the shift  2/6/2025 0757 by Zenia Escalera RN  Outcome: Progressing  2/6/2025 0654 by Zenia Escalera RN  Outcome: Progressing  Goal: Free from acute confusion related to pain meds throughout the shift  2/6/2025 0757 by Zenia Escalera RN  Outcome: Progressing  2/6/2025 0654 by Zenia Escalera RN  Outcome: Progressing     Problem: Safety - Medical Restraint  Goal: Remains free of injury from restraints (Restraint for Interference with Medical Device)  2/6/2025 0757 by Zenia Escalera RN  Outcome: Progressing  2/6/2025 0654 by Zenia Escalera RN  Outcome: Progressing  Goal: Free from restraint(s) (Restraint for Interference with Medical Device)  2/6/2025 0757 by Zenia Escalera RN  Outcome: Progressing  2/6/2025 0654 by Zenia Escalera RN  Outcome: Progressing     Problem: Knowledge Deficit  Goal: Patient/family/caregiver demonstrates understanding of disease  process, treatment plan, medications, and discharge instructions  2/6/2025 0757 by Zenia Escalera RN  Outcome: Progressing  2/6/2025 0654 by Zenia Escalera RN  Outcome: Progressing     Problem: Mechanical Ventilation  Goal: Patient Will Maintain Patent Airway  2/6/2025 0757 by Zenia Escalera RN  Outcome: Progressing  2/6/2025 0654 by Zenia Escalera RN  Outcome: Progressing  Goal: Oral health is maintained or improved  2/6/2025 0757 by Zenia Escalera RN  Outcome: Progressing  2/6/2025 0654 by Zenia Escalera RN  Outcome: Progressing  Goal: Tracheostomy will be managed safely  2/6/2025 0757 by Zenia Escalera RN  Outcome: Progressing  2/6/2025 0654 by Zenia Escalera RN  Outcome: Progressing  Goal: ET tube will be managed safely  2/6/2025 0757 by Zenia Escalera RN  Outcome: Progressing  2/6/2025 0654 by Zenia Escalera RN  Outcome: Progressing  Goal: Ability to express needs and understand communication  2/6/2025 0757 by Zenia Escalera RN  Outcome: Progressing  2/6/2025 0654 by Zenia Escalera RN  Outcome: Progressing  Goal: Mobility/activity is maintained at optimum level for patient  2/6/2025 0757 by Zenia Escalera RN  Outcome: Progressing  2/6/2025 0654 by Zenia Escalera RN  Outcome: Progressing     Problem: Fall/Injury  Goal: Not fall by end of shift  2/6/2025 0757 by Zenia Escalera RN  Outcome: Progressing  2/6/2025 0654 by Zenia Escalera RN  Outcome: Progressing  Goal: Be free from injury by end of the shift  2/6/2025 0757 by Zenia Escalera RN  Outcome: Progressing  2/6/2025 0654 by Zenia Escalera RN  Outcome: Progressing  Goal: Verbalize understanding of personal risk factors for fall in the hospital  2/6/2025 0757 by Zenia Escalera RN  Outcome: Progressing  2/6/2025 0654 by Zenia Escalera RN  Outcome: Progressing  Goal: Verbalize  understanding of risk factor reduction measures to prevent injury from fall in the home  2/6/2025 0757 by Zenia Escalera RN  Outcome: Progressing  2/6/2025 0654 by Zenia Escalera RN  Outcome: Progressing  Goal: Use assistive devices by end of the shift  2/6/2025 0757 by Zenia Escalera RN  Outcome: Progressing  2/6/2025 0654 by Zenia Escalera RN  Outcome: Progressing  Goal: Pace activities to prevent fatigue by end of the shift  2/6/2025 0757 by Zenia sEcalera RN  Outcome: Progressing  2/6/2025 0654 by Zenia Escalera RN  Outcome: Progressing   The patient's goals for the shift include

## 2025-02-06 NOTE — PROGRESS NOTES
"Faustina Vick \"Noam" is a 64 y.o. female on day 24 of admission presenting with Primary cancer of right upper lobe of lung (Multi).    Subjective   CAM + this am  No pressor requirement overnight    Objective   Physical Exam  Vitals reviewed.   Constitutional:       General: She is awake.      Appearance: She is underweight.      Comments: Precedex on at 0.25mcg/kg/hr   HENT:      Head: Normocephalic and atraumatic.      Nose: Nose normal.      Mouth/Throat:      Mouth: Mucous membranes are moist.   Eyes:      Extraocular Movements: Extraocular movements intact.      Pupils: Pupils are equal, round, and reactive to light.   Neck:      Comments: 7.5 cuffed Shiley Trach midline, no drainage  Cardiovascular:      Rate and Rhythm: Regular rhythm. Tachycardia present.      Pulses: Normal pulses.      Heart sounds: Normal heart sounds.   Pulmonary:      Effort: Pulmonary effort is normal.      Breath sounds: Rhonchi: left side.      Comments: Mechanically ventilated via trach. Scattered rhonchi left chest field  Chest:      Comments: Right lateral chest dressing c/d/i.  Abdominal:      Palpations: Abdomen is soft.      Comments: PEG tube in place with TF infusing at 45ml/hr. Insertion site without erythema or drainage   Genitourinary:     Comments: Montiel in place, clear yellow urine  Musculoskeletal:         General: No swelling or tenderness. Normal range of motion.      Cervical back: Normal range of motion and neck supple.   Skin:     General: Skin is warm and dry.   Neurological:      General: No focal deficit present.      Mental Status: She is alert. She is confused.      GCS: GCS eye subscore is 4. GCS verbal subscore is 1. GCS motor subscore is 6.      Motor: Weakness: generalized.      Comments: Has white board and mouthes words to assist with communication   Psychiatric:         Mood and Affect: Mood normal.         Behavior: Behavior normal. Behavior is cooperative.       Last Recorded Vitals  Blood pressure " "(!) 137/92, pulse 96, temperature 36.5 °C (97.7 °F), temperature source Temporal, resp. rate 18, height 1.651 m (5' 5\"), weight 55.2 kg (121 lb 11.1 oz), SpO2 93%.    VS over last 24h  Heart Rate:  []   Temp:  [36.2 °C (97.2 °F)-36.9 °C (98.4 °F)]   Resp:  [18-35]   BP: (103-143)/(57-92)   Weight:  [55.2 kg (121 lb 11.1 oz)]   SpO2:  [91 %-99 %]      Intake/Output last 3 Shifts:  I/O last 3 completed shifts:  In: 2536.7 (46 mL/kg) [I.V.:681.7 (12.3 mL/kg); NG/GT:1655; IV Piggyback:200]  Out: 2335 (42.3 mL/kg) [Urine:2335 (1.2 mL/kg/hr)]  Weight: 55.2 kg       Intake/Output Summary (Last 24 hours) at 2/6/2025 1448  Last data filed at 2/6/2025 1300  Gross per 24 hour   Intake 1841.94 ml   Output 1310 ml   Net 531.94 ml        Relevant Results  Scheduled medications  budesonide, 0.5 mg, nebulization, BID  clonazePAM, 0.5 mg, g-tube, q8h  digoxin, 125 mcg, intravenous, Daily  esomeprazole, 40 mg, g-tube, Daily before breakfast  hydrocortisone sodium succinate, 50 mg, intravenous, q6h  insulin lispro, 0-5 Units, subcutaneous, q4h  ipratropium-albuteroL, 3 mL, nebulization, q6h  midodrine, 15 mg, g-tube, q6h  oxygen, , inhalation, Continuous - Inhalation  pancrelipase (Lip-Prot-Amyl) 2 tablet, sodium bicarbonate 650 mg, , g-tube, Once  perflutren protein A microsphere, 0.5 mL, intravenous, Once in imaging  piperacillin-tazobactam, 4.5 g, intravenous, q6h  sulfur hexafluoride microsphr, 2 mL, intravenous, Once in imaging  thiamine, 100 mg, g-tube, Daily  traZODone, 25 mg, g-tube, Nightly      PRN medications  PRN medications: acetylcysteine, albuterol, calcium gluconate, calcium gluconate, dextrose, dextrose, glucagon, glucagon, HYDROmorphone, magnesium sulfate, magnesium sulfate, oxyCODONE, oxygen, oxygen, oxygen, potassium chloride        Results from last 7 days   Lab Units 02/06/25  0205 02/05/25  0405 02/04/25  0310   WBC AUTO x10*3/uL 6.7 6.3 6.1   HEMOGLOBIN g/dL 8.9* 9.1* 9.3*   PLATELETS AUTO x10*3/uL 170 " 163 140*      Results from last 7 days   Lab Units 02/06/25  0205 02/05/25  0405 02/04/25  0310   SODIUM mmol/L 145 145 141   POTASSIUM mmol/L 4.1 3.4* 4.2   CHLORIDE mmol/L 107 102 100   CO2 mmol/L 31 34* 34*   BUN mg/dL 17 17 16   CREATININE mg/dL 0.23* <0.20* <0.20*   GLUCOSE mg/dL 135* 135* 124*   MAGNESIUM mg/dL 1.94 2.11 1.97   PHOSPHORUS mg/dL 2.6 3.4 3.7      Results from last 7 days   Lab Units 02/06/25  0205 02/05/25  1643 02/05/25  0405 02/04/25  0310   INR  1.2*  --  1.2* 1.2*   PROTIME seconds 13.8*  --  13.3* 14.0*   APTT seconds 44*   < > 34 40*    < > = values in this interval not displayed.              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 Wagner on 1/13 requiring post-op vasoactive therapy. Post-op course c/b acute hyponatremia, multifactorial delirium, cardiogenic shock requiring inotropic support, and acute hypoxic/hypercarbic respiratory failure requiring intubation. COVID+ 1/20. Weaned off milrinone 1/21. Weaned off iEpo 1/24. S/p trach/PEG 1/30 tolerating TF through PEG. TTE 2/3 with improved EF to 60-65%     Plan:  NEURO: History of anxiety on alprazolam 1mg qd. A&Ox4 at baseline. Physical deconditioning, tolerating increased activity with PT/OT. Situational depression/anxiety, on Precedex 0.25 this am. Also on scheduled clonazepam and nightly trazodone. Acute delirium.  - Qtc 502 and 513 on 2/3  - Continue Trazodone nightly  - Continue Clonazepam 0.5 mg q8h with hold parameters  - Continue Oxycodone and Dilaudid prn   - Ongoing neuro and pain assessments  - PT/OT -> continue to progress activity  - CAM assessments q shift  - continue pet therapy  - PICS team consult  - delirium precautions, REST protocol     CV: No history of cardiac disease. Baseline /79, -110.  TTE 9/2024 normal biV function. Intra-op and immediate post op hypotension requiring pressor support. TTE 1/13 with EF 60-65%, low normal RV systolic function. Acutely elevated lactate 1/15-1/16. Repeat TTE 1/16: acute biV dysfunction/takotsubo cardiomyopathy, EF 34%, mod reduced RV systolic function, mild to mod TR. Cardiogenic shock resolved. Weaned off milrinone 1/21, iEpo 1/24. Difficulty weaning off low dose vaso, started midodrine 1/25. Repeat TTE 2/3 with improved EF to 60-65%. Ongoing intermittent episodes of tachycardia to 150s. Suspected Aflutter/atrial tachycardia, added digoxin 2/3. Started low intensity heparin infusion 2/5. On/off phenyl and vaso for nighttime hypotension. Midodrine increased to 15mg q6h. Restarted stress dose steroids 2/4. No pressor support needed overnight 2/5-2/6  - Continue digoxin 125mcg daily, level 0.51 today  - EP following: tachycardia due to acute illness. Continue dig and low intensity heparin infusion. Consider tikosyn once patient stable in sinus rhythm, ziopathch at discharge. Further discuss risk/benefit of amiodarone with thoracic surgery  - Continue Midodrine 15 mg q6h  - Continue stress dose steroids q6h  - Continuous EKG/abp monitoring  - MAP goal >65  - Goal even to negative fluid balance     PULM: Hx of 30 pack year smoking history (quit 4/2024), COPD, and stage III right lung SCC s/p chemo/XRT/immunotherapy. 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 Arrived to SICU extubated on 10L SFM, weaned to room air 1/14. Chest tube removed 1/14. Acute on chronic hypoxic respiratory failure 1/16 in the setting of COVID. Intubated 1/18. S/p trach 1/30. Tolerating SBTs, currently on PS 8, peep 5, 40%  - Wean FiO2 to maintain SpO2 >92%  - Scheduled budesonide/duoneb  - continue humidified vent  - PRN albuterol  - Daily CXR prn  - ABG prn  - Holding home benzonatate    GI: No GI history. Elevated LFT 1/16-1/21 resolved.  Evidence of pericholecystic fluid and biliary sludge on renal US 1/15 but no abdominal symptoms. RUQ US without significant findings. S/P PEG 1/30. Tolerating TF at goal rate. Last Bms 2/5, liquidy.  - Continue thiamine 100mg IV daily  - Continue IV PPI for GI prophylaxis  - hold bowel reg for diarrhea  - consider sending C.diff if persistent diarrhea after holding bowel reg  - consider adding fiber if C.diff negative    : No history of renal disease. Baseline creatinine 0.6. Symptomatic acute hyponatremia resolved, was given 3% saline infusions. Yoo replaced 2/1 for urine retention. Trial of silodosin stopped given recurrent hypotension  - remove yoo for TOV today, bladder scan q6h thereafter x24h  - RFP daily and prn  - Maintain U/O >0.5ml/kg/hr  - Replete electrolytes to maintain K>4, Phos>2.5, iCal>1.1, Mag>2     HEME: Hx of DCO. Baseline H/H 12/40. Acute blood loss anemia. Acute coagulopathy resolved. H/H essentially stable. Started on low intensity heparin infusion 2/5  - Check CBC daily and PRN  - coags prn  - Ongoing monitoring for s/s bleeding  - Maintain active T&S (2/4)  - check assays and adjust heparin infusion per nomogram    ENDO: No history of DM or thyroid disease. Adequate glycemic control  - BG q4h, SSI #1     MSK: Arthritis, Vit D deficiency. Sacral wound since admission. Currently 0.5 x 0.5 cm; significantly improved Stage I.   - Turn patient every 2 hrs, continue wound care  - Not on meds at home  - Monitor for symptoms     ID:  Completed treatment of COVID with decadron and remdesivir. Off precautions since 1/30. Pseudomonas UTI 2/1, started on cetriaxone 2/2, switched to zosyn 2/3 for 7 day course. Blood cultures sent 2/4, NGTD. Currently afebrile, no leukocytosis.  - continue zosyn through 2/9  - F/U blood cultures  - obtain sputum culture if able  - Temp q4h, wbc daily  - Ongoing monitoring for s/s infection      I have discussed the case with the resident/advanced practice provider.  I have personally performed a history, physical exam, and my own medical decision making. I have reviewed the note and agree with the findings and plan with the following exceptions as identified below. I have personally provided 37 minutes of critical care time exclusive of time spent on separately billable procedures. Time includes review of laboratory data, radiology results, discussion with consultants, family and monitoring for potential decompensation. Interventions were performed as documented above.      Family/Surrogate updated with plan of care.  Code status addressed/up to date.

## 2025-02-06 NOTE — PROGRESS NOTES
"Faustina Vick \"Jannie\" is a 64 y.o. female who presents for Primary cancer of right upper lobe of lung (Multi) [C34.11].  She initially received chemotherapy and radiation, but appeared to have residual radiographic abnormalities.  PET/CT suggested progression of disease, and in this setting and Dr. Wagner felt that surgical resection in a \"salvage\" setting might facilitate long-term overall survival given the failure of nonsurgical management. She is now  24 days post op from PNEUMONECTOMY (Right) (en bloc with chest wall resection and reconstruction), mediastinal lymph node dissection for a  Primary cancer of right upper lobe of lung (Multi).      Subjective   Continues to be on vent via trach, pressors have been weaned since yesterday. Patient denies pain.     Objective     General: Resting in bed  HEENT: Trach in place, no surrounding drainage or bleeding  Resp: symmetric chest rise, on CPAP  CV: Sinus tachycardia, 100s when in room, off presors  : yoo draining clear yellow urine  Abd: soft, ND, PEG in place with TF running at 45  Neuro: awake and alert, withdrawn    Last Recorded Vitals  Blood pressure 143/80, pulse 99, temperature 36.2 °C (97.2 °F), temperature source Temporal, resp. rate 24, height 1.651 m (5' 5\"), weight 55.2 kg (121 lb 11.1 oz), SpO2 97%.  Intake/Output last 3 Shifts:  I/O last 3 completed shifts:  In: 2536.7 (46 mL/kg) [I.V.:681.7 (12.3 mL/kg); NG/GT:1655; IV Piggyback:200]  Out: 2335 (42.3 mL/kg) [Urine:2335 (1.2 mL/kg/hr)]  Weight: 55.2 kg     Relevant Results  Scheduled medications  budesonide, 0.5 mg, nebulization, BID  clonazePAM, 0.5 mg, g-tube, q8h  digoxin, 125 mcg, intravenous, Daily  esomeprazole, 40 mg, g-tube, Daily before breakfast  hydrocortisone sodium succinate, 50 mg, intravenous, q6h  insulin lispro, 0-5 Units, subcutaneous, q4h  ipratropium-albuteroL, 3 mL, nebulization, q6h  midodrine, 15 mg, g-tube, q6h  oxygen, , inhalation, Continuous - Inhalation  pancrelipase " (Lip-Prot-Amyl) 2 tablet, sodium bicarbonate 650 mg, , g-tube, Once  perflutren protein A microsphere, 0.5 mL, intravenous, Once in imaging  piperacillin-tazobactam, 4.5 g, intravenous, q6h  sulfur hexafluoride microsphr, 2 mL, intravenous, Once in imaging  thiamine, 100 mg, g-tube, Daily  traZODone, 25 mg, g-tube, Nightly      Continuous medications  dexmedeTOMIDine, 0-1.5 mcg/kg/hr, Last Rate: 0.24 mcg/kg/hr (02/06/25 0627)  heparin, 0-4,000 Units/hr, Last Rate: 900 Units/hr (02/06/25 0517)      PRN medications  PRN medications: acetylcysteine, albuterol, calcium gluconate, calcium gluconate, dextrose, dextrose, glucagon, glucagon, HYDROmorphone, magnesium sulfate, magnesium sulfate, oxyCODONE, oxygen, oxygen, oxygen, potassium chloride    Results for orders placed or performed during the hospital encounter of 01/13/25 (from the past 24 hours)   POCT GLUCOSE   Result Value Ref Range    POCT Glucose 129 (H) 74 - 99 mg/dL   POCT GLUCOSE   Result Value Ref Range    POCT Glucose 128 (H) 74 - 99 mg/dL   Digoxin   Result Value Ref Range    Digoxin  0.32 (L) 0.80 - <2.00 ng/mL   aPTT - baseline   Result Value Ref Range    aPTT 34 27 - 38 seconds   Blood Gas Arterial Full Panel   Result Value Ref Range    POCT pH, Arterial 7.48 (H) 7.38 - 7.42 pH    POCT pCO2, Arterial 46 (H) 38 - 42 mm Hg    POCT pO2, Arterial 65 (L) 85 - 95 mm Hg    POCT SO2, Arterial 95 94 - 100 %    POCT Oxy Hemoglobin, Arterial 92.9 (L) 94.0 - 98.0 %    POCT Hematocrit Calculated, Arterial 27.0 (L) 36.0 - 46.0 %    POCT Sodium, Arterial 140 136 - 145 mmol/L    POCT Potassium, Arterial 3.2 (L) 3.5 - 5.3 mmol/L    POCT Chloride, Arterial 108 (H) 98 - 107 mmol/L    POCT Ionized Calcium, Arterial 1.27 1.10 - 1.33 mmol/L    POCT Glucose, Arterial 138 (H) 74 - 99 mg/dL    POCT Lactate, Arterial 0.7 0.4 - 2.0 mmol/L    POCT Base Excess, Arterial 9.7 (H) -2.0 - 3.0 mmol/L    POCT HCO3 Calculated, Arterial 34.3 (H) 22.0 - 26.0 mmol/L    POCT Hemoglobin,  Arterial 9.1 (L) 12.0 - 16.0 g/dL    POCT Anion Gap, Arterial 1 (L) 10 - 25 mmo/L    Patient Temperature 37.0 degrees Celsius    FiO2 40 %   Heparin Assay, UFH   Result Value Ref Range    Heparin Unfractionated 0.1 See Comment Below for Therapeutic Ranges IU/mL   POCT GLUCOSE   Result Value Ref Range    POCT Glucose 138 (H) 74 - 99 mg/dL   Blood Gas Arterial Full Panel   Result Value Ref Range    POCT pH, Arterial 7.49 (H) 7.38 - 7.42 pH    POCT pCO2, Arterial 44 (H) 38 - 42 mm Hg    POCT pO2, Arterial 66 (L) 85 - 95 mm Hg    POCT SO2, Arterial 96 94 - 100 %    POCT Oxy Hemoglobin, Arterial 93.4 (L) 94.0 - 98.0 %    POCT Hematocrit Calculated, Arterial 28.0 (L) 36.0 - 46.0 %    POCT Sodium, Arterial 141 136 - 145 mmol/L    POCT Potassium, Arterial 4.2 3.5 - 5.3 mmol/L    POCT Chloride, Arterial 108 (H) 98 - 107 mmol/L    POCT Ionized Calcium, Arterial 1.27 1.10 - 1.33 mmol/L    POCT Glucose, Arterial 147 (H) 74 - 99 mg/dL    POCT Lactate, Arterial 0.7 0.4 - 2.0 mmol/L    POCT Base Excess, Arterial 9.2 (H) -2.0 - 3.0 mmol/L    POCT HCO3 Calculated, Arterial 33.5 (H) 22.0 - 26.0 mmol/L    POCT Hemoglobin, Arterial 9.2 (L) 12.0 - 16.0 g/dL    POCT Anion Gap, Arterial 4 (L) 10 - 25 mmo/L    Patient Temperature 37.0 degrees Celsius    FiO2 40 %   Calcium, Ionized   Result Value Ref Range    POCT Calcium, Ionized 1.22 1.1 - 1.33 mmol/L   CBC   Result Value Ref Range    WBC 6.7 4.4 - 11.3 x10*3/uL    nRBC 0.0 0.0 - 0.0 /100 WBCs    RBC 3.21 (L) 4.00 - 5.20 x10*6/uL    Hemoglobin 8.9 (L) 12.0 - 16.0 g/dL    Hematocrit 27.9 (L) 36.0 - 46.0 %    MCV 87 80 - 100 fL    MCH 27.7 26.0 - 34.0 pg    MCHC 31.9 (L) 32.0 - 36.0 g/dL    RDW 19.0 (H) 11.5 - 14.5 %    Platelets 170 150 - 450 x10*3/uL   Magnesium   Result Value Ref Range    Magnesium 1.94 1.60 - 2.40 mg/dL   Renal function panel   Result Value Ref Range    Glucose 135 (H) 74 - 99 mg/dL    Sodium 145 136 - 145 mmol/L    Potassium 4.1 3.5 - 5.3 mmol/L    Chloride 107 98 -  107 mmol/L    Bicarbonate 31 21 - 32 mmol/L    Anion Gap 11 10 - 20 mmol/L    Urea Nitrogen 17 6 - 23 mg/dL    Creatinine 0.23 (L) 0.50 - 1.05 mg/dL    eGFR >90 >60 mL/min/1.73m*2    Calcium 8.9 8.6 - 10.6 mg/dL    Phosphorus 2.6 2.5 - 4.9 mg/dL    Albumin 3.3 (L) 3.4 - 5.0 g/dL   Coagulation Screen   Result Value Ref Range    Protime 13.8 (H) 9.8 - 12.8 seconds    INR 1.2 (H) 0.9 - 1.1    aPTT 44 (H) 27 - 38 seconds   Digoxin   Result Value Ref Range    Digoxin  0.51 (L) 0.80 - <2.00 ng/mL   Heparin Assay, UFH   Result Value Ref Range    Heparin Unfractionated 0.1 See Comment Below for Therapeutic Ranges IU/mL   POCT GLUCOSE   Result Value Ref Range    POCT Glucose 112 (H) 74 - 99 mg/dL   POCT GLUCOSE   Result Value Ref Range    POCT Glucose 140 (H) 74 - 99 mg/dL     CXR 2/2:    IMPRESSION:  1. No significant change in patchy left lung airspace opacities,  which may represent edema/infiltrates.  2. Stable postsurgical changes related to right-sided pneumonectomy.       Assessment/Plan   Assessment & Plan  Primary cancer of right upper lobe of lung (Multi)    Chronic obstructive pulmonary disease (Multi)    Malignant neoplasm of upper lobe of right lung (Multi)    Cardiogenic shock (Multi)    Respiratory distress    Faustina Vick (Jannie) is a 64F with Hx of Stage III lung cancer, asthma, anxiety, and arthritis who is now s/p R pneumonectomy with Dr. Wagner 1/13/25. 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 Cardiology was consulted and recommended Lasix gtt and milrinone. Lasix gtt held 1/17, hyponatremia improving. Patient underwent RHC and SGC placement with cardiology 1/17. She was reintubated on 1/18 d/t worsening respiratory status and AMS. Also had increased pressor requirements, now weaned off. Repeat echo stable on 1/18. COVID positive on 1/20.     Failed extubation trials, now s/p trach/PEG with Dr. Wagner 1/30.     - Do not recommend amio given risk of pulmonary  complications in the setting of pneumonectomy  - Will remove trach sutures today  - Continue & appreciate supportive ICU care  - Continue Tfs at goal, appreciate nutrition recs  - Pulmonary toilet  - PT/OT for rehabilitation, patient will likely need LTACH placement  - Agree with blood and sputum cultures given new pressor and O2 requirments  - Continue Abx for UTI, recommend removal of Montiel if possible    Patient discussed with attending Dr. Wagner.    Sarah Miranda MD  Thoracic Surgery [id number]

## 2025-02-06 NOTE — PROGRESS NOTES
"Physical Therapy    Physical Therapy Treatment    Patient Name: Faustina Vick \"Jannie\"  MRN: [id number]  Today's Date: 2/6/2025  Room: 12/12-A  Time Calculation  Start Time: 1040  Stop Time: 1105  Time Calculation (min): 25 min       Assessment/Plan   PT Plan  Treatment/Interventions: Bed mobility, Transfer training, Gait training, Stair training, Balance training, Strengthening, Endurance training, Range of motion, Therapeutic exercise, Therapeutic activity, Home exercise program  PT Plan: Ongoing PT  PT Frequency: 4 times per week  PT Discharge Recommendations: Moderate intensity level of continued care  Equipment Recommended upon Discharge: Wheeled walker  PT Recommended Transfer Status: Total assist  PT - OK to Discharge: Yes    General Visit Information:   Reason for Referral: Initial evaluation: right pneumonectomy, intrapericardial, with en block chest wall resection on 1/13 c/b hypotension post op. Re-eval cardiogenic shock, Takosubo cardiomyopathy with decreased EF 25-30% requiring inotropes and AHRF s/p intubation. Also found to be covid positive  Past Medical History Relevant to Rehab: stage III SCC of the lung s/p chemotherapy+radiation last chemotherapy session on 8/28/2024  Prior to Session Communication: Bedside nurse  Patient Position Received: Up in chair, Alarm on   Subjective: Patient is alert, agreeable to PT, up in chair after OT session approx 20 minutes earlier.    Precautions:  Precautions  Medical Precautions: Fall precautions  Vital Signs:   Date/Time Vitals Session Patient Position Pulse Resp SpO2 BP MAP (mmHg)    02/06/25 1105 --  --  108  --  94 %  106/57  --     02/06/25 1124 --  --  99  19  98 %  --  --     02/06/25 1200 --  --  101  22  98 %  122/70  83             Objective   Pain:  Pain Assessment  0-10 (Numeric) Pain Score: 0 - No pain (post 0)  Cognition:  Cognition  Orientation Level:  (Following 1 step commands c 50% correct, withdrawn throughout session, minimally communicating " and not answering yes/no questions consistently)  Lines/Tubes/Drains:  Arterial Line 01/28/25 Right Axillary (Active)   Number of days: 8       Implantable Port 01/16/25 Left Chest Single lumen port (Active)   Number of days: 20       Surgical Airway Shiley 7.5 (Active)   Number of days: 6       Urethral Catheter (Active)   Number of days: 4       Gastrostomy/Enterostomy Percutaneous endoscopic gastrostomy (PEG) 1 20 Fr. LUQ (Active)   Number of days: 6     Medical Gas Therapy: Supplemental oxygen  Medical Gas Delivery Method: Trach tube  Continuous Medications/Drips:  dexmedeTOMIDine, 0-1.5 mcg/kg/hr, Last Rate: 0.24 mcg/kg/hr (02/06/25 0627)  heparin, 0-4,000 Units/hr, Last Rate: 1,000 Units/hr (02/06/25 1214)        PT Treatments:  Therapeutic Exercise  Therapeutic Exercise Performed: Yes  Therapeutic Exercise Activity 1: BLE PF, DF, alternating LAQ, alternating hip flexion 5 x 1 AAROM  Therapeutic Activity  Therapeutic Activity 1: LE Cycle ergometer without resistance min A 10x revolutions x 5  Therapeutic Activity 2: EOB 4 minutes min A c BUE support     Bed Mobility 1  Bed Mobility 1: Sitting to supine  Level of Assistance 1: Maximum assistance  Bed Mobility Comments 1: HOB 30     Transfer 1  Transfer From 1: Bed to  Transfer to 1: Chair with drop arm  Technique 1: Squat pivot  Transfer Device 1:  (2 HHA)  Transfer Level of Assistance 1: Maximum assistance  Trials/Comments 1: x1             Outcome Measures:  [facility] Basic Mobility  Turning from your back to your side while in a flat bed without using bedrails: A lot  Moving from lying on your back to sitting on the side of a flat bed without using bedrails: A lot  Moving to and from bed to chair (including a wheelchair): A lot  Standing up from a chair using your arms (e.g. wheelchair or bedside chair): Total  To walk in hospital room: Total  Climbing 3-5 steps with railing: Total  Basic Mobility - Total Score: 9           FSS-ICU  Ambulation: Unable to attempt  due to weakness  Rolling: Maximal assistance (performs 25% - 49% of task)  Sitting: Minimal assistance (performs 75% or more of task)  Transfer Sit-to-Stand: Total assistance (performs 25% or requires another person)  Transfer Supine-to-Sit: Total assistance (performs 25% or requires another person)  Total Score: 8  ICU Mobility Screen  ICU Mobility Scale: Transferring bed to chair             Education Documentation  Body Mechanics, taught by yAad Kemp PT at 2/6/2025 11:25 AM.  Learner: Patient  Readiness: Acceptance  Method: Explanation  Response: Needs Reinforcement  Comment: POC review, positioning    Home Exercise Program, taught by Ayad Kepm PT at 2/6/2025 11:25 AM.  Learner: Patient  Readiness: Acceptance  Method: Explanation  Response: Needs Reinforcement  Comment: POC review, positioning    Mobility Training, taught by Ayad Kemp PT at 2/6/2025 11:25 AM.  Learner: Patient  Readiness: Acceptance  Method: Explanation  Response: Needs Reinforcement  Comment: POC review, positioning    Education Comments  No comments found.          OP EDUCATION:       Encounter Problems       Encounter Problems (Active)       Balance       Pt will demo static sitting balance w/o UE support IND to decrease risk of falls in home environment (Not met)       Start:  01/15/25    Expected End:  02/05/25    Resolved:  01/22/25    Updated to: Pt will maintain standing balance with Min A using LRAD    Update reason: re-eval         Pt will maintain standing balance with Min A using LRAD (Progressing)       Start:  01/22/25    Expected End:  02/12/25                   Mobility       Pt will mobilize supine to sitting MI to promote functional independence (Not met)       Start:  01/15/25    Expected End:  02/05/25    Resolved:  01/22/25    Updated to: Pt will perform bed mobility with Min A    Update reason: re-eval         Pt will perform bed mobility with Min A (Progressing)       Start:  01/22/25    Expected  End:  02/12/25                   Mobility       STG - Patient will ambulate x50 ft with Min A using LRAD (Progressing)       Start:  01/22/25    Expected End:  02/12/25               PT Transfers       Pt will transfer sit to stand w/ LRAD MI to improve functional mobility.  (Not met)       Start:  01/15/25    Expected End:  02/05/25    Resolved:  01/22/25    Updated to: Pt will perform functional transfers with Min A using LRAD    Update reason: Re-eval         Pt will perform functional transfers with Min A using LRAD (Progressing)       Start:  01/22/25    Expected End:  02/12/25                   Pain - Adult              Assessment: Patient is progressing Fairly with therapy this date.  Patient able to complete chair level there-ex and peddle exercises, difficulty with initiation of full revolution with minimal effort throughout activities.  Remains max/dependent for all mobility.  Patient remains appropriate for MOD intensity therapy when medically appropriate for discharge from acute stay.  Will continue to follow.      02/06/25 at 1:03 PM   Ayad Kemp, PT   Rehab Office: [phone]

## 2025-02-06 NOTE — PROGRESS NOTES
LSW left message with Mr. Vick to determine if he has made a selection of which LTACH facility he prefers in the [facility]. LSW will make another call this afternoon.     GEOFFREYW met with the patient's  at bedside, and he reported that he want's his wife to discharge to [facility] in [place], [place]. However, after reviewing the facility's webpage, this LSW has some reservations about the facility's ability to address the complex medical needs of the patient.  This LSW sent the referral at the request of the  and will follow up in the morning. If they cannot accept the patient a referral will be sent to [facility].

## 2025-02-06 NOTE — CARE PLAN
The patient's goals for the shift include      The clinical goals for the shift include pt will remain hemodynamically stable      Problem: Pain - Adult  Goal: Verbalizes/displays adequate comfort level or baseline comfort level  Outcome: Progressing     Problem: Safety - Adult  Goal: Free from fall injury  Outcome: Progressing     Problem: Discharge Planning  Goal: Discharge to home or other facility with appropriate resources  Outcome: Progressing     Problem: Chronic Conditions and Co-morbidities  Goal: Patient's chronic conditions and co-morbidity symptoms are monitored and maintained or improved  Outcome: Progressing     Problem: Skin  Goal: Decreased wound size/increased tissue granulation at next dressing change  Outcome: Progressing  Flowsheets (Taken 2/6/2025 0654)  Decreased wound size/increased tissue granulation at next dressing change:   Promote sleep for wound healing   Protective dressings over bony prominences  Goal: Participates in plan/prevention/treatment measures  Outcome: Progressing  Goal: Prevent/manage excess moisture  Outcome: Progressing  Goal: Prevent/minimize sheer/friction injuries  Outcome: Progressing  Goal: Promote/optimize nutrition  Outcome: Progressing  Goal: Promote skin healing  Outcome: Progressing     Problem: Pain  Goal: Takes deep breaths with improved pain control throughout the shift  Outcome: Progressing  Goal: Turns in bed with improved pain control throughout the shift  Outcome: Progressing  Goal: Walks with improved pain control throughout the shift  Outcome: Progressing  Goal: Performs ADL's with improved pain control throughout shift  Outcome: Progressing  Goal: Participates in PT with improved pain control throughout the shift  Outcome: Progressing  Goal: Free from opioid side effects throughout the shift  Outcome: Progressing  Goal: Free from acute confusion related to pain meds throughout the shift  Outcome: Progressing     Problem: Safety - Medical  Restraint  Goal: Remains free of injury from restraints (Restraint for Interference with Medical Device)  Outcome: Progressing  Goal: Free from restraint(s) (Restraint for Interference with Medical Device)  Outcome: Progressing     Problem: Knowledge Deficit  Goal: Patient/family/caregiver demonstrates understanding of disease process, treatment plan, medications, and discharge instructions  Outcome: Progressing     Problem: Mechanical Ventilation  Goal: Patient Will Maintain Patent Airway  Outcome: Progressing  Goal: Oral health is maintained or improved  Outcome: Progressing  Goal: Tracheostomy will be managed safely  Outcome: Progressing  Goal: ET tube will be managed safely  Outcome: Progressing  Goal: Ability to express needs and understand communication  Outcome: Progressing  Goal: Mobility/activity is maintained at optimum level for patient  Outcome: Progressing     Problem: Fall/Injury  Goal: Not fall by end of shift  Outcome: Progressing  Goal: Be free from injury by end of the shift  Outcome: Progressing  Goal: Verbalize understanding of personal risk factors for fall in the hospital  Outcome: Progressing  Goal: Verbalize understanding of risk factor reduction measures to prevent injury from fall in the home  Outcome: Progressing  Goal: Use assistive devices by end of the shift  Outcome: Progressing  Goal: Pace activities to prevent fatigue by end of the shift  Outcome: Progressing

## 2025-02-06 NOTE — PROGRESS NOTES
"Occupational Therapy    Occupational Therapy Treatment    Name: Faustina Vick \"Jannie\"  MRN: [id number]  : 1960  Date: 25  Room: -A      Time Calculation  Start Time: 1001  Stop Time: 1031  Time Calculation (min): 30 min    Assessment:  Barriers to Discharge Home: Cognition needs, Physical needs, Caregiver assistance  Caregiver Assistance: Caregiver assistance needed per identified barriers - however, level of patient's required assistance exceeds assistance available at home  Cognition Needs: Insight of patient limited regarding functional ability/needs, Medication and/or medical management daily assist needed  Physical Needs: 24hr mobility assistance needed, 24hr ADL assistance needed  End of Session Communication: Bedside nurse  End of Session Patient Position: Up in chair, Alarm on    Plan:  Treatment Interventions: ADL retraining, Functional transfer training, UE strengthening/ROM, Endurance training, Cognitive reorientation, Patient/family training, Equipment evaluation/education, Neuromuscular reeducation, Fine motor coordination activities, Compensatory technique education  OT Frequency: 4 times per week  OT Discharge Recommendations: Moderate intensity level of continued care  Equipment Recommended upon Discharge: Wheeled walker  OT Recommended Transfer Status: Assist of 2  OT - OK to Discharge: Yes    Subjective   General:  OT Last Visit  OT Received On: 25  Co-Treatment:  (rehab aide assisted with session)  Prior to Session Communication: Bedside nurse  Patient Position Received: Bed, 3 rail up, Alarm off, not on at start of session  Family/Caregiver Present: No  General Comment: Pt supine in bed on arrival, agreeable to participate. Tracheostoym CPAP 40% FiO2, PEEP 5, PS 8, .24 precedex, heparin.     Precautions:  Medical Precautions: Fall precautions, Cardiac precautions, Oxygen therapy device and L/min  Precautions Comment: MAP >65    Vitals:   25 1001 25 1031   Vital " Signs   Vitals Session Pre OT Post OT   Heart Rate 99 103   Resp (!) 35 21   SpO2 96 % 96 %   /60 117/60   MAP (mmHg) 84 84   Vital Signs Comment  --  RR with increase to low 40s with mobility but with quick recovery; otherwise VSS with activity       Lines/Tubes/Drains:  Arterial Line 01/28/25 Right Axillary (Active)   Number of days: 8       Implantable Port 01/16/25 Left Chest Single lumen port (Active)   Number of days: 20       Surgical Airway Shiley 7.5 (Active)   Number of days: 6       Urethral Catheter (Active)   Number of days: 4       Gastrostomy/Enterostomy Percutaneous endoscopic gastrostomy (PEG) 1 20 Fr. LUQ (Active)   Number of days: 6       Cognition:  Overall Cognitive Status: Impaired  Orientation Level:  (Oriented to month and year but not exact date, otherwise oriented.)  Following Commands: Follows one step commands with increased time  Cognition Comments: CAM-ICU (+), made good effort to mouth words to communicate  Divided Attention: Impaired    Pain Assessment:  Pain Assessment  Pain Assessment: 0-10  0-10 (Numeric) Pain Score: 0 - No pain     Objective   Bed Mobility/Transfers:   Bed Mobility  Bed Mobility: Yes  Bed Mobility 1  Bed Mobility 1: Supine to sitting  Level of Assistance 1: Maximum assistance, +2  Bed Mobility Comments 1: made good effort to assist with LEs; HOB elevated and draw sheet utilized  Transfers  Transfer: Yes  Transfer 1  Transfer From 1: Bed to  Transfer to 1: Chair with drop arm  Technique 1: Squat pivot  Transfer Level of Assistance 1: Maximum assistance, +2  Trials/Comments 1: x2 scoots      Therapy/Activity:      Therapeutic Activity  Therapeutic Activity Performed: Yes  Therapeutic Activity 1: Pt tolerated EOB sitting ~7 minutes prior to progressing to chair with min-mod A for sitting balance. Noted to have lateral lean left with head laterally flexed left. Able to correct with cues but unable to sustain neutral position >5 seconds.     Outcome  Measures:  [facility] Daily Activity  Putting on and taking off regular lower body clothing: Total  Bathing (including washing, rinsing, drying): A lot  Putting on and taking off regular upper body clothing: A lot  Toileting, which includes using toilet, bedpan or urinal: Total  Taking care of personal grooming such as brushing teeth: A lot  Eating Meals: Total  Daily Activity - Total Score: 9  ICU Mobility Screen  ICU Mobility Scale: Transferring bed to chair     Education Documentation  Body Mechanics, taught by Ellen Garcia OT at 2/6/2025 10:58 AM.  Learner: Patient  Readiness: Acceptance  Method: Explanation, Demonstration  Response: Demonstrated Understanding    Precautions, taught by Ellen Garcia OT at 2/6/2025 10:58 AM.  Learner: Patient  Readiness: Acceptance  Method: Explanation, Demonstration  Response: Demonstrated Understanding    ADL Training, taught by Ellen Garcia OT at 2/6/2025 10:58 AM.  Learner: Patient  Readiness: Acceptance  Method: Explanation, Demonstration  Response: Demonstrated Understanding    Education Comments  No comments found.        Goals:  Encounter Problems       Encounter Problems (Active)       ADLs       Patient will perform UB and LB bathing with modified independent level of assistance. (Not met)       Start:  01/14/25    Expected End:  01/28/25    Resolved:  01/22/25    Updated to: Patient will perform UB and LB bathing with moderate level of assistance.    Update reason: re eval         Patient with complete upper body dressing with modified independent level of assistance donning and doffing all UE clothes.  (Not met)       Start:  01/14/25    Expected End:  01/28/25    Resolved:  01/22/25    Updated to: Patient with complete upper body dressing with minimal level of assistance donning and doffing all UE clothes.    Update reason: re eval         Patient with complete lower body dressing with modified independent level of assistance donning  and doffing all LE clothes  with PRN adaptive equipment  (Not met)       Start:  01/14/25    Expected End:  01/28/25    Resolved:  01/22/25    Updated to: Patient with complete lower body dressing with moderate level of assistance donning and doffing all LE clothes  with PRN adaptive equipment    Update reason: re eval         Patient will complete toileting including hygiene clothing management/hygiene with modified independent level of assistance. (Not met)       Start:  01/14/25    Expected End:  01/28/25    Resolved:  01/22/25    Updated to: Patient will complete grooming with SBA    Update reason: re eval         Patient will perform UB and LB bathing with moderate level of assistance. (Progressing)       Start:  01/22/25    Expected End:  02/20/25                Patient with complete upper body dressing with minimal level of assistance donning and doffing all UE clothes. (Progressing)       Start:  01/22/25    Expected End:  02/20/25                Patient with complete lower body dressing with moderate level of assistance donning and doffing all LE clothes  with PRN adaptive equipment (Progressing)       Start:  01/22/25    Expected End:  02/20/25                Patient will complete grooming with SBA (Progressing)       Start:  01/22/25    Expected End:  02/20/25                   TRANSFERS       Patient will perform bed mobility modified independent level of assistance in order to improve safety and independence with mobility (Not met)       Start:  01/14/25    Expected End:  01/28/25    Resolved:  01/22/25    Updated to: Patient will perform bed mobility moderate level of assistance in order to improve safety and independence with mobility    Update reason: re eval         Patient will complete functional transfer to toilet with least restrictive device with modified independent level of assistance. (Not met)       Start:  01/14/25    Expected End:  01/28/25    Resolved:  01/22/25    Updated to: Patient  will complete STS with moderate assistance and LRAD    Update reason: re eval         Patient will perform bed mobility moderate level of assistance in order to improve safety and independence with mobility (Progressing)       Start:  01/22/25    Expected End:  02/20/25                Patient will complete STS with moderate assistance and LRAD (Progressing)       Start:  01/22/25    Expected End:  02/20/25 02/06/25 at 10:59 AM   Ellen Garcia, OT   [phone]

## 2025-02-06 NOTE — TELEPHONE ENCOUNTER
Spoke with Dr. Oh's office. Patient is still admitted at . Will follow up at a later date on discharge planning.

## 2025-02-07 ENCOUNTER — APPOINTMENT (OUTPATIENT)
Dept: CARDIOLOGY | Facility: HOSPITAL | Age: 65
End: 2025-02-07
Payer: COMMERCIAL

## 2025-02-07 ENCOUNTER — APPOINTMENT (OUTPATIENT)
Dept: RADIOLOGY | Facility: HOSPITAL | Age: 65
End: 2025-02-07
Payer: COMMERCIAL

## 2025-02-07 LAB
ABO GROUP (TYPE) IN BLOOD: NORMAL
ALBUMIN SERPL BCP-MCNC: 3.2 G/DL (ref 3.4–5)
ALBUMIN SERPL BCP-MCNC: 3.2 G/DL (ref 3.4–5)
ANION GAP BLDA CALCULATED.4IONS-SCNC: 2 MMO/L (ref 10–25)
ANION GAP SERPL CALC-SCNC: 12 MMOL/L (ref 10–20)
ANION GAP SERPL CALC-SCNC: 14 MMOL/L (ref 10–20)
ANTIBODY SCREEN: NORMAL
ATRIAL RATE: 116 BPM
ATRIAL RATE: 152 BPM
BASE EXCESS BLDA CALC-SCNC: 8.5 MMOL/L (ref -2–3)
BODY TEMPERATURE: 37 DEGREES CELSIUS
BUN SERPL-MCNC: 16 MG/DL (ref 6–23)
BUN SERPL-MCNC: 18 MG/DL (ref 6–23)
CA-I BLD-SCNC: 1.22 MMOL/L (ref 1.1–1.33)
CA-I BLDA-SCNC: 1.24 MMOL/L (ref 1.1–1.33)
CALCIUM SERPL-MCNC: 8.9 MG/DL (ref 8.6–10.6)
CALCIUM SERPL-MCNC: 9.2 MG/DL (ref 8.6–10.6)
CHLORIDE BLDA-SCNC: 108 MMOL/L (ref 98–107)
CHLORIDE SERPL-SCNC: 102 MMOL/L (ref 98–107)
CHLORIDE SERPL-SCNC: 104 MMOL/L (ref 98–107)
CO2 SERPL-SCNC: 32 MMOL/L (ref 21–32)
CO2 SERPL-SCNC: 33 MMOL/L (ref 21–32)
CREAT SERPL-MCNC: 0.21 MG/DL (ref 0.5–1.05)
CREAT SERPL-MCNC: 0.21 MG/DL (ref 0.5–1.05)
DIGOXIN SERPL-MCNC: 0.45 NG/ML (ref 0.8–?)
EGFRCR SERPLBLD CKD-EPI 2021: >90 ML/MIN/1.73M*2
EGFRCR SERPLBLD CKD-EPI 2021: >90 ML/MIN/1.73M*2
ERYTHROCYTE [DISTWIDTH] IN BLOOD BY AUTOMATED COUNT: 19 % (ref 11.5–14.5)
GLUCOSE BLD MANUAL STRIP-MCNC: 114 MG/DL (ref 74–99)
GLUCOSE BLD MANUAL STRIP-MCNC: 116 MG/DL (ref 74–99)
GLUCOSE BLD MANUAL STRIP-MCNC: 126 MG/DL (ref 74–99)
GLUCOSE BLD MANUAL STRIP-MCNC: 127 MG/DL (ref 74–99)
GLUCOSE BLD MANUAL STRIP-MCNC: 139 MG/DL (ref 74–99)
GLUCOSE BLD MANUAL STRIP-MCNC: 145 MG/DL (ref 74–99)
GLUCOSE BLD MANUAL STRIP-MCNC: 171 MG/DL (ref 74–99)
GLUCOSE BLDA-MCNC: 158 MG/DL (ref 74–99)
GLUCOSE SERPL-MCNC: 115 MG/DL (ref 74–99)
GLUCOSE SERPL-MCNC: 148 MG/DL (ref 74–99)
HCO3 BLDA-SCNC: 33.4 MMOL/L (ref 22–26)
HCT VFR BLD AUTO: 28.4 % (ref 36–46)
HCT VFR BLD EST: 29 % (ref 36–46)
HGB BLD-MCNC: 9 G/DL (ref 12–16)
HGB BLDA-MCNC: 9.8 G/DL (ref 12–16)
INHALED O2 CONCENTRATION: 40 %
LACTATE BLDA-SCNC: 0.9 MMOL/L (ref 0.4–2)
MAGNESIUM SERPL-MCNC: 1.95 MG/DL (ref 1.6–2.4)
MCH RBC QN AUTO: 27.8 PG (ref 26–34)
MCHC RBC AUTO-ENTMCNC: 31.7 G/DL (ref 32–36)
MCV RBC AUTO: 88 FL (ref 80–100)
NRBC BLD-RTO: 0 /100 WBCS (ref 0–0)
OXYHGB MFR BLDA: 94.4 % (ref 94–98)
P AXIS: 56 DEGREES
P AXIS: 59 DEGREES
P OFFSET: 210 MS
P OFFSET: 216 MS
P ONSET: 168 MS
P ONSET: 168 MS
PCO2 BLDA: 47 MM HG (ref 38–42)
PH BLDA: 7.46 PH (ref 7.38–7.42)
PHOSPHATE SERPL-MCNC: 3 MG/DL (ref 2.5–4.9)
PHOSPHATE SERPL-MCNC: 3.1 MG/DL (ref 2.5–4.9)
PLATELET # BLD AUTO: 213 X10*3/UL (ref 150–450)
PO2 BLDA: 73 MM HG (ref 85–95)
POTASSIUM BLDA-SCNC: 3.8 MMOL/L (ref 3.5–5.3)
POTASSIUM SERPL-SCNC: 3.2 MMOL/L (ref 3.5–5.3)
POTASSIUM SERPL-SCNC: 3.7 MMOL/L (ref 3.5–5.3)
PR INTERVAL: 114 MS
PR INTERVAL: 116 MS
Q ONSET: 225 MS
Q ONSET: 226 MS
QRS COUNT: 19 BEATS
QRS COUNT: 25 BEATS
QRS DURATION: 70 MS
QRS DURATION: 74 MS
QT INTERVAL: 316 MS
QT INTERVAL: 334 MS
QTC CALCULATION(BAZETT): 464 MS
QTC CALCULATION(BAZETT): 502 MS
QTC FREDERICIA: 416 MS
QTC FREDERICIA: 430 MS
R AXIS: 44 DEGREES
R AXIS: 46 DEGREES
RBC # BLD AUTO: 3.24 X10*6/UL (ref 4–5.2)
RH FACTOR (ANTIGEN D): NORMAL
SAO2 % BLDA: 97 % (ref 94–100)
SODIUM BLDA-SCNC: 140 MMOL/L (ref 136–145)
SODIUM SERPL-SCNC: 144 MMOL/L (ref 136–145)
SODIUM SERPL-SCNC: 146 MMOL/L (ref 136–145)
T AXIS: 169 DEGREES
T AXIS: 214 DEGREES
T OFFSET: 384 MS
T OFFSET: 392 MS
VENTRICULAR RATE: 116 BPM
VENTRICULAR RATE: 152 BPM
WBC # BLD AUTO: 6.6 X10*3/UL (ref 4.4–11.3)

## 2025-02-07 PROCEDURE — 93005 ELECTROCARDIOGRAM TRACING: CPT

## 2025-02-07 PROCEDURE — 2020000001 HC ICU ROOM DAILY

## 2025-02-07 PROCEDURE — 2500000001 HC RX 250 WO HCPCS SELF ADMINISTERED DRUGS (ALT 637 FOR MEDICARE OP): Performed by: STUDENT IN AN ORGANIZED HEALTH CARE EDUCATION/TRAINING PROGRAM

## 2025-02-07 PROCEDURE — 82330 ASSAY OF CALCIUM: CPT

## 2025-02-07 PROCEDURE — L8501 TRACHEOSTOMY SPEAKING VALVE: HCPCS | Performed by: SPEECH-LANGUAGE PATHOLOGIST

## 2025-02-07 PROCEDURE — 80069 RENAL FUNCTION PANEL: CPT

## 2025-02-07 PROCEDURE — 71045 X-RAY EXAM CHEST 1 VIEW: CPT

## 2025-02-07 PROCEDURE — 51701 INSERT BLADDER CATHETER: CPT

## 2025-02-07 PROCEDURE — 2500000001 HC RX 250 WO HCPCS SELF ADMINISTERED DRUGS (ALT 637 FOR MEDICARE OP)

## 2025-02-07 PROCEDURE — 71045 X-RAY EXAM CHEST 1 VIEW: CPT | Performed by: STUDENT IN AN ORGANIZED HEALTH CARE EDUCATION/TRAINING PROGRAM

## 2025-02-07 PROCEDURE — 99233 SBSQ HOSP IP/OBS HIGH 50: CPT | Performed by: STUDENT IN AN ORGANIZED HEALTH CARE EDUCATION/TRAINING PROGRAM

## 2025-02-07 PROCEDURE — 2500000001 HC RX 250 WO HCPCS SELF ADMINISTERED DRUGS (ALT 637 FOR MEDICARE OP): Performed by: PHYSICIAN ASSISTANT

## 2025-02-07 PROCEDURE — 2500000005 HC RX 250 GENERAL PHARMACY W/O HCPCS

## 2025-02-07 PROCEDURE — 92522 EVALUATE SPEECH PRODUCTION: CPT | Mod: GN | Performed by: SPEECH-LANGUAGE PATHOLOGIST

## 2025-02-07 PROCEDURE — 99291 CRITICAL CARE FIRST HOUR: CPT | Performed by: NURSE PRACTITIONER

## 2025-02-07 PROCEDURE — 84132 ASSAY OF SERUM POTASSIUM: CPT

## 2025-02-07 PROCEDURE — 85027 COMPLETE CBC AUTOMATED: CPT

## 2025-02-07 PROCEDURE — 83735 ASSAY OF MAGNESIUM: CPT

## 2025-02-07 PROCEDURE — 2500000004 HC RX 250 GENERAL PHARMACY W/ HCPCS (ALT 636 FOR OP/ED): Performed by: PHYSICIAN ASSISTANT

## 2025-02-07 PROCEDURE — 82947 ASSAY GLUCOSE BLOOD QUANT: CPT

## 2025-02-07 PROCEDURE — 86901 BLOOD TYPING SEROLOGIC RH(D): CPT | Performed by: NURSE PRACTITIONER

## 2025-02-07 PROCEDURE — 99291 CRITICAL CARE FIRST HOUR: CPT | Performed by: ANESTHESIOLOGY

## 2025-02-07 PROCEDURE — 97530 THERAPEUTIC ACTIVITIES: CPT | Mod: GP | Performed by: PHYSICAL THERAPIST

## 2025-02-07 PROCEDURE — 37799 UNLISTED PX VASCULAR SURGERY: CPT

## 2025-02-07 PROCEDURE — 2500000004 HC RX 250 GENERAL PHARMACY W/ HCPCS (ALT 636 FOR OP/ED)

## 2025-02-07 PROCEDURE — 80162 ASSAY OF DIGOXIN TOTAL: CPT | Performed by: NURSE PRACTITIONER

## 2025-02-07 PROCEDURE — 94003 VENT MGMT INPAT SUBQ DAY: CPT

## 2025-02-07 PROCEDURE — 92507 TX SP LANG VOICE COMM INDIV: CPT | Mod: GN | Performed by: SPEECH-LANGUAGE PATHOLOGIST

## 2025-02-07 PROCEDURE — 94640 AIRWAY INHALATION TREATMENT: CPT

## 2025-02-07 PROCEDURE — 93010 ELECTROCARDIOGRAM REPORT: CPT | Performed by: INTERNAL MEDICINE

## 2025-02-07 PROCEDURE — 2500000002 HC RX 250 W HCPCS SELF ADMINISTERED DRUGS (ALT 637 FOR MEDICARE OP, ALT 636 FOR OP/ED)

## 2025-02-07 PROCEDURE — 2500000004 HC RX 250 GENERAL PHARMACY W/ HCPCS (ALT 636 FOR OP/ED): Performed by: NURSE PRACTITIONER

## 2025-02-07 RX ORDER — CLONAZEPAM 1 MG/1
1 TABLET ORAL EVERY 8 HOURS
Status: DISCONTINUED | OUTPATIENT
Start: 2025-02-07 | End: 2025-02-07

## 2025-02-07 RX ORDER — POTASSIUM CHLORIDE 1.5 G/1.58G
40 POWDER, FOR SOLUTION ORAL ONCE
Status: COMPLETED | OUTPATIENT
Start: 2025-02-07 | End: 2025-02-07

## 2025-02-07 RX ORDER — CLONAZEPAM 0.5 MG/1
0.5 TABLET ORAL EVERY 8 HOURS
Status: DISCONTINUED | OUTPATIENT
Start: 2025-02-07 | End: 2025-02-08

## 2025-02-07 RX ORDER — DIGOXIN 0.25 MG/ML
375 INJECTION INTRAMUSCULAR; INTRAVENOUS ONCE
Status: COMPLETED | OUTPATIENT
Start: 2025-02-07 | End: 2025-02-07

## 2025-02-07 RX ADMIN — PIPERACILLIN SODIUM AND TAZOBACTAM SODIUM 4.5 G: 4; .5 INJECTION, SOLUTION INTRAVENOUS at 20:33

## 2025-02-07 RX ADMIN — IPRATROPIUM BROMIDE AND ALBUTEROL SULFATE 3 ML: .5; 3 SOLUTION RESPIRATORY (INHALATION) at 01:21

## 2025-02-07 RX ADMIN — MIDODRINE HYDROCHLORIDE 15 MG: 10 TABLET ORAL at 12:42

## 2025-02-07 RX ADMIN — DIGOXIN 125 MCG: 0.25 INJECTION INTRAMUSCULAR; INTRAVENOUS at 08:19

## 2025-02-07 RX ADMIN — POTASSIUM CHLORIDE 40 MEQ: 1.5 POWDER, FOR SOLUTION ORAL at 05:52

## 2025-02-07 RX ADMIN — TRAZODONE HYDROCHLORIDE 25 MG: 50 TABLET ORAL at 20:33

## 2025-02-07 RX ADMIN — PIPERACILLIN SODIUM AND TAZOBACTAM SODIUM 4.5 G: 4; .5 INJECTION, SOLUTION INTRAVENOUS at 08:19

## 2025-02-07 RX ADMIN — PIPERACILLIN SODIUM AND TAZOBACTAM SODIUM 4.5 G: 4; .5 INJECTION, SOLUTION INTRAVENOUS at 15:29

## 2025-02-07 RX ADMIN — MIDODRINE HYDROCHLORIDE 15 MG: 10 TABLET ORAL at 18:12

## 2025-02-07 RX ADMIN — DEXMEDETOMIDINE HYDROCHLORIDE 0.24 MCG/KG/HR: 400 INJECTION INTRAVENOUS at 20:33

## 2025-02-07 RX ADMIN — IPRATROPIUM BROMIDE AND ALBUTEROL SULFATE 3 ML: .5; 3 SOLUTION RESPIRATORY (INHALATION) at 08:06

## 2025-02-07 RX ADMIN — OXYCODONE HYDROCHLORIDE 2.5 MG: 5 SOLUTION ORAL at 19:33

## 2025-02-07 RX ADMIN — HYDROCORTISONE SODIUM SUCCINATE 50 MG: 100 INJECTION, POWDER, FOR SOLUTION INTRAMUSCULAR; INTRAVENOUS at 12:41

## 2025-02-07 RX ADMIN — THIAMINE HCL TAB 100 MG 100 MG: 100 TAB at 08:19

## 2025-02-07 RX ADMIN — DIGOXIN 375 MCG: 0.25 INJECTION INTRAMUSCULAR; INTRAVENOUS at 13:55

## 2025-02-07 RX ADMIN — HYDROCORTISONE SODIUM SUCCINATE 50 MG: 100 INJECTION, POWDER, FOR SOLUTION INTRAMUSCULAR; INTRAVENOUS at 05:52

## 2025-02-07 RX ADMIN — BUDESONIDE 0.5 MG: 0.5 INHALANT RESPIRATORY (INHALATION) at 19:45

## 2025-02-07 RX ADMIN — IPRATROPIUM BROMIDE AND ALBUTEROL SULFATE 3 ML: .5; 3 SOLUTION RESPIRATORY (INHALATION) at 19:44

## 2025-02-07 RX ADMIN — HYDROCORTISONE SODIUM SUCCINATE 50 MG: 100 INJECTION, POWDER, FOR SOLUTION INTRAMUSCULAR; INTRAVENOUS at 18:13

## 2025-02-07 RX ADMIN — BUDESONIDE 0.5 MG: 0.5 INHALANT RESPIRATORY (INHALATION) at 08:06

## 2025-02-07 RX ADMIN — IPRATROPIUM BROMIDE AND ALBUTEROL SULFATE 3 ML: .5; 3 SOLUTION RESPIRATORY (INHALATION) at 15:28

## 2025-02-07 RX ADMIN — CLONAZEPAM 0.5 MG: 0.5 TABLET ORAL at 15:29

## 2025-02-07 RX ADMIN — PIPERACILLIN SODIUM AND TAZOBACTAM SODIUM 4.5 G: 4; .5 INJECTION, SOLUTION INTRAVENOUS at 03:23

## 2025-02-07 RX ADMIN — MIDODRINE HYDROCHLORIDE 15 MG: 10 TABLET ORAL at 05:52

## 2025-02-07 RX ADMIN — CLONAZEPAM 0.5 MG: 0.5 TABLET ORAL at 08:19

## 2025-02-07 RX ADMIN — Medication 40 PERCENT: at 08:19

## 2025-02-07 RX ADMIN — ESOMEPRAZOLE MAGNESIUM 40 MG: 40 FOR SUSPENSION ORAL at 08:19

## 2025-02-07 ASSESSMENT — PAIN SCALES - GENERAL
PAINLEVEL_OUTOF10: 0 - NO PAIN

## 2025-02-07 ASSESSMENT — COGNITIVE AND FUNCTIONAL STATUS - GENERAL
STANDING UP FROM CHAIR USING ARMS: TOTAL
TURNING FROM BACK TO SIDE WHILE IN FLAT BAD: A LOT
MOBILITY SCORE: 8
MOVING TO AND FROM BED TO CHAIR: TOTAL
WALKING IN HOSPITAL ROOM: TOTAL
CLIMB 3 TO 5 STEPS WITH RAILING: TOTAL
MOVING FROM LYING ON BACK TO SITTING ON SIDE OF FLAT BED WITH BEDRAILS: A LOT

## 2025-02-07 ASSESSMENT — PAIN - FUNCTIONAL ASSESSMENT
PAIN_FUNCTIONAL_ASSESSMENT: 0-10

## 2025-02-07 NOTE — CONSULTS
Reason For Consult  Urinary retention     History Of Present Illness  Jannie Vick is a 64 y.o. 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 Wagner on . Patient with complicated hospital course notable for hyponatremia, delirium, cardiogenic shock requiring inotropic support, respiratory failure requiring intubation s/p Trach/PEG . Urology consulted for recommendations and management recommendations for urinary retention.     Patient had a urinary catheter earlier in the admission that was removed. She failed multiple TOVs requiring straight caths. A yoo catheter was inserted by the ICU team earlier today with return of clear yellow urine. She is afebrile and HDS. No complaints at this time.       Past Medical History  She has a past medical history of Anxiety, Arthritis, Asthma, History of SCC (squamous cell carcinoma) of skin, Personal history of antineoplastic chemotherapy, and Primary cancer of right upper lobe of lung (Multi).    She has no past medical history of Malignant hyperthermia.    Surgical History  She has a past surgical history that includes Insertion / removal / replacement venous access catheter; Ovarian cyst surgery;  section, low transverse; Other surgical history; and Cardiac catheterization (N/A, 2025).     Social History  She reports that she quit smoking about 11 months ago. Her smoking use included cigarettes. She has been exposed to tobacco smoke. She has quit using smokeless tobacco. She reports that she does not currently use alcohol. She reports that she does not use drugs.    Family History  Family History   Problem Relation Name Age of Onset    Adrenal disorder Mother      Bone cancer Mother      Skin cancer Father          Allergies  Patient has no known allergies.    Review of Systems  None other than  "HPI     Physical Exam  General: resting comfortably in bed  ENT: tracheostomy tube connected to vent   CV: regular rate and rhythm  Pulm: nonlabored breathing on supplemental oxygen, nonverbal due to trach  Abd: soft, nondistended, nontender  : yoo catheter draining clear yellow urine  MSK: PERALES, no gross deformities  Psych: mood and behavior normal       Last Recorded Vitals  Blood pressure 127/68, pulse 96, temperature 36.8 °C (98.2 °F), temperature source Temporal, resp. rate 22, height 1.651 m (5' 5\"), weight 55.2 kg (121 lb 11.1 oz), SpO2 98%.    Relevant Results         Assessment/Plan     Jannie Vick is a 64 y.o. 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 Wagner on 1/13. Patient with complicated hospital course notable for hyponatremia, delirium, cardiogenic shock requiring inotropic support, respiratory failure requiring intubation s/p Trach/PEG 1/30. Urology consulted for recommendations and management recommendations for urinary retention. Yoo catheter replaced today by ICU team.     Recommendations:  - Maintain yoo catheter on discharge   - Request urology referral for outpatient TOV in office  - Urology will sign off    Dominic Overton MD      "

## 2025-02-07 NOTE — PROGRESS NOTES
"Faustina Vick \"Noam" is a 64 y.o. female on day 25 of admission presenting with Primary cancer of right upper lobe of lung (Multi).    Subjective   Urine retention overnight, straight cath performed with 800ml uop  Remains off pressors    Objective   Physical Exam  Vitals reviewed.   Constitutional:       General: She is awake.      Appearance: She is underweight.      Comments: Precedex on at 0.25mcg/kg/hr   HENT:      Head: Normocephalic and atraumatic.      Nose: Nose normal.      Mouth/Throat:      Mouth: Mucous membranes are moist.   Eyes:      Extraocular Movements: Extraocular movements intact.      Pupils: Pupils are equal, round, and reactive to light.   Neck:      Comments: 7.5 cuffed Shiley Trach midline, no drainage  Cardiovascular:      Rate and Rhythm: Normal rate and regular rhythm.      Pulses: Normal pulses.      Heart sounds: Normal heart sounds.   Pulmonary:      Effort: Pulmonary effort is normal.      Breath sounds: Rhonchi: left side.      Comments: Mechanically ventilated via trach. Scattered rhonchi left chest field  Chest:      Comments: Right lateral chest incisions ROSHAN without drainage  Abdominal:      Palpations: Abdomen is soft.      Comments: PEG tube in place with TF infusing at 45ml/hr. Insertion site without erythema or drainage   Genitourinary:     Comments: Purewick in place  Musculoskeletal:         General: Normal range of motion.      Cervical back: Normal range of motion and neck supple.   Skin:     General: Skin is warm and dry.   Neurological:      General: No focal deficit present.      Mental Status: She is alert. She is confused.      GCS: GCS eye subscore is 4. GCS verbal subscore is 1. GCS motor subscore is 6.      Motor: Weakness: generalized.      Comments: Has white board and mouthes words to assist with communication   Psychiatric:         Mood and Affect: Mood normal.         Behavior: Behavior normal. Behavior is cooperative.       Last Recorded Vitals  Blood " "pressure 108/63, pulse 86, temperature 36 °C (96.8 °F), resp. rate 14, height 1.651 m (5' 5\"), weight 55.2 kg (121 lb 11.1 oz), SpO2 96%.    VS over last 24h  Heart Rate:  []   Temp:  [36 °C (96.8 °F)-37 °C (98.6 °F)]   Resp:  [14-35]   BP: (102-151)/(57-92)   SpO2:  [93 %-98 %]      Intake/Output last 3 Shifts:  I/O last 3 completed shifts:  In: 2906.7 (52.7 mL/kg) [I.V.:431.7 (7.8 mL/kg); NG/GT:2375; IV Piggyback:100]  Out: 1875 (34 mL/kg) [Urine:1875 (0.9 mL/kg/hr)]  Weight: 55.2 kg       Intake/Output Summary (Last 24 hours) at 2/7/2025 0745  Last data filed at 2/7/2025 0500  Gross per 24 hour   Intake 1836.37 ml   Output 1045 ml   Net 791.37 ml        Relevant Results  Scheduled medications  budesonide, 0.5 mg, nebulization, BID  clonazePAM, 0.5 mg, g-tube, q8h  digoxin, 125 mcg, intravenous, Daily  esomeprazole, 40 mg, g-tube, Daily before breakfast  hydrocortisone sodium succinate, 50 mg, intravenous, q6h  insulin lispro, 0-5 Units, subcutaneous, q4h  ipratropium-albuteroL, 3 mL, nebulization, q6h  midodrine, 15 mg, g-tube, q6h  oxygen, , inhalation, Continuous - Inhalation  pancrelipase (Lip-Prot-Amyl) 2 tablet, sodium bicarbonate 650 mg, , g-tube, Once  piperacillin-tazobactam, 4.5 g, intravenous, q6h  thiamine, 100 mg, g-tube, Daily  traZODone, 25 mg, g-tube, Nightly      PRN medications  PRN medications: acetylcysteine, albuterol, calcium gluconate, calcium gluconate, dextrose, dextrose, glucagon, glucagon, HYDROmorphone, magnesium sulfate, magnesium sulfate, oxyCODONE, oxygen, oxygen, oxygen, potassium chloride        Results from last 7 days   Lab Units 02/07/25  0324 02/06/25  0205 02/05/25  0405   WBC AUTO x10*3/uL 6.6 6.7 6.3   HEMOGLOBIN g/dL 9.0* 8.9* 9.1*   PLATELETS AUTO x10*3/uL 213 170 163      Results from last 7 days   Lab Units 02/07/25  0324 02/06/25  0205 02/05/25  0405   SODIUM mmol/L 144 145 145   POTASSIUM mmol/L 3.2* 4.1 3.4*   CHLORIDE mmol/L 102 107 102   CO2 mmol/L 33* 31 34* "   BUN mg/dL 16 17 17   CREATININE mg/dL 0.21* 0.23* <0.20*   GLUCOSE mg/dL 148* 135* 135*   MAGNESIUM mg/dL 1.95 1.94 2.11   PHOSPHORUS mg/dL 3.1 2.6 3.4      Results from last 7 days   Lab Units 02/06/25  0205 02/05/25  1643 02/05/25  0405 02/04/25  0310   INR  1.2*  --  1.2* 1.2*   PROTIME seconds 13.8*  --  13.3* 14.0*   APTT seconds 44*   < > 34 40*    < > = values in this interval not displayed.              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 Wagner on 1/13 requiring post-op vasoactive therapy. Post-op course c/b acute hyponatremia, multifactorial delirium, cardiogenic shock requiring inotropic support, and acute hypoxic/hypercarbic respiratory failure requiring intubation. COVID+ 1/20. Weaned off milrinone 1/21. Weaned off iEpo 1/24. S/p trach/PEG 1/30 tolerating TF through PEG. TTE 2/3 with improved EF to 60-65%     Plan:  NEURO: History of anxiety on alprazolam 1mg qd. A&Ox4 at baseline. Physical deconditioning, tolerating increased activity with PT/OT. Situational depression/anxiety, on Precedex 0.25 this am. Also on scheduled clonazepam and nightly trazodone. Acute delirium.  - Qtc 436 today  - Continue Trazodone nightly  - Continue Clonazepam 0.5 mg q8h with hold parameters  - Continue Oxycodone and Dilaudid prn   - Ongoing neuro and pain assessments  - PT/OT -> continue to progress activity  - CAM assessments q shift  - continue pet therapy  - PICS team consult  - delirium precautions, REST protocol     CV: No history of cardiac disease. Baseline /79, -110. TTE 9/2024 normal biV function. Intra-op and immediate post op hypotension requiring pressor support. TTE 1/13 with EF 60-65%, low normal RV systolic function. Acutely elevated lactate 1/15-1/16. Repeat TTE 1/16: acute  biV dysfunction/takotsubo cardiomyopathy, EF 34%, mod reduced RV systolic function, mild to mod TR. Cardiogenic shock resolved. Weaned off milrinone 1/21, iEpo 1/24. Difficulty weaning off low dose vaso, started midodrine 1/25. Repeat TTE 2/3 with improved EF to 60-65%. Ongoing intermittent episodes of tachycardia to 150s. Suspected Aflutter/atrial tachycardia, added digoxin 2/3. Started low intensity heparin infusion 2/5. On/off phenyl and vaso for nighttime hypotension. Midodrine increased to 15mg q6h. Restarted stress dose steroids 2/4. No pressor support needed overnight 2/5-2/6  - Continue digoxin 125mcg daily, level 0.45 (0.51 on  2/6) -> dose with additional 375mcg today, recheck dig level daily  - EP following: tachycardia due to acute illness. Continue dig and low intensity heparin infusion. Consider tikosyn once patient stable in sinus rhythm, ziopathch at discharge. Further discuss risk/benefit of amiodarone with thoracic surgery  - Continue Midodrine 15 mg q6h  - Continue stress dose steroids q6h  - Continuous EKG/abp monitoring  - MAP goal >65  - Goal even to negative fluid balance     PULM: Hx of 30 pack year smoking history (quit 4/2024), COPD, and stage III right lung SCC s/p chemo/XRT/immunotherapy. 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 Arrived to SICU extubated on 10L SFM, weaned to room air 1/14. Chest tube removed 1/14. Acute on chronic hypoxic respiratory failure 1/16 in the setting of COVID. Intubated 1/18. S/p trach 1/30. Sutures cut 2/6. Tolerating SBTs, was on SIMV overnight  - Wean FiO2 to maintain SpO2 >92%  - Scheduled budesonide/duoneb  - continue humidified vent  - PRN albuterol  - Daily CXR prn  - ABG prn  - Holding home benzonatate    GI: No GI history. Elevated LFT 1/16-1/21 resolved. Evidence of pericholecystic fluid and biliary sludge on renal US 1/15 but no abdominal symptoms. RUQ US without significant findings. S/P  PEG 1/30. Tolerating TF at goal rate. Last Bms 2/6, liquidy.  - Continue thiamine 100mg IV daily  - Continue IV PPI for GI prophylaxis  - hold bowel reg for diarrhea  - consider sending C.diff if persistent diarrhea after holding bowel reg  - consider adding fiber if C.diff negative  - PM speaking valve trial with SLP today    : No history of renal disease. Baseline creatinine 0.6. Symptomatic acute hyponatremia resolved, was given 3% saline infusions. Yoo replaced 2/1 for urine retention. Trial of silodosin stopped given recurrent hypotension. Failed TOV, straight cathed overnight. Hypokalemia  - continued urine retention, replace yoo  - Urology consult for urine retention recs  - RFP daily and prn  - Maintain U/O >0.5ml/kg/hr  - Replete electrolytes to maintain K>4, Phos>2.5, iCal>1.1, Mag>2     HEME: Hx of DOC. Baseline H/H 12/40. Acute blood loss anemia. Acute coagulopathy resolved. H/H essentially stable. Started on low intensity heparin infusion 2/5  - Check CBC daily and PRN  - coags prn  - Ongoing monitoring for s/s bleeding  - Maintain active T&S (2/7)  - check assays and adjust heparin infusion per nomogram    ENDO: No history of DM or thyroid disease. Adequate glycemic control  - BG q4h, SSI #1     MSK: Arthritis, Vit D deficiency. Sacral wound.  - Turn patient every 2 hrs, continue wound care  - Not on meds at home  - Monitor for symptoms     ID:  Completed treatment of COVID with decadron and remdesivir. Off precautions since 1/30. Pseudomonas UTI 2/1, started on cetriaxone 2/2, switched to zosyn 2/3 for 7 day course. Blood cultures sent 2/4, NGTD. Currently afebrile, no leukocytosis.  - continue zosyn through 2/9  - F/U blood cultures  - obtain sputum culture if able  - Temp q4h, wbc daily  - Ongoing monitoring for s/s infection    Access:  - R axillary a line (1/28-)  - Left chest mediport (not currently accessed)  - PIV x2  - Trach (1/30)  - PEG (1/30)  - yoo 2/7     Dispo: Continue ICU care.  Patient seen and discussed with ICU attending Dr. Ibrahim. DELMAR helping guide family with LTACH placement [facility].    Code Status: Full Code     Family:  was updated at bedside yesterday, will update family at bedside or call today    SICU Phone [phone]    ANDREA Disla    Critical care time: 45 minutes

## 2025-02-07 NOTE — PROGRESS NOTES
LSW left message with [facility]'s admission office to inquire about the referral that was sent yesterday. LSW had to leave a voice message. LSW will continue to monitor.     Addendum:   GEOFFREYW spoke with Virgen Henry, of the admission's office at [facility]. They are reviewing the clinicals and verifying health insurance coverage. Virgen reported that they will have a decision to accept or not to accept on Monday and she will call this  with their decision.

## 2025-02-07 NOTE — PROGRESS NOTES
"Speech-Language Pathology  Adult Inpatient External Speaking Valve Placement (PMSV)    Patient Name: Faustina Vick \"Jannie\"  MRN: [id number]  Today's Date: 2/7/2025   Start Time: 1100  Stop Time: 1130  Time Calculation (min): 30    History of Present Illness:  Faustina Vick is a 65 y/o with PMHx of COPD and 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 Wagner on 1/13 requiring post-op vasoactive therapy. Post-op course c/b acute hyponatremia, multifactorial delirium, cardiogenic shock requiring inotropic support, and acute hypoxic/hypercarbic respiratory failure requiring intubation. COVID+ 1/20. Weaned off milrinone 1/21. Weaned off iEpo 1/24. S/p trach/PEG 1/30 tolerating TF through PEG. TTE 2/3 with improved EF to 60-65%     Tracheostomy Type: Shiley #6 cuffed tracheostomy tube    On Vent: Yes    Impression:   External speaking valve placement completed in-line with ventilator. Vent settings appropriate for in-line PMSV placement. Pt sleeping upon arrival; easily aroused. Pt nodded head 'yes' when SLLP provided education re: placement of speaking valve. Pt tolerated in-line tracheal suction with min amounts of thin/clear secretions extracted. Cuff deflation completed with immediate wet nonproductive coughing noted. PMSV placed in-line with the ventilator. Pt tolerated external speaking valve placement for approx 3 minutes; constant coughing despite cues for regular pattern breathing. No attempts at vocalization at this time. SpO2 decreased to low 90s during valve placement/coughing episodes therefore valve removed and cuff re-inflated. Pt back on ventilator as she was at beginning of session. SLP to continue to follow pt while in acute care setting for continued attempts at speaking valve placement. Nursing and RT aware.      Recommend Speech Therapy Services:   Yes; Targeting PMSV " trials.     Goal(s):   Pt will complete PMSV trials with no significant changes to vital signs with 100% accuracy.     Start Date: 2/7/25  End Date: 2/17/25  Status: Goal Initiated this date       Plan:  SLP Services Indicated: Yes  Frequency: 2x week  Discussed POC with patient  SLP - OK to Discharge    Pain:   0-10  0 = No pain.     Inpatient Education:  Extensive education provided to patient regarding current function, recommendations/results, and POC.      Consultations/Referrals/Coordination of Services:   N/A

## 2025-02-07 NOTE — PROGRESS NOTES
Electrophysiology Progress Note    Subjective/Overnight events:  No new symptoms.      Objective:    Last Recorded Vitals:  Vitals:    25 0900 25 1000 25 1100 25 1200   BP: 118/69 126/71 125/83 136/87   BP Location:    Left arm   Patient Position:    Lying   Pulse: 98 99 93 97   Resp:    Temp:    36.8 °C (98.2 °F)   TempSrc:    Temporal   SpO2: 97% 96% 96% 97%   Weight:       Height:         Medical Gas Therapy: Supplemental oxygen  Medical Gas Delivery Method: Trach tube  Weight  Av.3 kg (121 lb 15.3 oz)  Min: 52.1 kg (114 lb 13.8 oz)  Max: 59.5 kg (131 lb 2.8 oz)      LABS:  CMP:  Results from last 7 days   Lab Units 25  1202 25  0324 25  0205 25  0405 25  0310 25  0218 25  0235 25  0052 25  0316   SODIUM mmol/L 146* 144 145 145 141 141 142  --  140   POTASSIUM mmol/L 3.7 3.2* 4.1 3.4* 4.2 3.7 3.9  --  3.9   CHLORIDE mmol/L 104 102 107 102 100 99 99  --  98   CO2 mmol/L 32 33* 31 34* 34* 35* 35*  --  35*   ANION GAP mmol/L 14 12 11 12 11 11 12  --  11   BUN mg/dL 18 16 17 17 16 17 15  --  16   CREATININE mg/dL 0.21* 0.21* 0.23* <0.20* <0.20* <0.20* <0.20*  --  <0.20*   EGFR mL/min/1.73m*2 >90 >90 >90  --   --   --   --   --   --    MAGNESIUM mg/dL  --  1.95 1.94 2.11 1.97 2.09  --  2.93* 2.19   ALBUMIN g/dL 3.2* 3.2* 3.3* 3.4 3.1* 3.2* 3.1*  --  3.4     CBC:  Results from last 7 days   Lab Units 25  0324 25  0205 25  0405 25  0310 25  0218 25  0052 25  0316   WBC AUTO x10*3/uL 6.6 6.7 6.3 6.1 7.5 8.5 9.5   HEMOGLOBIN g/dL 9.0* 8.9* 9.1* 9.3* 9.9* 9.9* 9.7*   HEMATOCRIT % 28.4* 27.9* 28.7* 28.9* 30.8* 30.8* 30.1*   PLATELETS AUTO x10*3/uL 213 170 163 140* 152 162 184   MCV fL 88 87 87 88 88 88 88     COAG:   Results from last 7 days   Lab Units 25  0205 25  0405 250 258   INR  1.2* 1.2* 1.2* 1.3*     ABO:   ABO TYPE   Date Value Ref Range Status  "  02/07/2025 B  Final     HEME/ENDO:  Results from last 7 days   Lab Units 02/04/25  0812   HEMOGLOBIN A1C % 4.9      CARDIAC:       No lab exists for component: \"CK\", \"CKMBP\"   Results from last 7 days   Lab Units 02/04/25  0812   HEMOGLOBIN A1C % 4.9        Last I/O:    Intake/Output Summary (Last 24 hours) at 2/7/2025 1314  Last data filed at 2/7/2025 1300  Gross per 24 hour   Intake 1808.97 ml   Output 1190 ml   Net 618.97 ml     Net IO Since Admission: 334.65 mL [02/07/25 1314]      Inpatient Medications:  Scheduled medications   Medication Dose Route Frequency    budesonide  0.5 mg nebulization BID    clonazePAM  0.5 mg g-tube q8h    digoxin  125 mcg intravenous Daily    digoxin  375 mcg intravenous Once    esomeprazole  40 mg g-tube Daily before breakfast    hydrocortisone sodium succinate  50 mg intravenous q6h    insulin lispro  0-5 Units subcutaneous q4h    ipratropium-albuteroL  3 mL nebulization q6h    midodrine  15 mg g-tube q6h    oxygen   inhalation Continuous - Inhalation    pancrelipase (Lip-Prot-Amyl) 2 tablet, sodium bicarbonate 650 mg   g-tube Once    piperacillin-tazobactam  4.5 g intravenous q6h    thiamine  100 mg g-tube Daily    traZODone  25 mg g-tube Nightly     PRN medications   Medication    acetylcysteine    albuterol    calcium gluconate    calcium gluconate    dextrose    dextrose    glucagon    glucagon    HYDROmorphone    magnesium sulfate    magnesium sulfate    oxyCODONE    oxygen    oxygen    oxygen    potassium chloride     Continuous Medications   Medication Dose Last Rate    dexmedeTOMIDine  0-1.5 mcg/kg/hr 0.24 mcg/kg/hr (02/07/25 0700)    heparin  0-4,000 Units/hr 1,000 Units/hr (02/07/25 0700)       Outpatient Medications:  Current Outpatient Medications   Medication Instructions    ALPRAZolam (XANAX) 1 mg, Nightly PRN    benzonatate (TESSALON) 200 mg, 3 times daily PRN    chlorhexidine (Hibiclens) 4 % external liquid Topical, Daily PRN    chlorhexidine (Peridex) 0.12 % " solution 15 mL, As needed    Trelegy Ellipta 100-62.5-25 mcg blister with device 1 puff, Daily    Ventolin HFA 90 mcg/actuation inhaler 2 puffs, Every 6 hours PRN       Physical Exam:  General: Resting in bed  HEENT: Trach in place, no surrounding drainage or bleeding  Resp: symmetric chest rise,  CV: well perfused, intermittently tachycardic    Assessment/Plan:  Faustina Vick (Jannie) is a 64F with Hx of Stage III lung cancer, asthma, anxiety, and arthritis who is now s/p R pneumonectomy with Dr. Wagner 1/13/25. 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 Cardiology was consulted and recommended Lasix gtt and milrinone. Lasix gtt held 1/17, hyponatremia improving. Patient underwent RHC and SGC placement with cardiology 1/17. She was reintubated on 1/18 d/t worsening respiratory status and AMS. Also had increased pressor requirements, now weaned off. Repeat echo stable on 1/18. COVID positive on 1/20.     The patient's recurrent atrial flutter/tachycardia following pneumonectomy presents a therapeutic challenge, given the limitations of available antiarrhythmic options.     Telemetry review showed short parosyxms of Atach/flutter up to the 130s with spontaneous conversion to normal sinus rhythm in the 80 to 90s.    -Off pressors for about 48 hours. Remains on midodrine 15 mg TID. BP in the 120-130s systolic.   -Consider restarting low dose metoprolol for better arrhythmia suppression, can up titrate as BP tolerates  -Continue digoxin 125mcg daily ( patient was loaded with 375 mg of digoxin today)  -Continue anticoagulation  -Will need a ziopatch on discharge with EP follow up.    Rowan Blackburn MD  Cardiology, PGY-5

## 2025-02-07 NOTE — PROGRESS NOTES
"Faustina Vick \"Jannie\" is a 64 y.o. female who presents for Primary cancer of right upper lobe of lung (Multi) [C34.11].  She initially received chemotherapy and radiation, but appeared to have residual radiographic abnormalities.  PET/CT suggested progression of disease, and in this setting and Dr. Wagner felt that surgical resection in a \"salvage\" setting might facilitate long-term overall survival given the failure of nonsurgical management. She is now  25 days post op from PNEUMONECTOMY (Right) (en bloc with chest wall resection and reconstruction), mediastinal lymph node dissection for a  Primary cancer of right upper lobe of lung (Multi).      Subjective   NAEO    Objective     General: Resting in bed  HEENT: Trach in place, no surrounding drainage or bleeding  Resp: symmetric chest rise, on vent  CV: Sinus tachycardia, 100s when in room, off presors  Abd: soft, ND, PEG in place with TF running at 45  Neuro: awake and alert, withdrawn    Last Recorded Vitals  Blood pressure 141/81, pulse 101, temperature 36.8 °C (98.2 °F), temperature source Temporal, resp. rate 23, height 1.651 m (5' 5\"), weight 55.2 kg (121 lb 11.1 oz), SpO2 95%.  Intake/Output last 3 Shifts:  I/O last 3 completed shifts:  In: 2906.7 (52.7 mL/kg) [I.V.:431.7 (7.8 mL/kg); NG/GT:2375; IV Piggyback:100]  Out: 1875 (34 mL/kg) [Urine:1875 (0.9 mL/kg/hr)]  Weight: 55.2 kg     Relevant Results  Scheduled medications  budesonide, 0.5 mg, nebulization, BID  clonazePAM, 0.5 mg, g-tube, q8h  digoxin, 125 mcg, intravenous, Daily  esomeprazole, 40 mg, g-tube, Daily before breakfast  hydrocortisone sodium succinate, 50 mg, intravenous, q6h  insulin lispro, 0-5 Units, subcutaneous, q4h  ipratropium-albuteroL, 3 mL, nebulization, q6h  midodrine, 15 mg, g-tube, q6h  oxygen, , inhalation, Continuous - Inhalation  pancrelipase (Lip-Prot-Amyl) 2 tablet, sodium bicarbonate 650 mg, , g-tube, Once  piperacillin-tazobactam, 4.5 g, intravenous, q6h  thiamine, 100 mg, " g-tube, Daily  traZODone, 25 mg, g-tube, Nightly      Continuous medications  dexmedeTOMIDine, 0-1.5 mcg/kg/hr, Last Rate: 0.24 mcg/kg/hr (02/07/25 0700)  heparin, 0-4,000 Units/hr, Last Rate: 1,000 Units/hr (02/07/25 0700)      PRN medications  PRN medications: acetylcysteine, albuterol, calcium gluconate, calcium gluconate, dextrose, dextrose, glucagon, glucagon, HYDROmorphone, magnesium sulfate, magnesium sulfate, oxyCODONE, oxygen, oxygen, oxygen, potassium chloride    Results for orders placed or performed during the hospital encounter of 01/13/25 (from the past 24 hours)   Heparin Assay, UFH   Result Value Ref Range    Heparin Unfractionated 0.3 See Comment Below for Therapeutic Ranges IU/mL   POCT GLUCOSE   Result Value Ref Range    POCT Glucose 135 (H) 74 - 99 mg/dL   Heparin Assay, UFH   Result Value Ref Range    Heparin Unfractionated 0.3 See Comment Below for Therapeutic Ranges IU/mL   POCT GLUCOSE   Result Value Ref Range    POCT Glucose 133 (H) 74 - 99 mg/dL   POCT GLUCOSE   Result Value Ref Range    POCT Glucose 139 (H) 74 - 99 mg/dL   CBC   Result Value Ref Range    WBC 6.6 4.4 - 11.3 x10*3/uL    nRBC 0.0 0.0 - 0.0 /100 WBCs    RBC 3.24 (L) 4.00 - 5.20 x10*6/uL    Hemoglobin 9.0 (L) 12.0 - 16.0 g/dL    Hematocrit 28.4 (L) 36.0 - 46.0 %    MCV 88 80 - 100 fL    MCH 27.8 26.0 - 34.0 pg    MCHC 31.7 (L) 32.0 - 36.0 g/dL    RDW 19.0 (H) 11.5 - 14.5 %    Platelets 213 150 - 450 x10*3/uL   Type And Screen   Result Value Ref Range    ABO TYPE B     Rh TYPE POS     ANTIBODY SCREEN NEG    Calcium, Ionized   Result Value Ref Range    POCT Calcium, Ionized 1.22 1.1 - 1.33 mmol/L   Magnesium   Result Value Ref Range    Magnesium 1.95 1.60 - 2.40 mg/dL   Renal function panel   Result Value Ref Range    Glucose 148 (H) 74 - 99 mg/dL    Sodium 144 136 - 145 mmol/L    Potassium 3.2 (L) 3.5 - 5.3 mmol/L    Chloride 102 98 - 107 mmol/L    Bicarbonate 33 (H) 21 - 32 mmol/L    Anion Gap 12 10 - 20 mmol/L    Urea Nitrogen  16 6 - 23 mg/dL    Creatinine 0.21 (L) 0.50 - 1.05 mg/dL    eGFR >90 >60 mL/min/1.73m*2    Calcium 9.2 8.6 - 10.6 mg/dL    Phosphorus 3.1 2.5 - 4.9 mg/dL    Albumin 3.2 (L) 3.4 - 5.0 g/dL   Digoxin   Result Value Ref Range    Digoxin  0.45 (L) 0.80 - <2.00 ng/mL   POCT GLUCOSE   Result Value Ref Range    POCT Glucose 145 (H) 74 - 99 mg/dL   POCT GLUCOSE   Result Value Ref Range    POCT Glucose 171 (H) 74 - 99 mg/dL   POCT GLUCOSE   Result Value Ref Range    POCT Glucose 127 (H) 74 - 99 mg/dL   Blood Gas Arterial Full Panel   Result Value Ref Range    POCT pH, Arterial 7.46 (H) 7.38 - 7.42 pH    POCT pCO2, Arterial 47 (H) 38 - 42 mm Hg    POCT pO2, Arterial 73 (L) 85 - 95 mm Hg    POCT SO2, Arterial 97 94 - 100 %    POCT Oxy Hemoglobin, Arterial 94.4 94.0 - 98.0 %    POCT Hematocrit Calculated, Arterial 29.0 (L) 36.0 - 46.0 %    POCT Sodium, Arterial 140 136 - 145 mmol/L    POCT Potassium, Arterial 3.8 3.5 - 5.3 mmol/L    POCT Chloride, Arterial 108 (H) 98 - 107 mmol/L    POCT Ionized Calcium, Arterial 1.24 1.10 - 1.33 mmol/L    POCT Glucose, Arterial 158 (H) 74 - 99 mg/dL    POCT Lactate, Arterial 0.9 0.4 - 2.0 mmol/L    POCT Base Excess, Arterial 8.5 (H) -2.0 - 3.0 mmol/L    POCT HCO3 Calculated, Arterial 33.4 (H) 22.0 - 26.0 mmol/L    POCT Hemoglobin, Arterial 9.8 (L) 12.0 - 16.0 g/dL    POCT Anion Gap, Arterial 2 (L) 10 - 25 mmo/L    Patient Temperature 37.0 degrees Celsius    FiO2 40 %   POCT GLUCOSE   Result Value Ref Range    POCT Glucose 114 (H) 74 - 99 mg/dL   Renal function panel   Result Value Ref Range    Glucose 115 (H) 74 - 99 mg/dL    Sodium 146 (H) 136 - 145 mmol/L    Potassium 3.7 3.5 - 5.3 mmol/L    Chloride 104 98 - 107 mmol/L    Bicarbonate 32 21 - 32 mmol/L    Anion Gap 14 10 - 20 mmol/L    Urea Nitrogen 18 6 - 23 mg/dL    Creatinine 0.21 (L) 0.50 - 1.05 mg/dL    eGFR >90 >60 mL/min/1.73m*2    Calcium 8.9 8.6 - 10.6 mg/dL    Phosphorus 3.0 2.5 - 4.9 mg/dL    Albumin 3.2 (L) 3.4 - 5.0 g/dL   POCT  GLUCOSE   Result Value Ref Range    POCT Glucose 116 (H) 74 - 99 mg/dL     CXR 2/2:    IMPRESSION:  1. No significant change in patchy left lung airspace opacities,  which may represent edema/infiltrates.  2. Stable postsurgical changes related to right-sided pneumonectomy.       Assessment/Plan   Assessment & Plan  Primary cancer of right upper lobe of lung (Multi)    Chronic obstructive pulmonary disease (Multi)    Malignant neoplasm of upper lobe of right lung (Multi)    Cardiogenic shock (Multi)    Respiratory distress    Faustina Vick (Jannie) is a 64F with Hx of Stage III lung cancer, asthma, anxiety, and arthritis who is now s/p R pneumonectomy with Dr. Wagner 1/13/25. 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 Cardiology was consulted and recommended Lasix gtt and milrinone. Lasix gtt held 1/17, hyponatremia improving. Patient underwent RHC and SGC placement with cardiology 1/17. She was reintubated on 1/18 d/t worsening respiratory status and AMS. Also had increased pressor requirements, now weaned off. Repeat echo stable on 1/18. COVID positive on 1/20.     Failed extubation trials, now s/p trach/PEG with Dr. Wagner 1/30.     - Do not recommend amio given risk of pulmonary complications in the setting of pneumonectomy  - Continue & appreciate supportive ICU care  - Continue Tfs at goal, appreciate nutrition recs  - Pulmonary toilet  - PT/OT for rehabilitation, patient will likely need LTACH placement  - Agree with blood and sputum cultures given new pressor and O2 requirments  - Continue Abx for UTI, recommend removal of Montiel if possible    Patient discussed with attending Dr. Wagner.    Sarah Miranda MD  Thoracic Surgery [id number]

## 2025-02-07 NOTE — PROGRESS NOTES
"Faustina Vick \"Noam" is a 64 y.o. female on day 25 of admission presenting with Primary cancer of right upper lobe of lung (Multi).    Subjective   Urine retention overnight, straight cath performed with 800ml uop  Remains off pressors    Objective   Physical Exam  Vitals reviewed.   Constitutional:       General: She is awake.      Appearance: She is underweight.      Comments: Precedex on at 0.25mcg/kg/hr   HENT:      Head: Normocephalic and atraumatic.      Nose: Nose normal.      Mouth/Throat:      Mouth: Mucous membranes are moist.   Eyes:      Extraocular Movements: Extraocular movements intact.      Pupils: Pupils are equal, round, and reactive to light.   Neck:      Comments: 7.5 cuffed Shiley Trach midline, no drainage  Cardiovascular:      Rate and Rhythm: Normal rate and regular rhythm.      Pulses: Normal pulses.      Heart sounds: Normal heart sounds.   Pulmonary:      Effort: Pulmonary effort is normal.      Breath sounds: Rhonchi: left side.      Comments: Mechanically ventilated via trach. Scattered rhonchi left chest field  Chest:      Comments: Right lateral chest incisions ROSHAN without drainage  Abdominal:      Palpations: Abdomen is soft.      Comments: PEG tube in place with TF infusing at 45ml/hr. Insertion site without erythema or drainage   Genitourinary:     Comments: Purewick in place  Musculoskeletal:         General: Normal range of motion.      Cervical back: Normal range of motion and neck supple.   Skin:     General: Skin is warm and dry.   Neurological:      General: No focal deficit present.      Mental Status: She is alert. She is confused.      GCS: GCS eye subscore is 4. GCS verbal subscore is 1. GCS motor subscore is 6.      Motor: Weakness: generalized.      Comments: Has white board and mouthes words to assist with communication   Psychiatric:         Mood and Affect: Mood normal.         Behavior: Behavior normal. Behavior is cooperative.       Last Recorded Vitals  Blood " "pressure 136/87, pulse 97, temperature 36.8 °C (98.2 °F), temperature source Temporal, resp. rate 22, height 1.651 m (5' 5\"), weight 55.2 kg (121 lb 11.1 oz), SpO2 97%.    VS over last 24h  Heart Rate:  []   Temp:  [36 °C (96.8 °F)-37 °C (98.6 °F)]   Resp:  [14-32]   BP: (102-151)/(61-87)   SpO2:  [93 %-98 %]      Intake/Output last 3 Shifts:  I/O last 3 completed shifts:  In: 2906.7 (52.7 mL/kg) [I.V.:431.7 (7.8 mL/kg); NG/GT:2375; IV Piggyback:100]  Out: 1875 (34 mL/kg) [Urine:1875 (0.9 mL/kg/hr)]  Weight: 55.2 kg       Intake/Output Summary (Last 24 hours) at 2/7/2025 1324  Last data filed at 2/7/2025 1300  Gross per 24 hour   Intake 1808.97 ml   Output 1190 ml   Net 618.97 ml        Relevant Results  Scheduled medications  budesonide, 0.5 mg, nebulization, BID  clonazePAM, 0.5 mg, g-tube, q8h  digoxin, 125 mcg, intravenous, Daily  digoxin, 375 mcg, intravenous, Once  esomeprazole, 40 mg, g-tube, Daily before breakfast  hydrocortisone sodium succinate, 50 mg, intravenous, q6h  insulin lispro, 0-5 Units, subcutaneous, q4h  ipratropium-albuteroL, 3 mL, nebulization, q6h  midodrine, 15 mg, g-tube, q6h  oxygen, , inhalation, Continuous - Inhalation  pancrelipase (Lip-Prot-Amyl) 2 tablet, sodium bicarbonate 650 mg, , g-tube, Once  piperacillin-tazobactam, 4.5 g, intravenous, q6h  thiamine, 100 mg, g-tube, Daily  traZODone, 25 mg, g-tube, Nightly      PRN medications  PRN medications: acetylcysteine, albuterol, calcium gluconate, calcium gluconate, dextrose, dextrose, glucagon, glucagon, HYDROmorphone, magnesium sulfate, magnesium sulfate, oxyCODONE, oxygen, oxygen, oxygen, potassium chloride        Results from last 7 days   Lab Units 02/07/25  0324 02/06/25  0205 02/05/25  0405   WBC AUTO x10*3/uL 6.6 6.7 6.3   HEMOGLOBIN g/dL 9.0* 8.9* 9.1*   PLATELETS AUTO x10*3/uL 213 170 163      Results from last 7 days   Lab Units 02/07/25  1202 02/07/25  0324 02/06/25  0205   SODIUM mmol/L 146* 144 145   POTASSIUM mmol/L " 3.7 3.2* 4.1   CHLORIDE mmol/L 104 102 107   CO2 mmol/L 32 33* 31   BUN mg/dL 18 16 17   CREATININE mg/dL 0.21* 0.21* 0.23*   GLUCOSE mg/dL 115* 148* 135*   MAGNESIUM mg/dL  --  1.95 1.94   PHOSPHORUS mg/dL 3.0 3.1 2.6      Results from last 7 days   Lab Units 02/06/25  0205 02/05/25  1643 02/05/25  0405 02/04/25  0310   INR  1.2*  --  1.2* 1.2*   PROTIME seconds 13.8*  --  13.3* 14.0*   APTT seconds 44*   < > 34 40*    < > = values in this interval not displayed.              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 Wagner on 1/13 requiring post-op vasoactive therapy. Post-op course c/b acute hyponatremia, multifactorial delirium, cardiogenic shock requiring inotropic support, and acute hypoxic/hypercarbic respiratory failure requiring intubation. COVID+ 1/20. Weaned off milrinone 1/21. Weaned off iEpo 1/24. S/p trach/PEG 1/30 tolerating TF through PEG. TTE 2/3 with improved EF to 60-65%     Plan:  NEURO: History of anxiety on alprazolam 1mg qd. A&Ox4 at baseline. Physical deconditioning, tolerating increased activity with PT/OT. Situational depression/anxiety, on Precedex 0.25 this am. Also on scheduled clonazepam and nightly trazodone. Acute delirium.  - Qtc 436 today  - Continue Trazodone nightly  - Continue Clonazepam 0.5 mg q8h with hold parameters  - Continue Oxycodone and Dilaudid prn   - Ongoing neuro and pain assessments  - PT/OT -> continue to progress activity  - CAM assessments q shift  - continue pet therapy  - PICS team consult  - delirium precautions, REST protocol     CV: No history of cardiac disease. Baseline /79, -110. TTE 9/2024 normal biV function. Intra-op and immediate post op hypotension requiring pressor support. TTE 1/13 with EF 60-65%, low normal RV systolic  function. Acutely elevated lactate 1/15-1/16. Repeat TTE 1/16: acute biV dysfunction/takotsubo cardiomyopathy, EF 34%, mod reduced RV systolic function, mild to mod TR. Cardiogenic shock resolved. Weaned off milrinone 1/21, iEpo 1/24. Difficulty weaning off low dose vaso, started midodrine 1/25. Repeat TTE 2/3 with improved EF to 60-65%. Ongoing intermittent episodes of tachycardia to 150s. Suspected Aflutter/atrial tachycardia, added digoxin 2/3. Started low intensity heparin infusion 2/5. On/off phenyl and vaso for nighttime hypotension. Midodrine increased to 15mg q6h. Restarted stress dose steroids 2/4. No pressor support needed overnight 2/5-2/6  - Continue digoxin 125mcg daily, level 0.45 (0.51 on  2/6) -> dose with additional 375mcg today, recheck dig level daily  - EP following: tachycardia due to acute illness. Continue dig and low intensity heparin infusion. Consider tikosyn once patient stable in sinus rhythm, ziopathch at discharge. Further discuss risk/benefit of amiodarone with thoracic surgery  - Continue Midodrine 15 mg q6h  - Continue stress dose steroids q6h  - Continuous EKG/abp monitoring  - MAP goal >65  - Goal even to negative fluid balance     PULM: Hx of 30 pack year smoking history (quit 4/2024), COPD, and stage III right lung SCC s/p chemo/XRT/immunotherapy. 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 Arrived to SICU extubated on 10L SFM, weaned to room air 1/14. Chest tube removed 1/14. Acute on chronic hypoxic respiratory failure 1/16 in the setting of COVID. Intubated 1/18. S/p trach 1/30. Sutures cut 2/6. Tolerating SBTs, was on SIMV overnight  - Wean FiO2 to maintain SpO2 >92%  - Scheduled budesonide/duoneb  - continue humidified vent  - PRN albuterol  - Daily CXR prn  - ABG prn  - Holding home benzonatate    GI: No GI history. Elevated LFT 1/16-1/21 resolved. Evidence of pericholecystic fluid and biliary sludge on renal US 1/15  but no abdominal symptoms. RUQ US without significant findings. S/P PEG 1/30. Tolerating TF at goal rate. Last Bms 2/6, liquidy.  - Continue thiamine 100mg IV daily  - Continue IV PPI for GI prophylaxis  - hold bowel reg for diarrhea  - consider sending C.diff if persistent diarrhea after holding bowel reg  - consider adding fiber if C.diff negative  - PM speaking valve trial with SLP today    : No history of renal disease. Baseline creatinine 0.6. Symptomatic acute hyponatremia resolved, was given 3% saline infusions. Yoo replaced 2/1 for urine retention. Trial of silodosin stopped given recurrent hypotension. Failed TOV, straight cathed overnight. Hypokalemia  - continued urine retention, replace yoo  - Urology consult for urine retention recs  - RFP daily and prn  - Maintain U/O >0.5ml/kg/hr  - Replete electrolytes to maintain K>4, Phos>2.5, iCal>1.1, Mag>2     HEME: Hx of DOC. Baseline H/H 12/40. Acute blood loss anemia. Acute coagulopathy resolved. H/H essentially stable. Started on low intensity heparin infusion 2/5  - Check CBC daily and PRN  - coags prn  - Ongoing monitoring for s/s bleeding  - Maintain active T&S (2/7)  - check assays and adjust heparin infusion per nomogram    ENDO: No history of DM or thyroid disease. Adequate glycemic control  - BG q4h, SSI #1        ID:  Completed treatment of COVID with decadron and remdesivir. Off precautions since 1/30. Pseudomonas UTI 2/1, started on cetriaxone 2/2, switched to zosyn 2/3 for 7 day course. Blood cultures sent 2/4, NGTD. Currently afebrile, no leukocytosis.  - continue zosyn through 2/9  - F/U blood cultures  - obtain sputum culture if able  - Temp q4h, wbc daily  - Ongoing monitoring for s/s infection    I have discussed the case with the resident/advanced practice provider. I have personally performed a history, physical exam, and my own medical decision making. I have reviewed the note and agree with the findings and plan with the following  exceptions as identified below. I have personally provided 36 minutes of critical care time exclusive of time spent on separately billable procedures. Time includes review of laboratory data, radiology results, discussion with consultants, family and monitoring for potential decompensation. Interventions were performed as documented above.      Family/Surrogate updated with plan of care.  Code status addressed/up to date.

## 2025-02-07 NOTE — PROGRESS NOTES
"Physical Therapy    Physical Therapy Treatment    Patient Name: Faustina Vick \"Jannie\"  MRN: [id number]  Department: [facility]  Room: 12/12-A  Today's Date: 2/7/2025  Time Calculation  Start Time: 1403  Stop Time: 1429  Time Calculation (min): 26 min         Assessment/Plan   PT Assessment  Barriers to Discharge Home: Physical needs, Caregiver assistance  Caregiver Assistance: Caregiver assistance needed per identified barriers - however, level of patient's required assistance exceeds assistance available at home  Physical Needs: Stair navigation into home limited by function/safety, High falls risk due to function or environment, 24hr ADL assistance needed, 24hr mobility assistance needed, Ambulating household distances limited by function/safety  Evaluation/Treatment Tolerance: Patient tolerated treatment well  End of Session Communication: Bedside nurse  End of Session Patient Position: Bed, 3 rail up, Alarm off, not on at start of session  PT Plan  Inpatient/Swing Bed or Outpatient: Inpatient  PT Plan  Treatment/Interventions: Bed mobility, Transfer training, Gait training, Stair training, Balance training, Strengthening, Endurance training, Range of motion, Therapeutic exercise, Therapeutic activity, Home exercise program  PT Plan: Ongoing PT  PT Frequency: 4 times per week  PT Discharge Recommendations: Moderate intensity level of continued care  Equipment Recommended upon Discharge: Wheeled walker  PT Recommended Transfer Status: Total assist  PT - OK to Discharge: Yes      General Visit Information:   PT  Visit  PT Received On: 02/07/25  General  Prior to Session Communication: Bedside nurse  Patient Position Received: Bed, 3 rail up  General Comment: Pt alert and agreeable, on CPAP 40% PEEP 5, PS 8, PEG, tele, IV, yoo, a-line.    Subjective   Precautions:  Precautions  Medical Precautions: Fall precautions      Vital Signs Comment: VSS throughout mobility and end of session, no tachycardia or hypotension " noted     Objective   Pain:  Pain Assessment  Pain Assessment: 0-10  0-10 (Numeric) Pain Score: 0 - No pain  Cognition:  Cognition  Overall Cognitive Status: Impaired  Following Commands: Follows one step commands with repetition  Cognition Comments: Pt making attempts to write and mouth words this date however maximal difficulty noted    Activity Tolerance:  Activity Tolerance  Endurance: Tolerates 10 - 20 min exercise with multiple rests  Early Mobility/Exercise Safety Screen: Proceed with mobilization - No exclusion criteria met  Treatments:  Therapeutic Activity  Therapeutic Activity 1: EOB sitting x10 mins with varied assistance from CGA to Mod A. L trunk lean noted intermittently for which cues were provided to shift to R. Pt performed postural correction exercises while seated as well as x1 standing trial.    Bed Mobility 1  Bed Mobility 1: Supine to sitting, Sitting to supine  Level of Assistance 1: Maximum assistance  Bed Mobility Comments 1: HOB 30 deg  Bed Mobility 2  Bed Mobility  2: Rolling left  Level of Assistance 2: Maximum assistance  Bed Mobility Comments 2: draw sheet       Transfer 1  Technique 1: Sit to stand, Stand to sit  Transfer Device 1:  (arm in arm assist)  Transfer Level of Assistance 1: Maximum assistance (x2)  Trials/Comments 1: x10 sec trial    Outcome Measures:  [facility] Basic Mobility  Turning from your back to your side while in a flat bed without using bedrails: A lot  Moving from lying on your back to sitting on the side of a flat bed without using bedrails: A lot  Moving to and from bed to chair (including a wheelchair): Total  Standing up from a chair using your arms (e.g. wheelchair or bedside chair): Total  To walk in hospital room: Total  Climbing 3-5 steps with railing: Total  Basic Mobility - Total Score: 8    FSS-ICU  Ambulation: Unable to attempt due to weakness  Rolling: Maximal assistance (performs 25% - 49% of task)  Sitting: Moderate assistance (performs 50 - 74% of  task)  Transfer Sit-to-Stand: Total assistance (performs 25% or requires another person)  Transfer Supine-to-Sit: Total assistance (performs 25% or requires another person)  Total Score: 7      Early Mobility/Exercise Safety Screen: Proceed with mobilization - No exclusion criteria met  ICU Mobility Scale: Standing [4]    Education Documentation  Body Mechanics, taught by Allie Akhtar PT at 2/7/2025  3:13 PM.  Learner: Patient  Readiness: Acceptance  Method: Explanation  Response: Needs Reinforcement    Home Exercise Program, taught by Allie Akhtar PT at 2/7/2025  3:13 PM.  Learner: Patient  Readiness: Acceptance  Method: Explanation  Response: Needs Reinforcement    Mobility Training, taught by Allie Akhtar PT at 2/7/2025  3:13 PM.  Learner: Patient  Readiness: Acceptance  Method: Explanation  Response: Needs Reinforcement    Education Comments  No comments found.      Encounter Problems       Encounter Problems (Active)       Balance       Pt will demo static sitting balance w/o UE support IND to decrease risk of falls in home environment (Not met)       Start:  01/15/25    Expected End:  02/05/25    Resolved:  01/22/25    Updated to: Pt will maintain standing balance with Min A using LRAD    Update reason: re-eval         Pt will maintain standing balance with Min A using LRAD (Progressing)       Start:  01/22/25    Expected End:  02/12/25                   Mobility       Pt will mobilize supine to sitting MI to promote functional independence (Not met)       Start:  01/15/25    Expected End:  02/05/25    Resolved:  01/22/25    Updated to: Pt will perform bed mobility with Min A    Update reason: re-eval         Pt will perform bed mobility with Min A (Progressing)       Start:  01/22/25    Expected End:  02/12/25                   Mobility       STG - Patient will ambulate x50 ft with Min A using LRAD (Progressing)       Start:  01/22/25    Expected End:  02/12/25               PT Transfers       Pt  will transfer sit to stand w/ LRAD MI to improve functional mobility.  (Not met)       Start:  01/15/25    Expected End:  02/05/25    Resolved:  01/22/25    Updated to: Pt will perform functional transfers with Min A using LRAD    Update reason: Re-eval         Pt will perform functional transfers with Min A using LRAD (Progressing)       Start:  01/22/25    Expected End:  02/12/25                   Pain - Adult            Allie Akhtar PT, DPT

## 2025-02-08 ENCOUNTER — APPOINTMENT (OUTPATIENT)
Dept: RADIOLOGY | Facility: HOSPITAL | Age: 65
End: 2025-02-08
Payer: COMMERCIAL

## 2025-02-08 LAB
ALBUMIN SERPL BCP-MCNC: 3.2 G/DL (ref 3.4–5)
ANION GAP BLDA CALCULATED.4IONS-SCNC: 2 MMO/L (ref 10–25)
ANION GAP SERPL CALC-SCNC: 12 MMOL/L (ref 10–20)
APTT PPP: 50 SECONDS (ref 27–38)
BACTERIA BLD CULT: NORMAL
BACTERIA BLD CULT: NORMAL
BASE EXCESS BLDA CALC-SCNC: 10 MMOL/L (ref -2–3)
BODY TEMPERATURE: 37 DEGREES CELSIUS
BUN SERPL-MCNC: 18 MG/DL (ref 6–23)
CA-I BLD-SCNC: 1.2 MMOL/L (ref 1.1–1.33)
CA-I BLDA-SCNC: 1.24 MMOL/L (ref 1.1–1.33)
CALCIUM SERPL-MCNC: 8.9 MG/DL (ref 8.6–10.6)
CHLORIDE BLDA-SCNC: 107 MMOL/L (ref 98–107)
CHLORIDE SERPL-SCNC: 103 MMOL/L (ref 98–107)
CO2 SERPL-SCNC: 34 MMOL/L (ref 21–32)
CREAT SERPL-MCNC: 0.22 MG/DL (ref 0.5–1.05)
DIGOXIN SERPL-MCNC: 0.78 NG/ML (ref 0.8–?)
EGFRCR SERPLBLD CKD-EPI 2021: >90 ML/MIN/1.73M*2
ERYTHROCYTE [DISTWIDTH] IN BLOOD BY AUTOMATED COUNT: 18.7 % (ref 11.5–14.5)
GLUCOSE BLD MANUAL STRIP-MCNC: 100 MG/DL (ref 74–99)
GLUCOSE BLD MANUAL STRIP-MCNC: 123 MG/DL (ref 74–99)
GLUCOSE BLD MANUAL STRIP-MCNC: 127 MG/DL (ref 74–99)
GLUCOSE BLD MANUAL STRIP-MCNC: 128 MG/DL (ref 74–99)
GLUCOSE BLD MANUAL STRIP-MCNC: 140 MG/DL (ref 74–99)
GLUCOSE BLD MANUAL STRIP-MCNC: 143 MG/DL (ref 74–99)
GLUCOSE BLD MANUAL STRIP-MCNC: 149 MG/DL (ref 74–99)
GLUCOSE BLD MANUAL STRIP-MCNC: 156 MG/DL (ref 74–99)
GLUCOSE BLDA-MCNC: 132 MG/DL (ref 74–99)
GLUCOSE SERPL-MCNC: 132 MG/DL (ref 74–99)
HCO3 BLDA-SCNC: 35.4 MMOL/L (ref 22–26)
HCT VFR BLD AUTO: 30.3 % (ref 36–46)
HCT VFR BLD EST: 33 % (ref 36–46)
HGB BLD-MCNC: 9.6 G/DL (ref 12–16)
HGB BLDA-MCNC: 10.9 G/DL (ref 12–16)
INHALED O2 CONCENTRATION: 40 %
INR PPP: 1.2 (ref 0.9–1.1)
LACTATE BLDA-SCNC: 1.2 MMOL/L (ref 0.4–2)
MAGNESIUM SERPL-MCNC: 1.93 MG/DL (ref 1.6–2.4)
MCH RBC QN AUTO: 28.1 PG (ref 26–34)
MCHC RBC AUTO-ENTMCNC: 31.7 G/DL (ref 32–36)
MCV RBC AUTO: 89 FL (ref 80–100)
NRBC BLD-RTO: 0 /100 WBCS (ref 0–0)
OXYHGB MFR BLDA: 93.3 % (ref 94–98)
PCO2 BLDA: 51 MM HG (ref 38–42)
PH BLDA: 7.45 PH (ref 7.38–7.42)
PHOSPHATE SERPL-MCNC: 3 MG/DL (ref 2.5–4.9)
PLATELET # BLD AUTO: 274 X10*3/UL (ref 150–450)
PO2 BLDA: 70 MM HG (ref 85–95)
POTASSIUM BLDA-SCNC: 3.5 MMOL/L (ref 3.5–5.3)
POTASSIUM SERPL-SCNC: 3.7 MMOL/L (ref 3.5–5.3)
PROTHROMBIN TIME: 13.7 SECONDS (ref 9.8–12.8)
RBC # BLD AUTO: 3.42 X10*6/UL (ref 4–5.2)
SAO2 % BLDA: 96 % (ref 94–100)
SODIUM BLDA-SCNC: 141 MMOL/L (ref 136–145)
SODIUM SERPL-SCNC: 145 MMOL/L (ref 136–145)
UFH PPP CHRO-ACNC: 0.3 IU/ML
WBC # BLD AUTO: 7.5 X10*3/UL (ref 4.4–11.3)

## 2025-02-08 PROCEDURE — 82947 ASSAY GLUCOSE BLOOD QUANT: CPT

## 2025-02-08 PROCEDURE — 85610 PROTHROMBIN TIME: CPT

## 2025-02-08 PROCEDURE — 71045 X-RAY EXAM CHEST 1 VIEW: CPT | Performed by: RADIOLOGY

## 2025-02-08 PROCEDURE — 85027 COMPLETE CBC AUTOMATED: CPT

## 2025-02-08 PROCEDURE — 2500000002 HC RX 250 W HCPCS SELF ADMINISTERED DRUGS (ALT 637 FOR MEDICARE OP, ALT 636 FOR OP/ED)

## 2025-02-08 PROCEDURE — 82330 ASSAY OF CALCIUM: CPT

## 2025-02-08 PROCEDURE — 94003 VENT MGMT INPAT SUBQ DAY: CPT

## 2025-02-08 PROCEDURE — 2500000001 HC RX 250 WO HCPCS SELF ADMINISTERED DRUGS (ALT 637 FOR MEDICARE OP): Performed by: PHYSICIAN ASSISTANT

## 2025-02-08 PROCEDURE — 80162 ASSAY OF DIGOXIN TOTAL: CPT | Performed by: NURSE PRACTITIONER

## 2025-02-08 PROCEDURE — 84132 ASSAY OF SERUM POTASSIUM: CPT

## 2025-02-08 PROCEDURE — 2500000005 HC RX 250 GENERAL PHARMACY W/O HCPCS

## 2025-02-08 PROCEDURE — 2020000001 HC ICU ROOM DAILY

## 2025-02-08 PROCEDURE — 2500000004 HC RX 250 GENERAL PHARMACY W/ HCPCS (ALT 636 FOR OP/ED): Performed by: NURSE PRACTITIONER

## 2025-02-08 PROCEDURE — 83735 ASSAY OF MAGNESIUM: CPT

## 2025-02-08 PROCEDURE — 85520 HEPARIN ASSAY: CPT

## 2025-02-08 PROCEDURE — 37799 UNLISTED PX VASCULAR SURGERY: CPT

## 2025-02-08 PROCEDURE — 2500000004 HC RX 250 GENERAL PHARMACY W/ HCPCS (ALT 636 FOR OP/ED): Performed by: PHYSICIAN ASSISTANT

## 2025-02-08 PROCEDURE — 2500000001 HC RX 250 WO HCPCS SELF ADMINISTERED DRUGS (ALT 637 FOR MEDICARE OP)

## 2025-02-08 PROCEDURE — 94640 AIRWAY INHALATION TREATMENT: CPT

## 2025-02-08 PROCEDURE — 2500000004 HC RX 250 GENERAL PHARMACY W/ HCPCS (ALT 636 FOR OP/ED)

## 2025-02-08 PROCEDURE — 71045 X-RAY EXAM CHEST 1 VIEW: CPT

## 2025-02-08 PROCEDURE — 2500000001 HC RX 250 WO HCPCS SELF ADMINISTERED DRUGS (ALT 637 FOR MEDICARE OP): Performed by: STUDENT IN AN ORGANIZED HEALTH CARE EDUCATION/TRAINING PROGRAM

## 2025-02-08 PROCEDURE — 37799 UNLISTED PX VASCULAR SURGERY: CPT | Performed by: NURSE PRACTITIONER

## 2025-02-08 PROCEDURE — 99291 CRITICAL CARE FIRST HOUR: CPT

## 2025-02-08 RX ORDER — LIDOCAINE HCL/PF 100 MG/5ML
SYRINGE (ML) INTRAVENOUS
Status: DISPENSED
Start: 2025-02-08 | End: 2025-02-08

## 2025-02-08 RX ORDER — POTASSIUM CHLORIDE 14.9 MG/ML
20 INJECTION INTRAVENOUS
Status: COMPLETED | OUTPATIENT
Start: 2025-02-08 | End: 2025-02-08

## 2025-02-08 RX ORDER — CLONAZEPAM 1 MG/1
1 TABLET ORAL EVERY 8 HOURS
Status: DISCONTINUED | OUTPATIENT
Start: 2025-02-08 | End: 2025-02-13 | Stop reason: HOSPADM

## 2025-02-08 RX ORDER — FUROSEMIDE 10 MG/ML
10 INJECTION INTRAMUSCULAR; INTRAVENOUS ONCE
Status: COMPLETED | OUTPATIENT
Start: 2025-02-08 | End: 2025-02-08

## 2025-02-08 RX ORDER — NYSTATIN 100000 [USP'U]/ML
4 SUSPENSION ORAL 4 TIMES DAILY
Status: DISCONTINUED | OUTPATIENT
Start: 2025-02-08 | End: 2025-02-09

## 2025-02-08 RX ADMIN — PIPERACILLIN SODIUM AND TAZOBACTAM SODIUM 4.5 G: 4; .5 INJECTION, SOLUTION INTRAVENOUS at 20:30

## 2025-02-08 RX ADMIN — CLONAZEPAM 1 MG: 1 TABLET ORAL at 17:42

## 2025-02-08 RX ADMIN — BUDESONIDE 0.5 MG: 0.5 INHALANT RESPIRATORY (INHALATION) at 19:30

## 2025-02-08 RX ADMIN — CLONAZEPAM 1 MG: 1 TABLET ORAL at 23:16

## 2025-02-08 RX ADMIN — MIDODRINE HYDROCHLORIDE 15 MG: 10 TABLET ORAL at 23:16

## 2025-02-08 RX ADMIN — THIAMINE HCL TAB 100 MG 100 MG: 100 TAB at 09:27

## 2025-02-08 RX ADMIN — FUROSEMIDE 10 MG: 10 INJECTION, SOLUTION INTRAMUSCULAR; INTRAVENOUS at 12:02

## 2025-02-08 RX ADMIN — HYDROCORTISONE SODIUM SUCCINATE 50 MG: 100 INJECTION, POWDER, FOR SOLUTION INTRAMUSCULAR; INTRAVENOUS at 00:47

## 2025-02-08 RX ADMIN — MIDODRINE HYDROCHLORIDE 15 MG: 10 TABLET ORAL at 17:42

## 2025-02-08 RX ADMIN — PIPERACILLIN SODIUM AND TAZOBACTAM SODIUM 4.5 G: 4; .5 INJECTION, SOLUTION INTRAVENOUS at 04:30

## 2025-02-08 RX ADMIN — MAGNESIUM SULFATE HEPTAHYDRATE 2 G: 40 INJECTION, SOLUTION INTRAVENOUS at 06:07

## 2025-02-08 RX ADMIN — CLONAZEPAM 0.5 MG: 0.5 TABLET ORAL at 00:15

## 2025-02-08 RX ADMIN — ESOMEPRAZOLE MAGNESIUM 40 MG: 40 FOR SUSPENSION ORAL at 06:04

## 2025-02-08 RX ADMIN — INSULIN LISPRO 1 UNITS: 100 INJECTION, SOLUTION INTRAVENOUS; SUBCUTANEOUS at 08:51

## 2025-02-08 RX ADMIN — NYSTATIN 400000 UNITS: 100000 SUSPENSION ORAL at 06:05

## 2025-02-08 RX ADMIN — IPRATROPIUM BROMIDE AND ALBUTEROL SULFATE 3 ML: .5; 3 SOLUTION RESPIRATORY (INHALATION) at 15:25

## 2025-02-08 RX ADMIN — POTASSIUM CHLORIDE 20 MEQ: 14.9 INJECTION, SOLUTION INTRAVENOUS at 08:47

## 2025-02-08 RX ADMIN — IPRATROPIUM BROMIDE AND ALBUTEROL SULFATE 3 ML: .5; 3 SOLUTION RESPIRATORY (INHALATION) at 19:29

## 2025-02-08 RX ADMIN — HYDROCORTISONE SODIUM SUCCINATE 50 MG: 100 INJECTION, POWDER, FOR SOLUTION INTRAMUSCULAR; INTRAVENOUS at 06:05

## 2025-02-08 RX ADMIN — PIPERACILLIN SODIUM AND TAZOBACTAM SODIUM 4.5 G: 4; .5 INJECTION, SOLUTION INTRAVENOUS at 09:27

## 2025-02-08 RX ADMIN — IPRATROPIUM BROMIDE AND ALBUTEROL SULFATE 3 ML: .5; 3 SOLUTION RESPIRATORY (INHALATION) at 08:14

## 2025-02-08 RX ADMIN — TRAZODONE HYDROCHLORIDE 25 MG: 50 TABLET ORAL at 20:30

## 2025-02-08 RX ADMIN — Medication 40 PERCENT: at 08:47

## 2025-02-08 RX ADMIN — IPRATROPIUM BROMIDE AND ALBUTEROL SULFATE 3 ML: .5; 3 SOLUTION RESPIRATORY (INHALATION) at 02:07

## 2025-02-08 RX ADMIN — NYSTATIN 400000 UNITS: 100000 SUSPENSION ORAL at 13:24

## 2025-02-08 RX ADMIN — POTASSIUM CHLORIDE 20 MEQ: 14.9 INJECTION, SOLUTION INTRAVENOUS at 06:05

## 2025-02-08 RX ADMIN — MIDODRINE HYDROCHLORIDE 15 MG: 10 TABLET ORAL at 06:04

## 2025-02-08 RX ADMIN — DIGOXIN 125 MCG: 0.25 INJECTION INTRAMUSCULAR; INTRAVENOUS at 09:27

## 2025-02-08 RX ADMIN — HYDROCORTISONE SODIUM SUCCINATE 50 MG: 100 INJECTION, POWDER, FOR SOLUTION INTRAMUSCULAR; INTRAVENOUS at 21:15

## 2025-02-08 RX ADMIN — NYSTATIN 400000 UNITS: 100000 SUSPENSION ORAL at 17:40

## 2025-02-08 RX ADMIN — MIDODRINE HYDROCHLORIDE 15 MG: 10 TABLET ORAL at 00:46

## 2025-02-08 RX ADMIN — HEPARIN SODIUM 1000 UNITS/HR: 10000 INJECTION, SOLUTION INTRAVENOUS at 00:49

## 2025-02-08 RX ADMIN — NYSTATIN 400000 UNITS: 100000 SUSPENSION ORAL at 20:30

## 2025-02-08 RX ADMIN — MIDODRINE HYDROCHLORIDE 15 MG: 10 TABLET ORAL at 12:02

## 2025-02-08 RX ADMIN — CLONAZEPAM 1 MG: 1 TABLET ORAL at 08:46

## 2025-02-08 RX ADMIN — BUDESONIDE 0.5 MG: 0.5 INHALANT RESPIRATORY (INHALATION) at 08:14

## 2025-02-08 RX ADMIN — PIPERACILLIN SODIUM AND TAZOBACTAM SODIUM 4.5 G: 4; .5 INJECTION, SOLUTION INTRAVENOUS at 15:33

## 2025-02-08 RX ADMIN — HYDROCORTISONE SODIUM SUCCINATE 50 MG: 100 INJECTION, POWDER, FOR SOLUTION INTRAMUSCULAR; INTRAVENOUS at 15:32

## 2025-02-08 ASSESSMENT — PAIN SCALES - GENERAL
PAINLEVEL_OUTOF10: 0 - NO PAIN

## 2025-02-08 ASSESSMENT — PAIN - FUNCTIONAL ASSESSMENT
PAIN_FUNCTIONAL_ASSESSMENT: 0-10

## 2025-02-08 NOTE — PROGRESS NOTES
"Faustina Vick \"Jannie\" is a 64 y.o. female who presents for Primary cancer of right upper lobe of lung (Multi) [C34.11].  She initially received chemotherapy and radiation, but appeared to have residual radiographic abnormalities.  PET/CT suggested progression of disease, and in this setting and Dr. Wagner felt that surgical resection in a \"salvage\" setting might facilitate long-term overall survival given the failure of nonsurgical management. She is now  26 days post op from PNEUMONECTOMY (Right) (en bloc with chest wall resection and reconstruction), mediastinal lymph node dissection for a  Primary cancer of right upper lobe of lung (Multi).      Subjective   NAEO. Patient remains on Precedex, tolerating CPAP.     Objective     General: Resting in bed, awake and alert, seems confused or mildly anxious   HEENT: Trach in place, no surrounding drainage or bleeding  Resp: symmetric chest rise, CPAP via trach   CV: Sinus tachycardia, 100s when in room, off presors  Abd: soft, ND, PEG in place with TF running at 45  Neuro: awake and alert, withdrawn    Last Recorded Vitals  Blood pressure 146/81, pulse 108, temperature 36.8 °C (98.2 °F), temperature source Temporal, resp. rate 25, height 1.651 m (5' 5\"), weight 52.3 kg (115 lb 4.8 oz), SpO2 95%.  Intake/Output last 3 Shifts:  I/O last 3 completed shifts:  In: 2589 (49.5 mL/kg) [I.V.:439 (8.4 mL/kg); NG/GT:2150]  Out: 1900 (36.3 mL/kg) [Urine:1900 (1 mL/kg/hr)]  Weight: 52.3 kg     Relevant Results  Scheduled medications  budesonide, 0.5 mg, nebulization, BID  clonazePAM, 1 mg, g-tube, q8h  digoxin, 125 mcg, intravenous, Daily  esomeprazole, 40 mg, g-tube, Daily before breakfast  furosemide, 10 mg, intravenous, Once  hydrocortisone sodium succinate, 50 mg, intravenous, q8h  insulin lispro, 0-5 Units, subcutaneous, q4h  ipratropium-albuteroL, 3 mL, nebulization, q6h  lidocaine (cardiac), , ,   midodrine, 15 mg, g-tube, q6h  nystatin, 4 mL, Swish & Swallow, 4x " daily  oxygen, , inhalation, Continuous - Inhalation  pancrelipase (Lip-Prot-Amyl) 2 tablet, sodium bicarbonate 650 mg, , g-tube, Once  piperacillin-tazobactam, 4.5 g, intravenous, q6h  thiamine, 100 mg, g-tube, Daily  traZODone, 25 mg, g-tube, Nightly      Continuous medications  dexmedeTOMIDine, 0-1.5 mcg/kg/hr, Last Rate: 0.24 mcg/kg/hr (02/08/25 0600)  heparin, 0-4,000 Units/hr, Last Rate: 1,000 Units/hr (02/08/25 0600)      PRN medications  PRN medications: acetylcysteine, albuterol, calcium gluconate, calcium gluconate, dextrose, dextrose, glucagon, glucagon, HYDROmorphone, lidocaine (cardiac), magnesium sulfate, magnesium sulfate, oxyCODONE, oxygen, oxygen, oxygen, potassium chloride    Results for orders placed or performed during the hospital encounter of 01/13/25 (from the past 24 hours)   POCT GLUCOSE   Result Value Ref Range    POCT Glucose 114 (H) 74 - 99 mg/dL   Renal function panel   Result Value Ref Range    Glucose 115 (H) 74 - 99 mg/dL    Sodium 146 (H) 136 - 145 mmol/L    Potassium 3.7 3.5 - 5.3 mmol/L    Chloride 104 98 - 107 mmol/L    Bicarbonate 32 21 - 32 mmol/L    Anion Gap 14 10 - 20 mmol/L    Urea Nitrogen 18 6 - 23 mg/dL    Creatinine 0.21 (L) 0.50 - 1.05 mg/dL    eGFR >90 >60 mL/min/1.73m*2    Calcium 8.9 8.6 - 10.6 mg/dL    Phosphorus 3.0 2.5 - 4.9 mg/dL    Albumin 3.2 (L) 3.4 - 5.0 g/dL   POCT GLUCOSE   Result Value Ref Range    POCT Glucose 116 (H) 74 - 99 mg/dL   POCT GLUCOSE   Result Value Ref Range    POCT Glucose 126 (H) 74 - 99 mg/dL   POCT GLUCOSE   Result Value Ref Range    POCT Glucose 140 (H) 74 - 99 mg/dL   POCT GLUCOSE   Result Value Ref Range    POCT Glucose 143 (H) 74 - 99 mg/dL   Magnesium   Result Value Ref Range    Magnesium 1.93 1.60 - 2.40 mg/dL   Calcium, Ionized   Result Value Ref Range    POCT Calcium, Ionized 1.20 1.1 - 1.33 mmol/L   Coagulation Screen   Result Value Ref Range    Protime 13.7 (H) 9.8 - 12.8 seconds    INR 1.2 (H) 0.9 - 1.1    aPTT 50 (H) 27 - 38  seconds   Renal function panel   Result Value Ref Range    Glucose 132 (H) 74 - 99 mg/dL    Sodium 145 136 - 145 mmol/L    Potassium 3.7 3.5 - 5.3 mmol/L    Chloride 103 98 - 107 mmol/L    Bicarbonate 34 (H) 21 - 32 mmol/L    Anion Gap 12 10 - 20 mmol/L    Urea Nitrogen 18 6 - 23 mg/dL    Creatinine 0.22 (L) 0.50 - 1.05 mg/dL    eGFR >90 >60 mL/min/1.73m*2    Calcium 8.9 8.6 - 10.6 mg/dL    Phosphorus 3.0 2.5 - 4.9 mg/dL    Albumin 3.2 (L) 3.4 - 5.0 g/dL   CBC   Result Value Ref Range    WBC 7.5 4.4 - 11.3 x10*3/uL    nRBC 0.0 0.0 - 0.0 /100 WBCs    RBC 3.42 (L) 4.00 - 5.20 x10*6/uL    Hemoglobin 9.6 (L) 12.0 - 16.0 g/dL    Hematocrit 30.3 (L) 36.0 - 46.0 %    MCV 89 80 - 100 fL    MCH 28.1 26.0 - 34.0 pg    MCHC 31.7 (L) 32.0 - 36.0 g/dL    RDW 18.7 (H) 11.5 - 14.5 %    Platelets 274 150 - 450 x10*3/uL   Heparin Assay, UFH   Result Value Ref Range    Heparin Unfractionated 0.3 See Comment Below for Therapeutic Ranges IU/mL   Blood Gas Arterial Full Panel   Result Value Ref Range    POCT pH, Arterial 7.45 (H) 7.38 - 7.42 pH    POCT pCO2, Arterial 51 (H) 38 - 42 mm Hg    POCT pO2, Arterial 70 (L) 85 - 95 mm Hg    POCT SO2, Arterial 96 94 - 100 %    POCT Oxy Hemoglobin, Arterial 93.3 (L) 94.0 - 98.0 %    POCT Hematocrit Calculated, Arterial 33.0 (L) 36.0 - 46.0 %    POCT Sodium, Arterial 141 136 - 145 mmol/L    POCT Potassium, Arterial 3.5 3.5 - 5.3 mmol/L    POCT Chloride, Arterial 107 98 - 107 mmol/L    POCT Ionized Calcium, Arterial 1.24 1.10 - 1.33 mmol/L    POCT Glucose, Arterial 132 (H) 74 - 99 mg/dL    POCT Lactate, Arterial 1.2 0.4 - 2.0 mmol/L    POCT Base Excess, Arterial 10.0 (H) -2.0 - 3.0 mmol/L    POCT HCO3 Calculated, Arterial 35.4 (H) 22.0 - 26.0 mmol/L    POCT Hemoglobin, Arterial 10.9 (L) 12.0 - 16.0 g/dL    POCT Anion Gap, Arterial 2 (L) 10 - 25 mmo/L    Patient Temperature 37.0 degrees Celsius    FiO2 40 %   POCT GLUCOSE   Result Value Ref Range    POCT Glucose 123 (H) 74 - 99 mg/dL   Digoxin    Result Value Ref Range    Digoxin  0.78 (L) 0.80 - <2.00 ng/mL   POCT GLUCOSE   Result Value Ref Range    POCT Glucose 156 (H) 74 - 99 mg/dL     CXR 2/2:    IMPRESSION:  1. No significant change in patchy left lung airspace opacities,  which may represent edema/infiltrates.  2. Stable postsurgical changes related to right-sided pneumonectomy.       Assessment/Plan   Assessment & Plan  Primary cancer of right upper lobe of lung (Multi)    Chronic obstructive pulmonary disease (Multi)    Malignant neoplasm of upper lobe of right lung (Multi)    Cardiogenic shock (Multi)    Respiratory distress    Faustina Vick (Jannie) is a 64F with Hx of Stage III lung cancer, asthma, anxiety, and arthritis who is now s/p R pneumonectomy with Dr. Wagner 1/13/25. 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 Cardiology was consulted and recommended Lasix gtt and milrinone. Lasix gtt held 1/17, hyponatremia improving. Patient underwent RHC and SGC placement with cardiology 1/17. She was reintubated on 1/18 d/t worsening respiratory status and AMS. Also had increased pressor requirements, now weaned off. Repeat echo stable on 1/18. COVID positive on 1/20.     Failed extubation trials, now s/p trach/PEG with Dr. Wagner 1/30.     - Do not recommend amio given risk of pulmonary complications in the setting of pneumonectomy  - Lasix 10 mg IV today   - Continue & appreciate supportive ICU care  - Continue Tfs at goal, appreciate nutrition recs  - Pulmonary toilet  - PT/OT for rehabilitation, patient will likely need LTACH placement  - Agree with blood and sputum cultures given new pressor and O2 requirments  - Continue Abx for UTI, recommend removal of Montiel if possible    Patient seen with Dr. Rowe and discussed with Dr. Wagner.    Rosalie Saavedra MD  Thoracic Surgery [id number]

## 2025-02-08 NOTE — CARE PLAN
Problem: Skin  Goal: Decreased wound size/increased tissue granulation at next dressing change  Outcome: Progressing  Flowsheets (Taken 2/8/2025 0745)  Decreased wound size/increased tissue granulation at next dressing change: Protective dressings over bony prominences  Goal: Participates in plan/prevention/treatment measures  Outcome: Progressing  Flowsheets (Taken 2/8/2025 0745)  Participates in plan/prevention/treatment measures: Increase activity/out of bed for meals  Goal: Prevent/manage excess moisture  Outcome: Progressing  Flowsheets (Taken 2/8/2025 0745)  Prevent/manage excess moisture:   Monitor for/manage infection if present   Moisturize dry skin  Goal: Prevent/minimize sheer/friction injuries  Outcome: Progressing  Flowsheets (Taken 2/8/2025 0745)  Prevent/minimize sheer/friction injuries:   Turn/reposition every 2 hours/use positioning/transfer devices   HOB 30 degrees or less  Goal: Promote/optimize nutrition  Outcome: Progressing  Flowsheets (Taken 2/8/2025 0745)  Promote/optimize nutrition:   Discuss with provider if NPO > 2 days   Monitor/record intake including meals  Goal: Promote skin healing  Outcome: Progressing  Flowsheets (Taken 2/8/2025 0745)  Promote skin healing:   Rotate device position/do not position patient on device   Turn/reposition every 2 hours/use positioning/transfer devices

## 2025-02-08 NOTE — PROGRESS NOTES
"Faustina Vick \"Noam" is a 64 y.o. female on day 26 of admission presenting with Primary cancer of right upper lobe of lung (Multi).    Subjective   Trach cleaning overnight lead to desaturation requiring vent mode switch to VC. Improved HR control on digoxin and metoprolol    Objective   Physical Exam  Vitals reviewed.   Constitutional:       General: She is awake. She is not in acute distress.     Appearance: She is underweight. She is ill-appearing.      Comments: Precedex on at 0.25mcg/kg/hr   HENT:      Head: Normocephalic and atraumatic.      Nose: Nose normal.      Mouth/Throat:      Mouth: Mucous membranes are moist.   Eyes:      Extraocular Movements: Extraocular movements intact.      Pupils: Pupils are equal, round, and reactive to light.   Neck:      Comments: 7.5 cuffed Shiley Trach midline, no drainage  Cardiovascular:      Rate and Rhythm: Normal rate and regular rhythm.      Pulses: Normal pulses.      Heart sounds: Normal heart sounds.   Pulmonary:      Effort: Pulmonary effort is normal.      Breath sounds: Rhonchi: left side.      Comments: Scattered rhonchi left chest field  CPAP on vent  Chest:      Comments: Right lateral chest incisions ROSHAN without drainage  Abdominal:      Palpations: Abdomen is soft.      Comments: PEG tube in place with TF infusing at 45ml/hr. Insertion site without erythema or drainage   Genitourinary:     Comments: Purewick in place  Musculoskeletal:         General: Normal range of motion.      Cervical back: Normal range of motion and neck supple.      Right lower leg: No edema.      Left lower leg: No edema.   Skin:     General: Skin is warm and dry.   Neurological:      General: No focal deficit present.      Mental Status: She is alert. She is confused.      GCS: GCS eye subscore is 4. GCS verbal subscore is 1. GCS motor subscore is 6.      Motor: Weakness: generalized.      Comments: Has white board and mouthes words to assist with communication   Psychiatric:    " "     Behavior: Behavior normal. Behavior is cooperative.       Last Recorded Vitals  Blood pressure 134/83, pulse 96, temperature 36.6 °C (97.9 °F), resp. rate 22, height 1.651 m (5' 5\"), weight 52.3 kg (115 lb 4.8 oz), SpO2 96%.    VS over last 24h  Heart Rate:  []   Temp:  [36.2 °C (97.2 °F)-36.8 °C (98.2 °F)]   Resp:  [13-32]   BP: (113-143)/(68-87)   Weight:  [52.3 kg (115 lb 4.8 oz)]   SpO2:  [94 %-99 %]      Intake/Output last 3 Shifts:  I/O last 3 completed shifts:  In: 2589 (49.5 mL/kg) [I.V.:439 (8.4 mL/kg); NG/GT:2150]  Out: 1900 (36.3 mL/kg) [Urine:1900 (1 mL/kg/hr)]  Weight: 52.3 kg       Intake/Output Summary (Last 24 hours) at 2/8/2025 0806  Last data filed at 2/8/2025 0700  Gross per 24 hour   Intake 1626.1 ml   Output 1100 ml   Net 526.1 ml        Relevant Results  Scheduled medications  budesonide, 0.5 mg, nebulization, BID  clonazePAM, 1 mg, g-tube, q8h  digoxin, 125 mcg, intravenous, Daily  esomeprazole, 40 mg, g-tube, Daily before breakfast  hydrocortisone sodium succinate, 50 mg, intravenous, q6h  insulin lispro, 0-5 Units, subcutaneous, q4h  ipratropium-albuteroL, 3 mL, nebulization, q6h  lidocaine (cardiac), , ,   midodrine, 15 mg, g-tube, q6h  nystatin, 4 mL, Swish & Swallow, 4x daily  oxygen, , inhalation, Continuous - Inhalation  pancrelipase (Lip-Prot-Amyl) 2 tablet, sodium bicarbonate 650 mg, , g-tube, Once  piperacillin-tazobactam, 4.5 g, intravenous, q6h  potassium chloride, 20 mEq, intravenous, q2h  thiamine, 100 mg, g-tube, Daily  traZODone, 25 mg, g-tube, Nightly      PRN medications  PRN medications: acetylcysteine, albuterol, calcium gluconate, calcium gluconate, dextrose, dextrose, glucagon, glucagon, HYDROmorphone, lidocaine (cardiac), magnesium sulfate, magnesium sulfate, oxyCODONE, oxygen, oxygen, oxygen, potassium chloride        Results from last 7 days   Lab Units 02/08/25  0103 02/07/25  0324 02/06/25  0205   WBC AUTO x10*3/uL 7.5 6.6 6.7   HEMOGLOBIN g/dL 9.6* 9.0* " 8.9*   PLATELETS AUTO x10*3/uL 274 213 170      Results from last 7 days   Lab Units 02/08/25  0103 02/07/25  1202 02/07/25  0324   SODIUM mmol/L 145 146* 144   POTASSIUM mmol/L 3.7 3.7 3.2*   CHLORIDE mmol/L 103 104 102   CO2 mmol/L 34* 32 33*   BUN mg/dL 18 18 16   CREATININE mg/dL 0.22* 0.21* 0.21*   GLUCOSE mg/dL 132* 115* 148*   MAGNESIUM mg/dL 1.93  --  1.95   PHOSPHORUS mg/dL 3.0 3.0 3.1      Results from last 7 days   Lab Units 02/08/25  0103 02/06/25  0205 02/05/25  1643 02/05/25  0405   INR  1.2* 1.2*  --  1.2*   PROTIME seconds 13.7* 13.8*  --  13.3*   APTT seconds 50* 44*   < > 34    < > = values in this interval not displayed.              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 Wagner on 1/13 requiring post-op vasoactive therapy. Post-op course c/b acute hyponatremia, multifactorial delirium, cardiogenic shock requiring inotropic support, and acute hypoxic/hypercarbic respiratory failure requiring intubation. COVID+ 1/20. Weaned off milrinone 1/21. Weaned off iEpo 1/24. S/p trach/PEG 1/30 tolerating TF through PEG. TTE 2/3 with improved EF to 60-65%.     Plan:  NEURO: History of anxiety on alprazolam 1mg qd. A&Ox4 at baseline. Physical deconditioning, tolerating increased activity with PT/OT. Situational depression/anxiety, remains on Precedex 0.25 this am. Also on scheduled clonazepam and nightly trazodone. Acute delirium. Qtc 436 2/7.   - Continue Trazodone nightly  - Continue Clonazepam 0.5 mg q8h with hold parameters  - Continue Oxycodone and Dilaudid prn   - Ongoing neuro and pain assessments  - PT/OT -> continue to progress activity  - CAM assessments q shift  - continue pet therapy  - PICS team consult  - delirium precautions, REST protocol     CV: No history of cardiac disease.  Baseline /79, -110. TTE 9/2024 normal biV function. Intra-op and immediate post op hypotension requiring pressor support. TTE 1/13 with EF 60-65%, low normal RV systolic function. Acutely elevated lactate 1/15-1/16. Repeat TTE 1/16: acute biV dysfunction/takotsubo cardiomyopathy, EF 34%, mod reduced RV systolic function, mild to mod TR. Cardiogenic shock resolved. Weaned off milrinone 1/21, iEpo 1/24. Difficulty weaning off low dose vaso, started midodrine 1/25. Repeat TTE 2/3 with improved EF to 60-65%. Ongoing intermittent episodes of tachycardia to 150s. Suspected Aflutter/atrial tachycardia, added digoxin 2/3. Started low intensity heparin infusion 2/5. On/off phenyl and vaso for nighttime hypotension. Midodrine increased to 15mg q6h. Restarted stress dose steroids 2/4. No pressor support needed overnight 2/5-2/6  - Continue digoxin 125mcg daily, level 0.45 (0.51 on  2/6) -> dose with additional 375mcg today, recheck dig level daily  - EP following: tachycardia due to acute illness. Continue dig and low intensity heparin infusion. Consider tikosyn once patient stable in sinus rhythm, ziopatch at discharge. Thoracic surgery recommending against amiodarone.  - Continue Midodrine 15 mg q6h  - Decrease hydrocortisone to 50mg q8h (2/8 - )  - Continuous EKG/abp monitoring  - MAP goal >65     PULM: Hx of 30 pack year smoking history (quit 4/2024), COPD, and stage III right lung SCC s/p chemo/XRT/immunotherapy. 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 Arrived to SICU extubated on 10L SFM, weaned to room air 1/14. Chest tube removed 1/14. Acute on chronic hypoxic respiratory failure 1/16 in the setting of COVID. Intubated 1/18. S/p trach 1/30. Sutures cut 2/6. Speech eval 2/7 for PMSV, desaturation and coughing noted, speech to continue to follow. On VC this o/n 2/8 due to desaturation overnight, back on CPAP this AM.   - Wean FiO2 to maintain SpO2  >92%  - Obtain sputum culture today if able  - Scheduled budesonide/duoneb  - continue humidified vent  - PRN albuterol  - Daily CXR prn  - ABG prn  - Holding home benzonatate    GI: No GI history. Elevated LFT 1/16-1/21 resolved. Evidence of pericholecystic fluid and biliary sludge on renal US 1/15 but no abdominal symptoms. RUQ US without significant findings. S/P PEG 1/30. Tolerating TF at goal rate. Last Bms 2/6, liquid.  - Continue thiamine 100mg IV daily  - Continue IV PPI for GI prophylaxis  - holding bowel reg for diarrhea  - consider sending C.diff if persistent diarrhea after holding bowel reg  - consider adding fiber if C.diff negative    : No history of renal disease. Baseline creatinine 0.6. Symptomatic acute hyponatremia resolved, was given 3% saline infusions. Yoo replaced 2/1 for urine retention. Trial of silodosin stopped given recurrent hypotension. Failed TOV, straight cathed overnight 2/6. Hypokalemia requiring frequent repletion. Per urology note 2/7, continue yoo at discharge and will f/u outpatient for TOV.  - Maintain yoo catheter per urology  - RFP daily and prn  - 10mg IV lasix, goal net -500cc  - Maintain U/O >0.5ml/kg/hr  - Replete electrolytes to maintain K>4, Phos>2.5, iCal>1.1, Mag>2     HEME: Hx of DOC. Baseline H/H 12/40. Acute blood loss anemia. Acute coagulopathy resolved. H/H essentially stable. Started on low intensity heparin infusion 2/5.  - Check CBC daily and PRN  - coags prn  - Ongoing monitoring for s/s bleeding  - Maintain active T&S (2/7)  - check assays and adjust heparin infusion per nomogram    ENDO: No history of DM or thyroid disease. Adequate glycemic control  - BG q4h, SSI #1     MSK: Arthritis, Vit D deficiency. Sacral wound.  - Turn patient every 2 hrs, continue wound care  - Not on meds at home  - Monitor for symptoms     ID:  Completed treatment of COVID with decadron and remdesivir. Off precautions since 1/30. Pan-susceptible Pseudomonas UTI 2/1,  started on cetriaxone 2/2, switched to zosyn 2/3 for 7 day course. Blood cultures sent 2/4, NGTD. Currently afebrile, no leukocytosis.  - continue zosyn through 2/9  - F/U blood cultures  - obtain sputum culture if able  - Temp q4h, wbc daily  - Ongoing monitoring for s/s infection    Access:  - R axillary a line (1/28-)  - Left chest mediport (not currently accessed)  - PIV x2  - Trach (1/30)  - PEG (1/30)  - yoo 2/7     Dispo: Continue ICU care. Patient seen and discussed with ICU attending Dr. Ibrahim. SW helping guide family with LTACH placement [facility].    Code Status: Full Code     Dinh Anna MD  Anesthesiology PGY3  SICU Phone [phone]

## 2025-02-08 NOTE — PROGRESS NOTES
This LSW received messages from both the unit manager and a member of the medical team regarding the concerns expressed by the patient’s , Mr. Vick. His frustration primarily stems from uncertainty about the cost associated with his wife’s discharge to a Long-Term Care (LTC) facility.    During a conversation with this LSW, Mr. Vick stated that prior to his wife’s hospitalization, her insurance coverage was $500,000, but it is now $250,000. As his statement was unclear, this LSW proceeded with facilitating the referral at Mr. Vick’s request. Prior to initiating the referral, Mr. Vick inquired about the cost of the LTC facility. This LSW informed him that, as a , I am unable to provide an estimated cost of care, as the facility itself may not be able to predict the full scope of her needs, including required medical equipment, provider services, and other associated variables.    Today, this LSW contacted [facility] and attempted to reach Alexa, the individual identified by the medical team as a point of contact. This LSW spoke with a nurse named Yvon, who reported that Alexa, the  was unavailable but assured that he would attempt to contact her. This LSW provided Yvon with Mr. Vick’s phone number and relayed his concerns. Yvon confirmed that Alexa would reach out to Mr. Vick today if she is available or, alternatively, on Monday when she returns to work.    This LSW will continue to monitor the situation and follow up as needed.

## 2025-02-09 ENCOUNTER — APPOINTMENT (OUTPATIENT)
Dept: RADIOLOGY | Facility: HOSPITAL | Age: 65
End: 2025-02-09
Payer: COMMERCIAL

## 2025-02-09 VITALS
OXYGEN SATURATION: 97 % | WEIGHT: 120.81 LBS | HEIGHT: 65 IN | HEART RATE: 104 BPM | SYSTOLIC BLOOD PRESSURE: 124 MMHG | DIASTOLIC BLOOD PRESSURE: 71 MMHG | TEMPERATURE: 97.5 F | RESPIRATION RATE: 15 BRPM | BODY MASS INDEX: 20.13 KG/M2

## 2025-02-09 LAB
ALBUMIN SERPL BCP-MCNC: 3 G/DL (ref 3.4–5)
ANION GAP SERPL CALC-SCNC: 13 MMOL/L (ref 10–20)
APTT PPP: 64 SECONDS (ref 27–38)
BASOPHILS # BLD AUTO: 0.01 X10*3/UL (ref 0–0.1)
BASOPHILS NFR BLD AUTO: 0.2 %
BUN SERPL-MCNC: 18 MG/DL (ref 6–23)
CA-I BLD-SCNC: 1.18 MMOL/L (ref 1.1–1.33)
CALCIUM SERPL-MCNC: 8.8 MG/DL (ref 8.6–10.6)
CHLORIDE SERPL-SCNC: 102 MMOL/L (ref 98–107)
CO2 SERPL-SCNC: 35 MMOL/L (ref 21–32)
CREAT SERPL-MCNC: <0.2 MG/DL (ref 0.5–1.05)
DIGOXIN SERPL-MCNC: 0.82 NG/ML (ref 0.8–?)
EGFRCR SERPLBLD CKD-EPI 2021: ABNORMAL ML/MIN/{1.73_M2}
EOSINOPHIL # BLD AUTO: 0.03 X10*3/UL (ref 0–0.7)
EOSINOPHIL NFR BLD AUTO: 0.5 %
ERYTHROCYTE [DISTWIDTH] IN BLOOD BY AUTOMATED COUNT: 18.6 % (ref 11.5–14.5)
GLUCOSE BLD MANUAL STRIP-MCNC: 139 MG/DL (ref 74–99)
GLUCOSE BLD MANUAL STRIP-MCNC: 141 MG/DL (ref 74–99)
GLUCOSE BLD MANUAL STRIP-MCNC: 161 MG/DL (ref 74–99)
GLUCOSE SERPL-MCNC: 156 MG/DL (ref 74–99)
HCT VFR BLD AUTO: 29.7 % (ref 36–46)
HGB BLD-MCNC: 9.4 G/DL (ref 12–16)
IMM GRANULOCYTES # BLD AUTO: 0.15 X10*3/UL (ref 0–0.7)
IMM GRANULOCYTES NFR BLD AUTO: 2.7 % (ref 0–0.9)
INR PPP: 1.3 (ref 0.9–1.1)
LYMPHOCYTES # BLD AUTO: 0.39 X10*3/UL (ref 1.2–4.8)
LYMPHOCYTES NFR BLD AUTO: 6.9 %
MAGNESIUM SERPL-MCNC: 2.13 MG/DL (ref 1.6–2.4)
MCH RBC QN AUTO: 28 PG (ref 26–34)
MCHC RBC AUTO-ENTMCNC: 31.6 G/DL (ref 32–36)
MCV RBC AUTO: 88 FL (ref 80–100)
MONOCYTES # BLD AUTO: 0.32 X10*3/UL (ref 0.1–1)
MONOCYTES NFR BLD AUTO: 5.7 %
NEUTROPHILS # BLD AUTO: 4.74 X10*3/UL (ref 1.2–7.7)
NEUTROPHILS NFR BLD AUTO: 84 %
NRBC BLD-RTO: 0 /100 WBCS (ref 0–0)
PHOSPHATE SERPL-MCNC: 3 MG/DL (ref 2.5–4.9)
PLATELET # BLD AUTO: 281 X10*3/UL (ref 150–450)
POTASSIUM SERPL-SCNC: 3.6 MMOL/L (ref 3.5–5.3)
PROTHROMBIN TIME: 14.2 SECONDS (ref 9.8–12.8)
RBC # BLD AUTO: 3.36 X10*6/UL (ref 4–5.2)
SODIUM SERPL-SCNC: 146 MMOL/L (ref 136–145)
WBC # BLD AUTO: 5.6 X10*3/UL (ref 4.4–11.3)

## 2025-02-09 PROCEDURE — 71045 X-RAY EXAM CHEST 1 VIEW: CPT

## 2025-02-09 PROCEDURE — 80069 RENAL FUNCTION PANEL: CPT

## 2025-02-09 PROCEDURE — 94640 AIRWAY INHALATION TREATMENT: CPT

## 2025-02-09 PROCEDURE — 2500000004 HC RX 250 GENERAL PHARMACY W/ HCPCS (ALT 636 FOR OP/ED)

## 2025-02-09 PROCEDURE — 2500000002 HC RX 250 W HCPCS SELF ADMINISTERED DRUGS (ALT 637 FOR MEDICARE OP, ALT 636 FOR OP/ED)

## 2025-02-09 PROCEDURE — 85025 COMPLETE CBC W/AUTO DIFF WBC: CPT | Performed by: THORACIC SURGERY (CARDIOTHORACIC VASCULAR SURGERY)

## 2025-02-09 PROCEDURE — 37799 UNLISTED PX VASCULAR SURGERY: CPT

## 2025-02-09 PROCEDURE — 2500000005 HC RX 250 GENERAL PHARMACY W/O HCPCS

## 2025-02-09 PROCEDURE — 2500000004 HC RX 250 GENERAL PHARMACY W/ HCPCS (ALT 636 FOR OP/ED): Performed by: PHYSICIAN ASSISTANT

## 2025-02-09 PROCEDURE — 85610 PROTHROMBIN TIME: CPT

## 2025-02-09 PROCEDURE — 80162 ASSAY OF DIGOXIN TOTAL: CPT | Performed by: NURSE PRACTITIONER

## 2025-02-09 PROCEDURE — 2500000001 HC RX 250 WO HCPCS SELF ADMINISTERED DRUGS (ALT 637 FOR MEDICARE OP)

## 2025-02-09 PROCEDURE — 82330 ASSAY OF CALCIUM: CPT

## 2025-02-09 PROCEDURE — 94003 VENT MGMT INPAT SUBQ DAY: CPT

## 2025-02-09 PROCEDURE — 2500000001 HC RX 250 WO HCPCS SELF ADMINISTERED DRUGS (ALT 637 FOR MEDICARE OP): Performed by: PHYSICIAN ASSISTANT

## 2025-02-09 PROCEDURE — 83735 ASSAY OF MAGNESIUM: CPT

## 2025-02-09 PROCEDURE — 2020000001 HC ICU ROOM DAILY

## 2025-02-09 PROCEDURE — 99291 CRITICAL CARE FIRST HOUR: CPT

## 2025-02-09 PROCEDURE — 99231 SBSQ HOSP IP/OBS SF/LOW 25: CPT | Performed by: NURSE PRACTITIONER

## 2025-02-09 PROCEDURE — 82947 ASSAY GLUCOSE BLOOD QUANT: CPT

## 2025-02-09 PROCEDURE — 2500000001 HC RX 250 WO HCPCS SELF ADMINISTERED DRUGS (ALT 637 FOR MEDICARE OP): Performed by: STUDENT IN AN ORGANIZED HEALTH CARE EDUCATION/TRAINING PROGRAM

## 2025-02-09 PROCEDURE — 2500000004 HC RX 250 GENERAL PHARMACY W/ HCPCS (ALT 636 FOR OP/ED): Performed by: NURSE PRACTITIONER

## 2025-02-09 RX ORDER — METHOCARBAMOL 100 MG/ML
1000 INJECTION, SOLUTION INTRAMUSCULAR; INTRAVENOUS ONCE
Status: COMPLETED | OUTPATIENT
Start: 2025-02-09 | End: 2025-02-09

## 2025-02-09 RX ORDER — NYSTATIN 100000 [USP'U]/ML
5 SUSPENSION ORAL 2 TIMES DAILY
Status: DISCONTINUED | OUTPATIENT
Start: 2025-02-09 | End: 2025-02-12

## 2025-02-09 RX ORDER — POTASSIUM CHLORIDE 1.5 G/1.58G
40 POWDER, FOR SOLUTION ORAL ONCE
Status: COMPLETED | OUTPATIENT
Start: 2025-02-09 | End: 2025-02-09

## 2025-02-09 RX ADMIN — IPRATROPIUM BROMIDE AND ALBUTEROL SULFATE 3 ML: .5; 3 SOLUTION RESPIRATORY (INHALATION) at 08:30

## 2025-02-09 RX ADMIN — PIPERACILLIN SODIUM AND TAZOBACTAM SODIUM 4.5 G: 4; .5 INJECTION, SOLUTION INTRAVENOUS at 18:19

## 2025-02-09 RX ADMIN — CLONAZEPAM 1 MG: 1 TABLET ORAL at 08:00

## 2025-02-09 RX ADMIN — THIAMINE HCL TAB 100 MG 100 MG: 100 TAB at 09:00

## 2025-02-09 RX ADMIN — HYDROCORTISONE SODIUM SUCCINATE 50 MG: 100 INJECTION, POWDER, FOR SOLUTION INTRAMUSCULAR; INTRAVENOUS at 21:08

## 2025-02-09 RX ADMIN — Medication 40 PERCENT: at 20:00

## 2025-02-09 RX ADMIN — MIDODRINE HYDROCHLORIDE 15 MG: 10 TABLET ORAL at 23:30

## 2025-02-09 RX ADMIN — IPRATROPIUM BROMIDE AND ALBUTEROL SULFATE 3 ML: .5; 3 SOLUTION RESPIRATORY (INHALATION) at 14:39

## 2025-02-09 RX ADMIN — NYSTATIN 500000 UNITS: 100000 SUSPENSION ORAL at 09:34

## 2025-02-09 RX ADMIN — PIPERACILLIN SODIUM AND TAZOBACTAM SODIUM 4.5 G: 4; .5 INJECTION, SOLUTION INTRAVENOUS at 23:29

## 2025-02-09 RX ADMIN — INSULIN LISPRO 1 UNITS: 100 INJECTION, SOLUTION INTRAVENOUS; SUBCUTANEOUS at 03:40

## 2025-02-09 RX ADMIN — DEXMEDETOMIDINE HYDROCHLORIDE 0.24 MCG/KG/HR: 400 INJECTION INTRAVENOUS at 04:35

## 2025-02-09 RX ADMIN — CLONAZEPAM 1 MG: 1 TABLET ORAL at 18:19

## 2025-02-09 RX ADMIN — NYSTATIN 500000 UNITS: 100000 SUSPENSION ORAL at 21:08

## 2025-02-09 RX ADMIN — TRAZODONE HYDROCHLORIDE 25 MG: 50 TABLET ORAL at 21:08

## 2025-02-09 RX ADMIN — METHOCARBAMOL 1000 MG: 1000 INJECTION, SOLUTION INTRAMUSCULAR; INTRAVENOUS at 02:13

## 2025-02-09 RX ADMIN — HYDROCORTISONE SODIUM SUCCINATE 50 MG: 100 INJECTION, POWDER, FOR SOLUTION INTRAMUSCULAR; INTRAVENOUS at 05:51

## 2025-02-09 RX ADMIN — MIDODRINE HYDROCHLORIDE 15 MG: 10 TABLET ORAL at 05:51

## 2025-02-09 RX ADMIN — BUDESONIDE 0.5 MG: 0.5 INHALANT RESPIRATORY (INHALATION) at 20:25

## 2025-02-09 RX ADMIN — IPRATROPIUM BROMIDE AND ALBUTEROL SULFATE 3 ML: .5; 3 SOLUTION RESPIRATORY (INHALATION) at 03:00

## 2025-02-09 RX ADMIN — Medication 40 PERCENT: at 08:00

## 2025-02-09 RX ADMIN — MIDODRINE HYDROCHLORIDE 15 MG: 10 TABLET ORAL at 18:19

## 2025-02-09 RX ADMIN — MIDODRINE HYDROCHLORIDE 15 MG: 10 TABLET ORAL at 13:06

## 2025-02-09 RX ADMIN — HEPARIN SODIUM 1000 UNITS/HR: 10000 INJECTION, SOLUTION INTRAVENOUS at 03:55

## 2025-02-09 RX ADMIN — POTASSIUM CHLORIDE 40 MEQ: 1.5 POWDER, FOR SOLUTION ORAL at 08:00

## 2025-02-09 RX ADMIN — NYSTATIN 500000 UNITS: 100000 SUSPENSION ORAL at 02:13

## 2025-02-09 RX ADMIN — ESOMEPRAZOLE MAGNESIUM 40 MG: 40 FOR SUSPENSION ORAL at 06:16

## 2025-02-09 RX ADMIN — HYDROCORTISONE SODIUM SUCCINATE 50 MG: 100 INJECTION, POWDER, FOR SOLUTION INTRAMUSCULAR; INTRAVENOUS at 13:07

## 2025-02-09 RX ADMIN — PIPERACILLIN SODIUM AND TAZOBACTAM SODIUM 4.5 G: 4; .5 INJECTION, SOLUTION INTRAVENOUS at 09:34

## 2025-02-09 RX ADMIN — PIPERACILLIN SODIUM AND TAZOBACTAM SODIUM 4.5 G: 4; .5 INJECTION, SOLUTION INTRAVENOUS at 03:06

## 2025-02-09 RX ADMIN — IPRATROPIUM BROMIDE AND ALBUTEROL SULFATE 3 ML: .5; 3 SOLUTION RESPIRATORY (INHALATION) at 20:23

## 2025-02-09 RX ADMIN — DIGOXIN 125 MCG: 0.25 INJECTION INTRAMUSCULAR; INTRAVENOUS at 09:34

## 2025-02-09 RX ADMIN — BUDESONIDE 0.5 MG: 0.5 INHALANT RESPIRATORY (INHALATION) at 08:30

## 2025-02-09 ASSESSMENT — PAIN - FUNCTIONAL ASSESSMENT
PAIN_FUNCTIONAL_ASSESSMENT: 0-10

## 2025-02-09 ASSESSMENT — PAIN SCALES - GENERAL
PAINLEVEL_OUTOF10: 0 - NO PAIN

## 2025-02-09 NOTE — PROGRESS NOTES
"Faustina Vick \"Jannie\" is a 64 y.o. female who presents for Primary cancer of right upper lobe of lung (Multi) [C34.11].  She initially received chemotherapy and radiation, but appeared to have residual radiographic abnormalities.  PET/CT suggested progression of disease, and in this setting and Dr. Wagner felt that surgical resection in a \"salvage\" setting might facilitate long-term overall survival given the failure of nonsurgical management. She is now  27 days post op from PNEUMONECTOMY (Right) (en bloc with chest wall resection and reconstruction), mediastinal lymph node dissection for a  Primary cancer of right upper lobe of lung (Multi).      Subjective   NAEO. Patient remains on Precedex, tolerating CPAP.     Objective     General: Resting in bed, awake and alert, PERALES x 4, follows.  HEENT: Trach in place, no surrounding drainage or bleeding  Resp: symmetric chest rise, CPAP via trach   CV: Sinus tachycardia, 100s when in room, off presors  Abd: soft, ND, PEG in place with TF running at 45  Neuro: awake and alert, withdrawn    Last Recorded Vitals  Blood pressure 125/72, pulse 98, temperature 36.7 °C (98.1 °F), temperature source Temporal, resp. rate 20, height 1.651 m (5' 5\"), weight 54.8 kg (120 lb 13 oz), SpO2 96%.  Intake/Output last 3 Shifts:  I/O last 3 completed shifts:  In: 2647.3 (48.3 mL/kg) [I.V.:502.3 (9.2 mL/kg); NG/GT:1945; IV Piggyback:200]  Out: 2440 (44.5 mL/kg) [Urine:2440 (1.2 mL/kg/hr)]  Weight: 54.8 kg     Relevant Results  Scheduled medications  budesonide, 0.5 mg, nebulization, BID  clonazePAM, 1 mg, g-tube, q8h  digoxin, 125 mcg, intravenous, Daily  esomeprazole, 40 mg, g-tube, Daily before breakfast  hydrocortisone sodium succinate, 50 mg, intravenous, q8h  insulin lispro, 0-5 Units, subcutaneous, q4h  ipratropium-albuteroL, 3 mL, nebulization, q6h  midodrine, 15 mg, g-tube, q6h  nystatin, 5 mL, Mouth/Throat, BID  oxygen, , inhalation, Continuous - Inhalation  pancrelipase " (Lip-Prot-Amyl) 2 tablet, sodium bicarbonate 650 mg, , g-tube, Once  piperacillin-tazobactam, 4.5 g, intravenous, q6h  thiamine, 100 mg, g-tube, Daily  traZODone, 25 mg, g-tube, Nightly      Continuous medications  dexmedeTOMIDine, 0-1.5 mcg/kg/hr, Last Rate: 0.24 mcg/kg/hr (02/09/25 5545)  heparin, 0-4,000 Units/hr, Last Rate: 1,000 Units/hr (02/09/25 7195)      PRN medications  PRN medications: acetylcysteine, albuterol, calcium gluconate, calcium gluconate, dextrose, dextrose, glucagon, glucagon, HYDROmorphone, magnesium sulfate, magnesium sulfate, oxyCODONE, oxygen, oxygen, oxygen, potassium chloride    Results for orders placed or performed during the hospital encounter of 01/13/25 (from the past 24 hours)   POCT GLUCOSE   Result Value Ref Range    POCT Glucose 100 (H) 74 - 99 mg/dL   POCT GLUCOSE   Result Value Ref Range    POCT Glucose 127 (H) 74 - 99 mg/dL   POCT GLUCOSE   Result Value Ref Range    POCT Glucose 149 (H) 74 - 99 mg/dL   Digoxin   Result Value Ref Range    Digoxin  0.82 0.80 - <2.00 ng/mL   Renal function panel   Result Value Ref Range    Glucose 156 (H) 74 - 99 mg/dL    Sodium 146 (H) 136 - 145 mmol/L    Potassium 3.6 3.5 - 5.3 mmol/L    Chloride 102 98 - 107 mmol/L    Bicarbonate 35 (H) 21 - 32 mmol/L    Anion Gap 13 10 - 20 mmol/L    Urea Nitrogen 18 6 - 23 mg/dL    Creatinine <0.20 (L) 0.50 - 1.05 mg/dL    eGFR      Calcium 8.8 8.6 - 10.6 mg/dL    Phosphorus 3.0 2.5 - 4.9 mg/dL    Albumin 3.0 (L) 3.4 - 5.0 g/dL   Magnesium   Result Value Ref Range    Magnesium 2.13 1.60 - 2.40 mg/dL   Coagulation Screen   Result Value Ref Range    Protime 14.2 (H) 9.8 - 12.8 seconds    INR 1.3 (H) 0.9 - 1.1    aPTT 64 (H) 27 - 38 seconds   Calcium, Ionized   Result Value Ref Range    POCT Calcium, Ionized 1.18 1.1 - 1.33 mmol/L   CBC and Auto Differential   Result Value Ref Range    WBC 5.6 4.4 - 11.3 x10*3/uL    nRBC 0.0 0.0 - 0.0 /100 WBCs    RBC 3.36 (L) 4.00 - 5.20 x10*6/uL    Hemoglobin 9.4 (L) 12.0 -  16.0 g/dL    Hematocrit 29.7 (L) 36.0 - 46.0 %    MCV 88 80 - 100 fL    MCH 28.0 26.0 - 34.0 pg    MCHC 31.6 (L) 32.0 - 36.0 g/dL    RDW 18.6 (H) 11.5 - 14.5 %    Platelets 281 150 - 450 x10*3/uL    Neutrophils % 84.0 40.0 - 80.0 %    Immature Granulocytes %, Automated 2.7 (H) 0.0 - 0.9 %    Lymphocytes % 6.9 13.0 - 44.0 %    Monocytes % 5.7 2.0 - 10.0 %    Eosinophils % 0.5 0.0 - 6.0 %    Basophils % 0.2 0.0 - 2.0 %    Neutrophils Absolute 4.74 1.20 - 7.70 x10*3/uL    Immature Granulocytes Absolute, Automated 0.15 0.00 - 0.70 x10*3/uL    Lymphocytes Absolute 0.39 (L) 1.20 - 4.80 x10*3/uL    Monocytes Absolute 0.32 0.10 - 1.00 x10*3/uL    Eosinophils Absolute 0.03 0.00 - 0.70 x10*3/uL    Basophils Absolute 0.01 0.00 - 0.10 x10*3/uL   POCT GLUCOSE   Result Value Ref Range    POCT Glucose 161 (H) 74 - 99 mg/dL     CXR 2/9:  IMPRESSION:  1. Postsurgical changes of right pneumonectomy with right-sided en bloc chest wall resection.   2. Similar complete opacification of the right pneumonectomy surgical bed with silhouetting of the right costophrenic angle. Right-sided pleural effusion can not be excluded.   3. Scattered densities throughout the left lung appears slightly improved when compared to prior exam, suggestive of improved left lung aeration over a background of patient's chronic lung disease.   4. Medical devices as detailed above.        Assessment/Plan   Assessment & Plan  Primary cancer of right upper lobe of lung (Multi)    Chronic obstructive pulmonary disease (Multi)    Malignant neoplasm of upper lobe of right lung (Multi)    Cardiogenic shock (Multi)    Respiratory distress    Faustina Vick (Jannie) is a 64F with Hx of Stage III lung cancer, asthma, anxiety, and arthritis who is now s/p R pneumonectomy with Dr. Wagner 1/13/25. 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 Cardiology was consulted and recommended Lasix gtt and milrinone. Lasix gtt held 1/17,  hyponatremia improving. Patient underwent RHC and SGC placement with cardiology 1/17. She was reintubated on 1/18 d/t worsening respiratory status and AMS. Also had increased pressor requirements, now weaned off. Repeat echo stable on 1/18. COVID positive on 1/20.     Failed extubation trials, now s/p trach/PEG with Dr. Wagner 1/30.     - Do not recommend amio given risk of pulmonary complications in the setting of pneumonectomy  - Continue & appreciate supportive ICU care  - Continue Tfs at goal, appreciate nutrition recs  - Pulmonary toilet  - PT/OT for rehabilitation, patient will likely need LTACH placement  - Agree with blood and sputum cultures given new pressor and O2 requirments  - Continue Abx for UTI, recommend removal of Montiel if possible    Patient seen with Dr. Rowe and discussed with Dr. Wagner.    Alondra Cheung, APRN-CNP  Thoracic Surgery [id number]

## 2025-02-09 NOTE — PROGRESS NOTES
"Faustina Vick \"Noam" is a 64 y.o. female on day 27 of admission presenting with Primary cancer of right upper lobe of lung (Multi).    Subjective   No acute events overnight.    Objective   Physical Exam  Vitals reviewed.   Constitutional:       General: She is awake. She is not in acute distress.     Appearance: She is underweight. She is ill-appearing.      Comments: Precedex on at 0.25mcg/kg/hr   HENT:      Head: Normocephalic and atraumatic.      Nose: Nose normal.      Mouth/Throat:      Mouth: Mucous membranes are moist.   Eyes:      Extraocular Movements: Extraocular movements intact.      Pupils: Pupils are equal, round, and reactive to light.   Neck:      Comments: 7.5 cuffed Shiley Trach midline, no drainage  Cardiovascular:      Rate and Rhythm: Normal rate and regular rhythm.      Pulses: Normal pulses.      Heart sounds: Normal heart sounds.   Pulmonary:      Effort: Pulmonary effort is normal.      Breath sounds: Rhonchi: left side.      Comments: Scattered rhonchi left chest field  CPAP on vent  Chest:      Comments: Right lateral chest incisions ROSHAN without drainage  Abdominal:      Palpations: Abdomen is soft.      Comments: PEG tube in place with TF infusing at 45ml/hr. Insertion site without erythema or drainage   Genitourinary:     Comments: Purewick in place  Musculoskeletal:         General: Normal range of motion.      Cervical back: Normal range of motion and neck supple.      Right lower leg: No edema.      Left lower leg: No edema.   Skin:     General: Skin is warm and dry.   Neurological:      General: No focal deficit present.      Mental Status: She is confused.      Motor: Weakness: generalized.   Psychiatric:         Behavior: Behavior normal. Behavior is cooperative.       Last Recorded Vitals  Blood pressure 114/76, pulse 80, temperature 35.9 °C (96.6 °F), temperature source Temporal, resp. rate 15, height 1.651 m (5' 5\"), weight 54.8 kg (120 lb 13 oz), SpO2 99%.    VS over last " 24h  Heart Rate:  []   Temp:  [35.8 °C (96.4 °F)-36.8 °C (98.2 °F)]   Resp:  [0-28]   BP: ()/(66-92)   Weight:  [54.8 kg (120 lb 13 oz)]   SpO2:  [93 %-100 %]      Intake/Output last 3 Shifts:  I/O last 3 completed shifts:  In: 2647.3 (48.3 mL/kg) [I.V.:502.3 (9.2 mL/kg); NG/GT:1945; IV Piggyback:200]  Out: 2440 (44.5 mL/kg) [Urine:2440 (1.2 mL/kg/hr)]  Weight: 54.8 kg       Intake/Output Summary (Last 24 hours) at 2/9/2025 0805  Last data filed at 2/9/2025 0600  Gross per 24 hour   Intake 1687.6 ml   Output 1860 ml   Net -172.4 ml        Relevant Results  Scheduled medications  budesonide, 0.5 mg, nebulization, BID  clonazePAM, 1 mg, g-tube, q8h  digoxin, 125 mcg, intravenous, Daily  esomeprazole, 40 mg, g-tube, Daily before breakfast  hydrocortisone sodium succinate, 50 mg, intravenous, q8h  insulin lispro, 0-5 Units, subcutaneous, q4h  ipratropium-albuteroL, 3 mL, nebulization, q6h  midodrine, 15 mg, g-tube, q6h  nystatin, 5 mL, Mouth/Throat, BID  oxygen, , inhalation, Continuous - Inhalation  pancrelipase (Lip-Prot-Amyl) 2 tablet, sodium bicarbonate 650 mg, , g-tube, Once  piperacillin-tazobactam, 4.5 g, intravenous, q6h  potassium chloride, 40 mEq, oral, Once  thiamine, 100 mg, g-tube, Daily  traZODone, 25 mg, g-tube, Nightly      PRN medications  PRN medications: acetylcysteine, albuterol, calcium gluconate, calcium gluconate, dextrose, dextrose, glucagon, glucagon, HYDROmorphone, magnesium sulfate, magnesium sulfate, oxyCODONE, oxygen, oxygen, oxygen, potassium chloride        Results from last 7 days   Lab Units 02/09/25  0336 02/08/25  0103 02/07/25  0324   WBC AUTO x10*3/uL 5.6 7.5 6.6   HEMOGLOBIN g/dL 9.4* 9.6* 9.0*   PLATELETS AUTO x10*3/uL 281 274 213      Results from last 7 days   Lab Units 02/09/25  0334 02/08/25  0103 02/07/25  1202   SODIUM mmol/L 146* 145 146*   POTASSIUM mmol/L 3.6 3.7 3.7   CHLORIDE mmol/L 102 103 104   CO2 mmol/L 35* 34* 32   BUN mg/dL 18 18 18   CREATININE mg/dL  <0.20* 0.22* 0.21*   GLUCOSE mg/dL 156* 132* 115*   MAGNESIUM mg/dL 2.13 1.93  --    PHOSPHORUS mg/dL 3.0 3.0 3.0      Results from last 7 days   Lab Units 02/09/25  0334 02/08/25  0103 02/06/25  0205   INR  1.3* 1.2* 1.2*   PROTIME seconds 14.2* 13.7* 13.8*   APTT seconds 64* 50* 44*              Assessment/Plan   Assessment:  Faustina Vick is a 63 y/o with PMHx of COPD and 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 Wagner on 1/13 requiring post-op vasoactive therapy. Post-op course c/b acute hyponatremia, multifactorial delirium, cardiogenic shock requiring inotropic support, and acute hypoxic/hypercarbic respiratory failure requiring intubation. COVID+ 1/20. Weaned off milrinone 1/21. Weaned off iEpo 1/24. S/p trach/PEG 1/30 tolerating TF through PEG. TTE 2/3 with improved EF to 60-65%. Pt developed atrial tachycardia with rates in 150-160s, cardiology recommended metoprolol and digoxin for rate and rhythm control, improved. Now pending LTACH placement.      Plan:  NEURO: History of anxiety on alprazolam 1mg qd. A&Ox4 at baseline. Physical deconditioning, tolerating increased activity with PT/OT. Situational depression/anxiety, remains on Precedex 0.25 this am. Also on scheduled clonazepam and nightly trazodone. Acute delirium. Qtc 436 2/7.   - Continue Trazodone nightly  - Continue Clonazepam 1mg q8h with hold parameters  - Continue Oxycodone and Dilaudid prn   - Ongoing neuro and pain assessments  - PT/OT -> continue to progress activity  - CAM assessments q shift  - continue pet therapy  - PICS team consult  - delirium precautions, REST protocol     CV: No history of cardiac disease. Baseline /79, -110. TTE 9/2024 normal biV function. Intra-op and immediate post op hypotension requiring pressor support. TTE 1/13 with EF 60-65%, low normal RV systolic function.  Acutely elevated lactate 1/15-1/16. Repeat TTE 1/16: acute biV dysfunction/takotsubo cardiomyopathy, EF 34%, mod reduced RV systolic function, mild to mod TR. Cardiogenic shock resolved. Weaned off milrinone 1/21, iEpo 1/24. Difficulty weaning off low dose vaso, started midodrine 1/25. Repeat TTE 2/3 with improved EF to 60-65%. Ongoing intermittent episodes of tachycardia to 150s. Suspected Aflutter/atrial tachycardia, added digoxin 2/3. Started low intensity heparin infusion 2/5. On/off phenyl and vaso for nighttime hypotension. Midodrine increased to 15mg q6h. Restarted stress dose steroids 2/4. No pressor requirement.   - Continue digoxin 125mcg daily -> check dig level daily - 0.82 today  - EP following: tachycardia due to acute illness. Continue dig and low intensity heparin infusion. Consider tikosyn once patient stable in sinus rhythm, ziopatch at discharge. Thoracic surgery recommending against amiodarone.  - Continue Midodrine 15 mg q6h  - Decrease hydrocortisone to 50mg q8h (2/8 - )  - Continuous EKG/abp monitoring  - MAP goal >65     PULM: Hx of 30 pack year smoking history (quit 4/2024), COPD, and stage III right lung SCC s/p chemo/XRT/immunotherapy. 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 Arrived to SICU extubated on 10L SFM, weaned to room air 1/14. Chest tube removed 1/14. Acute on chronic hypoxic respiratory failure 1/16 in the setting of COVID. Intubated 1/18. S/p trach 1/30. Sutures cut 2/6. Speech eval 2/7 for PMSV, desaturation and coughing noted, speech to continue to follow. Remains on CPAP  - Wean FiO2 to maintain SpO2 >92%  - Attempt trach collar tomorrow  - Obtain sputum culture today if able  - Scheduled budesonide/duoneb  - continue humidified vent  - PRN albuterol  - Daily CXR prn  - ABG prn  - Holding home benzonatate    GI: No GI history. Elevated LFT 1/16-1/21 resolved. Evidence of pericholecystic fluid and biliary sludge on  renal US 1/15 but no abdominal symptoms. RUQ US without significant findings. S/P PEG 1/30. Tolerating TF at goal rate. Last Bms 2/9, soft.  - Continue thiamine 100mg IV daily  - Continue IV PPI for GI prophylaxis  - holding bowel reg for diarrhea  - consider sending C. diff if persistent diarrhea and/or leukocytosis develops    : No history of renal disease. Baseline creatinine 0.6. Symptomatic acute hyponatremia resolved, was given 3% saline infusions. Yoo replaced 2/1 for urine retention. Trial of silodosin stopped given recurrent hypotension. Failed TOV, straight cathed overnight 2/6. Hypokalemia requiring frequent repletion. Per urology note 2/7, continue yoo at discharge and will f/u outpatient for TOV.  - Maintain yoo catheter per urology  - RFP daily and prn  - Maintain U/O >0.5ml/kg/hr  - Replete electrolytes to maintain K>4, Phos>2.5, iCal>1.1, Mag>2     HEME: Hx of DOC. Baseline H/H 12/40. Acute blood loss anemia. Acute coagulopathy resolved. H/H stable. Started on low intensity heparin infusion 2/5.  - Check CBC daily and PRN  - coags prn  - Ongoing monitoring for s/s bleeding  - Maintain active T&S (2/7)  - check assays and adjust heparin infusion per nomogram    ENDO: No history of DM or thyroid disease. Adequate glycemic control  - BG q4h, SSI #1     MSK: Arthritis, Vit D deficiency. Sacral wound.  - Turn patient every 2 hrs, continue wound care  - Not on meds at home  - Monitor for symptoms     ID:  Completed treatment of COVID with decadron and remdesivir. Off precautions since 1/30. Pan-susceptible Pseudomonas UTI 2/1, started on cetriaxone 2/2, switched to zosyn 2/3 for 7 day course. Blood cultures sent 2/4, negative. Currently afebrile, no leukocytosis.  - continue zosyn through today 2/9  - Temp q4h, wbc daily  - Ongoing monitoring for s/s infection    Access:  - R axillary a line (1/28-)  - Left chest mediport (not currently accessed)  - PIV x2  - Trach (1/30)  - PEG (1/30)  - yoo  2/7     Dispo: Continue ICU care. Patient seen and discussed with ICU attending Dr. Ibrahim. SW helping guide family with LTACH placement [facility].    Code Status: Full Code     Dinh Anna MD  Anesthesiology PGY3  SICU Phone [phone]

## 2025-02-10 ENCOUNTER — APPOINTMENT (OUTPATIENT)
Dept: RADIOLOGY | Facility: HOSPITAL | Age: 65
End: 2025-02-10
Payer: COMMERCIAL

## 2025-02-10 LAB
ABO GROUP (TYPE) IN BLOOD: NORMAL
ALBUMIN SERPL BCP-MCNC: 2.9 G/DL (ref 3.4–5)
ANION GAP SERPL CALC-SCNC: 10 MMOL/L (ref 10–20)
ANTIBODY SCREEN: NORMAL
APTT PPP: 75 SECONDS (ref 27–38)
BUN SERPL-MCNC: 16 MG/DL (ref 6–23)
CA-I BLD-SCNC: 1.19 MMOL/L (ref 1.1–1.33)
CALCIUM SERPL-MCNC: 8.8 MG/DL (ref 8.6–10.6)
CHLORIDE SERPL-SCNC: 101 MMOL/L (ref 98–107)
CO2 SERPL-SCNC: 37 MMOL/L (ref 21–32)
CREAT SERPL-MCNC: <0.2 MG/DL (ref 0.5–1.05)
DIGOXIN SERPL-MCNC: 0.55 NG/ML (ref 0.8–?)
EGFRCR SERPLBLD CKD-EPI 2021: ABNORMAL ML/MIN/{1.73_M2}
ERYTHROCYTE [DISTWIDTH] IN BLOOD BY AUTOMATED COUNT: 18.6 % (ref 11.5–14.5)
GLUCOSE BLD MANUAL STRIP-MCNC: 113 MG/DL (ref 74–99)
GLUCOSE BLD MANUAL STRIP-MCNC: 114 MG/DL (ref 74–99)
GLUCOSE BLD MANUAL STRIP-MCNC: 115 MG/DL (ref 74–99)
GLUCOSE BLD MANUAL STRIP-MCNC: 115 MG/DL (ref 74–99)
GLUCOSE BLD MANUAL STRIP-MCNC: 126 MG/DL (ref 74–99)
GLUCOSE BLD MANUAL STRIP-MCNC: 129 MG/DL (ref 74–99)
GLUCOSE SERPL-MCNC: 137 MG/DL (ref 74–99)
HCT VFR BLD AUTO: 31.8 % (ref 36–46)
HGB BLD-MCNC: 10.3 G/DL (ref 12–16)
INR PPP: 1.2 (ref 0.9–1.1)
MAGNESIUM SERPL-MCNC: 1.94 MG/DL (ref 1.6–2.4)
MCH RBC QN AUTO: 28.5 PG (ref 26–34)
MCHC RBC AUTO-ENTMCNC: 32.4 G/DL (ref 32–36)
MCV RBC AUTO: 88 FL (ref 80–100)
NRBC BLD-RTO: 0 /100 WBCS (ref 0–0)
PHOSPHATE SERPL-MCNC: 3.1 MG/DL (ref 2.5–4.9)
PLATELET # BLD AUTO: 351 X10*3/UL (ref 150–450)
POTASSIUM SERPL-SCNC: 3.5 MMOL/L (ref 3.5–5.3)
PROTHROMBIN TIME: 13.8 SECONDS (ref 9.8–12.8)
RBC # BLD AUTO: 3.61 X10*6/UL (ref 4–5.2)
RH FACTOR (ANTIGEN D): NORMAL
SODIUM SERPL-SCNC: 144 MMOL/L (ref 136–145)
UFH PPP CHRO-ACNC: 0.5 IU/ML
WBC # BLD AUTO: 8.9 X10*3/UL (ref 4.4–11.3)

## 2025-02-10 PROCEDURE — 85027 COMPLETE CBC AUTOMATED: CPT | Performed by: NURSE PRACTITIONER

## 2025-02-10 PROCEDURE — 2500000004 HC RX 250 GENERAL PHARMACY W/ HCPCS (ALT 636 FOR OP/ED)

## 2025-02-10 PROCEDURE — 2500000002 HC RX 250 W HCPCS SELF ADMINISTERED DRUGS (ALT 637 FOR MEDICARE OP, ALT 636 FOR OP/ED)

## 2025-02-10 PROCEDURE — 80162 ASSAY OF DIGOXIN TOTAL: CPT | Performed by: NURSE PRACTITIONER

## 2025-02-10 PROCEDURE — 99291 CRITICAL CARE FIRST HOUR: CPT | Performed by: ANESTHESIOLOGY

## 2025-02-10 PROCEDURE — 99291 CRITICAL CARE FIRST HOUR: CPT | Performed by: NURSE PRACTITIONER

## 2025-02-10 PROCEDURE — 94003 VENT MGMT INPAT SUBQ DAY: CPT

## 2025-02-10 PROCEDURE — 86901 BLOOD TYPING SEROLOGIC RH(D): CPT | Performed by: NURSE PRACTITIONER

## 2025-02-10 PROCEDURE — 85520 HEPARIN ASSAY: CPT

## 2025-02-10 PROCEDURE — 71045 X-RAY EXAM CHEST 1 VIEW: CPT | Performed by: RADIOLOGY

## 2025-02-10 PROCEDURE — 2500000004 HC RX 250 GENERAL PHARMACY W/ HCPCS (ALT 636 FOR OP/ED): Performed by: PHYSICIAN ASSISTANT

## 2025-02-10 PROCEDURE — 2500000001 HC RX 250 WO HCPCS SELF ADMINISTERED DRUGS (ALT 637 FOR MEDICARE OP): Performed by: PHYSICIAN ASSISTANT

## 2025-02-10 PROCEDURE — 37799 UNLISTED PX VASCULAR SURGERY: CPT | Performed by: NURSE PRACTITIONER

## 2025-02-10 PROCEDURE — 2500000001 HC RX 250 WO HCPCS SELF ADMINISTERED DRUGS (ALT 637 FOR MEDICARE OP): Performed by: STUDENT IN AN ORGANIZED HEALTH CARE EDUCATION/TRAINING PROGRAM

## 2025-02-10 PROCEDURE — 85610 PROTHROMBIN TIME: CPT

## 2025-02-10 PROCEDURE — 97530 THERAPEUTIC ACTIVITIES: CPT | Mod: GP | Performed by: STUDENT IN AN ORGANIZED HEALTH CARE EDUCATION/TRAINING PROGRAM

## 2025-02-10 PROCEDURE — 83735 ASSAY OF MAGNESIUM: CPT

## 2025-02-10 PROCEDURE — 2500000005 HC RX 250 GENERAL PHARMACY W/O HCPCS

## 2025-02-10 PROCEDURE — 82330 ASSAY OF CALCIUM: CPT

## 2025-02-10 PROCEDURE — 37799 UNLISTED PX VASCULAR SURGERY: CPT

## 2025-02-10 PROCEDURE — 2500000001 HC RX 250 WO HCPCS SELF ADMINISTERED DRUGS (ALT 637 FOR MEDICARE OP)

## 2025-02-10 PROCEDURE — 2020000001 HC ICU ROOM DAILY

## 2025-02-10 PROCEDURE — 2500000001 HC RX 250 WO HCPCS SELF ADMINISTERED DRUGS (ALT 637 FOR MEDICARE OP): Performed by: NURSE PRACTITIONER

## 2025-02-10 PROCEDURE — 82947 ASSAY GLUCOSE BLOOD QUANT: CPT

## 2025-02-10 PROCEDURE — 97110 THERAPEUTIC EXERCISES: CPT | Mod: GP | Performed by: STUDENT IN AN ORGANIZED HEALTH CARE EDUCATION/TRAINING PROGRAM

## 2025-02-10 PROCEDURE — 94640 AIRWAY INHALATION TREATMENT: CPT

## 2025-02-10 PROCEDURE — 71045 X-RAY EXAM CHEST 1 VIEW: CPT

## 2025-02-10 PROCEDURE — 84100 ASSAY OF PHOSPHORUS: CPT

## 2025-02-10 RX ORDER — TRAZODONE HYDROCHLORIDE 50 MG/1
50 TABLET ORAL NIGHTLY
Status: DISCONTINUED | OUTPATIENT
Start: 2025-02-10 | End: 2025-02-12

## 2025-02-10 RX ADMIN — DEXMEDETOMIDINE HYDROCHLORIDE 0.24 MCG/KG/HR: 400 INJECTION INTRAVENOUS at 01:41

## 2025-02-10 RX ADMIN — MIDODRINE HYDROCHLORIDE 15 MG: 10 TABLET ORAL at 06:01

## 2025-02-10 RX ADMIN — TRAZODONE HYDROCHLORIDE 50 MG: 50 TABLET ORAL at 21:00

## 2025-02-10 RX ADMIN — IPRATROPIUM BROMIDE AND ALBUTEROL SULFATE 3 ML: .5; 3 SOLUTION RESPIRATORY (INHALATION) at 15:08

## 2025-02-10 RX ADMIN — CLONAZEPAM 1 MG: 1 TABLET ORAL at 08:19

## 2025-02-10 RX ADMIN — IPRATROPIUM BROMIDE AND ALBUTEROL SULFATE 3 ML: .5; 3 SOLUTION RESPIRATORY (INHALATION) at 19:31

## 2025-02-10 RX ADMIN — IPRATROPIUM BROMIDE AND ALBUTEROL SULFATE 3 ML: .5; 3 SOLUTION RESPIRATORY (INHALATION) at 03:00

## 2025-02-10 RX ADMIN — HEPARIN SODIUM 1000 UNITS/HR: 10000 INJECTION, SOLUTION INTRAVENOUS at 01:42

## 2025-02-10 RX ADMIN — NYSTATIN 500000 UNITS: 100000 SUSPENSION ORAL at 21:00

## 2025-02-10 RX ADMIN — HYDROCORTISONE SODIUM SUCCINATE 50 MG: 100 INJECTION, POWDER, FOR SOLUTION INTRAMUSCULAR; INTRAVENOUS at 06:01

## 2025-02-10 RX ADMIN — HYDROCORTISONE SODIUM SUCCINATE 50 MG: 100 INJECTION, POWDER, FOR SOLUTION INTRAMUSCULAR; INTRAVENOUS at 13:53

## 2025-02-10 RX ADMIN — POTASSIUM CHLORIDE 20 MEQ: 14.9 INJECTION, SOLUTION INTRAVENOUS at 05:59

## 2025-02-10 RX ADMIN — CLONAZEPAM 1 MG: 1 TABLET ORAL at 01:44

## 2025-02-10 RX ADMIN — Medication 40 PERCENT: at 08:00

## 2025-02-10 RX ADMIN — ESOMEPRAZOLE MAGNESIUM 40 MG: 40 FOR SUSPENSION ORAL at 06:01

## 2025-02-10 RX ADMIN — NYSTATIN 500000 UNITS: 100000 SUSPENSION ORAL at 08:19

## 2025-02-10 RX ADMIN — MIDODRINE HYDROCHLORIDE 15 MG: 10 TABLET ORAL at 17:49

## 2025-02-10 RX ADMIN — IPRATROPIUM BROMIDE AND ALBUTEROL SULFATE 3 ML: .5; 3 SOLUTION RESPIRATORY (INHALATION) at 08:34

## 2025-02-10 RX ADMIN — HYDROCORTISONE SODIUM SUCCINATE 50 MG: 100 INJECTION, POWDER, FOR SOLUTION INTRAMUSCULAR; INTRAVENOUS at 21:00

## 2025-02-10 RX ADMIN — Medication 40 PERCENT: at 19:43

## 2025-02-10 RX ADMIN — BUDESONIDE 0.5 MG: 0.5 INHALANT RESPIRATORY (INHALATION) at 19:32

## 2025-02-10 RX ADMIN — HEPARIN SODIUM 1000 UNITS/HR: 10000 INJECTION, SOLUTION INTRAVENOUS at 21:56

## 2025-02-10 RX ADMIN — DIGOXIN 125 MCG: 0.25 INJECTION INTRAMUSCULAR; INTRAVENOUS at 08:19

## 2025-02-10 RX ADMIN — BUDESONIDE 0.5 MG: 0.5 INHALANT RESPIRATORY (INHALATION) at 08:34

## 2025-02-10 RX ADMIN — MIDODRINE HYDROCHLORIDE 15 MG: 10 TABLET ORAL at 12:00

## 2025-02-10 RX ADMIN — CLONAZEPAM 1 MG: 1 TABLET ORAL at 16:10

## 2025-02-10 RX ADMIN — THIAMINE HCL TAB 100 MG 100 MG: 100 TAB at 08:19

## 2025-02-10 ASSESSMENT — COGNITIVE AND FUNCTIONAL STATUS - GENERAL
MOBILITY SCORE: 8
MOVING TO AND FROM BED TO CHAIR: TOTAL
TURNING FROM BACK TO SIDE WHILE IN FLAT BAD: A LOT
WALKING IN HOSPITAL ROOM: TOTAL
STANDING UP FROM CHAIR USING ARMS: TOTAL
CLIMB 3 TO 5 STEPS WITH RAILING: TOTAL
MOVING FROM LYING ON BACK TO SITTING ON SIDE OF FLAT BED WITH BEDRAILS: A LOT

## 2025-02-10 ASSESSMENT — PAIN - FUNCTIONAL ASSESSMENT
PAIN_FUNCTIONAL_ASSESSMENT: 0-10

## 2025-02-10 ASSESSMENT — PAIN SCALES - GENERAL
PAINLEVEL_OUTOF10: 0 - NO PAIN

## 2025-02-10 NOTE — PROGRESS NOTES
"Faustina Vick \"Noam" is a 64 y.o. female on day 28 of admission presenting with Primary cancer of right upper lobe of lung (Multi).    Subjective   No acute events overnight.    Objective   Physical Exam  Vitals reviewed.   Constitutional:       General: She is awake.      Appearance: She is underweight.   HENT:      Head: Normocephalic and atraumatic.      Nose: Nose normal.      Mouth/Throat:      Mouth: Mucous membranes are moist.   Eyes:      Extraocular Movements: Extraocular movements intact.      Pupils: Pupils are equal, round, and reactive to light.   Neck:      Comments: 7.5 cuffed Shiley Trach midline, no drainage. Soft trach tie in place  Cardiovascular:      Rate and Rhythm: Normal rate and regular rhythm.      Pulses: Normal pulses.      Heart sounds: Normal heart sounds.   Pulmonary:      Effort: Pulmonary effort is normal.      Breath sounds: Rhonchi: left side.      Comments: Scattered rhonchi left chest field  CPAP on vent  Chest:      Comments: Right lateral chest incisions ROSHAN without drainage  Abdominal:      Palpations: Abdomen is soft.      Comments: PEG tube in place with TF infusing at 45ml/hr. Insertion site without erythema or drainage   Genitourinary:     Comments: Montiel in place, clear yellow  Musculoskeletal:         General: Normal range of motion.      Cervical back: Normal range of motion and neck supple.      Right lower leg: No edema.      Left lower leg: No edema.   Skin:     General: Skin is warm and dry.      Capillary Refill: Capillary refill takes less than 2 seconds.   Neurological:      General: No focal deficit present.      Mental Status: She is alert. She is confused.      GCS: GCS eye subscore is 4. GCS verbal subscore is 1. GCS motor subscore is 6.      Motor: Weakness: generalized.   Psychiatric:         Mood and Affect: Affect is labile and tearful.         Behavior: Behavior is cooperative.       Last Recorded Vitals  Blood pressure 133/73, pulse 109, temperature " "36.8 °C (98.2 °F), temperature source Temporal, resp. rate 22, height 1.651 m (5' 5\"), weight 55.4 kg (122 lb 2.2 oz), SpO2 96%.    VS over last 24h  Heart Rate:  []   Temp:  [36.1 °C (97 °F)-36.8 °C (98.2 °F)]   Resp:  [11-35]   BP: (112-144)/(71-85)   Weight:  [55.4 kg (122 lb 2.2 oz)]   SpO2:  [92 %-98 %]      Intake/Output last 3 Shifts:  I/O last 3 completed shifts:  In: 3368.8 (60.8 mL/kg) [I.V.:478.8 (8.6 mL/kg); Blood:700; NG/GT:2190]  Out: 1695 (30.6 mL/kg) [Urine:1695 (0.8 mL/kg/hr)]  Weight: 55.4 kg       Intake/Output Summary (Last 24 hours) at 2/10/2025 1133  Last data filed at 2/10/2025 1100  Gross per 24 hour   Intake 2595.78 ml   Output 1650 ml   Net 945.78 ml        Relevant Results  Scheduled medications  budesonide, 0.5 mg, nebulization, BID  clonazePAM, 1 mg, g-tube, q8h  digoxin, 125 mcg, intravenous, Daily  esomeprazole, 40 mg, g-tube, Daily before breakfast  hydrocortisone sodium succinate, 50 mg, intravenous, q8h  insulin lispro, 0-5 Units, subcutaneous, q4h  ipratropium-albuteroL, 3 mL, nebulization, q6h  midodrine, 15 mg, g-tube, q6h  nystatin, 5 mL, Mouth/Throat, BID  oxygen, , inhalation, Continuous - Inhalation  thiamine, 100 mg, g-tube, Daily  traZODone, 50 mg, g-tube, Nightly      PRN medications  PRN medications: acetylcysteine, albuterol, calcium gluconate, calcium gluconate, dextrose, dextrose, glucagon, glucagon, HYDROmorphone, magnesium sulfate, magnesium sulfate, oxyCODONE, oxygen, oxygen, oxygen, potassium chloride        Results from last 7 days   Lab Units 02/10/25  0807 02/09/25  0336 02/08/25  0103   WBC AUTO x10*3/uL 8.9 5.6 7.5   HEMOGLOBIN g/dL 10.3* 9.4* 9.6*   PLATELETS AUTO x10*3/uL 351 281 274      Results from last 7 days   Lab Units 02/10/25  0443 02/09/25  0334 02/08/25  0103   SODIUM mmol/L 144 146* 145   POTASSIUM mmol/L 3.5 3.6 3.7   CHLORIDE mmol/L 101 102 103   CO2 mmol/L 37* 35* 34*   BUN mg/dL 16 18 18   CREATININE mg/dL <0.20* <0.20* 0.22*   GLUCOSE " mg/dL 137* 156* 132*   MAGNESIUM mg/dL 1.94 2.13 1.93   PHOSPHORUS mg/dL 3.1 3.0 3.0      Results from last 7 days   Lab Units 02/10/25  0443 02/09/25  0334 02/08/25  0103   INR  1.2* 1.3* 1.2*   PROTIME seconds 13.8* 14.2* 13.7*   APTT seconds 75* 64* 50*              Assessment/Plan   Assessment:  Faustina Vick is a 63 y/o with PMHx of COPD and 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 Wagner on 1/13 requiring post-op vasoactive therapy. Post-op course c/b acute hyponatremia, multifactorial delirium, cardiogenic shock requiring inotropic support, and acute hypoxic/hypercarbic respiratory failure requiring intubation. COVID+ 1/20. Weaned off milrinone 1/21. Weaned off iEpo 1/24. S/p trach/PEG 1/30 tolerating TF through PEG. TTE 2/3 with improved EF to 60-65%. Pt developed atrial tachycardia with rates in 150-160s, cardiology recommended metoprolol and digoxin for rate and rhythm control, improved. Now pending LTACH placement.      Plan:  NEURO: History of anxiety on alprazolam 1mg qd. A&Ox4 at baseline. Physical deconditioning, tolerating increased activity with PT/OT. Situational depression/anxiety, remains on Precedex 0.25 this am. Also on scheduled clonazepam and nightly trazodone. Acute delirium. Qtc 436 2/7. Reportedly did not sleep at all overnight  - Continue Trazodone nightly -> increase to 50mg   - Continue Clonazepam 1mg q8h with hold parameters  - Continue Oxycodone and Dilaudid prn   - Ongoing neuro and pain assessments  - PT/OT -> continue to progress activity  - CAM assessments q shift  - continue pet therapy  - PICS team consult  - delirium precautions, REST protocol     CV: No history of cardiac disease. Baseline /79, -110. TTE 9/2024 normal biV function. Intra-op and immediate post op hypotension requiring pressor support. TTE 1/13 with EF 60-65%, low  normal RV systolic function. Acutely elevated lactate 1/15-1/16. Repeat TTE 1/16: acute biV dysfunction/takotsubo cardiomyopathy, EF 34%, mod reduced RV systolic function, mild to mod TR. Cardiogenic shock resolved. Weaned off milrinone 1/21, iEpo 1/24. Difficulty weaning off low dose vaso, started midodrine 1/25. Repeat TTE 2/3 with improved EF to 60-65%. Ongoing intermittent episodes of tachycardia to 150s. Suspected Aflutter/atrial tachycardia, added digoxin 2/3. Started low intensity heparin infusion 2/5. On/off phenyl and vaso for nighttime hypotension. Midodrine increased to 15mg q6h. Restarted stress dose steroids 2/4. No pressor requirement.   - Continue digoxin 125mcg daily -> check dig level daily - 0.55 today  - EP following: tachycardia due to acute illness. Continue dig and low intensity heparin infusion. Consider tikosyn once patient stable in sinus rhythm, ziopatch at discharge. Thoracic surgery recommending against amiodarone.  - Continue Midodrine 15 mg q6h  - continue hydrocortisone to 50mg q8h   - Continuous EKG/abp monitoring  - MAP goal >65     PULM: Hx of 30 pack year smoking history (quit 4/2024), COPD, and stage III right lung SCC s/p chemo/XRT/immunotherapy. 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 Arrived to SICU extubated on 10L SFM, weaned to room air 1/14. Chest tube removed 1/14. Acute on chronic hypoxic respiratory failure 1/16 in the setting of COVID. Intubated 1/18. S/p trach 1/30. Sutures cut 2/6. Speech eval 2/7 for PMSV, desaturation and coughing noted, speech to continue to follow. Remains on SBT  - wean PS to 5 today  - Wean FiO2 to maintain SpO2 >92%  - Attempt airvo v trach collar tomorrow  - Scheduled budesonide/duoneb  - continue humidified vent  - PRN albuterol  - Daily CXR prn  - ABG prn  - Holding home benzonatate    GI: No GI history. Elevated LFT 1/16-1/21 resolved. Evidence of pericholecystic fluid and biliary  sludge on renal US 1/15 but no abdominal symptoms. RUQ US without significant findings. S/P PEG 1/30. Tolerating TF at goal rate. Last BM 2/10, soft.  - Continue thiamine 100mg IV daily  - Continue IV PPI for GI prophylaxis  - holding bowel reg for soft stool    : No history of renal disease. Baseline creatinine 0.6. Symptomatic acute hyponatremia resolved, was given 3% saline infusions. Yoo replaced 2/1 for urine retention. Trial of silodosin stopped given recurrent hypotension. Failed TOV, straight cathed overnight 2/6. Hypokalemia requiring frequent repletion. Per urology note 2/7, continue yoo at discharge and will f/u outpatient for TOV.  - Maintain yoo catheter per urology  - RFP daily and prn  - Maintain U/O >0.5ml/kg/hr  - Replete electrolytes to maintain K>4, Phos>2.5, iCal>1.1, Mag>2     HEME: Hx of DOC. Baseline H/H 12/40. Acute blood loss anemia. Acute coagulopathy resolved. H/H stable. Started on low intensity heparin infusion 2/5.  - Check CBC daily and PRN  - coags prn  - Ongoing monitoring for s/s bleeding  - Maintain active T&S (2/7) -> resend today  - check assays and adjust heparin infusion per nomogram    ENDO: No history of DM or thyroid disease. Adequate glycemic control  - BG q4h, SSI #1     MSK: Arthritis, Vit D deficiency. Sacral wound.  - Turn patient every 2 hrs, continue wound care  - Monitor for symptoms     ID:  Completed treatment of COVID with decadron and remdesivir. Off precautions since 1/30. Pan-susceptible Pseudomonas UTI 2/1, started on cetriaxone 2/2, switched to zosyn 2/3 for 7 day course (completed 2/9). Blood cultures sent 2/4, negative. Currently afebrile, no leukocytosis.  - Temp q4h, wbc daily  - Ongoing monitoring for s/s infection    I have discussed the case with the resident/advanced practice provider. I have personally performed a history, physical exam, and my own medical decision making. I have reviewed the note and agree with the findings and plan with the  following exceptions as identified below. I have personally provided 36 minutes of critical care time exclusive of time spent on separately billable procedures. Time includes review of laboratory data, radiology results, discussion with consultants, family and monitoring for potential decompensation. Interventions were performed as documented above.      Family/Surrogate updated with plan of care.  Code status addressed/up to date.

## 2025-02-10 NOTE — PROGRESS NOTES
"Faustina Vick \"Noam" is a 64 y.o. female on day 28 of admission presenting with Primary cancer of right upper lobe of lung (Multi).    Subjective   No acute events overnight.    Objective   Physical Exam  Vitals reviewed.   Constitutional:       General: She is awake.      Appearance: She is underweight.   HENT:      Head: Normocephalic and atraumatic.      Nose: Nose normal.      Mouth/Throat:      Mouth: Mucous membranes are moist.   Eyes:      Extraocular Movements: Extraocular movements intact.      Pupils: Pupils are equal, round, and reactive to light.   Neck:      Comments: 7.5 cuffed Shiley Trach midline, no drainage. Soft trach tie in place  Cardiovascular:      Rate and Rhythm: Normal rate and regular rhythm.      Pulses: Normal pulses.      Heart sounds: Normal heart sounds.   Pulmonary:      Effort: Pulmonary effort is normal.      Breath sounds: Rhonchi: left side.      Comments: Scattered rhonchi left chest field  CPAP on vent  Chest:      Comments: Right lateral chest incisions ROSHAN without drainage  Abdominal:      Palpations: Abdomen is soft.      Comments: PEG tube in place with TF infusing at 45ml/hr. Insertion site without erythema or drainage   Genitourinary:     Comments: Montiel in place, clear yellow  Musculoskeletal:         General: Normal range of motion.      Cervical back: Normal range of motion and neck supple.      Right lower leg: No edema.      Left lower leg: No edema.   Skin:     General: Skin is warm and dry.      Capillary Refill: Capillary refill takes less than 2 seconds.   Neurological:      General: No focal deficit present.      Mental Status: She is alert. She is confused.      GCS: GCS eye subscore is 4. GCS verbal subscore is 1. GCS motor subscore is 6.      Motor: Weakness: generalized.   Psychiatric:         Mood and Affect: Affect is labile and tearful.         Behavior: Behavior is cooperative.       Last Recorded Vitals  Blood pressure 144/81, pulse 87, temperature " "36.8 °C (98.2 °F), temperature source Temporal, resp. rate 19, height 1.651 m (5' 5\"), weight 55.4 kg (122 lb 2.2 oz), SpO2 92%.    VS over last 24h  Heart Rate:  []   Temp:  [36.1 °C (97 °F)-36.8 °C (98.2 °F)]   Resp:  [11-35]   BP: (112-144)/(68-85)   Weight:  [55.4 kg (122 lb 2.2 oz)]   SpO2:  [92 %-98 %]      Intake/Output last 3 Shifts:  I/O last 3 completed shifts:  In: 3368.8 (60.8 mL/kg) [I.V.:478.8 (8.6 mL/kg); Blood:700; NG/GT:2190]  Out: 1695 (30.6 mL/kg) [Urine:1695 (0.8 mL/kg/hr)]  Weight: 55.4 kg       Intake/Output Summary (Last 24 hours) at 2/10/2025 0808  Last data filed at 2/10/2025 0600  Gross per 24 hour   Intake 2342.6 ml   Output 1060 ml   Net 1282.6 ml        Relevant Results  Scheduled medications  budesonide, 0.5 mg, nebulization, BID  clonazePAM, 1 mg, g-tube, q8h  digoxin, 125 mcg, intravenous, Daily  esomeprazole, 40 mg, g-tube, Daily before breakfast  hydrocortisone sodium succinate, 50 mg, intravenous, q8h  insulin lispro, 0-5 Units, subcutaneous, q4h  ipratropium-albuteroL, 3 mL, nebulization, q6h  midodrine, 15 mg, g-tube, q6h  nystatin, 5 mL, Mouth/Throat, BID  oxygen, , inhalation, Continuous - Inhalation  thiamine, 100 mg, g-tube, Daily  traZODone, 25 mg, g-tube, Nightly      PRN medications  PRN medications: acetylcysteine, albuterol, calcium gluconate, calcium gluconate, dextrose, dextrose, glucagon, glucagon, HYDROmorphone, magnesium sulfate, magnesium sulfate, oxyCODONE, oxygen, oxygen, oxygen, potassium chloride        Results from last 7 days   Lab Units 02/09/25  0336 02/08/25  0103 02/07/25  0324   WBC AUTO x10*3/uL 5.6 7.5 6.6   HEMOGLOBIN g/dL 9.4* 9.6* 9.0*   PLATELETS AUTO x10*3/uL 281 274 213      Results from last 7 days   Lab Units 02/10/25  0443 02/09/25  0334 02/08/25  0103   SODIUM mmol/L 144 146* 145   POTASSIUM mmol/L 3.5 3.6 3.7   CHLORIDE mmol/L 101 102 103   CO2 mmol/L 37* 35* 34*   BUN mg/dL 16 18 18   CREATININE mg/dL <0.20* <0.20* 0.22*   GLUCOSE " mg/dL 137* 156* 132*   MAGNESIUM mg/dL 1.94 2.13 1.93   PHOSPHORUS mg/dL 3.1 3.0 3.0      Results from last 7 days   Lab Units 02/10/25  0443 02/09/25  0334 02/08/25  0103   INR  1.2* 1.3* 1.2*   PROTIME seconds 13.8* 14.2* 13.7*   APTT seconds 75* 64* 50*              Assessment/Plan   Assessment:  Faustina Vick is a 63 y/o with PMHx of COPD and 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 Wagner on 1/13 requiring post-op vasoactive therapy. Post-op course c/b acute hyponatremia, multifactorial delirium, cardiogenic shock requiring inotropic support, and acute hypoxic/hypercarbic respiratory failure requiring intubation. COVID+ 1/20. Weaned off milrinone 1/21. Weaned off iEpo 1/24. S/p trach/PEG 1/30 tolerating TF through PEG. TTE 2/3 with improved EF to 60-65%. Pt developed atrial tachycardia with rates in 150-160s, cardiology recommended metoprolol and digoxin for rate and rhythm control, improved. Now pending LTACH placement.      Plan:  NEURO: History of anxiety on alprazolam 1mg qd. A&Ox4 at baseline. Physical deconditioning, tolerating increased activity with PT/OT. Situational depression/anxiety, remains on Precedex 0.25 this am. Also on scheduled clonazepam and nightly trazodone. Acute delirium. Qtc 436 2/7. Reportedly did not sleep at all overnight  - Continue Trazodone nightly -> increase to 50mg   - Continue Clonazepam 1mg q8h with hold parameters  - Continue Oxycodone and Dilaudid prn   - Ongoing neuro and pain assessments  - PT/OT -> continue to progress activity  - CAM assessments q shift  - continue pet therapy  - PICS team consult  - delirium precautions, REST protocol     CV: No history of cardiac disease. Baseline /79, -110. TTE 9/2024 normal biV function. Intra-op and immediate post op hypotension requiring pressor support. TTE 1/13 with EF 60-65%, low  normal RV systolic function. Acutely elevated lactate 1/15-1/16. Repeat TTE 1/16: acute biV dysfunction/takotsubo cardiomyopathy, EF 34%, mod reduced RV systolic function, mild to mod TR. Cardiogenic shock resolved. Weaned off milrinone 1/21, iEpo 1/24. Difficulty weaning off low dose vaso, started midodrine 1/25. Repeat TTE 2/3 with improved EF to 60-65%. Ongoing intermittent episodes of tachycardia to 150s. Suspected Aflutter/atrial tachycardia, added digoxin 2/3. Started low intensity heparin infusion 2/5. On/off phenyl and vaso for nighttime hypotension. Midodrine increased to 15mg q6h. Restarted stress dose steroids 2/4. No pressor requirement.   - Continue digoxin 125mcg daily -> check dig level daily - 0.55 today  - EP following: tachycardia due to acute illness. Continue dig and low intensity heparin infusion. Consider tikosyn once patient stable in sinus rhythm, ziopatch at discharge. Thoracic surgery recommending against amiodarone.  - Continue Midodrine 15 mg q6h  - continue hydrocortisone to 50mg q8h   - Continuous EKG/abp monitoring  - MAP goal >65     PULM: Hx of 30 pack year smoking history (quit 4/2024), COPD, and stage III right lung SCC s/p chemo/XRT/immunotherapy. 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 Arrived to SICU extubated on 10L SFM, weaned to room air 1/14. Chest tube removed 1/14. Acute on chronic hypoxic respiratory failure 1/16 in the setting of COVID. Intubated 1/18. S/p trach 1/30. Sutures cut 2/6. Speech eval 2/7 for PMSV, desaturation and coughing noted, speech to continue to follow. Remains on SBT  - wean PS to 5 today  - Wean FiO2 to maintain SpO2 >92%  - Attempt airvo v trach collar tomorrow  - Scheduled budesonide/duoneb  - continue humidified vent  - PRN albuterol  - Daily CXR prn  - ABG prn  - Holding home benzonatate    GI: No GI history. Elevated LFT 1/16-1/21 resolved. Evidence of pericholecystic fluid and biliary  sludge on renal US 1/15 but no abdominal symptoms. RUQ US without significant findings. S/P PEG 1/30. Tolerating TF at goal rate. Last BM 2/10, soft.  - Continue thiamine 100mg IV daily  - Continue IV PPI for GI prophylaxis  - holding bowel reg for soft stool    : No history of renal disease. Baseline creatinine 0.6. Symptomatic acute hyponatremia resolved, was given 3% saline infusions. Yoo replaced 2/1 for urine retention. Trial of silodosin stopped given recurrent hypotension. Failed TOV, straight cathed overnight 2/6. Hypokalemia requiring frequent repletion. Per urology note 2/7, continue yoo at discharge and will f/u outpatient for TOV.  - Maintain yoo catheter per urology  - RFP daily and prn  - Maintain U/O >0.5ml/kg/hr  - Replete electrolytes to maintain K>4, Phos>2.5, iCal>1.1, Mag>2     HEME: Hx of DOC. Baseline H/H 12/40. Acute blood loss anemia. Acute coagulopathy resolved. H/H stable. Started on low intensity heparin infusion 2/5.  - Check CBC daily and PRN  - coags prn  - Ongoing monitoring for s/s bleeding  - Maintain active T&S (2/7) -> resend today  - check assays and adjust heparin infusion per nomogram    ENDO: No history of DM or thyroid disease. Adequate glycemic control  - BG q4h, SSI #1     MSK: Arthritis, Vit D deficiency. Sacral wound.  - Turn patient every 2 hrs, continue wound care  - Monitor for symptoms     ID:  Completed treatment of COVID with decadron and remdesivir. Off precautions since 1/30. Pan-susceptible Pseudomonas UTI 2/1, started on cetriaxone 2/2, switched to zosyn 2/3 for 7 day course (completed 2/9). Blood cultures sent 2/4, negative. Currently afebrile, no leukocytosis.  - Temp q4h, wbc daily  - Ongoing monitoring for s/s infection    Access:  - R axillary a line (1/28-) -> consider removal tomorrow  - Left chest mediport (not currently accessed)  - PIV x2  - Trach (1/30)  - PEG (1/30)  - yoo 2/7     Dispo: Continue ICU care. Patient seen and discussed with ICU  attending Dr. Ibrahim. DELMAR helping guide family with LTACH placement [facility].    Code Status: Full Code     ANDREA Disla    Critical care time: 40 minutes

## 2025-02-10 NOTE — PROGRESS NOTES
"Faustina Vick \"Jannie\" is a 64 y.o. female who presents for Primary cancer of right upper lobe of lung (Multi) [C34.11].  She initially received chemotherapy and radiation, but appeared to have residual radiographic abnormalities.  PET/CT suggested progression of disease, and in this setting and Dr. Wagner felt that surgical resection in a \"salvage\" setting might facilitate long-term overall survival given the failure of nonsurgical management. She is now  28 days post op from PNEUMONECTOMY (Right) (en bloc with chest wall resection and reconstruction), mediastinal lymph node dissection for a  Primary cancer of right upper lobe of lung (Multi).      Subjective   NAEO. Afeb, VSS on precedex. Tolerating CPAP    Objective     General: Resting in bed, awake and alert, PERALES x 4, follows.  HEENT: Trach in place, no surrounding drainage or bleeding  Resp: symmetric chest rise, CPAP via trach   CV: Sinus tachycardia, 100s when in room, off presors  Abd: soft, ND, PEG in place with TF running at 45  Neuro: awake and alert, withdrawn    Last Recorded Vitals  Blood pressure 133/73, pulse 109, temperature 36.8 °C (98.2 °F), temperature source Temporal, resp. rate 22, height 1.651 m (5' 5\"), weight 55.4 kg (122 lb 2.2 oz), SpO2 96%.  Intake/Output last 3 Shifts:  I/O last 3 completed shifts:  In: 3368.8 (60.8 mL/kg) [I.V.:478.8 (8.6 mL/kg); Blood:700; NG/GT:2190]  Out: 1695 (30.6 mL/kg) [Urine:1695 (0.8 mL/kg/hr)]  Weight: 55.4 kg     Relevant Results  Scheduled medications  budesonide, 0.5 mg, nebulization, BID  clonazePAM, 1 mg, g-tube, q8h  digoxin, 125 mcg, intravenous, Daily  esomeprazole, 40 mg, g-tube, Daily before breakfast  hydrocortisone sodium succinate, 50 mg, intravenous, q8h  insulin lispro, 0-5 Units, subcutaneous, q4h  ipratropium-albuteroL, 3 mL, nebulization, q6h  midodrine, 15 mg, g-tube, q6h  nystatin, 5 mL, Mouth/Throat, BID  oxygen, , inhalation, Continuous - Inhalation  thiamine, 100 mg, g-tube, " Daily  traZODone, 50 mg, g-tube, Nightly      Continuous medications  heparin, 0-4,000 Units/hr, Last Rate: 1,000 Units/hr (02/10/25 0700)      PRN medications  PRN medications: acetylcysteine, albuterol, calcium gluconate, calcium gluconate, dextrose, dextrose, glucagon, glucagon, HYDROmorphone, magnesium sulfate, magnesium sulfate, oxyCODONE, oxygen, oxygen, oxygen, potassium chloride    Results for orders placed or performed during the hospital encounter of 01/13/25 (from the past 24 hours)   POCT GLUCOSE   Result Value Ref Range    POCT Glucose 139 (H) 74 - 99 mg/dL   POCT GLUCOSE   Result Value Ref Range    POCT Glucose 141 (H) 74 - 99 mg/dL   POCT GLUCOSE   Result Value Ref Range    POCT Glucose 126 (H) 74 - 99 mg/dL   Digoxin   Result Value Ref Range    Digoxin  0.55 (L) 0.80 - <2.00 ng/mL   Calcium, Ionized   Result Value Ref Range    POCT Calcium, Ionized 1.19 1.1 - 1.33 mmol/L   Renal function panel   Result Value Ref Range    Glucose 137 (H) 74 - 99 mg/dL    Sodium 144 136 - 145 mmol/L    Potassium 3.5 3.5 - 5.3 mmol/L    Chloride 101 98 - 107 mmol/L    Bicarbonate 37 (H) 21 - 32 mmol/L    Anion Gap 10 10 - 20 mmol/L    Urea Nitrogen 16 6 - 23 mg/dL    Creatinine <0.20 (L) 0.50 - 1.05 mg/dL    eGFR      Calcium 8.8 8.6 - 10.6 mg/dL    Phosphorus 3.1 2.5 - 4.9 mg/dL    Albumin 2.9 (L) 3.4 - 5.0 g/dL   Magnesium   Result Value Ref Range    Magnesium 1.94 1.60 - 2.40 mg/dL   Heparin Assay, UFH   Result Value Ref Range    Heparin Unfractionated 0.5 See Comment Below for Therapeutic Ranges IU/mL   Coagulation Screen   Result Value Ref Range    Protime 13.8 (H) 9.8 - 12.8 seconds    INR 1.2 (H) 0.9 - 1.1    aPTT 75 (H) 27 - 38 seconds   POCT GLUCOSE   Result Value Ref Range    POCT Glucose 114 (H) 74 - 99 mg/dL   CBC   Result Value Ref Range    WBC 8.9 4.4 - 11.3 x10*3/uL    nRBC 0.0 0.0 - 0.0 /100 WBCs    RBC 3.61 (L) 4.00 - 5.20 x10*6/uL    Hemoglobin 10.3 (L) 12.0 - 16.0 g/dL    Hematocrit 31.8 (L) 36.0 -  46.0 %    MCV 88 80 - 100 fL    MCH 28.5 26.0 - 34.0 pg    MCHC 32.4 32.0 - 36.0 g/dL    RDW 18.6 (H) 11.5 - 14.5 %    Platelets 351 150 - 450 x10*3/uL   POCT GLUCOSE   Result Value Ref Range    POCT Glucose 115 (H) 74 - 99 mg/dL     CXR 2/9:  IMPRESSION:  1. Postsurgical changes of right pneumonectomy with right-sided en bloc chest wall resection.   2. Similar complete opacification of the right pneumonectomy surgical bed with silhouetting of the right costophrenic angle. Right-sided pleural effusion can not be excluded.   3. Scattered densities throughout the left lung appears slightly improved when compared to prior exam, suggestive of improved left lung aeration over a background of patient's chronic lung disease.   4. Medical devices as detailed above.        Assessment/Plan   Assessment & Plan  Primary cancer of right upper lobe of lung (Multi)    Chronic obstructive pulmonary disease (Multi)    Malignant neoplasm of upper lobe of right lung (Multi)    Cardiogenic shock (Multi)    Respiratory distress    Faustina Vick (Jannie) is a 64F with Hx of Stage III lung cancer, asthma, anxiety, and arthritis who is now s/p R pneumonectomy with Dr. Wagner 1/13/25. 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 Cardiology was consulted and recommended Lasix gtt and milrinone. Lasix gtt held 1/17, hyponatremia improving. Patient underwent RHC and SGC placement with cardiology 1/17. She was reintubated on 1/18 d/t worsening respiratory status and AMS. Also had increased pressor requirements, now weaned off. Repeat echo stable on 1/18. COVID positive on 1/20.     Failed extubation trials, now s/p trach/PEG with Dr. Wagner 1/30.     - Do not recommend amio given risk of pulmonary complications in the setting of pneumonectomy  - Continue & appreciate supportive ICU care  - Continue Tfs at goal, appreciate nutrition recs  - Pulmonary toilet  - PT/OT for rehabilitation, patient will likely need  LTACH placement  - Agree with blood and sputum cultures given new pressor and O2 requirments  - Continue Abx for UTI, recommend removal of Montiel    Patient seen and discussed with Dr. Wagner.    Wu Martin,   PGY-3  Thoracic Surgery [id number]

## 2025-02-10 NOTE — PROGRESS NOTES
"Physical Therapy    Physical Therapy Treatment    Patient Name: Faustina Vick \"Jannie\"  MRN: [id number]  Today's Date: 2/10/2025  Room: 12/12-A  Time Calculation  Start Time: 1140  Stop Time: 1210  Time Calculation (min): 30 min       Assessment/Plan   PT Plan  Treatment/Interventions: Bed mobility, Transfer training, Gait training, Stair training, Balance training, Strengthening, Endurance training, Range of motion, Therapeutic exercise, Therapeutic activity, Home exercise program  PT Plan: Ongoing PT  PT Frequency: 4 times per week  PT Discharge Recommendations: Moderate intensity level of continued care  Equipment Recommended upon Discharge: Wheeled walker  PT Recommended Transfer Status: Total assist  PT - OK to Discharge: Yes    General Visit Information:   Reason for Referral: Initial evaluation: right pneumonectomy, intrapericardial, with en block chest wall resection on 1/13 c/b hypotension post op. Re-eval cardiogenic shock, Takosubo cardiomyopathy with decreased EF 25-30% requiring inotropes and AHRF s/p intubation. Also found to be covid positive  Past Medical History Relevant to Rehab: stage III SCC of the lung s/p chemotherapy+radiation last chemotherapy session on 8/28/2024  Prior to Session Communication: Bedside nurse  Patient Position Received: Bed, 3 rail up  Family/Caregiver Present: Yes  Caregiver Feedback: Daughter-in-law   Subjective: Patient is alert, agreeable to PT.  Noted to spend time up in the chair this afternoon with RT to get off unit.    Precautions:  Precautions  Medical Precautions: Fall precautions  Vital Signs:   Date/Time Vitals Session Patient Position Pulse Resp SpO2 BP MAP (mmHg)    02/10/25 1200 --  --  103  14  96 %  141/79  98             Objective   Pain:  Pain Assessment  0-10 (Numeric) Pain Score: 0 - No pain (in no visible distress, emotionally tearing up during session.)  Cognition:  Cognition  Orientation Level:  (oriented to person, place, situation with yes/no " questions)  Lines/Tubes/Drains:  Arterial Line 01/28/25 Right Axillary (Active)   Number of days: 12       Implantable Port 01/16/25 Left Chest Single lumen port (Active)   Number of days: 24       Surgical Airway Shiley 7.5 (Active)   Number of days: 11       Urethral Catheter 16 Fr. (Active)   Number of days: 3       Gastrostomy/Enterostomy Percutaneous endoscopic gastrostomy (PEG) 1 20 Fr. LUQ (Active)   Number of days: 11     Medical Gas Therapy: Supplemental oxygen  Medical Gas Delivery Method: Trach tube  Vent Mode: Pressure support  FiO2 (%):  [40 %] 40 %  PEEP/CPAP (cm H2O):  [5 cm H20] 5 cm H20  IA SUP:  [5 cm H20-8 cm H20] 5 cm H20  MAP (cm H2O):  [6-7.8] 6    Continuous Medications/Drips:  heparin, 0-4,000 Units/hr, Last Rate: 1,000 Units/hr (02/10/25 0700)    PT Treatments:  Therapeutic Exercise  Therapeutic Exercise Activity 1: BLE PF, DF, heel slide, SAQ, ab/adduction 10 x 2 AAROM  Therapeutic Exercise Activity 2: BUE hand open/close, elbow flex/ext 5 x 2 AAROM         Transfer 1  Transfer From 1: Bed to  Transfer to 1: Chair with drop arm  Technique 1: Lateral  Transfer Level of Assistance 1: Dependent, +2, +1 to manage equipment  Trials/Comments 1: x1      Outcome Measures:  [facility] Basic Mobility  Turning from your back to your side while in a flat bed without using bedrails: A lot  Moving from lying on your back to sitting on the side of a flat bed without using bedrails: A lot  Moving to and from bed to chair (including a wheelchair): Total  Standing up from a chair using your arms (e.g. wheelchair or bedside chair): Total  To walk in hospital room: Total  Climbing 3-5 steps with railing: Total  Basic Mobility - Total Score: 8           FSS-ICU  Ambulation: Unable to attempt due to weakness  Rolling: Maximal assistance (performs 25% - 49% of task)  Sitting: Moderate assistance (performs 50 - 74% of task)  Transfer Sit-to-Stand: Total assistance (performs 25% or requires another person)  Transfer  Supine-to-Sit: Total assistance (performs 25% or requires another person)  Total Score: 7  ICU Mobility Screen  ICU Mobility Scale: Passively moved to chair (no standing)             Education Documentation  No documentation found.  Education Comments  No comments found.          OP EDUCATION:       Encounter Problems       Encounter Problems (Active)       Balance       Pt will demo static sitting balance w/o UE support IND to decrease risk of falls in home environment (Not met)       Start:  01/15/25    Expected End:  02/05/25    Resolved:  01/22/25    Updated to: Pt will maintain standing balance with Min A using LRAD    Update reason: re-eval         Pt will maintain standing balance with Min A using LRAD (Progressing)       Start:  01/22/25    Expected End:  02/12/25                   Mobility       Pt will mobilize supine to sitting MI to promote functional independence (Not met)       Start:  01/15/25    Expected End:  02/05/25    Resolved:  01/22/25    Updated to: Pt will perform bed mobility with Min A    Update reason: re-eval         Pt will perform bed mobility with Min A (Progressing)       Start:  01/22/25    Expected End:  02/12/25                   Mobility       STG - Patient will ambulate x50 ft with Min A using LRAD (Progressing)       Start:  01/22/25    Expected End:  02/12/25               PT Transfers       Pt will transfer sit to stand w/ LRAD MI to improve functional mobility.  (Not met)       Start:  01/15/25    Expected End:  02/05/25    Resolved:  01/22/25    Updated to: Pt will perform functional transfers with Min A using LRAD    Update reason: Re-eval         Pt will perform functional transfers with Min A using LRAD (Progressing)       Start:  01/22/25    Expected End:  02/12/25                   Pain - Adult              Assessment: Patient is progressing Fairly with therapy this date.  Patient able to complete bed level there-ex and transfer this date.  Required increased time and  encouragement for there-ex but able to complete all activity with assist.  Deferred pivot transfer this date with goal of extending upright time in chair.  Noted to be ~2 hours up in chair upon follow up.  Patient remains appropriate for MOD intensity therapy when medically appropriate for discharge from acute stay.  Will continue to follow.      02/10/25 at 1:55 PM   Ayad Kemp, PT   Rehab Office: [phone]

## 2025-02-10 NOTE — CARE PLAN
The patient's goals for the shift include      The clinical goals for the shift include Pt will remain hds throughout the shift    Problem: Pain - Adult  Goal: Verbalizes/displays adequate comfort level or baseline comfort level  Outcome: Progressing     Problem: Safety - Adult  Goal: Free from fall injury  Outcome: Progressing     Problem: Discharge Planning  Goal: Discharge to home or other facility with appropriate resources  Outcome: Progressing     Problem: Chronic Conditions and Co-morbidities  Goal: Patient's chronic conditions and co-morbidity symptoms are monitored and maintained or improved  Outcome: Progressing     Problem: Skin  Goal: Decreased wound size/increased tissue granulation at next dressing change  Outcome: Progressing  Goal: Participates in plan/prevention/treatment measures  Outcome: Progressing  Goal: Prevent/manage excess moisture  Outcome: Progressing  Goal: Prevent/minimize sheer/friction injuries  Outcome: Progressing  Flowsheets (Taken 2/10/2025 0217)  Prevent/minimize sheer/friction injuries: Turn/reposition every 2 hours/use positioning/transfer devices  Goal: Promote/optimize nutrition  Outcome: Progressing  Goal: Promote skin healing  Outcome: Progressing     Problem: Pain  Goal: Takes deep breaths with improved pain control throughout the shift  Outcome: Progressing  Goal: Turns in bed with improved pain control throughout the shift  Outcome: Progressing  Goal: Walks with improved pain control throughout the shift  Outcome: Progressing  Goal: Performs ADL's with improved pain control throughout shift  Outcome: Progressing  Goal: Participates in PT with improved pain control throughout the shift  Outcome: Progressing  Goal: Free from opioid side effects throughout the shift  Outcome: Progressing  Goal: Free from acute confusion related to pain meds throughout the shift  Outcome: Progressing     Problem: Safety - Medical Restraint  Goal: Remains free of injury from restraints  (Restraint for Interference with Medical Device)  Outcome: Progressing  Goal: Free from restraint(s) (Restraint for Interference with Medical Device)  Outcome: Progressing     Problem: Knowledge Deficit  Goal: Patient/family/caregiver demonstrates understanding of disease process, treatment plan, medications, and discharge instructions  Outcome: Progressing     Problem: Mechanical Ventilation  Goal: Patient Will Maintain Patent Airway  Outcome: Progressing  Goal: Oral health is maintained or improved  Outcome: Progressing  Goal: Tracheostomy will be managed safely  Outcome: Progressing  Goal: ET tube will be managed safely  Outcome: Progressing  Goal: Ability to express needs and understand communication  Outcome: Progressing  Goal: Mobility/activity is maintained at optimum level for patient  Outcome: Progressing     Problem: Fall/Injury  Goal: Not fall by end of shift  Outcome: Progressing  Goal: Be free from injury by end of the shift  Outcome: Progressing  Goal: Verbalize understanding of personal risk factors for fall in the hospital  Outcome: Progressing  Goal: Verbalize understanding of risk factor reduction measures to prevent injury from fall in the home  Outcome: Progressing  Goal: Use assistive devices by end of the shift  Outcome: Progressing  Goal: Pace activities to prevent fatigue by end of the shift  Outcome: Progressing

## 2025-02-11 LAB
ALBUMIN SERPL BCP-MCNC: 3.1 G/DL (ref 3.4–5)
ANION GAP BLDA CALCULATED.4IONS-SCNC: 1 MMO/L (ref 10–25)
ANION GAP SERPL CALC-SCNC: 12 MMOL/L (ref 10–20)
BASE EXCESS BLDA CALC-SCNC: 14.2 MMOL/L (ref -2–3)
BODY TEMPERATURE: 37 DEGREES CELSIUS
BUN SERPL-MCNC: 14 MG/DL (ref 6–23)
CA-I BLD-SCNC: 1.2 MMOL/L (ref 1.1–1.33)
CA-I BLDA-SCNC: 1.23 MMOL/L (ref 1.1–1.33)
CALCIUM SERPL-MCNC: 8.9 MG/DL (ref 8.6–10.6)
CHLORIDE BLDA-SCNC: 103 MMOL/L (ref 98–107)
CHLORIDE SERPL-SCNC: 101 MMOL/L (ref 98–107)
CO2 SERPL-SCNC: 38 MMOL/L (ref 21–32)
CREAT SERPL-MCNC: <0.2 MG/DL (ref 0.5–1.05)
DIGOXIN SERPL-MCNC: 0.95 NG/ML (ref 0.8–?)
EGFRCR SERPLBLD CKD-EPI 2021: ABNORMAL ML/MIN/{1.73_M2}
ERYTHROCYTE [DISTWIDTH] IN BLOOD BY AUTOMATED COUNT: 18.6 % (ref 11.5–14.5)
GLUCOSE BLD MANUAL STRIP-MCNC: 110 MG/DL (ref 74–99)
GLUCOSE BLD MANUAL STRIP-MCNC: 118 MG/DL (ref 74–99)
GLUCOSE BLD MANUAL STRIP-MCNC: 120 MG/DL (ref 74–99)
GLUCOSE BLD MANUAL STRIP-MCNC: 93 MG/DL (ref 74–99)
GLUCOSE BLDA-MCNC: 107 MG/DL (ref 74–99)
GLUCOSE SERPL-MCNC: 111 MG/DL (ref 74–99)
HCO3 BLDA-SCNC: 40.3 MMOL/L (ref 22–26)
HCT VFR BLD AUTO: 33.2 % (ref 36–46)
HCT VFR BLD EST: 31 % (ref 36–46)
HGB BLD-MCNC: 9.9 G/DL (ref 12–16)
HGB BLDA-MCNC: 10.2 G/DL (ref 12–16)
INHALED O2 CONCENTRATION: 40 %
LACTATE BLDA-SCNC: 0.9 MMOL/L (ref 0.4–2)
MAGNESIUM SERPL-MCNC: 2.05 MG/DL (ref 1.6–2.4)
MCH RBC QN AUTO: 28.1 PG (ref 26–34)
MCHC RBC AUTO-ENTMCNC: 29.8 G/DL (ref 32–36)
MCV RBC AUTO: 94 FL (ref 80–100)
NRBC BLD-RTO: 0 /100 WBCS (ref 0–0)
OXYHGB MFR BLDA: 93.1 % (ref 94–98)
PCO2 BLDA: 58 MM HG (ref 38–42)
PH BLDA: 7.45 PH (ref 7.38–7.42)
PHOSPHATE SERPL-MCNC: 3.4 MG/DL (ref 2.5–4.9)
PLATELET # BLD AUTO: 395 X10*3/UL (ref 150–450)
PO2 BLDA: 68 MM HG (ref 85–95)
POTASSIUM BLDA-SCNC: 3.5 MMOL/L (ref 3.5–5.3)
POTASSIUM SERPL-SCNC: 3.6 MMOL/L (ref 3.5–5.3)
RBC # BLD AUTO: 3.52 X10*6/UL (ref 4–5.2)
SAO2 % BLDA: 94 % (ref 94–100)
SODIUM BLDA-SCNC: 141 MMOL/L (ref 136–145)
SODIUM SERPL-SCNC: 147 MMOL/L (ref 136–145)
UFH PPP CHRO-ACNC: 0.5 IU/ML
WBC # BLD AUTO: 9.8 X10*3/UL (ref 4.4–11.3)

## 2025-02-11 PROCEDURE — 80162 ASSAY OF DIGOXIN TOTAL: CPT | Performed by: NURSE PRACTITIONER

## 2025-02-11 PROCEDURE — 99291 CRITICAL CARE FIRST HOUR: CPT | Performed by: ANESTHESIOLOGY

## 2025-02-11 PROCEDURE — 82330 ASSAY OF CALCIUM: CPT

## 2025-02-11 PROCEDURE — 2500000001 HC RX 250 WO HCPCS SELF ADMINISTERED DRUGS (ALT 637 FOR MEDICARE OP)

## 2025-02-11 PROCEDURE — 37799 UNLISTED PX VASCULAR SURGERY: CPT | Performed by: STUDENT IN AN ORGANIZED HEALTH CARE EDUCATION/TRAINING PROGRAM

## 2025-02-11 PROCEDURE — 82947 ASSAY GLUCOSE BLOOD QUANT: CPT

## 2025-02-11 PROCEDURE — 2500000004 HC RX 250 GENERAL PHARMACY W/ HCPCS (ALT 636 FOR OP/ED)

## 2025-02-11 PROCEDURE — 99291 CRITICAL CARE FIRST HOUR: CPT

## 2025-02-11 PROCEDURE — 2500000001 HC RX 250 WO HCPCS SELF ADMINISTERED DRUGS (ALT 637 FOR MEDICARE OP): Performed by: PHYSICIAN ASSISTANT

## 2025-02-11 PROCEDURE — 84132 ASSAY OF SERUM POTASSIUM: CPT

## 2025-02-11 PROCEDURE — 85520 HEPARIN ASSAY: CPT

## 2025-02-11 PROCEDURE — 83735 ASSAY OF MAGNESIUM: CPT

## 2025-02-11 PROCEDURE — 2500000002 HC RX 250 W HCPCS SELF ADMINISTERED DRUGS (ALT 637 FOR MEDICARE OP, ALT 636 FOR OP/ED)

## 2025-02-11 PROCEDURE — 97530 THERAPEUTIC ACTIVITIES: CPT | Mod: GP | Performed by: STUDENT IN AN ORGANIZED HEALTH CARE EDUCATION/TRAINING PROGRAM

## 2025-02-11 PROCEDURE — 85027 COMPLETE CBC AUTOMATED: CPT | Performed by: STUDENT IN AN ORGANIZED HEALTH CARE EDUCATION/TRAINING PROGRAM

## 2025-02-11 PROCEDURE — 2500000005 HC RX 250 GENERAL PHARMACY W/O HCPCS

## 2025-02-11 PROCEDURE — 94640 AIRWAY INHALATION TREATMENT: CPT

## 2025-02-11 PROCEDURE — 2500000001 HC RX 250 WO HCPCS SELF ADMINISTERED DRUGS (ALT 637 FOR MEDICARE OP): Performed by: STUDENT IN AN ORGANIZED HEALTH CARE EDUCATION/TRAINING PROGRAM

## 2025-02-11 PROCEDURE — 94003 VENT MGMT INPAT SUBQ DAY: CPT

## 2025-02-11 PROCEDURE — 2500000004 HC RX 250 GENERAL PHARMACY W/ HCPCS (ALT 636 FOR OP/ED): Performed by: PHYSICIAN ASSISTANT

## 2025-02-11 PROCEDURE — 97110 THERAPEUTIC EXERCISES: CPT | Mod: GP | Performed by: STUDENT IN AN ORGANIZED HEALTH CARE EDUCATION/TRAINING PROGRAM

## 2025-02-11 PROCEDURE — 37799 UNLISTED PX VASCULAR SURGERY: CPT

## 2025-02-11 PROCEDURE — 2500000001 HC RX 250 WO HCPCS SELF ADMINISTERED DRUGS (ALT 637 FOR MEDICARE OP): Performed by: NURSE PRACTITIONER

## 2025-02-11 PROCEDURE — 2020000001 HC ICU ROOM DAILY

## 2025-02-11 RX ORDER — POTASSIUM CHLORIDE 14.9 MG/ML
20 INJECTION INTRAVENOUS
Status: DISCONTINUED | OUTPATIENT
Start: 2025-02-11 | End: 2025-02-11

## 2025-02-11 RX ORDER — INSULIN LISPRO 100 [IU]/ML
0-5 INJECTION, SOLUTION INTRAVENOUS; SUBCUTANEOUS EVERY 6 HOURS
Status: DISCONTINUED | OUTPATIENT
Start: 2025-02-11 | End: 2025-02-13 | Stop reason: HOSPADM

## 2025-02-11 RX ADMIN — MIDODRINE HYDROCHLORIDE 15 MG: 10 TABLET ORAL at 05:17

## 2025-02-11 RX ADMIN — DIGOXIN 125 MCG: 0.25 INJECTION INTRAMUSCULAR; INTRAVENOUS at 08:01

## 2025-02-11 RX ADMIN — Medication 40 PERCENT: at 08:00

## 2025-02-11 RX ADMIN — MIDODRINE HYDROCHLORIDE 15 MG: 10 TABLET ORAL at 18:17

## 2025-02-11 RX ADMIN — TRAZODONE HYDROCHLORIDE 50 MG: 50 TABLET ORAL at 21:52

## 2025-02-11 RX ADMIN — CLONAZEPAM 1 MG: 1 TABLET ORAL at 08:01

## 2025-02-11 RX ADMIN — ESOMEPRAZOLE MAGNESIUM 40 MG: 40 FOR SUSPENSION ORAL at 08:01

## 2025-02-11 RX ADMIN — HYDROCORTISONE SODIUM SUCCINATE 50 MG: 100 INJECTION, POWDER, FOR SOLUTION INTRAMUSCULAR; INTRAVENOUS at 05:17

## 2025-02-11 RX ADMIN — MIDODRINE HYDROCHLORIDE 15 MG: 10 TABLET ORAL at 00:38

## 2025-02-11 RX ADMIN — IPRATROPIUM BROMIDE AND ALBUTEROL SULFATE 3 ML: .5; 3 SOLUTION RESPIRATORY (INHALATION) at 15:05

## 2025-02-11 RX ADMIN — MIDODRINE HYDROCHLORIDE 15 MG: 10 TABLET ORAL at 12:08

## 2025-02-11 RX ADMIN — NYSTATIN 500000 UNITS: 100000 SUSPENSION ORAL at 08:01

## 2025-02-11 RX ADMIN — Medication 40 PERCENT: at 19:49

## 2025-02-11 RX ADMIN — POTASSIUM CHLORIDE 20 MEQ: 14.9 INJECTION, SOLUTION INTRAVENOUS at 05:20

## 2025-02-11 RX ADMIN — BUDESONIDE 0.5 MG: 0.5 INHALANT RESPIRATORY (INHALATION) at 08:30

## 2025-02-11 RX ADMIN — BUDESONIDE 0.5 MG: 0.5 INHALANT RESPIRATORY (INHALATION) at 19:58

## 2025-02-11 RX ADMIN — IPRATROPIUM BROMIDE AND ALBUTEROL SULFATE 3 ML: .5; 3 SOLUTION RESPIRATORY (INHALATION) at 08:31

## 2025-02-11 RX ADMIN — CLONAZEPAM 1 MG: 1 TABLET ORAL at 16:39

## 2025-02-11 RX ADMIN — IPRATROPIUM BROMIDE AND ALBUTEROL SULFATE 3 ML: .5; 3 SOLUTION RESPIRATORY (INHALATION) at 02:50

## 2025-02-11 RX ADMIN — HYDROCORTISONE SODIUM SUCCINATE 50 MG: 100 INJECTION, POWDER, FOR SOLUTION INTRAMUSCULAR; INTRAVENOUS at 18:17

## 2025-02-11 RX ADMIN — IPRATROPIUM BROMIDE AND ALBUTEROL SULFATE 3 ML: .5; 3 SOLUTION RESPIRATORY (INHALATION) at 19:57

## 2025-02-11 RX ADMIN — CLONAZEPAM 1 MG: 1 TABLET ORAL at 00:38

## 2025-02-11 RX ADMIN — THIAMINE HCL TAB 100 MG 100 MG: 100 TAB at 08:01

## 2025-02-11 RX ADMIN — NYSTATIN 500000 UNITS: 100000 SUSPENSION ORAL at 21:52

## 2025-02-11 ASSESSMENT — PAIN SCALES - GENERAL
PAINLEVEL_OUTOF10: 0 - NO PAIN

## 2025-02-11 ASSESSMENT — PAIN - FUNCTIONAL ASSESSMENT
PAIN_FUNCTIONAL_ASSESSMENT: 0-10

## 2025-02-11 ASSESSMENT — COGNITIVE AND FUNCTIONAL STATUS - GENERAL
MOBILITY SCORE: 8
TURNING FROM BACK TO SIDE WHILE IN FLAT BAD: A LOT
MOVING FROM LYING ON BACK TO SITTING ON SIDE OF FLAT BED WITH BEDRAILS: A LOT
WALKING IN HOSPITAL ROOM: TOTAL
CLIMB 3 TO 5 STEPS WITH RAILING: TOTAL
MOVING TO AND FROM BED TO CHAIR: TOTAL
STANDING UP FROM CHAIR USING ARMS: TOTAL

## 2025-02-11 NOTE — PROGRESS NOTES
Nutrition Follow Up Assessment:   Nutrition Assessment    Continues to receive enteral feeds via PEG-- change to Isosource 1.5 @ 45 ml/hr, per flow sheets has been receiving continuously. Has trach and is awaiting placement at [facility].       Anthropometrics:  Weight History:     Date/Time Weight   02/10/25 0500 55.4 kg   02/09/25 0600 54.8 kg   02/08/25 0200 52.3 kg   02/06/25 0600 55.2 kg   02/01/25 2000 55 kg   02/01/25 0400 55 kg   01/27/25 0600 54.9 kg   01/26/25 0600 58.7 kg   01/24/25 0430 57.8 kg   01/23/25 0600 59.5 kg   01/20/25 0600 58.5 kg   01/19/25 0600 54.6 kg   01/18/25 0600 53.3 kg   01/17/25 0600 52.2 kg   01/13/25 1400 52.1 kg       Nutrition Focused Physical Exam Findings:  Edema:  Edema: +1 trace  Edema Location: generalized  Physical Findings:  Skin: Positive  Positive Skin Findings: Pressure injury stage 1 (sacrum)    Nutrition Significant Labs:  BG POCT trend:   Results from last 7 days   Lab Units 02/11/25  1150 02/11/25  0740 02/11/25  0351 02/10/25  2327 02/10/25  1931   POCT GLUCOSE mg/dL 110* 93 120* 129* 115*    , Renal Lab Trend:   Results from last 7 days   Lab Units 02/11/25  0204 02/10/25  0443 02/09/25  0334 02/08/25  0103   POTASSIUM mmol/L 3.6 3.5 3.6 3.7   PHOSPHORUS mg/dL 3.4 3.1 3.0 3.0   SODIUM mmol/L 147* 144 146* 145   MAGNESIUM mg/dL 2.05 1.94 2.13 1.93   EGFR mL/min/1.73m*2  --   --   --  >90   BUN mg/dL 14 16 18 18   CREATININE mg/dL <0.20* <0.20* <0.20* 0.22*        Nutrition Specific Medications:  Scheduled medications  esomeprazole, 40 mg, g-tube, Daily before breakfast  hydrocortisone sodium succinate, 50 mg, intravenous, q12h  insulin lispro, 0-5 Units, subcutaneous, q6h  thiamine, 100 mg, g-tube, Daily      I/O:   Last BM Date: 02/11/25; Stool Appearance: Soft, Loose (02/11/25 1030)    Dietary Orders (From admission, onward)       Start     Ordered    02/05/25 0935  Enteral feeding with NPO Isosource 1.5; GT (gastric tube); 45; 100; Water; Every 6 hours  Diet  effective now        Question Answer Comment   Tube feeding formula: Isosource 1.5    Feeding route: GT (gastric tube)    Tube feeding continuous rate (mL/hr): 45    Tube feeding flush (mL): 100    Flush type: Water    Flush frequency: Every 6 hours        02/05/25 0936                     Estimated Needs:   Total Energy Estimated Needs in 24 hours (kCal):  (1675-2377)  Method for Estimating Needs: 25-30 kcal/kg current wt  Total Protein Estimated Needs in 24 Hours (g):  (70-80)  Method for Estimating 24 Hour Protein Needs: ~1.3-1.5 gm/kg current wt  Total Fluid Estimated Needs in 24 Hours (mL):  (per team)        Nutrition Diagnosis   Malnutrition Diagnosis  Patient has Malnutrition Diagnosis: Yes  Diagnosis Status: Active  Malnutrition Diagnosis: Severe malnutrition related to chronic disease or condition  As Evidenced by: clinically significant weight loss x 3 months (8.5%), poor oral intakes past week over post-op course (<50% est needs >5days), suspect longer, visually frail appearing with at least mild losses to temporalis/orbital region & BMI = 19.1    Nutrition Diagnosis  Patient has Nutrition Diagnosis: Yes  Diagnosis Status (1): Active  Nutrition Diagnosis 1: Increased nutrient needs  Related to (1): increased metabolic demand  As Evidenced by (1): lung cancer, critical illness       Nutrition Interventions/Recommendations   Nutrition prescription for enteral nutrition    Nutrition Recommendations:  Individualized Nutrition Prescription Provided for :   Continue with Isosource 1.5 @ 45 ml/hr = 1620 kcal, 73 g protein, 825 ml free H20  Free H20 flush per SICU    Nutrition Interventions/Goals:   Interventions: Enteral intake  Enteral Intake: Other (Comment)  Goal: continue feeds as ordered         Nutrition Monitoring and Evaluation   Food/Nutrient Related History Monitoring  Monitoring and Evaluation Plan: Enteral and parenteral nutrition intake determination  Enteral and Parenteral Nutrition Intake  Determination: Enteral nutrition intake - Tolerate TF at goal rate         Biochemical Data, Medical Tests and Procedures  Monitoring and Evaluation Plan: Electrolyte/renal panel, Glucose/endocrine profile  Electrolyte and Renal Panel: Electrolytes within normal limits  Glucose/Endocrine Profile: Glucose within normal limits ( mg/dL)         Goal Status: Goal(s) achieved    Time Spent (min): 30 minutes

## 2025-02-11 NOTE — PROGRESS NOTES
"Physical Therapy    Physical Therapy Treatment    Patient Name: Faustina Vick \"Jannie\"  MRN: [id number]  Today's Date: 2/11/2025  Room: 12/12-A  Time Calculation  Start Time: 1110  Stop Time: 1138  Time Calculation (min): 28 min       Assessment/Plan   PT Plan  Treatment/Interventions: Bed mobility, Transfer training, Gait training, Stair training, Balance training, Strengthening, Endurance training, Range of motion, Therapeutic exercise, Therapeutic activity, Home exercise program  PT Plan: Ongoing PT  PT Frequency: 4 times per week  PT Discharge Recommendations: Moderate intensity level of continued care  Equipment Recommended upon Discharge: Wheeled walker  PT Recommended Transfer Status: Total assist  PT - OK to Discharge: Yes    General Visit Information:   Reason for Referral: Initial evaluation: right pneumonectomy, intrapericardial, with en block chest wall resection on 1/13 c/b hypotension post op. Re-eval cardiogenic shock, Takosubo cardiomyopathy with decreased EF 25-30% requiring inotropes and AHRF s/p intubation. 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  Lethargic throughout session requiring frequent stimuli to maintain alertness.    Precautions:  Precautions  Medical Precautions: Fall precautions, Oxygen therapy device and L/min  Precautions Comment: trach/vent  Vital Signs:   Date/Time Vitals Session Patient Position Pulse Resp SpO2 BP MAP (mmHg)    02/11/25 1400 --  --  95  18  96 %  138/81  97             Objective   Pain:  Pain Assessment  0-10 (Numeric) Pain Score: 0 - No pain (post " 0)  Cognition:  Cognition  Orientation Level: Other (Comment) (lethargic throughout session but answering yes/no appropriately)  Lines/Tubes/Drains:  Arterial Line 01/28/25 Right Axillary (Active)   Number of days: 13       Implantable Port 01/16/25 Left Chest Single lumen port (Active)   Number of days: 25       Surgical Airway Shiley 7.5 (Active)   Number of days: 12       Urethral Catheter 16 Fr. (Active)   Number of days: 4       Gastrostomy/Enterostomy Percutaneous endoscopic gastrostomy (PEG) 1 20 Fr. LUQ (Active)   Number of days: 12     Medical Gas Therapy: Supplemental oxygen  Medical Gas Delivery Method: Trach tube  Continuous Medications/Drips:  heparin, 0-4,000 Units/hr, Last Rate: 1,000 Units/hr (02/11/25 0800)        PT Treatments:  Therapeutic Exercise  Therapeutic Exercise Activity 1: BLE PF, DF, heel slide, isometric quad AAROM c minimal effort noted/lethargic 10 x 1  Therapeutic Exercise Activity 2: BUE hand open close, elbow flex/ext 10 x 1 AAROM c increased verbal and tactile cues  Therapeutic Activity  Therapeutic Activity 1: Attempted shuttle press on 5 lb resistance, max a c increased tactile and verbal cues for maintaining of alertness  Therapeutic Activity 2: Static EOB 10 minutes min A, declined EOB ther-ex     Bed Mobility 1  Bed Mobility 1: Supine to sitting  Level of Assistance 1: Maximum assistance  Bed Mobility Comments 1: TAPS, HOB 30  Bed Mobility 2  Bed Mobility  2: Sitting to supine  Level of Assistance 2: Maximum assistance  Bed Mobility Comments 2: TAPS                   Outcome Measures:  [facility] Basic Mobility  Turning from your back to your side while in a flat bed without using bedrails: A lot  Moving from lying on your back to sitting on the side of a flat bed without using bedrails: A lot  Moving to and from bed to chair (including a wheelchair): Total  Standing up from a chair using your arms (e.g. wheelchair or bedside chair): Total  To walk in hospital room:  Total  Climbing 3-5 steps with railing: Total  Basic Mobility - Total Score: 8           FSS-ICU  Ambulation: Unable to attempt due to weakness  Rolling: Maximal assistance (performs 25% - 49% of task)  Sitting: Minimal assistance (performs 75% or more of task)  Transfer Sit-to-Stand: Unable to perform  Transfer Supine-to-Sit: Maximal assistance (performs 25% - 49% of task)  Total Score: 8  ICU Mobility Screen  ICU Mobility Scale: Sitting over edge of bed             Education Documentation  Body Mechanics, taught by Ayad Kemp PT at 2/11/2025  2:57 PM.  Learner: Patient  Readiness: Acceptance  Method: Explanation  Response: Needs Reinforcement  Comment: POC review, goals for next session    Home Exercise Program, taught by Ayad Kemp PT at 2/11/2025  2:57 PM.  Learner: Patient  Readiness: Acceptance  Method: Explanation  Response: Needs Reinforcement  Comment: POC review, goals for next session    Mobility Training, taught by Ayad Kemp PT at 2/11/2025  2:57 PM.  Learner: Patient  Readiness: Acceptance  Method: Explanation  Response: Needs Reinforcement  Comment: POC review, goals for next session    Education Comments  No comments found.          OP EDUCATION:       Encounter Problems       Encounter Problems (Active)       Balance       Pt will demo static sitting balance w/o UE support IND to decrease risk of falls in home environment (Not met)       Start:  01/15/25    Expected End:  02/05/25    Resolved:  01/22/25    Updated to: Pt will maintain standing balance with Min A using LRAD    Update reason: re-eval         Pt will maintain standing balance with Min A using LRAD (Progressing)       Start:  01/22/25    Expected End:  02/12/25                   Mobility       Pt will mobilize supine to sitting MI to promote functional independence (Not met)       Start:  01/15/25    Expected End:  02/05/25    Resolved:  01/22/25    Updated to: Pt will perform bed mobility with Min A    Update  reason: re-eval         Pt will perform bed mobility with Min A (Progressing)       Start:  01/22/25    Expected End:  02/12/25                   Mobility       STG - Patient will ambulate x50 ft with Min A using LRAD (Progressing)       Start:  01/22/25    Expected End:  02/12/25               PT Transfers       Pt will transfer sit to stand w/ LRAD MI to improve functional mobility.  (Not met)       Start:  01/15/25    Expected End:  02/05/25    Resolved:  01/22/25    Updated to: Pt will perform functional transfers with Min A using LRAD    Update reason: Re-eval         Pt will perform functional transfers with Min A using LRAD (Progressing)       Start:  01/22/25    Expected End:  02/12/25                   Pain - Adult              Assessment: Patient is progressing Fairly with therapy this date.  Patient able to complete bed level there-ex and upright activity this date.  Improved alertness while sitting upright but withdrawn and declined there-ex.  Continues to demonstrate some emotions while seeing family tearing up.  Deferred OOB this date 2/2 fatigue and lethargy, discussed possible moveo trial for next session.  Patient remains appropriate for MOD intensity therapy when medically appropriate for discharge from acute stay.  Will continue to follow.      02/11/25 at 3:00 PM   Ayad Kemp, PT   Rehab Office: [phone]

## 2025-02-11 NOTE — PROGRESS NOTES
"Music Therapy Note    Faustina \"Jannie\" Nicanor    Therapy Session  Referral Type: New referral this admission  Visit Type: Follow-up visit  Session Start Time: 1508  Session End Time: 1508  Intervention Delivery: In-person  Conflict of Service: Asleep  Number of family members present: 1  Family Present for Session: None  Family Participation: Supportive     Pre-assessment  Unable to Assess Reason: Physical limitation  Mood/Affect: Flat/blunted, Appropriate, Calm, Cooperative, Tired/lethargic         Treatment/Interventions  Music Therapy Interventions: Assessment  Interruption: No  Patient Fell Asleep at End of Session: No    Post-assessment  Unable to Assess Reason: Did not provide expressive therapy intervention  Mood/Affect: Appropriate, Calm, Cooperative, Flat/blunted, Tired/lethargic  Continue Visiting: Yes  Total Session Time (min): 0 minutes    Narrative  Assessment Detail: Patient somnolent and no family present at time of MT's attempt. MT did not attempt to wake pt.  Follow-up: MT to reattempt at another time as applicable.    Education Documentation  No documentation found.          "

## 2025-02-11 NOTE — PROGRESS NOTES
"Faustina Vick \"Noam" is a 64 y.o. female on day 29 of admission presenting with Primary cancer of right upper lobe of lung (Multi).    Subjective   No acute events overnight. Patient remains on CPAP 40% 5/5. Improved sleep overnight with increased trazodone dosing. A&Ox3 this am on exam.    Objective   Physical Exam  Vitals reviewed.   Constitutional:       General: She is awake.      Appearance: She is underweight.   HENT:      Head: Normocephalic and atraumatic.      Nose: Nose normal.      Mouth/Throat:      Mouth: Mucous membranes are moist.   Eyes:      Extraocular Movements: Extraocular movements intact.      Pupils: Pupils are equal, round, and reactive to light.   Neck:      Comments: 7.5 cuffed Shiley Trach midline, no drainage. Soft trach tie in place  Cardiovascular:      Rate and Rhythm: Normal rate and regular rhythm.      Pulses: Normal pulses.      Heart sounds: Normal heart sounds.   Pulmonary:      Effort: Pulmonary effort is normal.      Breath sounds: Rhonchi: left side.      Comments: Scattered rhonchi left chest field  CPAP on vent  Chest:      Comments: Right lateral chest incisions ROSHAN without drainage  Abdominal:      Palpations: Abdomen is soft.      Comments: PEG tube in place with TF infusing at 45ml/hr. Insertion site without erythema or drainage   Genitourinary:     Comments: Montiel in place, clear yellow  Musculoskeletal:         General: Normal range of motion.      Cervical back: Normal range of motion and neck supple.      Right lower leg: No edema.      Left lower leg: No edema.   Skin:     General: Skin is warm and dry.      Capillary Refill: Capillary refill takes less than 2 seconds.   Neurological:      General: No focal deficit present.      Mental Status: She is alert and oriented to person, place, and time.      GCS: GCS eye subscore is 4. GCS verbal subscore is 1. GCS motor subscore is 6.      Motor: Weakness: generalized.   Psychiatric:         Mood and Affect: Affect is " "labile and tearful.         Behavior: Behavior is cooperative.       Last Recorded Vitals  Blood pressure 135/87, pulse 92, temperature 36.4 °C (97.5 °F), temperature source Temporal, resp. rate 16, height 1.651 m (5' 5\"), weight 55.4 kg (122 lb 2.2 oz), SpO2 97%.    VS over last 24h  Heart Rate:  []   Temp:  [36 °C (96.8 °F)-36.7 °C (98.1 °F)]   Resp:  [13-27]   BP: (121-160)/(72-95)   SpO2:  [91 %-97 %]      Intake/Output last 3 Shifts:  I/O last 3 completed shifts:  In: 3422.7 (61.8 mL/kg) [I.V.:502.7 (9.1 mL/kg); Blood:700; NG/GT:2220]  Out: 3135 (56.6 mL/kg) [Urine:3135 (1.6 mL/kg/hr)]  Weight: 55.4 kg       Intake/Output Summary (Last 24 hours) at 2/11/2025 0804  Last data filed at 2/11/2025 0700  Gross per 24 hour   Intake 1655 ml   Output 2195 ml   Net -540 ml        Relevant Results  Scheduled medications  budesonide, 0.5 mg, nebulization, BID  clonazePAM, 1 mg, g-tube, q8h  digoxin, 125 mcg, intravenous, Daily  esomeprazole, 40 mg, g-tube, Daily before breakfast  hydrocortisone sodium succinate, 50 mg, intravenous, q8h  insulin lispro, 0-5 Units, subcutaneous, q6h  ipratropium-albuteroL, 3 mL, nebulization, q6h  midodrine, 15 mg, g-tube, q6h  nystatin, 5 mL, Mouth/Throat, BID  oxygen, , inhalation, Continuous - Inhalation  thiamine, 100 mg, g-tube, Daily  traZODone, 50 mg, g-tube, Nightly      PRN medications  PRN medications: acetylcysteine, albuterol, calcium gluconate, calcium gluconate, dextrose, dextrose, glucagon, glucagon, HYDROmorphone, magnesium sulfate, magnesium sulfate, oxyCODONE, oxygen, oxygen, oxygen, potassium chloride        Results from last 7 days   Lab Units 02/11/25  0219 02/10/25  0807 02/09/25  0336   WBC AUTO x10*3/uL 9.8 8.9 5.6   HEMOGLOBIN g/dL 9.9* 10.3* 9.4*   PLATELETS AUTO x10*3/uL 395 351 281      Results from last 7 days   Lab Units 02/11/25  0204 02/10/25  0443 02/09/25  0334   SODIUM mmol/L 147* 144 146*   POTASSIUM mmol/L 3.6 3.5 3.6   CHLORIDE mmol/L 101 101 102 "   CO2 mmol/L 38* 37* 35*   BUN mg/dL 14 16 18   CREATININE mg/dL <0.20* <0.20* <0.20*   GLUCOSE mg/dL 111* 137* 156*   MAGNESIUM mg/dL 2.05 1.94 2.13   PHOSPHORUS mg/dL 3.4 3.1 3.0      Results from last 7 days   Lab Units 02/10/25  0443 02/09/25  0334 02/08/25  0103   INR  1.2* 1.3* 1.2*   PROTIME seconds 13.8* 14.2* 13.7*   APTT seconds 75* 64* 50*        Assessment/Plan   Assessment:  Faustina Vick is a 63 y/o with PMHx of COPD and 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 Wagner on 1/13 requiring post-op vasoactive therapy. Post-op course c/b acute hyponatremia, multifactorial delirium, cardiogenic shock requiring inotropic support, and acute hypoxic/hypercarbic respiratory failure requiring intubation. COVID+ 1/20. Weaned off milrinone 1/21. Weaned off iEpo 1/24. S/p trach/PEG 1/30 tolerating TF through PEG. TTE 2/3 with improved EF to 60-65%. Pt developed atrial tachycardia with rates in 150-160s, cardiology recommended metoprolol and digoxin for rate and rhythm control, improved. Now pending LTACH placement.      Plan:  NEURO: History of anxiety on alprazolam 1mg qd. A&Ox4 at baseline. Physical deconditioning, tolerating increased activity with PT/OT. Situational depression/anxiety, remains on Precedex 0.25 this am. Also on scheduled clonazepam and nightly trazodone. Acute delirium. Qtc 436 2/7. Improved sleep overnight with increased trazodone dosing. A&Ox3 this am on exam.  - Continue Trazodone nightly -> increase to 50mg   - Continue Clonazepam 1mg q8h with hold parameters  - Continue Oxycodone prn for pain control   - Ongoing neuro and pain assessments  - PT/OT -> continue to progress activity  - CAM assessments q shift  - continue pet therapy  - PICS team consult  - delirium precautions, sleep/wake cycle hygiene, REST protocol     CV: No history of cardiac disease.  Baseline /79, -110. TTE 9/2024 normal biV function. Intra-op and immediate post op hypotension requiring pressor support. TTE 1/13 with EF 60-65%, low normal RV systolic function. Acutely elevated lactate 1/15-1/16. Repeat TTE 1/16: acute biV dysfunction/takotsubo cardiomyopathy, EF 34%, mod reduced RV systolic function, mild to mod TR. Cardiogenic shock resolved. Weaned off milrinone 1/21, iEpo 1/24. Difficulty weaning off low dose vaso, started midodrine 1/25. Repeat TTE 2/3 with improved EF to 60-65%. Ongoing intermittent episodes of tachycardia to 150s. Suspected Aflutter/atrial tachycardia, added digoxin 2/3. Started low intensity heparin infusion 2/5. On/off phenyl and vaso for nighttime hypotension. Midodrine increased to 15mg q6h. Restarted stress dose steroids 2/4. No pressor requirement.   - Continuous EKG/abp monitoring  - MAP goal >65  - Continue heparin infusion  - Continue digoxin 125mcg daily -> check dig level daily - 0.55 today  - EP following: tachycardia due to acute illness. Continue dig and low intensity heparin infusion. Consider tikosyn once patient stable in sinus rhythm, ziopatch at discharge. Thoracic surgery recommending against amiodarone.  - Continue Midodrine 15 mg q6h  - Wean hydrocortisone 50mg to q12h     PULM: Hx of 30 pack year smoking history (quit 4/2024), COPD, and stage III right lung SCC s/p chemo/XRT/immunotherapy. 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 Arrived to SICU extubated on 10L SFM, weaned to room air 1/14. Chest tube removed 1/14. Acute on chronic hypoxic respiratory failure 1/16 in the setting of COVID. Intubated 1/18. S/p trach 1/30. Sutures cut 2/6. Speech eval 2/7 for PMSV, desaturation and coughing noted, speech to continue to follow. Remains on SBT, tolerating 40% 5/5.  - Wean FiO2 to maintain SpO2 >92%  - Scheduled budesonide/duoneb  - continue humidified vent  - PRN albuterol  - Daily  CXR prn  - ABG prn    GI: No GI history. Elevated LFT 1/16-1/21 resolved. Evidence of pericholecystic fluid and biliary sludge on renal US 1/15 but no abdominal symptoms. RUQ US without significant findings. S/P PEG 1/30. Tolerating TF at goal rate. Last BM 2/10, soft.  - Continue TFs at goal 45ml/hr  - Continue thiamine 100mg daily  - Continue PPI for GI prophylaxis  - holding bowel reg for soft stool    : No history of renal disease. Baseline creatinine 0.6. Symptomatic acute hyponatremia resolved, was given 3% saline infusions. Yoo replaced 2/1 for urine retention. Trial of silodosin stopped given recurrent hypotension. Failed TOV, straight cathed overnight 2/6. Hypokalemia requiring frequent repletion. Per urology note 2/7, continue yoo at discharge and will f/u outpatient for TOV.  - Maintain yoo catheter per Urology  - RFP daily and prn  - Maintain U/O >0.5ml/kg/hr  - Replete electrolytes to maintain K>4, Phos>2.5, iCal>1.1, Mag>2     HEME: Hx of DOC. Baseline H/H 12/40. Acute blood loss anemia. Acute coagulopathy resolved. H/H stable. Started on low intensity heparin infusion 2/5.  - Check CBC daily and PRN  - coags prn  - Ongoing monitoring for s/s bleeding  - Maintain active T&S (2/10)  - check assays and adjust heparin infusion per nomogram    ENDO: No history of DM or thyroid disease. Adequate glycemic control.  - BG q6h, SSI #1     MSK: Arthritis, Vit D deficiency. Sacral wound.  - Turn patient every 2 hrs, continue wound care  - Monitor for symptoms     ID: Completed treatment of COVID with decadron and remdesivir. Off precautions since 1/30. Pan-susceptible Pseudomonas UTI 2/1, started on cetriaxone 2/2, switched to zosyn 2/3 for 7 day course (completed 2/9). Blood cultures sent 2/4, negative. Currently afebrile, no leukocytosis.  - Temp q4h, wbc daily  - Ongoing monitoring for s/s infection    Access:  - R axillary a line (1/28-)  - Left chest mediport (not currently accessed)  - PIV x3  -  Trach (1/30)  - PEG (1/30)  - yoo 2/7     Dispo: Continue ICU care. Patient seen and discussed with ICU attending Dr. Ibrahim. DELMAR helping guide family with LTACH placement in [facility].    Code Status: Full Code       ANDREA Saucedo-CNP  SICU phone [phone]    Critical Care time: 45 minutes    [phone]

## 2025-02-11 NOTE — PROGRESS NOTES
"Music Therapy Note    Faustina \"Jannie\" Nicanor     Therapy Session  Referral Type: New referral this admission  Visit Type: New visit  Session Start Time: 1334  Session End Time: 1338  Intervention Delivery: In-person  Conflict of Service: None  Number of family members present: 1  Family Present for Session: Child  Family Participation: Supportive     Pre-assessment  Unable to Assess Reason: Physical limitation  Mood/Affect: Flat/blunted, Appropriate, Calm, Cooperative, Tired/lethargic         Treatment/Interventions  Music Therapy Interventions: Assessment  Interruption: No  Patient Fell Asleep at End of Session: No    Post-assessment  Unable to Assess Reason: Did not provide expressive therapy intervention  Mood/Affect: Appropriate, Calm, Cooperative, Flat/blunted, Tired/lethargic  Continue Visiting: Yes  Total Session Time (min): 4 minutes    Narrative  Assessment Detail: Patient presented awake and alert, in bedside chair, displaying calm, flat, and lethargic affect. Patient was being visited by her daughter Unruly who was very supportive. MT introduced self and role and pt had limited reception to music therapy education d/t discomfort and lethargy. Patient shook her head when her daughter asked if she might be interested in a session or learning about music therapy this day. MT offered to return next day and see how pt is feeling then and pt agreed.  Follow-up: MT to continue to follow.    Education Documentation  No documentation found.          "

## 2025-02-11 NOTE — PROGRESS NOTES
"Occupational Therapy    Communication Note    Patient Name: Faustina Vick \"Jannie\"  MRN: [id number]  Today's Date: 2/11/2025   Room: 12/12-A    Discipline: Occupational Therapy      Missed Visit Reason:  (1420: Pt lethargic, unable to maintain eyes open for >3 seconds at a time. Declines to participate, nods head \"yes\" to participate in OT at a later time.)      02/11/25 at 3:25 PM   Ellen Garcia, OT   Rehab Office: [phone]     "

## 2025-02-11 NOTE — PROGRESS NOTES
"Faustina Vick \"Noam" is a 64 y.o. female on day 29 of admission presenting with Primary cancer of right upper lobe of lung (Multi).    Subjective   No acute events overnight. Patient remains on CPAP 40% 5/5. Improved sleep overnight with increased trazodone dosing. A&Ox3 this am on exam.    Objective   Physical Exam  Vitals reviewed.   Constitutional:       General: She is awake.      Appearance: She is underweight.   HENT:      Head: Normocephalic and atraumatic.      Nose: Nose normal.      Mouth/Throat:      Mouth: Mucous membranes are moist.   Eyes:      Extraocular Movements: Extraocular movements intact.      Pupils: Pupils are equal, round, and reactive to light.   Neck:      Comments: 7.5 cuffed Shiley Trach midline, no drainage. Soft trach tie in place  Cardiovascular:      Rate and Rhythm: Normal rate and regular rhythm.      Pulses: Normal pulses.      Heart sounds: Normal heart sounds.   Pulmonary:      Effort: Pulmonary effort is normal.      Breath sounds: Rhonchi: left side.      Comments: Scattered rhonchi left chest field  CPAP on vent  Chest:      Comments: Right lateral chest incisions ROSHAN without drainage  Abdominal:      Palpations: Abdomen is soft.      Comments: PEG tube in place with TF infusing at 45ml/hr. Insertion site without erythema or drainage   Genitourinary:     Comments: Montiel in place, clear yellow  Musculoskeletal:         General: Normal range of motion.      Cervical back: Normal range of motion and neck supple.      Right lower leg: No edema.      Left lower leg: No edema.   Skin:     General: Skin is warm and dry.      Capillary Refill: Capillary refill takes less than 2 seconds.   Neurological:      General: No focal deficit present.      Mental Status: She is alert and oriented to person, place, and time.      GCS: GCS eye subscore is 4. GCS verbal subscore is 1. GCS motor subscore is 6.      Motor: Weakness: generalized.   Psychiatric:         Mood and Affect: Affect is " "labile and tearful.         Behavior: Behavior is cooperative.       Last Recorded Vitals  Blood pressure 125/72, pulse 103, temperature 36.2 °C (97.2 °F), temperature source Temporal, resp. rate 22, height 1.651 m (5' 5\"), weight 55.4 kg (122 lb 2.2 oz), SpO2 96%.    VS over last 24h  Heart Rate:  []   Temp:  [36 °C (96.8 °F)-36.7 °C (98.1 °F)]   Resp:  [14-27]   BP: (121-160)/(72-95)   SpO2:  [91 %-97 %]      Intake/Output last 3 Shifts:  I/O last 3 completed shifts:  In: 3422.7 (61.8 mL/kg) [I.V.:502.7 (9.1 mL/kg); Blood:700; NG/GT:2220]  Out: 3135 (56.6 mL/kg) [Urine:3135 (1.6 mL/kg/hr)]  Weight: 55.4 kg       Intake/Output Summary (Last 24 hours) at 2/11/2025 1154  Last data filed at 2/11/2025 1100  Gross per 24 hour   Intake 1670 ml   Output 2265 ml   Net -595 ml        Relevant Results  Scheduled medications  budesonide, 0.5 mg, nebulization, BID  clonazePAM, 1 mg, g-tube, q8h  digoxin, 125 mcg, intravenous, Daily  esomeprazole, 40 mg, g-tube, Daily before breakfast  hydrocortisone sodium succinate, 50 mg, intravenous, q12h  insulin lispro, 0-5 Units, subcutaneous, q6h  ipratropium-albuteroL, 3 mL, nebulization, q6h  midodrine, 15 mg, g-tube, q6h  nystatin, 5 mL, Mouth/Throat, BID  oxygen, , inhalation, Continuous - Inhalation  thiamine, 100 mg, g-tube, Daily  traZODone, 50 mg, g-tube, Nightly      PRN medications  PRN medications: acetylcysteine, albuterol, calcium gluconate, calcium gluconate, dextrose, dextrose, glucagon, glucagon, magnesium sulfate, magnesium sulfate, oxyCODONE, oxygen, oxygen, oxygen, potassium chloride        Results from last 7 days   Lab Units 02/11/25  0219 02/10/25  0807 02/09/25  0336   WBC AUTO x10*3/uL 9.8 8.9 5.6   HEMOGLOBIN g/dL 9.9* 10.3* 9.4*   PLATELETS AUTO x10*3/uL 395 351 281      Results from last 7 days   Lab Units 02/11/25  0204 02/10/25  0443 02/09/25  0334   SODIUM mmol/L 147* 144 146*   POTASSIUM mmol/L 3.6 3.5 3.6   CHLORIDE mmol/L 101 101 102   CO2 mmol/L " 38* 37* 35*   BUN mg/dL 14 16 18   CREATININE mg/dL <0.20* <0.20* <0.20*   GLUCOSE mg/dL 111* 137* 156*   MAGNESIUM mg/dL 2.05 1.94 2.13   PHOSPHORUS mg/dL 3.4 3.1 3.0      Results from last 7 days   Lab Units 02/10/25  0443 02/09/25  0334 02/08/25  0103   INR  1.2* 1.3* 1.2*   PROTIME seconds 13.8* 14.2* 13.7*   APTT seconds 75* 64* 50*        Assessment/Plan   Assessment:  Faustina Vick is a 63 y/o with PMHx of COPD and 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 Wagner on 1/13 requiring post-op vasoactive therapy. Post-op course c/b acute hyponatremia, multifactorial delirium, cardiogenic shock requiring inotropic support, and acute hypoxic/hypercarbic respiratory failure requiring intubation. COVID+ 1/20. Weaned off milrinone 1/21. Weaned off iEpo 1/24. S/p trach/PEG 1/30 tolerating TF through PEG. TTE 2/3 with improved EF to 60-65%. Pt developed atrial tachycardia with rates in 150-160s, cardiology recommended metoprolol and digoxin for rate and rhythm control, improved. Now pending LTACH placement.      Plan:  NEURO: History of anxiety on alprazolam 1mg qd. A&Ox4 at baseline. Physical deconditioning, tolerating increased activity with PT/OT. Situational depression/anxiety, remains on Precedex 0.25 this am. Also on scheduled clonazepam and nightly trazodone. Acute delirium. Qtc 436 2/7. Improved sleep overnight with increased trazodone dosing. A&Ox3 this am on exam.  - Continue Trazodone nightly -> increase to 50mg   - Continue Clonazepam 1mg q8h with hold parameters  - Continue Oxycodone prn for pain control   - Ongoing neuro and pain assessments  - PT/OT -> continue to progress activity  - CAM assessments q shift  - continue pet therapy  - PICS team consult  - delirium precautions, sleep/wake cycle hygiene, REST protocol     CV: No history of cardiac disease. Baseline BP  109/79, -110. TTE 9/2024 normal biV function. Intra-op and immediate post op hypotension requiring pressor support. TTE 1/13 with EF 60-65%, low normal RV systolic function. Acutely elevated lactate 1/15-1/16. Repeat TTE 1/16: acute biV dysfunction/takotsubo cardiomyopathy, EF 34%, mod reduced RV systolic function, mild to mod TR. Cardiogenic shock resolved. Weaned off milrinone 1/21, iEpo 1/24. Difficulty weaning off low dose vaso, started midodrine 1/25. Repeat TTE 2/3 with improved EF to 60-65%. Ongoing intermittent episodes of tachycardia to 150s. Suspected Aflutter/atrial tachycardia, added digoxin 2/3. Started low intensity heparin infusion 2/5. On/off phenyl and vaso for nighttime hypotension. Midodrine increased to 15mg q6h. Restarted stress dose steroids 2/4. No pressor requirement.   - Continuous EKG/abp monitoring  - MAP goal >65  - Continue heparin infusion  - Continue digoxin 125mcg daily -> check dig level daily - 0.55 today  - EP following: tachycardia due to acute illness. Continue dig and low intensity heparin infusion. Consider tikosyn once patient stable in sinus rhythm, ziopatch at discharge. Thoracic surgery recommending against amiodarone.  - Continue Midodrine 15 mg q6h  - Wean hydrocortisone 50mg to q12h     PULM: Hx of 30 pack year smoking history (quit 4/2024), COPD, and stage III right lung SCC s/p chemo/XRT/immunotherapy. 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 Arrived to SICU extubated on 10L SFM, weaned to room air 1/14. Chest tube removed 1/14. Acute on chronic hypoxic respiratory failure 1/16 in the setting of COVID. Intubated 1/18. S/p trach 1/30. Sutures cut 2/6. Speech eval 2/7 for PMSV, desaturation and coughing noted, speech to continue to follow. Remains on SBT, tolerating 40% 5/5.  - Wean FiO2 to maintain SpO2 >92%  - Scheduled budesonide/duoneb  - continue humidified vent  - PRN albuterol  - Daily CXR prn  - ABG  prn    GI: No GI history. Elevated LFT 1/16-1/21 resolved. Evidence of pericholecystic fluid and biliary sludge on renal US 1/15 but no abdominal symptoms. RUQ US without significant findings. S/P PEG 1/30. Tolerating TF at goal rate. Last BM 2/10, soft.  - Continue TFs at goal 45ml/hr  - Continue thiamine 100mg daily  - Continue PPI for GI prophylaxis  - holding bowel reg for soft stool    : No history of renal disease. Baseline creatinine 0.6. Symptomatic acute hyponatremia resolved, was given 3% saline infusions. Yoo replaced 2/1 for urine retention. Trial of silodosin stopped given recurrent hypotension. Failed TOV, straight cathed overnight 2/6. Hypokalemia requiring frequent repletion. Per urology note 2/7, continue yoo at discharge and will f/u outpatient for TOV.  - Maintain yoo catheter per Urology  - RFP daily and prn  - Maintain U/O >0.5ml/kg/hr  - Replete electrolytes to maintain K>4, Phos>2.5, iCal>1.1, Mag>2     HEME: Hx of DOC. Baseline H/H 12/40. Acute blood loss anemia. Acute coagulopathy resolved. H/H stable. Started on low intensity heparin infusion 2/5.  - Check CBC daily and PRN  - coags prn  - Ongoing monitoring for s/s bleeding  - Maintain active T&S (2/10)  - check assays and adjust heparin infusion per nomogram    ENDO: No history of DM or thyroid disease. Adequate glycemic control.  - BG q6h, SSI #1     MSK: Arthritis, Vit D deficiency. Sacral wound.  - Turn patient every 2 hrs, continue wound care  - Monitor for symptoms     ID: Completed treatment of COVID with decadron and remdesivir. Off precautions since 1/30. Pan-susceptible Pseudomonas UTI 2/1, started on cetriaxone 2/2, switched to zosyn 2/3 for 7 day course (completed 2/9). Blood cultures sent 2/4, negative. Currently afebrile, no leukocytosis.  - Temp q4h, wbc daily  - Ongoing monitoring for s/s infection    I have discussed the case with the resident/advanced practice provider. I have personally performed a history,  physical exam, and my own medical decision making. I have reviewed the note and agree with the findings and plan with the following exceptions as identified below. I have personally provided 36 minutes of critical care time exclusive of time spent on separately billable procedures. Time includes review of laboratory data, radiology results, discussion with consultants, family and monitoring for potential decompensation. Interventions were performed as documented above.      Family/Surrogate updated with plan of care.  Code status addressed/up to date.

## 2025-02-11 NOTE — PROGRESS NOTES
"Faustina Vick \"Jannie\" is a 64 y.o. female who presents for Primary cancer of right upper lobe of lung (Multi) [C34.11].  She initially received chemotherapy and radiation, but appeared to have residual radiographic abnormalities.  PET/CT suggested progression of disease, and in this setting and Dr. Wagner felt that surgical resection in a \"salvage\" setting might facilitate long-term overall survival given the failure of nonsurgical management. She is now  29 days post op from PNEUMONECTOMY (Right) (en bloc with chest wall resection and reconstruction), mediastinal lymph node dissection for a  Primary cancer of right upper lobe of lung (Multi).      Subjective   NAEO. Afeb, VSS on precedex. Tolerating CPAP, TF system with possible obstruction in tubing    Objective     General: MAEx4, NAD  HEENT: Trach in place, no surrounding drainage or bleeding  Resp: symmetric chest rise, CPAP via trach   CV: Sinus tachycardia, 100s when in room, off presors  Abd: soft, ND, PEG in place with TF running at goal  Neuro: awake and alert, withdrawn    Last Recorded Vitals  Blood pressure 125/72, pulse 103, temperature 36.2 °C (97.2 °F), temperature source Temporal, resp. rate 22, height 1.651 m (5' 5\"), weight 55.4 kg (122 lb 2.2 oz), SpO2 96%.  Intake/Output last 3 Shifts:  I/O last 3 completed shifts:  In: 3422.7 (61.8 mL/kg) [I.V.:502.7 (9.1 mL/kg); Blood:700; NG/GT:2220]  Out: 3135 (56.6 mL/kg) [Urine:3135 (1.6 mL/kg/hr)]  Weight: 55.4 kg     Relevant Results  Scheduled medications  budesonide, 0.5 mg, nebulization, BID  clonazePAM, 1 mg, g-tube, q8h  digoxin, 125 mcg, intravenous, Daily  esomeprazole, 40 mg, g-tube, Daily before breakfast  hydrocortisone sodium succinate, 50 mg, intravenous, q12h  insulin lispro, 0-5 Units, subcutaneous, q6h  ipratropium-albuteroL, 3 mL, nebulization, q6h  midodrine, 15 mg, g-tube, q6h  nystatin, 5 mL, Mouth/Throat, BID  oxygen, , inhalation, Continuous - Inhalation  thiamine, 100 mg, g-tube, " Daily  traZODone, 50 mg, g-tube, Nightly      Continuous medications  heparin, 0-4,000 Units/hr, Last Rate: 1,000 Units/hr (02/11/25 0800)      PRN medications  PRN medications: acetylcysteine, albuterol, calcium gluconate, calcium gluconate, dextrose, dextrose, glucagon, glucagon, magnesium sulfate, magnesium sulfate, oxyCODONE, oxygen, oxygen, oxygen, potassium chloride    Results for orders placed or performed during the hospital encounter of 01/13/25 (from the past 24 hours)   POCT GLUCOSE   Result Value Ref Range    POCT Glucose 113 (H) 74 - 99 mg/dL   POCT GLUCOSE   Result Value Ref Range    POCT Glucose 115 (H) 74 - 99 mg/dL   POCT GLUCOSE   Result Value Ref Range    POCT Glucose 129 (H) 74 - 99 mg/dL   Blood Gas Arterial Full Panel   Result Value Ref Range    POCT pH, Arterial 7.45 (H) 7.38 - 7.42 pH    POCT pCO2, Arterial 58 (H) 38 - 42 mm Hg    POCT pO2, Arterial 68 (L) 85 - 95 mm Hg    POCT SO2, Arterial 94 94 - 100 %    POCT Oxy Hemoglobin, Arterial 93.1 (L) 94.0 - 98.0 %    POCT Hematocrit Calculated, Arterial 31.0 (L) 36.0 - 46.0 %    POCT Sodium, Arterial 141 136 - 145 mmol/L    POCT Potassium, Arterial 3.5 3.5 - 5.3 mmol/L    POCT Chloride, Arterial 103 98 - 107 mmol/L    POCT Ionized Calcium, Arterial 1.23 1.10 - 1.33 mmol/L    POCT Glucose, Arterial 107 (H) 74 - 99 mg/dL    POCT Lactate, Arterial 0.9 0.4 - 2.0 mmol/L    POCT Base Excess, Arterial 14.2 (H) -2.0 - 3.0 mmol/L    POCT HCO3 Calculated, Arterial 40.3 (H) 22.0 - 26.0 mmol/L    POCT Hemoglobin, Arterial 10.2 (L) 12.0 - 16.0 g/dL    POCT Anion Gap, Arterial 1 (L) 10 - 25 mmo/L    Patient Temperature 37.0 degrees Celsius    FiO2 40 %   Calcium, Ionized   Result Value Ref Range    POCT Calcium, Ionized 1.20 1.1 - 1.33 mmol/L   Heparin Assay, UFH   Result Value Ref Range    Heparin Unfractionated 0.5 See Comment Below for Therapeutic Ranges IU/mL   Magnesium   Result Value Ref Range    Magnesium 2.05 1.60 - 2.40 mg/dL   Renal function panel    Result Value Ref Range    Glucose 111 (H) 74 - 99 mg/dL    Sodium 147 (H) 136 - 145 mmol/L    Potassium 3.6 3.5 - 5.3 mmol/L    Chloride 101 98 - 107 mmol/L    Bicarbonate 38 (H) 21 - 32 mmol/L    Anion Gap 12 10 - 20 mmol/L    Urea Nitrogen 14 6 - 23 mg/dL    Creatinine <0.20 (L) 0.50 - 1.05 mg/dL    eGFR      Calcium 8.9 8.6 - 10.6 mg/dL    Phosphorus 3.4 2.5 - 4.9 mg/dL    Albumin 3.1 (L) 3.4 - 5.0 g/dL   Digoxin   Result Value Ref Range    Digoxin  0.95 0.80 - <2.00 ng/mL   CBC   Result Value Ref Range    WBC 9.8 4.4 - 11.3 x10*3/uL    nRBC 0.0 0.0 - 0.0 /100 WBCs    RBC 3.52 (L) 4.00 - 5.20 x10*6/uL    Hemoglobin 9.9 (L) 12.0 - 16.0 g/dL    Hematocrit 33.2 (L) 36.0 - 46.0 %    MCV 94 80 - 100 fL    MCH 28.1 26.0 - 34.0 pg    MCHC 29.8 (L) 32.0 - 36.0 g/dL    RDW 18.6 (H) 11.5 - 14.5 %    Platelets 395 150 - 450 x10*3/uL   POCT GLUCOSE   Result Value Ref Range    POCT Glucose 120 (H) 74 - 99 mg/dL   POCT GLUCOSE   Result Value Ref Range    POCT Glucose 93 74 - 99 mg/dL   POCT GLUCOSE   Result Value Ref Range    POCT Glucose 110 (H) 74 - 99 mg/dL     CXR 2/9:  IMPRESSION:  1. Postsurgical changes of right pneumonectomy with right-sided en bloc chest wall resection.   2. Similar complete opacification of the right pneumonectomy surgical bed with silhouetting of the right costophrenic angle. Right-sided pleural effusion can not be excluded.   3. Scattered densities throughout the left lung appears slightly improved when compared to prior exam, suggestive of improved left lung aeration over a background of patient's chronic lung disease.   4. Medical devices as detailed above.        Assessment/Plan   Assessment & Plan  Primary cancer of right upper lobe of lung (Multi)    Chronic obstructive pulmonary disease (Multi)    Malignant neoplasm of upper lobe of right lung (Multi)    Cardiogenic shock (Multi)    Respiratory distress    Faustina Vick (Jannie) is a 64F with Hx of Stage III lung cancer, asthma, anxiety,  and arthritis who is now s/p R pneumonectomy with Dr. Wagner 1/13/25. 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 Cardiology was consulted and recommended Lasix gtt and milrinone. Lasix gtt held 1/17, hyponatremia improving. Patient underwent RHC and SGC placement with cardiology 1/17. She was reintubated on 1/18 d/t worsening respiratory status and AMS. Also had increased pressor requirements, now weaned off. Repeat echo stable on 1/18. COVID positive on 1/20.     Failed extubation trials, now s/p trach/PEG with Dr. Wagner 1/30.     - Do not recommend amio given risk of pulmonary complications in the setting of pneumonectomy  - Continue & appreciate supportive ICU care, wean to trach collar as tolerated  - Continue Tfs at goal, appreciate nutrition recs, tubing now flowing appropriately  - Pulmonary toilet  - PT/OT for rehabilitation, patient will likely need LTACH placement  - Completed Abx for UTI, Urology plans TOV in office after DC    Patient seen and discussed with Dr. Wagner.    Wu Martin DO  PGY-3  Thoracic Surgery [id number]

## 2025-02-11 NOTE — PROGRESS NOTES
Social Work Note:    LEANNE spoke with the SW at [facility] about insurance.  Patient has a commercial [employer] plan and patient would have a copay of $150 per day.  She also explained that the patient and  may want to apply for Medicaid in case [employer] doesn't want to pay for SNF anymore. She explained Vent private pay rate is $1364 per day.  Alexa explained that she discussed this briefly with the patient's  on Friday but she could call again today to talk about it further.  LEANNE requested that the SW call the  to discuss and call this LISW back when able.  Patient is medically ready and needs a precert. LEANNE will continue to follow    LESLIE Boyce, LEANNE-S

## 2025-02-12 LAB
ALBUMIN SERPL BCP-MCNC: 3 G/DL (ref 3.4–5)
ANION GAP BLDA CALCULATED.4IONS-SCNC: 0 MMO/L (ref 10–25)
ANION GAP SERPL CALC-SCNC: 13 MMOL/L (ref 10–20)
BASE EXCESS BLDA CALC-SCNC: 13.6 MMOL/L (ref -2–3)
BODY TEMPERATURE: 37 DEGREES CELSIUS
BUN SERPL-MCNC: 15 MG/DL (ref 6–23)
CA-I BLD-SCNC: 1.2 MMOL/L (ref 1.1–1.33)
CA-I BLDA-SCNC: 1.29 MMOL/L (ref 1.1–1.33)
CALCIUM SERPL-MCNC: 8.9 MG/DL (ref 8.6–10.6)
CHLORIDE BLDA-SCNC: 101 MMOL/L (ref 98–107)
CHLORIDE SERPL-SCNC: 99 MMOL/L (ref 98–107)
CO2 SERPL-SCNC: 36 MMOL/L (ref 21–32)
CREAT SERPL-MCNC: <0.2 MG/DL (ref 0.5–1.05)
DIGOXIN SERPL-MCNC: 1.06 NG/ML (ref 0.8–?)
EGFRCR SERPLBLD CKD-EPI 2021: ABNORMAL ML/MIN/{1.73_M2}
ERYTHROCYTE [DISTWIDTH] IN BLOOD BY AUTOMATED COUNT: 18.7 % (ref 11.5–14.5)
GLUCOSE BLD MANUAL STRIP-MCNC: 115 MG/DL (ref 74–99)
GLUCOSE BLD MANUAL STRIP-MCNC: 116 MG/DL (ref 74–99)
GLUCOSE BLD MANUAL STRIP-MCNC: 117 MG/DL (ref 74–99)
GLUCOSE BLD MANUAL STRIP-MCNC: 121 MG/DL (ref 74–99)
GLUCOSE BLD MANUAL STRIP-MCNC: 163 MG/DL (ref 74–99)
GLUCOSE BLDA-MCNC: 140 MG/DL (ref 74–99)
GLUCOSE SERPL-MCNC: 140 MG/DL (ref 74–99)
HCO3 BLDA-SCNC: 39.6 MMOL/L (ref 22–26)
HCT VFR BLD AUTO: 33.5 % (ref 36–46)
HCT VFR BLD EST: 31 % (ref 36–46)
HGB BLD-MCNC: 10.1 G/DL (ref 12–16)
HGB BLDA-MCNC: 10.2 G/DL (ref 12–16)
INHALED O2 CONCENTRATION: 40 %
LACTATE BLDA-SCNC: 0.8 MMOL/L (ref 0.4–2)
MAGNESIUM SERPL-MCNC: 1.97 MG/DL (ref 1.6–2.4)
MCH RBC QN AUTO: 28.1 PG (ref 26–34)
MCHC RBC AUTO-ENTMCNC: 30.1 G/DL (ref 32–36)
MCV RBC AUTO: 93 FL (ref 80–100)
NRBC BLD-RTO: 0 /100 WBCS (ref 0–0)
OXYHGB MFR BLDA: 95.1 % (ref 94–98)
PCO2 BLDA: 57 MM HG (ref 38–42)
PH BLDA: 7.45 PH (ref 7.38–7.42)
PHOSPHATE SERPL-MCNC: 3.9 MG/DL (ref 2.5–4.9)
PLATELET # BLD AUTO: 404 X10*3/UL (ref 150–450)
PO2 BLDA: 90 MM HG (ref 85–95)
POTASSIUM BLDA-SCNC: 3.9 MMOL/L (ref 3.5–5.3)
POTASSIUM SERPL-SCNC: 4.1 MMOL/L (ref 3.5–5.3)
RBC # BLD AUTO: 3.6 X10*6/UL (ref 4–5.2)
SAO2 % BLDA: 96 % (ref 94–100)
SODIUM BLDA-SCNC: 137 MMOL/L (ref 136–145)
SODIUM SERPL-SCNC: 144 MMOL/L (ref 136–145)
UFH PPP CHRO-ACNC: 0.6 IU/ML
WBC # BLD AUTO: 10.3 X10*3/UL (ref 4.4–11.3)

## 2025-02-12 PROCEDURE — 2500000001 HC RX 250 WO HCPCS SELF ADMINISTERED DRUGS (ALT 637 FOR MEDICARE OP): Performed by: STUDENT IN AN ORGANIZED HEALTH CARE EDUCATION/TRAINING PROGRAM

## 2025-02-12 PROCEDURE — 85520 HEPARIN ASSAY: CPT

## 2025-02-12 PROCEDURE — 2500000005 HC RX 250 GENERAL PHARMACY W/O HCPCS

## 2025-02-12 PROCEDURE — 2500000004 HC RX 250 GENERAL PHARMACY W/ HCPCS (ALT 636 FOR OP/ED)

## 2025-02-12 PROCEDURE — 82330 ASSAY OF CALCIUM: CPT

## 2025-02-12 PROCEDURE — 2500000002 HC RX 250 W HCPCS SELF ADMINISTERED DRUGS (ALT 637 FOR MEDICARE OP, ALT 636 FOR OP/ED)

## 2025-02-12 PROCEDURE — 37799 UNLISTED PX VASCULAR SURGERY: CPT

## 2025-02-12 PROCEDURE — 82947 ASSAY GLUCOSE BLOOD QUANT: CPT

## 2025-02-12 PROCEDURE — 2020000001 HC ICU ROOM DAILY

## 2025-02-12 PROCEDURE — 85027 COMPLETE CBC AUTOMATED: CPT | Performed by: STUDENT IN AN ORGANIZED HEALTH CARE EDUCATION/TRAINING PROGRAM

## 2025-02-12 PROCEDURE — 94640 AIRWAY INHALATION TREATMENT: CPT

## 2025-02-12 PROCEDURE — 99233 SBSQ HOSP IP/OBS HIGH 50: CPT | Performed by: STUDENT IN AN ORGANIZED HEALTH CARE EDUCATION/TRAINING PROGRAM

## 2025-02-12 PROCEDURE — 97530 THERAPEUTIC ACTIVITIES: CPT | Mod: GO

## 2025-02-12 PROCEDURE — 36415 COLL VENOUS BLD VENIPUNCTURE: CPT

## 2025-02-12 PROCEDURE — 94003 VENT MGMT INPAT SUBQ DAY: CPT

## 2025-02-12 PROCEDURE — 84132 ASSAY OF SERUM POTASSIUM: CPT

## 2025-02-12 PROCEDURE — 80162 ASSAY OF DIGOXIN TOTAL: CPT | Performed by: NURSE PRACTITIONER

## 2025-02-12 PROCEDURE — 2500000001 HC RX 250 WO HCPCS SELF ADMINISTERED DRUGS (ALT 637 FOR MEDICARE OP)

## 2025-02-12 PROCEDURE — 83735 ASSAY OF MAGNESIUM: CPT

## 2025-02-12 PROCEDURE — 99291 CRITICAL CARE FIRST HOUR: CPT | Performed by: ANESTHESIOLOGY

## 2025-02-12 PROCEDURE — 99291 CRITICAL CARE FIRST HOUR: CPT

## 2025-02-12 PROCEDURE — 2500000001 HC RX 250 WO HCPCS SELF ADMINISTERED DRUGS (ALT 637 FOR MEDICARE OP): Performed by: PHYSICIAN ASSISTANT

## 2025-02-12 RX ORDER — TRAZODONE HYDROCHLORIDE 50 MG/1
37.5 TABLET ORAL NIGHTLY
Status: DISCONTINUED | OUTPATIENT
Start: 2025-02-12 | End: 2025-02-13

## 2025-02-12 RX ADMIN — CLONAZEPAM 1 MG: 1 TABLET ORAL at 23:46

## 2025-02-12 RX ADMIN — HYDROCORTISONE SODIUM SUCCINATE 50 MG: 100 INJECTION, POWDER, FOR SOLUTION INTRAMUSCULAR; INTRAVENOUS at 06:01

## 2025-02-12 RX ADMIN — MAGNESIUM SULFATE HEPTAHYDRATE 2 G: 40 INJECTION, SOLUTION INTRAVENOUS at 06:01

## 2025-02-12 RX ADMIN — IPRATROPIUM BROMIDE AND ALBUTEROL SULFATE 3 ML: .5; 3 SOLUTION RESPIRATORY (INHALATION) at 19:39

## 2025-02-12 RX ADMIN — DIGOXIN 125 MCG: 0.25 INJECTION INTRAMUSCULAR; INTRAVENOUS at 08:07

## 2025-02-12 RX ADMIN — ESOMEPRAZOLE MAGNESIUM 40 MG: 40 FOR SUSPENSION ORAL at 08:08

## 2025-02-12 RX ADMIN — INSULIN LISPRO 1 UNITS: 100 INJECTION, SOLUTION INTRAVENOUS; SUBCUTANEOUS at 23:53

## 2025-02-12 RX ADMIN — HEPARIN SODIUM 1000 UNITS/HR: 10000 INJECTION, SOLUTION INTRAVENOUS at 21:48

## 2025-02-12 RX ADMIN — MIDODRINE HYDROCHLORIDE 15 MG: 10 TABLET ORAL at 06:01

## 2025-02-12 RX ADMIN — BUDESONIDE 0.5 MG: 0.5 INHALANT RESPIRATORY (INHALATION) at 08:21

## 2025-02-12 RX ADMIN — CLONAZEPAM 1 MG: 1 TABLET ORAL at 00:58

## 2025-02-12 RX ADMIN — HYDROCORTISONE SODIUM SUCCINATE 50 MG: 100 INJECTION, POWDER, FOR SOLUTION INTRAMUSCULAR; INTRAVENOUS at 17:30

## 2025-02-12 RX ADMIN — IPRATROPIUM BROMIDE AND ALBUTEROL SULFATE 3 ML: .5; 3 SOLUTION RESPIRATORY (INHALATION) at 14:21

## 2025-02-12 RX ADMIN — MIDODRINE HYDROCHLORIDE 15 MG: 10 TABLET ORAL at 00:58

## 2025-02-12 RX ADMIN — HEPARIN SODIUM 1000 UNITS/HR: 10000 INJECTION, SOLUTION INTRAVENOUS at 01:27

## 2025-02-12 RX ADMIN — CLONAZEPAM 1 MG: 1 TABLET ORAL at 08:08

## 2025-02-12 RX ADMIN — MIDODRINE HYDROCHLORIDE 15 MG: 10 TABLET ORAL at 17:30

## 2025-02-12 RX ADMIN — MIDODRINE HYDROCHLORIDE 15 MG: 10 TABLET ORAL at 23:46

## 2025-02-12 RX ADMIN — BUDESONIDE 0.5 MG: 0.5 INHALANT RESPIRATORY (INHALATION) at 19:39

## 2025-02-12 RX ADMIN — CLONAZEPAM 1 MG: 1 TABLET ORAL at 17:30

## 2025-02-12 RX ADMIN — MIDODRINE HYDROCHLORIDE 15 MG: 10 TABLET ORAL at 12:41

## 2025-02-12 RX ADMIN — IPRATROPIUM BROMIDE AND ALBUTEROL SULFATE 3 ML: .5; 3 SOLUTION RESPIRATORY (INHALATION) at 01:30

## 2025-02-12 RX ADMIN — Medication 10 L/MIN: at 12:28

## 2025-02-12 RX ADMIN — IPRATROPIUM BROMIDE AND ALBUTEROL SULFATE 3 ML: .5; 3 SOLUTION RESPIRATORY (INHALATION) at 08:22

## 2025-02-12 RX ADMIN — Medication 40 PERCENT: at 20:36

## 2025-02-12 RX ADMIN — THIAMINE HCL TAB 100 MG 100 MG: 100 TAB at 08:07

## 2025-02-12 RX ADMIN — TRAZODONE HYDROCHLORIDE 37.5 MG: 50 TABLET ORAL at 20:40

## 2025-02-12 ASSESSMENT — PAIN SCALES - GENERAL
PAINLEVEL_OUTOF10: 0 - NO PAIN
PAINLEVEL_OUTOF10: 1
PAINLEVEL_OUTOF10: 0 - NO PAIN
PAINLEVEL_OUTOF10: 0 - NO PAIN

## 2025-02-12 ASSESSMENT — COGNITIVE AND FUNCTIONAL STATUS - GENERAL
HELP NEEDED FOR BATHING: A LOT
DRESSING REGULAR UPPER BODY CLOTHING: A LOT
EATING MEALS: TOTAL
TOILETING: TOTAL
PERSONAL GROOMING: A LOT
DRESSING REGULAR LOWER BODY CLOTHING: TOTAL
DAILY ACTIVITIY SCORE: 9

## 2025-02-12 ASSESSMENT — PAIN - FUNCTIONAL ASSESSMENT
PAIN_FUNCTIONAL_ASSESSMENT: 0-10

## 2025-02-12 NOTE — PROGRESS NOTES
"Faustina Vick \"Jannie\" is a 64 y.o. female who presents for Primary cancer of right upper lobe of lung (Multi) [C34.11].  She initially received chemotherapy and radiation, but appeared to have residual radiographic abnormalities.  PET/CT suggested progression of disease, and in this setting and Dr. Wagner felt that surgical resection in a \"salvage\" setting might facilitate long-term overall survival given the failure of nonsurgical management. She is now  30 days post op from PNEUMONECTOMY (Right) (en bloc with chest wall resection and reconstruction), mediastinal lymph node dissection for a  Primary cancer of right upper lobe of lung (Multi).      Subjective   NAEO. Afeb, VSS. Tolerating CPAP and TF at goal.    Objective     General: MAEx4, NAD  HEENT: Trach in place, no surrounding drainage or bleeding  Resp: symmetric chest rise, CPAP via trach   CV: Sinus tachycardia, 100s when in room, off presors  Abd: soft, ND, PEG in place with TF running at goal  Neuro: awake and alert, withdrawn    Last Recorded Vitals  Blood pressure 135/79, pulse 105, temperature 36.5 °C (97.7 °F), temperature source Temporal, resp. rate 21, height 1.651 m (5' 5\"), weight 55.4 kg (122 lb 2.2 oz), SpO2 95%.  Intake/Output last 3 Shifts:  I/O last 3 completed shifts:  In: 2270 (41 mL/kg) [I.V.:360 (6.5 mL/kg); NG/GT:1910]  Out: 3305 (59.7 mL/kg) [Urine:3305 (1.7 mL/kg/hr)]  Weight: 55.4 kg     Relevant Results  Scheduled medications  budesonide, 0.5 mg, nebulization, BID  clonazePAM, 1 mg, g-tube, q8h  digoxin, 125 mcg, intravenous, Daily  esomeprazole, 40 mg, g-tube, Daily before breakfast  hydrocortisone sodium succinate, 50 mg, intravenous, q12h  insulin lispro, 0-5 Units, subcutaneous, q6h  ipratropium-albuteroL, 3 mL, nebulization, q6h  midodrine, 15 mg, g-tube, q6h  oxygen, , inhalation, Continuous - Inhalation  thiamine, 100 mg, g-tube, Daily  traZODone, 37.5 mg, g-tube, Nightly      Continuous medications  heparin, 0-4,000 Units/hr, " Last Rate: 1,000 Units/hr (02/12/25 0127)      PRN medications  PRN medications: acetylcysteine, albuterol, calcium gluconate, calcium gluconate, dextrose, dextrose, glucagon, glucagon, magnesium sulfate, magnesium sulfate, oxyCODONE, oxygen, oxygen, oxygen, potassium chloride    Results for orders placed or performed during the hospital encounter of 01/13/25 (from the past 24 hours)   POCT GLUCOSE   Result Value Ref Range    POCT Glucose 118 (H) 74 - 99 mg/dL   Blood Gas Arterial Full Panel   Result Value Ref Range    POCT pH, Arterial 7.45 (H) 7.38 - 7.42 pH    POCT pCO2, Arterial 57 (H) 38 - 42 mm Hg    POCT pO2, Arterial 90 85 - 95 mm Hg    POCT SO2, Arterial 96 94 - 100 %    POCT Oxy Hemoglobin, Arterial 95.1 94.0 - 98.0 %    POCT Hematocrit Calculated, Arterial 31.0 (L) 36.0 - 46.0 %    POCT Sodium, Arterial 137 136 - 145 mmol/L    POCT Potassium, Arterial 3.9 3.5 - 5.3 mmol/L    POCT Chloride, Arterial 101 98 - 107 mmol/L    POCT Ionized Calcium, Arterial 1.29 1.10 - 1.33 mmol/L    POCT Glucose, Arterial 140 (H) 74 - 99 mg/dL    POCT Lactate, Arterial 0.8 0.4 - 2.0 mmol/L    POCT Base Excess, Arterial 13.6 (H) -2.0 - 3.0 mmol/L    POCT HCO3 Calculated, Arterial 39.6 (H) 22.0 - 26.0 mmol/L    POCT Hemoglobin, Arterial 10.2 (L) 12.0 - 16.0 g/dL    POCT Anion Gap, Arterial 0 (L) 10 - 25 mmo/L    Patient Temperature 37.0 degrees Celsius    FiO2 40 %   Digoxin   Result Value Ref Range    Digoxin  1.06 0.80 - <2.00 ng/mL   Calcium, Ionized   Result Value Ref Range    POCT Calcium, Ionized 1.20 1.1 - 1.33 mmol/L   Heparin Assay, UFH   Result Value Ref Range    Heparin Unfractionated 0.6 See Comment Below for Therapeutic Ranges IU/mL   Magnesium   Result Value Ref Range    Magnesium 1.97 1.60 - 2.40 mg/dL   Renal function panel   Result Value Ref Range    Glucose 140 (H) 74 - 99 mg/dL    Sodium 144 136 - 145 mmol/L    Potassium 4.1 3.5 - 5.3 mmol/L    Chloride 99 98 - 107 mmol/L    Bicarbonate 36 (H) 21 - 32 mmol/L     Anion Gap 13 10 - 20 mmol/L    Urea Nitrogen 15 6 - 23 mg/dL    Creatinine <0.20 (L) 0.50 - 1.05 mg/dL    eGFR      Calcium 8.9 8.6 - 10.6 mg/dL    Phosphorus 3.9 2.5 - 4.9 mg/dL    Albumin 3.0 (L) 3.4 - 5.0 g/dL   CBC   Result Value Ref Range    WBC 10.3 4.4 - 11.3 x10*3/uL    nRBC 0.0 0.0 - 0.0 /100 WBCs    RBC 3.60 (L) 4.00 - 5.20 x10*6/uL    Hemoglobin 10.1 (L) 12.0 - 16.0 g/dL    Hematocrit 33.5 (L) 36.0 - 46.0 %    MCV 93 80 - 100 fL    MCH 28.1 26.0 - 34.0 pg    MCHC 30.1 (L) 32.0 - 36.0 g/dL    RDW 18.7 (H) 11.5 - 14.5 %    Platelets 404 150 - 450 x10*3/uL   POCT GLUCOSE   Result Value Ref Range    POCT Glucose 117 (H) 74 - 99 mg/dL   POCT GLUCOSE   Result Value Ref Range    POCT Glucose 116 (H) 74 - 99 mg/dL          Assessment/Plan   Assessment & Plan  Primary cancer of right upper lobe of lung (Multi)    Chronic obstructive pulmonary disease (Multi)    Malignant neoplasm of upper lobe of right lung (Multi)    Cardiogenic shock (Multi)    Respiratory distress    Faustina Vick (Jannie) is a 64F with Hx of Stage III lung cancer, asthma, anxiety, and arthritis who is now s/p R pneumonectomy with Dr. Wagner 1/13/25. 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 Cardiology was consulted and recommended Lasix gtt and milrinone. Lasix gtt held 1/17, hyponatremia improving. Patient underwent RHC and SGC placement with cardiology 1/17. She was reintubated on 1/18 d/t worsening respiratory status and AMS. Also had increased pressor requirements, now weaned off. Repeat echo stable on 1/18. COVID positive on 1/20.     Failed extubation trials, now s/p trach/PEG with Dr. Wagner 1/30.     - Do not recommend amio given risk of pulmonary complications in the setting of pneumonectomy  - Continue & appreciate supportive ICU care, trailing trach mask today  - Continue Tfs at goal, appreciate nutrition recs, tubing now flowing appropriately  - Pulmonary toilet  - PT/OT for rehabilitation,  patient will likely need LTACH placement, precert sent 2/11  - Completed Abx for UTI, Urology plans TOV in office after DC    Patient seen and discussed with Dr. Wagner.    Wu Martin, DO  PGY-3  Thoracic Surgery [id number]

## 2025-02-12 NOTE — PROGRESS NOTES
"Faustina Vick \"Noam" is a 64 y.o. female on day 30 of admission presenting with Primary cancer of right upper lobe of lung (Multi).    Subjective   No acute events overnight. Patient remains on CPAP 40% 5/5. Improved sleep overnight with increased trazodone dosing. A&Ox3 this am on exam.    Objective   Physical Exam  Vitals reviewed.   Constitutional:       General: She is awake. She is not in acute distress.     Appearance: She is underweight.   HENT:      Head: Normocephalic and atraumatic.      Nose: Nose normal.      Mouth/Throat:      Mouth: Mucous membranes are moist.   Eyes:      Extraocular Movements: Extraocular movements intact.      Pupils: Pupils are equal, round, and reactive to light.   Neck:      Comments: 7.5 cuffed Shiley Trach midline, no drainage. Soft trach tie in place  Cardiovascular:      Rate and Rhythm: Normal rate and regular rhythm.      Pulses: Normal pulses.      Heart sounds: Normal heart sounds.   Pulmonary:      Effort: Pulmonary effort is normal.      Breath sounds: Rhonchi: left side.      Comments: Scattered rhonchi left chest field  CPAP on vent  Chest:      Comments: Right lateral chest incisions ROSHAN without drainage  Abdominal:      Palpations: Abdomen is soft.      Comments: PEG tube in place with TF infusing at 45ml/hr. Insertion site without erythema or drainage   Genitourinary:     Comments: Montiel in place, clear yellow  Musculoskeletal:         General: Normal range of motion.      Cervical back: Normal range of motion and neck supple.      Right lower leg: No edema.      Left lower leg: No edema.   Skin:     General: Skin is warm and dry.      Capillary Refill: Capillary refill takes less than 2 seconds.   Neurological:      General: No focal deficit present.      Mental Status: She is alert and oriented to person, place, and time.      GCS: GCS eye subscore is 4. GCS verbal subscore is 1. GCS motor subscore is 6.      Motor: Weakness: generalized.   Psychiatric:         " "Mood and Affect: Affect is labile.         Behavior: Behavior is cooperative.       Last Recorded Vitals  Blood pressure 135/79, pulse 95, temperature 36 °C (96.8 °F), temperature source Temporal, resp. rate 17, height 1.651 m (5' 5\"), weight 55.4 kg (122 lb 2.2 oz), SpO2 94%.    VS over last 24h  Heart Rate:  []   Temp:  [36 °C (96.8 °F)-36.4 °C (97.5 °F)]   Resp:  [0-26]   BP: (109-138)/(65-84)   SpO2:  [93 %-99 %]      Intake/Output last 3 Shifts:  I/O last 3 completed shifts:  In: 2270 (41 mL/kg) [I.V.:360 (6.5 mL/kg); NG/GT:1910]  Out: 3305 (59.7 mL/kg) [Urine:3305 (1.7 mL/kg/hr)]  Weight: 55.4 kg       Intake/Output Summary (Last 24 hours) at 2/12/2025 1121  Last data filed at 2/12/2025 0900  Gross per 24 hour   Intake 1530 ml   Output 2000 ml   Net -470 ml        Relevant Results  Scheduled medications  budesonide, 0.5 mg, nebulization, BID  clonazePAM, 1 mg, g-tube, q8h  digoxin, 125 mcg, intravenous, Daily  esomeprazole, 40 mg, g-tube, Daily before breakfast  hydrocortisone sodium succinate, 50 mg, intravenous, q12h  insulin lispro, 0-5 Units, subcutaneous, q6h  ipratropium-albuteroL, 3 mL, nebulization, q6h  midodrine, 15 mg, g-tube, q6h  oxygen, , inhalation, Continuous - Inhalation  thiamine, 100 mg, g-tube, Daily  traZODone, 37.5 mg, g-tube, Nightly      PRN medications  PRN medications: acetylcysteine, albuterol, calcium gluconate, calcium gluconate, dextrose, dextrose, glucagon, glucagon, magnesium sulfate, magnesium sulfate, oxyCODONE, oxygen, oxygen, oxygen, potassium chloride        Results from last 7 days   Lab Units 02/12/25  0100 02/11/25  0219 02/10/25  0807   WBC AUTO x10*3/uL 10.3 9.8 8.9   HEMOGLOBIN g/dL 10.1* 9.9* 10.3*   PLATELETS AUTO x10*3/uL 404 395 351      Results from last 7 days   Lab Units 02/12/25  0100 02/11/25  0204 02/10/25  0443   SODIUM mmol/L 144 147* 144   POTASSIUM mmol/L 4.1 3.6 3.5   CHLORIDE mmol/L 99 101 101   CO2 mmol/L 36* 38* 37*   BUN mg/dL 15 14 16 "   CREATININE mg/dL <0.20* <0.20* <0.20*   GLUCOSE mg/dL 140* 111* 137*   MAGNESIUM mg/dL 1.97 2.05 1.94   PHOSPHORUS mg/dL 3.9 3.4 3.1      Results from last 7 days   Lab Units 02/10/25  0443 02/09/25  0334 02/08/25  0103   INR  1.2* 1.3* 1.2*   PROTIME seconds 13.8* 14.2* 13.7*   APTT seconds 75* 64* 50*        Assessment/Plan   Faustina Vick is a 63 y/o with PMHx of COPD and 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 Wagner on 1/13 requiring post-op vasoactive therapy. Post-op course c/b acute hyponatremia, multifactorial delirium, cardiogenic shock requiring inotropic support, and acute hypoxic/hypercarbic respiratory failure requiring intubation. COVID+ 1/20. Weaned off milrinone 1/21. Weaned off iEpo 1/24. S/p trach/PEG 1/30 tolerating TF through PEG. TTE 2/3 with improved EF to 60-65%. Pt developed atrial tachycardia with rates in 150-160s, cardiology recommended metoprolol and digoxin for rate and rhythm control, improved. Now pending LTACH placement.      Plan:  NEURO: History of anxiety on alprazolam 1mg qd. A&Ox4 at baseline. Physical deconditioning, tolerating increased activity with PT/OT. Situational depression/anxiety, insomnia. Also on scheduled clonazepam and nightly trazodone. Acute delirium. Qtc 436 2/7. Improved sleep overnight with increased trazodone dosing. A&Ox3 this am on exam.  - Continue Trazodone nightly -> decrease dose to 37.5mg  - Continue Clonazepam 1mg q8h with hold parameters  - Continue Oxycodone prn for pain control   - Ongoing neuro and pain assessments  - PT/OT -> continue to progress activity  - CAM assessments q shift  - continue pet therapy  - PICS team consult  - delirium precautions, sleep/wake cycle hygiene, REST protocol     CV: No history of cardiac disease. Baseline /79, -110. TTE 9/2024 normal biV function. Intra-op and  immediate post op hypotension requiring pressor support. TTE 1/13 with EF 60-65%, low normal RV systolic function. Acutely elevated lactate 1/15-1/16. Repeat TTE 1/16: acute biV dysfunction/takotsubo cardiomyopathy, EF 34%, mod reduced RV systolic function, mild to mod TR. Cardiogenic shock resolved. Weaned off milrinone 1/21, iEpo 1/24. Difficulty weaning off low dose vaso, started midodrine 1/25. Repeat TTE 2/3 with improved EF to 60-65%. Ongoing intermittent episodes of tachycardia to 150s. Suspected Aflutter/atrial tachycardia, added digoxin 2/3. Started low intensity heparin infusion 2/5. On/off phenyl and vaso for nighttime hypotension. Midodrine increased to 15mg q6h. Restarted stress dose steroids 2/4. No pressor requirement.   - Continuous EKG/abp monitoring  - MAP goal >65  - Continue heparin infusion  - Continue digoxin 125mcg daily -> check dig level daily - 1.06 today  - EP following: tachycardia due to acute illness. Continue dig and low intensity heparin infusion. Ziopatch at discharge. Thoracic Surgery recommending against amiodarone.  - f/u final EP recs today  - Continue Midodrine 15 mg q6h  - Continue hydrocortisone 50mg q12h     PULM: Hx of 30 pack year smoking history (quit 4/2024), COPD, and stage III right lung SCC s/p chemo/XRT/immunotherapy. 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 Arrived to SICU extubated on 10L SFM, weaned to room air 1/14. Chest tube removed 1/14. Acute on chronic hypoxic respiratory failure 1/16 in the setting of COVID. Intubated 1/18. S/p trach 1/30. Sutures cut 2/6. Speech eval 2/7 for PMSV, desaturation and coughing noted, speech to continue to follow. Remains on SBT, tolerating 40% 5/5.  - trial trach collar today with low threshold to place back on CPAP  - Wean FiO2 to maintain SpO2 >92%  - Scheduled budesonide/duoneb  - continue humidified vent  - PRN albuterol  - Daily CXR prn  - ABG prn    GI: No GI  history. Elevated LFT 1/16-1/21 resolved. Evidence of pericholecystic fluid and biliary sludge on renal US 1/15 but no abdominal symptoms. RUQ US without significant findings. S/P PEG 1/30. Tolerating TF at goal rate. Last BM 2/10, soft.  - Continue TFs at goal 45ml/hr  - Continue thiamine 100mg daily  - Continue PPI for GI prophylaxis  - add fiber once daily  - holding bowel reg for soft stool    : No history of renal disease. Baseline creatinine 0.6. Symptomatic acute hyponatremia resolved, was given 3% saline infusions. Yoo replaced 2/1 for urine retention. Trial of silodosin stopped given recurrent hypotension. Failed TOV, straight cathed overnight 2/6. Hypokalemia requiring frequent repletion. Per urology note 2/7, continue yoo at discharge and will f/u outpatient for TOV.  - Maintain yoo catheter per Urology  - RFP daily and prn  - Maintain U/O >0.5ml/kg/hr  - Replete electrolytes to maintain K>4, Phos>2.5, iCal>1.1, Mag>2     HEME: Hx of DOC. Baseline H/H 12/40. Acute blood loss anemia. Acute coagulopathy resolved. H/H stable. Started on low intensity heparin infusion 2/5.  - Check CBC daily and PRN  - coags prn  - Ongoing monitoring for s/s bleeding  - Maintain active T&S (2/10)  - check assays and adjust heparin infusion per nomogram    ENDO: No history of DM or thyroid disease. Adequate glycemic control.  - BG q6h, SSI #1     MSK: Arthritis, Vit D deficiency. Sacral wound.  - Turn patient every 2 hrs, continue wound care  - Monitor for symptoms     ID: Completed treatment of COVID with decadron and remdesivir. Off precautions since 1/30. Pan-susceptible Pseudomonas UTI 2/1, started on cetriaxone 2/2, switched to zosyn 2/3 for 7 day course (completed 2/9). Blood cultures sent 2/4, negative. Currently afebrile, no leukocytosis.  - Temp q4h, wbc daily  - Ongoing monitoring for s/s infection    Access:  - R axillary a line (1/28-) -> consider removal tomorrow  - Left chest mediport (not currently  accessed)  - PIV x3  - Trach (1/30)  - PEG (1/30)  - yoo (2/7)     Dispo: Continue ICU care. Patient seen and discussed with ICU attending Dr. Atwood. DELMAR helping guide family with LTACH placement. Precert submitted on 2/11.    Code Status: Full Code       ANDREA Saucedo-CNP  SICU phone [phone]    Critical Care time: 45 minutes

## 2025-02-12 NOTE — PROGRESS NOTES
Subjective Data:  Patient sleeping at bedside.     Objective Data:  Last Recorded Vitals:  Vitals:    02/12/25 1300 02/12/25 1310 02/12/25 1314 02/12/25 1338   BP: 147/81  127/66 123/65   Pulse: 102  102 102   Resp: 24 19 26 18   Temp:       TempSrc:       SpO2: (!) 88%  94% 95%   Weight:       Height:           Last Labs:  CBC - 2/12/2025:  1:00 AM  10.3 10.1 404    33.5      CMP - 2/12/2025:  1:00 AM  8.9 5.3 30 --- 0.7   3.9 3.0 43 56      PTT - 2/10/2025:  4:43 AM  1.2   13.8 75     TROPHS   Date/Time Value Ref Range Status   01/23/2025 08:51 PM 48 0 - 34 ng/L Final   01/23/2025 03:32 PM 51 0 - 34 ng/L Final   01/16/2025 11:54  0 - 34 ng/L Final     Comment:     Previous result verified on 1/16/2025 1530 on specimen/case [id number] called with component [facility] for procedure Troponin I, High Sensitivity with value 928 ng/L.     BNP   Date/Time Value Ref Range Status   01/16/2025 03:57  0 - 99 pg/mL Final     HGBA1C   Date/Time Value Ref Range Status   02/04/2025 08:12 AM 4.9 See comment % Final      Last I/O:  I/O last 3 completed shifts:  In: 2270 (41 mL/kg) [I.V.:360 (6.5 mL/kg); NG/GT:1910]  Out: 3305 (59.7 mL/kg) [Urine:3305 (1.7 mL/kg/hr)]  Weight: 55.4 kg       Inpatient Medications:  Scheduled medications   Medication Dose Route Frequency    budesonide  0.5 mg nebulization BID    clonazePAM  1 mg g-tube q8h    digoxin  125 mcg intravenous Daily    esomeprazole  40 mg g-tube Daily before breakfast    hydrocortisone sodium succinate  50 mg intravenous q12h    insulin lispro  0-5 Units subcutaneous q6h    ipratropium-albuteroL  3 mL nebulization q6h    midodrine  15 mg g-tube q6h    oxygen   inhalation Continuous - Inhalation    thiamine  100 mg g-tube Daily    traZODone  37.5 mg g-tube Nightly     PRN medications   Medication    acetylcysteine    albuterol    calcium gluconate    calcium gluconate    dextrose    dextrose    glucagon    glucagon    magnesium sulfate    magnesium sulfate     oxyCODONE    oxygen    oxygen    oxygen    potassium chloride     Continuous Medications   Medication Dose Last Rate    heparin  0-4,000 Units/hr 1,000 Units/hr (02/12/25 0127)       Physical Exam:  General: Resting in bed  HEENT: Trach in place, no surrounding drainage or bleeding  Resp: symmetric chest rise,  CV: well perfused, RRR     Assessment/Plan   Faustina Vick (Jannie) is a 64F with Hx of Stage III lung cancer, asthma, anxiety, and arthritis who is now s/p R pneumonectomy with Dr. Wagner 1/13/25. 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 Cardiology was consulted and recommended Lasix gtt and milrinone. Lasix gtt held 1/17, hyponatremia improving. Patient underwent RHC and SGC placement with cardiology 1/17. She was reintubated on 1/18 d/t worsening respiratory status and AMS. Also had increased pressor requirements, now weaned off. Repeat echo stable on 1/18. COVID positive on 1/20.      The patient's recurrent atrial flutter/tachycardia following pneumonectomy presents a therapeutic challenge, given the limitations of available antiarrhythmic options.      Telemetry review showed normal sinus rhythm in the 90s-100s without any episodes of Atach/Flutter in the past 48 hours.     -Remains off pressors, on midodrine 15g QID  -Continue digoxin 125mcg daily   -Can switch heparin gtt to an oral anticoagulation like Eliquis 5 mg BID  -Will need a ziopatch on discharge with EP follow up.    EP will sign off at this time. Please contact us if you have any question or concerns.     Rowan Blackburn MD  Cardiology, PGY-5      Code Status:  Full Code    Discussed with attending, Dr Preston.     Rowan Blackburn MD

## 2025-02-12 NOTE — PROGRESS NOTES
ICU ATTENDING NOTE    Faustina Vick was seen and examined in the ICU. This includes review of the history, physical, ICU flow sheets, laboratory and radiographic data, medication administration record and other current clinical data.  I have seen the patient independently and reviewed the plan with the house staff, advanced practice provider, nursing team, respiratory therapy, primary service, consultants, and patient and/or family members.  This note serves to document my findings and time spent. My key findings, diagnoses and plans for ICU management today include the following:    Faustina Vick is a 64 y.o. female with PMH relevant for COPD & SCC of lung s/p RIGHT pneumonectomy with Dr. Wagner on 1/13    Postoperative course has been significant for stress cardiomyopathy, postoperative respiratory failure, COVID PNA, atrial tachycardia. S/p trach/PEG    Problem List:  Patient Active Problem List   Diagnosis    Primary cancer of right upper lobe of lung (Multi)    Hemorrhage of rectum and anus    History of lung cancer    Hypercalcemia    Iron deficiency anemia secondary to inadequate dietary iron intake    Lung mass    Normocytic anemia    Squamous cell carcinoma of right lung (Multi)    Thickened endometrium    Vitamin D deficiency    Chronic obstructive pulmonary disease (Multi)    Malignant neoplasm of upper lobe of right lung (Multi)    Cardiogenic shock (Multi)    Respiratory distress        ICU Diagnoses requiring active management:  - Acute postoperative respiratory failure  - Hypotension requiring vasopressor support  - Anemia of blood loss  - COVID pneumonia  - Stress cardiomyopathy  - Atrial tachycardia    PLAN:    Neuro: Awake, alert, off sedation. Hx of significant anxiety (home benzos), currently on Trazodone, Clonazepam. MMPC, postop pain well-controlled. PICS team consulted. Delirium precautions.    CV: no baseline CV disease. Post-procedure Takotsubo cardiomyopathy now with improved EF.  Recurrent atrial tachycardia, now on Digoxin, therapeutic on daily level. On midodrine 15 q6, off pressor and HDS. Slow steroid wean. Re-engage CV on further rate/rhythm control    Pulm: Hx of COPD, SCC of lung s/p R pneumonectomy 1/13. Postop course c/b AHRF, prolonged intubation now s/p Trach 1/30. Tolerating PS and minimal FiO2 but remains on vent support; briefly tolerated TC today. Continue nebulizer therapy. PRN CXR    FEN/GI: S/p PEG 1/30. Tolerating TF. Bowel regimen held d/t loose stools. MVI supplementation. PPI    Renal: Scr WNL, UOP appropriate. Hx of urinary retention, Montiel replacement w/ plan to maintain per Urology. Goal K>4, Mg>2    Heme: daily CBC. On heparin gtt for atrial flutter, therapeutic. To d/w Cards regarding long-term DOAC    ID: COVID PNA, now s/p txt w/ Dex and Remdesivir. Off precautions. Completed CTX->Zosyn for PsA UTI. Currently afebrile off antibiosis    Endo: SSI for BG < 180, minimal requirements    MSK/Skin: CTM for SSI/PI; Wound following for Sacral wound    PPX: Therapeutic heparin, SCD, PPI    T/L/D: A line (consider removal ann-marie), PIV, Trach, PEG, Montiel    Dispo: SICU - anticipate LTAC      Saul Atwood MD, MS  Division of Critical Care Medicine  Department of Anesthesiology        This critically ill patient continues to be at risk for clinically significant deterioration / failure due to the above mentioned dysfunctional, unstable organ systems.  I have personally identified and managed all complex critical care issues to prevent aforementioned clinical deterioration.  Critical care time is spent at bedside and/or the immediate area and has included, but is not limited to, the review of diagnostic tests, labs, radiographs, serial assessments of hemodynamics, respiratory status, ventilatory management, review of consult team recommendations, and family updates.  Time spent in procedures and teaching are reported separately. Critical Care Time: 45 minutes.

## 2025-02-12 NOTE — PROGRESS NOTES
"Occupational Therapy    OT Treatment    Patient Name: Faustina Vick \"Noam"  MRN: [id number]  Department: [facility]  Room: 12/12-A  Today's Date: 2/12/2025  Time Calculation  Start Time: 1314  Stop Time: 1338  Time Calculation (min): 24 min        Assessment:  OT Assessment: Pt continues to progress toward functional goals. Pt tolerates sitting EOB this date with MOd A. Pt remains appropriate for MOD intensity OT at dc  Barriers to Discharge Home: Cognition needs, Physical needs, Caregiver assistance  Caregiver Assistance: Caregiver assistance needed per identified barriers - however, level of patient's required assistance exceeds assistance available at home  Cognition Needs: Insight of patient limited regarding functional ability/needs, Medication and/or medical management daily assist needed  Physical Needs: 24hr mobility assistance needed, 24hr ADL assistance needed  Evaluation/Treatment Tolerance: Patient tolerated treatment well  Medical Staff Made Aware: Yes  End of Session Communication: Bedside nurse  End of Session Patient Position: Bed, 3 rail up, Alarm off, caregiver present  Evaluation/Treatment Tolerance: Patient tolerated treatment well  Medical Staff Made Aware: Yes  Plan:  Treatment Interventions: ADL retraining, Functional transfer training, UE strengthening/ROM, Endurance training, Cognitive reorientation, Patient/family training, Equipment evaluation/education, Neuromuscular reeducation, Fine motor coordination activities, Compensatory technique education  OT Frequency: 4 times per week  OT Discharge Recommendations: Moderate intensity level of continued care  Equipment Recommended upon Discharge:  (TBD)  OT Recommended Transfer Status: Assist of 2  OT - OK to Discharge: Yes  Treatment Interventions: ADL retraining, Functional transfer training, UE strengthening/ROM, Endurance training, Cognitive reorientation, Patient/family training, Equipment evaluation/education, Neuromuscular reeducation, " Fine motor coordination activities, Compensatory technique education    Subjective   Previous Visit Info:  OT Last Visit  OT Received On: 02/12/25  General:  General  Family/Caregiver Present: No  Prior to Session Communication: Bedside nurse  Patient Position Received: Bed, 3 rail up, Alarm off, not on at start of session  Preferred Learning Style: auditory, verbal, visual  General Comment: Pt supine in bed upon entry to room. Pt communicates with head nods and mouthing words, offered writing but did not need at this time. Pt on trach to vent CPAP: 40%, Peep 5, PS 5. on Hep gtt. Pt briefly emotional prior to EOB  Precautions:  Medical Precautions: Fall precautions, Oxygen therapy device and L/min  Precautions Comment: MAP>65     Date/Time Vitals Session Patient Position Pulse Resp SpO2 BP MAP (mmHg)    02/12/25 1300 --  --  102  24  88 %  147/81  101     02/12/25 1310 --  --  --  19  --  --  --     02/12/25 1314 Pre OT  --  102  26  94 %  127/66  91     02/12/25 1338 Post OT  --  102  18  95 %  123/65  88           Vital Signs Comment: VSS throughout session     Pain:  Pain Assessment  Pain Assessment: 0-10  0-10 (Numeric) Pain Score: 1  Pain Type: Acute pain  Pain Location: Abdomen  Pain Orientation: Right    Objective    Cognition:  Cognition  Overall Cognitive Status: Impaired  Orientation Level: Oriented X4  Following Commands: Follows one step commands with repetition  Impulsive: Mildly (impulsively begins lying down at EOB)  Processing Speed: Delayed    Bed Mobility/Transfers: Bed Mobility  Bed Mobility: Yes  Bed Mobility 1  Bed Mobility 1: Supine to sitting  Level of Assistance 1: Maximum assistance  Bed Mobility Comments 1: HOB elevated, use of draw sheet  Bed Mobility 2  Bed Mobility  2: Sitting to supine  Level of Assistance 2: Moderate assistance  Bed Mobility Comments 2: Pt initates lying down impulsively, requires Mod A for LEs  Bed Mobility 3  Bed Mobility 3: Rolling right  Level of Assistance 3:  Maximum assistance  Bed Mobility Comments 3: use of draw sheet       Therapy/Activity: Therapeutic Activity  Therapeutic Activity Performed: Yes  Therapeutic Activity 1: Pt tolerates sitting EOB with Mod A with L lateral lean. Tolerates x8 minutes prior to impulsively beginning to lie down.  Therapeutic Activity 2: Coping strategies with using pictures in room to bring negra. Pt becomes emotional EOB, reports missing home. Demos good engagement with utilizing objects around the room, pictures and stuffed animals.    Outcome Measures:[facility] Daily Activity  Putting on and taking off regular lower body clothing: Total  Bathing (including washing, rinsing, drying): A lot  Putting on and taking off regular upper body clothing: A lot  Toileting, which includes using toilet, bedpan or urinal: Total  Taking care of personal grooming such as brushing teeth: A lot  Eating Meals: Total  Daily Activity - Total Score: 9    , Confusion Assessment Method-ICU (CAM-ICU)  Feature 1: Acute Onset or Fluctuating Course: Negative  Feature 2: Inattention: Negative  Feature 4: Disorganized Thinking: Negative  Overall CAM-ICU: Negative  , and Early Mobility/Exercise Safety Screen: Proceed with mobilization - No exclusion criteria met  ICU Mobility Scale: Sitting over edge of bed [3]    Education Documentation  Body Mechanics, taught by Kia Zapien OT at 2/12/2025  2:11 PM.  Learner: Patient  Readiness: Acceptance  Method: Explanation  Response: Needs Reinforcement  Comment: OT POC, safety precautions    Precautions, taught by Kia Zapien OT at 2/12/2025  2:11 PM.  Learner: Patient  Readiness: Acceptance  Method: Explanation  Response: Needs Reinforcement  Comment: OT POC, safety precautions    ADL Training, taught by Kia Zapien OT at 2/12/2025  2:11 PM.  Learner: Patient  Readiness: Acceptance  Method: Explanation  Response: Needs Reinforcement  Comment: OT POC, safety precautions    Education Comments  No comments  found.        OP EDUCATION:       Goals:  Encounter Problems       Encounter Problems (Active)       ADLs       Patient will perform UB and LB bathing with modified independent level of assistance. (Not met)       Start:  01/14/25    Expected End:  01/28/25    Resolved:  01/22/25    Updated to: Patient will perform UB and LB bathing with moderate level of assistance.    Update reason: re eval         Patient with complete upper body dressing with modified independent level of assistance donning and doffing all UE clothes.  (Not met)       Start:  01/14/25    Expected End:  01/28/25    Resolved:  01/22/25    Updated to: Patient with complete upper body dressing with minimal level of assistance donning and doffing all UE clothes.    Update reason: re eval         Patient with complete lower body dressing with modified independent level of assistance donning and doffing all LE clothes  with PRN adaptive equipment  (Not met)       Start:  01/14/25    Expected End:  01/28/25    Resolved:  01/22/25    Updated to: Patient with complete lower body dressing with moderate level of assistance donning and doffing all LE clothes  with PRN adaptive equipment    Update reason: re eval         Patient will complete toileting including hygiene clothing management/hygiene with modified independent level of assistance. (Not met)       Start:  01/14/25    Expected End:  01/28/25    Resolved:  01/22/25    Updated to: Patient will complete grooming with SBA    Update reason: re eval         Patient will perform UB and LB bathing with moderate level of assistance. (Progressing)       Start:  01/22/25    Expected End:  02/20/25                Patient with complete upper body dressing with minimal level of assistance donning and doffing all UE clothes. (Progressing)       Start:  01/22/25    Expected End:  02/20/25                Patient with complete lower body dressing with moderate level of assistance donning and doffing all LE clothes   with PRN adaptive equipment (Progressing)       Start:  01/22/25    Expected End:  02/20/25                Patient will complete grooming with SBA (Progressing)       Start:  01/22/25    Expected End:  02/20/25                   TRANSFERS       Patient will perform bed mobility modified independent level of assistance in order to improve safety and independence with mobility (Not met)       Start:  01/14/25    Expected End:  01/28/25    Resolved:  01/22/25    Updated to: Patient will perform bed mobility moderate level of assistance in order to improve safety and independence with mobility    Update reason: re eval         Patient will complete functional transfer to toilet with least restrictive device with modified independent level of assistance. (Not met)       Start:  01/14/25    Expected End:  01/28/25    Resolved:  01/22/25    Updated to: Patient will complete STS with moderate assistance and LRAD    Update reason: re eval         Patient will perform bed mobility moderate level of assistance in order to improve safety and independence with mobility (Progressing)       Start:  01/22/25    Expected End:  02/20/25                Patient will complete STS with moderate assistance and LRAD (Not Progressing)       Start:  01/22/25    Expected End:  02/20/25 02/12/25 at 2:12 PM - Kia Zapien OT

## 2025-02-12 NOTE — PROGRESS NOTES
The patient has been approved to [facility]. This LSW will contact the  and notify him.     Precert Status: Approved  Certification Number: [id number]  Dates: 2025 - 2025  Faustina Gonzalez MRN [id number]   1960  [employer] # [id number]  [facility]

## 2025-02-12 NOTE — PROGRESS NOTES
LSW reached out to Alexa, the  at [facility] and left a message for a return call. This LSW will try and assist the  with completing a Medicaid application to help with the cost of the long term stay. LSW will wait for Alexa's call to gather additional collateral before proceeding.      Addendum:   LSW uploaded additional clinicals for the accepting facility.

## 2025-02-13 ENCOUNTER — APPOINTMENT (OUTPATIENT)
Dept: CARDIOLOGY | Facility: HOSPITAL | Age: 65
End: 2025-02-13
Payer: COMMERCIAL

## 2025-02-13 VITALS
RESPIRATION RATE: 18 BRPM | WEIGHT: 117.95 LBS | HEART RATE: 106 BPM | TEMPERATURE: 98.1 F | BODY MASS INDEX: 19.65 KG/M2 | SYSTOLIC BLOOD PRESSURE: 137 MMHG | DIASTOLIC BLOOD PRESSURE: 74 MMHG | OXYGEN SATURATION: 98 % | HEIGHT: 65 IN

## 2025-02-13 LAB
ALBUMIN SERPL BCP-MCNC: 3.3 G/DL (ref 3.4–5)
ANION GAP BLDA CALCULATED.4IONS-SCNC: 2 MMO/L (ref 10–25)
ANION GAP SERPL CALC-SCNC: 13 MMOL/L (ref 10–20)
BASE EXCESS BLDA CALC-SCNC: 16.7 MMOL/L (ref -2–3)
BODY SURFACE AREA: 1.57 M2
BODY TEMPERATURE: 37 DEGREES CELSIUS
BUN SERPL-MCNC: 14 MG/DL (ref 6–23)
CA-I BLD-SCNC: 1.12 MMOL/L (ref 1.1–1.33)
CA-I BLDA-SCNC: 1.22 MMOL/L (ref 1.1–1.33)
CALCIUM SERPL-MCNC: 8.9 MG/DL (ref 8.6–10.6)
CHLORIDE BLDA-SCNC: 101 MMOL/L (ref 98–107)
CHLORIDE SERPL-SCNC: 98 MMOL/L (ref 98–107)
CO2 SERPL-SCNC: 39 MMOL/L (ref 21–32)
CREAT SERPL-MCNC: <0.2 MG/DL (ref 0.5–1.05)
DIGOXIN SERPL-MCNC: 0.81 NG/ML (ref 0.8–?)
EGFRCR SERPLBLD CKD-EPI 2021: ABNORMAL ML/MIN/{1.73_M2}
ERYTHROCYTE [DISTWIDTH] IN BLOOD BY AUTOMATED COUNT: 18.8 % (ref 11.5–14.5)
GLUCOSE BLD MANUAL STRIP-MCNC: 125 MG/DL (ref 74–99)
GLUCOSE BLD MANUAL STRIP-MCNC: 96 MG/DL (ref 74–99)
GLUCOSE BLDA-MCNC: 148 MG/DL (ref 74–99)
GLUCOSE SERPL-MCNC: 142 MG/DL (ref 74–99)
HCO3 BLDA-SCNC: 43.5 MMOL/L (ref 22–26)
HCT VFR BLD AUTO: 33.4 % (ref 36–46)
HCT VFR BLD EST: 32 % (ref 36–46)
HGB BLD-MCNC: 10.2 G/DL (ref 12–16)
HGB BLDA-MCNC: 10.7 G/DL (ref 12–16)
INHALED O2 CONCENTRATION: 40 %
LACTATE BLDA-SCNC: 1.1 MMOL/L (ref 0.4–2)
MAGNESIUM SERPL-MCNC: 2.17 MG/DL (ref 1.6–2.4)
MCH RBC QN AUTO: 28 PG (ref 26–34)
MCHC RBC AUTO-ENTMCNC: 30.5 G/DL (ref 32–36)
MCV RBC AUTO: 92 FL (ref 80–100)
NRBC BLD-RTO: 0 /100 WBCS (ref 0–0)
OXYHGB MFR BLDA: 95.3 % (ref 94–98)
PCO2 BLDA: 64 MM HG (ref 38–42)
PH BLDA: 7.44 PH (ref 7.38–7.42)
PHOSPHATE SERPL-MCNC: 3.5 MG/DL (ref 2.5–4.9)
PLATELET # BLD AUTO: 396 X10*3/UL (ref 150–450)
PO2 BLDA: 84 MM HG (ref 85–95)
POTASSIUM BLDA-SCNC: 3.5 MMOL/L (ref 3.5–5.3)
POTASSIUM SERPL-SCNC: 3.6 MMOL/L (ref 3.5–5.3)
RBC # BLD AUTO: 3.64 X10*6/UL (ref 4–5.2)
SAO2 % BLDA: 98 % (ref 94–100)
SODIUM BLDA-SCNC: 143 MMOL/L (ref 136–145)
SODIUM SERPL-SCNC: 146 MMOL/L (ref 136–145)
UFH PPP CHRO-ACNC: 0.6 IU/ML
WBC # BLD AUTO: 11.6 X10*3/UL (ref 4.4–11.3)

## 2025-02-13 PROCEDURE — 2500000001 HC RX 250 WO HCPCS SELF ADMINISTERED DRUGS (ALT 637 FOR MEDICARE OP)

## 2025-02-13 PROCEDURE — 2500000001 HC RX 250 WO HCPCS SELF ADMINISTERED DRUGS (ALT 637 FOR MEDICARE OP): Performed by: STUDENT IN AN ORGANIZED HEALTH CARE EDUCATION/TRAINING PROGRAM

## 2025-02-13 PROCEDURE — 93246 EXT ECG>7D<15D RECORDING: CPT

## 2025-02-13 PROCEDURE — 85520 HEPARIN ASSAY: CPT

## 2025-02-13 PROCEDURE — 37799 UNLISTED PX VASCULAR SURGERY: CPT

## 2025-02-13 PROCEDURE — 99291 CRITICAL CARE FIRST HOUR: CPT | Performed by: ANESTHESIOLOGY

## 2025-02-13 PROCEDURE — 2500000002 HC RX 250 W HCPCS SELF ADMINISTERED DRUGS (ALT 637 FOR MEDICARE OP, ALT 636 FOR OP/ED)

## 2025-02-13 PROCEDURE — 99291 CRITICAL CARE FIRST HOUR: CPT

## 2025-02-13 PROCEDURE — 84132 ASSAY OF SERUM POTASSIUM: CPT

## 2025-02-13 PROCEDURE — 37799 UNLISTED PX VASCULAR SURGERY: CPT | Performed by: STUDENT IN AN ORGANIZED HEALTH CARE EDUCATION/TRAINING PROGRAM

## 2025-02-13 PROCEDURE — 80162 ASSAY OF DIGOXIN TOTAL: CPT | Performed by: NURSE PRACTITIONER

## 2025-02-13 PROCEDURE — 99239 HOSP IP/OBS DSCHRG MGMT >30: CPT | Performed by: NURSE PRACTITIONER

## 2025-02-13 PROCEDURE — 94003 VENT MGMT INPAT SUBQ DAY: CPT

## 2025-02-13 PROCEDURE — 82947 ASSAY GLUCOSE BLOOD QUANT: CPT

## 2025-02-13 PROCEDURE — 85027 COMPLETE CBC AUTOMATED: CPT | Performed by: STUDENT IN AN ORGANIZED HEALTH CARE EDUCATION/TRAINING PROGRAM

## 2025-02-13 PROCEDURE — 2500000004 HC RX 250 GENERAL PHARMACY W/ HCPCS (ALT 636 FOR OP/ED)

## 2025-02-13 PROCEDURE — 2500000001 HC RX 250 WO HCPCS SELF ADMINISTERED DRUGS (ALT 637 FOR MEDICARE OP): Performed by: PHYSICIAN ASSISTANT

## 2025-02-13 PROCEDURE — 94640 AIRWAY INHALATION TREATMENT: CPT

## 2025-02-13 RX ORDER — POTASSIUM CHLORIDE 1.5 G/1.58G
40 POWDER, FOR SOLUTION ORAL ONCE
Status: COMPLETED | OUTPATIENT
Start: 2025-02-13 | End: 2025-02-13

## 2025-02-13 RX ORDER — DIGOXIN 0.25 MG/ML
125 INJECTION INTRAMUSCULAR; INTRAVENOUS DAILY
Start: 2025-02-14 | End: 2025-02-21

## 2025-02-13 RX ORDER — MIDODRINE HYDROCHLORIDE 5 MG/1
15 TABLET ORAL EVERY 6 HOURS
Start: 2025-02-13 | End: 2025-02-21

## 2025-02-13 RX ORDER — BUDESONIDE 0.5 MG/2ML
0.5 INHALANT ORAL
Start: 2025-02-13 | End: 2025-02-21

## 2025-02-13 RX ORDER — LANOLIN ALCOHOL/MO/W.PET/CERES
100 CREAM (GRAM) TOPICAL DAILY
Start: 2025-02-14 | End: 2025-02-21

## 2025-02-13 RX ORDER — ESOMEPRAZOLE MAGNESIUM 40 MG/1
40 GRANULE, DELAYED RELEASE ORAL DAILY
Start: 2025-02-13 | End: 2025-02-21

## 2025-02-13 RX ORDER — TRAZODONE HYDROCHLORIDE 50 MG/1
50 TABLET ORAL NIGHTLY
Start: 2025-02-13 | End: 2025-02-21

## 2025-02-13 RX ORDER — TRAZODONE HYDROCHLORIDE 50 MG/1
50 TABLET ORAL NIGHTLY
Status: DISCONTINUED | OUTPATIENT
Start: 2025-02-13 | End: 2025-02-13 | Stop reason: HOSPADM

## 2025-02-13 RX ORDER — ALBUTEROL SULFATE 0.83 MG/ML
2.5 SOLUTION RESPIRATORY (INHALATION) EVERY 4 HOURS PRN
Qty: 75 ML | Refills: 11 | Status: SHIPPED | OUTPATIENT
Start: 2025-02-13 | End: 2025-02-21

## 2025-02-13 RX ORDER — ACETAZOLAMIDE 500 MG/5ML
500 INJECTION, POWDER, LYOPHILIZED, FOR SOLUTION INTRAVENOUS ONCE
Status: COMPLETED | OUTPATIENT
Start: 2025-02-13 | End: 2025-02-13

## 2025-02-13 RX ORDER — CLONAZEPAM 1 MG/1
1 TABLET ORAL EVERY 8 HOURS
Start: 2025-02-13 | End: 2025-02-21

## 2025-02-13 RX ORDER — IPRATROPIUM BROMIDE AND ALBUTEROL SULFATE 2.5; .5 MG/3ML; MG/3ML
3 SOLUTION RESPIRATORY (INHALATION)
Start: 2025-02-13 | End: 2025-02-21

## 2025-02-13 RX ADMIN — APIXABAN 5 MG: 5 TABLET, FILM COATED ORAL at 10:44

## 2025-02-13 RX ADMIN — HYDROCORTISONE SODIUM SUCCINATE 50 MG: 100 INJECTION, POWDER, FOR SOLUTION INTRAMUSCULAR; INTRAVENOUS at 05:37

## 2025-02-13 RX ADMIN — IPRATROPIUM BROMIDE AND ALBUTEROL SULFATE 3 ML: .5; 3 SOLUTION RESPIRATORY (INHALATION) at 01:36

## 2025-02-13 RX ADMIN — POTASSIUM CHLORIDE 40 MEQ: 1.5 POWDER, FOR SOLUTION ORAL at 04:42

## 2025-02-13 RX ADMIN — ESOMEPRAZOLE MAGNESIUM 40 MG: 40 FOR SUSPENSION ORAL at 06:11

## 2025-02-13 RX ADMIN — CLONAZEPAM 1 MG: 1 TABLET ORAL at 09:24

## 2025-02-13 RX ADMIN — BUDESONIDE 0.5 MG: 0.5 INHALANT RESPIRATORY (INHALATION) at 08:53

## 2025-02-13 RX ADMIN — MIDODRINE HYDROCHLORIDE 15 MG: 10 TABLET ORAL at 05:38

## 2025-02-13 RX ADMIN — DIGOXIN 125 MCG: 0.25 INJECTION INTRAMUSCULAR; INTRAVENOUS at 09:24

## 2025-02-13 RX ADMIN — ACETAZOLAMIDE 500 MG: 500 INJECTION, POWDER, LYOPHILIZED, FOR SOLUTION INTRAVENOUS at 10:43

## 2025-02-13 RX ADMIN — THIAMINE HCL TAB 100 MG 100 MG: 100 TAB at 09:24

## 2025-02-13 RX ADMIN — IPRATROPIUM BROMIDE AND ALBUTEROL SULFATE 3 ML: .5; 3 SOLUTION RESPIRATORY (INHALATION) at 08:53

## 2025-02-13 ASSESSMENT — PAIN SCALES - GENERAL
PAINLEVEL_OUTOF10: 0 - NO PAIN

## 2025-02-13 ASSESSMENT — PAIN - FUNCTIONAL ASSESSMENT
PAIN_FUNCTIONAL_ASSESSMENT: 0-10

## 2025-02-13 NOTE — PROGRESS NOTES
ICU ATTENDING NOTE    Faustina Vick was seen and examined in the ICU. This includes review of the history, physical, ICU flow sheets, laboratory and radiographic data, medication administration record and other current clinical data.  I have seen the patient independently and reviewed the plan with the house staff, advanced practice provider, nursing team, respiratory therapy, primary service, consultants, and patient and/or family members.  This note serves to document my findings and time spent. My key findings, diagnoses and plans for ICU management today include the following:    Faustina Vick is a 64 y.o. female with PMH relevant for COPD & SCC of lung s/p RIGHT pneumonectomy with Dr. Wagner on 1/13    Postoperative course has been significant for stress cardiomyopathy, postoperative respiratory failure, COVID PNA, atrial tachycardia. S/p trach/PEG    24H Events:  - Brief TCT yesterday, poorly tolerated  - Otherwise NAEON    Problem List:  Patient Active Problem List   Diagnosis    Primary cancer of right upper lobe of lung (Multi)    Hemorrhage of rectum and anus    History of lung cancer    Hypercalcemia    Iron deficiency anemia secondary to inadequate dietary iron intake    Lung mass    Normocytic anemia    Squamous cell carcinoma of right lung (Multi)    Thickened endometrium    Vitamin D deficiency    Chronic obstructive pulmonary disease (Multi)    Malignant neoplasm of upper lobe of right lung (Multi)    Cardiogenic shock (Multi)    Respiratory distress        ICU Diagnoses requiring active management:  - Acute postoperative respiratory failure  - Hypotension requiring vasopressor support  - Anemia of blood loss  - COVID pneumonia  - Stress cardiomyopathy  - Atrial tachycardia    PLAN:    Neuro: Awake, alert, off sedation. Hx of significant anxiety (home benzos), currently on Trazodone, Clonazepam. Inc Traz back to 50 given poor sleep with dose reduction. Pain well-controlled. PICS team consulted.  Continue delirium precautions.    CV: no baseline CV disease. Post-procedure Takotsubo cardiomyopathy now with improved EF. Recurrent atrial tachycardia, now on Digoxin, therapeutic on daily level. On midodrine 15 q6, off pressor and HDS. Slow steroid wean to every day today, empiric. Zio patch on discharge per CV    Pulm: Hx of COPD, SCC of lung s/p R pneumonectomy 1/13. Postop course c/b AHRF, prolonged intubation now s/p Trach 1/30. Tolerating PS and minimal FiO2 but fails TC, continue PS @ 40/5/5    FEN/GI: S/p PEG 1/30. Tolerating TF. Bowel regimen held d/t loose stools, increasing fiber. MVI supplementation. PPI forSUP    Renal: Scr WNL, UOP appropriate without forced diuresis. Acetazolamide today. Hx of urinary retention, Montiel replacement w/ plan to maintain per Urology. Goal K>4, Mg>2.    Heme: daily CBC. On heparin gtt for atrial flutter, therapeutic. Eliquis today and heparin off    ID: COVID PNA, now s/p txt w/ Dex and Remdesivir. Off precautions. Completed CTX->Zosyn for PsA UTI. Currently afebrile off antibiosis    Endo: SSI for BG < 180, minimal requirements    MSK/Skin: CTM for SSI/PI; Wound following for Sacral wound    PPX: Eliquis, SCD, PPI    T/L/D: A line (remove), PIV, Trach, PEG, Montiel    Dispo: LTAC today      Saul Atwood MD, MS  Division of Critical Care Medicine  Department of Anesthesiology        This critically ill patient continues to be at risk for clinically significant deterioration / failure due to the above mentioned dysfunctional, unstable organ systems.  I have personally identified and managed all complex critical care issues to prevent aforementioned clinical deterioration.  Critical care time is spent at bedside and/or the immediate area and has included, but is not limited to, the review of diagnostic tests, labs, radiographs, serial assessments of hemodynamics, respiratory status, ventilatory management, review of consult team recommendations, and family updates.  Time spent  in procedures and teaching are reported separately. Critical Care Time: 40 minutes.

## 2025-02-13 NOTE — PROGRESS NOTES
"Faustina Vick \"Noam" is a 64 y.o. female on day 31 of admission presenting with Primary cancer of right upper lobe of lung (Multi).    Subjective   Patient was restless overnight, otherwise no acute events. Patient remains on CPAP 40% 5/5. A&Ox3 this am on exam.    Objective   Physical Exam  Vitals reviewed.   Constitutional:       General: She is awake. She is not in acute distress.     Appearance: She is underweight.   HENT:      Head: Normocephalic and atraumatic.      Nose: Nose normal.      Mouth/Throat:      Mouth: Mucous membranes are moist.   Eyes:      Extraocular Movements: Extraocular movements intact.      Pupils: Pupils are equal, round, and reactive to light.   Neck:      Comments: 7.5 cuffed Shiley Trach midline, no drainage. Soft trach tie in place  Cardiovascular:      Rate and Rhythm: Normal rate and regular rhythm.      Pulses: Normal pulses.      Heart sounds: Normal heart sounds.   Pulmonary:      Effort: Pulmonary effort is normal.      Breath sounds: Rhonchi: left side.      Comments: Scattered rhonchi left chest field  CPAP on vent  Chest:      Comments: Right lateral chest incisions ROSHAN without drainage  Abdominal:      Palpations: Abdomen is soft.      Comments: PEG tube in place with TF infusing at 45ml/hr. Insertion site without erythema or drainage   Genitourinary:     Comments: Montiel in place, clear yellow  Musculoskeletal:         General: Normal range of motion.      Cervical back: Normal range of motion and neck supple.      Right lower leg: No edema.      Left lower leg: No edema.   Skin:     General: Skin is warm and dry.      Capillary Refill: Capillary refill takes less than 2 seconds.   Neurological:      General: No focal deficit present.      Mental Status: She is alert and oriented to person, place, and time.      GCS: GCS eye subscore is 4. GCS verbal subscore is 1. GCS motor subscore is 6.      Motor: Weakness: generalized.   Psychiatric:         Mood and Affect: Affect " "is labile.         Behavior: Behavior is cooperative.       Last Recorded Vitals  Blood pressure 140/77, pulse 104, temperature 36.5 °C (97.7 °F), temperature source Temporal, resp. rate 19, height 1.651 m (5' 5\"), weight 53.5 kg (117 lb 15.1 oz), SpO2 97%.    VS over last 24h  Heart Rate:  []   Temp:  [36.5 °C (97.7 °F)-36.6 °C (97.9 °F)]   Resp:  [17-27]   BP: (114-154)/(65-95)   Weight:  [53.5 kg (117 lb 15.1 oz)]   SpO2:  [88 %-100 %]      Intake/Output last 3 Shifts:  I/O last 3 completed shifts:  In: 2440 (45.6 mL/kg) [I.V.:340 (6.4 mL/kg); NG/GT:2100]  Out: 3990 (74.6 mL/kg) [Urine:3990 (2.1 mL/kg/hr)]  Weight: 53.5 kg       Intake/Output Summary (Last 24 hours) at 2/13/2025 0914  Last data filed at 2/13/2025 0600  Gross per 24 hour   Intake 1550 ml   Output 2570 ml   Net -1020 ml        Relevant Results  Scheduled medications  acetazolamide, 500 mg, intravenous, Once  budesonide, 0.5 mg, nebulization, BID  clonazePAM, 1 mg, g-tube, q8h  digoxin, 125 mcg, intravenous, Daily  esomeprazole, 40 mg, g-tube, Daily before breakfast  hydrocortisone sodium succinate, 50 mg, intravenous, q12h  insulin lispro, 0-5 Units, subcutaneous, q6h  ipratropium-albuteroL, 3 mL, nebulization, q6h  midodrine, 15 mg, g-tube, q6h  oxygen, , inhalation, Continuous - Inhalation  thiamine, 100 mg, g-tube, Daily  traZODone, 50 mg, g-tube, Nightly      PRN medications  PRN medications: acetylcysteine, albuterol, calcium gluconate, calcium gluconate, dextrose, dextrose, glucagon, glucagon, magnesium sulfate, magnesium sulfate, oxyCODONE, oxygen, oxygen, oxygen, potassium chloride        Results from last 7 days   Lab Units 02/13/25  0021 02/12/25  0100 02/11/25  0219   WBC AUTO x10*3/uL 11.6* 10.3 9.8   HEMOGLOBIN g/dL 10.2* 10.1* 9.9*   PLATELETS AUTO x10*3/uL 396 404 395      Results from last 7 days   Lab Units 02/12/25  2347 02/12/25  0100 02/11/25  0204   SODIUM mmol/L 146* 144 147*   POTASSIUM mmol/L 3.6 4.1 3.6   CHLORIDE " mmol/L 98 99 101   CO2 mmol/L 39* 36* 38*   BUN mg/dL 14 15 14   CREATININE mg/dL <0.20* <0.20* <0.20*   GLUCOSE mg/dL 142* 140* 111*   MAGNESIUM mg/dL 2.17 1.97 2.05   PHOSPHORUS mg/dL 3.5 3.9 3.4      Results from last 7 days   Lab Units 02/10/25  0443 02/09/25  0334 02/08/25  0103   INR  1.2* 1.3* 1.2*   PROTIME seconds 13.8* 14.2* 13.7*   APTT seconds 75* 64* 50*        Assessment/Plan   Faustina Vick is a 65 y/o with PMHx of COPD and 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 Wagner on 1/13 requiring post-op vasoactive therapy. Post-op course c/b acute hyponatremia, multifactorial delirium, cardiogenic shock requiring inotropic support, and acute hypoxic/hypercarbic respiratory failure requiring intubation. COVID+ 1/20. Weaned off milrinone 1/21. Weaned off iEpo 1/24. S/p trach/PEG 1/30 tolerating TF through PEG. TTE 2/3 with improved EF to 60-65%. Pt developed atrial tachycardia with rates in 150-160s, cardiology recommended metoprolol and digoxin for rate and rhythm control, improved. Now pending LTACH placement.      Plan:  NEURO: History of anxiety on alprazolam 1mg qd. A&Ox4 at baseline. Physical deconditioning, tolerating increased activity with PT/OT. Situational depression/anxiety, insomnia. Also on scheduled clonazepam and nightly trazodone. Acute delirium. Qtc 436 2/7. Pain well controlled, has not required PRN oxycodone since 2/7. Restless overnight per nursing. A&Ox3 this am on exam.  - Increase nightly trazodone back to 50mg  - Continue Clonazepam 1mg q8h with hold parameters  - Ongoing neuro and pain assessments  - PT/OT -> continue to progress activity  - CAM assessments q shift  - continue pet therapy  - PICS team consult  - delirium precautions, sleep/wake cycle hygiene, REST protocol     CV: No history of cardiac disease. Baseline /79, -110. TTE  9/2024 normal biV function. Intra-op and immediate post op hypotension requiring pressor support. TTE 1/13 with EF 60-65%, low normal RV systolic function. Acutely elevated lactate 1/15-1/16. Repeat TTE 1/16: acute biV dysfunction/takotsubo cardiomyopathy, EF 34%, mod reduced RV systolic function, mild to mod TR. Cardiogenic shock resolved. Weaned off milrinone 1/21, iEpo 1/24. Difficulty weaning off low dose vaso, started midodrine 1/25. Repeat TTE 2/3 with improved EF to 60-65%. Ongoing intermittent episodes of tachycardia to 150s. Suspected Aflutter/atrial tachycardia, added digoxin 2/3. Started low intensity heparin infusion 2/5. On/off phenyl and vaso for nighttime hypotension. Midodrine increased to 15mg q6h. Restarted stress dose steroids 2/4. No pressor requirement.   - Continuous EKG/abp monitoring  - MAP goal >65  - Start Eliquis 5mg BID this am and discontinue heparin infusion  - Continue digoxin 125mcg daily -> check dig level daily - 0.81 today  - EP following: tachycardia due to acute illness. Continue dig and transition heparin infusion to Eliquis 5mg BID when appropriate. Ziopatch at discharge and f/u outpatient. Thoracic Surgery recommending against amiodarone.  - Contact Holter monitor team this am for Ziopatch  - Continue Midodrine 15 mg q6h  - wean stress dose steroids to hydrocortisone 50mg IV q24h     PULM: Hx of 30 pack year smoking history (quit 4/2024), COPD, and stage III right lung SCC s/p chemo/XRT/immunotherapy. 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 Arrived to SICU extubated on 10L SFM, weaned to room air 1/14. Chest tube removed 1/14. Acute on chronic hypoxic respiratory failure 1/16 in the setting of COVID. Intubated 1/18. S/p trach 1/30. Sutures cut 2/6. Speech eval 2/7 for PMSV, desaturation and coughing noted, speech to continue to follow. Brief trach collar trial for ~45 min on 2/12 with subsequent desaturation to 87%.  Remains on SBT, tolerating 40% 5/5. Hypercarbia and uptrending bicarb to 39.  - Wean FiO2 to maintain SpO2 >92%  - Diamox 500mg IV x1 today  - Scheduled budesonide/duoneb  - continue humidified vent  - PRN albuterol  - Daily CXR prn  - ABG prn    GI: No GI history. Elevated LFT 1/16-1/21 resolved. Evidence of pericholecystic fluid and biliary sludge on renal US 1/15 but no abdominal symptoms. RUQ US without significant findings. S/P PEG 1/30. Tolerating TF at goal rate. BM x4 over past 24 hrs.   - Continue TFs at goal 45ml/hr  - Continue thiamine 100mg daily  - Continue PPI for GI prophylaxis  - increase fiber to TID  - holding bowel reg for soft stool    : No history of renal disease. Baseline creatinine 0.6. Symptomatic acute hyponatremia resolved, was given 3% saline infusions. Yoo replaced 2/1 for urine retention. Trial of silodosin stopped given recurrent hypotension. Failed TOV, straight cathed overnight 2/6. Hypokalemia requiring frequent repletion. Per urology note 2/7, continue yoo at discharge and will f/u outpatient for TOV.  - Maintain yoo catheter per Urology -> f/u outpatient  - RFP daily and prn  - Maintain U/O >0.5ml/kg/hr  - Replete electrolytes to maintain K>4, Phos>2.5, iCal>1.1, Mag>2     HEME: Hx of DOC. Baseline H/H 12/40. Acute blood loss anemia. Acute coagulopathy resolved. H/H stable. Started on low intensity heparin infusion 2/5.  - Check CBC daily and PRN  - coags prn  - Transition heparin infusion to Eliquis today  - Ongoing monitoring for s/s bleeding    ENDO: No history of DM or thyroid disease. Adequate glycemic control.  - BG q6h, SSI #1     MSK: Arthritis, Vit D deficiency. Sacral wound.  - Turn patient every 2 hrs, continue wound care  - Monitor for symptoms     ID: Completed treatment of COVID with decadron and remdesivir. Off precautions since 1/30. Pan-susceptible Pseudomonas UTI 2/1, started on cetriaxone 2/2, switched to zosyn 2/3 for 7 day course (completed 2/9). Blood  cultures sent 2/4, negative. Currently afebrile, no leukocytosis. New leukocytosis 2/13 with WBC slowly uptrending to 11.6 but stable. No active s/s of infection.  - Temp q4h, wbc daily  - Ongoing monitoring for s/s infection    Access:  - R axillary a line (1/28-) -> remove today  - Left chest mediport (not currently accessed)  - PIV x3  - Trach (1/30)  - PEG (1/30)  - yoo (2/7)     Dispo: Continue ICU care. Patient accepted to [facility] in [place]. Transfer to LTAC set up by  for 12pm today. Patient seen and discussed with ICU attending Dr. Atwood.    Code Status: Full Code       ANDREA Saucedo-CNP  SICU phone [phone]    Critical Care time: 45 minutes

## 2025-02-13 NOTE — CARE PLAN
Problem: Skin  Goal: Decreased wound size/increased tissue granulation at next dressing change  Outcome: Progressing  Flowsheets (Taken 2/11/2025 1100 by Huong Catherine, RN)  Decreased wound size/increased tissue granulation at next dressing change:   Protective dressings over bony prominences   Promote sleep for wound healing  Goal: Participates in plan/prevention/treatment measures  Outcome: Progressing  Flowsheets (Taken 2/11/2025 1100 by Huong Catherine, RN)  Participates in plan/prevention/treatment measures: Elevate heels  Goal: Prevent/manage excess moisture  Outcome: Progressing  Flowsheets (Taken 2/11/2025 1100 by Huong Catherine, RN)  Prevent/manage excess moisture:   Cleanse incontinence/protect with barrier cream   Follow provider orders for dressing changes  Goal: Prevent/minimize sheer/friction injuries  Outcome: Progressing  Flowsheets (Taken 2/11/2025 1100 by Huong Catherine RN)  Prevent/minimize sheer/friction injuries:   Complete micro-shifts as needed if patient unable. Adjust patient position to relieve pressure points, not a full turn   Turn/reposition every 2 hours/use positioning/transfer devices  Goal: Promote/optimize nutrition  Outcome: Progressing  Flowsheets (Taken 2/11/2025 1100 by Huong Catherine, RN)  Promote/optimize nutrition: Monitor/record intake including meals  Goal: Promote skin healing  Outcome: Progressing  Flowsheets (Taken 2/11/2025 1100 by Huong Catherine, RN)  Promote skin healing:   Assess skin/pad under line(s)/device(s)   Rotate device position/do not position patient on device   Turn/reposition every 2 hours/use positioning/transfer devices   Protective dressings over bony prominences

## 2025-02-13 NOTE — HOSPITAL COURSE
Faustina Vick (Jannie) is a 64F with PMHx of COPD and stage III right lung SCC s/p chemo/radiation asthma, anxiety, and arthritis who is now s/p R pneumonectomy with Dr. Wagner 1/13/25 requiring post-op vasoactive therapy. Post-op course c/b acute hyponatremia, multifactorial delirium, cardiogenic shock requiring inotropic support, and acute hypoxic/hypercarbic respiratory failure requiring intubation. . 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 Cardiology was consulted and recommended Lasix gtt and milrinone. Lasix gtt held 1/17, hyponatremia improving. Patient underwent RHC and SGC placement with cardiology 1/17. She was reintubated on 1/18 d/t worsening respiratory status and AMS. Also had increased pressor requirements, now weaned off. Repeat echo stable on 1/18. COVID positive on 1/20.      Failed extubation trials, now s/p trach/PEG with Dr. Wagner 1/30.     Tolerating TF through PEG. TTE 2/3 with improved EF to 60-65%. Pt developed atrial tachycardia with rates in 150-160s, cardiology recommended metoprolol and digoxin for rate and rhythm control, improved.

## 2025-02-13 NOTE — DISCHARGE SUMMARY
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 Wagner 1/13/25 requiring post-op vasoactive therapy. Post-op course c/b acute hyponatremia, multifactorial delirium, cardiogenic shock requiring inotropic support, and acute hypoxic/hypercarbic respiratory failure requiring intubation. . 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 Cardiology was consulted and recommended Lasix gtt and milrinone. Lasix gtt held 1/17, hyponatremia improving. Patient underwent RHC and SGC placement with cardiology 1/17. She was reintubated on 1/18 d/t worsening respiratory status and AMS. Also had increased pressor requirements, now weaned off. Repeat echo stable on 1/18. COVID positive on 1/20.      Failed extubation trials, now s/p trach/PEG with Dr. Wagner 1/30.     Tolerating TF through PEG. TTE 2/3 with improved EF to 60-65%. Pt developed atrial tachycardia with rates in 150-160s, cardiology recommended metoprolol and digoxin for rate and rhythm control, improved.   Heparin gtt transitioned to suggested dosing of eliquis on 213/2025.  Ziopatch requested per cardiology recs. Transferred to LTAC 2/13/2025 for vent weaning, strengthening exercises.     At the time of discharge, her pain was controlled on an enteral pain regimen via the feeding tube.   She was trached and breathing mostly comfortably on prescribed vent settings.  She was tolerating tube feeds via the feeding tube without nausea. She  was moving bowels regularly.  Montiel maintained for retention.     Patient accepted to [facility] in [place]. Outpatient follow up with Dr. Wagner has been arranged for two weeks with a CXR just prior to this visit.     Final ICU plan from 2/13/2025:  NEURO: History of anxiety on alprazolam 1mg qd. A&Ox4 at baseline. Physical deconditioning, tolerating increased activity with PT/OT. Situational depression/anxiety, insomnia. Also on scheduled clonazepam and nightly trazodone. Acute delirium. Qtc 436 2/7. Pain well controlled, has not required PRN oxycodone since 2/7. Restless overnight per nursing. A&Ox3 this am on exam.  - Increase nightly trazodone back to 50mg  - Continue Clonazepam 1mg q8h with hold parameters  - Ongoing neuro and pain assessments  - PT/OT -> continue to progress activity  - CAM assessments q shift  - continue pet therapy  - PICS team consult  - delirium precautions, sleep/wake cycle hygiene, REST protocol     CV: No history of cardiac disease. Baseline /79, -110. TTE 9/2024 normal biV function. Intra-op and immediate post op hypotension requiring pressor support. TTE 1/13 with EF 60-65%, low normal RV systolic function. Acutely elevated lactate 1/15-1/16. Repeat TTE 1/16: acute biV dysfunction/takotsubo cardiomyopathy, EF 34%, mod reduced RV systolic function, mild to mod TR. Cardiogenic shock resolved. Weaned off milrinone 1/21, iEpo 1/24. Difficulty weaning off low dose vaso, started midodrine 1/25. Repeat TTE 2/3 with improved EF to 60-65%. Ongoing intermittent episodes of tachycardia to 150s. Suspected Aflutter/atrial tachycardia, added digoxin 2/3. Started low intensity heparin infusion 2/5. On/off phenyl and vaso for nighttime hypotension. Midodrine increased to 15mg q6h. Restarted stress dose steroids 2/4. No pressor requirement.   - Continuous EKG/abp monitoring  - MAP goal >65  - Start Eliquis 5mg BID this am and discontinue heparin infusion  - Continue digoxin 125mcg  daily -> check dig level daily - 0.81 today  - EP following: tachycardia due to acute illness. Continue dig and transition heparin infusion to Eliquis 5mg BID when appropriate. Ziopatch at discharge and f/u outpatient. Thoracic Surgery recommending against amiodarone.  - Contact Holter monitor team this am for Ziopatch  - Continue Midodrine 15 mg q6h  - wean stress dose steroids to hydrocortisone 50mg IV q24h     PULM: Hx of 30 pack year smoking history (quit 4/2024), COPD, and stage III right lung SCC s/p chemo/XRT/immunotherapy. 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 Arrived to SICU extubated on 10L SFM, weaned to room air 1/14. Chest tube removed 1/14. Acute on chronic hypoxic respiratory failure 1/16 in the setting of COVID. Intubated 1/18. S/p trach 1/30. Sutures cut 2/6. Speech eval 2/7 for PMSV, desaturation and coughing noted, speech to continue to follow. Brief trach collar trial for ~45 min on 2/12 with subsequent desaturation to 87%. Remains on SBT, tolerating 40% 5/5. Hypercarbia and uptrending bicarb to 39.  - Wean FiO2 to maintain SpO2 >92%  - Diamox 500mg IV x1 today  - Scheduled budesonide/duoneb  - continue humidified vent  - PRN albuterol  - Daily CXR prn  - ABG prn     GI: No GI history. Elevated LFT 1/16-1/21 resolved. Evidence of pericholecystic fluid and biliary sludge on renal US 1/15 but no abdominal symptoms. RUQ US without significant findings. S/P PEG 1/30. Tolerating TF at goal rate. BM x4 over past 24 hrs.   - Continue TFs at goal 45ml/hr  - Continue thiamine 100mg daily  - Continue PPI for GI prophylaxis  - increase fiber to TID  - holding bowel reg for soft stool     : No history of renal disease. Baseline creatinine 0.6. Symptomatic acute hyponatremia resolved, was given 3% saline infusions. Montiel replaced 2/1 for urine retention. Trial of silodosin stopped given recurrent hypotension. Failed TOV, straight cathed overnight  2/6. Hypokalemia requiring frequent repletion. Per urology note 2/7, continue yoo at discharge and will f/u outpatient for TOV.  - Maintain yoo catheter per Urology -> f/u outpatient  - RFP daily and prn  - Maintain U/O >0.5ml/kg/hr  - Replete electrolytes to maintain K>4, Phos>2.5, iCal>1.1, Mag>2     HEME: Hx of DOC. Baseline H/H 12/40. Acute blood loss anemia. Acute coagulopathy resolved. H/H stable. Started on low intensity heparin infusion 2/5.  - Check CBC daily and PRN  - coags prn  - Transition heparin infusion to Eliquis today  - Ongoing monitoring for s/s bleeding     ENDO: No history of DM or thyroid disease. Adequate glycemic control.  - BG q6h, SSI #1     MSK: Arthritis, Vit D deficiency. Sacral wound.  - Turn patient every 2 hrs, continue wound care  - Monitor for symptoms     ID: Completed treatment of COVID with decadron and remdesivir. Off precautions since 1/30. Pan-susceptible Pseudomonas UTI 2/1, started on cetriaxone 2/2, switched to zosyn 2/3 for 7 day course (completed 2/9). Blood cultures sent 2/4, negative. Currently afebrile, no leukocytosis. New leukocytosis 2/13 with WBC slowly uptrending to 11.6 but stable. No active s/s of infection.  - Temp q4h, wbc daily  - Ongoing monitoring for s/s infection    EP/cards recs 2/12/2025:  The patient's recurrent atrial flutter/tachycardia following pneumonectomy presents a therapeutic challenge, given the limitations of available antiarrhythmic options.      Telemetry review showed normal sinus rhythm in the 90s-100s without any episodes of Atach/Flutter in the past 48 hours.      -Remains off pressors, on midodrine 15g QID  -Continue digoxin 125mcg daily   -Can switch heparin gtt to an oral anticoagulation like Eliquis 5 mg BID  -Will need a ziopatch on discharge with EP follow up.    Nutrition Recommendations:  Individualized Nutrition Prescription Provided for :   Continue with Isosource 1.5 @ 45 ml/hr = 1620 kcal, 73 g protein, 825 ml free  "H20  Free H20 flush per SICU    Urology consulted for recommendations and management recommendations for urinary retention. Yoo catheter replaced today by ICU team.   Recommendations:  - Maintain yoo catheter on discharge   - Request urology referral for outpatient TOV in office  - Urology will sign off    Wound Team Summary Assessment:   The wound care team came to bedside to assess the patient's sacrum for a potential DTPI. The wound is 0.5 x 0.5 cm, deep purple and nonblanchable. The wound was cleansed with vashe wound cleanser and pat dry. A mepilex border dressing was applied to the wound sacrum.      Wound Team Plan:   Recommendations: Daily  Sacrum: Cleanse the wound area with vashe wound cleanser and gently pat dry   Apply a sacrum mepilex border dressing      Continue to turn the patient every 2 hours  Utilize a repositioning sheet, wedges and heel boots to offload the patient's bony prominences       Pertinent Physical Exam At Time of Discharge  General: She is a pleasant female currently in no distress but appears melancholy with flat affect.   Visit Vitals  /85   Pulse 107   Temp 36.5 °C (97.7 °F) (Temporal)   Resp 22   Ht 1.651 m (5' 5\")   Wt 53.5 kg (117 lb 15.1 oz)   SpO2 97%   BMI 19.63 kg/m²   OB Status Postmenopausal   Smoking Status Former   BSA 1.57 m²     Body mass index is 19.63 kg/m².   HEENT: Normocephalic and atraumatic.   NECK: Supple. Trach midline. No JVD.   CHEST: Breathing comfortably on prescribed vent settings via the trach.    HEART: Regular rate and rhythm. NSR per tele review.   ABDOMEN: Soft, flat, nontender. Tube feeds via feeding tube.   : yoo w/clear yellow uop  NEUROLOGIC: Alert and non-verbally interactive. Writes on white board. Overall, grossly intact.   EXTREMITIES: Moves all extremities equally but weakly.  Pedal pulses are palpable. No lower extremity edema. No calf tenderness.     Home Medications     Medication List      START taking these medications     " albuterol 2.5 mg /3 mL (0.083 %) nebulizer solution; Take 3 mL (2.5 mg)   by nebulization every 4 hours if needed for shortness of breath.;   Replaces: Ventolin HFA 90 mcg/actuation inhaler   apixaban 5 mg tablet; Commonly known as: Eliquis; 1 tablet (5 mg) by   g-tube route 2 times a day.   budesonide 0.5 mg/2 mL nebulizer solution; Commonly known as: Pulmicort;   Take 2 mL (0.5 mg) by nebulization 2 times a day. Rinse mouth with water   after use to reduce aftertaste and incidence of candidiasis. Do not   swallow.   clonazePAM 1 mg tablet; Commonly known as: KlonoPIN; 1 tablet (1 mg) by   g-tube route every 8 hours.   digoxin 250 mcg/mL (0.25 mg/mL) injection; Commonly known as: Lanoxin;   Infuse 0.5 mL (125 mcg) over 5 minutes into a venous catheter once daily.;   Start taking on: February 14, 2025   esomeprazole 40 mg packet; Commonly known as: NexIUM; Take 40 mg by   mouth once daily.   hydrocortisone sodium succinate (PF) 100 mg/2 mL injection; Commonly   known as: Solu-CORTEF; Infuse 1 mL (50 mg) into a venous catheter once   every 24 hours. Do not fill before February 14, 2025.; Start taking on:   February 14, 2025   ipratropium-albuteroL 0.5-2.5 mg/3 mL nebulizer solution; Commonly known   as: Duo-Neb; Take 3 mL by nebulization every 6 hours.   midodrine 5 mg tablet; Commonly known as: Proamatine; 3 tablets (15 mg)   by g-tube route every 6 hours.   oxygen gas therapy; Commonly known as: O2; Inhale 40 percent   continuously.   thiamine 100 mg tablet; Commonly known as: Vitamin B-1; 1 tablet (100   mg) by g-tube route once daily.; Start taking on: February 14, 2025   traZODone 50 mg tablet; Commonly known as: Desyrel; 1 tablet (50 mg) by   g-tube route once daily at bedtime.     STOP taking these medications     ALPRAZolam 1 mg tablet; Commonly known as: Xanax   benzonatate 200 mg capsule; Commonly known as: Tessalon   chlorhexidine 0.12 % solution; Commonly known as: Peridex   chlorhexidine 4 % external  liquid; Commonly known as: Hibiclens   Trelegy Ellipta 100-62.5-25 mcg blister with device; Generic drug:   fluticasone-umeclidin-vilanter   Ventolin HFA 90 mcg/actuation inhaler; Generic drug: albuterol; Replaced   by: albuterol 2.5 mg /3 mL (0.083 %) nebulizer solution         Outpatient Follow-Up  Future Appointments   Date Time Provider Department Center   2/27/2025 11:00 AM Jonah Wagner MD [id number]       Time spent with discharge was >30 minutes and included coordination of care, arranging hospital follow-up, and creating the discharge summary of the patient's hospialization.    Alondra Cheung, APRN-CNP

## 2025-02-15 LAB
ATRIAL RATE: 100 BPM
P AXIS: 54 DEGREES
P OFFSET: 217 MS
P ONSET: 167 MS
PR INTERVAL: 116 MS
Q ONSET: 225 MS
QRS COUNT: 17 BEATS
QRS DURATION: 76 MS
QT INTERVAL: 338 MS
QTC CALCULATION(BAZETT): 436 MS
QTC FREDERICIA: 401 MS
R AXIS: 43 DEGREES
T AXIS: 243 DEGREES
T OFFSET: 394 MS
VENTRICULAR RATE: 100 BPM

## 2025-02-27 ENCOUNTER — APPOINTMENT (OUTPATIENT)
Dept: SURGERY | Facility: HOSPITAL | Age: 65
End: 2025-02-27
Payer: COMMERCIAL

## 2025-03-05 NOTE — DOCUMENTATION CLARIFICATION NOTE
"    PATIENT:               RAÚL KAMINSKI  ACCT #:                  [id number]  MRN:                       [id number]  :                       1960  ADMIT DATE:       2025 5:40 AM  DISCH DATE:        2025 1:45 PM  RESPONDING PROVIDER #:        [id number]          PROVIDER RESPONSE TEXT:    Sepsis ruled out after study; patient had cardiogenic shock, metabolic encephalopathy, and acute respiratory failure    CDI QUERY TEXT:    Clarification    Instruction:  Based on your assessment of the patient and the clinical information, please provide the requested documentation by clicking on the appropriate radio button and enter any additional information if prompted.    Question: Sepsis was documented in the medical record. Based on the documentation and the clinical information, can the diagnosis be further clarified as    When answering this query, please exercise your independent professional judgment. The fact that a question is being asked, does not imply that any particular answer is desired or expected.    The patient's clinical indicators include:  Clinical Information: 64 year old woman with SCC s/p pneumonectomy;     Documented Diagnosis: MED CC PN : \"ID/rheum: Sepsis with acute organ dysfunction 2/2 presumed aspiration v HCAP and COVID Current abx: zosyn ending today Follow cx data\"    Clinical Indicators:  -Vital Signs :  HR: 133  129  125  131  160  157  153  148  145  143  135  130  129  137  RR: 27  18  20  23  39  24  34  22  32  26  22  19  20  51  -WBC : 8.6 12.8  23.1  9.6  7.7  -Microbiology Results: no blood cultures  -Band Neutrophil Count/percent Band Neutrophil: not performed  -Lactic acid :  1.0  0.9  1.6  0.8  0.9  -BUN/Creat: 19/0.47  18/0.47  18/0.53  -Bilirubin: not performed  -MAP : 71  73  78  79  80  79  84  86  80  78  77  70  80  93  97  91  -Val[place] Coma Scale : GCS eye subscore is 3. GCS verbal subscore is 1. GCS motor subscore is 6  -PAO2/FIO2 " "1/23: 89/40  -Procalcitonin: not performed  -Platelets 1/23: 132  169  139  138  -Other clinical indicators:    ID CONSULT 1/21: \"developed acute respiratory failure from cardiogenic shock c/f takotsumo cariomyopathy 1/16. Was started on zosyn/azithromycin(1/17-p) fluconazole(1/18-p), and vancomycin (1/17-1/20). Re-intubated given worsening condition on 1/18. Detected COVID19 PCR as one of workups on 1/20, and added on remidesivir 1/20. Respiratory panel, blood/urine cultures 1/17 were negative. X-ray chest showed left lung likely from edema with plural effusion. Now, clinically improved/stable on respiratory status antibiotics above and ICU management.\"    MED CC PN 1/23: \"Post-op course c/b acute hyponatremia, multifactorial delirium, cardiogenic shock requiring inotropic support, and acute hypoxic/hypercarbic respiratory failure requiring intubation. COVID+ 1/20. Weaned off milrinone 1/21. Remains on iEpo 0.03.\"    Treatment: ID consult, Cardiology consult, Levophed IV 1/18-1/20, remdesivir 1/    Risk Factors: COVID, takotsumo, lung cancer  Options provided:  -- Sepsis ruled out after study; patient had cardiogenic shock, metabolic encephalopathy, and acute respiratory failure  -- Sepsis with multi-organ dysfunction of septic shock, metabolic encephalopathy, and acute respiratory failure, Please specify sepsis associated organ dysfunction below  -- Other - I will add my own diagnosis  -- Refer to Clinical Documentation Reviewer    Query created by: Clay Heath on 3/5/2025 11:03 AM      Electronically signed by:  RAJENDRA FRIED MD 3/5/2025 11:29 AM          "

## 2025-03-13 LAB
ACID FAST STN SPEC: NORMAL
MYCOBACTERIUM SPEC CULT: NORMAL
SCAN RESULT: NORMAL

## (undated) DEVICE — Device

## (undated) DEVICE — ELECTRODE, ELECTROSURGICAL, BLADE, STANDARD, 2.75 IN

## (undated) DEVICE — CATHETER, URETHRAL, ROBNEL,  8 FR, 16 IN, LF, RED

## (undated) DEVICE — TOWEL,OR,DSP,ST,BLUE,STD,6/PK,12PK/CS: Brand: MEDLINE

## (undated) DEVICE — TUBE, TRACH, CUFFED, LOW PRESS, LPC, SZ-6, 6.4MM ID, LF

## (undated) DEVICE — RELOAD, ECHELON, VASCULAR WHITE ENDOPATH 35MM FLEX POWERED

## (undated) DEVICE — DRESSING, ADHESIVE, ISLAND, TELFA, 4 X 14 IN

## (undated) DEVICE — LIQUIBAND RAPID ADHESIVE 36/CS 0.8ML: Brand: MEDLINE

## (undated) DEVICE — DOUBLE BASIN SET: Brand: MEDLINE INDUSTRIES, INC.

## (undated) DEVICE — GLOVE ORANGE PI 7   MSG9070

## (undated) DEVICE — STRIP, SKIN CLOSURE, STERI STRIP, REINFORCED, 0.5 X 4 IN

## (undated) DEVICE — GRADUATE TRIANG MEASURE 1000ML BLK PRNT

## (undated) DEVICE — INTRODUCER KIT, HEMOSTASIS, VALVE/SIDEPORT, W/GUIDEWIRE, 0.035 IN, 8.5 FR, 10 CM, LF

## (undated) DEVICE — SUTURE, VICRYL 2, TAPER POINT, TP-1 VIOLET 4-27 INCH

## (undated) DEVICE — SUTURE, VICRYL, 3-0, 27 IN, CT-2, VIOLET

## (undated) DEVICE — PACK PROCEDURE SURG GEN CUST

## (undated) DEVICE — GOWN,SIRUS,FABRNF,XL,20/CS: Brand: MEDLINE

## (undated) DEVICE — SOLUTION SURG PREP 26 CC PURPREP

## (undated) DEVICE — C-ARM: Brand: UNBRANDED

## (undated) DEVICE — COVER, CART, 45 X 27 X 48 IN, CLEAR

## (undated) DEVICE — SOLUTION IV 100ML 0.9% SOD CHL PLAS CONT USP VIAFLX 1 PER

## (undated) DEVICE — BLADE,STAINLESS-STEEL,15,STRL,DISPOSABLE: Brand: MEDLINE

## (undated) DEVICE — DECANTER: Brand: UNBRANDED

## (undated) DEVICE — COVER, TABLE, UHC

## (undated) DEVICE — DRAPE, INCISE, ANTIMICROBIAL, IOBAN 2, LARGE, 17 X 23 IN, DISPOSABLE, STERILE

## (undated) DEVICE — CLIP, LIGATING, W/ADHESIVE PAD, MEDIUM, TITANIUM

## (undated) DEVICE — MANIFOLD, 4 PORT NEPTUNE STANDARD

## (undated) DEVICE — DRESSING, ISLAND, TELFA, 4 X 5 IN

## (undated) DEVICE — CATHETER, THORACIC, STRAIGHT, ADULT, 28 FR, PVC

## (undated) DEVICE — SUTURE, ETHIBOND EXCEL 1, TAPER POINT CT-1 GREEN 30 INCH

## (undated) DEVICE — SHEET, SPLIT, CARDIOVASCULAR TIBURON

## (undated) DEVICE — DRAPE, TIBURON, SPLIT SHEET, REINF ADH STRIP, 77X122

## (undated) DEVICE — SPONGE, DISSECTOR, PEANUT, 3/8, STERILE 5 FOAM HOLDER"

## (undated) DEVICE — ADHESIVE, SKIN, DERMABOND ADVANCED, 15CM, PEN-STYLE

## (undated) DEVICE — SUTURE, VICRYL, 2-0, 27 IN, CT-1, VIOLET

## (undated) DEVICE — COLLECTION UNIT, DRAINAGE, THORACIC, SINGLE TUBE, DRY SUCTION, ATS COMPATIBLE, OASIS 3600, LF

## (undated) DEVICE — SYRINGE MED 10ML POLYPR LUERSLIP TIP FLAT TOP W/O SFTY DISP

## (undated) DEVICE — SUTURE, MONOCRYL, 3-0, 18 IN, PS2, UNDYED

## (undated) DEVICE — SYRINGE, LUER LOCK, 12ML

## (undated) DEVICE — CATHETER TRAY, SURESTEP, 16FR, URINE METER W/STATLOCK

## (undated) DEVICE — COVER HNDL LT DISP

## (undated) DEVICE — NEEDLE HYPO 25GA L1.5IN BLU POLYPR HUB S STL REG BVL STR

## (undated) DEVICE — MARKER, SKIN, RULER AND LABEL PACK, CUSTOM

## (undated) DEVICE — PAD, ELECTRODE DEFIB PADPRO ADULT STRL W/ADAPTER

## (undated) DEVICE — ROLL, DENTAL, 3/8 X 1-1/2, STERILE, 5/PK

## (undated) DEVICE — TIP, SUCTION, YANKAUER, BULB, ADULT

## (undated) DEVICE — SUTURE, SILK, 2-0, TIES, 12-30 IN, BLACK

## (undated) DEVICE — STAPLER, RELOADABLE LINEAR, 30MM 4.8,  GREEN

## (undated) DEVICE — GLOVE ORANGE PI 7 1/2   MSG9075

## (undated) DEVICE — SYRINGE,CONTROL,LL,FINGER,GRIP: Brand: MEDLINE INDUSTRIES, INC.

## (undated) DEVICE — ELECTRODE PT RET AD L9FT HI MOIST COND ADH HYDRGEL CORDED

## (undated) DEVICE — TUBING, SUCTION, CONNECTING, STERILE 0.25 X 120 IN., LF

## (undated) DEVICE — 4-PORT MANIFOLD: Brand: NEPTUNE 2

## (undated) DEVICE — SUTURE, VICRYL 0, TAPER POINT, CT-1 VIOLET 27 INCH

## (undated) DEVICE — SUTURE, SILK, 0, 24 IN, SUTUPAK, BLACK

## (undated) DEVICE — DRAPE,CHEST,FENES,15X10,STERIL: Brand: MEDLINE

## (undated) DEVICE — CATHETER, THERMODILUTION, SWAN GANZ, VIP, 5 LUMEN, 7.5 FR, 110 CM

## (undated) DEVICE — STAPLER, ECHELON, 35 VASCULAR FLEX POWERED

## (undated) DEVICE — PEG KIT, SAFETY, PULL, 20FR W/ENFIT

## (undated) DEVICE — SUTURE, SILK, 3-0, 30 IN, SH, CONTROL RELEASE, MULTIPACK, BLACK

## (undated) DEVICE — CLIP, LIGATING, HORIZON, LARGE, TITANIUM

## (undated) DEVICE — SPONGE GZ W4XL4IN RAYON POLY CVR W/NONWOVEN FAB STRL 2/PK